# Patient Record
Sex: FEMALE | Race: WHITE | Employment: UNEMPLOYED | URBAN - METROPOLITAN AREA
[De-identification: names, ages, dates, MRNs, and addresses within clinical notes are randomized per-mention and may not be internally consistent; named-entity substitution may affect disease eponyms.]

---

## 2017-03-30 ENCOUNTER — ANESTHESIA EVENT (OUTPATIENT)
Dept: SURGERY | Age: 57
DRG: 824 | End: 2017-03-30
Payer: COMMERCIAL

## 2017-03-30 ENCOUNTER — ANESTHESIA (OUTPATIENT)
Dept: SURGERY | Age: 57
DRG: 824 | End: 2017-03-30
Payer: COMMERCIAL

## 2017-03-30 ENCOUNTER — HOSPITAL ENCOUNTER (INPATIENT)
Age: 57
LOS: 1 days | Discharge: HOME | DRG: 824 | End: 2017-03-31
Attending: INTERNAL MEDICINE | Admitting: INTERNAL MEDICINE
Payer: COMMERCIAL

## 2017-03-30 ENCOUNTER — PATIENT OUTREACH (OUTPATIENT)
Dept: CASE MANAGEMENT | Age: 57
End: 2017-03-30

## 2017-03-30 DIAGNOSIS — R59.1 LYMPHADENOPATHY, GENERALIZED: ICD-10-CM

## 2017-03-30 DIAGNOSIS — R18.8 OTHER ASCITES: Primary | ICD-10-CM

## 2017-03-30 PROBLEM — C85.90 NON HODGKIN'S LYMPHOMA (HCC): Status: ACTIVE | Noted: 2017-03-30

## 2017-03-30 LAB
ALBUMIN SERPL BCP-MCNC: 2.9 G/DL (ref 3.5–5)
ALBUMIN/GLOB SERPL: 0.9 {RATIO} (ref 1.2–3.5)
ALP SERPL-CCNC: 89 U/L (ref 50–136)
ALT SERPL-CCNC: 14 U/L (ref 12–65)
ANION GAP BLD CALC-SCNC: 7 MMOL/L (ref 7–16)
APTT PPP: 23.9 SEC (ref 23.5–31.7)
AST SERPL W P-5'-P-CCNC: 32 U/L (ref 15–37)
BASOPHILS # BLD AUTO: 0 K/UL (ref 0–0.2)
BASOPHILS # BLD: 1 % (ref 0–2)
BILIRUB SERPL-MCNC: 0.6 MG/DL (ref 0.2–1.1)
BUN SERPL-MCNC: 39 MG/DL (ref 6–23)
CALCIUM SERPL-MCNC: 8.8 MG/DL (ref 8.3–10.4)
CHLORIDE SERPL-SCNC: 108 MMOL/L (ref 98–107)
CO2 SERPL-SCNC: 27 MMOL/L (ref 21–32)
CREAT SERPL-MCNC: 1.76 MG/DL (ref 0.6–1)
DIFFERENTIAL METHOD BLD: ABNORMAL
EOSINOPHIL # BLD: 0.5 K/UL (ref 0–0.8)
EOSINOPHIL NFR BLD: 12 % (ref 0.5–7.8)
ERYTHROCYTE [DISTWIDTH] IN BLOOD BY AUTOMATED COUNT: 18.5 % (ref 11.9–14.6)
GLOBULIN SER CALC-MCNC: 3.1 G/DL (ref 2.3–3.5)
GLUCOSE SERPL-MCNC: 70 MG/DL (ref 65–100)
HCT VFR BLD AUTO: 26.9 % (ref 35.8–46.3)
HGB BLD-MCNC: 8.1 G/DL (ref 11.7–15.4)
IMM GRANULOCYTES # BLD: 0 K/UL (ref 0–0.5)
IMM GRANULOCYTES NFR BLD AUTO: 0.2 % (ref 0–5)
INR PPP: 1 (ref 0.9–1.2)
LYMPHOCYTES # BLD AUTO: 20 % (ref 13–44)
LYMPHOCYTES # BLD: 0.8 K/UL (ref 0.5–4.6)
MAGNESIUM SERPL-MCNC: 2 MG/DL (ref 1.8–2.4)
MCH RBC QN AUTO: 26.9 PG (ref 26.1–32.9)
MCHC RBC AUTO-ENTMCNC: 30.1 G/DL (ref 31.4–35)
MCV RBC AUTO: 89.4 FL (ref 79.6–97.8)
MONOCYTES # BLD: 0.4 K/UL (ref 0.1–1.3)
MONOCYTES NFR BLD AUTO: 10 % (ref 4–12)
NEUTS SEG # BLD: 2.5 K/UL (ref 1.7–8.2)
NEUTS SEG NFR BLD AUTO: 57 % (ref 43–78)
PLATELET # BLD AUTO: 193 K/UL (ref 150–450)
PMV BLD AUTO: 9.5 FL (ref 10.8–14.1)
POTASSIUM SERPL-SCNC: 5.1 MMOL/L (ref 3.5–5.1)
PROT SERPL-MCNC: 6 G/DL (ref 6.3–8.2)
PROTHROMBIN TIME: 10.9 SEC (ref 9.6–12)
RBC # BLD AUTO: 3.01 M/UL (ref 4.05–5.25)
SODIUM SERPL-SCNC: 142 MMOL/L (ref 136–145)
URATE SERPL-MCNC: 15.4 MG/DL (ref 2.6–6)
WBC # BLD AUTO: 4.3 K/UL (ref 4.3–11.1)

## 2017-03-30 PROCEDURE — 77030008703 HC TU ET UNCUF COVD -A: Performed by: ANESTHESIOLOGY

## 2017-03-30 PROCEDURE — 77030008477 HC STYL SATN SLP COVD -A: Performed by: ANESTHESIOLOGY

## 2017-03-30 PROCEDURE — 76210000006 HC OR PH I REC 0.5 TO 1 HR: Performed by: SURGERY

## 2017-03-30 PROCEDURE — 74011250636 HC RX REV CODE- 250/636: Performed by: SURGERY

## 2017-03-30 PROCEDURE — 86334 IMMUNOFIX E-PHORESIS SERUM: CPT | Performed by: INTERNAL MEDICINE

## 2017-03-30 PROCEDURE — 74011250637 HC RX REV CODE- 250/637: Performed by: INTERNAL MEDICINE

## 2017-03-30 PROCEDURE — 85025 COMPLETE CBC W/AUTO DIFF WBC: CPT | Performed by: INTERNAL MEDICINE

## 2017-03-30 PROCEDURE — 88305 TISSUE EXAM BY PATHOLOGIST: CPT | Performed by: SURGERY

## 2017-03-30 PROCEDURE — 76060000032 HC ANESTHESIA 0.5 TO 1 HR: Performed by: SURGERY

## 2017-03-30 PROCEDURE — 77030020782 HC GWN BAIR PAWS FLX 3M -B: Performed by: ANESTHESIOLOGY

## 2017-03-30 PROCEDURE — 77030033269 HC SLV COMPR SCD KNE2 CARD -B: Performed by: SURGERY

## 2017-03-30 PROCEDURE — 88342 IMHCHEM/IMCYTCHM 1ST ANTB: CPT | Performed by: SURGERY

## 2017-03-30 PROCEDURE — 74011250636 HC RX REV CODE- 250/636

## 2017-03-30 PROCEDURE — 80053 COMPREHEN METABOLIC PANEL: CPT | Performed by: INTERNAL MEDICINE

## 2017-03-30 PROCEDURE — 85610 PROTHROMBIN TIME: CPT | Performed by: INTERNAL MEDICINE

## 2017-03-30 PROCEDURE — 74011250637 HC RX REV CODE- 250/637: Performed by: NURSE PRACTITIONER

## 2017-03-30 PROCEDURE — 65270000029 HC RM PRIVATE

## 2017-03-30 PROCEDURE — 36415 COLL VENOUS BLD VENIPUNCTURE: CPT | Performed by: INTERNAL MEDICINE

## 2017-03-30 PROCEDURE — 77030011640 HC PAD GRND REM COVD -A: Performed by: SURGERY

## 2017-03-30 PROCEDURE — 74011000250 HC RX REV CODE- 250: Performed by: ANESTHESIOLOGY

## 2017-03-30 PROCEDURE — 83735 ASSAY OF MAGNESIUM: CPT | Performed by: INTERNAL MEDICINE

## 2017-03-30 PROCEDURE — 77030019908 HC STETH ESOPH SIMS -A: Performed by: ANESTHESIOLOGY

## 2017-03-30 PROCEDURE — 77030031139 HC SUT VCRL2 J&J -A: Performed by: SURGERY

## 2017-03-30 PROCEDURE — 84165 PROTEIN E-PHORESIS SERUM: CPT | Performed by: INTERNAL MEDICINE

## 2017-03-30 PROCEDURE — 74011250636 HC RX REV CODE- 250/636: Performed by: ANESTHESIOLOGY

## 2017-03-30 PROCEDURE — 88341 IMHCHEM/IMCYTCHM EA ADD ANTB: CPT | Performed by: SURGERY

## 2017-03-30 PROCEDURE — 74011000250 HC RX REV CODE- 250

## 2017-03-30 PROCEDURE — 07B50ZX EXCISION OF RIGHT AXILLARY LYMPHATIC, OPEN APPROACH, DIAGNOSTIC: ICD-10-PCS | Performed by: SURGERY

## 2017-03-30 PROCEDURE — 85730 THROMBOPLASTIN TIME PARTIAL: CPT | Performed by: INTERNAL MEDICINE

## 2017-03-30 PROCEDURE — 84550 ASSAY OF BLOOD/URIC ACID: CPT | Performed by: INTERNAL MEDICINE

## 2017-03-30 PROCEDURE — 77030018836 HC SOL IRR NACL ICUM -A: Performed by: SURGERY

## 2017-03-30 PROCEDURE — 77030010514 HC APPL CLP LIG COVD -B: Performed by: SURGERY

## 2017-03-30 PROCEDURE — 83615 LACTATE (LD) (LDH) ENZYME: CPT | Performed by: INTERNAL MEDICINE

## 2017-03-30 PROCEDURE — 76010000138 HC OR TIME 0.5 TO 1 HR: Performed by: SURGERY

## 2017-03-30 PROCEDURE — 74011000250 HC RX REV CODE- 250: Performed by: SURGERY

## 2017-03-30 RX ORDER — SUCCINYLCHOLINE CHLORIDE 20 MG/ML
INJECTION INTRAMUSCULAR; INTRAVENOUS AS NEEDED
Status: DISCONTINUED | OUTPATIENT
Start: 2017-03-30 | End: 2017-03-30 | Stop reason: HOSPADM

## 2017-03-30 RX ORDER — BUPIVACAINE HYDROCHLORIDE AND EPINEPHRINE 2.5; 5 MG/ML; UG/ML
INJECTION, SOLUTION EPIDURAL; INFILTRATION; INTRACAUDAL; PERINEURAL AS NEEDED
Status: DISCONTINUED | OUTPATIENT
Start: 2017-03-30 | End: 2017-03-30 | Stop reason: HOSPADM

## 2017-03-30 RX ORDER — LIDOCAINE HYDROCHLORIDE 10 MG/ML
0.1 INJECTION INFILTRATION; PERINEURAL AS NEEDED
Status: DISCONTINUED | OUTPATIENT
Start: 2017-03-30 | End: 2017-03-30 | Stop reason: HOSPADM

## 2017-03-30 RX ORDER — LISINOPRIL 20 MG/1
20 TABLET ORAL DAILY
Status: DISCONTINUED | OUTPATIENT
Start: 2017-03-31 | End: 2017-03-31 | Stop reason: HOSPADM

## 2017-03-30 RX ORDER — HYDROCODONE BITARTRATE AND ACETAMINOPHEN 5; 325 MG/1; MG/1
1 TABLET ORAL
Status: DISCONTINUED | OUTPATIENT
Start: 2017-03-30 | End: 2017-03-31

## 2017-03-30 RX ORDER — SODIUM CHLORIDE, SODIUM LACTATE, POTASSIUM CHLORIDE, CALCIUM CHLORIDE 600; 310; 30; 20 MG/100ML; MG/100ML; MG/100ML; MG/100ML
100 INJECTION, SOLUTION INTRAVENOUS CONTINUOUS
Status: DISCONTINUED | OUTPATIENT
Start: 2017-03-30 | End: 2017-03-30 | Stop reason: HOSPADM

## 2017-03-30 RX ORDER — SODIUM CHLORIDE 0.9 % (FLUSH) 0.9 %
5-10 SYRINGE (ML) INJECTION AS NEEDED
Status: DISCONTINUED | OUTPATIENT
Start: 2017-03-30 | End: 2017-03-30 | Stop reason: HOSPADM

## 2017-03-30 RX ORDER — PANTOPRAZOLE SODIUM 40 MG/1
40 TABLET, DELAYED RELEASE ORAL
Status: DISCONTINUED | OUTPATIENT
Start: 2017-03-31 | End: 2017-03-31 | Stop reason: HOSPADM

## 2017-03-30 RX ORDER — GABAPENTIN 300 MG/1
300 CAPSULE ORAL
Status: DISCONTINUED | OUTPATIENT
Start: 2017-03-30 | End: 2017-03-31 | Stop reason: HOSPADM

## 2017-03-30 RX ORDER — NEOSTIGMINE METHYLSULFATE 1 MG/ML
INJECTION INTRAVENOUS AS NEEDED
Status: DISCONTINUED | OUTPATIENT
Start: 2017-03-30 | End: 2017-03-30 | Stop reason: HOSPADM

## 2017-03-30 RX ORDER — ONDANSETRON 2 MG/ML
INJECTION INTRAMUSCULAR; INTRAVENOUS AS NEEDED
Status: DISCONTINUED | OUTPATIENT
Start: 2017-03-30 | End: 2017-03-30 | Stop reason: HOSPADM

## 2017-03-30 RX ORDER — PROMETHAZINE HYDROCHLORIDE 25 MG/1
25 TABLET ORAL
Status: DISCONTINUED | OUTPATIENT
Start: 2017-03-30 | End: 2017-03-31 | Stop reason: HOSPADM

## 2017-03-30 RX ORDER — GLYCOPYRROLATE 0.2 MG/ML
INJECTION INTRAMUSCULAR; INTRAVENOUS AS NEEDED
Status: DISCONTINUED | OUTPATIENT
Start: 2017-03-30 | End: 2017-03-30 | Stop reason: HOSPADM

## 2017-03-30 RX ORDER — FENTANYL CITRATE 50 UG/ML
INJECTION, SOLUTION INTRAMUSCULAR; INTRAVENOUS AS NEEDED
Status: DISCONTINUED | OUTPATIENT
Start: 2017-03-30 | End: 2017-03-30 | Stop reason: HOSPADM

## 2017-03-30 RX ORDER — ROCURONIUM BROMIDE 10 MG/ML
INJECTION, SOLUTION INTRAVENOUS AS NEEDED
Status: DISCONTINUED | OUTPATIENT
Start: 2017-03-30 | End: 2017-03-30 | Stop reason: HOSPADM

## 2017-03-30 RX ORDER — IPRATROPIUM BROMIDE AND ALBUTEROL SULFATE 2.5; .5 MG/3ML; MG/3ML
3 SOLUTION RESPIRATORY (INHALATION)
Status: DISCONTINUED | OUTPATIENT
Start: 2017-03-30 | End: 2017-03-30

## 2017-03-30 RX ORDER — ONDANSETRON 2 MG/ML
4 INJECTION INTRAMUSCULAR; INTRAVENOUS
Status: DISCONTINUED | OUTPATIENT
Start: 2017-03-30 | End: 2017-03-31 | Stop reason: HOSPADM

## 2017-03-30 RX ORDER — LEVALBUTEROL INHALATION SOLUTION 0.63 MG/3ML
0.63 SOLUTION RESPIRATORY (INHALATION)
Status: COMPLETED | OUTPATIENT
Start: 2017-03-30 | End: 2017-03-30

## 2017-03-30 RX ORDER — LIDOCAINE HYDROCHLORIDE 20 MG/ML
INJECTION, SOLUTION EPIDURAL; INFILTRATION; INTRACAUDAL; PERINEURAL AS NEEDED
Status: DISCONTINUED | OUTPATIENT
Start: 2017-03-30 | End: 2017-03-30 | Stop reason: HOSPADM

## 2017-03-30 RX ORDER — OXYCODONE HYDROCHLORIDE 5 MG/1
5 TABLET ORAL
Status: DISCONTINUED | OUTPATIENT
Start: 2017-03-30 | End: 2017-03-30 | Stop reason: HOSPADM

## 2017-03-30 RX ORDER — HYDROMORPHONE HYDROCHLORIDE 2 MG/ML
0.5 INJECTION, SOLUTION INTRAMUSCULAR; INTRAVENOUS; SUBCUTANEOUS
Status: DISCONTINUED | OUTPATIENT
Start: 2017-03-30 | End: 2017-03-30 | Stop reason: HOSPADM

## 2017-03-30 RX ORDER — MIDAZOLAM HYDROCHLORIDE 1 MG/ML
2 INJECTION, SOLUTION INTRAMUSCULAR; INTRAVENOUS ONCE
Status: DISCONTINUED | OUTPATIENT
Start: 2017-03-30 | End: 2017-03-30 | Stop reason: HOSPADM

## 2017-03-30 RX ORDER — CEFAZOLIN SODIUM IN 0.9 % NACL 2 G/50 ML
2-3 INTRAVENOUS SOLUTION, PIGGYBACK (ML) INTRAVENOUS ONCE
Status: DISCONTINUED | OUTPATIENT
Start: 2017-03-30 | End: 2017-03-30 | Stop reason: DRUGHIGH

## 2017-03-30 RX ORDER — SODIUM CHLORIDE 0.9 % (FLUSH) 0.9 %
5-10 SYRINGE (ML) INJECTION EVERY 8 HOURS
Status: DISCONTINUED | OUTPATIENT
Start: 2017-03-30 | End: 2017-03-30 | Stop reason: HOSPADM

## 2017-03-30 RX ORDER — FENTANYL CITRATE 50 UG/ML
100 INJECTION, SOLUTION INTRAMUSCULAR; INTRAVENOUS ONCE
Status: DISCONTINUED | OUTPATIENT
Start: 2017-03-30 | End: 2017-03-30 | Stop reason: HOSPADM

## 2017-03-30 RX ORDER — PROPOFOL 10 MG/ML
INJECTION, EMULSION INTRAVENOUS AS NEEDED
Status: DISCONTINUED | OUTPATIENT
Start: 2017-03-30 | End: 2017-03-30 | Stop reason: HOSPADM

## 2017-03-30 RX ORDER — MIDAZOLAM HYDROCHLORIDE 1 MG/ML
2 INJECTION, SOLUTION INTRAMUSCULAR; INTRAVENOUS
Status: COMPLETED | OUTPATIENT
Start: 2017-03-30 | End: 2017-03-30

## 2017-03-30 RX ORDER — DEXAMETHASONE SODIUM PHOSPHATE 4 MG/ML
INJECTION, SOLUTION INTRA-ARTICULAR; INTRALESIONAL; INTRAMUSCULAR; INTRAVENOUS; SOFT TISSUE AS NEEDED
Status: DISCONTINUED | OUTPATIENT
Start: 2017-03-30 | End: 2017-03-30 | Stop reason: HOSPADM

## 2017-03-30 RX ADMIN — ROCURONIUM BROMIDE 10 MG: 10 INJECTION, SOLUTION INTRAVENOUS at 19:38

## 2017-03-30 RX ADMIN — CEFAZOLIN 3 G: 1 INJECTION, POWDER, FOR SOLUTION INTRAMUSCULAR; INTRAVENOUS; PARENTERAL at 19:44

## 2017-03-30 RX ADMIN — LIDOCAINE HYDROCHLORIDE 60 MG: 20 INJECTION, SOLUTION EPIDURAL; INFILTRATION; INTRACAUDAL; PERINEURAL at 19:38

## 2017-03-30 RX ADMIN — SUCCINYLCHOLINE CHLORIDE 140 MG: 20 INJECTION INTRAMUSCULAR; INTRAVENOUS at 19:38

## 2017-03-30 RX ADMIN — HYDROCODONE BITARTRATE AND ACETAMINOPHEN 1 TABLET: 5; 325 TABLET ORAL at 15:01

## 2017-03-30 RX ADMIN — DEXAMETHASONE SODIUM PHOSPHATE 4 MG: 4 INJECTION, SOLUTION INTRA-ARTICULAR; INTRALESIONAL; INTRAMUSCULAR; INTRAVENOUS; SOFT TISSUE at 19:56

## 2017-03-30 RX ADMIN — LEVALBUTEROL HYDROCHLORIDE 0.63 MG: 0.63 SOLUTION RESPIRATORY (INHALATION) at 20:35

## 2017-03-30 RX ADMIN — Medication 10 ML: at 21:30

## 2017-03-30 RX ADMIN — ONDANSETRON 4 MG: 2 INJECTION INTRAMUSCULAR; INTRAVENOUS at 19:54

## 2017-03-30 RX ADMIN — ROCURONIUM BROMIDE 20 MG: 10 INJECTION, SOLUTION INTRAVENOUS at 19:47

## 2017-03-30 RX ADMIN — SODIUM CHLORIDE, SODIUM LACTATE, POTASSIUM CHLORIDE, AND CALCIUM CHLORIDE 100 ML/HR: 600; 310; 30; 20 INJECTION, SOLUTION INTRAVENOUS at 17:22

## 2017-03-30 RX ADMIN — FENTANYL CITRATE 50 MCG: 50 INJECTION, SOLUTION INTRAMUSCULAR; INTRAVENOUS at 20:05

## 2017-03-30 RX ADMIN — FENTANYL CITRATE 50 MCG: 50 INJECTION, SOLUTION INTRAMUSCULAR; INTRAVENOUS at 20:09

## 2017-03-30 RX ADMIN — GLYCOPYRROLATE 0.4 MG: 0.2 INJECTION INTRAMUSCULAR; INTRAVENOUS at 20:04

## 2017-03-30 RX ADMIN — NEOSTIGMINE METHYLSULFATE 4 MG: 1 INJECTION INTRAVENOUS at 20:04

## 2017-03-30 RX ADMIN — FENTANYL CITRATE 100 MCG: 50 INJECTION, SOLUTION INTRAMUSCULAR; INTRAVENOUS at 19:35

## 2017-03-30 RX ADMIN — PROPOFOL 200 MG: 10 INJECTION, EMULSION INTRAVENOUS at 19:38

## 2017-03-30 RX ADMIN — MIDAZOLAM HYDROCHLORIDE 2 MG: 1 INJECTION, SOLUTION INTRAMUSCULAR; INTRAVENOUS at 19:25

## 2017-03-30 RX ADMIN — GABAPENTIN 300 MG: 300 CAPSULE ORAL at 22:30

## 2017-03-30 NOTE — CONSULTS
[facility]  H&P/Consult Note     Marisol Albarado  MRN: [id number]  BOJ:[birth year]  Age:56 y.o.    HPI: Marisol Albarado is a 64 y.o.  female who was admitted today by the Oncology service for expedited lymphoma evaluation. We have been consulted to obtain axilla node biopsy. She reports that she has been NPO all day and did not take her usual morning ASA. She is anxious to have biopsy and remaining work-up completed. PMHx listed below. She reports prior surgeries: cholecystectomy, L achilles tendon, bilat TKA. She does not smoke, social ETOH. Past Medical History:   Diagnosis Date    Anemia     in high school, \"received gamma globulin twice a week for awhile\"    Arthritis     osteo-r knee    Basal cell carcinoma     Followed by Dermatology    Chronic pain     KNEEs    Hypertension 10/4/2011    medication    Morbid obesity ([address]     Murmur     \"had it my whole life\", did not appreciate murmur 9/12/2011 during assessment    Non Hodgkin's lymphoma ([address] 3/30/2017    Other ill-defined conditions(799.89)     skin bumps-med as needed    Overactive bladder     Rheumatic fever     Possibly as a child    Shingles     Thromboembolus ([address] x2    LLE-calf-1990?-only took ASA-resolved in less than a wk-\"a little burned area-not examined\"    Thyroid disease     hypo-medication    Unspecified adverse effect of anesthesia     pt reports waking several times with knee surgery-spinal     Past Surgical History:   Procedure Laterality Date    [facility] CHOLECYSTECTOMY FNC41      HX CHOLECYSTECTOMY  1983    HX ORTHOPAEDIC  2012    right TKA    HX ORTHOPAEDIC  2013    left TKA. Dr. Lukasz Arrington.  IN ANESTH,ACHILLES TENDON SURG  2/10/2011    LEFT. Dr. Joseph Ceja.       Current Facility-Administered Medications   Medication Dose Route Frequency    [START ON 3/31/2017] levothyroxine (SYNTHROID) tablet 125 mcg  125 mcg Oral ACB    [START ON 3/31/2017] lisinopril (PRINIVIL, ZESTRIL) tablet 20 mg  20 mg Oral DAILY    [START ON 3/31/2017] pantoprazole (PROTONIX) tablet 40 mg  40 mg Oral ACB    promethazine (PHENERGAN) tablet 25 mg  25 mg Oral Q6H PRN    HYDROcodone-acetaminophen (NORCO) 5-325 mg per tablet 1 Tab  1 Tab Oral Q4H PRN    gabapentin (NEURONTIN) capsule 300 mg  300 mg Oral QHS    ondansetron (ZOFRAN) injection 4 mg  4 mg IntraVENous Q4H PRN     Review of patient's allergies indicates no known allergies. Social History     Social History    Marital status:      Spouse name: N/A    Number of children: N/A    Years of education: N/A     Social History Main Topics    Smoking status: Never Smoker    Smokeless tobacco: Never Used    Alcohol use Yes      Comment: RARE, ONCE/TWICE A YEAR    Drug use: Yes     Special: Prescription    Sexual activity: Not on file     Other Topics Concern    Not on file     Social History Narrative    10/3/11:  PATIENT IS  TO [address] [place], [place] [address] AND DAUGHTER. SHE IS DISABLED. History   Smoking Status    Never Smoker   Smokeless Tobacco    Never Used     Family History   Problem Relation Age of Onset    Post-op Nausea/Vomiting Other      X3    Breast Cancer Other 28     cousin    Kidney Disease Father      RENAL FAILURE    Other Son     Other Daughter     Other Maternal Grandmother      Vascular Disorder with leg amputation    Liver Disease Sister      non-alcoholic    Celiac Disease Sister     Malignant Hyperthermia Neg Hx     Pseudocholinesterase Deficiency Neg Hx     Delayed Awakening Neg Hx     Emergence Delirium Neg Hx     Post-op Cognitive Dysfunction Neg Hx      ROS: The patient has no difficulty with chest pain, +shortness of breath. +fever, +chills. +abd pain, + N/V. Comprehensive review of systems was otherwise unremarkable except as noted above.     Physical Exam:   Visit Vitals    /71 (BP 1 Location: Right arm, BP Patient Position: At rest)    Pulse 88    Temp 100.2 °F (37.9 °C)    Resp 18    Wt 122.9 kg (271 lb)    LMP 04/21/2015    SpO2 98%    BMI 48.01 kg/m2     Constitutional: Alert, oriented, cooperative patient in no acute distress; appears stated age    Eyes: Sclera are clear. EOMs intact  ENMT: No external lesions, gross hearing normal; no ear or lip lesions, nares normal  CV: RRR, S1S2. Normal perfusion, pulses palpable  Resp: No JVD. Breathing is non-labored; no audible wheezing, BBS clear, on RA    GI: Obese, protuberant but soft, mildly diffusely tender, few BS  Musculoskeletal: Unremarkable with normal function. No embolic signs or cyanosis. Neuro: Alert, oriented; moves all 4; no focal deficits  Psychiatric: Normal affect and mood, mildly anxious, no memory impairment  Lymph: R neck palpable cervical node, R axilla palpable node -- ttp    Recent vitals (if inpt):  Patient Vitals for the past 24 hrs:   BP Temp Pulse Resp SpO2 Weight   03/30/17 1424 119/71 100.2 °F (37.9 °C) 88 18 98 % -   03/30/17 1345 113/76 100.1 °F (37.8 °C) 91 18 96 % 122.9 kg (271 lb)       Labs:  No results for input(s): WBC, HGB, PLT, NA, K, CL, CO2, BUN, CREA, GLU, PTP, INR, APTT, TBIL, TBILI, CBIL, SGOT, GPT, ALT, AP, AML, LPSE, LCAD, NH4, TROPT, TROIQ, PCO2, PO2, HCO3, HGBEXT, PLTEXT in the last 72 hours. No lab exists for component:  PH, INREXT    Lab Results   Component Value Date/Time    WBC 4.7 02/10/2017 02:43 PM    HGB 9.8 02/10/2017 02:43 PM    PLATELET 778 42/04/9383 02:43 PM    Sodium 142 06/07/2016 08:08 AM    Potassium 4.8 06/07/2016 08:08 AM    Chloride 100 06/07/2016 08:08 AM    CO2 25 06/07/2016 08:08 AM    BUN 16 06/07/2016 08:08 AM    Creatinine 0.73 06/07/2016 08:08 AM    Glucose 110 06/07/2016 08:08 AM    INR 2.1 09/23/2013 12:11 PM    aPTT 27.2 07/30/2013 09:15 AM    Bilirubin, total 0.4 06/07/2016 08:08 AM    AST (SGOT) 23 06/07/2016 08:08 AM    ALT (SGPT) 23 06/07/2016 08:08 AM    Alk.  phosphatase 104 06/07/2016 08:08 AM     Admission date (for inpatients): 3/30/2017   * No surgery found * * No surgery found *    ASSESSMENT/PLAN:  Problem List  Date Reviewed: 3/6/2017          Codes Class Noted    Non Hodgkin's lymphoma ([facility] ICD-10-CM: C85.90  ICD-9-CM: 202.80  3/30/2017        Ascites ICD-10-CM: R18.8  ICD-9-CM: 789.59  3/30/2017        Lymphadenopathy, generalized ICD-10-CM: R59.1  ICD-9-CM: 785.6  3/30/2017        Osteoarthritis of left knee ICD-10-CM: M17.9  ICD-9-CM: 715.96  8/26/2013        Morbid obesity ([facility] ICD-10-CM: E66.01  ICD-9-CM: 278.01  10/4/2011        History of DVT of lower extremity ICD-10-CM: G30.389  ICD-9-CM: V12.51  10/4/2011    Overview Signed 10/4/2011  1:44 AM by Elayne Coto NP     X 2               Hypertension ICD-10-CM: I10  ICD-9-CM: 401.9  10/4/2011    Overview Signed 10/4/2011  1:44 AM by Elayne Coto NP     PRE OP             Heart murmur ICD-10-CM: R01.1  ICD-9-CM: 785.2  10/4/2011        Osteoarthritis of right knee ICD-10-CM: M17.9  ICD-9-CM: 715.96  10/3/2011        S/P total knee replacement using cement ICD-10-CM: I17.183  ICD-9-CM: V43.65  10/3/2011            Active Problems:    Non Hodgkin's lymphoma ([facility] (3/30/2017)      Ascites (3/30/2017)      Lymphadenopathy, generalized (3/30/2017)       PLAN:    Cont management/work-up -- per Oncology  NPO now  Obtain consent     Further recommendations following evaluation by Dr. Job Brittle      Signed:  PETE Dobson

## 2017-03-30 NOTE — PROGRESS NOTES
TRANSFER - OUT REPORT:    Verbal report given to RN(name) on Eamon Navas  being transferred to OR(unit) for ordered procedure       Report consisted of patients Situation, Background, Assessment and   Recommendations(SBAR). Information from the following report(s) SBAR was reviewed with the receiving nurse. Opportunity for questions and clarification was provided.

## 2017-03-30 NOTE — H&P
[facility]: Inpatient Hematology / Oncology History & Physical Note    Reason for Admission:  Lymphoma evaluation  PCP Physician:  Gauri Pardo MD    History of Present Illness:  65 y/o female in previously good health, seen as an urgent work-in in clinic for lymphadenopathy. She was having abdominal pain and swelling which has progressed over the last several weeks, CT was recommended but was initially denied by insurance. She had GI evaluation including EGD/colonoscopy which showed no intraluminal pathology, but finally had a CT at [facility] last week that showed diffuse lymphadenopathy with moderate ascites, splenomegaly, and possible infiltration of the left kidney, all consistent with lymphoproliferative disorder. She is scheduled for biopsy by IR at [facility] tomorrow, but presented to clinic today with these complaints, and we felt that she needed expedited work-up and management of her ascites, abdominal pain, anorexia/weight loss. Other symptoms include severe right jaw/facial pain, only partially relieved with dental numbing agents, and diarrhea improved with Imodium. Review of Systems:  Constitutional: Positive for chills, diaphoresis and fever. HENT: Positive for hearing loss (left ear ---one day) and nosebleeds. Eyes: Positive for blurred vision (wears glasses ). Respiratory: Positive for shortness of breath. Cardiovascular: Negative. Gastrointestinal: Positive for abdominal pain, heartburn, nausea and vomiting. Genitourinary: Positive for dysuria (about once a month ). Musculoskeletal: Positive for back pain (history ) and neck pain. Skin: Positive for itching (arms for 2 years). Neurological: Positive for dizziness, tremors and weakness. Endo/Heme/Allergies: Negative. Psychiatric/Behavioral: The patient is nervous/anxious. All other systems reviewed and are negative.     No Known Allergies  Past Medical History:   Diagnosis Date    Anemia in high school, \"received gamma globulin twice a week for awhile\"    Arthritis     osteo-r knee    Basal cell carcinoma     Followed by Dermatology    Chronic pain     KNEEs    Hypertension 10/4/2011    medication    Morbid obesity ([facility]     Murmur     \"had it my whole life\", did not appreciate murmur 9/12/2011 during assessment    Non Hodgkin's lymphoma ([facility] 3/30/2017    Other ill-defined conditions(799.89)     skin bumps-med as needed    Overactive bladder     Rheumatic fever     Possibly as a child    Shingles     Thromboembolus ([facility] x2    LLE-calf-1990?-only took ASA-resolved in less than a wk-\"a little burned area-not examined\"    Thyroid disease     hypo-medication    Unspecified adverse effect of anesthesia     pt reports waking several times with knee surgery-spinal     Past Surgical History:   Procedure Laterality Date    [facility] CHOLECYSTECTOMY FNC41      HX CHOLECYSTECTOMY  1983    HX ORTHOPAEDIC  2012    right TKA    HX ORTHOPAEDIC  2013    left TKA. Dr. Maria Luisa Odonnell.  [place] ANESTH,ACHILLES TENDON SURG  2/10/2011    LEFT. Dr. Todd Heard. Family History   Problem Relation Age of Onset    Post-op Nausea/Vomiting Other      X3    Breast Cancer Other 28     cousin    Kidney Disease Father      RENAL FAILURE    Other Son     Other Daughter     Other Maternal Grandmother      Vascular Disorder with leg amputation    Liver Disease Sister      non-alcoholic    Celiac Disease Sister     Malignant Hyperthermia Neg Hx     Pseudocholinesterase Deficiency Neg Hx     Delayed Awakening Neg Hx     Emergence Delirium Neg Hx     Post-op Cognitive Dysfunction Neg Hx      Social History     Social History    Marital status:      Spouse name: N/A    Number of children: N/A    Years of education: N/A     Occupational History    Not on file.      Social History Main Topics    Smoking status: Never Smoker    Smokeless tobacco: Never Used    Alcohol use Yes      Comment: RARE, ONCE/TWICE A YEAR    Drug use: Yes     Special: Prescription    Sexual activity: Not on file     Other Topics Concern    Not on file     Social History Narrative    10/3/11:  PATIENT IS  TO Aleksandra Singh AND DAUGHTER. SHE IS DISABLED. Current Outpatient Prescriptions   Medication Sig Dispense Refill    promethazine (PHENERGAN) 25 mg tablet Take 25 mg by mouth every six (6) hours as needed for Nausea. Unsure of dose      lidocaine (XYLOCAINE) 5 % ointment Apply  to affected area as needed for Pain. 15 g 1    ondansetron hcl (ZOFRAN, AS HYDROCHLORIDE,) 4 mg tablet Take 1 Tab by mouth every eight (8) hours as needed for Nausea. 60 Tab 3    aspirin 81 mg chewable tablet Take 81 mg by mouth daily.  levothyroxine (SYNTHROID) 125 mcg tablet Take 1 Tab by mouth Daily (before breakfast). 90 Tab 1    lisinopril (PRINIVIL, ZESTRIL) 20 mg tablet Take 1 Tab by mouth daily. 90 Tab 3    omeprazole (PRILOSEC) 20 mg capsule Take 1 Cap by mouth daily. 90 Cap 3    CHOLECALCIFEROL, VITAMIN D3, (VITAMIN D3 PO) Take  by mouth. OBJECTIVE:  No data found. Temp (24hrs), Av.4 °F (37.4 °C), Min:99.4 °F (37.4 °C), Max:99.4 °F (37.4 °C)         Physical Exam:  Constitutional: Well developed, well nourished female in no acute distress, sitting comfortably in the exam room chair. HEENT: Normocephalic and atraumatic. Oropharynx is clear, mucous membranes are moist.  Sclerae anicteric. Neck supple without JVD. No thyromegaly present. Lymph node   Palpable right posterior cervical and right axillary nodes, both approximately 1-2 cm. Skin Warm and dry. No bruising and no rash noted. No erythema. No pallor. Respiratory Lungs are clear to auscultation bilaterally without wheezes, rales or rhonchi, normal air exchange without accessory muscle use. CVS Normal rate, regular rhythm and normal S1 and S2. No murmurs, gallops, or rubs.    Abdomen Soft, obese, nontender and moderately distended, normoactive bowel sounds. No palpable mass. Cannot palpate hepatosplenomegaly due to ascites and body habitus. Neuro Grossly nonfocal with no obvious sensory or motor deficits. MSK Normal range of motion in general.  No edema and no tenderness. Psych Appropriate mood and affect. Labs:    No results found for this or any previous visit (from the past 24 hour(s)). Imaging:  [id number] CT ABDOMEN PELVIS Natasha Moreno ACCESSION NO: [id number]  ORDERED BY: Chandan Cohen. Jet Mosley MD    DATE OF EXAM: 03/22/2017 14:00  REASON FOR EXAM: ^abd pain, pelvic pain, weight loss 50lbs and anemia - neg GI w/    ADMISSION DATE: 03/22/2017 13:37    CT OF THE ABDOMEN AND PELVIS    A CT scan of the abdomen and pelvis was performed.  The clinical history is that of abdominal pain, pelvic pain, weight loss, and anemia. Bob Rosin study was performed after the administration of both oral and intravenous contrast.  Approximately 100 mL Omnipaque 350was administered intravenously without adverse reaction. Comparison: None . LOWER CHEST :I believe the heart is mildly enlarged. The patient has a small left pleural effusion.  The patient has several cardiophrenic angle lymph nodes on the right.  The largest of these measures about 10 mm in short axis diameter.  There is either a focal thickening involving the pericardium anteriorly near the base of the heart or there is an enlarged lymph node abutting the pericardium.  There are small lymph nodes also noted the region of the left cardiophrenic angle.  The largest these measures about 12 mm in short axis diameter. ABDOMEN:The liver is normal in size.  Note is made an approximately 16 mm low attenuation lesion involving the dome of the right lobe the liver (segment 8).   This is indeterminate in etiology.  Note is made of A 24 mm low attenuation lesion involving segment 5 of the right lobe the liver.  Based on its low attenuation is most likely represents a benign cyst. The spleen is enlarged.  It measures approximately 22.6 cm in length.  I do not see any focal lesions within the spleen. The pancreas appears grossly normal.  There are findings consistent with that of abnormal lymphadenopathy in the abdomen.  There is abnormal lymphadenopathy noted in the region of the jere hepatis, about the celiac axis, and in the portacaval region.  A lymph node anterior to the division of the celiac axis measures approximately 21 mm in short axis diameter. The patient also has retroperitoneal lymphadenopathy, most prominent in the left para-aortic region. This is continuous with abnormal soft tissue abutting   the central aspect of the left kidney.  For this reason, I cannot definitely exclude associated tumor involving the left kidney. There is what I believe to be an abnormal appearance of the central aspect of the left kidney. Strictly speaking, I would not be of exclude tumor in the left renal vein.  The distal left renal vein approaching the inferior vena cava is normal in appearance.  There are abnormal soft tissue densities noted about both kidneys, findings which are suspicious for tumor. There is abnormal lymphadenopathy anterior the lower IVC.  There is a small amount of ascites in the abdomen. PELVIS:In the pelvis, there are findings consistent with that of bilateral common iliac lymphadenopathy.  A right common iliac lymph node measures approximately 16 mm in short axis diameter.  I believe there is mild external iliac lymphadenopathy bilaterally.  There is a moderate-to-large amount of ascites in the pelvis.  There is mild increased density involving the subcutaneous fat, findings which could be seen with generalized anasarca. BONY SKLETON :No unexpected bony abnormality is identified.  There are degenerative changes involving the spine. IMPRESSION:  1.  THE PATIENT HAS MODERATE TO MARKED SPLENOMEGALY.   2.  THE PATIENT HAS FINDINGS CONSISTENT WITH THAT OF LYMPHADENOPATHY IN THE LOWER CHEST , ABDOMEN, AND PELVIS.  THE FINDINGS ARE BELIEVED TO BE RELATED TO UNDERLYING MALIGNANCY.  CERTAINLY FINDINGS COULD BE SEEN WITH LYMPHOMA. 3.  ABNORMAL APPEARANCE OF THE LEFT KIDNEY, FINDINGS WHICH COULD ALSO BE SEEN WITH TUMOR SUCH AS LYMPHOMA.  INVOLVEMENT OF THE LEFT RENAL VEIN CANNOT BE EXCLUDED. 4.  SOFT TISSUE DENSITIES ABOUT BOTH KIDNEYS ARE NOTED, FINDINGS WHICH ARE MOST LIKELY RELATED TUMOR. 5.  THERE IS 16 MM LOW ATTENUATION LESION IN THE DOME OF THE RIGHT LOBE THE LIVER, WHICH IS INDETERMINATE IN ETIOLOGY.  THERE IS A SECOND LESION IN THE SEGMENT 5 OF THE RIGHT LOBE THE LIVER, WHICH MOST LIKELY REPRESENTS A BENIGN LESION.   6.  THERE IS A SMALL AMOUNT OF ASCITES IN THE ABDOMEN, WITH A MODERATE AMOUNT OF ASCITES IN THE PELVIS    ASSESSMENT:    Active Problems:    Non Hodgkin's lymphoma ([facility] (3/30/2017)        PLAN:  Lymphadenopathy  Highly suspicious for lymphoma  She needs excisional biopsy rather than a core biopsy, easily palpable cervical or axillary nodes should be accessible  Consult gen surg - spoke with Thad Santos who will take her to OR tomorrow  BMBx in IR  Labs including LD, uric acid, SPEP, hep panel  Therapy depending on etiology    Ascites  Likely secondary to lymphoproliferative disorder  OK to wait on paracentesis for now    Pain  Worse in right jaw, sounds neuropathic in nature but atypical  Try opioids and add gabapentin to see if this helps  May need imaging to evaluate trigeminal V3 branch to make sure nothing structural    Proph  Hold pharmacologic DVT proph due to impending procedures  Hopeful for d/c in next day or two                Rama Villa, [address] [place] [address] Rd  [address] [address]  Office : [phone]  Fax : [phone]

## 2017-03-30 NOTE — ACP (ADVANCE CARE PLANNING)
Pt was seen for initial visit with Dr. Braydon Pérez. Plan is to do excisional biopsy and bone marrow biopsy inpatient and PET scan next week. Pt will follow up with Dr. Braydon Pérez after she is discharged from the hospital to discuss results of biopsies/scans and will discuss treatment options at that time. Referral for palliative care made per family's request.  Will continue to follow.

## 2017-03-30 NOTE — PROGRESS NOTES
TRANSFER - IN REPORT:    Verbal report received from RN(name) on Patricia Allis  being received from office(unit) for routine progression of care      Report consisted of patients Situation, Background, Assessment and   Recommendations(SBAR). Information from the following report(s) SBAR was reviewed with the receiving nurse. Opportunity for questions and clarification was provided. Assessment completed upon patients arrival to unit and care assumed.

## 2017-03-30 NOTE — PROGRESS NOTES
Bedside shift change report given to  (oncoming nurse) by Juan J Dennis RN (offgoing nurse). Report included the following information SBAR and MAR. Received as direct admit from Dr. Arely Minor. Family at bedside. Pt and family appear anxious.  Pt to OR for LN Bx.

## 2017-03-30 NOTE — IP AVS SNAPSHOT
[facility] 
 
 
 [address] 
[phone] Patient: Bekah Sanchez MRN: [id number] SPU:[id number] You are allergic to the following No active allergies Recent Documentation Weight BMI OB Status Smoking Status  
  
  
 122.9 kg 48.01 kg/m2 Postmenopausal Never Smoker Unresulted Labs Order Current Status LD, ISOENZYMES In process PROTEIN ELEC WITH TITA, SERUM Preliminary result Emergency Contacts Name Discharge Info Relation Home Work Mobile Ngozi Toledo  Spouse [[phone] Lesleigh Lesches  Son [22] [phone] 69 [facility]  Other Relative [6] [phone] About your hospitalization You were admitted on:  March 30, 2017 You last received care in the:  [address] You were discharged on:  March 31, 2017 Unit phone number:  [phone] Why you were hospitalized Your primary diagnosis was:  Not on File Your diagnoses also included:  Non Hodgkin's Lymphoma (Hcc), Ascites, Lymphadenopathy, Generalized Providers Seen During Your Hospitalizations Provider Role Specialty Primary office phone Jerome Ventura MD Attending Provider Hematology and Oncology [phone] Your Primary Care Physician (PCP) Primary Care Physician Office Phone Office Fax [facility] [phone] Follow-up Information Follow up With Details Comments Contact Info Jerome Ventura MD On 4/7/2017 labs at 11:00 see Dr. rAmando Duenas at 11:30 [address] [address] [address] 
[phone] Your Appointments Thursday April 06, 2017  9:45 AM EDT Office Visit with Sheyla Yu MD  
[address] [place] [address] [address] [place] [address] [address] [facility] [address]  
[phone]  Thursday April 06, 2017  4:00 PM EDT  
 NM PET/CT DOSE with [facility] NM PET/CT INJ RM  
[facility] [address] [facility] [address]  
[phone] * FOR ALL APPOINTMENTS BEFORE NOON: Nothing to eat or drink after midnight except for water ONLY. * AFTERNOON APPOINTMENTS: May eat a light breakfast, but without carbohydrates or sugars (We have a list of suggested foods). They must be NPO except for water for at least 4 hours before their appointment time. Patient should be well hydrated (at least 24 oz of water). Do not participate in strenuous activity the day before or the morning before afternoon appointments. Diabetic patients should refrain from insulin 4 hours prior to their appointment. No pregnant patients may have a PET/CT exam, breast feeding mothers should pump enough breast milk for 24 hours as they will not be able to breast feed for 1 day after the scan. Contact Nuclear Medicine with any questions. Procedure takes anywhere from 2-3 hours. If the patient requires pain or anxiety medications, arrangements must be made prior to patient's arrival with their ordering physician. The patient should wait 8 weeks after radiation therapy and 2 weeks after chemotherapy has been completed. * PATIENT ARRIVAL Please report 30 minutes early to check in, except for 7 am patient 7am arrival.  
  
    
 Friday April 07, 2017 11:05 AM EDT  
LAB with [address]  
[address] [place] [address] [employer]ANCE ([facility] Linnea Pratt [address] [place], [place] [phone] Friday April 07, 2017 11:30 AM EDT Follow Up with Lesly Carr MD  
[facility] Hematology and Oncology [facility]) [address] [facility] [address]  
[phone] Current Discharge Medication List  
  
START taking these medications Dose & Instructions Dispensing Information Comments Morning Noon Evening Bedtime  
 magic mouthwash Susp Commonly known as:  Yesica Snyder Your last dose was:  3/30 9:30 PM   
Your next dose is:  Every 4 hours as needed. Dose:  10 mL Take 10 mL by mouth every four (4) hours as needed. Quantity:  1 Bottle Refills:  2 CONTINUE these medications which have NOT CHANGED Dose & Instructions Dispensing Information Comments Morning Noon Evening Bedtime  
 aspirin 81 mg chewable tablet Your last dose was:  Before admission Your next dose is:  Tomorrow Dose:  81 mg Take 81 mg by mouth daily. Refills:  0  
     
  
   
   
   
  
 levothyroxine 125 mcg tablet Commonly known as:  SYNTHROID Your last dose was: Today Your next dose is:  Tomorrow Dose:  125 mcg Take 1 Tab by mouth Daily (before breakfast). Quantity:  90 Tab Refills:  1  
     
  
   
   
   
  
 lisinopril 20 mg tablet Commonly known as:  Myriam Peacock Your last dose was: Today Your next dose is:  Tomorrow Dose:  20 mg Take 1 Tab by mouth daily. Quantity:  90 Tab Refills:  3  
     
  
   
   
   
  
 omeprazole 20 mg capsule Commonly known as:  PRILOSEC Your last dose was:  today Your next dose is:  Tomorrow Dose:  20 mg Take 1 Cap by mouth daily. Quantity:  90 Cap Refills:  3  
     
  
   
   
   
  
 ondansetron hcl 4 mg tablet Commonly known as:  ZOFRAN (AS HYDROCHLORIDE) Your next dose is:  Every 8 hours as needed. Dose:  4 mg Take 1 Tab by mouth every eight (8) hours as needed for Nausea. Quantity:  60 Tab Refills:  3  
     
   
   
   
  
 promethazine 25 mg tablet Commonly known as:  PHENERGAN Your next dose is:  Every 6 hours as needed Dose:  25 mg Take 25 mg by mouth every six (6) hours as needed for Nausea. Unsure of dose Refills:  0 ASK your doctor about these medications Dose & Instructions Dispensing Information Comments Morning Noon Evening Bedtime  
 gabapentin 300 mg capsule Commonly known as:  NEURONTIN Your next dose is: Tonight Dose:  300 mg Take 1 Cap by mouth nightly. Quantity:  30 Cap Refills:  2 HYDROcodone-acetaminophen 5-325 mg per tablet Commonly known as:  Halle Cruise Your next dose is:  Every 4 hours as needed. Dose:  1-2 Tab Take 1-2 Tabs by mouth every four (4) hours as needed for Pain. Max Daily Amount: 12 Tabs. Quantity:  120 Tab Refills:  0  
     
   
   
   
  
 magic mouthwash solution Magic mouth wash  Maalox Lidocaine 2% viscous  Diphenhydramine oral solution   Pharmacy to mix equal portions of ingredients to a total volume as indicated in the dispense amount. Quantity:  473 mL Refills:  0 Where to Get Your Medications Information on where to get these meds will be given to you by the nurse or doctor. ! Ask your nurse or doctor about these medications  
  gabapentin 300 mg capsule HYDROcodone-acetaminophen 5-325 mg per tablet  
 magic mouthwash solution  
 magic mouthwash Susp Discharge Instructions DISCHARGE SUMMARY from Nurse The following personal items are in your possession at time of discharge: 
 
Dental Appliances: None Visual Aid: Glasses, With patient Home Medications: None Jewelry: None Clothing: None Other Valuables: None PATIENT INSTRUCTIONS: 
 
After general anesthesia or intravenous sedation, for 24 hours or while taking prescription Narcotics: · Limit your activities · Do not drive and operate hazardous machinery · Do not make important personal or business decisions · Do  not drink alcoholic beverages · If you have not urinated within 8 hours after discharge, please contact your surgeon on call. Report the following to your surgeon: 
· Excessive pain, swelling, redness or odor of or around the surgical area · Temperature over 100.5 · Nausea and vomiting lasting longer than 4 hours or if unable to take medications · Any signs of decreased circulation or nerve impairment to extremity: change in color, persistent  numbness, tingling, coldness or increase pain · Any questions What to do at Home: 
Recommended activity: Activity as tolerated, If you experience any of the following symptoms fever greater than 100.5, persistent nausea or vomiting, new or unrelieved pain, dizziness, chest pain or shortness of breath, please follow up with MD. 
 
 
*  Please give a list of your current medications to your Primary Care Provider. *  Please update this list whenever your medications are discontinued, doses are 
    changed, or new medications (including over-the-counter products) are added. *  Please carry medication information at all times in case of emergency situations. These are general instructions for a healthy lifestyle: No smoking/ No tobacco products/ Avoid exposure to second hand smoke Surgeon General's Warning:  Quitting smoking now greatly reduces serious risk to your health. Obesity, smoking, and sedentary lifestyle greatly increases your risk for illness A healthy diet, regular physical exercise & weight monitoring are important for maintaining a healthy lifestyle You may be retaining fluid if you have a history of heart failure or if you experience any of the following symptoms:  Weight gain of 3 pounds or more overnight or 5 pounds in a week, increased swelling in our hands or feet or shortness of breath while lying flat in bed. Please call your doctor as soon as you notice any of these symptoms; do not wait until your next office visit. Recognize signs and symptoms of STROKE: 
 
F-face looks uneven A-arms unable to move or move unevenly S-speech slurred or non-existent T-time-call 911 as soon as signs and symptoms begin-DO NOT go  
 Back to bed or wait to see if you get better-TIME IS BRAIN. Warning Signs of HEART ATTACK Call 911 if you have these symptoms: 
? Chest discomfort. Most heart attacks involve discomfort in the center of the chest that lasts more than a few minutes, or that goes away and comes back. It can feel like uncomfortable pressure, squeezing, fullness, or pain. ? Discomfort in other areas of the upper body. Symptoms can include pain or discomfort in one or both arms, the back, neck, jaw, or stomach. ? Shortness of breath with or without chest discomfort. ? Other signs may include breaking out in a cold sweat, nausea, or lightheadedness. Don't wait more than five minutes to call [address]! Fast action can save your life. Calling 911 is almost always the fastest way to get lifesaving treatment. Emergency Medical Services staff can begin treatment when they arrive  up to an hour sooner than if someone gets to the hospital by car. The discharge information has been reviewed with the patient. The patient verbalized understanding. Discharge medications reviewed with the patient and appropriate educational materials and side effects teaching were provided. Discharge Instructions Attachments/References BONE MARROW ASPIRATION AND BIOPSY: POST-OP (ENGLISH) Discharge Orders None Introducing [facility] SERVICES! Dear Red Gip: Thank you for requesting a [employer] account. Our records indicate that you already have an active [employer] account. You can access your account anytime at [email or web address]://Tale [employer] Stories. [employer] Did you know that you can access your hospital and ER discharge instructions at any time in [employer]? You can also review all of your test results from your hospital stay or ER visit. Additional Information If you have questions, please visit the Frequently Asked Questions section of the [employer] website at [email or web address] [employer]/. Remember, MyChart is NOT to be used for urgent needs. For medical emergencies, dial 911. Now available from your iPhone and Android! General Information Please provide this summary of care documentation to your next provider. Patient Signature:  ____________________________________________________________ Date:  ____________________________________________________________  
  
Poornima Carmona Provider Signature:  ____________________________________________________________ Date:  ____________________________________________________________ More Information Bone Marrow Aspiration and Biopsy: What to Expect at [facility] Your Recovery The biopsy site may feel sore for several days. It can help to walk, take pain medicine, and put ice packs on the site. You will probably be able to return to work and your usual activities the day after the procedure. Your doctor or nurse will call you with the results of your test. 
This care sheet gives you a general idea about how long it will take for you to recover. But each person recovers at a different pace. Follow the steps below to get better as quickly as possible. How can you care for yourself at home? Activity · Rest when you feel tired. Getting enough sleep will help you recover. · You may drive when you are no longer taking pain pills and can quickly move your foot from the gas pedal to the brake. You must also be able to sit comfortably for a long period of time, even if you do not plan to go far. You might get caught in traffic. · Most people are able to return to work the day after the procedure. Medicines · Your doctor will tell you if and when you can restart your medicines. He or she will also give you instructions about taking any new medicines. · If you take blood thinners, such as warfarin (Coumadin), clopidogrel (Plavix), or aspirin, be sure to talk to your doctor.  He or she will tell you if and when to start taking those medicines again. Make sure that you understand exactly what your doctor wants you to do. · Be safe with medicines. Take pain medicines exactly as directed. ¨ If the doctor gave you a prescription medicine for pain, take it as prescribed. ¨ If you are not taking a prescription pain medicine, take an over-the-counter medicine such as acetaminophen (Tylenol), ibuprofen (Advil, Motrin), or naproxen (Aleve). Read and follow all instructions on the label. ¨ Do not take two or more pain medicines at the same time unless the doctor told you to. Many pain medicines have acetaminophen, which is Tylenol. Too much acetaminophen (Tylenol) can be harmful. · If you think your pain medicine is making you sick to your stomach: 
¨ Take your medicine after meals (unless your doctor has told you not to). ¨ Ask your doctor for a different pain medicine. · If your doctor prescribed antibiotics, take them as directed. Do not stop taking them just because you feel better. Ice · Put ice or a cold pack on the biopsy site for 10 to 20 minutes at a time. Put a thin cloth between the ice and your skin. Follow-up care is a key part of your treatment and safety. Be sure to make and go to all appointments, and call your doctor if you are having problems. It's also a good idea to know your test results and keep a list of the medicines you take. When should you call for help? Call 911 anytime you think you may need emergency care. For example, call if: 
· You passed out (lost consciousness). Call your doctor now or seek immediate medical care if: 
· You have signs of infection, such as: 
¨ Increased pain, swelling, warmth, or redness. ¨ Red streaks leading from the biopsy site. ¨ Pus draining from the biopsy site. ¨ Swollen lymph nodes in your neck, armpits, or groin. ¨ A fever. Watch closely for any changes in your health, and be sure to contact your doctor if: · You are not getting better as expected. Where can you learn more? Go to http://rajat-rachel.info/. Enter E148 in the search box to learn more about \"Bone Marrow Aspiration and Biopsy: What to Expect at Home. \" Current as of: October 14, 2016 Content Version: 11.2 © 0383-2369 [email or web address]. Care instructions adapted under license by [employer] (which disclaims liability or warranty for this information). If you have questions about a medical condition or this instruction, always ask your healthcare professional. [address] any warranty or liability for your use of this information.

## 2017-03-30 NOTE — ANESTHESIA PREPROCEDURE EVALUATION
Anesthetic History   No history of anesthetic complications            Review of Systems / Medical History  Patient summary reviewed    Pulmonary  Within defined limits                 Neuro/Psych   Within defined limits           Cardiovascular    Hypertension: well controlled              Exercise tolerance: <4 METS  Comments: < 4 METS secondary to knee pain   GI/Hepatic/Renal  Within defined limits              Endo/Other      Hypothyroidism: well controlled  Morbid obesity, arthritis, cancer (Non Hodgkin's lymphoma) and anemia     Other Findings   Comments: DVT         Physical Exam    Airway  Mallampati: II  TM Distance: 4 - 6 cm  Neck ROM: normal range of motion, short neck   Mouth opening: Normal     Cardiovascular  Regular rate and rhythm,  S1 and S2 normal,  no murmur, click, rub, or gallop  Rhythm: regular  Rate: normal         Dental  No notable dental hx       Pulmonary  Breath sounds clear to auscultation               Abdominal  GI exam deferred       Other Findings            Anesthetic Plan    ASA: 3  Anesthesia type: general          Induction: Intravenous  Anesthetic plan and risks discussed with: Patient and Son / Daughter      Princess Matamoros

## 2017-03-30 NOTE — PROGRESS NOTES
Pt direct admit by Dr. Mart Figueroa. Son at bedside. Dual skin assessment completed with Donal Ayon RN. Skin is warm, dry, and intact. Pt with complaints of mouth pain and asking for meds. Will check MAR. No other needs at present.

## 2017-03-31 ENCOUNTER — APPOINTMENT (OUTPATIENT)
Dept: INTERVENTIONAL RADIOLOGY/VASCULAR | Age: 57
DRG: 824 | End: 2017-03-31
Attending: INTERNAL MEDICINE
Payer: COMMERCIAL

## 2017-03-31 ENCOUNTER — APPOINTMENT (OUTPATIENT)
Dept: ULTRASOUND IMAGING | Age: 57
DRG: 824 | End: 2017-03-31
Attending: RADIOLOGY
Payer: COMMERCIAL

## 2017-03-31 VITALS
RESPIRATION RATE: 15 BRPM | WEIGHT: 271 LBS | TEMPERATURE: 99 F | OXYGEN SATURATION: 98 % | BODY MASS INDEX: 48.01 KG/M2 | HEART RATE: 80 BPM | SYSTOLIC BLOOD PRESSURE: 111 MMHG | DIASTOLIC BLOOD PRESSURE: 58 MMHG

## 2017-03-31 LAB
ALBUMIN SERPL ELPH-MCNC: 3.05 G/DL (ref 3.2–5.6)
ALBUMIN/GLOB SERPL: 1.1 {RATIO}
ALPHA1 GLOB SERPL ELPH-MCNC: 0.38 G/DL (ref 0.1–0.4)
ALPHA2 GLOB SERPL ELPH-MCNC: 0.88 G/DL (ref 0.4–1.2)
APPEARANCE FLD: NORMAL
B-GLOBULIN SERPL QL ELPH: 0.94 G/DL (ref 0.6–1.3)
BONE MARROW PREP & W,BMA: NORMAL
COLOR FLD: YELLOW
FLUID COMMENTS, FCOM: NORMAL
GAMMA GLOB MFR SERPL ELPH: 0.54 G/DL (ref 0.5–1.6)
IGA SERPL-MCNC: 134 MG/DL (ref 85–499)
IGG SERPL-MCNC: 451 MG/DL (ref 610–1616)
IGM SERPL-MCNC: 24 MG/DL (ref 35–242)
LYMPHOCYTES NFR FLD: 85 %
M PROTEIN SERPL ELPH-MCNC: ABNORMAL G/DL
MONOS+MACROS NFR FLD: 12 %
NEUTS SEG NFR FLD: 3 %
NUC CELL # FLD: 2437 /CU MM
PROT PATTERN SERPL ELPH-IMP: ABNORMAL
PROT PATTERN SPEC IFE-IMP: ABNORMAL
PROT SERPL-MCNC: 5.8 G/DL (ref 6.3–8.2)
RBC # FLD: NORMAL /CU MM
SPECIMEN SOURCE FLD: NORMAL

## 2017-03-31 PROCEDURE — 99152 MOD SED SAME PHYS/QHP 5/>YRS: CPT

## 2017-03-31 PROCEDURE — 07DR3ZX EXTRACTION OF ILIAC BONE MARROW, PERCUTANEOUS APPROACH, DIAGNOSTIC: ICD-10-PCS | Performed by: INTERNAL MEDICINE

## 2017-03-31 PROCEDURE — 49083 ABD PARACENTESIS W/IMAGING: CPT

## 2017-03-31 PROCEDURE — 77030012920

## 2017-03-31 PROCEDURE — 88112 CYTOPATH CELL ENHANCE TECH: CPT | Performed by: INTERNAL MEDICINE

## 2017-03-31 PROCEDURE — 74011250637 HC RX REV CODE- 250/637: Performed by: INTERNAL MEDICINE

## 2017-03-31 PROCEDURE — C1729 CATH, DRAINAGE: HCPCS

## 2017-03-31 PROCEDURE — 38221 DX BONE MARROW BIOPSIES: CPT

## 2017-03-31 PROCEDURE — 88305 TISSUE EXAM BY PATHOLOGIST: CPT | Performed by: INTERNAL MEDICINE

## 2017-03-31 PROCEDURE — 88184 FLOWCYTOMETRY/ TC 1 MARKER: CPT | Performed by: INTERNAL MEDICINE

## 2017-03-31 PROCEDURE — 88312 SPECIAL STAINS GROUP 1: CPT | Performed by: INTERNAL MEDICINE

## 2017-03-31 PROCEDURE — 89050 BODY FLUID CELL COUNT: CPT | Performed by: INTERNAL MEDICINE

## 2017-03-31 PROCEDURE — 0W9G3ZZ DRAINAGE OF PERITONEAL CAVITY, PERCUTANEOUS APPROACH: ICD-10-PCS | Performed by: RADIOLOGY

## 2017-03-31 PROCEDURE — 74011250636 HC RX REV CODE- 250/636: Performed by: RADIOLOGY

## 2017-03-31 PROCEDURE — 88342 IMHCHEM/IMCYTCHM 1ST ANTB: CPT | Performed by: INTERNAL MEDICINE

## 2017-03-31 PROCEDURE — 88185 FLOWCYTOMETRY/TC ADD-ON: CPT | Performed by: INTERNAL MEDICINE

## 2017-03-31 PROCEDURE — 88341 IMHCHEM/IMCYTCHM EA ADD ANTB: CPT | Performed by: INTERNAL MEDICINE

## 2017-03-31 PROCEDURE — 74011000250 HC RX REV CODE- 250: Performed by: RADIOLOGY

## 2017-03-31 PROCEDURE — 74011250636 HC RX REV CODE- 250/636

## 2017-03-31 PROCEDURE — 88311 DECALCIFY TISSUE: CPT | Performed by: INTERNAL MEDICINE

## 2017-03-31 PROCEDURE — 88313 SPECIAL STAINS GROUP 2: CPT | Performed by: INTERNAL MEDICINE

## 2017-03-31 RX ORDER — HYDROCODONE BITARTRATE AND ACETAMINOPHEN 5; 325 MG/1; MG/1
1-2 TABLET ORAL
Qty: 180 TAB | Refills: 0 | Status: SHIPPED
Start: 2017-03-31 | End: 2017-03-31 | Stop reason: SDUPTHER

## 2017-03-31 RX ORDER — GABAPENTIN 300 MG/1
300 CAPSULE ORAL
Qty: 30 CAP | Refills: 3 | Status: SHIPPED | OUTPATIENT
Start: 2017-03-31 | End: 2017-03-31 | Stop reason: SDUPTHER

## 2017-03-31 RX ORDER — FENTANYL CITRATE 50 UG/ML
12.5-1 INJECTION, SOLUTION INTRAMUSCULAR; INTRAVENOUS
Status: DISCONTINUED | OUTPATIENT
Start: 2017-03-31 | End: 2017-03-31 | Stop reason: HOSPADM

## 2017-03-31 RX ORDER — DIPHENHYDRAMINE HYDROCHLORIDE 50 MG/ML
25-50 INJECTION, SOLUTION INTRAMUSCULAR; INTRAVENOUS ONCE
Status: DISCONTINUED | OUTPATIENT
Start: 2017-03-31 | End: 2017-03-31 | Stop reason: HOSPADM

## 2017-03-31 RX ORDER — SODIUM CHLORIDE 9 MG/ML
25 INJECTION, SOLUTION INTRAVENOUS ONCE
Status: COMPLETED | OUTPATIENT
Start: 2017-03-31 | End: 2017-03-31

## 2017-03-31 RX ORDER — HYDROCODONE BITARTRATE AND ACETAMINOPHEN 5; 325 MG/1; MG/1
1-2 TABLET ORAL
Status: DISCONTINUED | OUTPATIENT
Start: 2017-03-31 | End: 2017-03-31 | Stop reason: HOSPADM

## 2017-03-31 RX ORDER — LIDOCAINE HYDROCHLORIDE 20 MG/ML
2-20 INJECTION, SOLUTION INFILTRATION; PERINEURAL
Status: COMPLETED | OUTPATIENT
Start: 2017-03-31 | End: 2017-03-31

## 2017-03-31 RX ORDER — MIDAZOLAM HYDROCHLORIDE 1 MG/ML
.5-2 INJECTION, SOLUTION INTRAMUSCULAR; INTRAVENOUS
Status: DISCONTINUED | OUTPATIENT
Start: 2017-03-31 | End: 2017-03-31 | Stop reason: HOSPADM

## 2017-03-31 RX ADMIN — FENTANYL CITRATE 50 MCG: 50 INJECTION, SOLUTION INTRAMUSCULAR; INTRAVENOUS at 10:25

## 2017-03-31 RX ADMIN — FENTANYL CITRATE 50 MCG: 50 INJECTION, SOLUTION INTRAMUSCULAR; INTRAVENOUS at 10:30

## 2017-03-31 RX ADMIN — MIDAZOLAM HYDROCHLORIDE 1 MG: 1 INJECTION, SOLUTION INTRAMUSCULAR; INTRAVENOUS at 10:30

## 2017-03-31 RX ADMIN — LIDOCAINE HYDROCHLORIDE 200 MG: 20 INJECTION, SOLUTION INFILTRATION; PERINEURAL at 10:33

## 2017-03-31 RX ADMIN — LISINOPRIL 20 MG: 20 TABLET ORAL at 07:57

## 2017-03-31 RX ADMIN — SODIUM CHLORIDE 25 ML/HR: 900 INJECTION, SOLUTION INTRAVENOUS at 10:15

## 2017-03-31 RX ADMIN — MIDAZOLAM HYDROCHLORIDE 1 MG: 1 INJECTION, SOLUTION INTRAMUSCULAR; INTRAVENOUS at 10:25

## 2017-03-31 RX ADMIN — PANTOPRAZOLE SODIUM 40 MG: 40 TABLET, DELAYED RELEASE ORAL at 07:57

## 2017-03-31 RX ADMIN — LEVOTHYROXINE SODIUM 125 MCG: 75 TABLET ORAL at 07:57

## 2017-03-31 NOTE — DISCHARGE INSTRUCTIONS
DISCHARGE SUMMARY from Nurse    The following personal items are in your possession at time of discharge:    Dental Appliances: None  Visual Aid: Glasses, With patient     Home Medications: None  Jewelry: None  Clothing: None  Other Valuables: None             PATIENT INSTRUCTIONS:    After general anesthesia or intravenous sedation, for 24 hours or while taking prescription Narcotics:  · Limit your activities  · Do not drive and operate hazardous machinery  · Do not make important personal or business decisions  · Do  not drink alcoholic beverages  · If you have not urinated within 8 hours after discharge, please contact your surgeon on call. Report the following to your surgeon:  · Excessive pain, swelling, redness or odor of or around the surgical area  · Temperature over 100.5  · Nausea and vomiting lasting longer than 4 hours or if unable to take medications  · Any signs of decreased circulation or nerve impairment to extremity: change in color, persistent  numbness, tingling, coldness or increase pain  · Any questions        What to do at Home:  Recommended activity: Activity as tolerated,     If you experience any of the following symptoms fever greater than 100.5, persistent nausea or vomiting, new or unrelieved pain, dizziness, chest pain or shortness of breath, please follow up with MD.      *  Please give a list of your current medications to your Primary Care Provider. *  Please update this list whenever your medications are discontinued, doses are      changed, or new medications (including over-the-counter products) are added. *  Please carry medication information at all times in case of emergency situations. These are general instructions for a healthy lifestyle:    No smoking/ No tobacco products/ Avoid exposure to second hand smoke    Surgeon General's Warning:  Quitting smoking now greatly reduces serious risk to your health.     Obesity, smoking, and sedentary lifestyle greatly increases your risk for illness    A healthy diet, regular physical exercise & weight monitoring are important for maintaining a healthy lifestyle    You may be retaining fluid if you have a history of heart failure or if you experience any of the following symptoms:  Weight gain of 3 pounds or more overnight or 5 pounds in a week, increased swelling in our hands or feet or shortness of breath while lying flat in bed. Please call your doctor as soon as you notice any of these symptoms; do not wait until your next office visit. Recognize signs and symptoms of STROKE:    F-face looks uneven    A-arms unable to move or move unevenly    S-speech slurred or non-existent    T-time-call 911 as soon as signs and symptoms begin-DO NOT go       Back to bed or wait to see if you get better-TIME IS BRAIN. Warning Signs of HEART ATTACK     Call 911 if you have these symptoms:   Chest discomfort. Most heart attacks involve discomfort in the center of the chest that lasts more than a few minutes, or that goes away and comes back. It can feel like uncomfortable pressure, squeezing, fullness, or pain.  Discomfort in other areas of the upper body. Symptoms can include pain or discomfort in one or both arms, the back, neck, jaw, or stomach.  Shortness of breath with or without chest discomfort.  Other signs may include breaking out in a cold sweat, nausea, or lightheadedness. Don't wait more than five minutes to call 911 - MINUTES MATTER! Fast action can save your life. Calling 911 is almost always the fastest way to get lifesaving treatment. Emergency Medical Services staff can begin treatment when they arrive -- up to an hour sooner than if someone gets to the hospital by car. The discharge information has been reviewed with the patient. The patient verbalized understanding.     Discharge medications reviewed with the patient and appropriate educational materials and side effects teaching were provided.

## 2017-03-31 NOTE — DISCHARGE SUMMARY
[facility]: Inpatient Hematology / Oncology Discharge Summary Note    Patient ID:  Davin Echevarria  [id number]  89 y.o.  1960    Admit Date: 3/30/2017    Discharge Date: 3/31/2017    Admission Diagnoses: lymphoma  Lymphadenopathy, generalized  Ascites  Lymphoma     Discharge Diagnoses:  Principal Diagnosis: <principal problem not specified>  Active Problems:    Non Hodgkin's lymphoma ([address] (3/30/2017)      Ascites (3/30/2017)      Lymphadenopathy, generalized (3/30/2017)        Hospital Course:  Mrs Vania Sood is 63 y/o female in previously good health, seen as an urgent work-in in clinic for lymphadenopathy. She was having abdominal pain and swelling which had progressed over the last several weeks, CT was recommended but was initially denied by insurance. She had GI evaluation including EGD/colonoscopy which showed no intraluminal pathology, but finally had a CT at [facility] last week that showed diffuse lymphadenopathy with moderate ascites, splenomegaly, and possible infiltration of the left kidney, all consistent with lymphoproliferative disorder. She was scheduled for biopsy by IR at [facility] today, but presented to clinic yesterday with these complaints, and we felt that she needed expedited work-up and management of her ascites, abdominal pain, anorexia/weight loss. Other symptoms include severe right jaw/facial pain, only partially relieved with dental numbing agents, and diarrhea improved with Imodium. During her admission she was able to get her right axillary node biopsied, a bone marrow biopsy and a paracentesis with 4L of fluid removed. She had much relief after the fluid removal.  She continued to have gum pain that was relieved with MMW. She is stable and ready to go home. She is scheduled for her PET scan on 4/6 and follow up with Dr Melissa Leung on 4/7 to discuss all her results and to get a definite diagnosis.       Consults:  IP CONSULT TO INTERVENTIONAL RADIOLOGY  IP CONSULT TO GENERAL SURGERY    Pertinent Diagnostic Studies:   Labs:    Recent Labs      03/30/17   1525   WBC  4.3   HGB  8.1*   PLT  193   ANEU  2.5    Recent Labs      03/30/17   1525   NA  142   K  5.1   CL  108*   CO2  27   GLU  70   BUN  39*   CREA  1.76*   CA  8.8   SGOT  32   AP  89   TP  5.8*  6.0*   ALB  2.9*   MG  2.0       Imaging:  [unfilled]    Discharge Medication List as of 3/31/2017  1:41 PM      START taking these medications    Details   magic mouthwash (ALEXSANDER) susp Take 10 mL by mouth every four (4) hours as needed., No Print, Disp-1 Bottle, R-2      magic mouthwash solution Magic mouth wash   Maalox  Lidocaine 2% viscous   Diphenhydramine oral solution     Pharmacy to mix equal portions of ingredients to a total volume as indicated in the dispense amount. , Print, Disp-473 mL, R-0         CONTINUE these medications which have CHANGED    Details   HYDROcodone-acetaminophen (NORCO) 5-325 mg per tablet Take 1-2 Tabs by mouth every four (4) hours as needed for Pain. Max Daily Amount: 12 Tabs., Print, Disp-120 Tab, R-0      gabapentin (NEURONTIN) 300 mg capsule Take 1 Cap by mouth nightly., Normal, Disp-30 Cap, R-2         CONTINUE these medications which have NOT CHANGED    Details   promethazine (PHENERGAN) 25 mg tablet Take 25 mg by mouth every six (6) hours as needed for Nausea. Unsure of dose, Historical Med      ondansetron hcl (ZOFRAN, AS HYDROCHLORIDE,) 4 mg tablet Take 1 Tab by mouth every eight (8) hours as needed for Nausea., Normal, Disp-60 Tab, R-3      aspirin 81 mg chewable tablet Take 81 mg by mouth daily. , Historical Med      levothyroxine (SYNTHROID) 125 mcg tablet Take 1 Tab by mouth Daily (before breakfast). , Normal, Disp-90 Tab, R-1      lisinopril (PRINIVIL, ZESTRIL) 20 mg tablet Take 1 Tab by mouth daily. , Normal, Disp-90 Tab, R-3      omeprazole (PRILOSEC) 20 mg capsule Take 1 Cap by mouth daily. , Normal, Disp-90 Cap, R-3               OBJECTIVE:  Patient Vitals for the past 8 hrs:   BP Pulse Resp SpO2   17 1112 111/58 80 - 98 %   17 1107 99/57 81 - 95 %   17 1102 91/61 79 15 96 %   17 1052 104/60 71 15 98 %   17 1047 109/61 77 14 97 %   17 1042 109/55 76 14 98 %   17 1037 106/58 76 14 97 %   17 1032 109/58 76 14 96 %   17 1027 110/58 74 15 98 %   17 1022 109/57 74 16 97 %   17 1012 108/55 - - 99 %   17 1007 116/56 - - 99 %   17 0947 129/58 77 16 100 %   17 0942 129/59 77 16 100 %   17 0937 115/54 75 16 100 %   17 0932 121/61 75 16 100 %   17 0927 124/69 77 16 99 %   17 0922 124/65 79 16 100 %   17 0917 117/57 77 18 100 %   17 0912 135/61 75 18 100 %   17 0904 129/59 79 18 100 %     Temp (24hrs), Av.4 °F (37.4 °C), Min:99 °F (37.2 °C), Max:100.2 °F (37.9 °C)     0701 -  1900  In: 0   Out: 4200 [Urine:200]    Physical Exam:  Constitutional: Well developed, well nourished female in no acute distress, sitting comfortably on the hospital bed. Family at bedside. HEENT: Normocephalic and atraumatic. Oropharynx is clear, mucous membranes are moist.  Sclerae anicteric. Neck supple without JVD. No thyromegaly present. Lymph node   Deferred. Skin Warm and dry. No bruising and no rash noted. No erythema. No pallor. Respiratory Lungs are clear to auscultation bilaterally without wheezes, rales or rhonchi, normal air exchange without accessory muscle use. CVS Normal rate, regular rhythm and normal S1 and S2. No murmurs, gallops, or rubs. Abdomen Soft, nontender and nondistended, normoactive bowel sounds. No palpable mass. Neuro Grossly nonfocal with no obvious sensory or motor deficits. MSK Normal range of motion in general.  No edema and no tenderness. Psych Appropriate mood and affect.         ASSESSMENT:    Active Problems:    Non Hodgkin's lymphoma ([facility] [address] (3/30/2017)      Ascites (3/30/2017)      Lymphadenopathy, generalized (3/30/2017)        DISPOSITION:  Follow-up Appointments   Procedures    FOLLOW UP VISIT Appointment in: Other (Specify) Follow up is already scheduled with Dr Azam Rudd on 4/7 with labs at 11:00, visit at 11:30. Follow up is already scheduled with Dr Azam Rudd on 4/7 with labs at 11:00, visit at 11:30. Standing Status:   Standing     Number of Occurrences:   1     Order Specific Question:   Appointment in     Answer: Other (Specify)         Over 30 minutes was spent in discharge planning and coordination of care.             Harsha Ingram NP  [facility]  [address] [place] [address]  Office : [phone]  Fax : [phone]

## 2017-03-31 NOTE — PROGRESS NOTES
PLAN:  Care Management per Hematology  General Surgery  --Leave dressing in place to Right Axilla until Wednesday; allow steri strips to fall off. --No outpatient follow up with General Surgery needed. --Will Sign off at this time - please call for any questions or concerns. Thank you. ASSESSMENT:  Admit Date: 3/30/2017   1 Day Post-Op  Procedure(s):  RIGHT AXILLARY LYMPH NODE BIOPSY    Active Problems:    Non Hodgkin's lymphoma ([address] (3/30/2017)      Ascites (3/30/2017)      Lymphadenopathy, generalized (3/30/2017)       HPI: Patricia Arriaga is a 64 y.o.  female who was admitted 3/30/17 by the Oncology service for expedited lymphoma evaluation. General Surgery - Dr. Julian Bazzi was consulted to obtain axilla node biopsy. Right axillary lymph node excisional biopsy by Dr Julian Bazzi 3/30/17. SUBJECTIVE:  Pt up walking in room. No complaints. States is being discharged today. Family at bedside. AF, VSS. OBJECTIVE:  Constitutional: Alert, cooperative, no acute distress; appears stated age   Visit Vitals    /58    Pulse 80    Temp 99 °F (37.2 °C)    Resp 15    Wt 271 lb (122.9 kg)    LMP 04/21/2015    SpO2 98%    BMI 48.01 kg/m2     Eyes:Sclera are clear. ENMT: no external lesions gross hearing normal; no obvious neck masses, no ear or lip lesions  CV: RRR. Normal perfusion  Resp: No JVD. Breathing is  non-labored; no audible wheezing. GI: soft and non-distended     Musculoskeletal: unremarkable with normal function. No embolic signs or cyanosis.    Skin: Dressing to Right Axilla c/d/i  Neuro:  Oriented; moves all 4; no focal deficits  Psychiatric: normal affect and mood, no memory impairment      Patient Vitals for the past 24 hrs:   BP Temp Pulse Resp SpO2 Weight   03/31/17 1112 111/58 - 80 - 98 % -   03/31/17 1107 99/57 - 81 - 95 % -   03/31/17 1102 91/61 - 79 15 96 % -   03/31/17 1052 104/60 - 71 15 98 % -   03/31/17 1047 109/61 - 77 14 97 % -   03/31/17 1042 109/55 - 76 14 98 % - 03/31/17 1037 106/58 - 76 14 97 % -   03/31/17 1032 109/58 - 76 14 96 % -   03/31/17 1027 110/58 - 74 15 98 % -   03/31/17 1022 109/57 - 74 16 97 % -   03/31/17 1012 108/55 - - - 99 % -   03/31/17 1007 116/56 - - - 99 % -   03/31/17 0947 129/58 - 77 16 100 % -   03/31/17 0942 129/59 - 77 16 100 % -   03/31/17 0937 115/54 - 75 16 100 % -   03/31/17 0932 121/61 - 75 16 100 % -   03/31/17 0927 124/69 - 77 16 99 % -   03/31/17 0922 124/65 - 79 16 100 % -   03/31/17 0917 117/57 - 77 18 100 % -   03/31/17 0912 135/61 - 75 18 100 % -   03/31/17 0904 129/59 - 79 18 100 % -   03/31/17 0725 120/62 99 °F (37.2 °C) 74 20 94 % -   03/31/17 0341 128/58 99 °F (37.2 °C) 86 20 94 % -   03/30/17 2323 124/59 99.7 °F (37.6 °C) 88 20 92 % -   03/30/17 2158 137/69 100.2 °F (37.9 °C) 100 20 93 % -   03/30/17 2104 123/58 99.3 °F (37.4 °C) 97 20 96 % -   03/30/17 2059 108/55 - 98 20 94 % -   03/30/17 2054 119/50 - (!) 101 20 94 % -   03/30/17 2049 134/61 - (!) 103 22 98 % -   03/30/17 2044 138/55 - (!) 105 20 99 % -   03/30/17 2039 136/63 - (!) 104 22 97 % -   03/30/17 2034 130/57 - (!) 108 21 98 % -   03/30/17 2029 131/60 - (!) 111 20 93 % -   03/30/17 2025 132/59 99.1 °F (37.3 °C) (!) 108 19 96 % -   03/30/17 2024 129/60 - (!) 107 - 92 % -   03/30/17 1711 103/51 99.3 °F (37.4 °C) 86 18 97 % -   03/30/17 1424 119/71 100.2 °F (37.9 °C) 88 18 98 % -   03/30/17 1345 113/76 100.1 °F (37.8 °C) 91 18 96 % 271 lb (122.9 kg)     Labs:  Recent Labs      03/30/17   1525   WBC  4.3   HGB  8.1*   PLT  193   NA  142   K  5.1   CL  108*   CO2  27   BUN  39*   CREA  1.76*   GLU  70   PTP  10.9   INR  1.0   APTT  23.9   TBILI  0.6   SGOT  32   ALT  14   AP  89       Sivakumar Henley, FNP-BC    The patient was seen in conjunction with Dr. Tifafnie Shields who independently evaluated the patient, reviewed the chart and agreed with the assessment and plan.

## 2017-03-31 NOTE — PROGRESS NOTES
TRANSFER - OUT REPORT:    Verbal report given to [facility] RN(name) on Bo Chanel  being transferred to IR Recovery(unit) for ordered procedure       Report consisted of patients Situation, Background, Assessment and   Recommendations(SBAR). Information from the following report(s) Procedure Summary and MAR was reviewed with the receiving nurse. Lines:   Peripheral IV 03/30/17 Right Hand (Active)   Site Assessment Clean, dry, & intact 3/31/2017  8:00 AM   Phlebitis Assessment 0 3/31/2017  8:00 AM   Infiltration Assessment 0 3/31/2017  8:00 AM   Dressing Status Clean, dry, & intact 3/31/2017  8:00 AM   Dressing Type Transparent;Tape 3/31/2017  8:00 AM   Hub Color/Line Status Flushed 3/31/2017  8:00 AM        Opportunity for questions and clarification was provided.       Patient transported with:   Registered Nurse

## 2017-03-31 NOTE — PROGRESS NOTES
TRANSFER - OUT REPORT:    Verbal report given to RN (name) on Su Lopez  being transferred to IR(unit) for ordered procedure       Report consisted of patients Situation, Background, Assessment and   Recommendations(SBAR). Information from the following report(s) SBAR was reviewed with the receiving nurse. Opportunity for questions and clarification was provided.       Patient transported with:   Registered Nurse (IR)

## 2017-03-31 NOTE — PROCEDURES
Date of Procedure: 3/31/2017    Pre-Procedure Diagnosis: Lymphoma    Post-Procedure Diagnosis: SAME    Procedure(s): Bone marrow biopsy    Brief Description of Procedure: Routine bone marrow biopsy    Performed By: Dewayne Mckenzie MD     Assistants: None    Anesthesia: Moderate sedation    Estimated Blood Loss: Minimal    Specimens: Routine bone marrow biopsy    Implants: None    Findings: None    Complications: None    Recommendations: None    Follow Up: As needed

## 2017-03-31 NOTE — ANESTHESIA POSTPROCEDURE EVALUATION
Post-Anesthesia Evaluation and Assessment    Patient: Maxi Walters MRN: [id number]  SSN: [id number]    YOB: 1960  Age: 64 y.o. Sex: female       Cardiovascular Function/Vital Signs  Visit Vitals    /58 (BP 1 Location: Left arm, BP Patient Position: At rest)    Pulse 97    Temp 37.4 °C (99.3 °F)    Resp 20    Wt 122.9 kg (271 lb)    SpO2 96%    BMI 48.01 kg/m2       Patient is status post general anesthesia for Procedure(s):  RIGHT AXILLARY LYMPH NODE BIOPSY. Nausea/Vomiting: None    Postoperative hydration reviewed and adequate. Pain:  Pain Scale 1: Visual (03/30/17 2104)  Pain Intensity 1: 0 (03/30/17 2104)   Managed    Neurological Status:   Neuro (WDL): Within Defined Limits (03/30/17 2104)   At baseline    Mental Status and Level of Consciousness: Awake. Pulmonary Status:   O2 Device: Nasal cannula (03/30/17 2104)   Adequate oxygenation and airway patent    Complications related to anesthesia: None    Post-anesthesia assessment completed.  No concerns    Signed By: Yamilka Silva MD     March 30, 2017

## 2017-03-31 NOTE — PROGRESS NOTES
TRANSFER - IN REPORT:    Verbal [facility]name[facility] for routine progression of care      Report consisted of patients Situation, Background, Assessment and   Recommendations(SBAR). Information from the following report(s) SBAR was reviewed with the receiving nurse. Opportunity for questions and clarification was provided. Assessment completed upon patients arrival to unit and care assumed.

## 2017-03-31 NOTE — PERIOP NOTES
TRANSFER - OUT REPORT:    Verbal report given to Mallory(name) on Giovanni Solis  being transferred to [facility](unit) for routine post - op       Report consisted of patients Situation, Background, Assessment and   Recommendations(SBAR). Information from the following report(s) Kardex, OR Summary, Procedure Summary and Intake/Output was reviewed with the receiving nurse. Lines:   Peripheral IV 03/30/17 Right Hand (Active)   Site Assessment Clean, dry, & intact 3/30/2017  9:04 PM   Phlebitis Assessment 0 3/30/2017  9:04 PM   Infiltration Assessment 0 3/30/2017  9:04 PM   Dressing Status Clean, dry, & intact 3/30/2017  9:04 PM   Dressing Type Tape;Transparent 3/30/2017  8:25 PM   Hub Color/Line Status Blue; Infusing 3/30/2017  8:25 PM        Opportunity for questions and clarification was provided. Patient transported with:   O2 @ 3 liters    VTE prophylaxis orders have been written for Giovanni Solis. Patient and family given floor number and nurses name. Family updated re: pt status after security code verified.

## 2017-03-31 NOTE — PROGRESS NOTES
Discharge instructions and prescriptions provided and explained to patient, patient voiced understanding. Medication side effect sheet reviewed with pt. No home meds or valuables to return. Opportunity for questions provided. Instructed to call once ready to leave the floor.

## 2017-03-31 NOTE — PROGRESS NOTES
TRANSFER - OUT REPORT:    Verbal report given to Pro RN(name) on Paz Martinez  being transferred to 524(unit) for ordered procedure       Report consisted of patients Situation, Background, Assessment and   Recommendations(SBAR). Information from the following report(s) Procedure Summary and MAR was reviewed with the receiving nurse. Lines:   Peripheral IV 03/30/17 Right Hand (Active)   Site Assessment Clean, dry, & intact 3/31/2017  8:00 AM   Phlebitis Assessment 0 3/31/2017  8:00 AM   Infiltration Assessment 0 3/31/2017  8:00 AM   Dressing Status Clean, dry, & intact 3/31/2017  8:00 AM   Dressing Type Transparent;Tape 3/31/2017  8:00 AM   Hub Color/Line Status Flushed 3/31/2017  8:00 AM        Opportunity for questions and clarification was provided.       Patient transported with:   Zmanda

## 2017-03-31 NOTE — PROGRESS NOTES
DC instructions reviewed by DC nurse. VSS, rt axilla dressing C/D/I. Pt observed leaving floor with .

## 2017-03-31 NOTE — OP NOTES
Operative Report    Patient: Robbie Sanderson MRN: [id number]  SSN: [id number]    YOB: 1960  Age: 64 y.o. Sex: female       Date of Surgery: 3/30/2017     Preoperative Diagnosis: Lymphadenopathy, probable lymphoma    Postoperative Diagnosis: same    Procedure: right axillary lymph node excisional biopsy    Surgeon(s) and Role:     * Dominique Carias MD - Primary    Complications:  none    EBL: minimal      Procedure Details   Informed consent was obtained previously, and the site of surgery was properly noted/marked. The patient was placed supine and  anesthetized; a Time Out was held. The right axilla was prepped and draped in standard fashion. An incision was made over the palpable node and cautery was used to dissect through the subcutaneous tissue and through the deep subcutaneous fascia. Sharp and blunt dissection was performed with removal of the enlarged lymph node from the surrounding adipose tissue. Small  lymphatics and vessels were clipped or cauterized and divided. The specimen was submitted to pathology. The wound was irrigated and the incision was closed in layers with 3-0 vicryl to reapproximate the deep subcutaneous fascia and 4-0 Vicryl suture used for the subcuticular closure. Steri-Strips, telfa, and tegaderm were applied. The patient tolerated the procedure well and was taken to recovery in satisfactory condition    Instrument, sponge, and needle counts were correct at closure and at the conclusion of the case.      Specimens:   ID Type Source Tests Collected by Time Destination   1 : right axillary node Fresh Lymph Node  Dominique Carias MD 3/30/2017 2001 Pathology             Signed By:  Dominique Carias MD     March 30, 2017

## 2017-04-03 ENCOUNTER — HOSPITAL ENCOUNTER (OUTPATIENT)
Dept: ULTRASOUND IMAGING | Age: 57
Discharge: HOME | End: 2017-04-03
Attending: INTERNAL MEDICINE
Payer: COMMERCIAL

## 2017-04-03 DIAGNOSIS — R18.8 OTHER ASCITES: ICD-10-CM

## 2017-04-03 DIAGNOSIS — C85.90 NON-HODGKIN'S LYMPHOMA, UNSPECIFIED BODY REGION, UNSPECIFIED NON-HODGKIN LYMPHOMA TYPE (HCC): ICD-10-CM

## 2017-04-03 DIAGNOSIS — R59.1 LYMPHADENOPATHY, GENERALIZED: ICD-10-CM

## 2017-04-03 PROCEDURE — 76705 ECHO EXAM OF ABDOMEN: CPT

## 2017-04-03 NOTE — IP AVS SNAPSHOT
Current Discharge Medication List  
  
ASK your doctor about these medications Dose & Instructions Dispensing Information Comments Morning Noon Evening Bedtime  
 aspirin 81 mg chewable tablet Your last dose was: Your next dose is:    
   
   
 Dose:  81 mg Take 81 mg by mouth daily. Refills:  0  
     
   
   
   
  
 fluconazole 150 mg tablet Commonly known as:  DIFLUCAN Your last dose was: Your next dose is:    
   
   
 Dose:  150 mg Take 1 Tab by mouth daily for 1 day. FDA advises cautious prescribing of oral fluconazole in pregnancy. Quantity:  1 Tab Refills:  0  
     
   
   
   
  
 gabapentin 300 mg capsule Commonly known as:  NEURONTIN Your last dose was: Your next dose is:    
   
   
 Dose:  300 mg Take 1 Cap by mouth nightly. Quantity:  30 Cap Refills:  2 HYDROcodone-acetaminophen 5-325 mg per tablet Commonly known as:  Lynnda Levo Your last dose was: Your next dose is:    
   
   
 Dose:  1-2 Tab Take 1-2 Tabs by mouth every four (4) hours as needed for Pain. Max Daily Amount: 12 Tabs. Quantity:  120 Tab Refills:  0  
     
   
   
   
  
 levothyroxine 125 mcg tablet Commonly known as:  SYNTHROID Your last dose was: Your next dose is:    
   
   
 Dose:  125 mcg Take 1 Tab by mouth Daily (before breakfast). Quantity:  90 Tab Refills:  1  
     
   
   
   
  
 lisinopril 20 mg tablet Commonly known as:  Barbarann Plunk Your last dose was: Your next dose is:    
   
   
 Dose:  20 mg Take 1 Tab by mouth daily. Quantity:  90 Tab Refills:  3  
     
   
   
   
  
 magic mouthwash solution Your last dose was: Your next dose is:    
   
   
 Magic mouth wash  Maalox Lidocaine 2% viscous  Diphenhydramine oral solution   Pharmacy to mix equal portions of ingredients to a total volume as indicated in the dispense amount. Quantity:  473 mL Refills:  0  
     
   
   
   
  
 magic mouthwash Susp Commonly known as:  [place] Your last dose was: Your next dose is:    
   
   
 Dose:  10 mL Take 10 mL by mouth every four (4) hours as needed. Quantity:  1 Bottle Refills:  2  
     
   
   
   
  
 omeprazole 20 mg capsule Commonly known as:  PRILOSEC Your last dose was: Your next dose is:    
   
   
 Dose:  20 mg Take 1 Cap by mouth daily. Quantity:  90 Cap Refills:  3  
     
   
   
   
  
 ondansetron hcl 4 mg tablet Commonly known as:  ZOFRAN (AS HYDROCHLORIDE) Your last dose was: Your next dose is:    
   
   
 Dose:  4 mg Take 1 Tab by mouth every eight (8) hours as needed for Nausea. Quantity:  60 Tab Refills:  3  
     
   
   
   
  
 promethazine 25 mg tablet Commonly known as:  PHENERGAN Your last dose was: Your next dose is:    
   
   
 Dose:  25 mg Take 25 mg by mouth every six (6) hours as needed for Nausea. Unsure of dose Refills:  0

## 2017-04-03 NOTE — IP AVS SNAPSHOT
[address] 
[phone] Patient: Lawson Foster MRN: [id number] CIV:3/78/7330 You are allergic to the following No active allergies Recent Documentation OB Status Smoking Status Postmenopausal Never Smoker Emergency Contacts Name Discharge Info Relation Home Work Mobile Magnolia Coleman  Spouse [3] [phone] Shirleen Blizzard  Son [22] [phone] [facility]  Other Relative [6] [phone] About your hospitalization You were admitted on:  April 3, 2017 You last received care in the:   RADIOLOGY ULTRASOUND You were discharged on:  April 3, 2017 Unit phone number:  [phone] Why you were hospitalized Your primary diagnosis was:  Not on File Providers Seen During Your Hospitalizations Provider Role Specialty Primary office phone Quinton Dominguez MD Attending Provider Hematology and Oncology [phone] Your Primary Care Physician (PCP) Primary Care Physician Office Phone Office Fax Illene Hodgkin [phone] Follow-up Information None Your Appointments Thursday April 06, 2017  9:45 AM EDT Office Visit with Samaria Steinberg MD  
[address] [place] [address] 1051 [facility] [address] [address]  
[phone] Thursday April 06, 2017  4:00 PM EDT  
NM PET/CT DOSE with [facility] NM PET/CT INJ [facility] PET [facility]) [address] [facility] [place] [address] [id number] [phone] * FOR ALL APPOINTMENTS BEFORE NOON: Nothing to eat or drink after midnight except for water ONLY. * AFTERNOON APPOINTMENTS: May eat a light breakfast, but without carbohydrates or sugars (We have a list of suggested foods).  They must be NPO except for water for at least 4 hours before their appointment time. Patient should be well hydrated (at least 24 oz of water). Do not participate in strenuous activity the day before or the morning before afternoon appointments. Diabetic patients should refrain from insulin 4 hours prior to their appointment. No pregnant patients may have a PET/CT exam, breast feeding mothers should pump enough breast milk for 24 hours as they will not be able to breast feed for 1 day after the scan. Contact Nuclear Medicine with any questions. Procedure takes anywhere from 2-3 hours. If the patient requires pain or anxiety medications, arrangements must be made prior to patient's arrival with their ordering physician. The patient should wait 8 weeks after radiation therapy and 2 weeks after chemotherapy has been completed. * PATIENT ARRIVAL Please report 30 minutes early to check in, except for 7 am patient 7am arrival.  
  
    
 Friday April 07, 2017 11:05 AM EDT  
LAB with [address]  
1808 [facility] OUTREACH INSURANCE ([address]) Linnea Pratt [address] [place] [address]  
[phone] Friday April 07, 2017 11:30 AM EDT Follow Up with Stella Carbajal MD  
[facility] Hematology and Oncology [facility] PRINCESS Balbuena [address] [place] [phone]  
[phone] Current Discharge Medication List  
  
ASK your doctor about these medications Dose & Instructions Dispensing Information Comments Morning Noon Evening Bedtime  
 aspirin 81 mg chewable tablet Your last dose was: Your next dose is:    
   
   
 Dose:  81 mg Take 81 mg by mouth daily. Refills:  0  
     
   
   
   
  
 fluconazole 150 mg tablet Commonly known as:  DIFLUCAN Your last dose was: Your next dose is:    
   
   
 Dose:  150 mg Take 1 Tab by mouth daily for 1 day. FDA advises cautious prescribing of oral fluconazole in pregnancy. Quantity:  1 Tab Refills:  0 gabapentin 300 mg capsule Commonly known as:  NEURONTIN Your last dose was: Your next dose is:    
   
   
 Dose:  300 mg Take 1 Cap by mouth nightly. Quantity:  30 Cap Refills:  2 HYDROcodone-acetaminophen 5-325 mg per tablet Commonly known as:  Joana Ignacio Your last dose was: Your next dose is:    
   
   
 Dose:  1-2 Tab Take 1-2 Tabs by mouth every four (4) hours as needed for Pain. Max Daily Amount: 12 Tabs. Quantity:  120 Tab Refills:  0  
     
   
   
   
  
 levothyroxine 125 mcg tablet Commonly known as:  SYNTHROID Your last dose was: Your next dose is:    
   
   
 Dose:  125 mcg Take 1 Tab by mouth Daily (before breakfast). Quantity:  90 Tab Refills:  1  
     
   
   
   
  
 lisinopril 20 mg tablet Commonly known as:  Lakesha Cody Your last dose was: Your next dose is:    
   
   
 Dose:  20 mg Take 1 Tab by mouth daily. Quantity:  90 Tab Refills:  3  
     
   
   
   
  
 magic mouthwash solution Your last dose was: Your next dose is:    
   
   
 Magic mouth wash  Maalox Lidocaine 2% viscous  Diphenhydramine oral solution   Pharmacy to mix equal portions of ingredients to a total volume as indicated in the dispense amount. Quantity:  473 mL Refills:  0  
     
   
   
   
  
 magic mouthwash Susp Commonly known as:  [place] Your last dose was: Your next dose is:    
   
   
 Dose:  10 mL Take 10 mL by mouth every four (4) hours as needed. Quantity:  1 Bottle Refills:  2  
     
   
   
   
  
 omeprazole 20 mg capsule Commonly known as:  PRILOSEC Your last dose was: Your next dose is:    
   
   
 Dose:  20 mg Take 1 Cap by mouth daily. Quantity:  90 Cap Refills:  3  
     
   
   
   
  
 ondansetron hcl 4 mg tablet Commonly known as:  ZOFRAN (AS HYDROCHLORIDE) Your last dose was: Your next dose is:    
   
   
 Dose:  4 mg Take 1 Tab by mouth every eight (8) hours as needed for Nausea. Quantity:  60 Tab Refills:  3  
     
   
   
   
  
 promethazine 25 mg tablet Commonly known as:  PHENERGAN Your last dose was: Your next dose is:    
   
   
 Dose:  25 mg Take 25 mg by mouth every six (6) hours as needed for Nausea. Unsure of dose Refills:  0 Discharge Instructions [address] Department of Interventional Radiology [facility] 
[phone] Office 
[phone] Fax PARACENTESIS DISCHARGE INSTRUCTIONS General Information: 
During this procedure, the doctor will insert a needle into the abdomen to drain fluid. After the procedure, you will be able to take a deep breath much easier. The site of the puncture may ooze the first day. This will decrease and eventually stop. Paracentesis (draining fluid from the abdomen) sometimes makes patients hypotensive (low blood pressure). Your doctor may order for you to receive fluids or albumin (a volume booster) during the procedure through an IV site. Home Care Instructions: 
Keep the puncture site clean and dry. No tub baths or swimming until puncture site heals. Showering is acceptable. Resume your normal diet, and resume your normal activity slowly and as you tolerate. If you are short of breath, rest. If shortness of breath does not ease, please call your ordering doctor. Fluid can re-accumulate in the chest and/or in the abdomen. If this should occur, your doctor needs to know as you may need to have the procedure done again. Call If: 
   You should call your Physician and/or the Radiology Nurse if you notice any signs of infection, like pus draining, or if it is swollen or reddened.  Also call if you have a fever, or if you are bleeding from the puncture site more than a small amount on the dressing. Call if the puncture site keeps draining fluid. Some oozing is to be expected, but should slow and then stop. Call if you feel like you have pressure in your abdomen. SEEK IMMEDIATE CARE OR CALL 911 IF YOU SUDDENLY HAVE TROUBLE BREATHING, OR IF YOUR LIPS TURN BLUE, OR IF YOU NOTICE BLOOD IN YOUR SPUTUM. Follow-Up Instructions: Please see your ordering doctor as he/she has requested. Interventional Radiology General Nurse Discharge After general anesthesia or intravenous sedation, for 24 hours or while taking prescription Narcotics: · Limit your activities · Do not drive and operate hazardous machinery · Do not make important personal or business decisions · Do  not drink alcoholic beverages · If you have not urinated within 8 hours after discharge, please contact your surgeon on call. * Please give a list of your current medications to your Primary Care Provider. * Please update this list whenever your medications are discontinued, doses are 
   changed, or new medications (including over-the-counter products) are added. * Please carry medication information at all times in case of emergency situations. These are general instructions for a healthy lifestyle: No smoking/ No tobacco products/ Avoid exposure to second hand smoke Surgeon General's Warning:  Quitting smoking now greatly reduces serious risk to your health. Obesity, smoking, and sedentary lifestyle greatly increases your risk for illness A healthy diet, regular physical exercise & weight monitoring are important for maintaining a healthy lifestyle You may be retaining fluid if you have a history of heart failure or if you experience any of the following symptoms:  Weight gain of 3 pounds or more overnight or 5 pounds in a week, increased swelling in our hands or feet or shortness of breath while lying flat in bed.   Please call your doctor as soon as you notice any of these symptoms; do not wait until your next office visit. Recognize signs and symptoms of STROKE: 
F-face looks uneven A-arms unable to move or move unevenly S-speech slurred or non-existent T-time-call 911 as soon as signs and symptoms begin-DO NOT go Back to bed or wait to see if you get better-TIME IS BRAIN. To Reach Us: If you have any questions about your procedure, please call the Interventional Radiology department at [phone]. After business hours (5pm) and weekends, call the answering service at [phone] and ask for the Radiologist on call to be paged. Si tiene Preguntas acerca del procedimiento, por favor llame al departamento de Radiología Intervencional al [phone]. Después de horas de oficina (5 pm) y los fines de [place], llamar al [place] de llamadas al [phone] y pregunte por el Radiologo de [place]. Date: 4/3/2017 Discharging Nurse: Fina Negro RN Discharge Orders None Introducing [facility] SERVICES! Dear Erin Gomez: Thank you for requesting a [employer] account. Our records indicate that you already have an active [employer] account. You can access your account anytime at [email or web address] Did you know that you can access your hospital and ER discharge instructions at any time in [employer]? You can also review all of your test results from your hospital stay or ER visit. Additional Information If you have questions, please visit the Frequently Asked Questions section of the [employer] website at [email or web address] Remember, [employer] is NOT to be used for urgent needs. For medical emergencies, dial 911. Now available from your iPhone and Android! General Information Please provide this summary of care documentation to your next provider.  
  
  
    
    
 Patient Signature: ____________________________________________________________ Date:  ____________________________________________________________  
  
Angelia Marcela Provider Signature:  ____________________________________________________________ Date:  ____________________________________________________________

## 2017-04-03 NOTE — DISCHARGE INSTRUCTIONS
[address]  Department of Interventional Radiology  [facility]  [phone] Office  [phone] Fax    PARACENTESIS DISCHARGE INSTRUCTIONS    General Information:  During this procedure, the doctor will insert a needle into the abdomen to drain fluid. After the procedure, you will be able to take a deep breath much easier. The site of the puncture may ooze the first day. This will decrease and eventually stop. Paracentesis (draining fluid from the abdomen) sometimes makes patients hypotensive (low blood pressure). Your doctor may order for you to receive fluids or albumin (a volume booster) during the procedure through an IV site. Home Care Instructions:  Keep the puncture site clean and dry. No tub baths or swimming until puncture site heals. Showering is acceptable. Resume your normal diet, and resume your normal activity slowly and as you tolerate. If you are short of breath, rest. If shortness of breath does not ease, please call your ordering doctor. Fluid can re-accumulate in the chest and/or in the abdomen. If this should occur, your doctor needs to know as you may need to have the procedure done again. Call If:     You should call your Physician and/or the Radiology Nurse if you notice any signs of infection, like pus draining, or if it is swollen or reddened. Also call if you have a fever, or if you are bleeding from the puncture site more than a small amount on the dressing. Call if the puncture site keeps draining fluid. Some oozing is to be expected, but should slow and then stop. Call if you feel like you have pressure in your abdomen. SEEK IMMEDIATE CARE OR CALL 911 IF YOU SUDDENLY HAVE TROUBLE BREATHING, OR IF YOUR LIPS TURN BLUE, OR IF YOU NOTICE BLOOD IN YOUR SPUTUM. Follow-Up Instructions: Please see your ordering doctor as he/she has requested.    Interventional Radiology General Nurse Discharge    After general anesthesia or intravenous sedation, for 24 hours or while taking prescription Narcotics:  · Limit your activities  · Do not drive and operate hazardous machinery  · Do not make important personal or business decisions  · Do  not drink alcoholic beverages  · If you have not urinated within 8 hours after discharge, please contact your surgeon on call. * Please give a list of your current medications to your Primary Care Provider. * Please update this list whenever your medications are discontinued, doses are     changed, or new medications (including over-the-counter products) are added. * Please carry medication information at all times in case of emergency situations. These are general instructions for a healthy lifestyle:    No smoking/ No tobacco products/ Avoid exposure to second hand smoke  Surgeon General's Warning:  Quitting smoking now greatly reduces serious risk to your health. Obesity, smoking, and sedentary lifestyle greatly increases your risk for illness  A healthy diet, regular physical exercise & weight monitoring are important for maintaining a healthy lifestyle    You may be retaining fluid if you have a history of heart failure or if you experience any of the following symptoms:  Weight gain of 3 pounds or more overnight or 5 pounds in a week, increased swelling in our hands or feet or shortness of breath while lying flat in bed. Please call your doctor as soon as you notice any of these symptoms; do not wait until your next office visit. Recognize signs and symptoms of STROKE:  F-face looks uneven    A-arms unable to move or move unevenly    S-speech slurred or non-existent    T-time-call 911 as soon as signs and symptoms begin-DO NOT go       Back to bed or wait to see if you get better-TIME IS BRAIN. To Reach Us: If you have any questions about your procedure, please call the Interventional Radiology department at [phone].  After business hours (5pm) and weekends, call the answering service at [phone] and ask for the Radiologist on call to be paged. Si tiene Preguntas acerca del procedimiento, por favor llame al departamento de Radiología Intervencional al [phone]. Después de horas de oficina (5 pm) y los fines de [place], llamar al [place] de llamadas al [phone] y pregunte por el Radiologo de [facility].          Date: 4/3/2017  Discharging Nurse: Padmaja Pompa RN

## 2017-04-03 NOTE — PROGRESS NOTES
No fluid to be drained per Vivi Osuna PA-C. Dragan Banda Patient requested a 2nd copy of dc instructions .

## 2017-04-04 LAB
LDH SERPL-CCNC: 372 IU/L (ref 119–226)
LDH1 CFR SERPL ELPH: 19 % (ref 17–32)
LDH2 CFR SERPL ELPH: 34 % (ref 25–40)
LDH3 CFR SERPL ELPH: 27 % (ref 17–27)
LDH4 CFR SERPL ELPH: 13 % (ref 5–13)
LDH5 CFR SERPL ELPH: 7 % (ref 4–20)

## 2017-04-06 ENCOUNTER — HOSPITAL ENCOUNTER (OUTPATIENT)
Dept: PET IMAGING | Age: 57
Discharge: HOME | End: 2017-04-06
Payer: COMMERCIAL

## 2017-04-06 DIAGNOSIS — C85.90 NON-HODGKIN'S LYMPHOMA, UNSPECIFIED BODY REGION, UNSPECIFIED NON-HODGKIN LYMPHOMA TYPE (HCC): ICD-10-CM

## 2017-04-06 PROCEDURE — A9552 F18 FDG: HCPCS

## 2017-04-06 PROCEDURE — 74011636320 HC RX REV CODE- 636/320: Performed by: INTERNAL MEDICINE

## 2017-04-06 RX ADMIN — DIATRIZOATE MEGLUMINE AND DIATRIZOATE SODIUM 10 ML: 660; 100 LIQUID ORAL; RECTAL at 15:37

## 2017-04-07 ENCOUNTER — HOSPITAL ENCOUNTER (OUTPATIENT)
Dept: LAB | Age: 57
Discharge: HOME | End: 2017-04-07
Payer: COMMERCIAL

## 2017-04-07 ENCOUNTER — PATIENT OUTREACH (OUTPATIENT)
Dept: CASE MANAGEMENT | Age: 57
End: 2017-04-07

## 2017-04-07 DIAGNOSIS — C85.88 MARGINAL ZONE LYMPHOMA OF LYMPH NODES OF MULTIPLE SITES (HCC): ICD-10-CM

## 2017-04-07 DIAGNOSIS — C85.90 NON-HODGKIN'S LYMPHOMA, UNSPECIFIED BODY REGION, UNSPECIFIED NON-HODGKIN LYMPHOMA TYPE (HCC): ICD-10-CM

## 2017-04-07 LAB
ALBUMIN SERPL BCP-MCNC: 2.5 G/DL (ref 3.5–5)
ALBUMIN/GLOB SERPL: 0.8 {RATIO} (ref 1.2–3.5)
ALP SERPL-CCNC: 89 U/L (ref 50–136)
ALT SERPL-CCNC: 16 U/L (ref 12–65)
ANION GAP BLD CALC-SCNC: 9 MMOL/L (ref 7–16)
AST SERPL W P-5'-P-CCNC: 30 U/L (ref 15–37)
BASOPHILS # BLD AUTO: 0 K/UL (ref 0–0.2)
BASOPHILS # BLD: 1 % (ref 0–2)
BILIRUB SERPL-MCNC: 0.3 MG/DL (ref 0.2–1.1)
BUN SERPL-MCNC: 44 MG/DL (ref 6–23)
CALCIUM SERPL-MCNC: 8.8 MG/DL (ref 8.3–10.4)
CHLORIDE SERPL-SCNC: 106 MMOL/L (ref 98–107)
CO2 SERPL-SCNC: 24 MMOL/L (ref 23–32)
CREAT SERPL-MCNC: 1.54 MG/DL (ref 0.6–1)
DIFFERENTIAL METHOD BLD: ABNORMAL
EOSINOPHIL # BLD: 0.7 K/UL (ref 0–0.8)
EOSINOPHIL NFR BLD: 15 % (ref 0.5–7.8)
ERYTHROCYTE [DISTWIDTH] IN BLOOD BY AUTOMATED COUNT: 18.5 % (ref 11.9–14.6)
GLOBULIN SER CALC-MCNC: 3 G/DL (ref 2.3–3.5)
GLUCOSE SERPL-MCNC: 106 MG/DL (ref 65–100)
HCT VFR BLD AUTO: 26.7 % (ref 35.8–46.3)
HGB BLD-MCNC: 8.2 G/DL (ref 11.7–15.4)
LYMPHOCYTES # BLD AUTO: 20 % (ref 13–44)
LYMPHOCYTES # BLD: 0.9 K/UL (ref 0.5–4.6)
MAGNESIUM SERPL-MCNC: 2.1 MG/DL (ref 1.8–2.4)
MCH RBC QN AUTO: 27.4 PG (ref 26.1–32.9)
MCHC RBC AUTO-ENTMCNC: 30.7 G/DL (ref 31.4–35)
MCV RBC AUTO: 89.3 FL (ref 79.6–97.8)
MONOCYTES # BLD: 0.5 K/UL (ref 0.1–1.3)
MONOCYTES NFR BLD AUTO: 10 % (ref 4–12)
NEUTS SEG # BLD: 2.6 K/UL (ref 1.7–8.2)
NEUTS SEG NFR BLD AUTO: 55 % (ref 43–78)
NRBC # BLD: 0.01 K/UL (ref 0–0.2)
PLATELET # BLD AUTO: 176 K/UL (ref 150–450)
PMV BLD AUTO: 9.5 FL (ref 10.8–14.1)
POTASSIUM SERPL-SCNC: 5.8 MMOL/L (ref 3.5–5.1)
PROT SERPL-MCNC: 5.5 G/DL (ref 6.3–8.2)
RBC # BLD AUTO: 2.99 M/UL (ref 4.05–5.25)
SODIUM SERPL-SCNC: 139 MMOL/L (ref 136–145)
WBC # BLD AUTO: 4.7 K/UL (ref 4.3–11.1)

## 2017-04-07 PROCEDURE — 83735 ASSAY OF MAGNESIUM: CPT | Performed by: INTERNAL MEDICINE

## 2017-04-07 PROCEDURE — 36415 COLL VENOUS BLD VENIPUNCTURE: CPT | Performed by: INTERNAL MEDICINE

## 2017-04-07 PROCEDURE — 85025 COMPLETE CBC W/AUTO DIFF WBC: CPT | Performed by: INTERNAL MEDICINE

## 2017-04-07 PROCEDURE — 80074 ACUTE HEPATITIS PANEL: CPT | Performed by: INTERNAL MEDICINE

## 2017-04-07 PROCEDURE — 80053 COMPREHEN METABOLIC PANEL: CPT | Performed by: INTERNAL MEDICINE

## 2017-04-07 NOTE — ACP (ADVANCE CARE PLANNING)
Pt was seen by Dr. Jennifer Quach and was given her definitive diagnosis of Marginal zone lymphoma. Plan is to treat with 6 cycles of R- Bendamustine with Neulasta. Will do PET scan after 2 cycles. Pt to have chemo ed/FC and start treatment next week. Port placement to be scheduled, may have to do 1st cycle peripherally. Prescriptions called in for Acyclovir, Diflucan, Allopurinol and emla cream.  Pt's K+ 5.8 today. Pt instructed to start taking Allopurinol today, and to return to lab on Monday for lab check. Pt verbalized understanding of this and to stay away from foods high in  potassium until labs resulted on Monday. Pt and family in aggreance with plan. Will continue to follow.

## 2017-04-08 LAB
HAV IGM SERPL QL IA: NEGATIVE
HBV CORE IGM SERPL QL IA: NEGATIVE
HBV SURFACE AG SERPL QL IA: NEGATIVE
HCV AB S/CO SERPL IA: <0.1 S/CO RATIO (ref 0–0.9)

## 2017-04-10 ENCOUNTER — HOSPITAL ENCOUNTER (OUTPATIENT)
Dept: LAB | Age: 57
Discharge: HOME | End: 2017-04-10
Payer: COMMERCIAL

## 2017-04-10 DIAGNOSIS — C85.88 MARGINAL ZONE LYMPHOMA OF LYMPH NODES OF MULTIPLE SITES (HCC): ICD-10-CM

## 2017-04-10 LAB
ALBUMIN SERPL BCP-MCNC: 2.7 G/DL (ref 3.5–5)
ALBUMIN/GLOB SERPL: 0.9 {RATIO} (ref 1.2–3.5)
ALP SERPL-CCNC: 90 U/L (ref 50–136)
ALT SERPL-CCNC: 17 U/L (ref 12–65)
ANION GAP BLD CALC-SCNC: 10 MMOL/L (ref 7–16)
AST SERPL W P-5'-P-CCNC: 28 U/L (ref 15–37)
BILIRUB SERPL-MCNC: 0.4 MG/DL (ref 0.2–1.1)
BUN SERPL-MCNC: 45 MG/DL (ref 6–23)
CALCIUM SERPL-MCNC: 9.6 MG/DL (ref 8.3–10.4)
CHLORIDE SERPL-SCNC: 105 MMOL/L (ref 98–107)
CO2 SERPL-SCNC: 24 MMOL/L (ref 23–32)
CREAT SERPL-MCNC: 1.55 MG/DL (ref 0.6–1)
GLOBULIN SER CALC-MCNC: 3 G/DL (ref 2.3–3.5)
GLUCOSE SERPL-MCNC: 109 MG/DL (ref 65–100)
LDH SERPL L TO P-CCNC: 367 U/L (ref 100–190)
POTASSIUM SERPL-SCNC: 5.3 MMOL/L (ref 3.5–5.1)
PROT SERPL-MCNC: 5.7 G/DL (ref 6.3–8.2)
SODIUM SERPL-SCNC: 139 MMOL/L (ref 136–145)
URATE SERPL-MCNC: 13 MG/DL (ref 2.6–6)

## 2017-04-10 PROCEDURE — 83615 LACTATE (LD) (LDH) ENZYME: CPT | Performed by: INTERNAL MEDICINE

## 2017-04-10 PROCEDURE — 80053 COMPREHEN METABOLIC PANEL: CPT | Performed by: INTERNAL MEDICINE

## 2017-04-10 PROCEDURE — 84550 ASSAY OF BLOOD/URIC ACID: CPT | Performed by: INTERNAL MEDICINE

## 2017-04-10 PROCEDURE — 87389 HIV-1 AG W/HIV-1&-2 AB AG IA: CPT | Performed by: INTERNAL MEDICINE

## 2017-04-10 PROCEDURE — 36415 COLL VENOUS BLD VENIPUNCTURE: CPT | Performed by: INTERNAL MEDICINE

## 2017-04-11 ENCOUNTER — DOCUMENTATION ONLY (OUTPATIENT)
Dept: HEMATOLOGY | Age: 57
End: 2017-04-11

## 2017-04-11 LAB — HIV 1+2 AB+HIV1 P24 AG SERPL QL IA: NON REACTIVE

## 2017-04-11 NOTE — PROGRESS NOTES
I spoke with Kassy Turner regarding her [place]. Patient has a $1,500 Ded and $ 3,750 OOP Max has been met and insurance should pay  at 100%. Mrs. Eugenio Gonzalez had no concerns about the cost of treatment at this time. Next, I spoke with Mrs Eugenio Gonzalez regarding potential oral medication authorizations. I told her that if she ever had any problems getting her oral medications filled to give the dedicated   [address]. Konstantin Bee  Kendell Mathis a call. Most of the time, it is simply an authorization that needs to be done with the insurance company. Next, I spoke with Mrs. Eugenio Gonzalez regarding enrolling with [facility]. I went over some of the services that [facility] and [facility] offers and the enrollment process. Lastly, I gave Mrs. Eugenio Gonzalez a form with various resource organizations that could assist with specific needs (example:  transportation, lodging, preparing meals, home cleaning)                Faxed Patient Referral form to the [facility] at [phone]. Phone [phone]. Form scanned into chart. Faxed Physician's Statement to the [id number] [place] [address] at [phone]. Phone [phone]. Form scanned into chart.

## 2017-04-13 ENCOUNTER — HOSPITAL ENCOUNTER (OUTPATIENT)
Dept: INFUSION THERAPY | Age: 57
Discharge: HOME | End: 2017-04-13
Payer: COMMERCIAL

## 2017-04-13 VITALS
BODY MASS INDEX: 49.25 KG/M2 | HEART RATE: 92 BPM | DIASTOLIC BLOOD PRESSURE: 52 MMHG | RESPIRATION RATE: 18 BRPM | TEMPERATURE: 99.2 F | SYSTOLIC BLOOD PRESSURE: 88 MMHG | WEIGHT: 278 LBS | OXYGEN SATURATION: 93 %

## 2017-04-13 DIAGNOSIS — C85.88 MARGINAL ZONE LYMPHOMA OF LYMPH NODES OF MULTIPLE SITES (HCC): ICD-10-CM

## 2017-04-13 LAB
ALBUMIN SERPL BCP-MCNC: 2.6 G/DL (ref 3.5–5)
ALBUMIN/GLOB SERPL: 0.8 {RATIO} (ref 1.2–3.5)
ALP SERPL-CCNC: 90 U/L (ref 50–136)
ALT SERPL-CCNC: 17 U/L (ref 12–65)
ANION GAP BLD CALC-SCNC: 11 MMOL/L (ref 7–16)
ANION GAP BLD CALC-SCNC: 12 MMOL/L (ref 7–16)
APTT PPP: 23.4 SEC (ref 23.5–31.7)
AST SERPL W P-5'-P-CCNC: 30 U/L (ref 15–37)
BASOPHILS # BLD AUTO: 0.1 K/UL (ref 0–0.2)
BASOPHILS # BLD: 1 % (ref 0–2)
BILIRUB SERPL-MCNC: 0.5 MG/DL (ref 0.2–1.1)
BUN SERPL-MCNC: 40 MG/DL (ref 6–23)
BUN SERPL-MCNC: 41 MG/DL (ref 6–23)
CALCIUM SERPL-MCNC: 8.7 MG/DL (ref 8.3–10.4)
CALCIUM SERPL-MCNC: 9.3 MG/DL (ref 8.3–10.4)
CHLORIDE SERPL-SCNC: 105 MMOL/L (ref 98–107)
CHLORIDE SERPL-SCNC: 106 MMOL/L (ref 98–107)
CO2 SERPL-SCNC: 21 MMOL/L (ref 23–32)
CO2 SERPL-SCNC: 24 MMOL/L (ref 23–32)
CREAT SERPL-MCNC: 1.39 MG/DL (ref 0.6–1)
CREAT SERPL-MCNC: 1.54 MG/DL (ref 0.6–1)
DIFFERENTIAL METHOD BLD: ABNORMAL
EOSINOPHIL # BLD: 0.7 K/UL (ref 0–0.8)
EOSINOPHIL NFR BLD: 16 % (ref 0.5–7.8)
ERYTHROCYTE [DISTWIDTH] IN BLOOD BY AUTOMATED COUNT: 18.5 % (ref 11.9–14.6)
FIBRINOGEN PPP-MCNC: 420 MG/DL (ref 172–437)
GLOBULIN SER CALC-MCNC: 3.1 G/DL (ref 2.3–3.5)
GLUCOSE SERPL-MCNC: 118 MG/DL (ref 65–100)
GLUCOSE SERPL-MCNC: 93 MG/DL (ref 65–100)
HCT VFR BLD AUTO: 25.9 % (ref 35.8–46.3)
HGB BLD-MCNC: 8 G/DL (ref 11.7–15.4)
INR PPP: 1 (ref 0.9–1.2)
LDH SERPL L TO P-CCNC: 385 U/L (ref 100–190)
LYMPHOCYTES # BLD AUTO: 16 % (ref 13–44)
LYMPHOCYTES # BLD: 0.7 K/UL (ref 0.5–4.6)
MAGNESIUM SERPL-MCNC: 1.9 MG/DL (ref 1.8–2.4)
MAGNESIUM SERPL-MCNC: 2 MG/DL (ref 1.8–2.4)
MCH RBC QN AUTO: 27.8 PG (ref 26.1–32.9)
MCHC RBC AUTO-ENTMCNC: 30.9 G/DL (ref 31.4–35)
MCV RBC AUTO: 89.9 FL (ref 79.6–97.8)
MONOCYTES # BLD: 0.4 K/UL (ref 0.1–1.3)
MONOCYTES NFR BLD AUTO: 8 % (ref 4–12)
NEUTS SEG # BLD: 2.5 K/UL (ref 1.7–8.2)
NEUTS SEG NFR BLD AUTO: 59 % (ref 43–78)
NRBC # BLD: 0 K/UL (ref 0–0.2)
PHOSPHATE SERPL-MCNC: 4 MG/DL (ref 2.5–4.5)
PHOSPHATE SERPL-MCNC: 4.3 MG/DL (ref 2.5–4.5)
PLATELET # BLD AUTO: 178 K/UL (ref 150–450)
PMV BLD AUTO: 9.9 FL (ref 10.8–14.1)
POTASSIUM SERPL-SCNC: 5 MMOL/L (ref 3.5–5.1)
POTASSIUM SERPL-SCNC: 5 MMOL/L (ref 3.5–5.1)
PROT SERPL-MCNC: 5.7 G/DL (ref 6.3–8.2)
PROTHROMBIN TIME: 10.2 SEC (ref 9.6–12)
RBC # BLD AUTO: 2.88 M/UL (ref 4.05–5.25)
SODIUM SERPL-SCNC: 139 MMOL/L (ref 136–145)
SODIUM SERPL-SCNC: 140 MMOL/L (ref 136–145)
URATE SERPL-MCNC: 12.4 MG/DL (ref 2.6–6)
URATE SERPL-MCNC: 7.5 MG/DL (ref 2.6–6)
WBC # BLD AUTO: 4.3 K/UL (ref 4.3–11.1)

## 2017-04-13 PROCEDURE — 36415 COLL VENOUS BLD VENIPUNCTURE: CPT | Performed by: INTERNAL MEDICINE

## 2017-04-13 PROCEDURE — 80048 BASIC METABOLIC PNL TOTAL CA: CPT | Performed by: NURSE PRACTITIONER

## 2017-04-13 PROCEDURE — 85610 PROTHROMBIN TIME: CPT | Performed by: NURSE PRACTITIONER

## 2017-04-13 PROCEDURE — 74011250636 HC RX REV CODE- 250/636: Performed by: INTERNAL MEDICINE

## 2017-04-13 PROCEDURE — 85025 COMPLETE CBC W/AUTO DIFF WBC: CPT | Performed by: INTERNAL MEDICINE

## 2017-04-13 PROCEDURE — 96367 TX/PROPH/DG ADDL SEQ IV INF: CPT

## 2017-04-13 PROCEDURE — 84100 ASSAY OF PHOSPHORUS: CPT | Performed by: NURSE PRACTITIONER

## 2017-04-13 PROCEDURE — 80053 COMPREHEN METABOLIC PANEL: CPT | Performed by: INTERNAL MEDICINE

## 2017-04-13 PROCEDURE — 83735 ASSAY OF MAGNESIUM: CPT | Performed by: INTERNAL MEDICINE

## 2017-04-13 PROCEDURE — 83735 ASSAY OF MAGNESIUM: CPT | Performed by: NURSE PRACTITIONER

## 2017-04-13 PROCEDURE — 85730 THROMBOPLASTIN TIME PARTIAL: CPT | Performed by: NURSE PRACTITIONER

## 2017-04-13 PROCEDURE — 96375 TX/PRO/DX INJ NEW DRUG ADDON: CPT

## 2017-04-13 PROCEDURE — 85384 FIBRINOGEN ACTIVITY: CPT | Performed by: NURSE PRACTITIONER

## 2017-04-13 PROCEDURE — 74011000258 HC RX REV CODE- 258: Performed by: INTERNAL MEDICINE

## 2017-04-13 PROCEDURE — 74011250636 HC RX REV CODE- 250/636: Performed by: NURSE PRACTITIONER

## 2017-04-13 PROCEDURE — 96376 TX/PRO/DX INJ SAME DRUG ADON: CPT

## 2017-04-13 PROCEDURE — 96415 CHEMO IV INFUSION ADDL HR: CPT

## 2017-04-13 PROCEDURE — 96413 CHEMO IV INFUSION 1 HR: CPT

## 2017-04-13 PROCEDURE — 96361 HYDRATE IV INFUSION ADD-ON: CPT

## 2017-04-13 PROCEDURE — 74011000258 HC RX REV CODE- 258: Performed by: NURSE PRACTITIONER

## 2017-04-13 PROCEDURE — 96417 CHEMO IV INFUS EACH ADDL SEQ: CPT

## 2017-04-13 PROCEDURE — 84550 ASSAY OF BLOOD/URIC ACID: CPT | Performed by: INTERNAL MEDICINE

## 2017-04-13 PROCEDURE — 83615 LACTATE (LD) (LDH) ENZYME: CPT | Performed by: INTERNAL MEDICINE

## 2017-04-13 PROCEDURE — 74011250637 HC RX REV CODE- 250/637: Performed by: INTERNAL MEDICINE

## 2017-04-13 PROCEDURE — 84100 ASSAY OF PHOSPHORUS: CPT | Performed by: INTERNAL MEDICINE

## 2017-04-13 RX ORDER — SODIUM CHLORIDE 0.9 % (FLUSH) 0.9 %
10 SYRINGE (ML) INJECTION AS NEEDED
Status: ACTIVE | OUTPATIENT
Start: 2017-04-13 | End: 2017-04-14

## 2017-04-13 RX ORDER — SODIUM CHLORIDE 9 MG/ML
1000 INJECTION, SOLUTION INTRAVENOUS ONCE
Status: COMPLETED | OUTPATIENT
Start: 2017-04-13 | End: 2017-04-13

## 2017-04-13 RX ORDER — DIPHENHYDRAMINE HYDROCHLORIDE 50 MG/ML
50 INJECTION, SOLUTION INTRAMUSCULAR; INTRAVENOUS AS NEEDED
Status: DISPENSED | OUTPATIENT
Start: 2017-04-13 | End: 2017-04-13

## 2017-04-13 RX ORDER — SODIUM CHLORIDE 0.9 % (FLUSH) 0.9 %
10 SYRINGE (ML) INJECTION AS NEEDED
Status: DISCONTINUED | OUTPATIENT
Start: 2017-04-13 | End: 2017-04-17 | Stop reason: HOSPADM

## 2017-04-13 RX ORDER — ONDANSETRON 2 MG/ML
8 INJECTION INTRAMUSCULAR; INTRAVENOUS AS NEEDED
Status: ACTIVE | OUTPATIENT
Start: 2017-04-13 | End: 2017-04-13

## 2017-04-13 RX ORDER — LORAZEPAM 2 MG/ML
0.5 INJECTION INTRAMUSCULAR
Status: DISPENSED | OUTPATIENT
Start: 2017-04-13 | End: 2017-04-14

## 2017-04-13 RX ORDER — DIPHENHYDRAMINE HYDROCHLORIDE 50 MG/ML
50 INJECTION, SOLUTION INTRAMUSCULAR; INTRAVENOUS ONCE
Status: COMPLETED | OUTPATIENT
Start: 2017-04-13 | End: 2017-04-13

## 2017-04-13 RX ORDER — ONDANSETRON 2 MG/ML
8 INJECTION INTRAMUSCULAR; INTRAVENOUS ONCE
Status: COMPLETED | OUTPATIENT
Start: 2017-04-13 | End: 2017-04-13

## 2017-04-13 RX ORDER — ACETAMINOPHEN 325 MG/1
650 TABLET ORAL ONCE
Status: COMPLETED | OUTPATIENT
Start: 2017-04-13 | End: 2017-04-13

## 2017-04-13 RX ORDER — ACETAMINOPHEN 325 MG/1
650 TABLET ORAL AS NEEDED
Status: DISPENSED | OUTPATIENT
Start: 2017-04-13 | End: 2017-04-14

## 2017-04-13 RX ORDER — DEXAMETHASONE SODIUM PHOSPHATE 100 MG/10ML
10 INJECTION INTRAMUSCULAR; INTRAVENOUS ONCE
Status: COMPLETED | OUTPATIENT
Start: 2017-04-13 | End: 2017-04-13

## 2017-04-13 RX ORDER — HYDROCORTISONE SODIUM SUCCINATE 100 MG/2ML
100 INJECTION, POWDER, FOR SOLUTION INTRAMUSCULAR; INTRAVENOUS AS NEEDED
Status: DISPENSED | OUTPATIENT
Start: 2017-04-13 | End: 2017-04-13

## 2017-04-13 RX ADMIN — LORAZEPAM 0.5 MG: 2 INJECTION, SOLUTION INTRAMUSCULAR; INTRAVENOUS at 10:39

## 2017-04-13 RX ADMIN — ACETAMINOPHEN 650 MG: 325 TABLET, FILM COATED ORAL at 13:02

## 2017-04-13 RX ADMIN — DIPHENHYDRAMINE HYDROCHLORIDE 50 MG: 50 INJECTION, SOLUTION INTRAMUSCULAR; INTRAVENOUS at 09:12

## 2017-04-13 RX ADMIN — MEPERIDINE HYDROCHLORIDE 25 MG: 25 INJECTION INTRAMUSCULAR; INTRAVENOUS; SUBCUTANEOUS at 10:29

## 2017-04-13 RX ADMIN — SODIUM CHLORIDE 6 MG: 900 INJECTION, SOLUTION INTRAVENOUS at 08:45

## 2017-04-13 RX ADMIN — BENDAMUSTINE HYDROCHLORIDE 212.5 MG: 25 INJECTION, SOLUTION INTRAVENOUS at 15:01

## 2017-04-13 RX ADMIN — DEXAMETHASONE SODIUM PHOSPHATE 10 MG: 10 INJECTION INTRAMUSCULAR; INTRAVENOUS at 14:51

## 2017-04-13 RX ADMIN — ONDANSETRON 8 MG: 2 INJECTION INTRAMUSCULAR; INTRAVENOUS at 14:49

## 2017-04-13 RX ADMIN — SODIUM CHLORIDE 1000 ML: 900 INJECTION, SOLUTION INTRAVENOUS at 07:45

## 2017-04-13 RX ADMIN — RITUXIMAB 885 MG: 10 INJECTION, SOLUTION INTRAVENOUS at 09:19

## 2017-04-13 RX ADMIN — Medication 10 ML: at 15:20

## 2017-04-13 RX ADMIN — ACETAMINOPHEN 650 MG: 325 TABLET, FILM COATED ORAL at 09:10

## 2017-04-13 RX ADMIN — HYDROCORTISONE SODIUM SUCCINATE 100 MG: 100 INJECTION, POWDER, FOR SOLUTION INTRAMUSCULAR; INTRAVENOUS at 10:36

## 2017-04-13 RX ADMIN — SODIUM CHLORIDE 500 ML: 900 INJECTION, SOLUTION INTRAVENOUS at 10:30

## 2017-04-13 RX ADMIN — DIPHENHYDRAMINE HYDROCHLORIDE 50 MG: 50 INJECTION, SOLUTION INTRAMUSCULAR; INTRAVENOUS at 10:33

## 2017-04-13 NOTE — PROGRESS NOTES
Arrived to the [facility]. D1C1 rasburicase/ Rituxan, Ethelene Ards completed. Patient tolerated well. Any issues or concerns during appointment: after an hour of starting rituxan pt began having chills/vomiting/rigors and increased temperature, emergency meds given, rituxan restarted  After 1 hour and tolerated well. Patient aware of next infusion appointment on 4/14 (date) at 0700 (time).   Discharged ambulatory with family

## 2017-04-13 NOTE — PROGRESS NOTES
Massage THERAPY: Daily Note    Referring Physician: Tahir Pate MD  Medical/Referring Diagnosis: Lymphoma [facility]) [C85.90]   Precautions/Allergies: Review of patient's allergies indicates no known allergies. SUBJECTIVE:  Present Symptoms: some discomfort in legs     Pre-Treatment Pain: 4/10  Past Medical History:    Ms. Jose Bailey  has a past medical history of Anemia; Arthritis; Basal cell carcinoma; Chronic pain; Hypertension (10/4/2011); Morbid obesity ([address] Murmur; Non Hodgkin's lymphoma ([address] (3/30/2017); Other ill-defined conditions; Overactive bladder; Rheumatic fever; Shingles; Thromboembolus ([address] (x2); Thyroid disease; and Unspecified adverse effect of anesthesia. She also has no past medical history of Aneurysm ([address] Arrhythmia; Asthma; Autoimmune disease ([address] CAD (coronary artery disease); Chronic kidney disease; Coagulation defects; COPD; Diabetes ([address] Difficult intubation; GERD (gastroesophageal reflux disease); Heart failure ([address] Liver disease; Malignant hyperthermia due to anesthesia; Nausea & vomiting; Pseudocholinesterase deficiency; Psychiatric disorder; PUD (peptic ulcer disease); Seizures ([address] Stroke [facility]); or Unspecified sleep apnea. Ms. Jose Bailey  has a past surgical history that includes hc cholecystectomy fnc41; anesth,achilles tendon surg (2/10/2011); cholecystectomy (5630); orthopaedic (2012); and orthopaedic (2013). Current Medications:       Current Outpatient Prescriptions:     allopurinol (ZYLOPRIM) 300 mg tablet, Take 1 Tab by mouth two (2) times a day., Disp: 90 Tab, Rfl: 2    pregabalin (LYRICA) 25 mg capsule, Take 1 Cap by mouth three (3) times daily. Max Daily Amount: 75 mg., Disp: 90 Cap, Rfl: 2    nystatin (MYCOSTATIN) powder, Apply  to affected area three (3) times daily. , Disp: 60 g, Rfl: 2    acyclovir (ZOVIRAX) 400 mg tablet, Take 1 Tab by mouth two (2) times a day for 10 days. , Disp: 60 Tab, Rfl: 0    lidocaine-prilocaine (EMLA) topical cream, Apply  to affected area as needed for Pain. Apply to port site 45-60 minutes prior to lab appt or infusion. , Disp: 30 g, Rfl: 0    hydrOXYzine pamoate (VISTARIL) 25 mg capsule, Take 1 Cap by mouth four (4) times daily as needed for Itching for up to 14 days. , Disp: 64 Cap, Rfl: 0    cpm-phenyleph-acetaminophen (NOREL AD) 4- mg tab, Take 1 Tab by mouth every eight (8) hours as needed for Other (cough/congestion). , Disp: 30 Tab, Rfl: 0    magic mouthwash (ALEXSANDER) susp, Take 10 mL by mouth every four (4) hours as needed. , Disp: 1 Bottle, Rfl: 2    HYDROcodone-acetaminophen (NORCO) 5-325 mg per tablet, Take 1-2 Tabs by mouth every four (4) hours as needed for Pain. Max Daily Amount: 12 Tabs., Disp: 120 Tab, Rfl: 0    promethazine (PHENERGAN) 25 mg tablet, Take 25 mg by mouth every six (6) hours as needed for Nausea. Unsure of dose, Disp: , Rfl:     ondansetron hcl (ZOFRAN, AS HYDROCHLORIDE,) 4 mg tablet, Take 1 Tab by mouth every eight (8) hours as needed for Nausea., Disp: 60 Tab, Rfl: 3    aspirin 81 mg chewable tablet, Take 81 mg by mouth daily. , Disp: , Rfl:     levothyroxine (SYNTHROID) 125 mcg tablet, Take 1 Tab by mouth Daily (before breakfast). , Disp: 90 Tab, Rfl: 1    lisinopril (PRINIVIL, ZESTRIL) 20 mg tablet, Take 1 Tab by mouth daily. , Disp: 90 Tab, Rfl: 3    omeprazole (PRILOSEC) 20 mg capsule, Take 1 Cap by mouth daily. , Disp: 90 Cap, Rfl: 3    Current Facility-Administered Medications:     saline peripheral flush soln 10 mL, 10 mL, InterCATHeter, PRN, Margaret Mackenzie, NP    sodium chloride 0.9 % bolus infusion 500 mL, 500 mL, IntraVENous, ONCE, Denae Jalloh MD    meperidine (DEMEROL) injection 25 mg, 25 mg, IntraVENous, PRN, Denae Jalloh MD    diphenhydrAMINE (BENADRYL) injection 50 mg, 50 mg, IntraVENous, PRN, Denae Jalloh MD    hydrocortisone Sod Succ (PF) (SOLU-CORTEF) injection 100 mg, 100 mg, IntraVENous, PRN, Denae Jalloh MD       OBJECTIVE/ASSESSMENT:  Objective Measure: Tool Used: Subjective Units of Distress Scale (SUDS)  Score:  Pre-Treatment: 0/100 Post-Treatment: 0/100   Interpretation of Score: Rating of patient's distress, fear, anxiety or discomfort on a scale of 0-100. Observations of Patient:  Mentioned she had never had a massage before, likes pedicures, but not getting them while in treatment  Response To Treatment: legs and feet feel much better   Post-Treatment Pain: 1/10  TREATMENT:    (In addition to Assessment/Re-Assessment sessions the following treatments were rendered)  Treatment Provided:  [x]  Soft tissue massage  []  Healing Touch   Location: bilateral lower legs and feet  Patient Position: seated  Time: 20 minutes    PLAN OF CARE:    []  I will follow up with this patient as needed. [x]  No follow up visit necessary.     Thank you for this referral.  Elsie Baca

## 2017-04-14 ENCOUNTER — HOSPITAL ENCOUNTER (OUTPATIENT)
Dept: ULTRASOUND IMAGING | Age: 57
Discharge: HOME | End: 2017-04-14
Attending: NURSE PRACTITIONER
Payer: COMMERCIAL

## 2017-04-14 ENCOUNTER — HOSPITAL ENCOUNTER (OUTPATIENT)
Dept: INFUSION THERAPY | Age: 57
Discharge: HOME | End: 2017-04-14
Payer: COMMERCIAL

## 2017-04-14 VITALS
OXYGEN SATURATION: 95 % | SYSTOLIC BLOOD PRESSURE: 120 MMHG | RESPIRATION RATE: 18 BRPM | HEART RATE: 83 BPM | TEMPERATURE: 98.5 F | DIASTOLIC BLOOD PRESSURE: 74 MMHG

## 2017-04-14 VITALS
HEART RATE: 95 BPM | DIASTOLIC BLOOD PRESSURE: 76 MMHG | SYSTOLIC BLOOD PRESSURE: 115 MMHG | RESPIRATION RATE: 18 BRPM | WEIGHT: 280.6 LBS | BODY MASS INDEX: 49.71 KG/M2

## 2017-04-14 DIAGNOSIS — C85.88 MARGINAL ZONE LYMPHOMA OF LYMPH NODES OF MULTIPLE SITES (HCC): ICD-10-CM

## 2017-04-14 DIAGNOSIS — C85.90 NON-HODGKIN'S LYMPHOMA, UNSPECIFIED BODY REGION, UNSPECIFIED NON-HODGKIN LYMPHOMA TYPE (HCC): ICD-10-CM

## 2017-04-14 PROCEDURE — 74011250636 HC RX REV CODE- 250/636: Performed by: INTERNAL MEDICINE

## 2017-04-14 PROCEDURE — 96375 TX/PRO/DX INJ NEW DRUG ADDON: CPT

## 2017-04-14 PROCEDURE — 74011000258 HC RX REV CODE- 258: Performed by: INTERNAL MEDICINE

## 2017-04-14 PROCEDURE — 96409 CHEMO IV PUSH SNGL DRUG: CPT

## 2017-04-14 PROCEDURE — 49083 ABD PARACENTESIS W/IMAGING: CPT

## 2017-04-14 PROCEDURE — 74011250636 HC RX REV CODE- 250/636

## 2017-04-14 PROCEDURE — C1729 CATH, DRAINAGE: HCPCS

## 2017-04-14 RX ORDER — DEXAMETHASONE SODIUM PHOSPHATE 100 MG/10ML
10 INJECTION INTRAMUSCULAR; INTRAVENOUS ONCE
Status: COMPLETED | OUTPATIENT
Start: 2017-04-14 | End: 2017-04-14

## 2017-04-14 RX ORDER — ONDANSETRON 2 MG/ML
8 INJECTION INTRAMUSCULAR; INTRAVENOUS ONCE
Status: COMPLETED | OUTPATIENT
Start: 2017-04-14 | End: 2017-04-14

## 2017-04-14 RX ORDER — SODIUM CHLORIDE 0.9 % (FLUSH) 0.9 %
10 SYRINGE (ML) INJECTION AS NEEDED
Status: ACTIVE | OUTPATIENT
Start: 2017-04-14 | End: 2017-04-14

## 2017-04-14 RX ORDER — HEPARIN 100 UNIT/ML
300-500 SYRINGE INTRAVENOUS AS NEEDED
Status: DISCONTINUED | OUTPATIENT
Start: 2017-04-14 | End: 2017-04-14

## 2017-04-14 RX ADMIN — ONDANSETRON 8 MG: 2 INJECTION INTRAMUSCULAR; INTRAVENOUS at 07:38

## 2017-04-14 RX ADMIN — Medication 10 ML: at 07:15

## 2017-04-14 RX ADMIN — SODIUM CHLORIDE 500 ML: 900 INJECTION, SOLUTION INTRAVENOUS at 07:45

## 2017-04-14 RX ADMIN — DEXAMETHASONE SODIUM PHOSPHATE 10 MG: 10 INJECTION INTRAMUSCULAR; INTRAVENOUS at 07:40

## 2017-04-14 RX ADMIN — BENDAMUSTINE HYDROCHLORIDE 212.5 MG: 25 INJECTION, SOLUTION INTRAVENOUS at 08:25

## 2017-04-14 NOTE — PROGRESS NOTES
Arrived ambulatory for infusion appt  bendeka infused   Tolerated well  No concerns,reminded to start claritin for neulasta injection tomorrow  Verbalized understanding]  Next appt 4/15

## 2017-04-14 NOTE — IP AVS SNAPSHOT
Current Discharge Medication List  
  
ASK your doctor about these medications Dose & Instructions Dispensing Information Comments Morning Noon Evening Bedtime  
 acyclovir 400 mg tablet Commonly known as:  ZOVIRAX Your last dose was: Your next dose is:    
   
   
 Dose:  400 mg Take 1 Tab by mouth two (2) times a day for 10 days. Quantity:  60 Tab Refills:  0  
     
   
   
   
  
 allopurinol 300 mg tablet Commonly known as:  Sue Marquis Your last dose was: Your next dose is:    
   
   
 Dose:  300 mg Take 1 Tab by mouth two (2) times a day. Quantity:  90 Tab Refills:  2  
     
   
   
   
  
 aspirin 81 mg chewable tablet Your last dose was: Your next dose is:    
   
   
 Dose:  81 mg Take 81 mg by mouth daily. Refills:  0  
     
   
   
   
  
 cpm-phenyleph-acetaminophen 4- mg Tab Commonly known as:  Georgia SCHAFFER Your last dose was: Your next dose is:    
   
   
 Dose:  1 Tab Take 1 Tab by mouth every eight (8) hours as needed for Other (cough/congestion). Quantity:  30 Tab Refills:  0 HYDROcodone-acetaminophen 5-325 mg per tablet Commonly known as:  Chun Meraz Your last dose was: Your next dose is:    
   
   
 Dose:  1-2 Tab Take 1-2 Tabs by mouth every four (4) hours as needed for Pain. Max Daily Amount: 12 Tabs. Quantity:  120 Tab Refills:  0  
     
   
   
   
  
 hydrOXYzine pamoate 25 mg capsule Commonly known as:  VISTARIL Your last dose was: Your next dose is:    
   
   
 Dose:  25 mg Take 1 Cap by mouth four (4) times daily as needed for Itching for up to 14 days. Quantity:  56 Cap Refills:  0  
     
   
   
   
  
 levothyroxine 125 mcg tablet Commonly known as:  SYNTHROID Your last dose was: Your next dose is:    
   
   
 Dose:  125 mcg Take 1 Tab by mouth Daily (before breakfast). Quantity:  90 Tab Refills:  1  
     
   
   
   
  
 lidocaine-prilocaine topical cream  
Commonly known as:  EMLA Your last dose was: Your next dose is:    
   
   
 Apply  to affected area as needed for Pain. Apply to port site 45-60 minutes prior to lab appt or infusion. Quantity:  30 g Refills:  0  
     
   
   
   
  
 lisinopril 20 mg tablet Commonly known as:  Minus Salk Your last dose was: Your next dose is:    
   
   
 Dose:  20 mg Take 1 Tab by mouth daily. Quantity:  90 Tab Refills:  3  
     
   
   
   
  
 magic mouthwash Susp Commonly known as:  [place] Your last dose was: Your next dose is:    
   
   
 Dose:  10 mL Take 10 mL by mouth every four (4) hours as needed. Quantity:  1 Bottle Refills:  2  
     
   
   
   
  
 nystatin powder Commonly known as:  MYCOSTATIN Your last dose was: Your next dose is:    
   
   
 Apply  to affected area three (3) times daily. Quantity:  60 g Refills:  2  
     
   
   
   
  
 omeprazole 20 mg capsule Commonly known as:  PRILOSEC Your last dose was: Your next dose is:    
   
   
 Dose:  20 mg Take 1 Cap by mouth daily. Quantity:  90 Cap Refills:  3  
     
   
   
   
  
 ondansetron hcl 4 mg tablet Commonly known as:  ZOFRAN (AS HYDROCHLORIDE) Your last dose was: Your next dose is:    
   
   
 Dose:  4 mg Take 1 Tab by mouth every eight (8) hours as needed for Nausea. Quantity:  60 Tab Refills:  3  
     
   
   
   
  
 pregabalin 25 mg capsule Commonly known as:  Carlos Neal Your last dose was: Your next dose is:    
   
   
 Dose:  25 mg Take 1 Cap by mouth three (3) times daily. Max Daily Amount: 75 mg. Quantity:  90 Cap Refills:  2  
     
   
   
   
  
 promethazine 25 mg tablet Commonly known as:  PHENERGAN Your last dose was:     
   
Your next dose is:    
   
   
 Dose:  25 mg  
 Take 25 mg by mouth every six (6) hours as needed for Nausea. Unsure of dose Refills:  0

## 2017-04-14 NOTE — DISCHARGE INSTRUCTIONS
[address]  Department of Interventional Radiology  [address] [place] [address] [address] [facility]  [phone] Office  [phone] Fax    PARACENTESIS DISCHARGE INSTRUCTIONS    General Information:  During this procedure, the doctor will insert a needle into the abdomen to drain fluid. After the procedure, you will be able to take a deep breath much easier. The site of the puncture may ooze the first day. This will decrease and eventually stop. Paracentesis (draining fluid from the abdomen) sometimes makes patients hypotensive (low blood pressure). Your doctor may order for you to receive fluids or albumin (a volume booster) during the procedure through an IV site. Home Care Instructions:  Keep the puncture site clean and dry. No tub baths or swimming until puncture site heals. Showering is acceptable. Resume your normal diet, and resume your normal activity slowly and as you tolerate. If you are short of breath, rest. If shortness of breath does not ease, please call your ordering doctor. Fluid can re-accumulate in the chest and/or in the abdomen. If this should occur, your doctor needs to know as you may need to have the procedure done again. Call If:     You should call your Physician and/or the Radiology Nurse if you notice any signs of infection, like pus draining, or if it is swollen or reddened. Also call if you have a fever, or if you are bleeding from the puncture site more than a small amount on the dressing. Call if the puncture site keeps draining fluid. Some oozing is to be expected, but should slow and then stop. Call if you feel like you have pressure in your abdomen. SEEK IMMEDIATE CARE OR CALL 911 IF YOU SUDDENLY HAVE TROUBLE BREATHING, OR IF YOUR LIPS TURN BLUE, OR IF YOU NOTICE BLOOD IN YOUR SPUTUM. Follow-Up Instructions: Please see your ordering doctor as he/she has requested. To Reach Us:   If you have any questions about your procedure, please call the Interventional Radiology department at [phone]. After business hours (5pm) and weekends, call the answering service at [phone] and ask for the Radiologist on call to be paged. Interventional Radiology General Nurse Discharge    After general anesthesia or intravenous sedation, for 24 hours or while taking prescription Narcotics:  · Limit your activities  · Do not drive and operate hazardous machinery  · Do not make important personal or business decisions  · Do  not drink alcoholic beverages  · If you have not urinated within 8 hours after discharge, please contact your surgeon on call. * Please give a list of your current medications to your Primary Care Provider. * Please update this list whenever your medications are discontinued, doses are     changed, or new medications (including over-the-counter products) are added. * Please carry medication information at all times in case of emergency situations. These are general instructions for a healthy lifestyle:    No smoking/ No tobacco products/ Avoid exposure to second hand smoke  Surgeon General's Warning:  Quitting smoking now greatly reduces serious risk to your health. Obesity, smoking, and sedentary lifestyle greatly increases your risk for illness  A healthy diet, regular physical exercise & weight monitoring are important for maintaining a healthy lifestyle    You may be retaining fluid if you have a history of heart failure or if you experience any of the following symptoms:  Weight gain of 3 pounds or more overnight or 5 pounds in a week, increased swelling in our hands or feet or shortness of breath while lying flat in bed. Please call your doctor as soon as you notice any of these symptoms; do not wait until your next office visit.     Recognize signs and symptoms of STROKE:  F-face looks uneven    A-arms unable to move or move unevenly    S-speech slurred or non-existent    T-time-call 911 as soon as signs and symptoms begin-DO NOT go       Back to bed or wait to see if you get better-TIME IS BRAIN.             Date: 4/14/2017  Discharging Nurse: Karolina Otoole RN

## 2017-04-14 NOTE — IP AVS SNAPSHOT
Brenna Bryant 
 
 
 [address] [facility] 
[phone] Patient: eDl Jordan MRN: [id number] You are allergic to the following No active allergies Recent Documentation OB Status Smoking Status Postmenopausal Never Smoker Emergency Contacts Name Discharge Info Relation Home Work Mobile Chris Alejandro  Spouse [3] [phone] Raul Zambrano  Son [22] [phone] 69 Phan Graham  Other Relative [6] [phone] About your hospitalization You were admitted on:  April 14, 2017 You last received care in the:  [facility] You were discharged on:  April 14, 2017 Unit phone number:  [phone] Why you were hospitalized Your primary diagnosis was:  Not on File Providers Seen During Your Hospitalizations Provider Role Specialty Primary office phone Luis Angel Bell NP Attending Provider Nurse Practitioner [phone] Your Primary Care Physician (PCP) Primary Care Physician Office Phone Office Fax Floyd López [phone] Follow-up Information None Your Appointments Saturday April 15, 2017 11:00 AM EDT Injection with FLR5 INF4 [facility] ([address] [place] [address]) 5th Floor Infusion [address] Claudio Brunson [phone]  [facility] [id number] [phone] For [facility] SURGICAL [facility] Floor [phone] ext. [phone] [facility] Tuesday April 18, 2017  8:30 AM EDT  
IR INS TNL CVC W PT OVER 5 YRS with [facility] UNIT 1, [facility] RADIOLOGIST RESOURCE, [facility] IR ANES NOT REQUIRED  
[facility] ([address] [place] [address]) [address] [facility]  
[phone]  Interventional Radiology Procedure Referring Physicians: 1) Fax H&P/recent office notes and lab work, no older than 30 days (CBC, BMP, PT/PTT) to Interventional Radiology to facilitate prompt scheduling. 2)Patients with contrast dye allergies must be pre medicated prior to arrival. 3) Obtain clearance to hold blood thinners from prescribing Physician and give Patient instructions prior to arrival. 4)Hold oral diabetic medications the day of the procedure. If Insulin is required, take 1/2 dose the day of the procedure. 5) Pt should not eat or drink anything past midnight 6) Pt to arrive 1 hour to 1.5 hours early depending upon sedation method. 7) Responsible adult  required to drive Patient home after recovery period. Recovery period can vary depending on sedation and patient condition. 8) Requires approval from Radiologist prior to scheduling 9) Interventional Radiology Scheduling can be contacted at [phone] Wednesday May 10, 2017  9:45 AM EDT  
LAB with [address]  
[address] [facility] Linnea Pratt [address] [place] [address] [place], [place] [phone] Wednesday May 10, 2017 10:15 AM EDT PreChemo [facility] C/ Juan Balbuena [address] [facility] [address]  
[phone] Wednesday May 10, 2017 11:00 AM EDT Follow Up with MARA Valdivia Hematology and Oncology [facility]) RUDDY/ Juan Balbuena [address] [facility] [address]  
[phone] Wednesday May 10, 2017 11:15 AM EDT Chemo with NUR5  
[address] [facility] ([facility]) [address] Silva Awan [phone] [facility] [address]  
[phone] [address] Thursday May 11, 2017  1:15 PM EDT Infusion with NUR2  
[address] [facility] ([facility]) [address] Silva Awan [phone] [facility] [address]  
[phone] [address] Current Discharge Medication List  
  
ASK your doctor about these medications Dose & Instructions Dispensing Information Comments Morning Noon Evening Bedtime  
 acyclovir 400 mg tablet Commonly known as:  ZOVIRAX Your last dose was: Your next dose is:    
   
   
 Dose:  400 mg Take 1 Tab by mouth two (2) times a day for 10 days. Quantity:  60 Tab Refills:  0  
     
   
   
   
  
 allopurinol 300 mg tablet Commonly known as:  Jarome [place] Your last dose was: Your next dose is:    
   
   
 Dose:  300 mg Take 1 Tab by mouth two (2) times a day. Quantity:  90 Tab Refills:  2  
     
   
   
   
  
 aspirin 81 mg chewable tablet Your last dose was: Your next dose is:    
   
   
 Dose:  81 mg Take 81 mg by mouth daily. Refills:  0  
     
   
   
   
  
 cpm-phenyleph-acetaminophen 4- mg Tab Commonly known as:  Mertie James AD Your last dose was: Your next dose is:    
   
   
 Dose:  1 Tab Take 1 Tab by mouth every eight (8) hours as needed for Other (cough/congestion). Quantity:  30 Tab Refills:  0 HYDROcodone-acetaminophen 5-325 mg per tablet Commonly known as:  Jarome Becca Your last dose was: Your next dose is:    
   
   
 Dose:  1-2 Tab Take 1-2 Tabs by mouth every four (4) hours as needed for Pain. Max Daily Amount: 12 Tabs. Quantity:  120 Tab Refills:  0  
     
   
   
   
  
 hydrOXYzine pamoate 25 mg capsule Commonly known as:  VISTARIL Your last dose was: Your next dose is:    
   
   
 Dose:  25 mg Take 1 Cap by mouth four (4) times daily as needed for Itching for up to 14 days. Quantity:  56 Cap Refills:  0  
     
   
   
   
  
 levothyroxine 125 mcg tablet Commonly known as:  SYNTHROID Your last dose was: Your next dose is:    
   
   
 Dose:  125 mcg Take 1 Tab by mouth Daily (before breakfast). Quantity:  90 Tab Refills:  1  
     
   
   
   
  
 lidocaine-prilocaine topical cream  
Commonly known as:  EMLA Your last dose was: Your next dose is:    
   
   
 Apply  to affected area as needed for Pain. Apply to port site 45-60 minutes prior to lab appt or infusion. Quantity:  30 g Refills:  0  
     
   
   
   
  
 lisinopril 20 mg tablet Commonly known as:  Alex Leaver Your last dose was: Your next dose is:    
   
   
 Dose:  20 mg Take 1 Tab by mouth daily. Quantity:  90 Tab Refills:  3  
     
   
   
   
  
 magic mouthwash Susp Commonly known as:  [place] Your last dose was: Your next dose is:    
   
   
 Dose:  10 mL Take 10 mL by mouth every four (4) hours as needed. Quantity:  1 Bottle Refills:  2  
     
   
   
   
  
 nystatin powder Commonly known as:  MYCOSTATIN Your last dose was: Your next dose is:    
   
   
 Apply  to affected area three (3) times daily. Quantity:  60 g Refills:  2  
     
   
   
   
  
 omeprazole 20 mg capsule Commonly known as:  PRILOSEC Your last dose was: Your next dose is:    
   
   
 Dose:  20 mg Take 1 Cap by mouth daily. Quantity:  90 Cap Refills:  3  
     
   
   
   
  
 ondansetron hcl 4 mg tablet Commonly known as:  ZOFRAN (AS HYDROCHLORIDE) Your last dose was: Your next dose is:    
   
   
 Dose:  4 mg Take 1 Tab by mouth every eight (8) hours as needed for Nausea. Quantity:  60 Tab Refills:  3  
     
   
   
   
  
 pregabalin 25 mg capsule Commonly known as:  Salazar Sykes Your last dose was: Your next dose is:    
   
   
 Dose:  25 mg Take 1 Cap by mouth three (3) times daily. Max Daily Amount: 75 mg. Quantity:  90 Cap Refills:  2  
     
   
   
   
  
 promethazine 25 mg tablet Commonly known as:  PHENERGAN Your last dose was: Your next dose is:    
   
   
 Dose:  25 mg Take 25 mg by mouth every six (6) hours as needed for Nausea. Unsure of dose Refills:  0 Discharge Instructions [address] Department of Interventional Radiology [facility] 
[phone] Office 
[phone] Fax PARACENTESIS DISCHARGE INSTRUCTIONS General Information: 
During this procedure, the doctor will insert a needle into the abdomen to drain fluid. After the procedure, you will be able to take a deep breath much easier. The site of the puncture may ooze the first day. This will decrease and eventually stop. Paracentesis (draining fluid from the abdomen) sometimes makes patients hypotensive (low blood pressure). Your doctor may order for you to receive fluids or albumin (a volume booster) during the procedure through an IV site. Home Care Instructions: 
Keep the puncture site clean and dry. No tub baths or swimming until puncture site heals. Showering is acceptable. Resume your normal diet, and resume your normal activity slowly and as you tolerate. If you are short of breath, rest. If shortness of breath does not ease, please call your ordering doctor. Fluid can re-accumulate in the chest and/or in the abdomen. If this should occur, your doctor needs to know as you may need to have the procedure done again. Call If: 
   You should call your Physician and/or the Radiology Nurse if you notice any signs of infection, like pus draining, or if it is swollen or reddened. Also call if you have a fever, or if you are bleeding from the puncture site more than a small amount on the dressing. Call if the puncture site keeps draining fluid. Some oozing is to be expected, but should slow and then stop. Call if you feel like you have pressure in your abdomen.  SEEK IMMEDIATE CARE OR CALL 911 IF YOU SUDDENLY HAVE TROUBLE BREATHING, OR IF YOUR LIPS TURN BLUE, OR IF YOU NOTICE BLOOD IN YOUR SPUTUM. Follow-Up Instructions: Please see your ordering doctor as he/she has requested. To Reach Us: If you have any questions about your procedure, please call the Interventional Radiology department at [phone]. After business hours (5pm) and weekends, call the answering service at [phone] and ask for the Radiologist on call to be paged. Interventional Radiology General Nurse Discharge After general anesthesia or intravenous sedation, for 24 hours or while taking prescription Narcotics: · Limit your activities · Do not drive and operate hazardous machinery · Do not make important personal or business decisions · Do  not drink alcoholic beverages · If you have not urinated within 8 hours after discharge, please contact your surgeon on call. * Please give a list of your current medications to your Primary Care Provider. * Please update this list whenever your medications are discontinued, doses are 
   changed, or new medications (including over-the-counter products) are added. * Please carry medication information at all times in case of emergency situations. These are general instructions for a healthy lifestyle: No smoking/ No tobacco products/ Avoid exposure to second hand smoke Surgeon General's Warning:  Quitting smoking now greatly reduces serious risk to your health. Obesity, smoking, and sedentary lifestyle greatly increases your risk for illness A healthy diet, regular physical exercise & weight monitoring are important for maintaining a healthy lifestyle You may be retaining fluid if you have a history of heart failure or if you experience any of the following symptoms:  Weight gain of 3 pounds or more overnight or 5 pounds in a week, increased swelling in our hands or feet or shortness of breath while lying flat in bed.   Please call your doctor as soon as you notice any of these symptoms; do not wait until your next office visit. Recognize signs and symptoms of STROKE: 
F-face looks uneven A-arms unable to move or move unevenly S-speech slurred or non-existent T-time-call 911 as soon as signs and symptoms begin-DO NOT go Back to bed or wait to see if you get better-TIME IS BRAIN. Date: 4/14/2017 Discharging Nurse: Gordo Toledo RN Discharge Orders None Introducing \[facility]\"" & HEALTH SERVICES! Dear French Zhao: Thank you for requesting a [employer] account. Our records indicate that you already have an active [employer] account. You can access your account anytime at [email or web address] Did you know that you can access your hospital and ER discharge instructions at any time in [employer]? You can also review all of your test results from your hospital stay or ER visit. Additional Information If you have questions, please visit the Frequently Asked Questions section of the [employer] website at [email or web address] Remember, [employer] is NOT to be used for urgent needs. For medical emergencies, dial 911. Now available from your iPhone and Android! General Information Please provide this summary of care documentation to your next provider. Patient Signature:  ____________________________________________________________ Date:  ____________________________________________________________  
  
Isidro Garnica Provider Signature:  ____________________________________________________________ Date:  ____________________________________________________________

## 2017-04-15 ENCOUNTER — HOSPITAL ENCOUNTER (OUTPATIENT)
Dept: INFUSION THERAPY | Age: 57
Discharge: HOME | End: 2017-04-15
Payer: COMMERCIAL

## 2017-04-15 VITALS
BODY MASS INDEX: 48.64 KG/M2 | WEIGHT: 274.6 LBS | SYSTOLIC BLOOD PRESSURE: 125 MMHG | OXYGEN SATURATION: 97 % | HEART RATE: 86 BPM | RESPIRATION RATE: 18 BRPM | DIASTOLIC BLOOD PRESSURE: 62 MMHG | TEMPERATURE: 98.2 F

## 2017-04-15 DIAGNOSIS — C85.88 MARGINAL ZONE LYMPHOMA OF LYMPH NODES OF MULTIPLE SITES (HCC): ICD-10-CM

## 2017-04-15 PROCEDURE — 74011250637 HC RX REV CODE- 250/637: Performed by: NURSE PRACTITIONER

## 2017-04-15 PROCEDURE — 96372 THER/PROPH/DIAG INJ SC/IM: CPT

## 2017-04-15 PROCEDURE — 74011250636 HC RX REV CODE- 250/636: Performed by: INTERNAL MEDICINE

## 2017-04-15 RX ORDER — FUROSEMIDE 20 MG/1
20 TABLET ORAL
Status: COMPLETED | OUTPATIENT
Start: 2017-04-15 | End: 2017-04-15

## 2017-04-15 RX ADMIN — PEGFILGRASTIM 6 MG: 6 INJECTION SUBCUTANEOUS at 11:15

## 2017-04-15 RX ADMIN — FUROSEMIDE 20 MG: 20 TABLET ORAL at 11:44

## 2017-04-15 NOTE — PROGRESS NOTES
Arrived to the [facility]. Neulasta completed. Patient tolerated well. Any issues or concerns during appointment: Lasix 20mg PO given per order for pedal edema. Patient aware of next infusion appointment on 5/10/17 at 1115. Discharged ambulatory.

## 2017-04-18 ENCOUNTER — HOSPITAL ENCOUNTER (OUTPATIENT)
Dept: INTERVENTIONAL RADIOLOGY/VASCULAR | Age: 57
Discharge: HOME | End: 2017-04-18
Attending: INTERNAL MEDICINE
Payer: COMMERCIAL

## 2017-04-18 VITALS
RESPIRATION RATE: 18 BRPM | OXYGEN SATURATION: 98 % | HEART RATE: 70 BPM | SYSTOLIC BLOOD PRESSURE: 98 MMHG | DIASTOLIC BLOOD PRESSURE: 54 MMHG

## 2017-04-18 DIAGNOSIS — C85.88 MARGINAL ZONE LYMPHOMA OF LYMPH NODES OF MULTIPLE SITES (HCC): ICD-10-CM

## 2017-04-18 PROCEDURE — 36561 INSERT TUNNELED CV CATH: CPT

## 2017-04-18 PROCEDURE — C1894 INTRO/SHEATH, NON-LASER: HCPCS

## 2017-04-18 PROCEDURE — 99152 MOD SED SAME PHYS/QHP 5/>YRS: CPT

## 2017-04-18 PROCEDURE — 77030002916 HC SUT ETHLN J&J -A

## 2017-04-18 PROCEDURE — 74011250636 HC RX REV CODE- 250/636: Performed by: RADIOLOGY

## 2017-04-18 PROCEDURE — 77030010507 HC ADH SKN DERMBND J&J -B

## 2017-04-18 PROCEDURE — 77030002996 HC SUT SLK J&J -A

## 2017-04-18 PROCEDURE — C1788 PORT, INDWELLING, IMP: HCPCS

## 2017-04-18 PROCEDURE — 74011250636 HC RX REV CODE- 250/636

## 2017-04-18 PROCEDURE — 77030031131 HC SUT MXN P COVD -B

## 2017-04-18 PROCEDURE — 74011000250 HC RX REV CODE- 250: Performed by: RADIOLOGY

## 2017-04-18 PROCEDURE — 99153 MOD SED SAME PHYS/QHP EA: CPT

## 2017-04-18 RX ORDER — SODIUM CHLORIDE 9 MG/ML
25 INJECTION, SOLUTION INTRAVENOUS ONCE
Status: COMPLETED | OUTPATIENT
Start: 2017-04-18 | End: 2017-04-18

## 2017-04-18 RX ORDER — FENTANYL CITRATE 50 UG/ML
25-100 INJECTION, SOLUTION INTRAMUSCULAR; INTRAVENOUS
Status: DISCONTINUED | OUTPATIENT
Start: 2017-04-18 | End: 2017-04-18 | Stop reason: ALTCHOICE

## 2017-04-18 RX ORDER — DIPHENHYDRAMINE HYDROCHLORIDE 50 MG/ML
12.5-5 INJECTION, SOLUTION INTRAMUSCULAR; INTRAVENOUS ONCE
Status: COMPLETED | OUTPATIENT
Start: 2017-04-18 | End: 2017-04-18

## 2017-04-18 RX ORDER — SODIUM CHLORIDE 9 MG/ML
150 INJECTION, SOLUTION INTRAVENOUS CONTINUOUS
Status: ACTIVE | OUTPATIENT
Start: 2017-04-18 | End: 2017-04-19

## 2017-04-18 RX ORDER — LIDOCAINE HYDROCHLORIDE AND EPINEPHRINE 20; 10 MG/ML; UG/ML
200-400 INJECTION, SOLUTION INFILTRATION; PERINEURAL ONCE
Status: COMPLETED | OUTPATIENT
Start: 2017-04-18 | End: 2017-04-18

## 2017-04-18 RX ORDER — LIDOCAINE HYDROCHLORIDE 20 MG/ML
100-200 INJECTION, SOLUTION INFILTRATION; PERINEURAL ONCE
Status: COMPLETED | OUTPATIENT
Start: 2017-04-18 | End: 2017-04-18

## 2017-04-18 RX ORDER — CEFAZOLIN SODIUM IN 0.9 % NACL 2 G/50 ML
2 INTRAVENOUS SOLUTION, PIGGYBACK (ML) INTRAVENOUS ONCE
Status: COMPLETED | OUTPATIENT
Start: 2017-04-18 | End: 2017-04-18

## 2017-04-18 RX ORDER — IBUPROFEN 400 MG/1
400 TABLET ORAL
Status: DISCONTINUED | OUTPATIENT
Start: 2017-04-18 | End: 2017-04-22 | Stop reason: HOSPADM

## 2017-04-18 RX ORDER — HEPARIN SODIUM 200 [USP'U]/100ML
1000 INJECTION, SOLUTION INTRAVENOUS
Status: DISCONTINUED | OUTPATIENT
Start: 2017-04-18 | End: 2017-04-18 | Stop reason: ALTCHOICE

## 2017-04-18 RX ORDER — MIDAZOLAM HYDROCHLORIDE 1 MG/ML
.25-2 INJECTION, SOLUTION INTRAMUSCULAR; INTRAVENOUS
Status: DISCONTINUED | OUTPATIENT
Start: 2017-04-18 | End: 2017-04-18 | Stop reason: ALTCHOICE

## 2017-04-18 RX ORDER — HYDROCODONE BITARTRATE AND ACETAMINOPHEN 7.5; 325 MG/1; MG/1
1 TABLET ORAL
Status: DISCONTINUED | OUTPATIENT
Start: 2017-04-18 | End: 2017-04-22 | Stop reason: HOSPADM

## 2017-04-18 RX ORDER — HEPARIN SODIUM (PORCINE) LOCK FLUSH IV SOLN 100 UNIT/ML 100 UNIT/ML
500 SOLUTION INTRAVENOUS ONCE
Status: COMPLETED | OUTPATIENT
Start: 2017-04-18 | End: 2017-04-18

## 2017-04-18 RX ADMIN — MIDAZOLAM HYDROCHLORIDE 1 MG: 1 INJECTION, SOLUTION INTRAMUSCULAR; INTRAVENOUS at 08:43

## 2017-04-18 RX ADMIN — DIPHENHYDRAMINE HYDROCHLORIDE 50 MG: 50 INJECTION, SOLUTION INTRAMUSCULAR; INTRAVENOUS at 08:39

## 2017-04-18 RX ADMIN — HEPARIN SODIUM 2000 UNITS: 200 INJECTION, SOLUTION INTRAVENOUS at 08:58

## 2017-04-18 RX ADMIN — LIDOCAINE HYDROCHLORIDE,EPINEPHRINE BITARTRATE 300 MG: 20; .01 INJECTION, SOLUTION INFILTRATION; PERINEURAL at 08:51

## 2017-04-18 RX ADMIN — FENTANYL CITRATE 50 MCG: 50 INJECTION, SOLUTION INTRAMUSCULAR; INTRAVENOUS at 08:43

## 2017-04-18 RX ADMIN — CEFAZOLIN 2 G: 1 INJECTION, POWDER, FOR SOLUTION INTRAMUSCULAR; INTRAVENOUS; PARENTERAL at 08:20

## 2017-04-18 RX ADMIN — FENTANYL CITRATE 50 MCG: 50 INJECTION, SOLUTION INTRAMUSCULAR; INTRAVENOUS at 08:36

## 2017-04-18 RX ADMIN — HEPARIN SODIUM (PORCINE) LOCK FLUSH IV SOLN 100 UNIT/ML 500 UNITS: 100 SOLUTION at 08:58

## 2017-04-18 RX ADMIN — SODIUM CHLORIDE 25 ML/HR: 900 INJECTION, SOLUTION INTRAVENOUS at 08:20

## 2017-04-18 RX ADMIN — MIDAZOLAM HYDROCHLORIDE 1 MG: 1 INJECTION, SOLUTION INTRAMUSCULAR; INTRAVENOUS at 08:36

## 2017-04-18 RX ADMIN — LIDOCAINE HYDROCHLORIDE 200 MG: 20 INJECTION, SOLUTION INFILTRATION; PERINEURAL at 08:42

## 2017-04-18 NOTE — IP AVS SNAPSHOT
Joycelyn Reddy 
 
 
 [address] 
[phone] Patient: Yakov Prescott MRN: [id number]:[id number] You are allergic to the following No active allergies Recent Documentation Breastfeeding? OB Status Smoking Status No Postmenopausal Never Smoker Emergency Contacts Name Discharge Info Relation Home Work Mobile Jemukesh Sergeant  Spouse [3] [phone] Dread Jacome  Son [22] [phone] [address] [facility]  Other Relative [6] [phone] Maricarmen Zuniga  Sister [23] [phone] About your hospitalization You were admitted on:  April 18, 2017 You last received care in the:  [facility] Radiology Specials You were discharged on:  April 18, 2017 Unit phone number:  [phone] Why you were hospitalized Your primary diagnosis was:  Not on File Providers Seen During Your Hospitalizations Provider Role Specialty Primary office phone Reynaldo Juarez MD Attending Provider Hematology and Oncology [phone] Your Primary Care Physician (PCP) Primary Care Physician Office Phone Office Fax Tori Fish [phone] Follow-up Information None Your Appointments Wednesday May 10, 2017  9:45 AM EDT  
LAB with [address]  
[address] [place] [address] [address]  
[phone] Wednesday May 10, 2017 10:15 AM EDT PreChemo Follow Up with Reynaldo Juarez MD  
[facility] [address] [facility] [address]  
[phone] Wednesday May 10, 2017 11:00 AM EDT Follow Up with MARA Larios [facility] [address] [facility] [address]  
[phone] Wednesday May 10, 2017 11:15 AM EDT Chemo with NUR5  
[address] [facility] ([address]) [address] Karan Palacios [phone] [facility] [address]  
[phone] [address] Thursday May 11, 2017  1:15 PM EDT Infusion with NUR2  
[address] [facility] ([address]) [address] Karan Palacios [phone] [facility] [address]  
[phone] [address] Current Discharge Medication List  
  
ASK your doctor about these medications Dose & Instructions Dispensing Information Comments Morning Noon Evening Bedtime  
 acyclovir 400 mg tablet Commonly known as:  ZOVIRAX Ask about: Should I take this medication? Your last dose was: Your next dose is:    
   
   
 Dose:  400 mg Take 1 Tab by mouth two (2) times a day for 10 days. Quantity:  60 Tab Refills:  0  
     
   
   
   
  
 allopurinol 300 mg tablet Commonly known as:  Nelli Hernandez Your last dose was: Your next dose is:    
   
   
 Dose:  300 mg Take 1 Tab by mouth two (2) times a day. Quantity:  90 Tab Refills:  2  
     
   
   
   
  
 aspirin 81 mg chewable tablet Your last dose was: Your next dose is:    
   
   
 Dose:  81 mg Take 81 mg by mouth daily. Refills:  0  
     
   
   
   
  
 cpm-phenyleph-acetaminophen 4- mg Tab Commonly known as:  Ngozi Garrison AD Your last dose was: Your next dose is:    
   
   
 Dose:  1 Tab Take 1 Tab by mouth every eight (8) hours as needed for Other (cough/congestion). Quantity:  30 Tab Refills:  0 HYDROcodone-acetaminophen 5-325 mg per tablet Commonly known as:  Jacque Oviter Your last dose was: Your next dose is:    
   
   
 Dose:  1-2 Tab Take 1-2 Tabs by mouth every four (4) hours as needed for Pain. Max Daily Amount: 12 Tabs. Quantity:  120 Tab Refills:  0  
     
   
   
   
  
 hydrOXYzine pamoate 25 mg capsule Commonly known as:  VISTARIL Your last dose was: Your next dose is:    
   
   
 Dose:  25 mg Take 1 Cap by mouth four (4) times daily as needed for Itching for up to 14 days. Quantity:  56 Cap Refills:  0  
     
   
   
   
  
 levothyroxine 125 mcg tablet Commonly known as:  SYNTHROID Your last dose was: Your next dose is:    
   
   
 Dose:  125 mcg Take 1 Tab by mouth Daily (before breakfast). Quantity:  90 Tab Refills:  1  
     
   
   
   
  
 lidocaine-prilocaine topical cream  
Commonly known as:  EMLA Your last dose was: Your next dose is:    
   
   
 Apply  to affected area as needed for Pain. Apply to port site 45-60 minutes prior to lab appt or infusion. Quantity:  30 g Refills:  0  
     
   
   
   
  
 lisinopril 20 mg tablet Commonly known as:  [place] Your last dose was: Your next dose is:    
   
   
 Dose:  20 mg Take 1 Tab by mouth daily. Quantity:  90 Tab Refills:  3  
     
   
   
   
  
 magic mouthwash Susp Commonly known as:  [place] Your last dose was: Your next dose is:    
   
   
 Dose:  10 mL Take 10 mL by mouth every four (4) hours as needed. Quantity:  1 Bottle Refills:  2  
     
   
   
   
  
 nystatin powder Commonly known as:  MYCOSTATIN Your last dose was: Your next dose is:    
   
   
 Apply  to affected area three (3) times daily. Quantity:  60 g Refills:  2  
     
   
   
   
  
 omeprazole 20 mg capsule Commonly known as:  PRILOSEC Your last dose was: Your next dose is:    
   
   
 Dose:  20 mg Take 1 Cap by mouth daily. Quantity:  90 Cap Refills:  3  
     
   
   
   
  
 ondansetron hcl 4 mg tablet Commonly known as:  ZOFRAN (AS HYDROCHLORIDE) Your last dose was: Your next dose is:    
   
   
 Dose:  4 mg Take 1 Tab by mouth every eight (8) hours as needed for Nausea. Quantity:  60 Tab Refills:  3  
     
   
   
   
  
 pregabalin 25 mg capsule Commonly known as:  Rendell Panda Your last dose was: Your next dose is:    
   
   
 Dose:  25 mg Take 1 Cap by mouth three (3) times daily. Max Daily Amount: 75 mg. Quantity:  90 Cap Refills:  2  
     
   
   
   
  
 promethazine 25 mg tablet Commonly known as:  PHENERGAN Your last dose was: Your next dose is:    
   
   
 Dose:  25 mg Take 25 mg by mouth every six (6) hours as needed for Nausea. Unsure of dose Refills:  0 Discharge Instructions [address] Department of Interventional Radiology [place] [facility] 
[phone] Office 
[phone] Fax Implanted [facility] Discharge Instructions General Instructions: 
 A port is like an implanted IV. They are usually ordered for patients who will be getting chemotherapy, but can also be used as an IV for long term antibiotics, large amounts of fluids, and/or blood products. Your blood can be drawn from your port for labs also. Those patients who do not have good veins find the ports convenient as they can get the IV they need with one stick. The port can be used long term, and the care is easy. The device is under the skin, and once the skin heals, care is minimal. All that is required is the nurse who accesses the port will need to flush it with heparinized saline after each use. Ports are usually placed in the chest wall, usually on the right side. But they can be place in the arms and in the abdomen. Home Care Instructions:  If your port is in your arm, do not allow blood pressure or other IVs to be place in that arm. Do not allow bra straps or any clothing to rub the skin over the port. Do not bathe or swim until the skin has healed and if the port is accessed. Once it is healed, and when the port is not accessed, it is okay to bathe and swim. Restrict yourself to light activity for the first 5 days after getting the port put in, after that, resume normal activity slowly. You may resume your normal diet and medications. Follow-Up Instructions: Please see your oncologist, or whatever physician ordered the port as he/she has requested of you. Call If: You should call your Physician and/or the Radiology Nurse if you notice redness, pus, swelling, or pain from the area of your incision. Call if you should develop a fever. The nurses who access your port will know to call your doctor if the port does not seem to be working properly. You need to tell the nurses who use the port if you should have any pain or swelling at the site during an infusion. To Reach Us: If you have any questions about your procedure, please call the Interventional Radiology department at [phone]. After business hours (5pm) and weekends, call the answering service at [phone] and ask for the Radiologist on call to be paged. If you have any questions about your procedure, please call the Interventional Radiology department at [phone]. After business hours (5pm) and weekends, call the answering service at [phone] and ask for the Radiologist on call to be paged. Interventional Radiology General Nurse Discharge After general anesthesia or intravenous sedation, for 24 hours or while taking prescription Narcotics: · Limit your activities · Do not drive and operate hazardous machinery · Do not make important personal or business decisions · Do  not drink alcoholic beverages · If you have not urinated within 8 hours after discharge, please contact your surgeon on call. * Please give a list of your current medications to your Primary Care Provider. * Please update this list whenever your medications are discontinued, doses are 
   changed, or new medications (including over-the-counter products) are added. * Please carry medication information at all times in case of emergency situations. These are general instructions for a healthy lifestyle: No smoking/ No tobacco products/ Avoid exposure to second hand smoke Surgeon General's Warning:  Quitting smoking now greatly reduces serious risk to your health. Obesity, smoking, and sedentary lifestyle greatly increases your risk for illness A healthy diet, regular physical exercise & weight monitoring are important for maintaining a healthy lifestyle You may be retaining fluid if you have a history of heart failure or if you experience any of the following symptoms:  Weight gain of 3 pounds or more overnight or 5 pounds in a week, increased swelling in our hands or feet or shortness of breath while lying flat in bed. Please call your doctor as soon as you notice any of these symptoms; do not wait until your next office visit. Recognize signs and symptoms of STROKE: 
F-face looks uneven A-arms unable to move or move unevenly S-speech slurred or non-existent T-time-call 911 as soon as signs and symptoms begin-DO NOT go Back to bed or wait to see if you get better-TIME IS BRAIN. Patient Signature: 
Date: 4/18/2017 Discharging Nurse: Teresa Pitt RN Discharge Orders None Introducing [facility]! Dear Jayne Lucero: Thank you for requesting a [employer] account. Our records indicate that you already have an active [employer] account. You can access your account anytime at [email or web address] Did you know that you can access your hospital and ER discharge instructions at any time in [employer]?   You can also review all of your test results from your hospital stay or ER visit. Additional Information If you have questions, please visit the Frequently Asked Questions section of the [employer] website at [email or web address] Remember, MyChart is NOT to be used for urgent needs. For medical emergencies, dial 911. Now available from your iPhone and Android! General Information Please provide this summary of care documentation to your next provider. Patient Signature:  ____________________________________________________________ Date:  ____________________________________________________________  
  
Yumiko Parrish Provider Signature:  ____________________________________________________________ Date:  ____________________________________________________________

## 2017-04-18 NOTE — PROGRESS NOTES
Discharged patient home with family , all belongings with patient at this time. Tolerating procedures well.

## 2017-04-18 NOTE — DISCHARGE INSTRUCTIONS
[address]  Department of Interventional Radiology  [facility]  [phone] Office  [phone] Fax  Implanted Port Discharge Instructions      General Instructions:   A port is like an implanted IV. They are usually ordered for patients who will be getting chemotherapy, but can also be used as an IV for long term antibiotics, large amounts of fluids, and/or blood products. Your blood can be drawn from your port for labs also. Those patients who do not have good veins find the ports convenient as they can get the IV they need with one stick. The port can be used long term, and the care is easy. The device is under the skin, and once the skin heals, care is minimal. All that is required is the nurse who accesses the port will need to flush it with heparinized saline after each use. Ports are usually placed in the chest wall, usually on the right side. But they can be place in the arms and in the abdomen. Home Care Instructions: If your port is in your arm, do not allow blood pressure or other IVs to be place in that arm. Do not allow bra straps or any clothing to rub the skin over the port. Do not bathe or swim until the skin has healed and if the port is accessed. Once it is healed, and when the port is not accessed, it is okay to bathe and swim. Restrict yourself to light activity for the first 5 days after getting the port put in, after that, resume normal activity slowly. You may resume your normal diet and medications. Follow-Up Instructions: Please see your oncologist, or whatever physician ordered the port as he/she has requested of you. Call If: You should call your Physician and/or the Radiology Nurse if you notice redness, pus, swelling, or pain from the area of your incision. Call if you should develop a fever. The nurses who access your port will know to call your doctor if the port does not seem to be working properly. You need to tell the nurses who use the port if you should have any pain or swelling at the site during an infusion. To Reach Us: If you have any questions about your procedure, please call the Interventional Radiology department at [phone]. After business hours (5pm) and weekends, call the answering service at [phone] and ask for the Radiologist on call to be paged. If you have any questions about your procedure, please call the Interventional Radiology department at [phone]. After business hours (5pm) and weekends, call the answering service at [phone] and ask for the Radiologist on call to be paged. Interventional Radiology General Nurse Discharge    After general anesthesia or intravenous sedation, for 24 hours or while taking prescription Narcotics:  · Limit your activities  · Do not drive and operate hazardous machinery  · Do not make important personal or business decisions  · Do  not drink alcoholic beverages  · If you have not urinated within 8 hours after discharge, please contact your surgeon on call. * Please give a list of your current medications to your Primary Care Provider. * Please update this list whenever your medications are discontinued, doses are     changed, or new medications (including over-the-counter products) are added. * Please carry medication information at all times in case of emergency situations. These are general instructions for a healthy lifestyle:    No smoking/ No tobacco products/ Avoid exposure to second hand smoke  Surgeon General's Warning:  Quitting smoking now greatly reduces serious risk to your health.     Obesity, smoking, and sedentary lifestyle greatly increases your risk for illness  A healthy diet, regular physical exercise & weight monitoring are important for maintaining a healthy lifestyle    You may be retaining fluid if you have a history of heart failure or if you experience any of the following symptoms:  Weight gain of 3 pounds or more overnight or 5 pounds in a week, increased swelling in our hands or feet or shortness of breath while lying flat in bed. Please call your doctor as soon as you notice any of these symptoms; do not wait until your next office visit. Recognize signs and symptoms of STROKE:  F-face looks uneven    A-arms unable to move or move unevenly    S-speech slurred or non-existent    T-time-call 911 as soon as signs and symptoms begin-DO NOT go       Back to bed or wait to see if you get better-TIME IS BRAIN.                Patient Signature:  Date: 4/18/2017  Discharging Nurse: Lea uS RN

## 2017-04-18 NOTE — PROGRESS NOTES
TRANSFER - OUT REPORT:    Verbal report given to Gilmer Peguero RN (name) on Adeline Hernandez  being transferred to IR Recovery (unit) for routine progression of care       Report consisted of patients Situation, Background, Assessment and   Recommendations(SBAR). Information from the following report(s) Procedure Summary and MAR was reviewed with the receiving nurse. Opportunity for questions and clarification was provided. Conscious Sedation:   100 Mcg of Fentanyl administered  2 Mg of Versed administered  50 Benadryl     Pt tolerated procedure well.      Visit Vitals    BP 90/50    Pulse 60    Resp 17    SpO2 100%    Breastfeeding No     Past Medical History:   Diagnosis Date    Anemia     in high school, \"received gamma globulin twice a week for awhile\"    Arthritis     osteo-r knee    Basal cell carcinoma     Followed by Dermatology    Chronic pain     KNEEs    Hypertension 10/4/2011    medication    Morbid obesity ([facility]     Murmur     \"had it my whole life\", did not appreciate murmur 9/12/2011 during assessment    Non Hodgkin's lymphoma ([facility] 3/30/2017    Other ill-defined conditions     skin bumps-med as needed    Overactive bladder     Rheumatic fever     Possibly as a child    Shingles     Thromboembolus ([facility] x2    LLE-calf-1990?-only took ASA-resolved in less than a wk-\"a little burned area-not examined\"    Thyroid disease     hypo-medication    Unspecified adverse effect of anesthesia     pt reports waking several times with knee surgery-spinal                 Venous Access Device Power Port 04/18/17 Upper chest (subclavicular area, right (Active)

## 2017-04-18 NOTE — PROCEDURES
Interventional Radiology Brief Procedure Note    Patient: Janette Vazquez MRN: [id number]  SSN: [id number]    YOB: 1960  Age: 64 y.o. Sex: female      Date of Procedure: 4/18/2017    Pre-Procedure Diagnosis: Lymphoma. Post-Procedure Diagnosis: SAME    Procedure(s): Venous Chest Port Placement    Brief Description of Procedure: RIJV. Performed By: Didier Valenzuela MD     Assistants: None    Anesthesia: Moderate Sedation    Estimated Blood Loss: Less than 10ml    Specimens: None    Implants: None    Findings: Tip in RA. Complications: None    Recommendations: 1 hour bedrest.       Follow Up: PRN.      Signed By: Didier Valenzuela MD     April 18, 2017

## 2017-04-18 NOTE — PROGRESS NOTES
Interventional Radiology Prep Area Handoff      No Known Allergies    IRSummary reviewed. Pt identified with two identifiers. Verified procedure with Patient and IR Provider as Port placement. Consent obtained: yes H&P complete/updated: yes MD airway complete: yes    Sedation:moderate   NPO: yes   Anticoagulation: no     Pt shaved: n/a   Antibiotics: ancef 2gm  Anesthesia notified: n/a    Additional Notes: none      Lines:  Peripherally Inserted Central Catheter:     Central Venous Catheter:     Venous Access Device:     Peripheral Intravenous Line:     Hemodialysis Catheter:     Drain(s):       Labs verified: yes  No results found for this or any previous visit (from the past 24 hour(s)). No data found.

## 2017-04-23 ENCOUNTER — APPOINTMENT (OUTPATIENT)
Dept: GENERAL RADIOLOGY | Age: 57
DRG: 871 | End: 2017-04-23
Attending: EMERGENCY MEDICINE
Payer: COMMERCIAL

## 2017-04-23 ENCOUNTER — HOSPITAL ENCOUNTER (INPATIENT)
Age: 57
LOS: 9 days | Discharge: HOME | DRG: 871 | End: 2017-05-02
Attending: EMERGENCY MEDICINE | Admitting: INTERNAL MEDICINE
Payer: COMMERCIAL

## 2017-04-23 DIAGNOSIS — R06.83 SNORES: ICD-10-CM

## 2017-04-23 DIAGNOSIS — E66.01 MORBID OBESITY DUE TO EXCESS CALORIES (HCC): ICD-10-CM

## 2017-04-23 DIAGNOSIS — C85.90 NON-HODGKIN'S LYMPHOMA, UNSPECIFIED BODY REGION, UNSPECIFIED NON-HODGKIN LYMPHOMA TYPE (HCC): ICD-10-CM

## 2017-04-23 DIAGNOSIS — I82.611 SUPERFICIAL VENOUS THROMBOSIS OF RIGHT ARM: ICD-10-CM

## 2017-04-23 DIAGNOSIS — I95.9 HYPOTENSION, UNSPECIFIED HYPOTENSION TYPE: ICD-10-CM

## 2017-04-23 DIAGNOSIS — R50.9 FEVER, UNSPECIFIED FEVER CAUSE: Primary | ICD-10-CM

## 2017-04-23 DIAGNOSIS — A41.9 SEPSIS, DUE TO UNSPECIFIED ORGANISM: ICD-10-CM

## 2017-04-23 DIAGNOSIS — N17.9 ACUTE RENAL FAILURE, UNSPECIFIED ACUTE RENAL FAILURE TYPE (HCC): ICD-10-CM

## 2017-04-23 DIAGNOSIS — R18.8 OTHER ASCITES: ICD-10-CM

## 2017-04-23 DIAGNOSIS — C85.88 MARGINAL ZONE LYMPHOMA OF LYMPH NODES OF MULTIPLE SITES (HCC): ICD-10-CM

## 2017-04-23 PROBLEM — E83.42 HYPOMAGNESEMIA: Status: ACTIVE | Noted: 2017-04-23

## 2017-04-23 LAB
ALBUMIN SERPL BCP-MCNC: 2.8 G/DL (ref 3.5–5)
ALBUMIN/GLOB SERPL: 0.9 {RATIO} (ref 1.2–3.5)
ALP SERPL-CCNC: 100 U/L (ref 50–136)
ALT SERPL-CCNC: 18 U/L (ref 12–65)
ANION GAP BLD CALC-SCNC: 9 MMOL/L (ref 7–16)
APPEARANCE UR: CLEAR
AST SERPL W P-5'-P-CCNC: 16 U/L (ref 15–37)
ATRIAL RATE: 99 BPM
BACTERIA URNS QL MICRO: 0 /HPF
BASOPHILS # BLD AUTO: 0 K/UL (ref 0–0.2)
BASOPHILS # BLD: 0 % (ref 0–2)
BILIRUB SERPL-MCNC: 0.3 MG/DL (ref 0.2–1.1)
BILIRUB UR QL: NEGATIVE
BUN SERPL-MCNC: 14 MG/DL (ref 6–23)
CALCIUM SERPL-MCNC: 7.7 MG/DL (ref 8.3–10.4)
CALCULATED P AXIS, ECG09: 43 DEGREES
CALCULATED R AXIS, ECG10: 37 DEGREES
CALCULATED T AXIS, ECG11: 36 DEGREES
CASTS URNS QL MICRO: ABNORMAL /LPF
CHLORIDE SERPL-SCNC: 108 MMOL/L (ref 98–107)
CO2 SERPL-SCNC: 26 MMOL/L (ref 21–32)
COLOR UR: YELLOW
CREAT SERPL-MCNC: 0.97 MG/DL (ref 0.6–1)
DIAGNOSIS, 93000: NORMAL
DIFFERENTIAL METHOD BLD: ABNORMAL
EOSINOPHIL # BLD: 0.1 K/UL (ref 0–0.8)
EOSINOPHIL NFR BLD: 1 % (ref 0.5–7.8)
EPI CELLS #/AREA URNS HPF: ABNORMAL /HPF
ERYTHROCYTE [DISTWIDTH] IN BLOOD BY AUTOMATED COUNT: 19.6 % (ref 11.9–14.6)
FLUAV AG NPH QL IA: NEGATIVE
FLUBV AG NPH QL IA: NEGATIVE
GLOBULIN SER CALC-MCNC: 3 G/DL (ref 2.3–3.5)
GLUCOSE SERPL-MCNC: 114 MG/DL (ref 65–100)
GLUCOSE UR STRIP.AUTO-MCNC: NEGATIVE MG/DL
HCT VFR BLD AUTO: 24.6 % (ref 35.8–46.3)
HGB BLD-MCNC: 7.6 G/DL (ref 11.7–15.4)
HGB UR QL STRIP: NEGATIVE
IMM GRANULOCYTES # BLD: 0 K/UL (ref 0–0.5)
IMM GRANULOCYTES NFR BLD AUTO: 0.3 % (ref 0–5)
KETONES UR QL STRIP.AUTO: NEGATIVE MG/DL
LACTATE BLD-SCNC: 1 MMOL/L (ref 0.5–1.9)
LEUKOCYTE ESTERASE UR QL STRIP.AUTO: NEGATIVE
LYMPHOCYTES # BLD AUTO: 4 % (ref 13–44)
LYMPHOCYTES # BLD: 0.1 K/UL (ref 0.5–4.6)
MAGNESIUM SERPL-MCNC: 1.5 MG/DL (ref 1.8–2.4)
MCH RBC QN AUTO: 27.7 PG (ref 26.1–32.9)
MCHC RBC AUTO-ENTMCNC: 30.9 G/DL (ref 31.4–35)
MCV RBC AUTO: 89.8 FL (ref 79.6–97.8)
MONOCYTES # BLD: 0 K/UL (ref 0.1–1.3)
MONOCYTES NFR BLD AUTO: 1 % (ref 4–12)
NEUTS SEG # BLD: 3.4 K/UL (ref 1.7–8.2)
NEUTS SEG NFR BLD AUTO: 94 % (ref 43–78)
NITRITE UR QL STRIP.AUTO: NEGATIVE
P-R INTERVAL, ECG05: 134 MS
PH UR STRIP: 6 [PH] (ref 5–9)
PLATELET # BLD AUTO: 94 K/UL (ref 150–450)
PMV BLD AUTO: 9.7 FL (ref 10.8–14.1)
POTASSIUM SERPL-SCNC: 4.3 MMOL/L (ref 3.5–5.1)
PROCALCITONIN SERPL-MCNC: 0.3 NG/ML
PROT SERPL-MCNC: 5.8 G/DL (ref 6.3–8.2)
PROT UR STRIP-MCNC: 30 MG/DL
Q-T INTERVAL, ECG07: 314 MS
QRS DURATION, ECG06: 72 MS
QTC CALCULATION (BEZET), ECG08: 402 MS
RBC # BLD AUTO: 2.74 M/UL (ref 4.05–5.25)
RBC #/AREA URNS HPF: ABNORMAL /HPF
SODIUM SERPL-SCNC: 143 MMOL/L (ref 136–145)
SP GR UR REFRACTOMETRY: 1.02 (ref 1–1.02)
UROBILINOGEN UR QL STRIP.AUTO: 0.2 EU/DL (ref 0.2–1)
VENTRICULAR RATE, ECG03: 99 BPM
WBC # BLD AUTO: 3.6 K/UL (ref 4.3–11.1)
WBC URNS QL MICRO: ABNORMAL /HPF

## 2017-04-23 PROCEDURE — 87040 BLOOD CULTURE FOR BACTERIA: CPT | Performed by: EMERGENCY MEDICINE

## 2017-04-23 PROCEDURE — 80053 COMPREHEN METABOLIC PANEL: CPT | Performed by: EMERGENCY MEDICINE

## 2017-04-23 PROCEDURE — 87088 URINE BACTERIA CULTURE: CPT | Performed by: EMERGENCY MEDICINE

## 2017-04-23 PROCEDURE — 74011000258 HC RX REV CODE- 258: Performed by: EMERGENCY MEDICINE

## 2017-04-23 PROCEDURE — 74011250637 HC RX REV CODE- 250/637: Performed by: EMERGENCY MEDICINE

## 2017-04-23 PROCEDURE — 85025 COMPLETE CBC W/AUTO DIFF WBC: CPT | Performed by: EMERGENCY MEDICINE

## 2017-04-23 PROCEDURE — 96365 THER/PROPH/DIAG IV INF INIT: CPT | Performed by: EMERGENCY MEDICINE

## 2017-04-23 PROCEDURE — 74011250636 HC RX REV CODE- 250/636: Performed by: EMERGENCY MEDICINE

## 2017-04-23 PROCEDURE — 71010 XR CHEST PORT: CPT

## 2017-04-23 PROCEDURE — 74011250637 HC RX REV CODE- 250/637: Performed by: INTERNAL MEDICINE

## 2017-04-23 PROCEDURE — 93005 ELECTROCARDIOGRAM TRACING: CPT | Performed by: EMERGENCY MEDICINE

## 2017-04-23 PROCEDURE — 87086 URINE CULTURE/COLONY COUNT: CPT | Performed by: EMERGENCY MEDICINE

## 2017-04-23 PROCEDURE — 96368 THER/DIAG CONCURRENT INF: CPT | Performed by: EMERGENCY MEDICINE

## 2017-04-23 PROCEDURE — 83735 ASSAY OF MAGNESIUM: CPT | Performed by: EMERGENCY MEDICINE

## 2017-04-23 PROCEDURE — 87186 SC STD MICRODIL/AGAR DIL: CPT | Performed by: EMERGENCY MEDICINE

## 2017-04-23 PROCEDURE — 74011250636 HC RX REV CODE- 250/636: Performed by: INTERNAL MEDICINE

## 2017-04-23 PROCEDURE — 65270000029 HC RM PRIVATE

## 2017-04-23 PROCEDURE — 96367 TX/PROPH/DG ADDL SEQ IV INF: CPT | Performed by: EMERGENCY MEDICINE

## 2017-04-23 PROCEDURE — 81001 URINALYSIS AUTO W/SCOPE: CPT | Performed by: EMERGENCY MEDICINE

## 2017-04-23 PROCEDURE — 83605 ASSAY OF LACTIC ACID: CPT

## 2017-04-23 PROCEDURE — 84145 PROCALCITONIN (PCT): CPT | Performed by: EMERGENCY MEDICINE

## 2017-04-23 PROCEDURE — 87804 INFLUENZA ASSAY W/OPTIC: CPT | Performed by: EMERGENCY MEDICINE

## 2017-04-23 PROCEDURE — 99285 EMERGENCY DEPT VISIT HI MDM: CPT | Performed by: EMERGENCY MEDICINE

## 2017-04-23 RX ORDER — ACETAMINOPHEN 500 MG
1000 TABLET ORAL
Status: COMPLETED | OUTPATIENT
Start: 2017-04-23 | End: 2017-04-23

## 2017-04-23 RX ORDER — GUAIFENESIN 100 MG/5ML
81 LIQUID (ML) ORAL DAILY
Status: DISCONTINUED | OUTPATIENT
Start: 2017-04-24 | End: 2017-05-02 | Stop reason: HOSPADM

## 2017-04-23 RX ORDER — SODIUM CHLORIDE 0.9 % (FLUSH) 0.9 %
5-10 SYRINGE (ML) INJECTION AS NEEDED
Status: DISCONTINUED | OUTPATIENT
Start: 2017-04-23 | End: 2017-05-02 | Stop reason: HOSPADM

## 2017-04-23 RX ORDER — LISINOPRIL 20 MG/1
20 TABLET ORAL DAILY
Status: DISCONTINUED | OUTPATIENT
Start: 2017-04-24 | End: 2017-04-26

## 2017-04-23 RX ORDER — SODIUM CHLORIDE 0.9 % (FLUSH) 0.9 %
5 SYRINGE (ML) INJECTION EVERY 8 HOURS
Status: DISCONTINUED | OUTPATIENT
Start: 2017-04-23 | End: 2017-05-02 | Stop reason: HOSPADM

## 2017-04-23 RX ORDER — SODIUM CHLORIDE 0.9 % (FLUSH) 0.9 %
5-10 SYRINGE (ML) INJECTION AS NEEDED
Status: DISCONTINUED | OUTPATIENT
Start: 2017-04-23 | End: 2017-04-28

## 2017-04-23 RX ORDER — VANCOMYCIN 2 GRAM/500 ML IN 0.9 % SODIUM CHLORIDE INTRAVENOUS
2000 EVERY 12 HOURS
Status: DISCONTINUED | OUTPATIENT
Start: 2017-04-23 | End: 2017-04-25

## 2017-04-23 RX ORDER — MAGNESIUM SULFATE HEPTAHYDRATE 40 MG/ML
2 INJECTION, SOLUTION INTRAVENOUS ONCE
Status: COMPLETED | OUTPATIENT
Start: 2017-04-23 | End: 2017-04-23

## 2017-04-23 RX ORDER — HYDROCODONE BITARTRATE AND ACETAMINOPHEN 5; 325 MG/1; MG/1
1-2 TABLET ORAL
Status: DISCONTINUED | OUTPATIENT
Start: 2017-04-23 | End: 2017-05-02 | Stop reason: HOSPADM

## 2017-04-23 RX ORDER — ACETAMINOPHEN 325 MG/1
650 TABLET ORAL
Status: DISCONTINUED | OUTPATIENT
Start: 2017-04-23 | End: 2017-05-02 | Stop reason: HOSPADM

## 2017-04-23 RX ORDER — PANTOPRAZOLE SODIUM 40 MG/1
40 TABLET, DELAYED RELEASE ORAL
Status: DISCONTINUED | OUTPATIENT
Start: 2017-04-24 | End: 2017-05-02 | Stop reason: HOSPADM

## 2017-04-23 RX ORDER — PREGABALIN 50 MG/1
50 CAPSULE ORAL 3 TIMES DAILY
Status: DISCONTINUED | OUTPATIENT
Start: 2017-04-23 | End: 2017-05-02 | Stop reason: HOSPADM

## 2017-04-23 RX ORDER — HEPARIN SODIUM 5000 [USP'U]/ML
5000 INJECTION, SOLUTION INTRAVENOUS; SUBCUTANEOUS EVERY 8 HOURS
Status: DISCONTINUED | OUTPATIENT
Start: 2017-04-23 | End: 2017-04-26

## 2017-04-23 RX ORDER — ALLOPURINOL 300 MG/1
300 TABLET ORAL 2 TIMES DAILY
Status: DISCONTINUED | OUTPATIENT
Start: 2017-04-23 | End: 2017-04-28

## 2017-04-23 RX ORDER — SODIUM CHLORIDE 9 MG/ML
125 INJECTION, SOLUTION INTRAVENOUS CONTINUOUS
Status: DISCONTINUED | OUTPATIENT
Start: 2017-04-23 | End: 2017-05-01

## 2017-04-23 RX ADMIN — PREGABALIN 50 MG: 50 CAPSULE ORAL at 22:36

## 2017-04-23 RX ADMIN — PIPERACILLIN SODIUM,TAZOBACTAM SODIUM 4.5 G: 4; .5 INJECTION, POWDER, FOR SOLUTION INTRAVENOUS at 15:45

## 2017-04-23 RX ADMIN — MAGNESIUM SULFATE HEPTAHYDRATE 2 G: 40 INJECTION, SOLUTION INTRAVENOUS at 16:32

## 2017-04-23 RX ADMIN — SODIUM CHLORIDE 75 ML/HR: 900 INJECTION, SOLUTION INTRAVENOUS at 20:00

## 2017-04-23 RX ADMIN — Medication 5 ML: at 22:36

## 2017-04-23 RX ADMIN — HEPARIN SODIUM 5000 UNITS: 5000 INJECTION, SOLUTION INTRAVENOUS; SUBCUTANEOUS at 22:36

## 2017-04-23 RX ADMIN — ACETAMINOPHEN 1000 MG: 500 TABLET ORAL at 15:24

## 2017-04-23 RX ADMIN — ACETAMINOPHEN 650 MG: 325 TABLET, FILM COATED ORAL at 23:50

## 2017-04-23 RX ADMIN — SODIUM CHLORIDE 1000 ML: 900 INJECTION, SOLUTION INTRAVENOUS at 15:24

## 2017-04-23 RX ADMIN — PIPERACILLIN SODIUM,TAZOBACTAM SODIUM 3.38 G: 3; .375 INJECTION, POWDER, FOR SOLUTION INTRAVENOUS at 22:39

## 2017-04-23 RX ADMIN — VANCOMYCIN HYDROCHLORIDE 2000 MG: 10 INJECTION, POWDER, LYOPHILIZED, FOR SOLUTION INTRAVENOUS at 16:38

## 2017-04-23 NOTE — ED TRIAGE NOTES
Reports fever of 104.6 at home that has been rising through the day. States finished first round of chemo. Being treated for lymphoma. Took advil x 2 at home.

## 2017-04-23 NOTE — ED NOTES
TRANSFER - OUT REPORT:    Verbal report given to  Clarence Modi RN on Nabil Velasco  being transferred to [phone] for routine progression of care       Report consisted of patients Situation, Background, Assessment and   Recommendations(SBAR). Information from the following report(s) SBAR, ED Summary and MAR was reviewed with the receiving nurse. Lines:   Venous Access Device Power Port 04/18/17 Upper chest (subclavicular area, right (Active)       Peripheral IV 04/23/17 Right Antecubital (Active)        Opportunity for questions and clarification was provided.       Patient transported with:   Transport with mask

## 2017-04-23 NOTE — PROGRESS NOTES
Pharmacokinetic Consult to Pharmacist    Marilou Reyes is a 62 y.o. female being treated for sepsis with vancomycin and zosyn. Height: 5' 3\" (160 cm)  Weight: 124.3 kg (274 lb)  Lab Results   Component Value Date/Time    BUN 14 04/23/2017 03:01 PM    Creatinine 0.97 04/23/2017 03:01 PM    WBC 3.6 04/23/2017 03:01 PM    Procalcitonin 0.3 04/23/2017 03:01 PM      Estimated Creatinine Clearance: 82 mL/min (based on Cr of 0.97). CULTURES:  4/23: Influenza - in process   BCx - in process   UCx- in process    Day 1 of vancomycin. Goal trough is 15-20. Vancomycin dose initiated at 2g q12h. Check trough prior to 4th dose. Will continue to follow patient.       Thank you,  Romie Mcburney, PharmD, [facility]  Clinical Pharmacist  [phone]

## 2017-04-23 NOTE — IP AVS SNAPSHOT
[address] 
[phone] Patient: David Hogan MRN: [id number] VZN:6/81/[id number] You are allergic to the following No active allergies Recent Documentation Height Weight Breastfeeding? BMI OB Status Smoking Status 1.6 m 131.5 kg No 51.37 kg/m2 Postmenopausal Never Smoker Emergency Contacts Name Discharge Info Relation Home Work Mobile Erlin Sawant  Spouse [3] [phone] Karlo Womack  Son [22] [phone] [address] [facility]  Other Relative [6] [phone] Maricarmen Zuniga  Sister [23] [phone] About your hospitalization You were admitted on:  April 23, 2017 You last received care in the[address] You were discharged on:  May 2, 2017 Unit phone number:  [phone] Why you were hospitalized Your primary diagnosis was:  Sepsis (Hcc) Your diagnoses also included:  Non Hodgkin's Lymphoma (Hcc), Morbid Obesity (Hcc), Hypomagnesemia, Hypotension, Ascites, Acute Renal Failure (Arf) (Hcc), Snores, Superficial Venous Thrombosis Of Right Arm Providers Seen During Your Hospitalizations Provider Role Specialty Primary office phone Vamshi Paul MD Attending Provider Emergency Medicine [phone] Africa Love MD Attending Provider Internal Medicine [phone] Janak Retana MD Attending Provider Hematology and Oncology [phone] Your Primary Care Physician (PCP) Primary Care Physician Office Phone Office Fax Elen Cabrera [phone] Follow-up Information Follow up With Details Comments Contact Info RUTHY Arboleda On 5/8/2017 Appt time per Seema Rondon 10:15 am , [place] [address] [place] [address] 
[phone] Claudia Chester MD   [address] Josue Sifuentes 
[phone] Angella Ricardo MD On 5/3/2017 labs 9:30 a and appt 10:00 a per Bebo Hardy [address] [facility] [address] 
[phone] Your Appointments Wednesday May 03, 2017  9:30 AM EDT  
LAB with Hossein 58  
18[facility] OUTREACH INSURANCE [address]) Linnea Pratt [phone] [facility]  
[phone] Wednesday May 03, 2017 10:00 AM EDT Follow Up with Angella Ricardo MD  
[facility] Hematology and Oncology [facility]) C/ Juan Balbuena 33 [facility] [address]  
[phone] Wednesday May 10, 2017  9:45 AM EDT  
LAB with Hossein 58  
[address] [facility] OUTREACH INSURANCE [address]) Linnea Pratt [address] [facility]  
[phone] Wednesday May 10, 2017 10:15 AM EDT PreChemo Follow Up with Angella Ricardo MD  
[facility] Hematology and Oncology [facility]) C/ Juan Balbuena 33 [facility] [address]  
[phone] Wednesday May 10, 2017 11:00 AM EDT Follow Up with MARA York 52 [facility] Hematology and Oncology [facility]) C/ Juan Balbuena 33 [facility] [address]  
[phone] Wednesday May 10, 2017 11:15 AM EDT Chemo with NUR5  
ST. [address] ([address] ) [address] Ho Master [phone] [facility] [address]  
[phone] [address] Thursday May 11, 2017  1:15 PM EDT Infusion with NUR2  
ST. [address] ([address] ) [address] Ho Master [phone] [facility] [address]  
[phone] [address] Tuesday June 13, 2017  7:15 AM EDT  
LAB with [facility] LAB RESOURCE [address] [place] [address] ([facility] 10[facility]) [facility] ([facility]) [place] [address]  
[phone] Friday June 16, 2017  9:45 AM EDT  
COMPLETE PHYSICAL with Ilana Rodriguez MD  
13[facility] 1052 [facility] 101 [facility] Rosina Enrqiue  
[phone] Current Discharge Medication List  
  
START taking these medications Dose & Instructions Dispensing Information Comments Morning Noon Evening Bedtime  
 citalopram 20 mg tablet Commonly known as:  Gonzales Saint Johns Dose:  20 mg Take 1 Tab by mouth daily. Quantity:  30 Tab Refills:  0  
     
  
   
   
   
  
 midodrine 2.5 mg tablet Commonly known as:  Idelle Spine Dose:  2.5 mg Take 1 Tab by mouth two (2) times daily (with meals) for 30 days. Quantity:  60 Tab Refills:  0 CONTINUE these medications which have NOT CHANGED Dose & Instructions Dispensing Information Comments Morning Noon Evening Bedtime  
 allopurinol 300 mg tablet Commonly known as:  Onnie Keyana Dose:  300 mg Take 1 Tab by mouth two (2) times a day. Quantity:  90 Tab Refills:  2  
     
  
   
   
  
   
  
 aspirin 81 mg chewable tablet Dose:  81 mg Take 81 mg by mouth daily. Refills:  0 HYDROcodone-acetaminophen 5-325 mg per tablet Commonly known as:  Edgardo Kilts Dose:  1-2 Tab Take 1-2 Tabs by mouth every four (4) hours as needed for Pain. Max Daily Amount: 12 Tabs. Quantity:  120 Tab Refills:  0  
     
   
   
   
  
 levothyroxine 125 mcg tablet Commonly known as:  SYNTHROID Dose:  125 mcg Take 1 Tab by mouth Daily (before breakfast). Quantity:  90 Tab Refills:  1  
     
  
   
   
   
  
 lidocaine-prilocaine topical cream  
Commonly known as:  EMLA Apply  to affected area as needed for Pain. Apply to port site 45-60 minutes prior to lab appt or infusion. Quantity:  30 g Refills:  0  
     
   
   
   
  
 magic mouthwash Susp Commonly known as:  Yesica Snyder  
 Dose:  10 mL Take 10 mL by mouth every four (4) hours as needed. Quantity:  1 Bottle Refills:  2  
     
   
   
   
  
 nystatin powder Commonly known as:  MYCOSTATIN Apply  to affected area three (3) times daily. Quantity:  60 g Refills:  2  
     
  
   
  
   
   
  
  
 omeprazole 20 mg capsule Commonly known as:  PRILOSEC Dose:  20 mg Take 1 Cap by mouth daily. Quantity:  90 Cap Refills:  3  
     
  
   
   
   
  
 ondansetron hcl 4 mg tablet Commonly known as:  ZOFRAN (AS HYDROCHLORIDE) Dose:  4 mg Take 1 Tab by mouth every eight (8) hours as needed for Nausea. Quantity:  60 Tab Refills:  3  
     
   
   
   
  
 pregabalin 50 mg capsule Commonly known as:  Green Dye Dose:  50 mg Take 1 Cap by mouth three (3) times daily. Max Daily Amount: 150 mg.  
 Quantity:  90 Cap Refills:  1  
     
   
   
   
  
 promethazine 25 mg tablet Commonly known as:  PHENERGAN Dose:  25 mg Take 25 mg by mouth every six (6) hours as needed for Nausea. Unsure of dose Refills:  0 STOP taking these medications   
 cpm-phenyleph-acetaminophen 4- mg Tab Commonly known as:  NOREL AD  
   
  
 lisinopril 20 mg tablet Commonly known as:  Najma Cozier Where to Get Your Medications These medications were sent to [facility], [address]  [address], [place] [facility] [address] Phone:  [phone]  
  citalopram 20 mg tablet  
 midodrine 2.5 mg tablet Discharge Instructions DISCHARGE SUMMARY from Nurse The following personal items are in your possession at time of discharge: 
 
Dental Appliances: None Visual Aid: Glasses, With patient Home Medications: None Clothing: Kalia Kappa Other Valuables: None PATIENT INSTRUCTIONS: 
 
After general anesthesia or intravenous sedation, for 24 hours or while taking prescription Narcotics: · Limit your activities · Do not drive and operate hazardous machinery · Do not make important personal or business decisions · Do  not drink alcoholic beverages · If you have not urinated within 8 hours after discharge, please contact your surgeon on call. Report the following to your surgeon: 
· Excessive pain, swelling, redness or odor of or around the surgical area · Temperature over 100.5 · Nausea and vomiting lasting longer than 4 hours or if unable to take medications · Any signs of decreased circulation or nerve impairment to extremity: change in color, persistent  numbness, tingling, coldness or increase pain · Any questions What to do at Home: 
Recommended activity: Activity as tolerated, per MD instructions If you experience any of the following symptoms fever > 100.5, nausea, vomiting, pain, chest pain, shortness of breath please follow up with MD. 
 
 
*  Please give a list of your current medications to your Primary Care Provider. *  Please update this list whenever your medications are discontinued, doses are 
    changed, or new medications (including over-the-counter products) are added. *  Please carry medication information at all times in case of emergency situations. These are general instructions for a healthy lifestyle: No smoking/ No tobacco products/ Avoid exposure to second hand smoke Surgeon General's Warning:  Quitting smoking now greatly reduces serious risk to your health. Obesity, smoking, and sedentary lifestyle greatly increases your risk for illness A healthy diet, regular physical exercise & weight monitoring are important for maintaining a healthy lifestyle You may be retaining fluid if you have a history of heart failure or if you experience any of the following symptoms:  Weight gain of 3 pounds or more overnight or 5 pounds in a week, increased swelling in our hands or feet or shortness of breath while lying flat in bed. Please call your doctor as soon as you notice any of these symptoms; do not wait until your next office visit. Recognize signs and symptoms of STROKE: 
 
F-face looks uneven A-arms unable to move or move unevenly S-speech slurred or non-existent T-time-call 911 as soon as signs and symptoms begin-DO NOT go Back to bed or wait to see if you get better-TIME IS BRAIN. Warning Signs of HEART ATTACK Call 911 if you have these symptoms: 
? Chest discomfort. Most heart attacks involve discomfort in the center of the chest that lasts more than a few minutes, or that goes away and comes back. It can feel like uncomfortable pressure, squeezing, fullness, or pain. ? Discomfort in other areas of the upper body. Symptoms can include pain or discomfort in one or both arms, the back, neck, jaw, or stomach. ? Shortness of breath with or without chest discomfort. ? Other signs may include breaking out in a cold sweat, nausea, or lightheadedness. Don't wait more than five minutes to call [address]! Fast action can save your life. Calling 911 is almost always the fastest way to get lifesaving treatment. Emergency Medical Services staff can begin treatment when they arrive  up to an hour sooner than if someone gets to the hospital by car. The discharge information has been reviewed with the patient. The patient verbalized understanding. Discharge medications reviewed with the patient and appropriate educational materials and side effects teaching were provided. Sepsis: Care Instructions Your Care Instructions Sepsis is an infection that has spread throughout your body. It is a life-threatening condition and often causes extremely low blood pressure. This can lead to problems with many different organs.  
The cause of sepsis is not always clear, but it can happen as part of a long-term or sudden illness. Sometimes even a mild illness can lead to sepsis. Follow-up care is a key part of your treatment and safety. Be sure to make and go to all appointments, and call your doctor if you are having problems. Its also a good idea to know your test results and keep a list of the medicines you take. How can you care for yourself at home? · If your doctor prescribed antibiotics, take them as directed. Do not stop taking them just because you feel better. You need to take the full course of antibiotics. · Drink plenty of fluids, enough so that your urine is light yellow or clear like water. Choose water or caffeine-free clear liquids until you feel better. If you have kidney, heart, or liver disease and have to limit fluids, talk with your doctor before you increase your fluid intake. You can try rehydration drinks, such as Gatorade or Powerade. · Do not drink alcohol. · Eat a healthy diet. Include fruits, vegetables, and whole grains in your diet every day. · Walking is an easy way to get exercise. Gradually increase the amount you walk every day. Make sure your doctor knows that you are starting an exercise program. 
· Do not smoke or use other tobacco products. If you need help quitting, talk to your doctor about stop-smoking programs and medicines. These can increase your chances of quitting for good. When should you call for help? Call 911 anytime you think you may need emergency care. For example, call if: 
· You passed out (lost consciousness). Call your doctor now or seek immediate medical care if: 
· You have a fever or chills. · You have cool, pale, or clammy skin. · You are dizzy or lightheaded, or you feel like you may faint. · You have any new symptoms, such as a cough, pain in one part of your body, or urinary problems. Watch closely for changes in your health, and be sure to contact your doctor if: 
· You do not get better as expected. Where can you learn more? Go to http://rajat-rachel.info/. Enter E329 in the search box to learn more about \"Sepsis: Care Instructions. \" Current as of: May 27, 2016 Content Version: 11.2 © 4140-5245 [employer]Symbols. Care instructions adapted under license by [employer] (which disclaims liability or warranty for this information). If you have questions about a medical condition or this instruction, always ask your healthcare professional. [place] [address] any warranty or liability for your use of this information. Discharge Orders None Nujira Announcement We are excited to announce that we are making your provider's discharge notes available to you in Nujira. You will see these notes when they are completed and signed by the physician that discharged you from your recent hospital stay. If you have any questions or concerns about any information you see in Nujira, please call the Health Information Department where you were seen or reach out to your Primary Care Provider for more information about your plan of care. Introducing [facility]! Dear Maria Antonia De La Cruz: Thank you for requesting a Nujira account. Our records indicate that you already have an active Nujira account. You can access your account anytime at [email or web address] Did you know that you can access your hospital and ER discharge instructions at any time in Nujira? You can also review all of your test results from your hospital stay or ER visit. Additional Information If you have questions, please visit the Frequently Asked Questions section of the Nujira website at [email or web address] Remember, Nujira is NOT to be used for urgent needs. For medical emergencies, dial 911. Now available from your iPhone and Android! General Information Please provide this summary of care documentation to your next provider. Patient Signature:  ____________________________________________________________ Date:  ____________________________________________________________  
  
Angelia Marcela Provider Signature:  ____________________________________________________________ Date:  ____________________________________________________________

## 2017-04-23 NOTE — ED PROVIDER NOTES
HPI Comments: Presents with complaint of fever of 104.6 at home. Patient took 2 Advil prior to arrival.  She's had chills since yesterday. She reports congestion and \"phlegm\" but no cough or dysuria abdominal pain nausea or vomiting. She had chemotherapy 1 week ago. She had a port placed 5 days ago. She's had 2 paracentesis recently. Patient is a 62 y.o. female presenting with fever. The history is provided by the patient. Fever    This is a new problem. The current episode started yesterday. The problem occurs constantly. The problem has been rapidly worsening. The maximum temperature noted was more than 104 F. The temperature was taken using an oral thermometer. Associated symptoms include congestion. Pertinent negatives include no chest pain, no sleepiness, no diarrhea, no vomiting, no sore throat, no muscle aches, no cough, no shortness of breath, no mental status change and no rash. She has tried ibuprofen for the symptoms. The treatment provided mild relief.         Past Medical History:   Diagnosis Date    Anemia     in high school, \"received gamma globulin twice a week for awhile\"    Arthritis     osteo-r knee    Basal cell carcinoma     Followed by Dermatology    Chronic pain     KNEEs    Hypertension 10/4/2011    medication    Morbid obesity ([address]     Murmur     \"had it my whole life\", did not appreciate murmur 9/12/2011 during assessment    Non Hodgkin's lymphoma ([address] 3/30/2017    Other ill-defined conditions     skin bumps-med as needed    Overactive bladder     Rheumatic fever     Possibly as a child    Shingles     Thromboembolus ([address] x2    LLE-calf-1990?-only took ASA-resolved in less than a wk-\"a little burned area-not examined\"    Thyroid disease     hypo-medication    Unspecified adverse effect of anesthesia     pt reports waking several times with knee surgery-spinal       Past Surgical History:   Procedure Laterality Date    [facility] CHOLECYSTECTOMY [id number]      HX CHOLECYSTECTOMY  1983    HX ORTHOPAEDIC  2012    right TKA    HX ORTHOPAEDIC  2013    left TKA. Dr. Go Hurtado.  AL ANESTH,ACHILLES TENDON SURG  2/10/2011    LEFT. Dr. Federica Bruce. Family History:   Problem Relation Age of Onset    Post-op Nausea/Vomiting Other      X3    Breast Cancer Other 28     cousin    Kidney Disease Father      RENAL FAILURE    Other Son     Other Daughter     Other Maternal Grandmother      Vascular Disorder with leg amputation    Liver Disease Sister      non-alcoholic    Celiac Disease Sister     Malignant Hyperthermia Neg Hx     Pseudocholinesterase Deficiency Neg Hx     Delayed Awakening Neg Hx     Emergence Delirium Neg Hx     Post-op Cognitive Dysfunction Neg Hx        Social History     Social History    Marital status:      Spouse name: N/A    Number of children: N/A    Years of education: N/A     Occupational History    Not on file. Social History Main Topics    Smoking status: Never Smoker    Smokeless tobacco: Never Used    Alcohol use Yes      Comment: RARE, ONCE/TWICE A YEAR    Drug use: Yes     Special: Prescription    Sexual activity: Not on file     Other Topics Concern    Not on file     Social History Narrative    10/3/11:  PATIENT IS  TO Rosi Butts. SHE IS DISABLED. ALLERGIES: Review of patient's allergies indicates no known allergies. Review of Systems   Constitutional: Positive for chills and fever. HENT: Positive for congestion. Negative for sore throat. Respiratory: Negative for cough and shortness of breath. Cardiovascular: Negative for chest pain. Gastrointestinal: Negative for diarrhea and vomiting. Skin: Negative for rash and wound. All other systems reviewed and are negative.       Vitals:    04/23/17 1454   BP: 132/59   Pulse: (!) 112   Resp: 20   Temp: (!) 103 °F (39.4 °C)   SpO2: 98%   Weight: 124.3 kg (274 lb)   Height: 5' 3\" (1.6 m)            Physical Exam   Constitutional: She is oriented to person, place, and time. She appears well-developed and well-nourished. No distress. HENT:   Head: Normocephalic and atraumatic. Neck: Normal range of motion. Neck supple. Cardiovascular: Regular rhythm. Tachycardia present. Murmur heard. Pulmonary/Chest: Effort normal and breath sounds normal. No respiratory distress. She has no wheezes. She has no rales. Abdominal: Soft. She exhibits no distension. There is tenderness (very mild right lower quadrant patient reports since paracentesis). There is no rebound and no guarding. Mild dependent edema and erythema of her pannus   Musculoskeletal: Normal range of motion. She exhibits no edema. Neurological: She is alert and oriented to person, place, and time. No cranial nerve deficit. Skin: Skin is warm and dry. She is not diaphoretic. No erythema. Psychiatric: She has a normal mood and affect. Her behavior is normal.   Nursing note and vitals reviewed. MDM  Number of Diagnoses or Management Options  Fever, unspecified fever cause:   Non-Hodgkin's lymphoma, unspecified body region, unspecified non-Hodgkin lymphoma type:   Diagnosis management comments: Tylenol and fluids thank and Zosyn. Patient has multiple sources for possible infection including her recent port placement and SBP. She looks very well and her lactate is normal and her white count is 3.6. Discussed with oncology. I think she would be best served by being admitted and awaiting cultures.          Amount and/or Complexity of Data Reviewed  Clinical lab tests: ordered and reviewed  Review and summarize past medical records: yes  Discuss the patient with other providers: yes  Independent visualization of images, tracings, or specimens: yes (Sinus tach no ST elevation nl QRS)    Risk of Complications, Morbidity, and/or Mortality  Presenting problems: high  Diagnostic procedures: moderate  Management options: high    Patient Progress  Patient progress: improved    ED Course       Procedures

## 2017-04-23 NOTE — H&P
HOSPITALIST H&P/CONSULT  NAME:  Davin Echevarria   Age:  62 y.o.  :   1960   MRN:   [id number]  PCP: Faraz Martinez MD  Consulting MD:  Treatment Team: Attending Provider: Yamileth Crane MD; Primary Nurse: Aristeo Henley  HPI:   Patient is a 62 yof with a hx of NHL on chemotherapy, htn, morbid obesity who presents with one day hx of fevers and chills. Denies any nausea, vomiting, abdominal pain, dyspnea. Pt had recent port placed and 2 paracentesis within the past 3 weeks. Noted to have a fever of 101.4 in ER. States she had a fever of 103 at home. Pt started on vanco and zosyn in ER. Complete ROS done and is as stated in HPI or otherwise negative  Past Medical History:   Diagnosis Date    Anemia     in high school, \"received gamma globulin twice a week for awhile\"    Arthritis     osteo-r knee    Basal cell carcinoma     Followed by Dermatology    Chronic pain     KNEEs    Hypertension 10/4/2011    medication    Morbid obesity ([address]     Murmur     \"had it my whole life\", did not appreciate murmur 2011 during assessment    Non Hodgkin's lymphoma ([address] 3/30/2017    Other ill-defined conditions     skin bumps-med as needed    Overactive bladder     Rheumatic fever     Possibly as a child    Shingles     Thromboembolus ([address] x2    LLE-calf-?-only took ASA-resolved in less than a wk-\"a little burned area-not examined\"    Thyroid disease     hypo-medication    Unspecified adverse effect of anesthesia     pt reports waking several times with knee surgery-spinal      Past Surgical History:   Procedure Laterality Date    [id number]      HX CHOLECYSTECTOMY  1983    HX ORTHOPAEDIC      right TKA    HX ORTHOPAEDIC  2013    left TKA. Dr. Radha Ochoa.  [place] ANESTH,ACHILLES TENDON SURG  2/10/2011    LEFT. Dr. Oscar Chavis. Prior to Admission Medications   Prescriptions Last Dose Informant Patient Reported? Taking?    HYDROcodone-acetaminophen (NORCO) 5-325 mg per tablet   No No   Sig: Take 1-2 Tabs by mouth every four (4) hours as needed for Pain. Max Daily Amount: 12 Tabs. allopurinol (ZYLOPRIM) 300 mg tablet   No No   Sig: Take 1 Tab by mouth two (2) times a day. aspirin 81 mg chewable tablet   Yes No   Sig: Take 81 mg by mouth daily. cpm-phenyleph-acetaminophen (NOREL AD) 4- mg tab   No No   Sig: Take 1 Tab by mouth every eight (8) hours as needed for Other (cough/congestion). levothyroxine (SYNTHROID) 125 mcg tablet   No No   Sig: Take 1 Tab by mouth Daily (before breakfast). lidocaine-prilocaine (EMLA) topical cream   No No   Sig: Apply  to affected area as needed for Pain. Apply to port site 45-60 minutes prior to lab appt or infusion. lisinopril (PRINIVIL, ZESTRIL) 20 mg tablet   No No   Sig: Take 1 Tab by mouth daily. magic mouthwash (ALEXSANDER) susp   No No   Sig: Take 10 mL by mouth every four (4) hours as needed. nystatin (MYCOSTATIN) powder   No No   Sig: Apply  to affected area three (3) times daily. omeprazole (PRILOSEC) 20 mg capsule   No No   Sig: Take 1 Cap by mouth daily. ondansetron hcl (ZOFRAN, AS HYDROCHLORIDE,) 4 mg tablet   No No   Sig: Take 1 Tab by mouth every eight (8) hours as needed for Nausea. pregabalin (LYRICA) 50 mg capsule   No No   Sig: Take 1 Cap by mouth three (3) times daily. Max Daily Amount: 150 mg.   promethazine (PHENERGAN) 25 mg tablet   Yes No   Sig: Take 25 mg by mouth every six (6) hours as needed for Nausea.  Unsure of dose      Facility-Administered Medications: None     No Known Allergies   Social History   Substance Use Topics    Smoking status: Never Smoker    Smokeless tobacco: Never Used    Alcohol use Yes      Comment: RARE, ONCE/TWICE A YEAR      Family History   Problem Relation Age of Onset    Post-op Nausea/Vomiting Other      X3    Breast Cancer Other 28     cousin    Kidney Disease Father      RENAL FAILURE    Other Son     Other Daughter     Other Maternal Grandmother      Vascular Disorder with leg amputation    Liver Disease Sister      non-alcoholic    Celiac Disease Sister     Malignant Hyperthermia Neg Hx     Pseudocholinesterase Deficiency Neg Hx     Delayed Awakening Neg Hx     Emergence Delirium Neg Hx     Post-op Cognitive Dysfunction Neg Hx       Objective:     Visit Vitals    /82    Pulse 92    Temp (!) 101.4 °F (38.6 °C)    Resp 19    Ht 5' 3\" (1.6 m)    Wt 124.3 kg (274 lb)    LMP 2015    SpO2 97%    BMI 48.54 kg/m2      Temp (24hrs), Av.1 °F (38.9 °C), Min:101.4 °F (38.6 °C), Max:103 °F (39.4 °C)    Oxygen Therapy  O2 Sat (%): 97 % (17 1700)  Pulse via Oximetry: 93 beats per minute (17 1700)  O2 Device: Room air (17 1612)  Physical Exam:  General:    Alert, cooperative, no distress, appears stated age. Head:   Normocephalic, without obvious abnormality, atraumatic. Nose:  Nares normal. No drainage or sinus tenderness. Lungs:   Clear to auscultation bilaterally. No Wheezing or Rhonchi. No rales. Heart:   Regular rate and rhythm,  no murmur, rub or gallop. Abdomen:   Soft, non-tender. Not distended. Bowel sounds normal. obese  Extremities: No cyanosis. No edema. No clubbing  Skin:     Texture, turgor normal. No rashes or lesions.   Not Jaundiced  Neurologic: Alert and oriented x 3, no focal deficits   Data Review:   Recent Results (from the past 24 hour(s))   CBC WITH AUTOMATED DIFF    Collection Time: 17  3:01 PM   Result Value Ref Range    WBC 3.6 (L) 4.3 - 11.1 K/uL    RBC 2.74 (L) 4.05 - 5.25 M/uL    HGB 7.6 (L) 11.7 - 15.4 g/dL    HCT 24.6 (L) 35.8 - 46.3 %    MCV 89.8 79.6 - 97.8 FL    MCH 27.7 26.1 - 32.9 PG    MCHC 30.9 (L) 31.4 - 35.0 g/dL    RDW 19.6 (H) 11.9 - 14.6 %    PLATELET 94 (L) 418 - 450 K/uL    MPV 9.7 (L) 10.8 - 14.1 FL    DF AUTOMATED      NEUTROPHILS 94 (H) 43 - 78 %    LYMPHOCYTES 4 (L) 13 - 44 %    MONOCYTES 1 (L) 4.0 - 12.0 %    EOSINOPHILS 1 0.5 - 7.8 % BASOPHILS 0 0.0 - 2.0 %    IMMATURE GRANULOCYTES 0.3 0.0 - 5.0 %    ABS. NEUTROPHILS 3.4 1.7 - 8.2 K/UL    ABS. LYMPHOCYTES 0.1 (L) 0.5 - 4.6 K/UL    ABS. MONOCYTES 0.0 (L) 0.1 - 1.3 K/UL    ABS. EOSINOPHILS 0.1 0.0 - 0.8 K/UL    ABS. BASOPHILS 0.0 0.0 - 0.2 K/UL    ABS. IMM. GRANS. 0.0 0.0 - 0.5 K/UL   METABOLIC PANEL, COMPREHENSIVE    Collection Time: 04/23/17  3:01 PM   Result Value Ref Range    Sodium 143 136 - 145 mmol/L    Potassium 4.3 3.5 - 5.1 mmol/L    Chloride 108 (H) 98 - 107 mmol/L    CO2 26 21 - 32 mmol/L    Anion gap 9 7 - 16 mmol/L    Glucose 114 (H) 65 - 100 mg/dL    BUN 14 6 - 23 MG/DL    Creatinine 0.97 0.6 - 1.0 MG/DL    GFR est AA >60 >60 ml/min/1.73m2    GFR est non-AA >60 >60 ml/min/1.73m2    Calcium 7.7 (L) 8.3 - 10.4 MG/DL    Bilirubin, total 0.3 0.2 - 1.1 MG/DL    ALT (SGPT) 18 12 - 65 U/L    AST (SGOT) 16 15 - 37 U/L    Alk. phosphatase 100 50 - 136 U/L    Protein, total 5.8 (L) 6.3 - 8.2 g/dL    Albumin 2.8 (L) 3.5 - 5.0 g/dL    Globulin 3.0 2.3 - 3.5 g/dL    A-G Ratio 0.9 (L) 1.2 - 3.5     PROCALCITONIN    Collection Time: 04/23/17  3:01 PM   Result Value Ref Range    Procalcitonin 0.3 ng/mL   MAGNESIUM    Collection Time: 04/23/17  3:01 PM   Result Value Ref Range    Magnesium 1.5 (L) 1.8 - 2.4 mg/dL   POC LACTIC ACID    Collection Time: 04/23/17  3:04 PM   Result Value Ref Range    Lactic Acid (POC) 1.0 0.5 - 1.9 mmol/L   EKG, 12 LEAD, INITIAL    Collection Time: 04/23/17  3:46 PM   Result Value Ref Range    Ventricular Rate 99 BPM    Atrial Rate 99 BPM    P-R Interval 134 ms    QRS Duration 72 ms    Q-T Interval 314 ms    QTC Calculation (Bezet) 402 ms    Calculated P Axis 43 degrees    Calculated R Axis 37 degrees    Calculated T Axis 36 degrees    Diagnosis       !! AGE AND GENDER SPECIFIC ECG ANALYSIS !!   Normal sinus rhythm  Low voltage QRS  Nonspecific ST abnormality  No previous ECGs available  Confirmed by [facility]  MD ([facility]), FATUMA OWENS ([id number]) on 4/23/2017 5:26:27 PM URINALYSIS W/ RFLX MICROSCOPIC    Collection Time: 04/23/17  4:08 PM   Result Value Ref Range    Color YELLOW      Appearance CLEAR      Specific gravity 1.017 1.001 - 1.023      pH (UA) 6.0 5.0 - 9.0      Protein 30 (A) NEG mg/dL    Glucose NEGATIVE  mg/dL    Ketone NEGATIVE  NEG mg/dL    Bilirubin NEGATIVE  NEG      Blood NEGATIVE  NEG      Urobilinogen 0.2 0.2 - 1.0 EU/dL    Nitrites NEGATIVE  NEG      Leukocyte Esterase NEGATIVE  NEG      WBC 3-5 0 /hpf    RBC 0-3 0 /hpf    Epithelial cells 0-3 0 /hpf    Bacteria 0 0 /hpf    Casts 0-3 0 /lpf   INFLUENZA A & B AG (RAPID TEST)    Collection Time: 04/23/17  4:09 PM   Result Value Ref Range    Influenza A Ag NEGATIVE  NEG      Influenza B Ag NEGATIVE  NEG       Imaging /Procedures /Studies     Assessment and Plan: Active Hospital Problems    Diagnosis Date Noted    Sepsis ([facility] [address] 04/23/2017    Hypomagnesemia 04/23/2017    Non Hodgkin's lymphoma ([facility] [address] 03/30/2017    Morbid obesity ([facility] [address] 10/04/2011       PLAN  ·  admit to inpatient  · Continue sepsis bundle  · Continue vanco and zosyn. Follow cx and adjust as needed.   Pt with multiple possibilities for etiology   · Repeat mag in am  · Repeat procal in am  · Will ask oncology to eval in am    Code Status: full    Anticipated discharge: 4 days    Signed By: Chhaya Winkler MD     April 23, 2017

## 2017-04-23 NOTE — PROGRESS NOTES
TRANSFER - IN REPORT:    Verbal report received from Keyana in ED on Rubin Master  being received from ED for routine progression of care      Report consisted of patients Situation, Background, Assessment and   Recommendations(SBAR). Information from the following report(s) SBAR was reviewed with the receiving nurse. Screening Assessment for C Diff:     1. Three (3) or more diarrheal (liquid unformed) stools in less than 24 hours  no     2. If yes, has patient off laxatives for more than 24 hours? Not applicable     3. Was a stool specimen sent for C. Difficile toxin A and B? Not applicable     4. Was the patient placed on contact isolation? Not applicable    Opportunity for questions and clarification was provided. Assessment completed upon patients arrival to unit and care assumed.

## 2017-04-24 LAB
ANION GAP BLD CALC-SCNC: 9 MMOL/L (ref 7–16)
BUN SERPL-MCNC: 15 MG/DL (ref 6–23)
CALCIUM SERPL-MCNC: 7.8 MG/DL (ref 8.3–10.4)
CHLORIDE SERPL-SCNC: 110 MMOL/L (ref 98–107)
CO2 SERPL-SCNC: 25 MMOL/L (ref 21–32)
CREAT SERPL-MCNC: 0.95 MG/DL (ref 0.6–1)
ERYTHROCYTE [DISTWIDTH] IN BLOOD BY AUTOMATED COUNT: 19.7 % (ref 11.9–14.6)
GLUCOSE BLD STRIP.AUTO-MCNC: 109 MG/DL (ref 65–100)
GLUCOSE BLD STRIP.AUTO-MCNC: 135 MG/DL (ref 65–100)
GLUCOSE BLD STRIP.AUTO-MCNC: 141 MG/DL (ref 65–100)
GLUCOSE BLD STRIP.AUTO-MCNC: 95 MG/DL (ref 65–100)
GLUCOSE SERPL-MCNC: 101 MG/DL (ref 65–100)
HCT VFR BLD AUTO: 22.3 % (ref 35.8–46.3)
HGB BLD-MCNC: 7 G/DL (ref 11.7–15.4)
MCH RBC QN AUTO: 27.8 PG (ref 26.1–32.9)
MCHC RBC AUTO-ENTMCNC: 31.4 G/DL (ref 31.4–35)
MCV RBC AUTO: 88.5 FL (ref 79.6–97.8)
PLATELET # BLD AUTO: 84 K/UL (ref 150–450)
PMV BLD AUTO: 10.4 FL (ref 10.8–14.1)
POTASSIUM SERPL-SCNC: 4.2 MMOL/L (ref 3.5–5.1)
PROCALCITONIN SERPL-MCNC: 0.6 NG/ML
RBC # BLD AUTO: 2.52 M/UL (ref 4.05–5.25)
SODIUM SERPL-SCNC: 144 MMOL/L (ref 136–145)
WBC # BLD AUTO: 3.1 K/UL (ref 4.3–11.1)

## 2017-04-24 PROCEDURE — 74011250636 HC RX REV CODE- 250/636: Performed by: EMERGENCY MEDICINE

## 2017-04-24 PROCEDURE — 82962 GLUCOSE BLOOD TEST: CPT

## 2017-04-24 PROCEDURE — 85027 COMPLETE CBC AUTOMATED: CPT | Performed by: INTERNAL MEDICINE

## 2017-04-24 PROCEDURE — 80048 BASIC METABOLIC PNL TOTAL CA: CPT | Performed by: INTERNAL MEDICINE

## 2017-04-24 PROCEDURE — 74011000258 HC RX REV CODE- 258: Performed by: EMERGENCY MEDICINE

## 2017-04-24 PROCEDURE — 36415 COLL VENOUS BLD VENIPUNCTURE: CPT | Performed by: INTERNAL MEDICINE

## 2017-04-24 PROCEDURE — 65270000029 HC RM PRIVATE

## 2017-04-24 PROCEDURE — 74011250636 HC RX REV CODE- 250/636: Performed by: INTERNAL MEDICINE

## 2017-04-24 PROCEDURE — 84145 PROCALCITONIN (PCT): CPT | Performed by: INTERNAL MEDICINE

## 2017-04-24 PROCEDURE — 77030003560 HC NDL HUBR BARD -A

## 2017-04-24 PROCEDURE — 74011250637 HC RX REV CODE- 250/637: Performed by: NURSE PRACTITIONER

## 2017-04-24 PROCEDURE — 87040 BLOOD CULTURE FOR BACTERIA: CPT | Performed by: NURSE PRACTITIONER

## 2017-04-24 PROCEDURE — 74011250637 HC RX REV CODE- 250/637: Performed by: INTERNAL MEDICINE

## 2017-04-24 RX ORDER — CHLORPHENIRAMINE MALEATE 4 MG
4 TABLET ORAL
Status: DISCONTINUED | OUTPATIENT
Start: 2017-04-24 | End: 2017-04-28

## 2017-04-24 RX ADMIN — HEPARIN SODIUM 5000 UNITS: 5000 INJECTION, SOLUTION INTRAVENOUS; SUBCUTANEOUS at 17:34

## 2017-04-24 RX ADMIN — Medication 5 ML: at 14:00

## 2017-04-24 RX ADMIN — ALLOPURINOL 300 MG: 300 TABLET ORAL at 08:04

## 2017-04-24 RX ADMIN — ACETAMINOPHEN 650 MG: 325 TABLET, FILM COATED ORAL at 17:42

## 2017-04-24 RX ADMIN — LEVOTHYROXINE SODIUM 125 MCG: 125 TABLET ORAL at 07:30

## 2017-04-24 RX ADMIN — ACETAMINOPHEN 650 MG: 325 TABLET, FILM COATED ORAL at 12:05

## 2017-04-24 RX ADMIN — LEVOTHYROXINE SODIUM 125 MCG: 125 TABLET ORAL at 06:24

## 2017-04-24 RX ADMIN — CHLORPHENIRAMINE MALEATE 4 MG: 4 TABLET ORAL at 12:13

## 2017-04-24 RX ADMIN — ALLOPURINOL 300 MG: 300 TABLET ORAL at 17:35

## 2017-04-24 RX ADMIN — PREGABALIN 50 MG: 50 CAPSULE ORAL at 08:08

## 2017-04-24 RX ADMIN — PANTOPRAZOLE SODIUM 40 MG: 40 TABLET, DELAYED RELEASE ORAL at 07:30

## 2017-04-24 RX ADMIN — PANTOPRAZOLE SODIUM 40 MG: 40 TABLET, DELAYED RELEASE ORAL at 06:24

## 2017-04-24 RX ADMIN — Medication 5 ML: at 06:00

## 2017-04-24 RX ADMIN — HEPARIN SODIUM 5000 UNITS: 5000 INJECTION, SOLUTION INTRAVENOUS; SUBCUTANEOUS at 06:24

## 2017-04-24 RX ADMIN — LISINOPRIL 20 MG: 20 TABLET ORAL at 08:05

## 2017-04-24 RX ADMIN — PREGABALIN 50 MG: 50 CAPSULE ORAL at 17:35

## 2017-04-24 RX ADMIN — Medication 5 ML: at 21:50

## 2017-04-24 RX ADMIN — VANCOMYCIN HYDROCHLORIDE 2000 MG: 10 INJECTION, POWDER, LYOPHILIZED, FOR SOLUTION INTRAVENOUS at 04:40

## 2017-04-24 RX ADMIN — PIPERACILLIN SODIUM,TAZOBACTAM SODIUM 3.38 G: 3; .375 INJECTION, POWDER, FOR SOLUTION INTRAVENOUS at 17:36

## 2017-04-24 RX ADMIN — CHLORPHENIRAMINE MALEATE 4 MG: 4 TABLET ORAL at 21:49

## 2017-04-24 RX ADMIN — HEPARIN SODIUM 5000 UNITS: 5000 INJECTION, SOLUTION INTRAVENOUS; SUBCUTANEOUS at 21:49

## 2017-04-24 RX ADMIN — HYDROCODONE BITARTRATE AND ACETAMINOPHEN 2 TABLET: 5; 325 TABLET ORAL at 21:49

## 2017-04-24 RX ADMIN — PIPERACILLIN SODIUM,TAZOBACTAM SODIUM 3.38 G: 3; .375 INJECTION, POWDER, FOR SOLUTION INTRAVENOUS at 07:31

## 2017-04-24 RX ADMIN — PREGABALIN 50 MG: 50 CAPSULE ORAL at 21:49

## 2017-04-24 RX ADMIN — VANCOMYCIN HYDROCHLORIDE 2000 MG: 10 INJECTION, POWDER, LYOPHILIZED, FOR SOLUTION INTRAVENOUS at 17:34

## 2017-04-24 RX ADMIN — ACETAMINOPHEN 650 MG: 325 TABLET, FILM COATED ORAL at 07:30

## 2017-04-24 NOTE — PROGRESS NOTES
OOB to chair most of day. Adult son at bedside this afternoon assisting pt to watch the movie \"Pretty Woman\" which the pt enjoys the music from. \"you can't listen to that music and not be happy' she says. Pt appears upbeat and is good company.

## 2017-04-24 NOTE — CONSULTS
Inpatient Hematology / Oncology Consult Note    Reason for Consult:  Sepsis [facility])  Referring Physician:  Steffen Watkins MD    History of Present Illness:  Ms. Bj Meredith is a 62 y.o. female admitted on 4/23/2017. The primary encounter diagnosis was Fever, unspecified fever cause. A diagnosis of Non-Hodgkin's lymphoma, unspecified body region, unspecified non-Hodgkin lymphoma type was also pertinent to this visit. .  She is a patient of Dr. Brian Baldwin with NHL and is currently undergoing treatment with Bendeka/Rituxan/Neulasta. She completed the first cycle on 4/15. She presented to the ED c/o one day hx of fever and chills; fever of 103 at home. Reports fever began the day after new port placement. Started on vanc/zosyn in ED. BCx and UCx NGTD. Flu neg. CXR neg. Review of Systems:  Constitutional +fever, chills, fatigue. Denies weight loss, appetite changes, night sweats. HEENT Denies trauma, blurry vision, hearing loss, ear pain, nosebleeds, sore throat, neck pain and ear discharge. Skin Denies lesions or rashes. Lungs +cough. Denies dyspnea, cough, sputum production or hemoptysis. Cardiovascular Denies chest pain, palpitations, or lower extremity edema. Gastrointestinal Denies nausea, vomiting, changes in bowel habits, bloody or black stools, abdominal pain.  Denies dysuria, frequency or hesitancy of urination. Neuro Denies headaches, visual changes or ataxia. Denies dizziness, tingling, tremors, sensory change, speech change, focal weakness or headaches. Hematology Denies easy bruising or bleeding, denies gingival bleeding or epistaxis. Endo Denies heat/cold intolerance, denies diabetes or thyroid abnormalities. MSK Denies back pain, arthralgias, myalgias or frequent falls. Psychiatric/Behavioral Denies depression and substance abuse. The patient is not nervous/anxious.          No Known Allergies  Past Medical History:   Diagnosis Date    Anemia     in high school, \"received gamma globulin twice a week for awhile\"    Arthritis     osteo-r knee    Basal cell carcinoma     Followed by Dermatology    Chronic pain     KNEEs    Hypertension 10/4/2011    medication    [facility]bid [facility] ([facility]     Murmur     \"had it my whole life\", did not appreciate murmur 2011 during assessment    Non Hodgkin's lymphoma ([facility] 3/30/2017    Other ill-defined conditions     skin bumps-med as needed    Overactive bladder     Rheumatic fever     Possibly as a child    Shingles     Thromboembolus ([facility] x2    LLE-calf-?-only took ASA-resolved in less than a wk-\"a little burned area-not examined\"    Thyroid disease     hypo-medication    Unspecified adverse effect of anesthesia     pt reports waking several times with knee surgery-spinal     Past Surgical History:   Procedure Laterality Date    [facility] CHOLECYSTECTOMY [id number]      HX CHOLECYSTECTOMY  1983    HX ORTHOPAEDIC      right TKA    HX ORTHOPAEDIC  2013    left TKA. Dr. Kimberly Rosa.  NM ANESTH,ACHILLES TENDON SURG  2/10/2011    LEFT. Dr. Christopher Smith. Family History   Problem Relation Age of Onset    Post-op Nausea/Vomiting Other      X3    Breast Cancer Other 28     cousin    Kidney Disease Father      RENAL FAILURE    Other Son     Other Daughter     Other Maternal Grandmother      Vascular Disorder with leg amputation    Liver Disease Sister      non-alcoholic    Celiac Disease Sister     Malignant Hyperthermia Neg Hx     Pseudocholinesterase Deficiency Neg Hx     Delayed Awakening Neg Hx     Emergence Delirium Neg Hx     Post-op Cognitive Dysfunction Neg Hx      Social History     Social History    Marital status:      Spouse name: N/A    Number of children: N/A    Years of education: N/A     Occupational History    Not on file. Social History Main Topics    Smoking status: Never Smoker    Smokeless tobacco: Never Used    Alcohol use Yes      Comment: RARE, ONCE/TWICE A YEAR    Drug use:  Yes Special: Prescription    Sexual activity: Not on file     Other Topics Concern    Not on file     Social History Narrative    10/3/11:  PATIENT IS  TO Leda SON AND DAUGHTER. SHE IS DISABLED.        Current Facility-Administered Medications   Medication Dose Route Frequency Provider Last Rate Last Dose    chlorpheniramine (CHLOR-TRIMETRON) tablet 4 mg  4 mg Oral Q6H PRN Prince Viola, NP   4 mg at 04/24/17 1213    sodium chloride (NS) flush 5-10 mL  5-10 mL IntraVENous PRN Megan Garrido MD        vancomycin (VANCOCIN) 2000 mg in  ml infusion  2,000 mg IntraVENous Q12H Megan Garrido  mL/hr at 04/24/17 0440 2,000 mg at 04/24/17 0440    piperacillin-tazobactam (ZOSYN) 3.375 g in 0.9% sodium chloride (MBP/ADV) 100 mL  3.375 g IntraVENous Q8H Megan Garrido MD 25 mL/hr at 04/24/17 0731 3.375 g at 04/24/17 0731    allopurinol (ZYLOPRIM) tablet 300 mg  300 mg Oral BID Beverley Priest MD   300 mg at 04/24/17 9556    aspirin chewable tablet 81 mg  81 mg Oral DAILY Beverley Priest MD        HYDROcodone-acetaminophen [facility]) 5-325 mg per tablet 1-2 Tab  1-2 Tab Oral Q4H PRN Beverley Priest MD        levothyroxine (SYNTHROID) tablet 125 mcg  125 mcg Oral ACB Beverley Priest MD   125 mcg at 04/24/17 0730    lisinopril (PRINIVIL, ZESTRIL) tablet 20 mg  20 mg Oral DAILY Beverley Priest MD   20 mg at 04/24/17 0805    pantoprazole (PROTONIX) tablet 40 mg  40 mg Oral ACB Beverley Priest MD   40 mg at 04/24/17 0730    pregabalin (LYRICA) capsule 50 mg  50 mg Oral TID Beverley Priest MD   50 mg at 04/24/17 0030    sodium chloride (NS) flush 5 mL  5 mL InterCATHeter Q8H Beverley Priest MD   5 mL at 04/24/17 0600    sodium chloride (NS) flush 5-10 mL  5-10 mL InterCATHeter PRN Beverley Priest MD        0.9% sodium chloride infusion  75 mL/hr IntraVENous CONTINUOUS Beverley Priest MD 75 mL/hr at 04/23/17 2000 75 mL/hr at 17    acetaminophen (TYLENOL) tablet 650 mg  650 mg Oral Q4H PRN Samuel Shafer MD   650 mg at 17 1205    heparin (porcine) injection 5,000 Units  5,000 Units SubCUTAneous Hilda Olivares MD   5,000 Units at 17 6444    magic mouthwash (ALEXSANDER) oral suspension 5 mL  5 mL Oral Q4H PRN Samuel Shafer MD           OBJECTIVE:  Patient Vitals for the past 8 hrs:   BP Temp Pulse Resp SpO2   17 1133 109/73 100.1 °F (37.8 °C) (!) 107 20 100 %   17 0720 134/66 (!) 102.9 °F (39.4 °C) (!) 110 20 100 %   17 0624 - 98.4 °F (36.9 °C) - - -   17 0449 - 99.4 °F (37.4 °C) - - -     Temp (24hrs), Av.9 °F (38.3 °C), Min:98.4 °F (36.9 °C), Max:103 °F (39.4 °C)     0701 -  1900  In: 120 [P.O.:120]  Out: -     Physical Exam:  Constitutional: Well developed, well nourished female in no acute distress, sitting comfortably in chair. HEENT: Normocephalic and atraumatic. Oropharynx is clear, mucous membranes are moist. Neck supple without JVD. Lymph node   Deferred   Skin Warm and dry. No bruising and no rash noted. No erythema. No pallor. New port, R chest, placed 4/18   Respiratory Lungs are clear to auscultation bilaterally without wheezes, rales or rhonchi, normal air exchange without accessory muscle use. CVS Tachycardic rate, regular rhythm and normal S1 and S2. No murmurs, gallops, or rubs. Abdomen Soft, nontender and nondistended, normoactive bowel sounds. No palpable mass. No hepatosplenomegaly. Neuro Grossly nonfocal with no obvious sensory or motor deficits. MSK Normal range of motion in general.  No edema and no tenderness. Psych Appropriate mood and affect.         Labs:    Recent Results (from the past 24 hour(s))   CBC WITH AUTOMATED DIFF    Collection Time: 17  3:01 PM   Result Value Ref Range    WBC 3.6 (L) 4.3 - 11.1 K/uL    RBC 2.74 (L) 4.05 - 5.25 M/uL    HGB 7.6 (L) 11.7 - 15.4 g/dL    HCT 24.6 (L) 35.8 - 46.3 % MCV 89.8 79.6 - 97.8 FL    MCH 27.7 26.1 - 32.9 PG    MCHC 30.9 (L) 31.4 - 35.0 g/dL    RDW 19.6 (H) 11.9 - 14.6 %    PLATELET 94 (L) 691 - 450 K/uL    MPV 9.7 (L) 10.8 - 14.1 FL    DF AUTOMATED      NEUTROPHILS 94 (H) 43 - 78 %    LYMPHOCYTES 4 (L) 13 - 44 %    MONOCYTES 1 (L) 4.0 - 12.0 %    EOSINOPHILS 1 0.5 - 7.8 %    BASOPHILS 0 0.0 - 2.0 %    IMMATURE GRANULOCYTES 0.3 0.0 - 5.0 %    ABS. NEUTROPHILS 3.4 1.7 - 8.2 K/UL    ABS. LYMPHOCYTES 0.1 (L) 0.5 - 4.6 K/UL    ABS. MONOCYTES 0.0 (L) 0.1 - 1.3 K/UL    ABS. EOSINOPHILS 0.1 0.0 - 0.8 K/UL    ABS. BASOPHILS 0.0 0.0 - 0.2 K/UL    ABS. IMM. GRANS. 0.0 0.0 - 0.5 K/UL   METABOLIC PANEL, COMPREHENSIVE    Collection Time: 04/23/17  3:01 PM   Result Value Ref Range    Sodium 143 136 - 145 mmol/L    Potassium 4.3 3.5 - 5.1 mmol/L    Chloride 108 (H) 98 - 107 mmol/L    CO2 26 21 - 32 mmol/L    Anion gap 9 7 - 16 mmol/L    Glucose 114 (H) 65 - 100 mg/dL    BUN 14 6 - 23 MG/DL    Creatinine 0.97 0.6 - 1.0 MG/DL    GFR est AA >60 >60 ml/min/1.73m2    GFR est non-AA >60 >60 ml/min/1.73m2    Calcium 7.7 (L) 8.3 - 10.4 MG/DL    Bilirubin, total 0.3 0.2 - 1.1 MG/DL    ALT (SGPT) 18 12 - 65 U/L    AST (SGOT) 16 15 - 37 U/L    Alk.  phosphatase 100 50 - 136 U/L    Protein, total 5.8 (L) 6.3 - 8.2 g/dL    Albumin 2.8 (L) 3.5 - 5.0 g/dL    Globulin 3.0 2.3 - 3.5 g/dL    A-G Ratio 0.9 (L) 1.2 - 3.5     PROCALCITONIN    Collection Time: 04/23/17  3:01 PM   Result Value Ref Range    Procalcitonin 0.3 ng/mL   MAGNESIUM    Collection Time: 04/23/17  3:01 PM   Result Value Ref Range    Magnesium 1.5 (L) 1.8 - 2.4 mg/dL   POC LACTIC ACID    Collection Time: 04/23/17  3:04 PM   Result Value Ref Range    Lactic Acid (POC) 1.0 0.5 - 1.9 mmol/L   CULTURE, BLOOD    Collection Time: 04/23/17  3:18 PM   Result Value Ref Range    Special Requests: RIGHT ANTECUBITAL      Culture result: NO GROWTH AFTER 13 HOURS     EKG, 12 LEAD, INITIAL    Collection Time: 04/23/17  3:46 PM   Result Value Ref Range    Ventricular Rate 99 BPM    Atrial Rate 99 BPM    P-R Interval 134 ms    QRS Duration 72 ms    Q-T Interval 314 ms    QTC Calculation (Bezet) 402 ms    Calculated P Axis 43 degrees    Calculated R Axis 37 degrees    Calculated T Axis 36 degrees    Diagnosis       !! AGE AND GENDER SPECIFIC ECG ANALYSIS !! Normal sinus rhythm  Low voltage QRS  Nonspecific ST abnormality  No previous ECGs available  Confirmed by [facility]  MD ()[id number] ([id number]) on 4/23/2017 5:26:27 PM     URINALYSIS W/ RFLX MICROSCOPIC    Collection Time: 04/23/17  4:08 PM   Result Value Ref Range    Color YELLOW      Appearance CLEAR      Specific gravity 1.017 1.001 - 1.023      pH (UA) 6.0 5.0 - 9.0      Protein 30 (A) NEG mg/dL    Glucose NEGATIVE  mg/dL    Ketone NEGATIVE  NEG mg/dL    Bilirubin NEGATIVE  NEG      Blood NEGATIVE  NEG      Urobilinogen 0.2 0.2 - 1.0 EU/dL    Nitrites NEGATIVE  NEG      Leukocyte Esterase NEGATIVE  NEG      WBC 3-5 0 /hpf    RBC 0-3 0 /hpf    Epithelial cells 0-3 0 /hpf    Bacteria 0 0 /hpf    Casts 0-3 0 /lpf   CULTURE, URINE    Collection Time: 04/23/17  4:08 PM   Result Value Ref Range    Special Requests: NO SPECIAL REQUESTS      Culture result:        NO GROWTH AFTER SHORT PERIOD OF INCUBATION. FURTHER RESULTS TO FOLLOW AFTER OVERNIGHT INCUBATION.    INFLUENZA A & B AG (RAPID TEST)    Collection Time: 04/23/17  4:09 PM   Result Value Ref Range    Influenza A Ag NEGATIVE  NEG      Influenza B Ag NEGATIVE  NEG     CULTURE, BLOOD    Collection Time: 04/23/17  4:30 PM   Result Value Ref Range    Special Requests: LEFT ANTECUBITAL      Culture result: NO GROWTH AFTER 13 HOURS     METABOLIC PANEL, BASIC    Collection Time: 04/24/17  6:10 AM   Result Value Ref Range    Sodium 144 136 - 145 mmol/L    Potassium 4.2 3.5 - 5.1 mmol/L    Chloride 110 (H) 98 - 107 mmol/L    CO2 25 21 - 32 mmol/L    Anion gap 9 7 - 16 mmol/L    Glucose 101 (H) 65 - 100 mg/dL    BUN 15 6 - 23 MG/DL    Creatinine 0.95 0.6 - 1.0 MG/DL    GFR est AA >60 >60 ml/min/1.73m2    GFR est non-AA >60 >60 ml/min/1.73m2    Calcium 7.8 (L) 8.3 - 10.4 MG/DL   CBC W/O DIFF    Collection Time: 04/24/17  6:10 AM   Result Value Ref Range    WBC 3.1 (L) 4.3 - 11.1 K/uL    RBC 2.52 (L) 4.05 - 5.25 M/uL    HGB 7.0 (L) 11.7 - 15.4 g/dL    HCT 22.3 (L) 35.8 - 46.3 %    MCV 88.5 79.6 - 97.8 FL    MCH 27.8 26.1 - 32.9 PG    MCHC 31.4 31.4 - 35.0 g/dL    RDW 19.7 (H) 11.9 - 14.6 %    PLATELET 84 (L) 950 - 450 K/uL    MPV 10.4 (L) 10.8 - 14.1 FL   PROCALCITONIN    Collection Time: 04/24/17  6:10 AM   Result Value Ref Range    Procalcitonin 0.6 ng/mL   GLUCOSE, POC    Collection Time: 04/24/17  8:02 AM   Result Value Ref Range    Glucose (POC) 141 (H) 65 - 100 mg/dL   GLUCOSE, POC    Collection Time: 04/24/17 11:36 AM   Result Value Ref Range    Glucose (POC) 95 65 - 100 mg/dL       Imaging:  Portable chest x-ray. 4/23/2017     CLINICAL INDICATION: Sepsis, fever     FINDINGS: Single AP view of the chest submitted without comparison shows mild  atelectasis in the left lower lung otherwise the lungs are clear. A right-sided  chest port is in place. The cardiac silhouette and mediastinum are unremarkable. IMPRESSION: Mild left lower lung atelectasis, otherwise no acute abnormality.     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 [facility]) ICD-10-CM: C85.88  ICD-9-CM: 200.38  4/7/2017        Non Hodgkin's lymphoma ([facility] ICD-10-CM: C85.90  ICD-9-CM: 202.80  3/30/2017        Ascites ICD-10-CM: R18.8  ICD-9-CM: 789.59  3/30/2017        Lymphadenopathy, generalized ICD-10-CM: R59.1  ICD-9-CM: 785.6  3/30/2017        Osteoarthritis of left knee ICD-10-CM: M17.12  ICD-9-CM: 715.96  8/26/2013        Morbid obesity ([facility] ICD-10-CM: E66.01  ICD-9-CM: 278.01  10/4/2011        History of DVT of lower extremity ICD-10-CM: O98.892  ICD-9-CM: V12.51  10/4/2011    Overview Signed 10/4/2011  1:44 AM by Adan Martinez NP     X 2               Hypertension ICD-10-CM: I10  ICD-9-CM: 401.9  10/4/2011    Overview Signed 10/4/2011  1:44 AM by Adan Martinez NP     PRE OP             Heart murmur ICD-10-CM: R01.1  ICD-9-CM: 785.2  10/4/2011        Osteoarthritis of right knee ICD-10-CM: M17.11  ICD-9-CM: 715.96  10/3/2011        S/P total knee replacement using cement ICD-10-CM: R49.733  ICD-9-CM: V43.65  10/3/2011                RECOMMENDATIONS:  NHL  4/24  Completed cycle 1 of Bendeka/Rituxan/Neulasta on 4/15. Next cycle scheduled for 5/11. Fever  4/24 Still febrile. BCx & UCx -NGTD. Flu neg. CXR neg. Con't vanc/zosyn. BCx were not drawn from port originally. Will go ahead and order BCx to be drawn from port. DVT prophylaxis: heparin    Lab studies and imaging studies (CXR) were personally reviewed. Pertinent old records were reviewed. Case discussed with inpatient team.    Thank you for allowing us to participate in the care of Ms. Cristian Peterson.             Chris Casas NP   [facility]  [id number] [place] [address]  Office : [phone]  Fax : [phone]

## 2017-04-24 NOTE — PROGRESS NOTES
[facility] and [facility] collected by [facility] infusion nurse. Thank you 5th floor RN. Taken to lab.

## 2017-04-24 NOTE — PROGRESS NOTES
Patient was running 102.8 temperature at 2339, Hospitalist called and is aware. Tylenol 650mg given po, slowly came down and is now 99.4. Will continue to monitor.

## 2017-04-24 NOTE — PROGRESS NOTES
Reports 'weak stomach' and strong gag reflex. Denies n/v. +BS upper quadrants. Reports 60 lb wt loss since June. Dr. Jennie Shea as Oncologist.     Requests magic mouth wash. Will f/u with provider. Reports 'sore mouth' denies mouth sores. Tylenol given for fever.

## 2017-04-24 NOTE — PROGRESS NOTES
Hospitalist Progress Note    2017  Admit Date: 2017  2:56 PM   NAME: Rubin Wheeler   :  1960   MRN:  [id number]   Attending: Sachi Lanza MD  PCP:  Leslie Callaway MD    SUBJECTIVE:   Patient with recent NHL diagnosis and started chemo. Had port placed. Developed fevers/chills shortly after port placed. Started on vanco and zosyn on admission. BC pending. No cx drawn from port. Still with fevers overnight. Review of Systems negative with exception of pertinent positives noted above  PHYSICAL EXAM     Visit Vitals    /66    Pulse (!) 110    Temp (!) 102.9 °F (39.4 °C)    Resp 20    Ht 5' 3\" (1.6 m)    Wt 124.3 kg (274 lb)    LMP 2015    SpO2 100%    Breastfeeding No    BMI 48.54 kg/m2      Temp (24hrs), Av °F (38.3 °C), Min:98.4 °F (36.9 °C), Max:103 °F (39.4 °C)    Oxygen Therapy  O2 Sat (%): 100 % (17 0720)  Pulse via Oximetry: 86 beats per minute (17 1736)  O2 Device: Room air (17 1612)  No intake or output data in the 24 hours ending 17 0825   General: No acute distress    Lungs:  CTA Bilaterally. Heart:  Regular rate and rhythm,  No murmur, rub, or gallop  Abdomen: Soft, Non distended, Non tender, Positive bowel sounds, obese  Extremities: No cyanosis, clubbing or edema  Neurologic:  No focal deficits    ASSESSMENT      Active Hospital Problems    Diagnosis Date Noted    Sepsis ([address] 2017    Hypomagnesemia 2017    Non Hodgkin's lymphoma ([facility] 2017    Morbid obesity ([address] 10/04/2011     Plan:  · Continue current abx  · Would draw cx from port.   Has already received multiple dosing of vanco and zosyn  · Add mag to morning labs  · Oncology consulted regarding nhl    DVT Prophylaxis: heparin    Signed By: Sachi Lanza MD     2017

## 2017-04-25 LAB
ALBUMIN SERPL BCP-MCNC: 2.3 G/DL (ref 3.5–5)
ALBUMIN/GLOB SERPL: 0.9 {RATIO} (ref 1.2–3.5)
ALP SERPL-CCNC: 99 U/L (ref 50–136)
ALT SERPL-CCNC: 24 U/L (ref 12–65)
ANION GAP BLD CALC-SCNC: 10 MMOL/L (ref 7–16)
AST SERPL W P-5'-P-CCNC: 23 U/L (ref 15–37)
BASOPHILS # BLD AUTO: 0 K/UL (ref 0–0.2)
BASOPHILS # BLD: 0 % (ref 0–2)
BILIRUB SERPL-MCNC: 0.5 MG/DL (ref 0.2–1.1)
BUN SERPL-MCNC: 20 MG/DL (ref 6–23)
CALCIUM SERPL-MCNC: 7.4 MG/DL (ref 8.3–10.4)
CHLORIDE SERPL-SCNC: 109 MMOL/L (ref 98–107)
CO2 SERPL-SCNC: 23 MMOL/L (ref 21–32)
CREAT SERPL-MCNC: 1.3 MG/DL (ref 0.6–1)
DIFFERENTIAL METHOD BLD: ABNORMAL
EOSINOPHIL # BLD: 0.1 K/UL (ref 0–0.8)
EOSINOPHIL NFR BLD: 2 % (ref 0.5–7.8)
ERYTHROCYTE [DISTWIDTH] IN BLOOD BY AUTOMATED COUNT: 19.6 % (ref 11.9–14.6)
GLOBULIN SER CALC-MCNC: 2.7 G/DL (ref 2.3–3.5)
GLUCOSE BLD STRIP.AUTO-MCNC: 110 MG/DL (ref 65–100)
GLUCOSE BLD STRIP.AUTO-MCNC: 116 MG/DL (ref 65–100)
GLUCOSE BLD STRIP.AUTO-MCNC: 117 MG/DL (ref 65–100)
GLUCOSE BLD STRIP.AUTO-MCNC: 93 MG/DL (ref 65–100)
GLUCOSE SERPL-MCNC: 108 MG/DL (ref 65–100)
HCT VFR BLD AUTO: 21.5 % (ref 35.8–46.3)
HGB BLD-MCNC: 7 G/DL (ref 11.7–15.4)
IMM GRANULOCYTES # BLD: 0 K/UL (ref 0–0.5)
IMM GRANULOCYTES NFR BLD AUTO: 0.3 % (ref 0–5)
LACTATE SERPL-SCNC: 1.3 MMOL/L (ref 0.4–2)
LYMPHOCYTES # BLD AUTO: 3 % (ref 13–44)
LYMPHOCYTES # BLD: 0.1 K/UL (ref 0.5–4.6)
MAGNESIUM SERPL-MCNC: 1.6 MG/DL (ref 1.8–2.4)
MCH RBC QN AUTO: 27.3 PG (ref 26.1–32.9)
MCHC RBC AUTO-ENTMCNC: 31.6 G/DL (ref 31.4–35)
MCV RBC AUTO: 86.3 FL (ref 79.6–97.8)
MONOCYTES # BLD: 0.1 K/UL (ref 0.1–1.3)
MONOCYTES NFR BLD AUTO: 2 % (ref 4–12)
NEUTS SEG # BLD: 3.2 K/UL (ref 1.7–8.2)
NEUTS SEG NFR BLD AUTO: 93 % (ref 43–78)
PLATELET # BLD AUTO: 76 K/UL (ref 150–450)
PMV BLD AUTO: 9.7 FL (ref 10.8–14.1)
POTASSIUM SERPL-SCNC: 4 MMOL/L (ref 3.5–5.1)
PROCALCITONIN SERPL-MCNC: 3 NG/ML
PROT SERPL-MCNC: 5 G/DL (ref 6.3–8.2)
RBC # BLD AUTO: 2.49 M/UL (ref 4.05–5.25)
SODIUM SERPL-SCNC: 142 MMOL/L (ref 136–145)
VANCOMYCIN TROUGH SERPL-MCNC: 26.1 UG/ML (ref 5–20)
WBC # BLD AUTO: 3.4 K/UL (ref 4.3–11.1)

## 2017-04-25 PROCEDURE — 74011250636 HC RX REV CODE- 250/636: Performed by: INTERNAL MEDICINE

## 2017-04-25 PROCEDURE — 65270000029 HC RM PRIVATE

## 2017-04-25 PROCEDURE — 74011000258 HC RX REV CODE- 258: Performed by: EMERGENCY MEDICINE

## 2017-04-25 PROCEDURE — 74011250636 HC RX REV CODE- 250/636: Performed by: EMERGENCY MEDICINE

## 2017-04-25 PROCEDURE — 82962 GLUCOSE BLOOD TEST: CPT

## 2017-04-25 PROCEDURE — 80202 ASSAY OF VANCOMYCIN: CPT | Performed by: INTERNAL MEDICINE

## 2017-04-25 PROCEDURE — 80053 COMPREHEN METABOLIC PANEL: CPT | Performed by: NURSE PRACTITIONER

## 2017-04-25 PROCEDURE — 84145 PROCALCITONIN (PCT): CPT | Performed by: NURSE PRACTITIONER

## 2017-04-25 PROCEDURE — 74011250636 HC RX REV CODE- 250/636: Performed by: NURSE PRACTITIONER

## 2017-04-25 PROCEDURE — 83605 ASSAY OF LACTIC ACID: CPT | Performed by: NURSE PRACTITIONER

## 2017-04-25 PROCEDURE — 74011000258 HC RX REV CODE- 258: Performed by: NURSE PRACTITIONER

## 2017-04-25 PROCEDURE — 85025 COMPLETE CBC W/AUTO DIFF WBC: CPT | Performed by: NURSE PRACTITIONER

## 2017-04-25 PROCEDURE — 74011250637 HC RX REV CODE- 250/637: Performed by: NURSE PRACTITIONER

## 2017-04-25 PROCEDURE — 74011250637 HC RX REV CODE- 250/637: Performed by: INTERNAL MEDICINE

## 2017-04-25 PROCEDURE — 83735 ASSAY OF MAGNESIUM: CPT | Performed by: NURSE PRACTITIONER

## 2017-04-25 RX ORDER — VANCOMYCIN/0.9 % SOD CHLORIDE 1.5G/250ML
1500 PLASTIC BAG, INJECTION (ML) INTRAVENOUS EVERY 12 HOURS
Status: DISCONTINUED | OUTPATIENT
Start: 2017-04-25 | End: 2017-04-27

## 2017-04-25 RX ADMIN — PREGABALIN 50 MG: 50 CAPSULE ORAL at 08:19

## 2017-04-25 RX ADMIN — PREGABALIN 50 MG: 50 CAPSULE ORAL at 22:08

## 2017-04-25 RX ADMIN — ACETAMINOPHEN 650 MG: 325 TABLET, FILM COATED ORAL at 16:41

## 2017-04-25 RX ADMIN — ALLOPURINOL 300 MG: 300 TABLET ORAL at 08:19

## 2017-04-25 RX ADMIN — PIPERACILLIN SODIUM,TAZOBACTAM SODIUM 3.38 G: 3; .375 INJECTION, POWDER, FOR SOLUTION INTRAVENOUS at 00:43

## 2017-04-25 RX ADMIN — PIPERACILLIN SODIUM,TAZOBACTAM SODIUM 3.38 G: 3; .375 INJECTION, POWDER, FOR SOLUTION INTRAVENOUS at 08:23

## 2017-04-25 RX ADMIN — SODIUM CHLORIDE 1000 ML: 900 INJECTION, SOLUTION INTRAVENOUS at 08:00

## 2017-04-25 RX ADMIN — PREGABALIN 50 MG: 50 CAPSULE ORAL at 16:41

## 2017-04-25 RX ADMIN — PIPERACILLIN SODIUM,TAZOBACTAM SODIUM 3.38 G: 3; .375 INJECTION, POWDER, FOR SOLUTION INTRAVENOUS at 16:10

## 2017-04-25 RX ADMIN — CHLORPHENIRAMINE MALEATE 4 MG: 4 TABLET ORAL at 11:57

## 2017-04-25 RX ADMIN — HEPARIN SODIUM 5000 UNITS: 5000 INJECTION, SOLUTION INTRAVENOUS; SUBCUTANEOUS at 14:54

## 2017-04-25 RX ADMIN — ALLOPURINOL 300 MG: 300 TABLET ORAL at 18:20

## 2017-04-25 RX ADMIN — HYDROCODONE BITARTRATE AND ACETAMINOPHEN 2 TABLET: 5; 325 TABLET ORAL at 23:52

## 2017-04-25 RX ADMIN — Medication 5 ML: at 22:15

## 2017-04-25 RX ADMIN — VANCOMYCIN HYDROCHLORIDE 1500 MG: 10 INJECTION, POWDER, LYOPHILIZED, FOR SOLUTION INTRAVENOUS at 22:13

## 2017-04-25 RX ADMIN — CHLORPHENIRAMINE MALEATE 4 MG: 4 TABLET ORAL at 22:08

## 2017-04-25 RX ADMIN — PIPERACILLIN SODIUM,TAZOBACTAM SODIUM 3.38 G: 3; .375 INJECTION, POWDER, FOR SOLUTION INTRAVENOUS at 23:23

## 2017-04-25 RX ADMIN — SODIUM CHLORIDE 75 ML/HR: 900 INJECTION, SOLUTION INTRAVENOUS at 22:07

## 2017-04-25 RX ADMIN — HEPARIN SODIUM 5000 UNITS: 5000 INJECTION, SOLUTION INTRAVENOUS; SUBCUTANEOUS at 05:28

## 2017-04-25 RX ADMIN — LEVOTHYROXINE SODIUM 125 MCG: 125 TABLET ORAL at 05:28

## 2017-04-25 RX ADMIN — HEPARIN SODIUM 5000 UNITS: 5000 INJECTION, SOLUTION INTRAVENOUS; SUBCUTANEOUS at 22:08

## 2017-04-25 RX ADMIN — TOBRAMYCIN SULFATE 406 MG: 40 INJECTION, SOLUTION INTRAMUSCULAR; INTRAVENOUS at 13:22

## 2017-04-25 RX ADMIN — VANCOMYCIN HYDROCHLORIDE 2000 MG: 10 INJECTION, POWDER, LYOPHILIZED, FOR SOLUTION INTRAVENOUS at 04:06

## 2017-04-25 RX ADMIN — SODIUM CHLORIDE 75 ML/HR: 900 INJECTION, SOLUTION INTRAVENOUS at 10:29

## 2017-04-25 RX ADMIN — Medication 5 ML: at 14:00

## 2017-04-25 RX ADMIN — ACETAMINOPHEN 650 MG: 325 TABLET, FILM COATED ORAL at 20:30

## 2017-04-25 RX ADMIN — PANTOPRAZOLE SODIUM 40 MG: 40 TABLET, DELAYED RELEASE ORAL at 05:28

## 2017-04-25 RX ADMIN — Medication 5 ML: at 05:29

## 2017-04-25 NOTE — PROGRESS NOTES
Patient running temperature at 2007 of 102.9, she did experience tremors prior to the fever. She was given 2 Norco 5-325mg for extreme back pain rated a 8/10 and to help with the fever. She is now 99.4 and states that she feels much better, will continue to monitor.

## 2017-04-25 NOTE — PROGRESS NOTES
Pharmacokinetic Consult to Pharmacist    Janette Taylor is a 62 y.o. female being treated for sepsis with vancomycin, tobramycin and zosyn. Height: 5' 3\" (160 cm)  Weight: 124.3 kg (274 lb)  Lab Results   Component Value Date/Time    BUN 20 04/25/2017 03:30 AM    Creatinine 1.30 04/25/2017 03:30 AM    WBC 3.4 04/25/2017 03:44 AM    Procalcitonin 0.6 04/24/2017 06:10 AM      Estimated Creatinine Clearance: 61.2 mL/min (based on Cr of 1.3). CULTURES:  4/23  Influenza, negative, final.  4/23  Blood cultures x2, no growth, pending. 4/23  Urine culture, 74284 gram negative rods, pending. 4/24  Blood culture, pending. Lab Results   Component Value Date/Time    Vancomycin,trough 26.1 04/25/2017 03:30 AM       Day 3 of vancomycin. Goal trough is 15-20. Will decrease dose by 25% to 1500 mg q12h, and recheck a trough after the 4th dose on Thursday. Also, oncology consulted pharmacy this morning to dose tobramycin for persistent fever (102.7 this morning). Ordered 5 mg/kg q24h. We will utiltize the nomogram and a 10-hr post infusion random level to guide further tobramycin dosing. Will continue to follow patient.       Thank you,  Jordan Reid, [facility]

## 2017-04-25 NOTE — PROGRESS NOTES
Inpatient Hematology / Oncology Progress Note      Admission Date: 2017  2:56 PM  Reason for Admission/Hospital Course: Sepsis ([facility] [address]      24 Hour Events:  T102.7, hypotensive down to 64/43 - improved with fluid bolus 120/60  UCx - +gram neg rods  BCx - NGTD  States \"I feel fine\"; wanting to get up to shower      ROS:  Constitutional: +fever, chills, rigors, fatigue  CV: Negative for chest pain, palpitations, edema. Respiratory: +cough; negative for dyspnea, wheezing. GI: Negative for nausea, abdominal pain, diarrhea. 10 point review of systems is otherwise negative with the exception of the elements mentioned above in the HPI. No Known Allergies    OBJECTIVE:  Patient Vitals for the past 8 hrs:   BP Temp Pulse Resp SpO2   17 0843 95/46 - - - -   17 0741 (!) 84/64 - - - -   17 0725 (!) 66/39 99.4 °F (37.4 °C) 96 18 95 %   17 0718 (!) 64/43 99.4 °F (37.4 °C) 98 18 93 %   17 0411 93/51 (!) 102.7 °F (39.3 °C) (!) 105 18 99 %     Temp (24hrs), Av.6 °F (38.1 °C), Min:98.6 °F (37 °C), Max:102.9 °F (39.4 °C)         Physical Exam:  Constitutional: Well developed, well nourished female in no acute distress, lying comfortably in the hospital bed. HEENT: Normocephalic and atraumatic. Oropharynx is clear, mucous membranes are moist. Neck supple without JVD. Lymph node   Deferred   Skin Warm and dry. No bruising and no rash noted. No erythema. No pallor. Respiratory Lungs are clear to auscultation bilaterally without wheezes, rales or rhonchi, normal air exchange without accessory muscle use. CVS Tachycardic rate, regular rhythm and normal S1 and S2. No murmurs, gallops, or rubs. Abdomen +distention Soft, nontender, normoactive bowel sounds. No palpable mass. No hepatosplenomegaly. Neuro Grossly nonfocal with no obvious sensory or motor deficits. MSK +2 edema to BLE. Normal range of motion in general.  No tenderness.    Psych Appropriate mood and affect. Right chest port without erythema, edema. Mild tenderness with palpation (newly placed port)    Labs:    Recent Labs      04/25/17   0344  04/24/17   0610  04/23/17   1501   WBC  3.4*  3.1*  3.6*   RBC  2.49*  2.52*  2.74*   HGB  7.0*  7.0*  7.6*   HCT  21.5*  22.3*  24.6*   MCV  86.3  88.5  89.8   MCH  27.3  27.8  27.7   MCHC  31.6  31.4  30.9*   RDW  19.6*  19.7*  19.6*   PLT  76*  84*  94*   GRANS  93*   --   94*   LYMPH  3*   --   4*   MONOS  2*   --   1*   EOS  2   --   1   BASOS  0   --   0   IG  0.3   --   0.3   DF  AUTOMATED   --   AUTOMATED   ANEU  3.2   --   3.4   ABL  0.1*   --   0.1*   ABM  0.1   --   0.0*   HOMERO  0.1   --   0.1   ABB  0.0   --   0.0   AIG  0.0   --   0.0      Recent Labs      04/25/17   0330  04/24/17   0610  04/23/17   1501   NA  142  144  143   K  4.0  4.2  4.3   CL  109*  110*  108*   CO2  23  25  26   AGAP  10  9  9   GLU  108*  101*  114*   BUN  20  15  14   CREA  1.30*  0.95  0.97   GFRAA  54*  >60  >60   GFRNA  45*  >60  >60   CA  7.4*  7.8*  7.7*   SGOT  23   --   16   AP  99   --   100   TP  5.0*   --   5.8*   ALB  2.3*   --   2.8*   GLOB  2.7   --   3.0   AGRAT  0.9*   --   0.9*   MG  1.6*   --   1.5*         Imaging:  Portable chest x-ray. 4/23/2017      CLINICAL INDICATION: Sepsis, fever      FINDINGS: Single AP view of the chest submitted without comparison shows mild  atelectasis in the left lower lung otherwise the lungs are clear. A right-sided  chest port is in place. The cardiac silhouette and mediastinum are unremarkable.     IMPRESSION: Mild left lower lung atelectasis, otherwise no acute abnormality.     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 [facility]) ICD-10-CM: C85.88  ICD-9-CM: 200.38  4/7/2017        Non Hodgkin's lymphoma [facility]) ICD-10-CM: C85.90  ICD-9-CM: 202.80  3/30/2017        Ascites ICD-10-CM: R18.8  ICD-9-CM: 789.59  3/30/2017        Lymphadenopathy, generalized ICD-10-CM: R59.1  ICD-9-CM: 785.6  3/30/2017        Osteoarthritis of left knee ICD-10-CM: M17.12  ICD-9-CM: 715.96  8/26/2013        Morbid obesity ([address] ICD-10-CM: E66.01  ICD-9-CM: 278.01  10/4/2011        History of DVT of lower extremity ICD-10-CM: I94.380  ICD-9-CM: V12.51  10/4/2011    Overview Signed 10/4/2011  1:44 AM by Kenzie Salazar NP     X 2               Hypertension ICD-10-CM: I10  ICD-9-CM: 401.9  10/4/2011    Overview Signed 10/4/2011  1:44 AM by Kenzie Salazar NP     PRE OP             Heart murmur ICD-10-CM: R01.1  ICD-9-CM: 785.2  10/4/2011        Osteoarthritis of right knee ICD-10-CM: M17.11  ICD-9-CM: 715.96  10/3/2011        S/P total knee replacement using cement ICD-10-CM: N49.349  ICD-9-CM: V43.65  10/3/2011            Ms. Jimena Cutler is a 63 yo female with NGL. Admitted for sepsis. PLAN:  NHL  4/24 Completed cycle 1 of Bendeka/Rituxan/Neulasta on 4/15. Next cycle scheduled for 5/11.     Fever/Sepsis  4/24 Still febrile. BCx & UCx -NGTD. Flu neg. CXR neg. Con't vanc/zosyn. BCx were not drawn from port originally. Will go ahead and order BCx to be drawn from port. 4/25  Temp spike 102.7 with BP 64/43. 1L fluid bolus improved /60. Add tobramycin. BCx including one from port - NGTD.   UCx +12,000 gram neg rods, final report pending.     DVT prophylaxis: heparin              Melanie Molina NP   [address]  Office : [phone]  Fax : [phone]

## 2017-04-26 ENCOUNTER — APPOINTMENT (OUTPATIENT)
Dept: CT IMAGING | Age: 57
DRG: 871 | End: 2017-04-26
Attending: NURSE PRACTITIONER
Payer: COMMERCIAL

## 2017-04-26 LAB
ALBUMIN SERPL BCP-MCNC: 1.7 G/DL (ref 3.5–5)
ALBUMIN/GLOB SERPL: 0.8 {RATIO} (ref 1.2–3.5)
ALP SERPL-CCNC: 87 U/L (ref 50–136)
ALT SERPL-CCNC: 25 U/L (ref 12–65)
ANION GAP BLD CALC-SCNC: 13 MMOL/L (ref 7–16)
AST SERPL W P-5'-P-CCNC: 23 U/L (ref 15–37)
BACTERIA SPEC CULT: ABNORMAL
BASOPHILS # BLD AUTO: 0 K/UL (ref 0–0.2)
BASOPHILS # BLD: 0 % (ref 0–2)
BILIRUB SERPL-MCNC: 0.4 MG/DL (ref 0.2–1.1)
BUN SERPL-MCNC: 23 MG/DL (ref 6–23)
CALCIUM SERPL-MCNC: 5.6 MG/DL (ref 8.3–10.4)
CHLORIDE SERPL-SCNC: 118 MMOL/L (ref 98–107)
CO2 SERPL-SCNC: 15 MMOL/L (ref 21–32)
CREAT SERPL-MCNC: 1.91 MG/DL (ref 0.6–1)
DIFFERENTIAL METHOD BLD: ABNORMAL
EOSINOPHIL # BLD: 0 K/UL (ref 0–0.8)
EOSINOPHIL NFR BLD: 2 % (ref 0.5–7.8)
ERYTHROCYTE [DISTWIDTH] IN BLOOD BY AUTOMATED COUNT: 20.2 % (ref 11.9–14.6)
FLUAV AG NPH QL IA: NEGATIVE
FLUBV AG NPH QL IA: NEGATIVE
GLOBULIN SER CALC-MCNC: 2.1 G/DL (ref 2.3–3.5)
GLUCOSE BLD STRIP.AUTO-MCNC: 113 MG/DL (ref 65–100)
GLUCOSE BLD STRIP.AUTO-MCNC: 118 MG/DL (ref 65–100)
GLUCOSE SERPL-MCNC: 87 MG/DL (ref 65–100)
HCT VFR BLD AUTO: 29.7 % (ref 35.8–46.3)
HGB BLD-MCNC: 9.7 G/DL (ref 11.7–15.4)
IMM GRANULOCYTES # BLD: 0 K/UL (ref 0–0.5)
IMM GRANULOCYTES NFR BLD AUTO: 0.6 % (ref 0–5)
LYMPHOCYTES # BLD AUTO: 6 % (ref 13–44)
LYMPHOCYTES # BLD: 0.1 K/UL (ref 0.5–4.6)
MAGNESIUM SERPL-MCNC: 1.1 MG/DL (ref 1.8–2.4)
MCH RBC QN AUTO: 28.1 PG (ref 26.1–32.9)
MCHC RBC AUTO-ENTMCNC: 32.7 G/DL (ref 31.4–35)
MCV RBC AUTO: 86.1 FL (ref 79.6–97.8)
MONOCYTES # BLD: 0.1 K/UL (ref 0.1–1.3)
MONOCYTES NFR BLD AUTO: 3 % (ref 4–12)
NEUTS SEG # BLD: 1.6 K/UL (ref 1.7–8.2)
NEUTS SEG NFR BLD AUTO: 88 % (ref 43–78)
PLATELET # BLD AUTO: 36 K/UL (ref 150–450)
PMV BLD AUTO: 9.4 FL (ref 10.8–14.1)
POTASSIUM SERPL-SCNC: 3.1 MMOL/L (ref 3.5–5.1)
PROT SERPL-MCNC: 3.8 G/DL (ref 6.3–8.2)
RBC # BLD AUTO: 3.45 M/UL (ref 4.05–5.25)
SERVICE CMNT-IMP: ABNORMAL
SODIUM SERPL-SCNC: 146 MMOL/L (ref 136–145)
TOBRAMYCIN SERPL-MCNC: 4.2 UG/ML
WBC # BLD AUTO: 2.5 K/UL (ref 4.3–11.1)

## 2017-04-26 PROCEDURE — 74011250636 HC RX REV CODE- 250/636: Performed by: EMERGENCY MEDICINE

## 2017-04-26 PROCEDURE — 74011250636 HC RX REV CODE- 250/636: Performed by: NURSE PRACTITIONER

## 2017-04-26 PROCEDURE — 74011250636 HC RX REV CODE- 250/636: Performed by: INTERNAL MEDICINE

## 2017-04-26 PROCEDURE — 65270000029 HC RM PRIVATE

## 2017-04-26 PROCEDURE — 87633 RESP VIRUS 12-25 TARGETS: CPT | Performed by: NURSE PRACTITIONER

## 2017-04-26 PROCEDURE — 74011250637 HC RX REV CODE- 250/637: Performed by: INTERNAL MEDICINE

## 2017-04-26 PROCEDURE — 83735 ASSAY OF MAGNESIUM: CPT | Performed by: NURSE PRACTITIONER

## 2017-04-26 PROCEDURE — 85025 COMPLETE CBC W/AUTO DIFF WBC: CPT | Performed by: NURSE PRACTITIONER

## 2017-04-26 PROCEDURE — 82962 GLUCOSE BLOOD TEST: CPT

## 2017-04-26 PROCEDURE — 74011000258 HC RX REV CODE- 258: Performed by: EMERGENCY MEDICINE

## 2017-04-26 PROCEDURE — 80200 ASSAY OF TOBRAMYCIN: CPT | Performed by: NURSE PRACTITIONER

## 2017-04-26 PROCEDURE — 87804 INFLUENZA ASSAY W/OPTIC: CPT | Performed by: INTERNAL MEDICINE

## 2017-04-26 PROCEDURE — 80053 COMPREHEN METABOLIC PANEL: CPT | Performed by: NURSE PRACTITIONER

## 2017-04-26 PROCEDURE — 74011250637 HC RX REV CODE- 250/637: Performed by: NURSE PRACTITIONER

## 2017-04-26 RX ORDER — MAGNESIUM SULFATE HEPTAHYDRATE 40 MG/ML
4 INJECTION, SOLUTION INTRAVENOUS ONCE
Status: COMPLETED | OUTPATIENT
Start: 2017-04-26 | End: 2017-04-26

## 2017-04-26 RX ADMIN — PREGABALIN 50 MG: 50 CAPSULE ORAL at 18:11

## 2017-04-26 RX ADMIN — PREGABALIN 50 MG: 50 CAPSULE ORAL at 08:41

## 2017-04-26 RX ADMIN — MAGNESIUM SULFATE HEPTAHYDRATE 4 G: 40 INJECTION, SOLUTION INTRAVENOUS at 05:22

## 2017-04-26 RX ADMIN — LEVOFLOXACIN 750 MG: 500 TABLET, FILM COATED ORAL at 18:11

## 2017-04-26 RX ADMIN — SODIUM CHLORIDE 75 ML/HR: 900 INJECTION, SOLUTION INTRAVENOUS at 18:16

## 2017-04-26 RX ADMIN — PIPERACILLIN SODIUM,TAZOBACTAM SODIUM 3.38 G: 3; .375 INJECTION, POWDER, FOR SOLUTION INTRAVENOUS at 22:48

## 2017-04-26 RX ADMIN — CHLORPHENIRAMINE MALEATE 4 MG: 4 TABLET ORAL at 21:03

## 2017-04-26 RX ADMIN — Medication 5 ML: at 05:23

## 2017-04-26 RX ADMIN — PREGABALIN 50 MG: 50 CAPSULE ORAL at 21:04

## 2017-04-26 RX ADMIN — HEPARIN SODIUM 5000 UNITS: 5000 INJECTION, SOLUTION INTRAVENOUS; SUBCUTANEOUS at 05:23

## 2017-04-26 RX ADMIN — ALLOPURINOL 300 MG: 300 TABLET ORAL at 08:41

## 2017-04-26 RX ADMIN — ACETAMINOPHEN 650 MG: 325 TABLET, FILM COATED ORAL at 02:14

## 2017-04-26 RX ADMIN — VANCOMYCIN HYDROCHLORIDE 1500 MG: 10 INJECTION, POWDER, LYOPHILIZED, FOR SOLUTION INTRAVENOUS at 14:51

## 2017-04-26 RX ADMIN — ALLOPURINOL 300 MG: 300 TABLET ORAL at 18:13

## 2017-04-26 RX ADMIN — Medication 10 ML: at 21:05

## 2017-04-26 RX ADMIN — PIPERACILLIN SODIUM,TAZOBACTAM SODIUM 3.38 G: 3; .375 INJECTION, POWDER, FOR SOLUTION INTRAVENOUS at 14:50

## 2017-04-26 RX ADMIN — LEVOTHYROXINE SODIUM 125 MCG: 125 TABLET ORAL at 05:24

## 2017-04-26 RX ADMIN — PANTOPRAZOLE SODIUM 40 MG: 40 TABLET, DELAYED RELEASE ORAL at 05:24

## 2017-04-26 RX ADMIN — SODIUM CHLORIDE 75 ML/HR: 900 INJECTION, SOLUTION INTRAVENOUS at 12:53

## 2017-04-26 RX ADMIN — CHLORPHENIRAMINE MALEATE 4 MG: 4 TABLET ORAL at 10:29

## 2017-04-26 RX ADMIN — Medication 5 ML: at 14:53

## 2017-04-26 RX ADMIN — HYDROCODONE BITARTRATE AND ACETAMINOPHEN 1 TABLET: 5; 325 TABLET ORAL at 20:57

## 2017-04-26 RX ADMIN — CALCIUM GLUCONATE 4 G: 94 INJECTION, SOLUTION INTRAVENOUS at 08:38

## 2017-04-26 NOTE — PROGRESS NOTES
Called  of Mag 1.1 and calcium 5.6. Orders received for 4 g calcium gluconate once IV and 4 g magnesium sulfate IV once.

## 2017-04-26 NOTE — CONSULTS
Infectious Disease Consult    Today's Date: 4/26/2017   Admit Date: 4/23/2017    Impression:   · Fever with chills  · Ascites s/p paracentesis (4/3, 4/14)  · NHL s/p 1 cycle of chemotherapy; PORT placed 4/18  · OA s/p mayur TKAs  · Morbid obesity    Plan:   · Continue vancomycin and Zosyn. Stop Tobramycin and add Levaquin for atypical coverage  · Check another influenza and Respiratory viral panel  · Obtain CT A/P for abdominal source, hx of multiple soft tissue densities to kidneys and liver with ascites  · Screen for HIV/HCV    Anti-infectives:     Inpat Anti-Infectives     Start     Ordered Stop    04/27/17 0100  tobramycin (NEBCIN) 400 mg in 0.9% sodium chloride 100 mL IVPB  400 mg,   IntraVENous,   EVERY 36 HOURS      04/26/17 1038 --    04/26/17 2100  Vancomcyin Trough Reminder  Other,   ONCE      04/26/17 1044 04/27/17 0859    04/25/17 2200  vancomycin (VANCOCIN) 1500 mg in  ml infusion  1,500 mg,   IntraVENous,   EVERY 12 HOURS      04/25/17 0912 --    04/24/17 0000  piperacillin-tazobactam (ZOSYN) 3.375 g in 0.9% sodium chloride (MBP/ADV) 100 mL  3.375 g,   IntraVENous,   EVERY 8 HOURS      04/23/17 1600 --          Subjective:   Date of Consultation:  April 26, 2017  Referring Physician: Kandace Bumpers, NP    Patient is a 62 y.o. female admitted on 4/23/17 for fever and chills. She started chemotherapy for NHL with infusion of Bendeka/Rituxan/Neulasta. First cycle completed 4/15. PORT placed on 4/18/17. States \"high fever\" started on 4/22 but previously had \"daily low grade fevers for several weeks. \" Started on Vanc/Zosyn. BCx1 peripheral at admission NGTD. PORT accessed and BC from PORT NGTD. UC with 12k Proteus, UA negative for infection. Influenza swab negative. CXR negative. Tobramycin added on 4/25 due to hypotension and fever.   -She remains febrile with mild leukopenia. PCT 3. MATI noted with creatinine at 1.9, VT 26. To note, paracentesis x2 on 4/14 and 4/3.  Denies any specific complaints but + [facility] pain\" to R side of front jaw, periodontist  examined--no abnormalities. Loose stools but this has been \"usual.\" + ascites with abdominal tightness. No open lesions, mayur TKAs appear WNL.      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 ([address] C85.88    Sepsis ([address] A41.9    Hypomagnesemia E83.42     Past Medical History:   Diagnosis Date    Anemia     in high school, \"received gamma globulin twice a week for awhile\"    Arthritis     osteo-r knee    Basal cell carcinoma     Followed by Dermatology    Chronic pain     KNEEs    Hypertension 10/4/2011    medication    Morbid obesity ([address]     Murmur     \"had it my whole life\", did not appreciate murmur 9/12/2011 during assessment    Non Hodgkin's lymphoma ([address] 3/30/2017    Other ill-defined conditions     skin bumps-med as needed    Overactive bladder     Rheumatic fever     Possibly as a child    Shingles     Thromboembolus ([address] x2    LLE-calf-1990?-only took ASA-resolved in less than a wk-\"a little burned area-not examined\"    Thyroid disease     hypo-medication    Unspecified adverse effect of anesthesia     pt reports waking several times with knee surgery-spinal      Family History   Problem Relation Age of Onset    Post-op Nausea/Vomiting Other      X3    Breast Cancer Other 28     cousin    Kidney Disease Father      RENAL FAILURE    Other Son     Other Daughter     Other Maternal Grandmother      Vascular Disorder with leg amputation    Liver Disease Sister      non-alcoholic    Celiac Disease Sister     Malignant Hyperthermia Neg Hx  Pseudocholinesterase Deficiency Neg Hx     Delayed Awakening Neg Hx     Emergence Delirium Neg Hx     Post-op Cognitive Dysfunction Neg Hx       Social History   Substance Use Topics    Smoking status: Never Smoker    Smokeless tobacco: Never Used    Alcohol use Yes      Comment: RARE, ONCE/TWICE A YEAR     Past Surgical History:   Procedure Laterality Date    [facility]. CHOLECYSTECTOMY [id number]      HX CHOLECYSTECTOMY  1983    HX ORTHOPAEDIC  2012    right TKA    HX ORTHOPAEDIC  2013    left TKA. Dr. Andrea Kidd.  [place] ANESTH,ACHILLES TENDON SURG  2/10/2011    LEFT. Dr. Jakob Burdick. Prior to Admission medications    Medication Sig Start Date End Date Taking? Authorizing Provider   pregabalin (LYRICA) 50 mg capsule Take 1 Cap by mouth three (3) times daily. Max Daily Amount: 150 mg. 4/19/17   Quinton Dominguez MD   allopurinol (ZYLOPRIM) 300 mg tablet Take 1 Tab by mouth two (2) times a day. 4/10/17   Quinton Dominguez MD   nystatin (MYCOSTATIN) powder Apply  to affected area three (3) times daily. 4/10/17   Quinton Dominguez MD   lidocaine-prilocaine (EMLA) topical cream Apply  to affected area as needed for Pain. Apply to port site 45-60 minutes prior to lab appt or infusion. 4/7/17   Quinton Dominguez MD   cpm-phenyleph-acetaminophen (NOREL AD) 4- mg tab Take 1 Tab by mouth every eight (8) hours as needed for Other (cough/congestion). 4/6/17   Samaria Steinberg MD   magic mouthwash Valri Pepper) susp Take 10 mL by mouth every four (4) hours as needed. 3/31/17   Nimesh Esparza MD   HYDROcodone-acetaminophen [facility]) 5-325 mg per tablet Take 1-2 Tabs by mouth every four (4) hours as needed for Pain. Max Daily Amount: 12 Tabs. 3/31/17   Quinton Dominguez MD   promethazine (PHENERGAN) 25 mg tablet Take 25 mg by mouth every six (6) hours as needed for Nausea. Unsure of dose    Historical Provider   ondansetron hcl (ZOFRAN, AS HYDROCHLORIDE,) 4 mg tablet Take 1 Tab by mouth every eight (8) hours as needed for Nausea. 3/10/17   Janes Tripp MD   aspirin 81 mg chewable tablet Take 81 mg by mouth daily. Historical Provider   levothyroxine (SYNTHROID) 125 mcg tablet Take 1 Tab by mouth Daily (before breakfast). 16   Janes Tripp MD   lisinopril (PRINIVIL, ZESTRIL) 20 mg tablet Take 1 Tab by mouth daily. 16   Janes Tripp MD   omeprazole (PRILOSEC) 20 mg capsule Take 1 Cap by mouth daily. 16   Janes Tripp MD       No Known Allergies     Review of Systems:  A comprehensive review of systems was negative except for that written in the History of Present Illness. Objective:     Visit Vitals    BP 91/51    Pulse 90    Temp 99.7 °F (37.6 °C)    Resp 18    Ht 5' 3\" (1.6 m)    Wt 124.3 kg (274 lb)    SpO2 99%    Breastfeeding No    BMI 48.54 kg/m2     Temp (24hrs), Av.3 °F (38.5 °C), Min:99.5 °F (37.5 °C), Max:103 °F (39.4 °C)       Lines:  Ventricular Access Device:  R chest-mild erythema to incision site but well approximated. Physical Exam:    General:  Alert, cooperative, appears stated age   Eyes:  Sclera anicteric. Pupils equally round and reactive to light. Mouth/Throat: Mucous membranes normal, oral pharynx clear   Neck: Supple   Lungs:   Clear to auscultation bilaterally, good effort   CV:  Regular rate and rhythm,+ 2/6 murmur   Abdomen:   firm non-tender. bowel sounds normal. + distension    Extremities: No cyanosis. + 1-2 generalized edema   Skin: Skin color, texture, turgor normal. Petechiae to yuriy LEs   Lymph nodes: Cervical and supraclavicular normal   Musculoskeletal: No swelling or deformity.  Yuriy TKAs incisions well approximated, no erythema, no evidence of effusion   Lines/Devices:  Intact, no erythema, drainage or tenderness   Psych: Alert and oriented, normal mood affect given the setting       Data Review:     CBC:  Recent Labs      17   0330  17   0344  17   0610   WBC  2.5*  3.4*  3.1*   GRANS  88*  93*   --    MONOS 3*  2*   --    EOS  2  2   --    ANEU  1.6*  3.2   --    ABL  0.1*  0.1*   --    HGB  9.7*  7.0*  7.0*   HCT  29.7*  21.5*  22.3*   PLT  36*  76*  84*       BMP:  Recent Labs      04/26/17   0330  04/25/17   0330  04/24/17   0610   CREA  1.91*  1.30*  0.95   BUN  23  20  15   NA  146*  142  144   K  3.1*  4.0  4.2   CL  118*  109*  110*   CO2  15*  23  25   AGAP  13  10  9   GLU  87  108*  101*       LFTS:  Recent Labs      04/26/17   0330  04/25/17   0330  04/23/17   1501   TBILI  0.4  0.5  0.3   ALT  25  24  18   SGOT  23  23  16   AP  87  99  100   TP  3.8*  5.0*  5.8*   ALB  1.7*  2.3*  2.8*       Microbiology:     All Micro Results     Procedure Component Value Units Date/Time    CULTURE, URINE [[id number]]  (Abnormal)  (Susceptibility) Collected:  04/23/17 1608    Order Status:  Completed Specimen:  Urine from Cath Urine Updated:  04/26/17 0726     Special Requests: NO SPECIAL REQUESTS        Culture result: [id number]  COLONIES/mL  PROTEUS MIRABILIS   (A)     CULTURE, BLOOD [[id number]] Collected:  04/24/17 1450    Order Status:  Completed Specimen:  Blood from Blood Updated:  04/26/17 0623     Special Requests: PORT        Culture result: NO GROWTH 2 DAYS       CULTURE, BLOOD [[id number]] Collected:  04/23/17 1630    Order Status:  Completed Specimen:  Blood from Blood Updated:  04/26/17 0623     Special Requests: LEFT ANTECUBITAL        Culture result: NO GROWTH 3 DAYS       CULTURE, BLOOD [[id number]] Collected:  04/23/17 1518    Order Status:  Completed Specimen:  Blood from Blood Updated:  04/26/17 0623     Special Requests: RIGHT ANTECUBITAL        Culture result: NO GROWTH 3 DAYS       INFLUENZA A & B AG (RAPID TEST) [[id number]] Collected:  04/23/17 1609    Order Status:  Completed Specimen:  Nasal washing Updated:  04/23/17 1647     Influenza A Ag NEGATIVE          NEGATIVE FOR THE PRESENCE OF INFLUENZA A ANTIGEN  INFECTION DUE TO INFLUENZA A CANNOT BE RULED OUT.   BECAUSE THE ANTIGEN PRESENT IN THE SAMPLE MAY BE BELOW  THE DETECTION LIMIT OF THE TEST. A NEGATIVE TEST IS PRESUMPTIVE AND IT IS RECOMMENDED THAT THESE RESULTS BE CONFIRMED BY VIRAL CULTURE OR AN FDA-CLEARED INFLUENZA A AND B MOLECULAR ASSAY. Influenza B Ag NEGATIVE          NEGATIVE FOR THE PRESENCE OF INFLUENZA B ANTIGEN  INFECTION DUE TO INFLUENZA B CANNOT BE RULED OUT. BECAUSE THE ANTIGEN PRESENT IN THE SAMPLE MAY BE BELOW  THE DETECTION LIMIT OF THE TEST. A NEGATIVE TEST IS PRESUMPTIVE AND IT IS RECOMMENDED THAT THESE RESULTS BE CONFIRMED BY VIRAL CULTURE OR AN FDA-CLEARED INFLUENZA A AND B MOLECULAR ASSAY.          INFLUENZA A & B AG (RAPID TEST) [[id number]] Collected:  04/23/17 1530    Order Status:  Canceled Specimen:  Nasopharyngeal from Nasopharyngeal Updated:  04/23/17 1547          Imaging:   CXR negative    Signed By: Jamar Tubbs NP     April 26, 2017

## 2017-04-26 NOTE — PROGRESS NOTES
[facility]        Inpatient Hematology / Oncology Progress Note      Admission Date: 2017  2:56 PM  Reason for Admission/Hospital Course: Sepsis ([address]      24 Hour Events:  [id number], hypotensive down to 72/49 - improved to 91/51  UCx - +proteus mirabilis  BCx - NGTD  States \"I feel fine\"; wanting to get up to shower      ROS:  Constitutional: +fever, chills, rigors, fatigue  CV: Negative for chest pain, palpitations, edema. Respiratory: +cough; negative for dyspnea, wheezing. GI: Negative for nausea, abdominal pain, diarrhea. 10 point review of systems is otherwise negative with the exception of the elements mentioned above in the HPI. No Known Allergies    OBJECTIVE:  Patient Vitals for the past 8 hrs:   BP Temp Pulse Resp SpO2   17 1042 91/51 99.7 °F (37.6 °C) 90 18 99 %   17 0751 (!) 72/49 99.7 °F (37.6 °C) 91 18 100 %   17 0451 102/59 99.5 °F (37.5 °C) 92 18 93 %     Temp (24hrs), Av.3 °F (38.5 °C), Min:99.5 °F (37.5 °C), Max:103 °F (39.4 °C)     0701 -  1900  In: 120 [P.O.:120]  Out: -     Physical Exam:  Constitutional: Well developed, well nourished female in no acute distress, lying comfortably in the hospital bed. HEENT: Normocephalic and atraumatic. Oropharynx is clear, mucous membranes are moist. Neck supple without JVD. Lymph node   Deferred   Skin Warm and dry. No bruising and no rash noted. No erythema. No pallor. Respiratory Lungs are clear to auscultation bilaterally without wheezes, rales or rhonchi, normal air exchange without accessory muscle use. CVS Normal rate, regular rhythm and normal S1 and S2. No murmurs, gallops, or rubs. Abdomen +distention Soft, nontender, normoactive bowel sounds. No palpable mass. No hepatosplenomegaly. Neuro Grossly nonfocal with no obvious sensory or motor deficits. MSK 3+ edema to BLE. Normal range of motion in general.  No tenderness. Psych Appropriate mood and affect. Right chest port without erythema, edema. Mild tenderness with palpation (newly placed port)    Labs:      Recent Labs      04/26/17   0330  04/25/17   0344  04/24/17   0610  04/23/17   1501   WBC  2.5*  3.4*  3.1*  3.6*   RBC  3.45*  2.49*  2.52*  2.74*   HGB  9.7*  7.0*  7.0*  7.6*   HCT  29.7*  21.5*  22.3*  24.6*   MCV  86.1  86.3  88.5  89.8   MCH  28.1  27.3  27.8  27.7   MCHC  32.7  31.6  31.4  30.9*   RDW  20.2*  19.6*  19.7*  19.6*   PLT  36*  76*  84*  94*   GRANS  88*  93*   --   94*   LYMPH  6*  3*   --   4*   MONOS  3*  2*   --   1*   EOS  2  2   --   1   BASOS  0  0   --   0   IG  0.6  0.3   --   0.3   DF  AUTOMATED  AUTOMATED   --   AUTOMATED   ANEU  1.6*  3.2   --   3.4   ABL  0.1*  0.1*   --   0.1*   ABM  0.1  0.1   --   0.0*   HOMERO  0.0  0.1   --   0.1   ABB  0.0  0.0   --   0.0   AIG  0.0  0.0   --   0.0        Recent Labs      04/26/17   0330  04/25/17   0330  04/24/17   0610  04/23/17   1501   NA  146*  142  144  143   K  3.1*  4.0  4.2  4.3   CL  118*  109*  110*  108*   CO2  15*  23  25  26   AGAP  13  10  9  9   GLU  87  108*  101*  114*   BUN  23  20  15  14   CREA  1.91*  1.30*  0.95  0.97   GFRAA  35*  54*  >60  >60   GFRNA  29*  45*  >60  >60   CA  5.6*  7.4*  7.8*  7.7*   SGOT  23  23   --   16   AP  87  99   --   100   TP  3.8*  5.0*   --   5.8*   ALB  1.7*  2.3*   --   2.8*   GLOB  2.1*  2.7   --   3.0   AGRAT  0.8*  0.9*   --   0.9*   MG  1.1*  1.6*   --   1.5*         Imaging:  Portable chest x-ray. 4/23/2017      CLINICAL INDICATION: Sepsis, fever      FINDINGS: Single AP view of the chest submitted without comparison shows mild  atelectasis in the left lower lung otherwise the lungs are clear. A right-sided  chest port is in place. The cardiac silhouette and mediastinum are unremarkable.     IMPRESSION: Mild left lower lung atelectasis, otherwise no acute abnormality.     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 [facility]) ICD-10-CM: C85.88  ICD-9-CM: 200.38  4/7/2017        Non Hodgkin's lymphoma ([facility] ICD-10-CM: C85.90  ICD-9-CM: 202.80  3/30/2017        Ascites ICD-10-CM: R18.8  ICD-9-CM: 789.59  3/30/2017        Lymphadenopathy, generalized ICD-10-CM: R59.1  ICD-9-CM: 785.6  3/30/2017        Osteoarthritis of left knee ICD-10-CM: M17.12  ICD-9-CM: 715.96  8/26/2013        Morbid obesity ([facility] [address] ICD-10-CM: E66.01  ICD-9-CM: 278.01  10/4/2011        History of DVT of lower extremity ICD-10-CM: I29.131  ICD-9-CM: V12.51  10/4/2011    Overview Signed 10/4/2011  1:44 AM by Erma Carson NP     X 2               Hypertension ICD-10-CM: I10  ICD-9-CM: 401.9  10/4/2011    Overview Signed 10/4/2011  1:44 AM by Erma Carson NP     PRE OP             Heart murmur ICD-10-CM: R01.1  ICD-9-CM: 785.2  10/4/2011        Osteoarthritis of right knee ICD-10-CM: M17.11  ICD-9-CM: 715.96  10/3/2011        S/P total knee replacement using cement ICD-10-CM: V43.082  ICD-9-CM: V43.65  10/3/2011            Ms. Elena Bal is a 63 yo female with NGL. Admitted for sepsis. PLAN:  NHL  4/24 Completed cycle 1 of Bendeka/Rituxan/Neulasta on 4/15. Next cycle scheduled for 5/11.     Fever/Sepsis  4/24 Still febrile. BCx & UCx -NGTD. Flu neg. CXR neg. Con't vanc/zosyn. BCx were not drawn from port originally. Will go ahead and order BCx to be drawn from port. 4/25  Temp spike 102.7 with BP 64/43. 1L fluid bolus improved /60. Add tobramycin. BCx including one from port - NGTD. UCx +12,000 gram neg rods, final report pending. 4/26 Tmax 103. BCx-NGTD. UCx + for proteus mirabilis susceptible to current antibx regimen. ID consulted for persistent fever. Possible noninfectious etiologies of fever as well, lymphoma vs neutrophil recovery.     Hypotension  4/26 Continues to drop BP  down to 72/49 - improved to 91/51. Hold lisinopril. Electrolyte imbalance secondary to chemo  4/26  Mg+ 1.1, Ca+ 5.6. Replace with Ca+ gluconate 4g IV and Mg+ sulfate 4g IV. Pancytopenia secondary to chemo  4/26  WBC 2.5 Hgb 9.7 Plt 36,000. No transfusions needed today. D/C heparin. Will con't to monitor. Renal Fx  4/26  Renal fx is acutely worsening. BUN 23 Cr 1.91. Pharmacist checking vanc trough prior to the next scheduled dose and will adjust as necessary. Will continue to follow trend in renal fx and make adjustments if renal fx con't to worsen. ID consulted, will appreciate their recommendations.     DVT prophylaxis: SCDs. We would like to transfer her to 5th floor - oncology.           Clarisse Figueroa NP   [facility]  [address] [address]  Office : [phone]  Fax : [phone]

## 2017-04-26 NOTE — PROGRESS NOTES
Pharmacokinetic Consult to Pharmacist    Del Mcgeest is a 62 y.o. female being treated for sepsis with vancomycin, tobramycin and zosyn. Height: 5' 3\" (160 cm)  Weight: 124.3 kg (274 lb)  Lab Results   Component Value Date/Time    BUN 23 04/26/2017 03:30 AM    Creatinine 1.91 04/26/2017 03:30 AM    WBC 2.5 04/26/2017 03:30 AM    Procalcitonin 3.0 04/25/2017 08:25 AM      Estimated Creatinine Clearance: 41.7 mL/min (based on Cr of 1.91). CULTURES:  4/23  Influenza, negative, final.  4/23  Blood cultures x2, no growth, pending. 4/23  Urine culture, 59404 proteus mirabilis, pan susceptible  4/24  Blood culture, no growth to date, pending. Lab Results   Component Value Date/Time    Tobramycin,random 4.2 04/26/2017 03:30 AM       Day 4 of vancomycin. Goal trough is 15-20. Dose adjusted yesterday to 1500 mg Q12H. As renal function is acutely worsening, will plan to check vancomycin trough prior to the next scheduled dose and adjust dosing as necessary. Day 2 of tobramycin. Dosing at 5 mg/kg and using [place] [employer] for dosing frequency. Roughly 14 hour tobramycin random level resulted at 4.2, so will adjust dose to 400 mg Q36H. Will continue to follow trend in renal function and make adjustments if renal function continues to worsen. ID consulted today, so will await their recommendations. Will continue to follow patient.     Thank you,  Noble Augustine, PharmD  Clinical Pharmacist  [phone]

## 2017-04-26 NOTE — PROGRESS NOTES
Dr notified of platelets 36, orders to cancel heparin. No further orders due to platelets being low from chemo.

## 2017-04-27 ENCOUNTER — APPOINTMENT (OUTPATIENT)
Dept: ULTRASOUND IMAGING | Age: 57
DRG: 871 | End: 2017-04-27
Attending: NURSE PRACTITIONER
Payer: COMMERCIAL

## 2017-04-27 ENCOUNTER — APPOINTMENT (OUTPATIENT)
Dept: CT IMAGING | Age: 57
DRG: 871 | End: 2017-04-27
Attending: NURSE PRACTITIONER
Payer: COMMERCIAL

## 2017-04-27 LAB
ALBUMIN SERPL BCP-MCNC: 2 G/DL (ref 3.5–5)
ALBUMIN/GLOB SERPL: 0.7 {RATIO} (ref 1.2–3.5)
ALP SERPL-CCNC: 133 U/L (ref 50–136)
ALT SERPL-CCNC: 37 U/L (ref 12–65)
ANION GAP BLD CALC-SCNC: 12 MMOL/L (ref 7–16)
AST SERPL W P-5'-P-CCNC: 27 U/L (ref 15–37)
BASOPHILS # BLD AUTO: 0 K/UL (ref 0–0.2)
BASOPHILS # BLD: 0 % (ref 0–2)
BILIRUB SERPL-MCNC: 0.4 MG/DL (ref 0.2–1.1)
BUN SERPL-MCNC: 42 MG/DL (ref 6–23)
CALCIUM SERPL-MCNC: 7.1 MG/DL (ref 8.3–10.4)
CHLORIDE SERPL-SCNC: 110 MMOL/L (ref 98–107)
CO2 SERPL-SCNC: 18 MMOL/L (ref 21–32)
CORTIS BS SERPL-MCNC: 21 UG/DL
CREAT SERPL-MCNC: 3.74 MG/DL (ref 0.6–1)
DIFFERENTIAL METHOD BLD: ABNORMAL
EOSINOPHIL # BLD: 0 K/UL (ref 0–0.8)
EOSINOPHIL NFR BLD: 1 % (ref 0.5–7.8)
ERYTHROCYTE [DISTWIDTH] IN BLOOD BY AUTOMATED COUNT: 20.5 % (ref 11.9–14.6)
FLUAV RNA SPEC QL NAA+PROBE: NEGATIVE
FLUBV RNA SPEC QL NAA+PROBE: NEGATIVE
GLOBULIN SER CALC-MCNC: 2.8 G/DL (ref 2.3–3.5)
GLUCOSE SERPL-MCNC: 85 MG/DL (ref 65–100)
HADV DNA SPEC QL NAA+PROBE: NEGATIVE
HCT VFR BLD AUTO: 17 % (ref 35.8–46.3)
HCT VFR BLD AUTO: 18.5 % (ref 35.8–46.3)
HGB BLD-MCNC: 5.6 G/DL (ref 11.7–15.4)
HGB BLD-MCNC: 6 G/DL (ref 11.7–15.4)
HIV1 P24 AG SERPL QL IA: NONREACTIVE
HIV1+2 AB SERPL QL IA: NONREACTIVE
HMPV RNA SPEC QL NAA+PROBE: NEGATIVE
HPIV1 RNA SPEC QL NAA+PROBE: NEGATIVE
HPIV2 RNA SPEC QL NAA+PROBE: NEGATIVE
HPIV3 RNA SPEC QL NAA+PROBE: NEGATIVE
IMM GRANULOCYTES # BLD: 0 K/UL (ref 0–0.5)
IMM GRANULOCYTES NFR BLD AUTO: 0.3 % (ref 0–5)
LYMPHOCYTES # BLD AUTO: 6 % (ref 13–44)
LYMPHOCYTES # BLD: 0.2 K/UL (ref 0.5–4.6)
MAGNESIUM SERPL-MCNC: 2 MG/DL (ref 1.8–2.4)
MCH RBC QN AUTO: 28 PG (ref 26.1–32.9)
MCHC RBC AUTO-ENTMCNC: 32.9 G/DL (ref 31.4–35)
MCV RBC AUTO: 85 FL (ref 79.6–97.8)
MONOCYTES # BLD: 0.1 K/UL (ref 0.1–1.3)
MONOCYTES NFR BLD AUTO: 2 % (ref 4–12)
NEUTS SEG # BLD: 2.7 K/UL (ref 1.7–8.2)
NEUTS SEG NFR BLD AUTO: 91 % (ref 43–78)
PLATELET # BLD AUTO: 50 K/UL (ref 150–450)
PMV BLD AUTO: 10.5 FL (ref 10.8–14.1)
POTASSIUM SERPL-SCNC: 4.2 MMOL/L (ref 3.5–5.1)
PROT SERPL-MCNC: 4.8 G/DL (ref 6.3–8.2)
RBC # BLD AUTO: 2 M/UL (ref 4.05–5.25)
RHINOVIRUS RNA SPEC QL NAA+PROBE: NEGATIVE
RSV A RNA SPEC QL NAA+PROBE: NEGATIVE
RSV B RNA SPEC QL NAA+PROBE: NEGATIVE
SODIUM SERPL-SCNC: 140 MMOL/L (ref 136–145)
SPECIMEN SOURCE: NORMAL
URATE SERPL-MCNC: 4.1 MG/DL (ref 2.6–6)
VANCOMYCIN TROUGH SERPL-MCNC: 35.2 UG/ML (ref 5–20)
WBC # BLD AUTO: 2.9 K/UL (ref 4.3–11.1)

## 2017-04-27 PROCEDURE — 74011250636 HC RX REV CODE- 250/636: Performed by: EMERGENCY MEDICINE

## 2017-04-27 PROCEDURE — 87389 HIV-1 AG W/HIV-1&-2 AB AG IA: CPT | Performed by: NURSE PRACTITIONER

## 2017-04-27 PROCEDURE — 74011250637 HC RX REV CODE- 250/637: Performed by: NURSE PRACTITIONER

## 2017-04-27 PROCEDURE — 76770 US EXAM ABDO BACK WALL COMP: CPT

## 2017-04-27 PROCEDURE — 86923 COMPATIBILITY TEST ELECTRIC: CPT | Performed by: INTERNAL MEDICINE

## 2017-04-27 PROCEDURE — P9040 RBC LEUKOREDUCED IRRADIATED: HCPCS | Performed by: INTERNAL MEDICINE

## 2017-04-27 PROCEDURE — 65270000029 HC RM PRIVATE

## 2017-04-27 PROCEDURE — 74011250637 HC RX REV CODE- 250/637: Performed by: INTERNAL MEDICINE

## 2017-04-27 PROCEDURE — 85025 COMPLETE CBC W/AUTO DIFF WBC: CPT | Performed by: NURSE PRACTITIONER

## 2017-04-27 PROCEDURE — 74011636320 HC RX REV CODE- 636/320: Performed by: INTERNAL MEDICINE

## 2017-04-27 PROCEDURE — 36430 TRANSFUSION BLD/BLD COMPNT: CPT

## 2017-04-27 PROCEDURE — 86803 HEPATITIS C AB TEST: CPT | Performed by: NURSE PRACTITIONER

## 2017-04-27 PROCEDURE — 85018 HEMOGLOBIN: CPT | Performed by: INTERNAL MEDICINE

## 2017-04-27 PROCEDURE — 86900 BLOOD TYPING SEROLOGIC ABO: CPT | Performed by: INTERNAL MEDICINE

## 2017-04-27 PROCEDURE — 77030013131 HC IV BLD ST ICUM -A

## 2017-04-27 PROCEDURE — 80053 COMPREHEN METABOLIC PANEL: CPT | Performed by: NURSE PRACTITIONER

## 2017-04-27 PROCEDURE — 84550 ASSAY OF BLOOD/URIC ACID: CPT | Performed by: NURSE PRACTITIONER

## 2017-04-27 PROCEDURE — 86644 CMV ANTIBODY: CPT | Performed by: INTERNAL MEDICINE

## 2017-04-27 PROCEDURE — 80202 ASSAY OF VANCOMYCIN: CPT | Performed by: INTERNAL MEDICINE

## 2017-04-27 PROCEDURE — 74176 CT ABD & PELVIS W/O CONTRAST: CPT

## 2017-04-27 PROCEDURE — 83735 ASSAY OF MAGNESIUM: CPT | Performed by: NURSE PRACTITIONER

## 2017-04-27 PROCEDURE — 74011250636 HC RX REV CODE- 250/636: Performed by: NURSE PRACTITIONER

## 2017-04-27 PROCEDURE — 86920 COMPATIBILITY TEST SPIN: CPT | Performed by: INTERNAL MEDICINE

## 2017-04-27 PROCEDURE — 74011000258 HC RX REV CODE- 258: Performed by: EMERGENCY MEDICINE

## 2017-04-27 PROCEDURE — 82533 TOTAL CORTISOL: CPT | Performed by: NURSE PRACTITIONER

## 2017-04-27 RX ORDER — DIPHENOXYLATE HYDROCHLORIDE AND ATROPINE SULFATE 2.5; .025 MG/1; MG/1
1 TABLET ORAL
Status: DISCONTINUED | OUTPATIENT
Start: 2017-04-27 | End: 2017-05-02 | Stop reason: HOSPADM

## 2017-04-27 RX ORDER — ACETAMINOPHEN 325 MG/1
650 TABLET ORAL
Status: COMPLETED | OUTPATIENT
Start: 2017-04-27 | End: 2017-04-27

## 2017-04-27 RX ORDER — LOPERAMIDE HYDROCHLORIDE 2 MG/1
2 CAPSULE ORAL AS NEEDED
Status: DISCONTINUED | OUTPATIENT
Start: 2017-04-27 | End: 2017-05-02 | Stop reason: HOSPADM

## 2017-04-27 RX ORDER — DIPHENHYDRAMINE HCL 25 MG
25 CAPSULE ORAL
Status: COMPLETED | OUTPATIENT
Start: 2017-04-27 | End: 2017-04-27

## 2017-04-27 RX ORDER — SODIUM CHLORIDE 9 MG/ML
250 INJECTION, SOLUTION INTRAVENOUS AS NEEDED
Status: DISCONTINUED | OUTPATIENT
Start: 2017-04-27 | End: 2017-04-28

## 2017-04-27 RX ADMIN — LEVOTHYROXINE SODIUM 125 MCG: 125 TABLET ORAL at 07:39

## 2017-04-27 RX ADMIN — DIATRIZOATE MEGLUMINE AND DIATRIZOATE SODIUM 15 ML: 660; 100 LIQUID ORAL; RECTAL at 16:03

## 2017-04-27 RX ADMIN — Medication 5 ML: at 21:11

## 2017-04-27 RX ADMIN — ALLOPURINOL 300 MG: 300 TABLET ORAL at 12:50

## 2017-04-27 RX ADMIN — ASPIRIN 81 MG: 81 TABLET, CHEWABLE ORAL at 12:50

## 2017-04-27 RX ADMIN — ALLOPURINOL 300 MG: 300 TABLET ORAL at 19:30

## 2017-04-27 RX ADMIN — SODIUM CHLORIDE 125 ML/HR: 900 INJECTION, SOLUTION INTRAVENOUS at 23:55

## 2017-04-27 RX ADMIN — CHLORPHENIRAMINE MALEATE 4 MG: 4 TABLET ORAL at 23:07

## 2017-04-27 RX ADMIN — ACETAMINOPHEN 650 MG: 325 TABLET ORAL at 13:47

## 2017-04-27 RX ADMIN — PREGABALIN 50 MG: 50 CAPSULE ORAL at 16:03

## 2017-04-27 RX ADMIN — PANTOPRAZOLE SODIUM 40 MG: 40 TABLET, DELAYED RELEASE ORAL at 07:39

## 2017-04-27 RX ADMIN — SODIUM CHLORIDE 125 ML/HR: 900 INJECTION, SOLUTION INTRAVENOUS at 02:57

## 2017-04-27 RX ADMIN — PREGABALIN 50 MG: 50 CAPSULE ORAL at 12:50

## 2017-04-27 RX ADMIN — SODIUM CHLORIDE 125 ML/HR: 900 INJECTION, SOLUTION INTRAVENOUS at 19:48

## 2017-04-27 RX ADMIN — Medication 10 ML: at 06:58

## 2017-04-27 RX ADMIN — PREGABALIN 50 MG: 50 CAPSULE ORAL at 21:37

## 2017-04-27 RX ADMIN — LOPERAMIDE HYDROCHLORIDE 2 MG: 2 CAPSULE ORAL at 19:45

## 2017-04-27 RX ADMIN — LOPERAMIDE HYDROCHLORIDE 2 MG: 2 CAPSULE ORAL at 20:42

## 2017-04-27 RX ADMIN — DIPHENHYDRAMINE HYDROCHLORIDE 25 MG: 25 CAPSULE ORAL at 13:47

## 2017-04-27 RX ADMIN — ACETAMINOPHEN 650 MG: 325 TABLET, FILM COATED ORAL at 23:08

## 2017-04-27 RX ADMIN — PIPERACILLIN SODIUM,TAZOBACTAM SODIUM 3.38 G: 3; .375 INJECTION, POWDER, FOR SOLUTION INTRAVENOUS at 22:31

## 2017-04-27 RX ADMIN — PIPERACILLIN SODIUM,TAZOBACTAM SODIUM 3.38 G: 3; .375 INJECTION, POWDER, FOR SOLUTION INTRAVENOUS at 06:57

## 2017-04-27 RX ADMIN — Medication 5 ML: at 13:49

## 2017-04-27 RX ADMIN — VANCOMYCIN HYDROCHLORIDE 1500 MG: 10 INJECTION, POWDER, LYOPHILIZED, FOR SOLUTION INTRAVENOUS at 02:57

## 2017-04-27 NOTE — PROGRESS NOTES
[facility]        Inpatient Hematology / Oncology Progress Note      Admission Date: 2017  2:56 PM  Reason for Admission/Hospital Course: Sepsis ([address]      24 Hour Events:  T102.4 yesterday. Still hypotensive at times  UCx - +proteus mirabilis  BCx - NGTD  Fell today while in bathroom. Denies LOC, injury, or pain. ROS:  Constitutional: +fever, chills, fatigue  CV: Negative for chest pain, palpitations, edema. Respiratory: +cough, dyspnea; negative for wheezing. GI: +abdominal pain. Negative for nausea, diarrhea. 10 point review of systems is otherwise negative with the exception of the elements mentioned above in the HPI. No Known Allergies    OBJECTIVE:  Patient Vitals for the past 8 hrs:   BP Temp Pulse Resp SpO2   17 0923 (!) 74/46 (!) 100.7 °F (38.2 °C) 95 20 99 %   17 0744 (!) 88/53 99.7 °F (37.6 °C) 92 20 98 %     Temp (24hrs), Av.3 °F (37.9 °C), Min:99.6 °F (37.6 °C), Max:102.4 °F (39.1 °C)     0701 -  1900  In: 240 [P.O.:240]  Out: 1     Physical Exam:  Constitutional: Ill-appearing female in no acute distress, lying comfortably in the hospital bed. HEENT: Normocephalic and atraumatic. Oropharynx is clear, mucous membranes are moist. Neck supple without JVD. Lymph node   Deferred   Skin +pallor. Warm and dry. No bruising and no rash noted. No erythema. Respiratory +dyspneic Lungs are clear to auscultation bilaterally without wheezes, rales or rhonchi, normal air exchange without accessory muscle use. CVS Normal rate, regular rhythm and normal S1 and S2. No murmurs, gallops, or rubs. Abdomen +distention/ascites- worsening. Nontender with palpation, normoactive bowel sounds. No palpable mass. No hepatosplenomegaly. Neuro Grossly nonfocal with no obvious sensory or motor deficits. MSK 3+ pitting edema to BLE. Normal range of motion in general.  No tenderness. Psych Appropriate mood and affect.     Right chest port without erythema, edema. Mild tenderness with palpation (newly placed port)    Labs:      Recent Labs      04/27/17   0758  04/27/17   0428  04/26/17   0330  04/25/17   0344   WBC   --   2.9*  2.5*  3.4*   RBC   --   2.00*  3.45*  2.49*   HGB  6.0*  5.6*  9.7*  7.0*   HCT  18.5*  17.0*  29.7*  21.5*   MCV   --   85.0  86.1  86.3   MCH   --   28.0  28.1  27.3   MCHC   --   32.9  32.7  31.6   RDW   --   20.5*  20.2*  19.6*   PLT   --   50*  36*  76*   GRANS   --   91*  88*  93*   LYMPH   --   6*  6*  3*   MONOS   --   2*  3*  2*   EOS   --   1  2  2   BASOS   --   0  0  0   IG   --   0.3  0.6  0.3   DF   --   AUTOMATED  AUTOMATED  AUTOMATED   ANEU   --   2.7  1.6*  3.2   ABL   --   0.2*  0.1*  0.1*   ABM   --   0.1  0.1  0.1   HOMERO   --   0.0  0.0  0.1   ABB   --   0.0  0.0  0.0   AIG   --   0.0  0.0  0.0        Recent Labs      04/27/17   0428  04/26/17   0330  04/25/17   0330   NA  140  146*  142   K  4.2  3.1*  4.0   CL  110*  118*  109*   CO2  18*  15*  23   AGAP  12  13  10   GLU  85  87  108*   BUN  42*  23  20   CREA  3.74*  1.91*  1.30*   GFRAA  16*  35*  54*   GFRNA  13*  29*  45*   CA  7.1*  5.6*  7.4*   SGOT  27 23 23   AP  133  87  99   TP  4.8*  3.8*  5.0*   ALB  2.0*  1.7*  2.3*   GLOB  2.8  2.1*  2.7   AGRAT  0.7*  0.8*  0.9*   MG  2.0  1.1*  1.6*         Imaging:  Portable chest x-ray. 4/23/2017      CLINICAL INDICATION: Sepsis, fever      FINDINGS: Single AP view of the chest submitted without comparison shows mild  atelectasis in the left lower lung otherwise the lungs are clear. A right-sided  chest port is in place. The cardiac silhouette and mediastinum are unremarkable.     IMPRESSION: Mild left lower lung atelectasis, otherwise no acute abnormality.     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 [facility]) ICD-10-CM: C85.88  ICD-9-CM: 200.38  4/7/2017        Non Hodgkin's lymphoma ([address] ICD-10-CM: C85.90  ICD-9-CM: 202.80  3/30/2017        Ascites ICD-10-CM: R18.8  ICD-9-CM: 789.59  3/30/2017        Lymphadenopathy, generalized ICD-10-CM: R59.1  ICD-9-CM: 785.6  3/30/2017        Osteoarthritis of left knee ICD-10-CM: M17.12  ICD-9-CM: 715.96  8/26/2013        Morbid obesity ([address] ICD-10-CM: E66.01  ICD-9-CM: 278.01  10/4/2011        History of DVT of lower extremity ICD-10-CM: H29.044  ICD-9-CM: V12.51  10/4/2011    Overview Signed 10/4/2011  1:44 AM by Nilda Saavedra NP     X 2               Hypertension ICD-10-CM: I10  ICD-9-CM: 401.9  10/4/2011    Overview Signed 10/4/2011  1:44 AM by Nilda Saavedra NP     PRE OP             Heart murmur ICD-10-CM: R01.1  ICD-9-CM: 785.2  10/4/2011        Osteoarthritis of right knee ICD-10-CM: M17.11  ICD-9-CM: 715.96  10/3/2011        S/P total knee replacement using cement ICD-10-CM: X61.550  ICD-9-CM: V43.65  10/3/2011            Ms. Kevin Grimes is a 63 yo female with NGL. Admitted for sepsis. PLAN:  NHL  4/24 Completed cycle 1 of Bendeka/Rituxan/Neulasta on 4/15. Next cycle scheduled for 5/11.     Fever/Sepsis  4/24 Still febrile. BCx & UCx -NGTD. Flu neg. CXR neg. Con't vanc/zosyn. BCx were not drawn from port originally. Will go ahead and order BCx to be drawn from port. 4/25  Temp spike 102.7 with BP 64/43. 1L fluid bolus improved /60. Add tobramycin. BCx including one from port - NGTD. UCx +12,000 gram neg rods, final report pending. 4/26 Tmax 103. BCx-NGTD. UCx + for proteus mirabilis susceptible to current antibx regimen. ID consulted for persistent fever. Possible noninfectious etiologies of fever as well, lymphoma vs neutrophil recovery. 4/27 Fevers con't.   ID following - \"clincialy pt fabiana has a viral illness - check flu swab again given sampling issues as well as RVP (this will take a week to come back unfortunately). CT A/P and then likely a paracentesis to r/o infction. Switch tobra to levoquin to rx for atypical PNA pathogens. Cont vanc/zosyn for now. Routine HIV, HCV screens. PCT in 3s expected with mild CKD. \"  CT A/P unable to be completed due to elevated creatinine. Ordered CT A/P without contrast.  DC vanc due to decreased renal fx and vanc trough 35.2. Con't Zosyn/LVQ. Hypotension  4/26  Continues to drop BP  down to 72/49 - improved to 91/51. Hold lisinopril. 4/27 Still having hypotensive episodes - possibly happening at home and were just going unnoticed? Dondeejay Fearing in bathroom today. Denies LOC, injury, or pain. OOB with assistance only (if not hypotensive). Electrolyte imbalance secondary to chemo  4/26  Mg+ 1.1, Ca+ 5.6. Replace with Ca+ gluconate 4g IV and Mg+ sulfate 4g IV.  4/27 Improved with replacement Mg+2.0 Ca+7.1 (corrected Ca+8.7)    Pancytopenia secondary to chemo  4/26  WBC 2.5 Hgb 9.7 Plt 36,000. No transfusions needed today. D/C heparin. Will con't to monitor. 4/27  WBC 2.9 Hgb 5.6 Plt 50,000. Repeat Hgb 6.0. Transfuse 2 units PRBCs. Renal Fx  4/26  Renal fx is acutely worsening. BUN 23 Cr 1.91. Pharmacist checking vanc trough prior to the next scheduled dose and will adjust as necessary. Will continue to follow trend in renal fx and make adjustments if renal fx con't to worsen. ID consulted, will appreciate their recommendations. 4/27  BUN 42, Cr 3.74, GFR 13. Worsening renal fx due to vanc/tobra? DC vanc due to decreased renal fx and vanc trough 35.2. Ultrasound, CT A/P WO contrast, urine sodium, urine creatinine, cortisol, and uric acid.     DVT prophylaxis: SCDs.           Sarita Ramirez, [facility]  [address]  Office : [phone]  Fax : [phone]

## 2017-04-27 NOTE — PROGRESS NOTES
Spoke with Hari Su NP about pt's ordered CT scan. Pt's GFR < 30 and per protocol, pt will have hydration at 125 ml/ hr 12 hours prior to procedure.  Will postpone scan until the AM.

## 2017-04-27 NOTE — PROGRESS NOTES
Called and spoke with Ashlee New NP with [facility] ON[facility]. Made her aware of low BP. She stated to finish 1st unit of blood and check bp again and if lower to give 500ml bolus of ns.

## 2017-04-27 NOTE — PROGRESS NOTES
Pt's son, POA, has many questions for himself and multiple family members regarding pt's plan of care. Voiced concern that pt has not had a nephrology consult, is having continued hypotension (pt's son reports her normal BP is around 130/80), and received oral contrast (reports that a family member told him that pt was not to have contrast). Updated son on recent tests and changes to plan of care and medications today. Also explained that per MD note, pt was not to have IV contrast, but oral contrast could be given instead. Pt's son also asking about getting a paracentesis as pt is uncomfortable lying down s/t ascites. Informed him that he should be present when MD rounds in the morning in order to have any other questions answered at that time.

## 2017-04-27 NOTE — PROGRESS NOTES
Staffed called to room. Pt. On floor in bathroom. Stated she fell while standing at toilet. She stated she did not hurt herself and was not in pain. V/s taken and reported to Herbert Fuentes NP.no new orders. Pt. Assisted to recliner by several staff members.  present in room.

## 2017-04-27 NOTE — PROGRESS NOTES
Assessed pt. Skin with lexi arce. Pt. Has bilateral scars from knee replacement surgery. Scar under right arm from lymphnode removal/biopsy. Petechiae noted bilateral LE and abdomen. fungul issue on abdominal folds. Scar from cholecystectomy noted. Skin on back and bottom assessed and intact with no evidence of breakdown.

## 2017-04-27 NOTE — PROGRESS NOTES
Pt. Alert and oriented. Able to make needs known.  and son present. Transport to radiology for CT. No unresolved concerns at this time.

## 2017-04-27 NOTE — PROGRESS NOTES
Infectious Disease Consult    Today's Date: 2017   Admit Date: 2017    Impression:   · Fever with chills - etiology thsu far unclear - could be ascitic fluid infection vs viral vs atypical PNA vs non-inefcitous. Urine cx with proteus being treated w/o improvement in fever curve  · Proteus on urine cx  · Ascites s/p paracentesis (4/3, )  · NHL s/p 1 cycle of chemotherapy; PORT placed   · OA s/p yuriy TKAs, Morbid obesity  · Borderline hypotension  · MATI    Plan:   · Continue vancomycin, Zosyn, levoquin x 5-7 days for now   · CT A/P w/ oral contrast  · RVP panel pending  · Needs diagnostic and therapeutic paracentesis      Anti-infectives:   Zosyn  - current  vanc   levoquin      Subjective:   Feels bloated and tired. Abdomen getting more distended    No Known Allergies     Review of Systems:  A comprehensive review of systems was negative except for that written in the History of Present Illness. Objective:     Visit Vitals    BP (!) 88/55 (BP 1 Location: Left arm)    Pulse 87    Temp 99.7 °F (37.6 °C)    Resp 20    Ht 5' 3\" (1.6 m)    Wt 126.6 kg (279 lb)    SpO2 99%    Breastfeeding No    BMI 49.42 kg/m2     Temp (24hrs), Av.2 °F (37.9 °C), Min:99.6 °F (37.6 °C), Max:102.4 °F (39.1 °C)       Lines:  Ventricular Access Device:  R chest-mild erythema to incision site but well approximated. Physical Exam:    General:  Alert, cooperative, appears stated age   Eyes:  Sclera anicteric. Pupils equally round and reactive to light. Mouth/Throat: Mucous membranes normal, oral pharynx clear   Neck: Supple   Lungs:   Clear to auscultation bilaterally, good effort   CV:  Regular rate and rhythm,+ 2/6 murmur   Abdomen:   firm non-tender. bowel sounds normal. + distension    Extremities: No cyanosis. + 1-2 generalized edema   Skin: Skin color, texture, turgor normal. Petechiae to yuriy LEs - now spread to abdomen       Musculoskeletal: No swelling or deformity.  Yuriy TKAs incisions well approximated, no erythema, no evidence of effusion   Lines/Devices:  Intact, no erythema, drainage or tenderness   Psych: Alert and oriented, normal mood affect given the setting       Data Review:     CBC:  Recent Labs      04/27/17   0758  04/27/17 0428 04/26/17   0330  04/25/17   0344   WBC   --   2.9*  2.5*  3.4*   GRANS   --   91*  88*  93*   MONOS   --   2*  3*  2*   EOS   --   1  2  2   ANEU   --   2.7  1.6*  3.2   ABL   --   0.2*  0.1*  0.1*   HGB  6.0*  5.6*  9.7*  7.0*   HCT  18.5*  17.0*  29.7*  21.5*   PLT   --   50*  36*  76*       BMP:  Recent Labs      04/27/17 0428 04/26/17   0330  04/25/17   0330   CREA  3.74*  1.91*  1.30*   BUN  42*  23  20   NA  140  146*  142   K  4.2  3.1*  4.0   CL  110*  118*  109*   CO2  18*  15*  23   AGAP  12  13  10   GLU  85  87  108*       LFTS:  Recent Labs      04/27/17 0428 04/26/17   0330  04/25/17   0330   TBILI  0.4  0.4  0.5   ALT  37  25  24   SGOT  27  23  23   AP  133  87  99   TP  4.8*  3.8*  5.0*   ALB  2.0*  1.7*  2.3*       Microbiology:     All Micro Results     Procedure Component Value Units Date/Time    CULTURE, BLOOD [[id number]] Collected:  04/24/17 1450    Order Status:  Completed Specimen:  Blood from Blood Updated:  04/27/17 0639     Special Requests: PORT        Culture result: NO GROWTH 3 DAYS       CULTURE, BLOOD [[id number]] Collected:  04/23/17 1630    Order Status:  Completed Specimen:  Blood from Blood Updated:  04/27/17 0639     Special Requests: LEFT ANTECUBITAL        Culture result: NO GROWTH 4 DAYS       CULTURE, BLOOD [[id number]] Collected:  04/23/17 1518    Order Status:  Completed Specimen:  Blood from Blood Updated:  04/27/17 0639     Special Requests: RIGHT ANTECUBITAL        Culture result: NO GROWTH 4 DAYS       INFLUENZA A & B AG (RAPID TEST) [[id number]] Collected:  04/26/17 1507    Order Status:  Completed Specimen:  Nasopharyngeal from Nasal washing Updated:  04/26/17 1531     Influenza A Ag NEGATIVE NEGATIVE FOR THE PRESENCE OF INFLUENZA A ANTIGEN  INFECTION DUE TO INFLUENZA A CANNOT BE RULED OUT. BECAUSE THE ANTIGEN PRESENT IN THE SAMPLE MAY BE BELOW  THE DETECTION LIMIT OF THE TEST. A NEGATIVE TEST IS PRESUMPTIVE AND IT IS RECOMMENDED THAT THESE RESULTS BE CONFIRMED BY VIRAL CULTURE OR AN FDA-CLEARED INFLUENZA A AND B MOLECULAR ASSAY. Influenza B Ag NEGATIVE          NEGATIVE FOR THE PRESENCE OF INFLUENZA B ANTIGEN  INFECTION DUE TO INFLUENZA B CANNOT BE RULED OUT. BECAUSE THE ANTIGEN PRESENT IN THE SAMPLE MAY BE BELOW  THE DETECTION LIMIT OF THE TEST. A NEGATIVE TEST IS PRESUMPTIVE AND IT IS RECOMMENDED THAT THESE RESULTS BE CONFIRMED BY VIRAL CULTURE OR AN FDA-CLEARED INFLUENZA A AND B MOLECULAR ASSAY. RESPIRATORY VIRAL PANEL, PCR [[id number]] Collected:  04/26/17 1507    Order Status:  Completed Updated:  04/26/17 1522    CULTURE, URINE [[id number]]  (Abnormal)  (Susceptibility) Collected:  04/23/17 1608    Order Status:  Completed Specimen:  Urine from Cath Urine Updated:  04/26/17 0796     Special Requests: NO SPECIAL REQUESTS        Culture result: [id number]  COLONIES/mL  PROTEUS MIRABILIS   (A)     INFLUENZA A & B AG (RAPID TEST) [[id number]] Collected:  04/23/17 1609    Order Status:  Completed Specimen:  Nasal washing Updated:  04/23/17 1647     Influenza A Ag NEGATIVE          NEGATIVE FOR THE PRESENCE OF INFLUENZA A ANTIGEN  INFECTION DUE TO INFLUENZA A CANNOT BE RULED OUT. BECAUSE THE ANTIGEN PRESENT IN THE SAMPLE MAY BE BELOW  THE DETECTION LIMIT OF THE TEST. A NEGATIVE TEST IS PRESUMPTIVE AND IT IS RECOMMENDED THAT THESE RESULTS BE CONFIRMED BY VIRAL CULTURE OR AN FDA-CLEARED INFLUENZA A AND B MOLECULAR ASSAY. Influenza B Ag NEGATIVE          NEGATIVE FOR THE PRESENCE OF INFLUENZA B ANTIGEN  INFECTION DUE TO INFLUENZA B CANNOT BE RULED OUT. BECAUSE THE ANTIGEN PRESENT IN THE SAMPLE MAY BE BELOW  THE DETECTION LIMIT OF THE TEST.   A NEGATIVE TEST IS PRESUMPTIVE AND IT IS RECOMMENDED THAT THESE RESULTS BE CONFIRMED BY VIRAL CULTURE OR AN FDA-CLEARED INFLUENZA A AND B MOLECULAR ASSAY.          INFLUENZA A & B AG (RAPID TEST) [[id number]] Collected:  04/23/17 1530    Order Status:  Canceled Specimen:  Nasopharyngeal from Nasopharyngeal Updated:  04/23/17 1547          Imaging:   CXR negative    Signed By: Fabien Schultz MD     April 27, 2017

## 2017-04-28 ENCOUNTER — APPOINTMENT (OUTPATIENT)
Dept: ULTRASOUND IMAGING | Age: 57
DRG: 871 | End: 2017-04-28
Attending: INTERNAL MEDICINE
Payer: COMMERCIAL

## 2017-04-28 PROBLEM — N17.9 ACUTE RENAL FAILURE (ARF) (HCC): Status: ACTIVE | Noted: 2017-04-28

## 2017-04-28 PROBLEM — I95.9 HYPOTENSION: Status: ACTIVE | Noted: 2017-04-28

## 2017-04-28 LAB
ALBUMIN SERPL BCP-MCNC: 2 G/DL (ref 3.5–5)
ALBUMIN/GLOB SERPL: 0.7 {RATIO} (ref 1.2–3.5)
ALP SERPL-CCNC: 140 U/L (ref 50–136)
ALT SERPL-CCNC: 36 U/L (ref 12–65)
ANION GAP BLD CALC-SCNC: 12 MMOL/L (ref 7–16)
APPEARANCE FLD: NORMAL
AST SERPL W P-5'-P-CCNC: 25 U/L (ref 15–37)
BACTERIA SPEC CULT: NORMAL
BASOPHILS # BLD AUTO: 0 K/UL (ref 0–0.2)
BASOPHILS # BLD AUTO: 0 K/UL (ref 0–0.2)
BASOPHILS # BLD: 0 % (ref 0–2)
BASOPHILS # BLD: 0 % (ref 0–2)
BILIRUB SERPL-MCNC: 0.3 MG/DL (ref 0.2–1.1)
BUN SERPL-MCNC: 48 MG/DL (ref 6–23)
CALCIUM SERPL-MCNC: 7 MG/DL (ref 8.3–10.4)
CHLORIDE SERPL-SCNC: 111 MMOL/L (ref 98–107)
CO2 SERPL-SCNC: 18 MMOL/L (ref 21–32)
COLOR FLD: NORMAL
CREAT SERPL-MCNC: 4.47 MG/DL (ref 0.6–1)
DIFFERENTIAL METHOD BLD: ABNORMAL
DIFFERENTIAL METHOD BLD: ABNORMAL
EOSINOPHIL # BLD: 0 K/UL (ref 0–0.8)
EOSINOPHIL # BLD: 0.1 K/UL (ref 0–0.8)
EOSINOPHIL # FLD: 1 %
EOSINOPHIL NFR BLD: 1 % (ref 0.5–7.8)
EOSINOPHIL NFR BLD: 2 % (ref 0.5–7.8)
ERYTHROCYTE [DISTWIDTH] IN BLOOD BY AUTOMATED COUNT: 20.6 % (ref 11.9–14.6)
ERYTHROCYTE [DISTWIDTH] IN BLOOD BY AUTOMATED COUNT: 21 % (ref 11.9–14.6)
FLUID COMMENTS, FCOM: NORMAL
GLOBULIN SER CALC-MCNC: 2.7 G/DL (ref 2.3–3.5)
GLUCOSE SERPL-MCNC: 91 MG/DL (ref 65–100)
HCT VFR BLD AUTO: 19.1 % (ref 35.8–46.3)
HCT VFR BLD AUTO: 22.1 % (ref 35.8–46.3)
HCV AB S/CO SERPL IA: <0.1 S/CO RATIO (ref 0–0.9)
HCV AB SERPL QL IA: NORMAL
HGB BLD-MCNC: 6.2 G/DL (ref 11.7–15.4)
HGB BLD-MCNC: 7.3 G/DL (ref 11.7–15.4)
IMM GRANULOCYTES # BLD: 0 K/UL (ref 0–0.5)
IMM GRANULOCYTES # BLD: 0 K/UL (ref 0–0.5)
IMM GRANULOCYTES NFR BLD AUTO: 0.3 % (ref 0–5)
IMM GRANULOCYTES NFR BLD AUTO: 0.3 % (ref 0–5)
LYMPHOCYTES # BLD AUTO: 4 % (ref 13–44)
LYMPHOCYTES # BLD AUTO: 5 % (ref 13–44)
LYMPHOCYTES # BLD: 0.2 K/UL (ref 0.5–4.6)
LYMPHOCYTES # BLD: 0.2 K/UL (ref 0.5–4.6)
LYMPHOCYTES NFR FLD: 30 %
MAGNESIUM SERPL-MCNC: 2.1 MG/DL (ref 1.8–2.4)
MCH RBC QN AUTO: 27 PG (ref 26.1–32.9)
MCH RBC QN AUTO: 27 PG (ref 26.1–32.9)
MCHC RBC AUTO-ENTMCNC: 32.5 G/DL (ref 31.4–35)
MCHC RBC AUTO-ENTMCNC: 33 G/DL (ref 31.4–35)
MCV RBC AUTO: 81.9 FL (ref 79.6–97.8)
MCV RBC AUTO: 83 FL (ref 79.6–97.8)
MONOCYTES # BLD: 0.1 K/UL (ref 0.1–1.3)
MONOCYTES # BLD: 0.2 K/UL (ref 0.1–1.3)
MONOCYTES NFR BLD AUTO: 4 % (ref 4–12)
MONOCYTES NFR BLD AUTO: 5 % (ref 4–12)
MONOS+MACROS NFR FLD: 7 %
NEUTS SEG # BLD: 3 K/UL (ref 1.7–8.2)
NEUTS SEG # BLD: 3 K/UL (ref 1.7–8.2)
NEUTS SEG NFR BLD AUTO: 89 % (ref 43–78)
NEUTS SEG NFR BLD AUTO: 90 % (ref 43–78)
NEUTS SEG NFR FLD: 62 %
NUC CELL # FLD: 446 /CU MM
PLATELET # BLD AUTO: 40 K/UL (ref 150–450)
PLATELET # BLD AUTO: 42 K/UL (ref 150–450)
PMV BLD AUTO: 10.2 FL (ref 10.8–14.1)
PMV BLD AUTO: 9.4 FL (ref 10.8–14.1)
POTASSIUM SERPL-SCNC: 4.1 MMOL/L (ref 3.5–5.1)
PROT SERPL-MCNC: 4.7 G/DL (ref 6.3–8.2)
RBC # BLD AUTO: 2.3 M/UL (ref 4.05–5.25)
RBC # BLD AUTO: 2.7 M/UL (ref 4.05–5.25)
RBC # FLD: NORMAL /CU MM
SERVICE CMNT-IMP: NORMAL
SODIUM SERPL-SCNC: 141 MMOL/L (ref 136–145)
SPECIMEN SOURCE FLD: NORMAL
VANCOMYCIN SERPL-MCNC: 28 UG/ML
WBC # BLD AUTO: 3.4 K/UL (ref 4.3–11.1)
WBC # BLD AUTO: 3.4 K/UL (ref 4.3–11.1)

## 2017-04-28 PROCEDURE — 86644 CMV ANTIBODY: CPT | Performed by: INTERNAL MEDICINE

## 2017-04-28 PROCEDURE — 36591 DRAW BLOOD OFF VENOUS DEVICE: CPT

## 2017-04-28 PROCEDURE — 77030013131 HC IV BLD ST ICUM -A

## 2017-04-28 PROCEDURE — 89050 BODY FLUID CELL COUNT: CPT | Performed by: INTERNAL MEDICINE

## 2017-04-28 PROCEDURE — 74011250636 HC RX REV CODE- 250/636: Performed by: EMERGENCY MEDICINE

## 2017-04-28 PROCEDURE — 84157 ASSAY OF PROTEIN OTHER: CPT | Performed by: INTERNAL MEDICINE

## 2017-04-28 PROCEDURE — 74011250636 HC RX REV CODE- 250/636: Performed by: NURSE PRACTITIONER

## 2017-04-28 PROCEDURE — 74011250637 HC RX REV CODE- 250/637: Performed by: INTERNAL MEDICINE

## 2017-04-28 PROCEDURE — 80053 COMPREHEN METABOLIC PANEL: CPT | Performed by: NURSE PRACTITIONER

## 2017-04-28 PROCEDURE — 99254 IP/OBS CNSLTJ NEW/EST MOD 60: CPT | Performed by: INTERNAL MEDICINE

## 2017-04-28 PROCEDURE — 85025 COMPLETE CBC W/AUTO DIFF WBC: CPT | Performed by: NURSE PRACTITIONER

## 2017-04-28 PROCEDURE — 83735 ASSAY OF MAGNESIUM: CPT | Performed by: NURSE PRACTITIONER

## 2017-04-28 PROCEDURE — 36430 TRANSFUSION BLD/BLD COMPNT: CPT

## 2017-04-28 PROCEDURE — 74011250637 HC RX REV CODE- 250/637: Performed by: NURSE PRACTITIONER

## 2017-04-28 PROCEDURE — 77030034850

## 2017-04-28 PROCEDURE — 87641 MR-STAPH DNA AMP PROBE: CPT | Performed by: INTERNAL MEDICINE

## 2017-04-28 PROCEDURE — 65620000000 HC RM CCU GENERAL

## 2017-04-28 PROCEDURE — 77030033269 HC SLV COMPR SCD KNE2 CARD -B

## 2017-04-28 PROCEDURE — 74011000258 HC RX REV CODE- 258: Performed by: INTERNAL MEDICINE

## 2017-04-28 PROCEDURE — 83615 LACTATE (LD) (LDH) ENZYME: CPT | Performed by: INTERNAL MEDICINE

## 2017-04-28 PROCEDURE — 74011000258 HC RX REV CODE- 258: Performed by: EMERGENCY MEDICINE

## 2017-04-28 PROCEDURE — P9040 RBC LEUKOREDUCED IRRADIATED: HCPCS | Performed by: INTERNAL MEDICINE

## 2017-04-28 PROCEDURE — 80202 ASSAY OF VANCOMYCIN: CPT | Performed by: INTERNAL MEDICINE

## 2017-04-28 PROCEDURE — 74011250636 HC RX REV CODE- 250/636: Performed by: INTERNAL MEDICINE

## 2017-04-28 PROCEDURE — 49083 ABD PARACENTESIS W/IMAGING: CPT

## 2017-04-28 PROCEDURE — 87205 SMEAR GRAM STAIN: CPT | Performed by: INTERNAL MEDICINE

## 2017-04-28 PROCEDURE — 77030032490 HC SLV COMPR SCD KNE COVD -B

## 2017-04-28 PROCEDURE — 77010033678 HC OXYGEN DAILY

## 2017-04-28 PROCEDURE — 74011000250 HC RX REV CODE- 250: Performed by: INTERNAL MEDICINE

## 2017-04-28 RX ORDER — ACETAMINOPHEN 325 MG/1
650 TABLET ORAL ONCE
Status: DISCONTINUED | OUTPATIENT
Start: 2017-04-28 | End: 2017-04-28

## 2017-04-28 RX ORDER — ALBUMIN HUMAN 250 G/1000ML
12.5 SOLUTION INTRAVENOUS ONCE
Status: DISCONTINUED | OUTPATIENT
Start: 2017-04-28 | End: 2017-04-28

## 2017-04-28 RX ORDER — VANCOMYCIN/0.9 % SOD CHLORIDE 1.5G/250ML
1500 PLASTIC BAG, INJECTION (ML) INTRAVENOUS SEE ADMIN INSTRUCTIONS
Status: DISCONTINUED | OUTPATIENT
Start: 2017-04-28 | End: 2017-04-29

## 2017-04-28 RX ORDER — DIPHENHYDRAMINE HCL 25 MG
25 CAPSULE ORAL ONCE
Status: DISCONTINUED | OUTPATIENT
Start: 2017-04-28 | End: 2017-04-28

## 2017-04-28 RX ORDER — SODIUM CHLORIDE 9 MG/ML
250 INJECTION, SOLUTION INTRAVENOUS AS NEEDED
Status: DISCONTINUED | OUTPATIENT
Start: 2017-04-28 | End: 2017-05-02 | Stop reason: HOSPADM

## 2017-04-28 RX ORDER — POTASSIUM CHLORIDE 20 MEQ/1
20 TABLET, EXTENDED RELEASE ORAL 2 TIMES DAILY
Status: DISCONTINUED | OUTPATIENT
Start: 2017-04-28 | End: 2017-05-01

## 2017-04-28 RX ORDER — VANCOMYCIN/0.9 % SOD CHLORIDE 1.5G/250ML
1500 PLASTIC BAG, INJECTION (ML) INTRAVENOUS EVERY 12 HOURS
Status: DISCONTINUED | OUTPATIENT
Start: 2017-04-28 | End: 2017-04-28

## 2017-04-28 RX ORDER — VANCOMYCIN/0.9 % SOD CHLORIDE 1.5G/250ML
1500 PLASTIC BAG, INJECTION (ML) INTRAVENOUS ONCE
Status: DISCONTINUED | OUTPATIENT
Start: 2017-04-28 | End: 2017-04-28

## 2017-04-28 RX ORDER — ALLOPURINOL 100 MG/1
100 TABLET ORAL DAILY
Status: DISCONTINUED | OUTPATIENT
Start: 2017-04-29 | End: 2017-05-02 | Stop reason: HOSPADM

## 2017-04-28 RX ADMIN — PANTOPRAZOLE SODIUM 40 MG: 40 TABLET, DELAYED RELEASE ORAL at 07:52

## 2017-04-28 RX ADMIN — PIPERACILLIN SODIUM,TAZOBACTAM SODIUM 3.38 G: 3; .375 INJECTION, POWDER, FOR SOLUTION INTRAVENOUS at 07:51

## 2017-04-28 RX ADMIN — LEVOTHYROXINE SODIUM 125 MCG: 125 TABLET ORAL at 07:52

## 2017-04-28 RX ADMIN — PREGABALIN 50 MG: 50 CAPSULE ORAL at 07:52

## 2017-04-28 RX ADMIN — POTASSIUM CHLORIDE 20 MEQ: 20 TABLET, EXTENDED RELEASE ORAL at 07:52

## 2017-04-28 RX ADMIN — PIPERACILLIN SODIUM,TAZOBACTAM SODIUM 3.38 G: 3; .375 INJECTION, POWDER, FOR SOLUTION INTRAVENOUS at 20:56

## 2017-04-28 RX ADMIN — HYDROCODONE BITARTRATE AND ACETAMINOPHEN 1 TABLET: 5; 325 TABLET ORAL at 11:08

## 2017-04-28 RX ADMIN — LEVOFLOXACIN 750 MG: 500 TABLET, FILM COATED ORAL at 18:31

## 2017-04-28 RX ADMIN — Medication 5 ML: at 07:56

## 2017-04-28 RX ADMIN — SODIUM CHLORIDE 125 ML/HR: 900 INJECTION, SOLUTION INTRAVENOUS at 22:09

## 2017-04-28 RX ADMIN — PREGABALIN 50 MG: 50 CAPSULE ORAL at 22:34

## 2017-04-28 RX ADMIN — SODIUM CHLORIDE 250 ML: 900 INJECTION, SOLUTION INTRAVENOUS at 04:40

## 2017-04-28 RX ADMIN — PREGABALIN 50 MG: 50 CAPSULE ORAL at 18:30

## 2017-04-28 RX ADMIN — ALLOPURINOL 300 MG: 300 TABLET ORAL at 07:52

## 2017-04-28 RX ADMIN — Medication 5 ML: at 22:55

## 2017-04-28 RX ADMIN — POTASSIUM CHLORIDE 20 MEQ: 20 TABLET, EXTENDED RELEASE ORAL at 18:31

## 2017-04-28 RX ADMIN — Medication 5 ML: at 05:16

## 2017-04-28 RX ADMIN — NOREPINEPHRINE BITARTRATE 6 MCG/MIN: 1 INJECTION INTRAVENOUS at 18:17

## 2017-04-28 NOTE — PROGRESS NOTES
Dr. Lexie Cinnamon called for consult per Heme/Onc. Updated on patient status after admission to unit. Orders received.

## 2017-04-28 NOTE — PROGRESS NOTES
Problem: Nutrition Deficit  Goal: *Optimize nutritional status  Nutrition  Reason for assessment: LOS day 5  Assessment:   Diet order(s): Cardiac  Food/Nutrition Patient History:  Patient with h/o NHL s/p cycle 1 chemotherapy treatment 4/15 with MATI, ascites, planning for paracentesis today. She has had recent episodes of diarrhea. Patient is off floor, however, patient's son in room reports that her appetite has been poor throughout admission. Ensure enlive consumed at bedside is the only thing he has gotten her to drink today, per patient's son. He states that she has chewing difficulties and is unable to eat solids right now due to increased phlegm. Patient's son has purchased different supplements for her at home as she has had difficulties chewing and eating meals PTA. He does inquire whether she needs to monitor potassium at this time. Anthropometrics:Height: 5' 3\" (160 cm),  Weight: 128.8 kg (283 lb 14.4 oz), Weight Source: Standing scale (comment), Body mass index is 50.29 kg/(m^2). BMI class of morbid obesity class III. RN notes patient with 2+ generalized edema, 3+ pitting edema to upper extremities, 3+ edema to lower extremities. Macronutrient needs:  EER:  6406-3776 kcal /day (30-35 kcal/kg I BW)-patient remains edematous  EPR:  42-63 grams protein/day (0.8-1.2 grams/kg IBW)(GFR 11)-MATI  Intake/Comparative Standards: Average intake for past 5 day(s)/10 recorded meal(s): 69%. This potentially meets ~88% of kcal and ~100% of protein needs     Nutrition Diagnosis: Inadequate oral intake r/t decreased ability to consume sufficient oral intake as evidenced by patient with difficulty chewing, mouth pain, consuming 0% of meals and one ensure enlive today per patient's son, meeting above noted kcal needs. Intervention:  Meals and snacks: Add mechanical soft consistency to current diet. Patient does not require potassium restriction as this time.    Nutrition Supplement Therapy: ensure high protein TID while intake remains poor      Natan Kramer [address], [address] [place] [address], [address] [place] [address], [phone]

## 2017-04-28 NOTE — PROGRESS NOTES
TRANSFER - IN REPORT:    Verbal report received from [facility], [facility] on [facility]  being received from 5th floor BMT for change in patient condition(hypotension)      Report consisted of patients Situation, Background, Assessment and   Recommendations(SBAR). Information from the following report(s) SBAR, Kardex, ED Summary, Procedure Summary, Intake/Output, MAR, Recent Results, Med Rec Status and Cardiac Rhythm SR was reviewed with the receiving nurse. Opportunity for questions and clarification was provided. Assessment completed upon patients arrival to unit and care assumed.

## 2017-04-28 NOTE — PROGRESS NOTES
Infectious Disease Progress Note    Today's Date: 2017   Admit Date: 2017    Impression:   · Fever with chills - etiology thsu far unclear - could be ascitic fluid infection vs viral vs atypical PNA vs non-inefcitous. Urine cx with proteus being treated w/o improvement in fever curve  · Proteus on urine cx  · Ascites s/p paracentesis (4/3, )  · NHL s/p 1 cycle of chemotherapy; PORT placed   · OA s/p mayur TKAs, Morbid obesity  · Borderline hypotension  · MATI    Plan:   · Continue vancomycin, Zosyn, levoquin x 5-7 days for now. Will begin to taper soon. · CT A/P unrevealing  · Needs diagnostic and therapeutic paracentesis-today      Anti-infectives:   Zosyn  - current  vanc   levoquin      Subjective:   Afebrile but hypotension continues. Paracentesis today. SOB with shallow breath sounds related to abdominal fluid. No Known Allergies     Review of Systems:  A comprehensive review of systems was negative except for that written in the History of Present Illness. Objective:     Visit Vitals    BP (!) 72/43    Pulse 86    Temp 98.5 °F (36.9 °C)    Resp 22    Ht 5' 3\" (1.6 m)    Wt 128.8 kg (283 lb 14.4 oz)    SpO2 98%    Breastfeeding No    BMI 50.29 kg/m2     Temp (24hrs), Av.6 °F (37 °C), Min:97.8 °F (36.6 °C), Max:99.7 °F (37.6 °C)       Lines:  Ventricular Access Device:  R chest-mild erythema to incision site but well approximated. Physical Exam:    General:  Alert, cooperative, appears stated age   Eyes:  Sclera anicteric. Pupils equally round and reactive to light. Mouth/Throat: Mucous membranes normal, oral pharynx clear   Neck: Supple   Lungs:   Clear to auscultation bilaterally, good effort   CV:  Regular rate and rhythm,+ 2/6 murmur   Abdomen:   firm non-tender. bowel sounds normal. + distension    Extremities: No cyanosis.  + 3 generalized edema   Skin: Skin color, texture, turgor normal. Petechiae to mayur LEs - now spread to abdomen Musculoskeletal: No swelling or deformity.  Yuriy TKAs incisions well approximated, no erythema, no evidence of effusion   Lines/Devices:  Intact, no erythema, drainage or tenderness   Psych: Alert and oriented, normal mood affect given the setting       Data Review:     CBC:  Recent Labs      04/28/17   0915  04/28/17   0400  04/27/17   0758  04/27/17   0428   WBC  3.4*  3.4*   --   2.9*   GRANS  89*  90*   --   91*   MONOS  5  4   --   2*   EOS  2  1   --   1   ANEU  3.0  3.0   --   2.7   ABL  0.2*  0.2*   --   0.2*   HGB  7.3*  6.2*  6.0*  5.6*   HCT  22.1*  19.1*  18.5*  17.0*   PLT  40*  42*   --   50*       BMP:  Recent Labs      04/28/17   0400  04/27/17   0428  04/26/17   0330   CREA  4.47*  3.74*  1.91*   BUN  48*  42*  23   NA  141  140  146*   K  4.1  4.2  3.1*   CL  111*  110*  118*   CO2  18*  18*  15*   AGAP  12  12  13   GLU  91  85  87       LFTS:  Recent Labs      04/28/17   0400  04/27/17   0428  04/26/17   0330   TBILI  0.3  0.4  0.4   ALT  36  37  25   SGOT  25  27  23   AP  140*  133  87   TP  4.7*  4.8*  3.8*   ALB  2.0*  2.0*  1.7*       Microbiology:     All Micro Results     Procedure Component Value Units Date/Time    CULTURE, BLOOD [[id number]] Collected:  04/24/17 1450    Order Status:  Completed Specimen:  Blood from Blood Updated:  04/28/17 1013     Special Requests: PORT        Culture result: NO GROWTH 4 DAYS       CULTURE, BLOOD [[id number]] Collected:  04/23/17 1518    Order Status:  Completed Specimen:  Blood from Blood Updated:  04/28/17 1012     Special Requests: RIGHT ANTECUBITAL        Culture result: NO GROWTH 5 DAYS       CULTURE, BLOOD [[id number]] Collected:  04/23/17 1630    Order Status:  Completed Specimen:  Blood from Blood Updated:  04/28/17 1012     Special Requests: LEFT ANTECUBITAL        Culture result: NO GROWTH 5 DAYS       CULTURE, BODY FLUID Celeste Lorenzo STAIN [[id number]]     Order Status:  Sent Specimen:  Paracentesis Fluid     RESPIRATORY VIRAL PANEL, PCR [[id number]] Collected:  04/26/17 1507    Order Status:  Completed Specimen:  Respiratory sample Updated:  04/27/17 2335     Source NASOPHARYNGEAL        Influenza A NEGATIVE         Influenza B NEGATIVE         RSV A NEGATIVE         RSV B NEGATIVE         Parainfluenza 1 NEGATIVE         Parainfluenza 2 NEGATIVE         Parainfluenza 3 NEGATIVE         Rhinovirus NEGATIVE         Metapneumovirus NEGATIVE         Adenovirus NEGATIVE          (NOTE)  Performed At: [address] [id number]  Sindy Eid MD [id number]         INFLUENZA A & B AG (RAPID TEST) [[id number]] Collected:  04/26/17 1507    Order Status:  Completed Specimen:  Nasopharyngeal from Nasal washing Updated:  04/26/17 1539     Influenza A Ag NEGATIVE          NEGATIVE FOR THE PRESENCE OF INFLUENZA A ANTIGEN  INFECTION DUE TO INFLUENZA A CANNOT BE RULED OUT. BECAUSE THE ANTIGEN PRESENT IN THE SAMPLE MAY BE BELOW  THE DETECTION LIMIT OF THE TEST. A NEGATIVE TEST IS PRESUMPTIVE AND IT IS RECOMMENDED THAT THESE RESULTS BE CONFIRMED BY VIRAL CULTURE OR AN FDA-CLEARED INFLUENZA A AND B MOLECULAR ASSAY. Influenza B Ag NEGATIVE          NEGATIVE FOR THE PRESENCE OF INFLUENZA B ANTIGEN  INFECTION DUE TO INFLUENZA B CANNOT BE RULED OUT. BECAUSE THE ANTIGEN PRESENT IN THE SAMPLE MAY BE BELOW  THE DETECTION LIMIT OF THE TEST. A NEGATIVE TEST IS PRESUMPTIVE AND IT IS RECOMMENDED THAT THESE RESULTS BE CONFIRMED BY VIRAL CULTURE OR AN FDA-CLEARED INFLUENZA A AND B MOLECULAR ASSAY.          CULTURE, URINE [[id number]]  (Abnormal)  (Susceptibility) Collected:  04/23/17 1608    Order Status:  Completed Specimen:  Urine from Cath Urine Updated:  04/26/17 0744     Special Requests: NO SPECIAL REQUESTS        Culture result: [id number]  COLONIES/mL  PROTEUS MIRABILIS   (A)     INFLUENZA A & B AG (RAPID TEST) [[id number]] Collected:  04/23/17 1609    Order Status:  Completed Specimen:  Nasal washing Updated:  04/23/17 1643 Influenza A Ag NEGATIVE          NEGATIVE FOR THE PRESENCE OF INFLUENZA A ANTIGEN  INFECTION DUE TO INFLUENZA A CANNOT BE RULED OUT. BECAUSE THE ANTIGEN PRESENT IN THE SAMPLE MAY BE BELOW  THE DETECTION LIMIT OF THE TEST. A NEGATIVE TEST IS PRESUMPTIVE AND IT IS RECOMMENDED THAT THESE RESULTS BE CONFIRMED BY VIRAL CULTURE OR AN FDA-CLEARED INFLUENZA A AND B MOLECULAR ASSAY. Influenza B Ag NEGATIVE          NEGATIVE FOR THE PRESENCE OF INFLUENZA B ANTIGEN  INFECTION DUE TO INFLUENZA B CANNOT BE RULED OUT. BECAUSE THE ANTIGEN PRESENT IN THE SAMPLE MAY BE BELOW  THE DETECTION LIMIT OF THE TEST. A NEGATIVE TEST IS PRESUMPTIVE AND IT IS RECOMMENDED THAT THESE RESULTS BE CONFIRMED BY VIRAL CULTURE OR AN FDA-CLEARED INFLUENZA A AND B MOLECULAR ASSAY.          INFLUENZA A & B AG (RAPID TEST) [[id number]] Collected:  04/23/17 1530    Order Status:  Canceled Specimen:  Nasopharyngeal from Nasopharyngeal Updated:  04/23/17 1547          Imaging:   CXR negative    Signed By: Na Sandoval NP     April 28, 2017

## 2017-04-28 NOTE — PROGRESS NOTES
Full report from [address], ESTHER Bucio. Patient alert/oriented at time of shift change. Per MD Elida Ford at bedside, transfuse 1u PRBCs, titrate presser to keep MAP > 65. Patient denies any needs at current.

## 2017-04-28 NOTE — CONSULTS
[facility] consultation    We were asked to see the patient at the request of Dr. Antoine Magdaleno  for evaluation of acute kidney failure    Admission Date:  4/23/2017    Admission Diagnosis:  Sepsis [facility])    History of Present Illness:    Patient is a 63 y/o [place] female with hx of NHL, HTN presented with fevers and chills on 4/23. Patient was undergoing treatment with Bendeka/Rituxan/Neulast.  She completed the first cycle on 4/15. Her fevers have improved. She was initiated on multiple Abx including Vancomycin and Tobramycin. She had multiple doses of Vancomycin and looks like a single dose of Tobramycin. Her BPs have remained low. She also has decreasing urine output. She also has intermittent problems with diarrhea. Noted ACE-I prior to discharge. She has had 2 paracentesis procedures - the 2nd on 4/15 and the 1st in early April. They are planning another para today.     Past Medical History:   Diagnosis Date    Anemia     in high school, \"received gamma globulin twice a week for awhile\"    Arthritis     osteo-r knee    Basal cell carcinoma     Followed by Dermatology    Chronic pain     KNEEs    Hypertension 10/4/2011    medication    Morbid obesity ([address]     Murmur     \"had it my whole life\", did not appreciate murmur 9/12/2011 during assessment    Non Hodgkin's lymphoma ([address] 3/30/2017    Other ill-defined conditions     skin bumps-med as needed    Overactive bladder     Rheumatic fever     Possibly as a child    Shingles     Thromboembolus ([address] x2    LLE-calf-1990?-only took ASA-resolved in less than a wk-\"a little burned area-not examined\"    Thyroid disease     hypo-medication    Unspecified adverse effect of anesthesia     pt reports waking several times with knee surgery-spinal      Past Surgical History:   Procedure Laterality Date    [facility]. CHOLECYSTECTOMY [id number]      HX CHOLECYSTECTOMY  1983    HX ORTHOPAEDIC  2012    right TKA    HX ORTHOPAEDIC  2013    left TKA. Dr. Dumas [facility] [place].  VT ANESTH,ACHILLES TENDON SURG  2/10/2011    LEFT. Dr. Teja Ardon.        Current Facility-Administered Medications   Medication Dose Route Frequency    potassium chloride (K-DUR, KLOR-CON) SR tablet 20 mEq  20 mEq Oral BID    [START ON 4/29/2017] allopurinol (ZYLOPRIM) tablet 100 mg  100 mg Oral DAILY    0.9% sodium chloride infusion 250 mL  250 mL IntraVENous PRN    0.9% sodium chloride infusion 250 mL  250 mL IntraVENous PRN    loperamide (IMODIUM) capsule 2 mg  2 mg Oral PRN    diphenoxylate-atropine (LOMOTIL) tablet 1 Tab  1 Tab Oral Q4H PRN    0.9% sodium chloride infusion 250 mL  250 mL IntraVENous PRN    levoFLOXacin (LEVAQUIN) tablet 750 mg  750 mg Oral Q48H    sodium chloride (NS) flush 5-10 mL  5-10 mL IntraVENous PRN    piperacillin-tazobactam (ZOSYN) 3.375 g in 0.9% sodium chloride (MBP/ADV) 100 mL  3.375 g IntraVENous Q8H    aspirin chewable tablet 81 mg  81 mg Oral DAILY    HYDROcodone-acetaminophen (NORCO) 5-325 mg per tablet 1-2 Tab  1-2 Tab Oral Q4H PRN    levothyroxine (SYNTHROID) tablet 125 mcg  125 mcg Oral ACB    pantoprazole (PROTONIX) tablet 40 mg  40 mg Oral ACB    pregabalin (LYRICA) capsule 50 mg  50 mg Oral TID    sodium chloride (NS) flush 5 mL  5 mL InterCATHeter Q8H    sodium chloride (NS) flush 5-10 mL  5-10 mL InterCATHeter PRN    0.9% sodium chloride infusion  125 mL/hr IntraVENous CONTINUOUS    acetaminophen (TYLENOL) tablet 650 mg  650 mg Oral Q4H PRN    magic mouthwash (ALEXSANDER) oral suspension 5 mL  5 mL Oral Q4H PRN     No Known Allergies   Social History   Substance Use Topics    Smoking status: Never Smoker    Smokeless tobacco: Never Used    Alcohol use Yes      Comment: RARE, ONCE/TWICE A YEAR      Family History   Problem Relation Age of Onset    Post-op Nausea/Vomiting Other      X3    Breast Cancer Other 28     cousin    Kidney Disease Father      RENAL FAILURE    Other Son     Other Daughter     Other Maternal Grandmother Vascular Disorder with leg amputation    Liver Disease Sister      non-alcoholic    Celiac Disease Sister     Malignant Hyperthermia Neg Hx     Pseudocholinesterase Deficiency Neg Hx     Delayed Awakening Neg Hx     Emergence Delirium Neg Hx     Post-op Cognitive Dysfunction Neg Hx         Review of Systems:  Gen - fevers better, appetite poor  CV - no chest pain, no palpitation  Lung - noted shortness of breath, no cough  Abd - noted tenderness, no nausea/vomiting, noted diarrhea  Ext - noted edema    Objective:  Vitals:    04/28/17 0609 04/28/17 0634 04/28/17 0910 04/28/17 1146   BP: (!) 86/48 97/60 (!) 72/43 (!) 82/30   Pulse: 81 86 86 80   Resp:   22 20   Temp: 97.8 °F (36.6 °C) 97.9 °F (36.6 °C) 98.5 °F (36.9 °C) 98.3 °F (36.8 °C)   SpO2:   98% 98%   Weight:       Height:           Intake/Output Summary (Last 24 hours) at 04/28/17 1201  Last data filed at 04/28/17 0941   Gross per 24 hour   Intake             2830 ml   Output              300 ml   Net             2530 ml     Wt Readings from Last 3 Encounters:   04/28/17 128.8 kg (283 lb 14.4 oz)   04/15/17 124.6 kg (274 lb 9.6 oz)   04/14/17 127.3 kg (280 lb 9.6 oz)       GEN - in no distress, uncomfortable due to abdominal distention, noted conversational dyspnea  CV - S1, S2, no rub  Lung - clear anteriorly  Abd - distended  Ext - 2-3+ edema        Data Review:     Recent Labs      04/28/17   0915  04/28/17   0400  04/27/17   0758  04/27/17   0428   WBC  3.4*  3.4*   --   2.9*   HGB  7.3*  6.2*  6.0*  5.6*   HCT  22.1*  19.1*  18.5*  17.0*   PLT  40*  42*   --   50*        Recent Labs      04/28/17   0400  04/27/17   0428  04/26/17   0330   NA  141  140  146*   K  4.1  4.2  3.1*   CL  111*  110*  118*   CO2  18*  18*  15*   BUN  48*  42*  23   CREA  4.47*  3.74*  1.91*   CA  7.0*  7.1*  5.6*   GLU  91  85  87   MG  2.1  2.0  1.1*         Assessment/Plan:     1. MATI - oliguric. My concern in Abx in combination with hypoperfusion and developing ATN. Need to optimize hemodynamics. UA only showed some proteinuria. 2.  Anion Gap Metabolic Acidosis (in setting of hypoalbuminemia)    3. Anemia - ok with transfusion    4. Ascites - plans for para later today    Again, unfortunate female with MATI in the setting of hypotension and recent antibiotics. Fortunately, her renal function at baseline was normal.  Renal imaging reviewed and ok. My concern is another paracentesis today and ongoing hypotension, which will prevent renal recovery. She is becoming very edematous and in order to optimize hemodynamics, would favor moving to ICU and initiate vasopressor therapy. Extensive discussion with son both in the room with the patient as well as outside in the [place]. I discussed the case multiple times with Dr. Jf Pierce and Kary Waters. Thanks for the opportunity to see Ms. Michi Ramirez.

## 2017-04-28 NOTE — PROGRESS NOTES
Patient arrived to room from IR via stretcher and RN. Bedside report received from [facility]. Dual skin assessment completed by myself and Jeyson Mitchell RN. No skin breakdown noted. Blanchable redness noted to back area. Alleyvn placed to sacrum area.

## 2017-04-28 NOTE — PROGRESS NOTES
TRANSFER - OUT REPORT:    Verbal report given to Judith Hickey RN(name) on Kristal Ulrich  being transferred to CCU(unit) for change in patient condition(hypotension)       Report consisted of patients Situation, Background, Assessment and   Recommendations(SBAR). Information from the following report(s) SBAR, Kardex, Intake/Output, MAR and Recent Results was reviewed with the receiving nurse. Lines:   Venous Access Device Power Port 04/18/17 Upper chest (subclavicular area, right (Active)   Central Line Being Utilized Yes 4/28/2017  2:00 PM   Criteria for Appropriate Use Long term IV/antibiotic administration 4/28/2017  2:00 PM   Site Assessment Clean, dry, & intact 4/28/2017  2:00 PM   Date of Last Dressing Change 04/24/17 4/28/2017  2:00 PM   Dressing Status Clean, dry, & intact 4/28/2017  2:00 PM   Dressing Type Disk with Chlorhexadine gluconate (CHG); Transparent 4/28/2017  2:00 PM   Action Taken Blood drawn 4/28/2017  4:00 AM   Access Time (Medial Site) 1500 4/24/2017  3:15 PM   Access Needle Length (Medial Site) 0.75 inches 4/24/2017  3:15 PM   Positive Blood Return (Medial Site) Yes 4/28/2017  2:00 PM   Action Taken (Medial Site) Infusing 4/28/2017  2:00 PM   Alcohol Cap Used No 4/28/2017  2:00 PM        Opportunity for questions and clarification was provided.       Patient transported with:   O2 @ 2 liters

## 2017-04-28 NOTE — PROGRESS NOTES
B/p remains low after 500cc fluid bolus 83/53. .. Chales Minus Chales Minus Placed call to N/P Negin Lobo . .remains asymptomatic ok to transfuse blood when ready even with low B/P ......  No new orders

## 2017-04-28 NOTE — PROGRESS NOTES
TRANSFER - OUT REPORT:    Verbal report given to [address] on Lisa Mari  being transferred to [phone] (unit) for routine progression of care       Report consisted of patients Situation, Background, Assessment and   Recommendations(SBAR). Information from the following report(s) Procedure Summary was reviewed with the receiving nurse. Lines:   Venous Access Device Power Port 04/18/17 Upper chest (subclavicular area, right (Active)   Central Line Being Utilized Yes 4/28/2017  2:00 PM   Criteria for Appropriate Use Long term IV/antibiotic administration 4/28/2017  2:00 PM   Site Assessment Clean, dry, & intact 4/28/2017  2:00 PM   Date of Last Dressing Change 04/24/17 4/28/2017  2:00 PM   Dressing Status Clean, dry, & intact 4/28/2017  2:00 PM   Dressing Type Disk with Chlorhexadine gluconate (CHG); Transparent 4/28/2017  2:00 PM   Action Taken Blood drawn 4/28/2017  4:00 AM   Access Time (Medial Site) 1500 4/24/2017  3:15 PM   Access Needle Length (Medial Site) 0.75 inches 4/24/2017  3:15 PM   Positive Blood Return (Medial Site) Yes 4/28/2017  2:00 PM   Action Taken (Medial Site) Infusing 4/28/2017  2:00 PM   Alcohol Cap Used No 4/28/2017  2:00 PM        Opportunity for questions and clarification was provided.       Patient transported with:   O2 @ 2 liters

## 2017-04-28 NOTE — PROGRESS NOTES
[facility]        Inpatient Hematology / Oncology Progress Note      Admission Date: 2017  2:56 PM  Reason for Admission/Hospital Course: Sepsis ([address]      24 Hour Events:  Afebrile x 24 hours  UCx - +proteus mirabilis  On Zosyn/LVQ  BCx - NGTD  Reports 5 episodes of loose stools yesterday, none today      ROS:  Constitutional: + fatigue; Negative for fever, chills  CV: Negative for chest pain, palpitations, edema. Respiratory: +cough, dyspnea; negative for wheezing. GI: +abdominal pain, diarrhea    10 point review of systems is otherwise negative with the exception of the elements mentioned above in the HPI. No Known Allergies    OBJECTIVE:  Patient Vitals for the past 8 hrs:   BP Temp Pulse Resp SpO2 Weight   17 0634 97/60 97.9 °F (36.6 °C) 86 - - -   17 0609 (!) 86/48 97.8 °F (36.6 °C) 81 - - -   17 0533 (!) 78/41 - - - - -   17 0509 (!) 73/47 98.3 °F (36.8 °C) 83 - 96 % -   17 0455 93/53 98.3 °F (36.8 °C) 84 20 94 % -   17 0409 97/52 98.3 °F (36.8 °C) 85 20 95 % 283 lb 14.4 oz (128.8 kg)     Temp (24hrs), Av.8 °F (37.1 °C), Min:97.8 °F (36.6 °C), Max:100.7 °F (38.2 °C)    701 -  1900  In: 277 [P.O.:100; I.V.:177]  Out: -     Physical Exam:  Constitutional: Ill-appearing female in no acute distress, sitting comfortably in chair   HEENT: Normocephalic and atraumatic. Oropharynx is clear, mucous membranes are moist. Neck supple without JVD. Lymph node   Deferred   Skin +pallor - better today. Warm and dry. No bruising and no rash noted. No erythema. Respiratory +dyspneic with activity. Lungs are clear to auscultation bilaterally without wheezes, rales or rhonchi, normal air exchange without accessory muscle use. CVS Normal rate, regular rhythm and normal S1 and S2. No murmurs, gallops, or rubs. Abdomen +distention/ascites- worsening. Nontender with palpation, normoactive bowel sounds. No palpable mass.   No hepatosplenomegaly. Neuro Grossly nonfocal with no obvious sensory or motor deficits. MSK 3+ pitting edema to BLE. Normal range of motion in general.  No tenderness. Psych Appropriate mood and affect. Right chest port without erythema, edema. Mild tenderness with palpation (newly placed port)    Labs:      Recent Labs      04/28/17   0400  04/27/17   0758  04/27/17   0428  04/26/17   0330   WBC  3.4*   --   2.9*  2.5*   RBC  2.30*   --   2.00*  3.45*   HGB  6.2*  6.0*  5.6*  9.7*   HCT  19.1*  18.5*  17.0*  29.7*   MCV  83.0   --   85.0  86.1   MCH  27.0   --   28.0  28.1   MCHC  32.5   --   32.9  32.7   RDW  21.0*   --   20.5*  20.2*   PLT  42*   --   50*  36*   GRANS  90*   --   91*  88*   LYMPH  5*   --   6*  6*   MONOS  4   --   2*  3*   EOS  1   --   1  2   BASOS  0   --   0  0   IG  0.3   --   0.3  0.6   DF  AUTOMATED   --   AUTOMATED  AUTOMATED   ANEU  3.0   --   2.7  1.6*   ABL  0.2*   --   0.2*  0.1*   ABM  0.1   --   0.1  0.1   HOMERO  0.0   --   0.0  0.0   ABB  0.0   --   0.0  0.0   AIG  0.0   --   0.0  0.0        Recent Labs      04/28/17   0400  04/27/17   0428  04/26/17   0330   NA  141  140  146*   K  4.1  4.2  3.1*   CL  111*  110*  118*   CO2  18*  18*  15*   AGAP  12  12  13   GLU  91  85  87   BUN  48*  42*  23   CREA  4.47*  3.74*  1.91*   GFRAA  13*  16*  35*   GFRNA  11*  13*  29*   CA  7.0*  7.1*  5.6*   SGOT  25  27  23   AP  140*  133  87   TP  4.7*  4.8*  3.8*   ALB  2.0*  2.0*  1.7*   GLOB  2.7  2.8  2.1*   AGRAT  0.7*  0.7*  0.8*   MG  2.1  2.0  1.1*         Imaging:  Portable chest x-ray. 4/23/2017      CLINICAL INDICATION: Sepsis, fever      FINDINGS: Single AP view of the chest submitted without comparison shows mild  atelectasis in the left lower lung otherwise the lungs are clear. A right-sided  chest port is in place. The cardiac silhouette and mediastinum are unremarkable.     IMPRESSION: Mild left lower lung atelectasis, otherwise no acute abnormality. RENAL ULTRASOUND. 4/27/2017     HISTORY: Acute Renal Failure.     COMPARISON: None.     TECHNIQUE: Direct skin contact sector 3-5 MHz images of the kidneys are  obtained.     FINDINGS: Renal echogenicity within normal limits, the right kidney is  hypoechoic compared to the liver.     Right kidney: 12.2 cm in length. No hydronephrosis.     Left kidney: Also 12.2 cm in length . No hydronephrosis.     Lateral obscured by bowel gas and probably undistended.     IMPRESSION: Unremarkable renal ultrasound. CT abdomen and pelvis without contrast 04/27/2017     History: acute kidney failure; worsening ascites.     Technique: Helically acquired images were obtained from the upper abdomen to the  ischial tuberosities reconstructed at 5mm intervals after oral contrast only. Intravenous contrast was not administered due to elevated creatinine. Coronal  reformatted images were submitted.     Radiation dose reduction techniques were used for this study: Our CT scanners  use one or all of the following: Automated exposure control, adjustment of the  mA and/or kVp according to patient's size, iterative reconstruction.     Comparison: 03/22/2017 from [facility] system     CT abdomen: There is a small left pleural effusion, unchanged. The liver is  grossly unremarkable on this noncontrast study. The low density lesion seen  previously is no longer apparent. The spleen is markedly enlarged although less  pronounced than seen previously where was measured at up to 23.4 cm in length  compared to 19.3 cm on today's study. The pancreas pancreas and adrenal glands  are grossly unremarkable on this contrast study.      There is loss of the typical fat density within the juan although this may be  less pronounced than seen previously. Periaortic adenopathy is also diminished  measuring approximately 2.9 x 2.8 cm compared to 3.9 x 4.1 cm. There is a  moderate amount of abdominal ascites with minimal change.  There is diffuse  subcutaneous edema.     CT PELVIS: There is a large amount of pelvic ascites with minimal change. No  adenopathy is present. There is edema within the subcutaneous tissues.     IMPRESSION:  1. Overall moderate amounts of abdominal and pelvic ascites with minimal change.     2. Interval improvement in splenomegaly as well as retroperitoneal adenopathy. The low density lesion within the liver also is no longer evident although the  lack of intravenous contrast does not allow for accurate comparison. 3. Abnormal appearance of the renal juan which have also shown apparent  improvement although there is less than optimal evaluation the absence of  intravenous contrast as was administered on the comparison study  4. Small left pleural effusion, unchanged.     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 [facility]) ICD-10-CM: C85.88  ICD-9-CM: 200.38  4/7/2017        Non Hodgkin's lymphoma ([facility] ICD-10-CM: C85.90  ICD-9-CM: 202.80  3/30/2017        Ascites ICD-10-CM: R18.8  ICD-9-CM: 789.59  3/30/2017        Lymphadenopathy, generalized ICD-10-CM: R59.1  ICD-9-CM: 785.6  3/30/2017        Osteoarthritis of left knee ICD-10-CM: M17.12  ICD-9-CM: 715.96  8/26/2013        Morbid obesity ([facility] ICD-10-CM: E66.01  ICD-9-CM: 278.01  10/4/2011        History of DVT of lower extremity ICD-10-CM: F69.437  ICD-9-CM: V12.51  10/4/2011    Overview Signed 10/4/2011  1:44 AM by Inge Bahena NP     X 2               Hypertension ICD-10-CM: I10  ICD-9-CM: 401.9  10/4/2011    Overview Signed 10/4/2011  1:44 AM by Elbridge Shruti, NP     PRE OP             Heart murmur ICD-10-CM: R01.1  ICD-9-CM: 785.2  10/4/2011        Osteoarthritis of right knee ICD-10-CM: M17.11  ICD-9-CM: 715.96  10/3/2011        S/P total knee replacement using cement ICD-10-CM: Z96.659  ICD-9-CM: V43.65  10/3/2011            Ms. Edith Mireles is a 63 yo female with NGL. Admitted for sepsis. PLAN:  NHL  4/24 Completed cycle 1 of Bendeka/Rituxan/Neulasta on 4/15. Next cycle scheduled for 5/11.     Fever/Sepsis  4/24 Still febrile. BCx & UCx -NGTD. Flu neg. CXR neg. Con't vanc/zosyn. BCx were not drawn from port originally. Will go ahead and order BCx to be drawn from port. 4/25  Temp spike 102.7 with BP 64/43. 1L fluid bolus improved /60. Add tobramycin. BCx including one from port - NGTD. UCx +12,000 gram neg rods, final report pending. 4/26 Tmax 103. BCx-NGTD. UCx + for proteus mirabilis susceptible to current antibx regimen. ID consulted for persistent fever. Possible noninfectious etiologies of fever as well, lymphoma vs neutrophil recovery. 4/27 Fevers con't. ID following - \"clincialy pt fabiana has a viral illness - check flu swab again given sampling issues as well as RVP (this will take a week to come back unfortunately). CT A/P and then likely a paracentesis to r/o infction. Switch tobra to levoquin to rx for atypical PNA pathogens. Cont vanc/zosyn for now. Routine HIV, HCV screens. PCT in 3s expected with mild CKD. \"  CT A/P unable to be completed due to elevated creatinine. Ordered CT A/P without contrast.  DC vanc due to decreased renal fx and vanc trough 35.2. Con't Zosyn/LVQ. 4/28 Afebrile x 24 hours. ID following. Con't on Zosyn/LVQ. Paracentesis scheduled to r/o infx as well as to drain accumulating fluid. Hypotension  4/26  Continues to drop BP  down to 72/49 - improved to 91/51. Hold lisinopril. 4/27 Still having hypotensive episodes - possibly happening at home and were just going unnoticed? Stephannie Goodpasture in bathroom today. Denies LOC, injury, or pain. OOB with assistance only (if not hypotensive). 4/28 Most likely related to decreasing kidney fx. Nephrology consulted.     Electrolyte imbalance secondary to chemo  4/26  Mg+ 1.1, Ca+ 5.6. Replace with Ca+ gluconate 4g IV and Mg+ sulfate 4g IV.  4/27 Improved with replacement Mg+2.0 Ca+7.1 (corrected Ca+8.7)    Pancytopenia secondary to chemo  4/26  WBC 2.5 Hgb 9.7 Plt 36,000. No transfusions needed today. D/C heparin. Will con't to monitor. 4/27  WBC 2.9 Hgb 5.6 Plt 50,000. Repeat Hgb 6.0. Transfuse 2 units PRBCs.  4/28  Counts improved after transfusion. WBC 3.4 Hgb 7.3 Plt 40,000. Renal Fx  4/26  Renal fx is acutely worsening. BUN 23 Cr 1.91. Pharmacist checking vanc trough prior to the next scheduled dose and will adjust as necessary. Will continue to follow trend in renal fx and make adjustments if renal fx con't to worsen. ID consulted, will appreciate their recommendations. 4/27  BUN 42, Cr 3.74, GFR 13. Worsening renal fx due to vanc/tobra? DC vanc due to decreased renal fx and vanc trough 35.2. Ultrasound, CT A/P WO contrast, urine sodium, urine creatinine, cortisol, and uric acid. 4/28  Kidney fx continues to decline. BUN 48 Cr 4.47 GFR 11. Nephrology consulted. Pt had previous hx of mild CKD. Possibility that antibx caused the decline in fx? Decrease Allopurinol to 100mg daily as it can affect renal fx as well - AHS? Renal US unremarkable. CT A/P unrevealing. Nephrology consulted. Ascites  - Paracentesis (4/3/17, 4/14/17)   4/28 IR consulted for paracentesis. Unsure if this will be able to be performed due to hypotension. Diarrhea  4/28 Reports 5 episodes of loose stools yesterday. None today. Will check stool studies, c diff if diarrhea returns.     DVT prophylaxis: SCDs.           Taj Chowdary NP   [facility]  [address]  Office : [phone]  Fax : [phone]

## 2017-04-28 NOTE — PROGRESS NOTES
Bedside and Verbal shift change report given to Isela Ugarte RN (oncoming nurse) by Wai Tanner RN (offgoing nurse).  Report included the following information SBAR, Kardex, ED Summary, Procedure Summary, Intake/Output, MAR, Recent Results, Med Rec Status and Cardiac Rhythm SR.

## 2017-04-28 NOTE — CONSULTS
CONSULT NOTE    Francisco Snider    4/28/2017    Date of Admission:  4/23/2017    The patient's chart is reviewed and the patient is discussed with the staff. Subjective:     Patient is a 62 y.o.  female seen and evaluated at the request of Dr. Alyce Lopez for the evaluation of sepsis.   She is a 63 y/o [place] female with hx of NHL, HTN presented with fevers and chills on 4/23. Patient was  Undergoing  treatment with Bendeka/Rituxan/Neulast. She completed the first cycle on 4/15. Her fevers have improved. She is followed by ID and on Vanco ,zosyn and LEvaquyin . She had paracentesis today for the 3rd time. She has been bordeline hypotensive and also developed acute renal failure.    She has not been febrile last 24 hours. She reports she feels better after paracentesis. She denies SOB. Review of Systems  A comprehensive review of systems was negative except for that written in the HPI. 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 Prior to Admission Medications   Prescriptions Last Dose Informant Patient Reported? Taking? HYDROcodone-acetaminophen (NORCO) 5-325 mg per tablet   No No   Sig: Take 1-2 Tabs by mouth every four (4) hours as needed for Pain. Max Daily Amount: 12 Tabs. allopurinol (ZYLOPRIM) 300 mg tablet   No No   Sig: Take 1 Tab by mouth two (2) times a day.    aspirin 81 mg chewable tablet   Yes No   Sig: Take 81 mg by mouth daily.   cpm-phenyleph-acetaminophen (NOREL AD) 4- mg tab   No No   Sig: Take 1 Tab by mouth every eight (8) hours as needed for Other (cough/congestion). levothyroxine (SYNTHROID) 125 mcg tablet   No No   Sig: Take 1 Tab by mouth Daily (before breakfast). lidocaine-prilocaine (EMLA) topical cream   No No   Sig: Apply  to affected area as needed for Pain. Apply to port site 45-60 minutes prior to lab appt or infusion. lisinopril (PRINIVIL, ZESTRIL) 20 mg tablet   No No   Sig: Take 1 Tab by mouth daily. magic mouthwash (ALEXSANDER) susp   No No   Sig: Take 10 mL by mouth every four (4) hours as needed. nystatin (MYCOSTATIN) powder   No No   Sig: Apply  to affected area three (3) times daily. omeprazole (PRILOSEC) 20 mg capsule   No No   Sig: Take 1 Cap by mouth daily. ondansetron hcl (ZOFRAN, AS HYDROCHLORIDE,) 4 mg tablet   No No   Sig: Take 1 Tab by mouth every eight (8) hours as needed for Nausea. pregabalin (LYRICA) 50 mg capsule   No No   Sig: Take 1 Cap by mouth three (3) times daily. Max Daily Amount: 150 mg.   promethazine (PHENERGAN) 25 mg tablet   Yes No   Sig: Take 25 mg by mouth every six (6) hours as needed for Nausea.  Unsure of dose      Facility-Administered Medications: None       Past Medical History:   Diagnosis Date    Anemia     in high school, \"received gamma globulin twice a week for awhile\"    Arthritis     osteo-r knee    Basal cell carcinoma     Followed by Dermatology    Chronic pain     KNEEs    Hypertension 10/4/2011    medication    Morbid obesity ([address]     Murmur     \"had it my whole life\", did not appreciate murmur 9/12/2011 during assessment    Non Hodgkin's lymphoma ([address] 3/30/2017    Other ill-defined conditions     skin bumps-med as needed    Overactive bladder     Rheumatic fever     Possibly as a child    Shingles     Thromboembolus ([address] x2    LLE-calf-1990?-only took ASA-resolved in less than a wk-\"a little burned area-not examined\"    Thyroid disease     hypo-medication    Unspecified adverse effect of anesthesia     pt reports waking several times with knee surgery-spinal     Past Surgical History:   Procedure Laterality Date    [facility] CHOLECYSTECTOMY [id number]      HX CHOLECYSTECTOMY  1983    HX ORTHOPAEDIC  2012    right TKA    HX ORTHOPAEDIC  2013    left TKA. Dr. Joan Mills.  AR ANESTH,ACHILLES TENDON SURG  2/10/2011    LEFT. Dr. Tanna Hardy. Social History     Social History    Marital status:      Spouse name: N/A    Number of children: N/A    Years of education: N/A     Occupational History    Not on file. Social History Main Topics    Smoking status: Never Smoker    Smokeless tobacco: Never Used    Alcohol use Yes      Comment: RARE, ONCE/TWICE A YEAR    Drug use: Yes     Special: Prescription    Sexual activity: Not on file     Other Topics Concern    Not on file     Social History Narrative    10/3/11:  PATIENT IS  TO Kevin Ramirez. SHE IS DISABLED.        Family History   Problem Relation Age of Onset    Post-op Nausea/Vomiting Other      X3    Breast Cancer Other 28     cousin    Kidney Disease Father      RENAL FAILURE    Other Son     Other Daughter     Other Maternal Grandmother      Vascular Disorder with leg amputation    Liver Disease Sister      non-alcoholic    Celiac Disease Sister     Malignant Hyperthermia Neg Hx     Pseudocholinesterase Deficiency Neg Hx     Delayed Awakening Neg Hx     Emergence Delirium Neg Hx     Post-op Cognitive Dysfunction Neg Hx      No Known Allergies    Current Facility-Administered Medications   Medication Dose Route Frequency    potassium chloride (K-DUR, KLOR-CON) SR tablet 20 mEq  20 mEq Oral BID    [START ON 4/29/2017] allopurinol (ZYLOPRIM) tablet 100 mg  100 mg Oral DAILY    0.9% sodium chloride infusion 250 mL  250 mL IntraVENous PRN    piperacillin-tazobactam (ZOSYN) 3.375 g in 0.9% sodium chloride (MBP/ADV) 100 mL  3.375 g IntraVENous Q12H    vancomycin (VANCOCIN) 1500 mg in  ml infusion  1,500 mg IntraVENous See Admin Instructions    VANCOMYCIN RANDOM LEVEL REMINDER   Other ONCE    NOREPINephrine (LEVOPHED) 8,000 mcg in dextrose 5% 250 mL infusion  2-16 mcg/min IntraVENous TITRATE    loperamide (IMODIUM) capsule 2 mg  2 mg Oral PRN    diphenoxylate-atropine (LOMOTIL) tablet 1 Tab  1 Tab Oral Q4H PRN    levoFLOXacin (LEVAQUIN) tablet 750 mg  750 mg Oral Q48H    sodium chloride (NS) flush 5-10 mL  5-10 mL IntraVENous PRN    aspirin chewable tablet 81 mg  81 mg Oral DAILY    HYDROcodone-acetaminophen (NORCO) 5-325 mg per tablet 1-2 Tab  1-2 Tab Oral Q4H PRN    levothyroxine (SYNTHROID) tablet 125 mcg  125 mcg Oral ACB    pantoprazole (PROTONIX) tablet 40 mg  40 mg Oral ACB    pregabalin (LYRICA) capsule 50 mg  50 mg Oral TID    sodium chloride (NS) flush 5 mL  5 mL InterCATHeter Q8H    0.9% sodium chloride infusion  125 mL/hr IntraVENous CONTINUOUS    acetaminophen (TYLENOL) tablet 650 mg  650 mg Oral Q4H PRN    magic mouthwash (ALEXSANDER) oral suspension 5 mL  5 mL Oral Q4H PRN         Objective:     Vitals:    04/28/17 1616 04/28/17 1646 04/28/17 1700 04/28/17 1715   BP: 109/76 (!) 79/45 95/48 (!) 82/49   Pulse: 70 91 95 88   Resp: 20 23 26 24   Temp:       SpO2: 96% 99% 100% 100%   Weight:       Height:           PHYSICAL EXAM     Constitutional:  the patient is well developed and in no acute distress  EENMT:  Sclera clear, pupils equal, oral mucosa moist  Respiratory: diminished   Cardiovascular:  RRR without M,G,R  Gastrointestinal: soft and non-tender; with positive bowel sounds. ,ascites   Musculoskeletal: warm without cyanosis. There is plus 2 lower leg edema.   Skin:  no jaundice positive for  rashes, no wounds   Neurologic: no gross neuro deficits     Psychiatric:  alert and oriented x 3    Chest X-ray:        Recent Labs      04/28/17   0915  04/28/17   0400  04/27/17   0758  04/27/17   0428  04/26/17   0330 WBC  3.4*  3.4*   --   2.9*  2.5*   HGB  7.3*  6.2*  6.0*  5.6*  9.7*   HCT  22.1*  19.1*  18.5*  17.0*  29.7*   PLT  40*  42*   --   50*  36*     Recent Labs      04/28/17   0400  04/27/17   0428  04/26/17   0330   NA  141  140  146*   K  4.1  4.2  3.1*   CL  111*  110*  118*   GLU  91  85  87   CO2  18*  18*  15*   BUN  48*  42*  23   CREA  4.47*  3.74*  1.91*   MG  2.1  2.0  1.1*   CA  7.0*  7.1*  5.6*   ALB  2.0*  2.0*  1.7*   TBILI  0.3  0.4  0.4   ALT  36  37  25   SGOT  25  27  23         Assessment:  (Medical Decision Making)     Hospital Problems  Date Reviewed: 4/7/2017          Codes Class Noted POA    Hypotension may need pressors  ICD-10-CM: I95.9  ICD-9-CM: 458.9  4/28/2017 Unknown        Acute renal failure (ARF) (HCC) followed by renal  ICD-10-CM: N17.9  ICD-9-CM: 584.9  4/28/2017 Unknown        * (Principal)Sepsis ([facility] ICD-10-CM: A41.9  ICD-9-CM: 038.9, 995.91  4/23/2017 Yes        Hypomagnesemia ICD-10-CM: E83.42  ICD-9-CM: 275.2  4/23/2017 Yes        Non Hodgkin's lymphoma ([facility]   per oncology  ICD-10-CM: C85.90  ICD-9-CM: 202.80  3/30/2017 Yes        Ascites ICD-10-CM: R18.8  ICD-9-CM: 789.59  3/30/2017 Yes        Morbid obesity (HCC) ICD-10-CM: E66.01  ICD-9-CM: 278.01  10/4/2011 Yes              Plan:  (Medical Decision Making)     --continue ABX per ID   - pressors if needed for hypotension  - replace lytes  - transfuse if needed  - recheck CXR -not done in few days      More than 50% of the time documented was spent in face-to-face contact with the patient and in the care of the patient on the floor/unit where the patient is located. Thank you very much for this referral.  We appreciate the opportunity to participate in this patient's care. Will follow along with above stated plan.     Lizeth Mantilla MD

## 2017-04-28 NOTE — PROGRESS NOTES
Interventional Radiology Post Paracentesis/Thoracentesis Note    4/28/2017    Procedure(s): Ultrasound guided Diagnostic Paracentesis Performed with 8 Italian catheter total volume 3650 ml. Samples sent to lab. Preliminary Findings: moderate cloudy and dark. Complications: None    Plan:  Observation, check labs if drawn.           Chest X-Ray:  no    Full dictated report to follow    Signed By: Rocio Mead RN

## 2017-04-28 NOTE — PROGRESS NOTES
This Rn attempted to call report on pt going to room 3303 two times. Unable to give hand off report. Rn receiving pt to call this RN back at their earliest convenience.

## 2017-04-29 ENCOUNTER — APPOINTMENT (OUTPATIENT)
Dept: GENERAL RADIOLOGY | Age: 57
DRG: 871 | End: 2017-04-29
Attending: INTERNAL MEDICINE
Payer: COMMERCIAL

## 2017-04-29 LAB
ABO + RH BLD: NORMAL
ALBUMIN SERPL BCP-MCNC: 1.8 G/DL (ref 3.5–5)
ALBUMIN/GLOB SERPL: 0.6 {RATIO} (ref 1.2–3.5)
ALP SERPL-CCNC: 231 U/L (ref 50–136)
ALT SERPL-CCNC: 44 U/L (ref 12–65)
ANION GAP BLD CALC-SCNC: 12 MMOL/L (ref 7–16)
AST SERPL W P-5'-P-CCNC: 33 U/L (ref 15–37)
BACTERIA SPEC CULT: NORMAL
BASOPHILS # BLD AUTO: 0 K/UL (ref 0–0.2)
BASOPHILS # BLD: 0 % (ref 0–2)
BILIRUB SERPL-MCNC: 0.4 MG/DL (ref 0.2–1.1)
BLD PROD TYP BPU: NORMAL
BLOOD GROUP ANTIBODIES SERPL: NORMAL
BPU ID: NORMAL
BUN SERPL-MCNC: 55 MG/DL (ref 6–23)
CALCIUM SERPL-MCNC: 7.1 MG/DL (ref 8.3–10.4)
CHLORIDE SERPL-SCNC: 114 MMOL/L (ref 98–107)
CO2 SERPL-SCNC: 17 MMOL/L (ref 21–32)
CORTIS AM PEAK SERPL-MCNC: 12.7 UG/DL (ref 7–25)
CREAT SERPL-MCNC: 4.35 MG/DL (ref 0.6–1)
CROSSMATCH RESULT,%XM: NORMAL
DIFFERENTIAL METHOD BLD: ABNORMAL
EOSINOPHIL # BLD: 0.1 K/UL (ref 0–0.8)
EOSINOPHIL NFR BLD: 2 % (ref 0.5–7.8)
ERYTHROCYTE [DISTWIDTH] IN BLOOD BY AUTOMATED COUNT: 19.8 % (ref 11.9–14.6)
GLOBULIN SER CALC-MCNC: 3 G/DL (ref 2.3–3.5)
GLUCOSE SERPL-MCNC: 133 MG/DL (ref 65–100)
HCT VFR BLD AUTO: 26.4 % (ref 35.8–46.3)
HGB BLD-MCNC: 8.8 G/DL (ref 11.7–15.4)
IMM GRANULOCYTES # BLD: 0 K/UL (ref 0–0.5)
IMM GRANULOCYTES NFR BLD AUTO: 0.2 % (ref 0–5)
LDH FLD L TO P-CCNC: 129 U/L
LYMPHOCYTES # BLD AUTO: 5 % (ref 13–44)
LYMPHOCYTES # BLD: 0.2 K/UL (ref 0.5–4.6)
MAGNESIUM SERPL-MCNC: 2 MG/DL (ref 1.8–2.4)
MCH RBC QN AUTO: 27.6 PG (ref 26.1–32.9)
MCHC RBC AUTO-ENTMCNC: 33.3 G/DL (ref 31.4–35)
MCV RBC AUTO: 82.8 FL (ref 79.6–97.8)
MONOCYTES # BLD: 0.2 K/UL (ref 0.1–1.3)
MONOCYTES NFR BLD AUTO: 5 % (ref 4–12)
NEUTS SEG # BLD: 4.1 K/UL (ref 1.7–8.2)
NEUTS SEG NFR BLD AUTO: 88 % (ref 43–78)
PLATELET # BLD AUTO: 55 K/UL (ref 150–450)
PMV BLD AUTO: 10.8 FL (ref 10.8–14.1)
POTASSIUM SERPL-SCNC: 4.4 MMOL/L (ref 3.5–5.1)
PROT FLD-MCNC: 2.6 G/DL
PROT SERPL-MCNC: 4.8 G/DL (ref 6.3–8.2)
RBC # BLD AUTO: 3.19 M/UL (ref 4.05–5.25)
SERVICE CMNT-IMP: NORMAL
SODIUM SERPL-SCNC: 143 MMOL/L (ref 136–145)
SPECIMEN EXP DATE BLD: NORMAL
SPECIMEN SOURCE FLD: NORMAL
SPECIMEN SOURCE FLD: NORMAL
STATUS OF UNIT,%ST: NORMAL
UNIT DIVISION, %UDIV: 0
URATE SERPL-MCNC: 4.4 MG/DL (ref 2.6–6)
WBC # BLD AUTO: 4.7 K/UL (ref 4.3–11.1)

## 2017-04-29 PROCEDURE — 84550 ASSAY OF BLOOD/URIC ACID: CPT | Performed by: NURSE PRACTITIONER

## 2017-04-29 PROCEDURE — 74011250636 HC RX REV CODE- 250/636: Performed by: NURSE PRACTITIONER

## 2017-04-29 PROCEDURE — 82533 TOTAL CORTISOL: CPT | Performed by: NURSE PRACTITIONER

## 2017-04-29 PROCEDURE — 74011000250 HC RX REV CODE- 250: Performed by: INTERNAL MEDICINE

## 2017-04-29 PROCEDURE — 85025 COMPLETE CBC W/AUTO DIFF WBC: CPT | Performed by: NURSE PRACTITIONER

## 2017-04-29 PROCEDURE — 83735 ASSAY OF MAGNESIUM: CPT | Performed by: NURSE PRACTITIONER

## 2017-04-29 PROCEDURE — 36591 DRAW BLOOD OFF VENOUS DEVICE: CPT

## 2017-04-29 PROCEDURE — 74011000258 HC RX REV CODE- 258: Performed by: INTERNAL MEDICINE

## 2017-04-29 PROCEDURE — 30233N1 TRANSFUSION OF NONAUTOLOGOUS RED BLOOD CELLS INTO PERIPHERAL VEIN, PERCUTANEOUS APPROACH: ICD-10-PCS | Performed by: INTERNAL MEDICINE

## 2017-04-29 PROCEDURE — 65620000000 HC RM CCU GENERAL

## 2017-04-29 PROCEDURE — 74011250637 HC RX REV CODE- 250/637: Performed by: INTERNAL MEDICINE

## 2017-04-29 PROCEDURE — 74011250636 HC RX REV CODE- 250/636: Performed by: INTERNAL MEDICINE

## 2017-04-29 PROCEDURE — 71010 XR CHEST SNGL V: CPT

## 2017-04-29 PROCEDURE — 74011250637 HC RX REV CODE- 250/637: Performed by: NURSE PRACTITIONER

## 2017-04-29 PROCEDURE — 80053 COMPREHEN METABOLIC PANEL: CPT | Performed by: NURSE PRACTITIONER

## 2017-04-29 PROCEDURE — 99232 SBSQ HOSP IP/OBS MODERATE 35: CPT | Performed by: INTERNAL MEDICINE

## 2017-04-29 RX ORDER — LORAZEPAM 1 MG/1
1 TABLET ORAL
Status: DISCONTINUED | OUTPATIENT
Start: 2017-04-29 | End: 2017-05-02 | Stop reason: HOSPADM

## 2017-04-29 RX ADMIN — SODIUM CHLORIDE 125 ML/HR: 900 INJECTION, SOLUTION INTRAVENOUS at 14:10

## 2017-04-29 RX ADMIN — PANTOPRAZOLE SODIUM 40 MG: 40 TABLET, DELAYED RELEASE ORAL at 07:17

## 2017-04-29 RX ADMIN — POTASSIUM CHLORIDE 20 MEQ: 20 TABLET, EXTENDED RELEASE ORAL at 17:33

## 2017-04-29 RX ADMIN — HYDROCODONE BITARTRATE AND ACETAMINOPHEN 1 TABLET: 5; 325 TABLET ORAL at 07:18

## 2017-04-29 RX ADMIN — LEVOTHYROXINE SODIUM 125 MCG: 125 TABLET ORAL at 07:18

## 2017-04-29 RX ADMIN — PIPERACILLIN SODIUM,TAZOBACTAM SODIUM 3.38 G: 3; .375 INJECTION, POWDER, FOR SOLUTION INTRAVENOUS at 09:48

## 2017-04-29 RX ADMIN — ALLOPURINOL 100 MG: 100 TABLET ORAL at 09:48

## 2017-04-29 RX ADMIN — PREGABALIN 50 MG: 50 CAPSULE ORAL at 21:02

## 2017-04-29 RX ADMIN — PIPERACILLIN SODIUM,TAZOBACTAM SODIUM 3.38 G: 3; .375 INJECTION, POWDER, FOR SOLUTION INTRAVENOUS at 20:39

## 2017-04-29 RX ADMIN — Medication 5 ML: at 13:33

## 2017-04-29 RX ADMIN — NOREPINEPHRINE BITARTRATE 5 MCG/MIN: 1 INJECTION INTRAVENOUS at 13:32

## 2017-04-29 RX ADMIN — POTASSIUM CHLORIDE 20 MEQ: 20 TABLET, EXTENDED RELEASE ORAL at 09:50

## 2017-04-29 RX ADMIN — PREGABALIN 50 MG: 50 CAPSULE ORAL at 09:49

## 2017-04-29 RX ADMIN — Medication 5 ML: at 05:08

## 2017-04-29 RX ADMIN — PREGABALIN 50 MG: 50 CAPSULE ORAL at 15:37

## 2017-04-29 RX ADMIN — Medication 5 ML: at 21:06

## 2017-04-29 RX ADMIN — SODIUM CHLORIDE 125 ML/HR: 900 INJECTION, SOLUTION INTRAVENOUS at 06:19

## 2017-04-29 NOTE — PROGRESS NOTES
Patient complains of some pain to the RUE. Extremity observed to possibly have more swelling than LUE. Patient with + radial pulse, brisk cap refill. Patient with no IV or line in that arm. BP cuff changed to LUE- will pass on to oncoming shift.

## 2017-04-29 NOTE — PROGRESS NOTES
Bedside and Verbal shift change report given to Dinorah Armstrong RN (oncoming nurse) by Justin Jackson RN (offgoing nurse). Report included the following information SBAR, Kardex, Procedure Summary, Intake/Output, MAR, Recent Results and Cardiac Rhythm SR. Levophed infusing at 2mcg/min. MAP remains marginal.  Oncoming RN aware.

## 2017-04-29 NOTE — PROGRESS NOTES
Bedside, Verbal and Written shift change report given to RN Arron Dorantes (oncoming nurse) by Darryn Hylton (offgoing nurse). Report included the following information SBAR, Kardex, ED Summary, Intake/Output, MAR, Recent Results and Cardiac Rhythm NSR. We discussed patient's increased need for levo overnight to maintain BP as well as patient's irritation with washburn cath. In addition, we discussed late pain to RUE. Labs in process, oncoming RNs to review.

## 2017-04-29 NOTE — PROGRESS NOTES
Dr. King at bedside. Patient's spouse, son, and daughter present. Time allowed for questions and concerns.

## 2017-04-29 NOTE — PROGRESS NOTES
[facility] Nephrology progress note    Follow-Up on: 4/29/2017     Patient seen and examined. Clinically appears less sluggish than yesterday. Had para yesterday with 3.6 liters removed. She is quite agitated with the washburn discomfort. Levo is currently at 9 mcg. She feels much better after para yesterday. ROS:  Gen - no fever, no chills, appetite poor  CV - no chest pain, no orthopnea  Lung - shortness of breath better, occasional cough  Abd - no tenderness, no nausea, no vomiting  Ext - noted edema    Exam:  Vitals:    04/29/17 0559 04/29/17 0600 04/29/17 0638 04/29/17 0705   BP:  99/51 94/55 105/50   Pulse: 81  81 83   Resp: 19  27 21   Temp:       SpO2: 96%  98% 98%   Weight:       Height:             Intake/Output Summary (Last 24 hours) at 04/29/17 0722  Last data filed at 04/29/17 0533   Gross per 24 hour   Intake          2410.77 ml   Output             2050 ml   Net           360.77 ml       GEN - in no distress but notably agitated with catheter  CV - S1, S2, no rub  Lung - clear anteriorly  Abd - less distended, non-tender  Ext - 1-2+ edema    Recent Labs      04/29/17   0425  04/28/17   0915  04/28/17   0400   WBC  4.7  3.4*  3.4*   HGB  8.8*  7.3*  6.2*   HCT  26.4*  22.1*  19.1*   PLT  55*  40*  42*        Recent Labs      04/29/17   0425  04/28/17   0400  04/27/17   0428   NA  143  141  140   K  4.4  4.1  4.2   CL  114*  111*  110*   CO2  17*  18*  18*   BUN  55*  48*  42*   CREA  4.35*  4.47*  3.74*   CA  7.1*  7.0*  7.1*   GLU  133*  91  85   MG  2.0  2.1  2.0       Assessment / Plan:    1. MATI - non-oliguric. Likely ATN related to Abx and ischemia. However, with improved perfusion, her renal function appears to be stabilizing. 2. Anion Gap Metabolic Acidosis (in setting of hypoalbuminemia)    3. Pressor-dependent hypotension - currently on 9 mcg of Levo.     4. Anemia - better     5. Ascites - s/p para - 3.6 liters (4/28)    CXR reviewed.       Improved perfusion with pressors has stabilized her renal function with excellent urinary response. Should see ongoing improvement if can maintain adequate perfusion. I have an AM cortisol level pending. Discussed with  at bedside.

## 2017-04-29 NOTE — PROGRESS NOTES
Bedside and Verbal shift change report given to Maurisio Honeycutt RN (oncoming nurse) by Jessica Neff RN (offgoing nurse). Report included the following information SBAR, Kardex, Intake/Output, MAR, Recent Results and Cardiac Rhythm SR. Levophed infusing at 9mcg/min, MAP >60.

## 2017-04-29 NOTE — PROGRESS NOTES
Jonn Galindo  Admission Date: 4/23/2017             Daily Progress Note: 4/29/2017   Patient is a 62 y.o.  female seen and evaluated at the request of Dr. Sebastian Ziegler for the evaluation of sepsis.   She is a 61 y/o [place] female with hx of NHL, HTN presented with fevers and chills on 4/23. Patient was Undergoing  treatment with Bend[place]a/Rituxan/Neulast. She completed the first cycle on 4/15. Her fevers have improved. She is followed by ID and on Vanco ,zosyn and Levaquin . She had paracentesis today for the 3rd time. She has been bordeline hypotensive and also developed acute renal failure.    She has not been febrile last 24 hours. She reports she feels better after paracentesis. She denies SOB. Subjective:     Started on pressors yesterday and is being down titrated now on 5 mcg/min. Feels better after pracenthesis yesterday.   Received 1 UPRBC yesterday  Comfortable and up in chair today    Current Facility-Administered Medications   Medication Dose Route Frequency    LORazepam (ATIVAN) tablet 1 mg  1 mg Oral Q6H PRN    potassium chloride (K-DUR, KLOR-CON) SR tablet 20 mEq  20 mEq Oral BID    allopurinol (ZYLOPRIM) tablet 100 mg  100 mg Oral DAILY    0.9% sodium chloride infusion 250 mL  250 mL IntraVENous PRN    piperacillin-tazobactam (ZOSYN) 3.375 g in 0.9% sodium chloride (MBP/ADV) 100 mL  3.375 g IntraVENous Q12H    vancomycin (VANCOCIN) 1500 mg in  ml infusion  1,500 mg IntraVENous See Admin Instructions    NOREPINephrine (LEVOPHED) 8,000 mcg in dextrose 5% 250 mL infusion  2-16 mcg/min IntraVENous TITRATE    0.9% sodium chloride infusion 250 mL  250 mL IntraVENous PRN    loperamide (IMODIUM) capsule 2 mg  2 mg Oral PRN    diphenoxylate-atropine (LOMOTIL) tablet 1 Tab  1 Tab Oral Q4H PRN    levoFLOXacin (LEVAQUIN) tablet 750 mg  750 mg Oral Q48H    sodium chloride (NS) flush 5-10 mL  5-10 mL IntraVENous PRN    aspirin chewable tablet 81 mg  81 mg Oral DAILY    HYDROcodone-acetaminophen (NORCO) 5-325 mg per tablet 1-2 Tab  1-2 Tab Oral Q4H PRN    levothyroxine (SYNTHROID) tablet 125 mcg  125 mcg Oral ACB    pantoprazole (PROTONIX) tablet 40 mg  40 mg Oral ACB    pregabalin (LYRICA) capsule 50 mg  50 mg Oral TID    sodium chloride (NS) flush 5 mL  5 mL InterCATHeter Q8H    0.9% sodium chloride infusion  125 mL/hr IntraVENous CONTINUOUS    acetaminophen (TYLENOL) tablet 650 mg  650 mg Oral Q4H PRN    magic mouthwash (ALEXSANDER) oral suspension 5 mL  5 mL Oral Q4H PRN         Objective:     Vitals:    04/29/17 1143 04/29/17 1208 04/29/17 1214 04/29/17 1218   BP: (!) 85/49  (!) 71/46 (!) 95/38   Pulse: 87 87 75 69   Resp: 18 23 23 21   Temp:       SpO2: 100% 99% 98%    Weight:       Height:         Intake and Output:   04/27 1901 - 04/29 0700  In: 4880.8 [P.O.:320; I.V.:4189.8]  Out: 2150 [Urine:2150]  04/29 0701 - 04/29 1900  In: 240 [P.O.:240]  Out: 850 [Urine:850]    Physical Exam:          GEN: well developed and in no acute distress, Oxygen per per NC at 2L  HEENT:  PERRL, EOMI, no alar flaring or epistaxis, oral mucosa moist without cyanosis,   NECK:  no JVD, no retractions, no thyromegaly or masses,   LUNGS:  CTA  HEART:  RRR with no M,G,R;  ABDOMEN:  soft with no tenderness; positive bowel sounds present  EXTREMITIES:  warm with no cyanosis, 1-2+ lower leg edema  SKIN:  no jaundice or ecchymosis   NEURO:  alert and oriented, grossly non-focal    CHEST XRAY:     LAB  Recent Labs      04/29/17   0425  04/28/17   0915  04/28/17   0400   WBC  4.7  3.4*  3.4*   HGB  8.8*  7.3*  6.2*   HCT  26.4*  22.1*  19.1*   PLT  55*  40*  42*     Recent Labs      04/29/17   0425  04/28/17   0400  04/27/17   0428   NA  143  141  140   K  4.4  4.1  4.2   CL  114*  111*  110*   CO2  17*  18*  18*   GLU  133*  91  85   BUN  55*  48*  42*   CREA  4.35*  4.47*  3.74*   MG  2.0  2.1  2.0     No results for input(s): PH, PCO2, PO2, HCO3 in the last 72 hours.   No results for input(s): LCAD, LAC in the last 72 hours. 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   Wean pressor to maintain MAP of 65 mmHg+    Acute renal failure (ARF) (HCC) ICD-10-CM: N17.9  ICD-9-CM: 584.9  4/28/2017 Unknown    ATN- stable- nonoliguric nephrology following    * (Principal)Sepsis [facility]) ICD-10-CM: A41.9  ICD-9-CM: 038.9, 995.91  4/23/2017 Yes        Hypomagnesemia ICD-10-CM: E83.42  ICD-9-CM: 275.2  4/23/2017 Yes        Non Hodgkin's lymphoma ([facility] ICD-10-CM: C85.90  ICD-9-CM: 202.80  3/30/2017 Yes    Followed by oncology    Ascites ICD-10-CM: R18.8  ICD-9-CM: 789.59  3/30/2017 Yes    S/p paracenthesis yesterday 3.6L removed    Morbid obesity ([facility] ICD-10-CM: E66.01  ICD-9-CM: 278.01  10/4/2011 Yes                More than 50% of time documented was spent in face-to-face contact with the patient and in the care of the patient on the floor/unit where the patient is located. Nolberto Barrett MD

## 2017-04-29 NOTE — PROGRESS NOTES
Transfusion complete. Patient tolerated well, no s/s of reaction. Patient's BP remains marginal, monitoring for need in levo adjustment. No further needs expressed or apparent.

## 2017-04-29 NOTE — PROGRESS NOTES
Dr. Jerrell Mo at bedside. Updated on patient's status. Patient and son concerns and questions answered.

## 2017-04-29 NOTE — PROGRESS NOTES
Full report from Mack Chopra. Patient alert/oriented, up to chair since this AM. Patient's levo weaned to 2mcg/min, watching closely for rate adjustment needs. Patient denies any needs at current. Call light within reach, family at bedside.

## 2017-04-29 NOTE — PROGRESS NOTES
Patient with drop in BP over past four hours necessitating increase in levo. BP now within parameter. Patient with low grade fever (100F), not requiring tylenol coverage at this point.

## 2017-04-29 NOTE — PROGRESS NOTES
Patient to restful. Patient a bit more lethargic than at start of shift but fully alert/oriented when questioned. No needs identified or expressed.

## 2017-04-29 NOTE — PROGRESS NOTES
Infectious Disease Progress Note    Today's Date: 2017   Admit Date: 2017    Impression:   · Fever with chills - etiology thus far unclear - could be ascitic fluid infection vs viral vs atypical PNA vs non-inefcitous. Urine cx with proteus was treated w/o improvement in fever curve. Respiratory viral panel was negative. · Proteus on urine cx  · Ascites s/p paracentesis (4/3, , ). .. The [address] on the ascitic fluid meets criteria for bacterial peritonitis but the fluid had a markedly elevated RBC count that may have falsely augmented this. I agree with Dr. Zia Drummond that these fluid results are difficult to interpret in the setting of NHL and chemo and fluctuating counts in blood  · NHL s/p 1 cycle of chemotherapy; PORT placed   · OA s/p mayur TKAs, Morbid obesity  · Borderline hypotension  · MATI    Plan:   · Continue Zosyn. .. DOT is difficult to outline  ·  levoquin x 5 days total.   · Will dc vanc as nothing is growing warranting its continued use in the face of the MATI  · CT A/P unrevealing  · Continue to follow cultures  · If RUE c/o's continue into tomorrow, consider US    > 35 min spent in the care of the patient including chart review, d/w family, and d/w nursing    Anti-infectives:   Zosyn  - current  vanc -  levoquin  -    Subjective:   Now in the ICU. Denies acute c/o's apart from pain from washburn and some swelling in the RUE. No n/v/d, worsening sob, abdominal pain, f/c/s today. No Known Allergies     Review of Systems:  A comprehensive review of systems was negative except for that written in the History of Present Illness.     Objective:     Visit Vitals    BP 97/61    Pulse 87    Temp 98.9 °F (37.2 °C)    Resp 25    Ht 5' 3\" (1.6 m)    Wt 134.4 kg (296 lb 4.8 oz)    SpO2 100%    Breastfeeding No    BMI 52.49 kg/m2     Temp (24hrs), Av.6 °F (37 °C), Min:98 °F (36.7 °C), Max:100 °F (37.8 °C)       Lines:  Ventricular Access Device:  R chest-mild erythema to incision site but well approximated. Physical Exam:    General:  Alert, cooperative, appears stated age   Eyes:  Sclera anicteric. Pupils equally round and reactive to light. Mouth/Throat: Mucous membranes normal, oral pharynx clear   Neck: Supple   Lungs:   Clear to auscultation bilaterally, good effort   CV:  Regular rate and rhythm,+ 2/6 murmur   Abdomen:   firm non-tender. bowel sounds normal. + distension    Extremities: No cyanosis. + 3 generalized edema   Skin: Skin color, texture, turgor normal. Petechiae to yuriy LEs - now spread to abdomen   Musculoskeletal: RUE with some faint erythema but not out of proportion to the LUE. Given her body habitus, it is difficult at this time to say there is asymmetrical swelling.  Yuriy TKAs incisions well approximated, no erythema, no evidence of effusion   Lines/Devices:  Intact, no erythema, drainage or tenderness   Psych: Alert and oriented, normal mood affect given the setting       Data Review:     CBC:  Recent Labs      04/29/17 0425 04/28/17   0915  04/28/17 0400   WBC  4.7  3.4*  3.4*   GRANS  88*  89*  90*   MONOS  5  5  4   EOS  2  2  1   ANEU  4.1  3.0  3.0   ABL  0.2*  0.2*  0.2*   HGB  8.8*  7.3*  6.2*   HCT  26.4*  22.1*  19.1*   PLT  55*  40*  42*       BMP:  Recent Labs      04/29/17 0425 04/28/17   0400  04/27/17 0428   CREA  4.35*  4.47*  3.74*   BUN  55*  48*  42*   NA  143  141  140   K  4.4  4.1  4.2   CL  114*  111*  110*   CO2  17*  18*  18*   AGAP  12  12  12   GLU  133*  91  85       LFTS:  Recent Labs      04/29/17 0425 04/28/17   0400  04/27/17 0428   TBILI  0.4  0.3  0.4   ALT  44  36  37   SGOT  33  25  27   AP  231*  140*  133   TP  4.8*  4.7*  4.8*   ALB  1.8*  2.0*  2.0*       Microbiology:     All Micro Results     Procedure Component Value Units Date/Time    C. DIFFICILE/EPI PCR [[id number]]     Order Status:  Canceled Specimen:  Stool     CULTURE, BODY FLUID W Bebe Hernandez [[id number]] Collected:  04/28/17 1600 Order Status:  Completed Specimen:  Ascitic Fluid Updated:  17 0842     Special Requests: NO SPECIAL REQUESTS        GRAM STAIN PENDING     Culture result:         NO GROWTH AFTER SHORT PERIOD OF INCUBATION. FURTHER RESULTS TO FOLLOW AFTER OVERNIGHT INCUBATION.     CULTURE, BLOOD [[id number]] Collected:  17 1450    Order Status:  Completed Specimen:  Blood from Blood Updated:  17 0841     Special Requests: PORT        Culture result: NO GROWTH 5 DAYS       MRSA SCREEN - PCR (NASAL) [[id number]] Collected:  17 1650    Order Status:  Completed Specimen:  Nasal from Nares Updated:  17 2120     Special Requests: NO SPECIAL REQUESTS        Culture result:         MRSA target DNA is not detected (presumptive not colonized with MRSA)    CULTURE, BLOOD [[id number]] Collected:  17 1518    Order Status:  Completed Specimen:  Blood from Blood Updated:  17 1012     Special Requests: RIGHT ANTECUBITAL        Culture result: NO GROWTH 5 DAYS       CULTURE, BLOOD [[id number]] Collected:  17 1630    Order Status:  Completed Specimen:  Blood from Blood Updated:  17 1012     Special Requests: LEFT ANTECUBITAL        Culture result: NO GROWTH 5 DAYS       CULTURE, BODY FLUID Bart Bump STAIN [[id number]]     Order Status:  Sent Specimen:  Paracentesis Fluid     RESPIRATORY VIRAL PANEL, PCR [[id number]] Collected:  17 1507    Order Status:  Completed Specimen:  Respiratory sample Updated:  17 2335     Source NASOPHARYNGEAL        Influenza A NEGATIVE         Influenza B NEGATIVE         RSV A NEGATIVE         RSV B NEGATIVE         Parainfluenza 1 NEGATIVE         Parainfluenza 2 NEGATIVE         Parainfluenza 3 NEGATIVE         Rhinovirus NEGATIVE         Metapneumovirus NEGATIVE         Adenovirus NEGATIVE          (NOTE)  Performed At: [address] [id number]  Adriana Clements MD X         INFLUENZA A & B AG (RAPID TEST) [[id number]] Collected:  04/26/17 1507    Order Status:  Completed Specimen:  Nasopharyngeal from Nasal washing Updated:  04/26/17 1539     Influenza A Ag NEGATIVE          NEGATIVE FOR THE PRESENCE OF INFLUENZA A ANTIGEN  INFECTION DUE TO INFLUENZA A CANNOT BE RULED OUT. BECAUSE THE ANTIGEN PRESENT IN THE SAMPLE MAY BE BELOW  THE DETECTION LIMIT OF THE TEST. A NEGATIVE TEST IS PRESUMPTIVE AND IT IS RECOMMENDED THAT THESE RESULTS BE CONFIRMED BY VIRAL CULTURE OR AN FDA-CLEARED INFLUENZA A AND B MOLECULAR ASSAY. Influenza B Ag NEGATIVE          NEGATIVE FOR THE PRESENCE OF INFLUENZA B ANTIGEN  INFECTION DUE TO INFLUENZA B CANNOT BE RULED OUT. BECAUSE THE ANTIGEN PRESENT IN THE SAMPLE MAY BE BELOW  THE DETECTION LIMIT OF THE TEST. A NEGATIVE TEST IS PRESUMPTIVE AND IT IS RECOMMENDED THAT THESE RESULTS BE CONFIRMED BY VIRAL CULTURE OR AN FDA-CLEARED INFLUENZA A AND B MOLECULAR ASSAY. CULTURE, URINE [[id number]]  (Abnormal)  (Susceptibility) Collected:  04/23/17 1608    Order Status:  Completed Specimen:  Urine from Cath Urine Updated:  04/26/17 0726     Special Requests: NO SPECIAL REQUESTS        Culture result: [id number]  COLONIES/mL  PROTEUS MIRABILIS   (A)     INFLUENZA A & B AG (RAPID TEST) [[id number]] Collected:  04/23/17 1609    Order Status:  Completed Specimen:  Nasal washing Updated:  04/23/17 1647     Influenza A Ag NEGATIVE          NEGATIVE FOR THE PRESENCE OF INFLUENZA A ANTIGEN  INFECTION DUE TO INFLUENZA A CANNOT BE RULED OUT. BECAUSE THE ANTIGEN PRESENT IN THE SAMPLE MAY BE BELOW  THE DETECTION LIMIT OF THE TEST. A NEGATIVE TEST IS PRESUMPTIVE AND IT IS RECOMMENDED THAT THESE RESULTS BE CONFIRMED BY VIRAL CULTURE OR AN FDA-CLEARED INFLUENZA A AND B MOLECULAR ASSAY. Influenza B Ag NEGATIVE          NEGATIVE FOR THE PRESENCE OF INFLUENZA B ANTIGEN  INFECTION DUE TO INFLUENZA B CANNOT BE RULED OUT.   BECAUSE THE ANTIGEN PRESENT IN THE SAMPLE MAY BE BELOW  THE DETECTION LIMIT OF THE TEST. A NEGATIVE TEST IS PRESUMPTIVE AND IT IS RECOMMENDED THAT THESE RESULTS BE CONFIRMED BY VIRAL CULTURE OR AN FDA-CLEARED INFLUENZA A AND B MOLECULAR ASSAY. INFLUENZA A & B AG (RAPID TEST) [[id number]] Collected:  04/23/17 1530    Order Status:  Canceled Specimen:  Nasopharyngeal from Nasopharyngeal Updated:  04/23/17 1547          Imaging:      XR CHEST SNGL V (Final result) Result time: 04/29/17 07:12:00     Final result     Impression:     IMPRESSION:    Mild bibasilar densities, likely atelectasis. No substantial change. CT ABD PELV WO CONT (Final result) Result time: 04/27/17 19:13:49     Final result     Impression:     IMPRESSION:  1. Overall moderate amounts of abdominal and pelvic ascites with minimal change. 2. Interval improvement in splenomegaly as well as retroperitoneal adenopathy. The low density lesion within the liver also is no longer evident although the  lack of intravenous contrast does not allow for accurate comparison. 3. Abnormal appearance of the renal juan which have also shown apparent  improvement although there is less than optimal evaluation the absence of  intravenous contrast as was administered on the comparison study  4. Small left pleural effusion, unchanged.          Signed By: Janey King MD     April 29, 2017

## 2017-04-29 NOTE — PROGRESS NOTES
[facility] Hematology & Oncology        Inpatient Hematology / Oncology Progress Note      Admission Date: 2017  2:56 PM  Reason for Admission/Hospital Course: Sepsis ([address]      24 Hour Events: On levophed  In CCU  UOP improved  Para yesterday -3.6 L      ROS:  Constitutional: + fatigue; Negative for fever, chills  CV: Negative for chest pain, palpitations, edema. Respiratory: +cough, dyspnea; negative for wheezing. GI: +abdominal pain, diarrhea    10 point review of systems is otherwise negative with the exception of the elements mentioned above in the HPI. No Known Allergies    OBJECTIVE:  Patient Vitals for the past 8 hrs:   BP Temp Pulse Resp SpO2   17 1112 102/57 - 85 15 99 %   17 1001 - - 81 18 98 %   17 1000 99/53 - - - -   17 0930 91/62 - 83 21 99 %   17 0900 98/57 - 83 23 99 %   17 0830 111/61 - 76 16 100 %   17 0800 115/71 - - - -   17 0759 - - 81 19 97 %   17 0730 105/53 - 74 25 98 %   17 0718 105/50 98.6 °F (37 °C) 79 22 93 %   17 0705 105/50 - 83 21 98 %   17 0638 94/55 - 81 27 98 %   17 0600 99/51 - - - -   17 0559 - - 81 19 96 %   17 0530 96/49 - 80 19 -   17 0529 - - 81 16 99 %   17 0500 99/57 98.9 °F (37.2 °C) 81 20 97 %   17 0430 94/54 - 81 28 -   17 0429 - - 84 18 97 %   17 0400 97/61 - 79 19 96 %     Temp (24hrs), Av.6 °F (37 °C), Min:98 °F (36.7 °C), Max:100 °F (37.8 °C)     0701 - 04/29 1900  In: 240 [P.O.:240]  Out: 850 [Urine:850]    Physical Exam:  Constitutional: Ill-appearing female in no acute distress, sitting comfortably in chair   HEENT: Normocephalic and atraumatic. Oropharynx is clear, mucous membranes are moist.    Lymph node   Deferred   Skin  Warm and dry. No bruising and no rash noted. Abdomen pink.     Respiratory  Lungs are clear to auscultation bilaterally without wheezes, rales or rhonchi, normal air exchange without accessory muscle use.    CVS Normal rate, regular rhythm and normal S1 and S2. No murmurs, gallops, or rubs. Abdomen +distention/ascites- worsening. Nontender with palpation, normoactive bowel sounds. No palpable mass. No hepatosplenomegaly. Neuro Grossly nonfocal with no obvious sensory or motor deficits. MSK 3+ pitting edema to BLE. Normal range of motion in general.  No tenderness. Psych Appropriate mood and affect. Right chest port without erythema, edema. Mild tenderness with palpation (newly placed port)    Labs:      Recent Labs      04/29/17 0425 04/28/17   0915  04/28/17   0400   WBC  4.7  3.4*  3.4*   RBC  3.19*  2.70*  2.30*   HGB  8.8*  7.3*  6.2*   HCT  26.4*  22.1*  19.1*   MCV  82.8  81.9  83.0   MCH  27.6  27.0  27.0   MCHC  33.3  33.0  32.5   RDW  19.8*  20.6*  21.0*   PLT  55*  40*  42*   GRANS  88*  89*  90*   LYMPH  5*  4*  5*   MONOS  5  5  4   EOS  2  2  1   BASOS  0  0  0   IG  0.2  0.3  0.3   DF  AUTOMATED  AUTOMATED  AUTOMATED   ANEU  4.1  3.0  3.0   ABL  0.2*  0.2*  0.2*   ABM  0.2  0.2  0.1   HOMERO  0.1  0.1  0.0   ABB  0.0  0.0  0.0   AIG  0.0  0.0  0.0        Recent Labs      04/29/17 0425 04/28/17   0400  04/27/17   0428   NA  143  141  140   K  4.4  4.1  4.2   CL  114*  111*  110*   CO2  17*  18*  18*   AGAP  12  12  12   GLU  133*  91  85   BUN  55*  48*  42*   CREA  4.35*  4.47*  3.74*   GFRAA  13*  13*  16*   GFRNA  11*  11*  13*   CA  7.1*  7.0*  7.1*   SGOT  33  25  27   AP  231*  140*  133   TP  4.8*  4.7*  4.8*   ALB  1.8*  2.0*  2.0*   GLOB  3.0  2.7  2.8   AGRAT  0.6*  0.7*  0.7*   MG  2.0  2.1  2.0         Imaging:  Portable chest x-ray. 4/23/2017      CLINICAL INDICATION: Sepsis, fever      FINDINGS: Single AP view of the chest submitted without comparison shows mild  atelectasis in the left lower lung otherwise the lungs are clear. A right-sided  chest port is in place.  The cardiac silhouette and mediastinum are unremarkable.     IMPRESSION: Mild left lower lung atelectasis, otherwise no acute abnormality. RENAL ULTRASOUND.  4/27/2017     HISTORY: Acute Renal Failure.     COMPARISON: None.     TECHNIQUE: Direct skin contact sector 3-5 MHz images of the kidneys are  obtained.     FINDINGS: Renal echogenicity within normal limits, the right kidney is  hypoechoic compared to the liver.     Right kidney: 12.2 cm in length. No hydronephrosis.     Left kidney: Also 12.2 cm in length . No hydronephrosis.     Lateral obscured by bowel gas and probably undistended.     IMPRESSION: Unremarkable renal ultrasound. CT abdomen and pelvis without contrast 04/27/2017     History: acute kidney failure; worsening ascites.     Technique: Helically acquired images were obtained from the upper abdomen to the  ischial tuberosities reconstructed at 5mm intervals after oral contrast only. Intravenous contrast was not administered due to elevated creatinine. Coronal  reformatted images were submitted.     Radiation dose reduction techniques were used for this study: Our CT scanners  use one or all of the following: Automated exposure control, adjustment of the  mA and/or kVp according to patient's size, iterative reconstruction.     Comparison: 03/22/2017 from [facility] system     CT abdomen: There is a small left pleural effusion, unchanged. The liver is  grossly unremarkable on this noncontrast study. The low density lesion seen  previously is no longer apparent. The spleen is markedly enlarged although less  pronounced than seen previously where was measured at up to 23.4 cm in length  compared to 19.3 cm on today's study. The pancreas pancreas and adrenal glands  are grossly unremarkable on this contrast study.      There is loss of the typical fat density within the juan although this may be  less pronounced than seen previously. Periaortic adenopathy is also diminished  measuring approximately 2.9 x 2.8 cm compared to 3.9 x 4.1 cm.  There is a  moderate amount of abdominal ascites with minimal change. There is diffuse  subcutaneous edema.     CT PELVIS: There is a large amount of pelvic ascites with minimal change. No  adenopathy is present. There is edema within the subcutaneous tissues.     IMPRESSION:  1. Overall moderate amounts of abdominal and pelvic ascites with minimal change.     2. Interval improvement in splenomegaly as well as retroperitoneal adenopathy. The low density lesion within the liver also is no longer evident although the  lack of intravenous contrast does not allow for accurate comparison. 3. Abnormal appearance of the renal juan which have also shown apparent  improvement although there is less than optimal evaluation the absence of  intravenous contrast as was administered on the comparison study  4. Small left pleural effusion, unchanged.     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 [facility]) ICD-10-CM: C85.88  ICD-9-CM: 200.38  4/7/2017        Non Hodgkin's lymphoma ([facility] ICD-10-CM: C85.90  ICD-9-CM: 202.80  3/30/2017        Ascites ICD-10-CM: R18.8  ICD-9-CM: 789.59  3/30/2017        Lymphadenopathy, generalized ICD-10-CM: R59.1  ICD-9-CM: 785.6  3/30/2017        Osteoarthritis of left knee ICD-10-CM: M17.12  ICD-9-CM: 715.96  8/26/2013        Morbid obesity ([facility] ICD-10-CM: E66.01  ICD-9-CM: 278.01  10/4/2011        History of DVT of lower extremity ICD-10-CM: H33.898  ICD-9-CM: V12.51  10/4/2011    Overview Signed 10/4/2011  1:44 AM by Cristopher Agosto NP     X 2               Hypertension ICD-10-CM: I10  ICD-9-CM: 401.9  10/4/2011    Overview Signed 10/4/2011 1:44 AM by Leny Odom NP     PRE OP             Heart murmur ICD-10-CM: R01.1  ICD-9-CM: 785.2  10/4/2011        Osteoarthritis of right knee ICD-10-CM: M17.11  ICD-9-CM: 715.96  10/3/2011        S/P total knee replacement using cement ICD-10-CM: Z96.659  ICD-9-CM: V43.65  10/3/2011            Ms. Ketty Mccormack is a 61 yo female with NGL. Admitted for sepsis. PLAN:  NHL  4/24 Completed cycle 1 of Bendeka/Rituxan/Neulasta on 4/15. Next cycle scheduled for 5/11.     Fever/Sepsis  4/24 Still febrile. BCx & UCx -NGTD. Flu neg. CXR neg. Con't vanc/zosyn. BCx were not drawn from port originally. Will go ahead and order BCx to be drawn from port. 4/25  Temp spike 102.7 with BP 64/43. 1L fluid bolus improved /60. Add tobramycin. BCx including one from port - NGTD. UCx +12,000 gram neg rods, final report pending. 4/26 Tmax 103. BCx-NGTD. UCx + for proteus mirabilis susceptible to current antibx regimen. ID consulted for persistent fever. Possible noninfectious etiologies of fever as well, lymphoma vs neutrophil recovery. 4/27 Fevers con't. ID following - \"clincialy pt fabiana has a viral illness - check flu swab again given sampling issues as well as RVP (this will take a week to come back unfortunately). CT A/P and then likely a paracentesis to r/o infction. Switch tobra to levoquin to rx for atypical PNA pathogens. Cont vanc/zosyn for now. Routine HIV, HCV screens. PCT in 3s expected with mild CKD. \"  CT A/P unable to be completed due to elevated creatinine. Ordered CT A/P without contrast.  DC vanc due to decreased renal fx and vanc trough 35.2. Con't Zosyn/LVQ. 4/28 Afebrile x 24 hours. ID following. Con't on Zosyn/LVQ. Paracentesis scheduled to r/o infx as well as to drain accumulating fluid. Hypotension  4/26  Continues to drop BP  down to 72/49 - improved to 91/51. Hold lisinopril. 4/27 Still having hypotensive episodes - possibly happening at home and were just going unnoticed?   Julianna Lombard in bathroom today. Denies LOC, injury, or pain. OOB with assistance only (if not hypotensive). 4/28 Most likely related to decreasing kidney fx. Nephrology consulted. Electrolyte imbalance secondary to chemo  4/26  Mg+ 1.1, Ca+ 5.6. Replace with Ca+ gluconate 4g IV and Mg+ sulfate 4g IV.  4/27 Improved with replacement Mg+2.0 Ca+7.1 (corrected Ca+8.7)    Pancytopenia secondary to chemo  4/26  WBC 2.5 Hgb 9.7 Plt 36,000. No transfusions needed today. D/C heparin. Will con't to monitor. 4/27  WBC 2.9 Hgb 5.6 Plt 50,000. Repeat Hgb 6.0. Transfuse 2 units PRBCs.  4/28  Counts improved after transfusion. WBC 3.4 Hgb 7.3 Plt 40,000. Renal Fx  4/26  Renal fx is acutely worsening. BUN 23 Cr 1.91. Pharmacist checking vanc trough prior to the next scheduled dose and will adjust as necessary. Will continue to follow trend in renal fx and make adjustments if renal fx con't to worsen. ID consulted, will appreciate their recommendations. 4/27  BUN 42, Cr 3.74, GFR 13. Worsening renal fx due to vanc/tobra? DC vanc due to decreased renal fx and vanc trough 35.2. Ultrasound, CT A/P WO contrast, urine sodium, urine creatinine, cortisol, and uric acid. 4/28  Kidney fx continues to decline. BUN 48 Cr 4.47 GFR 11. Nephrology consulted. Pt had previous hx of mild CKD. Possibility that antibx caused the decline in fx? Decrease Allopurinol to 100mg daily as it can affect renal fx as well - AHS? Renal US unremarkable. CT A/P unrevealing. Nephrology consulted. Ascites  - Paracentesis (4/3/17, 4/14/17)   4/28 IR consulted for paracentesis. Unsure if this will be able to be performed due to hypotension. Diarrhea  4/28 Reports 5 episodes of loose stools yesterday. None today. Will check stool studies, c diff if diarrhea returns.     DVT prophylaxis: SCDs.        04/29/17 Ms Barrientos's BP has remained stable overnight with minimal use of levophed. Encouraged by improved perfusion with pressors.  Her UOP has significantly improved. Hopefully her kidney function will continue to improve. Patient is extremely anxious to get out of the CCU unit. We encouraged her to take it day by day.        Carito Carpio NP   [facility] Hematology & Oncology  [address] [place] [address]  Office : [phone]  Fax : [phone]

## 2017-04-30 LAB
ALBUMIN SERPL BCP-MCNC: 1.8 G/DL (ref 3.5–5)
ALBUMIN/GLOB SERPL: 0.7 {RATIO} (ref 1.2–3.5)
ALP SERPL-CCNC: 221 U/L (ref 50–136)
ALT SERPL-CCNC: 38 U/L (ref 12–65)
ANION GAP BLD CALC-SCNC: 11 MMOL/L (ref 7–16)
AST SERPL W P-5'-P-CCNC: 19 U/L (ref 15–37)
BASOPHILS # BLD AUTO: 0 K/UL (ref 0–0.2)
BASOPHILS # BLD: 0 % (ref 0–2)
BILIRUB SERPL-MCNC: 0.4 MG/DL (ref 0.2–1.1)
BUN SERPL-MCNC: 55 MG/DL (ref 6–23)
CALCIUM SERPL-MCNC: 7.4 MG/DL (ref 8.3–10.4)
CHLORIDE SERPL-SCNC: 118 MMOL/L (ref 98–107)
CO2 SERPL-SCNC: 17 MMOL/L (ref 21–32)
CREAT SERPL-MCNC: 3.99 MG/DL (ref 0.6–1)
DIFFERENTIAL METHOD BLD: ABNORMAL
EOSINOPHIL # BLD: 0.1 K/UL (ref 0–0.8)
EOSINOPHIL NFR BLD: 3 % (ref 0.5–7.8)
ERYTHROCYTE [DISTWIDTH] IN BLOOD BY AUTOMATED COUNT: 20 % (ref 11.9–14.6)
GLOBULIN SER CALC-MCNC: 2.7 G/DL (ref 2.3–3.5)
GLUCOSE SERPL-MCNC: 117 MG/DL (ref 65–100)
HCT VFR BLD AUTO: 25.3 % (ref 35.8–46.3)
HGB BLD-MCNC: 8.6 G/DL (ref 11.7–15.4)
IMM GRANULOCYTES # BLD: 0 K/UL (ref 0–0.5)
IMM GRANULOCYTES NFR BLD AUTO: 0.3 % (ref 0–5)
LYMPHOCYTES # BLD AUTO: 7 % (ref 13–44)
LYMPHOCYTES # BLD: 0.2 K/UL (ref 0.5–4.6)
MAGNESIUM SERPL-MCNC: 1.9 MG/DL (ref 1.8–2.4)
MCH RBC QN AUTO: 27.7 PG (ref 26.1–32.9)
MCHC RBC AUTO-ENTMCNC: 34 G/DL (ref 31.4–35)
MCV RBC AUTO: 81.4 FL (ref 79.6–97.8)
MONOCYTES # BLD: 0.2 K/UL (ref 0.1–1.3)
MONOCYTES NFR BLD AUTO: 7 % (ref 4–12)
NEUTS SEG # BLD: 2.5 K/UL (ref 1.7–8.2)
NEUTS SEG NFR BLD AUTO: 83 % (ref 43–78)
PLATELET # BLD AUTO: 39 K/UL (ref 150–450)
PMV BLD AUTO: 8.9 FL (ref 10.8–14.1)
POTASSIUM SERPL-SCNC: 4.6 MMOL/L (ref 3.5–5.1)
PROT SERPL-MCNC: 4.5 G/DL (ref 6.3–8.2)
RBC # BLD AUTO: 3.11 M/UL (ref 4.05–5.25)
SODIUM SERPL-SCNC: 146 MMOL/L (ref 136–145)
SODIUM UR-SCNC: 53 MMOL/L
WBC # BLD AUTO: 3 K/UL (ref 4.3–11.1)

## 2017-04-30 PROCEDURE — 74011250637 HC RX REV CODE- 250/637: Performed by: INTERNAL MEDICINE

## 2017-04-30 PROCEDURE — 84300 ASSAY OF URINE SODIUM: CPT | Performed by: INTERNAL MEDICINE

## 2017-04-30 PROCEDURE — 65270000029 HC RM PRIVATE

## 2017-04-30 PROCEDURE — 99232 SBSQ HOSP IP/OBS MODERATE 35: CPT | Performed by: INTERNAL MEDICINE

## 2017-04-30 PROCEDURE — 83735 ASSAY OF MAGNESIUM: CPT | Performed by: NURSE PRACTITIONER

## 2017-04-30 PROCEDURE — 80053 COMPREHEN METABOLIC PANEL: CPT | Performed by: NURSE PRACTITIONER

## 2017-04-30 PROCEDURE — 74011250636 HC RX REV CODE- 250/636: Performed by: INTERNAL MEDICINE

## 2017-04-30 PROCEDURE — 74011000258 HC RX REV CODE- 258: Performed by: INTERNAL MEDICINE

## 2017-04-30 PROCEDURE — 74011250637 HC RX REV CODE- 250/637: Performed by: NURSE PRACTITIONER

## 2017-04-30 PROCEDURE — 85025 COMPLETE CBC W/AUTO DIFF WBC: CPT | Performed by: NURSE PRACTITIONER

## 2017-04-30 PROCEDURE — 74011250636 HC RX REV CODE- 250/636: Performed by: NURSE PRACTITIONER

## 2017-04-30 RX ORDER — MIDODRINE HYDROCHLORIDE 5 MG/1
5 TABLET ORAL 2 TIMES DAILY WITH MEALS
Status: DISCONTINUED | OUTPATIENT
Start: 2017-04-30 | End: 2017-05-01

## 2017-04-30 RX ORDER — GUAIFENESIN/DEXTROMETHORPHAN 100-10MG/5
5 SYRUP ORAL
Status: DISCONTINUED | OUTPATIENT
Start: 2017-04-30 | End: 2017-05-02 | Stop reason: HOSPADM

## 2017-04-30 RX ADMIN — PREGABALIN 50 MG: 50 CAPSULE ORAL at 15:10

## 2017-04-30 RX ADMIN — PREGABALIN 50 MG: 50 CAPSULE ORAL at 08:01

## 2017-04-30 RX ADMIN — SODIUM CHLORIDE 125 ML/HR: 900 INJECTION, SOLUTION INTRAVENOUS at 01:49

## 2017-04-30 RX ADMIN — Medication 5 ML: at 05:03

## 2017-04-30 RX ADMIN — LEVOFLOXACIN 750 MG: 500 TABLET, FILM COATED ORAL at 15:10

## 2017-04-30 RX ADMIN — SODIUM CHLORIDE 125 ML/HR: 900 INJECTION, SOLUTION INTRAVENOUS at 19:33

## 2017-04-30 RX ADMIN — PREGABALIN 50 MG: 50 CAPSULE ORAL at 21:46

## 2017-04-30 RX ADMIN — GUAIFENESIN AND DEXTROMETHORPHAN 5 ML: 100; 10 SYRUP ORAL at 19:09

## 2017-04-30 RX ADMIN — ALLOPURINOL 100 MG: 100 TABLET ORAL at 08:01

## 2017-04-30 RX ADMIN — MIDODRINE HYDROCHLORIDE 5 MG: 5 TABLET ORAL at 12:13

## 2017-04-30 RX ADMIN — PIPERACILLIN SODIUM,TAZOBACTAM SODIUM 3.38 G: 3; .375 INJECTION, POWDER, FOR SOLUTION INTRAVENOUS at 08:02

## 2017-04-30 RX ADMIN — POTASSIUM CHLORIDE 20 MEQ: 20 TABLET, EXTENDED RELEASE ORAL at 08:02

## 2017-04-30 RX ADMIN — PANTOPRAZOLE SODIUM 40 MG: 40 TABLET, DELAYED RELEASE ORAL at 07:59

## 2017-04-30 RX ADMIN — MIDODRINE HYDROCHLORIDE 5 MG: 5 TABLET ORAL at 17:21

## 2017-04-30 RX ADMIN — PIPERACILLIN SODIUM,TAZOBACTAM SODIUM 3.38 G: 3; .375 INJECTION, POWDER, FOR SOLUTION INTRAVENOUS at 21:47

## 2017-04-30 RX ADMIN — SODIUM CHLORIDE 125 ML/HR: 900 INJECTION, SOLUTION INTRAVENOUS at 09:50

## 2017-04-30 RX ADMIN — Medication 5 ML: at 15:10

## 2017-04-30 RX ADMIN — LEVOTHYROXINE SODIUM 125 MCG: 125 TABLET ORAL at 08:00

## 2017-04-30 RX ADMIN — LORAZEPAM 1 MG: 1 TABLET ORAL at 03:47

## 2017-04-30 RX ADMIN — POTASSIUM CHLORIDE 20 MEQ: 20 TABLET, EXTENDED RELEASE ORAL at 17:21

## 2017-04-30 RX ADMIN — Medication 5 ML: at 21:52

## 2017-04-30 NOTE — PROGRESS NOTES
Called to patient's room. Patient remains agitated. Patient reports continuous discomfort from washburn cath. Patient continues to drain well, no apparent retention issues. Patient offered PO pain medication- refused. Patient's stretcher moved to semi-sitting position at patient's request.  I once again reinforced the need to retain washburn in light of variable BP overnight as well as need to monitor kidney function.

## 2017-04-30 NOTE — PROGRESS NOTES
TRANSFER - OUT REPORT:    [facility]  [facility] for routine progression of care       Report consisted of patients Situation, Background, Assessment and   Recommendations(SBAR). Information from the following report(s) SBAR, Kardex, ED Summary, Procedure Summary, Intake/Output, MAR, Recent Results, Med Rec Status and Cardiac Rhythm SR was reviewed with the receiving nurse. Lines:   Venous Access Device Power Port 04/18/17 Upper chest (subclavicular area, right (Active)   Central Line Being Utilized Yes 4/29/2017  7:11 PM   Criteria for Appropriate Use Long term IV/antibiotic administration 4/29/2017  7:11 PM   Site Assessment Clean, dry, & intact 4/29/2017  7:18 AM   Date of Last Dressing Change 04/24/17 4/29/2017  7:11 PM   Dressing Status Clean, dry, & intact 4/29/2017  7:11 PM   Dressing Type Disk with Chlorhexadine gluconate (CHG); Transparent 4/29/2017  7:11 PM   Action Taken Blood drawn 4/28/2017  6:40 PM   Access Time (Medial Site) 1500 4/24/2017  3:15 PM   Access Needle Length (Medial Site) 0.75 inches 4/24/2017  3:15 PM   Positive Blood Return (Medial Site) Yes 4/28/2017  2:00 PM   Action Taken (Medial Site) Flushed; Infusing 4/28/2017  7:00 PM   Alcohol Cap Used No 4/28/2017  5:30 PM       Peripheral IV 04/28/17 Left Antecubital (Active)   Site Assessment Clean, dry, & intact 4/29/2017  7:11 PM   Phlebitis Assessment 0 4/29/2017  7:11 PM   Infiltration Assessment 0 4/29/2017  7:11 PM   Dressing Status Clean, dry, & intact 4/29/2017  7:11 PM   Dressing Type Transparent;Tape 4/29/2017  7:11 PM   Hub Color/Line Status Patent; Flushed;Green 4/29/2017  7:11 PM   Alcohol Cap Used No 4/29/2017  7:18 AM        Opportunity for questions and clarification was provided.       Patient transported with:   Tech   Patient's spouse, son, and daughter present

## 2017-04-30 NOTE — PROGRESS NOTES
[facility]        Inpatient Hematology / Oncology Progress Note      Admission Date: 2017  2:56 PM  Reason for Admission/Hospital Course: Sepsis ([address]      24 Hour Events:  BP stable off pressor  Kidney function improving  Spouse and son at bedside  Transfer back to floor    ROS:  Constitutional: + fatigue; Negative for fever, chills  CV: Negative for chest pain, palpitations, edema. Respiratory: +cough, dyspnea; negative for wheezing. GI: -abdominal pain, diarrhea    10 point review of systems is otherwise negative with the exception of the elements mentioned above in the HPI.      No Known Allergies    OBJECTIVE:  Patient Vitals for the past 8 hrs:   BP Pulse Resp SpO2   17 1213 102/53 92 - -   17 1200 102/53 - - -   17 1159 - 94 21 100 %   17 1130 97/68 - - -   17 1129 - 88 21 99 %   17 1100 (!) 85/48 88 27 99 %   17 1048 (!) 85/47 - - -   17 1047 - 92 17 100 %   17 1000 (!) 88/60 94 23 99 %   17 0930 (!) 81/46 89 15 100 %   17 0914 (!) 87/58 - - -   17 0913 - 87 23 98 %   17 0805 99/55 98 23 99 %   17 0754 90/56 95 26 97 %   17 0732 (!) 81/52 - - -   17 0731 - 95 20 94 %   17 0729 (!) 75/40 86 20 96 %   17 0700 98/57 100 24 98 %   17 0645 (!) 81/59 (!) 103 21 97 %   17 0630 114/59 100 20 98 %   17 0616 - 97 19 98 %   17 0615 94/59 - - -   17 0600 110/62 91 18 97 %   17 0545 120/62 - - -   17 0544 - 89 23 98 %   17 0530 97/57 - - -   17 0529 - 86 25 98 %   17 0515 110/60 93 28 97 %   17 0500 109/67 90 18 98 %   17 0445 118/63 85 22 98 %   17 0430 (!) 76/58 87 21 99 %     Temp (24hrs), Av.6 °F (37 °C), Min:98.2 °F (36.8 °C), Max:98.9 °F (37.2 °C)    701 - 1900  In: 540 [P.O.:540]  Out: -     Physical Exam:  Constitutional: Well nourished female in no acute distress, sitting comfortably in chair   HEENT: Normocephalic and atraumatic. Oropharynx is clear, mucous membranes are moist.    Lymph node   Deferred   Skin  Warm and dry. No bruising and no rash noted. Abdomen pink. Respiratory  Lungs are clear to auscultation bilaterally without wheezes, rales or rhonchi, normal air exchange without accessory muscle use. CVS Normal rate, regular rhythm and normal S1 and S2. No murmurs, gallops, or rubs. Abdomen Nontender with palpation, normoactive bowel sounds. Neuro Grossly nonfocal with no obvious sensory or motor deficits. MSK 3+ pitting edema to BLE. Normal range of motion in general.  No tenderness. Psych Appropriate mood and affect. Anxious    Right chest port without erythema, edema. Mild tenderness with palpation (newly placed port)    Labs:      Recent Labs      04/30/17 0355 04/29/17 0425 04/28/17   0915   WBC  3.0*  4.7  3.4*   RBC  3.11*  3.19*  2.70*   HGB  8.6*  8.8*  7.3*   HCT  25.3*  26.4*  22.1*   MCV  81.4  82.8  81.9   MCH  27.7  27.6  27.0   MCHC  34.0  33.3  33.0   RDW  20.0*  19.8*  20.6*   PLT  39*  55*  40*   GRANS  83*  88*  89*   LYMPH  7*  5*  4*   MONOS  7  5  5   EOS  3  2  2   BASOS  0  0  0   IG  0.3  0.2  0.3   DF  AUTOMATED  AUTOMATED  AUTOMATED   ANEU  2.5  4.1  3.0   ABL  0.2*  0.2*  0.2*   ABM  0.2  0.2  0.2   HOMERO  0.1  0.1  0.1   ABB  0.0  0.0  0.0   AIG  0.0  0.0  0.0        Recent Labs      04/30/17   0355  04/29/17   0425  04/28/17   0400   NA  146*  143  141   K  4.6  4.4  4.1   CL  118*  114*  111*   CO2  17*  17*  18*   AGAP  11  12  12   GLU  117*  133*  91   BUN  55*  55*  48*   CREA  3.99*  4.35*  4.47*   GFRAA  15*  13*  13*   GFRNA  12*  11*  11*   CA  7.4*  7.1*  7.0*   SGOT  19  33  25   AP  221*  231*  140*   TP  4.5*  4.8*  4.7*   ALB  1.8*  1.8*  2.0*   GLOB  2.7  3.0  2.7   AGRAT  0.7*  0.6*  0.7*   MG  1.9  2.0  2.1         Imaging:  Portable chest x-ray.  4/23/2017      CLINICAL INDICATION: Sepsis, fever      FINDINGS: Single AP view of the chest submitted without comparison shows mild  atelectasis in the left lower lung otherwise the lungs are clear. A right-sided  chest port is in place. The cardiac silhouette and mediastinum are unremarkable.     IMPRESSION: Mild left lower lung atelectasis, otherwise no acute abnormality. RENAL ULTRASOUND.  4/27/2017     HISTORY: Acute Renal Failure.     COMPARISON: None.     TECHNIQUE: Direct skin contact sector 3-5 MHz images of the kidneys are  obtained.     FINDINGS: Renal echogenicity within normal limits, the right kidney is  hypoechoic compared to the liver.     Right kidney: 12.2 cm in length. No hydronephrosis.     Left kidney: Also 12.2 cm in length . No hydronephrosis.     Lateral obscured by bowel gas and probably undistended.     IMPRESSION: Unremarkable renal ultrasound. CT abdomen and pelvis without contrast 04/27/2017     History: acute kidney failure; worsening ascites.     Technique: Helically acquired images were obtained from the upper abdomen to the  ischial tuberosities reconstructed at 5mm intervals after oral contrast only. Intravenous contrast was not administered due to elevated creatinine. Coronal  reformatted images were submitted.     Radiation dose reduction techniques were used for this study: Our CT scanners  use one or all of the following: Automated exposure control, adjustment of the  mA and/or kVp according to patient's size, iterative reconstruction.     Comparison: 03/22/2017 from [facility]     CT abdomen: There is a small left pleural effusion, unchanged. The liver is  grossly unremarkable on this noncontrast study. The low density lesion seen  previously is no longer apparent. The spleen is markedly enlarged although less  pronounced than seen previously where was measured at up to 23.4 cm in length  compared to 19.3 cm on today's study.  The pancreas pancreas and adrenal glands  are grossly unremarkable on this contrast study.      There is loss of the typical fat density within the juan although this may be  less pronounced than seen previously. Periaortic adenopathy is also diminished  measuring approximately 2.9 x 2.8 cm compared to 3.9 x 4.1 cm. There is a  moderate amount of abdominal ascites with minimal change. There is diffuse  subcutaneous edema.     CT PELVIS: There is a large amount of pelvic ascites with minimal change. No  adenopathy is present. There is edema within the subcutaneous tissues.     IMPRESSION:  1. Overall moderate amounts of abdominal and pelvic ascites with minimal change.     2. Interval improvement in splenomegaly as well as retroperitoneal adenopathy. The low density lesion within the liver also is no longer evident although the  lack of intravenous contrast does not allow for accurate comparison. 3. Abnormal appearance of the renal juan which have also shown apparent  improvement although there is less than optimal evaluation the absence of  intravenous contrast as was administered on the comparison study  4. Small left pleural effusion, unchanged.     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 [facility]) ICD-10-CM: C85.88  ICD-9-CM: 200.38  4/7/2017        Non Hodgkin's lymphoma ([facility] ICD-10-CM: C85.90  ICD-9-CM: 202.80  3/30/2017        Ascites ICD-10-CM: R18.8  ICD-9-CM: 789.59  3/30/2017        Lymphadenopathy, generalized ICD-10-CM: R59.1  ICD-9-CM: 785.6  3/30/2017        Osteoarthritis of left knee ICD-10-CM: M17.12  ICD-9-CM: 715.96  8/26/2013        Morbid obesity ([facility] ICD-10-CM: E66.01  ICD-9-CM: 278.01  10/4/2011 History of DVT of lower extremity ICD-10-CM: A53.781  ICD-9-CM: V12.51  10/4/2011    Overview Signed 10/4/2011  1:44 AM by Mariluz Marquez NP     X 2               Hypertension ICD-10-CM: I10  ICD-9-CM: 401.9  10/4/2011    Overview Signed 10/4/2011  1:44 AM by Mariluz Marquez NP     PRE OP             Heart murmur ICD-10-CM: R01.1  ICD-9-CM: 785.2  10/4/2011        Osteoarthritis of right knee ICD-10-CM: M17.11  ICD-9-CM: 715.96  10/3/2011        S/P total knee replacement using cement ICD-10-CM: Z96.659  ICD-9-CM: V43.65  10/3/2011            Ms. Vania Sood is a 63 yo female with NGL. Admitted for sepsis. PLAN:  NHL  4/24 Completed cycle 1 of Bendeka/Rituxan/Neulasta on 4/15. Next cycle scheduled for 5/11.     Fever/Sepsis  4/24 Still febrile. BCx & UCx -NGTD. Flu neg. CXR neg. Con't vanc/zosyn. BCx were not drawn from port originally. Will go ahead and order BCx to be drawn from port. 4/25  Temp spike 102.7 with BP 64/43. 1L fluid bolus improved /60. Add tobramycin. BCx including one from port - NGTD. UCx +12,000 gram neg rods, final report pending. 4/26 Tmax 103. BCx-NGTD. UCx + for proteus mirabilis susceptible to current antibx regimen. ID consulted for persistent fever. Possible noninfectious etiologies of fever as well, lymphoma vs neutrophil recovery. 4/27 Fevers con't. ID following - \"clincialy pt fabiana has a viral illness - check flu swab again given sampling issues as well as RVP (this will take a week to come back unfortunately). CT A/P and then likely a paracentesis to r/o infction. Switch tobra to levoquin to rx for atypical PNA pathogens. Cont vanc/zosyn for now. Routine HIV, HCV screens. PCT in 3s expected with mild CKD. \"  CT A/P unable to be completed due to elevated creatinine. Ordered CT A/P without contrast.  DC vanc due to decreased renal fx and vanc trough 35.2. Con't Zosyn/LVQ. 4/28 Afebrile x 24 hours. ID following. Con't on Zosyn/LVQ.   Paracentesis scheduled to r/o infx as well as to drain accumulating fluid. Hypotension  4/26  Continues to drop BP  down to 72/49 - improved to 91/51. Hold lisinopril. 4/27 Still having hypotensive episodes - possibly happening at home and were just going unnoticed? Shane Puente in bathroom today. Denies LOC, injury, or pain. OOB with assistance only (if not hypotensive). 4/28 Most likely related to decreasing kidney fx. Nephrology consulted. 4/30 BP stable off pressors    Electrolyte imbalance secondary to chemo  4/26  Mg+ 1.1, Ca+ 5.6. Replace with Ca+ gluconate 4g IV and Mg+ sulfate 4g IV.  4/27 Improved with replacement Mg+2.0 Ca+7.1 (corrected Ca+8.7)    Pancytopenia secondary to chemo  4/26  WBC 2.5 Hgb 9.7 Plt 36,000. No transfusions needed today. D/C heparin. Will con't to monitor. 4/27  WBC 2.9 Hgb 5.6 Plt 50,000. Repeat Hgb 6.0. Transfuse 2 units PRBCs.  4/28  Counts improved after transfusion. WBC 3.4 Hgb 7.3 Plt 40,000. Renal Fx  4/26  Renal fx is acutely worsening. BUN 23 Cr 1.91. Pharmacist checking vanc trough prior to the next scheduled dose and will adjust as necessary. Will continue to follow trend in renal fx and make adjustments if renal fx con't to worsen. ID consulted, will appreciate their recommendations. 4/27  BUN 42, Cr 3.74, GFR 13. Worsening renal fx due to vanc/tobra? DC vanc due to decreased renal fx and vanc trough 35.2. Ultrasound, CT A/P WO contrast, urine sodium, urine creatinine, cortisol, and uric acid. 4/28  Kidney fx continues to decline. BUN 48 Cr 4.47 GFR 11. Nephrology consulted. Pt had previous hx of mild CKD. Possibility that antibx caused the decline in fx? Decrease Allopurinol to 100mg daily as it can affect renal fx as well - AHS? Renal US unremarkable. CT A/P unrevealing. Nephrology consulted. 4/30 Kidney function improving    Ascites  - Paracentesis (4/3/17, 4/14/17)   4/28 IR consulted for paracentesis.   Unsure if this will be able to be performed due to hypotension. 4/30 Ascites stable    Diarrhea  4/28 Reports 5 episodes of loose stools yesterday. None today. Will check stool studies, c diff if diarrhea returns.     DVT prophylaxis: SCDs.        04/29/17 Ms Barrientos's BP has remained stable overnight with minimal use of levophed. Encouraged by improved perfusion with pressors. Her UOP has significantly improved. Hopefully her kidney function will continue to improve. Patient is extremely anxious to get out of the CCU unit. We encouraged her to take it day by day. 04/30/17 BP is stable off of pressors. Encourage by improvement in kidney function. Adding celexa to ativan for depression/anxiety. Stable to transfer back to floor.      Zenia Speaker, NP   [facility] [place] [facility]  [address] [address]  Office : [phone]  Fax : [phone]

## 2017-04-30 NOTE — PROGRESS NOTES
Patient complaints continued about washburn. Patient's son came to nursing station during report stating \"Mom is crying now of discomfort. I am open for anything at this point to remove washburn. \"  BP still marginal.  Patient informed that washburn will be removed and she will have to call for assistance to use restroom. Patient and son verbalized understanding. Washburn removed, marco care performed, and patient repositioned in bed.

## 2017-04-30 NOTE — PROGRESS NOTES
Patient up from recliner and back into bed. Patient able to stand with assistance from 1 person, denies weakness/dizziness when to standing position. Full bed bath given, EKG/O2 probe and linens changed. Patient restful in bed with call light within reach. No further needs expressed or apparent.

## 2017-04-30 NOTE — PROGRESS NOTES
Bedside, Verbal and Written shift change report given to RN Seble Frye (oncoming nurse) by Radha Veloz (offgoing nurse). Report included the following information SBAR, Kardex, Intake/Output, MAR, Recent Results and Cardiac Rhythm NSR/S. Tach. We discussed patient's up/down levo demand overnight to maintain pressure with levo off as of appx 0630. In addition, we discussed patient's emotional distress and discomfort with washburn cath. With patient now off levo, oncoming RNs to remove washburn cath. Care assumed by oncoming RNs at this time.

## 2017-04-30 NOTE — PROGRESS NOTES
Chart was reviewed, but patient was not seen. Continue ID plan as stated in previous notes. Please call if questions arise. Will follow up on Monday or sooner if called. Patient finishes 5 days of levaquin today (4/26/17-4/30/17) for atypical pulmonary coverage. This was renal dosed every 48 hours and last dose was given today at 3 pm.    Will dc levaquin and remain only on zosyn.

## 2017-04-30 NOTE — PROGRESS NOTES
TRANSFER - IN REPORT:    Verbal report received from Erna(maulik) on Bekah Sanchez  being received from CCU(unit) for routine progression of care      Report consisted of patients Situation, Background, Assessment and   Recommendations(SBAR). Information from the following report(s) SBAR, Kardex and Recent Results was reviewed with the receiving nurse. Opportunity for questions and clarification was provided. Assessment completed upon patients arrival to unit and care assumed.

## 2017-04-30 NOTE — PROGRESS NOTES
Bekah Sanchez  Admission Date: 4/23/2017             Daily Progress Note: 4/30/2017   Patient is a 62 y.o.  female seen and evaluated at the request of Dr. Armando Duenas for the evaluation of sepsis.   She is a 61 y/o [place] female with hx of NHL, HTN presented with fevers and chills on 4/23. Patient was Undergoing  treatment with Bendeka/Rituxan/Neulast. She completed the first cycle on 4/15. Her fevers have improved. She is followed by ID and on Vanco ,zosyn and Levaquin . She had paracentesis today for the 3rd time. She has been bordeline hypotensive and also developed acute renal failure.    She has not been febrile last 24 hours. She reports she feels better after paracentesis. She denies SOB.       Subjective:   Comfortable and up in chair today  Asymptomatic with no complaints  Current Facility-Administered Medications   Medication Dose Route Frequency    LORazepam (ATIVAN) tablet 1 mg  1 mg Oral Q6H PRN    potassium chloride (K-DUR, KLOR-CON) SR tablet 20 mEq  20 mEq Oral BID    allopurinol (ZYLOPRIM) tablet 100 mg  100 mg Oral DAILY    0.9% sodium chloride infusion 250 mL  250 mL IntraVENous PRN    piperacillin-tazobactam (ZOSYN) 3.375 g in 0.9% sodium chloride (MBP/ADV) 100 mL  3.375 g IntraVENous Q12H    NOREPINephrine (LEVOPHED) 8,000 mcg in dextrose 5% 250 mL infusion  2-16 mcg/min IntraVENous TITRATE    0.9% sodium chloride infusion 250 mL  250 mL IntraVENous PRN    loperamide (IMODIUM) capsule 2 mg  2 mg Oral PRN    diphenoxylate-atropine (LOMOTIL) tablet 1 Tab  1 Tab Oral Q4H PRN    levoFLOXacin (LEVAQUIN) tablet 750 mg  750 mg Oral Q48H    sodium chloride (NS) flush 5-10 mL  5-10 mL IntraVENous PRN    aspirin chewable tablet 81 mg  81 mg Oral DAILY    HYDROcodone-acetaminophen (NORCO) 5-325 mg per tablet 1-2 Tab  1-2 Tab Oral Q4H PRN    levothyroxine (SYNTHROID) tablet 125 mcg  125 mcg Oral ACB    pantoprazole (PROTONIX) tablet 40 mg  40 mg Oral ACB    pregabalin (LYRICA) capsule 50 mg  50 mg Oral TID    sodium chloride (NS) flush 5 mL  5 mL InterCATHeter Q8H    0.9% sodium chloride infusion  125 mL/hr IntraVENous CONTINUOUS    acetaminophen (TYLENOL) tablet 650 mg  650 mg Oral Q4H PRN    magic mouthwash (ALEXSANDER) oral suspension 5 mL  5 mL Oral Q4H PRN         Objective:     Vitals:    04/30/17 0729 04/30/17 0731 04/30/17 0732 04/30/17 0754   BP: (!) 75/40  (!) 81/52 90/56   Pulse: 86 95  95   Resp: 20 20  26   Temp:       SpO2: 96% 94%  97%   Weight:       Height:         Intake and Output:   04/28 1901 - 04/30 0700  In: 6429.9 [P.O.:840; I.V.:5218.9]  Out: 5100 [Urine:5100]  04/30 0701 - 04/30 1900  In: 300 [P.O.:300]  Out: -     Physical Exam:          GEN: Obese and in no acute distress, Oxygen per per NC at 2L  HEENT:  PERRL, EOMI, no alar flaring or epistaxis, oral mucosa moist without cyanosis,   NECK:  no JVD, no retractions, no thyromegaly or masses,   LUNGS:  CTA  HEART:  RRR with no M,G,R;  ABDOMEN:  soft with no tenderness; positive bowel sounds present  EXTREMITIES:  warm with no cyanosis, 1-2+ lower leg edema  SKIN:  no jaundice or ecchymosis   NEURO:  alert and oriented, grossly non-focal    CHEST XRAY:     LAB  Recent Labs      04/30/17   0355  04/29/17   0425  04/28/17   0915   WBC  3.0*  4.7  3.4*   HGB  8.6*  8.8*  7.3*   HCT  25.3*  26.4*  22.1*   PLT  39*  55*  40*     Recent Labs      04/30/17   0355  04/29/17   0425  04/28/17   0400   NA  146*  143  141   K  4.6  4.4  4.1   CL  118*  114*  111*   CO2  17*  17*  18*   GLU  117*  133*  91   BUN  55*  55*  48*   CREA  3.99*  4.35*  4.47*   MG  1.9  2.0  2.1     No results for input(s): PH, PCO2, PO2, HCO3 in the last 72 hours. No results for input(s): LCAD, LAC in the last 72 hours.   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   Now off pressors  She is completely asymptomatic and may have lower baseline pressures    Acute renal failure (ARF) ([facility] ICD-10-CM: N17.9  ICD-9-CM: 584.9  4/28/2017 Unknown    ATN- stable- nonoliguric nephrology following    * (Principal)Sepsis ([facility] ICD-10-CM: A41.9  ICD-9-CM: 038.9, 995.91  4/23/2017 Yes        Hypomagnesemia ICD-10-CM: E83.42  ICD-9-CM: 275.2  4/23/2017 Yes        Non Hodgkin's lymphoma ([facility] ICD-10-CM: C85.90  ICD-9-CM: 202.80  3/30/2017 Yes    Followed by oncology    Ascites ICD-10-CM: R18.8  ICD-9-CM: 789.59  3/30/2017 Yes    S/p paracenthesis yesterday 3.6L removed    Morbid obesity ([facility] ICD-10-CM: E66.01  ICD-9-CM: 278.01  10/4/2011 Yes            Venkat David- may transfer out of ICU from my stand point    More than 50% of time documented was spent in face-to-face contact with the patient and in the care of the patient on the floor/unit where the patient is located.              Henok Gardner MD

## 2017-04-30 NOTE — PROGRESS NOTES
END OF SHIFT NOTE:    Intake/Output  04/30 0701 - 04/30 1900  In: 1009 [P.O.:540; I.V.:469]  Out: 725 [Urine:725]   Voiding: YES  Catheter: NO  Drain:              Stool:  0 occurrences. Stool Assessment  Stool Color: Brown;Yellow (04/29/17 0915)  Stool Appearance: Mucous (04/29/17 0915)  Stool Amount: Smear (04/29/17 0915)  Stool Source/Status: Incontinence; Rectum (04/29/17 0915)    Emesis:  0 occurrences.           VITAL SIGNS  Patient Vitals for the past 12 hrs:   Temp Pulse Resp BP SpO2   04/30/17 1435 97.4 °F (36.3 °C) 84 18 107/67 100 %   04/30/17 1417 - - - 118/55 -   04/30/17 1244 98 °F (36.7 °C) 89 21 (!) 85/60 100 %   04/30/17 1213 - 92 - 102/53 -   04/30/17 1200 - - - 102/53 -   04/30/17 1159 - 94 21 - 100 %   04/30/17 1130 - - - 97/68 -   04/30/17 1129 - 88 21 - 99 %   04/30/17 1100 - 88 27 (!) 85/48 99 %   04/30/17 1048 - - - (!) 85/47 -   04/30/17 1047 - 92 17 - 100 %   04/30/17 1000 - 94 23 (!) 88/60 99 %   04/30/17 0930 - 89 15 (!) 81/46 100 %   04/30/17 0914 - - - (!) 87/58 -   04/30/17 0913 - 87 23 - 98 %   04/30/17 0805 98.6 °F (37 °C) 98 23 99/55 99 %   04/30/17 0754 - 95 26 90/56 97 %   04/30/17 0732 - - - (!) 81/52 -   04/30/17 0731 - 95 20 - 94 %   04/30/17 0729 - 86 20 (!) 75/40 96 %   04/30/17 0700 - 100 24 98/57 98 %   04/30/17 0645 - (!) 103 21 (!) 81/59 97 %   04/30/17 0630 - 100 20 114/59 98 %   04/30/17 0616 - 97 19 - 98 %   04/30/17 0615 - - - 94/59 -       Pain Assessment  Pain 1  Pain Scale 1: Numeric (0 - 10) (04/30/17 1245)  Pain Intensity 1: 0 (04/30/17 1245)  Patient Stated Pain Goal: 0 (04/30/17 0300)  Pain Reassessment 1: Yes (04/30/17 0300)  Pain Onset 1: acute (04/28/17 1108)  Pain Location 1: Perineum (04/29/17 0718)  Pain Orientation 1: Anterior (04/26/17 2057)  Pain Description 1: Hypersensitivity;Pressure (04/29/17 0718)  Pain Intervention(s) 1: Medication (see MAR) (04/29/17 1408)    Ambulating  Yes    Additional Information:     Shift report will be given to onchelene nurse at the bedside.     Daryl Mota RN

## 2017-04-30 NOTE — PROGRESS NOTES
Bedside and Verbal shift change report given to Georgette Pedroza RN (oncoming nurse) by Barby Mann RN (offgoing nurse).  Report included the following information SBAR, Kardex, Intake/Output, MAR, Recent Results and Cardiac Rhythm SR.

## 2017-04-30 NOTE — PROGRESS NOTES
Patient largely restful over 1st 4 hours of shift. Family home for the evening, patient sleeping. As noted, patient with slow drop in BP while at rest.  Levo at 3 at current, continuously monitoring for rate adjustment. Patient easily awoken for re-assessment then back to restful. Good UOP at 550. No further needs expressed or apparent.

## 2017-04-30 NOTE — PROGRESS NOTES
Dual skin assessment completed along with Salo Alexander. Skin intact, redness to torso, back and legs from chemo per pt. Edema to bilateral legs and arms.

## 2017-04-30 NOTE — PROGRESS NOTES
BP continues to drop over 2nd 3rd of shift, levophed adjusted to meet MAP 65. Good  UOP. As was the case last night, patient very lethargic but able to answer orientation questions when prompted. No needs expressed or apparent.

## 2017-04-30 NOTE — PROGRESS NOTES
Patient called for assistance to use restroom. Assisted x2 to bedside commode. Patient noted to be unsteady on feet; instructed patient to stand tall with eyes open. Patient able to void 50 ml. Urine is clear and yellow. Patient assisted to recliner. Family at bedside. Call light within reach.

## 2017-04-30 NOTE — PROGRESS NOTES
[address] Nephrology progress note    Follow-Up on: 4/30/2017     Patient seen and examined. Had para Friday with 3.6 liters removed. She is much more comfortable this morning with washburn out. Levo is currently off and has been since early this morning. She continues to state her abdominal discomfort is ok. ROS:  Gen - no fever, no chills, appetite suboptimal  CV - no chest pain, no orthopnea  Lung - shortness of breath better, occasional cough  Abd - no tenderness, no nausea, no vomiting  Ext - noted edema    Exam:  Vitals:    04/30/17 0729 04/30/17 0731 04/30/17 0732 04/30/17 0754   BP: (!) 75/40  (!) 81/52 90/56   Pulse: 86 95  95   Resp: 20 20  26   Temp:       SpO2: 96% 94%  97%   Weight:       Height:             Intake/Output Summary (Last 24 hours) at 04/30/17 1103  Last data filed at 04/30/17 0810   Gross per 24 hour   Intake          4299.12 ml   Output             2750 ml   Net          1549.12 ml       GEN - in no distress but notably agitated with catheter  CV - S1, S2, no rub  Lung - clear bilaterally  Abd - less distended, non-tender  Ext - 2+ edema    Recent Labs      04/30/17   0355  04/29/17   0425  04/28/17   0915   WBC  3.0*  4.7  3.4*   HGB  8.6*  8.8*  7.3*   HCT  25.3*  26.4*  22.1*   PLT  39*  55*  40*        Recent Labs      04/30/17   0355  04/29/17   0425  04/28/17   0400   NA  146*  143  141   K  4.6  4.4  4.1   CL  118*  114*  111*   CO2  17*  17*  18*   BUN  55*  55*  48*   CREA  3.99*  4.35*  4.47*   CA  7.4*  7.1*  7.0*   GLU  117*  133*  91   MG  1.9  2.0  2.1       Assessment / Plan:    1. MATI - non-oliguric. Likely ATN related to Abx and ischemia. However, with improved perfusion, her renal function appears to be stabilizing. 2. Anion Gap Metabolic Acidosis (in setting of hypoalbuminemia)    3. Pressor-dependent hypotension - pressors now off. Start midodrine.     4. Pancytopenia     5.  Ascites - s/p para - 3.6 liters (4/28)    Discussed with  and Trung garduno, RN at bedside. Stable for transfer to floor. Start midodrine. AM cortisol yesterday ok.

## 2017-04-30 NOTE — PROGRESS NOTES
Alerted by tech that patient was agitated and needed assistance. In to room to find patient in bed, speaking in an animated fashion on the phone. Patient spoke to me and said \"I want this catheter out right now, I can't handle it and I want this damn thing out. \"  Patient agitated though alert/oriented. Patient states \"I'm tired of hearing the same old thing and I want to know what is going on. \"  I reinforced the need for the catheter as we are monitoring patient's kidney function. In addition, I reminded patient that due to her needs for presser support for BP, making several trips out of bed to the commode as she requested would put her at a great risk for a fall. At patient's request, I spoke to the son on the phone. I endorsed the same information and son was in agreement. Patient's son advises that it may help to establish goals with patient to have the washburn removed. I advised patient that this would be at the physician's discretion and as of current, it would not be safe or advisable to remove. Patient states she has had difficulty sleeping due to the discomfort of having the catheter in place. We agreed to try PO ativan to see if it would help with restfulness and ease anxiety. Patient states \"what if I just pull the damn thing out myself- nobody will know. \" I explained the mechanics of the catheter to the patient and advised her that this would be unadvisable as the balloon could cause damage to her urethra and/or bladder necessitating long term catheter use. Patient given PO ativan. Call light within reach. Patient again advises that I am available to help her with anything that she needs.

## 2017-05-01 ENCOUNTER — APPOINTMENT (OUTPATIENT)
Dept: GENERAL RADIOLOGY | Age: 57
DRG: 871 | End: 2017-05-01
Attending: NURSE PRACTITIONER
Payer: COMMERCIAL

## 2017-05-01 ENCOUNTER — APPOINTMENT (OUTPATIENT)
Dept: ULTRASOUND IMAGING | Age: 57
DRG: 871 | End: 2017-05-01
Attending: NURSE PRACTITIONER
Payer: COMMERCIAL

## 2017-05-01 ENCOUNTER — APPOINTMENT (OUTPATIENT)
Dept: ULTRASOUND IMAGING | Age: 57
DRG: 871 | End: 2017-05-01
Attending: INTERNAL MEDICINE
Payer: COMMERCIAL

## 2017-05-01 PROBLEM — R06.83 SNORES: Status: ACTIVE | Noted: 2017-05-01

## 2017-05-01 PROBLEM — I82.611 SUPERFICIAL VENOUS THROMBOSIS OF RIGHT ARM: Status: ACTIVE | Noted: 2017-05-01

## 2017-05-01 LAB
ALBUMIN SERPL BCP-MCNC: 2 G/DL (ref 3.5–5)
ALBUMIN/GLOB SERPL: 0.7 {RATIO} (ref 1.2–3.5)
ALP SERPL-CCNC: 225 U/L (ref 50–136)
ALT SERPL-CCNC: 31 U/L (ref 12–65)
ANION GAP BLD CALC-SCNC: 10 MMOL/L (ref 7–16)
AST SERPL W P-5'-P-CCNC: 18 U/L (ref 15–37)
BACTERIA SPEC CULT: NORMAL
BASOPHILS # BLD AUTO: 0 K/UL (ref 0–0.2)
BASOPHILS # BLD: 0 % (ref 0–2)
BILIRUB SERPL-MCNC: 0.3 MG/DL (ref 0.2–1.1)
BUN SERPL-MCNC: 55 MG/DL (ref 6–23)
CALCIUM SERPL-MCNC: 7.7 MG/DL (ref 8.3–10.4)
CHLORIDE SERPL-SCNC: 121 MMOL/L (ref 98–107)
CO2 SERPL-SCNC: 17 MMOL/L (ref 21–32)
CREAT SERPL-MCNC: 3.5 MG/DL (ref 0.6–1)
DIFFERENTIAL METHOD BLD: ABNORMAL
EOSINOPHIL # BLD: 0.1 K/UL (ref 0–0.8)
EOSINOPHIL NFR BLD: 4 % (ref 0.5–7.8)
ERYTHROCYTE [DISTWIDTH] IN BLOOD BY AUTOMATED COUNT: 20.1 % (ref 11.9–14.6)
GLOBULIN SER CALC-MCNC: 2.7 G/DL (ref 2.3–3.5)
GLUCOSE SERPL-MCNC: 91 MG/DL (ref 65–100)
GRAM STN SPEC: NORMAL
GRAM STN SPEC: NORMAL
HCT VFR BLD AUTO: 24.5 % (ref 35.8–46.3)
HGB BLD-MCNC: 8.2 G/DL (ref 11.7–15.4)
IMM GRANULOCYTES # BLD: 0 K/UL (ref 0–0.5)
IMM GRANULOCYTES NFR BLD AUTO: 0.5 % (ref 0–5)
LYMPHOCYTES # BLD AUTO: 11 % (ref 13–44)
LYMPHOCYTES # BLD: 0.2 K/UL (ref 0.5–4.6)
MAGNESIUM SERPL-MCNC: 1.7 MG/DL (ref 1.8–2.4)
MCH RBC QN AUTO: 27.6 PG (ref 26.1–32.9)
MCHC RBC AUTO-ENTMCNC: 33.5 G/DL (ref 31.4–35)
MCV RBC AUTO: 82.5 FL (ref 79.6–97.8)
MONOCYTES # BLD: 0.2 K/UL (ref 0.1–1.3)
MONOCYTES NFR BLD AUTO: 8 % (ref 4–12)
NEUTS SEG # BLD: 1.5 K/UL (ref 1.7–8.2)
NEUTS SEG NFR BLD AUTO: 77 % (ref 43–78)
PLATELET # BLD AUTO: 38 K/UL (ref 150–450)
PMV BLD AUTO: 10.1 FL (ref 10.8–14.1)
POTASSIUM SERPL-SCNC: 4.9 MMOL/L (ref 3.5–5.1)
PROT SERPL-MCNC: 4.7 G/DL (ref 6.3–8.2)
RBC # BLD AUTO: 2.97 M/UL (ref 4.05–5.25)
SERVICE CMNT-IMP: NORMAL
SODIUM SERPL-SCNC: 148 MMOL/L (ref 136–145)
WBC # BLD AUTO: 1.9 K/UL (ref 4.3–11.1)

## 2017-05-01 PROCEDURE — 85025 COMPLETE CBC W/AUTO DIFF WBC: CPT | Performed by: NURSE PRACTITIONER

## 2017-05-01 PROCEDURE — 36591 DRAW BLOOD OFF VENOUS DEVICE: CPT

## 2017-05-01 PROCEDURE — 74011000258 HC RX REV CODE- 258: Performed by: INTERNAL MEDICINE

## 2017-05-01 PROCEDURE — 74011250636 HC RX REV CODE- 250/636: Performed by: NURSE PRACTITIONER

## 2017-05-01 PROCEDURE — 74011250637 HC RX REV CODE- 250/637: Performed by: INTERNAL MEDICINE

## 2017-05-01 PROCEDURE — 77030003560 HC NDL HUBR BARD -A

## 2017-05-01 PROCEDURE — 80053 COMPREHEN METABOLIC PANEL: CPT | Performed by: NURSE PRACTITIONER

## 2017-05-01 PROCEDURE — 65270000029 HC RM PRIVATE

## 2017-05-01 PROCEDURE — 93971 EXTREMITY STUDY: CPT

## 2017-05-01 PROCEDURE — 83735 ASSAY OF MAGNESIUM: CPT | Performed by: NURSE PRACTITIONER

## 2017-05-01 PROCEDURE — 74011250637 HC RX REV CODE- 250/637: Performed by: NURSE PRACTITIONER

## 2017-05-01 PROCEDURE — 74011250636 HC RX REV CODE- 250/636: Performed by: INTERNAL MEDICINE

## 2017-05-01 PROCEDURE — 99232 SBSQ HOSP IP/OBS MODERATE 35: CPT | Performed by: INTERNAL MEDICINE

## 2017-05-01 PROCEDURE — 71020 XR CHEST PA LAT: CPT

## 2017-05-01 PROCEDURE — 93970 EXTREMITY STUDY: CPT

## 2017-05-01 RX ORDER — CITALOPRAM 20 MG/1
20 TABLET, FILM COATED ORAL DAILY
Status: DISCONTINUED | OUTPATIENT
Start: 2017-05-01 | End: 2017-05-02 | Stop reason: HOSPADM

## 2017-05-01 RX ORDER — SODIUM CHLORIDE 450 MG/100ML
100 INJECTION, SOLUTION INTRAVENOUS CONTINUOUS
Status: DISCONTINUED | OUTPATIENT
Start: 2017-05-01 | End: 2017-05-01

## 2017-05-01 RX ORDER — MIDODRINE HYDROCHLORIDE 5 MG/1
2.5 TABLET ORAL 2 TIMES DAILY WITH MEALS
Status: DISCONTINUED | OUTPATIENT
Start: 2017-05-01 | End: 2017-05-02 | Stop reason: HOSPADM

## 2017-05-01 RX ORDER — POTASSIUM CHLORIDE 20 MEQ/1
20 TABLET, EXTENDED RELEASE ORAL DAILY
Status: DISCONTINUED | OUTPATIENT
Start: 2017-05-01 | End: 2017-05-01

## 2017-05-01 RX ORDER — MAGNESIUM SULFATE HEPTAHYDRATE 40 MG/ML
2 INJECTION, SOLUTION INTRAVENOUS ONCE
Status: COMPLETED | OUTPATIENT
Start: 2017-05-01 | End: 2017-05-01

## 2017-05-01 RX ORDER — FLUTICASONE PROPIONATE 50 MCG
2 SPRAY, SUSPENSION (ML) NASAL DAILY
Status: DISCONTINUED | OUTPATIENT
Start: 2017-05-01 | End: 2017-05-02 | Stop reason: HOSPADM

## 2017-05-01 RX ADMIN — Medication 5 ML: at 14:00

## 2017-05-01 RX ADMIN — MIDODRINE HYDROCHLORIDE 2.5 MG: 5 TABLET ORAL at 17:27

## 2017-05-01 RX ADMIN — FLUTICASONE PROPIONATE 2 SPRAY: 50 SPRAY, METERED NASAL at 12:51

## 2017-05-01 RX ADMIN — SODIUM CHLORIDE 125 ML/HR: 900 INJECTION, SOLUTION INTRAVENOUS at 07:35

## 2017-05-01 RX ADMIN — PREGABALIN 50 MG: 50 CAPSULE ORAL at 17:28

## 2017-05-01 RX ADMIN — MAGNESIUM SULFATE HEPTAHYDRATE 2 G: 40 INJECTION, SOLUTION INTRAVENOUS at 09:05

## 2017-05-01 RX ADMIN — PREGABALIN 50 MG: 50 CAPSULE ORAL at 21:31

## 2017-05-01 RX ADMIN — PREGABALIN 50 MG: 50 CAPSULE ORAL at 09:05

## 2017-05-01 RX ADMIN — Medication 5 ML: at 21:31

## 2017-05-01 RX ADMIN — ALLOPURINOL 100 MG: 100 TABLET ORAL at 09:05

## 2017-05-01 RX ADMIN — LEVOTHYROXINE SODIUM 125 MCG: 125 TABLET ORAL at 07:23

## 2017-05-01 RX ADMIN — PIPERACILLIN SODIUM,TAZOBACTAM SODIUM 3.38 G: 3; .375 INJECTION, POWDER, FOR SOLUTION INTRAVENOUS at 09:05

## 2017-05-01 RX ADMIN — PANTOPRAZOLE SODIUM 40 MG: 40 TABLET, DELAYED RELEASE ORAL at 07:23

## 2017-05-01 RX ADMIN — Medication 5 ML: at 05:18

## 2017-05-01 RX ADMIN — CITALOPRAM HYDROBROMIDE 20 MG: 20 TABLET ORAL at 12:47

## 2017-05-01 RX ADMIN — POTASSIUM CHLORIDE 20 MEQ: 20 TABLET, EXTENDED RELEASE ORAL at 09:05

## 2017-05-01 RX ADMIN — MIDODRINE HYDROCHLORIDE 5 MG: 5 TABLET ORAL at 07:23

## 2017-05-01 NOTE — PROGRESS NOTES
[facility] Hematology & Oncology        Inpatient Hematology / Oncology Progress Note      Admission Date: 2017  2:56 PM  Reason for Admission/Hospital Course: Sepsis ([address]      24 Hour Events:  BP stable off pressor  Kidney function improving  Son at bedside  Cough worsening  C/o right arm swelling    ROS:  Constitutional: + fatigue; Negative for fever, chills  CV: +edema Negative for chest pain, palpitations  Respiratory: +cough, dyspnea; negative for wheezing. GI: negative abdominal pain, diarrhea    10 point review of systems is otherwise negative with the exception of the elements mentioned above in the HPI.      No Known Allergies    OBJECTIVE:  Patient Vitals for the past 8 hrs:   BP Pulse Resp SpO2   17 1213 102/53 92 - -   17 1200 102/53 - - -   17 1159 - 94 21 100 %   17 1130 97/68 - - -   17 1129 - 88 21 99 %   17 1100 (!) 85/48 88 27 99 %   17 1048 (!) 85/47 - - -   17 1047 - 92 17 100 %   17 1000 (!) 88/60 94 23 99 %   17 0930 (!) 81/46 89 15 100 %   17 0914 (!) 87/58 - - -   17 0913 - 87 23 98 %   17 0805 99/55 98 23 99 %   17 0754 90/56 95 26 97 %   17 0732 (!) 81/52 - - -   17 0731 - 95 20 94 %   17 0729 (!) 75/40 86 20 96 %   17 0700 98/57 100 24 98 %   17 0645 (!) 81/59 (!) 103 21 97 %   17 0630 114/59 100 20 98 %   17 0616 - 97 19 98 %   17 0615 94/59 - - -   17 0600 110/62 91 18 97 %   17 0545 120/62 - - -   17 0544 - 89 23 98 %   17 0530 97/57 - - -   17 0529 - 86 25 98 %   17 0515 110/60 93 28 97 %   17 0500 109/67 90 18 98 %   17 0445 118/63 85 22 98 %   17 0430 (!) 76/58 87 21 99 %     Temp (24hrs), Av.6 °F (37 °C), Min:98.2 °F (36.8 °C), Max:98.9 °F (37.2 °C)    701 - 1900  In: 540 [P.O.:540]  Out: -     Physical Exam:  Constitutional: Well nourished female in no acute distress, sitting comfortably in chair   HEENT: Normocephalic and atraumatic. Oropharynx is clear, mucous membranes are moist.    Lymph node   Deferred   Skin  Warm and dry. No bruising and no rash noted. Abdomen pink. Respiratory  Lungs are clear to auscultation bilaterally without wheezes, rales or rhonchi, normal air exchange without accessory muscle use. CVS Normal rate, regular rhythm and normal S1 and S2. No murmurs, gallops, or rubs. Abdomen Nontender with palpation, normoactive bowel sounds. Neuro Grossly nonfocal with no obvious sensory or motor deficits. MSK 2-3+ generalized edema with increasing swelling to right arm. Normal range of motion in general.  No tenderness. Psych Appropriate mood and affect. Anxious    Right chest port without erythema, edema, and tenderness    Labs:      Recent Labs      04/30/17 0355 04/29/17 0425 04/28/17   0915   WBC  3.0*  4.7  3.4*   RBC  3.11*  3.19*  2.70*   HGB  8.6*  8.8*  7.3*   HCT  25.3*  26.4*  22.1*   MCV  81.4  82.8  81.9   MCH  27.7  27.6  27.0   MCHC  34.0  33.3  33.0   RDW  20.0*  19.8*  20.6*   PLT  39*  55*  40*   GRANS  83*  88*  89*   LYMPH  7*  5*  4*   MONOS  7  5  5   EOS  3  2  2   BASOS  0  0  0   IG  0.3  0.2  0.3   DF  AUTOMATED  AUTOMATED  AUTOMATED   ANEU  2.5  4.1  3.0   ABL  0.2*  0.2*  0.2*   ABM  0.2  0.2  0.2   HOMERO  0.1  0.1  0.1   ABB  0.0  0.0  0.0   AIG  0.0  0.0  0.0        Recent Labs      04/30/17   0355  04/29/17   0425  04/28/17   0400   NA  146*  143  141   K  4.6  4.4  4.1   CL  118*  114*  111*   CO2  17*  17*  18*   AGAP  11  12  12   GLU  117*  133*  91   BUN  55*  55*  48*   CREA  3.99*  4.35*  4.47*   GFRAA  15*  13*  13*   GFRNA  12*  11*  11*   CA  7.4*  7.1*  7.0*   SGOT  19  33  25   AP  221*  231*  140*   TP  4.5*  4.8*  4.7*   ALB  1.8*  1.8*  2.0*   GLOB  2.7  3.0  2.7   AGRAT  0.7*  0.6*  0.7*   MG  1.9  2.0  2.1         Imaging:  Portable chest x-ray.  4/23/2017      CLINICAL INDICATION: Sepsis, fever      FINDINGS: Single AP view of the chest submitted without comparison shows mild  atelectasis in the left lower lung otherwise the lungs are clear. A right-sided  chest port is in place. The cardiac silhouette and mediastinum are unremarkable.     IMPRESSION: Mild left lower lung atelectasis, otherwise no acute abnormality. RENAL ULTRASOUND.  4/27/2017     HISTORY: Acute Renal Failure.     COMPARISON: None.     TECHNIQUE: Direct skin contact sector 3-5 MHz images of the kidneys are  obtained.     FINDINGS: Renal echogenicity within normal limits, the right kidney is  hypoechoic compared to the liver.     Right kidney: 12.2 cm in length. No hydronephrosis.     Left kidney: Also 12.2 cm in length . No hydronephrosis.     Lateral obscured by bowel gas and probably undistended.     IMPRESSION: Unremarkable renal ultrasound. CT abdomen and pelvis without contrast 04/27/2017     History: acute kidney failure; worsening ascites.     Technique: Helically acquired images were obtained from the upper abdomen to the  ischial tuberosities reconstructed at 5mm intervals after oral contrast only. Intravenous contrast was not administered due to elevated creatinine. Coronal  reformatted images were submitted.     Radiation dose reduction techniques were used for this study: Our CT scanners  use one or all of the following: Automated exposure control, adjustment of the  mA and/or kVp according to patient's size, iterative reconstruction.     Comparison: 03/22/2017 from [facility]     CT abdomen: There is a small left pleural effusion, unchanged. The liver is  grossly unremarkable on this noncontrast study. The low density lesion seen  previously is no longer apparent. The spleen is markedly enlarged although less  pronounced than seen previously where was measured at up to 23.4 cm in length  compared to 19.3 cm on today's study.  The pancreas pancreas and adrenal glands  are grossly unremarkable on this contrast study.      There is loss of the typical fat density within the juan although this may be  less pronounced than seen previously. Periaortic adenopathy is also diminished  measuring approximately 2.9 x 2.8 cm compared to 3.9 x 4.1 cm. There is a  moderate amount of abdominal ascites with minimal change. There is diffuse  subcutaneous edema.     CT PELVIS: There is a large amount of pelvic ascites with minimal change. No  adenopathy is present. There is edema within the subcutaneous tissues.     IMPRESSION:  1. Overall moderate amounts of abdominal and pelvic ascites with minimal change.     2. Interval improvement in splenomegaly as well as retroperitoneal adenopathy. The low density lesion within the liver also is no longer evident although the  lack of intravenous contrast does not allow for accurate comparison. 3. Abnormal appearance of the renal juan which have also shown apparent  improvement although there is less than optimal evaluation the absence of  intravenous contrast as was administered on the comparison study  4. Small left pleural effusion, unchanged. Portable view of the chest 4/29/17     COMPARISON: April 23, 2017.     CLINICAL HISTORY: Shortness of breath.     FINDINGS:     There is a stable right-sided IJ line. There is mild bibasilar densities. No  pneumothorax or pulmonary edema. No large pleural effusion. Cardiac mediastinal  contour is within normal limits. Surrounding bones are stable.     IMPRESSION:     Mild bibasilar densities, likely atelectasis. No substantial change. Right upper extremity duplex venous study, 5/1/2017.     CLINICAL HISTORY: Moderate right upper extremity swelling for 2 days.     Technique: Grayscale, and duplex Doppler images of the right upper extremity  venous vascular system were obtained using an 8 MHz transducer.  In addition,  evaluation of the left upper extremity central venous structures to include the  left internal jugular vein was performed.     FINDINGS:     Nonocclusive thrombus is seen in the right cephalic vein near the axillary  fossa. The remaining right upper extremity venous structures are patent. Normal  respiratory variation is seen in the central venous structures. Mild edema is  seen in the soft tissues of the right upper extremity. No abnormal fluid  collection is seen, however.     IMPRESSION:  1. Nonocclusive thrombus in the right cephalic vein. 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 [facility]) ICD-10-CM: C85.88  ICD-9-CM: 200.38  4/7/2017        Non Hodgkin's lymphoma ([facility] ICD-10-CM: C85.90  ICD-9-CM: 202.80  3/30/2017        Ascites ICD-10-CM: R18.8  ICD-9-CM: 789.59  3/30/2017        Lymphadenopathy, generalized ICD-10-CM: R59.1  ICD-9-CM: 785.6  3/30/2017        Osteoarthritis of left knee ICD-10-CM: M17.12  ICD-9-CM: 715.96  8/26/2013        Morbid obesity ([facility] ICD-10-CM: E66.01  ICD-9-CM: 278.01  10/4/2011        History of DVT of lower extremity ICD-10-CM: R10.061  ICD-9-CM: V12.51  10/4/2011    Overview Signed 10/4/2011  1:44 AM by Juno Toledo, NP     X 2               Hypertension ICD-10-CM: I10  ICD-9-CM: 401.9  10/4/2011    Overview Signed 10/4/2011  1:44 AM by Juno Toledo NP     PRE OP             Heart murmur ICD-10-CM: R01.1  ICD-9-CM: 785.2  10/4/2011        Osteoarthritis of right knee ICD-10-CM: M17.11  ICD-9-CM: 715.96  10/3/2011        S/P total knee replacement using cement ICD-10-CM: C46.076  ICD-9-CM: V43.65  10/3/2011            Ms. Javier Zuniga is a 61 yo female with NHL.   Admitted for sepsis. PLAN:  NHL  4/24 Completed cycle 1 of Bendeka/Rituxan/Neulasta on 4/15. Next cycle scheduled for 5/11.     Fever/Sepsis  4/24 Still febrile. BCx & UCx -NGTD. Flu neg. CXR neg. Con't vanc/zosyn. BCx were not drawn from port originally. Will go ahead and order BCx to be drawn from port. 4/25  Temp spike 102.7 with BP 64/43. 1L fluid bolus improved /60. Add tobramycin. BCx including one from port - NGTD. UCx +12,000 gram neg rods, final report pending. 4/26 Tmax 103. BCx-NGTD. UCx + for proteus mirabilis susceptible to current antibx regimen. ID consulted for persistent fever. Possible noninfectious etiologies of fever as well, lymphoma vs neutrophil recovery. 4/27 Fevers con't. ID following - \"clincialy pt fabiana has a viral illness - check flu swab again given sampling issues as well as RVP (this will take a week to come back unfortunately). CT A/P and then likely a paracentesis to r/o infction. Switch tobra to levoquin to rx for atypical PNA pathogens. Cont vanc/zosyn for now. Routine HIV, HCV screens. PCT in 3s expected with mild CKD. \"  CT A/P unable to be completed due to elevated creatinine. Ordered CT A/P without contrast.  DC vanc due to decreased renal fx and vanc trough 35.2. Con't Zosyn/LVQ. 4/28 Afebrile x 24 hours. ID following. Con't on Zosyn/LVQ. Paracentesis scheduled to r/o infx as well as to drain accumulating fluid. 5/1  Remains afebrile. LVQ d/c'd by ID. Con't Zosyn. Reports worsening cough. CXR ordered. Inquired about nasal spray for congestion. Fluticasone nasal spray ordered. Respiratory viral panel neg. Hypotension  4/26  Continues to drop BP  down to 72/49 - improved to 91/51. Hold lisinopril. 4/27 Still having hypotensive episodes - possibly happening at home and were just going unnoticed? Jeraline Buzzard in bathroom today. Denies LOC, injury, or pain. OOB with assistance only (if not hypotensive). 4/28 Most likely related to decreasing kidney fx. Nephrology consulted. 4/29 BP has remained stable overnight with minimal use of levophed. Encouraged by improved perfusion with pressors. Her UOP has significantly improved  4/30 BP stable off pressors  5/1  BP stable. Placed on midodrine by nephrology    Electrolyte imbalance secondary to chemo  4/26  Mg+ 1.1, Ca+ 5.6. Replace with Ca+ gluconate 4g IV and Mg+ sulfate 4g IV.  4/27 Improved with replacement Mg+2.0 Ca+7.1 (corrected Ca+8.7)  5/1 Mg+ 1.7. Replace with Mg+ 2g IV. Pancytopenia secondary to chemo  4/26  WBC 2.5 Hgb 9.7 Plt 36,000. No transfusions needed today. D/C heparin. Will con't to monitor. 4/27  WBC 2.9 Hgb 5.6 Plt 50,000. Repeat Hgb 6.0. Transfuse 2 units PRBCs.  4/28  Counts improved after transfusion. WBC 3.4 Hgb 7.3 Plt 40,000. 5/1 WBC 1.9 Hgb 8.2 Plt 38,000. No transfusions needed today. Renal Fx  4/26  Renal fx is acutely worsening. BUN 23 Cr 1.91. Pharmacist checking vanc trough prior to the next scheduled dose and will adjust as necessary. Will continue to follow trend in renal fx and make adjustments if renal fx con't to worsen. ID consulted, will appreciate their recommendations. 4/27  BUN 42, Cr 3.74, GFR 13. Worsening renal fx due to vanc/tobra? DC vanc due to decreased renal fx and vanc trough 35.2. Ultrasound, CT A/P WO contrast, urine sodium, urine creatinine, cortisol, and uric acid. 4/28  Kidney fx continues to decline. BUN 48 Cr 4.47 GFR 11. Nephrology consulted. Pt had previous hx of mild CKD. Possibility that antibx caused the decline in fx? Decrease Allopurinol to 100mg daily as it can affect renal fx as well - AHS? Renal US unremarkable. CT A/P unrevealing. Nephrology consulted. 4/30 Kidney function improving  5/1  Improving - BUN 55, Cr 3.5. DC IV fluids per nephrology. Ascites  - Paracentesis (4/3/17, 4/14/17)   4/28 IR consulted for paracentesis. Unsure if this will be able to be performed due to hypotension.   4/30 Ascites stable  5/1 Paracentesis performed on 4/28. 3.6L fluid removed and sent to lab- NGTD. Diarrhea  4/28 Reports 5 episodes of loose stools yesterday. None today. Will check stool studies, c diff if diarrhea returns. RESOLVED    Anxiety  4/30  Add celexa and ativan PRN    Right Arm Swelling  5/1  US ordered showed nonocclusive thrombus in the right cephalic vein. Plt currently 38,000. Anticoagulants contraindicated at this time. Will con't to monitor. Elevate arm to reduce swelling.     DVT prophylaxis: SCDs.     [facility]  [address] [address]  Office : [phone]  Fax : [phone]

## 2017-05-01 NOTE — PROGRESS NOTES
END OF SHIFT NOTE:    Intake/Output  05/01 0701 - 05/01 1900  In: 999 [P.O.:480; I.V.:519]  Out: 600 [Urine:600]   Voiding: YES  Catheter: NO  Drain:              Stool:  0 occurrences. Stool Assessment  Stool Color: Brown;Yellow (04/29/17 0915)  Stool Appearance: Mucous (04/29/17 0915)  Stool Amount: Smear (04/29/17 0915)  Stool Source/Status: Incontinence; Rectum (04/29/17 0915)    Emesis:  0 occurrences. VITAL SIGNS  Patient Vitals for the past 12 hrs:   Temp Pulse Resp BP SpO2   05/01/17 1547 97.9 °F (36.6 °C) 84 18 145/81 99 %   05/01/17 1248 98.6 °F (37 °C) 85 18 148/80 100 %   05/01/17 0724 97.7 °F (36.5 °C) 83 18 109/71 100 %       Pain Assessment  Pain 1  Pain Scale 1: Numeric (0 - 10) (05/01/17 0730)  Pain Intensity 1: 0 (05/01/17 0730)  Patient Stated Pain Goal: 0 (05/01/17 0315)  Pain Reassessment 1: Yes (05/01/17 0315)  Pain Onset 1: acute (04/28/17 1108)  Pain Location 1: Perineum (04/29/17 0718)  Pain Orientation 1: Anterior (04/26/17 2057)  Pain Description 1: Hypersensitivity;Pressure (04/29/17 0718)  Pain Intervention(s) 1: Medication (see MAR) (04/29/17 6493)    Ambulating  Yes    Additional Information:     Shift report will be given to oncoming nurse at the bedside.     Patricio Palacios RN

## 2017-05-01 NOTE — PROGRESS NOTES
Subjective:   Daily Progress Note: 2017 10:58 AM    C/O edema    Current Facility-Administered Medications   Medication Dose Route Frequency    citalopram (CELEXA) tablet 20 mg  20 mg Oral DAILY    fluticasone (FLONASE) 50 mcg/actuation nasal spray 2 Spray  2 Spray Both Nostrils DAILY    midodrine (PROAMITINE) tablet 2.5 mg  2.5 mg Oral BID WITH MEALS    guaiFENesin-dextromethorphan (ROBITUSSIN DM) 100-10 mg/5 mL syrup 5 mL  5 mL Oral Q6H PRN    LORazepam (ATIVAN) tablet 1 mg  1 mg Oral Q6H PRN    allopurinol (ZYLOPRIM) tablet 100 mg  100 mg Oral DAILY    0.9% sodium chloride infusion 250 mL  250 mL IntraVENous PRN    piperacillin-tazobactam (ZOSYN) 3.375 g in 0.9% sodium chloride (MBP/ADV) 100 mL  3.375 g IntraVENous Q12H    0.9% sodium chloride infusion 250 mL  250 mL IntraVENous PRN    loperamide (IMODIUM) capsule 2 mg  2 mg Oral PRN    diphenoxylate-atropine (LOMOTIL) tablet 1 Tab  1 Tab Oral Q4H PRN    sodium chloride (NS) flush 5-10 mL  5-10 mL IntraVENous PRN    aspirin chewable tablet 81 mg  81 mg Oral DAILY    HYDROcodone-acetaminophen (NORCO) 5-325 mg per tablet 1-2 Tab  1-2 Tab Oral Q4H PRN    levothyroxine (SYNTHROID) tablet 125 mcg  125 mcg Oral ACB    pantoprazole (PROTONIX) tablet 40 mg  40 mg Oral ACB    pregabalin (LYRICA) capsule 50 mg  50 mg Oral TID    sodium chloride (NS) flush 5 mL  5 mL InterCATHeter Q8H    acetaminophen (TYLENOL) tablet 650 mg  650 mg Oral Q4H PRN    magic mouthwash (ALEXSANDER) oral suspension 5 mL  5 mL Oral Q4H PRN               Objective:     Visit Vitals    /71 (BP 1 Location: Left arm, BP Patient Position: Sitting)    Pulse 83    Temp 97.7 °F (36.5 °C)    Resp 18    Ht 5' 3\" (1.6 m)    Wt 131.8 kg (290 lb 9.6 oz)    LMP 2015    SpO2 100%    Breastfeeding No    BMI 51.48 kg/m2    O2 Flow Rate (L/min): 0 l/min O2 Device: Room air    Temp (24hrs), Av.1 °F (36.7 °C), Min:97.4 °F (36.3 °C), Max:98.6 °F (37 °C)      701 - 05/01 1900  In: 759 [P.O.:240; I.V.:519]  Out: -   04/29 1901 - 05/01 0700  In: 3828.5 [P.O.:900; I.V.:2928.5]  Out: 4949 [Urine:2475]      Visit Vitals    /71 (BP 1 Location: Left arm, BP Patient Position: Sitting)    Pulse 83    Temp 97.7 °F (36.5 °C)    Resp 18    Ht 5' 3\" (1.6 m)    Wt 131.8 kg (290 lb 9.6 oz)    LMP 04/21/2015    SpO2 100%    Breastfeeding No    BMI 51.48 kg/m2     Head: Normocephalic, without obvious abnormality  Lungs: clear to auscultation bilaterally  Heart: regular rate and rhythm  Abdomen: obese  Extremities: 2-3+ edema, left arm ++          Data Review    Recent Results (from the past 48 hour(s))   CBC WITH AUTOMATED DIFF    Collection Time: 04/30/17  3:55 AM   Result Value Ref Range    WBC 3.0 (L) 4.3 - 11.1 K/uL    RBC 3.11 (L) 4.05 - 5.25 M/uL    HGB 8.6 (L) 11.7 - 15.4 g/dL    HCT 25.3 (L) 35.8 - 46.3 %    MCV 81.4 79.6 - 97.8 FL    MCH 27.7 26.1 - 32.9 PG    MCHC 34.0 31.4 - 35.0 g/dL    RDW 20.0 (H) 11.9 - 14.6 %    PLATELET 39 (L) 106 - 450 K/uL    MPV 8.9 (L) 10.8 - 14.1 FL    DF AUTOMATED      NEUTROPHILS 83 (H) 43 - 78 %    LYMPHOCYTES 7 (L) 13 - 44 %    MONOCYTES 7 4.0 - 12.0 %    EOSINOPHILS 3 0.5 - 7.8 %    BASOPHILS 0 0.0 - 2.0 %    IMMATURE GRANULOCYTES 0.3 0.0 - 5.0 %    ABS. NEUTROPHILS 2.5 1.7 - 8.2 K/UL    ABS. LYMPHOCYTES 0.2 (L) 0.5 - 4.6 K/UL    ABS. MONOCYTES 0.2 0.1 - 1.3 K/UL    ABS. EOSINOPHILS 0.1 0.0 - 0.8 K/UL    ABS. BASOPHILS 0.0 0.0 - 0.2 K/UL    ABS. IMM.  GRANS. 0.0 0.0 - 0.5 K/UL   METABOLIC PANEL, COMPREHENSIVE    Collection Time: 04/30/17  3:55 AM   Result Value Ref Range    Sodium 146 (H) 136 - 145 mmol/L    Potassium 4.6 3.5 - 5.1 mmol/L    Chloride 118 (H) 98 - 107 mmol/L    CO2 17 (L) 21 - 32 mmol/L    Anion gap 11 7 - 16 mmol/L    Glucose 117 (H) 65 - 100 mg/dL    BUN 55 (H) 6 - 23 MG/DL    Creatinine 3.99 (H) 0.6 - 1.0 MG/DL    GFR est AA 15 (L) >60 ml/min/1.73m2    GFR est non-AA 12 (L) >60 ml/min/1.73m2    Calcium 7.4 (L) 8.3 - 10.4 MG/DL    Bilirubin, total 0.4 0.2 - 1.1 MG/DL    ALT (SGPT) 38 12 - 65 U/L    AST (SGOT) 19 15 - 37 U/L    Alk. phosphatase 221 (H) 50 - 136 U/L    Protein, total 4.5 (L) 6.3 - 8.2 g/dL    Albumin 1.8 (L) 3.5 - 5.0 g/dL    Globulin 2.7 2.3 - 3.5 g/dL    A-G Ratio 0.7 (L) 1.2 - 3.5     MAGNESIUM    Collection Time: 04/30/17  3:55 AM   Result Value Ref Range    Magnesium 1.9 1.8 - 2.4 mg/dL   SODIUM, UR, RANDOM    Collection Time: 04/30/17  1:30 PM   Result Value Ref Range    Sodium urine, random 53 MMOL/L   CBC WITH AUTOMATED DIFF    Collection Time: 05/01/17  3:45 AM   Result Value Ref Range    WBC 1.9 (LL) 4.3 - 11.1 K/uL    RBC 2.97 (L) 4.05 - 5.25 M/uL    HGB 8.2 (L) 11.7 - 15.4 g/dL    HCT 24.5 (L) 35.8 - 46.3 %    MCV 82.5 79.6 - 97.8 FL    MCH 27.6 26.1 - 32.9 PG    MCHC 33.5 31.4 - 35.0 g/dL    RDW 20.1 (H) 11.9 - 14.6 %    PLATELET 38 (L) 879 - 450 K/uL    MPV 10.1 (L) 10.8 - 14.1 FL    DF AUTOMATED      NEUTROPHILS 77 43 - 78 %    LYMPHOCYTES 11 (L) 13 - 44 %    MONOCYTES 8 4.0 - 12.0 %    EOSINOPHILS 4 0.5 - 7.8 %    BASOPHILS 0 0.0 - 2.0 %    IMMATURE GRANULOCYTES 0.5 0.0 - 5.0 %    ABS. NEUTROPHILS 1.5 (L) 1.7 - 8.2 K/UL    ABS. LYMPHOCYTES 0.2 (L) 0.5 - 4.6 K/UL    ABS. MONOCYTES 0.2 0.1 - 1.3 K/UL    ABS. EOSINOPHILS 0.1 0.0 - 0.8 K/UL    ABS. BASOPHILS 0.0 0.0 - 0.2 K/UL    ABS. IMM. GRANS. 0.0 0.0 - 0.5 K/UL   METABOLIC PANEL, COMPREHENSIVE    Collection Time: 05/01/17  3:45 AM   Result Value Ref Range    Sodium 148 (H) 136 - 145 mmol/L    Potassium 4.9 3.5 - 5.1 mmol/L    Chloride 121 (H) 98 - 107 mmol/L    CO2 17 (L) 21 - 32 mmol/L    Anion gap 10 7 - 16 mmol/L    Glucose 91 65 - 100 mg/dL    BUN 55 (H) 6 - 23 MG/DL    Creatinine 3.50 (H) 0.6 - 1.0 MG/DL    GFR est AA 17 (L) >60 ml/min/1.73m2    GFR est non-AA 14 (L) >60 ml/min/1.73m2    Calcium 7.7 (L) 8.3 - 10.4 MG/DL    Bilirubin, total 0.3 0.2 - 1.1 MG/DL    ALT (SGPT) 31 12 - 65 U/L    AST (SGOT) 18 15 - 37 U/L    Alk.  phosphatase 225 (H) 50 - 136 U/L    Protein, total 4.7 (L) 6.3 - 8.2 g/dL    Albumin 2.0 (L) 3.5 - 5.0 g/dL    Globulin 2.7 2.3 - 3.5 g/dL    A-G Ratio 0.7 (L) 1.2 - 3.5     MAGNESIUM    Collection Time: 05/01/17  3:45 AM   Result Value Ref Range    Magnesium 1.7 (L) 1.8 - 2.4 mg/dL       Assessment     Patient Active Problem List    Diagnosis Date Noted    Hypotension 04/28/2017    Acute renal failure (ARF) ([facility] 04/28/2017    Sepsis ([facility] 04/23/2017    Hypomagnesemia 04/23/2017    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 [facility] 04/07/2017    Non Hodgkin's lymphoma ([facility] 03/30/2017    Ascites 03/30/2017    Lymphadenopathy, generalized 03/30/2017    Osteoarthritis of left knee 08/26/2013    Morbid obesity ([facility] 10/04/2011    History of DVT of lower extremity 10/04/2011    Hypertension 10/04/2011    Heart murmur 10/04/2011    Osteoarthritis of right knee 10/03/2011    S/P total knee replacement using cement 10/03/2011           Problems Addressed by Nephrology     MATI - prerenal  Edema    Plan     Can discontinue IV fluids. Urine output is up. Renal function continues to improve. She is out of bed which may help mobilize fluids. Left arm swollen - discussed with H/O and they will check an ultrasound. Would establish improvement off fluids before discharge.

## 2017-05-01 NOTE — PROGRESS NOTES
Results from [address] [facility] [address] reported to Kady Palafox NP, awaiting new orders

## 2017-05-01 NOTE — PROGRESS NOTES
Infectious Disease Progress Note    Today's Date: 2017   Admit Date: 2017    Impression:   · Fever with chills - etiology thus far unclear - however fever resolved abruptly  and she has remained with temp<100 since; all cultures have remained negative. Would favor stopping Zosyn and observing patient off. · Proteus on urine cx  · Ascites s/p paracentesis (4/3, , ). .. The [address] on the ascitic fluid meets criteria for bacterial peritonitis but the fluid had a markedly elevated RBC count that may have falsely augmented this. I agree with Dr. Melissa Leung that these fluid results are difficult to interpret in the setting of NHL and chemo and fluctuating counts in blood  · NHL s/p 1 cycle of chemotherapy; PORT placed   · OA s/p mayur TKAs, Morbid obesity  · Borderline hypotension  · MATI    Plan:   · Will discontinue Zosyn and observe; if T>100.6 would reculture. > 35 min spent in the care of the patient including chart review, d/w family, and d/w nursing    Anti-infectives:   Zosyn  - 17  vanc -  levoquin  -    Subjective:   Sitting on chair; main issue is diffuse swelling; LE bilaterally and RUE. Cough, nonproductive, but chronic persists primarily at night. No other focal complaints. No Known Allergies     Review of Systems:  A comprehensive review of systems was negative except for that written in the History of Present Illness. Objective:     Visit Vitals    /80 (BP 1 Location: Left arm, BP Patient Position: Sitting)    Pulse 85    Temp 98.6 °F (37 °C)    Resp 18    Ht 5' 3\" (1.6 m)    Wt 131.8 kg (290 lb 9.6 oz)    SpO2 100%    Breastfeeding No    BMI 51.48 kg/m2     Temp (24hrs), Av.2 °F (36.8 °C), Min:97.4 °F (36.3 °C), Max:98.6 °F (37 °C)       Lines:  Ventricular Access Device:  R chest-mild erythema to incision site but well approximated. Physical Exam:    General:  Alert, cooperative, appears stated age   Eyes:  Sclera anicteric. Pupils equally round and reactive to light. Mouth/Throat: Mucous membranes normal, oral pharynx clear   Neck: Supple   Lungs:   Clear to auscultation bilaterally, good effort   CV:  Regular rate and rhythm,+ 2/6 murmur   Abdomen:   firm non-tender. bowel sounds normal. + distension    Extremities: No cyanosis. + 3 generalized edema   Skin: Skin color, texture, turgor normal. Petechiae to yuriy LEs - now spread to abdomen   Musculoskeletal: RUE with some faint erythema but not out of proportion to the LUE. Given her body habitus, it is difficult at this time to say there is asymmetrical swelling.  Yuriy TKAs incisions well approximated, no erythema, no evidence of effusion   Lines/Devices:  Intact, no erythema, drainage or tenderness   Psych: Alert and oriented, normal mood affect given the setting       Data Review:     CBC:  Recent Labs      05/01/17 0345 04/30/17 0355 04/29/17 0425   WBC  1.9*  3.0*  4.7   GRANS  77  83*  88*   MONOS  8  7  5   EOS  4  3  2   ANEU  1.5*  2.5  4.1   ABL  0.2*  0.2*  0.2*   HGB  8.2*  8.6*  8.8*   HCT  24.5*  25.3*  26.4*   PLT  38*  39*  55*       BMP:  Recent Labs      05/01/17 0345 04/30/17 0355 04/29/17 0425   CREA  3.50*  3.99*  4.35*   BUN  55*  55*  55*   NA  148*  146*  143   K  4.9  4.6  4.4   CL  121*  118*  114*   CO2  17*  17*  17*   AGAP  10  11  12   GLU  91  117*  133*       LFTS:  Recent Labs      05/01/17 0345 04/30/17 0355 04/29/17 0425   TBILI  0.3  0.4  0.4   ALT  31  38  44   SGOT  18  19  33   AP  225*  221*  231*   TP  4.7*  4.5*  4.8*   ALB  2.0*  1.8*  1.8*       Microbiology:     All Micro Results     Procedure Component Value Units Date/Time    CULTURE, BODY FLUID Henrene Canaan STAIN [[id number]] Collected:  04/28/17 1600    Order Status:  Completed Specimen:  Ascitic Fluid Updated:  05/01/17 0937     Special Requests: NO SPECIAL REQUESTS        GRAM STAIN 0 TO 4 WBC'S/OIF      NO DEFINITE ORGANISM SEEN        Culture result: NO GROWTH 2 DAYS C.  DIFFICILE/EPI PCR [[id number]]     Order Status:  Canceled Specimen:  Stool     CULTURE, BLOOD [[id number]] Collected:  04/24/17 1450    Order Status:  Completed Specimen:  Blood from Blood Updated:  04/29/17 0841     Special Requests: PORT        Culture result: NO GROWTH 5 DAYS       MRSA SCREEN - PCR (NASAL) [[id number]] Collected:  04/28/17 1650    Order Status:  Completed Specimen:  Nasal from Nares Updated:  04/28/17 2120     Special Requests: NO SPECIAL REQUESTS        Culture result:         MRSA target DNA is not detected (presumptive not colonized with MRSA)    CULTURE, BLOOD [[id number]] Collected:  04/23/17 1518    Order Status:  Completed Specimen:  Blood from Blood Updated:  04/28/17 1012     Special Requests: RIGHT ANTECUBITAL        Culture result: NO GROWTH 5 DAYS       CULTURE, BLOOD [[id number]] Collected:  04/23/17 1630    Order Status:  Completed Specimen:  Blood from Blood Updated:  04/28/17 1012     Special Requests: LEFT ANTECUBITAL        Culture result: NO GROWTH 5 DAYS       CULTURE, BODY FLUID W Sable Loth [[id number]] Collected:  04/28/17 0930    Order Status:  Canceled Specimen:  Paracentesis Fluid     RESPIRATORY VIRAL PANEL, PCR [[id number]] Collected:  04/26/17 1507    Order Status:  Completed Specimen:  Respiratory sample Updated:  04/27/17 2335     Source NASOPHARYNGEAL        Influenza A NEGATIVE         Influenza B NEGATIVE         RSV A NEGATIVE         RSV B NEGATIVE         Parainfluenza 1 NEGATIVE         Parainfluenza 2 NEGATIVE         Parainfluenza 3 NEGATIVE         Rhinovirus NEGATIVE         Metapneumovirus NEGATIVE         Adenovirus NEGATIVE          (NOTE)  Performed At: [address] [id number]  Pj Shah MD [id number]         C/ [address] (RAPID TEST) [[id number]] Collected:  04/26/17 1507    Order Status:  Completed Specimen:  Nasopharyngeal from Nasal washing Updated:  04/26/17 1539     Influenza A Ag NEGATIVE          NEGATIVE FOR THE PRESENCE OF INFLUENZA A ANTIGEN  INFECTION DUE TO INFLUENZA A CANNOT BE RULED OUT. BECAUSE THE ANTIGEN PRESENT IN THE SAMPLE MAY BE BELOW  THE DETECTION LIMIT OF THE TEST. A NEGATIVE TEST IS PRESUMPTIVE AND IT IS RECOMMENDED THAT THESE RESULTS BE CONFIRMED BY VIRAL CULTURE OR AN FDA-CLEARED INFLUENZA A AND B MOLECULAR ASSAY. Influenza B Ag NEGATIVE          NEGATIVE FOR THE PRESENCE OF INFLUENZA B ANTIGEN  INFECTION DUE TO INFLUENZA B CANNOT BE RULED OUT. BECAUSE THE ANTIGEN PRESENT IN THE SAMPLE MAY BE BELOW  THE DETECTION LIMIT OF THE TEST. A NEGATIVE TEST IS PRESUMPTIVE AND IT IS RECOMMENDED THAT THESE RESULTS BE CONFIRMED BY VIRAL CULTURE OR AN FDA-CLEARED INFLUENZA A AND B MOLECULAR ASSAY. CULTURE, URINE [[id number]]  (Abnormal)  (Susceptibility) Collected:  04/23/17 1608    Order Status:  Completed Specimen:  Urine from Cath Urine Updated:  04/26/17 0726     Special Requests: NO SPECIAL REQUESTS        Culture result: [id number]  COLONIES/mL  PROTEUS MIRABILIS   (A)     INFLUENZA A & B AG (RAPID TEST) [[id number]] Collected:  04/23/17 1609    Order Status:  Completed Specimen:  Nasal washing Updated:  04/23/17 1647     Influenza A Ag NEGATIVE          NEGATIVE FOR THE PRESENCE OF INFLUENZA A ANTIGEN  INFECTION DUE TO INFLUENZA A CANNOT BE RULED OUT. BECAUSE THE ANTIGEN PRESENT IN THE SAMPLE MAY BE BELOW  THE DETECTION LIMIT OF THE TEST. A NEGATIVE TEST IS PRESUMPTIVE AND IT IS RECOMMENDED THAT THESE RESULTS BE CONFIRMED BY VIRAL CULTURE OR AN FDA-CLEARED INFLUENZA A AND B MOLECULAR ASSAY. Influenza B Ag NEGATIVE          NEGATIVE FOR THE PRESENCE OF INFLUENZA B ANTIGEN  INFECTION DUE TO INFLUENZA B CANNOT BE RULED OUT. BECAUSE THE ANTIGEN PRESENT IN THE SAMPLE MAY BE BELOW  THE DETECTION LIMIT OF THE TEST.   A NEGATIVE TEST IS PRESUMPTIVE AND IT IS RECOMMENDED THAT THESE RESULTS BE CONFIRMED BY VIRAL CULTURE OR AN FDA-CLEARED INFLUENZA A AND B MOLECULAR ASSAY. INFLUENZA A & B AG (RAPID TEST) [[id number]] Collected:  04/23/17 1530    Order Status:  Canceled Specimen:  Nasopharyngeal from Nasopharyngeal Updated:  04/23/17 1547          Imaging:      XR CHEST SNGL V (Final result) Result time: 04/29/17 07:12:00     Final result     Impression:     IMPRESSION:    Mild bibasilar densities, likely atelectasis. No substantial change. CT ABD PELV WO CONT (Final result) Result time: 04/27/17 19:13:49     Final result     Impression:     IMPRESSION:  1. Overall moderate amounts of abdominal and pelvic ascites with minimal change. 2. Interval improvement in splenomegaly as well as retroperitoneal adenopathy. The low density lesion within the liver also is no longer evident although the  lack of intravenous contrast does not allow for accurate comparison. 3. Abnormal appearance of the renal juan which have also shown apparent  improvement although there is less than optimal evaluation the absence of  intravenous contrast as was administered on the comparison study  4. Small left pleural effusion, unchanged.          Signed By: Rhoda Solis MD     May 1, 2017

## 2017-05-01 NOTE — PROGRESS NOTES
Son asked if PT could not wake his mother and wait until tomorrow. Son admits that she has been up for all of her meals and ambulating in the room. Will evaluate in the morning.   Kenna Zhang, PT

## 2017-05-01 NOTE — PROGRESS NOTES
Marisol Albarado  Admission Date: 4/23/2017             Daily Progress Note: 5/1/2017    The patient's chart is reviewed and the patient is discussed with the staff.    62 y.o.  female seen and evaluated at the request of Dr. Ebenezer Hernández for the evaluation of sepsis.   She is a 63 y/o [place] female with hx of NHL, HTN presented with fevers and chills on 4/23. Patient was Undergoing treatment with Bendeka/Rituxan/Neulast. She completed the first cycle on 4/15. Her fevers have improved. She is followed by ID and on Vanco ,zosyn and Levaquin . She had paracentesis today for the 3rd time. She has been bordeline hypotensive and also developed acute renal failure.    She has not been febrile last 24 hours. She reports she feels better after paracentesis. She denies SOB. Subjective: On room air with O2 sat 100%   In bed, son at bedside.  Nonproductive moist cough  Edema to BLE and RUE   Does report snoring at night but states she will not wear a cpap mask if JEAN-PIERRE identified     Current Facility-Administered Medications   Medication Dose Route Frequency    citalopram (CELEXA) tablet 20 mg  20 mg Oral DAILY    fluticasone (FLONASE) 50 mcg/actuation nasal spray 2 Spray  2 Spray Both Nostrils DAILY    midodrine (PROAMITINE) tablet 2.5 mg  2.5 mg Oral BID WITH MEALS    guaiFENesin-dextromethorphan (ROBITUSSIN DM) 100-10 mg/5 mL syrup 5 mL  5 mL Oral Q6H PRN    LORazepam (ATIVAN) tablet 1 mg  1 mg Oral Q6H PRN    allopurinol (ZYLOPRIM) tablet 100 mg  100 mg Oral DAILY    0.9% sodium chloride infusion 250 mL  250 mL IntraVENous PRN    0.9% sodium chloride infusion 250 mL  250 mL IntraVENous PRN    loperamide (IMODIUM) capsule 2 mg  2 mg Oral PRN    diphenoxylate-atropine (LOMOTIL) tablet 1 Tab  1 Tab Oral Q4H PRN    sodium chloride (NS) flush 5-10 mL  5-10 mL IntraVENous PRN    aspirin chewable tablet 81 mg  81 mg Oral DAILY    HYDROcodone-acetaminophen (NORCO) 5-325 mg per tablet 1-2 Tab  1-2 Tab Oral Q4H PRN    levothyroxine (SYNTHROID) tablet 125 mcg  125 mcg Oral ACB    pantoprazole (PROTONIX) tablet 40 mg  40 mg Oral ACB    pregabalin (LYRICA) capsule 50 mg  50 mg Oral TID    sodium chloride (NS) flush 5 mL  5 mL InterCATHeter Q8H    acetaminophen (TYLENOL) tablet 650 mg  650 mg Oral Q4H PRN    magic mouthwash (ALEXSANDER) oral suspension 5 mL  5 mL Oral Q4H PRN       Review of Systems  Constitutional: negative for fever, chills, sweats  Cardiovascular: negative for chest pain, palpitations, syncope, + edema  Gastrointestinal:  negative for dysphagia, reflux, vomiting, diarrhea, abdominal pain, or melena  Neurologic:  negative for focal weakness, numbness, headache    Objective:     Vitals:    05/01/17 0311 05/01/17 0342 05/01/17 0724 05/01/17 1248   BP:  108/64 109/71 148/80   Pulse:  90 83 85   Resp:  18 18 18   Temp:  98.6 °F (37 °C) 97.7 °F (36.5 °C) 98.6 °F (37 °C)   SpO2:  99% 100% 100%   Weight: 290 lb 9.6 oz (131.8 kg)      Height:         Intake and Output:   04/29 1901 - 05/01 0700  In: 3828.5 [P.O.:900; I.V.:2928.5]  Out: 3647 [Urine:2475]  05/01 0701 - 05/01 1900  In: 969 [P.O.:240; I.V.:519]  Out: -     Physical Exam:   Constitution:  the patient is obese and in no acute distress, on room air   EENMT:  Sclera clear, pupils equal, oral mucosa moist  Respiratory: clear   Cardiovascular:  RRR without M,G,R  Gastrointestinal: soft and non-tender; with positive bowel sounds. Musculoskeletal: warm without cyanosis. There is 2+ lower leg edema. Skin:  no jaundice or rashes, no open wounds   Neurologic: no gross neuro deficits     Psychiatric:  alert and oriented x 3    CHEST XRAY:       Findings:   A stable right-sided venous port is seen. The cardiac silhouette is nonenlarged. Lungs are expanded without pneumothorax. No evolving consolidation is seen. However, a small posterior left pleural effusion is suggested on the lateral  view.  Stable surgical clips overlie the right axilla.     IMPRESSION:   1. Small posterior left pleural effusion. Early fluid overload cannot be  excluded given the clinical history provided.         LAB  No results for input(s): GLUCPOC in the last 72 hours. No lab exists for component: [place]   Recent Labs      05/01/17 0345 04/30/17 0355 04/29/17   0425   WBC  1.9*  3.0*  4.7   HGB  8.2*  8.6*  8.8*   HCT  24.5*  25.3*  26.4*   PLT  38*  39*  55*     Recent Labs      05/01/17 0345 04/30/17 0355  04/29/17   0425   NA  148*  146*  143   K  4.9  4.6  4.4   CL  121*  118*  114*   CO2  17*  17*  17*   GLU  91  117*  133*   BUN  55*  55*  55*   CREA  3.50*  3.99*  4.35*   MG  1.7*  1.9  2.0   CA  7.7*  7.4*  7.1*   ALB  2.0*  1.8*  1.8*   TBILI  0.3  0.4  0.4   ALT  31  38  44   SGOT  18  19  33     No results for input(s): PH, PCO2, PO2, HCO3 in the last 72 hours. No results for input(s): LCAD, LAC in the last 72 hours.       Assessment:  (Medical Decision Making)     Hospital Problems  Date Reviewed: 5/1/2017          Codes Class Noted POA    Hypotension ICD-10-CM: I95.9  ICD-9-CM: 458.9  4/28/2017 Unknown    On midodrine    Acute renal failure (ARF) (HCC) ICD-10-CM: N17.9  ICD-9-CM: 584.9  4/28/2017 Unknown    Creatinine 3.50, per nephrology, improving     * (Principal)Sepsis ([facility] ICD-10-CM: A41.9  ICD-9-CM: 038.9, 995.91  4/23/2017 Yes    Antibiotics d/c today     Hypomagnesemia ICD-10-CM: E83.42  ICD-9-CM: 275.2  4/23/2017 Yes    Mag 1.7 today    Non Hodgkin's lymphoma ([facility] ICD-10-CM: C85.90  ICD-9-CM: 202.80  3/30/2017 Yes    Per heme/onc     Ascites ICD-10-CM: R18.8  ICD-9-CM: 789.59  3/30/2017 Yes    Paracentesis with 3.6L removed on 4/28     Morbid obesity ([facility] ICD-10-CM: E66.01  ICD-9-CM: 278.01  10/4/2011 Yes    Unchanged        Non occlusive thrombus to RUE   plts     Plan:  (Medical Decision Making)     --Antibiotics stopped per ID.  --BLE venous duplex with c/o pain to left and bilateral edema   --GONZALEZ tonight on room air     More than 50% of the time documented was spent in face-to-face contact with the patient and in the care of the patient on the floor/unit where the patient is located. Freedmo Keita NP    Lungs: CTA b/l No wheezing  Heart S1 and S2 audible, no murmers or rubs appreciated  Other     EXT: +2 edema legs and +1 right arm. Son aware has nonocclusive DVt in right arm. PLTS are 39 and on ASA per hematology. High risk for bleeding so no full dose anticoagulation. Check legs to see has DVT then may be a candidate for an IVC filter. Patient also at risk of mimi given making noise/snoring QHS, with obese body habitus and narrow airway. She is not sure she will use CPAP, etc. Will get overngiht to see if needs oxygen QHS. Consider PSG in the future. I have spoken with and examined the patient. I have reviewed the history, examination, assessment, and plan and agree with the above. Mark Anthony Dixon MD      This note was signed electronically. Errors are unfortunately her likely due to dictation software.

## 2017-05-01 NOTE — PROGRESS NOTES
Patient seen and full note to follow. Son aware has nonocclusive DVt in right arm. PLTS are 39 and on ASA per hematology. High risk for bleeding so no full dose anticoagulation. Check legs to see has DVT then may be a candidate for an IVC filter.      Kathleen Claire MD

## 2017-05-02 ENCOUNTER — HOME CARE VISIT (OUTPATIENT)
Dept: HOME HEALTH SERVICES | Facility: HOME HEALTH | Age: 57
End: 2017-05-02

## 2017-05-02 ENCOUNTER — HOME HEALTH ADMISSION (OUTPATIENT)
Dept: HOME HEALTH SERVICES | Facility: HOME HEALTH | Age: 57
End: 2017-05-02

## 2017-05-02 VITALS
BODY MASS INDEX: 51.38 KG/M2 | HEART RATE: 81 BPM | SYSTOLIC BLOOD PRESSURE: 128 MMHG | DIASTOLIC BLOOD PRESSURE: 86 MMHG | OXYGEN SATURATION: 96 % | HEIGHT: 63 IN | TEMPERATURE: 98.3 F | WEIGHT: 290 LBS | RESPIRATION RATE: 18 BRPM

## 2017-05-02 LAB
ALBUMIN SERPL BCP-MCNC: 2.2 G/DL (ref 3.5–5)
ALBUMIN/GLOB SERPL: 0.8 {RATIO} (ref 1.2–3.5)
ALP SERPL-CCNC: 241 U/L (ref 50–136)
ALT SERPL-CCNC: 33 U/L (ref 12–65)
ANION GAP BLD CALC-SCNC: 12 MMOL/L (ref 7–16)
AST SERPL W P-5'-P-CCNC: 20 U/L (ref 15–37)
BASOPHILS # BLD AUTO: 0 K/UL (ref 0–0.2)
BASOPHILS # BLD: 0 % (ref 0–2)
BILIRUB SERPL-MCNC: 0.3 MG/DL (ref 0.2–1.1)
BUN SERPL-MCNC: 50 MG/DL (ref 6–23)
CALCIUM SERPL-MCNC: 7.7 MG/DL (ref 8.3–10.4)
CHLORIDE SERPL-SCNC: 121 MMOL/L (ref 98–107)
CO2 SERPL-SCNC: 17 MMOL/L (ref 21–32)
CREAT SERPL-MCNC: 3.08 MG/DL (ref 0.6–1)
DIFFERENTIAL METHOD BLD: ABNORMAL
EOSINOPHIL # BLD: 0.1 K/UL (ref 0–0.8)
EOSINOPHIL NFR BLD: 7 % (ref 0.5–7.8)
ERYTHROCYTE [DISTWIDTH] IN BLOOD BY AUTOMATED COUNT: 20 % (ref 11.9–14.6)
GLOBULIN SER CALC-MCNC: 2.6 G/DL (ref 2.3–3.5)
GLUCOSE SERPL-MCNC: 112 MG/DL (ref 65–100)
HCT VFR BLD AUTO: 24.7 % (ref 35.8–46.3)
HGB BLD-MCNC: 8.2 G/DL (ref 11.7–15.4)
IMM GRANULOCYTES # BLD: 0 K/UL (ref 0–0.5)
IMM GRANULOCYTES NFR BLD AUTO: 0.6 % (ref 0–5)
LYMPHOCYTES # BLD AUTO: 9 % (ref 13–44)
LYMPHOCYTES # BLD: 0.2 K/UL (ref 0.5–4.6)
MAGNESIUM SERPL-MCNC: 1.9 MG/DL (ref 1.8–2.4)
MCH RBC QN AUTO: 27.4 PG (ref 26.1–32.9)
MCHC RBC AUTO-ENTMCNC: 33.2 G/DL (ref 31.4–35)
MCV RBC AUTO: 82.6 FL (ref 79.6–97.8)
MONOCYTES # BLD: 0.1 K/UL (ref 0.1–1.3)
MONOCYTES NFR BLD AUTO: 7 % (ref 4–12)
NEUTS SEG # BLD: 1.3 K/UL (ref 1.7–8.2)
NEUTS SEG NFR BLD AUTO: 76 % (ref 43–78)
PLATELET # BLD AUTO: 35 K/UL (ref 150–450)
PMV BLD AUTO: 9.6 FL (ref 10.8–14.1)
POTASSIUM SERPL-SCNC: 4.7 MMOL/L (ref 3.5–5.1)
PROT SERPL-MCNC: 4.8 G/DL (ref 6.3–8.2)
RBC # BLD AUTO: 2.99 M/UL (ref 4.05–5.25)
SODIUM SERPL-SCNC: 150 MMOL/L (ref 136–145)
WBC # BLD AUTO: 1.7 K/UL (ref 4.3–11.1)

## 2017-05-02 PROCEDURE — 74011250637 HC RX REV CODE- 250/637: Performed by: INTERNAL MEDICINE

## 2017-05-02 PROCEDURE — 74011250637 HC RX REV CODE- 250/637: Performed by: NURSE PRACTITIONER

## 2017-05-02 PROCEDURE — 85025 COMPLETE CBC W/AUTO DIFF WBC: CPT | Performed by: NURSE PRACTITIONER

## 2017-05-02 PROCEDURE — 97161 PT EVAL LOW COMPLEX 20 MIN: CPT

## 2017-05-02 PROCEDURE — 83735 ASSAY OF MAGNESIUM: CPT | Performed by: NURSE PRACTITIONER

## 2017-05-02 PROCEDURE — 80053 COMPREHEN METABOLIC PANEL: CPT | Performed by: NURSE PRACTITIONER

## 2017-05-02 PROCEDURE — 94762 N-INVAS EAR/PLS OXIMTRY CONT: CPT

## 2017-05-02 PROCEDURE — 97166 OT EVAL MOD COMPLEX 45 MIN: CPT

## 2017-05-02 RX ORDER — HEPARIN 100 UNIT/ML
300 SYRINGE INTRAVENOUS AS NEEDED
Status: DISCONTINUED | OUTPATIENT
Start: 2017-05-02 | End: 2017-05-02 | Stop reason: HOSPADM

## 2017-05-02 RX ORDER — CITALOPRAM 20 MG/1
20 TABLET, FILM COATED ORAL DAILY
Qty: 30 TAB | Refills: 0 | Status: SHIPPED | OUTPATIENT
Start: 2017-05-02 | End: 2017-07-12 | Stop reason: ALTCHOICE

## 2017-05-02 RX ORDER — MIDODRINE HYDROCHLORIDE 2.5 MG/1
2.5 TABLET ORAL 2 TIMES DAILY WITH MEALS
Qty: 60 TAB | Refills: 0 | Status: SHIPPED | OUTPATIENT
Start: 2017-05-02 | End: 2017-06-07 | Stop reason: SDUPTHER

## 2017-05-02 RX ADMIN — MIDODRINE HYDROCHLORIDE 2.5 MG: 5 TABLET ORAL at 07:40

## 2017-05-02 RX ADMIN — PREGABALIN 50 MG: 50 CAPSULE ORAL at 09:56

## 2017-05-02 RX ADMIN — PANTOPRAZOLE SODIUM 40 MG: 40 TABLET, DELAYED RELEASE ORAL at 07:40

## 2017-05-02 RX ADMIN — LEVOTHYROXINE SODIUM 125 MCG: 125 TABLET ORAL at 07:40

## 2017-05-02 RX ADMIN — Medication 10 ML: at 02:31

## 2017-05-02 RX ADMIN — ALLOPURINOL 100 MG: 100 TABLET ORAL at 09:56

## 2017-05-02 RX ADMIN — FLUTICASONE PROPIONATE 2 SPRAY: 50 SPRAY, METERED NASAL at 09:55

## 2017-05-02 RX ADMIN — CITALOPRAM HYDROBROMIDE 20 MG: 20 TABLET ORAL at 09:56

## 2017-05-02 NOTE — PROGRESS NOTES
Discharge instructions and prescriptions given and reviewed with pt, verbalizes understanding, medication side effect sheet reviewed with pt, pt discharged home with family. Sepsis booklet and thermometer given and discussed with patient and patient and  understand.

## 2017-05-02 NOTE — DISCHARGE SUMMARY
[facility]: Inpatient Hematology / Oncology Discharge Summary Note    Patient ID:  David Hogan  [id number]  61 y.o.  1960    Admit Date: 4/23/2017    Discharge Date: 5/2/2017    Admission Diagnoses: Sepsis [facility])    Discharge Diagnoses:  Principal Diagnosis: Sepsis ([facility]  Principal Problem:    Sepsis ([facility] (4/23/2017)    Active Problems: Morbid obesity ([facility] (10/4/2011)      Non Hodgkin's lymphoma ([facility] (3/30/2017)      Ascites (3/30/2017)      Hypomagnesemia (4/23/2017)      Hypotension (4/28/2017)      Acute renal failure (ARF) ([facility] (4/28/2017)      Snores (5/1/2017)      Superficial venous thrombosis of right arm (5/1/2017)        Hospital Course:  Ms. Meda Rubinstein is a 62 y.o. female admitted on 4/23/2017. The primary encounter diagnosis was Fever, unspecified fever cause. A diagnosis of Non-Hodgkin's lymphoma, unspecified body region, unspecified non-Hodgkin lymphoma type was also pertinent to this visit. . She is a patient of Dr. Pablo Redd with [facility] and is currently undergoing treatment with Bendeka/Rituxan/Neulasta. She completed the first cycle on 4/15. Next cycle scheduled for 5/11. She presented to the ED on 4/23 c/o one day hx of fever 103 and chills. Started on vanc/zosyn in ED. BCx NGTD. Flu neg. CXR neg.  UCx + for proteus mirabilis, susceptible to current antibx. Fevers persisted on vanc/zosyn, so tobramycin was added. ID was consulted for persistent fevers which they thought her fevers may have been viral in nature. Viral panel was negative. She became hypotensive (60/40's) and renal function began to decline - Cr up to 4.47.  DC'd lisinopril. Vanc and Tobra were d/c'd and replaced with LVQ. She also required paracentesis for ascites (3rd one in past month) - 3. 6L were removed and sent to lab for pathology. Nephrology was consulted and moved patient to ICU for pressors from 4/28-4/30.   She was placed on midodrine by nephrology to maintain BP once she was able to come off pressors. BP stable 120/60's. Cr improving daily 3.05 now. She remains quite edematous with some concerns about pushing diuresis in the context of her recently recovering renal function. On 5/1, she c/o right arm swelling. US showed nonocclusive thrombus in the right cephalic vein. Plt 35,000 so anticoagulants contraindicated at this time. Ok to con't with ASA. Pulm ordered LE US, which were negative. Pt and family asking for discharge. To f/u with Dr. Sebastian Ziegler this week inc labs. Advised to seek immediate medical attention with fever, SOB, CP, decreased urine OP, or with any other concerns. Pt verbalizes understanding.           Consults:  IP CONSULT TO ONCOLOGY  IP CONSULT TO INFECTIOUS DISEASES  IP CONSULT TO NEPHROLOGY  IP CONSULT TO INTERVENTIONAL RADIOLOGY  IP CONSULT TO INTENSIVIST    Pertinent Diagnostic Studies:   Labs:    Recent Labs      05/02/17 0235 05/01/17 0345  04/30/17   0355   WBC  1.7*  1.9*  3.0*   HGB  8.2*  8.2*  8.6*   PLT  35*  38*  39*   ANEU  1.3*  1.5*  2.5      Recent Labs      05/02/17 0235  05/01/17   0345  04/30/17   0355   NA  150*  148*  146*   K  4.7  4.9  4.6   CL  121*  121*  118*   CO2  17*  17*  17*   GLU  112*  91  117*   BUN  50*  55*  55*   CREA  3.08*  3.50*  3.99*   CA  7.7*  7.7*  7.4*   SGOT  20  18  19   AP  241*  225*  221*   TP  4.8*  4.7*  4.5*   ALB  2.2*  2.0*  1.8*   MG  1.9  1.7*  1.9       Imaging:  Portable chest x-ray. 4/23/2017      CLINICAL INDICATION: Sepsis, fever      FINDINGS: Single AP view of the chest submitted without comparison shows mild  atelectasis in the left lower lung otherwise the lungs are clear. A right-sided  chest port is in place.  The cardiac silhouette and mediastinum are unremarkable.      IMPRESSION: Mild left lower lung atelectasis, otherwise no acute abnormality.     RENAL ULTRASOUND. 4/27/2017      HISTORY: Acute Renal Failure.      COMPARISON: None.      TECHNIQUE: Direct skin contact sector 3-5 MHz images of the kidneys are  obtained.      FINDINGS: Renal echogenicity within normal limits, the right kidney is  hypoechoic compared to the liver.      Right kidney: 12.2 cm in length. No hydronephrosis.      Left kidney: Also 12.2 cm in length . No hydronephrosis.      Lateral obscured by bowel gas and probably undistended.      IMPRESSION: Unremarkable renal ultrasound.     CT abdomen and pelvis without contrast 04/27/2017      History: acute kidney failure; worsening ascites.      Technique: Helically acquired images were obtained from the upper abdomen to the  ischial tuberosities reconstructed at 5mm intervals after oral contrast only. Intravenous contrast was not administered due to elevated creatinine. Coronal  reformatted images were submitted.      Radiation dose reduction techniques were used for this study: Our CT scanners  use one or all of the following: Automated exposure control, adjustment of the  mA and/or kVp according to patient's size, iterative reconstruction.      Comparison: 03/22/2017 from [facility]      CT abdomen: There is a small left pleural effusion, unchanged. The liver is  grossly unremarkable on this noncontrast study. The low density lesion seen  previously is no longer apparent. The spleen is markedly enlarged although less  pronounced than seen previously where was measured at up to 23.4 cm in length  compared to 19.3 cm on today's study. The pancreas pancreas and adrenal glands  are grossly unremarkable on this contrast study.       There is loss of the typical fat density within the juan although this may be  less pronounced than seen previously. Periaortic adenopathy is also diminished  measuring approximately 2.9 x 2.8 cm compared to 3.9 x 4.1 cm. There is a  moderate amount of abdominal ascites with minimal change. There is diffuse  subcutaneous edema.      CT PELVIS: There is a large amount of pelvic ascites with minimal change. No  adenopathy is present.  There is edema within the subcutaneous tissues.      IMPRESSION:  1. Overall moderate amounts of abdominal and pelvic ascites with minimal change.      2. Interval improvement in splenomegaly as well as retroperitoneal adenopathy. The low density lesion within the liver also is no longer evident although the  lack of intravenous contrast does not allow for accurate comparison. 3. Abnormal appearance of the renal juan which have also shown apparent  improvement although there is less than optimal evaluation the absence of  intravenous contrast as was administered on the comparison study  4. Small left pleural effusion, unchanged.     Portable view of the chest 4/29/17      COMPARISON: April 23, 2017.      CLINICAL HISTORY: Shortness of breath.      FINDINGS:      There is a stable right-sided IJ line. There is mild bibasilar densities. No  pneumothorax or pulmonary edema. No large pleural effusion. Cardiac mediastinal  contour is within normal limits. Surrounding bones are stable.      IMPRESSION:      Mild bibasilar densities, likely atelectasis. No substantial change.     Right upper extremity duplex venous study, 5/1/2017.      CLINICAL HISTORY: Moderate right upper extremity swelling for 2 days.      Technique: Grayscale, and duplex Doppler images of the right upper extremity  venous vascular system were obtained using an 8 MHz transducer. In addition,  evaluation of the left upper extremity central venous structures to include the  left internal jugular vein was performed.      FINDINGS:      Nonocclusive thrombus is seen in the right cephalic vein near the axillary  fossa. The remaining right upper extremity venous structures are patent. Normal  respiratory variation is seen in the central venous structures. Mild edema is  seen in the soft tissues of the right upper extremity. No abnormal fluid  collection is seen, however.      IMPRESSION:  1. Nonocclusive thrombus in the right cephalic vein.     CHEST X-RAY, 2 views 5/1/2017     History: Increased cough and shortness of breath with bilateral leg swelling.     Technique: PA and lateral views of the chest.      Comparison: Chest x-ray 4/29/2017     Findings:   A stable right-sided venous port is seen. The cardiac silhouette is nonenlarged. Lungs are expanded without pneumothorax. No evolving consolidation is seen. However, a small posterior left pleural effusion is suggested on the lateral  view. Stable surgical clips overlie the right axilla.     IMPRESSION:   1. Small posterior left pleural effusion. Early fluid overload cannot be  excluded given the clinical history provided. Bilateral lower extremity duplex venous study, 5/1/2017.     CLINICAL HISTORY: Moderate bilateral lower extremity swelling.     Technique: Grayscale, and duplex Doppler images of the bilateral lower extremity  venous vascular systems were obtained using an 9 MHz transducer. In addition,  compression and augmentation were performed at multiple levels.     FINDINGS:     No definite evidence for lower extremity venous thrombus is seen. Assessment is  limited given that the soft tissues are significantly edematous. In particular,  the bilateral femoral veins are not well visualized on grayscale imaging. It can  be said that venous flow is seen at this level without findings to suggest  occlusion. In addition, the bilateral peroneal veins are not visualized. The  peroneal veins are considered superficial venous structures. Normal augmentation  is seen at all evaluated levels without findings to suggest more proximal  occlusion. Once again the soft tissues are edematous. No abnormal loculated  fluid collection is seen, however.     IMPRESSION:  1. Limited study due to soft tissue edema. No lower extremity DVT is directly  visualized. Current Discharge Medication List      START taking these medications    Details   citalopram (CELEXA) 20 mg tablet Take 1 Tab by mouth daily.   Qty: 30 Tab, Refills: 0 midodrine (PROAMITINE) 2.5 mg tablet Take 1 Tab by mouth two (2) times daily (with meals) for 30 days. Qty: 60 Tab, Refills: 0         CONTINUE these medications which have NOT CHANGED    Details   pregabalin (LYRICA) 50 mg capsule Take 1 Cap by mouth three (3) times daily. Max Daily Amount: 150 mg.  Qty: 90 Cap, Refills: 1      allopurinol (ZYLOPRIM) 300 mg tablet Take 1 Tab by mouth two (2) times a day. 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 Qty: 60 g, Refills: 2      lidocaine-prilocaine (EMLA) topical cream Apply  to affected area as needed for Pain. Apply to port site 45-60 minutes prior to lab appt or infusion. 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 Qty: 1 Bottle, Refills: 2      HYDROcodone-acetaminophen (NORCO) 5-325 mg per tablet Take 1-2 Tabs by mouth every four (4) hours as needed for Pain. Max Daily Amount: 12 Tabs. Qty: 120 Tab, Refills: 0    Associated Diagnoses: Lymphadenopathy, generalized      promethazine (PHENERGAN) 25 mg tablet Take 25 mg by mouth every six (6) hours as needed for Nausea. Unsure of dose      ondansetron hcl (ZOFRAN, AS HYDROCHLORIDE,) 4 mg tablet Take 1 Tab by mouth every eight (8) hours as needed for Nausea. Qty: 60 Tab, Refills: 3    Associated Diagnoses: Bilious vomiting with nausea      aspirin 81 mg chewable tablet Take 81 mg by mouth daily. levothyroxine (SYNTHROID) 125 mcg tablet Take 1 Tab by mouth Daily (before breakfast). Qty: 90 Tab, Refills: 1    Associated Diagnoses: Hypothyroidism due to acquired atrophy of thyroid      omeprazole (PRILOSEC) 20 mg capsule Take 1 Cap by mouth daily.   Qty: 90 Cap, Refills: 3    Associated Diagnoses: Gastroesophageal reflux disease without esophagitis         STOP taking these medications       cpm-phenyleph-acetaminophen (NOREL AD) 4- mg tab Comments:   Reason for Stopping:         lisinopril (PRINIVIL, ZESTRIL) 20 mg tablet Comments:   Reason for Stopping:                   OBJECTIVE:  Patient Vitals for the past 8 hrs:   BP Temp Pulse Resp SpO2 Weight   17 0800 123/64 97.7 °F (36.5 °C) 76 18 96 % -   17 0434 - - - - - 290 lb (131.5 kg)     Temp (24hrs), Av.2 °F (36.8 °C), Min:97.7 °F (36.5 °C), Max:98.6 °F (37 °C)     07 -  1900  In: 240 [P.O.:240]  Out: -     Physical Exam:  Constitutional: Well nourished female in no acute distress, sitting comfortably on hospital bed   HEENT: Normocephalic and atraumatic. Oropharynx is clear, mucous membranes are moist.  Sclerae anicteric. Neck supple without JVD. Lymph node   Deferred   Skin Warm and dry. No bruising and no rash noted. No erythema. No pallor. Respiratory Lungs are clear to auscultation bilaterally without wheezes, rales or rhonchi, normal air exchange without accessory muscle use. CVS Normal rate, regular rhythm and normal S1 and S2. No murmurs, gallops, or rubs. Abdomen +ascites Nontender, normoactive bowel sounds. No palpable mass. Neuro Grossly nonfocal with no obvious sensory or motor deficits. MSK 2-3+ generalized edema. +2 RUE Normal range of motion in general.    Psych Appropriate mood and affect. Appears anxious at times       ASSESSMENT:    Principal Problem:    Sepsis ([address] (2017)    Active Problems:     Morbid obesity ([address] (10/4/2011)      Non Hodgkin's lymphoma ([address] (3/30/2017)      Ascites (3/30/2017)      Hypomagnesemia (2017)      Hypotension (2017)      Acute renal failure (ARF) ([address] (2017)      Snores (2017)      Superficial venous thrombosis of right arm (2017)        DISPOSITION:  Follow-up Appointments   Procedures    FOLLOW UP VISIT Appointment in: Other (Specify) Follow up this week with Dr. Melissa Leung (with labs)     Follow up this week with Dr. Melissa Leung (with labs)     Standing Status:   Standing     Number of Occurrences:   1     Order Specific Question:   Appointment in     Answer: Other (Specify)           Over 30 minutes was spent in discharge planning and coordination of care.             Shelley Eller, CIARRA  [facility]  [address] [address]  Office : [phone]  Fax : [phone]

## 2017-05-02 NOTE — PROGRESS NOTES
[address]  Face to Face Encounter    Patients Name: Giovanni Solis    YOB: 1960    Primary Diagnosis: sepsis    Date of Face to Face:   5/2/2017                                  Face to Face Encounter findings are related to primary reason for home care:   yes. 1. I certify that the patient needs intermittent care as follows: skilled nursing care:  skilled observation/assessment, patient education  physical therapy: strengthening  occupational therapy:  ADL safety (ie. cooking, bathing, dressing)    2. I certify that this patient is homebound, that is: 1) patient requires the use of a walker device, special transportation, or assistance of another to leave the home; or 2) patient's condition makes leaving the home medically contraindicated; and 3) patient has a normal inability to leave the home and leaving the home requires considerable and taxing effort. Patient may leave the home for infrequent and short duration for medical reasons, and occasional absences for non-medical reasons. Homebound status is due to the following functional limitations: Patient's ambulation limited secondary to severe pain and requires the use of an assistive device and the assistance of a caregiver for safe completion. Patient with strength and ROM deficits limiting ambulation endurance requiring the use of an assistive device and the assistance of a caregiver. Patient deemed temporarily homebound secondary to increased risk for infection when leaving home and going out into the community. 3. I certify that this patient is under my care and that I, or a nurse practitioner or  [id number], or clinical nurse specialist, or certified nurse midwife, working with me, had a Face-to-Face Encounter that meets the physician Face-to-Face Encounter requirements.   The following are the clinical findings from the [address] [place] [address] Street encounter that support the need for skilled services and is a summary of the encounter:     See discharge summary      Daniel Rogel Petties  5/2/2017      THE FOLLOWING TO BE COMPLETED BY THE COMMUNITY PHYSICIAN:    I concur with the findings described above from the F2F encounter that this patient is homebound and in need of a skilled service.     Certifying Physician: _____________________________________      Printed Certifying Physician Name: _____________________________________    Date: _________________

## 2017-05-02 NOTE — PROGRESS NOTES
Problem: Mobility Impaired (Adult and Pediatric)  Goal: *Acute Goals and Plan of Care (Insert Text)  1. Ms. Meda Rubinstein will perform gait with least restrictive device 250 ft with good balance in 7 days. 2. Ms. Meda Rubinstein will perform therex in sitting and standing x 25 reps in 7 days. 3. Ms. Meda Rubinstein will perform dynamic standing balance activity x 15 min with good balance in 7 days. PHYSICAL THERAPY: INITIAL ASSESSMENT 5/2/2017  INPATIENT: Hospital Day: 10  Payor: [place] [facility] Financial / Plan: Zapproved HMO / Product Type: HMO /      NAME/AGE/GENDER: David Hogan is a 62 y.o. female     PRIMARY DIAGNOSIS: Sepsis ([address] Sepsis ([address] Sepsis ([facility]        ICD-10: Treatment Diagnosis:       · Generalized Muscle Weakness (M62.81)  · Difficulty in walking, Not elsewhere classified (R26.2)   Precaution/Allergies:  Review of patient's allergies indicates no known allergies. ASSESSMENT:      Ms. Meda Rubinstein presents with decreased mobility and gait along with decreased activity tolerance. As a result she is appropriate for skilled PT to maximize her rehab potential.  Goals were written if for some reason she does not leave today but there is every indication she will. In that case recommend home health PT/OT and a rollator. She still has more chemo coming up and she needs to be as strong as possible. The rollator is because she reaches for external support all the time. This is not safe and not energy efficient for her. This section established at most recent assessment   PROBLEM LIST (Impairments causing functional limitations):  1. Decreased Strength  2. Decreased Transfer Abilities  3. Decreased Ambulation Ability/Technique  4. Decreased Activity Tolerance    INTERVENTIONS PLANNED: (Benefits and precautions of physical therapy have been discussed with the patient.)  1. Bed Mobility  2. Gait Training  3. Therapeutic Activites  4.  Transfer Training      TREATMENT PLAN: Frequency/Duration: 3 times a week for duration of hospital stay  Rehabilitation Potential For Stated Goals: GOOD      RECOMMENDED REHABILITATION/EQUIPMENT: (at time of discharge pending progress): Continue Skilled Therapy. HISTORY:   History of Present Injury/Illness (Reason for Referral):  Patient is a 62 yof with a hx of NHL on chemotherapy, htn, morbid obesity who presents with one day hx of fevers and chills. Denies any nausea, vomiting, abdominal pain, dyspnea. Pt had recent port placed and 2 paracentesis within the past 3 weeks. Noted to have a fever of 101.4 in ER. States she had a fever of 103 at home. Pt started on vanco and zosyn in ER. Past Medical History/Comorbidities:   Ms. Eugenio Gonzalez  has a past medical history of Anemia; Arthritis; Basal cell carcinoma; Chronic pain; Hypertension (10/4/2011); Morbid obesity ([address] Murmur; Non Hodgkin's lymphoma ([address] (3/30/2017); Other ill-defined conditions; Overactive bladder; Rheumatic fever; Shingles; Thromboembolus ([address] (x2); Thyroid disease; and Unspecified adverse effect of anesthesia. She also has no past medical history of Aneurysm ([address] Arrhythmia; Asthma; Autoimmune disease ([address] CAD (coronary artery disease); Chronic kidney disease; Coagulation defects; COPD; Diabetes ([address] Difficult intubation; GERD (gastroesophageal reflux disease); Heart failure ([address] Liver disease; Malignant hyperthermia due to anesthesia; Nausea & vomiting; Pseudocholinesterase deficiency; Psychiatric disorder; PUD (peptic ulcer disease); Seizures ([address] Stroke [facility]); or Unspecified sleep apnea. Ms. Eugenio Gonzalez  has a past surgical history that includes hc cholecystectomy fnc41; anesth,achilles tendon surg (2/10/2011); cholecystectomy (8868); orthopaedic (2012); and orthopaedic (2013).   Social History/Living Environment:   Home Environment: Private residence  One/Two Story Residence: One story  Living Alone: No  Support Systems: Family member(s)  Patient Expects to be Discharged to[de-identified] Private residence  Current DME Used/Available at Home: None  Prior Level of Function/Work/Activity:  Held to furniture. Otherwise independent. Rather chatty  NHL, chemo, sepsis   Number of Personal Factors/Comorbidities that affect the Plan of Care: 3+: HIGH COMPLEXITY   EXAMINATION:   Most Recent Physical Functioning:   Gross Assessment:  AROM: Generally decreased, functional  Strength: Generally decreased, functional               Posture:  Posture (WDL): Within defined limits  Balance:  Sitting: Intact  Standing: Impaired  Standing - Static: Fair  Standing - Dynamic : Fair (reaches for external support) Bed Mobility:     Wheelchair Mobility:     Transfers:  Sit to Stand: Supervision  Stand to Sit: Supervision  Gait:     Base of Support: Widened  Step Length: Right shortened;Left shortened  Gait Abnormalities: Trunk sway increased  Distance (ft): 50 Feet (ft)  Assistive Device: Walker, rolling  Ambulation - Level of Assistance: Supervision  Interventions: Tactile cues; Verbal cues;Manual cues; Safety awareness training       Body Structures Involved:  1. Muscles Body Functions Affected:  1. Movement Related Activities and Participation Affected:  1. Mobility   Number of elements that affect the Plan of Care: 3: MODERATE COMPLEXITY   CLINICAL PRESENTATION:   Presentation: Stable and uncomplicated: LOW COMPLEXITY   CLINICAL DECISION MAKIN[address] [place] [address]-PAC 6 Clicks   Basic Mobility Inpatient Short Form  How much difficulty does the patient currently have. .. Unable A Lot A Little None   1. Turning over in bed (including adjusting bedclothes, sheets and blankets)? [ ] 1   [ ] 2   [X] 3   [ ] 4   2. Sitting down on and standing up from a chair with arms ( e.g., wheelchair, bedside commode, etc.)   [ ] 1   [ ] 2   [X] 3   [ ] 4   3. Moving from lying on back to sitting on the side of the bed? [ ] 1   [ ] 2   [X] 3   [ ] 4   How much help from another person does the patient currently need. ..  Total A Lot A Little None   4. Moving to and from a bed to a chair (including a wheelchair)? [ ] 1   [ ] 2   [X] 3   [ ] 4   5. Need to walk in hospital room? [ ] 1   [ ] 2   [X] 3   [ ] 4   6. Climbing 3-5 steps with a railing? [ ] 1   [ ] 2   [X] 3   [ ] 4   © 2007, Trustees of [address] [place] [address] [address] [address], under license to [employer]. All rights reserved    Score:  Initial: 18 Most Recent: X (Date: -- )     Interpretation of Tool:  Represents activities that are increasingly more difficult (i.e. Bed mobility, Transfers, Gait). Score 24 23 22-20 19-15 14-10 9-7 6       Modifier CH CI CJ CK CL CM CN         · Mobility - Walking and Moving Around:               - CURRENT STATUS:    CK - 40%-59% impaired, limited or restricted               - GOAL STATUS:           CK - 40%-59% impaired, limited or restricted               - D/C STATUS:                       ---------------To be determined---------------  Payor: [employer] / Plan: [address] / Product Type: HMO /       Medical Necessity:     · Patient is expected to demonstrate progress in functional technique to increase independence with mobility and gait. .  Reason for Services/Other Comments:  · Patient continues to require present interventions due to patient's inability to function at baseline. .   Use of outcome tool(s) and clinical judgement create a POC that gives a: Clear prediction of patient's progress: LOW COMPLEXITY                 TREATMENT:   (In addition to Assessment/Re-Assessment sessions the following treatments were rendered)   Pre-treatment Symptoms/Complaints:  Wants to go home  Pain: Initial:   Pain Intensity 1: 0  Post Session:  0/10      Assessment/Reassessment only, no treatment provided today     Braces/Orthotics/Lines/Etc:   · O2 Device: Room air  Treatment/Session Assessment:    · Response to Treatment:  good  · Interdisciplinary Collaboration:  · Registered Nurse  · After treatment position/precautions:  · Up in chair  · Call light within reach  · RN notified  · Compliance with Program/Exercises: Will assess as treatment progresses. · Recommendations/Intent for next treatment session: \"Next visit will focus on advancements to more challenging activities and reduction in assistance provided\".   Total Treatment Duration:  PT Patient Time In/Time Out  Time In: 1040  Time Out: 2200 Melissa Hernandez, PT

## 2017-05-02 NOTE — PROGRESS NOTES
[facility] Nephrology    Follow-Up on: MATI    HPI: Pt is feeling well today. Wants to go home. Complaints of edema. ROS:  Denies CP, SOB.     Current Facility-Administered Medications   Medication Dose Route Frequency    citalopram (CELEXA) tablet 20 mg  20 mg Oral DAILY    fluticasone (FLONASE) 50 mcg/actuation nasal spray 2 Spray  2 Spray Both Nostrils DAILY    midodrine (PROAMITINE) tablet 2.5 mg  2.5 mg Oral BID WITH MEALS    guaiFENesin-dextromethorphan (ROBITUSSIN DM) 100-10 mg/5 mL syrup 5 mL  5 mL Oral Q6H PRN    LORazepam (ATIVAN) tablet 1 mg  1 mg Oral Q6H PRN    allopurinol (ZYLOPRIM) tablet 100 mg  100 mg Oral DAILY    0.9% sodium chloride infusion 250 mL  250 mL IntraVENous PRN    0.9% sodium chloride infusion 250 mL  250 mL IntraVENous PRN    loperamide (IMODIUM) capsule 2 mg  2 mg Oral PRN    diphenoxylate-atropine (LOMOTIL) tablet 1 Tab  1 Tab Oral Q4H PRN    sodium chloride (NS) flush 5-10 mL  5-10 mL IntraVENous PRN    aspirin chewable tablet 81 mg  81 mg Oral DAILY    HYDROcodone-acetaminophen (NORCO) 5-325 mg per tablet 1-2 Tab  1-2 Tab Oral Q4H PRN    levothyroxine (SYNTHROID) tablet 125 mcg  125 mcg Oral ACB    pantoprazole (PROTONIX) tablet 40 mg  40 mg Oral ACB    pregabalin (LYRICA) capsule 50 mg  50 mg Oral TID    sodium chloride (NS) flush 5 mL  5 mL InterCATHeter Q8H    acetaminophen (TYLENOL) tablet 650 mg  650 mg Oral Q4H PRN    magic mouthwash (ALEXSANDER) oral suspension 5 mL  5 mL Oral Q4H PRN       Exam:  Vitals:    05/02/17 0014 05/02/17 0402 05/02/17 0434 05/02/17 0800   BP: 151/65 110/44  123/64   Pulse: 84 75  76   Resp: 18 18  18   Temp: 98.5 °F (36.9 °C) 98.1 °F (36.7 °C)  97.7 °F (36.5 °C)   SpO2: 100% 99%  96%   Weight:   131.5 kg (290 lb)    Height:             Intake/Output Summary (Last 24 hours) at 05/02/17 1006  Last data filed at 05/02/17 0856   Gross per 24 hour   Intake              480 ml   Output              900 ml   Net             -420 ml PE:  GEN - in no distress  CV - regular, no murmur, no rub  Lung - clear bilaterally  Abd - soft, nontender  Ext - 1-2+ pitting edema    Labs  Recent Labs      05/02/17 0235 05/01/17 0345  04/30/17   0355   WBC  1.7*  1.9*  3.0*   HGB  8.2*  8.2*  8.6*   HCT  24.7*  24.5*  25.3*   PLT  35*  38*  39*     Recent Labs      05/02/17 0235 05/01/17 0345  04/30/17   0355   NA  150*  148*  146*   K  4.7  4.9  4.6   CL  121*  121*  118*   CO2  17*  17*  17*   BUN  50*  55*  55*   CREA  3.08*  3.50*  3.99*   GLU  112*  91  117*   CA  7.7*  7.7*  7.4*   MG  1.9  1.7*  1.9     No results for input(s): PH, PCO2, PO2, PCO2 in the last 72 hours. 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 MATI due to ATN: Non=oliguric. Improved again today off of IVF. 2. Edema: Should improve with mobilization. May end up needing a diuretic  3. Anemia  4.  Hypernatremia: Encouraged more free water intake    Plan:  -OK to DC home  -Follow up with labs this week at the [address] [facility] for f/u

## 2017-05-02 NOTE — PROGRESS NOTES
Problem: Self Care Deficits Care Plan (Adult)  Goal: *Acute Goals and Plan of Care (Insert Text)  1. Patient will complete lower body bathing and dressing with modified independence and adaptive equipment as needed. 2. Patient will complete toileting with modified independence. 3. Patient will tolerate 23 minutes of OT treatment with less than 3 rest breaks to increase activity tolerance for ADLs. 4. Patient will complete functional transfers with modified independence and adaptive equipment as needed. Timeframe: 7 visits     Comments:       OCCUPATIONAL THERAPY: Initial Assessment and AM 5/2/2017  INPATIENT: Hospital Day: 10  Payor: Cherise Green / Plan: [employer] / Product Type: HMO /      NAME/AGE/GENDER: Cornelius Son is a 62 y.o. female     PRIMARY DIAGNOSIS:  Sepsis ([address] Sepsis ([facility] Sepsis ([facility]        ICD-10: Treatment Diagnosis:        · Generalized Muscle Weakness (M62.81)  · History of falling (Z91.81)   Precautions/Allergies:         Review of patient's allergies indicates no known allergies. ASSESSMENT:      Ms. Brain Jeans presents to hospital for above. Upon arrival of OT, pt was seated in reclining chair, alert and agreeable to OT evaluation. She reports that she is discharging home today and is eager to do so. Pt reports very strong social support system from spouse, siblings, and children. She reports independence at baseline with ADLs. She has hired maids to perform home management tasks and her mother in laws perform meal preparation. She no longer drive. Pt reports that she does not normally use AD for functional mobility although she would benefit from a rollator after observations during today's evaluation. Today, she performs functional transfers with supervision. She coasts along furniture in room, which an unsafe an inefficient way to ambulate. She would benefit from a rollator to improve safety with functional mobility within the home and community. Pt also requires assistance donning B socks, which she reports is below baseline and may be contributed to edema. Ms. Deshawn Haider remains very SOB throughout eval although Sa02 reads at 97% on room air. She would continue to benefit from skilled OT to address functional deficits in order to improve safety and independence within the home up d/c. Recommending HHOT. Will follow. This section established at most recent assessment   PROBLEM LIST (Impairments causing functional limitations):  1. Decreased Strength  2. Decreased ADL/Functional Activities  3. Decreased Transfer Abilities  4. Decreased Ambulation Ability/Technique  5. Decreased Balance  6. Decreased Activity Tolerance  7. Decreased Pacing Skills  8. Decreased Work Simplification/Energy Conservation Techniques  9. Increased Fatigue  10. Increased Shortness of Breath  11. Edema/Girth    INTERVENTIONS PLANNED: (Benefits and precautions of occupational therapy have been discussed with the patient.)  1. Activities of daily living training  2. Adaptive equipment training  3. Balance training  4. Clothing management  5. Donning&doffing training  6. Group therapy  7. Theraputic activity  8. Theraputic exercise      TREATMENT PLAN: Frequency/Duration: Follow patient 3x/week to address above goals. Rehabilitation Potential For Stated Goals: GOOD      RECOMMENDED REHABILITATION/EQUIPMENT: (at time of discharge pending progress): Continue Skilled Therapy. OCCUPATIONAL PROFILE AND HISTORY:   History of Present Injury/Illness (Reason for Referral):  See H&P  Past Medical History/Comorbidities:   Ms. Deshawn Haider  has a past medical history of Anemia; Arthritis; Basal cell carcinoma; Chronic pain; Hypertension (10/4/2011); Morbid obesity ([address] Murmur; Non Hodgkin's lymphoma ([address] (3/30/2017); Other ill-defined conditions; Overactive bladder; Rheumatic fever; Shingles;  Thromboembolus ([address] (x2); Thyroid disease; and Unspecified adverse effect of anesthesia. She also has no past medical history of Aneurysm ([facility] Arrhythmia; Asthma; Autoimmune disease ([facility] CAD (coronary artery disease); Chronic kidney disease; Coagulation defects; COPD; Diabetes ([facility] Difficult intubation; GERD (gastroesophageal reflux disease); Heart failure ([facility] Liver disease; Malignant hyperthermia due to anesthesia; Nausea & vomiting; Pseudocholinesterase deficiency; Psychiatric disorder; PUD (peptic ulcer disease); Seizures ([facility] Stroke [facility]); or Unspecified sleep apnea. Ms. Jon Linares  has a past surgical history that includes hc cholecystectomy fnc41; anesth,achilles tendon surg (2/10/2011); cholecystectomy (5584); orthopaedic (2012); and orthopaedic (2013). Social History/Living Environment:   Home Environment: Private residence  # Steps to Enter: 2  One/Two Story Residence: One story  # of Interior Steps: 13  Lift Chair Available: No  Living Alone: No  Support Systems: Family member(s)  Patient Expects to be Discharged to[de-identified] Private residence  Current DME Used/Available at Home: None  Tub or Shower Type: Shower  Prior Level of Function/Work/Activity:  Live in [facility] home with 13 total interior stairs. She has ample social support available to her at home. Independent with ADLs at baseline. Personal Factors:          Sex:  female        Age:  62 y.o. Social Background:  Strong supportive family    Number of Personal Factors/Comorbidities that affect the Plan of Care: Expanded review of therapy/medical records (1-2):  MODERATE COMPLEXITY   ASSESSMENT OF OCCUPATIONAL PERFORMANCE[de-identified]   Activities of Daily Living:           Basic ADLs (From Assessment) Complex ADLs (From Assessment)   Basic ADL  Feeding: Independent  Oral Facial Hygiene/Grooming: Independent  Bathing: Moderate assistance  Upper Body Dressing: Independent  Lower Body Dressing: Moderate assistance  Toileting: Supervision Instrumental ADL  Meal Preparation: Maximum assistance  Homemaking:  Total assistance  Medication Management: Modified independent  Financial Management: Modified independent   Grooming/Bathing/Dressing Activities of Daily Living     Cognitive Retraining  Safety/Judgement: Awareness of environment                 Functional Transfers  Toilet Transfer : Supervision     Bed/Mat Mobility  Sit to Stand: Supervision          Most Recent Physical Functioning:   Gross Assessment:  AROM: Generally decreased, functional  Strength: Generally decreased, functional               Posture:  Posture (WDL): Within defined limits  Balance:  Sitting: Intact  Standing: Impaired  Standing - Static: Fair  Standing - Dynamic : Fair (reaches for external support) Bed Mobility:     Wheelchair Mobility:     Transfers:  Sit to Stand: Supervision  Stand to Sit: Supervision                    Patient Vitals for the past 6 hrs:       BP BP Patient Position SpO2 Pulse   17 1242 128/86 At rest 96 % 81        Mental Status  Neurologic State: Alert  Orientation Level: Oriented X4  Cognition: Follows commands  Perception: Appears intact  Perseveration: No perseveration noted  Safety/Judgement: Awareness of environment                               Physical Skills Involved:  1. Range of Motion  2. Balance  3. Mobility  4. Strength  5. Endurance  6. Fine or Gross Motor Coordination Cognitive Skills Affected (resulting in the inability to perform in a timely and safe manner): 1. none Psychosocial Skills Affected:  1. Interpersonal Interactions  2. Habits  3. Routines and Behaviors  4. Active Use of Coping Strategies  5. Environmental Adaptations   Number of elements that affect the Plan of Care: 5+:  HIGH COMPLEXITY   CLINICAL DECISION MAKIN [facility] Inpatient Short Form  How much help from another person does the patient currently need. .. Total A Lot A Little None   1. Putting on and taking off regular lower body clothing?   [ ] 1   [X] 2   [ ] 3   [ ] 4   2.   Bathing (including washing, rinsing, drying)? [ ] 1   [X] 2   [ ] 3   [ ] 4   3. Toileting, which includes using toilet, bedpan or urinal?   [ ] 1   [ ] 2   [ ] 3   [X] 4   4. Putting on and taking off regular upper body clothing?   [ ] 1   [ ] 2   [ ] 3   [X] 4   5. Taking care of personal grooming such as brushing teeth? [ ] 1   [ ] 2   [ ] 3   [X] 4   6. Eating meals? [ ] 1   [ ] 2   [ ] 3   [X] 4   © 2007, Trustees of [address] [place] [address] [address] [address], under license to [employer]. All rights reserved    Score:  Initial: 20 Most Recent: X (Date: -- )     Interpretation of Tool:  Represents activities that are increasingly more difficult (i.e. Bed mobility, Transfers, Gait). Score 24 23 22-20 19-15 14-10 9-7 6       Modifier CH CI CJ CK CL CM CN         · Self Care:               - CURRENT STATUS:    CJ - 20%-39% impaired, limited or restricted               - GOAL STATUS:           CI - 1%-19% impaired, limited or restricted               - D/C STATUS:                       ---------------To be determined---------------  Payor: Alpha Landau / Plan: [employer] / Product Type: HMO /       Medical Necessity:     · Patient demonstrates good rehab potential due to higher previous functional level. Reason for Services/Other Comments:  · Patient continues to require modification of therapeutic interventions to increase complexity of exercises. Use of outcome tool(s) and clinical judgement create a POC that gives a: MODERATE COMPLEXITY             TREATMENT:   (In addition to Assessment/Re-Assessment sessions the following treatments were rendered)      Pre-treatment Symptoms/Complaints:    Pain: Initial:   Pain Intensity 1: 0  Post Session:  same      Assessment/Reassessment only, no treatment provided today     Braces/Orthotics/Lines/Etc:   · O2 Device: Room air  Treatment/Session Assessment:    · Response to Treatment:  Tolerated well w/o complaints.   · Interdisciplinary Collaboration:  · Physical Therapist  · Occupational Therapist  · Registered Nurse  · Physician  · After treatment position/precautions:  · Up in chair  · Bed/Chair-wheels locked  · Call light within reach  · Compliance with Program/Exercises: Compliant all of the time today. · Recommendations/Intent for next treatment session: \"Next visit will focus on advancements to more challenging activities and reduction in assistance provided\".   Total Treatment Duration:  OT Patient Time In/Time Out  Time In: 1000  Time Out: 921 [facility]

## 2017-05-02 NOTE — DISCHARGE INSTRUCTIONS
DISCHARGE SUMMARY from Nurse    The following personal items are in your possession at time of discharge:    Dental Appliances: None  Visual Aid: Glasses, With patient     Home Medications: None     Clothing: Shirt, Pants  Other Valuables: None             PATIENT INSTRUCTIONS:    After general anesthesia or intravenous sedation, for 24 hours or while taking prescription Narcotics:  · Limit your activities  · Do not drive and operate hazardous machinery  · Do not make important personal or business decisions  · Do  not drink alcoholic beverages  · If you have not urinated within 8 hours after discharge, please contact your surgeon on call. Report the following to your surgeon:  · Excessive pain, swelling, redness or odor of or around the surgical area  · Temperature over 100.5  · Nausea and vomiting lasting longer than 4 hours or if unable to take medications  · Any signs of decreased circulation or nerve impairment to extremity: change in color, persistent  numbness, tingling, coldness or increase pain  · Any questions        What to do at Home:  Recommended activity: Activity as tolerated, per MD instructions    If you experience any of the following symptoms fever > 100.5, nausea, vomiting, pain, chest pain, shortness of breath please follow up with MD.      *  Please give a list of your current medications to your Primary Care Provider. *  Please update this list whenever your medications are discontinued, doses are      changed, or new medications (including over-the-counter products) are added. *  Please carry medication information at all times in case of emergency situations. These are general instructions for a healthy lifestyle:    No smoking/ No tobacco products/ Avoid exposure to second hand smoke    Surgeon General's Warning:  Quitting smoking now greatly reduces serious risk to your health.     Obesity, smoking, and sedentary lifestyle greatly increases your risk for illness    A healthy diet, regular physical exercise & weight monitoring are important for maintaining a healthy lifestyle    You may be retaining fluid if you have a history of heart failure or if you experience any of the following symptoms:  Weight gain of 3 pounds or more overnight or 5 pounds in a week, increased swelling in our hands or feet or shortness of breath while lying flat in bed. Please call your doctor as soon as you notice any of these symptoms; do not wait until your next office visit. Recognize signs and symptoms of STROKE:    F-face looks uneven    A-arms unable to move or move unevenly    S-speech slurred or non-existent    T-time-call 911 as soon as signs and symptoms begin-DO NOT go       Back to bed or wait to see if you get better-TIME IS BRAIN. Warning Signs of HEART ATTACK     Call 911 if you have these symptoms:   Chest discomfort. Most heart attacks involve discomfort in the center of the chest that lasts more than a few minutes, or that goes away and comes back. It can feel like uncomfortable pressure, squeezing, fullness, or pain.  Discomfort in other areas of the upper body. Symptoms can include pain or discomfort in one or both arms, the back, neck, jaw, or stomach.  Shortness of breath with or without chest discomfort.  Other signs may include breaking out in a cold sweat, nausea, or lightheadedness. Don't wait more than five minutes to call 911 - MINUTES MATTER! Fast action can save your life. Calling 911 is almost always the fastest way to get lifesaving treatment. Emergency Medical Services staff can begin treatment when they arrive -- up to an hour sooner than if someone gets to the hospital by car. The discharge information has been reviewed with the patient. The patient verbalized understanding. Discharge medications reviewed with the patient and appropriate educational materials and side effects teaching were provided.            Sepsis: Care Instructions  Your Care Instructions  Sepsis is an infection that has spread throughout your body. It is a life-threatening condition and often causes extremely low blood pressure. This can lead to problems with many different organs. The cause of sepsis is not always clear, but it can happen as part of a long-term or sudden illness. Sometimes even a mild illness can lead to sepsis. Follow-up care is a key part of your treatment and safety. Be sure to make and go to all appointments, and call your doctor if you are having problems. Its also a good idea to know your test results and keep a list of the medicines you take. How can you care for yourself at home? · If your doctor prescribed antibiotics, take them as directed. Do not stop taking them just because you feel better. You need to take the full course of antibiotics. · Drink plenty of fluids, enough so that your urine is light yellow or clear like water. Choose water or caffeine-free clear liquids until you feel better. If you have kidney, heart, or liver disease and have to limit fluids, talk with your doctor before you increase your fluid intake. You can try rehydration drinks, such as Gatorade or Powerade. · Do not drink alcohol. · Eat a healthy diet. Include fruits, vegetables, and whole grains in your diet every day. · Walking is an easy way to get exercise. Gradually increase the amount you walk every day. Make sure your doctor knows that you are starting an exercise program.  · Do not smoke or use other tobacco products. If you need help quitting, talk to your doctor about stop-smoking programs and medicines. These can increase your chances of quitting for good. When should you call for help? Call 911 anytime you think you may need emergency care. For example, call if:  · You passed out (lost consciousness). Call your doctor now or seek immediate medical care if:  · You have a fever or chills. · You have cool, pale, or clammy skin.   · You are dizzy or lightheaded, or you feel like you may faint. · You have any new symptoms, such as a cough, pain in one part of your body, or urinary problems. Watch closely for changes in your health, and be sure to contact your doctor if:  · You do not get better as expected. Where can you learn more? Go to http://rajat-rachel.info/. Enter R112 in the search box to learn more about \"Sepsis: Care Instructions. \"  Current as of: May 27, 2016  Content Version: 11.2  © 9930-8583 [employer]. Care instructions adapted under license by [employer] (which disclaims liability or warranty for this information). If you have questions about a medical condition or this instruction, always ask your healthcare professional. [address] any warranty or liability for your use of this information.

## 2017-05-03 ENCOUNTER — HOSPITAL ENCOUNTER (OUTPATIENT)
Dept: LAB | Age: 57
Discharge: HOME | End: 2017-05-03
Payer: COMMERCIAL

## 2017-05-03 ENCOUNTER — PATIENT OUTREACH (OUTPATIENT)
Dept: CASE MANAGEMENT | Age: 57
End: 2017-05-03

## 2017-05-03 DIAGNOSIS — C85.88 MARGINAL ZONE LYMPHOMA OF LYMPH NODES OF MULTIPLE SITES (HCC): ICD-10-CM

## 2017-05-03 LAB
ALBUMIN SERPL BCP-MCNC: 2.4 G/DL (ref 3.5–5)
ALBUMIN/GLOB SERPL: 1 {RATIO} (ref 1.2–3.5)
ALP SERPL-CCNC: 190 U/L (ref 50–136)
ALT SERPL-CCNC: 29 U/L (ref 12–65)
ANION GAP BLD CALC-SCNC: 9 MMOL/L (ref 7–16)
AST SERPL W P-5'-P-CCNC: 15 U/L (ref 15–37)
BASOPHILS # BLD AUTO: 0 K/UL (ref 0–0.2)
BASOPHILS # BLD: 1 % (ref 0–2)
BILIRUB SERPL-MCNC: 0.4 MG/DL (ref 0.2–1.1)
BUN SERPL-MCNC: 43 MG/DL (ref 6–23)
CALCIUM SERPL-MCNC: 8.2 MG/DL (ref 8.3–10.4)
CHLORIDE SERPL-SCNC: 121 MMOL/L (ref 98–107)
CO2 SERPL-SCNC: 17 MMOL/L (ref 21–32)
CREAT SERPL-MCNC: 2.46 MG/DL (ref 0.6–1)
DIFFERENTIAL METHOD BLD: ABNORMAL
EOSINOPHIL # BLD: 0.1 K/UL (ref 0–0.8)
EOSINOPHIL NFR BLD: 7 % (ref 0.5–7.8)
ERYTHROCYTE [DISTWIDTH] IN BLOOD BY AUTOMATED COUNT: 19.3 % (ref 11.9–14.6)
GLOBULIN SER CALC-MCNC: 2.4 G/DL (ref 2.3–3.5)
GLUCOSE SERPL-MCNC: 116 MG/DL (ref 65–100)
HCT VFR BLD AUTO: 24.5 % (ref 35.8–46.3)
HGB BLD-MCNC: 7.9 G/DL (ref 11.7–15.4)
LYMPHOCYTES # BLD AUTO: 6 % (ref 13–44)
LYMPHOCYTES # BLD: 0.1 K/UL (ref 0.5–4.6)
MAGNESIUM SERPL-MCNC: 1.9 MG/DL (ref 1.8–2.4)
MCH RBC QN AUTO: 27.4 PG (ref 26.1–32.9)
MCHC RBC AUTO-ENTMCNC: 32.2 G/DL (ref 31.4–35)
MCV RBC AUTO: 85.1 FL (ref 79.6–97.8)
MONOCYTES # BLD: 0.2 K/UL (ref 0.1–1.3)
MONOCYTES NFR BLD AUTO: 8 % (ref 4–12)
NEUTS SEG # BLD: 1.5 K/UL (ref 1.7–8.2)
NEUTS SEG NFR BLD AUTO: 78 % (ref 43–78)
NRBC # BLD: 0.01 K/UL (ref 0–0.2)
NRBC BLD-RTO: 0.5 PER 100 WBC (ref 0–2)
PLATELET # BLD AUTO: 32 K/UL (ref 150–450)
PMV BLD AUTO: 8.7 FL (ref 10.8–14.1)
POTASSIUM SERPL-SCNC: 4.5 MMOL/L (ref 3.5–5.1)
PROT SERPL-MCNC: 4.8 G/DL (ref 6.3–8.2)
RBC # BLD AUTO: 2.88 M/UL (ref 4.05–5.25)
SODIUM SERPL-SCNC: 147 MMOL/L (ref 136–145)
WBC # BLD AUTO: 1.9 K/UL (ref 4.3–11.1)

## 2017-05-03 PROCEDURE — 85025 COMPLETE CBC W/AUTO DIFF WBC: CPT | Performed by: INTERNAL MEDICINE

## 2017-05-03 PROCEDURE — 83735 ASSAY OF MAGNESIUM: CPT | Performed by: INTERNAL MEDICINE

## 2017-05-03 PROCEDURE — 80053 COMPREHEN METABOLIC PANEL: CPT | Performed by: INTERNAL MEDICINE

## 2017-05-05 ENCOUNTER — HOSPITAL ENCOUNTER (OUTPATIENT)
Dept: LAB | Age: 57
Discharge: HOME | End: 2017-05-05
Payer: COMMERCIAL

## 2017-05-05 ENCOUNTER — PATIENT OUTREACH (OUTPATIENT)
Dept: CASE MANAGEMENT | Age: 57
End: 2017-05-05

## 2017-05-05 ENCOUNTER — TELEPHONE (OUTPATIENT)
Dept: HOME HEALTH SERVICES | Facility: HOME HEALTH | Age: 57
End: 2017-05-05

## 2017-05-05 ENCOUNTER — HOME CARE VISIT (OUTPATIENT)
Dept: HOME HEALTH SERVICES | Facility: HOME HEALTH | Age: 57
End: 2017-05-05

## 2017-05-05 ENCOUNTER — HOME CARE VISIT (OUTPATIENT)
Dept: SCHEDULING | Facility: HOME HEALTH | Age: 57
End: 2017-05-05

## 2017-05-05 DIAGNOSIS — C85.88 MARGINAL ZONE LYMPHOMA OF LYMPH NODES OF MULTIPLE SITES (HCC): ICD-10-CM

## 2017-05-05 LAB
ALBUMIN SERPL BCP-MCNC: 2.4 G/DL (ref 3.5–5)
ALBUMIN/GLOB SERPL: 1 {RATIO} (ref 1.2–3.5)
ALP SERPL-CCNC: 156 U/L (ref 50–136)
ALT SERPL-CCNC: 22 U/L (ref 12–65)
ANION GAP BLD CALC-SCNC: 7 MMOL/L (ref 7–16)
AST SERPL W P-5'-P-CCNC: 14 U/L (ref 15–37)
BASOPHILS # BLD AUTO: 0 K/UL (ref 0–0.2)
BASOPHILS # BLD: 0 % (ref 0–2)
BILIRUB SERPL-MCNC: 0.3 MG/DL (ref 0.2–1.1)
BUN SERPL-MCNC: 31 MG/DL (ref 6–23)
CALCIUM SERPL-MCNC: 8.3 MG/DL (ref 8.3–10.4)
CHLORIDE SERPL-SCNC: 119 MMOL/L (ref 98–107)
CO2 SERPL-SCNC: 19 MMOL/L (ref 21–32)
CREAT SERPL-MCNC: 1.89 MG/DL (ref 0.6–1)
DIFFERENTIAL METHOD BLD: ABNORMAL
EOSINOPHIL # BLD: 0.2 K/UL (ref 0–0.8)
EOSINOPHIL NFR BLD: 8 % (ref 0.5–7.8)
ERYTHROCYTE [DISTWIDTH] IN BLOOD BY AUTOMATED COUNT: 18.6 % (ref 11.9–14.6)
GLOBULIN SER CALC-MCNC: 2.5 G/DL (ref 2.3–3.5)
GLUCOSE SERPL-MCNC: 126 MG/DL (ref 65–100)
HCT VFR BLD AUTO: 23 % (ref 35.8–46.3)
HGB BLD-MCNC: 7.5 G/DL (ref 11.7–15.4)
LYMPHOCYTES # BLD AUTO: 5 % (ref 13–44)
LYMPHOCYTES # BLD: 0.1 K/UL (ref 0.5–4.6)
MAGNESIUM SERPL-MCNC: 1.8 MG/DL (ref 1.8–2.4)
MCH RBC QN AUTO: 27.9 PG (ref 26.1–32.9)
MCHC RBC AUTO-ENTMCNC: 32.6 G/DL (ref 31.4–35)
MCV RBC AUTO: 85.5 FL (ref 79.6–97.8)
MONOCYTES # BLD: 0.2 K/UL (ref 0.1–1.3)
MONOCYTES NFR BLD AUTO: 6 % (ref 4–12)
NEUTS SEG # BLD: 2.3 K/UL (ref 1.7–8.2)
NEUTS SEG NFR BLD AUTO: 82 % (ref 43–78)
NRBC # BLD: 0 K/UL (ref 0–0.2)
PLATELET # BLD AUTO: 22 K/UL (ref 150–450)
PMV BLD AUTO: 9.9 FL (ref 10.8–14.1)
POTASSIUM SERPL-SCNC: 4.4 MMOL/L (ref 3.5–5.1)
PROT SERPL-MCNC: 4.9 G/DL (ref 6.3–8.2)
RBC # BLD AUTO: 2.69 M/UL (ref 4.05–5.25)
SODIUM SERPL-SCNC: 145 MMOL/L (ref 136–145)
WBC # BLD AUTO: 2.9 K/UL (ref 4.3–11.1)

## 2017-05-05 PROCEDURE — 83735 ASSAY OF MAGNESIUM: CPT | Performed by: INTERNAL MEDICINE

## 2017-05-05 PROCEDURE — 80053 COMPREHEN METABOLIC PANEL: CPT | Performed by: INTERNAL MEDICINE

## 2017-05-05 PROCEDURE — 85025 COMPLETE CBC W/AUTO DIFF WBC: CPT | Performed by: INTERNAL MEDICINE

## 2017-05-05 NOTE — PROGRESS NOTES
Navigator spoke with patient via phone to discuss lab results. Pt notified of hgb, WBC, platelet count and creatinine. Pt states her abdomen is beginning to feel full. Pt with trend of needing weekly (approx) paracentesis. Paracentesis scheduled on 5/9 in anticipation that she will need it. Pt to have labs drawn at [facility] on Monday. If platelets <56, pt will get platelet transfusion on Tuesday (5th floor [facility]) prior to paracentesis. Will continue to follow.

## 2017-05-05 NOTE — TELEPHONE ENCOUNTER
[address] [facility] Pharmacist consult. MsFilipe Jon Linares ws discharged prior to my consult. I have called and only gotten voice mail. I have left my name and cell phone number. I asked her to call me with any medication questions or issues.   5/5/17  1300

## 2017-05-08 ENCOUNTER — HOSPITAL ENCOUNTER (OUTPATIENT)
Dept: LAB | Age: 57
Discharge: HOME | End: 2017-05-08
Payer: COMMERCIAL

## 2017-05-08 DIAGNOSIS — C85.88 MARGINAL ZONE LYMPHOMA OF LYMPH NODES OF MULTIPLE SITES (HCC): ICD-10-CM

## 2017-05-08 LAB
ALBUMIN SERPL BCP-MCNC: 2.5 G/DL (ref 3.5–5)
ALBUMIN/GLOB SERPL: 1 {RATIO} (ref 1.2–3.5)
ALP SERPL-CCNC: 146 U/L (ref 50–136)
ALT SERPL-CCNC: 20 U/L (ref 12–65)
ANION GAP BLD CALC-SCNC: 8 MMOL/L (ref 7–16)
AST SERPL W P-5'-P-CCNC: 14 U/L (ref 15–37)
BASOPHILS # BLD AUTO: 0 K/UL (ref 0–0.2)
BASOPHILS # BLD: 0 % (ref 0–2)
BILIRUB SERPL-MCNC: 0.3 MG/DL (ref 0.2–1.1)
BUN SERPL-MCNC: 25 MG/DL (ref 6–23)
CALCIUM SERPL-MCNC: 8.5 MG/DL (ref 8.3–10.4)
CHLORIDE SERPL-SCNC: 115 MMOL/L (ref 98–107)
CO2 SERPL-SCNC: 22 MMOL/L (ref 21–32)
CREAT SERPL-MCNC: 2.02 MG/DL (ref 0.6–1)
DIFFERENTIAL METHOD BLD: ABNORMAL
EOSINOPHIL # BLD: 0.4 K/UL (ref 0–0.8)
EOSINOPHIL NFR BLD: 12 % (ref 0.5–7.8)
ERYTHROCYTE [DISTWIDTH] IN BLOOD BY AUTOMATED COUNT: 18.7 % (ref 11.9–14.6)
GLOBULIN SER CALC-MCNC: 2.6 G/DL (ref 2.3–3.5)
GLUCOSE SERPL-MCNC: 115 MG/DL (ref 65–100)
HCT VFR BLD AUTO: 22.4 % (ref 35.8–46.3)
HGB BLD-MCNC: 7.2 G/DL (ref 11.7–15.4)
LYMPHOCYTES # BLD AUTO: 4 % (ref 13–44)
LYMPHOCYTES # BLD: 0.1 K/UL (ref 0.5–4.6)
MCH RBC QN AUTO: 27.5 PG (ref 26.1–32.9)
MCHC RBC AUTO-ENTMCNC: 32.1 G/DL (ref 31.4–35)
MCV RBC AUTO: 85.5 FL (ref 79.6–97.8)
MONOCYTES # BLD: 0.1 K/UL (ref 0.1–1.3)
MONOCYTES NFR BLD AUTO: 3 % (ref 4–12)
NEUTS SEG # BLD: 2.4 K/UL (ref 1.7–8.2)
NEUTS SEG NFR BLD AUTO: 81 % (ref 43–78)
NRBC # BLD: 0 K/UL (ref 0–0.2)
NRBC BLD-RTO: 0 PER 100 WBC (ref 0–2)
PLATELET # BLD AUTO: 29 K/UL (ref 150–450)
PMV BLD AUTO: 10.2 FL (ref 10.8–14.1)
POTASSIUM SERPL-SCNC: 4.4 MMOL/L (ref 3.5–5.1)
PROT SERPL-MCNC: 5.1 G/DL (ref 6.3–8.2)
RBC # BLD AUTO: 2.62 M/UL (ref 4.05–5.25)
SODIUM SERPL-SCNC: 145 MMOL/L (ref 136–145)
WBC # BLD AUTO: 2.9 K/UL (ref 4.3–11.1)

## 2017-05-08 PROCEDURE — 85025 COMPLETE CBC W/AUTO DIFF WBC: CPT | Performed by: INTERNAL MEDICINE

## 2017-05-08 PROCEDURE — 80053 COMPREHEN METABOLIC PANEL: CPT | Performed by: INTERNAL MEDICINE

## 2017-05-08 PROCEDURE — 86644 CMV ANTIBODY: CPT | Performed by: INTERNAL MEDICINE

## 2017-05-08 PROCEDURE — 86923 COMPATIBILITY TEST ELECTRIC: CPT | Performed by: INTERNAL MEDICINE

## 2017-05-08 PROCEDURE — 86900 BLOOD TYPING SEROLOGIC ABO: CPT | Performed by: INTERNAL MEDICINE

## 2017-05-09 ENCOUNTER — HOSPITAL ENCOUNTER (OUTPATIENT)
Dept: INFUSION THERAPY | Age: 57
Discharge: HOME | End: 2017-05-09

## 2017-05-09 ENCOUNTER — PATIENT OUTREACH (OUTPATIENT)
Dept: CASE MANAGEMENT | Age: 57
End: 2017-05-09

## 2017-05-09 DIAGNOSIS — C85.88 MARGINAL ZONE LYMPHOMA OF LYMPH NODES OF MULTIPLE SITES (HCC): ICD-10-CM

## 2017-05-09 RX ORDER — ACETAMINOPHEN 325 MG/1
650 TABLET ORAL ONCE
Status: CANCELLED | OUTPATIENT
Start: 2017-05-09 | End: 2017-05-09

## 2017-05-09 RX ORDER — SODIUM CHLORIDE 9 MG/ML
250 INJECTION, SOLUTION INTRAVENOUS AS NEEDED
Status: CANCELLED | OUTPATIENT
Start: 2017-05-09

## 2017-05-09 RX ORDER — DIPHENHYDRAMINE HCL 25 MG
25 CAPSULE ORAL
Status: CANCELLED | OUTPATIENT
Start: 2017-05-09

## 2017-05-09 NOTE — PROGRESS NOTES
Pt called and stated she did not feel she needed her paracentesis today. Infusion appt for platelets and IR appt cancelled. Paracentesis tentatively scheduled for Friday. Pt aware of appts tomorrow. Will continue to follow.

## 2017-05-10 ENCOUNTER — HOSPITAL ENCOUNTER (OUTPATIENT)
Dept: LAB | Age: 57
Discharge: HOME | End: 2017-05-10
Payer: COMMERCIAL

## 2017-05-10 ENCOUNTER — PATIENT OUTREACH (OUTPATIENT)
Dept: CASE MANAGEMENT | Age: 57
End: 2017-05-10

## 2017-05-10 ENCOUNTER — HOSPITAL ENCOUNTER (OUTPATIENT)
Dept: INFUSION THERAPY | Age: 57
Discharge: HOME | End: 2017-05-10
Payer: COMMERCIAL

## 2017-05-10 VITALS
TEMPERATURE: 98.2 F | OXYGEN SATURATION: 100 % | RESPIRATION RATE: 18 BRPM | DIASTOLIC BLOOD PRESSURE: 65 MMHG | SYSTOLIC BLOOD PRESSURE: 132 MMHG | HEART RATE: 65 BPM

## 2017-05-10 DIAGNOSIS — C85.88 MARGINAL ZONE LYMPHOMA OF LYMPH NODES OF MULTIPLE SITES (HCC): ICD-10-CM

## 2017-05-10 LAB
ALBUMIN SERPL BCP-MCNC: 2.9 G/DL (ref 3.5–5)
ALBUMIN/GLOB SERPL: 1.1 {RATIO} (ref 1.2–3.5)
ALP SERPL-CCNC: 148 U/L (ref 50–136)
ALT SERPL-CCNC: 19 U/L (ref 12–65)
ANION GAP BLD CALC-SCNC: 10 MMOL/L (ref 7–16)
AST SERPL W P-5'-P-CCNC: 14 U/L (ref 15–37)
BASOPHILS # BLD AUTO: 0 K/UL (ref 0–0.2)
BASOPHILS # BLD: 0 % (ref 0–2)
BILIRUB SERPL-MCNC: 0.3 MG/DL (ref 0.2–1.1)
BUN SERPL-MCNC: 22 MG/DL (ref 6–23)
CALCIUM SERPL-MCNC: 8.4 MG/DL (ref 8.3–10.4)
CHLORIDE SERPL-SCNC: 113 MMOL/L (ref 98–107)
CO2 SERPL-SCNC: 22 MMOL/L (ref 21–32)
CREAT SERPL-MCNC: 1.49 MG/DL (ref 0.6–1)
DIFFERENTIAL METHOD BLD: ABNORMAL
EOSINOPHIL # BLD: 0.4 K/UL (ref 0–0.8)
EOSINOPHIL NFR BLD: 16 % (ref 0.5–7.8)
ERYTHROCYTE [DISTWIDTH] IN BLOOD BY AUTOMATED COUNT: 18.6 % (ref 11.9–14.6)
GLOBULIN SER CALC-MCNC: 2.7 G/DL (ref 2.3–3.5)
GLUCOSE SERPL-MCNC: 107 MG/DL (ref 65–100)
HCT VFR BLD AUTO: 20.6 % (ref 35.8–46.3)
HGB BLD-MCNC: 6.7 G/DL (ref 11.7–15.4)
LYMPHOCYTES # BLD AUTO: 4 % (ref 13–44)
LYMPHOCYTES # BLD: 0.1 K/UL (ref 0.5–4.6)
MAGNESIUM SERPL-MCNC: 1.6 MG/DL (ref 1.8–2.4)
MCH RBC QN AUTO: 27.7 PG (ref 26.1–32.9)
MCHC RBC AUTO-ENTMCNC: 32.5 G/DL (ref 31.4–35)
MCV RBC AUTO: 85.1 FL (ref 79.6–97.8)
MONOCYTES # BLD: 0.1 K/UL (ref 0.1–1.3)
MONOCYTES NFR BLD AUTO: 3 % (ref 4–12)
NEUTS SEG # BLD: 2.2 K/UL (ref 1.7–8.2)
NEUTS SEG NFR BLD AUTO: 77 % (ref 43–78)
NRBC # BLD: 0 K/UL (ref 0–0.2)
PLATELET # BLD AUTO: 41 K/UL (ref 150–450)
PMV BLD AUTO: 9.1 FL (ref 10.8–14.1)
POTASSIUM SERPL-SCNC: 4 MMOL/L (ref 3.5–5.1)
PROT SERPL-MCNC: 5.6 G/DL (ref 6.3–8.2)
RBC # BLD AUTO: 2.42 M/UL (ref 4.05–5.25)
SODIUM SERPL-SCNC: 145 MMOL/L (ref 136–145)
WBC # BLD AUTO: 2.8 K/UL (ref 4.3–11.1)

## 2017-05-10 PROCEDURE — 77030018667 ADMN ST IV BLD FENW -A

## 2017-05-10 PROCEDURE — 83735 ASSAY OF MAGNESIUM: CPT | Performed by: INTERNAL MEDICINE

## 2017-05-10 PROCEDURE — 80053 COMPREHEN METABOLIC PANEL: CPT | Performed by: INTERNAL MEDICINE

## 2017-05-10 PROCEDURE — P9040 RBC LEUKOREDUCED IRRADIATED: HCPCS | Performed by: INTERNAL MEDICINE

## 2017-05-10 PROCEDURE — 74011250637 HC RX REV CODE- 250/637: Performed by: INTERNAL MEDICINE

## 2017-05-10 PROCEDURE — 85025 COMPLETE CBC W/AUTO DIFF WBC: CPT | Performed by: INTERNAL MEDICINE

## 2017-05-10 PROCEDURE — 36430 TRANSFUSION BLD/BLD COMPNT: CPT

## 2017-05-10 PROCEDURE — 74011250636 HC RX REV CODE- 250/636: Performed by: INTERNAL MEDICINE

## 2017-05-10 RX ORDER — SODIUM CHLORIDE 9 MG/ML
250 INJECTION, SOLUTION INTRAVENOUS AS NEEDED
Status: DISCONTINUED | OUTPATIENT
Start: 2017-05-10 | End: 2017-05-10 | Stop reason: SDUPTHER

## 2017-05-10 RX ORDER — ACETAMINOPHEN 325 MG/1
650 TABLET ORAL ONCE
Status: DISCONTINUED | OUTPATIENT
Start: 2017-05-10 | End: 2017-05-10 | Stop reason: SDUPTHER

## 2017-05-10 RX ORDER — DIPHENHYDRAMINE HCL 25 MG
25 CAPSULE ORAL
Status: DISPENSED | OUTPATIENT
Start: 2017-05-10 | End: 2017-05-10

## 2017-05-10 RX ORDER — SODIUM CHLORIDE 9 MG/ML
250 INJECTION, SOLUTION INTRAVENOUS AS NEEDED
Status: DISCONTINUED | OUTPATIENT
Start: 2017-05-10 | End: 2017-05-14 | Stop reason: HOSPADM

## 2017-05-10 RX ORDER — ACETAMINOPHEN 325 MG/1
650 TABLET ORAL ONCE
Status: COMPLETED | OUTPATIENT
Start: 2017-05-10 | End: 2017-05-10

## 2017-05-10 RX ORDER — HEPARIN 100 UNIT/ML
300 SYRINGE INTRAVENOUS AS NEEDED
Status: DISPENSED | OUTPATIENT
Start: 2017-05-10 | End: 2017-05-11

## 2017-05-10 RX ORDER — DIPHENHYDRAMINE HCL 25 MG
25 CAPSULE ORAL
Status: DISCONTINUED | OUTPATIENT
Start: 2017-05-10 | End: 2017-05-10 | Stop reason: SDUPTHER

## 2017-05-10 RX ADMIN — Medication 300 UNITS: at 16:43

## 2017-05-10 RX ADMIN — SODIUM CHLORIDE 250 ML: 900 INJECTION, SOLUTION INTRAVENOUS at 11:50

## 2017-05-10 RX ADMIN — DIPHENHYDRAMINE HYDROCHLORIDE 25 MG: 25 CAPSULE ORAL at 12:02

## 2017-05-10 RX ADMIN — ACETAMINOPHEN 650 MG: 325 TABLET, FILM COATED ORAL at 12:02

## 2017-05-10 NOTE — PROGRESS NOTES
Arrived to the [facility]. 2 units PRBC's completed. Patient tolerated without difficulty. Any issues or concerns during appointment: none. Patient aware of next infusion appointment on 5/16 (date) at 7:15 (time). Discharged in  Wheelchair with family to home.

## 2017-05-11 ENCOUNTER — HOSPITAL ENCOUNTER (OUTPATIENT)
Dept: INFUSION THERAPY | Age: 57
End: 2017-05-11
Payer: COMMERCIAL

## 2017-05-11 LAB
ABO + RH BLD: NORMAL
BLD PROD TYP BPU: NORMAL
BLD PROD TYP BPU: NORMAL
BLOOD GROUP ANTIBODIES SERPL: NORMAL
BPU ID: NORMAL
BPU ID: NORMAL
CROSSMATCH RESULT,%XM: NORMAL
CROSSMATCH RESULT,%XM: NORMAL
SPECIMEN EXP DATE BLD: NORMAL
STATUS OF UNIT,%ST: NORMAL
STATUS OF UNIT,%ST: NORMAL
UNIT DIVISION, %UDIV: 0
UNIT DIVISION, %UDIV: 0

## 2017-05-12 ENCOUNTER — APPOINTMENT (OUTPATIENT)
Dept: INFUSION THERAPY | Age: 57
End: 2017-05-12
Payer: COMMERCIAL

## 2017-05-12 ENCOUNTER — PATIENT OUTREACH (OUTPATIENT)
Dept: CASE MANAGEMENT | Age: 57
End: 2017-05-12

## 2017-05-12 DIAGNOSIS — C85.88 MARGINAL ZONE LYMPHOMA OF LYMPH NODES OF MULTIPLE SITES (HCC): ICD-10-CM

## 2017-05-15 RX ORDER — DIPHENHYDRAMINE HYDROCHLORIDE 50 MG/ML
50 INJECTION, SOLUTION INTRAMUSCULAR; INTRAVENOUS AS NEEDED
Status: CANCELLED
Start: 2017-05-17

## 2017-05-15 RX ORDER — EPINEPHRINE 1 MG/ML
0.3 INJECTION, SOLUTION, CONCENTRATE INTRAVENOUS AS NEEDED
Status: CANCELLED | OUTPATIENT
Start: 2017-05-16

## 2017-05-15 RX ORDER — DIPHENHYDRAMINE HYDROCHLORIDE 50 MG/ML
50 INJECTION, SOLUTION INTRAMUSCULAR; INTRAVENOUS AS NEEDED
Status: CANCELLED
Start: 2017-05-16

## 2017-05-15 RX ORDER — HEPARIN SODIUM 1000 [USP'U]/ML
2000 INJECTION, SOLUTION INTRAVENOUS; SUBCUTANEOUS AS NEEDED
Status: CANCELLED
Start: 2017-05-16

## 2017-05-15 RX ORDER — HEPARIN 100 UNIT/ML
300-500 SYRINGE INTRAVENOUS AS NEEDED
Status: CANCELLED
Start: 2017-05-17

## 2017-05-15 RX ORDER — ALBUTEROL SULFATE 0.83 MG/ML
2.5 SOLUTION RESPIRATORY (INHALATION) AS NEEDED
Status: CANCELLED
Start: 2017-05-16

## 2017-05-15 RX ORDER — ACETAMINOPHEN 325 MG/1
650 TABLET ORAL AS NEEDED
Status: CANCELLED
Start: 2017-05-17

## 2017-05-15 RX ORDER — ACETAMINOPHEN 325 MG/1
650 TABLET ORAL AS NEEDED
Status: CANCELLED
Start: 2017-05-16

## 2017-05-15 RX ORDER — ONDANSETRON 2 MG/ML
8 INJECTION INTRAMUSCULAR; INTRAVENOUS AS NEEDED
Status: CANCELLED | OUTPATIENT
Start: 2017-05-17

## 2017-05-15 RX ORDER — HEPARIN SODIUM 1000 [USP'U]/ML
2000 INJECTION, SOLUTION INTRAVENOUS; SUBCUTANEOUS AS NEEDED
Status: CANCELLED
Start: 2017-05-17

## 2017-05-15 RX ORDER — ONDANSETRON 2 MG/ML
8 INJECTION INTRAMUSCULAR; INTRAVENOUS AS NEEDED
Status: CANCELLED | OUTPATIENT
Start: 2017-05-16

## 2017-05-15 RX ORDER — ALBUTEROL SULFATE 0.83 MG/ML
2.5 SOLUTION RESPIRATORY (INHALATION) AS NEEDED
Status: CANCELLED
Start: 2017-05-17

## 2017-05-15 RX ORDER — EPINEPHRINE 1 MG/ML
0.3 INJECTION, SOLUTION, CONCENTRATE INTRAVENOUS AS NEEDED
Status: CANCELLED | OUTPATIENT
Start: 2017-05-17

## 2017-05-15 RX ORDER — DEXAMETHASONE SODIUM PHOSPHATE 100 MG/10ML
10 INJECTION INTRAMUSCULAR; INTRAVENOUS ONCE
Status: CANCELLED
Start: 2017-05-17 | End: 2017-05-17

## 2017-05-15 RX ORDER — HYDROCORTISONE SODIUM SUCCINATE 100 MG/2ML
100 INJECTION, POWDER, FOR SOLUTION INTRAMUSCULAR; INTRAVENOUS AS NEEDED
Status: CANCELLED | OUTPATIENT
Start: 2017-05-17

## 2017-05-15 RX ORDER — ONDANSETRON 2 MG/ML
8 INJECTION INTRAMUSCULAR; INTRAVENOUS ONCE
Status: CANCELLED | OUTPATIENT
Start: 2017-05-17 | End: 2017-05-17

## 2017-05-15 RX ORDER — SODIUM CHLORIDE 0.9 % (FLUSH) 0.9 %
10 SYRINGE (ML) INJECTION AS NEEDED
Status: CANCELLED
Start: 2017-05-17

## 2017-05-16 ENCOUNTER — HOSPITAL ENCOUNTER (OUTPATIENT)
Dept: INFUSION THERAPY | Age: 57
Discharge: HOME | End: 2017-05-16
Payer: COMMERCIAL

## 2017-05-16 VITALS
HEART RATE: 76 BPM | DIASTOLIC BLOOD PRESSURE: 90 MMHG | OXYGEN SATURATION: 100 % | WEIGHT: 277.2 LBS | TEMPERATURE: 98.9 F | BODY MASS INDEX: 49.1 KG/M2 | SYSTOLIC BLOOD PRESSURE: 145 MMHG | RESPIRATION RATE: 18 BRPM

## 2017-05-16 DIAGNOSIS — C85.88 MARGINAL ZONE LYMPHOMA OF LYMPH NODES OF MULTIPLE SITES (HCC): ICD-10-CM

## 2017-05-16 LAB
ALBUMIN SERPL BCP-MCNC: 2.9 G/DL (ref 3.5–5)
ALBUMIN/GLOB SERPL: 1.2 {RATIO} (ref 1.2–3.5)
ALP SERPL-CCNC: 131 U/L (ref 50–136)
ALT SERPL-CCNC: 19 U/L (ref 12–65)
ANION GAP BLD CALC-SCNC: 7 MMOL/L (ref 7–16)
AST SERPL W P-5'-P-CCNC: 13 U/L (ref 15–37)
BASOPHILS # BLD AUTO: 0 K/UL (ref 0–0.2)
BASOPHILS # BLD: 1 % (ref 0–2)
BILIRUB SERPL-MCNC: 0.4 MG/DL (ref 0.2–1.1)
BUN SERPL-MCNC: 16 MG/DL (ref 6–23)
CALCIUM SERPL-MCNC: 8.3 MG/DL (ref 8.3–10.4)
CHLORIDE SERPL-SCNC: 113 MMOL/L (ref 98–107)
CO2 SERPL-SCNC: 25 MMOL/L (ref 21–32)
CREAT SERPL-MCNC: 1.13 MG/DL (ref 0.6–1)
DIFFERENTIAL METHOD BLD: ABNORMAL
EOSINOPHIL # BLD: 0.6 K/UL (ref 0–0.8)
EOSINOPHIL NFR BLD: 41 % (ref 0.5–7.8)
ERYTHROCYTE [DISTWIDTH] IN BLOOD BY AUTOMATED COUNT: 17.9 % (ref 11.9–14.6)
GLOBULIN SER CALC-MCNC: 2.5 G/DL (ref 2.3–3.5)
GLUCOSE SERPL-MCNC: 103 MG/DL (ref 65–100)
HCT VFR BLD AUTO: 24.2 % (ref 35.8–46.3)
HGB BLD-MCNC: 8.1 G/DL (ref 11.7–15.4)
LYMPHOCYTES # BLD AUTO: 8 % (ref 13–44)
LYMPHOCYTES # BLD: 0.1 K/UL (ref 0.5–4.6)
MCH RBC QN AUTO: 29 PG (ref 26.1–32.9)
MCHC RBC AUTO-ENTMCNC: 33.5 G/DL (ref 31.4–35)
MCV RBC AUTO: 86.7 FL (ref 79.6–97.8)
MONOCYTES # BLD: 0.2 K/UL (ref 0.1–1.3)
MONOCYTES NFR BLD AUTO: 15 % (ref 4–12)
NEUTS SEG # BLD: 0.5 K/UL (ref 1.7–8.2)
NEUTS SEG NFR BLD AUTO: 35 % (ref 43–78)
NRBC # BLD: 0.01 K/UL (ref 0–0.2)
NRBC BLD-RTO: 0.7 PER 100 WBC (ref 0–2)
PLATELET # BLD AUTO: 117 K/UL (ref 150–450)
PMV BLD AUTO: 9.2 FL (ref 10.8–14.1)
POTASSIUM SERPL-SCNC: 3.9 MMOL/L (ref 3.5–5.1)
PROT SERPL-MCNC: 5.4 G/DL (ref 6.3–8.2)
RBC # BLD AUTO: 2.79 M/UL (ref 4.05–5.25)
SODIUM SERPL-SCNC: 145 MMOL/L (ref 136–145)
WBC # BLD AUTO: 1.4 K/UL (ref 4.3–11.1)

## 2017-05-16 PROCEDURE — 96413 CHEMO IV INFUSION 1 HR: CPT

## 2017-05-16 PROCEDURE — 80053 COMPREHEN METABOLIC PANEL: CPT | Performed by: INTERNAL MEDICINE

## 2017-05-16 PROCEDURE — 96411 CHEMO IV PUSH ADDL DRUG: CPT

## 2017-05-16 PROCEDURE — 96415 CHEMO IV INFUSION ADDL HR: CPT

## 2017-05-16 PROCEDURE — 74011250636 HC RX REV CODE- 250/636: Performed by: NURSE PRACTITIONER

## 2017-05-16 PROCEDURE — 85025 COMPLETE CBC W/AUTO DIFF WBC: CPT | Performed by: INTERNAL MEDICINE

## 2017-05-16 PROCEDURE — 74011250636 HC RX REV CODE- 250/636: Performed by: INTERNAL MEDICINE

## 2017-05-16 PROCEDURE — 77030003560 HC NDL HUBR BARD -A

## 2017-05-16 PROCEDURE — 96375 TX/PRO/DX INJ NEW DRUG ADDON: CPT

## 2017-05-16 PROCEDURE — 74011250637 HC RX REV CODE- 250/637: Performed by: INTERNAL MEDICINE

## 2017-05-16 PROCEDURE — 74011000258 HC RX REV CODE- 258: Performed by: INTERNAL MEDICINE

## 2017-05-16 RX ORDER — SODIUM CHLORIDE 0.9 % (FLUSH) 0.9 %
10 SYRINGE (ML) INJECTION AS NEEDED
Status: ACTIVE | OUTPATIENT
Start: 2017-05-16 | End: 2017-05-16

## 2017-05-16 RX ORDER — HEPARIN 100 UNIT/ML
300-500 SYRINGE INTRAVENOUS AS NEEDED
Status: DISPENSED | OUTPATIENT
Start: 2017-05-16 | End: 2017-05-16

## 2017-05-16 RX ORDER — DEXAMETHASONE SODIUM PHOSPHATE 100 MG/10ML
10 INJECTION INTRAMUSCULAR; INTRAVENOUS ONCE
Status: DISCONTINUED | OUTPATIENT
Start: 2017-05-16 | End: 2017-05-16 | Stop reason: SDUPTHER

## 2017-05-16 RX ORDER — DEXAMETHASONE SODIUM PHOSPHATE 100 MG/10ML
10 INJECTION INTRAMUSCULAR; INTRAVENOUS ONCE
Status: COMPLETED | OUTPATIENT
Start: 2017-05-16 | End: 2017-05-16

## 2017-05-16 RX ORDER — HEPARIN 100 UNIT/ML
300 SYRINGE INTRAVENOUS AS NEEDED
Status: ACTIVE | OUTPATIENT
Start: 2017-05-16 | End: 2017-05-16

## 2017-05-16 RX ORDER — LORAZEPAM 2 MG/ML
0.5 INJECTION INTRAMUSCULAR
Status: ACTIVE | OUTPATIENT
Start: 2017-05-16 | End: 2017-05-17

## 2017-05-16 RX ORDER — ONDANSETRON 2 MG/ML
8 INJECTION INTRAMUSCULAR; INTRAVENOUS ONCE
Status: DISCONTINUED | OUTPATIENT
Start: 2017-05-16 | End: 2017-05-16 | Stop reason: SDUPTHER

## 2017-05-16 RX ORDER — HYDROCORTISONE SODIUM SUCCINATE 100 MG/2ML
100 INJECTION, POWDER, FOR SOLUTION INTRAMUSCULAR; INTRAVENOUS AS NEEDED
Status: ACTIVE | OUTPATIENT
Start: 2017-05-16 | End: 2017-05-16

## 2017-05-16 RX ORDER — ACETAMINOPHEN 325 MG/1
650 TABLET ORAL ONCE
Status: COMPLETED | OUTPATIENT
Start: 2017-05-16 | End: 2017-05-16

## 2017-05-16 RX ORDER — SODIUM CHLORIDE 0.9 % (FLUSH) 0.9 %
10 SYRINGE (ML) INJECTION AS NEEDED
Status: DISCONTINUED | OUTPATIENT
Start: 2017-05-16 | End: 2017-05-20 | Stop reason: HOSPADM

## 2017-05-16 RX ORDER — ONDANSETRON 2 MG/ML
8 INJECTION INTRAMUSCULAR; INTRAVENOUS ONCE
Status: COMPLETED | OUTPATIENT
Start: 2017-05-16 | End: 2017-05-16

## 2017-05-16 RX ORDER — DIPHENHYDRAMINE HYDROCHLORIDE 50 MG/ML
50 INJECTION, SOLUTION INTRAMUSCULAR; INTRAVENOUS ONCE
Status: COMPLETED | OUTPATIENT
Start: 2017-05-16 | End: 2017-05-16

## 2017-05-16 RX ADMIN — DIPHENHYDRAMINE HYDROCHLORIDE 50 MG: 50 INJECTION, SOLUTION INTRAMUSCULAR; INTRAVENOUS at 08:06

## 2017-05-16 RX ADMIN — ACETAMINOPHEN 650 MG: 325 TABLET, FILM COATED ORAL at 08:05

## 2017-05-16 RX ADMIN — ONDANSETRON 8 MG: 2 INJECTION INTRAMUSCULAR; INTRAVENOUS at 12:29

## 2017-05-16 RX ADMIN — Medication 10 ML: at 13:20

## 2017-05-16 RX ADMIN — BENDAMUSTINE HYDROCHLORIDE 212.5 MG: 25 INJECTION, SOLUTION INTRAVENOUS at 13:05

## 2017-05-16 RX ADMIN — SODIUM CHLORIDE 500 ML: 900 INJECTION, SOLUTION INTRAVENOUS at 08:07

## 2017-05-16 RX ADMIN — DEXAMETHASONE SODIUM PHOSPHATE 10 MG: 10 INJECTION INTRAMUSCULAR; INTRAVENOUS at 12:30

## 2017-05-16 RX ADMIN — RITUXIMAB 885 MG: 10 INJECTION, SOLUTION INTRAVENOUS at 08:32

## 2017-05-16 RX ADMIN — SODIUM CHLORIDE, PRESERVATIVE FREE 300 UNITS: 5 INJECTION INTRAVENOUS at 13:20

## 2017-05-17 ENCOUNTER — HOSPITAL ENCOUNTER (OUTPATIENT)
Dept: INFUSION THERAPY | Age: 57
Discharge: HOME | End: 2017-05-17
Payer: COMMERCIAL

## 2017-05-17 VITALS
BODY MASS INDEX: 49.71 KG/M2 | SYSTOLIC BLOOD PRESSURE: 129 MMHG | TEMPERATURE: 97.5 F | HEART RATE: 88 BPM | RESPIRATION RATE: 18 BRPM | DIASTOLIC BLOOD PRESSURE: 78 MMHG | OXYGEN SATURATION: 96 % | WEIGHT: 280.6 LBS

## 2017-05-17 DIAGNOSIS — C85.88 MARGINAL ZONE LYMPHOMA OF LYMPH NODES OF MULTIPLE SITES (HCC): ICD-10-CM

## 2017-05-17 PROCEDURE — 96409 CHEMO IV PUSH SNGL DRUG: CPT

## 2017-05-17 PROCEDURE — 74011250636 HC RX REV CODE- 250/636: Performed by: INTERNAL MEDICINE

## 2017-05-17 PROCEDURE — 74011000258 HC RX REV CODE- 258: Performed by: INTERNAL MEDICINE

## 2017-05-17 PROCEDURE — 96377 APPLICATON ON-BODY INJECTOR: CPT

## 2017-05-17 PROCEDURE — 96375 TX/PRO/DX INJ NEW DRUG ADDON: CPT

## 2017-05-17 RX ORDER — SODIUM CHLORIDE 0.9 % (FLUSH) 0.9 %
10 SYRINGE (ML) INJECTION AS NEEDED
Status: ACTIVE | OUTPATIENT
Start: 2017-05-17 | End: 2017-05-17

## 2017-05-17 RX ORDER — HEPARIN 100 UNIT/ML
300-500 SYRINGE INTRAVENOUS AS NEEDED
Status: DISPENSED | OUTPATIENT
Start: 2017-05-17 | End: 2017-05-17

## 2017-05-17 RX ORDER — DEXAMETHASONE SODIUM PHOSPHATE 100 MG/10ML
10 INJECTION INTRAMUSCULAR; INTRAVENOUS ONCE
Status: COMPLETED | OUTPATIENT
Start: 2017-05-17 | End: 2017-05-17

## 2017-05-17 RX ORDER — ONDANSETRON 2 MG/ML
8 INJECTION INTRAMUSCULAR; INTRAVENOUS ONCE
Status: COMPLETED | OUTPATIENT
Start: 2017-05-17 | End: 2017-05-17

## 2017-05-17 RX ADMIN — Medication 10 ML: at 07:15

## 2017-05-17 RX ADMIN — DEXAMETHASONE SODIUM PHOSPHATE 10 MG: 10 INJECTION INTRAMUSCULAR; INTRAVENOUS at 07:44

## 2017-05-17 RX ADMIN — ONDANSETRON 8 MG: 2 INJECTION INTRAMUSCULAR; INTRAVENOUS at 07:43

## 2017-05-17 RX ADMIN — PEGFILGRASTIM 6 MG: KIT SUBCUTANEOUS at 08:32

## 2017-05-17 RX ADMIN — Medication 10 ML: at 08:43

## 2017-05-17 RX ADMIN — BENDAMUSTINE HYDROCHLORIDE 212.5 MG: 25 INJECTION, SOLUTION INTRAVENOUS at 08:09

## 2017-05-17 RX ADMIN — SODIUM CHLORIDE 500 ML: 900 INJECTION, SOLUTION INTRAVENOUS at 07:15

## 2017-05-17 RX ADMIN — SODIUM CHLORIDE, PRESERVATIVE FREE 300 UNITS: 5 INJECTION INTRAVENOUS at 08:44

## 2017-05-17 NOTE — PROGRESS NOTES
Pt arrived ambulatory to C with port previously accessed with dressing dry and intact and with good blood return. NS infusing. Pre meds given IV as ordered. Bendeka infused over 10 minutes. Neulasta wearable applied to upper rt arm. Port flushed and packed with heparin and de accessed. Pt aware of next appt on 6/14/17 at [address] [place] [address]. Pt discharged ambulatory.

## 2017-05-23 ENCOUNTER — PATIENT OUTREACH (OUTPATIENT)
Dept: CASE MANAGEMENT | Age: 57
End: 2017-05-23

## 2017-05-23 ENCOUNTER — HOSPITAL ENCOUNTER (OUTPATIENT)
Dept: LAB | Age: 57
Discharge: HOME | End: 2017-05-23
Payer: COMMERCIAL

## 2017-05-23 DIAGNOSIS — C85.88 MARGINAL ZONE LYMPHOMA OF LYMPH NODES OF MULTIPLE SITES (HCC): ICD-10-CM

## 2017-05-23 LAB
ALBUMIN SERPL BCP-MCNC: 3 G/DL (ref 3.5–5)
ALBUMIN/GLOB SERPL: 1.1 {RATIO} (ref 1.2–3.5)
ALP SERPL-CCNC: 134 U/L (ref 50–136)
ALT SERPL-CCNC: 17 U/L (ref 12–65)
ANION GAP BLD CALC-SCNC: 5 MMOL/L (ref 7–16)
AST SERPL W P-5'-P-CCNC: 16 U/L (ref 15–37)
BASOPHILS # BLD AUTO: 0.3 K/UL (ref 0–0.2)
BASOPHILS NFR BLD MANUAL: 2 % (ref 0–2)
BILIRUB SERPL-MCNC: 0.3 MG/DL (ref 0.2–1.1)
BLASTS NFR BLD: 1 %
BUN SERPL-MCNC: 14 MG/DL (ref 6–23)
CALCIUM SERPL-MCNC: 7.8 MG/DL (ref 8.3–10.4)
CHLORIDE SERPL-SCNC: 111 MMOL/L (ref 98–107)
CO2 SERPL-SCNC: 27 MMOL/L (ref 21–32)
CREAT SERPL-MCNC: 1.01 MG/DL (ref 0.6–1)
DIFFERENTIAL METHOD BLD: ABNORMAL
EOSINOPHIL # BLD: 0.3 K/UL (ref 0–0.8)
EOSINOPHIL NFR BLD MANUAL: 2 % (ref 1–8)
ERYTHROCYTE [DISTWIDTH] IN BLOOD BY AUTOMATED COUNT: 20.8 % (ref 11.9–14.6)
GLOBULIN SER CALC-MCNC: 2.7 G/DL (ref 2.3–3.5)
GLUCOSE SERPL-MCNC: 104 MG/DL (ref 65–100)
HCT VFR BLD AUTO: 25.6 % (ref 35.8–46.3)
HGB BLD-MCNC: 8.3 G/DL (ref 11.7–15.4)
LYMPHOCYTES # BLD: 0.3 K/UL (ref 0.5–4.6)
LYMPHOCYTES NFR BLD MANUAL: 2 % (ref 16–44)
MAGNESIUM SERPL-MCNC: 1.2 MG/DL (ref 1.8–2.4)
MCH RBC QN AUTO: 29.4 PG (ref 26.1–32.9)
MCHC RBC AUTO-ENTMCNC: 32.4 G/DL (ref 31.4–35)
MCV RBC AUTO: 90.8 FL (ref 79.6–97.8)
METAMYELOCYTES NFR BLD MANUAL: 4 %
MONOCYTES # BLD: 2.3 K/UL (ref 0.1–1.3)
MONOCYTES NFR BLD MANUAL: 18 % (ref 3–9)
NEUTS BAND NFR BLD MANUAL: 9 % (ref 0–10)
NEUTS SEG # BLD: 9.7 K/UL (ref 1.7–8.2)
NEUTS SEG NFR BLD MANUAL: 62 % (ref 47–75)
NRBC # BLD: 0.02 K/UL (ref 0–0.2)
PLATELET # BLD AUTO: 134 K/UL (ref 150–450)
PLATELET COMMENTS,PCOM: ADEQUATE
PMV BLD AUTO: 10.1 FL (ref 10.8–14.1)
POTASSIUM SERPL-SCNC: 3.6 MMOL/L (ref 3.5–5.1)
PROT SERPL-MCNC: 5.7 G/DL (ref 6.3–8.2)
RBC # BLD AUTO: 2.82 M/UL (ref 4.05–5.25)
RBC MORPH BLD: ABNORMAL
RBC MORPH BLD: ABNORMAL
SODIUM SERPL-SCNC: 143 MMOL/L (ref 136–145)
WBC # BLD AUTO: 12.9 K/UL (ref 4.3–11.1)
WBC MORPH BLD: ABNORMAL

## 2017-05-23 PROCEDURE — 80053 COMPREHEN METABOLIC PANEL: CPT | Performed by: INTERNAL MEDICINE

## 2017-05-23 PROCEDURE — 85025 COMPLETE CBC W/AUTO DIFF WBC: CPT | Performed by: INTERNAL MEDICINE

## 2017-05-23 PROCEDURE — 83735 ASSAY OF MAGNESIUM: CPT | Performed by: INTERNAL MEDICINE

## 2017-05-23 NOTE — PROGRESS NOTES
Pt was called with lab results. Mag level 1.2. Pt to start Mag Ox per Dr. Alyce Lopez. [facility] Pt reports some SOB/wheezing while ambulating up stairs related to increased abdominal ascites. Paracentesis planned for Friday. Pt given IR scheduling phone number if she wishes to cancel appt.

## 2017-05-24 ENCOUNTER — HOSPITAL ENCOUNTER (OUTPATIENT)
Dept: ONCOLOGY | Age: 57
Discharge: HOME | End: 2017-05-24
Payer: COMMERCIAL

## 2017-05-24 DIAGNOSIS — C85.88 MARGINAL ZONE LYMPHOMA OF LYMPH NODES OF MULTIPLE SITES (HCC): ICD-10-CM

## 2017-05-24 PROCEDURE — 97162 PT EVAL MOD COMPLEX 30 MIN: CPT

## 2017-05-24 NOTE — PROGRESS NOTES
Ambulatory/Rehab Services H2 Model Falls Risk Assessment    Risk Factor Pts. ·   Confusion/Disorientation/Impulsivity  []    4 ·   Symptomatic Depression  []   2 ·   Altered Elimination  []   1 ·   Dizziness/Vertigo  []   1 ·   Gender (Male)  []   1 ·   Any administered antiepileptics (anticonvulsants):  []   2 ·   Any administered benzodiazepines:  []   1 ·   Visual Impairment (specify):  []   1 ·   Portable Oxygen Use  []   1 ·   Orthostatic ? BP  []   1 ·   History of Recent Falls (within 3 mos.)  []   5     Ability to Rise from Chair (choose one) Pts. ·   Ability to rise in a single movement  []   0 ·   Pushes up, successful in one attempt  [x]   1 ·   Multiple attempts, but successful  []   3 ·   Unable to rise without assistance  []   4   Total: (5 or greater = High Risk) 1     Falls Prevention Plan:   []                Physical Limitations to Exercise (specify):   []                Mobility Assistance Device (type):   []                Exercise/Equipment Adaptation (specify):    ©2010 [facility] [address] [place] [address] Patent #[id number].  Federal Law prohibits the replication, distribution or use without written permission from [facility] [employer]

## 2017-05-24 NOTE — PROGRESS NOTES
Serafin Osborn  : 1960 [facility] at [address] [facility]  [place] , [facility], [address] [place] [address]  [phone]   [phone]          OUTPATIENT PHYSICAL THERAPY:Initial Assessment 2017    ICD-10: Treatment Diagnosis: I 89.0 lymphedema not elsewhere classified  Precautions/Allergies:   Review of patient's allergies indicates no known allergies. Fall Risk Score: 1 (? 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 Vivian Donovan presents for a lymphedema assessment due to edema of bilateral lower extremities. She has pitting edema in the bilateral lower extremities. She has previously had short stretch bandaging and MLD following knee surgery 3 years ago. She would like to try this again even though this is not orthopedic post surgical swelling. She was diagnosed with lymphoma in March of this year. She recently completed her second chemo of 6 planned. She will have a PET scan 17. Progress with chemo has been limited due to lab values being abnormal.  She is uncomfortable due to the edema. She will have a paracentesis 17. We will initiate edema management and progress slowly dependent on her tolerance to compression and her response to treatment. PROBLEM LIST (Impacting functional limitations):  1. Decreased Strength  2. Increased Pain  3. Decreased Activity Tolerance  4. Increased Fatigue  5. Increased Shortness of Breath  6. Decreased Flexibility/Joint Mobility  7. Edema/Girth  8. Decreased Knowledge of Precautions  9. Decreased [place] with Home Exercise Program INTERVENTIONS PLANNED:  1. Decongestion Therapy  2.  Home Exercise Program (HEP)  3. Range of Motion (ROM)  4. Therapeutic Exercise/Strengthening   TREATMENT PLAN:  Effective Dates: 5/24/17 TO 8/16/17. Frequency/Duration: 2 times a week for 12 weeks  GOALS: (Goals have been discussed and agreed upon with patient.)  Short-Term Functional Goals: Time Frame: 4 weeks  1. The patient will have knowledge of signs and symptoms of lymphedema and how to manage within 4 weeks. 2. The patient will tolerate short stretch bandaging of bilateral lower extremities for reduction of girth within 4 weeks. 3. The patient and caregiver will be independent with compression bandaging within 4 weeks. Discharge Goals: Time Frame: 8 weeks  1. The patient will have a girth decrease of at least 5 cm in the calf within 8 weeks. 2. The patient will be fit with static compression garments within 8 weeks. 3. The patient and caregiver will be independent with edema management within 8 weeks. Rehabilitation Potential For Stated Goals: [address] [place], [place] [address]s therapy, I certify that the treatment plan above will be carried out by a therapist or under their direction. Thank you for this referral,  Darlene Pierre PT     Referring Physician Signature: Mary Kay King MD              Date                    The information in this section was collected on 5/24/17 (except where otherwise noted). HISTORY:   History of Present Injury/Illness (Reason for Referral):  Lymphoma, ascites, bilateral lower extremity pitting edema  Past Medical History/Comorbidities:   Ms. Deng Salas  has a past medical history of Anemia; Arthritis; Basal cell carcinoma; Chronic pain; Hypertension (10/4/2011); Morbid obesity ([address] Murmur; Non Hodgkin's lymphoma ([address] (3/30/2017); Other ill-defined conditions; Overactive bladder; Rheumatic fever; Shingles; Thromboembolus ([address] (x2); Thyroid disease; and Unspecified adverse effect of anesthesia. She also has no past medical history of Aneurysm ([address] Arrhythmia; Asthma;  Autoimmune disease ([address] CAD (coronary artery disease); Chronic kidney disease; Coagulation defects; COPD; Diabetes ([address] Difficult intubation; GERD (gastroesophageal reflux disease); Heart failure ([address] Liver disease; Malignant hyperthermia due to anesthesia; Nausea & vomiting; Pseudocholinesterase deficiency; Psychiatric disorder; PUD (peptic ulcer disease); Seizures ([address] Stroke [facility]); or Unspecified sleep apnea. Ms. Chanell Chavez  has a past surgical history that includes hc cholecystectomy fnc41; anesth,achilles tendon surg (2/10/2011); cholecystectomy (8239); orthopaedic (2012); and orthopaedic (2013). Past Medical History:   Diagnosis Date    Anemia     in high school, \"received gamma globulin twice a week for awhile\"    Arthritis     osteo-r knee    Basal cell carcinoma     Followed by Dermatology    Chronic pain     KNEEs    Hypertension 10/4/2011    medication    Morbid obesity ([address]     Murmur     \"had it my whole life\", did not appreciate murmur 9/12/2011 during assessment    Non Hodgkin's lymphoma ([address] 3/30/2017    Other ill-defined conditions     skin bumps-med as needed    Overactive bladder     Rheumatic fever     Possibly as a child    Shingles     Thromboembolus ([address] x2    LLE-calf-1990?-only took ASA-resolved in less than a wk-\"a little burned area-not examined\"    Thyroid disease     hypo-medication    Unspecified adverse effect of anesthesia     pt reports waking several times with knee surgery-spinal     Past Surgical History:   Procedure Laterality Date    [facility] CHOLECYSTECTOMY FNC41      HX CHOLECYSTECTOMY  1983    HX ORTHOPAEDIC  2012    right TKA    HX ORTHOPAEDIC  2013    left TKA. Dr. Elizabeth Mccormack.  CT ANESTH,ACHILLES TENDON SURG  2/10/2011    LEFT. Dr. Davidson Valdez.         Social History/Living Environment:     lives with family, 2 flights of stairs  Prior Level of Function/Work/Activity:    Dominant Side:         RIGHT  Current Medications:       Current Outpatient Prescriptions:     magnesium oxide (MAG-OX) 400 mg tablet, Take 1 Tab by mouth two (2) times a day., Disp: 60 Tab, Rfl: 1    hydrOXYzine HCl (ATARAX) 25 mg tablet, Take 1 Tab by mouth every six (6) hours as needed for Itching., Disp: 30 Tab, Rfl: 1    magic mouthwash (ALEXSADNER) susp, Take 10 mL by mouth every four (4) hours as needed. , Disp: 1 Bottle, Rfl: 2    citalopram (CELEXA) 20 mg tablet, Take 1 Tab by mouth daily. , Disp: 30 Tab, Rfl: 0    midodrine (PROAMITINE) 2.5 mg tablet, Take 1 Tab by mouth two (2) times daily (with meals) for 30 days. , Disp: 60 Tab, Rfl: 0    pregabalin (LYRICA) 50 mg capsule, Take 1 Cap by mouth three (3) times daily. Max Daily Amount: 150 mg., Disp: 90 Cap, Rfl: 1    allopurinol (ZYLOPRIM) 300 mg tablet, Take 1 Tab by mouth two (2) times a day., Disp: 90 Tab, Rfl: 2    nystatin (MYCOSTATIN) powder, Apply  to affected area three (3) times daily. , Disp: 60 g, Rfl: 2    lidocaine-prilocaine (EMLA) topical cream, Apply  to affected area as needed for Pain. Apply to port site 45-60 minutes prior to lab appt or infusion. , Disp: 30 g, Rfl: 0    HYDROcodone-acetaminophen (NORCO) 5-325 mg per tablet, Take 1-2 Tabs by mouth every four (4) hours as needed for Pain. Max Daily Amount: 12 Tabs., Disp: 120 Tab, Rfl: 0    promethazine (PHENERGAN) 25 mg tablet, Take 25 mg by mouth every six (6) hours as needed for Nausea. Unsure of dose, Disp: , Rfl:     ondansetron hcl (ZOFRAN, AS HYDROCHLORIDE,) 4 mg tablet, Take 1 Tab by mouth every eight (8) hours as needed for Nausea., Disp: 60 Tab, Rfl: 3    aspirin 81 mg chewable tablet, Take 81 mg by mouth daily. , Disp: , Rfl:     levothyroxine (SYNTHROID) 125 mcg tablet, Take 1 Tab by mouth Daily (before breakfast). , Disp: 90 Tab, Rfl: 1    omeprazole (PRILOSEC) 20 mg capsule, Take 1 Cap by mouth daily. , Disp: 90 Cap, Rfl: 3   Date Last Reviewed:  5/24/17   Number of Personal Factors/Comorbidities that affect the Plan of Care: 3+: HIGH COMPLEXITY   EXAMINATION:   Palpation: Pitting edema, dry skin, shiny skin  ROM:          Limited in bilateral lower extremities due to the amount of edema. She previously has had bilateral knee replacements and an Achilles tendon repair on the left  Strength:          Grossly 4-/5 x 4 extremities  Functional Mobility:         Gait/Ambulation:  Slow, lists side to side        Transfers:  Stand by assist        Bed Mobility:  Minimal assist  Skin Integrity:          Right forearm with red, circular area approximately 3-4 cm in diameter. Warm to touch, nodule in the center. Edema of the elbow region. Advised the patient to seek medical advice. Edema/Girth:  pitting    Left Right    Initial Most Recent Initial Most Recent   Upper  Extremity           Lower  Extremity 5th Tuberosity (cm): 28 (dorsum 28.7)  Ankle (cm): 31 (10 cm above 44.6)  Calf (cm): 55.8  Mid Knee (cm): 50.6  Thigh (cm): 59.5 (10 cm above 68.9)  Groin (cm): 69.5   5th Tuberosity (cm): 27.6 (dorsum 29)  Ankle (cm): 28.6 (10 cm above 42.7)  Calf (cm): 56.3  Mid Knee (cm): 48.9  Thigh (cm): 57.8 (10 cm above 69.1)  Groin (cm): 74.5         Body Structures Involved:  1. Muscles  2. lymphatic system Body Functions Affected:  1. Sensory/Pain  2. Skin Related  3. lymphatic system Activities and Participation Affected:  1. General Tasks and Demands  2. Mobility  3. Self Care   Number of elements (examined above) that affect the Plan of Care: 4+: HIGH COMPLEXITY   CLINICAL PRESENTATION:   Presentation: Evolving clinical presentation with unstable and unpredictable characteristics: HIGH COMPLEXITY   CLINICAL DECISION MAKING:   Outcome Measure: Tool Used: NCCN Distress Thermometer   Score:  Initial:   Most Recent: X    Interpretation of Score: If greater than or equal to 8, then PHQ-9 Depression Scale Score   and JOON-7 Anxiety Scale Score  .   Tool Used: ECOG Performance Survey Score  Score:  Initial: 2 Most Recent:      Interpretation of Score:   0 Fully active, able to carry on all pre-disease performance without restriction   1 Restricted in physically strenuous activity but ambulatory and able to carry out work of a light or sedentary nature, e.g., light house work, office work   2 Ambulatory and capable of all selfcare but unable to carry out any work activities. Up and about more than 50% of waking hours   3 Capable of only limited selfcare, confined to bed or chair more than 50% of waking hours   4 Completely disabled. Cannot carry on any selfcare. Totally confined to bed or chair   5 Dead        Tool Used: Lymphedema Life Impact Scale   Score:  Initial:   Most Recent: X (Date: -- )   Interpretation of Score: The Lymphedema Life Impact Scale (LLIS) is a validated instrument that measures the physical, functional, and psychosocial concerns pertinent to patients with extremity lymphedema. The Scale's questionnaire is administered to patients to gauge impairments, activity limitations, and participation restrictions resulting from their lymphedema. Score 0 1-13 14-26 27-40 41-54 55-67 68   Modifier CH CI CJ CK CL CM CN       Medical Necessity:   · Patient is expected to demonstrate progress in strength and edema management to increase independence with self care and houseold activity. Reason for Services/Other Comments:  · Patient continues to demonstrate capacity to improve strength and edema management which will increase independence.    Use of outcome tool(s) and clinical judgement create a POC that gives a: Questionable prediction of patient's progress: MODERATE COMPLEXITY            TREATMENT:   (In addition to Assessment/Re-Assessment sessions the following treatments were rendered)  Pre-treatment Symptoms/Complaints:  Bilateral lower extremity edema, ascites, shortness of breath  Pain: Initial: minimal      Post Session:   minimal     assessment   Short stretch bandage applied form toes to popliteal crease of the left lower extremity using stockinette, transelast, cotton padding, 6 cm, 8 cm and 2 10 cm short stretch bandages with mild compression. The patient is to remove if the bandage slides down or she becomes short of breath. She will return tomorrow in order for us to make a decision concerning efficacy and feasibility of compression. Treatment/Session Assessment:    · Response to Treatment:  Tolerated the assessment and treatment well. · Compliance with Program/Exercises: Will assess as treatment progresses. · Recommendations/Intent for next treatment session: \"Next visit will focus on assess efficacy of compression\".   Total Treatment Duration:  PT Patient Time In/Time Out  Time In: [address], PT

## 2017-05-24 NOTE — PROGRESS NOTES
[facility] at [address]  [phone]    [phone]    Oncology Rehab Plan of Care  NAME/AGE/GENDER: Eamon Navas is a 62 y.o. female who was born on 1960.   Date: 5/24/2017  Referring Provider: Jc Crenshaw MD   Medical Oncologist: Dr. Katelyn Cody Oncologist:   Primary Care Physician: Yasmin Schaefer MD   Next appointment: 6/13/17 with Dr. Serenity Thakkar  Diagnosis: marginal zone lymphoma  History of Present Illness:  · Date of first cancer diagnosis/type/stage: March 2017  · Surgery: N/A   · Radiation:   · Chemotherapy:   · Current Day Forward Treatment Plan Days   · Treatment Plan: OP BENDAMUSTINE + RITUXIMAB   ·           · Day 1, Cycle 3  (Planned for 6/13/2017)   ·  Chemotherapy: riTUXimab (RITUXAN) 885 mg in 0.9% sodium chloride 885 mL   · infusion, bendamustine (BENDEKA) 212.5 mg in 0.9% sodium chloride 50 mL   · chemo infusion   ·  Supportive Care: acyclovir (ZOVIRAX) 400 mg tablet, fluconazole   · (DIFLUCAN) 200 mg tablet   · Day 2, Cycle 3  (Planned for 6/14/2017)   ·  Chemotherapy: DOSE MODIFICATION, bendamustine (BENDEKA) 212.5 mg in 0.9%   · sodium chloride 50 mL chemo infusion   ·  Supportive Care: pegfilgrastim (NEULASTA) wearable SQ injector 6 mg   · Day 1, Cycle 4  (Planned for 7/11/2017)   ·  Chemotherapy: riTUXimab (RITUXAN) 885 mg in 0.9% sodium chloride 885 mL   · infusion, bendamustine (BENDEKA) 212.5 mg in 0.9% sodium chloride 50 mL   · chemo infusion   ·  Supportive Care: acyclovir (ZOVIRAX) 400 mg tablet, fluconazole   · (DIFLUCAN) 200 mg tablet   · Day 2, Cycle 4  (Planned for 7/12/2017)   ·  Chemotherapy: DOSE MODIFICATION, bendamustine (BENDEKA) 212.5 mg in 0.9%   · sodium chloride 50 mL chemo infusion   ·  Supportive Care: pegfilgrastim (NEULASTA) wearable SQ injector 6 mg   · Day 1, Cycle 5  (Planned for 8/8/2017)   ·  Chemotherapy: riTUXimab (RITUXAN) 885 mg in 0.9% sodium chloride 885 mL   · infusion, bendamustine (BENDEKA) 212.5 mg in 0.9% sodium chloride 50 mL   · chemo infusion   ·  Supportive Care: acyclovir (ZOVIRAX) 400 mg tablet, fluconazole   · (DIFLUCAN) 200 mg tablet   · Day 2, Cycle 5  (Planned for 8/9/2017)   ·  Chemotherapy: DOSE MODIFICATION, bendamustine (BENDEKA) 212.5 mg in 0.9%   · sodium chloride 50 mL chemo infusion   ·  Supportive Care: pegfilgrastim (NEULASTA) wearable SQ injector 6 mg   · Day 1, Cycle 6  (Planned for 9/5/2017)   ·  Chemotherapy: riTUXimab (RITUXAN) 885 mg in 0.9% sodium chloride 885 mL   · infusion, bendamustine (BENDEKA) 212.5 mg in 0.9% sodium chloride 50 mL   · chemo infusion   ·  Supportive Care: acyclovir (ZOVIRAX) 400 mg tablet, fluconazole   · (DIFLUCAN) 200 mg tablet   · Day 2, Cycle 6  (Planned for 9/6/2017)   ·  Chemotherapy: DOSE MODIFICATION, bendamustine (BENDEKA) 212.5 mg in 0.9%   · sodium chloride 50 mL chemo infusion   ·  Supportive Care: pegfilgrastim (NEULASTA) wearable SQ injector 6 mg   ·    · Other treatment: paracentesis as needed  · Clinical Trial: no  · Date of second cancer and/or recurrence of primary cancer and subsequent treatment: N/A  Diagnostic tests/Results:    Labs:   Recent Results (from the past 168 hour(s))   CBC WITH AUTOMATED DIFF    Collection Time: 05/23/17 10:12 AM   Result Value Ref Range    WBC 12.9 (H) 4.3 - 11.1 K/uL    RBC 2.82 (L) 4.05 - 5.25 M/uL    HGB 8.3 (L) 11.7 - 15.4 g/dL    HCT 25.6 (L) 35.8 - 46.3 %    MCV 90.8 79.6 - 97.8 FL    MCH 29.4 26.1 - 32.9 PG    MCHC 32.4 31.4 - 35.0 g/dL    RDW 20.8 (H) 11.9 - 14.6 %    PLATELET 715 (L) 745 - 450 K/uL    MPV 10.1 (L) 10.8 - 14.1 FL    ABSOLUTE NRBC 0.02 0.0 - 0.2 K/uL    NEUTROPHILS 62 47 - 75 %    BAND NEUTROPHILS 9 0 - 10 %    LYMPHOCYTES 2 (L) 16 - 44 %    MONOCYTES 18 (H) 3 - 9 %    EOSINOPHILS 2 1 - 8 %    BASOPHILS 2 0 - 2 %    METAMYELOCYTES 4 %    BLASTS 1 %    ABS. NEUTROPHILS 9.7 (H) 1.7 - 8.2 K/UL    ABS.  LYMPHOCYTES 0.3 (L) 0.5 - 4.6 K/UL ABS. MONOCYTES 2.3 (H) 0.1 - 1.3 K/UL    ABS. EOSINOPHILS 0.3 0.0 - 0.8 K/UL    ABS. BASOPHILS 0.3 (H) 0.0 - 0.2 K/UL    RBC COMMENTS MODERATE  ANISOCYTOSIS + POIKILOCYTOSIS        RBC COMMENTS SLIGHT  POLYCHROMASIA        WBC COMMENTS Result Confirmed By Smear      PLATELET COMMENTS ADEQUATE      DF MANUAL     METABOLIC PANEL, COMPREHENSIVE    Collection Time: 05/23/17 10:12 AM   Result Value Ref Range    Sodium 143 136 - 145 mmol/L    Potassium 3.6 3.5 - 5.1 mmol/L    Chloride 111 (H) 98 - 107 mmol/L    CO2 27 21 - 32 mmol/L    Anion gap 5 (L) 7 - 16 mmol/L    Glucose 104 (H) 65 - 100 mg/dL    BUN 14 6 - 23 MG/DL    Creatinine 1.01 (H) 0.6 - 1.0 MG/DL    GFR est AA >60 >60 ml/min/1.73m2    GFR est non-AA >60 >60 ml/min/1.73m2    Calcium 7.8 (L) 8.3 - 10.4 MG/DL    Bilirubin, total 0.3 0.2 - 1.1 MG/DL    ALT (SGPT) 17 12 - 65 U/L    AST (SGOT) 16 15 - 37 U/L    Alk. phosphatase 134 50 - 136 U/L    Protein, total 5.7 (L) 6.3 - 8.2 g/dL    Albumin 3.0 (L) 3.5 - 5.0 g/dL    Globulin 2.7 2.3 - 3.5 g/dL    A-G Ratio 1.1 (L) 1.2 - 3.5     MAGNESIUM    Collection Time: 05/23/17 10:12 AM   Result Value Ref Range    Magnesium 1.2 (LL) 1.8 - 2.4 mg/dL   ·        SUBJECTIVE     Present Symptoms: ascites; lower extremity lymphedema   Symptom History:    · Pain Intensity:2 on a scale of 0-10  · Pain Aggravating Factors: edema  · Pain Relieving Factors: paracentesis; leg wrapping  · Fatigue Intensity: 7 on a scale of 0-10  Home Situation (including environment, stairs, family support, if living alone, any DME used at home):  Patient lives with  and son in a 2 level home. Patient climbs stairs 2-3 times daily. · Marital status:   · Children: 2  · Personal support: family, friends  Nutrition Intake: [x]Good []Poor - Refer to nutrition counseling []Other(comment):   Work Status: unemployed  · Employer: N/A  · Occupation: N/A  Personal/Social History:   Social History   Substance Use Topics    Smoking status: Never Smoker    Smokeless tobacco: Never Used    Alcohol use Yes      Comment: RARE, ONCE/TWICE A YEAR      Family History:   Family History   Problem Relation Age of Onset    Post-op Nausea/Vomiting Other      X3    Breast Cancer Other 28     cousin    Kidney Disease Father      RENAL FAILURE    Other Son     Other Daughter     Other Maternal Grandmother      Vascular Disorder with leg amputation    Liver Disease Sister      non-alcoholic    Celiac Disease Sister     Malignant Hyperthermia Neg Hx     Pseudocholinesterase Deficiency Neg Hx     Delayed Awakening Neg Hx     Emergence Delirium Neg Hx     Post-op Cognitive Dysfunction Neg Hx      Allergy: No Known Allergies   Current Med's:   Current Outpatient Prescriptions on File Prior to Encounter   Medication Sig Dispense Refill    magnesium oxide (MAG-OX) 400 mg tablet Take 1 Tab by mouth two (2) times a day. 60 Tab 1    hydrOXYzine HCl (ATARAX) 25 mg tablet Take 1 Tab by mouth every six (6) hours as needed for Itching. 30 Tab 1    magic mouthwash (ALEXSANDER) susp Take 10 mL by mouth every four (4) hours as needed. 1 Bottle 2    citalopram (CELEXA) 20 mg tablet Take 1 Tab by mouth daily. 30 Tab 0    midodrine (PROAMITINE) 2.5 mg tablet Take 1 Tab by mouth two (2) times daily (with meals) for 30 days. 60 Tab 0    pregabalin (LYRICA) 50 mg capsule Take 1 Cap by mouth three (3) times daily. Max Daily Amount: 150 mg. 90 Cap 1    allopurinol (ZYLOPRIM) 300 mg tablet Take 1 Tab by mouth two (2) times a day. 90 Tab 2    nystatin (MYCOSTATIN) powder Apply  to affected area three (3) times daily. 60 g 2    lidocaine-prilocaine (EMLA) topical cream Apply  to affected area as needed for Pain. Apply to port site 45-60 minutes prior to lab appt or infusion. 30 g 0    HYDROcodone-acetaminophen (NORCO) 5-325 mg per tablet Take 1-2 Tabs by mouth every four (4) hours as needed for Pain. Max Daily Amount: 12 Tabs.  120 Tab 0    promethazine (PHENERGAN) 25 mg tablet Take 25 mg by mouth every six (6) hours as needed for Nausea. Unsure of dose      ondansetron hcl (ZOFRAN, AS HYDROCHLORIDE,) 4 mg tablet Take 1 Tab by mouth every eight (8) hours as needed for Nausea. 60 Tab 3    aspirin 81 mg chewable tablet Take 81 mg by mouth daily.  levothyroxine (SYNTHROID) 125 mcg tablet Take 1 Tab by mouth Daily (before breakfast). 90 Tab 1    omeprazole (PRILOSEC) 20 mg capsule Take 1 Cap by mouth daily. 90 Cap 3     No current facility-administered medications on file prior to encounter. OBJECTIVE     Past Medical History:   Diagnosis Date    Anemia     in high school, \"received gamma globulin twice a week for awhile\"    Arthritis     osteo-r knee    Basal cell carcinoma     Followed by Dermatology    Chronic pain     KNEEs    Hypertension 10/4/2011    medication    Morbid obesity ([facility]     Murmur     \"had it my whole life\", did not appreciate murmur 9/12/2011 during assessment    Non Hodgkin's lymphoma ([facility] 3/30/2017    Other ill-defined conditions     skin bumps-med as needed    Overactive bladder     Rheumatic fever     Possibly as a child    Shingles     Thromboembolus ([address] x2    LLE-calf-1990?-only took ASA-resolved in less than a wk-\"a little burned area-not examined\"    Thyroid disease     hypo-medication    Unspecified adverse effect of anesthesia     pt reports waking several times with knee surgery-spinal     Past Surgical History:   Procedure Laterality Date    [facility] CHOLE[facility] [id number]      HX CHOLECYSTECTOMY  1983    HX ORTHOPAEDIC  2012    right TKA    HX ORTHOPAEDIC  2013    left TKA. Dr. Elizabeth Mccormack.  RI ANESTH,ACHILLES TENDON SURG  2/10/2011    LEFT. Dr. Davidson Valdez.       Patient Active Problem List   Diagnosis Code    Osteoarthritis of right knee M17.11    S/P total knee replacement using cement Z96.659    Morbid obesity ([facility] E66.01    History of DVT of lower extremity Z86.718    Hypertension I10    Heart murmur R01.1    Osteoarthritis of left knee M17.12    Non Hodgkin's lymphoma (HCC) C85.90    Ascites R18.8    Lymphadenopathy, generalized R59.1    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 (HCC) C85.88    Sepsis (HCC) A41.9    Hypomagnesemia E83.42    Hypotension I95.9    Acute renal failure (ARF) (HCC) N17.9    Snores R06.83    Superficial venous thrombosis of right arm I82.611     Port:  Right chest  · Tubes:   · Open incision:  no  Skin/Soft Tissue:   · Incision/Scars: Yes, surgical  · Other: dry skin      Outcome Measures: Tool Used: ECOG Performance Survey Score  Score:  Initial: 2 Most Recent:     Interpretation of Score:   0 Fully active, able to carry on all pre-disease performance without restriction   1 Restricted in physically strenuous activity but ambulatory and able to carry out work of a light or sedentary nature, e.g., light house work, office work   2 Ambulatory and capable of all selfcare but unable to carry out any work activities. Up and about more than 50% of waking hours   3 Capable of only limited selfcare, confined to bed or chair more than 50% of waking hours   4 Completely disabled. Cannot carry on any self care. Totally confined to bed or chair   5 Dead     Has your activity changed since diagnosis?    yes  Limitations/Prior level of functioning: independent  Patient reports difficulty with the following:  Fatigue, edema limit all activities  []Walking  []Up/down chair  []Standing  []Stairs  []Lifting  []Laundry  []Yard work  []Driving  []Vacuuming  []Cooking  []Balance    []Reaching  []Writing  []Buttoning clothes  []Dressing  []Work activities []Hobbies    Owned Assistive Devices: rollator    Owned Exercise Equipment: none  Dominant Hand: right handed  Personal Goals: \"try to manage lymphedema\"    ASSESSMENT/PROBLEMS   [x]Fatigue 7 on a scale of 0-10  [x]Pain 2 on a scale of 0-10  [x]Ascites requiring paracentesis as needed  [x]Deconditioned  [x]Shortness of Breath With Exertion    PLAN   Short Term Goals:   4 Weeks  1. Follow-up with lymphedema therapist  Long Term Goals:   8-12 Weeks  1.  Manage lower extremity lymphedema per lymphedema therapist  Referrals:   []Nutritionist  [x]Lymphedema Specialist []Support Group  []Home Health []Yoga   []Heal Thy Self []Look Good Feel Better  []Counseling  []Local Gym   []Massage  []Physical Therapy []Occupational Therapy  []Speech Therapy  [][employer] [][facility]    Potential for Stated Goals:  Sangeeta Nicholas RN

## 2017-05-25 ENCOUNTER — HOSPITAL ENCOUNTER (OUTPATIENT)
Dept: ONCOLOGY | Age: 57
Discharge: HOME | End: 2017-05-25
Payer: COMMERCIAL

## 2017-05-25 PROCEDURE — 97140 MANUAL THERAPY 1/> REGIONS: CPT

## 2017-05-25 NOTE — PROGRESS NOTES
Sonya Escalante  : 1960 [facility] [address] [facility]  [place] , [facility], [address] [place] [address]  [phone]   [phone]          OUTPATIENT PHYSICAL THERAPY:Daily Note 2017    ICD-10: Treatment Diagnosis: I 89.0 lymphedema not elsewhere classified  Precautions/Allergies:   Review of patient's allergies indicates no known allergies. Fall Risk Score: 1 (? 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 Bj Meredith presents for a lymphedema assessment due to edema of bilateral lower extremities. She has pitting edema in the bilateral lower extremities. She has previously had short stretch bandaging and MLD following knee surgery 3 years ago. She would like to try this again even though this is not orthopedic post surgical swelling. She was diagnosed with lymphoma in March of this year. She recently completed her second chemo of 6 planned. She will have a PET scan 17. Progress with chemo has been limited due to lab values being abnormal.  She is uncomfortable due to the edema. She will have a paracentesis 17. We will initiate edema management and progress slowly dependent on her tolerance to compression and her response to treatment. PROBLEM LIST (Impacting functional limitations):  1. Decreased Strength  2. Increased Pain  3. Decreased Activity Tolerance  4. Increased Fatigue  5. Increased Shortness of Breath  6. Decreased Flexibility/Joint Mobility  7. Edema/Girth  8. Decreased Knowledge of Precautions  9. Decreased [place] with Home Exercise Program INTERVENTIONS PLANNED:  1. Decongestion Therapy  2.  Home Exercise Program (HEP)  3. Range of Motion (ROM)  4. Therapeutic Exercise/Strengthening   TREATMENT PLAN:  Effective Dates: 5/24/17 TO 8/16/17. Frequency/Duration: 2 times a week for 12 weeks  GOALS: (Goals have been discussed and agreed upon with patient.)  Short-Term Functional Goals: Time Frame: 4 weeks  1. The patient will have knowledge of signs and symptoms of lymphedema and how to manage within 4 weeks. 2. The patient will tolerate short stretch bandaging of bilateral lower extremities for reduction of girth within 4 weeks. 3. The patient and caregiver will be independent with compression bandaging within 4 weeks. Discharge Goals: Time Frame: 8 weeks  1. The patient will have a girth decrease of at least 5 cm in the calf within 8 weeks. 2. The patient will be fit with static compression garments within 8 weeks. 3. The patient and caregiver will be independent with edema management within 8 weeks. Rehabilitation Potential For Stated Goals: [address] [place] [address]'s therapy, I certify that the treatment plan above will be carried out by a therapist or under their direction. Thank you for this referral,  Fred Dangelo PT     Referring Physician Signature: Gene Washington MD              Date                    The information in this section was collected on 5/24/17 (except where otherwise noted). HISTORY:   History of Present Injury/Illness (Reason for Referral):  Lymphoma, ascites, bilateral lower extremity pitting edema  Past Medical History/Comorbidities:   Ms. Zhanna Aquino  has a past medical history of Anemia; Arthritis; Basal cell carcinoma; Chronic pain; Hypertension (10/4/2011); Morbid obesity ([address] Murmur; Non Hodgkin's lymphoma ([address] (3/30/2017); Other ill-defined conditions; Overactive bladder; Rheumatic fever; Shingles; Thromboembolus ([address] (x2); Thyroid disease; and Unspecified adverse effect of anesthesia. She also has no past medical history of Aneurysm ([address] Arrhythmia; Asthma;  Autoimmune disease ([address] CAD (coronary artery disease); Chronic kidney disease; Coagulation defects; COPD; Diabetes ([address] Difficult intubation; GERD (gastroesophageal reflux disease); Heart failure ([address] Liver disease; Malignant hyperthermia due to anesthesia; Nausea & vomiting; Pseudocholinesterase deficiency; Psychiatric disorder; PUD (peptic ulcer disease); Seizures ([address] Stroke [facility]); or Unspecified sleep apnea. Ms. Javier Zuniga  has a past surgical history that includes hc cholecystectomy fnc41; anesth,achilles tendon surg (2/10/2011); cholecystectomy (5958); orthopaedic (2012); and orthopaedic (2013). Past Medical History:   Diagnosis Date    Anemia     in high school, \"received gamma globulin twice a week for awhile\"    Arthritis     osteo-r knee    Basal cell carcinoma     Followed by Dermatology    Chronic pain     KNEEs    Hypertension 10/4/2011    medication    Morbid [facility]     Murmur     \"had it my whole life\", did not appreciate murmur 9/12/2011 during assessment    Non Hodgkin's lymphoma ([facility] 3/30/2017    Other ill-defined conditions     skin bumps-med as needed    Overactive bladder     Rheumatic fever     Possibly as a child    Shingles     Thromboembolus ([address] x2    LLE-calf-1990?-only took ASA-resolved in less than a wk-\"a little burned area-not examined\"    Thyroid disease     hypo-medication    Unspecified adverse effect of anesthesia     pt reports waking several times with knee surgery-spinal     Past Surgical History:   Procedure Laterality Date    [facility] CHOLECYSTECTOMY FNC41      HX CHOLECYSTECTOMY  1983    HX ORTHOPAEDIC  2012    right TKA    HX ORTHOPAEDIC  2013    left TKA. Dr. Trevor Cevallos.  [place] ANESTH,ACHILLES TENDON SURG  2/10/2011    LEFT. Dr. Grafton Bloch.         Social History/Living Environment:     lives with family, 2 flights of stairs  Prior Level of Function/Work/Activity:    Dominant Side:         RIGHT  Current Medications:       Current Outpatient Prescriptions:     magnesium oxide (MAG-OX) 400 mg tablet, Take 1 Tab by mouth two (2) times a day., Disp: 60 Tab, Rfl: 1    hydrOXYzine HCl (ATARAX) 25 mg tablet, Take 1 Tab by mouth every six (6) hours as needed for Itching., Disp: 30 Tab, Rfl: 1    magic mouthwash (ALEXSANDER) susp, Take 10 mL by mouth every four (4) hours as needed. , Disp: 1 Bottle, Rfl: 2    citalopram (CELEXA) 20 mg tablet, Take 1 Tab by mouth daily. , Disp: 30 Tab, Rfl: 0    midodrine (PROAMITINE) 2.5 mg tablet, Take 1 Tab by mouth two (2) times daily (with meals) for 30 days. , Disp: 60 Tab, Rfl: 0    pregabalin (LYRICA) 50 mg capsule, Take 1 Cap by mouth three (3) times daily. Max Daily Amount: 150 mg., Disp: 90 Cap, Rfl: 1    allopurinol (ZYLOPRIM) 300 mg tablet, Take 1 Tab by mouth two (2) times a day., Disp: 90 Tab, Rfl: 2    nystatin (MYCOSTATIN) powder, Apply  to affected area three (3) times daily. , Disp: 60 g, Rfl: 2    lidocaine-prilocaine (EMLA) topical cream, Apply  to affected area as needed for Pain. Apply to port site 45-60 minutes prior to lab appt or infusion. , Disp: 30 g, Rfl: 0    HYDROcodone-acetaminophen (NORCO) 5-325 mg per tablet, Take 1-2 Tabs by mouth every four (4) hours as needed for Pain. Max Daily Amount: 12 Tabs., Disp: 120 Tab, Rfl: 0    promethazine (PHENERGAN) 25 mg tablet, Take 25 mg by mouth every six (6) hours as needed for Nausea. Unsure of dose, Disp: , Rfl:     ondansetron hcl (ZOFRAN, AS HYDROCHLORIDE,) 4 mg tablet, Take 1 Tab by mouth every eight (8) hours as needed for Nausea., Disp: 60 Tab, Rfl: 3    aspirin 81 mg chewable tablet, Take 81 mg by mouth daily. , Disp: , Rfl:     levothyroxine (SYNTHROID) 125 mcg tablet, Take 1 Tab by mouth Daily (before breakfast). , Disp: 90 Tab, Rfl: 1    omeprazole (PRILOSEC) 20 mg capsule, Take 1 Cap by mouth daily. , Disp: 90 Cap, Rfl: 3   Date Last Reviewed:  5/24/17   Number of Personal Factors/Comorbidities that affect the Plan of Care: 3+: HIGH COMPLEXITY   EXAMINATION:   Palpation: Pitting edema, dry skin, shiny skin  ROM:          Limited in bilateral lower extremities due to the amount of edema. She previously has had bilateral knee replacements and an Achilles tendon repair on the left  Strength:          Grossly 4-/5 x 4 extremities  Functional Mobility:         Gait/Ambulation:  Slow, lists side to side        Transfers:  Stand by assist        Bed Mobility:  Minimal assist  Skin Integrity:          Right forearm with red, circular area approximately 3-4 cm in diameter. Warm to touch, nodule in the center. Edema of the elbow region. Advised the patient to seek medical advice. Edema/Girth:  pitting    Left Right    Initial Most Recent Initial Most Recent   Upper  Extremity           Lower  Extremity               Body Structures Involved:  1. Muscles  2. lymphatic system Body Functions Affected:  1. Sensory/Pain  2. Skin Related  3. lymphatic system Activities and Participation Affected:  1. General Tasks and Demands  2. Mobility  3. Self Care   Number of elements (examined above) that affect the Plan of Care: 4+: HIGH COMPLEXITY   CLINICAL PRESENTATION:   Presentation: Evolving clinical presentation with unstable and unpredictable characteristics: HIGH COMPLEXITY   CLINICAL DECISION MAKING:   Outcome Measure: Tool Used: NCCN Distress Thermometer   Score:  Initial:   Most Recent: X    Interpretation of Score: If greater than or equal to 8, then PHQ-9 Depression Scale Score   and JOON-7 Anxiety Scale Score  . Tool Used: ECOG Performance Survey Score  Score:  Initial: 2 Most Recent:      Interpretation of Score:   0 Fully active, able to carry on all pre-disease performance without restriction   1 Restricted in physically strenuous activity but ambulatory and able to carry out work of a light or sedentary nature, e.g., light house work, office work   2 Ambulatory and capable of all selfcare but unable to carry out any work activities.  Up and about more than 50% of waking hours   3 Capable of only limited selfcare, confined to bed or chair more than 50% of waking hours   4 Completely disabled. Cannot carry on any selfcare. Totally confined to bed or chair   5 Dead        Tool Used: Lymphedema Life Impact Scale   Score:  Initial:   Most Recent: X (Date: -- )   Interpretation of Score: The Lymphedema Life Impact Scale (LLIS) is a validated instrument that measures the physical, functional, and psychosocial concerns pertinent to patients with extremity lymphedema. The Scale's questionnaire is administered to patients to gauge impairments, activity limitations, and participation restrictions resulting from their lymphedema. Score 0 1-13 14-26 27-40 41-54 55-67 68   Modifier CH CI CJ CK CL CM CN       Medical Necessity:   · Patient is expected to demonstrate progress in strength and edema management to increase independence with self care and houseold activity. Reason for Services/Other Comments:  · Patient continues to demonstrate capacity to improve strength and edema management which will increase independence. Use of outcome tool(s) and clinical judgement create a POC that gives a: Questionable prediction of patient's progress: MODERATE COMPLEXITY            TREATMENT:   (In addition to Assessment/Re-Assessment sessions the following treatments were rendered)  Pre-treatment Symptoms/Complaints:  Bilateral lower extremity edema, ascites, shortness of breath  Pain: Initial: o      Post Session:  0   The patient presents with her spouse. Bandages have been removed. Tissue intact. Edema visibly improved. The patient reports she had a slight increase in knee girth, but it reduced over night. She reports she urinated frequently through the night. Tissue is dry. Edema is pitting. The patient did not have an increase in shortness of breath. She will have a paracentesis tomorrow. Her spouse is present and willing to be trained in short stretch bandaging.   as above    Treatment/Session Assessment:    · Response to Treatment:  Tolerated the treatment well. Seems pleased and ready to proceed with compression. · Compliance with Program/Exercises: Will assess as treatment progresses. · Recommendations/Intent for next treatment session: \"Next visit will focus on assess efficacy of compression\". Continue with compression bandaging.   Total Treatment Duration:  PT Patient Time In/Time Out  Time In: 1200  Time Out: [address], PT

## 2017-05-26 ENCOUNTER — HOSPITAL ENCOUNTER (OUTPATIENT)
Dept: ULTRASOUND IMAGING | Age: 57
Discharge: HOME | End: 2017-05-26
Attending: INTERNAL MEDICINE
Payer: COMMERCIAL

## 2017-05-26 VITALS
SYSTOLIC BLOOD PRESSURE: 147 MMHG | OXYGEN SATURATION: 100 % | RESPIRATION RATE: 15 BRPM | DIASTOLIC BLOOD PRESSURE: 67 MMHG | HEART RATE: 83 BPM

## 2017-05-26 DIAGNOSIS — C85.88 MARGINAL ZONE LYMPHOMA OF LYMPH NODES OF MULTIPLE SITES (HCC): ICD-10-CM

## 2017-05-26 PROCEDURE — 49083 ABD PARACENTESIS W/IMAGING: CPT

## 2017-05-26 PROCEDURE — P9047 ALBUMIN (HUMAN), 25%, 50ML: HCPCS | Performed by: PHYSICIAN ASSISTANT

## 2017-05-26 PROCEDURE — 74011250636 HC RX REV CODE- 250/636: Performed by: PHYSICIAN ASSISTANT

## 2017-05-26 RX ORDER — ALBUMIN HUMAN 250 G/1000ML
12.5 SOLUTION INTRAVENOUS ONCE
Status: COMPLETED | OUTPATIENT
Start: 2017-05-26 | End: 2017-05-26

## 2017-05-26 RX ORDER — HEPARIN SODIUM (PORCINE) LOCK FLUSH IV SOLN 100 UNIT/ML 100 UNIT/ML
500 SOLUTION INTRAVENOUS ONCE
Status: COMPLETED | OUTPATIENT
Start: 2017-05-26 | End: 2017-05-26

## 2017-05-26 RX ADMIN — ALBUMIN (HUMAN) 12.5 G: 0.25 INJECTION, SOLUTION INTRAVENOUS at 10:43

## 2017-05-26 RX ADMIN — HEPARIN SODIUM (PORCINE) LOCK FLUSH IV SOLN 100 UNIT/ML 500 UNITS: 100 SOLUTION at 12:00

## 2017-05-26 NOTE — DISCHARGE INSTRUCTIONS
[address]  Department of Interventional Radiology  [facility]  [phone] Office  [phone] Fax    PARACENTESIS DISCHARGE INSTRUCTIONS    General Information:  During this procedure, the doctor will insert a needle into the abdomen to drain fluid. After the procedure, you will be able to take a deep breath much easier. The site of the puncture may ooze the first day. This will decrease and eventually stop. Paracentesis (draining fluid from the abdomen) sometimes makes patients hypotensive (low blood pressure). Your doctor may order for you to receive fluids or albumin (a volume booster) during the procedure through an IV site. Home Care Instructions:  Keep the puncture site clean and dry. No tub baths or swimming until puncture site heals. Showering is acceptable. Resume your normal diet, and resume your normal activity slowly and as you tolerate. If you are short of breath, rest. If shortness of breath does not ease, please call your ordering doctor. Fluid can re-accumulate in the chest and/or in the abdomen. If this should occur, your doctor needs to know as you may need to have the procedure done again. Call If:     You should call your Physician and/or the Radiology Nurse if you notice any signs of infection, like pus draining, or if it is swollen or reddened. Also call if you have a fever, or if you are bleeding from the puncture site more than a small amount on the dressing. Call if the puncture site keeps draining fluid. Some oozing is to be expected, but should slow and then stop. Call if you feel like you have pressure in your abdomen. SEEK IMMEDIATE CARE OR CALL 911 IF YOU SUDDENLY HAVE TROUBLE BREATHING, OR IF YOUR LIPS TURN BLUE, OR IF YOU NOTICE BLOOD IN YOUR SPUTUM. Follow-Up Instructions: Please see your ordering doctor as he/she has requested. To Reach Us:     If you have any questions about your procedure, please call the Interventional Radiology department at [phone]. After business hours (5pm) and weekends, call the answering service at [phone] and ask for the Radiologist on call to be paged. Interventional Radiology General Nurse Discharge    After general anesthesia or intravenous sedation, for 24 hours or while taking prescription Narcotics:  · Limit your activities  · Do not drive and operate hazardous machinery  · Do not make important personal or business decisions  · Do  not drink alcoholic beverages  · If you have not urinated within 8 hours after discharge, please contact your surgeon on call. * Please give a list of your current medications to your Primary Care Provider. * Please update this list whenever your medications are discontinued, doses are     changed, or new medications (including over-the-counter products) are added. * Please carry medication information at all times in case of emergency situations. These are general instructions for a healthy lifestyle:    No smoking/ No tobacco products/ Avoid exposure to second hand smoke  Surgeon General's Warning:  Quitting smoking now greatly reduces serious risk to your health. Obesity, smoking, and sedentary lifestyle greatly increases your risk for illness  A healthy diet, regular physical exercise & weight monitoring are important for maintaining a healthy lifestyle    You may be retaining fluid if you have a history of heart failure or if you experience any of the following symptoms:  Weight gain of 3 pounds or more overnight or 5 pounds in a week, increased swelling in our hands or feet or shortness of breath while lying flat in bed. Please call your doctor as soon as you notice any of these symptoms; do not wait until your next office visit.     Recognize signs and symptoms of STROKE:  F-face looks uneven    A-arms unable to move or move unevenly    S-speech slurred or non-existent    T-time-call 911 as soon as signs and symptoms begin-DO NOT go       Back to bed or wait to see if you get better-TIME IS BRAIN. Date: 5/26/2017  Discharging Nurse:  Timmy Millard RN

## 2017-05-26 NOTE — PROGRESS NOTES
Port de-accessed after flushing and packing with 500 units IV Heparin flush. Patient discharged after reviewing discharge instructions and not having any questions. Patient ambulated to [facility] where family was waiting for ride home.  Written discharge instructions in hand

## 2017-05-26 NOTE — IP AVS SNAPSHOT
[address] [facility] 
 
 
 [address] 
[phone] Patient: Evan Sequeira MRN: [id number] SDR:[id number] You are allergic to the following No active allergies Recent Documentation OB Status Smoking Status Postmenopausal Never Smoker Emergency Contacts Name Discharge Info Relation Home Work Mobile Angélica Alvarez  Spouse [3] [phone] Naren Ervin  Son [22] [phone] Phan Graham  Other Relative [6] [phone] Maricarmen Zuniga  Sister [23] [phone] About your hospitalization You were admitted on:  May 26, 2017 You last received care in the:  [facility] RADIOLOGY ULTRASOUND You were discharged on:  May 26, 2017 Unit phone number:  [phone] Why you were hospitalized Your primary diagnosis was:  Not on File Providers Seen During Your Hospitalizations Provider Role Specialty Primary office phone Ken Ely MD Attending Provider Hematology and Oncology [phone] Your Primary Care Physician (PCP) Primary Care Physician Office Phone Office Fax [facility] [phone] Follow-up Information None Your Appointments Friday May 26, 2017 10:00 AM EDT  
US GUIDED PARACENTISIS INIT with D US LOGIC 7 UNIT 1, SFD NURSING, SFD IR PA RESOURCE 2  
[facility] RADIOLOGY ULTRASOUND ([address] [place] [address]) [address]  
[phone] Interventional Radiology Procedure completed with ultrasound guidance. Referring Physicians: 1) Initial paracentesis patients required: H&P/recent office notes and lab work, no older than 30 days (CBC, BMP, PT/PTT) to Interventional Radiology to facilitate prompt scheduling.  2) Obtain clearance to hold blood thinners from prescribing Physician and give Patient instructions prior to arrival. 3) Patient may continue to eat or drink as normal prior to arrival. 4) Pt to arrive 15 minutes early. 5) Responsible adult  required to drive Patient home after recovery period. Recovery period can vary depending on sedation and patient condition. 6) Interventional Radiology Scheduling can be contacted at [phone] Tuesday May 30, 2017 10:45 AM EDT  
SC PT RECUR VISIT LYMPHEDEMA with Nichole Samuels PT  
[facility] ONCOLOGY REHAB ([address] [place] [address]) [facility] [address] [facility] [place], operated by [facility] [address]  
[phone] Wednesday May 31, 2017  2:00 PM EDT  
SC PT RECUR VISIT LYMPHEDEMA with Nichole Samuels PT  
[facility] ONCOLOGY REHAB ([address] [place] [address]) [facility] [address] [facility] [place], operated by [facility] [address]  
[phone] Thursday June 01, 2017  2:30 PM EDT  
SC PT RECUR VISIT LYMPHEDEMA with SFE ONC PT  
[facility] ONCOLOGY REHAB ([address] [place] [address]) [facility] [address] [facility] [place], operated by [facility] [address]  
[phone] Thursday June 08, 2017 10:45 AM EDT  
NM PET/CT DOSE with [facility] NM PET/CT INJ [facility] PET [facility]) RUDDY/ Juan Balbuena 33 [facility], [place], operated by [facility] [address]  
[phone] * FOR ALL APPOINTMENTS BEFORE NOON: Nothing to eat or drink after midnight except for water ONLY. * AFTERNOON APPOINTMENTS: May eat a light breakfast, but without carbohydrates or sugars (We have a list of suggested foods). They must be NPO except for water for at least 4 hours before their appointment time. Patient should be well hydrated (at least 24 oz of water). Do not participate in strenuous activity the day before or the morning before afternoon appointments. Diabetic patients should refrain from insulin 4 hours prior to their appointment.   No pregnant patients may have a PET/CT exam, breast feeding mothers should pump enough breast milk for 24 hours as they will not be able to breast feed for 1 day after the scan. Contact Nuclear Medicine with any questions. Procedure takes anywhere from 2-3 hours. If the patient requires pain or anxiety medications, arrangements must be made prior to patient's arrival with their ordering physician. The patient should wait 8 weeks after radiation therapy and 2 weeks after chemotherapy has been completed. * PATIENT ARRIVAL Please report 30 minutes early to check in, except for 7 am patient 7am arrival.  
  
    
 Tuesday June 13, 2017  7:15 AM EDT  
LAB with [facility] LAB RESOURCE [address] [facility] ([facility] [address] [facility]) [address] [facility] ([facility]) [address]  
[phone] Tuesday June 13, 2017  1:00 PM EDT  
LAB with [address]  
[address] [facility] OUTREACH INSURANCE [address]) Linnea Pratt [address] [place] [address]  
[phone] Tuesday June 13, 2017  1:30 PM EDT PreChemo Follow Up with Juan Luis Stern MD  
3 [facility] Hematology and Oncology [facility]) C/ Juan Balbuena 33 [facility] [address]  
[phone] Tuesday June 13, 2017  1:30 PM EDT Follow Up with MARA Larios [address] [place] [address] Hematology and Oncology [facility]) C/ Juan Balbuena 33 [facility] [address]  
[phone] Wednesday June 14, 2017  7:15 AM EDT Chemo with Trav Barone. [address] [facility] ([address] [phone] [facility] [address]  
[phone] [address] Thursday Mitali 15, 2017  7:15 AM EDT Infusion with NUR2  
ST. [address] [facility] ([address] ) [address] [phone] [facility] [address]  
[phone] [address] Friday June 16, 2017  9:45 AM EDT  
 COMPLETE PHYSICAL with Dick Bear MD  
[facility] [address] [place]  
[phone] Current Discharge Medication List  
  
ASK your doctor about these medications Dose & Instructions Dispensing Information Comments Morning Noon Evening Bedtime  
 allopurinol 300 mg tablet Commonly known as:  Catia Rhodes Your last dose was: Your next dose is:    
   
   
 Dose:  300 mg Take 1 Tab by mouth two (2) times a day. Quantity:  90 Tab Refills:  2  
     
   
   
   
  
 aspirin 81 mg chewable tablet Your last dose was: Your next dose is:    
   
   
 Dose:  81 mg Take 81 mg by mouth daily. Refills:  0  
     
   
   
   
  
 citalopram 20 mg tablet Commonly known as:  Metta Chana Your last dose was: Your next dose is:    
   
   
 Dose:  20 mg Take 1 Tab by mouth daily. Quantity:  30 Tab Refills:  0 HYDROcodone-acetaminophen 5-325 mg per tablet Commonly known as:  Bethel Lewis Your last dose was: Your next dose is:    
   
   
 Dose:  1-2 Tab Take 1-2 Tabs by mouth every four (4) hours as needed for Pain. Max Daily Amount: 12 Tabs. Quantity:  120 Tab Refills:  0  
     
   
   
   
  
 hydrOXYzine HCl 25 mg tablet Commonly known as:  ATARAX Your last dose was: Your next dose is:    
   
   
 Dose:  25 mg Take 1 Tab by mouth every six (6) hours as needed for Itching. Quantity:  30 Tab Refills:  1  
     
   
   
   
  
 levothyroxine 125 mcg tablet Commonly known as:  SYNTHROID Your last dose was: Your next dose is:    
   
   
 Dose:  125 mcg Take 1 Tab by mouth Daily (before breakfast). Quantity:  90 Tab Refills:  1  
     
   
   
   
  
 lidocaine-prilocaine topical cream  
Commonly known as:  EMLA Your last dose was: Your next dose is: Apply  to affected area as needed for Pain. Apply to port site 45-60 minutes prior to lab appt or infusion. Quantity:  30 g Refills:  0  
     
   
   
   
  
 magic mouthwash Susp Commonly known as:  Yesica Daremeterioalam Your last dose was: Your next dose is:    
   
   
 Dose:  10 mL Take 10 mL by mouth every four (4) hours as needed. Quantity:  1 Bottle Refills:  2  
     
   
   
   
  
 magnesium oxide 400 mg tablet Commonly known as:  MAG-OX Your last dose was: Your next dose is:    
   
   
 Dose:  400 mg Take 1 Tab by mouth two (2) times a day. Quantity:  60 Tab Refills:  1  
     
   
   
   
  
 midodrine 2.5 mg tablet Commonly known as:  Marisol Hal Your last dose was: Your next dose is:    
   
   
 Dose:  2.5 mg Take 1 Tab by mouth two (2) times daily (with meals) for 30 days. Quantity:  60 Tab Refills:  0  
     
   
   
   
  
 nystatin powder Commonly known as:  MYCOSTATIN Your last dose was: Your next dose is:    
   
   
 Apply  to affected area three (3) times daily. Quantity:  60 g Refills:  2  
     
   
   
   
  
 omeprazole 20 mg capsule Commonly known as:  PRILOSEC Your last dose was: Your next dose is:    
   
   
 Dose:  20 mg Take 1 Cap by mouth daily. Quantity:  90 Cap Refills:  3  
     
   
   
   
  
 ondansetron hcl 4 mg tablet Commonly known as:  ZOFRAN (AS HYDROCHLORIDE) Your last dose was: Your next dose is:    
   
   
 Dose:  4 mg Take 1 Tab by mouth every eight (8) hours as needed for Nausea. Quantity:  60 Tab Refills:  3  
     
   
   
   
  
 pregabalin 50 mg capsule Commonly known as:  Kai Moreno Your last dose was: Your next dose is:    
   
   
 Dose:  50 mg Take 1 Cap by mouth three (3) times daily. Max Daily Amount: 150 mg.  
 Quantity:  90 Cap Refills:  1  
     
   
   
   
  
 promethazine 25 mg tablet Commonly known as:  PHENERGAN Your last dose was: Your next dose is:    
   
   
 Dose:  25 mg Take 25 mg by mouth every six (6) hours as needed for Nausea. Unsure of dose Refills:  0 Discharge Instructions [address] Department of Interventional Radiology [facility] 
[phone] Office 
[phone] Fax PARACENTESIS DISCHARGE INSTRUCTIONS General Information: 
During this procedure, the doctor will insert a needle into the abdomen to drain fluid. After the procedure, you will be able to take a deep breath much easier. The site of the puncture may ooze the first day. This will decrease and eventually stop. Paracentesis (draining fluid from the abdomen) sometimes makes patients hypotensive (low blood pressure). Your doctor may order for you to receive fluids or albumin (a volume booster) during the procedure through an IV site. Home Care Instructions: 
Keep the puncture site clean and dry. No tub baths or swimming until puncture site heals. Showering is acceptable. Resume your normal diet, and resume your normal activity slowly and as you tolerate. If you are short of breath, rest. If shortness of breath does not ease, please call your ordering doctor. Fluid can re-accumulate in the chest and/or in the abdomen. If this should occur, your doctor needs to know as you may need to have the procedure done again. Call If: 
   You should call your Physician and/or the Radiology Nurse if you notice any signs of infection, like pus draining, or if it is swollen or reddened. Also call if you have a fever, or if you are bleeding from the puncture site more than a small amount on the dressing. Call if the puncture site keeps draining fluid. Some oozing is to be expected, but should slow and then stop.  Call if you feel like you have pressure in your abdomen. SEEK IMMEDIATE CARE OR CALL 911 IF YOU SUDDENLY HAVE TROUBLE BREATHING, OR IF YOUR LIPS TURN BLUE, OR IF YOU NOTICE BLOOD IN YOUR SPUTUM. Follow-Up Instructions: Please see your ordering doctor as he/she has requested. To Reach Us: If you have any questions about your procedure, please call the Interventional Radiology department at [phone]. After business hours (5pm) and weekends, call the answering service at [phone] and ask for the Radiologist on call to be paged. Interventional Radiology General Nurse Discharge After general anesthesia or intravenous sedation, for 24 hours or while taking prescription Narcotics: · Limit your activities · Do not drive and operate hazardous machinery · Do not make important personal or business decisions · Do  not drink alcoholic beverages · If you have not urinated within 8 hours after discharge, please contact your surgeon on call. * Please give a list of your current medications to your Primary Care Provider. * Please update this list whenever your medications are discontinued, doses are 
   changed, or new medications (including over-the-counter products) are added. * Please carry medication information at all times in case of emergency situations. These are general instructions for a healthy lifestyle: No smoking/ No tobacco products/ Avoid exposure to second hand smoke Surgeon General's Warning:  Quitting smoking now greatly reduces serious risk to your health. Obesity, smoking, and sedentary lifestyle greatly increases your risk for illness A healthy diet, regular physical exercise & weight monitoring are important for maintaining a healthy lifestyle You may be retaining fluid if you have a history of heart failure or if you experience any of the following symptoms:  Weight gain of 3 pounds or more overnight or 5 pounds in a week, increased swelling in our hands or feet or shortness of breath while lying flat in bed. Please call your doctor as soon as you notice any of these symptoms; do not wait until your next office visit. Recognize signs and symptoms of STROKE: 
F-face looks uneven A-arms unable to move or move unevenly S-speech slurred or non-existent T-time-call 911 as soon as signs and symptoms begin-DO NOT go Back to bed or wait to see if you get better-TIME IS BRAIN. Date: 5/26/2017 Discharging Nurse: Carmen Hall RN Discharge Orders None [facility]! Dear Alexandra Kumar: Thank you for requesting a [employer] account. Our records indicate that you already have an active [employer] account. You can access your account anytime at [email or web address] Did you know that you can access your hospital and ER discharge instructions at any time in [employer]? You can also review all of your test results from your hospital stay or ER visit. Additional Information If you have questions, please visit the Frequently Asked Questions section of the [employer] website at [email or web address] Remember, [employer] is NOT to be used for urgent needs. For medical emergencies, dial 911. Now available from your iPhone and Android! General Information Please provide this summary of care documentation to your next provider. Patient Signature:  ____________________________________________________________ Date:  ____________________________________________________________  
  
Victor Manuel Rendon Provider Signature:  ____________________________________________________________ Date:  ____________________________________________________________

## 2017-05-29 ENCOUNTER — HOSPITAL ENCOUNTER (EMERGENCY)
Age: 57
Discharge: HOME | End: 2017-05-29
Attending: EMERGENCY MEDICINE
Payer: COMMERCIAL

## 2017-05-29 VITALS
BODY MASS INDEX: 49.61 KG/M2 | OXYGEN SATURATION: 100 % | HEART RATE: 81 BPM | WEIGHT: 280 LBS | HEIGHT: 63 IN | DIASTOLIC BLOOD PRESSURE: 60 MMHG | TEMPERATURE: 98.8 F | RESPIRATION RATE: 18 BRPM | SYSTOLIC BLOOD PRESSURE: 112 MMHG

## 2017-05-29 DIAGNOSIS — T81.31XA OPEN ABDOMINAL INCISION WITH DRAINAGE, INITIAL ENCOUNTER: Primary | ICD-10-CM

## 2017-05-29 PROCEDURE — 75810000293 HC SIMP/SUPERF WND  RPR

## 2017-05-29 PROCEDURE — 99283 EMERGENCY DEPT VISIT LOW MDM: CPT

## 2017-05-29 NOTE — ED TRIAGE NOTES
Patient states draining from paracentesis site. States she had 6.5L off Friday. States that she woke up this morning and noticed it draining. States this has happened before and it needs to be glued back together.

## 2017-05-29 NOTE — ED PROVIDER NOTES
HPI Comments: Patient states she had a paracentesis on Friday. Draining from the site. No abdominal pain. No fever. States had this problem before when the glue did not close the site. Patient is a 62 y.o. female presenting with post-operative complications. The history is provided by the patient, medical records and the spouse. Post OP Complication   This is a new problem. The current episode started yesterday. The problem has not changed since onset. Pertinent negatives include no abdominal pain. The symptoms are aggravated by standing. Nothing relieves the symptoms. Past Medical History:   Diagnosis Date    Anemia     in high school, \"received gamma globulin twice a week for awhile\"    Arthritis     osteo-r knee    Basal cell carcinoma     Followed by Dermatology    Chronic pain     KNEEs    Hypertension 10/4/2011    medication    Morbid obesity ([address]     Murmur     \"had it my whole life\", did not appreciate murmur 9/12/2011 during assessment    Non Hodgkin's lymphoma ([address] 3/30/2017    Other ill-defined conditions     skin bumps-med as needed    Overactive bladder     Rheumatic fever     Possibly as a child    Shingles     Thromboembolus ([address] x2    LLE-calf-1990?-only took ASA-resolved in less than a wk-\"a little burned area-not examined\"    Thyroid disease     hypo-medication    Unspecified adverse effect of anesthesia     pt reports waking several times with knee surgery-spinal       Past Surgical History:   Procedure Laterality Date    [facility] CHOLECYSTECTOMY [id number]      HX CHOLECYSTECTOMY  1983    HX ORTHOPAEDIC  2012    right TKA    HX ORTHOPAEDIC  2013    left TKA. Dr. Alejandra Trinidad.  MI ANESTH,ACHILLES TENDON SURG  2/10/2011    LEFT. Dr. Hemant Pennington.           Family History:   Problem Relation Age of Onset    Post-op Nausea/Vomiting Other      X3    Breast Cancer Other 28     cousin    Kidney Disease Father      RENAL FAILURE    Other Son     Other Daughter     Other Maternal Grandmother      Vascular Disorder with leg amputation    Liver Disease Sister      non-alcoholic    Celiac Disease Sister     Malignant Hyperthermia Neg Hx     Pseudocholinesterase Deficiency Neg Hx     Delayed Awakening Neg Hx     Emergence Delirium Neg Hx     Post-op Cognitive Dysfunction Neg Hx        Social History     Social History    Marital status:      Spouse name: N/A    Number of children: N/A    Years of education: N/A     Occupational History    Not on file. Social History Main Topics    Smoking status: Never Smoker    Smokeless tobacco: Never Used    Alcohol use Yes      Comment: RARE, ONCE/TWICE A YEAR    Drug use: Yes     Special: Prescription    Sexual activity: Not on file     Other Topics Concern    Not on file     Social History Narrative    10/3/11:  PATIENT IS  TO Dagoberto Santo. SHE IS DISABLED. ALLERGIES: Review of patient's allergies indicates no known allergies. Review of Systems   Constitutional: Negative. Respiratory: Negative. Cardiovascular: Negative. Gastrointestinal: Negative for abdominal pain, nausea and vomiting. Skin: Positive for wound. Vitals:    05/29/17 0624 05/29/17 0739   BP: 126/63 112/60   Pulse: 84 81   Resp: 18 18   Temp: 98.8 °F (37.1 °C)    SpO2: 99% 100%   Weight: 127 kg (280 lb)    Height: 5' 3\" (1.6 m)             Physical Exam   Constitutional: She is oriented to person, place, and time. She appears well-developed and well-nourished. Cardiovascular: Normal rate, regular rhythm, normal heart sounds and intact distal pulses. Pulmonary/Chest: Effort normal and breath sounds normal.   Abdominal:   Abdominal swelling consistent with ascites. Not tenderness or redness. Puncture site right abdomen draining clear yellow fluid. Neurological: She is alert and oriented to person, place, and time. Skin: Skin is warm and dry. No erythema.    Psychiatric: She has a normal mood and affect. Nursing note and vitals reviewed. MDM  ED Course       Procedures    Ascites with draining from recent paracentesis puncture site. I pulled off the glue. Applied fresh Dermabond. No further leaking. Reapplied dressing. Watch for signs of infection - fever, pain. Follow up with Dr. Melissa Leung.

## 2017-05-30 ENCOUNTER — HOSPITAL ENCOUNTER (OUTPATIENT)
Dept: ONCOLOGY | Age: 57
Discharge: HOME | End: 2017-05-30
Payer: COMMERCIAL

## 2017-05-30 PROCEDURE — 97140 MANUAL THERAPY 1/> REGIONS: CPT

## 2017-05-30 NOTE — PROGRESS NOTES
Bo Chanel  : 1960 [facility]  [address], [place], [address]  [phone]   [phone]          OUTPATIENT PHYSICAL THERAPY:Daily Note 2017    ICD-10: Treatment Diagnosis: I 89.0 lymphedema not elsewhere classified  Precautions/Allergies:   Review of patient's allergies indicates no known allergies. Fall Risk Score: 1 (? 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 Cristian Peterson presents for a lymphedema assessment due to edema of bilateral lower extremities. She has pitting edema in the bilateral lower extremities. She has previously had short stretch bandaging and MLD following knee surgery 3 years ago. She would like to try this again even though this is not orthopedic post surgical swelling. She was diagnosed with lymphoma in March of this year. She recently completed her second chemo of 6 planned. She will have a PET scan 17. Progress with chemo has been limited due to lab values being abnormal.  She is uncomfortable due to the edema. She will have a paracentesis 17. We will initiate edema management and progress slowly dependent on her tolerance to compression and her response to treatment. PROBLEM LIST (Impacting functional limitations):  1. Decreased Strength  2. Increased Pain  3. Decreased Activity Tolerance  4. Increased Fatigue  5. Increased Shortness of Breath  6. Decreased Flexibility/Joint Mobility  7. Edema/Girth  8. Decreased Knowledge of Precautions  9. Decreased [place] with Home Exercise Program INTERVENTIONS PLANNED:  1. Decongestion Therapy  2.  Home Exercise Program (HEP)  3. Range of Motion (ROM)  4. Therapeutic Exercise/Strengthening   TREATMENT PLAN:  Effective Dates: 5/24/17 TO 8/16/17. Frequency/Duration: 2 times a week for 12 weeks  GOALS: (Goals have been discussed and agreed upon with patient.)  Short-Term Functional Goals: Time Frame: 4 weeks  1. The patient will have knowledge of signs and symptoms of lymphedema and how to manage within 4 weeks. 2. The patient will tolerate short stretch bandaging of bilateral lower extremities for reduction of girth within 4 weeks. 3. The patient and caregiver will be independent with compression bandaging within 4 weeks. Discharge Goals: Time Frame: 8 weeks  1. The patient will have a girth decrease of at least 5 cm in the calf within 8 weeks. 2. The patient will be fit with static compression garments within 8 weeks. 3. The patient and caregiver will be independent with edema management within 8 weeks. Rehabilitation Potential For Stated Goals: [address] [place], [place] [address]s therapy, I certify that the treatment plan above will be carried out by a therapist or under their direction. Thank you for this referral,  Beverly Barry PT     Referring Physician Signature: Keith Valdes MD              Date                    The information in this section was collected on 5/24/17 (except where otherwise noted). HISTORY:   History of Present Injury/Illness (Reason for Referral):  Lymphoma, ascites, bilateral lower extremity pitting edema  Past Medical History/Comorbidities:   Ms. Eduardo Duckworth  has a past medical history of Anemia; Arthritis; Basal cell carcinoma; Chronic pain; Hypertension (10/4/2011); Morbid obesity ([address] Murmur; Non Hodgkin's lymphoma ([address] (3/30/2017); Other ill-defined conditions; Overactive bladder; Rheumatic fever; Shingles; Thromboembolus ([address] (x2); Thyroid disease; and Unspecified adverse effect of anesthesia. She also has no past medical history of Aneurysm ([address] Arrhythmia; Asthma;  Autoimmune disease ([address] CAD (coronary artery disease); Chronic kidney disease; Coagulation defects; COPD; Diabetes ([address] Difficult intubation; GERD (gastroesophageal reflux disease); Heart failure ([address] Liver disease; Malignant hyperthermia due to anesthesia; Nausea & vomiting; Pseudocholinesterase deficiency; Psychiatric disorder; PUD (peptic ulcer disease); Seizures ([address] Stroke [facility]); or Unspecified sleep apnea. Ms. Genie Chand  has a past surgical history that includes hc cholecystectomy fnc41; anesth,achilles tendon surg (2/10/2011); cholecystectomy (3700); orthopaedic (2012); and orthopaedic (2013). Past Medical History:   Diagnosis Date    Anemia     in high school, \"received gamma globulin twice a week for awhile\"    Arthritis     osteo-r knee    Basal cell carcinoma     Followed by Dermatology    Chronic pain     KNEEs    Hypertension 10/4/2011    medication    Morbid obesity ([address]     Murmur     \"had it my whole life\", did not appreciate murmur 9/12/2011 during assessment    Non Hodgkin's lymphoma ([facility] 3/30/2017    Other ill-defined conditions     skin bumps-med as needed    Overactive bladder     Rheumatic fever     Possibly as a child    Shingles     Thromboembolus ([address] x2    LLE-calf-1990?-only took ASA-resolved in less than a wk-\"a little burned area-not examined\"    Thyroid disease     hypo-medication    Unspecified adverse effect of anesthesia     pt reports waking several times with knee surgery-spinal     Past Surgical History:   Procedure Laterality Date    [facility] CHOLECYSTECTOMY FNC41      HX CHOLECYSTECTOMY  1983    HX ORTHOPAEDIC  2012    right TKA    HX ORTHOPAEDIC  2013    left TKA. Dr. Colletta Callander.  AL ANESTH,ACHILLES TENDON SURG  2/10/2011    LEFT. Dr. Levi Bah.         Social History/Living Environment:     lives with family, 2 flights of stairs  Prior Level of Function/Work/Activity:    Dominant Side:         RIGHT  Current Medications:       Current Outpatient Prescriptions:     magnesium oxide (MAG-OX) 400 mg tablet, Take 1 Tab by mouth two (2) times a day., Disp: 60 Tab, Rfl: 1    hydrOXYzine HCl (ATARAX) 25 mg tablet, Take 1 Tab by mouth every six (6) hours as needed for Itching., Disp: 30 Tab, Rfl: 1    magic mouthwash (ALEXSANDER) susp, Take 10 mL by mouth every four (4) hours as needed. , Disp: 1 Bottle, Rfl: 2    citalopram (CELEXA) 20 mg tablet, Take 1 Tab by mouth daily. , Disp: 30 Tab, Rfl: 0    midodrine (PROAMITINE) 2.5 mg tablet, Take 1 Tab by mouth two (2) times daily (with meals) for 30 days. , Disp: 60 Tab, Rfl: 0    pregabalin (LYRICA) 50 mg capsule, Take 1 Cap by mouth three (3) times daily. Max Daily Amount: 150 mg., Disp: 90 Cap, Rfl: 1    allopurinol (ZYLOPRIM) 300 mg tablet, Take 1 Tab by mouth two (2) times a day., Disp: 90 Tab, Rfl: 2    nystatin (MYCOSTATIN) powder, Apply  to affected area three (3) times daily. , Disp: 60 g, Rfl: 2    lidocaine-prilocaine (EMLA) topical cream, Apply  to affected area as needed for Pain. Apply to port site 45-60 minutes prior to lab appt or infusion. , Disp: 30 g, Rfl: 0    HYDROcodone-acetaminophen (NORCO) 5-325 mg per tablet, Take 1-2 Tabs by mouth every four (4) hours as needed for Pain. Max Daily Amount: 12 Tabs., Disp: 120 Tab, Rfl: 0    promethazine (PHENERGAN) 25 mg tablet, Take 25 mg by mouth every six (6) hours as needed for Nausea. Unsure of dose, Disp: , Rfl:     ondansetron hcl (ZOFRAN, AS HYDROCHLORIDE,) 4 mg tablet, Take 1 Tab by mouth every eight (8) hours as needed for Nausea., Disp: 60 Tab, Rfl: 3    aspirin 81 mg chewable tablet, Take 81 mg by mouth daily. , Disp: , Rfl:     levothyroxine (SYNTHROID) 125 mcg tablet, Take 1 Tab by mouth Daily (before breakfast). , Disp: 90 Tab, Rfl: 1    omeprazole (PRILOSEC) 20 mg capsule, Take 1 Cap by mouth daily. , Disp: 90 Cap, Rfl: 3   Date Last Reviewed:  5/24/17   Number of Personal Factors/Comorbidities that affect the Plan of Care: 3+: HIGH COMPLEXITY   EXAMINATION:   Palpation: Pitting edema, dry skin, shiny skin  ROM:          Limited in bilateral lower extremities due to the amount of edema. She previously has had bilateral knee replacements and an Achilles tendon repair on the left  Strength:          Grossly 4-/5 x 4 extremities  Functional Mobility:         Gait/Ambulation:  Slow, lists side to side        Transfers:  Stand by assist        Bed Mobility:  Minimal assist  Skin Integrity:          Right forearm with red, circular area approximately 3-4 cm in diameter. Warm to touch, nodule in the center. Edema of the elbow region. Advised the patient to seek medical advice. Edema/Girth:  pitting    Left Right    Initial Most Recent Initial Most Recent   Upper  Extremity           Lower  Extremity 5th Tuberosity (cm): 27.9 (28 dorsum)  Ankle (cm): 30.5 (42.3)  Calf (cm): 54  Mid Knee (cm): 46.5  Thigh (cm): 54.9   5th Tuberosity (cm): 27.5 (27.5 dorsum)  Ankle (cm): 28.2 (40.2)  Calf (cm): 54  Mid Knee (cm): 45.2  Thigh (cm): 55.4         Body Structures Involved:  1. Muscles  2. lymphatic system Body Functions Affected:  1. Sensory/Pain  2. Skin Related  3. lymphatic system Activities and Participation Affected:  1. General Tasks and Demands  2. Mobility  3. Self Care   Number of elements (examined above) that affect the Plan of Care: 4+: HIGH COMPLEXITY   CLINICAL PRESENTATION:   Presentation: Evolving clinical presentation with unstable and unpredictable characteristics: HIGH COMPLEXITY   CLINICAL DECISION MAKING:   Outcome Measure: Tool Used: NCCN Distress Thermometer   Score:  Initial:   Most Recent: X    Interpretation of Score: If greater than or equal to 8, then PHQ-9 Depression Scale Score   and JOON-7 Anxiety Scale Score  .   Tool Used: ECOG Performance Survey Score  Score:  Initial: 2 Most Recent:      Interpretation of Score:   0 Fully active, able to carry on all pre-disease performance without restriction   1 Restricted in physically strenuous activity but ambulatory and able to carry out work of a light or sedentary nature, e.g., light house work, office work   2 Ambulatory and capable of all selfcare but unable to carry out any work activities. Up and about more than 50% of waking hours   3 Capable of only limited selfcare, confined to bed or chair more than 50% of waking hours   4 Completely disabled. Cannot carry on any selfcare. Totally confined to bed or chair   5 Dead        Tool Used: Lymphedema Life Impact Scale   Score:  Initial:   Most Recent: X (Date: -- )   Interpretation of Score: The Lymphedema Life Impact Scale (LLIS) is a validated instrument that measures the physical, functional, and psychosocial concerns pertinent to patients with extremity lymphedema. The Scale's questionnaire is administered to patients to gauge impairments, activity limitations, and participation restrictions resulting from their lymphedema. Score 0 1-13 14-26 27-40 41-54 55-67 68   Modifier CH CI CJ CK CL CM CN       Medical Necessity:   · Patient is expected to demonstrate progress in strength and edema management to increase independence with self care and houseold activity. Reason for Services/Other Comments:  · Patient continues to demonstrate capacity to improve strength and edema management which will increase independence. Use of outcome tool(s) and clinical judgement create a POC that gives a: Questionable prediction of patient's progress: MODERATE COMPLEXITY            TREATMENT:   (In addition to Assessment/Re-Assessment sessions the following treatments were rendered)  Pre-treatment Symptoms/Complaints:  Bilateral lower extremity edema, ascites, shortness of breath, had paracentesis with 6.5 liters removed. Pain: Initial: o      Post Session:  0   38 min  Short stretch bandage applied to bilateral lower extremities toes to popliteal crease using stockinette, transelast, cotton padding, 6 cm, 8 cm and 2 10 cm short stretch bandages.   The patient's spouse was present for treatment. He was given written instructions and the process was demonstrated to him. Girth measurements were taken prior to bandaging. as above    Treatment/Session Assessment:    · Response to Treatment:  Tolerated the treatment well. · Compliance with Program/Exercises: Will assess as treatment progresses. · Recommendations/Intent for next treatment session: \"Next visit will focus on assess efficacy of compression\". Continue with compression bandaging.   Total Treatment Duration:  PT Patient Time In/Time Out  Time In: 1045  Time Out: 9[address] [facility],

## 2017-05-31 ENCOUNTER — HOSPITAL ENCOUNTER (OUTPATIENT)
Dept: ONCOLOGY | Age: 57
Discharge: HOME | End: 2017-05-31
Payer: COMMERCIAL

## 2017-05-31 PROCEDURE — 97140 MANUAL THERAPY 1/> REGIONS: CPT

## 2017-05-31 NOTE — PROGRESS NOTES
Su Lopez  : [facility] [address] [facility]  [place] , [facility], [address] [place] [address]  [phone]   [phone]          OUTPATIENT PHYSICAL THERAPY:Daily Note 2017    ICD-10: Treatment Diagnosis: I 89.0 lymphedema not elsewhere classified  Precautions/Allergies:   Review of patient's allergies indicates no known allergies. Fall Risk Score: 1 (? 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 Eduardo Duckworth presents for a lymphedema assessment due to edema of bilateral lower extremities. She has pitting edema in the bilateral lower extremities. She has previously had short stretch bandaging and MLD following knee surgery 3 years ago. She would like to try this again even though this is not orthopedic post surgical swelling. She was diagnosed with lymphoma in March of this year. She recently completed her second chemo of 6 planned. She will have a PET scan 17. Progress with chemo has been limited due to lab values being abnormal.  She is uncomfortable due to the edema. She will have a paracentesis 17. We will initiate edema management and progress slowly dependent on her tolerance to compression and her response to treatment. PROBLEM LIST (Impacting functional limitations):  1. Decreased Strength  2. Increased Pain  3. Decreased Activity Tolerance  4. Increased Fatigue  5. Increased Shortness of Breath  6. Decreased Flexibility/Joint Mobility  7. Edema/Girth  8. Decreased Knowledge of Precautions  9. Decreased [place] with Home Exercise Program INTERVENTIONS PLANNED:  1. Decongestion Therapy  2.  Home Exercise Program (HEP)  3. Range of Motion (ROM)  4. Therapeutic Exercise/Strengthening   TREATMENT PLAN:  Effective Dates: 5/24/17 TO 8/16/17. Frequency/Duration: 2 times a week for 12 weeks  GOALS: (Goals have been discussed and agreed upon with patient.)  Short-Term Functional Goals: Time Frame: 4 weeks  1. The patient will have knowledge of signs and symptoms of lymphedema and how to manage within 4 weeks. 2. The patient will tolerate short stretch bandaging of bilateral lower extremities for reduction of girth within 4 weeks. 3. The patient and caregiver will be independent with compression bandaging within 4 weeks. Discharge Goals: Time Frame: 8 weeks  1. The patient will have a girth decrease of at least 5 cm in the calf within 8 weeks. 2. The patient will be fit with static compression garments within 8 weeks. 3. The patient and caregiver will be independent with edema management within 8 weeks. Rehabilitation Potential For Stated Goals: [address] [place], [place] [address]s therapy, I certify that the treatment plan above will be carried out by a therapist or under their direction. Thank you for this referral,  Darlene Pierre PT     Referring Physician Signature: Mary Kay King MD              Date                    The information in this section was collected on 5/24/17 (except where otherwise noted). HISTORY:   History of Present Injury/Illness (Reason for Referral):  Lymphoma, ascites, bilateral lower extremity pitting edema  Past Medical History/Comorbidities:   Ms. Deng Salas  has a past medical history of Anemia; Arthritis; Basal cell carcinoma; Chronic pain; Hypertension (10/4/2011); Morbid obesity ([address] Murmur; Non Hodgkin's lymphoma ([address] (3/30/2017); Other ill-defined conditions; Overactive bladder; Rheumatic fever; Shingles; Thromboembolus ([address] (x2); Thyroid disease; and Unspecified adverse effect of anesthesia. She also has no past medical history of Aneurysm ([address] Arrhythmia; Asthma;  Autoimmune disease ([address] CAD (coronary artery disease); Chronic kidney disease; Coagulation defects; COPD; Diabetes ([address] Difficult intubation; GERD (gastroesophageal reflux disease); Heart failure ([address] Liver disease; Malignant hyperthermia due to anesthesia; Nausea & vomiting; Pseudocholinesterase deficiency; Psychiatric disorder; PUD (peptic ulcer disease); Seizures ([address] Stroke [facility]); or Unspecified sleep apnea. Ms. Izaiah Noonan  has a past surgical history that includes hc cholecystectomy fnc41; anesth,achilles tendon surg (2/10/2011); cholecystectomy (9268); orthopaedic (2012); and orthopaedic (2013). Past Medical History:   Diagnosis Date    Anemia     in high school, \"received gamma globulin twice a week for awhile\"    Arthritis     osteo-r knee    Basal cell carcinoma     Followed by Dermatology    Chronic pain     KNEEs    Hypertension 10/4/2011    medication    Morbid [facility]     Murmur     \"had it my whole life\", did not appreciate murmur 9/12/2011 during assessment    Non Hodgkin's lymphoma ([facility] 3/30/2017    Other ill-defined conditions     skin bumps-med as needed    Overactive bladder     Rheumatic fever     Possibly as a child    Shingles     Thromboembolus ([address] x2    LLE-calf-1990?-only took ASA-resolved in less than a wk-\"a little burned area-not examined\"    Thyroid disease     hypo-medication    Unspecified adverse effect of anesthesia     pt reports waking several times with knee surgery-spinal     Past Surgical History:   Procedure Laterality Date    [facility] CHOLECYST[facility] FNC41      HX CHOLECYSTECTOMY  1983    HX ORTHOPAEDIC  2012    right TKA    HX ORTHOPAEDIC  2013    left TKA. Dr. Joan Mills.  UT ANESTH,ACHILLES TENDON SURG  2/10/2011    LEFT. Dr. Tanna Hardy.         Social History/Living Environment:     lives with family, 2 flights of stairs  Prior Level of Function/Work/Activity:    Dominant Side:         RIGHT  Current Medications:       Current Outpatient Prescriptions:     magnesium oxide (MAG-OX) 400 mg tablet, Take 1 Tab by mouth two (2) times a day., Disp: 60 Tab, Rfl: 1    hydrOXYzine HCl (ATARAX) 25 mg tablet, Take 1 Tab by mouth every six (6) hours as needed for Itching., Disp: 30 Tab, Rfl: 1    magic mouthwash (ALEXSANDER) susp, Take 10 mL by mouth every four (4) hours as needed. , Disp: 1 Bottle, Rfl: 2    citalopram (CELEXA) 20 mg tablet, Take 1 Tab by mouth daily. , Disp: 30 Tab, Rfl: 0    midodrine (PROAMITINE) 2.5 mg tablet, Take 1 Tab by mouth two (2) times daily (with meals) for 30 days. , Disp: 60 Tab, Rfl: 0    pregabalin (LYRICA) 50 mg capsule, Take 1 Cap by mouth three (3) times daily. Max Daily Amount: 150 mg., Disp: 90 Cap, Rfl: 1    allopurinol (ZYLOPRIM) 300 mg tablet, Take 1 Tab by mouth two (2) times a day., Disp: 90 Tab, Rfl: 2    nystatin (MYCOSTATIN) powder, Apply  to affected area three (3) times daily. , Disp: 60 g, Rfl: 2    lidocaine-prilocaine (EMLA) topical cream, Apply  to affected area as needed for Pain. Apply to port site 45-60 minutes prior to lab appt or infusion. , Disp: 30 g, Rfl: 0    HYDROcodone-acetaminophen (NORCO) 5-325 mg per tablet, Take 1-2 Tabs by mouth every four (4) hours as needed for Pain. Max Daily Amount: 12 Tabs., Disp: 120 Tab, Rfl: 0    promethazine (PHENERGAN) 25 mg tablet, Take 25 mg by mouth every six (6) hours as needed for Nausea. Unsure of dose, Disp: , Rfl:     ondansetron hcl (ZOFRAN, AS HYDROCHLORIDE,) 4 mg tablet, Take 1 Tab by mouth every eight (8) hours as needed for Nausea., Disp: 60 Tab, Rfl: 3    aspirin 81 mg chewable tablet, Take 81 mg by mouth daily. , Disp: , Rfl:     levothyroxine (SYNTHROID) 125 mcg tablet, Take 1 Tab by mouth Daily (before breakfast). , Disp: 90 Tab, Rfl: 1    omeprazole (PRILOSEC) 20 mg capsule, Take 1 Cap by mouth daily. , Disp: 90 Cap, Rfl: 3   Date Last Reviewed:  5/24/17   Number of Personal Factors/Comorbidities that affect the Plan of Care: 3+: HIGH COMPLEXITY   EXAMINATION:   Palpation: Pitting edema, dry skin, shiny skin  ROM:          Limited in bilateral lower extremities due to the amount of edema. She previously has had bilateral knee replacements and an Achilles tendon repair on the left  Strength:          Grossly 4-/5 x 4 extremities  Functional Mobility:         Gait/Ambulation:  Slow, lists side to side        Transfers:  Stand by assist        Bed Mobility:  Minimal assist  Skin Integrity:          Right forearm with red, circular area approximately 3-4 cm in diameter. Warm to touch, nodule in the center. Edema of the elbow region. Advised the patient to seek medical advice. Edema/Girth:  pitting    Left Right    Initial Most Recent Initial Most Recent   Upper  Extremity           Lower  Extremity 5th Tuberosity (cm): 26.2 (27.1)  Ankle (cm): 28.4 (43)  Calf (cm): 53.1  Mid Knee (cm): 46  Thigh (cm): 56   5th Tuberosity (cm): 27 (27)  Ankle (cm): 27.9 (38.4)  Calf (cm): 53.6  Mid Knee (cm): 45.4  Thigh (cm): 55.5         Body Structures Involved:  1. Muscles  2. lymphatic system Body Functions Affected:  1. Sensory/Pain  2. Skin Related  3. lymphatic system Activities and Participation Affected:  1. General Tasks and Demands  2. Mobility  3. Self Care   Number of elements (examined above) that affect the Plan of Care: 4+: HIGH COMPLEXITY   CLINICAL PRESENTATION:   Presentation: Evolving clinical presentation with unstable and unpredictable characteristics: HIGH COMPLEXITY   CLINICAL DECISION MAKING:   Outcome Measure: Tool Used: NCCN Distress Thermometer   Score:  Initial:   Most Recent: X    Interpretation of Score: If greater than or equal to 8, then PHQ-9 Depression Scale Score   and JOON-7 Anxiety Scale Score  .   Tool Used: ECOG Performance Survey Score  Score:  Initial: 2 Most Recent:      Interpretation of Score:   0 Fully active, able to carry on all pre-disease performance without restriction   1 Restricted in physically strenuous activity but ambulatory and able to carry out work of a light or sedentary nature, e.g., light house work, office work   2 Ambulatory and capable of all selfcare but unable to carry out any work activities. Up and about more than 50% of waking hours   3 Capable of only limited selfcare, confined to bed or chair more than 50% of waking hours   4 Completely disabled. Cannot carry on any selfcare. Totally confined to bed or chair   5 Dead        Tool Used: Lymphedema Life Impact Scale   Score:  Initial:   Most Recent: X (Date: -- )   Interpretation of Score: The Lymphedema Life Impact Scale (LLIS) is a validated instrument that measures the physical, functional, and psychosocial concerns pertinent to patients with extremity lymphedema. The Scale's questionnaire is administered to patients to gauge impairments, activity limitations, and participation restrictions resulting from their lymphedema. Score 0 1-13 14-26 27-40 41-54 55-67 68   Modifier CH CI CJ CK CL CM CN       Medical Necessity:   · Patient is expected to demonstrate progress in strength and edema management to increase independence with self care and houseold activity. Reason for Services/Other Comments:  · Patient continues to demonstrate capacity to improve strength and edema management which will increase independence. Use of outcome tool(s) and clinical judgement create a POC that gives a: Questionable prediction of patient's progress: MODERATE COMPLEXITY            TREATMENT:   (In addition to Assessment/Re-Assessment sessions the following treatments were rendered)  Pre-treatment Symptoms/Complaints:  Bilateral lower extremity edema, ascites, shortness of breath. The patient reports she had to remove the bandage last night due to left ankle pain, but her spouse re-bandaged her this morning. Pain: Initial: o      Post Session:  0   44 min  Bandages removed and skin inspected. No redness noted. Bandages were loose and drooping. Girth measurements taken.   Short stretch bandage applied to bilateral lower extremities toes to popliteal crease using stockinette, transelast, cotton padding, 6 cm, 8 cm and 2 10 cm short stretch bandages. The patient's son was present for treatment. The patient will return tomorrow. as above    Treatment/Session Assessment:    · Response to Treatment:  Tolerated the treatment well. · Compliance with Program/Exercises: Will assess as treatment progresses. · Recommendations/Intent for next treatment session: \"Next visit will focus on assess efficacy of compression\". Continue with compression bandaging to tolerance.   Total Treatment Duration:  PT Patient Time In/Time Out  Time In: 1357  Time Out: 40[address] [facility] PT

## 2017-06-01 ENCOUNTER — HOSPITAL ENCOUNTER (OUTPATIENT)
Dept: ONCOLOGY | Age: 57
Discharge: HOME | End: 2017-06-01
Payer: COMMERCIAL

## 2017-06-01 PROCEDURE — 97140 MANUAL THERAPY 1/> REGIONS: CPT

## 2017-06-01 NOTE — PROGRESS NOTES
Nabil Velasco  : 1960 [facility] [address] [facility]  [address], [facility], [address] [facility]  [phone]   [phone]          OUTPATIENT PHYSICAL THERAPY:Daily Note 2017    ICD-10: Treatment Diagnosis: I 89.0 lymphedema not elsewhere classified  Precautions/Allergies:   Review of patient's allergies indicates no known allergies. Fall Risk Score: 1 (? 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 Debora Hughes presents for a lymphedema assessment due to edema of bilateral lower extremities. She has pitting edema in the bilateral lower extremities. She has previously had short stretch bandaging and MLD following knee surgery 3 years ago. She would like to try this again even though this is not orthopedic post surgical swelling. She was diagnosed with lymphoma in March of this year. She recently completed her second chemo of 6 planned. She will have a PET scan 17. Progress with chemo has been limited due to lab values being abnormal.  She is uncomfortable due to the edema. She will have a paracentesis 17. We will initiate edema management and progress slowly dependent on her tolerance to compression and her response to treatment. PROBLEM LIST (Impacting functional limitations):  1. Decreased Strength  2. Increased Pain  3. Decreased Activity Tolerance  4. Increased Fatigue  5. Increased Shortness of Breath  6. Decreased Flexibility/Joint Mobility  7. Edema/Girth  8. Decreased Knowledge of Precautions  9. Decreased [place] with Home Exercise Program INTERVENTIONS PLANNED:  1. Decongestion Therapy  2.  Home Exercise Program (HEP)  3. Range of Motion (ROM)  4. Therapeutic Exercise/Strengthening   TREATMENT PLAN:  Effective Dates: 5/24/17 TO 8/16/17. Frequency/Duration: 2 times a week for 12 weeks  GOALS: (Goals have been discussed and agreed upon with patient.)  Short-Term Functional Goals: Time Frame: 4 weeks  1. The patient will have knowledge of signs and symptoms of lymphedema and how to manage within 4 weeks. 2. The patient will tolerate short stretch bandaging of bilateral lower extremities for reduction of girth within 4 weeks. 3. The patient and caregiver will be independent with compression bandaging within 4 weeks. Discharge Goals: Time Frame: 8 weeks  1. The patient will have a girth decrease of at least 5 cm in the calf within 8 weeks. 2. The patient will be fit with static compression garments within 8 weeks. 3. The patient and caregiver will be independent with edema management within 8 weeks. Rehabilitation Potential For Stated Goals: [address] [place], [place] [address]s therapy, I certify that the treatment plan above will be carried out by a therapist or under their direction. Thank you for this referral,  Kendell Hart PT     Referring Physician Signature: Jayde Stockton MD              Date                    The information in this section was collected on 5/24/17 (except where otherwise noted). HISTORY:   History of Present Injury/Illness (Reason for Referral):  Lymphoma, ascites, bilateral lower extremity pitting edema  Past Medical History/Comorbidities:   Ms. Laureen Dumas  has a past medical history of Anemia; Arthritis; Basal cell carcinoma; Chronic pain; Hypertension (10/4/2011); Morbid obesity ([address] Murmur; Non Hodgkin's lymphoma ([address] (3/30/2017); Other ill-defined conditions; Overactive bladder; Rheumatic fever; Shingles; Thromboembolus ([address] (x2); Thyroid disease; and Unspecified adverse effect of anesthesia. She also has no past medical history of Aneurysm ([address] Arrhythmia; Asthma;  Autoimmune disease ([address] CAD (coronary artery disease); Chronic kidney disease; Coagulation defects; COPD; Diabetes ([address] Difficult intubation; GERD (gastroesophageal reflux disease); Heart failure ([address] Liver disease; Malignant hyperthermia due to anesthesia; Nausea & vomiting; Pseudocholinesterase deficiency; Psychiatric disorder; PUD (peptic ulcer disease); Seizures ([address] Stroke [facility]); or Unspecified sleep apnea. Ms. Juan Bradford  has a past surgical history that includes hc cholecystectomy fnc41; anesth,achilles tendon surg (2/10/2011); cholecystectomy (8305); orthopaedic (2012); and orthopaedic (2013). Past Medical History:   Diagnosis Date    Anemia     in high school, \"received gamma globulin twice a week for awhile\"    Arthritis     osteo-r knee    Basal cell carcinoma     Followed by Dermatology    Chronic pain     KNEEs    Hypertension 10/4/2011    medication    Morbid obesity ([facility]     Murmur     \"had it my whole life\", did not appreciate murmur 9/12/2011 during assessment    Non Hodgkin's lymphoma ([facility] 3/30/2017    Other ill-defined conditions     skin bumps-med as needed    Overactive bladder     Rheumatic fever     Possibly as a child    Shingles     Thromboembolus ([facility] x2    LLE-calf-1990?-only took ASA-resolved in less than a wk-\"a little burned area-not examined\"    Thyroid disease     hypo-medication    Unspecified adverse effect of anesthesia     pt reports waking several times with knee surgery-spinal     Past Surgical History:   Procedure Laterality Date    [facility] CHOLECYSTECTOMY FNC41      HX CHOLECYSTECTOMY  1983    HX ORTHOPAEDIC  2012    right TKA    HX ORTHOPAEDIC  2013    left TKA. Dr. Bob Clements.  [place] ANESTH,ACHILLES TENDON SURG  2/10/2011    LEFT. Dr. Chrystie Homans.         Social History/Living Environment:     lives with family, 2 flights of stairs  Prior Level of Function/Work/Activity:    Dominant Side:         RIGHT  Current Medications:       Current Outpatient Prescriptions:     magnesium oxide (MAG-OX) 400 mg tablet, Take 1 Tab by mouth two (2) times a day., Disp: 60 Tab, Rfl: 1    hydrOXYzine HCl (ATARAX) 25 mg tablet, Take 1 Tab by mouth every six (6) hours as needed for Itching., Disp: 30 Tab, Rfl: 1    magic mouthwash (ALEXSANDER) susp, Take 10 mL by mouth every four (4) hours as needed. , Disp: 1 Bottle, Rfl: 2    citalopram (CELEXA) 20 mg tablet, Take 1 Tab by mouth daily. , Disp: 30 Tab, Rfl: 0    midodrine (PROAMITINE) 2.5 mg tablet, Take 1 Tab by mouth two (2) times daily (with meals) for 30 days. , Disp: 60 Tab, Rfl: 0    pregabalin (LYRICA) 50 mg capsule, Take 1 Cap by mouth three (3) times daily. Max Daily Amount: 150 mg., Disp: 90 Cap, Rfl: 1    allopurinol (ZYLOPRIM) 300 mg tablet, Take 1 Tab by mouth two (2) times a day., Disp: 90 Tab, Rfl: 2    nystatin (MYCOSTATIN) powder, Apply  to affected area three (3) times daily. , Disp: 60 g, Rfl: 2    lidocaine-prilocaine (EMLA) topical cream, Apply  to affected area as needed for Pain. Apply to port site 45-60 minutes prior to lab appt or infusion. , Disp: 30 g, Rfl: 0    HYDROcodone-acetaminophen (NORCO) 5-325 mg per tablet, Take 1-2 Tabs by mouth every four (4) hours as needed for Pain. Max Daily Amount: 12 Tabs., Disp: 120 Tab, Rfl: 0    promethazine (PHENERGAN) 25 mg tablet, Take 25 mg by mouth every six (6) hours as needed for Nausea. Unsure of dose, Disp: , Rfl:     ondansetron hcl (ZOFRAN, AS HYDROCHLORIDE,) 4 mg tablet, Take 1 Tab by mouth every eight (8) hours as needed for Nausea., Disp: 60 Tab, Rfl: 3    aspirin 81 mg chewable tablet, Take 81 mg by mouth daily. , Disp: , Rfl:     levothyroxine (SYNTHROID) 125 mcg tablet, Take 1 Tab by mouth Daily (before breakfast). , Disp: 90 Tab, Rfl: 1    omeprazole (PRILOSEC) 20 mg capsule, Take 1 Cap by mouth daily. , Disp: 90 Cap, Rfl: 3   Date Last Reviewed:  5/24/17   Number of Personal Factors/Comorbidities that affect the Plan of Care: 3+: HIGH COMPLEXITY   EXAMINATION:   Palpation: Pitting edema, dry skin, shiny skin  ROM:          Limited in bilateral lower extremities due to the amount of edema. She previously has had bilateral knee replacements and an Achilles tendon repair on the left  Strength:          Grossly 4-/5 x 4 extremities  Functional Mobility:         Gait/Ambulation:  Slow, lists side to side        Transfers:  Stand by assist        Bed Mobility:  Minimal assist  Skin Integrity:          Right forearm with red, circular area approximately 3-4 cm in diameter. Warm to touch, nodule in the center. Edema of the elbow region. Advised the patient to seek medical advice. Edema/Girth:  pitting    Left Right    Initial Most Recent Initial Most Recent   Upper  Extremity           Lower  Extremity               Body Structures Involved:  1. Muscles  2. lymphatic system Body Functions Affected:  1. Sensory/Pain  2. Skin Related  3. lymphatic system Activities and Participation Affected:  1. General Tasks and Demands  2. Mobility  3. Self Care   Number of elements (examined above) that affect the Plan of Care: 4+: HIGH COMPLEXITY   CLINICAL PRESENTATION:   Presentation: Evolving clinical presentation with unstable and unpredictable characteristics: HIGH COMPLEXITY   CLINICAL DECISION MAKING:   Outcome Measure: Tool Used: NCCN Distress Thermometer   Score:  Initial:   Most Recent: X    Interpretation of Score: If greater than or equal to 8, then PHQ-9 Depression Scale Score   and JOON-7 Anxiety Scale Score  . Tool Used: ECOG Performance Survey Score  Score:  Initial: 2 Most Recent:      Interpretation of Score:   0 Fully active, able to carry on all pre-disease performance without restriction   1 Restricted in physically strenuous activity but ambulatory and able to carry out work of a light or sedentary nature, e.g., light house work, office work   2 Ambulatory and capable of all selfcare but unable to carry out any work activities.  Up and about more than 50% of waking hours   3 Capable of only limited selfcare, confined to bed or chair more than 50% of waking hours   4 Completely disabled. Cannot carry on any selfcare. Totally confined to bed or chair   5 Dead        Tool Used: Lymphedema Life Impact Scale   Score:  Initial:  54 Most Recent: X (Date: -- )   Interpretation of Score: The Lymphedema Life Impact Scale (LLIS) is a validated instrument that measures the physical, functional, and psychosocial concerns pertinent to patients with extremity lymphedema. The Scale's questionnaire is administered to patients to gauge impairments, activity limitations, and participation restrictions resulting from their lymphedema. Score 0 1-13 14-26 27-40 41-54 55-67 68   Modifier CH CI CJ CK CL CM CN       Medical Necessity:   · Patient is expected to demonstrate progress in strength and edema management to increase independence with self care and houseold activity. Reason for Services/Other Comments:  · Patient continues to demonstrate capacity to improve strength and edema management which will increase independence. Use of outcome tool(s) and clinical judgement create a POC that gives a: Questionable prediction of patient's progress: MODERATE COMPLEXITY            TREATMENT:   (In addition to Assessment/Re-Assessment sessions the following treatments were rendered)  Pre-treatment Symptoms/Complaints:  Bilateral lower extremity edema, ascites, shortness of breath. The patient reports she had to remove the bandage last night due to left ankle pain again, and also due to having to have her paracentesis drainage opening closed again. Pain: Initial: o      Post Session:  0   30 min  Bandages not in place upon arrival.  Skin intact. Short stretch bandage applied to bilateral lower extremities toes to popliteal crease using stockinette, transelast, cotton padding, 6 cm, 8 cm and 2 10 cm short stretch bandages. The patient's mother in law was present for treatment.   The patient will return next week after her next paracentesis. as above    Treatment/Session Assessment:    · Response to Treatment:  Tolerated the treatment well. · Compliance with Program/Exercises: Will assess as treatment progresses. · Recommendations/Intent for next treatment session: \"Next visit will focus on assess efficacy of compression\". Continue with compression bandaging to tolerance.   Total Treatment Duration:  PT Patient Time In/Time Out  Time In: 1433  Time Out: [id number] [address],

## 2017-06-05 ENCOUNTER — HOSPITAL ENCOUNTER (OUTPATIENT)
Dept: ULTRASOUND IMAGING | Age: 57
Discharge: HOME | End: 2017-06-05
Attending: INTERNAL MEDICINE
Payer: COMMERCIAL

## 2017-06-05 DIAGNOSIS — C85.88 MARGINAL ZONE LYMPHOMA OF LYMPH NODES OF MULTIPLE SITES (HCC): ICD-10-CM

## 2017-06-05 PROCEDURE — 76705 ECHO EXAM OF ABDOMEN: CPT

## 2017-06-05 NOTE — IP AVS SNAPSHOT
[address] 
[phone] Patient: Adeline Hernandez MRN: [id number] You are allergic to the following No active allergies Recent Documentation OB Status Smoking Status Postmenopausal Never Smoker Emergency Contacts Name Discharge Info Relation Home Work Mobile [employer] [address] CAREGIVER [3] Spouse [3] [phone] Molina Barrientos IV DISCHARGE CAREGIVER [3] Son [22] [phone] [facility] [place] [facility] CAREGIVER [3] Other Relative [6] [phone] [facility] DISCHARGE CAREGIVER [3] Sister [23] [phone] About your hospitalization You were admitted on:  June 5, 2017 You last received care in the:  [facility] You were discharged on:  June 5, 2017 Unit phone number:  [phone] Why you were hospitalized Your primary diagnosis was:  Not on File Providers Seen During Your Hospitalizations Provider Role Specialty Primary office phone Lesly Carr MD Attending Provider Hematology and Oncology [phone] Your Primary Care Physician (PCP) Primary Care Physician Office Phone Office Fax Levar Doyle [phone] Follow-up Information None Your Appointments Tuesday June 06, 2017  8:00 AM EDT  
SC PT RECUR VISIT LYMPHEDEMA with Nichole Samuels PT  
[facility] ([address] [place] [address]) [address] [facility] [address]  
[phone] Wednesday June 07, 2017  8:45 AM EDT  
SC PT RECUR VISIT LYMPHEDEMA with Nichole Samuels PT  
[facility] ([address] [place] [address] Avenue) [address] [facility] [address]  
[phone]  Thursday June 08, 2017  8:00 AM EDT  
SC PT RECUR VISIT LYMPHEDEMA with Marbin Lucero PT  
 [facility]COL[facility] ([address]) [address] [facility] [address]  
[phone] Thursday June 08, 2017 10:45 AM EDT  
NM PET/CT DOSE with [facility] NM PET/CT INJ RM  
[facility] PET [facility]) [address] [facility] [address]  
[phone] * FOR ALL APPOINTMENTS BEFORE NOON: Nothing to eat or drink after midnight except for water ONLY. * AFTERNOON APPOINTMENTS: May eat a light breakfast, but without carbohydrates or sugars (We have a list of suggested foods). They must be NPO except for water for at least 4 hours before their appointment time. Patient should be well hydrated (at least 24 oz of water). Do not participate in strenuous activity the day before or the morning before afternoon appointments. Diabetic patients should refrain from insulin 4 hours prior to their appointment. No pregnant patients may have a PET/CT exam, breast feeding mothers should pump enough breast milk for 24 hours as they will not be able to breast feed for 1 day after the scan. Contact Nuclear Medicine with any questions. Procedure takes anywhere from 2-3 hours. If the patient requires pain or anxiety medications, arrangements must be made prior to patient's arrival with their ordering physician. The patient should wait 8 weeks after radiation therapy and 2 weeks after chemotherapy has been completed. * PATIENT ARRIVAL Please report 30 minutes early to check in, except for 7 am patient 7am arrival.  
  
    
 Tuesday June 13, 2017  7:15 AM EDT  
LAB with [facility] LAB RESOURCE 1333 [facility] ([facility] 1051 [facility]) 101 [facility] ([facility]) [address]  
[phone] Tuesday June 13, 2017  1:00 PM EDT  
LAB with [address]  
[facility] OUTREACH INSURANCE [facility]) Linnea Pratt [address] [place] [address]  
[phone]  Tuesday June 13, 2017  1:30 PM EDT  
 PreChemo Follow Up with Juan Luis Stern MD  
[facility] Hematology and Oncology [facility]) C/ Juan Balbuena 33 [facility] [address]  
[phone] Tuesday June 13, 2017  1:30 PM EDT Follow Up with MARA York 52 [facility] Hematology and Oncology [facility]) C/ Juan Sarabias 33 [facility] [address]  
[phone] Wednesday June 14, 2017  7:15 AM EDT Chemo with Trav Riches. [address]  ([facility]) [address] Umang Hackett [phone] [facility] [address]  
[phone] [address] Thursday Mitali 15, 2017  7:15 AM EDT Infusion with NUR2  
ST. [address] [facility] ([facility]) [address] Umang Hackett [phone] [facility] [address]  
[phone] [address] Friday June 16, 2017  9:45 AM EDT  
COMPLETE PHYSICAL with Paige Lee MD  
[address] [place] [address] [address] [place] [address]  
[phone] Monday June 19, 2017  1:45 PM EDT  
SC PT RECUR VISIT LYMPHEDEMA with Nichole Samuels, TONNY  
[facility] ([address]) [address] [facility] [address]  
[phone] Tuesday June 20, 2017  9:30 AM EDT  
SC PT RECUR VISIT LYMPHEDEMA with Nichole Samuels PT  
[facility] ([address]) [address] [facility] [address]  
[phone] Thursday June 22, 2017  8:00 AM EDT  
SC PT RECUR VISIT LYMPHEDEMA with Nichole Samuels PT  
[facility] ([address]) [address] [facility] [address]  
[phone] Current Discharge Medication List  
  
ASK your doctor about these medications Dose & Instructions Dispensing Information Comments Morning Noon Evening Bedtime  
 allopurinol 300 mg tablet Commonly known as:  Marigene  Your last dose was: Your next dose is:    
   
   
 Dose:  300 mg Take 1 Tab by mouth two (2) times a day. Quantity:  90 Tab Refills:  2  
     
   
   
   
  
 aspirin 81 mg chewable tablet Your last dose was: Your next dose is:    
   
   
 Dose:  81 mg Take 81 mg by mouth daily. Refills:  0  
     
   
   
   
  
 citalopram 20 mg tablet Commonly known as:  Lamonte Hope Your last dose was: Your next dose is:    
   
   
 Dose:  20 mg Take 1 Tab by mouth daily. Quantity:  30 Tab Refills:  0 HYDROcodone-acetaminophen 5-325 mg per tablet Commonly known as:  Faraz Royal Your last dose was: Your next dose is:    
   
   
 Dose:  1-2 Tab Take 1-2 Tabs by mouth every four (4) hours as needed for Pain. Max Daily Amount: 12 Tabs. Quantity:  120 Tab Refills:  0  
     
   
   
   
  
 hydrOXYzine HCl 25 mg tablet Commonly known as:  ATARAX Your last dose was: Your next dose is:    
   
   
 Dose:  25 mg Take 1 Tab by mouth every six (6) hours as needed for Itching. Quantity:  30 Tab Refills:  1  
     
   
   
   
  
 levothyroxine 125 mcg tablet Commonly known as:  SYNTHROID Your last dose was: Your next dose is:    
   
   
 Dose:  125 mcg Take 1 Tab by mouth Daily (before breakfast). Quantity:  90 Tab Refills:  1  
     
   
   
   
  
 lidocaine-prilocaine topical cream  
Commonly known as:  EMLA Your last dose was: Your next dose is:    
   
   
 Apply  to affected area as needed for Pain. Apply to port site 45-60 minutes prior to lab appt or infusion. Quantity:  30 g Refills:  0  
     
   
   
   
  
 magic mouthwash Susp Commonly known as:  Yesica Darussalam Your last dose was: Your next dose is: Dose:  10 mL Take 10 mL by mouth every four (4) hours as needed. Quantity:  1 Bottle Refills:  2  
     
   
   
   
  
 magnesium oxide 400 mg tablet Commonly known as:  MAG-OX Your last dose was: Your next dose is:    
   
   
 Dose:  400 mg Take 1 Tab by mouth two (2) times a day. Quantity:  60 Tab Refills:  1  
     
   
   
   
  
 nystatin powder Commonly known as:  MYCOSTATIN Your last dose was: Your next dose is:    
   
   
 Apply  to affected area three (3) times daily. Quantity:  60 g Refills:  2  
     
   
   
   
  
 omeprazole 20 mg capsule Commonly known as:  PRILOSEC Your last dose was: Your next dose is:    
   
   
 Dose:  20 mg Take 1 Cap by mouth daily. Quantity:  90 Cap Refills:  3  
     
   
   
   
  
 ondansetron hcl 4 mg tablet Commonly known as:  ZOFRAN (AS HYDROCHLORIDE) Your last dose was: Your next dose is:    
   
   
 Dose:  4 mg Take 1 Tab by mouth every eight (8) hours as needed for Nausea. Quantity:  60 Tab Refills:  3  
     
   
   
   
  
 pregabalin 50 mg capsule Commonly known as:  Liz Million Your last dose was: Your next dose is:    
   
   
 Dose:  50 mg Take 1 Cap by mouth three (3) times daily. Max Daily Amount: 150 mg.  
 Quantity:  90 Cap Refills:  1  
     
   
   
   
  
 promethazine 25 mg tablet Commonly known as:  PHENERGAN Your last dose was: Your next dose is:    
   
   
 Dose:  25 mg Take 25 mg by mouth every six (6) hours as needed for Nausea. Unsure of dose Refills:  0 Discharge Instructions [place] [address] Department of Interventional Radiology [facility] 
[phone] Office 
[phone] Fax [facility] DISCHARGE INSTRUCTIONS General Information: During this procedure, the doctor will insert a needle into the abdomen to drain fluid. After the procedure, you will be able to take a deep breath much easier. The site of the puncture may ooze the first day. This will decrease and eventually stop. Paracentesis (draining fluid from the abdomen) sometimes makes patients hypotensive (low blood pressure). Your doctor may order for you to receive fluids or albumin (a volume booster) during the procedure through an IV site. Home Care Instructions: 
Keep the puncture site clean and dry. No tub baths or swimming until puncture site heals. Showering is acceptable. Resume your normal diet, and resume your normal activity slowly and as you tolerate. If you are short of breath, rest. If shortness of breath does not ease, please call your ordering doctor. Fluid can re-accumulate in the chest and/or in the abdomen. If this should occur, your doctor needs to know as you may need to have the procedure done again. Call If: 
   You should call your Physician and/or the Radiology Nurse if you notice any signs of infection, like pus draining, or if it is swollen or reddened. Also call if you have a fever, or if you are bleeding from the puncture site more than a small amount on the dressing. Call if the puncture site keeps draining fluid. Some oozing is to be expected, but should slow and then stop. Call if you feel like you have pressure in your abdomen. SEEK IMMEDIATE CARE OR CALL 911 IF YOU SUDDENLY HAVE TROUBLE BREATHING, OR IF YOUR LIPS TURN BLUE, OR IF YOU NOTICE BLOOD IN YOUR SPUTUM. Follow-Up Instructions: Please see your ordering doctor as he/she has requested. To Reach Us: If you have any questions about your procedure, please call the Interventional Radiology department at [phone]. After business hours (5pm) and weekends, call the answering service at [phone] and ask for the Radiologist on call to be paged. Interventional Radiology General Nurse Discharge After general anesthesia or intravenous sedation, for 24 hours or while taking prescription Narcotics: · Limit your activities · Do not drive and operate hazardous machinery · Do not make important personal or business decisions · Do  not drink alcoholic beverages · If you have not urinated within 8 hours after discharge, please contact your surgeon on call. * Please give a list of your current medications to your Primary Care Provider. * Please update this list whenever your medications are discontinued, doses are 
   changed, or new medications (including over-the-counter products) are added. * Please carry medication information at all times in case of emergency situations. These are general instructions for a healthy lifestyle: No smoking/ No tobacco products/ Avoid exposure to second hand smoke Surgeon General's Warning:  Quitting smoking now greatly reduces serious risk to your health. Obesity, smoking, and sedentary lifestyle greatly increases your risk for illness A healthy diet, regular physical exercise & weight monitoring are important for maintaining a healthy lifestyle You may be retaining fluid if you have a history of heart failure or if you experience any of the following symptoms:  Weight gain of 3 pounds or more overnight or 5 pounds in a week, increased swelling in our hands or feet or shortness of breath while lying flat in bed. Please call your doctor as soon as you notice any of these symptoms; do not wait until your next office visit. Recognize signs and symptoms of STROKE: 
F-face looks uneven A-arms unable to move or move unevenly S-speech slurred or non-existent T-time-call 911 as soon as signs and symptoms begin-DO NOT go Back to bed or wait to see if you get better-TIME IS BRAIN. Date: 6/5/2017 Discharging Nurse: Saroj Massey Discharge Orders None Introducing [facility]! Dear Luz Otoole: Thank you for requesting a [employer] account. Our records indicate that you already have an active [employer] account. You can access your account anytime at [email or web address] Did you know that you can access your hospital and ER discharge instructions at any time in [employer]? You can also review all of your test results from your hospital stay or ER visit. Additional Information If you have questions, please visit the Frequently Asked Questions section of the [employer] website at [email or web address] Remember, [employer] is NOT to be used for urgent needs. For medical emergencies, dial 911. Now available from your iPhone and Android! General Information Please provide this summary of care documentation to your next provider. Patient Signature:  ____________________________________________________________ Date:  ____________________________________________________________  
  
Neita Hidden Provider Signature:  ____________________________________________________________ Date:  ____________________________________________________________

## 2017-06-05 NOTE — IP AVS SNAPSHOT
Current Discharge Medication List  
  
ASK your doctor about these medications Dose & Instructions Dispensing Information Comments Morning Noon Evening Bedtime  
 allopurinol 300 mg tablet Commonly known as:  Alvaro Leisure Your last dose was: Your next dose is:    
   
   
 Dose:  300 mg Take 1 Tab by mouth two (2) times a day. Quantity:  90 Tab Refills:  2  
     
   
   
   
  
 aspirin 81 mg chewable tablet Your last dose was: Your next dose is:    
   
   
 Dose:  81 mg Take 81 mg by mouth daily. Refills:  0  
     
   
   
   
  
 citalopram 20 mg tablet Commonly known as:  Cristian Elizondo Your last dose was: Your next dose is:    
   
   
 Dose:  20 mg Take 1 Tab by mouth daily. Quantity:  30 Tab Refills:  0 HYDROcodone-acetaminophen 5-325 mg per tablet Commonly known as:  Denice Hercules Your last dose was: Your next dose is:    
   
   
 Dose:  1-2 Tab Take 1-2 Tabs by mouth every four (4) hours as needed for Pain. Max Daily Amount: 12 Tabs. Quantity:  120 Tab Refills:  0  
     
   
   
   
  
 hydrOXYzine HCl 25 mg tablet Commonly known as:  ATARAX Your last dose was: Your next dose is:    
   
   
 Dose:  25 mg Take 1 Tab by mouth every six (6) hours as needed for Itching. Quantity:  30 Tab Refills:  1  
     
   
   
   
  
 levothyroxine 125 mcg tablet Commonly known as:  SYNTHROID Your last dose was: Your next dose is:    
   
   
 Dose:  125 mcg Take 1 Tab by mouth Daily (before breakfast). Quantity:  90 Tab Refills:  1  
     
   
   
   
  
 lidocaine-prilocaine topical cream  
Commonly known as:  EMLA Your last dose was: Your next dose is:    
   
   
 Apply  to affected area as needed for Pain. Apply to port site 45-60 minutes prior to lab appt or infusion. Quantity:  30 g Refills:  0 magic mouthwash Susp Commonly known as:  [facility] [address] Your last dose was: Your next dose is:    
   
   
 Dose:  10 mL Take 10 mL by mouth every four (4) hours as needed. Quantity:  1 Bottle Refills:  2  
     
   
   
   
  
 magnesium oxide 400 mg tablet Commonly known as:  MAG-OX Your last dose was: Your next dose is:    
   
   
 Dose:  400 mg Take 1 Tab by mouth two (2) times a day. Quantity:  60 Tab Refills:  1  
     
   
   
   
  
 nystatin powder Commonly known as:  MYCOSTATIN Your last dose was: Your next dose is:    
   
   
 Apply  to affected area three (3) times daily. Quantity:  60 g Refills:  2  
     
   
   
   
  
 omeprazole 20 mg capsule Commonly known as:  PRILOSEC Your last dose was: Your next dose is:    
   
   
 Dose:  20 mg Take 1 Cap by mouth daily. Quantity:  90 Cap Refills:  3  
     
   
   
   
  
 ondansetron hcl 4 mg tablet Commonly known as:  ZOFRAN (AS HYDROCHLORIDE) Your last dose was: Your next dose is:    
   
   
 Dose:  4 mg Take 1 Tab by mouth every eight (8) hours as needed for Nausea. Quantity:  60 Tab Refills:  3  
     
   
   
   
  
 pregabalin 50 mg capsule Commonly known as:  Radha Half Your last dose was: Your next dose is:    
   
   
 Dose:  50 mg Take 1 Cap by mouth three (3) times daily. Max Daily Amount: 150 mg.  
 Quantity:  90 Cap Refills:  1  
     
   
   
   
  
 promethazine 25 mg tablet Commonly known as:  PHENERGAN Your last dose was: Your next dose is:    
   
   
 Dose:  25 mg Take 25 mg by mouth every six (6) hours as needed for Nausea. Unsure of dose Refills:  0

## 2017-06-05 NOTE — PROGRESS NOTES
Osmany Humphrey in to examine patient. States no more than a measuring cup worth of fluid in abdomen. No need to complete paracentesis.

## 2017-06-06 ENCOUNTER — HOSPITAL ENCOUNTER (OUTPATIENT)
Dept: ONCOLOGY | Age: 57
Discharge: HOME | End: 2017-06-06
Payer: COMMERCIAL

## 2017-06-06 PROCEDURE — 97140 MANUAL THERAPY 1/> REGIONS: CPT

## 2017-06-06 NOTE — PROGRESS NOTES
Cornelius Son  : 1960 [facility] [address] [facility]  [place] , [facility], [address] [place] [address]  [phone]   [phone]          OUTPATIENT PHYSICAL THERAPY:Daily Note 2017    ICD-10: Treatment Diagnosis: I 89.0 lymphedema not elsewhere classified  Precautions/Allergies:   Review of patient's allergies indicates no known allergies. Fall Risk Score: 1 (? 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 Brain Jeans presents for a lymphedema assessment due to edema of bilateral lower extremities. She has pitting edema in the bilateral lower extremities. She has previously had short stretch bandaging and MLD following knee surgery 3 years ago. She would like to try this again even though this is not orthopedic post surgical swelling. She was diagnosed with lymphoma in March of this year. She recently completed her second chemo of 6 planned. She will have a PET scan 17. Progress with chemo has been limited due to lab values being abnormal.  She is uncomfortable due to the edema. She will have a paracentesis 17. We will initiate edema management and progress slowly dependent on her tolerance to compression and her response to treatment. PROBLEM LIST (Impacting functional limitations):  1. Decreased Strength  2. Increased Pain  3. Decreased Activity Tolerance  4. Increased Fatigue  5. Increased Shortness of Breath  6. Decreased Flexibility/Joint Mobility  7. Edema/Girth  8. Decreased Knowledge of Precautions  9. Decreased [place] with Home Exercise Program INTERVENTIONS PLANNED:  1. Decongestion Therapy  2.  Home Exercise Program (HEP)  3. Range of Motion (ROM)  4. Therapeutic Exercise/Strengthening   TREATMENT PLAN:  Effective Dates: 5/24/17 TO 8/16/17. Frequency/Duration: 2 times a week for 12 weeks  GOALS: (Goals have been discussed and agreed upon with patient.)  Short-Term Functional Goals: Time Frame: 4 weeks  1. The patient will have knowledge of signs and symptoms of lymphedema and how to manage within 4 weeks. 2. The patient will tolerate short stretch bandaging of bilateral lower extremities for reduction of girth within 4 weeks. 3. The patient and caregiver will be independent with compression bandaging within 4 weeks. Discharge Goals: Time Frame: 8 weeks  1. The patient will have a girth decrease of at least 5 cm in the calf within 8 weeks. 2. The patient will be fit with static compression garments within 8 weeks. 3. The patient and caregiver will be independent with edema management within 8 weeks. Rehabilitation Potential For Stated Goals: [address] [place], [place] [address]s therapy, I certify that the treatment plan above will be carried out by a therapist or under their direction. Thank you for this referral,  Alicia Oro PT     Referring Physician Signature: Portia Tijerina MD              Date                    The information in this section was collected on 5/24/17 (except where otherwise noted). HISTORY:   History of Present Injury/Illness (Reason for Referral):  Lymphoma, ascites, bilateral lower extremity pitting edema  Past Medical History/Comorbidities:   Ms. Han Santillan  has a past medical history of Anemia; Arthritis; Basal cell carcinoma; Chronic pain; Hypertension (10/4/2011); Morbid obesity ([address] Murmur; Non Hodgkin's lymphoma ([address] (3/30/2017); Other ill-defined conditions; Overactive bladder; Rheumatic fever; Shingles; Thromboembolus ([address] (x2); Thyroid disease; and Unspecified adverse effect of anesthesia. She also has no past medical history of Aneurysm ([address] Arrhythmia; Asthma;  Autoimmune disease ([address] CAD (coronary artery disease); Chronic kidney disease; Coagulation defects; COPD; Diabetes ([address] Difficult intubation; GERD (gastroesophageal reflux disease); Heart failure ([address] Liver disease; Malignant hyperthermia due to anesthesia; Nausea & vomiting; Pseudocholinesterase deficiency; Psychiatric disorder; PUD (peptic ulcer disease); Seizures ([address] Stroke [facility]); or Unspecified sleep apnea. Ms. Amador Rivera  has a past surgical history that includes hc cholecystectomy fnc41; anesth,achilles tendon surg (2/10/2011); cholecystectomy (5825); orthopaedic (2012); and orthopaedic (2013). Past Medical History:   Diagnosis Date    Anemia     in high school, \"received gamma globulin twice a week for awhile\"    Arthritis     osteo-r knee    Basal cell carcinoma     Followed by Dermatology    Chronic pain     KNEEs    Hypertension 10/4/2011    medication    Morbid obesity ([facility]     Murmur     \"had it my whole life\", did not appreciate murmur 9/12/2011 during assessment    Non Hodgkin's lymphoma ([facility] 3/30/2017    Other ill-defined conditions     skin bumps-med as needed    Overactive bladder     Rheumatic fever     Possibly as a child    Shingles     Thromboembolus ([facility] x2    LLE-calf-1990?-only took ASA-resolved in less than a wk-\"a little burned area-not examined\"    Thyroid disease     hypo-medication    Unspecified adverse effect of anesthesia     pt reports waking several times with knee surgery-spinal     Past Surgical History:   Procedure Laterality Date    [facility] CHOLECYSTECT[facility]Y FNC41      HX CHOLECYSTECTOMY  1983    HX ORTHOPAEDIC  2012    right TKA    HX ORTHOPAEDIC  2013    left TKA. Dr. Malu Bautista.  [place] ANESTH,ACHILLES TENDON SURG  2/10/2011    LEFT. Dr. Caterina Huffman.         Social History/Living Environment:     lives with family, 2 flights of stairs  Prior Level of Function/Work/Activity:    Dominant Side:         RIGHT  Current Medications:       Current Outpatient Prescriptions:     magnesium oxide (MAG-OX) 400 mg tablet, Take 1 Tab by mouth two (2) times a day., Disp: 60 Tab, Rfl: 1    hydrOXYzine HCl (ATARAX) 25 mg tablet, Take 1 Tab by mouth every six (6) hours as needed for Itching., Disp: 30 Tab, Rfl: 1    magic mouthwash (ALEXSANDER) susp, Take 10 mL by mouth every four (4) hours as needed. , Disp: 1 Bottle, Rfl: 2    citalopram (CELEXA) 20 mg tablet, Take 1 Tab by mouth daily. , Disp: 30 Tab, Rfl: 0    pregabalin (LYRICA) 50 mg capsule, Take 1 Cap by mouth three (3) times daily. Max Daily Amount: 150 mg., Disp: 90 Cap, Rfl: 1    allopurinol (ZYLOPRIM) 300 mg tablet, Take 1 Tab by mouth two (2) times a day., Disp: 90 Tab, Rfl: 2    nystatin (MYCOSTATIN) powder, Apply  to affected area three (3) times daily. , Disp: 60 g, Rfl: 2    lidocaine-prilocaine (EMLA) topical cream, Apply  to affected area as needed for Pain. Apply to port site 45-60 minutes prior to lab appt or infusion. , Disp: 30 g, Rfl: 0    HYDROcodone-acetaminophen (NORCO) 5-325 mg per tablet, Take 1-2 Tabs by mouth every four (4) hours as needed for Pain. Max Daily Amount: 12 Tabs., Disp: 120 Tab, Rfl: 0    promethazine (PHENERGAN) 25 mg tablet, Take 25 mg by mouth every six (6) hours as needed for Nausea. Unsure of dose, Disp: , Rfl:     ondansetron hcl (ZOFRAN, AS HYDROCHLORIDE,) 4 mg tablet, Take 1 Tab by mouth every eight (8) hours as needed for Nausea., Disp: 60 Tab, Rfl: 3    aspirin 81 mg chewable tablet, Take 81 mg by mouth daily. , Disp: , Rfl:     levothyroxine (SYNTHROID) 125 mcg tablet, Take 1 Tab by mouth Daily (before breakfast). , Disp: 90 Tab, Rfl: 1    omeprazole (PRILOSEC) 20 mg capsule, Take 1 Cap by mouth daily. , Disp: 90 Cap, Rfl: 3   Date Last Reviewed:  5/24/17   Number of Personal Factors/Comorbidities that affect the Plan of Care: 3+: HIGH COMPLEXITY   EXAMINATION:   Palpation:          Pitting edema, dry skin, shiny skin  ROM:          Limited in bilateral lower extremities due to the amount of edema.   She previously has had bilateral knee replacements and an Achilles tendon repair on the left  Strength:          Grossly 4-/5 x 4 extremities  Functional Mobility:         Gait/Ambulation:  Slow, lists side to side        Transfers:  Stand by assist        Bed Mobility:  Minimal assist  Skin Integrity:          Right forearm with red, circular area approximately 3-4 cm in diameter. Warm to touch, nodule in the center. Edema of the elbow region. Advised the patient to seek medical advice. Edema/Girth:  pitting    Left Right    Initial Most Recent Initial Most Recent   Upper  Extremity           Lower  Extremity               Body Structures Involved:  1. Muscles  2. lymphatic system Body Functions Affected:  1. Sensory/Pain  2. Skin Related  3. lymphatic system Activities and Participation Affected:  1. General Tasks and Demands  2. Mobility  3. Self Care   Number of elements (examined above) that affect the Plan of Care: 4+: HIGH COMPLEXITY   CLINICAL PRESENTATION:   Presentation: Evolving clinical presentation with unstable and unpredictable characteristics: HIGH COMPLEXITY   CLINICAL DECISION MAKING:   Outcome Measure: Tool Used: NCCN Distress Thermometer   Score:  Initial:   Most Recent: X    Interpretation of Score: If greater than or equal to 8, then PHQ-9 Depression Scale Score   and JOON-7 Anxiety Scale Score  . Tool Used: ECOG Performance Survey Score  Score:  Initial: 2 Most Recent:      Interpretation of Score:   0 Fully active, able to carry on all pre-disease performance without restriction   1 Restricted in physically strenuous activity but ambulatory and able to carry out work of a light or sedentary nature, e.g., light house work, office work   2 Ambulatory and capable of all selfcare but unable to carry out any work activities. Up and about more than 50% of waking hours   3 Capable of only limited selfcare, confined to bed or chair more than 50% of waking hours   4 Completely disabled.  Cannot carry on any selfcare. Totally confined to bed or chair   5 Dead        Tool Used: Lymphedema Life Impact Scale   Score:  Initial:  54 Most Recent: X (Date: -- )   Interpretation of Score: The Lymphedema Life Impact Scale (LLIS) is a validated instrument that measures the physical, functional, and psychosocial concerns pertinent to patients with extremity lymphedema. The Scale's questionnaire is administered to patients to gauge impairments, activity limitations, and participation restrictions resulting from their lymphedema. Score 0 1-13 14-26 27-40 41-54 55-67 68   Modifier CH CI CJ CK CL CM CN       Medical Necessity:   · Patient is expected to demonstrate progress in strength and edema management to increase independence with self care and houseold activity. Reason for Services/Other Comments:  · Patient continues to demonstrate capacity to improve strength and edema management which will increase independence. Use of outcome tool(s) and clinical judgement create a POC that gives a: Questionable prediction of patient's progress: MODERATE COMPLEXITY            TREATMENT:   (In addition to Assessment/Re-Assessment sessions the following treatments were rendered)  Pre-treatment Symptoms/Complaints:  Bilateral lower extremity edema, ascites, shortness of breath. The patient reports she was unable to have paracentesis yesterday. My  has been bandaging me. He is getting better at it. i am thinking about getting a second opinion with my cancer. Pain: Initial: o      Post Session:  0   25 min  Bandages not in place upon arrival.  Skin intact. Short stretch bandage applied to bilateral lower extremities toes to popliteal crease using stockinette, transelast, cotton padding, 6 cm, 8 cm and 2 10 cm short stretch bandages. The patient's mother in law was present for treatment. The patient will return tomorrow.    as above    Treatment/Session Assessment:    · Response to Treatment:  Tolerated the treatment well.    · Compliance with Program/Exercises: Will assess as treatment progresses. · Recommendations/Intent for next treatment session: \"Next visit will focus on assess efficacy of compression\". Continue with compression bandaging to tolerance.   Total Treatment Duration:  PT Patient Time In/Time Out  Time In: 0801  Time Out: 2101    [facility] PT

## 2017-06-07 ENCOUNTER — HOSPITAL ENCOUNTER (OUTPATIENT)
Dept: ONCOLOGY | Age: 57
Discharge: HOME | End: 2017-06-07
Payer: COMMERCIAL

## 2017-06-07 PROCEDURE — 97140 MANUAL THERAPY 1/> REGIONS: CPT

## 2017-06-07 NOTE — PROGRESS NOTES
Bekah Sanchez  : 1960 [facility]  [address], [place] [address], [address]  [phone]   [phone]          OUTPATIENT PHYSICAL THERAPY:Daily Note 2017    ICD-10: Treatment Diagnosis: I 89.0 lymphedema not elsewhere classified  Precautions/Allergies:   Review of patient's allergies indicates no known allergies. Fall Risk Score: 1 (? 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 Cecilia Barclay presents for a lymphedema assessment due to edema of bilateral lower extremities. She has pitting edema in the bilateral lower extremities. She has previously had short stretch bandaging and MLD following knee surgery 3 years ago. She would like to try this again even though this is not orthopedic post surgical swelling. She was diagnosed with lymphoma in March of this year. She recently completed her second chemo of 6 planned. She will have a PET scan 17. Progress with chemo has been limited due to lab values being abnormal.  She is uncomfortable due to the edema. She will have a paracentesis 17. We will initiate edema management and progress slowly dependent on her tolerance to compression and her response to treatment. PROBLEM LIST (Impacting functional limitations):  1. Decreased Strength  2. Increased Pain  3. Decreased Activity Tolerance  4. Increased Fatigue  5. Increased Shortness of Breath  6. Decreased Flexibility/Joint Mobility  7. Edema/Girth  8. Decreased Knowledge of Precautions  9. Decreased [place] with Home Exercise Program INTERVENTIONS PLANNED:  1. Decongestion Therapy  2.  Home Exercise Program (HEP)  3. Range of Motion (ROM)  4. Therapeutic Exercise/Strengthening   TREATMENT PLAN:  Effective Dates: 5/24/17 TO 8/16/17. Frequency/Duration: 2 times a week for 12 weeks  Patient would like to hold until after chemo. She will return 6/20/17. GOALS: (Goals have been discussed and agreed upon with patient.)  Short-Term Functional Goals: Time Frame: 4 weeks  1. The patient will have knowledge of signs and symptoms of lymphedema and how to manage within 4 weeks. 2. The patient will tolerate short stretch bandaging of bilateral lower extremities for reduction of girth within 4 weeks. 3. The patient and caregiver will be independent with compression bandaging within 4 weeks. Discharge Goals: Time Frame: 8 weeks  1. The patient will have a girth decrease of at least 5 cm in the calf within 8 weeks. 2. The patient will be fit with static compression garments within 8 weeks. 3. The patient and caregiver will be independent with edema management within 8 weeks. Rehabilitation Potential For Stated Goals: [address] [place], [place] [address]s therapy, I certify that the treatment plan above will be carried out by a therapist or under their direction. Thank you for this referral,  Chelo Keith PT     Referring Physician Signature: Surinder Warner MD              Date                    The information in this section was collected on 5/24/17 (except where otherwise noted). HISTORY:   History of Present Injury/Illness (Reason for Referral):  Lymphoma, ascites, bilateral lower extremity pitting edema  Past Medical History/Comorbidities:   Ms. Helen Elena  has a past medical history of Anemia; Arthritis; Basal cell carcinoma; Chronic pain; Hypertension (10/4/2011); Morbid obesity ([address] Murmur; Non Hodgkin's lymphoma ([address] (3/30/2017); Other ill-defined conditions; Overactive bladder; Rheumatic fever; Shingles; Thromboembolus ([address] (x2); Thyroid disease; and Unspecified adverse effect of anesthesia.  She also has no past medical history of Aneurysm ([address] Arrhythmia; Asthma; Autoimmune disease ([address] CAD (coronary artery disease); Chronic kidney disease; Coagulation defects; COPD; Diabetes ([address] Difficult intubation; GERD (gastroesophageal reflux disease); Heart failure ([address] Liver disease; Malignant hyperthermia due to anesthesia; Nausea & vomiting; Pseudocholinesterase deficiency; Psychiatric disorder; PUD (peptic ulcer disease); Seizures ([address] Stroke [facility]); or Unspecified sleep apnea. Ms. Bj Meredith  has a past surgical history that includes  cholecystectomy fnc41; anesth,achilles tendon surg (2/10/2011); cholecystectomy (0882); orthopaedic (2012); and orthopaedic (2013). Past Medical History:   Diagnosis Date    Anemia     in high school, \"received gamma globulin twice a week for awhile\"    Arthritis     osteo-r knee    Basal cell carcinoma     Followed by Dermatology    Chronic pain     KNEEs    Hypertension 10/4/2011    medication    Morbid obesity ([address]     Murmur     \"had it my whole life\", did not appreciate murmur 9/12/2011 during assessment    Non Hodgkin's lymphoma ([address] 3/30/2017    Other ill-defined conditions     skin bumps-med as needed    Overactive bladder     Rheumatic fever     Possibly as a child    Shingles     Thromboembolus ([address] x2    LLE-calf-1990?-only took ASA-resolved in less than a wk-\"a little burned area-not examined\"    Thyroid disease     hypo-medication    Unspecified adverse effect of anesthesia     pt reports waking several times with knee surgery-spinal     Past Surgical History:   Procedure Laterality Date    [facility]LECY[facility] FNC41      HX CHOLECYSTECTOMY  1983    HX ORTHOPAEDIC  2012    right TKA    HX ORTHOPAEDIC  2013    left TKA. Dr. Maria Luisa Odonnell.  AR ANESTH,ACHILLES TENDON SURG  2/10/2011    LEFT. Dr. Todd Heard.         Social History/Living Environment:     lives with family, 2 flights of stairs  Prior Level of Function/Work/Activity:    Dominant Side:         RIGHT  Current Medications:       Current Outpatient Prescriptions:     magnesium oxide (MAG-OX) 400 mg tablet, Take 1 Tab by mouth two (2) times a day., Disp: 60 Tab, Rfl: 1    hydrOXYzine HCl (ATARAX) 25 mg tablet, Take 1 Tab by mouth every six (6) hours as needed for Itching., Disp: 30 Tab, Rfl: 1    magic mouthwash (ALEXSANDER) susp, Take 10 mL by mouth every four (4) hours as needed. , Disp: 1 Bottle, Rfl: 2    citalopram (CELEXA) 20 mg tablet, Take 1 Tab by mouth daily. , Disp: 30 Tab, Rfl: 0    pregabalin (LYRICA) 50 mg capsule, Take 1 Cap by mouth three (3) times daily. Max Daily Amount: 150 mg., Disp: 90 Cap, Rfl: 1    allopurinol (ZYLOPRIM) 300 mg tablet, Take 1 Tab by mouth two (2) times a day., Disp: 90 Tab, Rfl: 2    nystatin (MYCOSTATIN) powder, Apply  to affected area three (3) times daily. , Disp: 60 g, Rfl: 2    lidocaine-prilocaine (EMLA) topical cream, Apply  to affected area as needed for Pain. Apply to port site 45-60 minutes prior to lab appt or infusion. , Disp: 30 g, Rfl: 0    HYDROcodone-acetaminophen (NORCO) 5-325 mg per tablet, Take 1-2 Tabs by mouth every four (4) hours as needed for Pain. Max Daily Amount: 12 Tabs., Disp: 120 Tab, Rfl: 0    promethazine (PHENERGAN) 25 mg tablet, Take 25 mg by mouth every six (6) hours as needed for Nausea. Unsure of dose, Disp: , Rfl:     ondansetron hcl (ZOFRAN, AS HYDROCHLORIDE,) 4 mg tablet, Take 1 Tab by mouth every eight (8) hours as needed for Nausea., Disp: 60 Tab, Rfl: 3    aspirin 81 mg chewable tablet, Take 81 mg by mouth daily. , Disp: , Rfl:     levothyroxine (SYNTHROID) 125 mcg tablet, Take 1 Tab by mouth Daily (before breakfast). , Disp: 90 Tab, Rfl: 1    omeprazole (PRILOSEC) 20 mg capsule, Take 1 Cap by mouth daily. , Disp: 90 Cap, Rfl: 3   Date Last Reviewed:  5/24/17   Number of Personal Factors/Comorbidities that affect the Plan of Care: 3+: HIGH COMPLEXITY   EXAMINATION:   Palpation:          Pitting edema, dry skin, shiny skin  ROM: Limited in bilateral lower extremities due to the amount of edema. She previously has had bilateral knee replacements and an Achilles tendon repair on the left  Strength:          Grossly 4-/5 x 4 extremities  Functional Mobility:         Gait/Ambulation:  Slow, lists side to side        Transfers:  Stand by assist        Bed Mobility:  Minimal assist  Skin Integrity:          Right forearm with red, circular area approximately 3-4 cm in diameter. Warm to touch, nodule in the center. Edema of the elbow region. Advised the patient to seek medical advice. Edema/Girth:  pitting    Left Right    Initial Most Recent Initial Most Recent   Upper  Extremity           Lower  Extremity               Body Structures Involved:  1. Muscles  2. lymphatic system Body Functions Affected:  1. Sensory/Pain  2. Skin Related  3. lymphatic system Activities and Participation Affected:  1. General Tasks and Demands  2. Mobility  3. Self Care   Number of elements (examined above) that affect the Plan of Care: 4+: HIGH COMPLEXITY   CLINICAL PRESENTATION:   Presentation: Evolving clinical presentation with unstable and unpredictable characteristics: HIGH COMPLEXITY   CLINICAL DECISION MAKING:   Outcome Measure: Tool Used: NCCN Distress Thermometer   Score:  Initial:   Most Recent: X    Interpretation of Score: If greater than or equal to 8, then PHQ-9 Depression Scale Score   and JOON-7 Anxiety Scale Score  . Tool Used: ECOG Performance Survey Score  Score:  Initial: 2 Most Recent:      Interpretation of Score:   0 Fully active, able to carry on all pre-disease performance without restriction   1 Restricted in physically strenuous activity but ambulatory and able to carry out work of a light or sedentary nature, e.g., light house work, office work   2 Ambulatory and capable of all selfcare but unable to carry out any work activities.  Up and about more than 50% of waking hours   3 Capable of only limited selfcare, confined to bed or chair more than 50% of waking hours   4 Completely disabled. Cannot carry on any selfcare. Totally confined to bed or chair   5 Dead        Tool Used: Lymphedema Life Impact Scale   Score:  Initial:  54 Most Recent: X (Date: -- )   Interpretation of Score: The Lymphedema Life Impact Scale (LLIS) is a validated instrument that measures the physical, functional, and psychosocial concerns pertinent to patients with extremity lymphedema. The Scale's questionnaire is administered to patients to gauge impairments, activity limitations, and participation restrictions resulting from their lymphedema. Score 0 1-13 14-26 27-40 41-54 55-67 68   Modifier CH CI CJ CK CL CM CN       Medical Necessity:   · Patient is expected to demonstrate progress in strength and edema management to increase independence with self care and houseold activity. Reason for Services/Other Comments:  · Patient continues to demonstrate capacity to improve strength and edema management which will increase independence. Use of outcome tool(s) and clinical judgement create a POC that gives a: Questionable prediction of patient's progress: MODERATE COMPLEXITY            TREATMENT:   (In addition to Assessment/Re-Assessment sessions the following treatments were rendered)  Pre-treatment Symptoms/Complaints:  Bilateral lower extremity edema, ascites, shortness of breath. The patient reports she has a scan tomorrow so would like to cancel her appointment. She reports she is worried about her coughing issues and lying still for the scan. Pain: Initial: o      Post Session:  0   28 min  Bandages not in place upon arrival.  Skin intact. Short stretch bandage applied to bilateral lower extremities toes to popliteal crease using stockinette, transelast, cotton padding, 6 cm, 8 cm and 2 10 cm short stretch bandages. The patient's mother in law was present for treatment. The patient will not be seen next week due to chemo.   Her spouse will bandage to tolerance. as above    Treatment/Session Assessment:    · Response to Treatment:  Tolerated the treatment well. · Compliance with Program/Exercises: Will assess as treatment progresses. · Recommendations/Intent for next treatment session: \"Next visit will focus on assess efficacy of compression\". Continue with compression bandaging to tolerance. Will see her 6/20/17.   Total Treatment Duration:  PT Patient Time In/Time Out  Time In: 0841  Time Out: 360 Norris Townsend, PT

## 2017-06-08 ENCOUNTER — HOSPITAL ENCOUNTER (OUTPATIENT)
Dept: PET IMAGING | Age: 57
Discharge: HOME | End: 2017-06-08
Payer: COMMERCIAL

## 2017-06-08 ENCOUNTER — HOSPITAL ENCOUNTER (OUTPATIENT)
Dept: ONCOLOGY | Age: 57
Discharge: HOME | End: 2017-06-08
Payer: COMMERCIAL

## 2017-06-08 DIAGNOSIS — C85.88 MARGINAL ZONE LYMPHOMA OF LYMPH NODES OF MULTIPLE SITES (HCC): ICD-10-CM

## 2017-06-08 PROCEDURE — 74011636320 HC RX REV CODE- 636/320: Performed by: INTERNAL MEDICINE

## 2017-06-08 PROCEDURE — A9552 F18 FDG: HCPCS

## 2017-06-08 RX ADMIN — DIATRIZOATE MEGLUMINE AND DIATRIZOATE SODIUM 10 ML: 660; 100 LIQUID ORAL; RECTAL at 10:30

## 2017-06-13 ENCOUNTER — PATIENT OUTREACH (OUTPATIENT)
Dept: CASE MANAGEMENT | Age: 57
End: 2017-06-13

## 2017-06-13 ENCOUNTER — HOSPITAL ENCOUNTER (OUTPATIENT)
Dept: LAB | Age: 57
Discharge: HOME | End: 2017-06-13
Payer: COMMERCIAL

## 2017-06-13 DIAGNOSIS — C85.88 MARGINAL ZONE LYMPHOMA OF LYMPH NODES OF MULTIPLE SITES (HCC): ICD-10-CM

## 2017-06-13 LAB
ALBUMIN SERPL BCP-MCNC: 3.6 G/DL (ref 3.5–5)
ALBUMIN/GLOB SERPL: 1.3 {RATIO} (ref 1.2–3.5)
ALP SERPL-CCNC: 106 U/L (ref 50–136)
ALT SERPL-CCNC: 19 U/L (ref 12–65)
ANION GAP BLD CALC-SCNC: 10 MMOL/L (ref 7–16)
AST SERPL W P-5'-P-CCNC: 15 U/L (ref 15–37)
BASOPHILS # BLD AUTO: 0.1 K/UL (ref 0–0.2)
BASOPHILS # BLD: 1 % (ref 0–2)
BILIRUB SERPL-MCNC: 0.3 MG/DL (ref 0.2–1.1)
BUN SERPL-MCNC: 21 MG/DL (ref 6–23)
CALCIUM SERPL-MCNC: 9.7 MG/DL (ref 8.3–10.4)
CHLORIDE SERPL-SCNC: 103 MMOL/L (ref 98–107)
CO2 SERPL-SCNC: 27 MMOL/L (ref 21–32)
CREAT SERPL-MCNC: 0.72 MG/DL (ref 0.6–1)
DIFFERENTIAL METHOD BLD: ABNORMAL
EOSINOPHIL # BLD: 0.5 K/UL (ref 0–0.8)
EOSINOPHIL NFR BLD: 7 % (ref 0.5–7.8)
ERYTHROCYTE [DISTWIDTH] IN BLOOD BY AUTOMATED COUNT: 20.4 % (ref 11.9–14.6)
GLOBULIN SER CALC-MCNC: 2.8 G/DL (ref 2.3–3.5)
GLUCOSE SERPL-MCNC: 88 MG/DL (ref 65–100)
HCT VFR BLD AUTO: 23.6 % (ref 35.8–46.3)
HGB BLD-MCNC: 8.2 G/DL (ref 11.7–15.4)
LYMPHOCYTES # BLD AUTO: 4 % (ref 13–44)
LYMPHOCYTES # BLD: 0.2 K/UL (ref 0.5–4.6)
MAGNESIUM SERPL-MCNC: 2.1 MG/DL (ref 1.8–2.4)
MCH RBC QN AUTO: 31.7 PG (ref 26.1–32.9)
MCHC RBC AUTO-ENTMCNC: 34.7 G/DL (ref 31.4–35)
MCV RBC AUTO: 91.1 FL (ref 79.6–97.8)
MONOCYTES # BLD: 0.4 K/UL (ref 0.1–1.3)
MONOCYTES NFR BLD AUTO: 6 % (ref 4–12)
NEUTS SEG # BLD: 5.5 K/UL (ref 1.7–8.2)
NEUTS SEG NFR BLD AUTO: 82 % (ref 43–78)
NRBC # BLD: 0 K/UL (ref 0–0.2)
PLATELET # BLD AUTO: 135 K/UL (ref 150–450)
PMV BLD AUTO: 10 FL (ref 10.8–14.1)
POTASSIUM SERPL-SCNC: 4.1 MMOL/L (ref 3.5–5.1)
PROT SERPL-MCNC: 6.4 G/DL (ref 6.3–8.2)
RBC # BLD AUTO: 2.59 M/UL (ref 4.05–5.25)
SODIUM SERPL-SCNC: 140 MMOL/L (ref 136–145)
WBC # BLD AUTO: 6.7 K/UL (ref 4.3–11.1)

## 2017-06-13 PROCEDURE — 85025 COMPLETE CBC W/AUTO DIFF WBC: CPT | Performed by: INTERNAL MEDICINE

## 2017-06-13 PROCEDURE — 83735 ASSAY OF MAGNESIUM: CPT | Performed by: INTERNAL MEDICINE

## 2017-06-13 PROCEDURE — 80053 COMPREHEN METABOLIC PANEL: CPT | Performed by: INTERNAL MEDICINE

## 2017-06-13 RX ORDER — SODIUM CHLORIDE 0.9 % (FLUSH) 0.9 %
10 SYRINGE (ML) INJECTION AS NEEDED
Status: CANCELLED
Start: 2017-06-15

## 2017-06-13 RX ORDER — DIPHENHYDRAMINE HYDROCHLORIDE 50 MG/ML
50 INJECTION, SOLUTION INTRAMUSCULAR; INTRAVENOUS AS NEEDED
Status: CANCELLED
Start: 2017-06-14

## 2017-06-13 RX ORDER — ALBUTEROL SULFATE 0.83 MG/ML
2.5 SOLUTION RESPIRATORY (INHALATION) AS NEEDED
Status: CANCELLED
Start: 2017-06-14

## 2017-06-13 RX ORDER — ONDANSETRON 2 MG/ML
8 INJECTION INTRAMUSCULAR; INTRAVENOUS AS NEEDED
Status: CANCELLED | OUTPATIENT
Start: 2017-06-15

## 2017-06-13 RX ORDER — ONDANSETRON 2 MG/ML
8 INJECTION INTRAMUSCULAR; INTRAVENOUS ONCE
Status: CANCELLED | OUTPATIENT
Start: 2017-06-15 | End: 2017-06-15

## 2017-06-13 RX ORDER — DIPHENHYDRAMINE HYDROCHLORIDE 50 MG/ML
50 INJECTION, SOLUTION INTRAMUSCULAR; INTRAVENOUS AS NEEDED
Status: CANCELLED
Start: 2017-06-15

## 2017-06-13 RX ORDER — ACETAMINOPHEN 325 MG/1
650 TABLET ORAL AS NEEDED
Status: CANCELLED
Start: 2017-06-14

## 2017-06-13 RX ORDER — ONDANSETRON 2 MG/ML
8 INJECTION INTRAMUSCULAR; INTRAVENOUS AS NEEDED
Status: CANCELLED | OUTPATIENT
Start: 2017-06-14

## 2017-06-13 RX ORDER — HYDROCORTISONE SODIUM SUCCINATE 100 MG/2ML
100 INJECTION, POWDER, FOR SOLUTION INTRAMUSCULAR; INTRAVENOUS AS NEEDED
Status: CANCELLED | OUTPATIENT
Start: 2017-06-15

## 2017-06-13 RX ORDER — HYDROCORTISONE SODIUM SUCCINATE 100 MG/2ML
100 INJECTION, POWDER, FOR SOLUTION INTRAMUSCULAR; INTRAVENOUS AS NEEDED
Status: CANCELLED | OUTPATIENT
Start: 2017-06-14

## 2017-06-13 RX ORDER — ACETAMINOPHEN 325 MG/1
650 TABLET ORAL AS NEEDED
Status: CANCELLED
Start: 2017-06-15

## 2017-06-13 RX ORDER — HEPARIN SODIUM 1000 [USP'U]/ML
2000 INJECTION, SOLUTION INTRAVENOUS; SUBCUTANEOUS AS NEEDED
Status: CANCELLED
Start: 2017-06-14

## 2017-06-13 RX ORDER — EPINEPHRINE 1 MG/ML
0.3 INJECTION, SOLUTION, CONCENTRATE INTRAVENOUS AS NEEDED
Status: CANCELLED | OUTPATIENT
Start: 2017-06-14

## 2017-06-13 RX ORDER — HEPARIN 100 UNIT/ML
300-500 SYRINGE INTRAVENOUS AS NEEDED
Status: CANCELLED
Start: 2017-06-15

## 2017-06-13 RX ORDER — DEXAMETHASONE SODIUM PHOSPHATE 100 MG/10ML
10 INJECTION INTRAMUSCULAR; INTRAVENOUS ONCE
Status: CANCELLED
Start: 2017-06-15 | End: 2017-06-15

## 2017-06-13 RX ORDER — ALBUTEROL SULFATE 0.83 MG/ML
2.5 SOLUTION RESPIRATORY (INHALATION) AS NEEDED
Status: CANCELLED
Start: 2017-06-15

## 2017-06-13 RX ORDER — HEPARIN SODIUM 1000 [USP'U]/ML
2000 INJECTION, SOLUTION INTRAVENOUS; SUBCUTANEOUS AS NEEDED
Status: CANCELLED
Start: 2017-06-15

## 2017-06-13 RX ORDER — EPINEPHRINE 1 MG/ML
0.3 INJECTION, SOLUTION, CONCENTRATE INTRAVENOUS AS NEEDED
Status: CANCELLED | OUTPATIENT
Start: 2017-06-15

## 2017-06-13 NOTE — PROGRESS NOTES
Pt was seen and labs reviewed by Dr. Jf Pierce. PET results discussed with patient and family. PET showed great response to therapy, so will continue on current regimen for total of 6 cycles. Will proceed with chemo tomorrow, and pt to get Neulasta injector on day 2. 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 Pt reports feeling much better. Will help facilitate 2nd opinion (per request) once pt and family decide on physician. Will continue to follow.

## 2017-06-14 ENCOUNTER — HOSPITAL ENCOUNTER (OUTPATIENT)
Dept: INFUSION THERAPY | Age: 57
Discharge: HOME | End: 2017-06-14
Payer: COMMERCIAL

## 2017-06-14 VITALS
RESPIRATION RATE: 18 BRPM | DIASTOLIC BLOOD PRESSURE: 61 MMHG | HEART RATE: 76 BPM | SYSTOLIC BLOOD PRESSURE: 123 MMHG | TEMPERATURE: 98.8 F | BODY MASS INDEX: 45.03 KG/M2 | WEIGHT: 254.2 LBS

## 2017-06-14 DIAGNOSIS — C85.88 MARGINAL ZONE LYMPHOMA OF LYMPH NODES OF MULTIPLE SITES (HCC): ICD-10-CM

## 2017-06-14 PROCEDURE — 96375 TX/PRO/DX INJ NEW DRUG ADDON: CPT

## 2017-06-14 PROCEDURE — 96413 CHEMO IV INFUSION 1 HR: CPT

## 2017-06-14 PROCEDURE — 77030003560 HC NDL HUBR BARD -A

## 2017-06-14 PROCEDURE — 96411 CHEMO IV PUSH ADDL DRUG: CPT

## 2017-06-14 PROCEDURE — 96415 CHEMO IV INFUSION ADDL HR: CPT

## 2017-06-14 PROCEDURE — 96361 HYDRATE IV INFUSION ADD-ON: CPT

## 2017-06-14 PROCEDURE — 74011250637 HC RX REV CODE- 250/637: Performed by: NURSE PRACTITIONER

## 2017-06-14 PROCEDURE — 74011250636 HC RX REV CODE- 250/636: Performed by: NURSE PRACTITIONER

## 2017-06-14 PROCEDURE — 74011000258 HC RX REV CODE- 258: Performed by: NURSE PRACTITIONER

## 2017-06-14 RX ORDER — DEXAMETHASONE SODIUM PHOSPHATE 100 MG/10ML
10 INJECTION INTRAMUSCULAR; INTRAVENOUS ONCE
Status: COMPLETED | OUTPATIENT
Start: 2017-06-14 | End: 2017-06-14

## 2017-06-14 RX ORDER — LORAZEPAM 2 MG/ML
0.5 INJECTION INTRAMUSCULAR
Status: ACTIVE | OUTPATIENT
Start: 2017-06-14 | End: 2017-06-14

## 2017-06-14 RX ORDER — ACETAMINOPHEN 325 MG/1
650 TABLET ORAL ONCE
Status: COMPLETED | OUTPATIENT
Start: 2017-06-14 | End: 2017-06-14

## 2017-06-14 RX ORDER — DIPHENHYDRAMINE HYDROCHLORIDE 50 MG/ML
50 INJECTION, SOLUTION INTRAMUSCULAR; INTRAVENOUS ONCE
Status: COMPLETED | OUTPATIENT
Start: 2017-06-14 | End: 2017-06-14

## 2017-06-14 RX ORDER — HEPARIN 100 UNIT/ML
300-500 SYRINGE INTRAVENOUS AS NEEDED
Status: DISPENSED | OUTPATIENT
Start: 2017-06-14 | End: 2017-06-14

## 2017-06-14 RX ORDER — SODIUM CHLORIDE 0.9 % (FLUSH) 0.9 %
10 SYRINGE (ML) INJECTION AS NEEDED
Status: ACTIVE | OUTPATIENT
Start: 2017-06-14 | End: 2017-06-14

## 2017-06-14 RX ORDER — ONDANSETRON 2 MG/ML
8 INJECTION INTRAMUSCULAR; INTRAVENOUS ONCE
Status: COMPLETED | OUTPATIENT
Start: 2017-06-14 | End: 2017-06-14

## 2017-06-14 RX ADMIN — Medication 10 ML: at 07:25

## 2017-06-14 RX ADMIN — DEXAMETHASONE SODIUM PHOSPHATE 10 MG: 10 INJECTION INTRAMUSCULAR; INTRAVENOUS at 08:00

## 2017-06-14 RX ADMIN — Medication 10 ML: at 11:35

## 2017-06-14 RX ADMIN — Medication 10 ML: at 08:13

## 2017-06-14 RX ADMIN — ONDANSETRON 8 MG: 2 INJECTION INTRAMUSCULAR; INTRAVENOUS at 08:02

## 2017-06-14 RX ADMIN — SODIUM CHLORIDE, PRESERVATIVE FREE 500 UNITS: 5 INJECTION INTRAVENOUS at 11:35

## 2017-06-14 RX ADMIN — SODIUM CHLORIDE 500 ML: 900 INJECTION, SOLUTION INTRAVENOUS at 07:25

## 2017-06-14 RX ADMIN — ACETAMINOPHEN 650 MG: 325 TABLET, FILM COATED ORAL at 08:05

## 2017-06-14 RX ADMIN — DIPHENHYDRAMINE HYDROCHLORIDE 50 MG: 50 INJECTION, SOLUTION INTRAMUSCULAR; INTRAVENOUS at 08:13

## 2017-06-14 RX ADMIN — RITUXIMAB 885 MG: 10 INJECTION, SOLUTION INTRAVENOUS at 08:20

## 2017-06-14 RX ADMIN — BENDAMUSTINE HYDROCHLORIDE 212.5 MG: 25 INJECTION, SOLUTION INTRAVENOUS at 11:15

## 2017-06-14 NOTE — PROGRESS NOTES
Pt arrived ambulatory today at [phone], to receive IV chemotherapy. Pt tolerated without difficulty. Patient discharged via ambulatory accompanied by son. Instructed to notify physician of any problems, questions or concerns. Allowed opportunity for patient/family to ask questions. Verbalized understanding. Next appointment is Mitali 15  with Cristal Campbell87.

## 2017-06-14 NOTE — PROGRESS NOTES
Problem: Chemotherapy Treatment  Goal: *Chemotherapy regimen followed  Outcome: Progressing Towards Goal  Verbalizes/demonstrates understanding of purpose/procedure/potential side effects of rituxan and treanda.

## 2017-06-15 ENCOUNTER — HOSPITAL ENCOUNTER (OUTPATIENT)
Dept: INFUSION THERAPY | Age: 57
Discharge: HOME | End: 2017-06-15
Payer: COMMERCIAL

## 2017-06-15 VITALS
BODY MASS INDEX: 45.63 KG/M2 | OXYGEN SATURATION: 98 % | WEIGHT: 257.6 LBS | TEMPERATURE: 99.1 F | HEART RATE: 78 BPM | DIASTOLIC BLOOD PRESSURE: 86 MMHG | SYSTOLIC BLOOD PRESSURE: 142 MMHG | RESPIRATION RATE: 18 BRPM

## 2017-06-15 DIAGNOSIS — C85.88 MARGINAL ZONE LYMPHOMA OF LYMPH NODES OF MULTIPLE SITES (HCC): ICD-10-CM

## 2017-06-15 PROCEDURE — 96375 TX/PRO/DX INJ NEW DRUG ADDON: CPT

## 2017-06-15 PROCEDURE — 96377 APPLICATON ON-BODY INJECTOR: CPT

## 2017-06-15 PROCEDURE — 74011250636 HC RX REV CODE- 250/636: Performed by: NURSE PRACTITIONER

## 2017-06-15 PROCEDURE — 77030003560 HC NDL HUBR BARD -A

## 2017-06-15 PROCEDURE — 96409 CHEMO IV PUSH SNGL DRUG: CPT

## 2017-06-15 PROCEDURE — 96361 HYDRATE IV INFUSION ADD-ON: CPT

## 2017-06-15 PROCEDURE — 74011000258 HC RX REV CODE- 258: Performed by: NURSE PRACTITIONER

## 2017-06-15 RX ORDER — ONDANSETRON 2 MG/ML
8 INJECTION INTRAMUSCULAR; INTRAVENOUS ONCE
Status: COMPLETED | OUTPATIENT
Start: 2017-06-15 | End: 2017-06-15

## 2017-06-15 RX ORDER — HEPARIN 100 UNIT/ML
300-500 SYRINGE INTRAVENOUS AS NEEDED
Status: DISPENSED | OUTPATIENT
Start: 2017-06-15 | End: 2017-06-15

## 2017-06-15 RX ORDER — SODIUM CHLORIDE 0.9 % (FLUSH) 0.9 %
10 SYRINGE (ML) INJECTION AS NEEDED
Status: ACTIVE | OUTPATIENT
Start: 2017-06-15 | End: 2017-06-15

## 2017-06-15 RX ORDER — DEXAMETHASONE SODIUM PHOSPHATE 100 MG/10ML
10 INJECTION INTRAMUSCULAR; INTRAVENOUS ONCE
Status: COMPLETED | OUTPATIENT
Start: 2017-06-15 | End: 2017-06-15

## 2017-06-15 RX ADMIN — Medication 10 ML: at 08:43

## 2017-06-15 RX ADMIN — BENDAMUSTINE HYDROCHLORIDE 212.5 MG: 25 INJECTION, SOLUTION INTRAVENOUS at 08:24

## 2017-06-15 RX ADMIN — ONDANSETRON 8 MG: 2 INJECTION INTRAMUSCULAR; INTRAVENOUS at 07:36

## 2017-06-15 RX ADMIN — SODIUM CHLORIDE 500 ML: 900 INJECTION, SOLUTION INTRAVENOUS at 07:40

## 2017-06-15 RX ADMIN — DEXAMETHASONE SODIUM PHOSPHATE 10 MG: 10 INJECTION INTRAMUSCULAR; INTRAVENOUS at 07:37

## 2017-06-15 RX ADMIN — Medication 10 ML: at 07:36

## 2017-06-15 RX ADMIN — PEGFILGRASTIM 6 MG: KIT SUBCUTANEOUS at 08:40

## 2017-06-15 RX ADMIN — SODIUM CHLORIDE, PRESERVATIVE FREE 500 UNITS: 5 INJECTION INTRAVENOUS at 08:43

## 2017-06-15 NOTE — PROGRESS NOTES
Arrived to [facility] to receive Bendeka. Tolerated well. Issues or concerns during appointment: none. Aware of next appointment on 7/12 at 7:45 AM.  Discharged ambulatory.

## 2017-06-19 ENCOUNTER — APPOINTMENT (OUTPATIENT)
Dept: ONCOLOGY | Age: 57
End: 2017-06-19
Payer: COMMERCIAL

## 2017-06-20 ENCOUNTER — HOSPITAL ENCOUNTER (OUTPATIENT)
Dept: ONCOLOGY | Age: 57
Discharge: HOME | End: 2017-06-20
Payer: COMMERCIAL

## 2017-06-22 ENCOUNTER — HOSPITAL ENCOUNTER (OUTPATIENT)
Dept: ONCOLOGY | Age: 57
Discharge: HOME | End: 2017-06-22
Payer: COMMERCIAL

## 2017-06-22 PROCEDURE — 97110 THERAPEUTIC EXERCISES: CPT

## 2017-06-22 PROCEDURE — 97140 MANUAL THERAPY 1/> REGIONS: CPT

## 2017-07-05 ENCOUNTER — HOSPITAL ENCOUNTER (OUTPATIENT)
Dept: ONCOLOGY | Age: 57
Discharge: HOME | End: 2017-07-05
Payer: COMMERCIAL

## 2017-07-10 ENCOUNTER — HOSPITAL ENCOUNTER (OUTPATIENT)
Dept: ONCOLOGY | Age: 57
Discharge: HOME | End: 2017-07-10
Payer: COMMERCIAL

## 2017-07-12 ENCOUNTER — HOSPITAL ENCOUNTER (OUTPATIENT)
Dept: LAB | Age: 57
Discharge: HOME | End: 2017-07-12
Payer: COMMERCIAL

## 2017-07-12 ENCOUNTER — HOSPITAL ENCOUNTER (OUTPATIENT)
Dept: INFUSION THERAPY | Age: 57
Discharge: HOME | End: 2017-07-12
Payer: COMMERCIAL

## 2017-07-12 ENCOUNTER — PATIENT OUTREACH (OUTPATIENT)
Dept: CASE MANAGEMENT | Age: 57
End: 2017-07-12

## 2017-07-12 VITALS
TEMPERATURE: 98.2 F | HEART RATE: 71 BPM | SYSTOLIC BLOOD PRESSURE: 130 MMHG | OXYGEN SATURATION: 100 % | RESPIRATION RATE: 18 BRPM | DIASTOLIC BLOOD PRESSURE: 73 MMHG

## 2017-07-12 DIAGNOSIS — C85.88 MARGINAL ZONE LYMPHOMA OF LYMPH NODES OF MULTIPLE SITES (HCC): ICD-10-CM

## 2017-07-12 DIAGNOSIS — C85.90 NON-HODGKIN'S LYMPHOMA, UNSPECIFIED BODY REGION, UNSPECIFIED NON-HODGKIN LYMPHOMA TYPE (HCC): ICD-10-CM

## 2017-07-12 LAB
ALBUMIN SERPL BCP-MCNC: 3.3 G/DL (ref 3.5–5)
ALBUMIN/GLOB SERPL: 1.1 {RATIO} (ref 1.2–3.5)
ALP SERPL-CCNC: 116 U/L (ref 50–136)
ALT SERPL-CCNC: 19 U/L (ref 12–65)
ANION GAP BLD CALC-SCNC: 7 MMOL/L (ref 7–16)
AST SERPL W P-5'-P-CCNC: 18 U/L (ref 15–37)
BASOPHILS # BLD AUTO: 0 K/UL (ref 0–0.2)
BASOPHILS # BLD: 1 % (ref 0–2)
BILIRUB SERPL-MCNC: 0.3 MG/DL (ref 0.2–1.1)
BUN SERPL-MCNC: 14 MG/DL (ref 6–23)
CALCIUM SERPL-MCNC: 8.9 MG/DL (ref 8.3–10.4)
CHLORIDE SERPL-SCNC: 106 MMOL/L (ref 98–107)
CO2 SERPL-SCNC: 28 MMOL/L (ref 21–32)
CREAT SERPL-MCNC: 0.8 MG/DL (ref 0.6–1)
DIFFERENTIAL METHOD BLD: ABNORMAL
EOSINOPHIL # BLD: 0.1 K/UL (ref 0–0.8)
EOSINOPHIL NFR BLD: 2 % (ref 0.5–7.8)
ERYTHROCYTE [DISTWIDTH] IN BLOOD BY AUTOMATED COUNT: 14.9 % (ref 11.9–14.6)
GLOBULIN SER CALC-MCNC: 3.1 G/DL (ref 2.3–3.5)
GLUCOSE SERPL-MCNC: 108 MG/DL (ref 65–100)
HCT VFR BLD AUTO: 25.5 % (ref 35.8–46.3)
HGB BLD-MCNC: 8.6 G/DL (ref 11.7–15.4)
LYMPHOCYTES # BLD AUTO: 6 % (ref 13–44)
LYMPHOCYTES # BLD: 0.2 K/UL (ref 0.5–4.6)
MAGNESIUM SERPL-MCNC: 1.7 MG/DL (ref 1.8–2.4)
MCH RBC QN AUTO: 31 PG (ref 26.1–32.9)
MCHC RBC AUTO-ENTMCNC: 33.7 G/DL (ref 31.4–35)
MCV RBC AUTO: 92.1 FL (ref 79.6–97.8)
MONOCYTES # BLD: 0.3 K/UL (ref 0.1–1.3)
MONOCYTES NFR BLD AUTO: 9 % (ref 4–12)
NEUTS SEG # BLD: 2.9 K/UL (ref 1.7–8.2)
NEUTS SEG NFR BLD AUTO: 82 % (ref 43–78)
NRBC # BLD: 0 K/UL (ref 0–0.2)
PLATELET # BLD AUTO: 126 K/UL (ref 150–450)
PLATELET COMMENTS,PCOM: ABNORMAL
PMV BLD AUTO: 9.2 FL (ref 10.8–14.1)
POTASSIUM SERPL-SCNC: 4 MMOL/L (ref 3.5–5.1)
PROT SERPL-MCNC: 6.4 G/DL (ref 6.3–8.2)
RBC # BLD AUTO: 2.77 M/UL (ref 4.05–5.25)
RBC MORPH BLD: ABNORMAL
SODIUM SERPL-SCNC: 141 MMOL/L (ref 136–145)
WBC # BLD AUTO: 3.5 K/UL (ref 4.3–11.1)
WBC MORPH BLD: ABNORMAL

## 2017-07-12 PROCEDURE — 96415 CHEMO IV INFUSION ADDL HR: CPT

## 2017-07-12 PROCEDURE — 74011000258 HC RX REV CODE- 258: Performed by: INTERNAL MEDICINE

## 2017-07-12 PROCEDURE — 80053 COMPREHEN METABOLIC PANEL: CPT | Performed by: INTERNAL MEDICINE

## 2017-07-12 PROCEDURE — 85025 COMPLETE CBC W/AUTO DIFF WBC: CPT | Performed by: INTERNAL MEDICINE

## 2017-07-12 PROCEDURE — 83735 ASSAY OF MAGNESIUM: CPT | Performed by: INTERNAL MEDICINE

## 2017-07-12 PROCEDURE — 96375 TX/PRO/DX INJ NEW DRUG ADDON: CPT

## 2017-07-12 PROCEDURE — 96413 CHEMO IV INFUSION 1 HR: CPT

## 2017-07-12 PROCEDURE — 96361 HYDRATE IV INFUSION ADD-ON: CPT

## 2017-07-12 PROCEDURE — 96411 CHEMO IV PUSH ADDL DRUG: CPT

## 2017-07-12 PROCEDURE — 74011250636 HC RX REV CODE- 250/636: Performed by: INTERNAL MEDICINE

## 2017-07-12 PROCEDURE — 74011250637 HC RX REV CODE- 250/637: Performed by: INTERNAL MEDICINE

## 2017-07-12 RX ORDER — DIPHENHYDRAMINE HYDROCHLORIDE 50 MG/ML
50 INJECTION, SOLUTION INTRAMUSCULAR; INTRAVENOUS ONCE
Status: COMPLETED | OUTPATIENT
Start: 2017-07-12 | End: 2017-07-12

## 2017-07-12 RX ORDER — SODIUM CHLORIDE 0.9 % (FLUSH) 0.9 %
10 SYRINGE (ML) INJECTION AS NEEDED
Status: ACTIVE | OUTPATIENT
Start: 2017-07-12 | End: 2017-07-12

## 2017-07-12 RX ORDER — DEXAMETHASONE SODIUM PHOSPHATE 100 MG/10ML
10 INJECTION INTRAMUSCULAR; INTRAVENOUS ONCE
Status: COMPLETED | OUTPATIENT
Start: 2017-07-12 | End: 2017-07-12

## 2017-07-12 RX ORDER — ONDANSETRON 2 MG/ML
8 INJECTION INTRAMUSCULAR; INTRAVENOUS ONCE
Status: COMPLETED | OUTPATIENT
Start: 2017-07-12 | End: 2017-07-12

## 2017-07-12 RX ORDER — ACETAMINOPHEN 325 MG/1
650 TABLET ORAL ONCE
Status: COMPLETED | OUTPATIENT
Start: 2017-07-12 | End: 2017-07-12

## 2017-07-12 RX ORDER — HEPARIN 100 UNIT/ML
300-500 SYRINGE INTRAVENOUS AS NEEDED
Status: DISPENSED | OUTPATIENT
Start: 2017-07-12 | End: 2017-07-12

## 2017-07-12 RX ADMIN — Medication 10 ML: at 09:29

## 2017-07-12 RX ADMIN — Medication 10 ML: at 13:27

## 2017-07-12 RX ADMIN — RITUXIMAB 885 MG: 10 INJECTION, SOLUTION INTRAVENOUS at 10:05

## 2017-07-12 RX ADMIN — ACETAMINOPHEN 650 MG: 325 TABLET, FILM COATED ORAL at 09:29

## 2017-07-12 RX ADMIN — SODIUM CHLORIDE, PRESERVATIVE FREE 500 UNITS: 5 INJECTION INTRAVENOUS at 13:27

## 2017-07-12 RX ADMIN — DEXAMETHASONE SODIUM PHOSPHATE 10 MG: 10 INJECTION INTRAMUSCULAR; INTRAVENOUS at 12:51

## 2017-07-12 RX ADMIN — BENDAMUSTINE HYDROCHLORIDE 212.5 MG: 25 INJECTION, SOLUTION INTRAVENOUS at 13:15

## 2017-07-12 RX ADMIN — SODIUM CHLORIDE 500 ML: 900 INJECTION, SOLUTION INTRAVENOUS at 09:32

## 2017-07-12 RX ADMIN — ONDANSETRON 8 MG: 2 INJECTION INTRAMUSCULAR; INTRAVENOUS at 12:50

## 2017-07-12 RX ADMIN — DIPHENHYDRAMINE HYDROCHLORIDE 50 MG: 50 INJECTION, SOLUTION INTRAMUSCULAR; INTRAVENOUS at 09:30

## 2017-07-12 NOTE — PROGRESS NOTES
Pt was seen and labs reviewed by Dr. Aaron Alejandro. Will proceed with cycle 4 today. Pt to be referred to Dr. Shelley Solomon at [facility] for 2nd opinion per her request.  Navigator to help facilitate this. Maintenance therapy will (possibly) depend on 2nd opinion. Pt to return to clinic on August 15 for cycle 5. Navigation to cont to follow.

## 2017-07-12 NOTE — PROGRESS NOTES
Arrived to [facility] to receive Rituxan/Bendeka. Tolerated well. Issues or concerns during appointment: none. Aware of next appointment on 7/13 at 7:15 AM.   Discharged ambulatory accompanied by son.

## 2017-07-13 ENCOUNTER — HOSPITAL ENCOUNTER (OUTPATIENT)
Dept: INFUSION THERAPY | Age: 57
Discharge: HOME | End: 2017-07-13
Payer: COMMERCIAL

## 2017-07-13 VITALS
WEIGHT: 255.8 LBS | DIASTOLIC BLOOD PRESSURE: 90 MMHG | SYSTOLIC BLOOD PRESSURE: 149 MMHG | HEART RATE: 86 BPM | RESPIRATION RATE: 18 BRPM | BODY MASS INDEX: 45.31 KG/M2 | OXYGEN SATURATION: 98 % | TEMPERATURE: 98.2 F

## 2017-07-13 DIAGNOSIS — C85.88 MARGINAL ZONE LYMPHOMA OF LYMPH NODES OF MULTIPLE SITES (HCC): ICD-10-CM

## 2017-07-13 PROCEDURE — 74011250636 HC RX REV CODE- 250/636: Performed by: INTERNAL MEDICINE

## 2017-07-13 PROCEDURE — 96377 APPLICATON ON-BODY INJECTOR: CPT

## 2017-07-13 PROCEDURE — 74011000258 HC RX REV CODE- 258: Performed by: INTERNAL MEDICINE

## 2017-07-13 PROCEDURE — 96375 TX/PRO/DX INJ NEW DRUG ADDON: CPT

## 2017-07-13 PROCEDURE — 96409 CHEMO IV PUSH SNGL DRUG: CPT

## 2017-07-13 RX ORDER — HEPARIN 100 UNIT/ML
300-500 SYRINGE INTRAVENOUS AS NEEDED
Status: DISPENSED | OUTPATIENT
Start: 2017-07-13 | End: 2017-07-13

## 2017-07-13 RX ORDER — ONDANSETRON 2 MG/ML
8 INJECTION INTRAMUSCULAR; INTRAVENOUS ONCE
Status: COMPLETED | OUTPATIENT
Start: 2017-07-13 | End: 2017-07-13

## 2017-07-13 RX ORDER — DEXAMETHASONE SODIUM PHOSPHATE 100 MG/10ML
10 INJECTION INTRAMUSCULAR; INTRAVENOUS ONCE
Status: COMPLETED | OUTPATIENT
Start: 2017-07-13 | End: 2017-07-13

## 2017-07-13 RX ORDER — SODIUM CHLORIDE 0.9 % (FLUSH) 0.9 %
10 SYRINGE (ML) INJECTION AS NEEDED
Status: ACTIVE | OUTPATIENT
Start: 2017-07-13 | End: 2017-07-13

## 2017-07-13 RX ADMIN — SODIUM CHLORIDE, PRESERVATIVE FREE 500 UNITS: 5 INJECTION INTRAVENOUS at 08:10

## 2017-07-13 RX ADMIN — DEXAMETHASONE SODIUM PHOSPHATE 10 MG: 10 INJECTION INTRAMUSCULAR; INTRAVENOUS at 07:29

## 2017-07-13 RX ADMIN — BENDAMUSTINE HYDROCHLORIDE 212.5 MG: 25 INJECTION, SOLUTION INTRAVENOUS at 07:55

## 2017-07-13 RX ADMIN — Medication 10 ML: at 08:10

## 2017-07-13 RX ADMIN — PEGFILGRASTIM 6 MG: KIT SUBCUTANEOUS at 08:08

## 2017-07-13 RX ADMIN — SODIUM CHLORIDE 500 ML: 900 INJECTION, SOLUTION INTRAVENOUS at 07:20

## 2017-07-13 RX ADMIN — ONDANSETRON 8 MG: 2 INJECTION INTRAMUSCULAR; INTRAVENOUS at 07:28

## 2017-07-13 RX ADMIN — Medication 10 ML: at 07:20

## 2017-07-13 NOTE — PROGRESS NOTES
Arrived to [facility] to receive Bendeka and Neulasta. Tolerated well. Issues or concerns during appointment: none. Aware of next appointment on 8/15 at 8:15 AM.  Discharged ambulatory accompanied by spouse.

## 2017-07-17 ENCOUNTER — HOSPITAL ENCOUNTER (OUTPATIENT)
Dept: ONCOLOGY | Age: 57
Discharge: HOME | End: 2017-07-17
Payer: COMMERCIAL

## 2017-07-17 PROCEDURE — 97140 MANUAL THERAPY 1/> REGIONS: CPT

## 2017-07-17 NOTE — PROGRESS NOTES
Ruslan Marsh  : 1960 [facility] at [address] [facility]  [place] , [facility], [address] [facility]  [phone]   [phone]          OUTPATIENT PHYSICAL THERAPY:Daily Note 2017    ICD-10: Treatment Diagnosis: I 89.0 lymphedema not elsewhere classified  Precautions/Allergies:   Review of patient's allergies indicates no known allergies. Fall Risk Score: 1 (? 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 Lowell Maza presents for a lymphedema assessment due to edema of bilateral lower extremities. She has pitting edema in the bilateral lower extremities. She has previously had short stretch bandaging and MLD following knee surgery 3 years ago. She would like to try this again even though this is not orthopedic post surgical swelling. She was diagnosed with lymphoma in March of this year. She recently completed her second chemo of 6 planned. She will have a PET scan 17. Progress with chemo has been limited due to lab values being abnormal.  She is uncomfortable due to the edema. She will have a paracentesis 17. We will initiate edema management and progress slowly dependent on her tolerance to compression and her response to treatment. PROBLEM LIST (Impacting functional limitations):  1. Decreased Strength  2. Increased Pain  3. Decreased Activity Tolerance  4. Increased Fatigue  5. Increased Shortness of Breath  6. Decreased Flexibility/Joint Mobility  7. Edema/Girth  8. Decreased Knowledge of Precautions  9. Decreased [place] with Home Exercise Program INTERVENTIONS PLANNED:  1. Decongestion Therapy  2. Home Exercise Program (HEP)  3. Range of Motion (ROM)  4.  Therapeutic Exercise/Strengthening TREATMENT PLAN:  Effective Dates: 5/24/17 TO 8/16/17. Frequency/Duration: 2 times a week for 12 weeks  Patient will return 7/20/17. GOALS: (Goals have been discussed and agreed upon with patient.)  Short-Term Functional Goals: Time Frame: 4 weeks  1. The patient will have knowledge of signs and symptoms of lymphedema and how to manage within 4 weeks. Met   2. The patient will tolerate short stretch bandaging of bilateral lower extremities for reduction of girth within 4 weeks. Met   3. The patient and caregiver will be independent with compression bandaging within 4 weeks. Met   Discharge Goals: Time Frame: 8 weeks  1. The patient will have a girth decrease of at least 5 cm in the calf within 8 weeks. 2. The patient will be fit with static compression garments within 8 weeks. 3. The patient and caregiver will be independent with edema management within 8 weeks. Rehabilitation Potential For Stated Goals: [address] [place], [place] [address]s therapy, I certify that the treatment plan above will be carried out by a therapist or under their direction. Thank you for this referral,  Bev Perez PT     Referring Physician Signature: Karely Ordaz MD              Date                    The information in this section was collected on 5/24/17 (except where otherwise noted). HISTORY:   History of Present Injury/Illness (Reason for Referral):  Lymphoma, ascites, bilateral lower extremity pitting edema  Past Medical History/Comorbidities:   Ms. Amina Aaron  has a past medical history of Anemia; Arthritis; Basal cell carcinoma; Chronic pain; Hypertension (10/4/2011); Morbid obesity ([address] Murmur; Non Hodgkin's lymphoma ([address] (3/30/2017); Other ill-defined conditions; Overactive bladder; Rheumatic fever; Shingles; Thromboembolus ([address] (x2); Thyroid disease; and Unspecified adverse effect of anesthesia. She also has no past medical history of Aneurysm ([address] Arrhythmia; Asthma;  Autoimmune disease ([address] CAD (coronary artery disease); Chronic kidney disease; Coagulation defects; COPD; Diabetes ([address] Difficult intubation; GERD (gastroesophageal reflux disease); Heart failure ([address] Liver disease; Malignant hyperthermia due to anesthesia; Nausea & vomiting; Pseudocholinesterase deficiency; Psychiatric disorder; PUD (peptic ulcer disease); Seizures ([address] Stroke [facility]); or Unspecified sleep apnea. Ms. Stacy Cifuentes  has a past surgical history that includes  cholecystectomy fnc41; anesth,achilles tendon surg (2/10/2011); cholecystectomy (0636); orthopaedic (2012); orthopaedic (2013); and vascular access. Past Medical History:   Diagnosis Date    Anemia     in high school, \"received gamma globulin twice a week for awhile\"    Arthritis     osteo-r knee    Basal cell carcinoma     Followed by Dermatology    Chronic pain     KNEEs    Hypertension 10/4/2011    medication    Morbid obesity ([address]     Murmur     \"had it my whole life\", did not appreciate murmur 9/12/2011 during assessment    Non Hodgkin's lymphoma ([facility] [address] 3/30/2017    Other ill-defined conditions     skin bumps-med as needed    Overactive bladder     Rheumatic fever     Possibly as a child    Shingles     Thromboembolus ([address] x2    LLE-calf-1990?-only took ASA-resolved in less than a wk-\"a little burned area-not examined\"    Thyroid disease     hypo-medication    Unspecified adverse effect of anesthesia     pt reports waking several times with knee surgery-spinal     Past Surgical History:   Procedure Laterality Date    [facility] CHOLECYSTECTOMY FNC41      HX CHOLECYSTECTOMY  1983    HX ORTHOPAEDIC  2012    right TKA    HX ORTHOPAEDIC  2013    left TKA. Dr. Luz Garcia.  HX VASCULAR ACCESS      [place] ANESTH,ACHILLES TENDON SURG  2/10/2011    LEFT. Dr. Frances Angelucci.         Social History/Living Environment:     lives with family, 2 flights of stairs  Prior Level of Function/Work/Activity:    Dominant Side:         RIGHT  Current Medications:       Current Outpatient Prescriptions:     loratadine (CLARITIN) 10 mg tablet, Take 10 mg by mouth., Disp: , Rfl:     fluconazole (DIFLUCAN) 200 mg tablet, , Disp: , Rfl:     pregabalin (LYRICA) 100 mg capsule, Take 1 Cap by mouth three (3) times daily. Max Daily Amount: 300 mg., Disp: 90 Cap, Rfl: 3    oxyCODONE (OXYIR) 5 mg capsule, Take 1-2 Caps by mouth every four (4) hours as needed. Max Daily Amount: 60 mg., Disp: 180 Cap, Rfl: 0    midodrine (PROAMITINE) 2.5 mg tablet, Take 1 Tab by mouth two (2) times daily (with meals). , Disp: 60 Tab, Rfl: 2    omeprazole (PRILOSEC) 20 mg capsule, TAKE 1 CAPSULE DAILY, Disp: 90 Cap, Rfl: 2    benzonatate (TESSALON PERLES) 100 mg capsule, Take 1 Cap by mouth every six (6) hours as needed for Cough. , Disp: 40 Cap, Rfl: 3    levothyroxine (SYNTHROID) 125 mcg tablet, TAKE 1 TABLET DAILY BEFORE BREAKFAST, Disp: 90 Tab, Rfl: 0    magnesium oxide (MAG-OX) 400 mg tablet, Take 1 Tab by mouth two (2) times a day., Disp: 60 Tab, Rfl: 1    magic mouthwash (ALEXSANDER) susp, Take 10 mL by mouth every four (4) hours as needed. , Disp: 1 Bottle, Rfl: 2    allopurinol (ZYLOPRIM) 300 mg tablet, Take 1 Tab by mouth two (2) times a day., Disp: 90 Tab, Rfl: 2    nystatin (MYCOSTATIN) powder, Apply  to affected area three (3) times daily. , Disp: 60 g, Rfl: 2    lidocaine-prilocaine (EMLA) topical cream, Apply  to affected area as needed for Pain. Apply to port site 45-60 minutes prior to lab appt or infusion. , Disp: 30 g, Rfl: 0    promethazine (PHENERGAN) 25 mg tablet, Take 25 mg by mouth every six (6) hours as needed for Nausea. Unsure of dose, Disp: , Rfl:     ondansetron hcl (ZOFRAN, AS HYDROCHLORIDE,) 4 mg tablet, Take 1 Tab by mouth every eight (8) hours as needed for Nausea., Disp: 60 Tab, Rfl: 3    aspirin 81 mg chewable tablet, Take 81 mg by mouth daily. , Disp: , Rfl:    Date Last Reviewed:  5/24/17   Number of Personal Factors/Comorbidities that affect the Plan of Care: 3+: HIGH COMPLEXITY EXAMINATION:   Palpation:          Pitting edema, dry skin, loose skin  ROM:          Within functional limits. She previously has had bilateral knee replacements and an Achilles tendon repair on the left  Strength:          Grossly 4-/5 x 4 extremities  Functional Mobility:         Gait/Ambulation:  Independent with increased speed        Transfers: independent        Bed Mobility:  independent  Skin Integrity:          Intact, but dry. Edema/Girth:  pitting    Left Right    Initial Most Recent Initial Most Recent   Upper  Extremity           Lower  Extremity               Body Structures Involved:  1. Muscles  2. lymphatic system Body Functions Affected:  1. Sensory/Pain  2. Skin Related  3. lymphatic system Activities and Participation Affected:  1. General Tasks and Demands  2. Mobility  3. Self Care   Number of elements (examined above) that affect the Plan of Care: 4+: HIGH COMPLEXITY   CLINICAL PRESENTATION:   Presentation: Evolving clinical presentation with unstable and unpredictable characteristics: HIGH COMPLEXITY   CLINICAL DECISION MAKING:   Outcome Measure: Tool Used: NCCN Distress Thermometer   Score:  Initial:   Most Recent: X    Interpretation of Score: If greater than or equal to 8, then PHQ-9 Depression Scale Score   and JOON-7 Anxiety Scale Score  . Tool Used: ECOG Performance Survey Score  Score:  Initial: 2 Most Recent:  1    Interpretation of Score:   0 Fully active, able to carry on all pre-disease performance without restriction   1 Restricted in physically strenuous activity but ambulatory and able to carry out work of a light or sedentary nature, e.g., light house work, office work   2 Ambulatory and capable of all selfcare but unable to carry out any work activities. Up and about more than 50% of waking hours   3 Capable of only limited selfcare, confined to bed or chair more than 50% of waking hours   4 Completely disabled. Cannot carry on any selfcare.  Totally confined to bed or chair   5 Dead    Tool Used: 6-MINUTE WALK TEST  Score:  Initial: 1110 feet Most Recent: X feet (Date: -- )   Interpretation of Score: Normal range varies but is approximately 5027-4990 Feet      Distance walked: 1110 feet     Baseline End of Test   Heart Rate 72 100   Dyspnea (Luther Scale)     Fatigue (Luther Scale) 0 2   SpO2 97 99   /69 142/72     Score 2133 [phone] [phone] 426-16 15-0   Modifier CH CI CJ CK CL CM CN       Tool Used: Timed Up and Go (TUG)  Score:  Initial: 8 seconds Most Recent: X seconds (Date: -- )   Interpretation of Score: The test measures, in seconds, the time taken by an individual to stand up from a standard arm chair (seat height 46 cm [18 in], arm height 65 cm [25.6 in]), walk a distance of 3 meters (118 in, approx 10 ft), turn, walk back to the chair and sit down. If the individual takes longer than 14 seconds to complete TUG, this indicates risk for falls. Score 7 7.5-10.5 11-14 14.5-17.5 18-21 21.5-24.5 25+   Modifier CH CI CJ CK CL CM CN         Tool Used: Lymphedema Life Impact Scale   Score:  Initial:  54 Most Recent: 48 (Date: 6/22/17 )   Interpretation of Score: The Lymphedema Life Impact Scale (LLIS) is a validated instrument that measures the physical, functional, and psychosocial concerns pertinent to patients with extremity lymphedema. The Scale's questionnaire is administered to patients to gauge impairments, activity limitations, and participation restrictions resulting from their lymphedema. Score 0 1-13 14-26 27-40 41-54 55-67 68   Modifier CH CI CJ CK CL CM CN       Medical Necessity:   · Patient is expected to demonstrate progress in strength and edema management to increase independence with self care and houseold activity. Reason for Services/Other Comments:  · Patient continues to demonstrate capacity to improve strength and edema management which will increase independence.    Use of outcome tool(s) and clinical judgement create a POC that gives a: Questionable prediction of patient's progress: MODERATE COMPLEXITY            TREATMENT:   (In addition to Assessment/Re-Assessment sessions the following treatments were rendered)  Pre-treatment Symptoms/Complaints:  Bilateral lower extremity edema. The patient reports she has been having aching in her extremities and back due to her chemo. She reports she feels bad today. She reports she had a fall last week. She reports her blood work did not come back good. Pain: Initial: o      Post Session:  0   24 min  Bandages not in place upon arrival.  The patient presents with her Cir-caid juxta Lite. She was instructed in the correct donning and doffing. She is to wear the foot stocking and calf piece for edema. as above    Treatment/Session Assessment:    · Response to Treatment:  Tolerated the treatment well. · Compliance with Program/Exercises: Will assess as treatment progresses. · Recommendations/Intent for next treatment session: \"Next visit will focus on assess efficacy of compression\". Continue with compression to tolerance. Will see her 7/20/17.   Will initiate conditioning and strengthening per her request.  Total Treatment Duration:  PT Patient Time In/Time Out  Time In: (P) 2832  Time Out: [address],

## 2017-07-18 ENCOUNTER — PATIENT OUTREACH (OUTPATIENT)
Dept: CASE MANAGEMENT | Age: 57
End: 2017-07-18

## 2017-07-18 DIAGNOSIS — C85.88 MARGINAL ZONE LYMPHOMA OF LYMPH NODES OF MULTIPLE SITES (HCC): ICD-10-CM

## 2017-07-19 ENCOUNTER — APPOINTMENT (OUTPATIENT)
Dept: GENERAL RADIOLOGY | Age: 57
DRG: 809 | End: 2017-07-19
Attending: EMERGENCY MEDICINE
Payer: COMMERCIAL

## 2017-07-19 ENCOUNTER — PATIENT OUTREACH (OUTPATIENT)
Dept: CASE MANAGEMENT | Age: 57
End: 2017-07-19

## 2017-07-19 ENCOUNTER — HOSPITAL ENCOUNTER (INPATIENT)
Age: 57
LOS: 2 days | Discharge: HOME | DRG: 809 | End: 2017-07-21
Attending: EMERGENCY MEDICINE | Admitting: INTERNAL MEDICINE
Payer: COMMERCIAL

## 2017-07-19 DIAGNOSIS — D70.9 NEUTROPENIC FEVER (HCC): Primary | ICD-10-CM

## 2017-07-19 DIAGNOSIS — J06.9 ACUTE URI: ICD-10-CM

## 2017-07-19 DIAGNOSIS — C85.88 MARGINAL ZONE LYMPHOMA OF LYMPH NODES OF MULTIPLE SITES (HCC): ICD-10-CM

## 2017-07-19 DIAGNOSIS — R50.81 NEUTROPENIC FEVER (HCC): Primary | ICD-10-CM

## 2017-07-19 PROBLEM — E03.9 HYPOTHYROID: Chronic | Status: ACTIVE | Noted: 2017-07-19

## 2017-07-19 PROBLEM — I95.89 CHRONIC HYPOTENSION: Chronic | Status: ACTIVE | Noted: 2017-07-19

## 2017-07-19 PROBLEM — T45.1X5A PANCYTOPENIA DUE TO ANTINEOPLASTIC CHEMOTHERAPY (HCC): Chronic | Status: ACTIVE | Noted: 2017-07-19

## 2017-07-19 PROBLEM — D61.810 PANCYTOPENIA DUE TO ANTINEOPLASTIC CHEMOTHERAPY (HCC): Chronic | Status: ACTIVE | Noted: 2017-07-19

## 2017-07-19 LAB
ALBUMIN SERPL BCP-MCNC: 3.1 G/DL (ref 3.5–5)
ALBUMIN/GLOB SERPL: 1 {RATIO} (ref 1.2–3.5)
ALP SERPL-CCNC: 157 U/L (ref 50–136)
ALT SERPL-CCNC: 49 U/L (ref 12–65)
ANION GAP BLD CALC-SCNC: 9 MMOL/L (ref 7–16)
AST SERPL W P-5'-P-CCNC: 36 U/L (ref 15–37)
BILIRUB SERPL-MCNC: 0.4 MG/DL (ref 0.2–1.1)
BUN SERPL-MCNC: 9 MG/DL (ref 6–23)
CALCIUM SERPL-MCNC: 8.5 MG/DL (ref 8.3–10.4)
CHLORIDE SERPL-SCNC: 104 MMOL/L (ref 98–107)
CO2 SERPL-SCNC: 28 MMOL/L (ref 21–32)
CREAT SERPL-MCNC: 0.79 MG/DL (ref 0.6–1)
DEPRECATED S PYO AG THROAT QL EIA: NEGATIVE
DIFFERENTIAL METHOD BLD: ABNORMAL
ERYTHROCYTE [DISTWIDTH] IN BLOOD BY AUTOMATED COUNT: 15.1 % (ref 11.9–14.6)
FLUAV AG NPH QL IA: NEGATIVE
FLUBV AG NPH QL IA: NEGATIVE
GLOBULIN SER CALC-MCNC: 3.1 G/DL (ref 2.3–3.5)
GLUCOSE SERPL-MCNC: 110 MG/DL (ref 65–100)
HCT VFR BLD AUTO: 25.8 % (ref 35.8–46.3)
HGB BLD-MCNC: 8.7 G/DL (ref 11.7–15.4)
LACTATE BLD-SCNC: 0.9 MMOL/L (ref 0.5–1.9)
MCH RBC QN AUTO: 30.3 PG (ref 26.1–32.9)
MCHC RBC AUTO-ENTMCNC: 33.7 G/DL (ref 31.4–35)
MCV RBC AUTO: 89.9 FL (ref 79.6–97.8)
PLATELET # BLD AUTO: 85 K/UL (ref 150–450)
PLATELET COMMENTS,PCOM: ABNORMAL
PMV BLD AUTO: 10.8 FL (ref 10.8–14.1)
POTASSIUM SERPL-SCNC: 3.7 MMOL/L (ref 3.5–5.1)
PROCALCITONIN SERPL-MCNC: 0.1 NG/ML
PROT SERPL-MCNC: 6.2 G/DL (ref 6.3–8.2)
RBC # BLD AUTO: 2.87 M/UL (ref 4.05–5.25)
RBC MORPH BLD: ABNORMAL
SODIUM SERPL-SCNC: 141 MMOL/L (ref 136–145)
WBC # BLD AUTO: 0.7 K/UL (ref 4.3–11.1)
WBC MORPH BLD: ABNORMAL

## 2017-07-19 PROCEDURE — 87880 STREP A ASSAY W/OPTIC: CPT | Performed by: INTERNAL MEDICINE

## 2017-07-19 PROCEDURE — 87040 BLOOD CULTURE FOR BACTERIA: CPT | Performed by: EMERGENCY MEDICINE

## 2017-07-19 PROCEDURE — 74011250637 HC RX REV CODE- 250/637: Performed by: INTERNAL MEDICINE

## 2017-07-19 PROCEDURE — 96375 TX/PRO/DX INJ NEW DRUG ADDON: CPT | Performed by: EMERGENCY MEDICINE

## 2017-07-19 PROCEDURE — 87081 CULTURE SCREEN ONLY: CPT | Performed by: EMERGENCY MEDICINE

## 2017-07-19 PROCEDURE — 74011250636 HC RX REV CODE- 250/636: Performed by: EMERGENCY MEDICINE

## 2017-07-19 PROCEDURE — 87804 INFLUENZA ASSAY W/OPTIC: CPT | Performed by: INTERNAL MEDICINE

## 2017-07-19 PROCEDURE — 74011000258 HC RX REV CODE- 258: Performed by: EMERGENCY MEDICINE

## 2017-07-19 PROCEDURE — 85025 COMPLETE CBC W/AUTO DIFF WBC: CPT | Performed by: EMERGENCY MEDICINE

## 2017-07-19 PROCEDURE — 96365 THER/PROPH/DIAG IV INF INIT: CPT | Performed by: EMERGENCY MEDICINE

## 2017-07-19 PROCEDURE — 84145 PROCALCITONIN (PCT): CPT | Performed by: EMERGENCY MEDICINE

## 2017-07-19 PROCEDURE — 74011250637 HC RX REV CODE- 250/637: Performed by: EMERGENCY MEDICINE

## 2017-07-19 PROCEDURE — 81003 URINALYSIS AUTO W/O SCOPE: CPT | Performed by: EMERGENCY MEDICINE

## 2017-07-19 PROCEDURE — 71020 XR CHEST PA LAT: CPT

## 2017-07-19 PROCEDURE — 99285 EMERGENCY DEPT VISIT HI MDM: CPT | Performed by: EMERGENCY MEDICINE

## 2017-07-19 PROCEDURE — 65270000029 HC RM PRIVATE

## 2017-07-19 PROCEDURE — 83605 ASSAY OF LACTIC ACID: CPT

## 2017-07-19 PROCEDURE — 80053 COMPREHEN METABOLIC PANEL: CPT | Performed by: EMERGENCY MEDICINE

## 2017-07-19 RX ORDER — LANOLIN ALCOHOL/MO/W.PET/CERES
400 CREAM (GRAM) TOPICAL 2 TIMES DAILY
Status: DISCONTINUED | OUTPATIENT
Start: 2017-07-20 | End: 2017-07-21 | Stop reason: HOSPADM

## 2017-07-19 RX ORDER — SODIUM CHLORIDE 0.9 % (FLUSH) 0.9 %
5-10 SYRINGE (ML) INJECTION EVERY 8 HOURS
Status: DISCONTINUED | OUTPATIENT
Start: 2017-07-19 | End: 2017-07-21 | Stop reason: HOSPADM

## 2017-07-19 RX ORDER — ACETAMINOPHEN 325 MG/1
650 TABLET ORAL
Status: DISCONTINUED | OUTPATIENT
Start: 2017-07-19 | End: 2017-07-21 | Stop reason: HOSPADM

## 2017-07-19 RX ORDER — MIDODRINE HYDROCHLORIDE 5 MG/1
2.5 TABLET ORAL 2 TIMES DAILY WITH MEALS
Status: DISCONTINUED | OUTPATIENT
Start: 2017-07-20 | End: 2017-07-21 | Stop reason: HOSPADM

## 2017-07-19 RX ORDER — ACETAMINOPHEN 500 MG
1000 TABLET ORAL
Status: COMPLETED | OUTPATIENT
Start: 2017-07-19 | End: 2017-07-19

## 2017-07-19 RX ORDER — LORATADINE 10 MG/1
10 TABLET ORAL DAILY
Status: DISCONTINUED | OUTPATIENT
Start: 2017-07-20 | End: 2017-07-21 | Stop reason: HOSPADM

## 2017-07-19 RX ORDER — ZOLPIDEM TARTRATE 5 MG/1
5 TABLET ORAL
Status: DISCONTINUED | OUTPATIENT
Start: 2017-07-19 | End: 2017-07-21 | Stop reason: HOSPADM

## 2017-07-19 RX ORDER — ALLOPURINOL 300 MG/1
300 TABLET ORAL 2 TIMES DAILY
Status: DISCONTINUED | OUTPATIENT
Start: 2017-07-20 | End: 2017-07-21 | Stop reason: HOSPADM

## 2017-07-19 RX ORDER — SODIUM CHLORIDE 9 MG/ML
100 INJECTION, SOLUTION INTRAVENOUS CONTINUOUS
Status: DISCONTINUED | OUTPATIENT
Start: 2017-07-19 | End: 2017-07-21 | Stop reason: HOSPADM

## 2017-07-19 RX ORDER — PREGABALIN 50 MG/1
100 CAPSULE ORAL 3 TIMES DAILY
Status: DISCONTINUED | OUTPATIENT
Start: 2017-07-19 | End: 2017-07-21 | Stop reason: HOSPADM

## 2017-07-19 RX ORDER — SODIUM CHLORIDE 0.9 % (FLUSH) 0.9 %
5-10 SYRINGE (ML) INJECTION AS NEEDED
Status: DISCONTINUED | OUTPATIENT
Start: 2017-07-19 | End: 2017-07-21 | Stop reason: HOSPADM

## 2017-07-19 RX ORDER — PANTOPRAZOLE SODIUM 40 MG/1
40 TABLET, DELAYED RELEASE ORAL
Status: DISCONTINUED | OUTPATIENT
Start: 2017-07-20 | End: 2017-07-21 | Stop reason: HOSPADM

## 2017-07-19 RX ORDER — VANCOMYCIN 1.75 GRAM/500 ML IN 0.9 % SODIUM CHLORIDE INTRAVENOUS
1750 ONCE
Status: COMPLETED | OUTPATIENT
Start: 2017-07-19 | End: 2017-07-19

## 2017-07-19 RX ORDER — NYSTATIN 100000 [USP'U]/G
POWDER TOPICAL 3 TIMES DAILY
Status: DISCONTINUED | OUTPATIENT
Start: 2017-07-19 | End: 2017-07-21 | Stop reason: HOSPADM

## 2017-07-19 RX ADMIN — VANCOMYCIN HYDROCHLORIDE 1750 MG: 10 INJECTION, POWDER, LYOPHILIZED, FOR SOLUTION INTRAVENOUS at 21:45

## 2017-07-19 RX ADMIN — ACETAMINOPHEN 650 MG: 325 TABLET ORAL at 23:53

## 2017-07-19 RX ADMIN — PIPERACILLIN SODIUM,TAZOBACTAM SODIUM 4.5 G: 4; .5 INJECTION, POWDER, FOR SOLUTION INTRAVENOUS at 20:54

## 2017-07-19 RX ADMIN — PREGABALIN 100 MG: 50 CAPSULE ORAL at 23:53

## 2017-07-19 RX ADMIN — ZOLPIDEM TARTRATE 5 MG: 5 TABLET ORAL at 23:53

## 2017-07-19 RX ADMIN — ACETAMINOPHEN 1000 MG: 500 TABLET ORAL at 19:35

## 2017-07-19 NOTE — ACP (ADVANCE CARE PLANNING)
Pt called navigator after speaking to triage nurse who told her to go to ER for fever/possible neutropenia. See previous notes. Pt was told to call provider 24/7 with temp 100.5 or higher yesterday and this morning, and she verbalized understanding at the time. Pt states now that her temp is 101.9-102 and she feels \"like she has the flu\". Pt instructed to go to the ER now. Pt verbalized understanding of this and navigator clarified multiple times the importance of calling provider 24/7 with temp of 100.5.

## 2017-07-19 NOTE — IP AVS SNAPSHOT
Silva Frederick 
 
 
 [address] 
[phone] Patient: Kulwant Tariq MRN: [id number] DAYANARA:[birth year] You are allergic to the following No active allergies Recent Documentation Height Weight Breastfeeding? BMI OB Status Smoking Status 1.575 m 114.5 kg No 46.18 kg/m2 Postmenopausal Never Smoker Unresulted Labs Order Current Status CULTURE, BLOOD Preliminary result CULTURE, BLOOD Preliminary result CULTURE, STREP THROAT Preliminary result Emergency Contacts Name Discharge Info Relation Home Work Mobile [employer] 91 CAREGIVER [3] Spouse [3] [phone] Molina Barrientos IV DISCHARGE CAREGIVER [3] Son [22] [phone] [facility] CAREGIVER [3] Other Relative [6] [phone] [facility] DISCHARGE CAREGIVER [3] Sister [23] [phone] About your hospitalization You were admitted on:  July 19, 2017 You last received care in the:  [address] You were discharged on:  July 21, 2017 Unit phone number:  [phone] Why you were hospitalized Your primary diagnosis was:  Neutropenic Fever (Hcc) Your diagnoses also included:  Sepsis (Hcc), 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), Pancytopenia Due To Antineoplastic Chemotherapy (Hcc), Hypothyroid, Chronic Hypotension Providers Seen During Your Hospitalizations Provider Role Specialty Primary office phone Darryn Duong MD Attending Provider Emergency Medicine [phone] Atif Starks MD Attending Provider Internal Medicine [phone] Mike Bennett MD Attending Provider Hematology and Oncology [phone] Your Primary Care Physician (PCP) Primary Care Physician Office Phone Office Fax Reather Sailors [phone] Follow-up Information Follow up With Details Comments Contact Info Jaya Galindo MD   [address] 1333 [facility] 123  Josue Sifuentes 
[phone] Pamela Freitas MD On 7/26/2017 Pt will see NP, lab at 9:00 and deepak at 9:30am per Brend[place] [address] [address] [facility] [address] 
[phone] Your Appointments Monday July 24, 2017 11:00 AM EDT  
SC PT RECUR VISIT LYMPHEDEMA with Nichole Samuels PT  
[facility] ([address] [place] [address] Avenue) [address] [facility] [address]  
[phone] Monday July 24, 2017  3:00 PM EDT  
COMPLETE PHYSICAL with Jaya Galindo MD  
[address] [address]  
[phone] Wednesday July 26, 2017  9:00 AM EDT  
LAB with [address]  
7798 [facility] OUTREACH INSURANCE [facility]) [address] 652 [facility]  
[phone] Wednesday July 26, 2017  9:30 AM EDT HOSPITAL FOLLOW-UP with MARA alf NP1 Josette Moss Hematology and Oncology [facility]) [address] [facility] [address]  
[phone] Thursday July 27, 2017  8:45 AM EDT  
SC PT RECUR VISIT LYMPHEDEMA with Nichole Samuels PT  
[facility] ([address] [place] [address] Avenue) [address] [facility] [address]  
[phone] Monday July 31, 2017 10:15 AM EDT  
SC PT RECUR VISIT LYMPHEDEMA with Nichole Samuels PT  
[facility] ([facility] [address]) [address] [facility] [address]  
[phone] Thursday August 03, 2017  8:45 AM EDT  
SC PT RECUR VISIT LYMPHEDEMA with Nichole Samuels, PT  
[facility] ([address]) [address] [facility] [address]  
[phone]  Tuesday August 15, 2017  8:15 AM EDT  
 LAB with [address]  
1808 [facility] OUTREACH INSURANCE ([address]) Linnea Pratt [address] [place] [address]  
[phone] Tuesday August 15, 2017  8:45 AM EDT PreChemo Follow Up with MD Vickey Andrea Hematology and Oncology [facility]) [address] [facility] [address]  
[phone] Tuesday August 15, 2017  9:00 AM EDT Follow Up with MARA Acosta Hematology and Oncology [facility]) [address] [facility] [address]  
[phone] Tuesday August 15, 2017  9:15 AM EDT Chemo with [address] [employer] [address] ) [address] Moose Hough [phone] [facility] [address]  
[phone] [address] Wednesday August 16, 2017  7:15 AM EDT Infusion with [address] [employer] [facility] [address] ) [address] Virk Gianluca [phone] [facility] [address]  
[phone] [address] Current Discharge Medication List  
  
START taking these medications Dose & Instructions Dispensing Information Comments Morning Noon Evening Bedtime  
 acyclovir 400 mg tablet Commonly known as:  ZOVIRAX Dose:  400 mg Take 1 Tab by mouth two (2) times a day. Quantity:  60 Tab Refills:  3  
     
  
   
   
  
   
  
 amoxicillin-clavulanate 875-125 mg per tablet Commonly known as:  AUGMENTIN Dose:  1 Tab Take 1 Tab by mouth two (2) times a day for 6 days. Quantity:  12 Tab Refills:  0 CONTINUE these medications which have CHANGED Dose & Instructions Dispensing Information Comments Morning Noon Evening Bedtime * SYNTHROID 150 mcg tablet Generic drug:  levothyroxine What changed:  Another medication with the same name was changed. Make sure you understand how and when to take each. Dose:  150 mcg Take 150 mcg by mouth Daily (before breakfast). Refills:  0  
     
  
   
   
   
  
 * levothyroxine 150 mcg tablet Commonly known as:  SYNTHROID What changed:  See the new instructions. Dose:  150 mcg Take 1 Tab by mouth Daily (before breakfast). Quantity:  30 Tab Refills:  0  
     
  
   
   
   
  
 * Notice: This list has 2 medication(s) that are the same as other medications prescribed for you. Read the directions carefully, and ask your doctor or other care provider to review them with you. CONTINUE these medications which have NOT CHANGED Dose & Instructions Dispensing Information Comments Morning Noon Evening Bedtime  
 allopurinol 300 mg tablet Commonly known as:  Lara Groom Dose:  300 mg Take 1 Tab by mouth two (2) times a day. Quantity:  90 Tab Refills:  2  
     
  
   
   
  
   
  
 benzonatate 100 mg capsule Commonly known as:  TESSALON PERLES Dose:  100 mg Take 1 Cap by mouth every six (6) hours as needed for Cough. Quantity:  40 Cap Refills:  3 CLARITIN 10 mg tablet Generic drug:  loratadine Dose:  10 mg Take 10 mg by mouth. Refills:  0  
     
  
   
   
   
  
 fluconazole 200 mg tablet Commonly known as:  DIFLUCAN Refills:  0  
     
   
   
   
  
 lidocaine-prilocaine topical cream  
Commonly known as:  EMLA Apply  to affected area as needed for Pain. Apply to port site 45-60 minutes prior to lab appt or infusion. Quantity:  30 g Refills:  0  
     
   
   
   
  
 magic mouthwash Susp Commonly known as:  [place] Dose:  10 mL Take 10 mL by mouth every four (4) hours as needed. Quantity:  1 Bottle Refills:  2  
     
   
   
   
  
 magnesium oxide 400 mg tablet Commonly known as:  MAG-OX  Dose:  400 mg  
 Take 1 Tab by mouth two (2) times a day. Quantity:  60 Tab Refills:  1  
     
   
   
   
  
 midodrine 2.5 mg tablet Commonly known as:  Kin Bowser Dose:  2.5 mg Take 1 Tab by mouth two (2) times daily (with meals). Quantity:  60 Tab Refills:  2  
     
  
   
   
  
   
  
 nystatin powder Commonly known as:  MYCOSTATIN Apply  to affected area three (3) times daily. Quantity:  60 g Refills:  2  
     
   
   
   
  
 omeprazole 20 mg capsule Commonly known as:  PRILOSEC  
   
 TAKE 1 CAPSULE DAILY Quantity:  90 Cap Refills:  2  
     
   
   
   
  
 ondansetron hcl 4 mg tablet Commonly known as:  ZOFRAN (AS HYDROCHLORIDE) Dose:  4 mg Take 1 Tab by mouth every eight (8) hours as needed for Nausea. Quantity:  60 Tab Refills:  3  
     
   
   
   
  
 oxyCODONE 5 mg capsule Commonly known as:  OXYIR Dose:  5-10 mg Take 1-2 Caps by mouth every four (4) hours as needed. Max Daily Amount: 60 mg.  
 Quantity:  180 Cap Refills:  0  
     
   
   
   
  
 pregabalin 100 mg capsule Commonly known as:  Carlynn Karly Dose:  100 mg Take 1 Cap by mouth three (3) times daily. Max Daily Amount: 300 mg. Quantity:  90 Cap Refills:  3  
     
   
   
   
  
 promethazine 25 mg tablet Commonly known as:  PHENERGAN Dose:  25 mg Take 25 mg by mouth every six (6) hours as needed for Nausea. Unsure of dose Refills:  0 STOP taking these medications   
 levoFLOXacin 500 mg tablet Commonly known as:  Kaelyn Matt Where to Get Your Medications These medications were sent to [facility], [address]  [address], [facility] [address] Phone:  [phone]  
  acyclovir 400 mg tablet  
 amoxicillin-clavulanate 875-125 mg per tablet  
 levothyroxine 150 mcg tablet Discharge Instructions DISCHARGE SUMMARY from Nurse The following personal items are in your possession at time of discharge: 
 
Dental Appliances: None Visual Aid: Glasses, At bedside Jewelry: None Clothing: Pajamas, Shorts, Undergarments, Sweater Other Valuables: Cell Phone, Myrna Mow Personal Items Sent to Safe: none PATIENT INSTRUCTIONS: 
 
After general anesthesia or intravenous sedation, for 24 hours or while taking prescription Narcotics: · Limit your activities · Do not drive and operate hazardous machinery · Do not make important personal or business decisions · Do  not drink alcoholic beverages · If you have not urinated within 8 hours after discharge, please contact your surgeon on call. Report the following to your surgeon: 
· Excessive pain, swelling, redness or odor of or around the surgical area · Temperature over 100.5 · Nausea and vomiting lasting longer than 4 hours or if unable to take medications · Any signs of decreased circulation or nerve impairment to extremity: change in color, persistent  numbness, tingling, coldness or increase pain · Any questions What to do at Home: 
Recommended activity: Activity as tolerated, per MD instructions If you experience any of the following symptoms fever > 100.5, nausea, vomiting, pain, chest pain, shortness of breath please follow up with MD. 
 
 
*  Please give a list of your current medications to your Primary Care Provider. *  Please update this list whenever your medications are discontinued, doses are 
    changed, or new medications (including over-the-counter products) are added. *  Please carry medication information at all times in case of emergency situations. These are general instructions for a healthy lifestyle: No smoking/ No tobacco products/ Avoid exposure to second hand smoke Surgeon General's Warning:  Quitting smoking now greatly reduces serious risk to your health. Obesity, smoking, and sedentary lifestyle greatly increases your risk for illness A healthy diet, regular physical exercise & weight monitoring are important for maintaining a healthy lifestyle You may be retaining fluid if you have a history of heart failure or if you experience any of the following symptoms:  Weight gain of 3 pounds or more overnight or 5 pounds in a week, increased swelling in our hands or feet or shortness of breath while lying flat in bed. Please call your doctor as soon as you notice any of these symptoms; do not wait until your next office visit. Recognize signs and symptoms of STROKE: 
 
F-face looks uneven A-arms unable to move or move unevenly S-speech slurred or non-existent T-time-call 911 as soon as signs and symptoms begin-DO NOT go Back to bed or wait to see if you get better-TIME IS BRAIN. Warning Signs of HEART ATTACK Call 911 if you have these symptoms: 
? Chest discomfort. Most heart attacks involve discomfort in the center of the chest that lasts more than a few minutes, or that goes away and comes back. It can feel like uncomfortable pressure, squeezing, fullness, or pain. ? Discomfort in other areas of the upper body. Symptoms can include pain or discomfort in one or both arms, the back, neck, jaw, or stomach. ? Shortness of breath with or without chest discomfort. ? Other signs may include breaking out in a cold sweat, nausea, or lightheadedness. Don't wait more than five minutes to call [address]! Fast action can save your life. Calling 911 is almost always the fastest way to get lifesaving treatment. Emergency Medical Services staff can begin treatment when they arrive  up to an hour sooner than if someone gets to the hospital by car. The discharge information has been reviewed with the patient. The patient verbalized understanding.  
 
Discharge medications reviewed with the patient and appropriate educational materials and side effects teaching were provided. Learning About Fever What is a fever? A fever is a high body temperature. It's one way your body fights being sick. A fever shows that the body is responding to infection or other illnesses, both minor and severe. A fever is a symptom, not an illness by itself. A fever can be a sign that you are ill, but most fevers are not caused by a serious problem. You may have a fever with a minor illness, such as a cold. But sometimes a very serious infection may cause little or no fever. It is important to look at other symptoms, other conditions you have, and how you feel in general. In children, notice how they act and see what symptoms they complain of. What is a normal body temperature? A normal body temperature is about 98. 6ºF. Some people have a normal temperature that is a little higher or a little lower than this. Your temperature may be a little lower in the morning than it is later in the day. It may go up during hot weather or when you exercise, wear heavy clothes, or take a hot bath. Your temperature may also be different depending on how you take it. A temperature taken in the mouth (oral) or under the arm may be a little lower than your core temperature (rectal). What is a fever temperature? A core temperature of 100.4°F or above is considered a fever. What can cause a fever? A fever may be caused by: · Infections. This is the most common cause of a fever. Examples of infections that can cause a fever include the flu, a kidney infection, or pneumonia. · Some medicines. · Severe trauma or injury, such as a heart attack, stroke, heatstroke, or burns. · Other medical conditions, such as arthritis and some cancers. How can you treat a fever at home?  
· Ask your doctor if you can take an over-the-counter pain medicine, such as acetaminophen (Tylenol), ibuprofen (Advil, Motrin), or naproxen (Aleve). Be safe with medicines. Read and follow all instructions on the label. · To prevent dehydration, drink plenty of fluids. Choose water and other caffeine-free clear liquids until you feel better. If you have kidney, heart, or liver disease and have to limit fluids, talk with your doctor before you increase the amount of fluids you drink. Follow-up care is a key part of your treatment and safety. Be sure to make and go to all appointments, and call your doctor if you are having problems. It's also a good idea to know your test results and keep a list of the medicines you take. Where can you learn more? Go to http://rajatAggamin Pharmaceuticalsrachel.info/. Enter M805 in the search box to learn more about \"Learning About Fever. \" Current as of: March 20, 2017 Content Version: 11.3 © 0537-7814 [employer]. Care instructions adapted under license by [employer] (which disclaims liability or warranty for this information). If you have questions about a medical condition or this instruction, always ask your healthcare professional. [address] any warranty or liability for your use of this information. Low Blood Pressure: Care Instructions Your Care Instructions Blood pressure is a measurement of the force of the blood against the walls of the blood vessels during and after each beat of the heart. Low blood pressure (hypotension) means that your blood pressure is much lower than normal. Some people, especially young, slim women, may have slightly low blood pressure without symptoms. However, in many people, low blood pressure can cause symptoms such as dizziness or lightheadedness. When your blood pressure is too low, your heart, brain, and other organs do not get enough blood. Low blood pressure can be caused by many things, including heart problems and some medicines.  Uncontrolled diabetes can cause your blood pressure to drop, and so can a severe allergic reaction or infection. Another cause is dehydration, which is when your body loses too much fluid. Treatment for low blood pressure depends on the cause. Follow-up care is a key part of your treatment and safety. Be sure to make and go to all appointments, and call your doctor if you are having problems. It's also a good idea to know your test results and keep a list of the medicines you take. How can you care for yourself at home? · Drink plenty of fluids, enough so that your urine is light yellow or clear like water. If you have kidney, heart, or liver disease and have to limit fluids, talk with your doctor before you increase the amount of fluids you drink. · Be safe with medicines. Call your doctor if you think you are having a problem with your medicine. You will get more details on the specific medicines your doctor prescribes. · Stand up or get out of bed very slowly to allow your body to adjust. 
· Get plenty of rest. 
· Do not smoke. Smoking increases your risk of heart attack. If you need help quitting, talk to your doctor about stop-smoking programs and medicines. These can increase your chances of quitting for good. · Limit alcohol to 2 drinks a day for men and 1 drink a day for women. Alcohol may interfere with your medicine. In addition, alcohol can make your low blood pressure worse by causing your body to lose water. When should you call for help? Call 911 anytime you think you may need emergency care. For example, call if: 
· You have symptoms of a heart attack. These may include: ¨ Chest pain or pressure, or a strange feeling in the chest. 
¨ Sweating. ¨ Shortness of breath. ¨ Nausea or vomiting. ¨ Pain, pressure, or a strange feeling in the back, neck, jaw, or upper belly or in one or both shoulders or arms. ¨ Lightheadedness or sudden weakness. ¨ A fast or irregular heartbeat. After you call 911, the  may tell you to chew 1 adult-strength or 2 to 4 low-dose aspirin. Wait for an ambulance. Do not try to drive yourself. · You have symptoms of a stroke. These may include: 
¨ Sudden numbness, tingling, weakness, or loss of movement in your face, arm, or leg, especially on only one side of your body. ¨ Sudden vision changes. ¨ Sudden trouble speaking. ¨ Sudden confusion or trouble understanding simple statements. ¨ Sudden problems with walking or balance. ¨ A sudden, severe headache that is different from past headaches. · You passed out (lost consciousness). Call your doctor now or seek immediate medical care if: 
· You are dizzy or lightheaded, or you feel like you may faint. · You have signs of needing more fluids. You have sunken eyes and a dry mouth, and you pass only a little dark urine. · You cannot keep down fluids. Watch closely for changes in your health, and be sure to contact your doctor if: 
· You do not get better as expected. Where can you learn more? Go to http://rajat-rachel.info/. Enter C304 in the search box to learn more about \"Low Blood Pressure: Care Instructions. \" Current as of: April 21, 2016 Content Version: 11.3 © 5245-4550 [employer]. Care instructions adapted under license by [employer] (which disclaims liability or warranty for this information). If you have questions about a medical condition or this instruction, always ask your healthcare professional. [place] [address] any warranty or liability for your use of this information. Discharge Orders None [facility] [place] Announcement We are excited to announce that we are making your provider's discharge notes available to you in [employer]. You will see these notes when they are completed and signed by the physician that discharged you from your recent hospital stay.   If you have any questions or concerns about any information you see in [facility], please call the Health Information Department where you were seen or reach out to your Primary Care Provider for more information about your plan of care. Introducing [address]! Dear Georgette Contreras: Thank you for requesting a [facility] account. Our records indicate that you already have an active [facility] account. You can access your account anytime at [email or web address] Did you know that you can access your hospital and ER discharge instructions at any time in [facility]? You can also review all of your test results from your hospital stay or ER visit. Additional Information If you have questions, please visit the Frequently Asked Questions section of the [facility] website at [email or web address] Remember, [facility] is NOT to be used for urgent needs. For medical emergencies, dial 911. Now available from your iPhone and Android! General Information Please provide this summary of care documentation to your next provider. Patient Signature:  ____________________________________________________________ Date:  ____________________________________________________________  
  
Marino Hua Provider Signature:  ____________________________________________________________ Date:  ____________________________________________________________

## 2017-07-19 NOTE — ED PROVIDER NOTES
HPI Comments: Patient with history of non-Hodgkin's lymphoma on month 4 of chemotherapy which she takes 3 days a week. Started running a fever yesterday to 101 and was placed on Levaquin, but feeling worse today with temps up above 102. Told by her oncologist come in for further evaluation. Patient is a 62 y.o. female presenting with fever. The history is provided by the patient and the spouse. Fever    This is a new problem. The current episode started yesterday. The problem occurs daily. The problem has been gradually worsening. The maximum temperature noted was 102 - 102.9 F. The temperature was taken using an oral thermometer. Associated symptoms include congestion, sore throat, muscle aches and cough. Pertinent negatives include no chest pain, no sleepiness, no diarrhea, no vomiting, no headaches, no shortness of breath, no mental status change, no neck pain, no rash and no urinary symptoms. She has tried acetaminophen (started on Levaquin yesterday, had second pill this morning.) for the symptoms. The treatment provided no relief.         Past Medical History:   Diagnosis Date    Anemia     in high school, \"received gamma globulin twice a week for awhile\"    Arthritis     osteo-r knee    Basal cell carcinoma     Followed by Dermatology    Chronic pain     KNEEs    Hypertension 10/4/2011    medication    Morbid obesity ([address]     Murmur     \"had it my whole life\", did not appreciate murmur 9/12/2011 during assessment    Non Hodgkin's lymphoma ([address] 3/30/2017    Other ill-defined conditions     skin bumps-med as needed    Overactive bladder     Rheumatic fever     Possibly as a child    Shingles     Thromboembolus ([address] x2    LLE-calf-1990?-only took ASA-resolved in less than a wk-\"a little burned area-not examined\"    Thyroid disease     hypo-medication    Unspecified adverse effect of anesthesia     pt reports waking several times with knee surgery-spinal       Past Surgical History: Procedure Laterality Date    [facility]. CHOLECYSTECTOMY FNC41      HX CHOLECYSTECTOMY  1983    HX ORTHOPAEDIC  2012    right TKA    HX ORTHOPAEDIC  2013    left TKA. Dr. Colletta Plume.  HX VASCULAR ACCESS      [place] ANESTH,ACHILLES TENDON SURG  2/10/2011    LEFT. Dr. Pravin Hernandes. Family History:   Problem Relation Age of Onset    Post-op Nausea/Vomiting Other      X3    Breast Cancer Other 28     cousin    Kidney Disease Father      RENAL FAILURE    Other Son     Other Daughter     Other Maternal Grandmother      Vascular Disorder with leg amputation    Liver Disease Sister      non-alcoholic    Celiac Disease Sister     Malignant Hyperthermia Neg Hx     Pseudocholinesterase Deficiency Neg Hx     Delayed Awakening Neg Hx     Emergence Delirium Neg Hx     Post-op Cognitive Dysfunction Neg Hx        Social History     Social History    Marital status:      Spouse name: N/A    Number of children: N/A    Years of education: N/A     Occupational History    Not on file. Social History Main Topics    Smoking status: Never Smoker    Smokeless tobacco: Never Used    Alcohol use Yes      Comment: RARE, ONCE/TWICE A YEAR    Drug use: Yes     Special: Prescription    Sexual activity: Not on file     Other Topics Concern    Not on file     Social History Narrative    10/3/11:  PATIENT IS  TO [facility]. SHE IS DISABLED. ALLERGIES: Review of patient's allergies indicates no known allergies. Review of Systems   Constitutional: Positive for activity change, appetite change, chills, fatigue and fever. Negative for diaphoresis. HENT: Positive for congestion and sore throat. Negative for trouble swallowing and voice change. Respiratory: Positive for cough. Negative for shortness of breath. Cardiovascular: Negative for chest pain. Gastrointestinal: Negative for diarrhea and vomiting. Musculoskeletal: Negative for neck pain.    Skin: Negative for rash. Neurological: Negative for headaches. All other systems reviewed and are negative. Vitals:    07/19/17 1753   BP: 131/82   Pulse: (!) 105   Resp: 16   Temp: (!) 101 °F (38.3 °C)   SpO2: 100%   Weight: 111.1 kg (245 lb)   Height: 5' 2\" (1.575 m)            Physical Exam   Constitutional: She is oriented to person, place, and time. She appears well-developed and well-nourished. No distress. HENT:   Head: Normocephalic and atraumatic. Right Ear: Tympanic membrane and external ear normal.   Left Ear: Tympanic membrane and external ear normal.   Mouth/Throat: Uvula is midline and mucous membranes are normal. She does not have dentures. Oral lesions (scattered aphthous ulcers are noted) present. No trismus in the jaw. No dental abscesses or uvula swelling. Posterior oropharyngeal erythema present. No oropharyngeal exudate, posterior oropharyngeal edema or tonsillar abscesses. Eyes: Conjunctivae and EOM are normal. Pupils are equal, round, and reactive to light. Neck: Normal range of motion. Neck supple. No tracheal deviation present. Cardiovascular: Normal rate, regular rhythm, normal heart sounds and intact distal pulses. Exam reveals no gallop and no friction rub. No murmur heard. Pulmonary/Chest: Effort normal and breath sounds normal. No respiratory distress. She has no wheezes. Abdominal: Soft. Bowel sounds are normal. She exhibits no distension and no mass. There is no hepatosplenomegaly. There is no tenderness. There is no rebound and no guarding. Musculoskeletal: Normal range of motion. She exhibits no edema. Lymphadenopathy:     She has no cervical adenopathy. Neurological: She is alert and oriented to person, place, and time. She displays normal reflexes. No cranial nerve deficit. Skin: Skin is warm and dry. No rash noted. She is not diaphoretic. No erythema. Psychiatric: She has a normal mood and affect. Nursing note and vitals reviewed.        MDM  Number of Diagnoses or Management Options  Acute URI: new and requires workup  Neutropenic fever ([facility]): new and requires workup     Amount and/or Complexity of Data Reviewed  Clinical lab tests: ordered and reviewed  Tests in the radiology section of CPT®: ordered and reviewed  Decide to obtain previous medical records or to obtain history from someone other than the patient: yes  Obtain history from someone other than the patient: yes  Review and summarize past medical records: yes  Discuss the patient with other providers: yes    Risk of Complications, Morbidity, and/or Mortality  Presenting problems: high  Diagnostic procedures: moderate  Management options: high    Patient Progress  Patient progress: improved    ED Course       Procedures

## 2017-07-19 NOTE — ED TRIAGE NOTES
Pt states that she has recently had chemo treatments but has a fever with sore throat and runny nose with some bleeding.

## 2017-07-20 ENCOUNTER — HOSPITAL ENCOUNTER (OUTPATIENT)
Dept: ONCOLOGY | Age: 57
Discharge: HOME | End: 2017-07-20
Payer: COMMERCIAL

## 2017-07-20 LAB
ALBUMIN SERPL BCP-MCNC: 2.8 G/DL (ref 3.5–5)
ALBUMIN/GLOB SERPL: 0.9 {RATIO} (ref 1.2–3.5)
ALP SERPL-CCNC: 155 U/L (ref 50–136)
ALT SERPL-CCNC: 52 U/L (ref 12–65)
ANION GAP BLD CALC-SCNC: 6 MMOL/L (ref 7–16)
APPEARANCE UR: CLEAR
AST SERPL W P-5'-P-CCNC: 32 U/L (ref 15–37)
BACTERIA URNS QL MICRO: 0 /HPF
BILIRUB SERPL-MCNC: 0.4 MG/DL (ref 0.2–1.1)
BILIRUB UR QL: NEGATIVE
BUN SERPL-MCNC: 8 MG/DL (ref 6–23)
CALCIUM SERPL-MCNC: 8.2 MG/DL (ref 8.3–10.4)
CASTS URNS QL MICRO: ABNORMAL /LPF
CHLORIDE SERPL-SCNC: 107 MMOL/L (ref 98–107)
CO2 SERPL-SCNC: 30 MMOL/L (ref 21–32)
COLOR UR: YELLOW
CREAT SERPL-MCNC: 0.74 MG/DL (ref 0.6–1)
DIFFERENTIAL METHOD BLD: ABNORMAL
EPI CELLS #/AREA URNS HPF: ABNORMAL /HPF
ERYTHROCYTE [DISTWIDTH] IN BLOOD BY AUTOMATED COUNT: 14.9 % (ref 11.9–14.6)
GLOBULIN SER CALC-MCNC: 3 G/DL (ref 2.3–3.5)
GLUCOSE SERPL-MCNC: 109 MG/DL (ref 65–100)
GLUCOSE UR STRIP.AUTO-MCNC: NEGATIVE MG/DL
HCT VFR BLD AUTO: 24.1 % (ref 35.8–46.3)
HGB BLD-MCNC: 8 G/DL (ref 11.7–15.4)
HGB UR QL STRIP: NEGATIVE
KETONES UR QL STRIP.AUTO: NEGATIVE MG/DL
LEUKOCYTE ESTERASE UR QL STRIP.AUTO: NEGATIVE
MCH RBC QN AUTO: 29.9 PG (ref 26.1–32.9)
MCHC RBC AUTO-ENTMCNC: 33.2 G/DL (ref 31.4–35)
MCV RBC AUTO: 89.9 FL (ref 79.6–97.8)
NITRITE UR QL STRIP.AUTO: NEGATIVE
PH UR STRIP: 6.5 [PH] (ref 5–9)
PLATELET # BLD AUTO: 70 K/UL (ref 150–450)
PLATELET COMMENTS,PCOM: ABNORMAL
PMV BLD AUTO: 9.8 FL (ref 10.8–14.1)
POTASSIUM SERPL-SCNC: 3.7 MMOL/L (ref 3.5–5.1)
PROT SERPL-MCNC: 5.8 G/DL (ref 6.3–8.2)
PROT UR STRIP-MCNC: 30 MG/DL
RBC # BLD AUTO: 2.68 M/UL (ref 4.05–5.25)
RBC #/AREA URNS HPF: ABNORMAL /HPF
RBC MORPH BLD: ABNORMAL
SODIUM SERPL-SCNC: 143 MMOL/L (ref 136–145)
SP GR UR REFRACTOMETRY: 1.02 (ref 1–1.02)
UROBILINOGEN UR QL STRIP.AUTO: 0.2 EU/DL (ref 0.2–1)
WBC # BLD AUTO: 0.7 K/UL (ref 4.3–11.1)
WBC MORPH BLD: ABNORMAL
WBC URNS QL MICRO: ABNORMAL /HPF

## 2017-07-20 PROCEDURE — 74011000258 HC RX REV CODE- 258: Performed by: INTERNAL MEDICINE

## 2017-07-20 PROCEDURE — 74011250637 HC RX REV CODE- 250/637: Performed by: INTERNAL MEDICINE

## 2017-07-20 PROCEDURE — 81001 URINALYSIS AUTO W/SCOPE: CPT | Performed by: EMERGENCY MEDICINE

## 2017-07-20 PROCEDURE — 85025 COMPLETE CBC W/AUTO DIFF WBC: CPT | Performed by: INTERNAL MEDICINE

## 2017-07-20 PROCEDURE — 74011250636 HC RX REV CODE- 250/636: Performed by: INTERNAL MEDICINE

## 2017-07-20 PROCEDURE — 74011250637 HC RX REV CODE- 250/637: Performed by: NURSE PRACTITIONER

## 2017-07-20 PROCEDURE — 80053 COMPREHEN METABOLIC PANEL: CPT | Performed by: INTERNAL MEDICINE

## 2017-07-20 PROCEDURE — 74011250636 HC RX REV CODE- 250/636: Performed by: NURSE PRACTITIONER

## 2017-07-20 PROCEDURE — 65270000029 HC RM PRIVATE

## 2017-07-20 RX ORDER — ENOXAPARIN SODIUM 100 MG/ML
40 INJECTION SUBCUTANEOUS EVERY 24 HOURS
Status: DISCONTINUED | OUTPATIENT
Start: 2017-07-20 | End: 2017-07-20 | Stop reason: SDUPTHER

## 2017-07-20 RX ORDER — FLUCONAZOLE 100 MG/1
200 TABLET ORAL DAILY
Status: DISCONTINUED | OUTPATIENT
Start: 2017-07-20 | End: 2017-07-21 | Stop reason: HOSPADM

## 2017-07-20 RX ORDER — ENOXAPARIN SODIUM 100 MG/ML
40 INJECTION SUBCUTANEOUS EVERY 12 HOURS
Status: DISCONTINUED | OUTPATIENT
Start: 2017-07-20 | End: 2017-07-21 | Stop reason: HOSPADM

## 2017-07-20 RX ORDER — VANCOMYCIN/0.9 % SOD CHLORIDE 1.5G/250ML
1500 PLASTIC BAG, INJECTION (ML) INTRAVENOUS EVERY 12 HOURS
Status: DISCONTINUED | OUTPATIENT
Start: 2017-07-20 | End: 2017-07-21 | Stop reason: HOSPADM

## 2017-07-20 RX ADMIN — VANCOMYCIN HYDROCHLORIDE 1500 MG: 10 INJECTION, POWDER, LYOPHILIZED, FOR SOLUTION INTRAVENOUS at 21:52

## 2017-07-20 RX ADMIN — PANTOPRAZOLE SODIUM 40 MG: 40 TABLET, DELAYED RELEASE ORAL at 08:04

## 2017-07-20 RX ADMIN — MIDODRINE HYDROCHLORIDE 2.5 MG: 5 TABLET ORAL at 09:23

## 2017-07-20 RX ADMIN — ALLOPURINOL 300 MG: 300 TABLET ORAL at 09:23

## 2017-07-20 RX ADMIN — Medication 10 ML: at 21:54

## 2017-07-20 RX ADMIN — ENOXAPARIN SODIUM 40 MG: 40 INJECTION SUBCUTANEOUS at 21:52

## 2017-07-20 RX ADMIN — PREGABALIN 100 MG: 50 CAPSULE ORAL at 09:22

## 2017-07-20 RX ADMIN — ZOLPIDEM TARTRATE 5 MG: 5 TABLET ORAL at 23:36

## 2017-07-20 RX ADMIN — LEVOTHYROXINE SODIUM 125 MCG: 125 TABLET ORAL at 08:04

## 2017-07-20 RX ADMIN — VANCOMYCIN HYDROCHLORIDE 1500 MG: 10 INJECTION, POWDER, LYOPHILIZED, FOR SOLUTION INTRAVENOUS at 10:48

## 2017-07-20 RX ADMIN — PIPERACILLIN SODIUM,TAZOBACTAM SODIUM 4.5 G: 4; .5 INJECTION, POWDER, FOR SOLUTION INTRAVENOUS at 17:00

## 2017-07-20 RX ADMIN — PREGABALIN 100 MG: 50 CAPSULE ORAL at 21:53

## 2017-07-20 RX ADMIN — SODIUM CHLORIDE 100 ML/HR: 900 INJECTION, SOLUTION INTRAVENOUS at 00:08

## 2017-07-20 RX ADMIN — FLUCONAZOLE 200 MG: 100 TABLET ORAL at 16:33

## 2017-07-20 RX ADMIN — Medication 400 MG: at 18:54

## 2017-07-20 RX ADMIN — PIPERACILLIN SODIUM,TAZOBACTAM SODIUM 4.5 G: 4; .5 INJECTION, POWDER, FOR SOLUTION INTRAVENOUS at 04:01

## 2017-07-20 RX ADMIN — Medication 400 MG: at 09:22

## 2017-07-20 RX ADMIN — ALLOPURINOL 300 MG: 300 TABLET ORAL at 21:52

## 2017-07-20 RX ADMIN — PREGABALIN 100 MG: 50 CAPSULE ORAL at 17:00

## 2017-07-20 NOTE — ED NOTES
Patient is resting on stretcher with respirations even and unlabored. The patient denies any needs at this time. Will continue to closely monitor.

## 2017-07-20 NOTE — ED NOTES
Patient is resting on stretcher with respirations even and unlabored. Patient denies any needs at this time and is in no acute distress. Call light within reach. Will continue to monitor.

## 2017-07-20 NOTE — PROGRESS NOTES
Pharmacokinetic Consult to Pharmacist    Andrea Ballesteros is a 62 y.o. female being treated for fever with vancomycin and zosyn. Height: 5' 2\" (157.5 cm)  Weight: 111.1 kg (245 lb)  Lab Results   Component Value Date/Time    BUN 9 07/19/2017 08:37 PM    Creatinine 0.79 07/19/2017 08:37 PM    WBC 0.7 07/19/2017 08:37 PM    Procalcitonin 0.1 07/19/2017 08:37 PM      Estimated Creatinine Clearance: 92.4 mL/min (based on Cr of 0.79). CULTURES:  7/19 BCx: pending x2   Flu: negative   Strep: negative    Day 1 of vancomycin. Goal trough is 15-20. Vancomycin dose initiated at 1750mg x1 in ER, then 1.5g q 12hr. Will continue to follow patient.       Thank you,  Zhen Puente, PharmD  Clinical Pharmacist

## 2017-07-20 NOTE — ED NOTES
Morning labs collected and sent to laboratory at this time. Patient ambulatory to restroom and back to room and is in no acute distress. The patient is now resting comfortably on stretcher and denies any needs at this time.

## 2017-07-20 NOTE — ED NOTES
Patient resting on stretcher with respirations even and unlabored. The patient has call light at bedside and denies any needs at this time. Will continue to monitor.

## 2017-07-20 NOTE — H&P
Hospitalist H&P Note     Admit Date:  2017  6:48 PM   Name:  Bea Bernal   Age:  62 y.o.  :  1960   MRN:  [id number]   PCP:  Trip Bridges MD  Treatment Team: Attending Provider: Garfield Biswas MD; Primary Nurse: Selene Whalen    HPI:     Ms. Concepcion Raza is a 61 yo female with a PMH of hypotension on midodrine, hypothyroidism, non HD lymphoma on chemo evaluated with fever () , taking levaquin day 2 per oncology for URI. Has sore throat, runny nose with epistaxis, headache, cough but somewhat improved with tylenol. Denies dysuria, no anorexia, some BM changes but chronically loose at times , has bone pain/ myalgias and cold sores. Son was sick contact with similar complaints. WBC 0.7 with neulasta received last week. CXR shows stable right lung base density , UA / BC pending. States port site is ok    10 systems reviewed and negative except as noted in HPI.   Has Weight loss, sweats resolved,           Past Medical History:   Diagnosis Date    Anemia     in high school, \"received gamma globulin twice a week for awhile\"    Arthritis     osteo-r knee    Basal cell carcinoma     Followed by Dermatology    Chronic pain     KNEEs    Hypertension 10/4/2011    medication    Morbid obesity ([address]     Murmur     \"had it my whole life\", did not appreciate murmur 2011 during assessment    Non Hodgkin's lymphoma ([address] 3/30/2017    Other ill-defined conditions     skin bumps-med as needed    Overactive bladder     Rheumatic fever     Possibly as a child    Shingles     Thromboembolus ([address] x2    LLE-calf-?-only took ASA-resolved in less than a wk-\"a little burned area-not examined\"    Thyroid disease     hypo-medication    Unspecified adverse effect of anesthesia     pt reports waking several times with knee surgery-spinal      Past Surgical History:   Procedure Laterality Date    [facility]. CHOLECYSTECTOMY FNC41      HX CHOLECYSTECTOMY  1983    HX ORTHOPAEDIC  2012    right TKA    HX ORTHOPAEDIC  2013    left TKA. Dr. Darling Ferrari.  HX VASCULAR ACCESS      [place] ANESTH,ACHILLES TENDON SURG  2/10/2011    LEFT. Dr. Ar Cervantes. No Known Allergies   Social History   Substance Use Topics    Smoking status: Never Smoker    Smokeless tobacco: Never Used    Alcohol use Yes      Comment: RARE, ONCE/TWICE A YEAR      Family History   Problem Relation Age of Onset    Post-op Nausea/Vomiting Other      X3    Breast Cancer Other 28     cousin    Kidney Disease Father      RENAL FAILURE    Other Son     Other Daughter     Other Maternal Grandmother      Vascular Disorder with leg amputation    Liver Disease Sister      non-alcoholic    Celiac Disease Sister     Malignant Hyperthermia Neg Hx     Pseudocholinesterase Deficiency Neg Hx     Delayed Awakening Neg Hx     Emergence Delirium Neg Hx     Post-op Cognitive Dysfunction Neg Hx       Immunization History   Administered Date(s) Administered    TB Skin Test (PPD) Intradermal 08/27/2013     PTA Medications:  Prior to Admission Medications   Prescriptions Last Dose Informant Patient Reported? Taking?   allopurinol (ZYLOPRIM) 300 mg tablet   No No   Sig: Take 1 Tab by mouth two (2) times a day. aspirin 81 mg chewable tablet   Yes No   Sig: Take 81 mg by mouth daily. benzonatate (TESSALON PERLES) 100 mg capsule   No No   Sig: Take 1 Cap by mouth every six (6) hours as needed for Cough. fluconazole (DIFLUCAN) 200 mg tablet   Yes No   levoFLOXacin (LEVAQUIN) 500 mg tablet   No No   Sig: Take 1 Tab by mouth daily. Do not take until instructed. levothyroxine (SYNTHROID) 125 mcg tablet   No No   Sig: TAKE 1 TABLET DAILY BEFORE BREAKFAST   lidocaine-prilocaine (EMLA) topical cream   No No   Sig: Apply  to affected area as needed for Pain. Apply to port site 45-60 minutes prior to lab appt or infusion. loratadine (CLARITIN) 10 mg tablet   Yes No   Sig: Take 10 mg by mouth. magic mouthwash (ALEXSANDER) susp   No No   Sig: Take 10 mL by mouth every four (4) hours as needed. magnesium oxide (MAG-OX) 400 mg tablet   No No   Sig: Take 1 Tab by mouth two (2) times a day. midodrine (PROAMITINE) 2.5 mg tablet   No No   Sig: Take 1 Tab by mouth two (2) times daily (with meals). nystatin (MYCOSTATIN) powder   No No   Sig: Apply  to affected area three (3) times daily. omeprazole (PRILOSEC) 20 mg capsule   No No   Sig: TAKE 1 CAPSULE DAILY   ondansetron hcl (ZOFRAN, AS HYDROCHLORIDE,) 4 mg tablet   No No   Sig: Take 1 Tab by mouth every eight (8) hours as needed for Nausea. oxyCODONE (OXYIR) 5 mg capsule   No No   Sig: Take 1-2 Caps by mouth every four (4) hours as needed. Max Daily Amount: 60 mg.   pregabalin (LYRICA) 100 mg capsule   No No   Sig: Take 1 Cap by mouth three (3) times daily. Max Daily Amount: 300 mg.   promethazine (PHENERGAN) 25 mg tablet   Yes No   Sig: Take 25 mg by mouth every six (6) hours as needed for Nausea. Unsure of dose      Facility-Administered Medications: None       Objective:   Patient Vitals for the past 24 hrs:   Temp Pulse Resp BP SpO2   07/19/17 2150 (!) 101.7 °F (38.7 °C) - - - -   07/19/17 2131 - - - 99/53 -   07/19/17 2101 - - - (!) 136/96 -   07/19/17 2030 - - - 124/57 -   07/19/17 2019 - 92 - 116/53 97 %   07/19/17 1930 (!) 102 °F (38.9 °C) (!) 103 - 141/65 100 %   07/19/17 1753 (!) 101 °F (38.3 °C) (!) 105 16 131/82 100 %     Oxygen Therapy  O2 Sat (%): 97 % (07/19/17 2019)  Pulse via Oximetry: 92 beats per minute (07/19/17 2019)  O2 Device: Room air (07/19/17 1753)  No intake or output data in the 24 hours ending 07/19/17 2157    Physical Exam:  General:    Well nourished. Alert. No distress   Eyes:   Normal sclera. Extraocular movements intact. ENT:  Normocephalic, atraumatic. Moist mucous membranes, TM clear   CV:   RRR. No murmur, rub, or gallop. Trace edema  Lungs:  CTAB. No wheezing, rhonchi, or rales.   Abdomen: Soft, nontender, nondistended. Bowel sounds normal.   Extremities: Warm and dry. No cyanosis   Neurologic:  grossly intact. .  Skin:     No rashes or jaundice. Psych:  Normal mood and affect. I reviewed the labs, imaging, EKGs, telemetry, and other studies done this admission. Data Review:   Recent Results (from the past 24 hour(s))   CBC WITH AUTOMATED DIFF    Collection Time: 07/19/17  8:37 PM   Result Value Ref Range    WBC 0.7 (LL) 4.3 - 11.1 K/uL    RBC 2.87 (L) 4.05 - 5.25 M/uL    HGB 8.7 (L) 11.7 - 15.4 g/dL    HCT 25.8 (L) 35.8 - 46.3 %    MCV 89.9 79.6 - 97.8 FL    MCH 30.3 26.1 - 32.9 PG    MCHC 33.7 31.4 - 35.0 g/dL    RDW 15.1 (H) 11.9 - 14.6 %    PLATELET 85 (L) 863 - 450 K/uL    MPV 10.8 10.8 - 14.1 FL    RBC COMMENTS SLIGHT  ANISOCYTOSIS + POIKILOCYTOSIS        WBC COMMENTS WBC TOO FEW TO DIFFERENTIATE      PLATELET COMMENTS MARKED      DF AUTOMATED     METABOLIC PANEL, COMPREHENSIVE    Collection Time: 07/19/17  8:37 PM   Result Value Ref Range    Sodium 141 136 - 145 mmol/L    Potassium 3.7 3.5 - 5.1 mmol/L    Chloride 104 98 - 107 mmol/L    CO2 28 21 - 32 mmol/L    Anion gap 9 7 - 16 mmol/L    Glucose 110 (H) 65 - 100 mg/dL    BUN 9 6 - 23 MG/DL    Creatinine 0.79 0.6 - 1.0 MG/DL    GFR est AA >60 >60 ml/min/1.73m2    GFR est non-AA >60 >60 ml/min/1.73m2    Calcium 8.5 8.3 - 10.4 MG/DL    Bilirubin, total 0.4 0.2 - 1.1 MG/DL    ALT (SGPT) 49 12 - 65 U/L    AST (SGOT) 36 15 - 37 U/L    Alk.  phosphatase 157 (H) 50 - 136 U/L    Protein, total 6.2 (L) 6.3 - 8.2 g/dL    Albumin 3.1 (L) 3.5 - 5.0 g/dL    Globulin 3.1 2.3 - 3.5 g/dL    A-G Ratio 1.0 (L) 1.2 - 3.5     PROCALCITONIN    Collection Time: 07/19/17  8:37 PM   Result Value Ref Range    Procalcitonin 0.1 ng/mL   POC LACTIC ACID    Collection Time: 07/19/17  8:42 PM   Result Value Ref Range    Lactic Acid (POC) 0.9 0.5 - 1.9 mmol/L       All Micro Results     Procedure Component Value Units Date/Time    CULTURE, BLOOD [[id number]] Collected:  07/19/17 8975 Order Status:  Completed Specimen:  Blood Updated:  07/19/17 2128    CULTURE, BLOOD [[id number]] Collected:  07/19/17 2037    Order Status:  Completed Specimen:  Blood Updated:  07/19/17 2128          Other Studies:  Xr Chest Pa Lat    Result Date: 7/19/2017  PA LATERAL CHEST X-RAY HISTORY: Lymphoma. Fever and cough times one day COMPARISON: May 1, 2017 FINDINGS: Clips are present in the right axillary tail. A chest port is present with catheter tip in SVC. Pleural spaces are clear. Faint density in the medial right lung base is unchanged without definite new consolidation. IMPRESSION: No new consolidation.       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

## 2017-07-20 NOTE — CONSULTS
[employer] [facility]        Inpatient Hematology / Oncology Consult Note    Reason for Consult:  Neutropenic fever [facility])  Referring Physician:  No att. providers found    History of Present Illness:  Ms. Samson Portillo is a 62 y.o. female admitted on 7/19/2017 with a primary diagnosis of The primary encounter diagnosis was Neutropenic fever ([address] A diagnosis of Acute URI was also pertinent to this visit. She is a known patient of Dr. Ines Millan with marginal zone lymphoma s/p C4 bendamustine/rituximab on 7/13. She presented to ED with fever (), taking levaquin day 2 for URI. Reports sore throat, runny nose with epistaxis, HA, and cough. Her son was sick with similar symptoms. WBC 0.7. Neulasta given last week. Procal 0.1  Lactic acid 0.9  CXR showed stable right lung base density. UA negative for infx. BCx-NGTD. RST neg. Flu neg. Vanc/Zosyn started in ED. We were consulted for neutropenic fever and to assume care of our patient. Review of Systems:  Constitutional +fever Denies weight loss, appetite changes, fatigue, night sweats. HEENT +sore throat +rhinorrhea with epistaxis. +cold sores. Denies trauma, blurry vision, hearing loss, ear pain, nosebleeds, sore throat, neck pain and ear discharge. Skin Denies lesions or rashes. Lungs +cough Denies dyspnea, sputum production or hemoptysis. Cardiovascular Denies chest pain, palpitations, or lower extremity edema. Gastrointestinal Denies nausea, vomiting, changes in bowel habits, bloody or black stools, abdominal pain.  Denies dysuria, frequency or hesitancy of urination. Neuro +headache. Denies visual changes or ataxia. Denies dizziness, tingling, tremors, sensory change, speech change, focal weakness. Hematology Denies easy bruising or bleeding, denies gingival bleeding or epistaxis. Endo +hypothyroidism. Denies heat/cold intolerance, denies diabetes. MSK +arthralgias/myalgias. Denies back pain or frequent falls. Psychiatric/Behavioral Denies depression and substance abuse. The patient is not nervous/anxious. No Known Allergies  Past Medical History:   Diagnosis Date    Anemia     in high school, \"received gamma globulin twice a week for awhile\"    Arthritis     osteo-r knee    Basal cell carcinoma     Followed by Dermatology    Chronic pain     KNEEs    Hypertension 10/4/2011    medication    Morbid obesity ([address]     Murmur     \"had it my whole life\", did not appreciate murmur 9/12/2011 during assessment    Non Hodgkin's lymphoma ([address] 3/30/2017    Other ill-defined conditions     skin bumps-med as needed    Overactive bladder     Rheumatic fever     Possibly as a child    Shingles     Thromboembolus ([address] x2    LLE-calf-1990?-only took ASA-resolved in less than a wk-\"a little burned area-not examined\"    Thyroid disease     hypo-medication    Unspecified adverse effect of anesthesia     pt reports waking several times with knee surgery-spinal     Past Surgical History:   Procedure Laterality Date    [facility] CHOLECYSTECTOMY [id number]      HX CHOLECYSTECTOMY  1983    HX ORTHOPAEDIC  2012    right TKA    HX ORTHOPAEDIC  2013    left TKA. Dr. Mike Thomason.  HX VASCULAR ACCESS      ND ANESTH,ACHILLES TENDON SURG  2/10/2011    LEFT. Dr. Quinton Andrade.       Family History   Problem Relation Age of Onset    Post-op Nausea/Vomiting Other      X3    Breast Cancer Other 28     cousin    Kidney Disease Father      RENAL FAILURE    Other Son     Other Daughter     Other Maternal Grandmother      Vascular Disorder with leg amputation    Liver Disease Sister      non-alcoholic    Celiac Disease Sister     Malignant Hyperthermia Neg Hx     Pseudocholinesterase Deficiency Neg Hx     Delayed Awakening Neg Hx     Emergence Delirium Neg Hx     Post-op Cognitive Dysfunction Neg Hx      Social History     Social History    Marital status:      Spouse name: N/A    Number of children: N/A    Years of education: N/A     Occupational History    Not on file. Social History Main Topics    Smoking status: Never Smoker    Smokeless tobacco: Never Used    Alcohol use Yes      Comment: RARE, ONCE/TWICE A YEAR    Drug use: Yes     Special: Prescription    Sexual activity: Not on file     Other Topics Concern    Not on file     Social History Narrative    10/3/11:  PATIENT IS  TO Kelley Almanzar. SHE IS DISABLED.        Current Facility-Administered Medications   Medication Dose Route Frequency Provider Last Rate Last Dose    vancomycin (VANCOCIN) 1500 mg in  ml infusion  1,500 mg IntraVENous Q12H Yamel Mandujano MD        piperacillin-tazobactam (ZOSYN) 4.5 g in 0.9% sodium chloride (MBP/ADV) 100 mL  4.5 g IntraVENous Serg Jones MD 25 mL/hr at 07/20/17 0401 4.5 g at 07/20/17 0401    sodium chloride (NS) flush 5-10 mL  5-10 mL IntraVENous Q8H Ian Wahl MD        sodium chloride (NS) flush 5-10 mL  5-10 mL IntraVENous PRN Gauri Montanez MD        allopurinol (ZYLOPRIM) tablet 300 mg  300 mg Oral BID Yamel Mandujano MD   300 mg at 07/20/17 5641    levothyroxine (SYNTHROID) tablet 125 mcg  125 mcg Oral ACB Yamel Mandujano MD   125 mcg at 07/20/17 0804    loratadine (CLARITIN) tablet 10 mg  10 mg Oral DAILY Yamel Mandujano MD        magnesium oxide (MAG-OX) tablet 400 mg  400 mg Oral BID Yamel Mandujano MD   400 mg at 07/20/17 9944    midodrine (PROAMITINE) tablet 2.5 mg  2.5 mg Oral BID WITH MEALS Rand Hubbard MD   2.5 mg at 07/20/17 6049    nystatin (MYCOSTATIN) 100,000 unit/gram powder   Topical TID Yamel Mandujano MD        pantoprazole (PROTONIX) tablet 40 mg  40 mg Oral ACB Osiel Munoz MD   40 mg at 07/20/17 0804    pregabalin (LYRICA) capsule 100 mg  100 mg Oral TID Yamel Mandujano MD   100 mg at 07/20/17 2034    0.9% sodium chloride infusion  100 mL/hr IntraVENous CONTINUOUS Rand MADISON MD Tammy 100 mL/hr at 07/20/17 0008 100 mL/hr at 07/20/17 0008    acetaminophen (TYLENOL) tablet 650 mg  650 mg Oral Q6H PRN Miriam Newell MD   650 mg at 07/19/17 2353    zolpidem (AMBIEN) tablet 5 mg  5 mg Oral QHS PRN Miriam Newell MD   5 mg at 07/19/17 2353     Current Outpatient Prescriptions   Medication Sig Dispense Refill    allopurinol (ZYLOPRIM) 300 mg tablet Take 1 Tab by mouth two (2) times a day. 90 Tab 2    levoFLOXacin (LEVAQUIN) 500 mg tablet Take 1 Tab by mouth daily. Do not take until instructed. 7 Tab 0    loratadine (CLARITIN) 10 mg tablet Take 10 mg by mouth.  fluconazole (DIFLUCAN) 200 mg tablet       pregabalin (LYRICA) 100 mg capsule Take 1 Cap by mouth three (3) times daily. Max Daily Amount: 300 mg. 90 Cap 3    oxyCODONE (OXYIR) 5 mg capsule Take 1-2 Caps by mouth every four (4) hours as needed. Max Daily Amount: 60 mg. 180 Cap 0    midodrine (PROAMITINE) 2.5 mg tablet Take 1 Tab by mouth two (2) times daily (with meals). 60 Tab 2    omeprazole (PRILOSEC) 20 mg capsule TAKE 1 CAPSULE DAILY 90 Cap 2    benzonatate (TESSALON PERLES) 100 mg capsule Take 1 Cap by mouth every six (6) hours as needed for Cough. 40 Cap 3    levothyroxine (SYNTHROID) 125 mcg tablet TAKE 1 TABLET DAILY BEFORE BREAKFAST 90 Tab 0    magnesium oxide (MAG-OX) 400 mg tablet Take 1 Tab by mouth two (2) times a day. 60 Tab 1    magic mouthwash (ALEXSANDER) susp Take 10 mL by mouth every four (4) hours as needed. 1 Bottle 2    nystatin (MYCOSTATIN) powder Apply  to affected area three (3) times daily. 60 g 2    lidocaine-prilocaine (EMLA) topical cream Apply  to affected area as needed for Pain. Apply to port site 45-60 minutes prior to lab appt or infusion. 30 g 0    promethazine (PHENERGAN) 25 mg tablet Take 25 mg by mouth every six (6) hours as needed for Nausea.  Unsure of dose      ondansetron hcl (ZOFRAN, AS HYDROCHLORIDE,) 4 mg tablet Take 1 Tab by mouth every eight (8) hours as needed for Nausea. 60 Tab 3       OBJECTIVE:  Patient Vitals for the past 8 hrs:   BP Temp Pulse Resp SpO2   17 0922 108/68 - 93 - -   17 0401 104/51 98.1 °F (36.7 °C) 95 16 99 %     Temp (24hrs), Av.7 °F (38.2 °C), Min:98.1 °F (36.7 °C), Max:102 °F (38.9 °C)         Physical Exam:  Constitutional: Well developed, well nourished female in no acute distress, sitting comfortably in the hospital bed. HEENT: Normocephalic and atraumatic. Oropharynx is clear, mucous membranes are moist. Extraocular muscles are intact. Sclerae anicteric. Neck supple without JVD. No thyromegaly present. Lymph node   Deferred   Skin Warm and dry. No bruising and no rash noted. No erythema. No pallor. Respiratory Lungs are clear to auscultation bilaterally without wheezes, rales or rhonchi, normal air exchange without accessory muscle use. CVS Normal rate, regular rhythm and normal S1 and S2. No murmurs, gallops, or rubs. Abdomen Soft, nontender and nondistended, normoactive bowel sounds. No palpable mass. No hepatosplenomegaly. Neuro Grossly nonfocal with no obvious sensory or motor deficits. MSK Normal range of motion in general.  No edema and no tenderness. Psych Appropriate mood and affect.         Labs:    Recent Results (from the past 24 hour(s))   CBC WITH AUTOMATED DIFF    Collection Time: 17  8:37 PM   Result Value Ref Range    WBC 0.7 (LL) 4.3 - 11.1 K/uL    RBC 2.87 (L) 4.05 - 5.25 M/uL    HGB 8.7 (L) 11.7 - 15.4 g/dL    HCT 25.8 (L) 35.8 - 46.3 %    MCV 89.9 79.6 - 97.8 FL    MCH 30.3 26.1 - 32.9 PG    MCHC 33.7 31.4 - 35.0 g/dL    RDW 15.1 (H) 11.9 - 14.6 %    PLATELET 85 (L) 576 - 450 K/uL    MPV 10.8 10.8 - 14.1 FL    RBC COMMENTS SLIGHT  ANISOCYTOSIS + POIKILOCYTOSIS        WBC COMMENTS WBC TOO FEW TO DIFFERENTIATE      PLATELET COMMENTS MARKED      DF AUTOMATED     METABOLIC PANEL, COMPREHENSIVE    Collection Time: 17  8:37 PM   Result Value Ref Range    Sodium 141 136 - 145 mmol/L    Potassium 3.7 3.5 - 5.1 mmol/L    Chloride 104 98 - 107 mmol/L    CO2 28 21 - 32 mmol/L    Anion gap 9 7 - 16 mmol/L    Glucose 110 (H) 65 - 100 mg/dL    BUN 9 6 - 23 MG/DL    Creatinine 0.79 0.6 - 1.0 MG/DL    GFR est AA >60 >60 ml/min/1.73m2    GFR est non-AA >60 >60 ml/min/1.73m2    Calcium 8.5 8.3 - 10.4 MG/DL    Bilirubin, total 0.4 0.2 - 1.1 MG/DL    ALT (SGPT) 49 12 - 65 U/L    AST (SGOT) 36 15 - 37 U/L    Alk. phosphatase 157 (H) 50 - 136 U/L    Protein, total 6.2 (L) 6.3 - 8.2 g/dL    Albumin 3.1 (L) 3.5 - 5.0 g/dL    Globulin 3.1 2.3 - 3.5 g/dL    A-G Ratio 1.0 (L) 1.2 - 3.5     PROCALCITONIN    Collection Time: 07/19/17  8:37 PM   Result Value Ref Range    Procalcitonin 0.1 ng/mL   CULTURE, BLOOD    Collection Time: 07/19/17  8:37 PM   Result Value Ref Range    Special Requests:  RT CHEST PORT     Culture result: NO GROWTH AFTER 9 HOURS     POC LACTIC ACID    Collection Time: 07/19/17  8:42 PM   Result Value Ref Range    Lactic Acid (POC) 0.9 0.5 - 1.9 mmol/L   CULTURE, BLOOD    Collection Time: 07/19/17  8:53 PM   Result Value Ref Range    Special Requests: RT CHEST PORT     Culture result: NO GROWTH AFTER 9 HOURS     STREP AG SCREEN, GROUP A    Collection Time: 07/19/17 10:31 PM   Result Value Ref Range    Group A Strep Ag ID NEGATIVE  NEG     INFLUENZA A & B AG (RAPID TEST)    Collection Time: 07/19/17 10:31 PM   Result Value Ref Range    Influenza A Ag NEGATIVE  NEG      Influenza B Ag NEGATIVE  NEG     CULTURE, STREP THROAT    Collection Time: 07/19/17 10:31 PM   Result Value Ref Range    Special Requests: NO SPECIAL REQUESTS      Culture result:        NO GROWTH AFTER SHORT PERIOD OF INCUBATION. FURTHER RESULTS TO FOLLOW AFTER OVERNIGHT INCUBATION.    URINALYSIS W/ RFLX MICROSCOPIC    Collection Time: 07/20/17 12:21 AM   Result Value Ref Range    Color YELLOW      Appearance CLEAR      Specific gravity 1.017 1.001 - 1.023      pH (UA) 6.5 5.0 - 9.0      Protein 30 (A) NEG mg/dL Glucose NEGATIVE  mg/dL    Ketone NEGATIVE  NEG mg/dL    Bilirubin NEGATIVE  NEG      Blood NEGATIVE  NEG      Urobilinogen 0.2 0.2 - 1.0 EU/dL    Nitrites NEGATIVE  NEG      Leukocyte Esterase NEGATIVE  NEG      WBC 0-3 0 /hpf    RBC 0-3 0 /hpf    Epithelial cells 3-5 0 /hpf    Bacteria 0 0 /hpf    Casts 0-3 0 /lpf   METABOLIC PANEL, COMPREHENSIVE    Collection Time: 07/20/17  4:22 AM   Result Value Ref Range    Sodium 143 136 - 145 mmol/L    Potassium 3.7 3.5 - 5.1 mmol/L    Chloride 107 98 - 107 mmol/L    CO2 30 21 - 32 mmol/L    Anion gap 6 (L) 7 - 16 mmol/L    Glucose 109 (H) 65 - 100 mg/dL    BUN 8 6 - 23 MG/DL    Creatinine 0.74 0.6 - 1.0 MG/DL    GFR est AA >60 >60 ml/min/1.73m2    GFR est non-AA >60 >60 ml/min/1.73m2    Calcium 8.2 (L) 8.3 - 10.4 MG/DL    Bilirubin, total 0.4 0.2 - 1.1 MG/DL    ALT (SGPT) 52 12 - 65 U/L    AST (SGOT) 32 15 - 37 U/L    Alk. phosphatase 155 (H) 50 - 136 U/L    Protein, total 5.8 (L) 6.3 - 8.2 g/dL    Albumin 2.8 (L) 3.5 - 5.0 g/dL    Globulin 3.0 2.3 - 3.5 g/dL    A-G Ratio 0.9 (L) 1.2 - 3.5     CBC WITH AUTOMATED DIFF    Collection Time: 07/20/17  4:22 AM   Result Value Ref Range    WBC 0.7 (LL) 4.3 - 11.1 K/uL    RBC 2.68 (L) 4.05 - 5.25 M/uL    HGB 8.0 (L) 11.7 - 15.4 g/dL    HCT 24.1 (L) 35.8 - 46.3 %    MCV 89.9 79.6 - 97.8 FL    MCH 29.9 26.1 - 32.9 PG    MCHC 33.2 31.4 - 35.0 g/dL    RDW 14.9 (H) 11.9 - 14.6 %    PLATELET 70 (L) 668 - 450 K/uL    MPV 9.8 (L) 10.8 - 14.1 FL    RBC COMMENTS ANISOCYTOSIS + POIKILOCYTOSIS      WBC COMMENTS WBC TOO FEW TO DIFFERENTIATE      PLATELET COMMENTS DECREASED      DF AUTOMATED         Imaging:  XR CHEST PA LAT [[id number]] Collected: 07/19/17 1953      Order Status: Completed Updated: 07/19/17 2002     Narrative:       PA LATERAL CHEST X-RAY    HISTORY: Lymphoma. Fever and cough times one day    COMPARISON: May 1, 2017    FINDINGS: Clips are present in the right axillary tail. A chest port is present  with catheter tip in SVC. Pleural spaces are clear. Faint density in the medial  right lung base is unchanged without definite new consolidation.       Impression:       IMPRESSION: No new consolidation.          ASSESSMENT:  Problem List  Date Reviewed: 7/12/2017          Codes Class Noted    * (Principal)Neutropenic fever ([facility] ICD-10-CM: D70.9, R50.81  ICD-9-CM: 288.00, 780.61  7/19/2017        Pancytopenia due to antineoplastic chemotherapy ([facility] (Chronic) ICD-10-CM: D61.810, T45.1X5A  ICD-9-CM: 284.11, E933.1  7/19/2017        Hypothyroid (Chronic) ICD-10-CM: E03.9  ICD-9-CM: 244.9  7/19/2017        Chronic hypotension (Chronic) ICD-10-CM: I95.89  ICD-9-CM: 458.1  7/19/2017        Snores ICD-10-CM: R06.83  ICD-9-CM: 786.09  5/1/2017        Superficial venous thrombosis of right arm ICD-10-CM: I82.611  ICD-9-CM: 453.81  5/1/2017        Hypotension ICD-10-CM: I95.9  ICD-9-CM: 458.9  4/28/2017        Acute renal failure (ARF) ([facility] ICD-10-CM: N17.9  ICD-9-CM: 584.9  4/28/2017        Sepsis ([facility] ICD-10-CM: A41.9  ICD-9-CM: 038.9, 995.91  4/23/2017        Hypomagnesemia ICD-10-CM: E83.42  ICD-9-CM: 275.2  4/23/2017        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 [facility]) ICD-10-CM: C85.88  ICD-9-CM: 200.38  4/7/2017        Non Hodgkin's lymphoma ([facility] ICD-10-CM: C85.90  ICD-9-CM: 202.80  3/30/2017        Ascites ICD-10-CM: R18.8  ICD-9-CM: 789.59  3/30/2017        Lymphadenopathy, generalized ICD-10-CM: R59.1  ICD-9-CM: 785.6  3/30/2017        Osteoarthritis of left knee ICD-10-CM: M17.12  ICD-9-CM: 715.96  8/26/2013        Morbid obesity ([facility] ICD-10-CM: E66.01  ICD-9-CM: 278.01  10/4/2011        History of DVT of lower extremity ICD-10-CM: H63.566  ICD-9-CM: V12.51  10/4/2011    Overview Signed 10/4/2011  1:44 AM by Machelle Willingham NP     X 2               Hypertension ICD-10-CM: F09  ICD-9-CM: 401.9  10/4/2011    Overview Signed 10/4/2011  1:44 AM by Machelle Willingham NP     PRE OP             Heart murmur ICD-10-CM: R01.1  ICD-9-CM: 785.2  10/4/2011        Osteoarthritis of right knee ICD-10-CM: M17.11  ICD-9-CM: 715.96  10/3/2011        S/P total knee replacement using cement ICD-10-CM: Z96.659  ICD-9-CM: V43.65  10/3/2011                RECOMMENDATIONS:  Marginal Zone Lymphoma  - s/p C4 bendamustine/rituximab on 7/12    Febrile Neutropenia  - UA neg for infx  - BCx - NGTD  - CXR showed stable right lung base density  - RST neg  - Flu neg  - Con't Vanc/Zosyn    Pancytopenia secondary to chemo  - Con't to monitor. Transfuse prn    Jammie SOPs  Continue home meds  DVT Prophylaxis: Lovenox unless plt <50,000      Lab studies and imaging studies (CXR) were personally reviewed. Thank you for allowing us to participate in the care of Ms. Cabrera Quintero. We will assume care of Mrs. Cabrera Quintero. Possible discharge home in 2 days if BCx remains with no growth and pt remains afebrile. Negrito Crawley NP   [facility]  [address] [place] [address]  Office : [phone]  Fax : [phone]         Attending Addendum:  I personally evaluated the patient with Negrito Crawley, N.P.,  and agree with the assessment, findings and plan as documented. Appears stable, we will transfer her to our service.               Joe Adorno MD  [address] [place] [address]  Office : [phone]  Fax : [phone]

## 2017-07-21 VITALS
OXYGEN SATURATION: 100 % | TEMPERATURE: 98.9 F | SYSTOLIC BLOOD PRESSURE: 133 MMHG | DIASTOLIC BLOOD PRESSURE: 84 MMHG | RESPIRATION RATE: 19 BRPM | WEIGHT: 252.5 LBS | BODY MASS INDEX: 46.46 KG/M2 | HEART RATE: 83 BPM | HEIGHT: 62 IN

## 2017-07-21 LAB
ALBUMIN SERPL BCP-MCNC: 2.6 G/DL (ref 3.5–5)
ALBUMIN/GLOB SERPL: 0.9 {RATIO} (ref 1.2–3.5)
ALP SERPL-CCNC: 149 U/L (ref 50–136)
ALT SERPL-CCNC: 41 U/L (ref 12–65)
ANION GAP BLD CALC-SCNC: 9 MMOL/L (ref 7–16)
AST SERPL W P-5'-P-CCNC: 22 U/L (ref 15–37)
BASOPHILS # BLD AUTO: 0 K/UL (ref 0–0.2)
BASOPHILS # BLD: 3 % (ref 0–2)
BILIRUB SERPL-MCNC: 0.2 MG/DL (ref 0.2–1.1)
BUN SERPL-MCNC: 10 MG/DL (ref 6–23)
CALCIUM SERPL-MCNC: 8.5 MG/DL (ref 8.3–10.4)
CHLORIDE SERPL-SCNC: 109 MMOL/L (ref 98–107)
CO2 SERPL-SCNC: 26 MMOL/L (ref 21–32)
CREAT SERPL-MCNC: 0.78 MG/DL (ref 0.6–1)
DIFFERENTIAL METHOD BLD: ABNORMAL
EOSINOPHIL # BLD: 0.1 K/UL (ref 0–0.8)
EOSINOPHIL NFR BLD: 10 % (ref 0.5–7.8)
ERYTHROCYTE [DISTWIDTH] IN BLOOD BY AUTOMATED COUNT: 14.9 % (ref 11.9–14.6)
GLOBULIN SER CALC-MCNC: 2.8 G/DL (ref 2.3–3.5)
GLUCOSE SERPL-MCNC: 118 MG/DL (ref 65–100)
HCT VFR BLD AUTO: 23.7 % (ref 35.8–46.3)
HGB BLD-MCNC: 7.8 G/DL (ref 11.7–15.4)
IMM GRANULOCYTES # BLD: 0.2 K/UL (ref 0–0.5)
LYMPHOCYTES # BLD AUTO: 10 % (ref 13–44)
LYMPHOCYTES # BLD: 0.1 K/UL (ref 0.5–4.6)
MAGNESIUM SERPL-MCNC: 1.8 MG/DL (ref 1.8–2.4)
MCH RBC QN AUTO: 29.7 PG (ref 26.1–32.9)
MCHC RBC AUTO-ENTMCNC: 32.9 G/DL (ref 31.4–35)
MCV RBC AUTO: 90.1 FL (ref 79.6–97.8)
MONOCYTES # BLD: 0.2 K/UL (ref 0.1–1.3)
MONOCYTES NFR BLD AUTO: 20 % (ref 4–12)
NEUTS SEG # BLD: 0.6 K/UL (ref 1.7–8.2)
NEUTS SEG NFR BLD AUTO: 57 % (ref 43–78)
PLATELET # BLD AUTO: 78 K/UL (ref 150–450)
PLATELET COMMENTS,PCOM: ABNORMAL
PMV BLD AUTO: 10.8 FL (ref 10.8–14.1)
POTASSIUM SERPL-SCNC: 3.6 MMOL/L (ref 3.5–5.1)
PROT SERPL-MCNC: 5.4 G/DL (ref 6.3–8.2)
RBC # BLD AUTO: 2.63 M/UL (ref 4.05–5.25)
RBC MORPH BLD: ABNORMAL
SODIUM SERPL-SCNC: 144 MMOL/L (ref 136–145)
VANCOMYCIN TROUGH SERPL-MCNC: 13.3 UG/ML (ref 5–20)
WBC # BLD AUTO: 1 K/UL (ref 4.3–11.1)
WBC MORPH BLD: ABNORMAL

## 2017-07-21 PROCEDURE — 74011250636 HC RX REV CODE- 250/636: Performed by: INTERNAL MEDICINE

## 2017-07-21 PROCEDURE — 80202 ASSAY OF VANCOMYCIN: CPT | Performed by: INTERNAL MEDICINE

## 2017-07-21 PROCEDURE — 74011250636 HC RX REV CODE- 250/636: Performed by: NURSE PRACTITIONER

## 2017-07-21 PROCEDURE — 36591 DRAW BLOOD OFF VENOUS DEVICE: CPT

## 2017-07-21 PROCEDURE — 74011250637 HC RX REV CODE- 250/637: Performed by: NURSE PRACTITIONER

## 2017-07-21 PROCEDURE — 83735 ASSAY OF MAGNESIUM: CPT | Performed by: NURSE PRACTITIONER

## 2017-07-21 PROCEDURE — 74011250637 HC RX REV CODE- 250/637: Performed by: INTERNAL MEDICINE

## 2017-07-21 PROCEDURE — 80053 COMPREHEN METABOLIC PANEL: CPT | Performed by: NURSE PRACTITIONER

## 2017-07-21 PROCEDURE — 85025 COMPLETE CBC W/AUTO DIFF WBC: CPT | Performed by: NURSE PRACTITIONER

## 2017-07-21 PROCEDURE — 74011000258 HC RX REV CODE- 258: Performed by: INTERNAL MEDICINE

## 2017-07-21 RX ORDER — LEVOTHYROXINE SODIUM 150 UG/1
150 TABLET ORAL
Status: DISCONTINUED | OUTPATIENT
Start: 2017-07-21 | End: 2017-07-21 | Stop reason: HOSPADM

## 2017-07-21 RX ORDER — AMOXICILLIN AND CLAVULANATE POTASSIUM 875; 125 MG/1; MG/1
1 TABLET, FILM COATED ORAL 2 TIMES DAILY
Qty: 12 TAB | Refills: 0 | Status: SHIPPED | OUTPATIENT
Start: 2017-07-21 | End: 2017-07-27

## 2017-07-21 RX ORDER — LEVOTHYROXINE SODIUM 150 UG/1
150 TABLET ORAL
Qty: 30 TAB | Refills: 0 | Status: SHIPPED | OUTPATIENT
Start: 2017-07-21 | End: 2017-07-24 | Stop reason: SDUPTHER

## 2017-07-21 RX ORDER — ACYCLOVIR 800 MG/1
400 TABLET ORAL 2 TIMES DAILY
Status: DISCONTINUED | OUTPATIENT
Start: 2017-07-21 | End: 2017-07-21 | Stop reason: HOSPADM

## 2017-07-21 RX ORDER — ACYCLOVIR 400 MG/1
400 TABLET ORAL 2 TIMES DAILY
Qty: 60 TAB | Refills: 3 | Status: SHIPPED | OUTPATIENT
Start: 2017-07-21 | End: 2017-11-21 | Stop reason: SDUPTHER

## 2017-07-21 RX ORDER — HEPARIN 100 UNIT/ML
500 SYRINGE INTRAVENOUS ONCE
Status: COMPLETED | OUTPATIENT
Start: 2017-07-21 | End: 2017-07-21

## 2017-07-21 RX ADMIN — SODIUM CHLORIDE 100 ML/HR: 900 INJECTION, SOLUTION INTRAVENOUS at 04:12

## 2017-07-21 RX ADMIN — PIPERACILLIN SODIUM,TAZOBACTAM SODIUM 4.5 G: 4; .5 INJECTION, POWDER, FOR SOLUTION INTRAVENOUS at 09:31

## 2017-07-21 RX ADMIN — Medication 400 MG: at 09:31

## 2017-07-21 RX ADMIN — VANCOMYCIN HYDROCHLORIDE 1500 MG: 10 INJECTION, POWDER, LYOPHILIZED, FOR SOLUTION INTRAVENOUS at 10:25

## 2017-07-21 RX ADMIN — PANTOPRAZOLE SODIUM 40 MG: 40 TABLET, DELAYED RELEASE ORAL at 09:31

## 2017-07-21 RX ADMIN — ALLOPURINOL 300 MG: 300 TABLET ORAL at 09:30

## 2017-07-21 RX ADMIN — PIPERACILLIN SODIUM,TAZOBACTAM SODIUM 4.5 G: 4; .5 INJECTION, POWDER, FOR SOLUTION INTRAVENOUS at 04:11

## 2017-07-21 RX ADMIN — PREGABALIN 100 MG: 50 CAPSULE ORAL at 09:31

## 2017-07-21 RX ADMIN — FLUCONAZOLE 200 MG: 100 TABLET ORAL at 09:31

## 2017-07-21 RX ADMIN — ACYCLOVIR 400 MG: 800 TABLET ORAL at 12:05

## 2017-07-21 RX ADMIN — SODIUM CHLORIDE, PRESERVATIVE FREE 500 UNITS: 5 INJECTION INTRAVENOUS at 14:05

## 2017-07-21 RX ADMIN — LEVOTHYROXINE SODIUM 150 MCG: 150 TABLET ORAL at 09:31

## 2017-07-21 RX ADMIN — ENOXAPARIN SODIUM 40 MG: 40 INJECTION SUBCUTANEOUS at 09:31

## 2017-07-21 RX ADMIN — Medication 10 ML: at 14:04

## 2017-07-21 NOTE — DISCHARGE INSTRUCTIONS
DISCHARGE SUMMARY from Nurse    The following personal items are in your possession at time of discharge:    Dental Appliances: None  Visual Aid: Glasses, At bedside        Jewelry: None  Clothing: Pajamas, Shorts, Undergarments, Sweater  Other Valuables: Cell Phone, [place] Items Sent to Safe: none          PATIENT INSTRUCTIONS:    After general anesthesia or intravenous sedation, for 24 hours or while taking prescription Narcotics:  · Limit your activities  · Do not drive and operate hazardous machinery  · Do not make important personal or business decisions  · Do  not drink alcoholic beverages  · If you have not urinated within 8 hours after discharge, please contact your surgeon on call. Report the following to your surgeon:  · Excessive pain, swelling, redness or odor of or around the surgical area  · Temperature over 100.5  · Nausea and vomiting lasting longer than 4 hours or if unable to take medications  · Any signs of decreased circulation or nerve impairment to extremity: change in color, persistent  numbness, tingling, coldness or increase pain  · Any questions        What to do at Home:  Recommended activity: Activity as tolerated, per MD instructions    If you experience any of the following symptoms fever > 100.5, nausea, vomiting, pain, chest pain, shortness of breath please follow up with MD.      *  Please give a list of your current medications to your Primary Care Provider. *  Please update this list whenever your medications are discontinued, doses are      changed, or new medications (including over-the-counter products) are added. *  Please carry medication information at all times in case of emergency situations. These are general instructions for a healthy lifestyle:    No smoking/ No tobacco products/ Avoid exposure to second hand smoke    Surgeon General's Warning:  Quitting smoking now greatly reduces serious risk to your health.     Obesity, smoking, and sedentary lifestyle greatly increases your risk for illness    A healthy diet, regular physical exercise & weight monitoring are important for maintaining a healthy lifestyle    You may be retaining fluid if you have a history of heart failure or if you experience any of the following symptoms:  Weight gain of 3 pounds or more overnight or 5 pounds in a week, increased swelling in our hands or feet or shortness of breath while lying flat in bed. Please call your doctor as soon as you notice any of these symptoms; do not wait until your next office visit. Recognize signs and symptoms of STROKE:    F-face looks uneven    A-arms unable to move or move unevenly    S-speech slurred or non-existent    T-time-call 911 as soon as signs and symptoms begin-DO NOT go       Back to bed or wait to see if you get better-TIME IS BRAIN. Warning Signs of HEART ATTACK     Call 911 if you have these symptoms:   Chest discomfort. Most heart attacks involve discomfort in the center of the chest that lasts more than a few minutes, or that goes away and comes back. It can feel like uncomfortable pressure, squeezing, fullness, or pain.  Discomfort in other areas of the upper body. Symptoms can include pain or discomfort in one or both arms, the back, neck, jaw, or stomach.  Shortness of breath with or without chest discomfort.  Other signs may include breaking out in a cold sweat, nausea, or lightheadedness. Don't wait more than five minutes to call 911 - MINUTES MATTER! Fast action can save your life. Calling 911 is almost always the fastest way to get lifesaving treatment. Emergency Medical Services staff can begin treatment when they arrive -- up to an hour sooner than if someone gets to the hospital by car. The discharge information has been reviewed with the patient. The patient verbalized understanding.     Discharge medications reviewed with the patient and appropriate educational materials and side effects teaching were provided. Learning About Fever  What is a fever? A fever is a high body temperature. It's one way your body fights being sick. A fever shows that the body is responding to infection or other illnesses, both minor and severe. A fever is a symptom, not an illness by itself. A fever can be a sign that you are ill, but most fevers are not caused by a serious problem. You may have a fever with a minor illness, such as a cold. But sometimes a very serious infection may cause little or no fever. It is important to look at other symptoms, other conditions you have, and how you feel in general. In children, notice how they act and see what symptoms they complain of. What is a normal body temperature? A normal body temperature is about 98. 6ºF. Some people have a normal temperature that is a little higher or a little lower than this. Your temperature may be a little lower in the morning than it is later in the day. It may go up during hot weather or when you exercise, wear heavy clothes, or take a hot bath. Your temperature may also be different depending on how you take it. A temperature taken in the mouth (oral) or under the arm may be a little lower than your core temperature (rectal). What is a fever temperature? A core temperature of 100.4°F or above is considered a fever. What can cause a fever? A fever may be caused by:  · Infections. This is the most common cause of a fever. Examples of infections that can cause a fever include the flu, a kidney infection, or pneumonia. · Some medicines. · Severe trauma or injury, such as a heart attack, stroke, heatstroke, or burns. · Other medical conditions, such as arthritis and some cancers. How can you treat a fever at home? · Ask your doctor if you can take an over-the-counter pain medicine, such as acetaminophen (Tylenol), ibuprofen (Advil, Motrin), or naproxen (Aleve). Be safe with medicines.  Read and follow all instructions on the label.  · To prevent dehydration, drink plenty of fluids. Choose water and other caffeine-free clear liquids until you feel better. If you have kidney, heart, or liver disease and have to limit fluids, talk with your doctor before you increase the amount of fluids you drink. Follow-up care is a key part of your treatment and safety. Be sure to make and go to all appointments, and call your doctor if you are having problems. It's also a good idea to know your test results and keep a list of the medicines you take. Where can you learn more? Go to http://rajatCurserachel.info/. Enter B597 in the search box to learn more about \"Learning About Fever. \"  Current as of: March 20, 2017  Content Version: 11.3  © 7671-6849 [employer]. Care instructions adapted under license by [employer] (which disclaims liability or warranty for this information). If you have questions about a medical condition or this instruction, always ask your healthcare professional. [place] [address] any warranty or liability for your use of this information. Low Blood Pressure: Care Instructions  Your Care Instructions  Blood pressure is a measurement of the force of the blood against the walls of the blood vessels during and after each beat of the heart. Low blood pressure (hypotension) means that your blood pressure is much lower than normal. Some people, especially young, slim women, may have slightly low blood pressure without symptoms. However, in many people, low blood pressure can cause symptoms such as dizziness or lightheadedness. When your blood pressure is too low, your heart, brain, and other organs do not get enough blood. Low blood pressure can be caused by many things, including heart problems and some medicines. Uncontrolled diabetes can cause your blood pressure to drop, and so can a severe allergic reaction or infection.  Another cause is dehydration, which is when your body loses too much fluid. Treatment for low blood pressure depends on the cause. Follow-up care is a key part of your treatment and safety. Be sure to make and go to all appointments, and call your doctor if you are having problems. It's also a good idea to know your test results and keep a list of the medicines you take. How can you care for yourself at home? · Drink plenty of fluids, enough so that your urine is light yellow or clear like water. If you have kidney, heart, or liver disease and have to limit fluids, talk with your doctor before you increase the amount of fluids you drink. · Be safe with medicines. Call your doctor if you think you are having a problem with your medicine. You will get more details on the specific medicines your doctor prescribes. · Stand up or get out of bed very slowly to allow your body to adjust.  · Get plenty of rest.  · Do not smoke. Smoking increases your risk of heart attack. If you need help quitting, talk to your doctor about stop-smoking programs and medicines. These can increase your chances of quitting for good. · Limit alcohol to 2 drinks a day for men and 1 drink a day for women. Alcohol may interfere with your medicine. In addition, alcohol can make your low blood pressure worse by causing your body to lose water. When should you call for help? Call 911 anytime you think you may need emergency care. For example, call if:  · You have symptoms of a heart attack. These may include:  ¨ Chest pain or pressure, or a strange feeling in the chest.  ¨ Sweating. ¨ Shortness of breath. ¨ Nausea or vomiting. ¨ Pain, pressure, or a strange feeling in the back, neck, jaw, or upper belly or in one or both shoulders or arms. ¨ Lightheadedness or sudden weakness. ¨ A fast or irregular heartbeat. After you call 911, the  may tell you to chew 1 adult-strength or 2 to 4 low-dose aspirin. Wait for an ambulance. Do not try to drive yourself.   · You have symptoms of a stroke. These may include:  ¨ Sudden numbness, tingling, weakness, or loss of movement in your face, arm, or leg, especially on only one side of your body. ¨ Sudden vision changes. ¨ Sudden trouble speaking. ¨ Sudden confusion or trouble understanding simple statements. ¨ Sudden problems with walking or balance. ¨ A sudden, severe headache that is different from past headaches. · You passed out (lost consciousness). Call your doctor now or seek immediate medical care if:  · You are dizzy or lightheaded, or you feel like you may faint. · You have signs of needing more fluids. You have sunken eyes and a dry mouth, and you pass only a little dark urine. · You cannot keep down fluids. Watch closely for changes in your health, and be sure to contact your doctor if:  · You do not get better as expected. Where can you learn more? Go to http://rajat-rachel.info/. Enter C304 in the search box to learn more about \"Low Blood Pressure: Care Instructions. \"  Current as of: April 21, 2016  Content Version: 11.3  © 3165-6502 [employer]. Care instructions adapted under license by [employer] (which disclaims liability or warranty for this information). If you have questions about a medical condition or this instruction, always ask your healthcare professional. [place] [address] any warranty or liability for your use of this information.

## 2017-07-21 NOTE — PROGRESS NOTES
Pharmacokinetic Consult to Pharmacist    Tala Paige is a 62 y.o. female being treated for neutropenic fever with vancomycin and zosyn. Height: 5' 2\" (157.5 cm)  Weight: 114.5 kg (252 lb 8 oz)  Lab Results   Component Value Date/Time    BUN 10 07/21/2017 04:19 AM    Creatinine 0.78 07/21/2017 04:19 AM    WBC 1.0 07/21/2017 04:19 AM    Procalcitonin 0.1 07/19/2017 08:37 PM    Lactic acid 1.3 04/25/2017 08:25 AM    Lactic Acid (POC) 0.9 07/19/2017 08:42 PM      Estimated Creatinine Clearance: 95.3 mL/min (based on Cr of 0.78). Lab Results   Component Value Date/Time    Vancomycin,trough 13.3 07/21/2017 09:35 AM    Vancomycin, random 28.0 04/28/2017 06:40 PM       Day 3 of vancomycin. Goal trough is 15-20. No dose change. Move next dose forward to 2000. Continue 1.5g q12h  Will continue to follow patient. Thank you,  Rosa Ruth, Pharm. D.   Clinical Pharmacist  [phone]

## 2017-07-21 NOTE — PROGRESS NOTES
Problem: Nutrition Deficit  Goal: *Optimize nutritional status  Nutrition  Reason for assessment: Referral received from nursing admission Malnutrition Screening Tool for recently lost >33# without trying and eating poorly due to decreased appetite. Assessment:   Diet order(s): regular  Food/Nutrition Patient History:  The patient has a history remarkable for NHL (diagnosed in March), s/p C4 bendamustine/rituximab on 7/13. She reports that she has lost 60-70 pounds over the course of this year. She states that she was sick with n/v/d for approximately 6 weeks during February-March. She states that she had a UBW of ~280 pounds but had gotten up to her maximum weight of 310 pounds prior to diagnosis. Per patient, she and her  have both made quite a few of diet changes (she reports that he had a heart stent in March as well) and they have since gotten rid of \"junk food\", sodas, have limited salt intake, and have tried to eat \"healthy. \"  She believes that this has contributed to weight loss as well. Per patient, she has intermittent abdominal distention and nausea and will drink an ensure vs eat a meal when she feels this way. Anthropometrics:Height: 5' 2\" (157.5 cm),  Weight: 114.5 kg (252 lb 8 oz), Weight Source: Standing scale (comment), Body mass index is 46.18 kg/(m^2). BMI class of morbid obesity class III. WT / BMI 5/29/2017   WEIGHT 280 lb       WT / BMI 6/13/2016   WEIGHT 289 lb   She has had a potential 28 pound, 10% weight loss within ~2 months and a ~37 pound, 12.8% clinically insignificant weight loss over ~ 1 year. Macronutrient needs:  EER:  6508-0296 kcal /day (11-14 kcal/kg listed BW)  EPR:  50-65 grams protein/day (1-1.3 grams/kg IBW)(GFR >60)  Intake/Comparative Standards: Per RD meal rounds: 100% of breakfast. Average intake for past 1 day(s)/1 recorded meal(s): 100%.  This potentially meets ~100% of kcal and ~100% of protein needs     Nutrition Diagnosis: Unintended weight loss r/t altered GI function, as evidenced by patient report of poor oral intake over ~6 weeks d/t n/v/d, weight loss noted above. Note that some of the patient's weight loss was intended and r/t to diet changes. Intervention:  Meals and snacks: Continue current diet. Nutrition Supplement Therapy: ensure high protein (1 daily)  Discharge plan: home at discharge.       Natan Foster [address], [address], [phone]

## 2017-07-21 NOTE — DISCHARGE SUMMARY
[facility] Hematology & Oncology: Inpatient Hematology / Oncology Discharge Summary Note    Patient ID:  Antonietta Bennett  [id number]  80 y.o.  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 Rina Bright is a 62 y.o. female admitted on 7/19/2017 with a primary diagnosis of Neutropenic fever ([address] A diagnosis of Acute URI was also pertinent to this visit. She is a known patient of Dr. Seda Galvez with marginal zone lymphoma s/p C4 bendamustine/rituximab on 7/13. She presented to ED with fever (), taking levaquin x 2 days for URI. Reports sore throat, runny nose with epistaxis, HA, and cough. Her son was sick with similar symptoms. WBC 0.7. Neulasta given last week. Procal 0.1  Lactic acid 0.9  CXR showed stable right lung base density. UA negative for infx. RST neg. Strep cx pending. Flu neg. Vanc/Zosyn started in ED. She also has oral lesion c/w herpes labialis. Pt states she feels much better and is ready to go home. BCx has remained negative x 48 hours. Did have temp last night up to 100.9, but felt to viral in nature. Has remained afebrile since then. We will discharge her home on Augmentin 875mg BID x 6 days to complete antibx therapy. DC levaquin that she was given PTA. Also starting on acyclovir 400mg BID. Dr. Ramiro Russo would like for her to remain on acyclovir until 2 months after her last dose of bendamustine (~Nov). Rx sent to pharmacy for Augmentin and Acyclovir. She is to f/u with Dr. Seda Galvez in 3-5 days.   Per Dr. Ramiro Russo, ok if patient has fever at home - illness felt to viral in nature. WBC up to 1,000/ today. Advised to call with any uncontrollable symptoms or with any concerns. Pt verbalizes understanding. Consults:  IP CONSULT TO ONCOLOGY    Pertinent Diagnostic Studies:   Labs:    Recent Labs      07/21/17 0419 07/20/17 0422 07/19/17 2037   WBC  1.0*  0.7*  0.7*   HGB  7.8*  8.0*  8.7*   PLT  78*  70*  85*   ANEU  0.6*   --    --     Recent Labs      07/21/17 0419 07/20/17 0422 07/19/17 2037   NA  144  143  141   K  3.6  3.7  3.7   CL  109*  107  104   CO2  26  30  28   GLU  118*  109*  110*   BUN  10  8  9   CREA  0.78  0.74  0.79   CA  8.5  8.2*  8.5   SGOT  22  32  36   AP  149*  155*  157*   TP  5.4*  5.8*  6.2*   ALB  2.6*  2.8*  3.1*   MG  1.8   --    --        Imaging:   XR CHEST PA LAT [[id number]] Collected: 07/19/17 1953     Order Status: Completed Updated: 07/19/17 2002     Narrative:       PA LATERAL CHEST X-RAY    HISTORY: Lymphoma. Fever and cough times one day    COMPARISON: May 1, 2017    FINDINGS: Clips are present in the right axillary tail. A chest port is present  with catheter tip in SVC. Pleural spaces are clear. Faint density in the medial  right lung base is unchanged without definite new consolidation.       Impression:       IMPRESSION: No new consolidation.             Current Discharge Medication List      START taking these medications    Details   acyclovir (ZOVIRAX) 400 mg tablet Take 1 Tab by mouth two (2) times a day. Qty: 60 Tab, Refills: 3      amoxicillin-clavulanate (AUGMENTIN) 875-125 mg per tablet Take 1 Tab by mouth two (2) times a day for 6 days. Qty: 12 Tab, Refills: 0         CONTINUE these medications which have CHANGED    Details   !! levothyroxine (SYNTHROID) 150 mcg tablet Take 1 Tab by mouth Daily (before breakfast). Qty: 30 Tab, Refills: 0       !! - Potential duplicate medications found. Please discuss with provider.       CONTINUE these medications which have NOT CHANGED    Details   !! levothyroxine (SYNTHROID) 150 mcg tablet Take 150 mcg by mouth Daily (before breakfast). allopurinol (ZYLOPRIM) 300 mg tablet Take 1 Tab by mouth two (2) times a day. 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 Qty: 60 Tab, Refills: 1      loratadine (CLARITIN) 10 mg tablet Take 10 mg by mouth. 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 Max Daily Amount: 300 mg. Qty: 90 Cap, Refills: 3      oxyCODONE (OXYIR) 5 mg capsule Take 1-2 Caps by mouth every four (4) hours as needed. Max Daily Amount: 60 mg.  Qty: 180 Cap, Refills: 0      midodrine (PROAMITINE) 2.5 mg tablet Take 1 Tab by mouth two (2) times daily (with meals). Qty: 60 Tab, Refills: 2    Associated Diagnoses: Non-Hodgkin's lymphoma, unspecified body region, unspecified non-Hodgkin lymphoma type (HCC)      omeprazole (PRILOSEC) 20 mg capsule TAKE 1 CAPSULE DAILY  Qty: 90 Cap, Refills: 2    Associated Diagnoses: Gastroesophageal reflux disease without esophagitis      benzonatate (TESSALON PERLES) 100 mg capsule Take 1 Cap by mouth every six (6) hours as needed for Cough. Qty: 40 Cap, Refills: 3    Associated Diagnoses: Upper respiratory tract infection, unspecified type      magic mouthwash (ALEXSANDER) susp Take 10 mL by mouth every four (4) hours as needed. Qty: 1 Bottle, Refills: 2      nystatin (MYCOSTATIN) powder Apply  to affected area three (3) times daily. Qty: 60 g, Refills: 2      lidocaine-prilocaine (EMLA) topical cream Apply  to affected area as needed for Pain.  Apply to port site 45-60 minutes prior to lab appt or infusion. 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 Unsure of dose      ondansetron hcl (ZOFRAN, AS HYDROCHLORIDE,) 4 mg tablet Take 1 Tab by mouth every eight (8) hours as needed for Nausea. Qty: 60 Tab, Refills: 3    Associated Diagnoses: Bilious vomiting with nausea       !! - Potential duplicate medications found. Please discuss with provider. STOP taking these medications       levoFLOXacin (LEVAQUIN) 500 mg tablet Comments:   Reason for Stopping:                   OBJECTIVE:  Patient Vitals for the past 8 hrs:   BP Temp Pulse Resp SpO2   17 1132 133/84 98.9 °F (37.2 °C) 83 19 100 %   17 0710 140/76 98.7 °F (37.1 °C) 84 19 98 %     Temp (24hrs), Av.3 °F (37.4 °C), Min:97.7 °F (36.5 °C), Max:100.9 °F (38.3 °C)     0701 -  1900  In: 120 [P.O.:120]  Out: 750 [Urine:750]    Physical Exam:  Constitutional: Well developed, well nourished female in no acute distress, sitting comfortably in bedside chair. HEENT: Normocephalic and atraumatic. Mucous membranes are moist.  Extraocular muscles are intact. Sclerae anicteric. Neck supple without JVD. No thyromegaly present. +1cm erythematous and edematous oral lesion on right lower lip with ulceration posterior to lesion    Lymph node   Deferred   Skin Warm and dry. No bruising and no rash noted. No erythema. No pallor. Respiratory Lungs are clear to auscultation bilaterally without wheezes, rales or rhonchi, normal air exchange without accessory muscle use. CVS Normal rate, regular rhythm and normal S1 and S2. No murmurs, gallops, or rubs. Abdomen Soft, nontender and nondistended, normoactive bowel sounds. No palpable mass. No hepatosplenomegaly. Neuro Grossly nonfocal with no obvious sensory or motor deficits. MSK +trace edema to BLE. Normal range of motion in general.  No tenderness. Psych Appropriate mood and affect. 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 Aaron Alejandro or NP     Please schedule f/u appt in 3-5 days with Dr. Aaron Alejandro or NP     Standing Status:   Standing     Number of Occurrences:   1     Order Specific Question:   Appointment in     Answer:   3 - 5 Days           Over 30 minutes was spent in discharge planning and coordination of care. Niru Hamilton NP  [facility]ematology [facility] On[facility]  [address] [place] [address]  Office : [phone]  Fax : [phone]         Attending Addendum:  I personally evaluated the patient with Niru Hamilton, N.P.,  and agree with the assessment, findings and plan as documented. Appears stable, she will discharged home on PO ABX.               Fatuma Marin MD  [address] [place] [address]  Office : [phone]  Fax : [phone]

## 2017-07-21 NOTE — PROGRESS NOTES
TRANSFER - IN REPORT:    Verbal report received from [employer]) [facility] Dept(unit) for routine progression of care      Report consisted of patients Situation, Background, Assessment and   Recommendations(SBAR). Information from the following report(s) SBAR was reviewed with the receiving nurse. Opportunity for questions and clarification was provided. Assessment completed upon patients arrival to unit and care assumed.

## 2017-07-21 NOTE — PROGRESS NOTES
Skin assessment completed by this nurse and Yanna Luis Rn. Bilateral knee scars from past knee surgeries, right large abdominal scar from past gallbladder surgery. , small scrape on lower right shin, several moles on back. No rash or breakdown. Admission assessment complete. Denies any pain or distress. Alert and oriented x4. Right port intact, patient with positive blood return. IVF's normal saline at 100cc/hr infusing.

## 2017-07-22 LAB
BACTERIA SPEC CULT: NORMAL
SERVICE CMNT-IMP: NORMAL

## 2017-07-24 ENCOUNTER — HOSPITAL ENCOUNTER (OUTPATIENT)
Dept: ONCOLOGY | Age: 57
Discharge: HOME | End: 2017-07-24
Payer: COMMERCIAL

## 2017-07-24 LAB
BACTERIA SPEC CULT: NORMAL
BACTERIA SPEC CULT: NORMAL
SERVICE CMNT-IMP: NORMAL
SERVICE CMNT-IMP: NORMAL

## 2017-07-24 NOTE — PROGRESS NOTES
The patient called to cancel for the week due to being in the hospital recently. She will retun next week.

## 2017-07-26 ENCOUNTER — PATIENT OUTREACH (OUTPATIENT)
Dept: CASE MANAGEMENT | Age: 57
End: 2017-07-26

## 2017-07-26 ENCOUNTER — HOSPITAL ENCOUNTER (OUTPATIENT)
Dept: LAB | Age: 57
Discharge: HOME | End: 2017-07-26
Payer: COMMERCIAL

## 2017-07-26 DIAGNOSIS — C85.88 MARGINAL ZONE LYMPHOMA OF LYMPH NODES OF MULTIPLE SITES (HCC): ICD-10-CM

## 2017-07-26 LAB
ALBUMIN SERPL BCP-MCNC: 3.4 G/DL (ref 3.5–5)
ALBUMIN/GLOB SERPL: 1.1 {RATIO} (ref 1.2–3.5)
ALP SERPL-CCNC: 134 U/L (ref 50–136)
ALT SERPL-CCNC: 29 U/L (ref 12–65)
ANION GAP BLD CALC-SCNC: 5 MMOL/L (ref 7–16)
AST SERPL W P-5'-P-CCNC: 22 U/L (ref 15–37)
BASOPHILS # BLD AUTO: 0.1 K/UL (ref 0–0.2)
BASOPHILS # BLD: 2 % (ref 0–2)
BILIRUB SERPL-MCNC: 0.2 MG/DL (ref 0.2–1.1)
BUN SERPL-MCNC: 14 MG/DL (ref 6–23)
CALCIUM SERPL-MCNC: 9 MG/DL (ref 8.3–10.4)
CHLORIDE SERPL-SCNC: 106 MMOL/L (ref 98–107)
CO2 SERPL-SCNC: 31 MMOL/L (ref 21–32)
CREAT SERPL-MCNC: 0.9 MG/DL (ref 0.6–1)
DIFFERENTIAL METHOD BLD: ABNORMAL
EOSINOPHIL # BLD: 0.2 K/UL (ref 0–0.8)
EOSINOPHIL NFR BLD: 7 % (ref 0.5–7.8)
ERYTHROCYTE [DISTWIDTH] IN BLOOD BY AUTOMATED COUNT: 14.8 % (ref 11.9–14.6)
GLOBULIN SER CALC-MCNC: 3 G/DL (ref 2.3–3.5)
GLUCOSE SERPL-MCNC: 117 MG/DL (ref 65–100)
HCT VFR BLD AUTO: 25.8 % (ref 35.8–46.3)
HGB BLD-MCNC: 8.7 G/DL (ref 11.7–15.4)
LDH SERPL L TO P-CCNC: 343 U/L (ref 100–190)
LYMPHOCYTES # BLD AUTO: 6 % (ref 13–44)
LYMPHOCYTES # BLD: 0.2 K/UL (ref 0.5–4.6)
MAGNESIUM SERPL-MCNC: 2 MG/DL (ref 1.8–2.4)
MCH RBC QN AUTO: 30.4 PG (ref 26.1–32.9)
MCHC RBC AUTO-ENTMCNC: 33.7 G/DL (ref 31.4–35)
MCV RBC AUTO: 90.2 FL (ref 79.6–97.8)
MONOCYTES # BLD: 0.2 K/UL (ref 0.1–1.3)
MONOCYTES NFR BLD AUTO: 7 % (ref 4–12)
NEUTS SEG # BLD: 1.8 K/UL (ref 1.7–8.2)
NEUTS SEG NFR BLD AUTO: 78 % (ref 43–78)
NRBC # BLD: 0 K/UL (ref 0–0.2)
PLATELET # BLD AUTO: 122 K/UL (ref 150–450)
PLATELET COMMENTS,PCOM: ABNORMAL
PMV BLD AUTO: 10.7 FL (ref 10.8–14.1)
POTASSIUM SERPL-SCNC: 4 MMOL/L (ref 3.5–5.1)
PROT SERPL-MCNC: 6.4 G/DL (ref 6.3–8.2)
RBC # BLD AUTO: 2.86 M/UL (ref 4.05–5.25)
RBC MORPH BLD: ABNORMAL
SODIUM SERPL-SCNC: 142 MMOL/L (ref 136–145)
WBC # BLD AUTO: 2.5 K/UL (ref 4.3–11.1)
WBC MORPH BLD: ABNORMAL

## 2017-07-26 PROCEDURE — 80053 COMPREHEN METABOLIC PANEL: CPT | Performed by: INTERNAL MEDICINE

## 2017-07-26 PROCEDURE — 83735 ASSAY OF MAGNESIUM: CPT | Performed by: INTERNAL MEDICINE

## 2017-07-26 PROCEDURE — 85025 COMPLETE CBC W/AUTO DIFF WBC: CPT | Performed by: INTERNAL MEDICINE

## 2017-07-26 PROCEDURE — 83615 LACTATE (LD) (LDH) ENZYME: CPT | Performed by: INTERNAL MEDICINE

## 2017-07-26 NOTE — PROGRESS NOTES
7/26/17:  Patient in for hospital follow-up with NP. Patient was discharged on 7/21 and has continued to improve since discharge. LAbs reviewed by NP and are improving. Patient continuing acyclovir 400mg bid. Patient to return as scheduled in August prior to next cycle of chemotherapy. Patient still planning on visit to [facility] as well.

## 2017-07-27 ENCOUNTER — APPOINTMENT (OUTPATIENT)
Dept: ONCOLOGY | Age: 57
End: 2017-07-27
Payer: COMMERCIAL

## 2017-08-03 ENCOUNTER — HOSPITAL ENCOUNTER (OUTPATIENT)
Dept: ONCOLOGY | Age: 57
Discharge: HOME | End: 2017-08-03
Payer: COMMERCIAL

## 2017-08-03 PROCEDURE — 97110 THERAPEUTIC EXERCISES: CPT

## 2017-08-03 NOTE — PROGRESS NOTES
Beatrice Negrete  : [facility]  [address], [place], [address]  [phone]   [phone]          OUTPATIENT PHYSICAL THERAPY:Daily Note 8/3/2017    ICD-10: Treatment Diagnosis: I 89.0 lymphedema not elsewhere classified  Precautions/Allergies:   Review of patient's allergies indicates no known allergies. Fall Risk Score: 1 (? 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 Nima Dean presents for a lymphedema assessment due to edema of bilateral lower extremities. She has pitting edema in the bilateral lower extremities. She has previously had short stretch bandaging and MLD following knee surgery 3 years ago. She would like to try this again even though this is not orthopedic post surgical swelling. She was diagnosed with lymphoma in March of this year. She recently completed her second chemo of 6 planned. She will have a PET scan 17. Progress with chemo has been limited due to lab values being abnormal.  She is uncomfortable due to the edema. She will have a paracentesis 17. We will initiate edema management and progress slowly dependent on her tolerance to compression and her response to treatment. PROBLEM LIST (Impacting functional limitations):  1. Decreased Strength  2. Increased Pain  3. Decreased Activity Tolerance  4. Increased Fatigue  5. Increased Shortness of Breath  6. Decreased Flexibility/Joint Mobility  7. Edema/Girth  8. Decreased Knowledge of Precautions  9. Decreased [place] with Home Exercise Program INTERVENTIONS PLANNED:  1. Decongestion Therapy  2. Home Exercise Program (HEP)  3. Range of Motion (ROM)  4.  Therapeutic Exercise/Strengthening TREATMENT PLAN:  Effective Dates: 5/24/17 TO 8/16/17. Frequency/Duration: 2 times a week for 12 weeks  Patient will return 8/14/17. She will be going out of town next week. GOALS: (Goals have been discussed and agreed upon with patient.)  Short-Term Functional Goals: Time Frame: 4 weeks  1. The patient will have knowledge of signs and symptoms of lymphedema and how to manage within 4 weeks. Met   2. The patient will tolerate short stretch bandaging of bilateral lower extremities for reduction of girth within 4 weeks. Met   3. The patient and caregiver will be independent with compression bandaging within 4 weeks. Met   Discharge Goals: Time Frame: 8 weeks  1. The patient will have a girth decrease of at least 5 cm in the calf within 8 weeks. 2. The patient will be fit with static compression garments within 8 weeks. 3. The patient and caregiver will be independent with edema management within 8 weeks. Rehabilitation Potential For Stated Goals: [address] [place] [address] therapy, I certify that the treatment plan above will be carried out by a therapist or under their direction. Thank you for this referral,  Romulo Alexander PT     Referring Physician Signature: Zandra Ferraro MD              Date                    The information in this section was collected on 5/24/17 (except where otherwise noted). HISTORY:   History of Present Injury/Illness (Reason for Referral):  Lymphoma, ascites, bilateral lower extremity pitting edema  Past Medical History/Comorbidities:   Ms. Nima Dean  has a past medical history of Anemia; Arthritis; Basal cell carcinoma; Chronic pain; Hypertension (10/4/2011); Morbid obesity ([address] Murmur; Non Hodgkin's lymphoma ([address] (3/30/2017); Other ill-defined conditions; Overactive bladder; Rheumatic fever; Shingles; Thromboembolus ([address] (x2); Thyroid disease; and Unspecified adverse effect of anesthesia. She also has no past medical history of Aneurysm ([address]  Arrhythmia; Asthma; Autoimmune disease ([facility] CAD (coronary artery disease); Chronic kidney disease; Coagulation defects; COPD; Diabetes ([address] Difficult intubation; GERD (gastroesophageal reflux disease); Heart failure ([address] Liver disease; Malignant hyperthermia due to anesthesia; Nausea & vomiting; Pseudocholinesterase deficiency; Psychiatric disorder; PUD (peptic ulcer disease); Seizures ([facility] Stroke [facility]); or Unspecified sleep apnea. Ms. Amina Aaron  has a past surgical history that includes hc cholecystectomy fnc41; anesth,achilles tendon surg (2/10/2011); cholecystectomy (9430); orthopaedic (2012); orthopaedic (2013); and vascular access. Past Medical History:   Diagnosis Date    Anemia     in high school, \"received gamma globulin twice a week for awhile\"    Arthritis     osteo-r knee    Basal cell carcinoma     Followed by Dermatology    Chronic pain     KNEEs    Hypertension 10/4/2011    medication    Morbid obesity [facility]     Murmur     \"had it my whole life\", did not appreciate murmur 9/12/2011 during assessment    Non Hodgkin's lymphoma ([facility] 3/30/2017    Other ill-defined conditions     skin bumps-med as needed    Overactive bladder     Rheumatic fever     Possibly as a child    Shingles     Thromboembolus ([facility] x2    LLE-calf-1990?-only took ASA-resolved in less than a wk-\"a little burned area-not examined\"    Thyroid disease     hypo-medication    Unspecified adverse effect of anesthesia     pt reports waking several times with knee surgery-spinal     Past Surgical History:   Procedure Laterality Date    [facility]. CHOLECYSTECTOMY FNC41      HX CHOLECYSTECTOMY  1983    HX ORTHOPAEDIC  2012    right TKA    HX ORTHOPAEDIC  2013    left TKA. Dr. Clau Cabello.  HX VASCULAR ACCESS      CT ANESTH,ACHILLES TENDON SURG  2/10/2011    LEFT. Dr. Uzma Reyes.         Social History/Living Environment:     lives with family, 2 flights of stairs  Prior Level of Function/Work/Activity:    Dominant Side: RIGHT  Current Medications:       Current Outpatient Prescriptions:     hydrOXYzine HCl (ATARAX) 25 mg tablet, Take  by mouth every six (6) hours as needed for Itching., Disp: , Rfl:     mupirocin calcium (BACTROBAN) 2 % topical cream, Apply  to affected area two (2) times a day., Disp: 30 g, Rfl: 1    levothyroxine (SYNTHROID) 150 mcg tablet, Take 1 Tab by mouth Daily (before breakfast). , Disp: 90 Tab, Rfl: 3    acyclovir (ZOVIRAX) 400 mg tablet, Take 1 Tab by mouth two (2) times a day., Disp: 60 Tab, Rfl: 3    allopurinol (ZYLOPRIM) 300 mg tablet, Take 1 Tab by mouth two (2) times a day. (Patient taking differently: Take 300 mg by mouth daily.), Disp: 90 Tab, Rfl: 2    loratadine (CLARITIN) 10 mg tablet, Take 10 mg by mouth., Disp: , Rfl:     fluconazole (DIFLUCAN) 200 mg tablet, Take  by mouth daily. , Disp: , Rfl:     pregabalin (LYRICA) 100 mg capsule, Take 1 Cap by mouth three (3) times daily. Max Daily Amount: 300 mg., Disp: 90 Cap, Rfl: 3    oxyCODONE (OXYIR) 5 mg capsule, Take 1-2 Caps by mouth every four (4) hours as needed. Max Daily Amount: 60 mg., Disp: 180 Cap, Rfl: 0    midodrine (PROAMITINE) 2.5 mg tablet, Take 1 Tab by mouth two (2) times daily (with meals). , Disp: 60 Tab, Rfl: 2    omeprazole (PRILOSEC) 20 mg capsule, TAKE 1 CAPSULE DAILY, Disp: 90 Cap, Rfl: 2    benzonatate (TESSALON PERLES) 100 mg capsule, Take 1 Cap by mouth every six (6) hours as needed for Cough. , Disp: 40 Cap, Rfl: 3    magnesium oxide (MAG-OX) 400 mg tablet, Take 1 Tab by mouth two (2) times a day., Disp: 60 Tab, Rfl: 1    magic mouthwash (ALEXSANDER) susp, Take 10 mL by mouth every four (4) hours as needed. , Disp: 1 Bottle, Rfl: 2    nystatin (MYCOSTATIN) powder, Apply  to affected area three (3) times daily. , Disp: 60 g, Rfl: 2    lidocaine-prilocaine (EMLA) topical cream, Apply  to affected area as needed for Pain. Apply to port site 45-60 minutes prior to lab appt or infusion. , Disp: 30 g, Rfl: 0   promethazine (PHENERGAN) 25 mg tablet, Take 25 mg by mouth every six (6) hours as needed for Nausea. Unsure of dose, Disp: , Rfl:    Date Last Reviewed:  5/24/17   Number of Personal Factors/Comorbidities that affect the Plan of Care: 3+: HIGH COMPLEXITY   EXAMINATION:   Palpation:          Pitting edema, dry skin, loose skin  ROM:          Within functional limits. She previously has had bilateral knee replacements and an Achilles tendon repair on the left  Strength:          Grossly 4-/5 x 4 extremities  Functional Mobility:         Gait/Ambulation:  Independent with increased speed        Transfers: independent        Bed Mobility:  independent  Skin Integrity:          Intact, but dry. Edema/Girth:  pitting    Left Right    Initial Most Recent Initial Most Recent   Upper  Extremity           Lower  Extremity               Body Structures Involved:  1. Muscles  2. lymphatic system Body Functions Affected:  1. Sensory/Pain  2. Skin Related  3. lymphatic system Activities and Participation Affected:  1. General Tasks and Demands  2. Mobility  3. Self Care   Number of elements (examined above) that affect the Plan of Care: 4+: HIGH COMPLEXITY   CLINICAL PRESENTATION:   Presentation: Evolving clinical presentation with unstable and unpredictable characteristics: HIGH COMPLEXITY   CLINICAL DECISION MAKING:   Outcome Measure: Tool Used: NCCN Distress Thermometer   Score:  Initial:   Most Recent: X    Interpretation of Score: If greater than or equal to 8, then PHQ-9 Depression Scale Score   and JOON-7 Anxiety Scale Score  .   Tool Used: ECOG Performance Survey Score  Score:  Initial: 2 Most Recent:  1    Interpretation of Score:   0 Fully active, able to carry on all pre-disease performance without restriction   1 Restricted in physically strenuous activity but ambulatory and able to carry out work of a light or sedentary nature, e.g., light house work, office work   2 Ambulatory and capable of all selfcare but unable to carry out any work activities. Up and about more than 50% of waking hours   3 Capable of only limited selfcare, confined to bed or chair more than 50% of waking hours   4 Completely disabled. Cannot carry on any selfcare. Totally confined to bed or chair   5 Dead    Tool Used: 6-MINUTE WALK TEST  Score:  Initial: 1110 feet Most Recent: X feet (Date: -- )   Interpretation of Score: Normal range varies but is approximately 9696-0075 Feet      Distance walked: 1110 feet     Baseline End of Test   Heart Rate 72 100   Dyspnea (Luther Scale)     Fatigue (Luther Scale) 0 2   SpO2 97 99   /69 142/72     Score 2133 [id number] [id number] [phone] 426-16 15-0   Modifier CH CI CJ CK CL CM CN       Tool Used: Timed Up and Go (TUG)  Score:  Initial: 8 seconds Most Recent: X seconds (Date: -- )   Interpretation of Score: The test measures, in seconds, the time taken by an individual to stand up from a standard arm chair (seat height 46 cm [18 in], arm height 65 cm [25.6 in]), walk a distance of 3 meters (118 in, approx 10 ft), turn, walk back to the chair and sit down. If the individual takes longer than 14 seconds to complete TUG, this indicates risk for falls. Score 7 7.5-10.5 11-14 14.5-17.5 18-21 21.5-24.5 25+   Modifier CH CI CJ CK CL CM CN         Tool Used: Lymphedema Life Impact Scale   Score:  Initial:  54 Most Recent: 48 (Date: 6/22/17 )   Interpretation of Score: The Lymphedema Life Impact Scale (LLIS) is a validated instrument that measures the physical, functional, and psychosocial concerns pertinent to patients with extremity lymphedema. The Scale's questionnaire is administered to patients to gauge impairments, activity limitations, and participation restrictions resulting from their lymphedema.   Score 0 1-13 14-26 27-40 41-54 55-67 68   Modifier CH CI CJ CK CL CM CN       Medical Necessity:   · Patient is expected to demonstrate progress in strength and edema management to increase independence with self care and houseold activity. Reason for Services/Other Comments:  · Patient continues to demonstrate capacity to improve strength and edema management which will increase independence. Use of outcome tool(s) and clinical judgement create a POC that gives a: Questionable prediction of patient's progress: MODERATE COMPLEXITY            TREATMENT:   (In addition to Assessment/Re-Assessment sessions the following treatments were rendered)  Pre-treatment Symptoms/Complaints:  Fatigue, pain weakness, lower extremity edema  Pain: Initial: 5/10 in leg ( bone pain)  3-4/10 in arm      Post Session:  Same as pre session    min  O2 98 HR 99  Fatigue 5-6/10 has not slept well due to phlegm  Nustep level 2 x 5 min Mets 2° SPM 61 O2 98 HR 86  240' x 1 O2 98   UBE level 1 x 4 min O2 98 HR 92  240' x 1 O2 HR98 94  Sit to stand x 10 reps  Long arc quads x 10 reps with 5 count hold   O2 98 HR 91  Fatigue 4/10  Reviewed correct donning of Cir-Caid Juxta Lite. as above    Treatment/Session Assessment:    · Response to Treatment:  Tolerated the treatment well. · Compliance with Program/Exercises: Will assess as treatment progresses. · Recommendations/Intent for next treatment session: \"Next visit will focus on assess efficacy of compression\". Continue with compression to tolerance. Will see her 8/14/17.   Will continue with conditioning and strengthening per her request.  Total Treatment Duration:  PT Patient Time In/Time Out  Time In: 0850  Time Out: 0930    Euell Later, PT

## 2017-08-07 ENCOUNTER — APPOINTMENT (OUTPATIENT)
Dept: ONCOLOGY | Age: 57
End: 2017-08-07
Payer: COMMERCIAL

## 2017-08-08 ENCOUNTER — HOSPITAL ENCOUNTER (OUTPATIENT)
Dept: LAB | Age: 57
Discharge: HOME | End: 2017-08-08
Payer: COMMERCIAL

## 2017-08-08 ENCOUNTER — PATIENT OUTREACH (OUTPATIENT)
Dept: CASE MANAGEMENT | Age: 57
End: 2017-08-08

## 2017-08-08 DIAGNOSIS — C85.88 MARGINAL ZONE LYMPHOMA OF LYMPH NODES OF MULTIPLE SITES (HCC): ICD-10-CM

## 2017-08-08 LAB
BASOPHILS # BLD AUTO: 0 K/UL (ref 0–0.2)
BASOPHILS # BLD: 3 % (ref 0–2)
DIFFERENTIAL METHOD BLD: ABNORMAL
EOSINOPHIL # BLD: 0.3 K/UL (ref 0–0.8)
EOSINOPHIL NFR BLD: 27 % (ref 0.5–7.8)
ERYTHROCYTE [DISTWIDTH] IN BLOOD BY AUTOMATED COUNT: 15.5 % (ref 11.9–14.6)
HCT VFR BLD AUTO: 27.7 % (ref 35.8–46.3)
HGB BLD-MCNC: 9.2 G/DL (ref 11.7–15.4)
LYMPHOCYTES # BLD AUTO: 17 % (ref 13–44)
LYMPHOCYTES # BLD: 0.2 K/UL (ref 0.5–4.6)
MCH RBC QN AUTO: 29.3 PG (ref 26.1–32.9)
MCHC RBC AUTO-ENTMCNC: 33.2 G/DL (ref 31.4–35)
MCV RBC AUTO: 88.2 FL (ref 79.6–97.8)
MONOCYTES # BLD: 0.5 K/UL (ref 0.1–1.3)
MONOCYTES NFR BLD AUTO: 49 % (ref 4–12)
NEUTS SEG # BLD: 0.1 K/UL (ref 1.7–8.2)
NEUTS SEG NFR BLD AUTO: 5 % (ref 43–78)
NRBC # BLD: 0 K/UL (ref 0–0.2)
NRBC BLD-RTO: 0 PER 100 WBC (ref 0–2)
PLATELET # BLD AUTO: 152 K/UL (ref 150–450)
PMV BLD AUTO: 9.9 FL (ref 10.8–14.1)
RBC # BLD AUTO: 3.14 M/UL (ref 4.05–5.25)
WBC # BLD AUTO: 1 K/UL (ref 4.3–11.1)

## 2017-08-08 PROCEDURE — 85025 COMPLETE CBC W/AUTO DIFF WBC: CPT | Performed by: INTERNAL MEDICINE

## 2017-08-08 NOTE — PROGRESS NOTES
Pt called and stated she has had a sore throat and sinus drainage since last week, for which she was treated with Rocephin and Omnicef by PCP. Pt called navigator today stating her throat has not gotten and better, in fact feels worse, and now she has sores all over her tongue. Pt has already notified her PCP, and he prescribed a steroid pack for her. Dr. Aryan Avila aware and pt came to [facility] for CBC. [address] resulted 100, [place]a aware. Levaquin 500 mg daily for 7 days sent to pharmacy, and pt instructed to stop Omnicef. Pt also instructed to call on call provider 24/7 if temp 100.5 or higher or if she has chills with or without fever. Pt was advised that going out of town at this time is not recommended due to her risk for infection, she verbalized understanding of all of the above.

## 2017-08-14 ENCOUNTER — HOSPITAL ENCOUNTER (OUTPATIENT)
Dept: ONCOLOGY | Age: 57
Discharge: HOME | End: 2017-08-14
Payer: COMMERCIAL

## 2017-08-14 NOTE — PROGRESS NOTES
Bud Trevino  : 1960 [facility] [address] [facility]  [address], [facility], [address]  [phone]   [phone]          OUTPATIENT PHYSICAL THERAPY:Recertification [id number]    ICD-10: Treatment Diagnosis: I 89.0 lymphedema not elsewhere classified  M 62.81 muscle weakness generalized  Precautions/Allergies:   Review of patient's allergies indicates no known allergies. Fall Risk Score: 1 (? 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 Marilu Ace presents for a lymphedema assessment due to edema of bilateral lower extremities. She has pitting edema in the bilateral lower extremities. She has previously had short stretch bandaging and MLD following knee surgery 3 years ago. She would like to try this again even though this is not orthopedic post surgical swelling. She was diagnosed with lymphoma in March of this year. She recently completed her second chemo of 6 planned. She will have a PET scan 17. Progress with chemo has been limited due to lab values being abnormal.  She is uncomfortable due to the edema. She will have a paracentesis 17. We will initiate edema management and progress slowly dependent on her tolerance to compression and her response to treatment. We have now initiated conditioning and strengthening. She is independent with home management of edema. PROBLEM LIST (Impacting functional limitations):  1. Decreased Strength  2. Increased Pain  3. Decreased Activity Tolerance  4. Increased Fatigue  5. Increased Shortness of Breath  6. Decreased Flexibility/Joint Mobility  7. Edema/Girth  8. Decreased Knowledge of Precautions  9.  Decreased Gunpowder with Home Exercise Program INTERVENTIONS PLANNED:  1. Decongestion Therapy  2. Home Exercise Program (HEP)  3. Range of Motion (ROM)  4. Therapeutic Exercise/Strengthening   TREATMENT PLAN:  Effective Dates: 8/14/17 TO 11/7/17/17. Frequency/Duration: 2 times a week for 12 weeks dependent on chemo schedule and counts  GOALS: (Goals have been discussed and agreed upon with patient.)  Short-Term Functional Goals: Time Frame: 4 weeks  1. The patient will have knowledge of signs and symptoms of lymphedema and how to manage within 4 weeks. Met   2. The patient will tolerate short stretch bandaging of bilateral lower extremities for reduction of girth within 4 weeks. Met   3. The patient and caregiver will be independent with compression bandaging within 4 weeks. Met   4. The patient will improve her 6 minute walk by 60 feet within 4 weeks. 5. The patient will report a fatigue of 3 or less within 4 weeks. Discharge Goals: Time Frame: 8 weeks  1. The patient will have a girth decrease of at least 5 cm in the calf within 8 weeks. 2. The patient will be fit with static compression garments within 8 weeks. Met   3. The patient and caregiver will be independent with edema management within 8 weeks. Met  4. The patient will transition to Healthy Self within 8 weeks. 5.   Rehabilitation Potential For Stated Goals: [address] [place] [address]'s therapy, I certify that the treatment plan above will be carried out by a therapist or under their direction. Thank you for this referral,  Gonzalo Alfaro PT     Referring Physician Signature: Latricia Cristina MD              Date                    The information in this section was collected on 5/24/17 (except where otherwise noted). HISTORY:   History of Present Injury/Illness (Reason for Referral):  Lymphoma, ascites, bilateral lower extremity pitting edema  Past Medical History/Comorbidities:   Ms. Arturo Mckenna  has a past medical history of Anemia;  Arthritis; Basal cell carcinoma; Chronic pain; Hypertension (10/4/2011); Morbid obesity ([address] Murmur; Non Hodgkin's lymphoma ([address] (3/30/2017); Other ill-defined conditions; Overactive bladder; Rheumatic fever; Shingles; Thromboembolus ([address] (x2); Thyroid disease; and Unspecified adverse effect of anesthesia. She also has no past medical history of Aneurysm ([address] Arrhythmia; Asthma; Autoimmune disease ([address] CAD (coronary artery disease); Chronic kidney disease; Coagulation defects; COPD; Diabetes ([address] Difficult intubation; GERD (gastroesophageal reflux disease); Heart failure ([address] Liver disease; Malignant hyperthermia due to anesthesia; Nausea & vomiting; Pseudocholinesterase deficiency; Psychiatric disorder; PUD (peptic ulcer disease); Seizures ([address] Stroke [facility]); or Unspecified sleep apnea. Ms. Bhargav Clarke  has a past surgical history that includes  cholecystectomy fnc41; anesth,achilles tendon surg (2/10/2011); cholecystectomy (3830); orthopaedic (2012); orthopaedic (2013); and vascular access.    Past Medical History:   Diagnosis Date    Anemia     in high school, \"received gamma globulin twice a week for awhile\"    Arthritis     osteo-r knee    Basal cell carcinoma     Followed by Dermatology    Chronic pain     KNEEs    Hypertension 10/4/2011    medication    Morbid obesity ([address]     Murmur     \"had it my whole life\", did not appreciate murmur 9/12/2011 during assessment    Non Hodgkin's lymphoma ([address] 3/30/2017    Other ill-defined conditions     skin bumps-med as needed    Overactive bladder     Rheumatic fever     Possibly as a child    Shingles     Thromboembolus ([address] x2    LLE-calf-1990?-only took ASA-resolved in less than a wk-\"a little burned area-not examined\"    Thyroid disease     hypo-medication    Unspecified adverse effect of anesthesia     pt reports waking several times with knee surgery-spinal     Past Surgical History:   Procedure Laterality Date     CHOLECYSTECTOMY FNC41     [id number]  HX ORTHOPAEDIC  2012    right TKA    HX ORTHOPAEDIC  2013    left TKA. Dr. Desire Isidro.  HX VASCULAR ACCESS      [place] ANESTH,ACHILLES TENDON SURG  2/10/2011    LEFT. Dr. Surekha Hernandez. Social History/Living Environment:     lives with family, 2 flights of stairs  Prior Level of Function/Work/Activity:    Dominant Side:         RIGHT  Current Medications:       Current Outpatient Prescriptions:     magic mouthwash (ALEXSANDER) susp, Take 10 mL by mouth every four (4) hours as needed. , Disp: 2 Bottle, Rfl: 2    nystatin (MYCOSTATIN) powder, Apply  to affected area three (3) times daily. , Disp: 60 g, Rfl: 2    nystatin (MYCOSTATIN) 100,000 unit/mL suspension, Take 5 mL by mouth four (4) times daily. swish and spit, Disp: 470 mL, Rfl: 2    predniSONE (STERAPRED DS) 10 mg dose pack, Take 1 Tab by mouth See Admin Instructions. See administration instruction per 10mg dose pack for 6 days, Disp: 21 Tab, Rfl: 0    fluticasone (FLONASE) 50 mcg/actuation nasal spray, 2 Sprays by Both Nostrils route daily. , Disp: 1 Bottle, Rfl: 1    OTHER, Kenalog in oragel to ulcers in mouth TID, Disp: 15 g, Rfl: 0    levoFLOXacin (LEVAQUIN) 500 mg tablet, Take 1 Tab by mouth daily. , Disp: 7 Tab, Rfl: 0    cefdinir (OMNICEF) 300 mg capsule, Take 1 Cap by mouth two (2) times a day., Disp: 20 Cap, Rfl: 0    fluconazole (DIFLUCAN) 200 mg tablet, Take 1 Tab by mouth daily. , Disp: 90 Tab, Rfl: 0    hydrOXYzine HCl (ATARAX) 25 mg tablet, Take  by mouth every six (6) hours as needed for Itching., Disp: , Rfl:     mupirocin calcium (BACTROBAN) 2 % topical cream, Apply  to affected area two (2) times a day., Disp: 30 g, Rfl: 1    levothyroxine (SYNTHROID) 150 mcg tablet, Take 1 Tab by mouth Daily (before breakfast). , Disp: 90 Tab, Rfl: 3    acyclovir (ZOVIRAX) 400 mg tablet, Take 1 Tab by mouth two (2) times a day., Disp: 60 Tab, Rfl: 3    allopurinol (ZYLOPRIM) 300 mg tablet, Take 1 Tab by mouth two (2) times a day.  (Patient taking differently: Take 300 mg by mouth daily.), Disp: 90 Tab, Rfl: 2    loratadine (CLARITIN) 10 mg tablet, Take 10 mg by mouth., Disp: , Rfl:     pregabalin (LYRICA) 100 mg capsule, Take 1 Cap by mouth three (3) times daily. Max Daily Amount: 300 mg., Disp: 90 Cap, Rfl: 3    oxyCODONE (OXYIR) 5 mg capsule, Take 1-2 Caps by mouth every four (4) hours as needed. Max Daily Amount: 60 mg., Disp: 180 Cap, Rfl: 0    midodrine (PROAMITINE) 2.5 mg tablet, Take 1 Tab by mouth two (2) times daily (with meals). , Disp: 60 Tab, Rfl: 2    omeprazole (PRILOSEC) 20 mg capsule, TAKE 1 CAPSULE DAILY, Disp: 90 Cap, Rfl: 2    benzonatate (TESSALON PERLES) 100 mg capsule, Take 1 Cap by mouth every six (6) hours as needed for Cough. , Disp: 40 Cap, Rfl: 3    magnesium oxide (MAG-OX) 400 mg tablet, Take 1 Tab by mouth two (2) times a day., Disp: 60 Tab, Rfl: 1    lidocaine-prilocaine (EMLA) topical cream, Apply  to affected area as needed for Pain. Apply to port site 45-60 minutes prior to lab appt or infusion. , Disp: 30 g, Rfl: 0    promethazine (PHENERGAN) 25 mg tablet, Take 25 mg by mouth every six (6) hours as needed for Nausea. Unsure of dose, Disp: , Rfl:    Date Last Reviewed:  5/24/17   Number of Personal Factors/Comorbidities that affect the Plan of Care: 3+: HIGH COMPLEXITY   EXAMINATION:   Palpation:          Pitting edema, dry skin, loose skin  ROM:          Within functional limits. She previously has had bilateral knee replacements and an Achilles tendon repair on the left  Strength:          Grossly 4/5 x 4 extremities  Functional Mobility:         Gait/Ambulation:  Independent with increased speed        Transfers: independent        Bed Mobility:  independent  Skin Integrity:          Intact, but dry. Edema/Girth:  pitting    Left Right    Initial Most Recent Initial Most Recent   Upper  Extremity           Lower  Extremity               Body Structures Involved:  1.  Muscles  2. lymphatic system Body Functions Affected:  1. Sensory/Pain  2. Skin Related  3. lymphatic system Activities and Participation Affected:  1. General Tasks and Demands  2. Mobility  3. Self Care   Number of elements (examined above) that affect the Plan of Care: 4+: HIGH COMPLEXITY   CLINICAL PRESENTATION:   Presentation: Evolving clinical presentation with unstable and unpredictable characteristics: HIGH COMPLEXITY   CLINICAL DECISION MAKING:   Outcome Measure: Tool Used: [facility] Therm[facility]   Score:  Initial:   Most Recent: X    Interpretation of Score: If greater than or equal to 8, then PHQ-9 Depression Scale Score   and JOON-7 Anxiety Scale Score  . Tool Used: ECOG Performance Survey Score  Score:  Initial: 2 Most Recent:  1    Interpretation of Score:   0 Fully active, able to carry on all pre-disease performance without restriction   1 Restricted in physically strenuous activity but ambulatory and able to carry out work of a light or sedentary nature, e.g., light house work, office work   2 Ambulatory and capable of all selfcare but unable to carry out any work activities. Up and about more than 50% of waking hours   3 Capable of only limited selfcare, confined to bed or chair more than 50% of waking hours   4 Completely disabled. Cannot carry on any selfcare. Totally confined to bed or chair   5 Dead    Tool Used: 6-MINUTE WALK TEST  Score:  Initial: 1110 feet Most Recent: 1230 feet (Date: 8/14/17 )   Interpretation of Score: Normal range varies but is approximately 7248-1160 Feet      Distance walked: 1230 feet     Baseline End of Test   Heart Rate 83 78   Dyspnea (Luther Scale)     Fatigue (Luther Scale) 7 2   SpO2 98 98   /80 138/63     Score 2133 1478-1796 8107-2288 [phone] 426-16 15-0   Modifier CH CI CJ CK CL CM CN       Tool Used: Timed Up and Go (TUG)  Score:  Initial: 8 seconds Most Recent: X seconds (Date: -- )   Interpretation of Score:  The test measures, in seconds, the time taken by an individual to stand up from a standard arm chair (seat height 46 cm [18 in], arm height 65 cm [25.6 in]), walk a distance of 3 meters (118 in, approx 10 ft), turn, walk back to the chair and sit down. If the individual takes longer than 14 seconds to complete TUG, this indicates risk for falls. Score 7 7.5-10.5 11-14 14.5-17.5 18-21 21.5-24.5 25+   Modifier CH CI CJ CK CL CM CN         Tool Used: Lymphedema Life Impact Scale   Score:  Initial:  54 Most Recent: 33 (Date: 8/14/17 )   Interpretation of Score: The Lymphedema Life Impact Scale (LLIS) is a validated instrument that measures the physical, functional, and psychosocial concerns pertinent to patients with extremity lymphedema. The Scale's questionnaire is administered to patients to gauge impairments, activity limitations, and participation restrictions resulting from their lymphedema. Score 0 1-13 14-26 27-40 41-54 55-67 68   Modifier CH CI CJ CK CL CM CN       Medical Necessity:   · Patient is expected to demonstrate progress in strength and edema management to increase independence with self care and houseold activity. Reason for Services/Other Comments:  · Patient continues to demonstrate capacity to improve strength and edema management which will increase independence.    Use of outcome tool(s) and clinical judgement create a POC that gives a: Questionable prediction of patient's progress: MODERATE COMPLEXITY            TREATMENT:   (In addition to Assessment/Re-Assessment sessions the following treatments were rendered)  Pre-treatment Symptoms/Complaints:  Fatigue, pain weakness, lower extremity edema  Pain: Initial: 4/10         Post Session:  Same as pre session    37 min  O2 98 HR 83  Fatigue 7/10   Nustep level 2 x 5 min Mets 2.4 SPM 58 O2 98 HR 83  6 minute walk  LLIS  UBE level 1 x 4 min O2 98 HR 90  Sit to stand x 10 reps  Long arc quads x 10 reps with 5 count hold   O2 98 HR 91        as above    Treatment/Session Assessment:    · Response to Treatment:  Tolerated the treatment well. · Compliance with Program/Exercises: Will assess as treatment progresses. · Recommendations/Intent for next treatment session: \"Next visit will focus on assess efficacy of compression\".    Will continue with conditioning and strengthening per her request.  Total Treatment Duration:  PT Patient Time In/Time Out  Time In: 0853  Time Out: 0930    Nixon Rockwell, PT

## 2017-08-15 ENCOUNTER — PATIENT OUTREACH (OUTPATIENT)
Dept: CASE MANAGEMENT | Age: 57
End: 2017-08-15

## 2017-08-15 ENCOUNTER — HOSPITAL ENCOUNTER (OUTPATIENT)
Dept: INFUSION THERAPY | Age: 57
Discharge: HOME | End: 2017-08-15
Payer: COMMERCIAL

## 2017-08-15 ENCOUNTER — HOSPITAL ENCOUNTER (OUTPATIENT)
Dept: LAB | Age: 57
Discharge: HOME | End: 2017-08-15
Payer: COMMERCIAL

## 2017-08-15 VITALS — DIASTOLIC BLOOD PRESSURE: 55 MMHG | SYSTOLIC BLOOD PRESSURE: 115 MMHG

## 2017-08-15 DIAGNOSIS — C85.90 NON-HODGKIN'S LYMPHOMA, UNSPECIFIED BODY REGION, UNSPECIFIED NON-HODGKIN LYMPHOMA TYPE (HCC): ICD-10-CM

## 2017-08-15 DIAGNOSIS — C85.88 MARGINAL ZONE LYMPHOMA OF LYMPH NODES OF MULTIPLE SITES (HCC): ICD-10-CM

## 2017-08-15 LAB
ALBUMIN SERPL BCP-MCNC: 3.6 G/DL (ref 3.5–5)
ALBUMIN/GLOB SERPL: 1.3 {RATIO} (ref 1.2–3.5)
ALP SERPL-CCNC: 116 U/L (ref 50–136)
ALT SERPL-CCNC: 25 U/L (ref 12–65)
ANION GAP BLD CALC-SCNC: 7 MMOL/L (ref 7–16)
AST SERPL W P-5'-P-CCNC: 12 U/L (ref 15–37)
BASOPHILS # BLD AUTO: 0.1 K/UL (ref 0–0.2)
BASOPHILS # BLD: 1 % (ref 0–2)
BILIRUB SERPL-MCNC: 0.3 MG/DL (ref 0.2–1.1)
BUN SERPL-MCNC: 22 MG/DL (ref 6–23)
CALCIUM SERPL-MCNC: 9 MG/DL (ref 8.3–10.4)
CHLORIDE SERPL-SCNC: 108 MMOL/L (ref 98–107)
CO2 SERPL-SCNC: 27 MMOL/L (ref 21–32)
CREAT SERPL-MCNC: 0.79 MG/DL (ref 0.6–1)
DIFFERENTIAL METHOD BLD: ABNORMAL
EOSINOPHIL # BLD: 0.2 K/UL (ref 0–0.8)
EOSINOPHIL NFR BLD: 5 % (ref 0.5–7.8)
ERYTHROCYTE [DISTWIDTH] IN BLOOD BY AUTOMATED COUNT: 15.7 % (ref 11.9–14.6)
GLOBULIN SER CALC-MCNC: 2.7 G/DL (ref 2.3–3.5)
GLUCOSE SERPL-MCNC: 98 MG/DL (ref 65–100)
HCT VFR BLD AUTO: 32.4 % (ref 35.8–46.3)
HGB BLD-MCNC: 10.6 G/DL (ref 11.7–15.4)
LYMPHOCYTES # BLD AUTO: 2 % (ref 13–44)
LYMPHOCYTES # BLD: 0.1 K/UL (ref 0.5–4.6)
MAGNESIUM SERPL-MCNC: 1.7 MG/DL (ref 1.8–2.4)
MCH RBC QN AUTO: 29.2 PG (ref 26.1–32.9)
MCHC RBC AUTO-ENTMCNC: 32.7 G/DL (ref 31.4–35)
MCV RBC AUTO: 89.3 FL (ref 79.6–97.8)
MONOCYTES # BLD: 0.4 K/UL (ref 0.1–1.3)
MONOCYTES NFR BLD AUTO: 8 % (ref 4–12)
NEUTS SEG # BLD: 4.2 K/UL (ref 1.7–8.2)
NEUTS SEG NFR BLD AUTO: 84 % (ref 43–78)
NRBC # BLD: 0.01 K/UL (ref 0–0.2)
PLATELET # BLD AUTO: 150 K/UL (ref 150–450)
PMV BLD AUTO: 10.2 FL (ref 10.8–14.1)
POTASSIUM SERPL-SCNC: 4 MMOL/L (ref 3.5–5.1)
PROT SERPL-MCNC: 6.3 G/DL (ref 6.3–8.2)
RBC # BLD AUTO: 3.63 M/UL (ref 4.05–5.25)
SODIUM SERPL-SCNC: 142 MMOL/L (ref 136–145)
WBC # BLD AUTO: 5 K/UL (ref 4.3–11.1)

## 2017-08-15 PROCEDURE — 74011250637 HC RX REV CODE- 250/637: Performed by: INTERNAL MEDICINE

## 2017-08-15 PROCEDURE — 96413 CHEMO IV INFUSION 1 HR: CPT

## 2017-08-15 PROCEDURE — 83735 ASSAY OF MAGNESIUM: CPT | Performed by: INTERNAL MEDICINE

## 2017-08-15 PROCEDURE — 85025 COMPLETE CBC W/AUTO DIFF WBC: CPT | Performed by: INTERNAL MEDICINE

## 2017-08-15 PROCEDURE — 96375 TX/PRO/DX INJ NEW DRUG ADDON: CPT

## 2017-08-15 PROCEDURE — 96415 CHEMO IV INFUSION ADDL HR: CPT

## 2017-08-15 PROCEDURE — 74011250636 HC RX REV CODE- 250/636: Performed by: INTERNAL MEDICINE

## 2017-08-15 PROCEDURE — 80053 COMPREHEN METABOLIC PANEL: CPT | Performed by: INTERNAL MEDICINE

## 2017-08-15 PROCEDURE — 96417 CHEMO IV INFUS EACH ADDL SEQ: CPT

## 2017-08-15 PROCEDURE — 74011000258 HC RX REV CODE- 258: Performed by: INTERNAL MEDICINE

## 2017-08-15 RX ORDER — DIPHENHYDRAMINE HYDROCHLORIDE 50 MG/ML
50 INJECTION, SOLUTION INTRAMUSCULAR; INTRAVENOUS ONCE
Status: COMPLETED | OUTPATIENT
Start: 2017-08-15 | End: 2017-08-15

## 2017-08-15 RX ORDER — HEPARIN 100 UNIT/ML
300-500 SYRINGE INTRAVENOUS AS NEEDED
Status: DISPENSED | OUTPATIENT
Start: 2017-08-15 | End: 2017-08-15

## 2017-08-15 RX ORDER — SODIUM CHLORIDE 0.9 % (FLUSH) 0.9 %
10 SYRINGE (ML) INJECTION AS NEEDED
Status: ACTIVE | OUTPATIENT
Start: 2017-08-15 | End: 2017-08-15

## 2017-08-15 RX ORDER — ACETAMINOPHEN 325 MG/1
650 TABLET ORAL ONCE
Status: COMPLETED | OUTPATIENT
Start: 2017-08-15 | End: 2017-08-15

## 2017-08-15 RX ORDER — DEXAMETHASONE SODIUM PHOSPHATE 100 MG/10ML
10 INJECTION INTRAMUSCULAR; INTRAVENOUS ONCE
Status: COMPLETED | OUTPATIENT
Start: 2017-08-15 | End: 2017-08-15

## 2017-08-15 RX ORDER — LORAZEPAM 2 MG/ML
0.5 INJECTION INTRAMUSCULAR
Status: ACTIVE | OUTPATIENT
Start: 2017-08-15 | End: 2017-08-16

## 2017-08-15 RX ORDER — ONDANSETRON 2 MG/ML
8 INJECTION INTRAMUSCULAR; INTRAVENOUS ONCE
Status: COMPLETED | OUTPATIENT
Start: 2017-08-15 | End: 2017-08-15

## 2017-08-15 RX ADMIN — BENDAMUSTINE HYDROCHLORIDE 212.5 MG: 25 INJECTION, SOLUTION INTRAVENOUS at 13:39

## 2017-08-15 RX ADMIN — Medication 10 ML: at 14:00

## 2017-08-15 RX ADMIN — DEXAMETHASONE SODIUM PHOSPHATE 10 MG: 10 INJECTION INTRAMUSCULAR; INTRAVENOUS at 12:36

## 2017-08-15 RX ADMIN — Medication 500 UNITS: at 14:00

## 2017-08-15 RX ADMIN — ACETAMINOPHEN 650 MG: 325 TABLET, FILM COATED ORAL at 10:11

## 2017-08-15 RX ADMIN — ONDANSETRON 8 MG: 2 INJECTION INTRAMUSCULAR; INTRAVENOUS at 12:34

## 2017-08-15 RX ADMIN — SODIUM CHLORIDE 500 ML: 900 INJECTION, SOLUTION INTRAVENOUS at 10:05

## 2017-08-15 RX ADMIN — RITUXIMAB 885 MG: 10 INJECTION, SOLUTION INTRAVENOUS at 10:59

## 2017-08-15 RX ADMIN — DIPHENHYDRAMINE HYDROCHLORIDE 50 MG: 50 INJECTION, SOLUTION INTRAMUSCULAR; INTRAVENOUS at 10:04

## 2017-08-15 NOTE — PROGRESS NOTES
Pt was seen and labs reviewed by Dr. Maria Luisa Mosley. Will proceed with chemo. Pt to have Neulasta injection on day 3 instead of on body injector. Will return for cycle 6 in 4 weeks.

## 2017-08-15 NOTE — PROGRESS NOTES
[facility] D1C5  completed. Patient tolerated well. Any issues or concerns during appointment: no.  Patient aware of next infusion appointment on 8/16/17 (date) at 36 (time). Discharged ambulatory.

## 2017-08-16 ENCOUNTER — HOSPITAL ENCOUNTER (OUTPATIENT)
Dept: INFUSION THERAPY | Age: 57
Discharge: HOME | End: 2017-08-16
Payer: COMMERCIAL

## 2017-08-16 VITALS
TEMPERATURE: 98.8 F | HEART RATE: 77 BPM | SYSTOLIC BLOOD PRESSURE: 112 MMHG | OXYGEN SATURATION: 96 % | DIASTOLIC BLOOD PRESSURE: 72 MMHG | RESPIRATION RATE: 16 BRPM | BODY MASS INDEX: 45.91 KG/M2 | WEIGHT: 251 LBS

## 2017-08-16 DIAGNOSIS — C85.88 MARGINAL ZONE LYMPHOMA OF LYMPH NODES OF MULTIPLE SITES (HCC): ICD-10-CM

## 2017-08-16 PROCEDURE — 96375 TX/PRO/DX INJ NEW DRUG ADDON: CPT

## 2017-08-16 PROCEDURE — 74011000258 HC RX REV CODE- 258: Performed by: INTERNAL MEDICINE

## 2017-08-16 PROCEDURE — 74011250636 HC RX REV CODE- 250/636: Performed by: INTERNAL MEDICINE

## 2017-08-16 PROCEDURE — 96361 HYDRATE IV INFUSION ADD-ON: CPT

## 2017-08-16 PROCEDURE — 96409 CHEMO IV PUSH SNGL DRUG: CPT

## 2017-08-16 RX ORDER — ONDANSETRON 2 MG/ML
8 INJECTION INTRAMUSCULAR; INTRAVENOUS ONCE
Status: COMPLETED | OUTPATIENT
Start: 2017-08-16 | End: 2017-08-16

## 2017-08-16 RX ORDER — DEXAMETHASONE SODIUM PHOSPHATE 100 MG/10ML
10 INJECTION INTRAMUSCULAR; INTRAVENOUS ONCE
Status: COMPLETED | OUTPATIENT
Start: 2017-08-16 | End: 2017-08-16

## 2017-08-16 RX ORDER — HEPARIN 100 UNIT/ML
300-500 SYRINGE INTRAVENOUS AS NEEDED
Status: DISPENSED | OUTPATIENT
Start: 2017-08-16 | End: 2017-08-16

## 2017-08-16 RX ORDER — SODIUM CHLORIDE 0.9 % (FLUSH) 0.9 %
10 SYRINGE (ML) INJECTION AS NEEDED
Status: ACTIVE | OUTPATIENT
Start: 2017-08-16 | End: 2017-08-16

## 2017-08-16 RX ADMIN — SODIUM CHLORIDE, PRESERVATIVE FREE 500 UNITS: 5 INJECTION INTRAVENOUS at 08:56

## 2017-08-16 RX ADMIN — DEXAMETHASONE SODIUM PHOSPHATE 10 MG: 10 INJECTION INTRAMUSCULAR; INTRAVENOUS at 07:51

## 2017-08-16 RX ADMIN — Medication 10 ML: at 07:51

## 2017-08-16 RX ADMIN — BENDAMUSTINE HYDROCHLORIDE 212.5 MG: 25 INJECTION, SOLUTION INTRAVENOUS at 08:34

## 2017-08-16 RX ADMIN — SODIUM CHLORIDE 500 ML: 900 INJECTION, SOLUTION INTRAVENOUS at 07:53

## 2017-08-16 RX ADMIN — ONDANSETRON 8 MG: 2 INJECTION INTRAMUSCULAR; INTRAVENOUS at 07:52

## 2017-08-16 RX ADMIN — Medication 10 ML: at 08:55

## 2017-08-16 NOTE — PROGRESS NOTES
Arrived to the [facility]placed by [facility]. Taz completed. Patient tolerated well. Any issues or concerns during appointment: no.  Patient aware of next infusion appointment on 8/17 (date) at 1400 (time). Discharged ambulatory.

## 2017-08-17 ENCOUNTER — HOSPITAL ENCOUNTER (OUTPATIENT)
Dept: INFUSION THERAPY | Age: 57
Discharge: HOME | End: 2017-08-17
Payer: COMMERCIAL

## 2017-08-17 VITALS
WEIGHT: 250.8 LBS | TEMPERATURE: 99.1 F | OXYGEN SATURATION: 97 % | SYSTOLIC BLOOD PRESSURE: 148 MMHG | BODY MASS INDEX: 45.87 KG/M2 | HEART RATE: 86 BPM | DIASTOLIC BLOOD PRESSURE: 94 MMHG | RESPIRATION RATE: 18 BRPM

## 2017-08-17 DIAGNOSIS — C85.88 MARGINAL ZONE LYMPHOMA OF LYMPH NODES OF MULTIPLE SITES (HCC): ICD-10-CM

## 2017-08-17 PROCEDURE — 74011250636 HC RX REV CODE- 250/636: Performed by: INTERNAL MEDICINE

## 2017-08-17 PROCEDURE — 96372 THER/PROPH/DIAG INJ SC/IM: CPT

## 2017-08-17 RX ADMIN — PEGFILGRASTIM 6 MG: 6 INJECTION SUBCUTANEOUS at 15:03

## 2017-08-17 NOTE — PROGRESS NOTES
Arrived to the [facility]. Neulasta completed completed. Patient tolerated well. Any issues or concerns during appointment: NO.  Patient aware of next infusion appointment on 09/12/17 (date) at 5 (time). Discharged ambulatory.

## 2017-08-23 ENCOUNTER — HOSPITAL ENCOUNTER (OUTPATIENT)
Dept: ONCOLOGY | Age: 57
Discharge: HOME | End: 2017-08-23
Payer: COMMERCIAL

## 2017-08-23 PROCEDURE — 97110 THERAPEUTIC EXERCISES: CPT

## 2017-08-23 NOTE — PROGRESS NOTES
Antonietta Bennett  : 1960 [facility] [address] [facility]  [place], [place], [address]  [phone]   [phone]          OUTPATIENT PHYSICAL THERAPY:Daily Note 2017    ICD-10: Treatment Diagnosis: I 89.0 lymphedema not elsewhere classified  M 62.81 muscle weakness generalized  Precautions/Allergies:   Review of patient's allergies indicates no known allergies. Fall Risk Score: 1 (? 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 Rina Bright presents for a lymphedema assessment due to edema of bilateral lower extremities. She has pitting edema in the bilateral lower extremities. She has previously had short stretch bandaging and MLD following knee surgery 3 years ago. She would like to try this again even though this is not orthopedic post surgical swelling. She was diagnosed with lymphoma in March of this year. She recently completed her second chemo of 6 planned. She will have a PET scan 17. Progress with chemo has been limited due to lab values being abnormal.  She is uncomfortable due to the edema. She will have a paracentesis 17. We will initiate edema management and progress slowly dependent on her tolerance to compression and her response to treatment. We have now initiated conditioning and strengthening. She is independent with home management of edema. PROBLEM LIST (Impacting functional limitations):  1. Decreased Strength  2. Increased Pain  3. Decreased Activity Tolerance  4. Increased Fatigue  5. Increased Shortness of Breath  6. Decreased Flexibility/Joint Mobility  7. Edema/Girth  8. Decreased Knowledge of Precautions  9.  Decreased [place] with Home Exercise Program INTERVENTIONS PLANNED:  1. Decongestion Therapy  2. Home Exercise Program (HEP)  3. Range of Motion (ROM)  4. Therapeutic Exercise/Strengthening   TREATMENT PLAN:  Effective Dates: 8/14/17 TO 11/7/17/17. Frequency/Duration: 2 times a week for 12 weeks dependent on chemo schedule and counts  GOALS: (Goals have been discussed and agreed upon with patient.)  Short-Term Functional Goals: Time Frame: 4 weeks  1. The patient will have knowledge of signs and symptoms of lymphedema and how to manage within 4 weeks. Met   2. The patient will tolerate short stretch bandaging of bilateral lower extremities for reduction of girth within 4 weeks. Met   3. The patient and caregiver will be independent with compression bandaging within 4 weeks. Met   4. The patient will improve her 6 minute walk by 60 feet within 4 weeks. 5. The patient will report a fatigue of 3 or less within 4 weeks. Discharge Goals: Time Frame: 8 weeks  1. The patient will have a girth decrease of at least 5 cm in the calf within 8 weeks. 2. The patient will be fit with static compression garments within 8 weeks. Met   3. The patient and caregiver will be independent with edema management within 8 weeks. Met  4. The patient will transition to Healthy Self within 8 weeks. 5.   Rehabilitation Potential For Stated Goals: [address] [place] [address]'s therapy, I certify that the treatment plan above will be carried out by a therapist or under their direction. Thank you for this referral,  Margarette Pratt PT     Referring Physician Signature: Noy Coulter MD              Date                    The information in this section was collected on 5/24/17 (except where otherwise noted). HISTORY:   History of Present Injury/Illness (Reason for Referral):  Lymphoma, ascites, bilateral lower extremity pitting edema  Past Medical History/Comorbidities:   Ms. Gustavo Grullon  has a past medical history of Anemia;  Arthritis; Basal cell carcinoma; Chronic pain; Hypertension (10/4/2011); Morbid obesity ([address] Murmur; Non Hodgkin's lymphoma ([address] (3/30/2017); Other ill-defined conditions; Overactive bladder; Rheumatic fever; Shingles; Thromboembolus ([address] (x2); Thyroid disease; and Unspecified adverse effect of anesthesia. She also has no past medical history of Aneurysm ([address] Arrhythmia; Asthma; Autoimmune disease ([address] CAD (coronary artery disease); Chronic kidney disease; Coagulation defects; COPD; Diabetes ([address] Difficult intubation; GERD (gastroesophageal reflux disease); Heart failure ([address] Liver disease; Malignant hyperthermia due to anesthesia; Nausea & vomiting; Pseudocholinesterase deficiency; Psychiatric disorder; PUD (peptic ulcer disease); Seizures ([address] Stroke [facility]); or Unspecified sleep apnea. Ms. Evonne Sanchez  has a past surgical history that includes hc cholecystectomy fnc41; anesth,achilles tendon surg (2/10/2011); cholecystectomy (7304); orthopaedic (2012); orthopaedic (2013); and vascular access.    Past Medical History:   Diagnosis Date    Anemia     in high school, \"received gamma globulin twice a week for awhile\"    Arthritis     osteo-r knee    Basal cell carcinoma     Followed by Dermatology    Chronic pain     KNEEs    Hypertension 10/4/2011    medication    Morbid obesity ([address]     Murmur     \"had it my whole life\", did not appreciate murmur 9/12/2011 during assessment    Non Hodgkin's lymphoma ([address] 3/30/2017    Other ill-defined conditions     skin bumps-med as needed    Overactive bladder     Rheumatic fever     Possibly as a child    Shingles     Thromboembolus ([address] x2    LLE-calf-1990?-only took ASA-resolved in less than a wk-\"a little burned area-not examined\"    Thyroid disease     hypo-medication    Unspecified adverse effect of anesthesia     pt reports waking several times with knee surgery-spinal     Past Surgical History:   Procedure Laterality Date    HC CHOLECYSTECTOMY FNC41      HX CHOLECYSTECTOMY  1983    HX ORTHOPAEDIC  2012    right TKA    HX ORTHOPAEDIC  2013    left TKA. Dr. Juanita Belle.  HX VASCULAR ACCESS      [place] ANESTH,ACHILLES TENDON SURG  2/10/2011    LEFT. Dr. Juan J Weinberg. Social History/Living Environment:     lives with family, 2 flights of stairs  Prior Level of Function/Work/Activity:    Dominant Side:         RIGHT  Current Medications:       Current Outpatient Prescriptions:     magnesium oxide (MAG-OX) 400 mg tablet, Take 1 Tab by mouth two (2) times a day., Disp: 60 Tab, Rfl: 1    magic mouthwash (ALEXSANDER) susp, Take 10 mL by mouth every four (4) hours as needed. , Disp: 2 Bottle, Rfl: 2    nystatin (MYCOSTATIN) powder, Apply  to affected area three (3) times daily. , Disp: 60 g, Rfl: 2    nystatin (MYCOSTATIN) 100,000 unit/mL suspension, Take 5 mL by mouth four (4) times daily. swish and spit, Disp: 470 mL, Rfl: 2    predniSONE (STERAPRED DS) 10 mg dose pack, Take 1 Tab by mouth See Admin Instructions. See administration instruction per 10mg dose pack for 6 days, Disp: 21 Tab, Rfl: 0    fluticasone (FLONASE) 50 mcg/actuation nasal spray, 2 Sprays by Both Nostrils route daily. , Disp: 1 Bottle, Rfl: 1    OTHER, Kenalog in oragel to ulcers in mouth TID, Disp: 15 g, Rfl: 0    levoFLOXacin (LEVAQUIN) 500 mg tablet, Take 1 Tab by mouth daily. , Disp: 7 Tab, Rfl: 0    cefdinir (OMNICEF) 300 mg capsule, Take 1 Cap by mouth two (2) times a day., Disp: 20 Cap, Rfl: 0    fluconazole (DIFLUCAN) 200 mg tablet, Take 1 Tab by mouth daily. , Disp: 90 Tab, Rfl: 0    hydrOXYzine HCl (ATARAX) 25 mg tablet, Take  by mouth every six (6) hours as needed for Itching., Disp: , Rfl:     mupirocin calcium (BACTROBAN) 2 % topical cream, Apply  to affected area two (2) times a day., Disp: 30 g, Rfl: 1    levothyroxine (SYNTHROID) 150 mcg tablet, Take 1 Tab by mouth Daily (before breakfast). , Disp: 90 Tab, Rfl: 3    acyclovir (ZOVIRAX) 400 mg tablet, Take 1 Tab by mouth two (2) times a day., Disp: 60 Tab, Rfl: 3    allopurinol (ZYLOPRIM) 300 mg tablet, Take 1 Tab by mouth two (2) times a day. (Patient taking differently: Take 300 mg by mouth daily.), Disp: 90 Tab, Rfl: 2    loratadine (CLARITIN) 10 mg tablet, Take 10 mg by mouth., Disp: , Rfl:     pregabalin (LYRICA) 100 mg capsule, Take 1 Cap by mouth three (3) times daily. Max Daily Amount: 300 mg., Disp: 90 Cap, Rfl: 3    oxyCODONE (OXYIR) 5 mg capsule, Take 1-2 Caps by mouth every four (4) hours as needed. Max Daily Amount: 60 mg., Disp: 180 Cap, Rfl: 0    midodrine (PROAMITINE) 2.5 mg tablet, Take 1 Tab by mouth two (2) times daily (with meals). , Disp: 60 Tab, Rfl: 2    omeprazole (PRILOSEC) 20 mg capsule, TAKE 1 CAPSULE DAILY, Disp: 90 Cap, Rfl: 2    benzonatate (TESSALON PERLES) 100 mg capsule, Take 1 Cap by mouth every six (6) hours as needed for Cough. , Disp: 40 Cap, Rfl: 3    lidocaine-prilocaine (EMLA) topical cream, Apply  to affected area as needed for Pain. Apply to port site 45-60 minutes prior to lab appt or infusion. , Disp: 30 g, Rfl: 0    promethazine (PHENERGAN) 25 mg tablet, Take 25 mg by mouth every six (6) hours as needed for Nausea. Unsure of dose, Disp: , Rfl:    Date Last Reviewed:  5/24/17   Number of Personal Factors/Comorbidities that affect the Plan of Care: 3+: HIGH COMPLEXITY   EXAMINATION:   Palpation:          Pitting edema, dry skin, loose skin  ROM:          Within functional limits. She previously has had bilateral knee replacements and an Achilles tendon repair on the left  Strength:          Grossly 4/5 x 4 extremities  Functional Mobility:         Gait/Ambulation:  Independent with increased speed        Transfers: independent        Bed Mobility:  independent  Skin Integrity:          Intact, but dry. Edema/Girth:  pitting    Left Right    Initial Most Recent Initial Most Recent   Upper  Extremity           Lower  Extremity               Body Structures Involved:  1.  Muscles  2. lymphatic system Body Functions Affected:  1. Sensory/Pain  2. Skin Related  3. lymphatic system Activities and Participation Affected:  1. General Tasks and Demands  2. Mobility  3. Self Care   Number of elements (examined above) that affect the Plan of Care: 4+: HIGH COMPLEXITY   CLINICAL PRESENTATION:   Presentation: Evolving clinical presentation with unstable and unpredictable characteristics: HIGH COMPLEXITY   CLINICAL DECISION MAKING:   Outcome Measure: Tool Used: [facility]   Score:  Initial:   Most Recent: X    Interpretation of Score: If greater than or equal to 8, then PHQ-9 Depression Scale Score   and JOON-7 Anxiety Scale Score  . Tool Used: ECOG Performance Survey Score  Score:  Initial: 2 Most Recent:  1    Interpretation of Score:   0 Fully active, able to carry on all pre-disease performance without restriction   1 Restricted in physically strenuous activity but ambulatory and able to carry out work of a light or sedentary nature, e.g., light house work, office work   2 Ambulatory and capable of all selfcare but unable to carry out any work activities. Up and about more than 50% of waking hours   3 Capable of only limited selfcare, confined to bed or chair more than 50% of waking hours   4 Completely disabled. Cannot carry on any selfcare. Totally confined to bed or chair   5 Dead    Tool Used: 6-MINUTE WALK TEST  Score:  Initial: 1110 feet Most Recent: 1230 feet (Date: 8/14/17 )   Interpretation of Score: Normal range varies but is approximately 2464-3211 Feet      Distance walked: 1230 feet     Baseline End of Test   Heart Rate 83 78   Dyspnea (Luther Scale)     Fatigue (Luther Scale) 7 2   SpO2 98 98   /80 138/63     Score 2133 9170-1384 8346-9124 [phone] 426-16 15-0   Modifier CH CI CJ CK CL CM CN       Tool Used: Timed Up and Go (TUG)  Score:  Initial: 8 seconds Most Recent: X seconds (Date: -- )   Interpretation of Score:  The test measures, in seconds, the time taken by an individual to stand up from a standard arm chair (seat height 46 cm [18 in], arm height 65 cm [25.6 in]), walk a distance of 3 meters (118 in, approx 10 ft), turn, walk back to the chair and sit down. If the individual takes longer than 14 seconds to complete TUG, this indicates risk for falls. Score 7 7.5-10.5 11-14 14.5-17.5 18-21 21.5-24.5 25+   Modifier CH CI CJ CK CL CM CN         Tool Used: Lymphedema Life Impact Scale   Score:  Initial:  54 Most Recent: 33 (Date: 8/14/17 )   Interpretation of Score: The Lymphedema Life Impact Scale (LLIS) is a validated instrument that measures the physical, functional, and psychosocial concerns pertinent to patients with extremity lymphedema. The Scale's questionnaire is administered to patients to gauge impairments, activity limitations, and participation restrictions resulting from their lymphedema. Score 0 1-13 14-26 27-40 41-54 55-67 68   Modifier CH CI CJ CK CL CM CN       Medical Necessity:   · Patient is expected to demonstrate progress in strength and edema management to increase independence with self care and houseold activity. Reason for Services/Other Comments:  · Patient continues to demonstrate capacity to improve strength and edema management which will increase independence.    Use of outcome tool(s) and clinical judgement create a POC that gives a: Questionable prediction of patient's progress: MODERATE COMPLEXITY            TREATMENT:   (In addition to Assessment/Re-Assessment sessions the following treatments were rendered)  Pre-treatment Symptoms/Complaints:  Fatigue, pain weakness, lower extremity edema  Pain: Initial: 5/10         Post Session:  3/10    44 min  O2 96 HR 89  Bp 114/72  Fatigue 5/10  UBE level 1 x 4 min O2 98 HR 97  Walk 3 min O2 98   Nustep level 2 x 7 min Mets 2.5 SPM 64 O2 98 HR 92  Sit to stand x 10 reps  Long arc quads x 10 reps with 5 count hold   Walk 3 min O2 98   Bilateral lower extremity hip abduction, hip extension, hamstring curls, up on toes, hip flexion x 10 reps  Bilateral upper extremity with 2# x 10 reps biceps curls, forward flexion, abduction, triceps extension   Fatigue 3/10      as above    Treatment/Session Assessment:    · Response to Treatment:  Tolerated the treatment well. · Compliance with Program/Exercises: Will assess as treatment progresses. · Recommendations/Intent for next treatment session: \"Next visit will focus on assess efficacy of compression\".    Will continue with conditioning and strengthening per her request.  Total Treatment Duration:  PT Patient Time In/Time Out  Time In: 0852  Time Out: 326 [facility],

## 2017-08-28 ENCOUNTER — HOSPITAL ENCOUNTER (OUTPATIENT)
Dept: ONCOLOGY | Age: 57
Discharge: HOME | End: 2017-08-28
Payer: COMMERCIAL

## 2017-08-28 PROCEDURE — 97110 THERAPEUTIC EXERCISES: CPT

## 2017-08-28 NOTE — PROGRESS NOTES
Kulwant Tariq  : 1960 [facility]  [place], [place], [address]  [phone]   [phone]          OUTPATIENT PHYSICAL THERAPY:Daily Note 2017    ICD-10: Treatment Diagnosis: I 89.0 lymphedema not elsewhere classified  M 62.81 muscle weakness generalized  Precautions/Allergies:   Review of patient's allergies indicates no known allergies. Fall Risk Score: 1 (? 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 Dominga Horton presents for a lymphedema assessment due to edema of bilateral lower extremities. She has pitting edema in the bilateral lower extremities. She has previously had short stretch bandaging and MLD following knee surgery 3 years ago. She would like to try this again even though this is not orthopedic post surgical swelling. She was diagnosed with lymphoma in March of this year. She recently completed her second chemo of 6 planned. She will have a PET scan 17. Progress with chemo has been limited due to lab values being abnormal.  She is uncomfortable due to the edema. She will have a paracentesis 17. We will initiate edema management and progress slowly dependent on her tolerance to compression and her response to treatment. We have now initiated conditioning and strengthening. She is independent with home management of edema. PROBLEM LIST (Impacting functional limitations):  1. Decreased Strength  2. Increased Pain  3. Decreased Activity Tolerance  4. Increased Fatigue  5. Increased Shortness of Breath  6. Decreased Flexibility/Joint Mobility  7. Edema/Girth  8. Decreased Knowledge of Precautions  9.  Decreased [place] with Home Exercise Program INTERVENTIONS PLANNED:  1. Decongestion Therapy  2. Home Exercise Program (HEP)  3. Range of Motion (ROM)  4. Therapeutic Exercise/Strengthening   TREATMENT PLAN:  Effective Dates: 8/14/17 TO 11/7/17/17. Frequency/Duration: 2 times a week for 12 weeks dependent on chemo schedule and counts  GOALS: (Goals have been discussed and agreed upon with patient.)  Short-Term Functional Goals: Time Frame: 4 weeks  1. The patient will have knowledge of signs and symptoms of lymphedema and how to manage within 4 weeks. Met   2. The patient will tolerate short stretch bandaging of bilateral lower extremities for reduction of girth within 4 weeks. Met   3. The patient and caregiver will be independent with compression bandaging within 4 weeks. Met   4. The patient will improve her 6 minute walk by 60 feet within 4 weeks. 5. The patient will report a fatigue of 3 or less within 4 weeks. Discharge Goals: Time Frame: 8 weeks  1. The patient will have a girth decrease of at least 5 cm in the calf within 8 weeks. 2. The patient will be fit with static compression garments within 8 weeks. Met   3. The patient and caregiver will be independent with edema management within 8 weeks. Met  4. The patient will transition to Healthy Self within 8 weeks. 5.   Rehabilitation Potential For Stated Goals: [address] [place] [address]'s therapy, I certify that the treatment plan above will be carried out by a therapist or under their direction. Thank you for this referral,  Maurice Weir PT     Referring Physician Signature: Gunjan Lazaro MD              Date                    The information in this section was collected on 5/24/17 (except where otherwise noted). HISTORY:   History of Present Injury/Illness (Reason for Referral):  Lymphoma, ascites, bilateral lower extremity pitting edema  Past Medical History/Comorbidities:   Ms. Bhargav Clarke  has a past medical history of Anemia;  Arthritis; Basal cell carcinoma; Chronic pain; Hypertension (10/4/2011); Morbid obesity ([address] Murmur; Non Hodgkin's lymphoma ([address] (3/30/2017); Other ill-defined conditions; Overactive bladder; Rheumatic fever; Shingles; Thromboembolus ([address] (x2); Thyroid disease; and Unspecified adverse effect of anesthesia. She also has no past medical history of Aneurysm ([address] Arrhythmia; Asthma; Autoimmune disease ([address] CAD (coronary artery disease); Chronic kidney disease; Coagulation defects; COPD; Diabetes ([address] Difficult intubation; GERD (gastroesophageal reflux disease); Heart failure ([address] Liver disease; Malignant hyperthermia due to anesthesia; Nausea & vomiting; Pseudocholinesterase deficiency; Psychiatric disorder; PUD (peptic ulcer disease); Seizures ([address] Stroke [facility]); or Unspecified sleep apnea. Ms. Bhargav Clarke  has a past surgical history that includes hc cholecystectomy fnc41; anesth,achilles tendon surg (2/10/2011); cholecystectomy (8349); orthopaedic (2012); orthopaedic (2013); and vascular access.    Past Medical History:   Diagnosis Date    Anemia     in high school, \"received gamma globulin twice a week for awhile\"    Arthritis     osteo-r knee    Basal cell carcinoma     Followed by Dermatology    Chronic pain     KNEEs    Hypertension 10/4/2011    medication    Morbid obesity ([address]     Murmur     \"had it my whole life\", did not appreciate murmur 9/12/2011 during assessment    Non Hodgkin's lymphoma ([address] 3/30/2017    Other ill-defined conditions     skin bumps-med as needed    Overactive bladder     Rheumatic fever     Possibly as a child    Shingles     Thromboembolus ([address] x2    LLE-calf-1990?-only took ASA-resolved in less than a wk-\"a little burned area-not examined\"    Thyroid disease     hypo-medication    Unspecified adverse effect of anesthesia     pt reports waking several times with knee surgery-spinal     Past Surgical History:   Procedure Laterality Date    HC CHOLECYSTECTOMY FNC41      HX CHOLECYSTECTOMY  1983    HX ORTHOPAEDIC  2012    right TKA    HX ORTHOPAEDIC  2013    left TKA. Dr. Juanita Belle.  HX VASCULAR ACCESS      SD ANESTH,ACHILLES TENDON SURG  2/10/2011    LEFT. Dr. Juan J Weinberg. Social History/Living Environment:     lives with family, 2 flights of stairs  Prior Level of Function/Work/Activity:    Dominant Side:         RIGHT  Current Medications:       Current Outpatient Prescriptions:     magnesium oxide (MAG-OX) 400 mg tablet, Take 1 Tab by mouth two (2) times a day., Disp: 60 Tab, Rfl: 1    magic mouthwash (ALEXSANDER) susp, Take 10 mL by mouth every four (4) hours as needed. , Disp: 2 Bottle, Rfl: 2    nystatin (MYCOSTATIN) powder, Apply  to affected area three (3) times daily. , Disp: 60 g, Rfl: 2    nystatin (MYCOSTATIN) 100,000 unit/mL suspension, Take 5 mL by mouth four (4) times daily. swish and spit, Disp: 470 mL, Rfl: 2    predniSONE (STERAPRED DS) 10 mg dose pack, Take 1 Tab by mouth See Admin Instructions. See administration instruction per 10mg dose pack for 6 days, Disp: 21 Tab, Rfl: 0    fluticasone (FLONASE) 50 mcg/actuation nasal spray, 2 Sprays by Both Nostrils route daily. , Disp: 1 Bottle, Rfl: 1    OTHER, Kenalog in oragel to ulcers in mouth TID, Disp: 15 g, Rfl: 0    levoFLOXacin (LEVAQUIN) 500 mg tablet, Take 1 Tab by mouth daily. , Disp: 7 Tab, Rfl: 0    cefdinir (OMNICEF) 300 mg capsule, Take 1 Cap by mouth two (2) times a day., Disp: 20 Cap, Rfl: 0    fluconazole (DIFLUCAN) 200 mg tablet, Take 1 Tab by mouth daily. , Disp: 90 Tab, Rfl: 0    hydrOXYzine HCl (ATARAX) 25 mg tablet, Take  by mouth every six (6) hours as needed for Itching., Disp: , Rfl:     mupirocin calcium (BACTROBAN) 2 % topical cream, Apply  to affected area two (2) times a day., Disp: 30 g, Rfl: 1    levothyroxine (SYNTHROID) 150 mcg tablet, Take 1 Tab by mouth Daily (before breakfast). , Disp: 90 Tab, Rfl: 3    acyclovir (ZOVIRAX) 400 mg tablet, Take 1 Tab by mouth two (2) times a day., Disp: 60 Tab, Rfl: 3    allopurinol (ZYLOPRIM) 300 mg tablet, Take 1 Tab by mouth two (2) times a day. (Patient taking differently: Take 300 mg by mouth daily.), Disp: 90 Tab, Rfl: 2    loratadine (CLARITIN) 10 mg tablet, Take 10 mg by mouth., Disp: , Rfl:     pregabalin (LYRICA) 100 mg capsule, Take 1 Cap by mouth three (3) times daily. Max Daily Amount: 300 mg., Disp: 90 Cap, Rfl: 3    oxyCODONE (OXYIR) 5 mg capsule, Take 1-2 Caps by mouth every four (4) hours as needed. Max Daily Amount: 60 mg., Disp: 180 Cap, Rfl: 0    midodrine (PROAMITINE) 2.5 mg tablet, Take 1 Tab by mouth two (2) times daily (with meals). , Disp: 60 Tab, Rfl: 2    omeprazole (PRILOSEC) 20 mg capsule, TAKE 1 CAPSULE DAILY, Disp: 90 Cap, Rfl: 2    benzonatate (TESSALON PERLES) 100 mg capsule, Take 1 Cap by mouth every six (6) hours as needed for Cough. , Disp: 40 Cap, Rfl: 3    lidocaine-prilocaine (EMLA) topical cream, Apply  to affected area as needed for Pain. Apply to port site 45-60 minutes prior to lab appt or infusion. , Disp: 30 g, Rfl: 0    promethazine (PHENERGAN) 25 mg tablet, Take 25 mg by mouth every six (6) hours as needed for Nausea. Unsure of dose, Disp: , Rfl:    Date Last Reviewed:  5/24/17   Number of Personal Factors/Comorbidities that affect the Plan of Care: 3+: HIGH COMPLEXITY   EXAMINATION:   Palpation:          Pitting edema, dry skin, loose skin  ROM:          Within functional limits. She previously has had bilateral knee replacements and an Achilles tendon repair on the left  Strength:          Grossly 4/5 x 4 extremities  Functional Mobility:         Gait/Ambulation:  Independent with increased speed        Transfers: independent        Bed Mobility:  independent  Skin Integrity:          Intact, but dry. Edema/Girth:  pitting    Left Right    Initial Most Recent Initial Most Recent   Upper  Extremity           Lower  Extremity               Body Structures Involved:  1.  Muscles  2. lymphatic system Body Functions Affected:  1. Sensory/Pain  2. Skin Related  3. lymphatic system Activities and Participation Affected:  1. General Tasks and Demands  2. Mobility  3. Self Care   Number of elements (examined above) that affect the Plan of Care: 4+: HIGH COMPLEXITY   CLINICAL PRESENTATION:   Presentation: Evolving clinical presentation with unstable and unpredictable characteristics: HIGH COMPLEXITY   CLINICAL DECISION MAKING:   Outcome Measure: Tool Used: [facility]   Score:  Initial:   Most Recent: X    Interpretation of Score: If greater than or equal to 8, then PHQ-9 Depression Scale Score   and JOON-7 Anxiety Scale Score  . Tool Used: ECOG Performance Survey Score  Score:  Initial: 2 Most Recent:  1    Interpretation of Score:   0 Fully active, able to carry on all pre-disease performance without restriction   1 Restricted in physically strenuous activity but ambulatory and able to carry out work of a light or sedentary nature, e.g., light house work, office work   2 Ambulatory and capable of all selfcare but unable to carry out any work activities. Up and about more than 50% of waking hours   3 Capable of only limited selfcare, confined to bed or chair more than 50% of waking hours   4 Completely disabled. Cannot carry on any selfcare. Totally confined to bed or chair   5 Dead    Tool Used: 6-MINUTE WALK TEST  Score:  Initial: 1110 feet Most Recent: 1230 feet (Date: 8/14/17 )   Interpretation of Score: Normal range varies but is approximately 0071-0851 Feet      Distance walked: 1230 feet     Baseline End of Test   Heart Rate 83 78   Dyspnea (Luther Scale)     Fatigue (Luther Scale) 7 2   SpO2 98 98   /80 138/63     Score 2133 [id number] 2431-0433 [phone] 426-16 15-0   Modifier CH CI CJ CK CL CM CN       Tool Used: Timed Up and Go (TUG)  Score:  Initial: 8 seconds Most Recent: X seconds (Date: -- )   Interpretation of Score:  The test measures, in seconds, the time taken by an individual to stand up from a standard arm chair (seat height 46 cm [18 in], arm height 65 cm [25.6 in]), walk a distance of 3 meters (118 in, approx 10 ft), turn, walk back to the chair and sit down. If the individual takes longer than 14 seconds to complete TUG, this indicates risk for falls. Score 7 7.5-10.5 11-14 14.5-17.5 18-21 21.5-24.5 25+   Modifier CH CI CJ CK CL CM CN         Tool Used: Lymphedema Life Impact Scale   Score:  Initial:  54 Most Recent: 33 (Date: 8/14/17 )   Interpretation of Score: The Lymphedema Life Impact Scale (LLIS) is a validated instrument that measures the physical, functional, and psychosocial concerns pertinent to patients with extremity lymphedema. The Scale's questionnaire is administered to patients to gauge impairments, activity limitations, and participation restrictions resulting from their lymphedema. Score 0 1-13 14-26 27-40 41-54 55-67 68   Modifier CH CI CJ CK CL CM CN       Medical Necessity:   · Patient is expected to demonstrate progress in strength and edema management to increase independence with self care and houseold activity. Reason for Services/Other Comments:  · Patient continues to demonstrate capacity to improve strength and edema management which will increase independence.    Use of outcome tool(s) and clinical judgement create a POC that gives a: Questionable prediction of patient's progress: MODERATE COMPLEXITY            TREATMENT:   (In addition to Assessment/Re-Assessment sessions the following treatments were rendered)  Pre-treatment Symptoms/Complaints:  Fatigue, pain weakness, lower extremity edema  Pain: Initial: 5/10         Post Session: 3 /10    42 min  O2 97 HR 89  Bp 111/66  Fatigue 2/10  UBE level 1 x 4 min O2 98 HR 91  Walk 3 min   Nustep level 2 x 7 min Mets 2.8 SPM 76 O2 99 HR 91  Sit to stand x 10 reps  Long arc quads x 10 reps with 5 count hold   Walk 3 min O2 98   Bilateral lower extremity hip abduction, hip extension, hamstring curls, up on toes, hip flexion x 10 reps O2 98   Bilateral upper extremity with 2# x 10 reps biceps curls, forward flexion, abduction, triceps extension   Step ups x 10 reps O2 98   Fatigue 3/10      as above    Treatment/Session Assessment:    · Response to Treatment:  Tolerated the treatment well. · Compliance with Program/Exercises: Will assess as treatment progresses. · Recommendations/Intent for next treatment session: \"Next visit will focus on assess efficacy of compression\".    Will continue with conditioning and strengthening per her request.  Total Treatment Duration:  PT Patient Time In/Time Out  Time In: 0849  Time Out: [address] [place] [address],

## 2017-08-30 ENCOUNTER — HOSPITAL ENCOUNTER (OUTPATIENT)
Dept: ONCOLOGY | Age: 57
Discharge: HOME | End: 2017-08-30
Payer: COMMERCIAL

## 2017-08-30 PROCEDURE — 97110 THERAPEUTIC EXERCISES: CPT

## 2017-08-30 NOTE — PROGRESS NOTES
Ruslan Marsh  : 1960 [facility] [address] [facility]  Glenny Ulloa, [place], [address]  [phone]   [phone]          OUTPATIENT PHYSICAL THERAPY:Daily Note 2017    ICD-10: Treatment Diagnosis: I 89.0 lymphedema not elsewhere classified  M 62.81 muscle weakness generalized  Precautions/Allergies:   Review of patient's allergies indicates no known allergies. Fall Risk Score: 1 (? 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) [C85.88]    DATE OF ONSET: 3/30/17  REFERRING PHYSICIAN: Eileen Oliver MD  RETURN PHYSICIAN APPOINTMENT: 8/15/17     INITIAL ASSESSMENT:  Ms. Lowell Maza presents for a lymphedema assessment due to edema of bilateral lower extremities. She has pitting edema in the bilateral lower extremities. She has previously had short stretch bandaging and MLD following knee surgery 3 years ago. She would like to try this again even though this is not orthopedic post surgical swelling. She was diagnosed with lymphoma in March of this year. She recently completed her second chemo of 6 planned. She will have a PET scan 17. Progress with chemo has been limited due to lab values being abnormal.  She is uncomfortable due to the edema. She will have a paracentesis 17. We will initiate edema management and progress slowly dependent on her tolerance to compression and her response to treatment. We have now initiated conditioning and strengthening. She is independent with home management of edema. PROBLEM LIST (Impacting functional limitations):  1. Decreased Strength  2. Increased Pain  3. Decreased Activity Tolerance  4. Increased Fatigue  5. Increased Shortness of Breath  6. Decreased Flexibility/Joint Mobility  7. Edema/Girth  8. Decreased Knowledge of Precautions  9.  Decreased [place] with Home Exercise Program INTERVENTIONS PLANNED:  1. Decongestion Therapy  2. Home Exercise Program (HEP)  3. Range of Motion (ROM)  4. Therapeutic Exercise/Strengthening   TREATMENT PLAN:  Effective Dates: 8/14/17 TO 11/7/17/17. Frequency/Duration: 2 times a week for 12 weeks dependent on chemo schedule and counts  GOALS: (Goals have been discussed and agreed upon with patient.)  Short-Term Functional Goals: Time Frame: 4 weeks  1. The patient will have knowledge of signs and symptoms of lymphedema and how to manage within 4 weeks. Met   2. The patient will tolerate short stretch bandaging of bilateral lower extremities for reduction of girth within 4 weeks. Met   3. The patient and caregiver will be independent with compression bandaging within 4 weeks. Met   4. The patient will improve her 6 minute walk by 60 feet within 4 weeks. 5. The patient will report a fatigue of 3 or less within 4 weeks. Discharge Goals: Time Frame: 8 weeks  1. The patient will have a girth decrease of at least 5 cm in the calf within 8 weeks. 2. The patient will be fit with static compression garments within 8 weeks. Met   3. The patient and caregiver will be independent with edema management within 8 weeks. Met  4. The patient will transition to Healthy Self within 8 weeks. 5.   Rehabilitation Potential For Stated Goals: [address] [place] [address]'s therapy, I certify that the treatment plan above will be carried out by a therapist or under their direction. Thank you for this referral,  Giovanny Main PT     Referring Physician Signature: Gordo Astudillo MD              Date                    The information in this section was collected on 5/24/17 (except where otherwise noted). HISTORY:   History of Present Injury/Illness (Reason for Referral):  Lymphoma, ascites, bilateral lower extremity pitting edema  Past Medical History/Comorbidities:   Ms. Chun Parmar  has a past medical history of Anemia;  Arthritis; Basal cell carcinoma; Chronic pain; Hypertension (10/4/2011); Morbid obesity ([address] Murmur; Non Hodgkin's lymphoma ([address] (3/30/2017); Other ill-defined conditions; Overactive bladder; Rheumatic fever; Shingles; Thromboembolus ([address] (x2); Thyroid disease; and Unspecified adverse effect of anesthesia. She also has no past medical history of Aneurysm ([address] Arrhythmia; Asthma; Autoimmune disease ([address] CAD (coronary artery disease); Chronic kidney disease; Coagulation defects; COPD; Diabetes ([address] Difficult intubation; GERD (gastroesophageal reflux disease); Heart failure ([address] Liver disease; Malignant hyperthermia due to anesthesia; Nausea & vomiting; Pseudocholinesterase deficiency; Psychiatric disorder; PUD (peptic ulcer disease); Seizures ([address] Stroke [facility]); or Unspecified sleep apnea. Ms. Nima Dean  has a past surgical history that includes hc cholecystectomy fnc41; anesth,achilles tendon surg (2/10/2011); cholecystectomy (5881); orthopaedic (2012); orthopaedic (2013); and vascular access.    Past Medical History:   Diagnosis Date    Anemia     in high school, \"received gamma globulin twice a week for awhile\"    Arthritis     osteo-r knee    Basal cell carcinoma     Followed by Dermatology    Chronic pain     KNEEs    Hypertension 10/4/2011    medication    Morbid obesity ([address]     Murmur     \"had it my whole life\", did not appreciate murmur 9/12/2011 during assessment    Non Hodgkin's lymphoma ([address] 3/30/2017    Other ill-defined conditions     skin bumps-med as needed    Overactive bladder     Rheumatic fever     Possibly as a child    Shingles     Thromboembolus ([address] x2    LLE-calf-1990?-only took ASA-resolved in less than a wk-\"a little burned area-not examined\"    Thyroid disease     hypo-medication    Unspecified adverse effect of anesthesia     pt reports waking several times with knee surgery-spinal     Past Surgical History:   Procedure Laterality Date    HC CHOLECYSTECTOMY FNC41      HX CHOLECYSTECTOMY  1983    HX ORTHOPAEDIC  2012    right TKA    HX ORTHOPAEDIC  2013    left TKA. Dr. Santos Wilson.  HX VASCULAR ACCESS      [place] ANESTH,ACHILLES TENDON SURG  2/10/2011    LEFT. Dr. Adrianna Cerrato. Social History/Living Environment:     lives with family, 2 flights of stairs  Prior Level of Function/Work/Activity:    Dominant Side:         RIGHT  Current Medications:       Current Outpatient Prescriptions:     magnesium oxide (MAG-OX) 400 mg tablet, Take 1 Tab by mouth two (2) times a day., Disp: 60 Tab, Rfl: 1    magic mouthwash (ALEXSANDER) susp, Take 10 mL by mouth every four (4) hours as needed. , Disp: 2 Bottle, Rfl: 2    nystatin (MYCOSTATIN) powder, Apply  to affected area three (3) times daily. , Disp: 60 g, Rfl: 2    nystatin (MYCOSTATIN) 100,000 unit/mL suspension, Take 5 mL by mouth four (4) times daily. swish and spit, Disp: 470 mL, Rfl: 2    predniSONE (STERAPRED DS) 10 mg dose pack, Take 1 Tab by mouth See Admin Instructions. See administration instruction per 10mg dose pack for 6 days, Disp: 21 Tab, Rfl: 0    fluticasone (FLONASE) 50 mcg/actuation nasal spray, 2 Sprays by Both Nostrils route daily. , Disp: 1 Bottle, Rfl: 1    OTHER, Kenalog in oragel to ulcers in mouth TID, Disp: 15 g, Rfl: 0    levoFLOXacin (LEVAQUIN) 500 mg tablet, Take 1 Tab by mouth daily. , Disp: 7 Tab, Rfl: 0    cefdinir (OMNICEF) 300 mg capsule, Take 1 Cap by mouth two (2) times a day., Disp: 20 Cap, Rfl: 0    fluconazole (DIFLUCAN) 200 mg tablet, Take 1 Tab by mouth daily. , Disp: 90 Tab, Rfl: 0    hydrOXYzine HCl (ATARAX) 25 mg tablet, Take  by mouth every six (6) hours as needed for Itching., Disp: , Rfl:     mupirocin calcium (BACTROBAN) 2 % topical cream, Apply  to affected area two (2) times a day., Disp: 30 g, Rfl: 1    levothyroxine (SYNTHROID) 150 mcg tablet, Take 1 Tab by mouth Daily (before breakfast). , Disp: 90 Tab, Rfl: 3    acyclovir (ZOVIRAX) 400 mg tablet, Take 1 Tab by mouth two (2) times a day., Disp: 60 Tab, Rfl: 3    allopurinol (ZYLOPRIM) 300 mg tablet, Take 1 Tab by mouth two (2) times a day. (Patient taking differently: Take 300 mg by mouth daily.), Disp: 90 Tab, Rfl: 2    loratadine (CLARITIN) 10 mg tablet, Take 10 mg by mouth., Disp: , Rfl:     pregabalin (LYRICA) 100 mg capsule, Take 1 Cap by mouth three (3) times daily. Max Daily Amount: 300 mg., Disp: 90 Cap, Rfl: 3    oxyCODONE (OXYIR) 5 mg capsule, Take 1-2 Caps by mouth every four (4) hours as needed. Max Daily Amount: 60 mg., Disp: 180 Cap, Rfl: 0    midodrine (PROAMITINE) 2.5 mg tablet, Take 1 Tab by mouth two (2) times daily (with meals). , Disp: 60 Tab, Rfl: 2    omeprazole (PRILOSEC) 20 mg capsule, TAKE 1 CAPSULE DAILY, Disp: 90 Cap, Rfl: 2    benzonatate (TESSALON PERLES) 100 mg capsule, Take 1 Cap by mouth every six (6) hours as needed for Cough. , Disp: 40 Cap, Rfl: 3    lidocaine-prilocaine (EMLA) topical cream, Apply  to affected area as needed for Pain. Apply to port site 45-60 minutes prior to lab appt or infusion. , Disp: 30 g, Rfl: 0    promethazine (PHENERGAN) 25 mg tablet, Take 25 mg by mouth every six (6) hours as needed for Nausea. Unsure of dose, Disp: , Rfl:    Date Last Reviewed:  5/24/17   Number of Personal Factors/Comorbidities that affect the Plan of Care: 3+: HIGH COMPLEXITY   EXAMINATION:   Palpation:          Pitting edema, dry skin, loose skin  ROM:          Within functional limits. She previously has had bilateral knee replacements and an Achilles tendon repair on the left  Strength:          Grossly 4/5 x 4 extremities  Functional Mobility:         Gait/Ambulation:  Independent with increased speed        Transfers: independent        Bed Mobility:  independent  Skin Integrity:          Intact, but dry. Edema/Girth:  pitting    Left Right    Initial Most Recent Initial Most Recent   Upper  Extremity           Lower  Extremity               Body Structures Involved:  1.  Muscles  2. lymphatic system Body Functions Affected:  1. Sensory/Pain  2. Skin Related  3. lymphatic system Activities and Participation Affected:  1. General Tasks and Demands  2. Mobility  3. Self Care   Number of elements (examined above) that affect the Plan of Care: 4+: HIGH COMPLEXITY   CLINICAL PRESENTATION:   Presentation: Evolving clinical presentation with unstable and unpredictable characteristics: HIGH COMPLEXITY   CLINICAL DECISION MAKING:   Outcome Measure: Tool Used: [facility] Distress Thermometer   Score:  Initial:   Most Recent: X    Interpretation of Score: If greater than or equal to 8, then PHQ-9 Depression Scale Score   and JOON-7 Anxiety Scale Score  . Tool Used: ECOG Performance Survey Score  Score:  Initial: 2 Most Recent:  1    Interpretation of Score:   0 Fully active, able to carry on all pre-disease performance without restriction   1 Restricted in physically strenuous activity but ambulatory and able to carry out work of a light or sedentary nature, e.g., light house work, office work   2 Ambulatory and capable of all selfcare but unable to carry out any work activities. Up and about more than 50% of waking hours   3 Capable of only limited selfcare, confined to bed or chair more than 50% of waking hours   4 Completely disabled. Cannot carry on any selfcare. Totally confined to bed or chair   5 Dead    Tool Used: 6-MINUTE WALK TEST  Score:  Initial: 1110 feet Most Recent: 1230 feet (Date: 8/14/17 )   Interpretation of Score: Normal range varies but is approximately 2914-4717 Feet      Distance walked: 1230 feet     Baseline End of Test   Heart Rate 83 78   Dyspnea (Luther Scale)     Fatigue (Luther Scale) 7 2   SpO2 98 98   /80 138/63     Score 2133 [id number] 8816-3943 [phone] 426-16 15-0   Modifier CH CI CJ CK CL CM CN       Tool Used: Timed Up and Go (TUG)  Score:  Initial: 8 seconds Most Recent: X seconds (Date: -- )   Interpretation of Score:  The test measures, in seconds, the time taken by an individual to stand up from a standard arm chair (seat height 46 cm [18 in], arm height 65 cm [25.6 in]), walk a distance of 3 meters (118 in, approx 10 ft), turn, walk back to the chair and sit down. If the individual takes longer than 14 seconds to complete TUG, this indicates risk for falls. Score 7 7.5-10.5 11-14 14.5-17.5 18-21 21.5-24.5 25+   Modifier CH CI CJ CK CL CM CN         Tool Used: Lymphedema Life Impact Scale   Score:  Initial:  54 Most Recent: 33 (Date: 8/14/17 )   Interpretation of Score: The Lymphedema Life Impact Scale (LLIS) is a validated instrument that measures the physical, functional, and psychosocial concerns pertinent to patients with extremity lymphedema. The Scale's questionnaire is administered to patients to gauge impairments, activity limitations, and participation restrictions resulting from their lymphedema. Score 0 1-13 14-26 27-40 41-54 55-67 68   Modifier CH CI CJ CK CL CM CN       Medical Necessity:   · Patient is expected to demonstrate progress in strength and edema management to increase independence with self care and houseold activity. Reason for Services/Other Comments:  · Patient continues to demonstrate capacity to improve strength and edema management which will increase independence. Use of outcome tool(s) and clinical judgement create a POC that gives a: Questionable prediction of patient's progress: MODERATE COMPLEXITY            TREATMENT:   (In addition to Assessment/Re-Assessment sessions the following treatments were rendered)  Pre-treatment Symptoms/Complaints:  Fatigue, pain weakness, lower extremity edema   The patient reports worsening numbness in her left lower extremity. She was advised to report this to her physician.   Pain: Initial: 0/10         Post Session: 0 /10    42 min  O2 97 HR 99  Bp 102/67  Fatigue 5/10  UBE level 1 x 4 min O2 98 HR 91  Walk 3 min O2 99   Nustep level 2 x 10 min Mets 3.1 SPM 85 (patient neglected to stop at 7 min by mistake)  Sit to stand x 10 reps  Long arc quads x 10 reps with 5 count hold   Walk 3 min O2 96   Bilateral lower extremity hip abduction, hip extension, hamstring curls, up on toes, hip flexion x 10 reps O2 98 HR 90  Bilateral upper extremity with 2# x 10 reps biceps curls, forward flexion, abduction, triceps extension   Step ups x 10 reps O2 98   Fatigue 2/10      as above    Treatment/Session Assessment:    · Response to Treatment:  Tolerated the treatment well. · Compliance with Program/Exercises: Will assess as treatment progresses. · Recommendations/Intent for next treatment session: \"Next visit will focus on conditioning and strengthening\".        Total Treatment Duration:  PT Patient Time In/Time Out  Time In: 0858  Time Out: 0940    Joaquina Acosta, PT

## 2017-09-06 ENCOUNTER — APPOINTMENT (OUTPATIENT)
Dept: GENERAL RADIOLOGY | Age: 57
DRG: 809 | End: 2017-09-06
Attending: EMERGENCY MEDICINE
Payer: COMMERCIAL

## 2017-09-06 ENCOUNTER — HOSPITAL ENCOUNTER (EMERGENCY)
Age: 57
Discharge: HOME | DRG: 809 | End: 2017-09-06
Attending: EMERGENCY MEDICINE
Payer: COMMERCIAL

## 2017-09-06 VITALS
OXYGEN SATURATION: 100 % | BODY MASS INDEX: 46.01 KG/M2 | TEMPERATURE: 99.3 F | DIASTOLIC BLOOD PRESSURE: 65 MMHG | WEIGHT: 250 LBS | SYSTOLIC BLOOD PRESSURE: 118 MMHG | RESPIRATION RATE: 16 BRPM | HEART RATE: 83 BPM | HEIGHT: 62 IN

## 2017-09-06 DIAGNOSIS — C85.90 NON-HODGKIN'S LYMPHOMA, UNSPECIFIED BODY REGION, UNSPECIFIED NON-HODGKIN LYMPHOMA TYPE (HCC): ICD-10-CM

## 2017-09-06 DIAGNOSIS — D70.9 NEUTROPENIC FEVER (HCC): Primary | ICD-10-CM

## 2017-09-06 DIAGNOSIS — R50.81 NEUTROPENIC FEVER (HCC): Primary | ICD-10-CM

## 2017-09-06 LAB
ALBUMIN SERPL-MCNC: 3.3 G/DL (ref 3.5–5)
ALBUMIN/GLOB SERPL: 0.9 {RATIO} (ref 1.2–3.5)
ALP SERPL-CCNC: 143 U/L (ref 50–136)
ALT SERPL-CCNC: 43 U/L (ref 12–65)
ANION GAP SERPL CALC-SCNC: 8 MMOL/L (ref 7–16)
AST SERPL-CCNC: 32 U/L (ref 15–37)
BASOPHILS # BLD: 0 K/UL (ref 0–0.2)
BASOPHILS NFR BLD: 1 % (ref 0–2)
BILIRUB SERPL-MCNC: 0.5 MG/DL (ref 0.2–1.1)
BUN SERPL-MCNC: 16 MG/DL (ref 6–23)
CALCIUM SERPL-MCNC: 8.7 MG/DL (ref 8.3–10.4)
CHLORIDE SERPL-SCNC: 103 MMOL/L (ref 98–107)
CO2 SERPL-SCNC: 30 MMOL/L (ref 21–32)
CREAT SERPL-MCNC: 0.79 MG/DL (ref 0.6–1)
DIFFERENTIAL METHOD BLD: ABNORMAL
EOSINOPHIL # BLD: 0 K/UL (ref 0–0.8)
EOSINOPHIL NFR BLD: 4 % (ref 0.5–7.8)
ERYTHROCYTE [DISTWIDTH] IN BLOOD BY AUTOMATED COUNT: 18.4 % (ref 11.9–14.6)
GLOBULIN SER CALC-MCNC: 3.5 G/DL (ref 2.3–3.5)
GLUCOSE SERPL-MCNC: 94 MG/DL (ref 65–100)
HCT VFR BLD AUTO: 28.6 % (ref 35.8–46.3)
HGB BLD-MCNC: 9.7 G/DL (ref 11.7–15.4)
IMM GRANULOCYTES # BLD: 0 K/UL (ref 0–0.5)
IMM GRANULOCYTES NFR BLD: 0.9 % (ref 0–5)
LACTATE BLD-SCNC: 0.9 MMOL/L (ref 0.5–1.9)
LYMPHOCYTES # BLD: 0.1 K/UL (ref 0.5–4.6)
LYMPHOCYTES NFR BLD: 8 % (ref 13–44)
MCH RBC QN AUTO: 29.2 PG (ref 26.1–32.9)
MCHC RBC AUTO-ENTMCNC: 33.9 G/DL (ref 31.4–35)
MCV RBC AUTO: 86.1 FL (ref 79.6–97.8)
MONOCYTES # BLD: 0.1 K/UL (ref 0.1–1.3)
MONOCYTES NFR BLD: 11 % (ref 4–12)
NEUTS SEG # BLD: 0.8 K/UL (ref 1.7–8.2)
NEUTS SEG NFR BLD: 75 % (ref 43–78)
PLATELET # BLD AUTO: 69 K/UL (ref 150–450)
PMV BLD AUTO: 9.4 FL (ref 10.8–14.1)
POTASSIUM SERPL-SCNC: 4.3 MMOL/L (ref 3.5–5.1)
PROCALCITONIN SERPL-MCNC: 0.2 NG/ML
PROT SERPL-MCNC: 6.8 G/DL (ref 6.3–8.2)
RBC # BLD AUTO: 3.32 M/UL (ref 4.05–5.25)
SODIUM SERPL-SCNC: 141 MMOL/L (ref 136–145)
WBC # BLD AUTO: 1.1 K/UL (ref 4.3–11.1)

## 2017-09-06 PROCEDURE — 74011250636 HC RX REV CODE- 250/636: Performed by: EMERGENCY MEDICINE

## 2017-09-06 PROCEDURE — 85025 COMPLETE CBC W/AUTO DIFF WBC: CPT | Performed by: EMERGENCY MEDICINE

## 2017-09-06 PROCEDURE — 99285 EMERGENCY DEPT VISIT HI MDM: CPT | Performed by: EMERGENCY MEDICINE

## 2017-09-06 PROCEDURE — 80053 COMPREHEN METABOLIC PANEL: CPT | Performed by: EMERGENCY MEDICINE

## 2017-09-06 PROCEDURE — 96365 THER/PROPH/DIAG IV INF INIT: CPT | Performed by: EMERGENCY MEDICINE

## 2017-09-06 PROCEDURE — 83605 ASSAY OF LACTIC ACID: CPT

## 2017-09-06 PROCEDURE — 96375 TX/PRO/DX INJ NEW DRUG ADDON: CPT | Performed by: EMERGENCY MEDICINE

## 2017-09-06 PROCEDURE — 81003 URINALYSIS AUTO W/O SCOPE: CPT | Performed by: EMERGENCY MEDICINE

## 2017-09-06 PROCEDURE — 87040 BLOOD CULTURE FOR BACTERIA: CPT | Performed by: EMERGENCY MEDICINE

## 2017-09-06 PROCEDURE — 74011000258 HC RX REV CODE- 258: Performed by: EMERGENCY MEDICINE

## 2017-09-06 PROCEDURE — 74011250637 HC RX REV CODE- 250/637: Performed by: EMERGENCY MEDICINE

## 2017-09-06 PROCEDURE — 96367 TX/PROPH/DG ADDL SEQ IV INF: CPT | Performed by: EMERGENCY MEDICINE

## 2017-09-06 PROCEDURE — 87086 URINE CULTURE/COLONY COUNT: CPT | Performed by: EMERGENCY MEDICINE

## 2017-09-06 PROCEDURE — 84145 PROCALCITONIN (PCT): CPT | Performed by: EMERGENCY MEDICINE

## 2017-09-06 PROCEDURE — 71010 XR CHEST PORT: CPT

## 2017-09-06 PROCEDURE — 96366 THER/PROPH/DIAG IV INF ADDON: CPT | Performed by: EMERGENCY MEDICINE

## 2017-09-06 RX ORDER — VANCOMYCIN HYDROCHLORIDE 1 G/20ML
INJECTION, POWDER, LYOPHILIZED, FOR SOLUTION INTRAVENOUS ONCE
Status: DISCONTINUED | OUTPATIENT
Start: 2017-09-06 | End: 2017-09-06

## 2017-09-06 RX ORDER — VANCOMYCIN 2 GRAM/500 ML IN 0.9 % SODIUM CHLORIDE INTRAVENOUS
2000 ONCE
Status: COMPLETED | OUTPATIENT
Start: 2017-09-06 | End: 2017-09-06

## 2017-09-06 RX ORDER — ACETAMINOPHEN 500 MG
1000 TABLET ORAL
Status: COMPLETED | OUTPATIENT
Start: 2017-09-06 | End: 2017-09-06

## 2017-09-06 RX ORDER — HEPARIN 100 UNIT/ML
300 SYRINGE INTRAVENOUS AS NEEDED
Status: DISCONTINUED | OUTPATIENT
Start: 2017-09-06 | End: 2017-09-07 | Stop reason: HOSPADM

## 2017-09-06 RX ADMIN — ACETAMINOPHEN 1000 MG: 500 TABLET, FILM COATED ORAL at 18:33

## 2017-09-06 RX ADMIN — VANCOMYCIN HYDROCHLORIDE 2000 MG: 10 INJECTION, POWDER, LYOPHILIZED, FOR SOLUTION INTRAVENOUS at 20:22

## 2017-09-06 RX ADMIN — SODIUM CHLORIDE, PRESERVATIVE FREE 300 UNITS: 5 INJECTION INTRAVENOUS at 22:29

## 2017-09-06 RX ADMIN — PIPERACILLIN SODIUM,TAZOBACTAM SODIUM 4.5 G: 4; .5 INJECTION, POWDER, FOR SOLUTION INTRAVENOUS at 19:45

## 2017-09-06 NOTE — ED PROVIDER NOTES
HPI Comments: 28-year-old female has a history of lymphoma. Received chemotherapy treatments with the last one being about 4 weeks ago. Also has a history of DVT. Had cholecystectomy in the past.  She was referred by her oncologist and the [facility]. She returned from the [facility] last evening. She felt very fatigued yesterday. She noticed some chills and aches this morning. And fevers clamp 202. History of sepsis and neutropenic fever in the past.    Patient is a 62 y.o. female presenting with fever. The history is provided by the patient. Fever    This is a new problem. The current episode started 6 to 12 hours ago. The problem occurs constantly. The problem has been gradually worsening. The maximum temperature noted was 102 - 102.9 F. Associated symptoms include headaches, muscle aches and cough (chronic). Pertinent negatives include no chest pain, no diarrhea, no vomiting, no sore throat, no shortness of breath, no neck pain, no rash and no urinary symptoms. She has tried nothing for the symptoms.         Past Medical History:   Diagnosis Date    Anemia     in high school, \"received gamma globulin twice a week for awhile\"    Arthritis     osteo-r knee    Basal cell carcinoma     Followed by Dermatology    Chronic pain     KNEEs    Hypertension 10/4/2011    medication    Morbid obesity ([address]     Murmur     \"had it my whole life\", did not appreciate murmur 9/12/2011 during assessment    Non Hodgkin's lymphoma ([address] 3/30/2017    Other ill-defined conditions     skin bumps-med as needed    Overactive bladder     Rheumatic fever     Possibly as a child    Shingles     Thromboembolus ([address] x2    LLE-calf-1990?-only took ASA-resolved in less than a wk-\"a little burned area-not examined\"    Thyroid disease     hypo-medication    Unspecified adverse effect of anesthesia     pt reports waking several times with knee surgery-spinal       Past Surgical History:   Procedure Laterality Date    [facility] CHOLECYSTECTOMY FNC41      HX CHOLECYSTECTOMY  1983    HX ORTHOPAEDIC  2012    right TKA    HX ORTHOPAEDIC  2013    left TKA. Dr. Liborio Sanchez.  HX VASCULAR ACCESS      [place] ANESTH,ACHILLES TENDON SURG  2/10/2011    LEFT. Dr. Nkechi Vasquez. Family History:   Problem Relation Age of Onset    Post-op Nausea/Vomiting Other      X3    Breast Cancer Other 28     cousin    Kidney Disease Father      RENAL FAILURE    Other Son     Other Daughter     Other Maternal Grandmother      Vascular Disorder with leg amputation    Liver Disease Sister      non-alcoholic    Celiac Disease Sister     Malignant Hyperthermia Neg Hx     Pseudocholinesterase Deficiency Neg Hx     Delayed Awakening Neg Hx     Emergence Delirium Neg Hx     Post-op Cognitive Dysfunction Neg Hx        Social History     Social History    Marital status:      Spouse name: N/A    Number of children: N/A    Years of education: N/A     Occupational History    Not on file. Social History Main Topics    Smoking status: Never Smoker    Smokeless tobacco: Never Used    Alcohol use Yes      Comment: RARE, ONCE/TWICE A YEAR    Drug use: Yes     Special: Prescription    Sexual activity: Not on file     Other Topics Concern    Not on file     Social History Narrative    10/3/11:  PATIENT IS  TO Cynthia Zarate. SHE IS DISABLED. ALLERGIES: Review of patient's allergies indicates no known allergies. Review of Systems   Constitutional: Positive for fever. Negative for chills. HENT: Negative for sore throat. Respiratory: Positive for cough (chronic). Negative for shortness of breath. Cardiovascular: Negative for chest pain and palpitations. Gastrointestinal: Negative for abdominal pain, diarrhea and vomiting. Genitourinary: Negative for dysuria and flank pain. Musculoskeletal: Negative for back pain and neck pain. Skin: Negative for color change and rash. Neurological: Positive for headaches. Negative for syncope. All other systems reviewed and are negative. Vitals:    09/06/17 1609   BP: 134/57   Pulse: 99   Resp: 20   Temp: (!) 101.2 °F (38.4 °C)   SpO2: 96%   Weight: 113.4 kg (250 lb)   Height: 5' 2\" (1.575 m)            Physical Exam   Constitutional: She is oriented to person, place, and time. She appears well-developed and well-nourished. No distress. HENT:   Head: Normocephalic and atraumatic. Mouth/Throat: Oropharynx is clear and moist. No oropharyngeal exudate. Eyes: Conjunctivae and EOM are normal. Pupils are equal, round, and reactive to light. Neck: Normal range of motion. Neck supple. Cardiovascular: Normal rate, regular rhythm and intact distal pulses. No murmur heard. Pulmonary/Chest: Breath sounds normal. No respiratory distress. Abdominal: Soft. Bowel sounds are normal. She exhibits no mass. There is no tenderness. There is no rebound and no guarding. No hernia. Neurological: She is alert and oriented to person, place, and time. Gait normal.   Nl speech   Skin: Skin is warm and dry. Psychiatric: She has a normal mood and affect. Her speech is normal.   Nursing note and vitals reviewed. MDM  Number of Diagnoses or Management Options  Diagnosis management comments: Assessment sepsis, pneumonia, UTI, neutropenia       Amount and/or Complexity of Data Reviewed  Clinical lab tests: ordered and reviewed  Tests in the radiology section of CPT®: ordered and reviewed  Discuss the patient with other providers: yes  Independent visualization of images, tracings, or specimens: yes    Risk of Complications, Morbidity, and/or Mortality  Presenting problems: moderate  Diagnostic procedures: low  Management options: moderate    Patient Progress  Patient progress: stable    ED Course       Procedures      7:17 PM  Neutropenia borderline. Head paged oncology for quite a while and just heard back from them.   They do desire to patient received the antibiotics and call hospitalist for admission.

## 2017-09-06 NOTE — ED TRIAGE NOTES
Patient being treated for lymphoma. Last treatment 4 weeks ago. At [facility] recently. Patient with fever of 102.2 at home. Sent by cancer center.

## 2017-09-07 ENCOUNTER — HOSPITAL ENCOUNTER (OUTPATIENT)
Dept: LAB | Age: 57
Discharge: HOME | End: 2017-09-07
Payer: COMMERCIAL

## 2017-09-07 ENCOUNTER — HOSPITAL ENCOUNTER (OUTPATIENT)
Dept: ONCOLOGY | Age: 57
Discharge: HOME | End: 2017-09-07

## 2017-09-07 ENCOUNTER — HOSPITAL ENCOUNTER (OUTPATIENT)
Dept: INFUSION THERAPY | Age: 57
Discharge: HOME | End: 2017-09-07
Payer: COMMERCIAL

## 2017-09-07 VITALS
WEIGHT: 252.8 LBS | BODY MASS INDEX: 46.24 KG/M2 | SYSTOLIC BLOOD PRESSURE: 106 MMHG | TEMPERATURE: 102.1 F | HEART RATE: 108 BPM | RESPIRATION RATE: 18 BRPM | DIASTOLIC BLOOD PRESSURE: 80 MMHG | OXYGEN SATURATION: 99 %

## 2017-09-07 DIAGNOSIS — C85.88 MARGINAL ZONE LYMPHOMA OF LYMPH NODES OF MULTIPLE SITES (HCC): ICD-10-CM

## 2017-09-07 LAB
ALBUMIN SERPL-MCNC: 3.5 G/DL (ref 3.5–5)
ALBUMIN/GLOB SERPL: 1.1 {RATIO} (ref 1.2–3.5)
ALP SERPL-CCNC: 145 U/L (ref 50–136)
ALT SERPL-CCNC: 56 U/L (ref 12–65)
ANION GAP SERPL CALC-SCNC: 8 MMOL/L (ref 7–16)
APPEARANCE UR: ABNORMAL
AST SERPL-CCNC: 44 U/L (ref 15–37)
BASOPHILS # BLD: 0 K/UL (ref 0–0.2)
BASOPHILS NFR BLD: 1 % (ref 0–2)
BILIRUB SERPL-MCNC: 0.6 MG/DL (ref 0.2–1.1)
BILIRUB UR QL: NEGATIVE
BUN SERPL-MCNC: 15 MG/DL (ref 6–23)
CALCIUM SERPL-MCNC: 9 MG/DL (ref 8.3–10.4)
CHLORIDE SERPL-SCNC: 102 MMOL/L (ref 98–107)
CO2 SERPL-SCNC: 28 MMOL/L (ref 21–32)
COLOR UR: YELLOW
CREAT SERPL-MCNC: 0.78 MG/DL (ref 0.6–1)
DIFFERENTIAL METHOD BLD: ABNORMAL
EOSINOPHIL # BLD: 0 K/UL (ref 0–0.8)
EOSINOPHIL NFR BLD: 3 % (ref 0.5–7.8)
ERYTHROCYTE [DISTWIDTH] IN BLOOD BY AUTOMATED COUNT: 18 % (ref 11.9–14.6)
GLOBULIN SER CALC-MCNC: 3.2 G/DL (ref 2.3–3.5)
GLUCOSE SERPL-MCNC: 98 MG/DL (ref 65–100)
GLUCOSE UR STRIP.AUTO-MCNC: NEGATIVE MG/DL
HCT VFR BLD AUTO: 26.7 % (ref 35.8–46.3)
HGB BLD-MCNC: 9.1 G/DL (ref 11.7–15.4)
HGB UR QL STRIP: ABNORMAL
KETONES UR QL STRIP.AUTO: NEGATIVE MG/DL
LEUKOCYTE ESTERASE UR QL STRIP.AUTO: ABNORMAL
LYMPHOCYTES # BLD: 0.1 K/UL (ref 0.5–4.6)
LYMPHOCYTES NFR BLD: 13 % (ref 13–44)
MAGNESIUM SERPL-MCNC: 2 MG/DL (ref 1.8–2.4)
MCH RBC QN AUTO: 29.4 PG (ref 26.1–32.9)
MCHC RBC AUTO-ENTMCNC: 34.1 G/DL (ref 31.4–35)
MCV RBC AUTO: 86.1 FL (ref 79.6–97.8)
MONOCYTES # BLD: 0.1 K/UL (ref 0.1–1.3)
MONOCYTES NFR BLD: 18 % (ref 4–12)
NEUTS SEG # BLD: 0.5 K/UL (ref 1.7–8.2)
NEUTS SEG NFR BLD: 65 % (ref 43–78)
NITRITE UR QL STRIP.AUTO: NEGATIVE
NRBC # BLD: 0 K/UL (ref 0–0.2)
NRBC BLD-RTO: 0 PER 100 WBC (ref 0–2)
PH UR STRIP: 6 [PH] (ref 5–9)
PLATELET # BLD AUTO: 65 K/UL (ref 150–450)
PMV BLD AUTO: 9.1 FL (ref 10.8–14.1)
POTASSIUM SERPL-SCNC: 4.3 MMOL/L (ref 3.5–5.1)
PROT SERPL-MCNC: 6.7 G/DL (ref 6.3–8.2)
PROT UR STRIP-MCNC: 30 MG/DL
RBC # BLD AUTO: 3.1 M/UL (ref 4.05–5.25)
SODIUM SERPL-SCNC: 138 MMOL/L (ref 136–145)
SP GR UR REFRACTOMETRY: 1.01 (ref 1–1.02)
UROBILINOGEN UR QL STRIP.AUTO: 0.2 EU/DL (ref 0.2–1)
WBC # BLD AUTO: 0.7 K/UL (ref 4.3–11.1)

## 2017-09-07 PROCEDURE — 74011000258 HC RX REV CODE- 258: Performed by: INTERNAL MEDICINE

## 2017-09-07 PROCEDURE — 87086 URINE CULTURE/COLONY COUNT: CPT | Performed by: INTERNAL MEDICINE

## 2017-09-07 PROCEDURE — 81003 URINALYSIS AUTO W/O SCOPE: CPT | Performed by: INTERNAL MEDICINE

## 2017-09-07 PROCEDURE — 96366 THER/PROPH/DIAG IV INF ADDON: CPT

## 2017-09-07 PROCEDURE — 85025 COMPLETE CBC W/AUTO DIFF WBC: CPT | Performed by: INTERNAL MEDICINE

## 2017-09-07 PROCEDURE — 80053 COMPREHEN METABOLIC PANEL: CPT | Performed by: INTERNAL MEDICINE

## 2017-09-07 PROCEDURE — 96365 THER/PROPH/DIAG IV INF INIT: CPT

## 2017-09-07 PROCEDURE — 74011250636 HC RX REV CODE- 250/636: Performed by: INTERNAL MEDICINE

## 2017-09-07 PROCEDURE — 96367 TX/PROPH/DG ADDL SEQ IV INF: CPT

## 2017-09-07 PROCEDURE — 83735 ASSAY OF MAGNESIUM: CPT | Performed by: INTERNAL MEDICINE

## 2017-09-07 PROCEDURE — 81015 MICROSCOPIC EXAM OF URINE: CPT | Performed by: INTERNAL MEDICINE

## 2017-09-07 RX ORDER — SODIUM CHLORIDE 0.9 % (FLUSH) 0.9 %
10 SYRINGE (ML) INJECTION AS NEEDED
Status: ACTIVE | OUTPATIENT
Start: 2017-09-07 | End: 2017-09-07

## 2017-09-07 RX ORDER — HEPARIN 100 UNIT/ML
500 SYRINGE INTRAVENOUS AS NEEDED
Status: DISPENSED | OUTPATIENT
Start: 2017-09-07 | End: 2017-09-07

## 2017-09-07 RX ORDER — VANCOMYCIN 1.75 GRAM/500 ML IN 0.9 % SODIUM CHLORIDE INTRAVENOUS
1750 ONCE
Status: COMPLETED | OUTPATIENT
Start: 2017-09-07 | End: 2017-09-07

## 2017-09-07 RX ADMIN — VANCOMYCIN HYDROCHLORIDE 1750 MG: 10 INJECTION, POWDER, LYOPHILIZED, FOR SOLUTION INTRAVENOUS at 16:56

## 2017-09-07 RX ADMIN — Medication 10 ML: at 18:45

## 2017-09-07 RX ADMIN — CEFTRIAXONE 2 G: 2 INJECTION, POWDER, FOR SOLUTION INTRAMUSCULAR; INTRAVENOUS at 16:24

## 2017-09-07 RX ADMIN — Medication 10 ML: at 16:24

## 2017-09-07 RX ADMIN — Medication 10 ML: at 16:55

## 2017-09-07 RX ADMIN — SODIUM CHLORIDE, PRESERVATIVE FREE 500 UNITS: 5 INJECTION INTRAVENOUS at 18:45

## 2017-09-07 NOTE — DISCHARGE INSTRUCTIONS
Neutropenia: Care Instructions  Your Care Instructions  Neutropenia (say \"upi-yqak-CVX-nee-uh\") means that your blood has too few neutrophils. These are white blood cells that help protect the body from infection. They do this by killing bacteria. Neutropenia can be caused by some types of infection. It also can be caused by immune system conditions such as HIV or lupus, a lack of vitamin Q08 or folic acid, or an enlarged spleen. Some medicines can cause it too. It is most often caused by treatments for certain health problems, such as chemotherapy and radiation treatment for cancer. Mild neutropenia usually causes no symptoms. But when it's severe, it increases the risk of infection of your skin and organs. That's because your body can't fight off germs as well as it should. Follow-up care is a key part of your treatment and safety. Be sure to make and go to all appointments, and call your doctor if you are having problems. It's also a good idea to know your test results and keep a list of the medicines you take. How can you care for yourself at home? · Take your medicines exactly as prescribed. Call your doctor if you have any problems with your medicine. · Eat a healthy, balanced diet. Eat foods with a lot of fiber. This helps to prevent constipation. Prevent infections  · Take your temperature several times a day, as your doctor suggests. Keep a written record of your temperature readings. Fever is a common symptom of infection. And it may be the only symptom. · Use a soft toothbrush. Do not floss your teeth. Talk with your doctor about other steps to prevent infections in your mouth. · Wash your hands often with soap and water, especially before you eat and after you use the bathroom. · If you are a woman, use sanitary napkins (pads) instead of tampons. Do not douche. · Do not use rectal thermometers or suppositories.   · Avoid tasks that might expose you to germs, such as disposing of pet feces or urine. · Avoid crowds of people and anyone who might have an infection or an illness such as a cold or the flu. You may need to avoid people who have recently had certain kinds of vaccinations. · Even small injuries can get infected. Take steps to prevent cuts, burns, and sunburns. · If you have severe neutropenia, your doctor may advise you to avoid fresh fruits, vegetables, and flowers. When should you call for help? Call 911 anytime you think you may need emergency care. For example, call if:  · You have severe shortness of breath. · You passed out (lost consciousness). Call your doctor now or seek immediate medical care if:  · You have signs of infection, such as:  ¨ Increased pain, swelling, warmth, or redness of your skin. ¨ Red streaks leading from a wound. ¨ Pus draining from a wound. ¨ A fever. Watch closely for changes in your health, and be sure to contact your doctor if:  · You do not get better as expected. Where can you learn more? Go to http://rajatJoystickersrachel.info/. Enter W659 in the search box to learn more about \"Neutropenia: Care Instructions. \"  Current as of: October 14, 2016  Content Version: 11.3  © 7112-9104 [employer]. Care instructions adapted under license by [employer] (which disclaims liability or warranty for this information). If you have questions about a medical condition or this instruction, always ask your healthcare professional. [place] [address] any warranty or liability for your use of this information. Learning About Fever  What is a fever? A fever is a high body temperature. It's one way your body fights being sick. A fever shows that the body is responding to infection or other illnesses, both minor and severe. A fever is a symptom, not an illness by itself. A fever can be a sign that you are ill, but most fevers are not caused by a serious problem.   You may have a fever with a minor illness, such as a cold. But sometimes a very serious infection may cause little or no fever. It is important to look at other symptoms, other conditions you have, and how you feel in general. In children, notice how they act and see what symptoms they complain of. What is a normal body temperature? A normal body temperature is about 98. 6ºF. Some people have a normal temperature that is a little higher or a little lower than this. Your temperature may be a little lower in the morning than it is later in the day. It may go up during hot weather or when you exercise, wear heavy clothes, or take a hot bath. Your temperature may also be different depending on how you take it. A temperature taken in the mouth (oral) or under the arm may be a little lower than your core temperature (rectal). What is a fever temperature? A core temperature of 100.4°F or above is considered a fever. What can cause a fever? A fever may be caused by:  · Infections. This is the most common cause of a fever. Examples of infections that can cause a fever include the flu, a kidney infection, or pneumonia. · Some medicines. · Severe trauma or injury, such as a heart attack, stroke, heatstroke, or burns. · Other medical conditions, such as arthritis and some cancers. How can you treat a fever at home? · Ask your doctor if you can take an over-the-counter pain medicine, such as acetaminophen (Tylenol), ibuprofen (Advil, Motrin), or naproxen (Aleve). Be safe with medicines. Read and follow all instructions on the label. · To prevent dehydration, drink plenty of fluids. Choose water and other caffeine-free clear liquids until you feel better. If you have kidney, heart, or liver disease and have to limit fluids, talk with your doctor before you increase the amount of fluids you drink. Follow-up care is a key part of your treatment and safety. Be sure to make and go to all appointments, and call your doctor if you are having problems. It's also a good idea to know your test results and keep a list of the medicines you take. Where can you learn more? Go to http://rajat-rachel.info/. Enter F449 in the search box to learn more about \"Learning About Fever. \"  Current as of: March 20, 2017  Content Version: 11.3  © 3949-4012 [employer]. Care instructions adapted under license by [employer] (which disclaims liability or warranty for this information). If you have questions about a medical condition or this instruction, always ask your healthcare professional. [place] [address] any warranty or liability for your use of this information.

## 2017-09-07 NOTE — PROGRESS NOTES
Problem: Chemotherapy Treatment  Goal: *Chemotherapy regimen followed  Outcome: Progressing Towards Goal  Verbalizes/demonstrates understanding of purpose/procedure/potential side effects of rocephin and vancomycin.

## 2017-09-07 NOTE — PROGRESS NOTES
Pt arrived ambulatory today at 1600, to receive IV . Pt tolerated without difficulty. Patient discharged via ambulatory accompanied by self. Instructed to notify physician of any problems, questions or concerns. Allowed opportunity for patient/family to ask questions. Verbalized understanding. Next appointment is Sept 8 at 4 pm  with Cristal Sanchez.

## 2017-09-08 ENCOUNTER — HOSPITAL ENCOUNTER (OUTPATIENT)
Dept: LAB | Age: 57
Discharge: HOME | End: 2017-09-08
Payer: COMMERCIAL

## 2017-09-08 ENCOUNTER — HOSPITAL ENCOUNTER (INPATIENT)
Age: 57
LOS: 4 days | Discharge: HOME | DRG: 809 | End: 2017-09-12
Attending: INTERNAL MEDICINE | Admitting: INTERNAL MEDICINE
Payer: COMMERCIAL

## 2017-09-08 ENCOUNTER — HOSPITAL ENCOUNTER (OUTPATIENT)
Dept: INFUSION THERAPY | Age: 57
Discharge: HOME | End: 2017-09-08
Payer: COMMERCIAL

## 2017-09-08 VITALS
SYSTOLIC BLOOD PRESSURE: 154 MMHG | WEIGHT: 245.4 LBS | TEMPERATURE: 103 F | HEART RATE: 109 BPM | DIASTOLIC BLOOD PRESSURE: 97 MMHG | OXYGEN SATURATION: 100 % | RESPIRATION RATE: 18 BRPM | BODY MASS INDEX: 44.88 KG/M2

## 2017-09-08 DIAGNOSIS — T45.1X5A PANCYTOPENIA DUE TO ANTINEOPLASTIC CHEMOTHERAPY (HCC): Chronic | ICD-10-CM

## 2017-09-08 DIAGNOSIS — C85.88 MARGINAL ZONE LYMPHOMA OF LYMPH NODES OF MULTIPLE SITES (HCC): ICD-10-CM

## 2017-09-08 DIAGNOSIS — D61.810 PANCYTOPENIA DUE TO ANTINEOPLASTIC CHEMOTHERAPY (HCC): Chronic | ICD-10-CM

## 2017-09-08 DIAGNOSIS — D70.9 NEUTROPENIC FEVER (HCC): ICD-10-CM

## 2017-09-08 DIAGNOSIS — R50.81 NEUTROPENIC FEVER (HCC): ICD-10-CM

## 2017-09-08 LAB
ALBUMIN SERPL-MCNC: 3.5 G/DL (ref 3.5–5)
ALBUMIN/GLOB SERPL: 1.1 {RATIO} (ref 1.2–3.5)
ALP SERPL-CCNC: 149 U/L (ref 50–136)
ALT SERPL-CCNC: 57 U/L (ref 12–65)
ANION GAP SERPL CALC-SCNC: 8 MMOL/L (ref 7–16)
AST SERPL-CCNC: 46 U/L (ref 15–37)
BACTERIA URNS QL MICRO: NORMAL /HPF
BILIRUB SERPL-MCNC: 0.5 MG/DL (ref 0.2–1.1)
BUN SERPL-MCNC: 17 MG/DL (ref 6–23)
CALCIUM SERPL-MCNC: 8.7 MG/DL (ref 8.3–10.4)
CASTS URNS QL MICRO: 0 /LPF
CHLORIDE SERPL-SCNC: 100 MMOL/L (ref 98–107)
CO2 SERPL-SCNC: 27 MMOL/L (ref 21–32)
CREAT SERPL-MCNC: 0.77 MG/DL (ref 0.6–1)
CRYSTALS URNS QL MICRO: 0 /LPF
DIFFERENTIAL METHOD BLD: ABNORMAL
EPI CELLS #/AREA URNS HPF: NORMAL /HPF
ERYTHROCYTE [DISTWIDTH] IN BLOOD BY AUTOMATED COUNT: 17.6 % (ref 11.9–14.6)
GLOBULIN SER CALC-MCNC: 3.3 G/DL (ref 2.3–3.5)
GLUCOSE SERPL-MCNC: 112 MG/DL (ref 65–100)
HCT VFR BLD AUTO: 25.2 % (ref 35.8–46.3)
HGB BLD-MCNC: 8.6 G/DL (ref 11.7–15.4)
MCH RBC QN AUTO: 29.2 PG (ref 26.1–32.9)
MCHC RBC AUTO-ENTMCNC: 34.1 G/DL (ref 31.4–35)
MCV RBC AUTO: 85.4 FL (ref 79.6–97.8)
MUCOUS THREADS URNS QL MICRO: 0 /LPF
NRBC # BLD: 0 K/UL (ref 0–0.2)
OTHER OBSERVATIONS,UCOM: NORMAL
PLATELET # BLD AUTO: 55 K/UL (ref 150–450)
PLATELET COMMENTS,PCOM: ABNORMAL
PMV BLD AUTO: 10.6 FL (ref 10.8–14.1)
POTASSIUM SERPL-SCNC: 4.2 MMOL/L (ref 3.5–5.1)
PROT SERPL-MCNC: 6.8 G/DL (ref 6.3–8.2)
RBC # BLD AUTO: 2.95 M/UL (ref 4.05–5.25)
RBC #/AREA URNS HPF: 0 /HPF
RBC MORPH BLD: ABNORMAL
SODIUM SERPL-SCNC: 135 MMOL/L (ref 136–145)
WBC # BLD AUTO: 0.4 K/UL (ref 4.3–11.1)
WBC MORPH BLD: ABNORMAL
WBC URNS QL MICRO: NORMAL /HPF

## 2017-09-08 PROCEDURE — 96365 THER/PROPH/DIAG IV INF INIT: CPT

## 2017-09-08 PROCEDURE — 99223 1ST HOSP IP/OBS HIGH 75: CPT | Performed by: INTERNAL MEDICINE

## 2017-09-08 PROCEDURE — 74011250637 HC RX REV CODE- 250/637: Performed by: INTERNAL MEDICINE

## 2017-09-08 PROCEDURE — 96366 THER/PROPH/DIAG IV INF ADDON: CPT

## 2017-09-08 PROCEDURE — 74011250636 HC RX REV CODE- 250/636: Performed by: INTERNAL MEDICINE

## 2017-09-08 PROCEDURE — 85025 COMPLETE CBC W/AUTO DIFF WBC: CPT | Performed by: INTERNAL MEDICINE

## 2017-09-08 PROCEDURE — 65270000029 HC RM PRIVATE

## 2017-09-08 PROCEDURE — 74011250637 HC RX REV CODE- 250/637: Performed by: NURSE PRACTITIONER

## 2017-09-08 PROCEDURE — 74011000258 HC RX REV CODE- 258: Performed by: INTERNAL MEDICINE

## 2017-09-08 PROCEDURE — 80053 COMPREHEN METABOLIC PANEL: CPT | Performed by: INTERNAL MEDICINE

## 2017-09-08 PROCEDURE — 96367 TX/PROPH/DG ADDL SEQ IV INF: CPT

## 2017-09-08 RX ORDER — PREGABALIN 50 MG/1
100 CAPSULE ORAL 3 TIMES DAILY
Status: DISCONTINUED | OUTPATIENT
Start: 2017-09-08 | End: 2017-09-12 | Stop reason: HOSPADM

## 2017-09-08 RX ORDER — HEPARIN 100 UNIT/ML
500 SYRINGE INTRAVENOUS AS NEEDED
Status: DISPENSED | OUTPATIENT
Start: 2017-09-08 | End: 2017-09-09

## 2017-09-08 RX ORDER — ACETAMINOPHEN 325 MG/1
650 TABLET ORAL
Status: DISCONTINUED | OUTPATIENT
Start: 2017-09-08 | End: 2017-09-12 | Stop reason: HOSPADM

## 2017-09-08 RX ORDER — ACYCLOVIR 800 MG/1
400 TABLET ORAL 2 TIMES DAILY
Status: DISCONTINUED | OUTPATIENT
Start: 2017-09-08 | End: 2017-09-12 | Stop reason: HOSPADM

## 2017-09-08 RX ORDER — FLUTICASONE PROPIONATE 50 MCG
2 SPRAY, SUSPENSION (ML) NASAL DAILY
Status: DISCONTINUED | OUTPATIENT
Start: 2017-09-09 | End: 2017-09-12 | Stop reason: HOSPADM

## 2017-09-08 RX ORDER — MIDODRINE HYDROCHLORIDE 5 MG/1
2.5 TABLET ORAL 2 TIMES DAILY WITH MEALS
Status: DISCONTINUED | OUTPATIENT
Start: 2017-09-09 | End: 2017-09-12 | Stop reason: HOSPADM

## 2017-09-08 RX ORDER — SODIUM CHLORIDE 9 MG/ML
25 INJECTION, SOLUTION INTRAVENOUS ONCE
Status: COMPLETED | OUTPATIENT
Start: 2017-09-08 | End: 2017-09-08

## 2017-09-08 RX ORDER — OXYCODONE HYDROCHLORIDE 5 MG/1
5 TABLET ORAL
Status: DISCONTINUED | OUTPATIENT
Start: 2017-09-08 | End: 2017-09-12 | Stop reason: HOSPADM

## 2017-09-08 RX ORDER — FLUCONAZOLE 100 MG/1
200 TABLET ORAL DAILY
Status: DISCONTINUED | OUTPATIENT
Start: 2017-09-09 | End: 2017-09-12 | Stop reason: HOSPADM

## 2017-09-08 RX ORDER — VANCOMYCIN HYDROCHLORIDE
1250 EVERY 12 HOURS
Status: DISCONTINUED | OUTPATIENT
Start: 2017-09-09 | End: 2017-09-10

## 2017-09-08 RX ORDER — ALLOPURINOL 300 MG/1
300 TABLET ORAL DAILY
Status: DISCONTINUED | OUTPATIENT
Start: 2017-09-09 | End: 2017-09-12 | Stop reason: HOSPADM

## 2017-09-08 RX ORDER — SODIUM CHLORIDE 9 MG/ML
75 INJECTION, SOLUTION INTRAVENOUS CONTINUOUS
Status: DISCONTINUED | OUTPATIENT
Start: 2017-09-08 | End: 2017-09-11

## 2017-09-08 RX ORDER — VANCOMYCIN 1.75 GRAM/500 ML IN 0.9 % SODIUM CHLORIDE INTRAVENOUS
1750 ONCE
Status: COMPLETED | OUTPATIENT
Start: 2017-09-08 | End: 2017-09-08

## 2017-09-08 RX ORDER — SODIUM CHLORIDE 0.9 % (FLUSH) 0.9 %
10-40 SYRINGE (ML) INJECTION AS NEEDED
Status: DISCONTINUED | OUTPATIENT
Start: 2017-09-08 | End: 2017-09-12 | Stop reason: HOSPADM

## 2017-09-08 RX ADMIN — Medication 10 ML: at 16:05

## 2017-09-08 RX ADMIN — SODIUM CHLORIDE 75 ML/HR: 900 INJECTION, SOLUTION INTRAVENOUS at 22:20

## 2017-09-08 RX ADMIN — VANCOMYCIN HYDROCHLORIDE 1750 MG: 10 INJECTION, POWDER, LYOPHILIZED, FOR SOLUTION INTRAVENOUS at 16:48

## 2017-09-08 RX ADMIN — Medication 10 ML: at 18:53

## 2017-09-08 RX ADMIN — SODIUM CHLORIDE, PRESERVATIVE FREE 500 UNITS: 5 INJECTION INTRAVENOUS at 18:54

## 2017-09-08 RX ADMIN — CEFTRIAXONE 2 G: 2 INJECTION, POWDER, FOR SOLUTION INTRAMUSCULAR; INTRAVENOUS at 16:08

## 2017-09-08 RX ADMIN — PREGABALIN 100 MG: 50 CAPSULE ORAL at 22:16

## 2017-09-08 RX ADMIN — ACETAMINOPHEN 650 MG: 325 TABLET ORAL at 22:16

## 2017-09-08 RX ADMIN — ACYCLOVIR 400 MG: 800 TABLET ORAL at 22:16

## 2017-09-08 RX ADMIN — SODIUM CHLORIDE 25 ML/HR: 900 INJECTION, SOLUTION INTRAVENOUS at 16:05

## 2017-09-08 NOTE — PROGRESS NOTES
Arrived to the [facility]. Antibiotics completed. Patient tolerated well. Any issues or concerns during appointment: Fever persists,decreased WBC . Seen in infusion and admission to room 518 by Dr Norah Yee  Discharged ambulatory.

## 2017-09-08 NOTE — IP AVS SNAPSHOT
Jamila Reyes 
 
 
 [address] [facility] 322 [facility] 
[phone] Patient: Felipe Larkin MRN: [id number] CQV:7/27/[id number] You are allergic to the following No active allergies Recent Documentation Weight Breastfeeding? BMI OB Status Smoking Status 116.6 kg No 47.02 kg/m2 Postmenopausal Never Smoker Unresulted Labs Order Current Status CULTURE, BLOOD Preliminary result CULTURE, BLOOD Preliminary result Emergency Contacts Name Discharge Info Relation Home Work Mobile [employer] CAREGIVER [3] Spouse [3] [phone] [place] IV,Molina DISCHARGE CAREGIVER [3] Son [22] [phone] [address] [facility] CAREGIVER [3] Other Relative [6] [phone] [facility] DISCHARGE CAREGIVER [3] Sister [23] [phone] About your hospitalization You were admitted on:  September 8, 2017 You last received care in the:  [address] You were discharged on:  September 12, 2017 Unit phone number:  [phone] Why you were hospitalized Your primary diagnosis was:  Not on File Your diagnoses also included:  Pancytopenia Due To Antineoplastic Chemotherapy (Hcc), Non Hodgkin's Lymphoma (Hcc), Neutropenic Fever (Hcc), Febrile Neutropenia (Hcc) Providers Seen During Your Hospitalizations Provider Role Specialty Primary office phone Milton Whalen MD Attending Provider Hematology and Oncology [phone] Your Primary Care Physician (PCP) Primary Care Physician Office Phone Office Fax Anabelle Kunz [phone] Follow-up Information Follow up With Details Comments Contact Info Jeffrey Duke MD   [phone]  Josue Sifuentes 
[phone] MD zack Johnson will call us back with deepak, [address] [employer] [address] [address] 
[phone] Your Appointments Tuesday October 24, 2017  8:00 AM EDT  
LAB with [facility] LAB RESOURCE [address] [facility] ([facility] [address]) 101 [facility] ([facility]) Rosina Enrique  
[phone] Current Discharge Medication List  
  
START taking these medications Dose & Instructions Dispensing Information Comments Morning Noon Evening Bedtime  
 amoxicillin-clavulanate 875-125 mg per tablet Commonly known as:  AUGMENTIN Dose:  1 Tab Take 1 Tab by mouth two (2) times a day for 7 days. Quantity:  14 Tab Refills:  0 CONTINUE these medications which have CHANGED Dose & Instructions Dispensing Information Comments Morning Noon Evening Bedtime  
 allopurinol 300 mg tablet Commonly known as:  Deepali Fortino What changed:  when to take this Dose:  300 mg Take 1 Tab by mouth two (2) times a day. Quantity:  90 Tab Refills:  2  
     
  
   
   
  
   
  
 levoFLOXacin 750 mg tablet Commonly known as:  Rebecca De La Fuente What changed:   
- medication strength 
- how much to take Dose:  750 mg Take 1 Tab by mouth daily for 7 days. Quantity:  7 Tab Refills:  0  
     
  
   
   
   
  
 nystatin powder Commonly known as:  MYCOSTATIN What changed:  Another medication with the same name was removed. Continue taking this medication, and follow the directions you see here. Apply  to affected area three (3) times daily. Quantity:  60 g Refills:  2 CONTINUE these medications which have NOT CHANGED Dose & Instructions Dispensing Information Comments Morning Noon Evening Bedtime  
 acyclovir 400 mg tablet Commonly known as:  ZOVIRAX Dose:  400 mg Take 1 Tab by mouth two (2) times a day. Quantity:  60 Tab Refills:  3 CLARITIN 10 mg tablet Generic drug:  loratadine  Dose:  10 mg  
 Take 10 mg by mouth. Refills:  0  
     
  
   
   
   
  
 fluconazole 200 mg tablet Commonly known as:  DIFLUCAN Dose:  200 mg Take 1 Tab by mouth daily. Quantity:  90 Tab Refills:  0  
     
  
   
   
   
  
 fluticasone 50 mcg/actuation nasal spray Commonly known as:  Kristen Paget Dose:  2 Spray 2 Sprays by Both Nostrils route daily. Quantity:  1 Bottle Refills:  1  
     
  
   
   
   
  
 hydrOXYzine HCl 25 mg tablet Commonly known as:  ATARAX Notes to Patient:  Take on as needed schedule Take  by mouth every six (6) hours as needed for Itching. Refills:  0  
     
   
   
   
  
 levothyroxine 125 mcg tablet Commonly known as:  SYNTHROID  
   
 TAKE 1 TABLET DAILY BEFORE BREAKFAST Quantity:  90 Tab Refills:  0  
     
  
   
   
   
  
 lidocaine-prilocaine topical cream  
Commonly known as:  EMLA Notes to Patient:  Take on as needed schedule Apply  to affected area as needed for Pain. Apply to port site 45-60 minutes prior to lab appt or infusion. Quantity:  30 g Refills:  0  
     
   
   
   
  
 magic mouthwash Susp Commonly known as:  Yesica Snyder Notes to Patient:  Take on as needed schedule Dose:  10 mL Take 10 mL by mouth every four (4) hours as needed. Quantity:  2 Bottle Refills:  2  
     
   
   
   
  
 magnesium oxide 400 mg tablet Commonly known as:  MAG-OX Dose:  400 mg Take 1 Tab by mouth two (2) times a day. Quantity:  60 Tab Refills:  1  
     
  
   
   
  
   
  
 omeprazole 20 mg capsule Commonly known as:  PRILOSEC  
   
 TAKE 1 CAPSULE DAILY Quantity:  90 Cap Refills:  2  
     
  
   
   
   
  
 oxyCODONE 5 mg capsule Commonly known as:  OXYIR Notes to Patient:  Take on as needed schedule Dose:  5-10 mg Take 1-2 Caps by mouth every four (4) hours as needed. Max Daily Amount: 60 mg.  
 Quantity:  180 Cap Refills:  0 pregabalin 100 mg capsule Commonly known as:  James Buxton Dose:  100 mg Take 1 Cap by mouth three (3) times daily. Max Daily Amount: 300 mg. Quantity:  90 Cap Refills:  3  
     
  
   
  
   
   
  
  
 promethazine 25 mg tablet Commonly known as:  PHENERGAN Notes to Patient:  Take on as needed schedule Dose:  25 mg Take 25 mg by mouth every six (6) hours as needed for Nausea. Unsure of dose Refills:  0 STOP taking these medications   
 benzonatate 100 mg capsule Commonly known as:  TESSALON PERLES  
   
  
 cefdinir 300 mg capsule Commonly known as:  OMNICEF  
   
  
 midodrine 2.5 mg tablet Commonly known as:  PROAMITINE  
   
  
 mupirocin calcium 2 % topical cream  
Commonly known as:  BACTROBAN  
   
  
 OTHER  
   
  
 predniSONE 10 mg dose pack Commonly known as:  STERAPRED DS Where to Get Your Medications These medications were sent to [facility], [address]  [address], [place] [address] Phone:  [phone]  
  amoxicillin-clavulanate 875-125 mg per tablet  
 levoFLOXacin 750 mg tablet Discharge Instructions DISCHARGE SUMMARY from Nurse The following personal items are in your possession at time of discharge: 
 
Dental Appliances: None Visual Aid: Glasses, With patient Home Medications: None Jewelry: None Clothing: Socks, Undergarments, Pants, At bedside, Pajamas Other Valuables: Eyeglasses, [employer], At bedside, Suitcase PATIENT INSTRUCTIONS: 
 
After general anesthesia or intravenous sedation, for 24 hours or while taking prescription Narcotics: · Limit your activities · Do not drive and operate hazardous machinery · Do not make important personal or business decisions · Do  not drink alcoholic beverages · If you have not urinated within 8 hours after discharge, please contact your surgeon on call. Report the following to your surgeon: 
· Excessive pain, swelling, redness or odor of or around the surgical area · Temperature over 100.5 · Nausea and vomiting lasting longer than 4 hours or if unable to take medications · Any signs of decreased circulation or nerve impairment to extremity: change in color, persistent  numbness, tingling, coldness or increase pain · Any questions What to do at Home: 
Recommended activity: Activity as tolerated, per MD instructions If you experience any of the following symptoms fever > 100.5, nausea, vomiting, pain, chest pain, shortness of breath please follow up with MD. 
 
 
*  Please give a list of your current medications to your Primary Care Provider. *  Please update this list whenever your medications are discontinued, doses are 
    changed, or new medications (including over-the-counter products) are added. *  Please carry medication information at all times in case of emergency situations. These are general instructions for a healthy lifestyle: No smoking/ No tobacco products/ Avoid exposure to second hand smoke Surgeon General's Warning:  Quitting smoking now greatly reduces serious risk to your health. Obesity, smoking, and sedentary lifestyle greatly increases your risk for illness A healthy diet, regular physical exercise & weight monitoring are important for maintaining a healthy lifestyle You may be retaining fluid if you have a history of heart failure or if you experience any of the following symptoms:  Weight gain of 3 pounds or more overnight or 5 pounds in a week, increased swelling in our hands or feet or shortness of breath while lying flat in bed. Please call your doctor as soon as you notice any of these symptoms; do not wait until your next office visit. Recognize signs and symptoms of STROKE: 
 
F-face looks uneven A-arms unable to move or move unevenly S-speech slurred or non-existent T-time-call 911 as soon as signs and symptoms begin-DO NOT go Back to bed or wait to see if you get better-TIME IS BRAIN. Warning Signs of HEART ATTACK Call 911 if you have these symptoms: 
? Chest discomfort. Most heart attacks involve discomfort in the center of the chest that lasts more than a few minutes, or that goes away and comes back. It can feel like uncomfortable pressure, squeezing, fullness, or pain. ? Discomfort in other areas of the upper body. Symptoms can include pain or discomfort in one or both arms, the back, neck, jaw, or stomach. ? Shortness of breath with or without chest discomfort. ? Other signs may include breaking out in a cold sweat, nausea, or lightheadedness. Don't wait more than five minutes to call [address]! Fast action can save your life. Calling 911 is almost always the fastest way to get lifesaving treatment. Emergency Medical Services staff can begin treatment when they arrive  up to an hour sooner than if someone gets to the hospital by car. The discharge information has been reviewed with the patient. The patient verbalized understanding. Discharge medications reviewed with the patient and appropriate educational materials and side effects teaching were provided. Neutropenia: Care Instructions Your Care Instructions Neutropenia (say \"kkw-eqvw-OQX-nee-uh\") means that your blood has too few neutrophils. These are white blood cells that help protect the body from infection. They do this by killing bacteria. Neutropenia can be caused by some types of infection. It also can be caused by immune system conditions such as HIV or lupus, a lack of vitamin I64 or folic acid, or an enlarged spleen. Some medicines can cause it too. It is most often caused by treatments for certain health problems, such as chemotherapy and radiation treatment for cancer. Mild neutropenia usually causes no symptoms.  But when it's severe, it increases the risk of infection of your skin and organs. That's because your body can't fight off germs as well as it should. Follow-up care is a key part of your treatment and safety. Be sure to make and go to all appointments, and call your doctor if you are having problems. It's also a good idea to know your test results and keep a list of the medicines you take. How can you care for yourself at home? · Take your medicines exactly as prescribed. Call your doctor if you have any problems with your medicine. · Eat a healthy, balanced diet. Eat foods with a lot of fiber. This helps to prevent constipation. Prevent infections · Take your temperature several times a day, as your doctor suggests. Keep a written record of your temperature readings. Fever is a common symptom of infection. And it may be the only symptom. · Use a soft toothbrush. Do not floss your teeth. Talk with your doctor about other steps to prevent infections in your mouth. · Wash your hands often with soap and water, especially before you eat and after you use the bathroom. · If you are a woman, use sanitary napkins (pads) instead of tampons. Do not douche. · Do not use rectal thermometers or suppositories. · Avoid tasks that might expose you to germs, such as disposing of pet feces or urine. · Avoid crowds of people and anyone who might have an infection or an illness such as a cold or the flu. You may need to avoid people who have recently had certain kinds of vaccinations. · Even small injuries can get infected. Take steps to prevent cuts, burns, and sunburns. · If you have severe neutropenia, your doctor may advise you to avoid fresh fruits, vegetables, and flowers. When should you call for help? Call 911 anytime you think you may need emergency care. For example, call if: 
· You have severe shortness of breath. · You passed out (lost consciousness). Call your doctor now or seek immediate medical care if: · You have signs of infection, such as: 
¨ Increased pain, swelling, warmth, or redness of your skin. ¨ Red streaks leading from a wound. ¨ Pus draining from a wound. ¨ A fever. Watch closely for changes in your health, and be sure to contact your doctor if: 
· You do not get better as expected. Where can you learn more? Go to http://rajat-rachel.info/. Enter E915 in the search box to learn more about \"Neutropenia: Care Instructions. \" Current as of: October 14, 2016 Content Version: 11.3 © 4269-8943 [employer]. Care instructions adapted under license by [employer] (which disclaims liability or warranty for this information). If you have questions about a medical condition or this instruction, always ask your healthcare professional. [address] any warranty or liability for your use of this information. Thrombocytopenia: Care Instructions Your Care Instructions Thrombocytopenia is a low number of platelets in the blood. Platelets are the cells that help blood clot. If you don't have enough of them, your blood cannot clot well. So it is harder to stop bleeding. You may have low platelets because your bone marrow does not make them. Or your body's defenses (immune system) may destroy them. Having an enlarged spleen can also reduce the number of platelets in your blood. This is because they can get trapped in the enlarged spleen. Some diseases or medicines may also cause low platelets. But platelets may go back to normal levels if the disease is treated or the medicine is stopped. You may not need treatment if your problem is mild. If you do need treatment, you may have platelets added to your blood. Or you may get medicine to stop the loss of platelets or help your body make them. Follow-up care is a key part of your treatment and safety.  Be sure to make and go to all appointments, and call your doctor if you are having problems. It's also a good idea to know your test results and keep a list of the medicines you take. How can you care for yourself at home? · Be safe with medicines. Take your medicines exactly as prescribed. Call your doctor if you think you are having a problem with your medicine. · Do not take aspirin or anti-inflammatory medicines unless your doctor says it is okay. Examples are ibuprofen (Advil, Motrin) and naproxen (Aleve). They may increase the risk of bleeding. · Avoid contact sports or activities that could cause you to fall. When should you call for help? Call 911 anytime you think you may need emergency care. For example, call if: 
· You have symptoms of a stroke. These may include: 
¨ Sudden numbness, tingling, weakness, or loss of movement in your face, arm, or leg, especially on only one side of your body. ¨ Sudden vision changes. ¨ Sudden trouble speaking. ¨ Sudden confusion or trouble understanding simple statements. ¨ Sudden problems with walking or balance. ¨ A sudden, severe headache that is different from past headaches. Call your doctor now or seek immediate medical care if: 
· You have any abnormal bleeding, such as: 
¨ Nosebleeds. ¨ Vaginal bleeding that is different (heavier, more frequent, at a different time of the month) than what you are used to. ¨ Bloody or black stools, or rectal bleeding. ¨ Bloody or pink urine. · You have severe pain that does not get better. Watch closely for changes in your health, and be sure to contact your doctor if: 
· You do not get better as expected. Where can you learn more? Go to http://rajat-rachel.info/. Enter Y127 in the search box to learn more about \"Thrombocytopenia: Care Instructions. \" Current as of: October 13, 2016 Content Version: 11.3 © 4170-6463 [employer].  Care instructions adapted under license by [employer] (which disclaims liability or warranty for this information). If you have questions about a medical condition or this instruction, always ask your healthcare professional. [address] any warranty or liability for your use of this information. Discharge Orders None [employer] Announcement We are excited to announce that we are making your provider's discharge notes available to you in [employer]ell. You will see these notes when they are completed and signed by the physician that discharged you from your recent hospital stay. If you have any questions or concerns about any information you see in Pl[employer]ell, please call the Health Information Department where you were seen or reach out to your Primary Care Provider for more information about your plan of care. Introducing [facility]! Dear Pablo Ribeiro: Thank you for requesting a Pl[employer]ell account. Our records indicate that you already have an active [employer]ell account. You can access your account anytime at [email or web address] Did you know that you can access your hospital and ER discharge instructions at any time in Plasticell? You can also review all of your test results from your hospital stay or ER visit. Additional Information If you have questions, please visit the Frequently Asked Questions section of the [employer]ell website at [email or web address] Remember, Plasticell is NOT to be used for urgent needs. For medical emergencies, dial 911. Now available from your iPhone and Android! General Information Please provide this summary of care documentation to your next provider. Patient Signature:  ____________________________________________________________ Date:  ____________________________________________________________  
  
Alisa Velasquez Provider Signature:  ____________________________________________________________ Date:  ____________________________________________________________

## 2017-09-08 NOTE — H&P
[facility]: Inpatient Hematology / Oncology History & Physical Note    Reason for Admission:  Febrile neutropenia  PCP Physician:  Wei Box MD    History of Present Illness:  63 y/o well known to [facility] for history of MZL, on BR therapy and completed 5 cycles. Seen in ED for fever on Wednesday, temp to 101 but ANC was 0.8 and she was feeling well otherwise, given Zosyn/vanc and discharged per her request.  Seen in infusion last two days, today with worsening WBC to 0.4 (ANC not measured). She remains febrile despite IV Rocephin and vanc. We recommended admission for febrile neutropenia. Review of Systems:  Constitutional: Positive for fever, malaise/fatigue. HENT: Negative. Eyes: Negative. Respiratory: Negative. Cardiovascular: Negative. Gastrointestinal: Negative. Genitourinary: Negative. Musculoskeletal: Negative. Skin: Negative. Neurological: Positive for jaw pain and leg pain. Endo/Heme/Allergies: Negative. Psychiatric/Behavioral: Negative. All other systems reviewed and are negative.        No Known Allergies  Past Medical History:   Diagnosis Date    Anemia     in high school, \"received gamma globulin twice a week for awhile\"    Arthritis     osteo-r knee    Basal cell carcinoma     Followed by Dermatology    Chronic pain     KNEEs    Hypertension 10/4/2011    medication    Morbid obesity ([address]     Murmur     \"had it my whole life\", did not appreciate murmur 9/12/2011 during assessment    Non Hodgkin's lymphoma ([address] 3/30/2017    Other ill-defined conditions     skin bumps-med as needed    Overactive bladder     Rheumatic fever     Possibly as a child    Shingles     Thromboembolus ([address] x2    LLE-calf-1990?-only took ASA-resolved in less than a wk-\"a little burned area-not examined\"    Thyroid disease     hypo-medication    Unspecified adverse effect of anesthesia     pt reports waking several times with knee surgery-spinal Past Surgical History:   Procedure Laterality Date    [facility]. CHOLECYSTECTOMY [id number]      HX CHOLECYSTECTOMY  1983    HX ORTHOPAEDIC  2012    right TKA    HX ORTHOPAEDIC  2013    left TKA. Dr. Kianna Garcia.  HX VASCULAR ACCESS      CA ANESTH,ACHILLES TENDON SURG  2/10/2011    LEFT. Dr. Susy Bartlett. Family History   Problem Relation Age of Onset    Post-op Nausea/Vomiting Other      X3    Breast Cancer Other 28     cousin    Kidney Disease Father      RENAL FAILURE    Other Son     Other Daughter     Other Maternal Grandmother      Vascular Disorder with leg amputation    Liver Disease Sister      non-alcoholic    Celiac Disease Sister     Malignant Hyperthermia Neg Hx     Pseudocholinesterase Deficiency Neg Hx     Delayed Awakening Neg Hx     Emergence Delirium Neg Hx     Post-op Cognitive Dysfunction Neg Hx      Social History     Social History    Marital status:      Spouse name: N/A    Number of children: N/A    Years of education: N/A     Occupational History    Not on file. Social History Main Topics    Smoking status: Never Smoker    Smokeless tobacco: Never Used    Alcohol use Yes      Comment: RARE, ONCE/TWICE A YEAR    Drug use: Yes     Special: Prescription    Sexual activity: Not on file     Other Topics Concern    Not on file     Social History Narrative    10/3/11:  PATIENT IS  TO Jakob Cheng. SHE IS DISABLED. Current Outpatient Prescriptions   Medication Sig Dispense Refill    levothyroxine (SYNTHROID) 125 mcg tablet TAKE 1 TABLET DAILY BEFORE BREAKFAST 90 Tab 0    magnesium oxide (MAG-OX) 400 mg tablet Take 1 Tab by mouth two (2) times a day. 60 Tab 1    magic mouthwash (ALEXSANDER) susp Take 10 mL by mouth every four (4) hours as needed. 2 Bottle 2    nystatin (MYCOSTATIN) powder Apply  to affected area three (3) times daily.  60 g 2    nystatin (MYCOSTATIN) 100,000 unit/mL suspension Take 5 mL by mouth four (4) times daily. swish and spit 470 mL 2    predniSONE (STERAPRED DS) 10 mg dose pack Take 1 Tab by mouth See Admin Instructions. See administration instruction per 10mg dose pack for 6 days 21 Tab 0    fluticasone (FLONASE) 50 mcg/actuation nasal spray 2 Sprays by Both Nostrils route daily. 1 Bottle 1    OTHER Kenalog in oragel to ulcers in mouth TID 15 g 0    levoFLOXacin (LEVAQUIN) 500 mg tablet Take 1 Tab by mouth daily. 7 Tab 0    cefdinir (OMNICEF) 300 mg capsule Take 1 Cap by mouth two (2) times a day. 20 Cap 0    fluconazole (DIFLUCAN) 200 mg tablet Take 1 Tab by mouth daily. 90 Tab 0    hydrOXYzine HCl (ATARAX) 25 mg tablet Take  by mouth every six (6) hours as needed for Itching.  mupirocin calcium (BACTROBAN) 2 % topical cream Apply  to affected area two (2) times a day. 30 g 1    acyclovir (ZOVIRAX) 400 mg tablet Take 1 Tab by mouth two (2) times a day. 60 Tab 3    allopurinol (ZYLOPRIM) 300 mg tablet Take 1 Tab by mouth two (2) times a day. (Patient taking differently: Take 300 mg by mouth daily.) 90 Tab 2    loratadine (CLARITIN) 10 mg tablet Take 10 mg by mouth.  pregabalin (LYRICA) 100 mg capsule Take 1 Cap by mouth three (3) times daily. Max Daily Amount: 300 mg. 90 Cap 3    oxyCODONE (OXYIR) 5 mg capsule Take 1-2 Caps by mouth every four (4) hours as needed. Max Daily Amount: 60 mg. 180 Cap 0    midodrine (PROAMITINE) 2.5 mg tablet Take 1 Tab by mouth two (2) times daily (with meals). 60 Tab 2    omeprazole (PRILOSEC) 20 mg capsule TAKE 1 CAPSULE DAILY 90 Cap 2    benzonatate (TESSALON PERLES) 100 mg capsule Take 1 Cap by mouth every six (6) hours as needed for Cough. 40 Cap 3    lidocaine-prilocaine (EMLA) topical cream Apply  to affected area as needed for Pain. Apply to port site 45-60 minutes prior to lab appt or infusion. 30 g 0    promethazine (PHENERGAN) 25 mg tablet Take 25 mg by mouth every six (6) hours as needed for Nausea.  Unsure of dose       Facility-Administered Medications Ordered in Other Encounters   Medication Dose Route Frequency Provider Last Rate Last Dose    vancomycin (VANCOCIN) 1750 mg in  ml infusion  1,750 mg IntraVENous ONCE Maria C Torres  mL/hr at 17 1648 1,750 mg at 17 1648    heparin (porcine) pf 500 Units  500 Units InterCATHeter PRN Maria C Torres MD        sodium chloride (NS) flush 10-40 mL  10-40 mL IntraVENous PRN Maria C Torres MD   10 mL at 17 1605       OBJECTIVE:  No data found. Temp (24hrs), Av °F (39.4 °C), Min:103 °F (39.4 °C), Max:103 °F (39.4 °C)         Physical Exam:  Constitutional: Well developed, well nourished female in no acute distress, sitting comfortably in the hospital bed. Appears fatigued but in good spirits. HEENT: Normocephalic and atraumatic. Oropharynx is clear, mucous membranes are moist.  Sclerae anicteric. Neck supple without JVD. No thyromegaly present. Lymph node   No palpable submandibular, cervical, supraclavicular lymph nodes. Skin Warm and dry. No bruising and no rash noted. No erythema. No pallor. Respiratory Lungs are clear to auscultation bilaterally without wheezes, rales or rhonchi, normal air exchange without accessory muscle use. CVS Normal rate, regular rhythm and normal S1 and S2. No murmurs, gallops, or rubs. Abdomen Soft, obese, nontender and nondistended, normoactive bowel sounds. No palpable mass. Neuro Grossly nonfocal with no obvious sensory or motor deficits. MSK Normal range of motion in general.  No edema and no tenderness. Psych Appropriate mood and affect.       Labs:    Recent Results (from the past 24 hour(s))   CBC WITH AUTOMATED DIFF    Collection Time: 17  3:40 PM   Result Value Ref Range    WBC 0.4 (LL) 4.3 - 11.1 K/uL    RBC 2.95 (L) 4.05 - 5.25 M/uL    HGB 8.6 (L) 11.7 - 15.4 g/dL    HCT 25.2 (L) 35.8 - 46.3 %    MCV 85.4 79.6 - 97.8 FL    MCH 29.2 26.1 - 32.9 PG    MCHC 34.1 31.4 - 35.0 g/dL    RDW 17.6 (H) 11.9 - 14.6 %    PLATELET 55 (L) 660 - 450 K/uL    MPV 10.6 (L) 10.8 - 14.1 FL    ABSOLUTE NRBC 0.00 0.0 - 0.2 K/uL    RBC COMMENTS SLIGHT  ANISOCYTOSIS + POIKILOCYTOSIS        WBC COMMENTS WBC TOO FEW TO DIFFERENTIATE      PLATELET COMMENTS DECREASED      DF AUTOMATED     METABOLIC PANEL, COMPREHENSIVE    Collection Time: 09/08/17  3:40 PM   Result Value Ref Range    Sodium 135 (L) 136 - 145 mmol/L    Potassium 4.2 3.5 - 5.1 mmol/L    Chloride 100 98 - 107 mmol/L    CO2 27 21 - 32 mmol/L    Anion gap 8 7 - 16 mmol/L    Glucose 112 (H) 65 - 100 mg/dL    BUN 17 6 - 23 MG/DL    Creatinine 0.77 0.6 - 1.0 MG/DL    GFR est AA >60 >60 ml/min/1.73m2    GFR est non-AA >60 >60 ml/min/1.73m2    Calcium 8.7 8.3 - 10.4 MG/DL    Bilirubin, total 0.5 0.2 - 1.1 MG/DL    ALT (SGPT) 57 12 - 65 U/L    AST (SGOT) 46 (H) 15 - 37 U/L    Alk. phosphatase 149 (H) 50 - 136 U/L    Protein, total 6.8 6.3 - 8.2 g/dL    Albumin 3.5 3.5 - 5.0 g/dL    Globulin 3.3 2.3 - 3.5 g/dL    A-G Ratio 1.1 (L) 1.2 - 3.5         Imaging:  No images are attached to the encounter. ASSESSMENT:    Active Problems:    Non Hodgkin's lymphoma ([facility] (3/30/2017)      Neutropenic fever ([facility] (7/19/2017)      Pancytopenia due to antineoplastic chemotherapy ([facility] (7/19/2017)        PLAN:  Febrile neutropenia  Failed outpatient management while ANC was still >500, now total  and fevers continue  Admit for IV abx, change to Zosyn/vanc  Recheck cultures tomorrow if fevers continue - bcx from 9/6 NGTD    NHL  S/p 5 cycles of BR  Plan was for 1 more cycle then restaging - she recently returned from [facility] after seeing Dr. Harlan Headley - some discussion about necessity of 6th cycle, possibly moving up PET/CT and making decision about further therapy based on presence/absence of residual disease. I agree that if she has residual disease biopsy of a representative lesion to r/o HT is appropriate. Will d/w Dr. Doretha Matthew.     Pancytopenia  Due to therapy  Monitor platelets/Hgb, Jammie SOPs for transfusions    Proph  SCDs due to platelets dropping and will likely be under 50k tomorrow  Likely 3-4 days inpatient            Sapna Pena MD  [facility]  [address] [place] [address]  Office : [phone]  Fax : [phone]

## 2017-09-08 NOTE — PROGRESS NOTES
TRANSFER - OUT REPORT:    [facility]  [facility] to [facility] for routine progression of care       Report consisted of patients Situation, Background, Assessment and   Recommendations(SBAR). Information from the following report(s) SBAR was reviewed with the receiving nurse. Opportunity for questions and clarification was provided.       Patient transported with:   Patients medications from home

## 2017-09-09 LAB
ALBUMIN SERPL-MCNC: 3 G/DL (ref 3.5–5)
ALBUMIN/GLOB SERPL: 1 {RATIO} (ref 1.2–3.5)
ALP SERPL-CCNC: 136 U/L (ref 50–136)
ALT SERPL-CCNC: 51 U/L (ref 12–65)
ANION GAP SERPL CALC-SCNC: 7 MMOL/L (ref 7–16)
AST SERPL-CCNC: 40 U/L (ref 15–37)
BACTERIA SPEC CULT: NORMAL
BILIRUB SERPL-MCNC: 0.5 MG/DL (ref 0.2–1.1)
BUN SERPL-MCNC: 16 MG/DL (ref 6–23)
CALCIUM SERPL-MCNC: 8.6 MG/DL (ref 8.3–10.4)
CHLORIDE SERPL-SCNC: 102 MMOL/L (ref 98–107)
CO2 SERPL-SCNC: 29 MMOL/L (ref 21–32)
CREAT SERPL-MCNC: 0.78 MG/DL (ref 0.6–1)
DIFFERENTIAL METHOD BLD: ABNORMAL
ERYTHROCYTE [DISTWIDTH] IN BLOOD BY AUTOMATED COUNT: 18.1 % (ref 11.9–14.6)
GLOBULIN SER CALC-MCNC: 3 G/DL (ref 2.3–3.5)
GLUCOSE SERPL-MCNC: 118 MG/DL (ref 65–100)
HCT VFR BLD AUTO: 22.5 % (ref 35.8–46.3)
HGB BLD-MCNC: 7.8 G/DL (ref 11.7–15.4)
MAGNESIUM SERPL-MCNC: 1.7 MG/DL (ref 1.8–2.4)
MCH RBC QN AUTO: 28.7 PG (ref 26.1–32.9)
MCHC RBC AUTO-ENTMCNC: 34.7 G/DL (ref 31.4–35)
MCV RBC AUTO: 82.7 FL (ref 79.6–97.8)
PLATELET # BLD AUTO: 47 K/UL (ref 150–450)
PLATELET COMMENTS,PCOM: ABNORMAL
PMV BLD AUTO: 9 FL (ref 10.8–14.1)
POTASSIUM SERPL-SCNC: 4.1 MMOL/L (ref 3.5–5.1)
PROT SERPL-MCNC: 6 G/DL (ref 6.3–8.2)
RBC # BLD AUTO: 2.72 M/UL (ref 4.05–5.25)
RBC MORPH BLD: ABNORMAL
SERVICE CMNT-IMP: NORMAL
SODIUM SERPL-SCNC: 138 MMOL/L (ref 136–145)
WBC # BLD AUTO: 0.4 K/UL (ref 4.3–11.1)
WBC MORPH BLD: ABNORMAL

## 2017-09-09 PROCEDURE — 77030032490 HC SLV COMPR SCD KNE COVD -B

## 2017-09-09 PROCEDURE — 36591 DRAW BLOOD OFF VENOUS DEVICE: CPT

## 2017-09-09 PROCEDURE — 83735 ASSAY OF MAGNESIUM: CPT | Performed by: INTERNAL MEDICINE

## 2017-09-09 PROCEDURE — 80053 COMPREHEN METABOLIC PANEL: CPT | Performed by: INTERNAL MEDICINE

## 2017-09-09 PROCEDURE — 74011250636 HC RX REV CODE- 250/636: Performed by: INTERNAL MEDICINE

## 2017-09-09 PROCEDURE — 74011250637 HC RX REV CODE- 250/637: Performed by: NURSE PRACTITIONER

## 2017-09-09 PROCEDURE — 65270000029 HC RM PRIVATE

## 2017-09-09 PROCEDURE — 74011250637 HC RX REV CODE- 250/637: Performed by: INTERNAL MEDICINE

## 2017-09-09 PROCEDURE — 87040 BLOOD CULTURE FOR BACTERIA: CPT | Performed by: INTERNAL MEDICINE

## 2017-09-09 PROCEDURE — 36415 COLL VENOUS BLD VENIPUNCTURE: CPT | Performed by: INTERNAL MEDICINE

## 2017-09-09 PROCEDURE — 74011000258 HC RX REV CODE- 258: Performed by: INTERNAL MEDICINE

## 2017-09-09 PROCEDURE — 85025 COMPLETE CBC W/AUTO DIFF WBC: CPT | Performed by: INTERNAL MEDICINE

## 2017-09-09 PROCEDURE — 99232 SBSQ HOSP IP/OBS MODERATE 35: CPT | Performed by: INTERNAL MEDICINE

## 2017-09-09 RX ORDER — DIPHENHYDRAMINE HCL 25 MG
25 CAPSULE ORAL
Status: DISCONTINUED | OUTPATIENT
Start: 2017-09-09 | End: 2017-09-10

## 2017-09-09 RX ORDER — LANOLIN ALCOHOL/MO/W.PET/CERES
400 CREAM (GRAM) TOPICAL 2 TIMES DAILY
Status: DISCONTINUED | OUTPATIENT
Start: 2017-09-09 | End: 2017-09-12 | Stop reason: HOSPADM

## 2017-09-09 RX ADMIN — Medication 400 MG: at 21:02

## 2017-09-09 RX ADMIN — LEVOTHYROXINE SODIUM 125 MCG: 75 TABLET ORAL at 08:27

## 2017-09-09 RX ADMIN — MIDODRINE HYDROCHLORIDE 2.5 MG: 5 TABLET ORAL at 08:26

## 2017-09-09 RX ADMIN — PIPERACILLIN SODIUM,TAZOBACTAM SODIUM 3.38 G: 3; .375 INJECTION, POWDER, FOR SOLUTION INTRAVENOUS at 08:26

## 2017-09-09 RX ADMIN — PREGABALIN 100 MG: 50 CAPSULE ORAL at 08:26

## 2017-09-09 RX ADMIN — PIPERACILLIN SODIUM,TAZOBACTAM SODIUM 3.38 G: 3; .375 INJECTION, POWDER, FOR SOLUTION INTRAVENOUS at 15:30

## 2017-09-09 RX ADMIN — PREGABALIN 100 MG: 50 CAPSULE ORAL at 21:07

## 2017-09-09 RX ADMIN — DIPHENHYDRAMINE HYDROCHLORIDE 25 MG: 25 CAPSULE ORAL at 21:02

## 2017-09-09 RX ADMIN — ALLOPURINOL 300 MG: 300 TABLET ORAL at 08:27

## 2017-09-09 RX ADMIN — ACETAMINOPHEN 650 MG: 325 TABLET ORAL at 19:57

## 2017-09-09 RX ADMIN — FLUCONAZOLE 200 MG: 100 TABLET ORAL at 08:27

## 2017-09-09 RX ADMIN — ACYCLOVIR 400 MG: 800 TABLET ORAL at 17:19

## 2017-09-09 RX ADMIN — PIPERACILLIN SODIUM,TAZOBACTAM SODIUM 3.38 G: 3; .375 INJECTION, POWDER, FOR SOLUTION INTRAVENOUS at 00:22

## 2017-09-09 RX ADMIN — VANCOMYCIN HYDROCHLORIDE 1250 MG: 10 INJECTION, POWDER, LYOPHILIZED, FOR SOLUTION INTRAVENOUS at 05:23

## 2017-09-09 RX ADMIN — ACETAMINOPHEN 650 MG: 325 TABLET ORAL at 08:29

## 2017-09-09 RX ADMIN — ACETAMINOPHEN 650 MG: 325 TABLET ORAL at 14:21

## 2017-09-09 RX ADMIN — ACYCLOVIR 400 MG: 800 TABLET ORAL at 08:27

## 2017-09-09 RX ADMIN — PREGABALIN 100 MG: 50 CAPSULE ORAL at 17:19

## 2017-09-09 RX ADMIN — VANCOMYCIN HYDROCHLORIDE 1250 MG: 10 INJECTION, POWDER, LYOPHILIZED, FOR SOLUTION INTRAVENOUS at 17:18

## 2017-09-09 RX ADMIN — ACETAMINOPHEN 650 MG: 325 TABLET ORAL at 04:47

## 2017-09-09 RX ADMIN — FLUTICASONE PROPIONATE 2 SPRAY: 50 SPRAY, METERED NASAL at 08:28

## 2017-09-09 RX ADMIN — MIDODRINE HYDROCHLORIDE 2.5 MG: 5 TABLET ORAL at 17:19

## 2017-09-09 NOTE — PROGRESS NOTES
[address] [facility]ology Oncology    Inpatient Hematology / Oncology Progress Note      Admission Date: 2017  8:16 PM  Reason for Admission/Hospital Course: febrile neutropenia  Febrile neutropenia (HCC)      24 Hour Events:  Remains febrile - tmax 101. 2. No infectious symptoms. Feels well overall. ROS:  Constitutional: Positive for fatigue, fever. Negative for weakness, malaise. CV: Negative for chest pain, palpitations, edema. Respiratory: Negative for dyspnea, cough, wheezing. GI: Negative for nausea, abdominal pain, diarrhea. 10 point review of systems is otherwise negative with the exception of the elements mentioned above in the HPI. No Known Allergies    OBJECTIVE:  Patient Vitals for the past 8 hrs:   BP Temp Pulse Resp SpO2   17 1421 - (!) 101.4 °F (38.6 °C) - - -   17 1247 124/64 100 °F (37.8 °C) 82 18 94 %   17 0833 125/67 (!) 101.2 °F (38.4 °C) 88 18 96 %     Temp (24hrs), Av.4 °F (38.6 °C), Min:100 °F (37.8 °C), Max:103 °F (39.4 °C)     0701 -  1900  In: -   Out: 800     Physical Exam:  Constitutional: Well developed, well nourished female in no acute distress, sitting comfortably in the bedside chair. HEENT: Normocephalic and atraumatic. Oropharynx is clear, mucous membranes are moist.  Extraocular muscles are intact. Sclerae anicteric. Neck supple. Skin Warm and dry. No bruising and no rash noted. No erythema. No pallor. Respiratory Lungs are clear to auscultation bilaterally without wheezes, rales or rhonchi, normal air exchange without accessory muscle use. CVS Normal rate, regular rhythm and normal S1 and S2. No murmurs, gallops, or rubs. Abdomen Soft, nontender and nondistended, normoactive bowel sounds. No palpable mass. No hepatosplenomegaly. Neuro Grossly nonfocal with no obvious sensory or motor deficits. MSK Normal range of motion in general.  No edema and no tenderness.    Psych Appropriate mood and affect.         Labs:    Recent Labs      09/09/17 0441 09/08/17   1540  09/07/17   1556  09/06/17   1707   WBC  0.4*  0.4*  0.7*  1.1*   RBC  2.72*  2.95*  3.10*  3.32*   HGB  7.8*  8.6*  9.1*  9.7*   HCT  22.5*  25.2*  26.7*  28.6*   MCV  82.7  85.4  86.1  86.1   MCH  28.7  29.2  29.4  29.2   MCHC  34.7  34.1  34.1  33.9   RDW  18.1*  17.6*  18.0*  18.4*   PLT  47*  55*  65*  69*   GRANS   --    --   65  75   LYMPH   --    --   13  8*   MONOS   --    --   18*  11   EOS   --    --   3  4   BASOS   --    --   1  1   IG   --    --    --   0.9   DF  MANUAL  AUTOMATED  AUTOMATED  AUTOMATED   ANEU   --    --   0.5*  0.8*   ABL   --    --   0.1*  0.1*   ABM   --    --   0.1  0.1   HOMERO   --    --   0.0  0.0   ABB   --    --   0.0  0.0   AIG   --    --    --   0.0      Recent Labs      09/09/17 0441 09/08/17   1540  09/07/17   1556   NA  138  135*  138   K  4.1  4.2  4.3   CL  102  100  102   CO2  29  27  28   AGAP  7  8  8   GLU  118*  112*  98   BUN  16  17  15   CREA  0.78  0.77  0.78   GFRAA  >60  >60  >60   GFRNA  >60  >60  >60   CA  8.6  8.7  9.0   SGOT  40*  46*  44*   AP  136  149*  145*   TP  6.0*  6.8  6.7   ALB  3.0*  3.5  3.5   GLOB  3.0  3.3  3.2   AGRAT  1.0*  1.1*  1.1*   MG  1.7*   --   2.0         Imaging: none       ASSESSMENT:    Problem List  Date Reviewed: 8/15/2017          Codes Class Noted    Febrile neutropenia ([facility] ICD-10-CM: D70.9, R50.81  ICD-9-CM: 288.00, 780.61  9/8/2017        Neutropenic fever (HCC) ICD-10-CM: D70.9, R50.81  ICD-9-CM: 288.00, 780.61  7/19/2017        Pancytopenia due to antineoplastic chemotherapy [facility]) (Chronic) ICD-10-CM: D61.810, T45.1X5A  ICD-9-CM: 284.11, E933.1  7/19/2017        Hypothyroid (Chronic) ICD-10-CM: E03.9  ICD-9-CM: 244.9  7/19/2017        Chronic hypotension (Chronic) ICD-10-CM: S46.45  ICD-9-CM: 458.1  7/19/2017        Snores ICD-10-CM: T98.51  ICD-9-CM: 786.09  5/1/2017        Superficial venous thrombosis of right arm ICD-10-CM: I82.611  ICD-9-CM: 453.81  5/1/2017        Hypotension ICD-10-CM: I95.9  ICD-9-CM: 458.9  4/28/2017        Acute renal failure (ARF) ([facility] ICD-10-CM: N17.9  ICD-9-CM: 584.9  4/28/2017        Sepsis ([facility] ICD-10-CM: A41.9  ICD-9-CM: 038.9, 995.91  4/23/2017        Hypomagnesemia ICD-10-CM: E83.42  ICD-9-CM: 275.2  4/23/2017        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) ICD-10-CM: C85.88  ICD-9-CM: 200.38  4/7/2017        Non Hodgkin's lymphoma ([facility] ICD-10-CM: C85.90  ICD-9-CM: 202.80  3/30/2017        Ascites ICD-10-CM: R18.8  ICD-9-CM: 789.59  3/30/2017        Lymphadenopathy, generalized ICD-10-CM: R59.1  ICD-9-CM: 785.6  3/30/2017        Osteoarthritis of left knee ICD-10-CM: M17.12  ICD-9-CM: 715.96  8/26/2013        Morbid obesity ([facility] ICD-10-CM: E66.01  ICD-9-CM: 278.01  10/4/2011        History of DVT of lower extremity ICD-10-CM: Z73.243  ICD-9-CM: V12.51  10/4/2011    Overview Signed 10/4/2011  1:44 AM by Myriam Bhat NP     X 2               Hypertension ICD-10-CM: I10  ICD-9-CM: 401.9  10/4/2011    Overview Signed 10/4/2011  1:44 AM by Myriam Bhat NP     PRE OP             Heart murmur ICD-10-CM: R01.1  ICD-9-CM: 785.2  10/4/2011        Osteoarthritis of right knee ICD-10-CM: M17.11  ICD-9-CM: 715.96  10/3/2011        S/P total knee replacement using cement ICD-10-CM: O20.352  ICD-9-CM: V43.65  10/3/2011                PLAN:  Febrile neutropenia  Failed outpatient management while ANC was still >500, now total  and fevers continue  Admit for IV abx, change to Zosyn/vanc  Recheck cultures tomorrow if fevers continue - bcx from 9/6 NGTD  09/09 . BC from 09/06 - NGTD but fevers continue. Repeat cultures this morning.  Continue Vancomycin and Zosyn.       NHL  S/p 5 cycles of BR  Plan was for 1 more cycle then restaging - she recently returned from [facility] after seeing Dr. Bae Matters - some discussion about necessity of 6th cycle, possibly moving up PET/CT and making decision about further therapy based on presence/absence of residual disease. I agree that if she has residual disease biopsy of a representative lesion to r/o HT is appropriate.   Will d/w Dr. Brito Standard.     Pancytopenia  Due to therapy  Monitor platelets/Hgb, Jammie SOPs for transfusions     Proph  SCDs due to platelets dropping and will likely be under 50k tomorrow  Likely 3-4 days inpatient          [facility]  [address] [place] [address]  Office : [phone]  Fax : [phone]

## 2017-09-09 NOTE — PROGRESS NOTES
END OF SHIFT NOTE:    Intake/Output  09/08 1901 - 09/09 0700  In: -   Out: 800 [Urine:800]   Voiding: YES  Catheter: NO  Drain:              Stool:  0 occurrences. Emesis:  0 occurrences. VITAL SIGNS  Patient Vitals for the past 12 hrs:   Temp Pulse Resp BP SpO2   09/09/17 0435 (!) 101.9 °F (38.8 °C) 96 18 118/63 98 %   09/08/17 2331 (!) 100.9 °F (38.3 °C) 93 18 114/52 97 %   09/08/17 2035 (!) 101.6 °F (38.7 °C) (!) 104 18 130/64 100 %       Pain Assessment  Pain 1  Pain Scale 1: Numeric (0 - 10) (09/09/17 0517)  Pain Intensity 1: 4 (09/09/17 0517)  Patient Stated Pain Goal: 0 (09/09/17 0019)  Pain Reassessment 1: Yes (09/09/17 0517)  Pain Location 1: Head (09/09/17 0447)  Pain Orientation 1: Mid (09/09/17 0019)  Pain Description 1: Aching (09/09/17 0447)  Pain Intervention(s) 1: Medication (see MAR) (09/09/17 0447)    Ambulating  Yes    Additional Information: uneventful night. Patient slept throughout the night. Patient still having fevers. Tylenol given and fever reduced to 100.9. Blood cultures drawn this morning. Shift report given to oncoming nurse at the bedside.     Parish Sloan RN

## 2017-09-09 NOTE — PROGRESS NOTES
Pharmacokinetic Consult to Pharmacist    John Correa is a 62 y.o. female being treated for neutropenic fever with vancomycin and Zosyn. Weight: 115.4 kg (254 lb 8 oz)  Lab Results   Component Value Date/Time    BUN 17 09/08/2017 03:40 PM    Creatinine 0.77 09/08/2017 03:40 PM    WBC 0.4 09/08/2017 03:40 PM    Procalcitonin 0.2 09/06/2017 04:59 PM    Lactic acid 1.3 04/25/2017 08:25 AM    Lactic Acid (POC) 0.9 09/06/2017 05:12 PM      Estimated Creatinine Clearance: 97 mL/min (based on Cr of 0.77). All Micro Results     None          Day 1 of vancomycin. Goal trough is 15-20. Vancomycin dose initiated at 1.25 g q12h. Expected trough ~18.6. Will continue to follow patient.       Thank you,  Colton Ormond, MathewD

## 2017-09-09 NOTE — PROGRESS NOTES
Dual Skin assessment completed with Too Gutierrez. Patient has one scar on her belly. Rest of skin unremarkable.

## 2017-09-09 NOTE — PROGRESS NOTES
Paged doctor on called due to patient needing tylenol for fever. CIARRA Ram returned page. Orders received.

## 2017-09-09 NOTE — PROGRESS NOTES
Dual Skin assessment completed with Gretchen Meier. One scar on her abdomen. Rest of skin unremarkable.

## 2017-09-10 LAB
ALBUMIN SERPL-MCNC: 2.7 G/DL (ref 3.5–5)
ALBUMIN/GLOB SERPL: 0.8 {RATIO} (ref 1.2–3.5)
ALP SERPL-CCNC: 141 U/L (ref 50–136)
ALT SERPL-CCNC: 49 U/L (ref 12–65)
ANION GAP SERPL CALC-SCNC: 9 MMOL/L (ref 7–16)
AST SERPL-CCNC: 32 U/L (ref 15–37)
BACTERIA SPEC CULT: NORMAL
BASOPHILS # BLD: 0 K/UL (ref 0–0.2)
BASOPHILS NFR BLD: 0 % (ref 0–2)
BILIRUB SERPL-MCNC: 0.5 MG/DL (ref 0.2–1.1)
BUN SERPL-MCNC: 12 MG/DL (ref 6–23)
CALCIUM SERPL-MCNC: 8.2 MG/DL (ref 8.3–10.4)
CHLORIDE SERPL-SCNC: 106 MMOL/L (ref 98–107)
CO2 SERPL-SCNC: 25 MMOL/L (ref 21–32)
CREAT SERPL-MCNC: 0.73 MG/DL (ref 0.6–1)
DIFFERENTIAL METHOD BLD: ABNORMAL
EOSINOPHIL # BLD: 0 K/UL (ref 0–0.8)
EOSINOPHIL NFR BLD: 4 % (ref 0.5–7.8)
ERYTHROCYTE [DISTWIDTH] IN BLOOD BY AUTOMATED COUNT: 17.5 % (ref 11.9–14.6)
GLOBULIN SER CALC-MCNC: 3.3 G/DL (ref 2.3–3.5)
GLUCOSE SERPL-MCNC: 106 MG/DL (ref 65–100)
HCT VFR BLD AUTO: 21.9 % (ref 35.8–46.3)
HGB BLD-MCNC: 7.6 G/DL (ref 11.7–15.4)
IMM GRANULOCYTES # BLD: 0 K/UL (ref 0–0.5)
IMM GRANULOCYTES NFR BLD: 0 % (ref 0–5)
LYMPHOCYTES # BLD: 0.2 K/UL (ref 0.5–4.6)
LYMPHOCYTES NFR BLD: 32 % (ref 13–44)
MAGNESIUM SERPL-MCNC: 1.7 MG/DL (ref 1.8–2.4)
MCH RBC QN AUTO: 28.8 PG (ref 26.1–32.9)
MCHC RBC AUTO-ENTMCNC: 34.7 G/DL (ref 31.4–35)
MCV RBC AUTO: 83 FL (ref 79.6–97.8)
MONOCYTES # BLD: 0.2 K/UL (ref 0.1–1.3)
MONOCYTES NFR BLD: 33 % (ref 4–12)
NEUTS SEG # BLD: 0.1 K/UL (ref 1.7–8.2)
NEUTS SEG NFR BLD: 31 % (ref 43–78)
PLATELET # BLD AUTO: 44 K/UL (ref 150–450)
PLATELET COMMENTS,PCOM: ABNORMAL
PMV BLD AUTO: 9.8 FL (ref 10.8–14.1)
POTASSIUM SERPL-SCNC: 3.7 MMOL/L (ref 3.5–5.1)
PROT SERPL-MCNC: 6 G/DL (ref 6.3–8.2)
RBC # BLD AUTO: 2.64 M/UL (ref 4.05–5.25)
RBC MORPH BLD: ABNORMAL
SERVICE CMNT-IMP: NORMAL
SODIUM SERPL-SCNC: 140 MMOL/L (ref 136–145)
VANCOMYCIN TROUGH SERPL-MCNC: 11.7 UG/ML (ref 5–20)
WBC # BLD AUTO: 0.5 K/UL (ref 4.3–11.1)
WBC MORPH BLD: ABNORMAL

## 2017-09-10 PROCEDURE — 80202 ASSAY OF VANCOMYCIN: CPT | Performed by: INTERNAL MEDICINE

## 2017-09-10 PROCEDURE — 83735 ASSAY OF MAGNESIUM: CPT | Performed by: INTERNAL MEDICINE

## 2017-09-10 PROCEDURE — 65270000029 HC RM PRIVATE

## 2017-09-10 PROCEDURE — 80053 COMPREHEN METABOLIC PANEL: CPT | Performed by: INTERNAL MEDICINE

## 2017-09-10 PROCEDURE — 99233 SBSQ HOSP IP/OBS HIGH 50: CPT | Performed by: INTERNAL MEDICINE

## 2017-09-10 PROCEDURE — 85025 COMPLETE CBC W/AUTO DIFF WBC: CPT | Performed by: INTERNAL MEDICINE

## 2017-09-10 PROCEDURE — 74011250637 HC RX REV CODE- 250/637: Performed by: INTERNAL MEDICINE

## 2017-09-10 PROCEDURE — 74011250636 HC RX REV CODE- 250/636: Performed by: INTERNAL MEDICINE

## 2017-09-10 PROCEDURE — 74011250637 HC RX REV CODE- 250/637: Performed by: NURSE PRACTITIONER

## 2017-09-10 PROCEDURE — 74011000258 HC RX REV CODE- 258: Performed by: INTERNAL MEDICINE

## 2017-09-10 PROCEDURE — 74011250636 HC RX REV CODE- 250/636: Performed by: NURSE PRACTITIONER

## 2017-09-10 RX ORDER — VANCOMYCIN 1.75 GRAM/500 ML IN 0.9 % SODIUM CHLORIDE INTRAVENOUS
1750 EVERY 12 HOURS
Status: DISCONTINUED | OUTPATIENT
Start: 2017-09-10 | End: 2017-09-12 | Stop reason: HOSPADM

## 2017-09-10 RX ORDER — MAGNESIUM SULFATE HEPTAHYDRATE 40 MG/ML
2 INJECTION, SOLUTION INTRAVENOUS ONCE
Status: COMPLETED | OUTPATIENT
Start: 2017-09-10 | End: 2017-09-10

## 2017-09-10 RX ADMIN — PIPERACILLIN SODIUM,TAZOBACTAM SODIUM 3.38 G: 3; .375 INJECTION, POWDER, FOR SOLUTION INTRAVENOUS at 07:43

## 2017-09-10 RX ADMIN — PIPERACILLIN SODIUM,TAZOBACTAM SODIUM 3.38 G: 3; .375 INJECTION, POWDER, FOR SOLUTION INTRAVENOUS at 23:46

## 2017-09-10 RX ADMIN — Medication 400 MG: at 07:41

## 2017-09-10 RX ADMIN — Medication 400 MG: at 17:14

## 2017-09-10 RX ADMIN — MIDODRINE HYDROCHLORIDE 2.5 MG: 5 TABLET ORAL at 17:14

## 2017-09-10 RX ADMIN — MIDODRINE HYDROCHLORIDE 2.5 MG: 5 TABLET ORAL at 07:41

## 2017-09-10 RX ADMIN — ACETAMINOPHEN: 500 TABLET, FILM COATED ORAL at 22:32

## 2017-09-10 RX ADMIN — SODIUM CHLORIDE 75 ML/HR: 900 INJECTION, SOLUTION INTRAVENOUS at 22:32

## 2017-09-10 RX ADMIN — MAGNESIUM SULFATE HEPTAHYDRATE 2 G: 40 INJECTION, SOLUTION INTRAVENOUS at 08:27

## 2017-09-10 RX ADMIN — ACYCLOVIR 400 MG: 800 TABLET ORAL at 17:14

## 2017-09-10 RX ADMIN — PREGABALIN 100 MG: 50 CAPSULE ORAL at 07:40

## 2017-09-10 RX ADMIN — ALLOPURINOL 300 MG: 300 TABLET ORAL at 07:41

## 2017-09-10 RX ADMIN — PIPERACILLIN SODIUM,TAZOBACTAM SODIUM 3.38 G: 3; .375 INJECTION, POWDER, FOR SOLUTION INTRAVENOUS at 00:17

## 2017-09-10 RX ADMIN — FLUCONAZOLE 200 MG: 100 TABLET ORAL at 08:00

## 2017-09-10 RX ADMIN — ACYCLOVIR 400 MG: 800 TABLET ORAL at 07:41

## 2017-09-10 RX ADMIN — LEVOTHYROXINE SODIUM 125 MCG: 75 TABLET ORAL at 06:19

## 2017-09-10 RX ADMIN — PREGABALIN 100 MG: 50 CAPSULE ORAL at 22:32

## 2017-09-10 RX ADMIN — VANCOMYCIN HYDROCHLORIDE 1250 MG: 10 INJECTION, POWDER, LYOPHILIZED, FOR SOLUTION INTRAVENOUS at 05:01

## 2017-09-10 RX ADMIN — VANCOMYCIN HYDROCHLORIDE 1750 MG: 10 INJECTION, POWDER, LYOPHILIZED, FOR SOLUTION INTRAVENOUS at 17:14

## 2017-09-10 RX ADMIN — PIPERACILLIN SODIUM,TAZOBACTAM SODIUM 3.38 G: 3; .375 INJECTION, POWDER, FOR SOLUTION INTRAVENOUS at 15:22

## 2017-09-10 RX ADMIN — PREGABALIN 100 MG: 50 CAPSULE ORAL at 15:21

## 2017-09-10 NOTE — PROGRESS NOTES
End of Shift Note: 7p ~7a    Max Temp 103.1 @ 2100, last 2 temps 99.6 & 99.9. HR & BP stable. Received Tylenol x 1. Pt extremely anxious to go home and spend time with dtr. Educated on neutropenic fever, IVAB & neutropenic precautions at home. Pt verbalizes understanding. Continuing with current POC.     Report to oncoming RN at [facility]

## 2017-09-10 NOTE — PROGRESS NOTES
Pharmacokinetic Consult to Pharmacist    Tray Chungbourne is a 62 y.o. female being treated for neutropenic fever with vancomycin and Zosyn. Pt noted to still be neutropenic today, but WBC starting to trend upwards. Febrile overnight, but no fever so far this morning. Weight: 115.1 kg (253 lb 11.2 oz)  Lab Results   Component Value Date/Time    BUN 12 09/10/2017 04:15 AM    Creatinine 0.73 09/10/2017 04:15 AM    WBC 0.5 09/10/2017 04:18 AM    Procalcitonin 0.2 09/06/2017 04:59 PM    Lactic acid 1.3 04/25/2017 08:25 AM    Lactic Acid (POC) 0.9 09/06/2017 05:12 PM      Estimated Creatinine Clearance: 102.1 mL/min (based on Cr of 0.73). All Micro Results     Procedure Component Value Units Date/Time    CULTURE, BLOOD [[id number]] Collected:  09/09/17 0800    Order Status:  Completed Specimen:  Blood from Blood Updated:  09/10/17 0622     Special Requests: --        RIGHT  Antecubital       Culture result: NO GROWTH AFTER 21 HOURS       CULTURE, BLOOD [[id number]] Collected:  09/09/17 0441    Order Status:  Completed Specimen:  Blood from Blood Updated:  09/10/17 0619        Lab Results   Component Value Date/Time    Vancomycin,trough 11.7 09/10/2017 04:15 AM    Vancomycin, random 28.0 04/28/2017 06:40 PM       Day 3 of vancomycin. Goal trough is 15-20. Vancomycin trough level resulted below goal at 11.7, so will increase dose to 1750 mg Q12H. Trough prior to the 4th dose. Will continue to follow patient.       Thank you,  Taylor Vega, PharmD  Clinical Pharmacist  [phone]

## 2017-09-10 NOTE — PROGRESS NOTES
[address] Hematology Oncology    Inpatient Hematology / Oncology Progress Note      Admission Date: 2017  8:16 PM  Reason for Admission/Hospital Course: febrile neutropenia  Febrile neutropenia (HCC)      24 Hour Events:  Afebrile since 2100 last night. No infectious symptoms. Feels well overall. ROS:  Constitutional: Positive for fatigue, fever. Negative for weakness, malaise. CV: Negative for chest pain, palpitations, edema. Respiratory: Negative for dyspnea, cough, wheezing. GI: Negative for nausea, abdominal pain, diarrhea. 10 point review of systems is otherwise negative with the exception of the elements mentioned above in the HPI. No Known Allergies    OBJECTIVE:  Patient Vitals for the past 8 hrs:   BP Temp Pulse Resp SpO2 Weight   09/10/17 0741 131/73 98.9 °F (37.2 °C) 86 20 95 % -   09/10/17 0436 118/42 99.9 °F (37.7 °C) 82 20 95 % 253 lb 11.2 oz (115.1 kg)     Temp (24hrs), Av.9 °F (38.3 °C), Min:98.9 °F (37.2 °C), Max:103.1 °F (39.5 °C)         Physical Exam:  Constitutional: Well developed, well nourished female in no acute distress, sitting comfortably in the bedside chair. HEENT: Normocephalic and atraumatic. Oropharynx is clear, mucous membranes are moist.  Extraocular muscles are intact. Sclerae anicteric. Neck supple. Skin Warm and dry. No bruising and no rash noted. No erythema. No pallor. Respiratory Lungs are clear to auscultation bilaterally without wheezes, rales or rhonchi, normal air exchange without accessory muscle use. CVS Normal rate, regular rhythm and normal S1 and S2. No murmurs, gallops, or rubs. Abdomen Soft, nontender and nondistended, normoactive bowel sounds. No palpable mass. No hepatosplenomegaly. Neuro Grossly nonfocal with no obvious sensory or motor deficits. MSK Normal range of motion in general.  No edema and no tenderness. Psych Appropriate mood and affect.         Labs:      Recent Labs      09/10/17 7783  09/09/17 0441 09/08/17   1540  09/07/17   1556   WBC  0.5*  0.4*  0.4*  0.7*   RBC  2.64*  2.72*  2.95*  3.10*   HGB  7.6*  7.8*  8.6*  9.1*   HCT  21.9*  22.5*  25.2*  26.7*   MCV  83.0  82.7  85.4  86.1   MCH  28.8  28.7  29.2  29.4   MCHC  34.7  34.7  34.1  34.1   RDW  17.5*  18.1*  17.6*  18.0*   PLT  44*  47*  55*  65*   GRANS  31*   --    --   65   LYMPH  32   --    --   13   MONOS  33*   --    --   18*   EOS  4   --    --   3   BASOS  0   --    --   1   IG  0   --    --    --    DF  AUTOMATED  MANUAL  AUTOMATED  AUTOMATED   ANEU  0.1*   --    --   0.5*   ABL  0.2*   --    --   0.1*   ABM  0.2   --    --   0.1   HOMERO  0.0   --    --   0.0   ABB  0.0   --    --   0.0   AIG  0.0   --    --    --         Recent Labs      09/10/17   0415  09/09/17 0441 09/08/17   1540  09/07/17   1556   NA  140  138  135*  138   K  3.7  4.1  4.2  4.3   CL  106  102  100  102   CO2  25  29  27  28   AGAP  9  7  8  8   GLU  106*  118*  112*  98   BUN  12  16  17  15   CREA  0.73  0.78  0.77  0.78   GFRAA  >60  >60  >60  >60   GFRNA  >60  >60  >60  >60   CA  8.2*  8.6  8.7  9.0   SGOT  32  40*  46*  44*   AP  141*  136  149*  145*   TP  6.0*  6.0*  6.8  6.7   ALB  2.7*  3.0*  3.5  3.5   GLOB  3.3  3.0  3.3  3.2   AGRAT  0.8*  1.0*  1.1*  1.1*   MG  1.7*  1.7*   --   2.0         Imaging: none       ASSESSMENT:    Problem List  Date Reviewed: 8/15/2017          Codes Class Noted    Feb[facility]rop[facility] ([facility] ICD-10-CM: D70.9, R50.81  ICD-9-CM: 288.00, 780.61  9/8/2017        Neutropenic fever (HCC) ICD-10-CM: D70.9, R50.81  ICD-9-CM: 288.00, 780.61  7/19/2017        Pancytopenia due to antineoplastic chemotherapy [facility]) (Chronic) ICD-10-CM: D61.810, T45.1X5A  ICD-9-CM: 284.11, E933.1  7/19/2017        Hypothyroid (Chronic) ICD-10-CM: E03.9  ICD-9-CM: 244.9  7/19/2017        Chronic hypotension (Chronic) ICD-10-CM: N26.12  ICD-9-CM: 458.1  7/19/2017        Snores ICD-10-CM: G20.04  ICD-9-CM: 786.09  5/1/2017        Superficial venous thrombosis of right arm ICD-10-CM: I82.611  ICD-9-CM: 453.81  5/1/2017        Hypotension ICD-10-CM: I95.9  ICD-9-CM: 458.9  4/28/2017        Acute renal failure (ARF) (HCC) ICD-10-CM: N17.9  ICD-9-CM: 584.9  4/28/2017        Sepsis (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.) ICD-10-CM: C85.90  ICD-9-CM: 202.80  3/30/2017        Ascites ICD-10-CM: R18.8  ICD-9-CM: 789.59  3/30/2017        Lymphadenopathy, generalized ICD-10-CM: R59.1  ICD-9-CM: 785.6  3/30/2017        Osteoarthritis of left knee ICD-10-CM: M17.12  ICD-9-CM: 715.96  8/26/2013        Morbid obesity ([facility] ICD-10-CM: E66.01  ICD-9-CM: 278.01  10/4/2011        History of DVT of lower extremity ICD-10-CM: E72.577  ICD-9-CM: V12.51  10/4/2011    Overview Signed 10/4/2011  1:44 AM by Lois Ho NP     X 2               Hypertension ICD-10-CM: I10  ICD-9-CM: 401.9  10/4/2011    Overview Signed 10/4/2011  1:44 AM by Lois Ho NP     PRE OP             Heart murmur ICD-10-CM: R01.1  ICD-9-CM: 785.2  10/4/2011        Osteoarthritis of right knee ICD-10-CM: M17.11  ICD-9-CM: 715.96  10/3/2011        S/P total knee replacement using cement ICD-10-CM: G52.777  ICD-9-CM: V43.65  10/3/2011                PLAN:  Febrile neutropenia  Failed outpatient management while ANC was still >500, now total  and fevers continue  Admit for IV abx, change to Zosyn/vanc  Recheck cultures tomorrow if fevers continue - bcx from 9/6 NGTD  09/09 . BC from 09/06 - NGTD but fevers continue. Repeat cultures this morning. Continue Vancomycin and Zosyn. 09/10 . . BC from 09/06 and 09/09 - NGTD. Afebrile for over 12 hours now.  Continue Vancomycin and Zosyn.      NHL  S/p 5 cycles of BR  Plan was for 1 more cycle then restaging - she recently returned from [facility] after seeing Dr. Harlan Headley - some discussion about necessity of 6th cycle, possibly moving up PET/CT and making decision about further therapy based on presence/absence of residual disease. I agree that if she has residual disease biopsy of a representative lesion to r/o HT is appropriate. Will d/w Dr. Doretha Matthew.     Pancytopenia  Due to therapy  Monitor platelets/Hgb, Jammie SOPs for transfusions     Proph  SCDs due to platelets dropping and will likely be under 50k tomorrow    Disposition: Continue antibiotics and supportive care. Will discharge once afebrile over 24 hours, neutrophils recovered, and blood cultures remain negative.            Naresh Colorado NP   [address] Hematology Oncology  [address] [place] [address]  Office : [phone]  Fax : [phone]

## 2017-09-10 NOTE — PROGRESS NOTES
0655-Bedside report received from Michoacano Lawson., RN. Resting in bed. No needs voiced. No s/s of acute distress. 1149-MD stated okay for patient to leave floor if unhooked from fluids. Pts fluids stopped for now and is off floor accompanied by daughter.

## 2017-09-11 ENCOUNTER — HOSPITAL ENCOUNTER (OUTPATIENT)
Dept: PHYSICAL THERAPY | Age: 57
Discharge: HOME | End: 2017-09-11

## 2017-09-11 LAB
ALBUMIN SERPL-MCNC: 2.7 G/DL (ref 3.5–5)
ALBUMIN/GLOB SERPL: 0.8 {RATIO} (ref 1.2–3.5)
ALP SERPL-CCNC: 153 U/L (ref 50–136)
ALT SERPL-CCNC: 54 U/L (ref 12–65)
ANION GAP SERPL CALC-SCNC: 11 MMOL/L (ref 7–16)
AST SERPL-CCNC: 28 U/L (ref 15–37)
BASOPHILS # BLD: 0 K/UL (ref 0–0.2)
BASOPHILS NFR BLD: 0 % (ref 0–2)
BILIRUB SERPL-MCNC: 0.4 MG/DL (ref 0.2–1.1)
BUN SERPL-MCNC: 13 MG/DL (ref 6–23)
CALCIUM SERPL-MCNC: 8.7 MG/DL (ref 8.3–10.4)
CHLORIDE SERPL-SCNC: 106 MMOL/L (ref 98–107)
CO2 SERPL-SCNC: 25 MMOL/L (ref 21–32)
CREAT SERPL-MCNC: 0.75 MG/DL (ref 0.6–1)
DIFFERENTIAL METHOD BLD: ABNORMAL
EOSINOPHIL # BLD: 0 K/UL (ref 0–0.8)
EOSINOPHIL NFR BLD: 6 % (ref 0.5–7.8)
ERYTHROCYTE [DISTWIDTH] IN BLOOD BY AUTOMATED COUNT: 17.6 % (ref 11.9–14.6)
GLOBULIN SER CALC-MCNC: 3.5 G/DL (ref 2.3–3.5)
GLUCOSE SERPL-MCNC: 107 MG/DL (ref 65–100)
HCT VFR BLD AUTO: 22.6 % (ref 35.8–46.3)
HGB BLD-MCNC: 7.8 G/DL (ref 11.7–15.4)
IMM GRANULOCYTES # BLD: 0 K/UL (ref 0–0.5)
IMM GRANULOCYTES NFR BLD: 0 % (ref 0–5)
LYMPHOCYTES # BLD: 0.2 K/UL (ref 0.5–4.6)
LYMPHOCYTES NFR BLD: 27 % (ref 13–44)
MAGNESIUM SERPL-MCNC: 2 MG/DL (ref 1.8–2.4)
MCH RBC QN AUTO: 28.7 PG (ref 26.1–32.9)
MCHC RBC AUTO-ENTMCNC: 34.5 G/DL (ref 31.4–35)
MCV RBC AUTO: 83.1 FL (ref 79.6–97.8)
MONOCYTES # BLD: 0.2 K/UL (ref 0.1–1.3)
MONOCYTES NFR BLD: 28 % (ref 4–12)
NEUTS SEG # BLD: 0.3 K/UL (ref 1.7–8.2)
NEUTS SEG NFR BLD: 39 % (ref 43–78)
PLATELET # BLD AUTO: 53 K/UL (ref 150–450)
PMV BLD AUTO: 10.2 FL (ref 10.8–14.1)
POTASSIUM SERPL-SCNC: 4.1 MMOL/L (ref 3.5–5.1)
PROT SERPL-MCNC: 6.2 G/DL (ref 6.3–8.2)
RBC # BLD AUTO: 2.72 M/UL (ref 4.05–5.25)
SODIUM SERPL-SCNC: 142 MMOL/L (ref 136–145)
WBC # BLD AUTO: 0.7 K/UL (ref 4.3–11.1)

## 2017-09-11 PROCEDURE — 36591 DRAW BLOOD OFF VENOUS DEVICE: CPT

## 2017-09-11 PROCEDURE — 99233 SBSQ HOSP IP/OBS HIGH 50: CPT | Performed by: INTERNAL MEDICINE

## 2017-09-11 PROCEDURE — 85025 COMPLETE CBC W/AUTO DIFF WBC: CPT | Performed by: INTERNAL MEDICINE

## 2017-09-11 PROCEDURE — 83735 ASSAY OF MAGNESIUM: CPT | Performed by: INTERNAL MEDICINE

## 2017-09-11 PROCEDURE — 74011000258 HC RX REV CODE- 258: Performed by: INTERNAL MEDICINE

## 2017-09-11 PROCEDURE — 80053 COMPREHEN METABOLIC PANEL: CPT | Performed by: INTERNAL MEDICINE

## 2017-09-11 PROCEDURE — 74011250637 HC RX REV CODE- 250/637: Performed by: INTERNAL MEDICINE

## 2017-09-11 PROCEDURE — 74011250637 HC RX REV CODE- 250/637: Performed by: NURSE PRACTITIONER

## 2017-09-11 PROCEDURE — 74011250636 HC RX REV CODE- 250/636: Performed by: INTERNAL MEDICINE

## 2017-09-11 PROCEDURE — 65270000029 HC RM PRIVATE

## 2017-09-11 RX ADMIN — VANCOMYCIN HYDROCHLORIDE 1750 MG: 10 INJECTION, POWDER, LYOPHILIZED, FOR SOLUTION INTRAVENOUS at 04:23

## 2017-09-11 RX ADMIN — ALLOPURINOL 300 MG: 300 TABLET ORAL at 09:09

## 2017-09-11 RX ADMIN — MIDODRINE HYDROCHLORIDE 2.5 MG: 5 TABLET ORAL at 07:48

## 2017-09-11 RX ADMIN — ACYCLOVIR 400 MG: 800 TABLET ORAL at 09:10

## 2017-09-11 RX ADMIN — PREGABALIN 100 MG: 50 CAPSULE ORAL at 22:30

## 2017-09-11 RX ADMIN — PIPERACILLIN SODIUM,TAZOBACTAM SODIUM 4.5 G: 4; .5 INJECTION, POWDER, FOR SOLUTION INTRAVENOUS at 16:00

## 2017-09-11 RX ADMIN — VANCOMYCIN HYDROCHLORIDE 1750 MG: 10 INJECTION, POWDER, LYOPHILIZED, FOR SOLUTION INTRAVENOUS at 17:05

## 2017-09-11 RX ADMIN — LEVOTHYROXINE SODIUM 125 MCG: 75 TABLET ORAL at 06:31

## 2017-09-11 RX ADMIN — ACETAMINOPHEN 650 MG: 325 TABLET ORAL at 04:35

## 2017-09-11 RX ADMIN — PIPERACILLIN SODIUM,TAZOBACTAM SODIUM 3.38 G: 3; .375 INJECTION, POWDER, FOR SOLUTION INTRAVENOUS at 07:48

## 2017-09-11 RX ADMIN — MIDODRINE HYDROCHLORIDE 2.5 MG: 5 TABLET ORAL at 17:04

## 2017-09-11 RX ADMIN — FLUCONAZOLE 200 MG: 100 TABLET ORAL at 09:09

## 2017-09-11 RX ADMIN — PREGABALIN 100 MG: 50 CAPSULE ORAL at 15:39

## 2017-09-11 RX ADMIN — Medication 400 MG: at 17:04

## 2017-09-11 RX ADMIN — PREGABALIN 100 MG: 50 CAPSULE ORAL at 09:09

## 2017-09-11 RX ADMIN — Medication 400 MG: at 09:09

## 2017-09-11 RX ADMIN — ACYCLOVIR 400 MG: 800 TABLET ORAL at 17:04

## 2017-09-11 NOTE — PROGRESS NOTES
END OF SHIFT NOTE:    Intake/Output      Voiding: YES  Catheter: NO  Drain:              Stool:  0 occurrences. Emesis:  0 occurrences. VITAL SIGNS  Patient Vitals for the past 12 hrs:   Temp Pulse Resp BP SpO2   09/11/17 1550 98.9 °F (37.2 °C) 86 19 143/74 99 %   09/11/17 1127 99.5 °F (37.5 °C) 84 20 143/62 98 %   09/11/17 0724 99.3 °F (37.4 °C) 80 20 141/51 98 %       Pain Assessment  Pain 1  Pain Scale 1: Numeric (0 - 10) (09/11/17 0745)  Pain Intensity 1: 0 (09/11/17 0745)  Patient Stated Pain Goal: 0 (09/11/17 0425)  Pain Reassessment 1: Yes (09/09/17 0517)  Pain Location 1: Head (09/09/17 0833)  Pain Orientation 1: Mid (09/09/17 0019)  Pain Description 1: Aching (09/09/17 2880)  Pain Intervention(s) 1: Medication (see MAR) (Tylenol) (09/09/17 8293)    Ambulating  Yes    Additional Information: afebrile whole shift. Really wants to go home tomorrow      Shift report given to oncoming nurse at the bedside.     Nisha Alberto RN

## 2017-09-11 NOTE — PROGRESS NOTES
[address] Hematology Oncology    Inpatient Hematology / Oncology Progress Note      Admission Date: 2017  8:16 PM  Reason for Admission/Hospital Course: febrile neutropenia  Febrile neutropenia (HCC)      24 Hour Events:  Fever last pm 100.8  No infectious symptoms. Feels well overall. ANC 0.3      ROS:  Constitutional: Positive for fatigue, fever. Negative for weakness, malaise. CV: Negative for chest pain, palpitations, edema. Respiratory: Negative for dyspnea, cough, wheezing. GI: Negative for nausea, abdominal pain, diarrhea. 10 point review of systems is otherwise negative with the exception of the elements mentioned above in the HPI. No Known Allergies    OBJECTIVE:  Patient Vitals for the past 8 hrs:   BP Temp Pulse Resp SpO2   17 1127 143/62 99.5 °F (37.5 °C) 84 20 98 %   17 0724 141/51 99.3 °F (37.4 °C) 80 20 98 %   17 0425 118/61 (!) 100.8 °F (38.2 °C) 89 20 100 %     Temp (24hrs), Av.8 °F (37.7 °C), Min:98.9 °F (37.2 °C), Max:100.9 °F (38.3 °C)     07 -  1900  In: 665 [P.O.:240; I.V.:612]  Out: -     Physical Exam:  Constitutional: Well developed, well nourished female in no acute distress, sitting comfortably in the bedside chair. HEENT: Normocephalic and atraumatic. Oropharynx is clear, mucous membranes are moist.  Extraocular muscles are intact. Sclerae anicteric. Skin Warm and dry. No bruising and no rash noted. No erythema. No pallor. Respiratory Lungs are clear to auscultation bilaterally without wheezes, rales or rhonchi, normal air exchange without accessory muscle use. CVS Normal rate, regular rhythm and normal S1 and S2. No murmurs, gallops, or rubs. Abdomen Soft, nontender and nondistended, normoactive bowel sounds. Neuro Grossly nonfocal with no obvious sensory or motor deficits. MSK Normal range of motion in general.  No edema and no tenderness. Psych Appropriate mood and affect.         Labs: Recent Labs      09/11/17   0424  09/10/17   0418  09/09/17   0441   WBC  0.7*  0.5*  0.4*   RBC  2.72*  2.64*  2.72*   HGB  7.8*  7.6*  7.8*   HCT  22.6*  21.9*  22.5*   MCV  83.1  83.0  82.7   MCH  28.7  28.8  28.7   MCHC  34.5  34.7  34.7   RDW  17.6*  17.5*  18.1*   PLT  53*  44*  47*   GRANS  39*  31*   --    LYMPH  27  32   --    MONOS  28*  33*   --    EOS  6  4   --    BASOS  0  0   --    IG  0.0  0   --    DF  AUTOMATED  AUTOMATED  MANUAL   ANEU  0.3*  0.1*   --    ABL  0.2*  0.2*   --    ABM  0.2  0.2   --    HOMERO  0.0  0.0   --    ABB  0.0  0.0   --    AIG  0.0  0.0   --         Recent Labs      09/11/17   0424  09/10/17   0415  09/09/17   0441   NA  142  140  138   K  4.1  3.7  4.1   CL  106  106  102   CO2  25  25  29   AGAP  11  9  7   GLU  107*  106*  118*   BUN  13  12  16   CREA  0.75  0.73  0.78   GFRAA  >60  >60  >60   GFRNA  >60  >60  >60   CA  8.7  8.2*  8.6   SGOT  28  32  40*   AP  153*  141*  136   TP  6.2*  6.0*  6.0*   ALB  2.7*  2.7*  3.0*   GLOB  3.5  3.3  3.0   AGRAT  0.8*  0.8*  1.0*   MG  2.0  1.7*  1.7*         Imaging: none       ASSESSMENT:    Problem List  Date Reviewed: 8/15/2017          Codes Class Noted    Febrile neutropenia ([facility] ICD-10-CM: D70.9, R50.81  ICD-9-CM: 288.00, 780.61  9/8/2017        Neutropenic fever ([facility] ICD-10-CM: D70.9, R50.81  ICD-9-CM: 288.00, 780.61  7/19/2017        Pancytopenia due to antineoplastic chemotherapy [facility]) (Chronic) ICD-10-CM: D61.810, T45.1X5A  ICD-9-CM: 284.11, E933.1  7/19/2017        Hypothyroid (Chronic) ICD-10-CM: E03.9  ICD-9-CM: 244.9  7/19/2017        Chronic hypotension (Chronic) ICD-10-CM: Y95.57  ICD-9-CM: 458.1  7/19/2017        Snores ICD-10-CM: U90.49  ICD-9-CM: 786.09  5/1/2017        Superficial venous thrombosis of right arm ICD-10-CM: I82.611  ICD-9-CM: 453.81  5/1/2017        Hypotension ICD-10-CM: I95.9  ICD-9-CM: 458.9  4/28/2017        Acute renal failure (ARF) ([facility] ICD-10-CM: N17.9  ICD-9-CM: 584.9  4/28/2017 Sepsis ([facility] ICD-10-CM: A41.9  ICD-9-CM: 038.9, 995.91  4/23/2017        Hypomagnesemia ICD-10-CM: E83.42  ICD-9-CM: 275.2  4/23/2017        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 [facility]) ICD-10-CM: C85.88  ICD-9-CM: 200.38  4/7/2017        Non Hodgkin's lymphoma ([facility] ICD-10-CM: C85.90  ICD-9-CM: 202.80  3/30/2017        Ascites ICD-10-CM: R18.8  ICD-9-CM: 789.59  3/30/2017        Lymphadenopathy, generalized ICD-10-CM: R59.1  ICD-9-CM: 785.6  3/30/2017        Osteoarthritis of left knee ICD-10-CM: M17.12  ICD-9-CM: 715.96  8/26/2013        Morbid obesity ([facility] ICD-10-CM: E66.01  ICD-9-CM: 278.01  10/4/2011        History of DVT of lower extremity ICD-10-CM: F47.880  ICD-9-CM: V12.51  10/4/2011    Overview Signed 10/4/2011  1:44 AM by Ash [place] NP     X 2               Hypertension ICD-10-CM: I10  ICD-9-CM: 401.9  10/4/2011    Overview Signed 10/4/2011  1:44 AM by Ash [place] NP     PRE OP             Heart murmur ICD-10-CM: R01.1  ICD-9-CM: 785.2  10/4/2011        Osteoarthritis of right knee ICD-10-CM: M17.11  ICD-9-CM: 715.96  10/3/2011        S/P total knee replacement using cement ICD-10-CM: B74.248  ICD-9-CM: V43.65  10/3/2011                PLAN:  Febrile neutropenia  Failed outpatient management while ANC was still >500, now total  and fevers continue  Admit for IV abx, change to Zosyn/vanc  Recheck cultures tomorrow if fevers continue - bcx from 9/6 NGTD  09/09 . BC from 09/06 - NGTD but fevers continue. Repeat cultures this morning. Continue Vancomycin and Zosyn. 09/10 . . BC from 09/06 and 09/09 - NGTD. Afebrile for over 12 hours now. Continue Vancomycin and Zosyn. 09/11 ANC 0.3.  Fever last pm 100.8.      NHL  S/p 5 cycles of BR  Plan was for 1 more cycle then restaging - she recently returned from [facility] after seeing Dr. Franc Lewis - some discussion about necessity of 6th cycle, possibly moving up PET/CT and making decision about further therapy based on presence/absence of residual disease. I agree that if she has residual disease biopsy of a representative lesion to r/o HT is appropriate. Will d/w Dr. Margy Montiel.  09/11 Discussed completing 6 cycle BR then repeating PET     Pancytopenia  Due to therapy  Monitor platelets/Hgb, Jammie SOPs for transfusions     Proph  SCDs due to platelets dropping and will likely be under 50k tomorrow      Disposition: Continue antibiotics and supportive care. Will discharge once afebrile over 24 hours, neutrophils recovered, and blood cultures remain negative. [facility]  [address] [place] [address]  Office : [phone]  Fax : [phone]       Attending Addendum:  I personally evaluated the patient with Heather Valenzuela, N.P.,  and agree with the assessment, findings and plan as documented. Appears stable, heart regular without murmur, lungs clear, abdomen benign. She may be able to go home tomorrow.               Tamanna Walters MD  [address]  Office : [phone]  Fax : [phone]

## 2017-09-11 NOTE — PROGRESS NOTES
End of Shift Note: 7p ~7a     Max Temp 100.8 this shift. Received Tylenol x 1. Pt extremely anxious to go home. Educated on neutropenic fever & neutropenic precautions at home. Pt verbalizes understanding.   Continuing with current POC.     Report to Jose Manuel Man RN at [facility]

## 2017-09-11 NOTE — PROGRESS NOTES
's initial visit. Ms. Adele Contreras was receiving a visit from a [facility] to receive [facility] upon my arrival. Briefly conveyed care and concern and assurance that chaplains remain available for support if desired.      Amos Long 68  Board Certified

## 2017-09-12 ENCOUNTER — HOSPITAL ENCOUNTER (OUTPATIENT)
Dept: INFUSION THERAPY | Age: 57
End: 2017-09-12
Payer: COMMERCIAL

## 2017-09-12 VITALS
RESPIRATION RATE: 20 BRPM | TEMPERATURE: 98.7 F | HEART RATE: 82 BPM | BODY MASS INDEX: 47.02 KG/M2 | WEIGHT: 257.1 LBS | SYSTOLIC BLOOD PRESSURE: 130 MMHG | DIASTOLIC BLOOD PRESSURE: 71 MMHG | OXYGEN SATURATION: 99 %

## 2017-09-12 LAB
ALBUMIN SERPL-MCNC: 2.5 G/DL (ref 3.5–5)
ALBUMIN/GLOB SERPL: 0.8 {RATIO} (ref 1.2–3.5)
ALP SERPL-CCNC: 151 U/L (ref 50–136)
ALT SERPL-CCNC: 46 U/L (ref 12–65)
ANION GAP SERPL CALC-SCNC: 9 MMOL/L (ref 7–16)
AST SERPL-CCNC: 21 U/L (ref 15–37)
BACTERIA SPEC CULT: NORMAL
BACTERIA SPEC CULT: NORMAL
BILIRUB SERPL-MCNC: 0.3 MG/DL (ref 0.2–1.1)
BUN SERPL-MCNC: 14 MG/DL (ref 6–23)
CALCIUM SERPL-MCNC: 8.6 MG/DL (ref 8.3–10.4)
CHLORIDE SERPL-SCNC: 111 MMOL/L (ref 98–107)
CO2 SERPL-SCNC: 25 MMOL/L (ref 21–32)
CREAT SERPL-MCNC: 0.7 MG/DL (ref 0.6–1)
DIFFERENTIAL METHOD BLD: ABNORMAL
ERYTHROCYTE [DISTWIDTH] IN BLOOD BY AUTOMATED COUNT: 17.1 % (ref 11.9–14.6)
GLOBULIN SER CALC-MCNC: 3.3 G/DL (ref 2.3–3.5)
GLUCOSE SERPL-MCNC: 96 MG/DL (ref 65–100)
HCT VFR BLD AUTO: 21.1 % (ref 35.8–46.3)
HGB BLD-MCNC: 7.1 G/DL (ref 11.7–15.4)
MAGNESIUM SERPL-MCNC: 2 MG/DL (ref 1.8–2.4)
MCH RBC QN AUTO: 28.6 PG (ref 26.1–32.9)
MCHC RBC AUTO-ENTMCNC: 34.8 G/DL (ref 31.4–35)
MCV RBC AUTO: 82.3 FL (ref 79.6–97.8)
PLATELET # BLD AUTO: 49 K/UL (ref 150–450)
PLATELET COMMENTS,PCOM: ABNORMAL
PMV BLD AUTO: 10.4 FL (ref 10.8–14.1)
POTASSIUM SERPL-SCNC: 4.1 MMOL/L (ref 3.5–5.1)
PROT SERPL-MCNC: 5.8 G/DL (ref 6.3–8.2)
RBC # BLD AUTO: 2.48 M/UL (ref 4.05–5.25)
RBC MORPH BLD: ABNORMAL
RBC MORPH BLD: ABNORMAL
SERVICE CMNT-IMP: NORMAL
SERVICE CMNT-IMP: NORMAL
SODIUM SERPL-SCNC: 145 MMOL/L (ref 136–145)
VANCOMYCIN TROUGH SERPL-MCNC: 18.3 UG/ML (ref 5–20)
WBC # BLD AUTO: 0.5 K/UL (ref 4.3–11.1)
WBC MORPH BLD: ABNORMAL

## 2017-09-12 PROCEDURE — 80053 COMPREHEN METABOLIC PANEL: CPT | Performed by: INTERNAL MEDICINE

## 2017-09-12 PROCEDURE — 80202 ASSAY OF VANCOMYCIN: CPT | Performed by: INTERNAL MEDICINE

## 2017-09-12 PROCEDURE — 36591 DRAW BLOOD OFF VENOUS DEVICE: CPT

## 2017-09-12 PROCEDURE — 99238 HOSP IP/OBS DSCHRG MGMT 30/<: CPT | Performed by: INTERNAL MEDICINE

## 2017-09-12 PROCEDURE — 74011250637 HC RX REV CODE- 250/637: Performed by: INTERNAL MEDICINE

## 2017-09-12 PROCEDURE — 85025 COMPLETE CBC W/AUTO DIFF WBC: CPT | Performed by: INTERNAL MEDICINE

## 2017-09-12 PROCEDURE — 74011000258 HC RX REV CODE- 258: Performed by: INTERNAL MEDICINE

## 2017-09-12 PROCEDURE — 83735 ASSAY OF MAGNESIUM: CPT | Performed by: INTERNAL MEDICINE

## 2017-09-12 PROCEDURE — 74011250636 HC RX REV CODE- 250/636: Performed by: INTERNAL MEDICINE

## 2017-09-12 PROCEDURE — 74011250636 HC RX REV CODE- 250/636: Performed by: NURSE PRACTITIONER

## 2017-09-12 RX ORDER — AMOXICILLIN AND CLAVULANATE POTASSIUM 875; 125 MG/1; MG/1
1 TABLET, FILM COATED ORAL 2 TIMES DAILY
Qty: 14 TAB | Refills: 0 | Status: SHIPPED | OUTPATIENT
Start: 2017-09-12 | End: 2017-09-19

## 2017-09-12 RX ORDER — HEPARIN 100 UNIT/ML
300 SYRINGE INTRAVENOUS AS NEEDED
Status: DISCONTINUED | OUTPATIENT
Start: 2017-09-12 | End: 2017-09-12 | Stop reason: HOSPADM

## 2017-09-12 RX ORDER — LEVOFLOXACIN 750 MG/1
750 TABLET ORAL DAILY
Qty: 7 TAB | Refills: 0 | Status: SHIPPED | OUTPATIENT
Start: 2017-09-12 | End: 2017-09-19

## 2017-09-12 RX ADMIN — PREGABALIN 100 MG: 50 CAPSULE ORAL at 08:03

## 2017-09-12 RX ADMIN — ALLOPURINOL 300 MG: 300 TABLET ORAL at 08:03

## 2017-09-12 RX ADMIN — LEVOTHYROXINE SODIUM 125 MCG: 75 TABLET ORAL at 08:04

## 2017-09-12 RX ADMIN — FLUCONAZOLE 200 MG: 100 TABLET ORAL at 08:03

## 2017-09-12 RX ADMIN — PIPERACILLIN SODIUM,TAZOBACTAM SODIUM 4.5 G: 4; .5 INJECTION, POWDER, FOR SOLUTION INTRAVENOUS at 00:28

## 2017-09-12 RX ADMIN — MIDODRINE HYDROCHLORIDE 2.5 MG: 5 TABLET ORAL at 08:04

## 2017-09-12 RX ADMIN — PIPERACILLIN SODIUM,TAZOBACTAM SODIUM 4.5 G: 4; .5 INJECTION, POWDER, FOR SOLUTION INTRAVENOUS at 07:31

## 2017-09-12 RX ADMIN — Medication 400 MG: at 08:03

## 2017-09-12 RX ADMIN — SODIUM CHLORIDE, PRESERVATIVE FREE 300 UNITS: 5 INJECTION INTRAVENOUS at 12:50

## 2017-09-12 RX ADMIN — ACYCLOVIR 400 MG: 800 TABLET ORAL at 08:04

## 2017-09-12 RX ADMIN — VANCOMYCIN HYDROCHLORIDE 1750 MG: 10 INJECTION, POWDER, LYOPHILIZED, FOR SOLUTION INTRAVENOUS at 05:05

## 2017-09-12 RX ADMIN — TBO-FILGRASTIM 480 MCG: 480 INJECTION, SOLUTION SUBCUTANEOUS at 12:50

## 2017-09-12 NOTE — PROGRESS NOTES
Pharmacokinetic Consult to Pharmacist    Ale Starr is a 62 y.o. female being treated for neutropenic fever with vancomycin and pip/tazo. Weight: 116.6 kg (257 lb 1.6 oz)  Lab Results   Component Value Date/Time    BUN 14 09/12/2017 03:45 AM    Creatinine 0.70 09/12/2017 03:45 AM    WBC 0.5 09/12/2017 03:45 AM    Procalcitonin 0.2 09/06/2017 04:59 PM    Lactic acid 1.3 04/25/2017 08:25 AM    Lactic Acid (POC) 0.9 09/06/2017 05:12 PM      Estimated Creatinine Clearance: 107.4 mL/min (based on Cr of 0.7). All Micro Results     Procedure Component Value Units Date/Time    CULTURE, BLOOD [[id number]] Collected:  09/09/17 0800    Order Status:  Completed Specimen:  Blood from Blood Updated:  09/12/17 0633     Special Requests: --        RIGHT  Antecubital       Culture result: NO GROWTH 3 DAYS       CULTURE, BLOOD [[id number]] Collected:  09/09/17 0441    Order Status:  Completed Specimen:  Blood from Blood Updated:  09/12/17 0633     Special Requests: --        NO SPECIAL REQUESTS  PORT       Culture result: NO GROWTH 2 DAYS           Lab Results   Component Value Date/Time    Vancomycin,trough 18.3 09/12/2017 03:45 AM    Vancomycin, random 28.0 04/28/2017 06:40 PM       Day 5 of vancomycin. Goal trough is 15-20. Trough is therapeutic at 18. 3. Continue vancomycin 1750 mg IV q12h. Further levels will be ordered as clinically indicated. Pharmacy will continue to follow. Please call with any questions.     Thank you,  Anupama Franco, PharmD  Clinical Pharmacist  [phone]

## 2017-09-12 NOTE — PROGRESS NOTES
END OF SHIFT NOTE:    Intake/Output  09/11 1901 - 09/12 0700  In: -   Out: 1000 [Urine:1000]   Voiding: YES  Catheter: NO  Drain:              Stool:  0 occurrences. Emesis:  0 occurrences. VITAL SIGNS  Patient Vitals for the past 12 hrs:   Temp Pulse Resp BP SpO2   09/12/17 0454 98 °F (36.7 °C) 73 20 107/66 99 %   09/12/17 0023 98.6 °F (37 °C) 76 20 140/86 99 %   09/11/17 1951 98.3 °F (36.8 °C) 83 18 139/82 99 %       Pain Assessment  Pain 1  Pain Scale 1: Numeric (0 - 10) (09/12/17 0130)  Pain Intensity 1: 0 (09/12/17 0130)  Patient Stated Pain Goal: 0 (09/11/17 0425)  Pain Reassessment 1: Yes (09/09/17 0517)  Pain Location 1: Head (09/09/17 0833)  Pain Orientation 1: Mid (09/09/17 0019)  Pain Description 1: Aching (09/09/17 9707)  Pain Intervention(s) 1: Medication (see MAR) (Tylenol) (09/09/17 9594)    Ambulating  Yes    Additional Information: uneventful night. Patient slept throughout the night. Critical labs called on WBC of 0.5. Vitals stable. Has been afebrile for 24 hours. Shift report given to oncoming nurse at the bedside.     Alfredo Reyes RN

## 2017-09-12 NOTE — DISCHARGE SUMMARY
[facility]: Inpatient Hematology / Oncology Discharge Summary Note    Patient ID:  David Birmingham  [id number]  5 y.o.  1960    Admit Date: 9/8/2017    Discharge Date: 9/12/2017    Admission Diagnoses: febrile neutropenia  Febrile neutropenia ([facility] [address]    Discharge Diagnoses:  Principal Diagnosis: <principal problem not specified>  Active Problems:    Non Hodgkin's lymphoma ([facility] [address] (3/30/2017)      Neutropenic fever ([facility] [address] (7/19/2017)      Pancytopenia due to antineoplastic chemotherapy ([facility] [address] (7/19/2017)      Febrile neutropenia ([facility] [address] (9/8/2017)        Hospital Course:  61 y/o well known to [facility] for history of MZL, on BR therapy and completed 5 cycles. Seen in ED for fever on Wednesday 9/6 , temp to 101 but ANC was 0.8 and she was feeling well otherwise, given Zosyn/vanc and discharged per her request.  Seen in infusion last over the next two days. Friday 9/8 with worsening WBC to 0.4 (ANC not measured). She remains febrile despite IV Rocephin and vanc. therefore she was admitted for febrile neutropenia. BCNTD. Her WBC today is 0.5. We are giving one dose granix prior to discharge. . She remained afebrile overnight. She will be discharged on 7 days Augmentin and Levaquin. She is feeling well and is anxious to go home. She will have follow up with Dr. Dean Peraza one week.        Consults:  None    Pertinent Diagnostic Studies:   Labs:    Recent Labs      09/12/17   0345  09/11/17   0424  09/10/17   0418   WBC  0.5*  0.7*  0.5*   HGB  7.1*  7.8*  7.6*   PLT  49*  53*  44*   ANEU   --   0.3*  0.1*    Recent Labs      09/12/17   0345  09/11/17   0424  09/10/17   0415   NA  145  142  140   K  4.1  4.1  3.7   CL  111*  106  106   CO2  25  25  25   GLU  96  107*  106*   BUN  14  13  12   CREA  0.70  0.75  0.73   CA  8.6  8.7  8.2*   SGOT  21  28  32   AP  151*  153*  141*   TP  5.8*  6.2*  6.0*   ALB  2.5*  2.7*  2.7*   MG  2.0  2.0  1.7*           OBJECTIVE:  Patient Vitals for the past 8 hrs:   BP Temp Pulse Resp SpO2 Weight   17 0756 111/58 97.4 °F (36.3 °C) 70 20 100 % -   17 0454 107/66 98 °F (36.7 °C) 73 20 99 % 257 lb 1.6 oz (116.6 kg)     Temp (24hrs), Av.5 °F (36.9 °C), Min:97.4 °F (36.3 °C), Max:99.5 °F (37.5 °C)         Physical Exam:  Constitutional: Well developed, well nourished female in no acute distress, sitting comfortably in bedside chair. HEENT: Normocephalic and atraumatic. Extraocular muscles are intact. Sclerae anicteric. Skin Warm and dry. No bruising and no rash noted. No erythema. No pallor. Respiratory Lungs are clear to auscultation bilaterally without wheezes, rales or rhonchi, normal air exchange without accessory muscle use. CVS Normal rate, regular rhythm and normal S1 and S2. No murmurs, gallops, or rubs. Neuro Grossly nonfocal with no obvious sensory or motor deficits. MSK Normal range of motion in general.     Psych Appropriate mood and affect. ASSESSMENT:    Active Problems:    Non Hodgkin's lymphoma ([facility] [address] (3/30/2017)      Neutropenic fever ([facility] [address] (2017)      Pancytopenia due to antineoplastic chemotherapy ([facility] [address] (2017)      Febrile neutropenia ([facility] [address] (2017)      Current Discharge Medication List      START taking these medications    Details   amoxicillin-clavulanate (AUGMENTIN) 875-125 mg per tablet Take 1 Tab by mouth two (2) times a day for 7 days. Qty: 14 Tab, Refills: 0         CONTINUE these medications which have CHANGED    Details   levoFLOXacin (LEVAQUIN) 750 mg tablet Take 1 Tab by mouth daily for 7 days. Qty: 7 Tab, Refills: 0         CONTINUE these medications which have NOT CHANGED    Details   levothyroxine (SYNTHROID) 125 mcg tablet TAKE 1 TABLET DAILY BEFORE BREAKFAST  Qty: 90 Tab, Refills: 0      magnesium oxide (MAG-OX) 400 mg tablet Take 1 Tab by mouth two (2) times a day. Qty: 60 Tab, Refills: 1      magic mouthwash (ALEXSANDER) susp Take 10 mL by mouth every four (4) hours as needed.   Qty: 2 Bottle, Refills: 2      nystatin (MYCOSTATIN) powder Apply  to affected area three (3) times daily. Qty: 60 g, Refills: 2      fluticasone (FLONASE) 50 mcg/actuation nasal spray 2 Sprays by Both Nostrils route daily. Qty: 1 Bottle, Refills: 1      fluconazole (DIFLUCAN) 200 mg tablet Take 1 Tab by mouth daily. 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 acyclovir (ZOVIRAX) 400 mg tablet Take 1 Tab by mouth two (2) times a day. Qty: 60 Tab, Refills: 3      allopurinol (ZYLOPRIM) 300 mg tablet Take 1 Tab by mouth two (2) times a day. 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 Max Daily Amount: 300 mg. Qty: 90 Cap, Refills: 3      oxyCODONE (OXYIR) 5 mg capsule Take 1-2 Caps by mouth every four (4) hours as needed. Max Daily Amount: 60 mg.  Qty: 180 Cap, Refills: 0      omeprazole (PRILOSEC) 20 mg capsule TAKE 1 CAPSULE DAILY  Qty: 90 Cap, Refills: 2    Associated Diagnoses: Gastroesophageal reflux disease without esophagitis      lidocaine-prilocaine (EMLA) topical cream Apply  to affected area as needed for Pain. Apply to port site 45-60 minutes prior to lab appt or infusion. 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  Unsure of dose         STOP taking these medications       nystatin (MYCOSTATIN) 100,000 unit/mL suspension Comments:   Reason for Stopping:         predniSONE (STERAPRED DS) 10 mg dose pack Comments:   Reason for Stopping:         OTHER Comments:   Reason for Stopping:         cefdinir (OMNICEF) 300 mg capsule Comments:   Reason for Stopping:         mupirocin calcium (BACTROBAN) 2 % topical cream Comments:   Reason for Stopping:         midodrine (PROAMITINE) 2.5 mg tablet Comments:   Reason for Stopping:         benzonatate (TESSALON PERLES) 100 mg capsule Comments:   Reason for Stopping:             DISPOSITION:  Follow-up Appointments   Procedures    FOLLOW UP VISIT Appointment in: One Week Please make follow up appointment with  one week. Please make follow up appointment with  one week. Standing Status:   Standing     Number of Occurrences:   1     Order Specific Question:   Appointment in     Answer: [facility], NP  [facility]  [address] [place] [address]  Office : [phone]  Fax : [phone]           Attending Addendum:  I personally evaluated the patient with Celestina Cobos N.P.,  and agree with the assessment, findings and plan as documented. Appears stable, heart regular without murmur, lungs clear, abdomen benign. She will be discharged on PO ABX and is scheduled to follow up in clinic next week.               Ari Patino MD  [facility]  [address] [place] [address]  Office : [phone]  Fax : [phone]

## 2017-09-12 NOTE — PROGRESS NOTES
Problem: Falls - Risk of  Goal: *Absence of Falls  Document Audi Fall Risk and appropriate interventions in the flowsheet. Outcome: Progressing Towards Goal  Fall Risk Interventions:              Medication Interventions: Teach patient to arise slowly           History of Falls Interventions:  Investigate reason for fall

## 2017-09-12 NOTE — DISCHARGE INSTRUCTIONS
DISCHARGE SUMMARY from Nurse    The following personal items are in your possession at time of discharge:    Dental Appliances: None  Visual Aid: Glasses, With patient     Home Medications: None  Jewelry: None  Clothing: Socks, Undergarments, Pants, At bedside, Pajamas  Other Valuables: Eyeglasses, Cell Phone, At bedside, Suitcase             PATIENT INSTRUCTIONS:    After general anesthesia or intravenous sedation, for 24 hours or while taking prescription Narcotics:  · Limit your activities  · Do not drive and operate hazardous machinery  · Do not make important personal or business decisions  · Do  not drink alcoholic beverages  · If you have not urinated within 8 hours after discharge, please contact your surgeon on call. Report the following to your surgeon:  · Excessive pain, swelling, redness or odor of or around the surgical area  · Temperature over 100.5  · Nausea and vomiting lasting longer than 4 hours or if unable to take medications  · Any signs of decreased circulation or nerve impairment to extremity: change in color, persistent  numbness, tingling, coldness or increase pain  · Any questions        What to do at Home:  Recommended activity: Activity as tolerated, per MD instructions    If you experience any of the following symptoms fever > 100.5, nausea, vomiting, pain, chest pain, shortness of breath please follow up with MD.      *  Please give a list of your current medications to your Primary Care Provider. *  Please update this list whenever your medications are discontinued, doses are      changed, or new medications (including over-the-counter products) are added. *  Please carry medication information at all times in case of emergency situations.           These are general instructions for a healthy lifestyle:    No smoking/ No tobacco products/ Avoid exposure to second hand smoke    Surgeon General's Warning:  Quitting smoking now greatly reduces serious risk to your health. Obesity, smoking, and sedentary lifestyle greatly increases your risk for illness    A healthy diet, regular physical exercise & weight monitoring are important for maintaining a healthy lifestyle    You may be retaining fluid if you have a history of heart failure or if you experience any of the following symptoms:  Weight gain of 3 pounds or more overnight or 5 pounds in a week, increased swelling in our hands or feet or shortness of breath while lying flat in bed. Please call your doctor as soon as you notice any of these symptoms; do not wait until your next office visit. Recognize signs and symptoms of STROKE:    F-face looks uneven    A-arms unable to move or move unevenly    S-speech slurred or non-existent    T-time-call 911 as soon as signs and symptoms begin-DO NOT go       Back to bed or wait to see if you get better-TIME IS BRAIN. Warning Signs of HEART ATTACK     Call 911 if you have these symptoms:   Chest discomfort. Most heart attacks involve discomfort in the center of the chest that lasts more than a few minutes, or that goes away and comes back. It can feel like uncomfortable pressure, squeezing, fullness, or pain.  Discomfort in other areas of the upper body. Symptoms can include pain or discomfort in one or both arms, the back, neck, jaw, or stomach.  Shortness of breath with or without chest discomfort.  Other signs may include breaking out in a cold sweat, nausea, or lightheadedness. Don't wait more than five minutes to call 911 - MINUTES MATTER! Fast action can save your life. Calling 911 is almost always the fastest way to get lifesaving treatment. Emergency Medical Services staff can begin treatment when they arrive -- up to an hour sooner than if someone gets to the hospital by car. The discharge information has been reviewed with the patient. The patient verbalized understanding.     Discharge medications reviewed with the patient and appropriate educational materials and side effects teaching were provided. Neutropenia: Care Instructions  Your Care Instructions  Neutropenia (say \"cox-omiw-UGX-nee-uh\") means that your blood has too few neutrophils. These are white blood cells that help protect the body from infection. They do this by killing bacteria. Neutropenia can be caused by some types of infection. It also can be caused by immune system conditions such as HIV or lupus, a lack of vitamin K52 or folic acid, or an enlarged spleen. Some medicines can cause it too. It is most often caused by treatments for certain health problems, such as chemotherapy and radiation treatment for cancer. Mild neutropenia usually causes no symptoms. But when it's severe, it increases the risk of infection of your skin and organs. That's because your body can't fight off germs as well as it should. Follow-up care is a key part of your treatment and safety. Be sure to make and go to all appointments, and call your doctor if you are having problems. It's also a good idea to know your test results and keep a list of the medicines you take. How can you care for yourself at home? · Take your medicines exactly as prescribed. Call your doctor if you have any problems with your medicine. · Eat a healthy, balanced diet. Eat foods with a lot of fiber. This helps to prevent constipation. Prevent infections  · Take your temperature several times a day, as your doctor suggests. Keep a written record of your temperature readings. Fever is a common symptom of infection. And it may be the only symptom. · Use a soft toothbrush. Do not floss your teeth. Talk with your doctor about other steps to prevent infections in your mouth. · Wash your hands often with soap and water, especially before you eat and after you use the bathroom. · If you are a woman, use sanitary napkins (pads) instead of tampons. Do not douche. · Do not use rectal thermometers or suppositories.   · Avoid tasks that might expose you to germs, such as disposing of pet feces or urine. · Avoid crowds of people and anyone who might have an infection or an illness such as a cold or the flu. You may need to avoid people who have recently had certain kinds of vaccinations. · Even small injuries can get infected. Take steps to prevent cuts, burns, and sunburns. · If you have severe neutropenia, your doctor may advise you to avoid fresh fruits, vegetables, and flowers. When should you call for help? Call 911 anytime you think you may need emergency care. For example, call if:  · You have severe shortness of breath. · You passed out (lost consciousness). Call your doctor now or seek immediate medical care if:  · You have signs of infection, such as:  ¨ Increased pain, swelling, warmth, or redness of your skin. ¨ Red streaks leading from a wound. ¨ Pus draining from a wound. ¨ A fever. Watch closely for changes in your health, and be sure to contact your doctor if:  · You do not get better as expected. Where can you learn more? Go to http://rajatK-MOTION Interactiverachel.info/. Enter N281 in the search box to learn more about \"Neutropenia: Care Instructions. \"  Current as of: October 14, 2016  Content Version: 11.3  © 9397-6967 [employer]. Care instructions adapted under license by [employer] (which disclaims liability or warranty for this information). If you have questions about a medical condition or this instruction, always ask your healthcare professional. [place] [address] any warranty or liability for your use of this information. Thrombocytopenia: Care Instructions  Your Care Instructions    Thrombocytopenia is a low number of platelets in the blood. Platelets are the cells that help blood clot. If you don't have enough of them, your blood cannot clot well. So it is harder to stop bleeding.   You may have low platelets because your bone marrow does not make them. Or your body's defenses (immune system) may destroy them. Having an enlarged spleen can also reduce the number of platelets in your blood. This is because they can get trapped in the enlarged spleen. Some diseases or medicines may also cause low platelets. But platelets may go back to normal levels if the disease is treated or the medicine is stopped. You may not need treatment if your problem is mild. If you do need treatment, you may have platelets added to your blood. Or you may get medicine to stop the loss of platelets or help your body make them. Follow-up care is a key part of your treatment and safety. Be sure to make and go to all appointments, and call your doctor if you are having problems. It's also a good idea to know your test results and keep a list of the medicines you take. How can you care for yourself at home? · Be safe with medicines. Take your medicines exactly as prescribed. Call your doctor if you think you are having a problem with your medicine. · Do not take aspirin or anti-inflammatory medicines unless your doctor says it is okay. Examples are ibuprofen (Advil, Motrin) and naproxen (Aleve). They may increase the risk of bleeding. · Avoid contact sports or activities that could cause you to fall. When should you call for help? Call 911 anytime you think you may need emergency care. For example, call if:  · You have symptoms of a stroke. These may include:  ¨ Sudden numbness, tingling, weakness, or loss of movement in your face, arm, or leg, especially on only one side of your body. ¨ Sudden vision changes. ¨ Sudden trouble speaking. ¨ Sudden confusion or trouble understanding simple statements. ¨ Sudden problems with walking or balance. ¨ A sudden, severe headache that is different from past headaches. Call your doctor now or seek immediate medical care if:  · You have any abnormal bleeding, such as:  ¨ Nosebleeds.   ¨ Vaginal bleeding that is different (heavier, more frequent, at a different time of the month) than what you are used to. ¨ Bloody or black stools, or rectal bleeding. ¨ Bloody or pink urine. · You have severe pain that does not get better. Watch closely for changes in your health, and be sure to contact your doctor if:  · You do not get better as expected. Where can you learn more? Go to http://rajat-rachel.info/. Enter R189 in the search box to learn more about \"Thrombocytopenia: Care Instructions. \"  Current as of: October 13, 2016  Content Version: 11.3  © 5619-8877 [employer]. Care instructions adapted under license by [employer] (which disclaims liability or warranty for this information). If you have questions about a medical condition or this instruction, always ask your healthcare professional. [address] any warranty or liability for your use of this information.

## 2017-09-13 ENCOUNTER — HOSPITAL ENCOUNTER (OUTPATIENT)
Dept: INFUSION THERAPY | Age: 57
End: 2017-09-13
Payer: COMMERCIAL

## 2017-09-14 ENCOUNTER — APPOINTMENT (OUTPATIENT)
Dept: INFUSION THERAPY | Age: 57
End: 2017-09-14
Payer: COMMERCIAL

## 2017-09-14 LAB
BACTERIA SPEC CULT: NORMAL
SERVICE CMNT-IMP: NORMAL

## 2017-09-15 ENCOUNTER — PATIENT OUTREACH (OUTPATIENT)
Dept: CASE MANAGEMENT | Age: 57
End: 2017-09-15

## 2017-09-15 ENCOUNTER — HOSPITAL ENCOUNTER (OUTPATIENT)
Dept: LAB | Age: 57
Discharge: HOME | End: 2017-09-15
Payer: COMMERCIAL

## 2017-09-15 DIAGNOSIS — C85.88 MARGINAL ZONE LYMPHOMA OF LYMPH NODES OF MULTIPLE SITES (HCC): ICD-10-CM

## 2017-09-15 LAB
ALBUMIN SERPL-MCNC: 3.5 G/DL (ref 3.5–5)
ALBUMIN/GLOB SERPL: 1.2 {RATIO} (ref 1.2–3.5)
ALP SERPL-CCNC: 140 U/L (ref 50–136)
ALT SERPL-CCNC: 34 U/L (ref 12–65)
ANION GAP SERPL CALC-SCNC: 5 MMOL/L (ref 7–16)
AST SERPL-CCNC: 16 U/L (ref 15–37)
BACTERIA SPEC CULT: NORMAL
BASOPHILS # BLD: 0 K/UL (ref 0–0.2)
BASOPHILS NFR BLD: 2 % (ref 0–2)
BILIRUB SERPL-MCNC: 0.3 MG/DL (ref 0.2–1.1)
BUN SERPL-MCNC: 18 MG/DL (ref 6–23)
CALCIUM SERPL-MCNC: 9.2 MG/DL (ref 8.3–10.4)
CHLORIDE SERPL-SCNC: 107 MMOL/L (ref 98–107)
CO2 SERPL-SCNC: 28 MMOL/L (ref 21–32)
CREAT SERPL-MCNC: 0.67 MG/DL (ref 0.6–1)
DIFFERENTIAL METHOD BLD: ABNORMAL
EOSINOPHIL # BLD: 0.1 K/UL (ref 0–0.8)
EOSINOPHIL NFR BLD: 8 % (ref 0.5–7.8)
ERYTHROCYTE [DISTWIDTH] IN BLOOD BY AUTOMATED COUNT: 16.4 % (ref 11.9–14.6)
GLOBULIN SER CALC-MCNC: 3 G/DL (ref 2.3–3.5)
GLUCOSE SERPL-MCNC: 92 MG/DL (ref 65–100)
HCT VFR BLD AUTO: 23.3 % (ref 35.8–46.3)
HGB BLD-MCNC: 8 G/DL (ref 11.7–15.4)
LDH SERPL L TO P-CCNC: 186 U/L (ref 100–190)
LYMPHOCYTES # BLD: 0.2 K/UL (ref 0.5–4.6)
LYMPHOCYTES NFR BLD: 22 % (ref 13–44)
MAGNESIUM SERPL-MCNC: 2 MG/DL (ref 1.8–2.4)
MCH RBC QN AUTO: 29 PG (ref 26.1–32.9)
MCHC RBC AUTO-ENTMCNC: 34.3 G/DL (ref 31.4–35)
MCV RBC AUTO: 84.4 FL (ref 79.6–97.8)
MONOCYTES # BLD: 0.5 K/UL (ref 0.1–1.3)
MONOCYTES NFR BLD: 45 % (ref 4–12)
NEUTS SEG # BLD: 0.2 K/UL (ref 1.7–8.2)
NEUTS SEG NFR BLD: 23 % (ref 43–78)
NRBC # BLD: 0.01 K/UL (ref 0–0.2)
PLATELET # BLD AUTO: 77 K/UL (ref 150–450)
PMV BLD AUTO: 10.3 FL (ref 10.8–14.1)
POTASSIUM SERPL-SCNC: 3.9 MMOL/L (ref 3.5–5.1)
PROT SERPL-MCNC: 6.5 G/DL (ref 6.3–8.2)
RBC # BLD AUTO: 2.76 M/UL (ref 4.05–5.25)
SERVICE CMNT-IMP: NORMAL
SODIUM SERPL-SCNC: 140 MMOL/L (ref 136–145)
WBC # BLD AUTO: 1 K/UL (ref 4.3–11.1)

## 2017-09-15 PROCEDURE — 83615 LACTATE (LD) (LDH) ENZYME: CPT | Performed by: INTERNAL MEDICINE

## 2017-09-15 PROCEDURE — 80053 COMPREHEN METABOLIC PANEL: CPT | Performed by: INTERNAL MEDICINE

## 2017-09-15 PROCEDURE — 85025 COMPLETE CBC W/AUTO DIFF WBC: CPT | Performed by: INTERNAL MEDICINE

## 2017-09-15 PROCEDURE — 83735 ASSAY OF MAGNESIUM: CPT | Performed by: INTERNAL MEDICINE

## 2017-09-15 NOTE — PROGRESS NOTES
Pt was seen and labs reviewed by Dr. Rochelle Mcdonald. Plan is to repeat a PET scan in 2 weeks (6 weeks from last chemo cycle) and also a MRI of the brain. Pt is still neutropenic, so will repeat labs in 1 week. Will return on October 3rd for results of scans and proceed with chcle 6 of BR. Pt to get Neulasta on body injector with next cycle.

## 2017-09-20 ENCOUNTER — HOSPITAL ENCOUNTER (OUTPATIENT)
Dept: PHYSICAL THERAPY | Age: 57
Discharge: HOME | End: 2017-09-20
Payer: COMMERCIAL

## 2017-09-20 PROCEDURE — 97110 THERAPEUTIC EXERCISES: CPT

## 2017-09-20 NOTE — PROGRESS NOTES
Jenn Martha  : [facility] [address] [facility]  Reneehemyjsophie , [facility], [address] [place] [address]  [phone]   [phone]          OUTPATIENT PHYSICAL THERAPY:Daily Note 2017    ICD-10: Treatment Diagnosis: I 89.0 lymphedema not elsewhere classified  M 62.81 muscle weakness generalized  Precautions/Allergies:   Review of patient's allergies indicates no known allergies. Fall Risk Score: 1 (? 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) [C85.88]    DATE OF ONSET: 3/30/17  REFERRING PHYSICIAN: Juvencio Villa MD  RETURN PHYSICIAN APPOINTMENT: 10/3/17     INITIAL ASSESSMENT:  Ms. Cabrera Quintero presents for a lymphedema assessment due to edema of bilateral lower extremities. She has pitting edema in the bilateral lower extremities. She has previously had short stretch bandaging and MLD following knee surgery 3 years ago. She would like to try this again even though this is not orthopedic post surgical swelling. She was diagnosed with lymphoma in March of this year. She recently completed her second chemo of 6 planned. She will have a PET scan 17. Progress with chemo has been limited due to lab values being abnormal.  She is uncomfortable due to the edema. She will have a paracentesis 17. We will initiate edema management and progress slowly dependent on her tolerance to compression and her response to treatment. We have now initiated conditioning and strengthening. She is independent with home management of edema. PROBLEM LIST (Impacting functional limitations):  1. Decreased Strength  2. Increased Pain  3. Decreased Activity Tolerance  4. Increased Fatigue  5. Increased Shortness of Breath  6. Decreased Flexibility/Joint Mobility  7. Edema/Girth  8. Decreased Knowledge of Precautions  9.  Decreased [place] with Home Exercise Program INTERVENTIONS PLANNED:  1. Decongestion Therapy  2. Home Exercise Program (HEP)  3. Range of Motion (ROM)  4. Therapeutic Exercise/Strengthening   TREATMENT PLAN:  Effective Dates: 8/14/17 TO 11/7/17/17. Frequency/Duration: 2 times a week for 12 weeks dependent on chemo schedule and counts, the patient has been out due to hospitalization. GOALS: (Goals have been discussed and agreed upon with patient.)  Short-Term Functional Goals: Time Frame: 4 weeks  1. The patient will have knowledge of signs and symptoms of lymphedema and how to manage within 4 weeks. Met   2. The patient will tolerate short stretch bandaging of bilateral lower extremities for reduction of girth within 4 weeks. Met   3. The patient and caregiver will be independent with compression bandaging within 4 weeks. Met   4. The patient will improve her 6 minute walk by 60 feet within 4 weeks. 5. The patient will report a fatigue of 3 or less within 4 weeks. Discharge Goals: Time Frame: 8 weeks  1. The patient will have a girth decrease of at least 5 cm in the calf within 8 weeks. 2. The patient will be fit with static compression garments within 8 weeks. Met   3. The patient and caregiver will be independent with edema management within 8 weeks. Met  4. The patient will transition to Healthy Self within 8 weeks. 5.   Rehabilitation Potential For Stated Goals: [address] [place] [address]'s therapy, I certify that the treatment plan above will be carried out by a therapist or under their direction. Thank you for this referral,  Edward Anand PT     Referring Physician Signature: Dayday Wells MD              Date                    The information in this section was collected on 5/24/17 (except where otherwise noted).   HISTORY:   History of Present Injury/Illness (Reason for Referral):  Lymphoma, ascites, bilateral lower extremity pitting edema  Past Medical History/Comorbidities:   Ms. Stacy Cifuentes  has a past medical history of Anemia; Arthritis; Basal cell carcinoma; Chronic pain; Hypertension (10/4/2011); Morbid obesity ([address] Murmur; Non Hodgkin's lymphoma ([address] (3/30/2017); Other ill-defined conditions; Overactive bladder; Rheumatic fever; Shingles; Thromboembolus ([address] (x2); Thyroid disease; and Unspecified adverse effect of anesthesia. She also has no past medical history of Aneurysm ([address] Arrhythmia; Asthma; Autoimmune disease ([address] CAD (coronary artery disease); Chronic kidney disease; Coagulation defects; COPD; Diabetes ([address] Difficult intubation; GERD (gastroesophageal reflux disease); Heart failure ([address] Liver disease; Malignant hyperthermia due to anesthesia; Nausea & vomiting; Pseudocholinesterase deficiency; Psychiatric disorder; PUD (peptic ulcer disease); Seizures ([address] Stroke [facility]); or Unspecified sleep apnea. Ms. Alisson Rodriguez  has a past surgical history that includes hc cholecystectomy fnc41; anesth,achilles tendon surg (2/10/2011); cholecystectomy (6867); orthopaedic (2012); orthopaedic (2013); and vascular access.    Past Medical History:   Diagnosis Date    Anemia     in high school, \"received gamma globulin twice a week for awhile\"    Arthritis     osteo-r knee    Basal cell carcinoma     Followed by Dermatology    Chronic pain     KNEEs    Hypertension 10/4/2011    medication    Morbid obesity ([address]     Murmur     \"had it my whole life\", did not appreciate murmur 9/12/2011 during assessment    Non Hodgkin's lymphoma ([address] 3/30/2017    Other ill-defined conditions     skin bumps-med as needed    Overactive bladder     Rheumatic fever     Possibly as a child    Shingles     Thromboembolus ([address] x2    LLE-calf-1990?-only took ASA-resolved in less than a wk-\"a little burned area-not examined\"    Thyroid disease     hypo-medication    Unspecified adverse effect of anesthesia     pt reports waking several times with knee surgery-spinal     Past Surgical History:   Procedure Laterality Date    [facility] CHOLECYSTECTOMY FNC41      HX CHOLECYSTECTOMY  1983    HX ORTHOPAEDIC  2012    right TKA    HX ORTHOPAEDIC  2013    left TKA. Dr. Gaby Wong.  HX VASCULAR ACCESS      ID ANESTH,ACHILLES TENDON SURG  2/10/2011    LEFT. Dr. Aron Vance. Social History/Living Environment:     lives with family, 2 flights of stairs  Prior Level of Function/Work/Activity:    Dominant Side:         RIGHT  Current Medications:       Current Outpatient Prescriptions:     levothyroxine (SYNTHROID) 150 mcg tablet, Take 1 Tab by mouth Daily (before breakfast). , Disp: 90 Tab, Rfl: 3    magnesium oxide (MAG-OX) 400 mg tablet, Take 1 Tab by mouth two (2) times a day., Disp: 60 Tab, Rfl: 1    magic mouthwash (ALEXSANDER) susp, Take 10 mL by mouth every four (4) hours as needed. , Disp: 2 Bottle, Rfl: 2    nystatin (MYCOSTATIN) powder, Apply  to affected area three (3) times daily. , Disp: 60 g, Rfl: 2    fluticasone (FLONASE) 50 mcg/actuation nasal spray, 2 Sprays by Both Nostrils route daily. , Disp: 1 Bottle, Rfl: 1    fluconazole (DIFLUCAN) 200 mg tablet, Take 1 Tab by mouth daily. , Disp: 90 Tab, Rfl: 0    acyclovir (ZOVIRAX) 400 mg tablet, Take 1 Tab by mouth two (2) times a day., Disp: 60 Tab, Rfl: 3    allopurinol (ZYLOPRIM) 300 mg tablet, Take 1 Tab by mouth two (2) times a day. (Patient taking differently: Take 300 mg by mouth daily.), Disp: 90 Tab, Rfl: 2    loratadine (CLARITIN) 10 mg tablet, Take 10 mg by mouth., Disp: , Rfl:     pregabalin (LYRICA) 100 mg capsule, Take 1 Cap by mouth three (3) times daily. Max Daily Amount: 300 mg., Disp: 90 Cap, Rfl: 3    oxyCODONE (OXYIR) 5 mg capsule, Take 1-2 Caps by mouth every four (4) hours as needed. Max Daily Amount: 60 mg., Disp: 180 Cap, Rfl: 0    omeprazole (PRILOSEC) 20 mg capsule, TAKE 1 CAPSULE DAILY, Disp: 90 Cap, Rfl: 2    lidocaine-prilocaine (EMLA) topical cream, Apply  to affected area as needed for Pain.  Apply to port site 45-60 minutes prior to lab appt or infusion. , Disp: 30 g, Rfl: 0    promethazine (PHENERGAN) 25 mg tablet, Take 25 mg by mouth every six (6) hours as needed for Nausea. Unsure of dose, Disp: , Rfl:    Date Last Reviewed:  5/24/17   Number of Personal Factors/Comorbidities that affect the Plan of Care: 3+: HIGH COMPLEXITY   EXAMINATION:   Palpation:          Pitting edema, dry skin, loose skin  ROM:          Within functional limits. She previously has had bilateral knee replacements and an Achilles tendon repair on the left  Strength:          Grossly 4/5 x 4 extremities  Functional Mobility:         Gait/Ambulation:  Independent with increased speed        Transfers: independent        Bed Mobility:  independent  Skin Integrity:          Intact, but dry. Edema/Girth:  pitting    Left Right    Initial Most Recent Initial Most Recent   Upper  Extremity           Lower  Extremity               Body Structures Involved:  1. Muscles  2. lymphatic system Body Functions Affected:  1. Sensory/Pain  2. Skin Related  3. lymphatic system Activities and Participation Affected:  1. General Tasks and Demands  2. Mobility  3. Self Care   Number of elements (examined above) that affect the Plan of Care: 4+: HIGH COMPLEXITY   CLINICAL PRESENTATION:   Presentation: Evolving clinical presentation with unstable and unpredictable characteristics: HIGH COMPLEXITY   CLINICAL DECISION MAKING:   Outcome Measure: Tool Used: NCCN Distress Thermometer   Score:  Initial:   Most Recent: X    Interpretation of Score: If greater than or equal to 8, then PHQ-9 Depression Scale Score   and JOON-7 Anxiety Scale Score  .   Tool Used: ECOG Performance Survey Score  Score:  Initial: 2 Most Recent:  1    Interpretation of Score:   0 Fully active, able to carry on all pre-disease performance without restriction   1 Restricted in physically strenuous activity but ambulatory and able to carry out work of a light or sedentary nature, e.g., light house work, office work   2 Ambulatory and capable of all selfcare but unable to carry out any work activities. Up and about more than 50% of waking hours   3 Capable of only limited selfcare, confined to bed or chair more than 50% of waking hours   4 Completely disabled. Cannot carry on any selfcare. Totally confined to bed or chair   5 Dead    Tool Used: 6-MINUTE WALK TEST  Score:  Initial: 1110 feet Most Recent: 1230 feet (Date: 8/14/17 )   Interpretation of Score: Normal range varies but is approximately 2320-5370 Feet      Distance walked: 1230 feet     Baseline End of Test   Heart Rate 83 78   Dyspnea (Luther Scale)     Fatigue (Luther Scale) 7 2   SpO2 98 98   /80 138/63     Score 2133 1344-3253 8213-3558 [phone] 426-16 15-0   Modifier CH CI CJ CK CL CM CN       Tool Used: Timed Up and Go (TUG)  Score:  Initial: 8 seconds Most Recent: X seconds (Date: -- )   Interpretation of Score: The test measures, in seconds, the time taken by an individual to stand up from a standard arm chair (seat height 46 cm [18 in], arm height 65 cm [25.6 in]), walk a distance of 3 meters (118 in, approx 10 ft), turn, walk back to the chair and sit down. If the individual takes longer than 14 seconds to complete TUG, this indicates risk for falls. Score 7 7.5-10.5 11-14 14.5-17.5 18-21 21.5-24.5 25+   Modifier CH CI CJ CK CL CM CN         Tool Used: Lymphedema Life Impact Scale   Score:  Initial:  54 Most Recent: 33 (Date: 8/14/17 )   Interpretation of Score: The Lymphedema Life Impact Scale (LLIS) is a validated instrument that measures the physical, functional, and psychosocial concerns pertinent to patients with extremity lymphedema. The Scale's questionnaire is administered to patients to gauge impairments, activity limitations, and participation restrictions resulting from their lymphedema.   Score 0 1-13 14-26 27-40 41-54 55-67 68   Modifier CH CI CJ CK CL CM CN       Medical Necessity:   · Patient is expected to demonstrate progress in strength and edema management to increase independence with self care and houseold activity. Reason for Services/Other Comments:  · Patient continues to demonstrate capacity to improve strength and edema management which will increase independence. Use of outcome tool(s) and clinical judgement create a POC that gives a: Questionable prediction of patient's progress: MODERATE COMPLEXITY            TREATMENT:   (In addition to Assessment/Re-Assessment sessions the following treatments were rendered)  Pre-treatment Symptoms/Complaints:  Fatigue, pain weakness, lower extremity edema     Pain: Initial: 5-6/10         Post Session: 2 /10    43 min  The patient returns after having been out due to a hospitalization. O2 98 HR 89  Bp 113/70  Fatigue 9/10  UBE level 1 x 4 min  Walk 650'  Nustep level 2 x 7 min   Walk 650'  Sit to stand x 10 reps  Bilateral upper extremity with 2# x 10 reps biceps curls, forward flexion, abduction, triceps extension   Walk 650'  O2 97   Fatigue 0/10      as above    Treatment/Session Assessment:    · Response to Treatment:  Tolerated the treatment well. · Compliance with Program/Exercises: Will assess as treatment progresses. · Recommendations/Intent for next treatment session: \"Next visit will focus on conditioning and strengthening\".        Total Treatment Duration:  PT Patient Time In/Time Out  Time In: 0934  Time Out: 910 Deon Crain, PT

## 2017-09-21 ENCOUNTER — HOSPITAL ENCOUNTER (OUTPATIENT)
Dept: PHYSICAL THERAPY | Age: 57
Discharge: HOME | End: 2017-09-21
Payer: COMMERCIAL

## 2017-09-21 PROCEDURE — 97110 THERAPEUTIC EXERCISES: CPT

## 2017-09-21 NOTE — PROGRESS NOTES
Donna Ray  : 1960 [facility]  [address], [place], [address]  [phone]   [phone]          OUTPATIENT PHYSICAL THERAPY:Daily Note 2017    ICD-10: Treatment Diagnosis: I 89.0 lymphedema not elsewhere classified  M 62.81 muscle weakness generalized  Precautions/Allergies:   Review of patient's allergies indicates no known allergies. Fall Risk Score: 1 (? 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) [C85.88]    DATE OF ONSET: 3/30/17  REFERRING PHYSICIAN: Gutierrez Hudson MD  RETURN PHYSICIAN APPOINTMENT: 10/3/17     INITIAL ASSESSMENT:  Ms. Alvina Chisholm presents for a lymphedema assessment due to edema of bilateral lower extremities. She has pitting edema in the bilateral lower extremities. She has previously had short stretch bandaging and MLD following knee surgery 3 years ago. She would like to try this again even though this is not orthopedic post surgical swelling. She was diagnosed with lymphoma in March of this year. She recently completed her second chemo of 6 planned. She will have a PET scan 17. Progress with chemo has been limited due to lab values being abnormal.  She is uncomfortable due to the edema. She will have a paracentesis 17. We will initiate edema management and progress slowly dependent on her tolerance to compression and her response to treatment. We have now initiated conditioning and strengthening. She is independent with home management of edema. PROBLEM LIST (Impacting functional limitations):  1. Decreased Strength  2. Increased Pain  3. Decreased Activity Tolerance  4. Increased Fatigue  5. Increased Shortness of Breath  6. Decreased Flexibility/Joint Mobility  7. Edema/Girth  8. Decreased Knowledge of Precautions  9.  Decreased [place] with Home Exercise Program INTERVENTIONS PLANNED:  1. Decongestion Therapy  2. Home Exercise Program (HEP)  3. Range of Motion (ROM)  4. Therapeutic Exercise/Strengthening   TREATMENT PLAN:  Effective Dates: 8/14/17 TO 11/7/17/17. Frequency/Duration: 2 times a week for 12 weeks dependent on chemo schedule and counts, the patient has been out due to hospitalization. GOALS: (Goals have been discussed and agreed upon with patient.)  Short-Term Functional Goals: Time Frame: 4 weeks  1. The patient will have knowledge of signs and symptoms of lymphedema and how to manage within 4 weeks. Met   2. The patient will tolerate short stretch bandaging of bilateral lower extremities for reduction of girth within 4 weeks. Met   3. The patient and caregiver will be independent with compression bandaging within 4 weeks. Met   4. The patient will improve her 6 minute walk by 60 feet within 4 weeks. 5. The patient will report a fatigue of 3 or less within 4 weeks. Discharge Goals: Time Frame: 8 weeks  1. The patient will have a girth decrease of at least 5 cm in the calf within 8 weeks. 2. The patient will be fit with static compression garments within 8 weeks. Met   3. The patient and caregiver will be independent with edema management within 8 weeks. Met  4. The patient will transition to Healthy Self within 8 weeks. 5.   Rehabilitation Potential For Stated Goals: [address] [place] [address]'s therapy, I certify that the treatment plan above will be carried out by a therapist or under their direction. Thank you for this referral,  Darrell Real PT     Referring Physician Signature: Ebenezer Bailey MD              Date                    The information in this section was collected on 5/24/17 (except where otherwise noted).   HISTORY:   History of Present Injury/Illness (Reason for Referral):  Lymphoma, ascites, bilateral lower extremity pitting edema  Past Medical History/Comorbidities:   Ms. Teresa Martinez  has a past medical history of Anemia; Arthritis; Basal cell carcinoma; Chronic pain; Hypertension (10/4/2011); Morbid obesity ([address] Murmur; Non Hodgkin's lymphoma ([address] (3/30/2017); Other ill-defined conditions; Overactive bladder; Rheumatic fever; Shingles; Thromboembolus ([address] (x2); Thyroid disease; and Unspecified adverse effect of anesthesia. She also has no past medical history of Aneurysm ([address] Arrhythmia; Asthma; Autoimmune disease ([address] CAD (coronary artery disease); Chronic kidney disease; Coagulation defects; COPD; Diabetes ([address] Difficult intubation; GERD (gastroesophageal reflux disease); Heart failure ([address] Liver disease; Malignant hyperthermia due to anesthesia; Nausea & vomiting; Pseudocholinesterase deficiency; Psychiatric disorder; PUD (peptic ulcer disease); Seizures ([address] Stroke [facility]); or Unspecified sleep apnea. Ms. Bishnu Smith  has a past surgical history that includes hc cholecystectomy fnc41; anesth,achilles tendon surg (2/10/2011); cholecystectomy (0148); orthopaedic (2012); orthopaedic (2013); and vascular access.    Past Medical History:   Diagnosis Date    Anemia     in high school, \"received gamma globulin twice a week for awhile\"    Arthritis     osteo-r knee    Basal cell carcinoma     Followed by Dermatology    Chronic pain     KNEEs    Hypertension 10/4/2011    medication    Morbid obesity ([address]     Murmur     \"had it my whole life\", did not appreciate murmur 9/12/2011 during assessment    Non Hodgkin's lymphoma ([address] 3/30/2017    Other ill-defined conditions     skin bumps-med as needed    Overactive bladder     Rheumatic fever     Possibly as a child    Shingles     Thromboembolus ([address] x2    LLE-calf-1990?-only took ASA-resolved in less than a wk-\"a little burned area-not examined\"    Thyroid disease     hypo-medication    Unspecified adverse effect of anesthesia     pt reports waking several times with knee surgery-spinal     Past Surgical History:   Procedure Laterality Date    [facility] CHOLECYSTECT[facility] FNC41      HX CHOLECYSTECTOMY  1983    HX ORTHOPAEDIC  2012    right TKA    HX ORTHOPAEDIC  2013    left TKA. Dr. Gaby Wong.  HX VASCULAR ACCESS      [facility] ANESTH,ACHILLES TENDON SURG  2/10/2011    LEFT. Dr. Aron Vance. Social History/Living Environment:     lives with family, 2 flights of stairs  Prior Level of Function/Work/Activity:    Dominant Side:         RIGHT  Current Medications:       Current Outpatient Prescriptions:     levothyroxine (SYNTHROID) 150 mcg tablet, Take 1 Tab by mouth Daily (before breakfast). , Disp: 90 Tab, Rfl: 3    magnesium oxide (MAG-OX) 400 mg tablet, Take 1 Tab by mouth two (2) times a day., Disp: 60 Tab, Rfl: 1    magic mouthwash (ALEXSANDER) susp, Take 10 mL by mouth every four (4) hours as needed. , Disp: 2 Bottle, Rfl: 2    nystatin (MYCOSTATIN) powder, Apply  to affected area three (3) times daily. , Disp: 60 g, Rfl: 2    fluticasone (FLONASE) 50 mcg/actuation nasal spray, 2 Sprays by Both Nostrils route daily. , Disp: 1 Bottle, Rfl: 1    fluconazole (DIFLUCAN) 200 mg tablet, Take 1 Tab by mouth daily. , Disp: 90 Tab, Rfl: 0    acyclovir (ZOVIRAX) 400 mg tablet, Take 1 Tab by mouth two (2) times a day., Disp: 60 Tab, Rfl: 3    allopurinol (ZYLOPRIM) 300 mg tablet, Take 1 Tab by mouth two (2) times a day. (Patient taking differently: Take 300 mg by mouth daily.), Disp: 90 Tab, Rfl: 2    loratadine (CLARITIN) 10 mg tablet, Take 10 mg by mouth., Disp: , Rfl:     pregabalin (LYRICA) 100 mg capsule, Take 1 Cap by mouth three (3) times daily. Max Daily Amount: 300 mg., Disp: 90 Cap, Rfl: 3    oxyCODONE (OXYIR) 5 mg capsule, Take 1-2 Caps by mouth every four (4) hours as needed. Max Daily Amount: 60 mg., Disp: 180 Cap, Rfl: 0    omeprazole (PRILOSEC) 20 mg capsule, TAKE 1 CAPSULE DAILY, Disp: 90 Cap, Rfl: 2    lidocaine-prilocaine (EMLA) topical cream, Apply  to affected area as needed for Pain.  Apply to port site 45-60 minutes prior to lab appt or infusion. , Disp: 30 g, Rfl: 0    promethazine (PHENERGAN) 25 mg tablet, Take 25 mg by mouth every six (6) hours as needed for Nausea. Unsure of dose, Disp: , Rfl:    Date Last Reviewed:  5/24/17   Number of Personal Factors/Comorbidities that affect the Plan of Care: 3+: HIGH COMPLEXITY   EXAMINATION:   Palpation:          Pitting edema, dry skin, loose skin  ROM:          Within functional limits. She previously has had bilateral knee replacements and an Achilles tendon repair on the left  Strength:          Grossly 4/5 x 4 extremities  Functional Mobility:         Gait/Ambulation:  Independent with increased speed        Transfers: independent        Bed Mobility:  independent  Skin Integrity:          Intact, but dry. Edema/Girth:  pitting    Left Right    Initial Most Recent Initial Most Recent   Upper  Extremity           Lower  Extremity               Body Structures Involved:  1. Muscles  2. lymphatic system Body Functions Affected:  1. Sensory/Pain  2. Skin Related  3. lymphatic system Activities and Participation Affected:  1. General Tasks and Demands  2. Mobility  3. Self Care   Number of elements (examined above) that affect the Plan of Care: 4+: HIGH COMPLEXITY   CLINICAL PRESENTATION:   Presentation: Evolving clinical presentation with unstable and unpredictable characteristics: HIGH COMPLEXITY   CLINICAL DECISION MAKING:   Outcome Measure: Tool Used: NCCN Distress Thermometer   Score:  Initial:   Most Recent: X    Interpretation of Score: If greater than or equal to 8, then PHQ-9 Depression Scale Score   and JOON-7 Anxiety Scale Score  .   Tool Used: ECOG Performance Survey Score  Score:  Initial: 2 Most Recent:  1    Interpretation of Score:   0 Fully active, able to carry on all pre-disease performance without restriction   1 Restricted in physically strenuous activity but ambulatory and able to carry out work of a light or sedentary nature, e.g., light house work, office work   2 Ambulatory and capable of all selfcare but unable to carry out any work activities. Up and about more than 50% of waking hours   3 Capable of only limited selfcare, confined to bed or chair more than 50% of waking hours   4 Completely disabled. Cannot carry on any selfcare. Totally confined to bed or chair   5 Dead    Tool Used: 6-MINUTE WALK TEST  Score:  Initial: 1110 feet Most Recent: 1230 feet (Date: 8/14/17 )   Interpretation of Score: Normal range varies but is approximately 1592-4094 Feet      Distance walked: 1230 feet     Baseline End of Test   Heart Rate 83 78   Dyspnea (Luther Scale)     Fatigue (Luther Scale) 7 2   SpO2 98 98   /80 138/63     Score 2133 [id number] [id number] [phone] 426-16 15-0   Modifier CH CI CJ CK CL CM CN       Tool Used: Timed Up and Go (TUG)  Score:  Initial: 8 seconds Most Recent: X seconds (Date: -- )   Interpretation of Score: The test measures, in seconds, the time taken by an individual to stand up from a standard arm chair (seat height 46 cm [18 in], arm height 65 cm [25.6 in]), walk a distance of 3 meters (118 in, approx 10 ft), turn, walk back to the chair and sit down. If the individual takes longer than 14 seconds to complete TUG, this indicates risk for falls. Score 7 7.5-10.5 11-14 14.5-17.5 18-21 21.5-24.5 25+   Modifier CH CI CJ CK CL CM CN         Tool Used: Lymphedema Life Impact Scale   Score:  Initial:  54 Most Recent: 33 (Date: 8/14/17 )   Interpretation of Score: The Lymphedema Life Impact Scale (LLIS) is a validated instrument that measures the physical, functional, and psychosocial concerns pertinent to patients with extremity lymphedema. The Scale's questionnaire is administered to patients to gauge impairments, activity limitations, and participation restrictions resulting from their lymphedema.   Score 0 1-13 14-26 27-40 41-54 55-67 68   Modifier CH CI CJ CK CL CM CN       Medical Necessity:   · Patient is expected to demonstrate progress in strength and edema management to increase independence with self care and houseold activity. Reason for Services/Other Comments:  · Patient continues to demonstrate capacity to improve strength and edema management which will increase independence. Use of outcome tool(s) and clinical judgement create a POC that gives a: Questionable prediction of patient's progress: MODERATE COMPLEXITY            TREATMENT:   (In addition to Assessment/Re-Assessment sessions the following treatments were rendered)  Pre-treatment Symptoms/Complaints:  Fatigue, pain weakness, lower extremity edema     Pain: Initial: 4/10         Post Session: 2 /10    44 min  Next visit 6 minute walk, TUG  O2 98 HR 90  Bp 121/73  Fatigue 2/10  UBE level 1 x 4 min  Walk 650'  Nustep level 2 x 7 min   Walk 650'  Sit to stand x 10 reps  Bilateral upper extremity with 2# x 10 reps biceps curls, forward flexion, abduction, triceps extension   Long arc quads x 10 repos with 5 count hold  Walk 650'  O2 98   Fatigue 0/10      as above    Treatment/Session Assessment:    · Response to Treatment:  Tolerated the treatment well. · Compliance with Program/Exercises: Will assess as treatment progresses. · Recommendations/Intent for next treatment session: \"Next visit will focus on conditioning and strengthening\".        Total Treatment Duration:  PT Patient Time In/Time Out  Time In: 0801  Time Out: 0845    Pat Patten PT

## 2017-09-22 ENCOUNTER — HOSPITAL ENCOUNTER (OUTPATIENT)
Dept: LAB | Age: 57
Discharge: HOME | End: 2017-09-22
Payer: COMMERCIAL

## 2017-09-22 DIAGNOSIS — C85.88 MARGINAL ZONE LYMPHOMA OF LYMPH NODES OF MULTIPLE SITES (HCC): ICD-10-CM

## 2017-09-22 LAB
ALBUMIN SERPL-MCNC: 3.6 G/DL (ref 3.5–5)
ALBUMIN/GLOB SERPL: 1.3 {RATIO} (ref 1.2–3.5)
ALP SERPL-CCNC: 119 U/L (ref 50–136)
ALT SERPL-CCNC: 36 U/L (ref 12–65)
ANION GAP SERPL CALC-SCNC: 7 MMOL/L (ref 7–16)
AST SERPL-CCNC: 21 U/L (ref 15–37)
BASOPHILS # BLD: 0 K/UL (ref 0–0.2)
BASOPHILS NFR BLD: 1 % (ref 0–2)
BILIRUB SERPL-MCNC: 0.3 MG/DL (ref 0.2–1.1)
BUN SERPL-MCNC: 23 MG/DL (ref 6–23)
CALCIUM SERPL-MCNC: 9.2 MG/DL (ref 8.3–10.4)
CHLORIDE SERPL-SCNC: 105 MMOL/L (ref 98–107)
CO2 SERPL-SCNC: 29 MMOL/L (ref 21–32)
CREAT SERPL-MCNC: 0.73 MG/DL (ref 0.6–1)
DIFFERENTIAL METHOD BLD: ABNORMAL
EOSINOPHIL # BLD: 0.1 K/UL (ref 0–0.8)
EOSINOPHIL NFR BLD: 3 % (ref 0.5–7.8)
ERYTHROCYTE [DISTWIDTH] IN BLOOD BY AUTOMATED COUNT: 19.6 % (ref 11.9–14.6)
GLOBULIN SER CALC-MCNC: 2.8 G/DL (ref 2.3–3.5)
GLUCOSE SERPL-MCNC: 99 MG/DL (ref 65–100)
HCT VFR BLD AUTO: 24.9 % (ref 35.8–46.3)
HGB BLD-MCNC: 8.5 G/DL (ref 11.7–15.4)
LYMPHOCYTES # BLD: 0.2 K/UL (ref 0.5–4.6)
LYMPHOCYTES NFR BLD: 9 % (ref 13–44)
MAGNESIUM SERPL-MCNC: 2.1 MG/DL (ref 1.8–2.4)
MCH RBC QN AUTO: 30.2 PG (ref 26.1–32.9)
MCHC RBC AUTO-ENTMCNC: 34.1 G/DL (ref 31.4–35)
MCV RBC AUTO: 88.6 FL (ref 79.6–97.8)
MONOCYTES # BLD: 0.3 K/UL (ref 0.1–1.3)
MONOCYTES NFR BLD: 14 % (ref 4–12)
NEUTS SEG # BLD: 1.5 K/UL (ref 1.7–8.2)
NEUTS SEG NFR BLD: 73 % (ref 43–78)
NRBC # BLD: 0 K/UL (ref 0–0.2)
NRBC BLD-RTO: 0 PER 100 WBC (ref 0–2)
PLATELET # BLD AUTO: 96 K/UL (ref 150–450)
PMV BLD AUTO: 10.9 FL (ref 10.8–14.1)
POTASSIUM SERPL-SCNC: 4.1 MMOL/L (ref 3.5–5.1)
PROT SERPL-MCNC: 6.4 G/DL (ref 6.3–8.2)
RBC # BLD AUTO: 2.81 M/UL (ref 4.05–5.25)
SODIUM SERPL-SCNC: 141 MMOL/L (ref 136–145)
WBC # BLD AUTO: 2.1 K/UL (ref 4.3–11.1)

## 2017-09-22 PROCEDURE — 83735 ASSAY OF MAGNESIUM: CPT | Performed by: INTERNAL MEDICINE

## 2017-09-22 PROCEDURE — 85025 COMPLETE CBC W/AUTO DIFF WBC: CPT | Performed by: INTERNAL MEDICINE

## 2017-09-22 PROCEDURE — 80053 COMPREHEN METABOLIC PANEL: CPT | Performed by: INTERNAL MEDICINE

## 2017-09-26 ENCOUNTER — HOSPITAL ENCOUNTER (OUTPATIENT)
Dept: PHYSICAL THERAPY | Age: 57
Discharge: HOME | End: 2017-09-26
Payer: COMMERCIAL

## 2017-09-26 PROCEDURE — 97110 THERAPEUTIC EXERCISES: CPT

## 2017-09-28 ENCOUNTER — HOSPITAL ENCOUNTER (OUTPATIENT)
Dept: PET IMAGING | Age: 57
Discharge: HOME | End: 2017-09-28
Payer: COMMERCIAL

## 2017-09-28 DIAGNOSIS — C85.88 MARGINAL ZONE LYMPHOMA OF LYMPH NODES OF MULTIPLE SITES (HCC): ICD-10-CM

## 2017-09-28 PROCEDURE — 74011636320 HC RX REV CODE- 636/320: Performed by: INTERNAL MEDICINE

## 2017-09-28 PROCEDURE — A9552 F18 FDG: HCPCS

## 2017-09-28 RX ADMIN — DIATRIZOATE MEGLUMINE AND DIATRIZOATE SODIUM 10 ML: 660; 100 LIQUID ORAL; RECTAL at 11:50

## 2017-10-02 ENCOUNTER — HOSPITAL ENCOUNTER (OUTPATIENT)
Dept: MRI IMAGING | Age: 57
Discharge: HOME | End: 2017-10-02
Attending: INTERNAL MEDICINE
Payer: COMMERCIAL

## 2017-10-02 DIAGNOSIS — C85.88 MARGINAL ZONE LYMPHOMA OF LYMPH NODES OF MULTIPLE SITES (HCC): ICD-10-CM

## 2017-10-02 PROCEDURE — A9577 INJ MULTIHANCE: HCPCS | Performed by: INTERNAL MEDICINE

## 2017-10-02 PROCEDURE — 74011250636 HC RX REV CODE- 250/636: Performed by: INTERNAL MEDICINE

## 2017-10-02 PROCEDURE — 70553 MRI BRAIN STEM W/O & W/DYE: CPT

## 2017-10-02 RX ORDER — SODIUM CHLORIDE 0.9 % (FLUSH) 0.9 %
10 SYRINGE (ML) INJECTION
Status: COMPLETED | OUTPATIENT
Start: 2017-10-02 | End: 2017-10-02

## 2017-10-02 RX ORDER — HEPARIN 100 UNIT/ML
500 SYRINGE INTRAVENOUS ONCE
Status: COMPLETED | OUTPATIENT
Start: 2017-10-02 | End: 2017-10-02

## 2017-10-02 RX ADMIN — Medication 10 ML: at 13:10

## 2017-10-02 RX ADMIN — GADOBENATE DIMEGLUMINE 20 ML: 529 INJECTION, SOLUTION INTRAVENOUS at 13:10

## 2017-10-02 RX ADMIN — Medication 500 UNITS: at 13:30

## 2017-10-03 ENCOUNTER — PATIENT OUTREACH (OUTPATIENT)
Dept: CASE MANAGEMENT | Age: 57
End: 2017-10-03

## 2017-10-03 ENCOUNTER — HOSPITAL ENCOUNTER (OUTPATIENT)
Dept: LAB | Age: 57
Discharge: HOME | End: 2017-10-03
Payer: COMMERCIAL

## 2017-10-03 DIAGNOSIS — C85.88 MARGINAL ZONE LYMPHOMA OF LYMPH NODES OF MULTIPLE SITES (HCC): ICD-10-CM

## 2017-10-03 LAB
ALBUMIN SERPL-MCNC: 3.7 G/DL (ref 3.5–5)
ALBUMIN/GLOB SERPL: 1.3 {RATIO} (ref 1.2–3.5)
ALP SERPL-CCNC: 107 U/L (ref 50–136)
ALT SERPL-CCNC: 29 U/L (ref 12–65)
ANION GAP SERPL CALC-SCNC: 4 MMOL/L (ref 7–16)
AST SERPL-CCNC: 18 U/L (ref 15–37)
BASOPHILS # BLD: 0 K/UL (ref 0–0.2)
BASOPHILS NFR BLD: 1 % (ref 0–2)
BILIRUB SERPL-MCNC: 0.3 MG/DL (ref 0.2–1.1)
BUN SERPL-MCNC: 23 MG/DL (ref 6–23)
CALCIUM SERPL-MCNC: 9.3 MG/DL (ref 8.3–10.4)
CHLORIDE SERPL-SCNC: 105 MMOL/L (ref 98–107)
CO2 SERPL-SCNC: 32 MMOL/L (ref 21–32)
CREAT SERPL-MCNC: 0.66 MG/DL (ref 0.6–1)
DIFFERENTIAL METHOD BLD: ABNORMAL
EOSINOPHIL # BLD: 0.1 K/UL (ref 0–0.8)
EOSINOPHIL NFR BLD: 3 % (ref 0.5–7.8)
ERYTHROCYTE [DISTWIDTH] IN BLOOD BY AUTOMATED COUNT: 19 % (ref 11.9–14.6)
GLOBULIN SER CALC-MCNC: 2.8 G/DL (ref 2.3–3.5)
GLUCOSE SERPL-MCNC: 104 MG/DL (ref 65–100)
HCT VFR BLD AUTO: 27.1 % (ref 35.8–46.3)
HGB BLD-MCNC: 9.3 G/DL (ref 11.7–15.4)
LYMPHOCYTES # BLD: 0.2 K/UL (ref 0.5–4.6)
LYMPHOCYTES NFR BLD: 7 % (ref 13–44)
MAGNESIUM SERPL-MCNC: 1.9 MG/DL (ref 1.8–2.4)
MCH RBC QN AUTO: 30.7 PG (ref 26.1–32.9)
MCHC RBC AUTO-ENTMCNC: 34.3 G/DL (ref 31.4–35)
MCV RBC AUTO: 89.4 FL (ref 79.6–97.8)
MONOCYTES # BLD: 0.4 K/UL (ref 0.1–1.3)
MONOCYTES NFR BLD: 15 % (ref 4–12)
NEUTS SEG # BLD: 2.1 K/UL (ref 1.7–8.2)
NEUTS SEG NFR BLD: 75 % (ref 43–78)
NRBC # BLD: 0 K/UL (ref 0–0.2)
PLATELET # BLD AUTO: 95 K/UL (ref 150–450)
PMV BLD AUTO: 9.3 FL (ref 10.8–14.1)
POTASSIUM SERPL-SCNC: 4.3 MMOL/L (ref 3.5–5.1)
PROT SERPL-MCNC: 6.5 G/DL (ref 6.3–8.2)
RBC # BLD AUTO: 3.03 M/UL (ref 4.05–5.25)
SODIUM SERPL-SCNC: 141 MMOL/L (ref 136–145)
WBC # BLD AUTO: 2.9 K/UL (ref 4.3–11.1)

## 2017-10-03 PROCEDURE — 85025 COMPLETE CBC W/AUTO DIFF WBC: CPT | Performed by: INTERNAL MEDICINE

## 2017-10-03 PROCEDURE — 80053 COMPREHEN METABOLIC PANEL: CPT | Performed by: INTERNAL MEDICINE

## 2017-10-03 PROCEDURE — 83735 ASSAY OF MAGNESIUM: CPT | Performed by: INTERNAL MEDICINE

## 2017-10-03 NOTE — PROGRESS NOTES
Pt was seen and labs reviewed by Dr. Nori Matos. Will proceed with cycle 6 BR tomorrow despite platelet count 46Q. Dr. Nori Matos went over PET scan results and MRI results with pt. Will do Echo to further look into enlarged heart as seen on PET scan. Pt to return to clinic in 4 weeks to start 1st dose of Maintenance therapy. Will do CT scan in about 3 months. Navigation to cont to follow.

## 2017-10-04 ENCOUNTER — HOSPITAL ENCOUNTER (OUTPATIENT)
Dept: INFUSION THERAPY | Age: 57
Discharge: HOME | End: 2017-10-04
Payer: COMMERCIAL

## 2017-10-04 VITALS
HEART RATE: 68 BPM | RESPIRATION RATE: 16 BRPM | DIASTOLIC BLOOD PRESSURE: 74 MMHG | WEIGHT: 261 LBS | TEMPERATURE: 98.4 F | SYSTOLIC BLOOD PRESSURE: 127 MMHG | OXYGEN SATURATION: 100 % | BODY MASS INDEX: 47.74 KG/M2

## 2017-10-04 DIAGNOSIS — C85.88 MARGINAL ZONE LYMPHOMA OF LYMPH NODES OF MULTIPLE SITES (HCC): ICD-10-CM

## 2017-10-04 PROCEDURE — 77030003560 HC NDL HUBR BARD -A

## 2017-10-04 PROCEDURE — 96415 CHEMO IV INFUSION ADDL HR: CPT

## 2017-10-04 PROCEDURE — 96413 CHEMO IV INFUSION 1 HR: CPT

## 2017-10-04 PROCEDURE — 74011250637 HC RX REV CODE- 250/637: Performed by: INTERNAL MEDICINE

## 2017-10-04 PROCEDURE — 96411 CHEMO IV PUSH ADDL DRUG: CPT

## 2017-10-04 PROCEDURE — 74011000258 HC RX REV CODE- 258: Performed by: INTERNAL MEDICINE

## 2017-10-04 PROCEDURE — 96375 TX/PRO/DX INJ NEW DRUG ADDON: CPT

## 2017-10-04 PROCEDURE — 74011250636 HC RX REV CODE- 250/636: Performed by: INTERNAL MEDICINE

## 2017-10-04 RX ORDER — DIPHENHYDRAMINE HYDROCHLORIDE 50 MG/ML
50 INJECTION, SOLUTION INTRAMUSCULAR; INTRAVENOUS AS NEEDED
Status: ACTIVE | OUTPATIENT
Start: 2017-10-04 | End: 2017-10-04

## 2017-10-04 RX ORDER — HYDROCORTISONE SODIUM SUCCINATE 100 MG/2ML
100 INJECTION, POWDER, FOR SOLUTION INTRAMUSCULAR; INTRAVENOUS AS NEEDED
Status: ACTIVE | OUTPATIENT
Start: 2017-10-04 | End: 2017-10-04

## 2017-10-04 RX ORDER — HEPARIN 100 UNIT/ML
300-500 SYRINGE INTRAVENOUS AS NEEDED
Status: DISPENSED | OUTPATIENT
Start: 2017-10-04 | End: 2017-10-04

## 2017-10-04 RX ORDER — DIPHENHYDRAMINE HYDROCHLORIDE 50 MG/ML
50 INJECTION, SOLUTION INTRAMUSCULAR; INTRAVENOUS ONCE
Status: COMPLETED | OUTPATIENT
Start: 2017-10-04 | End: 2017-10-04

## 2017-10-04 RX ORDER — ALBUTEROL SULFATE 0.83 MG/ML
2.5 SOLUTION RESPIRATORY (INHALATION) AS NEEDED
Status: ACTIVE | OUTPATIENT
Start: 2017-10-04 | End: 2017-10-04

## 2017-10-04 RX ORDER — ACETAMINOPHEN 325 MG/1
650 TABLET ORAL ONCE
Status: COMPLETED | OUTPATIENT
Start: 2017-10-04 | End: 2017-10-04

## 2017-10-04 RX ORDER — EPINEPHRINE 1 MG/ML
0.3 INJECTION, SOLUTION, CONCENTRATE INTRAVENOUS AS NEEDED
Status: ACTIVE | OUTPATIENT
Start: 2017-10-04 | End: 2017-10-04

## 2017-10-04 RX ORDER — SODIUM CHLORIDE 0.9 % (FLUSH) 0.9 %
10 SYRINGE (ML) INJECTION AS NEEDED
Status: ACTIVE | OUTPATIENT
Start: 2017-10-04 | End: 2017-10-04

## 2017-10-04 RX ORDER — DEXAMETHASONE SODIUM PHOSPHATE 100 MG/10ML
10 INJECTION INTRAMUSCULAR; INTRAVENOUS ONCE
Status: COMPLETED | OUTPATIENT
Start: 2017-10-04 | End: 2017-10-04

## 2017-10-04 RX ORDER — ONDANSETRON 2 MG/ML
8 INJECTION INTRAMUSCULAR; INTRAVENOUS ONCE
Status: COMPLETED | OUTPATIENT
Start: 2017-10-04 | End: 2017-10-04

## 2017-10-04 RX ADMIN — ONDANSETRON 8 MG: 2 INJECTION INTRAMUSCULAR; INTRAVENOUS at 12:22

## 2017-10-04 RX ADMIN — ACETAMINOPHEN 650 MG: 325 TABLET, FILM COATED ORAL at 08:32

## 2017-10-04 RX ADMIN — BENDAMUSTINE HYDROCHLORIDE 212.5 MG: 25 INJECTION, SOLUTION INTRAVENOUS at 13:23

## 2017-10-04 RX ADMIN — RITUXIMAB 885 MG: 10 INJECTION, SOLUTION INTRAVENOUS at 09:20

## 2017-10-04 RX ADMIN — DEXAMETHASONE SODIUM PHOSPHATE 10 MG: 10 INJECTION INTRAMUSCULAR; INTRAVENOUS at 12:25

## 2017-10-04 RX ADMIN — DIPHENHYDRAMINE HYDROCHLORIDE 50 MG: 50 INJECTION, SOLUTION INTRAMUSCULAR; INTRAVENOUS at 08:34

## 2017-10-04 RX ADMIN — SODIUM CHLORIDE 500 ML: 900 INJECTION, SOLUTION INTRAVENOUS at 08:38

## 2017-10-04 RX ADMIN — Medication 10 ML: at 08:32

## 2017-10-04 RX ADMIN — SODIUM CHLORIDE, PRESERVATIVE FREE 500 UNITS: 5 INJECTION INTRAVENOUS at 13:39

## 2017-10-04 NOTE — PROGRESS NOTES
[facility]ux[facility]Bendeka. Tolerated well. Encouraged precautions with right arm sticks and BP d/t lymph node excision and risk of lymphedema. Issues or concerns during appointment: none. Aware of next appointment on 10/5/17 @ 8547. Discharged ambulatory accompanied by son.

## 2017-10-05 ENCOUNTER — APPOINTMENT (OUTPATIENT)
Dept: PHYSICAL THERAPY | Age: 57
End: 2017-10-05
Payer: COMMERCIAL

## 2017-10-05 ENCOUNTER — HOSPITAL ENCOUNTER (OUTPATIENT)
Dept: INFUSION THERAPY | Age: 57
Discharge: HOME | End: 2017-10-05
Payer: COMMERCIAL

## 2017-10-05 VITALS
OXYGEN SATURATION: 97 % | RESPIRATION RATE: 16 BRPM | HEART RATE: 90 BPM | BODY MASS INDEX: 48.29 KG/M2 | SYSTOLIC BLOOD PRESSURE: 96 MMHG | TEMPERATURE: 99.2 F | DIASTOLIC BLOOD PRESSURE: 45 MMHG | WEIGHT: 264 LBS

## 2017-10-05 DIAGNOSIS — C85.88 MARGINAL ZONE LYMPHOMA OF LYMPH NODES OF MULTIPLE SITES (HCC): ICD-10-CM

## 2017-10-05 PROCEDURE — 96409 CHEMO IV PUSH SNGL DRUG: CPT

## 2017-10-05 PROCEDURE — 74011250636 HC RX REV CODE- 250/636: Performed by: INTERNAL MEDICINE

## 2017-10-05 PROCEDURE — 74011000258 HC RX REV CODE- 258: Performed by: INTERNAL MEDICINE

## 2017-10-05 PROCEDURE — 96375 TX/PRO/DX INJ NEW DRUG ADDON: CPT

## 2017-10-05 PROCEDURE — 96377 APPLICATON ON-BODY INJECTOR: CPT

## 2017-10-05 RX ORDER — HEPARIN 100 UNIT/ML
300-500 SYRINGE INTRAVENOUS AS NEEDED
Status: DISPENSED | OUTPATIENT
Start: 2017-10-05 | End: 2017-10-05

## 2017-10-05 RX ORDER — ONDANSETRON 2 MG/ML
8 INJECTION INTRAMUSCULAR; INTRAVENOUS ONCE
Status: COMPLETED | OUTPATIENT
Start: 2017-10-05 | End: 2017-10-05

## 2017-10-05 RX ORDER — SODIUM CHLORIDE 0.9 % (FLUSH) 0.9 %
10 SYRINGE (ML) INJECTION AS NEEDED
Status: ACTIVE | OUTPATIENT
Start: 2017-10-05 | End: 2017-10-05

## 2017-10-05 RX ORDER — DEXAMETHASONE SODIUM PHOSPHATE 100 MG/10ML
10 INJECTION INTRAMUSCULAR; INTRAVENOUS ONCE
Status: COMPLETED | OUTPATIENT
Start: 2017-10-05 | End: 2017-10-05

## 2017-10-05 RX ADMIN — ONDANSETRON 8 MG: 2 INJECTION INTRAMUSCULAR; INTRAVENOUS at 07:42

## 2017-10-05 RX ADMIN — Medication 10 ML: at 08:29

## 2017-10-05 RX ADMIN — PEGFILGRASTIM 6 MG: KIT SUBCUTANEOUS at 08:25

## 2017-10-05 RX ADMIN — SODIUM CHLORIDE, PRESERVATIVE FREE 500 UNITS: 5 INJECTION INTRAVENOUS at 08:30

## 2017-10-05 RX ADMIN — Medication 10 ML: at 07:40

## 2017-10-05 RX ADMIN — SODIUM CHLORIDE 500 ML: 900 INJECTION, SOLUTION INTRAVENOUS at 07:41

## 2017-10-05 RX ADMIN — BENDAMUSTINE HYDROCHLORIDE 212.5 MG: 25 INJECTION, SOLUTION INTRAVENOUS at 08:11

## 2017-10-05 RX ADMIN — DEXAMETHASONE SODIUM PHOSPHATE 10 MG: 10 INJECTION INTRAMUSCULAR; INTRAVENOUS at 07:43

## 2017-10-05 NOTE — PROGRESS NOTES
Pt arrived ambulatory to [facility] with port previously accessed with dressing dry and intact and with good blood return. NS infusing. Pre meds given IV as ordered. Bendeka infused over 10 minutes. Neulasta wearable applied to upper left arm. Port flushed and packed with heparin and de accessed. Pt aware of next appt on 10/31/17 at 0915. Pt discharged ambulatory.

## 2017-10-10 ENCOUNTER — HOSPITAL ENCOUNTER (OUTPATIENT)
Dept: PHYSICAL THERAPY | Age: 57
Discharge: HOME | End: 2017-10-10
Payer: COMMERCIAL

## 2017-10-10 PROCEDURE — 97110 THERAPEUTIC EXERCISES: CPT

## 2017-10-10 NOTE — PROGRESS NOTES
Esme Mccormack  : 1960 [facility] [address] [facility]  [place], [place], [address] [facility]  [phone]   [phone]          OUTPATIENT PHYSICAL THERAPY:Daily Note 10/10/2017    ICD-10: Treatment Diagnosis: I 89.0 lymphedema not elsewhere classified  M 62.81 muscle weakness generalized  Precautions/Allergies:   Review of patient's allergies indicates no known allergies. Fall Risk Score: 1 (? 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 Brianna Sunshine presents for a lymphedema assessment due to edema of bilateral lower extremities. She has pitting edema in the bilateral lower extremities. She has previously had short stretch bandaging and MLD following knee surgery 3 years ago. She would like to try this again even though this is not orthopedic post surgical swelling. She was diagnosed with lymphoma in March of this year. She recently completed her second chemo of 6 planned. She will have a PET scan 17. Progress with chemo has been limited due to lab values being abnormal.  She is uncomfortable due to the edema. She will have a paracentesis 17. We will initiate edema management and progress slowly dependent on her tolerance to compression and her response to treatment. We have now initiated conditioning and strengthening. She is independent with home management of edema. PROBLEM LIST (Impacting functional limitations):  1. Decreased Strength  2. Increased Pain  3. Decreased Activity Tolerance  4. Increased Fatigue  5. Increased Shortness of Breath  6. Decreased Flexibility/Joint Mobility  7. Edema/Girth  8. Decreased Knowledge of Precautions  9.  Decreased [place] with Home Exercise Program INTERVENTIONS PLANNED:  1. Decongestion Therapy  2. Home Exercise Program (HEP)  3. Range of Motion (ROM)  4. Therapeutic Exercise/Strengthening   TREATMENT PLAN:  Effective Dates: 8/14/17 TO 11/7/17. Frequency/Duration: 2 times a week for 12 weeks dependent on chemo schedule and counts, the patient has been out due to hospitalization. GOALS: (Goals have been discussed and agreed upon with patient.)  Short-Term Functional Goals: Time Frame: 4 weeks  1. The patient will have knowledge of signs and symptoms of lymphedema and how to manage within 4 weeks. Met   2. The patient will tolerate short stretch bandaging of bilateral lower extremities for reduction of girth within 4 weeks. Met   3. The patient and caregiver will be independent with compression bandaging within 4 weeks. Met   4. The patient will improve her 6 minute walk by 60 feet within 4 weeks. Met   5. The patient will report a fatigue of 3 or less within 4 weeks. Met   Discharge Goals: Time Frame: 8 weeks  1. The patient will have a girth decrease of at least 5 cm in the calf within 8 weeks. 2. The patient will be fit with static compression garments within 8 weeks. Met   3. The patient and caregiver will be independent with edema management within 8 weeks. Met  4. The patient will transition to Healthy Self within 8 weeks. 5.   Rehabilitation Potential For Stated Goals: [address] [place] [address]'s therapy, I certify that the treatment plan above will be carried out by a therapist or under their direction. Thank you for this referral,  Anival Khan PT     Referring Physician Signature: Alpheus Dubin, MD              Date                    The information in this section was collected on 5/24/17 (except where otherwise noted).   HISTORY:   History of Present Injury/Illness (Reason for Referral):  Lymphoma, ascites, bilateral lower extremity pitting edema  Past Medical History/Comorbidities:   Ms. Roseanne Bumpers  has a past medical history of Anemia; Arthritis; Basal cell carcinoma; Chronic pain; Hypertension (10/4/2011); Morbid obesity ([address] Murmur; Non Hodgkin's lymphoma ([address] (3/30/2017); Other ill-defined conditions(799.89); Overactive bladder; Rheumatic fever; Shingles; Thromboembolus ([address] (x2); Thyroid disease; and Unspecified adverse effect of anesthesia. She also has no past medical history of Aneurysm ([address] Arrhythmia; Asthma; Autoimmune disease ([address] CAD (coronary artery disease); Chronic kidney disease; Coagulation defects; COPD; Diabetes ([address] Difficult intubation; GERD (gastroesophageal reflux disease); Heart failure ([address] Liver disease; Malignant hyperthermia due to anesthesia; Nausea & vomiting; Pseudocholinesterase deficiency; Psychiatric disorder; PUD (peptic ulcer disease); Seizures ([address] Stroke [facility]); or Unspecified sleep apnea. Ms. Jailyn Warner  has a past surgical history that includes hc cholecystectomy fnc41; anesth,achilles tendon surg (2/10/2011); cholecystectomy (0142); orthopaedic (2012); orthopaedic (2013); and vascular access.    Past Medical History:   Diagnosis Date    Anemia     in high school, \"received gamma globulin twice a week for awhile\"    Arthritis     osteo-r knee    Basal cell carcinoma     Followed by Dermatology    Chronic pain     KNEEs    Hypertension 10/4/2011    medication    Morbid obesity ([address]     Murmur     \"had it my whole life\", did not appreciate murmur 9/12/2011 during assessment    Non Hodgkin's lymphoma ([address] 3/30/2017    Other ill-defined conditions(799.89)     skin bumps-med as needed    Overactive bladder     Rheumatic fever     Possibly as a child    Shingles     Thromboembolus ([address] x2    LLE-calf-1990?-only took ASA-resolved in less than a wk-\"a little burned area-not examined\"    Thyroid disease     hypo-medication    Unspecified adverse effect of anesthesia     pt reports waking several times with knee surgery-spinal     Past Surgical History:   Procedure Laterality Date    [facility]. CHOLECYSTECTOMY FNC41      HX CHOLECYSTECTOMY  1983    HX ORTHOPAEDIC  2012    right TKA    HX ORTHOPAEDIC  2013    left TKA. Dr. Shirley Avila.  HX VASCULAR ACCESS      [place] ANESTH,ACHILLES TENDON SURG  2/10/2011    LEFT. Dr. Jony Peña. Social History/Living Environment:     lives with family, 2 flights of stairs  Prior Level of Function/Work/Activity:    Dominant Side:         RIGHT  Current Medications:       Current Outpatient Prescriptions:     levothyroxine (SYNTHROID) 150 mcg tablet, Take 1 Tab by mouth Daily (before breakfast). , Disp: 90 Tab, Rfl: 3    magnesium oxide (MAG-OX) 400 mg tablet, Take 1 Tab by mouth two (2) times a day., Disp: 60 Tab, Rfl: 1    magic mouthwash (ALEXSANDER) susp, Take 10 mL by mouth every four (4) hours as needed. , Disp: 2 Bottle, Rfl: 2    nystatin (MYCOSTATIN) powder, Apply  to affected area three (3) times daily. , Disp: 60 g, Rfl: 2    fluticasone (FLONASE) 50 mcg/actuation nasal spray, 2 Sprays by Both Nostrils route daily. , Disp: 1 Bottle, Rfl: 1    fluconazole (DIFLUCAN) 200 mg tablet, Take 1 Tab by mouth daily. , Disp: 90 Tab, Rfl: 0    acyclovir (ZOVIRAX) 400 mg tablet, Take 1 Tab by mouth two (2) times a day., Disp: 60 Tab, Rfl: 3    allopurinol (ZYLOPRIM) 300 mg tablet, Take 1 Tab by mouth two (2) times a day. (Patient taking differently: Take 300 mg by mouth daily.), Disp: 90 Tab, Rfl: 2    loratadine (CLARITIN) 10 mg tablet, Take 10 mg by mouth., Disp: , Rfl:     pregabalin (LYRICA) 100 mg capsule, Take 1 Cap by mouth three (3) times daily. Max Daily Amount: 300 mg., Disp: 90 Cap, Rfl: 3    oxyCODONE (OXYIR) 5 mg capsule, Take 1-2 Caps by mouth every four (4) hours as needed. Max Daily Amount: 60 mg., Disp: 180 Cap, Rfl: 0    omeprazole (PRILOSEC) 20 mg capsule, TAKE 1 CAPSULE DAILY, Disp: 90 Cap, Rfl: 2    lidocaine-prilocaine (EMLA) topical cream, Apply  to affected area as needed for Pain.  Apply to port site 45-60 minutes prior to lab appt or infusion. , Disp: 30 g, Rfl: 0    promethazine (PHENERGAN) 25 mg tablet, Take 25 mg by mouth every six (6) hours as needed for Nausea. Unsure of dose, Disp: , Rfl:    Date Last Reviewed:  5/24/17   Number of Personal Factors/Comorbidities that affect the Plan of Care: 3+: HIGH COMPLEXITY   EXAMINATION:   Palpation:          Pitting edema, dry skin, loose skin  ROM:          Within functional limits. She previously has had bilateral knee replacements and an Achilles tendon repair on the left  Strength:          Grossly 4+/5 x 4 extremities  Functional Mobility:         Gait/Ambulation:  Independent with increased speed        Transfers: independent        Bed Mobility:  independent  Skin Integrity:          Intact, but dry. Edema/Girth:  pitting    Left Right    Initial Most Recent Initial Most Recent   Upper  Extremity           Lower  Extremity               Body Structures Involved:  1. Muscles  2. lymphatic system Body Functions Affected:  1. Sensory/Pain  2. Skin Related  3. lymphatic system Activities and Participation Affected:  1. General Tasks and Demands  2. Mobility  3. Self Care   Number of elements (examined above) that affect the Plan of Care: 4+: HIGH COMPLEXITY   CLINICAL PRESENTATION:   Presentation: Evolving clinical presentation with unstable and unpredictable characteristics: HIGH COMPLEXITY   CLINICAL DECISION MAKING:   Outcome Measure: Tool Used: NCCN Distress Thermometer   Score:  Initial:   Most Recent: X    Interpretation of Score: If greater than or equal to 8, then PHQ-9 Depression Scale Score   and JOON-7 Anxiety Scale Score  .   Tool Used: ECOG Performance Survey Score  Score:  Initial: 2 Most Recent:  1    Interpretation of Score:   0 Fully active, able to carry on all pre-disease performance without restriction   1 Restricted in physically strenuous activity but ambulatory and able to carry out work of a light or sedentary nature, e.g., light house work, office work   2 Ambulatory and capable of all selfcare but unable to carry out any work activities. Up and about more than 50% of waking hours   3 Capable of only limited selfcare, confined to bed or chair more than 50% of waking hours   4 Completely disabled. Cannot carry on any selfcare. Totally confined to bed or chair   5 Dead    Tool Used: 6-MINUTE WALK TEST  Score:  Initial: 1110 feet Most Recent: 1631 feet (Date: 9/26/17 )   Interpretation of Score: Normal range varies but is approximately 8837-0752 Feet      Distance walked: 1631 feet     Baseline End of Test   Heart Rate 93 116   Dyspnea (Luther Scale)     Fatigue (Luther Scale) 2 2   SpO2 98 98   /78 178/80     Score 2133 1206-0026 4161-5237 1279-853 852-427 426-16 15-0   Modifier CH CI CJ CK CL CM CN       Tool Used: Timed Up and Go (TUG)  Score:  Initial: 8 seconds Most Recent: 6 seconds (Date: 9/26/17 )   Interpretation of Score: The test measures, in seconds, the time taken by an individual to stand up from a standard arm chair (seat height 46 cm [18 in], arm height 65 cm [25.6 in]), walk a distance of 3 meters (118 in, approx 10 ft), turn, walk back to the chair and sit down. If the individual takes longer than 14 seconds to complete TUG, this indicates risk for falls. Score 7 7.5-10.5 11-14 14.5-17.5 18-21 21.5-24.5 25+   Modifier CH CI CJ CK CL CM CN         Tool Used: Lymphedema Life Impact Scale   Score:  Initial:  54 Most Recent:  (Date: 9/26/17 )   Interpretation of Score: The Lymphedema Life Impact Scale (LLIS) is a validated instrument that measures the physical, functional, and psychosocial concerns pertinent to patients with extremity lymphedema. The Scale's questionnaire is administered to patients to gauge impairments, activity limitations, and participation restrictions resulting from their lymphedema.   Score 0 1-13 14-26 27-40 41-54 55-67 68   Modifier CH CI CJ CK CL CM CN       Medical Necessity:   · Patient is expected to demonstrate progress in strength and edema management to increase independence with self care and houseold activity. Reason for Services/Other Comments:  · Patient continues to demonstrate capacity to improve strength and edema management which will increase independence. Use of outcome tool(s) and clinical judgement create a POC that gives a: Questionable prediction of patient's progress: MODERATE COMPLEXITY            TREATMENT:   (In addition to Assessment/Re-Assessment sessions the following treatments were rendered)  Pre-treatment Symptoms/Complaints:  Fatigue, pain weakness, lower extremity edema     Pain: Initial: 5/10         Post Session: 5 /10    52 min   patient reports she has an enlarged heart and is having an echo tomorrow. O2 97 HR 94  Fatigue 5/10        Walk 350'  O2 98   UBE level 1 x 4 min  Nustep level 1 x 7 min O2 99 HR  Walk 350'   Sit to stand x 10 reps  Long arc quads x 10 reps with 5 count hold  Bilateral upper extremity with 2# x 10 reps biceps curls, forward flexion, abduction, triceps extension   Long arc quads x 10 reps with 5 count hold        as above    Treatment/Session Assessment:    · Response to Treatment:  Tolerated the treatment well. · Compliance with Program/Exercises: Will assess as treatment progresses. · Recommendations/Intent for next treatment session: \"Next visit will focus on conditioning and strengthening\".        Total Treatment Duration:  PT Patient Time In/Time Out  Time In: 0835  Time Out: [place], PT

## 2017-10-11 ENCOUNTER — HOSPITAL ENCOUNTER (OUTPATIENT)
Dept: NON INVASIVE DIAGNOSTICS | Age: 57
Discharge: HOME | End: 2017-10-11
Attending: INTERNAL MEDICINE
Payer: COMMERCIAL

## 2017-10-11 DIAGNOSIS — C85.88 MARGINAL ZONE LYMPHOMA OF LYMPH NODES OF MULTIPLE SITES (HCC): ICD-10-CM

## 2017-10-11 PROCEDURE — 74011000250 HC RX REV CODE- 250: Performed by: INTERNAL MEDICINE

## 2017-10-11 PROCEDURE — 74011250636 HC RX REV CODE- 250/636: Performed by: INTERNAL MEDICINE

## 2017-10-11 PROCEDURE — C8929 TTE W OR WO FOL WCON,DOPPLER: HCPCS

## 2017-10-11 RX ADMIN — PERFLUTREN 1 ML: 6.52 INJECTION, SUSPENSION INTRAVENOUS at 09:00

## 2017-10-12 ENCOUNTER — APPOINTMENT (OUTPATIENT)
Dept: PHYSICAL THERAPY | Age: 57
End: 2017-10-12
Payer: COMMERCIAL

## 2017-10-17 ENCOUNTER — HOSPITAL ENCOUNTER (OUTPATIENT)
Dept: PHYSICAL THERAPY | Age: 57
Discharge: HOME | End: 2017-10-17
Payer: COMMERCIAL

## 2017-10-17 PROCEDURE — 97110 THERAPEUTIC EXERCISES: CPT

## 2017-10-17 NOTE — PROGRESS NOTES
Rahul Jara  : 1960 [facility] [address] [facility]  [place], [place], [address]  [phone]   [phone]          OUTPATIENT PHYSICAL THERAPY:Daily Note 10/17/2017    ICD-10: Treatment Diagnosis: I 89.0 lymphedema not elsewhere classified  M 62.81 muscle weakness generalized  Precautions/Allergies:   Review of patient's allergies indicates no known allergies. Fall Risk Score: 1 (? 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 Schwab presents for a lymphedema assessment due to edema of bilateral lower extremities. She has pitting edema in the bilateral lower extremities. She has previously had short stretch bandaging and MLD following knee surgery 3 years ago. She would like to try this again even though this is not orthopedic post surgical swelling. She was diagnosed with lymphoma in March of this year. She recently completed her second chemo of 6 planned. She will have a PET scan 17. Progress with chemo has been limited due to lab values being abnormal.  She is uncomfortable due to the edema. She will have a paracentesis 17. We will initiate edema management and progress slowly dependent on her tolerance to compression and her response to treatment. We have now initiated conditioning and strengthening. She is independent with home management of edema. PROBLEM LIST (Impacting functional limitations):  1. Decreased Strength  2. Increased Pain  3. Decreased Activity Tolerance  4. Increased Fatigue  5. Increased Shortness of Breath  6. Decreased Flexibility/Joint Mobility  7. Edema/Girth  8. Decreased Knowledge of Precautions  9.  Decreased Thomas with Home Exercise Program INTERVENTIONS PLANNED:  1. Decongestion Therapy  2. Home Exercise Program (HEP)  3. Range of Motion (ROM)  4. Therapeutic Exercise/Strengthening   TREATMENT PLAN:  Effective Dates: 8/14/17 TO 11/7/17. Frequency/Duration: 2 times a week for 12 weeks dependent on chemo schedule and counts  GOALS: (Goals have been discussed and agreed upon with patient.)  Short-Term Functional Goals: Time Frame: 4 weeks  1. The patient will have knowledge of signs and symptoms of lymphedema and how to manage within 4 weeks. Met   2. The patient will tolerate short stretch bandaging of bilateral lower extremities for reduction of girth within 4 weeks. Met   3. The patient and caregiver will be independent with compression bandaging within 4 weeks. Met   4. The patient will improve her 6 minute walk by 60 feet within 4 weeks. Met   5. The patient will report a fatigue of 3 or less within 4 weeks. Met   Discharge Goals: Time Frame: 8 weeks  1. The patient will have a girth decrease of at least 5 cm in the calf within 8 weeks. 2. The patient will be fit with static compression garments within 8 weeks. Met   3. The patient and caregiver will be independent with edema management within 8 weeks. Met  4. The patient will transition to Healthy Self within 8 weeks. 5.   Rehabilitation Potential For Stated Goals: [address] [place] [address]'s therapy, I certify that the treatment plan above will be carried out by a therapist or under their direction. Thank you for this referral,  Amrit Mcmullen PT     Referring Physician Signature: Jessica Chamorro MD              Date                    The information in this section was collected on 5/24/17 (except where otherwise noted). HISTORY:   History of Present Injury/Illness (Reason for Referral):  Lymphoma, ascites, bilateral lower extremity pitting edema  Past Medical History/Comorbidities:   Ms. Alfredo Quintana  has a past medical history of Anemia;  Arthritis; Basal cell carcinoma; Chronic pain; Hypertension (10/4/2011); Morbid obesity ([address] Murmur; Non Hodgkin's lymphoma ([address] (3/30/2017); Other ill-defined conditions(799.89); Overactive bladder; Rheumatic fever; Shingles; Thromboembolus ([address] (x2); Thyroid disease; and Unspecified adverse effect of anesthesia. She also has no past medical history of Aneurysm ([address] Arrhythmia; Asthma; Autoimmune disease ([address] CAD (coronary artery disease); Chronic kidney disease; Coagulation defects; COPD; Diabetes ([address] Difficult intubation; GERD (gastroesophageal reflux disease); Heart failure ([address] Liver disease; Malignant hyperthermia due to anesthesia; Nausea & vomiting; Pseudocholinesterase deficiency; Psychiatric disorder; PUD (peptic ulcer disease); Seizures ([address] Stroke [facility]); or Unspecified sleep apnea. Ms. Sanjuanita Carbajal  has a past surgical history that includes  cholecystectomy fnc41; anesth,achilles tendon surg (2/10/2011); cholecystectomy (9298); orthopaedic (2012); orthopaedic (2013); and vascular access.    Past Medical History:   Diagnosis Date    Anemia     in high school, \"received gamma globulin twice a week for awhile\"    Arthritis     osteo-r knee    Basal cell carcinoma     Followed by Dermatology    Chronic pain     KNEEs    Hypertension 10/4/2011    medication    Morbid obesity ([address]     Murmur     \"had it my whole life\", did not appreciate murmur 9/12/2011 during assessment    Non Hodgkin's lymphoma ([address] 3/30/2017    Other ill-defined conditions(799.89)     skin bumps-med as needed    Overactive bladder     Rheumatic fever     Possibly as a child    Shingles     Thromboembolus ([address] x2    LLE-calf-1990?-only took ASA-resolved in less than a wk-\"a little burned area-not examined\"    Thyroid disease     hypo-medication    Unspecified adverse effect of anesthesia     pt reports waking several times with knee surgery-spinal     Past Surgical History:   Procedure Laterality Date    [facility] CHOLECYST[facility] FNC41      HX CHOLECYSTECTOMY  1983    HX ORTHOPAEDIC  2012    right TKA    HX ORTHOPAEDIC  2013    left TKA. Dr. Murrell No.  HX VASCULAR ACCESS      HI ANESTH,ACHILLES TENDON SURG  2/10/2011    LEFT. Dr. Carole Stiles. Social History/Living Environment:     lives with family, 2 flights of stairs  Prior Level of Function/Work/Activity:    Dominant Side:         RIGHT  Current Medications:       Current Outpatient Prescriptions:     levothyroxine (SYNTHROID) 150 mcg tablet, Take 1 Tab by mouth Daily (before breakfast). , Disp: 90 Tab, Rfl: 3    magnesium oxide (MAG-OX) 400 mg tablet, Take 1 Tab by mouth two (2) times a day., Disp: 60 Tab, Rfl: 1    magic mouthwash (ALEXSANDER) susp, Take 10 mL by mouth every four (4) hours as needed. , Disp: 2 Bottle, Rfl: 2    nystatin (MYCOSTATIN) powder, Apply  to affected area three (3) times daily. , Disp: 60 g, Rfl: 2    fluticasone (FLONASE) 50 mcg/actuation nasal spray, 2 Sprays by Both Nostrils route daily. , Disp: 1 Bottle, Rfl: 1    fluconazole (DIFLUCAN) 200 mg tablet, Take 1 Tab by mouth daily. , Disp: 90 Tab, Rfl: 0    acyclovir (ZOVIRAX) 400 mg tablet, Take 1 Tab by mouth two (2) times a day., Disp: 60 Tab, Rfl: 3    allopurinol (ZYLOPRIM) 300 mg tablet, Take 1 Tab by mouth two (2) times a day. (Patient taking differently: Take 300 mg by mouth daily.), Disp: 90 Tab, Rfl: 2    loratadine (CLARITIN) 10 mg tablet, Take 10 mg by mouth., Disp: , Rfl:     pregabalin (LYRICA) 100 mg capsule, Take 1 Cap by mouth three (3) times daily. Max Daily Amount: 300 mg., Disp: 90 Cap, Rfl: 3    oxyCODONE (OXYIR) 5 mg capsule, Take 1-2 Caps by mouth every four (4) hours as needed. Max Daily Amount: 60 mg., Disp: 180 Cap, Rfl: 0    omeprazole (PRILOSEC) 20 mg capsule, TAKE 1 CAPSULE DAILY, Disp: 90 Cap, Rfl: 2    lidocaine-prilocaine (EMLA) topical cream, Apply  to affected area as needed for Pain. Apply to port site 45-60 minutes prior to lab appt or infusion. , Disp: 30 g, Rfl: 0   promethazine (PHENERGAN) 25 mg tablet, Take 25 mg by mouth every six (6) hours as needed for Nausea. Unsure of dose, Disp: , Rfl:    Date Last Reviewed:  5/24/17   Number of Personal Factors/Comorbidities that affect the Plan of Care: 3+: HIGH COMPLEXITY   EXAMINATION:   Palpation:          Pitting edema, dry skin, loose skin  ROM:          Within functional limits. She previously has had bilateral knee replacements and an Achilles tendon repair on the left  Strength:          Grossly 4+/5 x 4 extremities  Functional Mobility:         Gait/Ambulation:  Independent with increased speed        Transfers: independent        Bed Mobility:  independent  Skin Integrity:          Intact, but dry. Edema/Girth:  pitting    Left Right    Initial Most Recent Initial Most Recent   Upper  Extremity           Lower  Extremity               Body Structures Involved:  1. Muscles  2. lymphatic system Body Functions Affected:  1. Sensory/Pain  2. Skin Related  3. lymphatic system Activities and Participation Affected:  1. General Tasks and Demands  2. Mobility  3. Self Care   Number of elements (examined above) that affect the Plan of Care: 4+: HIGH COMPLEXITY   CLINICAL PRESENTATION:   Presentation: Evolving clinical presentation with unstable and unpredictable characteristics: HIGH COMPLEXITY   CLINICAL DECISION MAKING:   Outcome Measure: Tool Used: NCCN Distress Thermometer   Score:  Initial:   Most Recent: X    Interpretation of Score: If greater than or equal to 8, then PHQ-9 Depression Scale Score   and JOON-7 Anxiety Scale Score  .   Tool Used: ECOG Performance Survey Score  Score:  Initial: 2 Most Recent:  1    Interpretation of Score:   0 Fully active, able to carry on all pre-disease performance without restriction   1 Restricted in physically strenuous activity but ambulatory and able to carry out work of a light or sedentary nature, e.g., light house work, office work   2 Ambulatory and capable of all selfcare but unable to carry out any work activities. Up and about more than 50% of waking hours   3 Capable of only limited selfcare, confined to bed or chair more than 50% of waking hours   4 Completely disabled. Cannot carry on any selfcare. Totally confined to bed or chair   5 Dead    Tool Used: 6-MINUTE WALK TEST  Score:  Initial: 1110 feet Most Recent: 1631 feet (Date: 9/26/17 )   Interpretation of Score: Normal range varies but is approximately 0574-2958 Feet      Distance walked: 1631 feet     Baseline End of Test   Heart Rate 93 116   Dyspnea (Luther Scale)     Fatigue (Luther Scale) 2 2   SpO2 98 98   /78 178/80     Score 2133 [id number] 1410-2626 [phone] 426-16 15-0   Modifier CH CI CJ CK CL CM CN       Tool Used: Timed Up and Go (TUG)  Score:  Initial: 8 seconds Most Recent: 6 seconds (Date: 9/26/17 )   Interpretation of Score: The test measures, in seconds, the time taken by an individual to stand up from a standard arm chair (seat height 46 cm [18 in], arm height 65 cm [25.6 in]), walk a distance of 3 meters (118 in, approx 10 ft), turn, walk back to the chair and sit down. If the individual takes longer than 14 seconds to complete TUG, this indicates risk for falls. Score 7 7.5-10.5 11-14 14.5-17.5 18-21 21.5-24.5 25+   Modifier CH CI CJ CK CL CM CN         Tool Used: Lymphedema Life Impact Scale   Score:  Initial:  54 Most Recent:  (Date: 9/26/17 )   Interpretation of Score: The Lymphedema Life Impact Scale (LLIS) is a validated instrument that measures the physical, functional, and psychosocial concerns pertinent to patients with extremity lymphedema. The Scale's questionnaire is administered to patients to gauge impairments, activity limitations, and participation restrictions resulting from their lymphedema.   Score 0 1-13 14-26 27-40 41-54 55-67 68   Modifier CH CI CJ CK CL CM CN       Medical Necessity:   · Patient is expected to demonstrate progress in strength and edema management to increase independence with self care and houseold activity. Reason for Services/Other Comments:  · Patient continues to demonstrate capacity to improve strength and edema management which will increase independence. Use of outcome tool(s) and clinical judgement create a POC that gives a: Questionable prediction of patient's progress: MODERATE COMPLEXITY            TREATMENT:   (In addition to Assessment/Re-Assessment sessions the following treatments were rendered)  Pre-treatment Symptoms/Complaints:  Fatigue, pain weakness, lower extremity edema     Pain: Initial: 3/10         Post Session: 3 /10    44 min   patient reports she has an enlarged left ventricle and is waiting for her doctor to advise her. O2 98 HR 90  /76  Fatigue 2/10        Walk 350'  O2 97   UBE level 1 x 4 min O2 100   Walk 350' O2 99   Nustep level 1 x 7 min O2 99 HR 99  Sit to stand x 10 reps  Bilateral upper extremity with 2# x 10 reps biceps curls, forward flexion, abduction, triceps extension O2 99   Long arc quads x 10 reps with 5 count hold  Allowed for slow pace and multiple rest breaks. as above    Treatment/Session Assessment:    · Response to Treatment:  Tolerated the treatment well. Allowed for multiple rest breaks. · Compliance with Program/Exercises: Will assess as treatment progresses. · Recommendations/Intent for next treatment session: \"Next visit will focus on conditioning and strengthening\".        Total Treatment Duration:  PT Patient Time In/Time Out  Time In: 0801  Time Out: 0845    Stewart Cushing, PT

## 2017-10-19 ENCOUNTER — HOSPITAL ENCOUNTER (OUTPATIENT)
Dept: PHYSICAL THERAPY | Age: 57
Discharge: HOME | End: 2017-10-19
Payer: COMMERCIAL

## 2017-10-19 PROCEDURE — 97110 THERAPEUTIC EXERCISES: CPT

## 2017-10-19 NOTE — PROGRESS NOTES
Kaleb Boyd  : [facility]  [place], [place], [address]  [phone]   [phone]          OUTPATIENT PHYSICAL THERAPY:Daily Note 10/19/2017    ICD-10: Treatment Diagnosis: I 89.0 lymphedema not elsewhere classified  M 62.81 muscle weakness generalized  Precautions/Allergies:   Review of patient's allergies indicates no known allergies. Fall Risk Score: 1 (? 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 Anna Hoffman presents for a lymphedema assessment due to edema of bilateral lower extremities. She has pitting edema in the bilateral lower extremities. She has previously had short stretch bandaging and MLD following knee surgery 3 years ago. She would like to try this again even though this is not orthopedic post surgical swelling. She was diagnosed with lymphoma in March of this year. She recently completed her second chemo of 6 planned. She will have a PET scan 17. Progress with chemo has been limited due to lab values being abnormal.  She is uncomfortable due to the edema. She will have a paracentesis 17. We will initiate edema management and progress slowly dependent on her tolerance to compression and her response to treatment. We have now initiated conditioning and strengthening. She is independent with home management of edema. PROBLEM LIST (Impacting functional limitations):  1. Decreased Strength  2. Increased Pain  3. Decreased Activity Tolerance  4. Increased Fatigue  5. Increased Shortness of Breath  6. Decreased Flexibility/Joint Mobility  7. Edema/Girth  8. Decreased Knowledge of Precautions  9.  Decreased [place] with Home Exercise Program INTERVENTIONS PLANNED:  1. Decongestion Therapy  2. Home Exercise Program (HEP)  3. Range of Motion (ROM)  4. Therapeutic Exercise/Strengthening   TREATMENT PLAN:  Effective Dates: 8/14/17 TO 11/7/17. Frequency/Duration: 2 times a week for 12 weeks dependent on chemo schedule and counts  GOALS: (Goals have been discussed and agreed upon with patient.)  Short-Term Functional Goals: Time Frame: 4 weeks  1. The patient will have knowledge of signs and symptoms of lymphedema and how to manage within 4 weeks. Met   2. The patient will tolerate short stretch bandaging of bilateral lower extremities for reduction of girth within 4 weeks. Met   3. The patient and caregiver will be independent with compression bandaging within 4 weeks. Met   4. The patient will improve her 6 minute walk by 60 feet within 4 weeks. Met   5. The patient will report a fatigue of 3 or less within 4 weeks. Met   Discharge Goals: Time Frame: 8 weeks  1. The patient will have a girth decrease of at least 5 cm in the calf within 8 weeks. 2. The patient will be fit with static compression garments within 8 weeks. Met   3. The patient and caregiver will be independent with edema management within 8 weeks. Met  4. The patient will transition to Healthy Self within 8 weeks. 5.   Rehabilitation Potential For Stated Goals: [address] [place] [address]'s therapy, I certify that the treatment plan above will be carried out by a therapist or under their direction. Thank you for this referral,  Clarissa Addison, PT     Referring Physician Signature: Lee Roldan MD              Date                    The information in this section was collected on 5/24/17 (except where otherwise noted). HISTORY:   History of Present Injury/Illness (Reason for Referral):  Lymphoma, ascites, bilateral lower extremity pitting edema  Past Medical History/Comorbidities:   Ms. Schwab  has a past medical history of Anemia;  Arthritis; Basal cell carcinoma; Chronic pain; Hypertension (10/4/2011); Morbid obesity ([address] Murmur; Non Hodgkin's lymphoma ([address] (3/30/2017); Other ill-defined conditions(799.89); Overactive bladder; Rheumatic fever; Shingles; Thromboembolus ([address] (x2); Thyroid disease; and Unspecified adverse effect of anesthesia. She also has no past medical history of Aneurysm ([address] Arrhythmia; Asthma; Autoimmune disease ([address] CAD (coronary artery disease); Chronic kidney disease; Coagulation defects; COPD; Diabetes ([address] Difficult intubation; GERD (gastroesophageal reflux disease); Heart failure ([address] Liver disease; Malignant hyperthermia due to anesthesia; Nausea & vomiting; Pseudocholinesterase deficiency; Psychiatric disorder; PUD (peptic ulcer disease); Seizures ([address] Stroke [facility]); or Unspecified sleep apnea. Ms. Roseanne Bumpers  has a past surgical history that includes  cholecystectomy fnc41; anesth,achilles tendon surg (2/10/2011); cholecystectomy (8417); orthopaedic (2012); orthopaedic (2013); and vascular access.    Past Medical History:   Diagnosis Date    Anemia     in high school, \"received gamma globulin twice a week for awhile\"    Arthritis     osteo-r knee    Basal cell carcinoma     Followed by Dermatology    Chronic pain     KNEEs    Hypertension 10/4/2011    medication    Morbid obesity ([address]     Murmur     \"had it my whole life\", did not appreciate murmur 9/12/2011 during assessment    Non Hodgkin's lymphoma ([address] 3/30/2017    Other ill-defined conditions(799.89)     skin bumps-med as needed    Overactive bladder     Rheumatic fever     Possibly as a child    Shingles     Thromboembolus ([address] x2    LLE-calf-1990?-only took ASA-resolved in less than a wk-\"a little burned area-not examined\"    Thyroid disease     hypo-medication    Unspecified adverse effect of anesthesia     pt reports waking several times with knee surgery-spinal     Past Surgical History:   Procedure Laterality Date    [facility] CHOLECYST[facility] FNC41      HX CHOLECYSTECTOMY  1983    HX ORTHOPAEDIC  2012    right TKA    HX ORTHOPAEDIC  2013    left TKA. Dr. Pankaj Mccain.  HX VASCULAR ACCESS      ME ANESTH,ACHILLES TENDON SURG  2/10/2011    LEFT. Dr. Araujo Favorite. Social History/Living Environment:     lives with family, 2 flights of stairs  Prior Level of Function/Work/Activity:    Dominant Side:         RIGHT  Current Medications:       Current Outpatient Prescriptions:     levothyroxine (SYNTHROID) 150 mcg tablet, Take 1 Tab by mouth Daily (before breakfast). , Disp: 90 Tab, Rfl: 3    magnesium oxide (MAG-OX) 400 mg tablet, Take 1 Tab by mouth two (2) times a day., Disp: 60 Tab, Rfl: 1    magic mouthwash (ALEXSANDER) susp, Take 10 mL by mouth every four (4) hours as needed. , Disp: 2 Bottle, Rfl: 2    nystatin (MYCOSTATIN) powder, Apply  to affected area three (3) times daily. , Disp: 60 g, Rfl: 2    fluticasone (FLONASE) 50 mcg/actuation nasal spray, 2 Sprays by Both Nostrils route daily. , Disp: 1 Bottle, Rfl: 1    fluconazole (DIFLUCAN) 200 mg tablet, Take 1 Tab by mouth daily. , Disp: 90 Tab, Rfl: 0    acyclovir (ZOVIRAX) 400 mg tablet, Take 1 Tab by mouth two (2) times a day., Disp: 60 Tab, Rfl: 3    allopurinol (ZYLOPRIM) 300 mg tablet, Take 1 Tab by mouth two (2) times a day. (Patient taking differently: Take 300 mg by mouth daily.), Disp: 90 Tab, Rfl: 2    loratadine (CLARITIN) 10 mg tablet, Take 10 mg by mouth., Disp: , Rfl:     pregabalin (LYRICA) 100 mg capsule, Take 1 Cap by mouth three (3) times daily. Max Daily Amount: 300 mg., Disp: 90 Cap, Rfl: 3    oxyCODONE (OXYIR) 5 mg capsule, Take 1-2 Caps by mouth every four (4) hours as needed. Max Daily Amount: 60 mg., Disp: 180 Cap, Rfl: 0    omeprazole (PRILOSEC) 20 mg capsule, TAKE 1 CAPSULE DAILY, Disp: 90 Cap, Rfl: 2    lidocaine-prilocaine (EMLA) topical cream, Apply  to affected area as needed for Pain. Apply to port site 45-60 minutes prior to lab appt or infusion. , Disp: 30 g, Rfl: 0   promethazine (PHENERGAN) 25 mg tablet, Take 25 mg by mouth every six (6) hours as needed for Nausea. Unsure of dose, Disp: , Rfl:    Date Last Reviewed:  5/24/17   Number of Personal Factors/Comorbidities that affect the Plan of Care: 3+: HIGH COMPLEXITY   EXAMINATION:   Palpation:          Pitting edema, dry skin, loose skin  ROM:          Within functional limits. She previously has had bilateral knee replacements and an Achilles tendon repair on the left  Strength:          Grossly 4+/5 x 4 extremities  Functional Mobility:         Gait/Ambulation:  Independent with increased speed        Transfers: independent        Bed Mobility:  independent  Skin Integrity:          Intact, but dry. Edema/Girth:  pitting    Left Right    Initial Most Recent Initial Most Recent   Upper  Extremity           Lower  Extremity               Body Structures Involved:  1. Muscles  2. lymphatic system Body Functions Affected:  1. Sensory/Pain  2. Skin Related  3. lymphatic system Activities and Participation Affected:  1. General Tasks and Demands  2. Mobility  3. Self Care   Number of elements (examined above) that affect the Plan of Care: 4+: HIGH COMPLEXITY   CLINICAL PRESENTATION:   Presentation: Evolving clinical presentation with unstable and unpredictable characteristics: HIGH COMPLEXITY   CLINICAL DECISION MAKING:   Outcome Measure: Tool Used: NCCN Distress Thermometer   Score:  Initial:   Most Recent: X    Interpretation of Score: If greater than or equal to 8, then PHQ-9 Depression Scale Score   and JOON-7 Anxiety Scale Score  .   Tool Used: ECOG Performance Survey Score  Score:  Initial: 2 Most Recent:  1    Interpretation of Score:   0 Fully active, able to carry on all pre-disease performance without restriction   1 Restricted in physically strenuous activity but ambulatory and able to carry out work of a light or sedentary nature, e.g., light house work, office work   2 Ambulatory and capable of all selfcare but unable to carry out any work activities. Up and about more than 50% of waking hours   3 Capable of only limited selfcare, confined to bed or chair more than 50% of waking hours   4 Completely disabled. Cannot carry on any selfcare. Totally confined to bed or chair   5 Dead    Tool Used: 6-MINUTE WALK TEST  Score:  Initial: 1110 feet Most Recent: 1631 feet (Date: 9/26/17 )   Interpretation of Score: Normal range varies but is approximately 1390-5448 Feet      Distance walked: 1631 feet     Baseline End of Test   Heart Rate 93 116   Dyspnea (Luther Scale)     Fatigue (Luther Scale) 2 2   SpO2 98 98   /78 178/80     Score 2133 [phone] 8988-8337 [phone] 426-16 15-0   Modifier CH CI CJ CK CL CM CN       Tool Used: Timed Up and Go (TUG)  Score:  Initial: 8 seconds Most Recent: 6 seconds (Date: 9/26/17 )   Interpretation of Score: The test measures, in seconds, the time taken by an individual to stand up from a standard arm chair (seat height 46 cm [18 in], arm height 65 cm [25.6 in]), walk a distance of 3 meters (118 in, approx 10 ft), turn, walk back to the chair and sit down. If the individual takes longer than 14 seconds to complete TUG, this indicates risk for falls. Score 7 7.5-10.5 11-14 14.5-17.5 18-21 21.5-24.5 25+   Modifier CH CI CJ CK CL CM CN         Tool Used: Lymphedema Life Impact Scale   Score:  Initial:  54 Most Recent:  (Date: 9/26/17 )   Interpretation of Score: The Lymphedema Life Impact Scale (LLIS) is a validated instrument that measures the physical, functional, and psychosocial concerns pertinent to patients with extremity lymphedema. The Scale's questionnaire is administered to patients to gauge impairments, activity limitations, and participation restrictions resulting from their lymphedema.   Score 0 1-13 14-26 27-40 41-54 55-67 68   Modifier CH CI CJ CK CL CM CN       Medical Necessity:   · Patient is expected to demonstrate progress in strength and edema management to increase independence with self care and houseold activity. Reason for Services/Other Comments:  · Patient continues to demonstrate capacity to improve strength and edema management which will increase independence. Use of outcome tool(s) and clinical judgement create a POC that gives a: Questionable prediction of patient's progress: MODERATE COMPLEXITY            TREATMENT:   (In addition to Assessment/Re-Assessment sessions the following treatments were rendered)  Pre-treatment Symptoms/Complaints:  Fatigue, weakness  Pain: Initial: 0/10         Post Session: 3 /10    38 min   patient reports she has an enlarged left ventricle and is waiting for her doctor to advise her. O2 97 HR 75  /78  Fatigue 2/10        Walk 350'  O2 97   UBE level 1 x 4 min O2 96   Walk 350' O2 99   Nustep level 1 x 7 min O2 97   Walk 350' x 10 O2 98   Sit to stand x 10 reps  Bilateral upper extremity with 2# x 10 reps biceps curls, forward flexion, abduction, triceps extension O2 98   Long arc quads x 10 reps with 5 count hold  Allowed for slow pace and multiple rest breaks. Patient with significant cough today. as above    Treatment/Session Assessment:    · Response to Treatment:  Tolerated the treatment well. Allowed for multiple rest breaks. · Compliance with Program/Exercises: Will assess as treatment progresses. · Recommendations/Intent for next treatment session: \"Next visit will focus on conditioning and strengthening\".        Total Treatment Duration:  PT Patient Time In/Time Out  Time In: 0851  Time Out: 0929    Praveen Anthony, PT

## 2017-10-24 ENCOUNTER — PATIENT OUTREACH (OUTPATIENT)
Dept: CASE MANAGEMENT | Age: 57
End: 2017-10-24

## 2017-10-24 ENCOUNTER — HOSPITAL ENCOUNTER (OUTPATIENT)
Dept: LAB | Age: 57
Discharge: HOME | End: 2017-10-24
Payer: COMMERCIAL

## 2017-10-24 ENCOUNTER — HOSPITAL ENCOUNTER (OUTPATIENT)
Dept: PHYSICAL THERAPY | Age: 57
Discharge: HOME | End: 2017-10-24
Payer: COMMERCIAL

## 2017-10-24 DIAGNOSIS — C85.88 MARGINAL ZONE LYMPHOMA OF LYMPH NODES OF MULTIPLE SITES (HCC): ICD-10-CM

## 2017-10-24 LAB
ALBUMIN SERPL-MCNC: 3.8 G/DL (ref 3.5–5)
ALBUMIN/GLOB SERPL: 1.4 {RATIO} (ref 1.2–3.5)
ALP SERPL-CCNC: 153 U/L (ref 50–136)
ALT SERPL-CCNC: 68 U/L (ref 12–65)
ANION GAP SERPL CALC-SCNC: 8 MMOL/L (ref 7–16)
AST SERPL-CCNC: 28 U/L (ref 15–37)
BASOPHILS # BLD: 0 K/UL (ref 0–0.2)
BASOPHILS NFR BLD: 1 % (ref 0–2)
BILIRUB SERPL-MCNC: 0.3 MG/DL (ref 0.2–1.1)
BUN SERPL-MCNC: 15 MG/DL (ref 6–23)
CALCIUM SERPL-MCNC: 9.2 MG/DL (ref 8.3–10.4)
CHLORIDE SERPL-SCNC: 104 MMOL/L (ref 98–107)
CO2 SERPL-SCNC: 28 MMOL/L (ref 21–32)
CREAT SERPL-MCNC: 0.87 MG/DL (ref 0.6–1)
DIFFERENTIAL METHOD BLD: ABNORMAL
EOSINOPHIL # BLD: 0.1 K/UL (ref 0–0.8)
EOSINOPHIL NFR BLD: 2 % (ref 0.5–7.8)
ERYTHROCYTE [DISTWIDTH] IN BLOOD BY AUTOMATED COUNT: 17.5 % (ref 11.9–14.6)
GLOBULIN SER CALC-MCNC: 2.8 G/DL (ref 2.3–3.5)
GLUCOSE SERPL-MCNC: 147 MG/DL (ref 65–100)
HCT VFR BLD AUTO: 27.7 % (ref 35.8–46.3)
HGB BLD-MCNC: 9.7 G/DL (ref 11.7–15.4)
LYMPHOCYTES # BLD: 0.4 K/UL (ref 0.5–4.6)
LYMPHOCYTES NFR BLD: 6 % (ref 13–44)
MAGNESIUM SERPL-MCNC: 1.8 MG/DL (ref 1.8–2.4)
MCH RBC QN AUTO: 32.2 PG (ref 26.1–32.9)
MCHC RBC AUTO-ENTMCNC: 35 G/DL (ref 31.4–35)
MCV RBC AUTO: 92 FL (ref 79.6–97.8)
MONOCYTES # BLD: 0.5 K/UL (ref 0.1–1.3)
MONOCYTES NFR BLD: 8 % (ref 4–12)
NEUTS SEG # BLD: 5.2 K/UL (ref 1.7–8.2)
NEUTS SEG NFR BLD: 84 % (ref 43–78)
NRBC # BLD: 0 K/UL (ref 0–0.2)
PLATELET # BLD AUTO: 101 K/UL (ref 150–450)
PMV BLD AUTO: 10.4 FL (ref 10.8–14.1)
POTASSIUM SERPL-SCNC: 3.8 MMOL/L (ref 3.5–5.1)
PROT SERPL-MCNC: 6.6 G/DL (ref 6.3–8.2)
RBC # BLD AUTO: 3.01 M/UL (ref 4.05–5.25)
SODIUM SERPL-SCNC: 140 MMOL/L (ref 136–145)
WBC # BLD AUTO: 6.1 K/UL (ref 4.3–11.1)

## 2017-10-24 PROCEDURE — 80053 COMPREHEN METABOLIC PANEL: CPT | Performed by: INTERNAL MEDICINE

## 2017-10-24 PROCEDURE — 97110 THERAPEUTIC EXERCISES: CPT

## 2017-10-24 PROCEDURE — 85025 COMPLETE CBC W/AUTO DIFF WBC: CPT | Performed by: INTERNAL MEDICINE

## 2017-10-24 PROCEDURE — 83735 ASSAY OF MAGNESIUM: CPT | Performed by: INTERNAL MEDICINE

## 2017-10-24 NOTE — PROGRESS NOTES
Pt presented to Cancer center with spot on her leg that was erythemic, hot to the touch and painful. Dr. Alexandre Fuel aware, and Keflex 500 mg BID x 7 days called to pharmacy. Pt sent to lab for lab draw. Pt instructed to call 24/7 with temp 100.5 or higher or if cellulitis is not improved with antibiotics.

## 2017-10-26 ENCOUNTER — HOSPITAL ENCOUNTER (OUTPATIENT)
Dept: PHYSICAL THERAPY | Age: 57
Discharge: HOME | End: 2017-10-26
Payer: COMMERCIAL

## 2017-10-26 NOTE — PROGRESS NOTES
Cancel Oncology Rehab today due to cellulitis of the lower extremity. We will resume next week as medically able.

## 2017-10-31 ENCOUNTER — HOSPITAL ENCOUNTER (OUTPATIENT)
Dept: LAB | Age: 57
Discharge: HOME | End: 2017-10-31
Payer: COMMERCIAL

## 2017-10-31 ENCOUNTER — HOSPITAL ENCOUNTER (OUTPATIENT)
Dept: INFUSION THERAPY | Age: 57
Discharge: HOME | End: 2017-10-31
Payer: COMMERCIAL

## 2017-10-31 ENCOUNTER — PATIENT OUTREACH (OUTPATIENT)
Dept: CASE MANAGEMENT | Age: 57
End: 2017-10-31

## 2017-10-31 VITALS
RESPIRATION RATE: 20 BRPM | WEIGHT: 262 LBS | SYSTOLIC BLOOD PRESSURE: 97 MMHG | BODY MASS INDEX: 47.92 KG/M2 | HEART RATE: 75 BPM | TEMPERATURE: 98.6 F | DIASTOLIC BLOOD PRESSURE: 56 MMHG

## 2017-10-31 DIAGNOSIS — C85.88 MARGINAL ZONE LYMPHOMA OF LYMPH NODES OF MULTIPLE SITES (HCC): ICD-10-CM

## 2017-10-31 LAB
ALBUMIN SERPL-MCNC: 3.8 G/DL (ref 3.5–5)
ALBUMIN/GLOB SERPL: 1.4 {RATIO} (ref 1.2–3.5)
ALP SERPL-CCNC: 125 U/L (ref 50–136)
ALT SERPL-CCNC: 42 U/L (ref 12–65)
ANION GAP SERPL CALC-SCNC: 7 MMOL/L (ref 7–16)
AST SERPL-CCNC: 23 U/L (ref 15–37)
BASOPHILS # BLD: 0 K/UL (ref 0–0.2)
BASOPHILS NFR BLD: 1 % (ref 0–2)
BILIRUB SERPL-MCNC: 0.4 MG/DL (ref 0.2–1.1)
BUN SERPL-MCNC: 31 MG/DL (ref 6–23)
CALCIUM SERPL-MCNC: 9.1 MG/DL (ref 8.3–10.4)
CHLORIDE SERPL-SCNC: 104 MMOL/L (ref 98–107)
CO2 SERPL-SCNC: 28 MMOL/L (ref 21–32)
CREAT SERPL-MCNC: 0.87 MG/DL (ref 0.6–1)
DIFFERENTIAL METHOD BLD: ABNORMAL
EOSINOPHIL # BLD: 0.1 K/UL (ref 0–0.8)
EOSINOPHIL NFR BLD: 2 % (ref 0.5–7.8)
ERYTHROCYTE [DISTWIDTH] IN BLOOD BY AUTOMATED COUNT: 17.1 % (ref 11.9–14.6)
GLOBULIN SER CALC-MCNC: 2.8 G/DL (ref 2.3–3.5)
GLUCOSE SERPL-MCNC: 97 MG/DL (ref 65–100)
HCT VFR BLD AUTO: 27.3 % (ref 35.8–46.3)
HGB BLD-MCNC: 9.6 G/DL (ref 11.7–15.4)
LDH SERPL L TO P-CCNC: 192 U/L (ref 100–190)
LYMPHOCYTES # BLD: 0.3 K/UL (ref 0.5–4.6)
LYMPHOCYTES NFR BLD: 6 % (ref 13–44)
MAGNESIUM SERPL-MCNC: 2 MG/DL (ref 1.8–2.4)
MCH RBC QN AUTO: 32 PG (ref 26.1–32.9)
MCHC RBC AUTO-ENTMCNC: 35.2 G/DL (ref 31.4–35)
MCV RBC AUTO: 91 FL (ref 79.6–97.8)
MONOCYTES # BLD: 0.6 K/UL (ref 0.1–1.3)
MONOCYTES NFR BLD: 12 % (ref 4–12)
NEUTS SEG # BLD: 4.2 K/UL (ref 1.7–8.2)
NEUTS SEG NFR BLD: 80 % (ref 43–78)
NRBC # BLD: 0 K/UL (ref 0–0.2)
PLATELET # BLD AUTO: 97 K/UL (ref 150–450)
PMV BLD AUTO: 11 FL (ref 10.8–14.1)
POTASSIUM SERPL-SCNC: 4.1 MMOL/L (ref 3.5–5.1)
PROT SERPL-MCNC: 6.6 G/DL (ref 6.3–8.2)
RBC # BLD AUTO: 3 M/UL (ref 4.05–5.25)
SODIUM SERPL-SCNC: 139 MMOL/L (ref 136–145)
WBC # BLD AUTO: 5.2 K/UL (ref 4.3–11.1)

## 2017-10-31 PROCEDURE — 96375 TX/PRO/DX INJ NEW DRUG ADDON: CPT

## 2017-10-31 PROCEDURE — 83615 LACTATE (LD) (LDH) ENZYME: CPT | Performed by: INTERNAL MEDICINE

## 2017-10-31 PROCEDURE — 83735 ASSAY OF MAGNESIUM: CPT | Performed by: INTERNAL MEDICINE

## 2017-10-31 PROCEDURE — 96413 CHEMO IV INFUSION 1 HR: CPT

## 2017-10-31 PROCEDURE — 74011250636 HC RX REV CODE- 250/636: Performed by: INTERNAL MEDICINE

## 2017-10-31 PROCEDURE — 90471 IMMUNIZATION ADMIN: CPT

## 2017-10-31 PROCEDURE — 74011250637 HC RX REV CODE- 250/637: Performed by: INTERNAL MEDICINE

## 2017-10-31 PROCEDURE — 96361 HYDRATE IV INFUSION ADD-ON: CPT

## 2017-10-31 PROCEDURE — 90686 IIV4 VACC NO PRSV 0.5 ML IM: CPT | Performed by: INTERNAL MEDICINE

## 2017-10-31 PROCEDURE — 80053 COMPREHEN METABOLIC PANEL: CPT | Performed by: INTERNAL MEDICINE

## 2017-10-31 PROCEDURE — 85025 COMPLETE CBC W/AUTO DIFF WBC: CPT | Performed by: INTERNAL MEDICINE

## 2017-10-31 PROCEDURE — 96415 CHEMO IV INFUSION ADDL HR: CPT

## 2017-10-31 RX ORDER — ACETAMINOPHEN 325 MG/1
650 TABLET ORAL ONCE
Status: COMPLETED | OUTPATIENT
Start: 2017-10-31 | End: 2017-10-31

## 2017-10-31 RX ORDER — DIPHENHYDRAMINE HYDROCHLORIDE 50 MG/ML
50 INJECTION, SOLUTION INTRAMUSCULAR; INTRAVENOUS ONCE
Status: COMPLETED | OUTPATIENT
Start: 2017-10-31 | End: 2017-10-31

## 2017-10-31 RX ORDER — SODIUM CHLORIDE 0.9 % (FLUSH) 0.9 %
10 SYRINGE (ML) INJECTION AS NEEDED
Status: ACTIVE | OUTPATIENT
Start: 2017-10-31 | End: 2017-10-31

## 2017-10-31 RX ORDER — HEPARIN 100 UNIT/ML
300-500 SYRINGE INTRAVENOUS AS NEEDED
Status: DISPENSED | OUTPATIENT
Start: 2017-10-31 | End: 2017-10-31

## 2017-10-31 RX ADMIN — SODIUM CHLORIDE 500 ML: 900 INJECTION, SOLUTION INTRAVENOUS at 10:25

## 2017-10-31 RX ADMIN — SODIUM CHLORIDE, PRESERVATIVE FREE 500 UNITS: 5 INJECTION INTRAVENOUS at 14:45

## 2017-10-31 RX ADMIN — Medication 10 ML: at 14:45

## 2017-10-31 RX ADMIN — Medication 10 ML: at 10:25

## 2017-10-31 RX ADMIN — ACETAMINOPHEN 650 MG: 325 TABLET, FILM COATED ORAL at 11:02

## 2017-10-31 RX ADMIN — Medication 10 ML: at 11:02

## 2017-10-31 RX ADMIN — DIPHENHYDRAMINE HYDROCHLORIDE 50 MG: 50 INJECTION, SOLUTION INTRAMUSCULAR; INTRAVENOUS at 11:02

## 2017-10-31 RX ADMIN — INFLUENZA VIRUS VACCINE 0.5 ML: 15; 15; 15; 15 SUSPENSION INTRAMUSCULAR at 11:03

## 2017-10-31 RX ADMIN — RITUXIMAB 855 MG: 10 INJECTION, SOLUTION INTRAVENOUS at 11:30

## 2017-10-31 NOTE — PROGRESS NOTES
Pt was seen and lbs reviewed by Dr. Melly Bliss. Dr. 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 Spleen pain was possibly related to Neulasta, so we will see if it occurs with just the Rituxan this time. Pt to be referred to endo for fluctuating TSH per the pt and family's request.  Will return to clinic in 2 months with PET prior and 2nd dose of maintenance Rituxan.

## 2017-10-31 NOTE — PROGRESS NOTES
Problem: Chemotherapy Treatment  Goal: *Chemotherapy regimen followed  Outcome: Progressing Towards Goal  Verbalizes/demonstrates understanding of purpose/procedure/potential side effects of rituxan.

## 2017-10-31 NOTE — PROGRESS NOTES
Pt arrived ambulatory today at 1020, to receive IV chemotherapy. Pt tolerated without difficulty. Patient discharged via ambulatory accompanied by son. Instructed to notify physician of any problems, questions or concerns. Allowed opportunity for patient/family to ask questions. Verbalized understanding. Next appointment is Dec 27 at 200  with Cristal Sanchez.

## 2017-11-02 ENCOUNTER — HOSPITAL ENCOUNTER (OUTPATIENT)
Dept: PHYSICAL THERAPY | Age: 57
Discharge: HOME | End: 2017-11-02
Payer: COMMERCIAL

## 2017-11-02 PROCEDURE — 97110 THERAPEUTIC EXERCISES: CPT

## 2017-11-02 NOTE — PROGRESS NOTES
Esme Mccormack  : 1960 [facility] [address] [facility]  [place], [place] [address], [address] [facility]  [phone]   [phone]          OUTPATIENT PHYSICAL THERAPY:Daily Note 2017    ICD-10: Treatment Diagnosis: I 89.0 lymphedema not elsewhere classified  M 62.81 muscle weakness generalized  Precautions/Allergies:   Review of patient's allergies indicates no known allergies. Fall Risk Score: 1 (? 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 Brianna Sunshine presents for a lymphedema assessment due to edema of bilateral lower extremities. She has pitting edema in the bilateral lower extremities. She has previously had short stretch bandaging and MLD following knee surgery 3 years ago. She would like to try this again even though this is not orthopedic post surgical swelling. She was diagnosed with lymphoma in March of this year. She recently completed her second chemo of 6 planned. She will have a PET scan 17. Progress with chemo has been limited due to lab values being abnormal.  She is uncomfortable due to the edema. She will have a paracentesis 17. We will initiate edema management and progress slowly dependent on her tolerance to compression and her response to treatment. We have now initiated conditioning and strengthening. She is independent with home management of edema. PROBLEM LIST (Impacting functional limitations):  1. Decreased Strength  2. Increased Pain  3. Decreased Activity Tolerance  4. Increased Fatigue  5. Increased Shortness of Breath  6. Decreased Flexibility/Joint Mobility  7. Edema/Girth  8. Decreased Knowledge of Precautions  9.  Decreased [place] with Home Exercise Program INTERVENTIONS PLANNED:  1. Decongestion Therapy  2. Home Exercise Program (HEP)  3. Range of Motion (ROM)  4. Therapeutic Exercise/Strengthening   TREATMENT PLAN:  Effective Dates: 8/14/17 TO 11/7/17. Frequency/Duration: 2 times a week for 12 weeks dependent on chemo schedule and counts  GOALS: (Goals have been discussed and agreed upon with patient.)  Short-Term Functional Goals: Time Frame: 4 weeks  1. The patient will have knowledge of signs and symptoms of lymphedema and how to manage within 4 weeks. Met   2. The patient will tolerate short stretch bandaging of bilateral lower extremities for reduction of girth within 4 weeks. Met   3. The patient and caregiver will be independent with compression bandaging within 4 weeks. Met   4. The patient will improve her 6 minute walk by 60 feet within 4 weeks. Met   5. The patient will report a fatigue of 3 or less within 4 weeks. Met   Discharge Goals: Time Frame: 8 weeks  1. The patient will have a girth decrease of at least 5 cm in the calf within 8 weeks. 2. The patient will be fit with static compression garments within 8 weeks. Met   3. The patient and caregiver will be independent with edema management within 8 weeks. Met  4. The patient will transition to Healthy Self within 8 weeks. 5.   Rehabilitation Potential For Stated Goals: [address] [place] [address]'s therapy, I certify that the treatment plan above will be carried out by a therapist or under their direction. Thank you for this referral,  Lorren Galeazzi, PT     Referring Physician Signature: Valentino Standard, MD              Date                    The information in this section was collected on 5/24/17 (except where otherwise noted). HISTORY:   History of Present Injury/Illness (Reason for Referral):  Lymphoma, ascites, bilateral lower extremity pitting edema  Past Medical History/Comorbidities:   Ms. Bryson Snowden  has a past medical history of Anemia;  Arthritis; Basal cell carcinoma; Chronic pain; Hypertension (10/4/2011); Morbid obesity ([address] Murmur; Non Hodgkin's lymphoma ([address] (3/30/2017); Other ill-defined conditions(799.89); Overactive bladder; Rheumatic fever; Shingles; Thromboembolus ([address] (x2); Thyroid disease; and Unspecified adverse effect of anesthesia. She also has no past medical history of Aneurysm ([address] Arrhythmia; Asthma; Autoimmune disease ([address] CAD (coronary artery disease); Chronic kidney disease; Coagulation defects; COPD; Diabetes ([address] Difficult intubation; GERD (gastroesophageal reflux disease); Heart failure ([address] Liver disease; Malignant hyperthermia due to anesthesia; Nausea & vomiting; Pseudocholinesterase deficiency; Psychiatric disorder; PUD (peptic ulcer disease); Seizures ([address] Stroke [facility]); or Unspecified sleep apnea. Ms. Domenico Sidhu  has a past surgical history that includes  cholecystectomy fnc41; anesth,achilles tendon surg (2/10/2011); cholecystectomy (1431); orthopaedic (2012); orthopaedic (2013); and vascular access.    Past Medical History:   Diagnosis Date    Anemia     in high school, \"received gamma globulin twice a week for awhile\"    Arthritis     osteo-r knee    Basal cell carcinoma     Followed by Dermatology    Chronic pain     KNEEs    Hypertension 10/4/2011    medication    Morbid obesity ([address]     Murmur     \"had it my whole life\", did not appreciate murmur 9/12/2011 during assessment    Non Hodgkin's lymphoma ([address] 3/30/2017    Other ill-defined conditions(799.89)     skin bumps-med as needed    Overactive bladder     Rheumatic fever     Possibly as a child    Shingles     Thromboembolus ([address] x2    LLE-calf-1990?-only took ASA-resolved in less than a wk-\"a little burned area-not examined\"    Thyroid disease     hypo-medication    Unspecified adverse effect of anesthesia     pt reports waking several times with knee surgery-spinal     Past Surgical History:   Procedure Laterality Date    [facility] CHOLECYSTECTOMY FNC41      HX CHOLECYSTECTOMY  1983    HX ORTHOPAEDIC  2012    right TKA    HX ORTHOPAEDIC  2013    left TKA. Dr. Ursula Galvan.  HX VASCULAR ACCESS      PA ANESTH,ACHILLES TENDON SURG  2/10/2011    LEFT. Dr. Bisi Blake. Social History/Living Environment:     lives with family, 2 flights of stairs  Prior Level of Function/Work/Activity:    Dominant Side:         RIGHT  Current Medications:       Current Outpatient Prescriptions:     oxyCODONE (OXYIR) 5 mg capsule, Take 1-2 Caps by mouth every four (4) hours as needed. Max Daily Amount: 60 mg., Disp: 180 Cap, Rfl: 0    fluconazole (DIFLUCAN) 200 mg tablet, Take 1 Tab by mouth daily. , Disp: 90 Tab, Rfl: 0    pregabalin (LYRICA) 100 mg capsule, Take 1 Cap by mouth three (3) times daily. Max Daily Amount: 300 mg., Disp: 90 Cap, Rfl: 3    levothyroxine (SYNTHROID) 175 mcg tablet, Take 1 Tab by mouth Daily (before breakfast). , Disp: 30 Tab, Rfl: 5    magnesium oxide (MAG-OX) 400 mg tablet, Take 1 Tab by mouth two (2) times a day., Disp: 60 Tab, Rfl: 1    magic mouthwash (ALEXSANDER) susp, Take 10 mL by mouth every four (4) hours as needed. , Disp: 2 Bottle, Rfl: 2    nystatin (MYCOSTATIN) powder, Apply  to affected area three (3) times daily. , Disp: 60 g, Rfl: 2    fluticasone (FLONASE) 50 mcg/actuation nasal spray, 2 Sprays by Both Nostrils route daily. , Disp: 1 Bottle, Rfl: 1    acyclovir (ZOVIRAX) 400 mg tablet, Take 1 Tab by mouth two (2) times a day., Disp: 60 Tab, Rfl: 3    allopurinol (ZYLOPRIM) 300 mg tablet, Take 1 Tab by mouth two (2) times a day. (Patient taking differently: Take 300 mg by mouth daily.), Disp: 90 Tab, Rfl: 2    loratadine (CLARITIN) 10 mg tablet, Take 10 mg by mouth., Disp: , Rfl:     omeprazole (PRILOSEC) 20 mg capsule, TAKE 1 CAPSULE DAILY, Disp: 90 Cap, Rfl: 2    lidocaine-prilocaine (EMLA) topical cream, Apply  to affected area as needed for Pain. Apply to port site 45-60 minutes prior to lab appt or infusion. , Disp: 30 g, Rfl: 0   promethazine (PHENERGAN) 25 mg tablet, Take 25 mg by mouth every six (6) hours as needed for Nausea. Unsure of dose, Disp: , Rfl:    Date Last Reviewed:  5/24/17   Number of Personal Factors/Comorbidities that affect the Plan of Care: 3+: HIGH COMPLEXITY   EXAMINATION:   Palpation:          Pitting edema, dry skin, loose skin  ROM:          Within functional limits. She previously has had bilateral knee replacements and an Achilles tendon repair on the left  Strength:          Grossly 4+/5 x 4 extremities  Functional Mobility:         Gait/Ambulation:  Independent with increased speed        Transfers: independent        Bed Mobility:  independent  Skin Integrity:          Intact, but dry. Edema/Girth:  pitting    Left Right    Initial Most Recent Initial Most Recent   Upper  Extremity           Lower  Extremity               Body Structures Involved:  1. Muscles  2. lymphatic system Body Functions Affected:  1. Sensory/Pain  2. Skin Related  3. lymphatic system Activities and Participation Affected:  1. General Tasks and Demands  2. Mobility  3. Self Care   Number of elements (examined above) that affect the Plan of Care: 4+: HIGH COMPLEXITY   CLINICAL PRESENTATION:   Presentation: Evolving clinical presentation with unstable and unpredictable characteristics: HIGH COMPLEXITY   CLINICAL DECISION MAKING:   Outcome Measure: Tool Used: NCCN Distress Thermometer   Score:  Initial:   Most Recent: X    Interpretation of Score: If greater than or equal to 8, then PHQ-9 Depression Scale Score   and JOON-7 Anxiety Scale Score  .   Tool Used: ECOG Performance Survey Score  Score:  Initial: 2 Most Recent:  1    Interpretation of Score:   0 Fully active, able to carry on all pre-disease performance without restriction   1 Restricted in physically strenuous activity but ambulatory and able to carry out work of a light or sedentary nature, e.g., light house work, office work   2 Ambulatory and capable of all selfcare but unable to carry out any work activities. Up and about more than 50% of waking hours   3 Capable of only limited selfcare, confined to bed or chair more than 50% of waking hours   4 Completely disabled. Cannot carry on any selfcare. Totally confined to bed or chair   5 Dead    Tool Used: 6-MINUTE WALK TEST  Score:  Initial: 1110 feet Most Recent: 1631 feet (Date: 9/26/17 )   Interpretation of Score: Normal range varies but is approximately 4823-2416 Feet      Distance walked: 1631 feet     Baseline End of Test   Heart Rate 93 116   Dyspnea (Luther Scale)     Fatigue (Luther Scale) 2 2   SpO2 98 98   /78 178/80     Score 2133 [phone] [phone] 426-16 15-0   Modifier CH CI CJ CK CL CM CN       Tool Used: Timed Up and Go (TUG)  Score:  Initial: 8 seconds Most Recent: 6 seconds (Date: 9/26/17 )   Interpretation of Score: The test measures, in seconds, the time taken by an individual to stand up from a standard arm chair (seat height 46 cm [18 in], arm height 65 cm [25.6 in]), walk a distance of 3 meters (118 in, approx 10 ft), turn, walk back to the chair and sit down. If the individual takes longer than 14 seconds to complete TUG, this indicates risk for falls. Score 7 7.5-10.5 11-14 14.5-17.5 18-21 21.5-24.5 25+   Modifier CH CI CJ CK CL CM CN         Tool Used: Lymphedema Life Impact Scale   Score:  Initial:  54 Most Recent:  (Date: 9/26/17 )   Interpretation of Score: The Lymphedema Life Impact Scale (LLIS) is a validated instrument that measures the physical, functional, and psychosocial concerns pertinent to patients with extremity lymphedema. The Scale's questionnaire is administered to patients to gauge impairments, activity limitations, and participation restrictions resulting from their lymphedema.   Score 0 1-13 14-26 27-40 41-54 55-67 68   Modifier CH CI CJ CK CL CM CN       Medical Necessity:   · Patient is expected to demonstrate progress in strength and edema management to increase independence with self care and houseold activity. Reason for Services/Other Comments:  · Patient continues to demonstrate capacity to improve strength and edema management which will increase independence. Use of outcome tool(s) and clinical judgement create a POC that gives a: Questionable prediction of patient's progress: MODERATE COMPLEXITY            TREATMENT:   (In addition to Assessment/Re-Assessment sessions the following treatments were rendered)  Pre-treatment Symptoms/Complaints:  Fatigue, weakness  Pain: Initial: 5/10 shoulders and toe        Post Session: 4 /10    45 min   the patient reports she has an in grow toenail which hurts. She will have a PET scan 12/26/17. She is to take Rituxan every 2 weeks for 2 years at this point. She has had an increase in her thyroid medication. She had the flu shot. Fatigue 5/10  O2 98 HR 79  /75  Fatigue 2/10        Walk 350'  O2 96   UBE level 1 x 4 min O2 99 HR 84  Walk 350' O2 99   Nustep level 1 x 7 min O2 98 HR 90  Walk 350' x 10 O2 98   Sit to stand x 10 reps  Bilateral upper extremity with 2# x 10 reps biceps curls, forward flexion, abduction, triceps extension O2 98 HR 87  Long arc quads x 10 reps with 5 count hold  Fatigue 2/10  as above    Treatment/Session Assessment:    · Response to Treatment:  Tolerated the treatment well. Allowed for multiple rest breaks. · Compliance with Program/Exercises: Will assess as treatment progresses. · Recommendations/Intent for next treatment session: \"Next visit will focus on conditioning and strengthening\".        Total Treatment Duration:  PT Patient Time In/Time Out  Time In: 0803  Time Out: 0848    Gege Garcia PT

## 2017-11-07 ENCOUNTER — HOSPITAL ENCOUNTER (OUTPATIENT)
Dept: PHYSICAL THERAPY | Age: 57
Discharge: HOME | End: 2017-11-07
Payer: COMMERCIAL

## 2017-11-07 PROCEDURE — 97110 THERAPEUTIC EXERCISES: CPT

## 2017-11-07 NOTE — PROGRESS NOTES
Maxi Walters  : [facility]  [place] , [facility], [address] [place] [address]  [phone]   [phone]          OUTPATIENT PHYSICAL THERAPY:Recertification     ICD-10: Treatment Diagnosis: I 89.0 lymphedema not elsewhere classified  M 62.81 muscle weakness generalized  Precautions/Allergies:   Review of patient's allergies indicates no known allergies. Fall Risk Score: 1 (? 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 Danita Lindo presents for a lymphedema assessment due to edema of bilateral lower extremities. She has pitting edema in the bilateral lower extremities. She has previously had short stretch bandaging and MLD following knee surgery 3 years ago. She would like to try this again even though this is not orthopedic post surgical swelling. She was diagnosed with lymphoma in March of this year. She recently completed her second chemo of 6 planned. She will have a PET scan 17. Progress with chemo has been limited due to lab values being abnormal.  She is uncomfortable due to the edema. She will have a paracentesis 17. We will initiate edema management and progress slowly dependent on her tolerance to compression and her response to treatment. We have now initiated conditioning and strengthening. She is independent with home management of edema. 17: The patient's edema has resolved. She has now been focusing on conditioning and strengthening. She will benefit from therapeutic exercises in order to address decreased strength and overall conditioning. PROBLEM LIST (Impacting functional limitations):  1. Decreased Strength  2.  Increased Pain  3. Decreased Activity Tolerance  4. Increased Fatigue  5. Increased Shortness of Breath  6. Decreased Flexibility/Joint Mobility  7. Decreased Tensas with Home Exercise Program INTERVENTIONS PLANNED:  1. Home Exercise Program (HEP)  2. Range of Motion (ROM)  3. Therapeutic Exercise/Strengthening   TREATMENT PLAN:  Effective Dates: 11/7/17 TO 1/31/18. Frequency/Duration: 2 times a week for 12 weeks dependent on chemo schedule and counts  GOALS: (Goals have been discussed and agreed upon with patient.)  Short-Term Functional Goals: Time Frame: 4 weeks  1. The patient will have knowledge of signs and symptoms of lymphedema and how to manage within 4 weeks. Met   2. The patient will tolerate short stretch bandaging of bilateral lower extremities for reduction of girth within 4 weeks. Met   3. The patient and caregiver will be independent with compression bandaging within 4 weeks. Met   4. The patient will improve her 6 minute walk by 60 feet within 4 weeks. Met   5. The patient will report a fatigue of 3 or less within 4 weeks. Met   Discharge Goals: Time Frame: 8 weeks  1. The patient will have a girth decrease of at least 5 cm in the calf within 8 weeks. 2. The patient will be fit with static compression garments within 8 weeks. Met   3. The patient and caregiver will be independent with edema management within 8 weeks. Met  4. The patient will transition to Healthy Self within 8 weeks. 5.   Rehabilitation Potential For Stated Goals: [address] [place] [address]'s therapy, I certify that the treatment plan above will be carried out by a therapist or under their direction. Thank you for this referral,  Anival Khan PT     Referring Physician Signature: Alpheus Dubin, MD              Date                    The information in this section was collected on 5/24/17 (except where otherwise noted).   HISTORY:   History of Present Injury/Illness (Reason for Referral):  Lymphoma, decreased activity tolerance, decreased strength  Past Medical History/Comorbidities:   Ms. Roseanne Bumpers  has a past medical history of Anemia; Arthritis; Basal cell carcinoma; Chronic pain; Hypertension (10/4/2011); Morbid obesity ([address] Murmur; Non Hodgkin's lymphoma ([address] (3/30/2017); Other ill-defined conditions(799.89); Overactive bladder; Rheumatic fever; Shingles; Thromboembolus ([address] (x2); Thyroid disease; and Unspecified adverse effect of anesthesia. She also has no past medical history of Aneurysm ([address] Arrhythmia; Asthma; Autoimmune disease ([address] CAD (coronary artery disease); Chronic kidney disease; Coagulation defects; COPD; Diabetes ([address] Difficult intubation; GERD (gastroesophageal reflux disease); Heart failure ([address] Liver disease; Malignant hyperthermia due to anesthesia; Nausea & vomiting; Pseudocholinesterase deficiency; Psychiatric disorder; PUD (peptic ulcer disease); Seizures ([address] Stroke [facility]); or Unspecified sleep apnea. Ms. Roseanne Bumpers  has a past surgical history that includes hc cholecystectomy fnc41; anesth,achilles tendon surg (2/10/2011); cholecystectomy (0538); orthopaedic (2012); orthopaedic (2013); and vascular access.    Past Medical History:   Diagnosis Date    Anemia     in high school, \"received gamma globulin twice a week for awhile\"    Arthritis     osteo-r knee    Basal cell carcinoma     Followed by Dermatology    Chronic pain     KNEEs    Hypertension 10/4/2011    medication    Morbid obesity ([address]     Murmur     \"had it my whole life\", did not appreciate murmur 9/12/2011 during assessment    Non Hodgkin's lymphoma ([address] 3/30/2017    Other ill-defined conditions(799.89)     skin bumps-med as needed    Overactive bladder     Rheumatic fever     Possibly as a child    Shingles     Thromboembolus ([address] x2    LLE-calf-1990?-only took ASA-resolved in less than a wk-\"a little burned area-not examined\"    Thyroid disease     hypo-medication    Unspecified adverse effect of anesthesia     pt reports waking several times with knee surgery-spinal     Past Surgical History:   Procedure Laterality Date    [facility]. CHOLECYSTECTOMY FNC41      HX CHOLECYSTECTOMY  1983    HX ORTHOPAEDIC  2012    right TKA    HX ORTHOPAEDIC  2013    left TKA. Dr. Deisi Sanz.  HX VASCULAR ACCESS      AL ANESTH,ACHILLES TENDON SURG  2/10/2011    LEFT. Dr. Nori Negrete. Social History/Living Environment:     lives with family, 2 flights of stairs  Prior Level of Function/Work/Activity:    Dominant Side:         RIGHT  Current Medications:       Current Outpatient Prescriptions:     oxyCODONE (OXYIR) 5 mg capsule, Take 1-2 Caps by mouth every four (4) hours as needed. Max Daily Amount: 60 mg., Disp: 180 Cap, Rfl: 0    fluconazole (DIFLUCAN) 200 mg tablet, Take 1 Tab by mouth daily. , Disp: 90 Tab, Rfl: 0    pregabalin (LYRICA) 100 mg capsule, Take 1 Cap by mouth three (3) times daily. Max Daily Amount: 300 mg., Disp: 90 Cap, Rfl: 3    levothyroxine (SYNTHROID) 175 mcg tablet, Take 1 Tab by mouth Daily (before breakfast). , Disp: 30 Tab, Rfl: 5    magnesium oxide (MAG-OX) 400 mg tablet, Take 1 Tab by mouth two (2) times a day., Disp: 60 Tab, Rfl: 1    magic mouthwash (ALEXSANDER) susp, Take 10 mL by mouth every four (4) hours as needed. , Disp: 2 Bottle, Rfl: 2    nystatin (MYCOSTATIN) powder, Apply  to affected area three (3) times daily. , Disp: 60 g, Rfl: 2    fluticasone (FLONASE) 50 mcg/actuation nasal spray, 2 Sprays by Both Nostrils route daily. , Disp: 1 Bottle, Rfl: 1    acyclovir (ZOVIRAX) 400 mg tablet, Take 1 Tab by mouth two (2) times a day., Disp: 60 Tab, Rfl: 3    allopurinol (ZYLOPRIM) 300 mg tablet, Take 1 Tab by mouth two (2) times a day.  (Patient taking differently: Take 300 mg by mouth daily.), Disp: 90 Tab, Rfl: 2    loratadine (CLARITIN) 10 mg tablet, Take 10 mg by mouth., Disp: , Rfl:     omeprazole (PRILOSEC) 20 mg capsule, TAKE 1 CAPSULE DAILY, Disp: 90 Cap, Rfl: 2    lidocaine-prilocaine (EMLA) topical cream, Apply  to affected area as needed for Pain. Apply to port site 45-60 minutes prior to lab appt or infusion. , Disp: 30 g, Rfl: 0    promethazine (PHENERGAN) 25 mg tablet, Take 25 mg by mouth every six (6) hours as needed for Nausea. Unsure of dose, Disp: , Rfl:    Date Last Reviewed:  11/7/17   Number of Personal Factors/Comorbidities that affect the Plan of Care: 3+: HIGH COMPLEXITY   EXAMINATION:   Palpation:           dry skin, loose skin  ROM:          Within functional limits. She previously has had bilateral knee replacements and an Achilles tendon repair on the left  Strength:          Grossly 4+/5 x 4 extremities  Functional Mobility:         Gait/Ambulation:  Independent         Transfers: independent        Bed Mobility:  independent  Skin Integrity:          Intact, but dry. Edema/Girth:  pitting    Left Right    Initial Most Recent Initial Most Recent   Upper  Extremity           Lower  Extremity               Body Structures Involved:  1. Muscles Body Functions Affected:  1. Sensory/Pain Activities and Participation Affected:  1. None   Number of elements (examined above) that affect the Plan of Care: 1-2: LOW COMPLEXITY   CLINICAL PRESENTATION:   Presentation: Evolving clinical presentation with unstable and unpredictable characteristics: HIGH COMPLEXITY   CLINICAL DECISION MAKING:   Outcome Measure: Tool Used: NCCN Distress Thermometer   Score:  Initial:   Most Recent: X    Interpretation of Score: If greater than or equal to 8, then PHQ-9 Depression Scale Score   and JOON-7 Anxiety Scale Score  .   Tool Used: ECOG Performance Survey Score  Score:  Initial: 2 Most Recent:  1    Interpretation of Score:   0 Fully active, able to carry on all pre-disease performance without restriction   1 Restricted in physically strenuous activity but ambulatory and able to carry out work of a light or sedentary nature, e.g., light house work, office work   2 Ambulatory and capable of all selfcare but unable to carry out any work activities. Up and about more than 50% of waking hours   3 Capable of only limited selfcare, confined to bed or chair more than 50% of waking hours   4 Completely disabled. Cannot carry on any selfcare. Totally confined to bed or chair   5 Dead    Tool Used: 6-MINUTE WALK TEST  Score:  Initial: 1110 feet Most Recent: 1630 feet (Date: 11/7/17 )   Interpretation of Score: Normal range varies but is approximately 3655-8055 Feet      Distance walked: 1631 feet     Baseline End of Test   Heart Rate 90 119   Dyspnea (Luther Scale)     Fatigue (Luther Scale) 2 2   SpO2 99 96   /87 174/74     Score 2133 [id number] 9299-1124 [phone] 426-16 15-0   Modifier CH CI CJ CK CL CM CN       Tool Used: Timed Up and Go (TUG)  Score:  Initial: 8 seconds Most Recent: 6 seconds (Date: 11/7/17 )   Interpretation of Score: The test measures, in seconds, the time taken by an individual to stand up from a standard arm chair (seat height 46 cm [18 in], arm height 65 cm [25.6 in]), walk a distance of 3 meters (118 in, approx 10 ft), turn, walk back to the chair and sit down. If the individual takes longer than 14 seconds to complete TUG, this indicates risk for falls. Score 7 7.5-10.5 11-14 14.5-17.5 18-21 21.5-24.5 25+   Modifier CH CI CJ CK CL CM CN         Tool Used: Lymphedema Life Impact Scale   Score:  Initial:  54 Most Recent:33  (Date: 9/26/17 )   Interpretation of Score: The Lymphedema Life Impact Scale (LLIS) is a validated instrument that measures the physical, functional, and psychosocial concerns pertinent to patients with extremity lymphedema. The Scale's questionnaire is administered to patients to gauge impairments, activity limitations, and participation restrictions resulting from their lymphedema.   Score 0 1-13 14-26 27-40 41-54 55-67 68   Modifier CH CI CJ CK CL CM CN       Medical Necessity:   · Patient is expected to demonstrate progress in strength to increase independence with self care and houseold activity. Reason for Services/Other Comments:  · Patient continues to demonstrate capacity to improve strength and conditioning which will increase independence. Use of outcome tool(s) and clinical judgement create a POC that gives a: Questionable prediction of patient's progress: MODERATE COMPLEXITY            TREATMENT:   (In addition to Assessment/Re-Assessment sessions the following treatments were rendered)  Pre-treatment Symptoms/Complaints:  Fatigue, weakness  Pain: Initial: 4-5/10         Post Session: 4 /10    33 min  Patient late     Fatigue 3/10  O2 99 HR 90  /87  TUG, 6 minute walk  UBE level 1 x 4 min O2 99 HR 84  Sit to stand x 10 reps  Long arc quads x 10 reps with 5 count hold  Fatigue 2/10  as above    Treatment/Session Assessment:    · Response to Treatment:  Tolerated the treatment well. Limit strenuous activity due to enlarged heart. · Compliance with Program/Exercises: Will assess as treatment progresses. · Recommendations/Intent for next treatment session: \"Next visit will focus on conditioning and strengthening\".        Total Treatment Duration:  PT Patient Time In/Time Out  Time In: 2072  Time Out: 0930    Kenyatta Guaman PT

## 2017-11-08 ENCOUNTER — HOSPITAL ENCOUNTER (OUTPATIENT)
Dept: PHYSICAL THERAPY | Age: 57
Discharge: HOME | End: 2017-11-08
Payer: COMMERCIAL

## 2017-11-08 PROCEDURE — 97110 THERAPEUTIC EXERCISES: CPT

## 2017-11-08 NOTE — PROGRESS NOTES
Rahul Jara  : 1960 [facility] [address] [facility]  [place], [place], [address]  [phone]   [phone]          OUTPATIENT PHYSICAL THERAPY:Daily Note 2017    ICD-10: Treatment Diagnosis: I 89.0 lymphedema not elsewhere classified  M 62.81 muscle weakness generalized  Precautions/Allergies:   Review of patient's allergies indicates no known allergies. Fall Risk Score: 1 (? 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 Schwab presents for a lymphedema assessment due to edema of bilateral lower extremities. She has pitting edema in the bilateral lower extremities. She has previously had short stretch bandaging and MLD following knee surgery 3 years ago. She would like to try this again even though this is not orthopedic post surgical swelling. She was diagnosed with lymphoma in March of this year. She recently completed her second chemo of 6 planned. She will have a PET scan 17. Progress with chemo has been limited due to lab values being abnormal.  She is uncomfortable due to the edema. She will have a paracentesis 17. We will initiate edema management and progress slowly dependent on her tolerance to compression and her response to treatment. We have now initiated conditioning and strengthening. She is independent with home management of edema. 17: The patient's edema has resolved. She has now been focusing on conditioning and strengthening. She will benefit from therapeutic exercises in order to address decreased strength and overall conditioning. PROBLEM LIST (Impacting functional limitations):  1. Decreased Strength  2. Increased Pain  3.  Decreased Activity Tolerance  4. Increased Fatigue  5. Increased Shortness of Breath  6. Decreased Flexibility/Joint Mobility  7. Decreased [place] with Home Exercise Program INTERVENTIONS PLANNED:  1. Home Exercise Program (HEP)  2. Range of Motion (ROM)  3. Therapeutic Exercise/Strengthening   TREATMENT PLAN:  Effective Dates: 11/7/17 TO 1/31/18. Frequency/Duration: 2 times a week for 12 weeks dependent on chemo schedule and counts  GOALS: (Goals have been discussed and agreed upon with patient.)  Short-Term Functional Goals: Time Frame: 4 weeks  1. The patient will have knowledge of signs and symptoms of lymphedema and how to manage within 4 weeks. Met   2. The patient will tolerate short stretch bandaging of bilateral lower extremities for reduction of girth within 4 weeks. Met   3. The patient and caregiver will be independent with compression bandaging within 4 weeks. Met   4. The patient will improve her 6 minute walk by 60 feet within 4 weeks. Met   5. The patient will report a fatigue of 3 or less within 4 weeks. Met   Discharge Goals: Time Frame: 8 weeks  1. The patient will have a girth decrease of at least 5 cm in the calf within 8 weeks. 2. The patient will be fit with static compression garments within 8 weeks. Met   3. The patient and caregiver will be independent with edema management within 8 weeks. Met  4. The patient will transition to Healthy Self within 8 weeks. 5.   Rehabilitation Potential For Stated Goals: [address] [place] [address]'s therapy, I certify that the treatment plan above will be carried out by a therapist or under their direction. Thank you for this referral,  Praveen Anthony PT     Referring Physician Signature: Ebony Granger MD              Date                    The information in this section was collected on 5/24/17 (except where otherwise noted).   HISTORY:   History of Present Injury/Illness (Reason for Referral):  Lymphoma, decreased activity tolerance, decreased strength  Past Medical History/Comorbidities:   Ms. Randy Mccallum  has a past medical history of Anemia; Arthritis; Basal cell carcinoma; Chronic pain; Hypertension (10/4/2011); Morbid obesity ([address] Murmur; Non Hodgkin's lymphoma ([address] (3/30/2017); Other ill-defined conditions(799.89); Overactive bladder; Rheumatic fever; Shingles; Thromboembolus ([address] (x2); Thyroid disease; and Unspecified adverse effect of anesthesia. She also has no past medical history of Aneurysm ([address] Arrhythmia; Asthma; Autoimmune disease ([address] CAD (coronary artery disease); Chronic kidney disease; Coagulation defects; COPD; Diabetes ([address] Difficult intubation; GERD (gastroesophageal reflux disease); Heart failure ([address] Liver disease; Malignant hyperthermia due to anesthesia; Nausea & vomiting; Pseudocholinesterase deficiency; Psychiatric disorder; PUD (peptic ulcer disease); Seizures ([address] Stroke [facility]); or Unspecified sleep apnea. Ms. Randy Mccallum  has a past surgical history that includes hc cholecystectomy fnc41; anesth,achilles tendon surg (2/10/2011); cholecystectomy (5462); orthopaedic (2012); orthopaedic (2013); and vascular access.    Past Medical History:   Diagnosis Date    Anemia     in high school, \"received gamma globulin twice a week for awhile\"    Arthritis     osteo-r knee    Basal cell carcinoma     Followed by Dermatology    Chronic pain     KNEEs    Hypertension 10/4/2011    medication    Morbid obesity ([address]     Murmur     \"had it my whole life\", did not appreciate murmur 9/12/2011 during assessment    Non Hodgkin's lymphoma ([address] 3/30/2017    Other ill-defined conditions(799.89)     skin bumps-med as needed    Overactive bladder     Rheumatic fever     Possibly as a child    Shingles     Thromboembolus ([address] x2    LLE-calf-1990?-only took ASA-resolved in less than a wk-\"a little burned area-not examined\"    Thyroid disease     hypo-medication    Unspecified adverse effect of anesthesia     pt reports waking several times with knee surgery-spinal     Past Surgical History:   Procedure Laterality Date    [facility]. CHOLECYSTECTOMY FNC41      HX CHOLECYSTECTOMY  1983    HX ORTHOPAEDIC  2012    right TKA    HX ORTHOPAEDIC  2013    left TKA. Dr. Hitchcock Reason.  HX VASCULAR ACCESS      SD ANESTH,ACHILLES TENDON SURG  2/10/2011    LEFT. Dr. Patel Wahl. Social History/Living Environment:     lives with family, 2 flights of stairs  Prior Level of Function/Work/Activity:    Dominant Side:         RIGHT  Current Medications:       Current Outpatient Prescriptions:     oxyCODONE (OXYIR) 5 mg capsule, Take 1-2 Caps by mouth every four (4) hours as needed. Max Daily Amount: 60 mg., Disp: 180 Cap, Rfl: 0    fluconazole (DIFLUCAN) 200 mg tablet, Take 1 Tab by mouth daily. , Disp: 90 Tab, Rfl: 0    pregabalin (LYRICA) 100 mg capsule, Take 1 Cap by mouth three (3) times daily. Max Daily Amount: 300 mg., Disp: 90 Cap, Rfl: 3    levothyroxine (SYNTHROID) 175 mcg tablet, Take 1 Tab by mouth Daily (before breakfast). , Disp: 30 Tab, Rfl: 5    magnesium oxide (MAG-OX) 400 mg tablet, Take 1 Tab by mouth two (2) times a day., Disp: 60 Tab, Rfl: 1    magic mouthwash (ALEXSANDER) susp, Take 10 mL by mouth every four (4) hours as needed. , Disp: 2 Bottle, Rfl: 2    nystatin (MYCOSTATIN) powder, Apply  to affected area three (3) times daily. , Disp: 60 g, Rfl: 2    fluticasone (FLONASE) 50 mcg/actuation nasal spray, 2 Sprays by Both Nostrils route daily. , Disp: 1 Bottle, Rfl: 1    acyclovir (ZOVIRAX) 400 mg tablet, Take 1 Tab by mouth two (2) times a day., Disp: 60 Tab, Rfl: 3    allopurinol (ZYLOPRIM) 300 mg tablet, Take 1 Tab by mouth two (2) times a day.  (Patient taking differently: Take 300 mg by mouth daily.), Disp: 90 Tab, Rfl: 2    loratadine (CLARITIN) 10 mg tablet, Take 10 mg by mouth., Disp: , Rfl:     omeprazole (PRILOSEC) 20 mg capsule, TAKE 1 CAPSULE DAILY, Disp: 90 Cap, Rfl: 2    lidocaine-prilocaine (EMLA) topical cream, Apply  to affected area as needed for Pain. Apply to port site 45-60 minutes prior to lab appt or infusion. , Disp: 30 g, Rfl: 0    promethazine (PHENERGAN) 25 mg tablet, Take 25 mg by mouth every six (6) hours as needed for Nausea. Unsure of dose, Disp: , Rfl:    Date Last Reviewed:  11/7/17   Number of Personal Factors/Comorbidities that affect the Plan of Care: 3+: HIGH COMPLEXITY   EXAMINATION:   Palpation:           dry skin, loose skin  ROM:          Within functional limits. She previously has had bilateral knee replacements and an Achilles tendon repair on the left  Strength:          Grossly 4+/5 x 4 extremities  Functional Mobility:         Gait/Ambulation:  Independent         Transfers: independent        Bed Mobility:  independent  Skin Integrity:          Intact, but dry. Edema/Girth:  pitting    Left Right    Initial Most Recent Initial Most Recent   Upper  Extremity           Lower  Extremity               Body Structures Involved:  1. Muscles Body Functions Affected:  1. Sensory/Pain Activities and Participation Affected:  1. None   Number of elements (examined above) that affect the Plan of Care: 1-2: LOW COMPLEXITY   CLINICAL PRESENTATION:   Presentation: Evolving clinical presentation with unstable and unpredictable characteristics: HIGH COMPLEXITY   CLINICAL DECISION MAKING:   Outcome Measure: Tool Used: NCCN Distress Thermometer   Score:  Initial:   Most Recent: X    Interpretation of Score: If greater than or equal to 8, then PHQ-9 Depression Scale Score   and JOON-7 Anxiety Scale Score  .   Tool Used: ECOG Performance Survey Score  Score:  Initial: 2 Most Recent:  1    Interpretation of Score:   0 Fully active, able to carry on all pre-disease performance without restriction   1 Restricted in physically strenuous activity but ambulatory and able to carry out work of a light or sedentary nature, e.g., light house work, office work   2 Ambulatory and capable of all selfcare but unable to carry out any work activities. Up and about more than 50% of waking hours   3 Capable of only limited selfcare, confined to bed or chair more than 50% of waking hours   4 Completely disabled. Cannot carry on any selfcare. Totally confined to bed or chair   5 Dead    Tool Used: 6-MINUTE WALK TEST  Score:  Initial: 1110 feet Most Recent: 1630 feet (Date: 11/7/17 )   Interpretation of Score: Normal range varies but is approximately 1285-1416 Feet      Distance walked: 1631 feet     Baseline End of Test   Heart Rate 90 119   Dyspnea (Luther Scale)     Fatigue (Luther Scale) 2 2   SpO2 99 96   /87 174/74     Score 2133 [id number] 0327-3448 [phone] 426-16 15-0   Modifier CH CI CJ CK CL CM CN       Tool Used: Timed Up and Go (TUG)  Score:  Initial: 8 seconds Most Recent: 6 seconds (Date: 11/7/17 )   Interpretation of Score: The test measures, in seconds, the time taken by an individual to stand up from a standard arm chair (seat height 46 cm [18 in], arm height 65 cm [25.6 in]), walk a distance of 3 meters (118 in, approx 10 ft), turn, walk back to the chair and sit down. If the individual takes longer than 14 seconds to complete TUG, this indicates risk for falls. Score 7 7.5-10.5 11-14 14.5-17.5 18-21 21.5-24.5 25+   Modifier CH CI CJ CK CL CM CN         Tool Used: Lymphedema Life Impact Scale   Score:  Initial:  54 Most Recent:33  (Date: 9/26/17 )   Interpretation of Score: The Lymphedema Life Impact Scale (LLIS) is a validated instrument that measures the physical, functional, and psychosocial concerns pertinent to patients with extremity lymphedema. The Scale's questionnaire is administered to patients to gauge impairments, activity limitations, and participation restrictions resulting from their lymphedema.   Score 0 1-13 14-26 27-40 41-54 55-67 68   Modifier CH CI CJ CK CL CM CN       Medical Necessity:   · Patient is expected to demonstrate progress in strength to increase independence with self care and houseold activity. Reason for Services/Other Comments:  · Patient continues to demonstrate capacity to improve strength and conditioning which will increase independence. Use of outcome tool(s) and clinical judgement create a POC that gives a: Questionable prediction of patient's progress: MODERATE COMPLEXITY            TREATMENT:   (In addition to Assessment/Re-Assessment sessions the following treatments were rendered)  Pre-treatment Symptoms/Complaints:  Fatigue, weakness  Pain: Initial: 4-5/10         Post Session: 4 /10    39 min       Fatigue 0/10  O2 99 HR 86  /76  650' x 1 O2 96   Nustep level 2 x 7 min spm 69 mets 2.6  650' x 1  UBE level 1 x 4 min  650' x 1 O294   Sit to stand x 10 reps  Long arc quads x 10 reps with 5 count hold  Bilateral upper extremity with 2# biceps curls, triceps extension, bent over row, forward flexion, abduciton x 10 reps  O2 98 HR 92  Fatigue 0/10  as above    Treatment/Session Assessment:    · Response to Treatment:  Tolerated the treatment well. Limit strenuous activity due to enlarged heart. · Compliance with Program/Exercises: Will assess as treatment progresses. · Recommendations/Intent for next treatment session: \"Next visit will focus on conditioning and strengthening\".        Total Treatment Duration:  PT Patient Time In/Time Out  Time In: 0902  Time Out: [address], PT

## 2017-11-09 ENCOUNTER — APPOINTMENT (OUTPATIENT)
Dept: PHYSICAL THERAPY | Age: 57
End: 2017-11-09
Payer: COMMERCIAL

## 2017-11-14 ENCOUNTER — HOSPITAL ENCOUNTER (OUTPATIENT)
Dept: PHYSICAL THERAPY | Age: 57
Discharge: HOME | End: 2017-11-14
Payer: COMMERCIAL

## 2017-11-14 PROCEDURE — 97110 THERAPEUTIC EXERCISES: CPT

## 2017-11-14 NOTE — PROGRESS NOTES
Riddhi Manzanares  : 1960 [facility] [address] [facility]  [place] , [facility], [address] [facility]  [phone]   [phone]          OUTPATIENT PHYSICAL THERAPY:Daily Note 2017    ICD-10: Treatment Diagnosis: I 89.0 lymphedema not elsewhere classified  M 62.81 muscle weakness generalized  Precautions/Allergies:   Review of patient's allergies indicates no known allergies. Fall Risk Score: 1 (? 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 Adeel Toledo presents for a lymphedema assessment due to edema of bilateral lower extremities. She has pitting edema in the bilateral lower extremities. She has previously had short stretch bandaging and MLD following knee surgery 3 years ago. She would like to try this again even though this is not orthopedic post surgical swelling. She was diagnosed with lymphoma in March of this year. She recently completed her second chemo of 6 planned. She will have a PET scan 17. Progress with chemo has been limited due to lab values being abnormal.  She is uncomfortable due to the edema. She will have a paracentesis 17. We will initiate edema management and progress slowly dependent on her tolerance to compression and her response to treatment. We have now initiated conditioning and strengthening. She is independent with home management of edema. 17: The patient's edema has resolved. She has now been focusing on conditioning and strengthening. She will benefit from therapeutic exercises in order to address decreased strength and overall conditioning. PROBLEM LIST (Impacting functional limitations):  1. Decreased Strength  2. Increased Pain  3.  Decreased Activity Tolerance  4. Increased Fatigue  5. Increased Shortness of Breath  6. Decreased Flexibility/Joint Mobility  7. Decreased [place] with Home Exercise Program INTERVENTIONS PLANNED:  1. Home Exercise Program (HEP)  2. Range of Motion (ROM)  3. Therapeutic Exercise/Strengthening   TREATMENT PLAN:  Effective Dates: 11/7/17 TO 1/31/18. Frequency/Duration: 2 times a week for 12 weeks dependent on chemo schedule and counts  GOALS: (Goals have been discussed and agreed upon with patient.)  Short-Term Functional Goals: Time Frame: 4 weeks  1. The patient will have knowledge of signs and symptoms of lymphedema and how to manage within 4 weeks. Met   2. The patient will tolerate short stretch bandaging of bilateral lower extremities for reduction of girth within 4 weeks. Met   3. The patient and caregiver will be independent with compression bandaging within 4 weeks. Met   4. The patient will improve her 6 minute walk by 60 feet within 4 weeks. Met   5. The patient will report a fatigue of 3 or less within 4 weeks. Met   Discharge Goals: Time Frame: 8 weeks  1. The patient will have a girth decrease of at least 5 cm in the calf within 8 weeks. 2. The patient will be fit with static compression garments within 8 weeks. Met   3. The patient and caregiver will be independent with edema management within 8 weeks. Met  4. The patient will transition to Healthy Self within 8 weeks. 5.   Rehabilitation Potential For Stated Goals: [address] [place] [address]'s therapy, I certify that the treatment plan above will be carried out by a therapist or under their direction. Thank you for this referral,  Suzon Hodgkins, PT     Referring Physician Signature: Lyric Godinez MD              Date                    The information in this section was collected on 5/24/17 (except where otherwise noted).   HISTORY:   History of Present Injury/Illness (Reason for Referral):  Lymphoma, decreased activity tolerance, decreased strength  Past Medical History/Comorbidities:   Ms. Leslie Solorzano  has a past medical history of Anemia; Arthritis; Basal cell carcinoma; Chronic pain; Hypertension (10/4/2011); Morbid obesity ([address] Murmur; Non Hodgkin's lymphoma ([address] (3/30/2017); Other ill-defined conditions(799.89); Overactive bladder; Rheumatic fever; Shingles; Thromboembolus ([address] (x2); Thyroid disease; and Unspecified adverse effect of anesthesia. She also has no past medical history of Aneurysm ([address] Arrhythmia; Asthma; Autoimmune disease ([address] CAD (coronary artery disease); Chronic kidney disease; Coagulation defects; COPD; Diabetes ([address] Difficult intubation; GERD (gastroesophageal reflux disease); Heart failure ([address] Liver disease; Malignant hyperthermia due to anesthesia; Nausea & vomiting; Pseudocholinesterase deficiency; Psychiatric disorder; PUD (peptic ulcer disease); Seizures ([address] Stroke [facility]); or Unspecified sleep apnea. Ms. Leslie Solorzano  has a past surgical history that includes hc cholecystectomy fnc41; anesth,achilles tendon surg (2/10/2011); cholecystectomy (1715); orthopaedic (2012); orthopaedic (2013); and vascular access.    Past Medical History:   Diagnosis Date    Anemia     in high school, \"received gamma globulin twice a week for awhile\"    Arthritis     osteo-r knee    Basal cell carcinoma     Followed by Dermatology    Chronic pain     KNEEs    Hypertension 10/4/2011    medication    Morbid obesity ([address]     Murmur     \"had it my whole life\", did not appreciate murmur 9/12/2011 during assessment    Non Hodgkin's lymphoma ([address] 3/30/2017    Other ill-defined conditions(799.89)     skin bumps-med as needed    Overactive bladder     Rheumatic fever     Possibly as a child    Shingles     Thromboembolus ([address] x2    LLE-calf-1990?-only took ASA-resolved in less than a wk-\"a little burned area-not examined\"    Thyroid disease     hypo-medication    Unspecified adverse effect of anesthesia     pt reports waking several times with knee surgery-spinal     Past Surgical History:   Procedure Laterality Date    [facility]. CHOLECYSTECTOMY FNC41      HX CHOLECYSTECTOMY  1983    HX ORTHOPAEDIC  2012    right TKA    HX ORTHOPAEDIC  2013    left TKA. Dr. Candis Sandifer.  HX VASCULAR ACCESS      MO ANESTH,ACHILLES TENDON SURG  2/10/2011    LEFT. Dr. Parish Tobin. Social History/Living Environment:     lives with family, 2 flights of stairs  Prior Level of Function/Work/Activity:    Dominant Side:         RIGHT  Current Medications:       Current Outpatient Prescriptions:     oxyCODONE (OXYIR) 5 mg capsule, Take 1-2 Caps by mouth every four (4) hours as needed. Max Daily Amount: 60 mg., Disp: 180 Cap, Rfl: 0    fluconazole (DIFLUCAN) 200 mg tablet, Take 1 Tab by mouth daily. , Disp: 90 Tab, Rfl: 0    pregabalin (LYRICA) 100 mg capsule, Take 1 Cap by mouth three (3) times daily. Max Daily Amount: 300 mg., Disp: 90 Cap, Rfl: 3    levothyroxine (SYNTHROID) 175 mcg tablet, Take 1 Tab by mouth Daily (before breakfast). , Disp: 30 Tab, Rfl: 5    magnesium oxide (MAG-OX) 400 mg tablet, Take 1 Tab by mouth two (2) times a day., Disp: 60 Tab, Rfl: 1    magic mouthwash (ALEXSANDER) susp, Take 10 mL by mouth every four (4) hours as needed. , Disp: 2 Bottle, Rfl: 2    nystatin (MYCOSTATIN) powder, Apply  to affected area three (3) times daily. , Disp: 60 g, Rfl: 2    fluticasone (FLONASE) 50 mcg/actuation nasal spray, 2 Sprays by Both Nostrils route daily. , Disp: 1 Bottle, Rfl: 1    acyclovir (ZOVIRAX) 400 mg tablet, Take 1 Tab by mouth two (2) times a day., Disp: 60 Tab, Rfl: 3    allopurinol (ZYLOPRIM) 300 mg tablet, Take 1 Tab by mouth two (2) times a day.  (Patient taking differently: Take 300 mg by mouth daily.), Disp: 90 Tab, Rfl: 2    loratadine (CLARITIN) 10 mg tablet, Take 10 mg by mouth., Disp: , Rfl:     omeprazole (PRILOSEC) 20 mg capsule, TAKE 1 CAPSULE DAILY, Disp: 90 Cap, Rfl: 2    lidocaine-prilocaine (EMLA) topical cream, Apply  to affected area as needed for Pain. Apply to port site 45-60 minutes prior to lab appt or infusion. , Disp: 30 g, Rfl: 0    promethazine (PHENERGAN) 25 mg tablet, Take 25 mg by mouth every six (6) hours as needed for Nausea. Unsure of dose, Disp: , Rfl:    Date Last Reviewed:  11/7/17   Number of Personal Factors/Comorbidities that affect the Plan of Care: 3+: HIGH COMPLEXITY   EXAMINATION:   Palpation:           dry skin, loose skin  ROM:          Within functional limits. She previously has had bilateral knee replacements and an Achilles tendon repair on the left  Strength:          Grossly 4+/5 x 4 extremities  Functional Mobility:         Gait/Ambulation:  Independent         Transfers: independent        Bed Mobility:  independent  Skin Integrity:          Intact, but dry. Edema/Girth:  pitting    Left Right    Initial Most Recent Initial Most Recent   Upper  Extremity           Lower  Extremity               Body Structures Involved:  1. Muscles Body Functions Affected:  1. Sensory/Pain Activities and Participation Affected:  1. None   Number of elements (examined above) that affect the Plan of Care: 1-2: LOW COMPLEXITY   CLINICAL PRESENTATION:   Presentation: Evolving clinical presentation with unstable and unpredictable characteristics: HIGH COMPLEXITY   CLINICAL DECISION MAKING:   Outcome Measure: Tool Used: NCCN Distress Thermometer   Score:  Initial:   Most Recent: X    Interpretation of Score: If greater than or equal to 8, then PHQ-9 Depression Scale Score   and JOON-7 Anxiety Scale Score  .   Tool Used: ECOG Performance Survey Score  Score:  Initial: 2 Most Recent:  1    Interpretation of Score:   0 Fully active, able to carry on all pre-disease performance without restriction   1 Restricted in physically strenuous activity but ambulatory and able to carry out work of a light or sedentary nature, e.g., light house work, office work   2 Ambulatory and capable of all selfcare but unable to carry out any work activities. Up and about more than 50% of waking hours   3 Capable of only limited selfcare, confined to bed or chair more than 50% of waking hours   4 Completely disabled. Cannot carry on any selfcare. Totally confined to bed or chair   5 Dead    Tool Used: 6-MINUTE WALK TEST  Score:  Initial: 1110 feet Most Recent: 1630 feet (Date: 11/7/17 )   Interpretation of Score: Normal range varies but is approximately 1843-1778 Feet      Distance walked: 1631 feet     Baseline End of Test   Heart Rate 90 119   Dyspnea (Luther Scale)     Fatigue (Luther Scale) 2 2   SpO2 99 96   /87 174/74     Score 2133 [phone] 0524-7547 [phone] 426-16 15-0   Modifier CH CI CJ CK CL CM CN       Tool Used: Timed Up and Go (TUG)  Score:  Initial: 8 seconds Most Recent: 6 seconds (Date: 11/7/17 )   Interpretation of Score: The test measures, in seconds, the time taken by an individual to stand up from a standard arm chair (seat height 46 cm [18 in], arm height 65 cm [25.6 in]), walk a distance of 3 meters (118 in, approx 10 ft), turn, walk back to the chair and sit down. If the individual takes longer than 14 seconds to complete TUG, this indicates risk for falls. Score 7 7.5-10.5 11-14 14.5-17.5 18-21 21.5-24.5 25+   Modifier CH CI CJ CK CL CM CN         Tool Used: Lymphedema Life Impact Scale   Score:  Initial:  54 Most Recent:33  (Date: 9/26/17 )   Interpretation of Score: The Lymphedema Life Impact Scale (LLIS) is a validated instrument that measures the physical, functional, and psychosocial concerns pertinent to patients with extremity lymphedema. The Scale's questionnaire is administered to patients to gauge impairments, activity limitations, and participation restrictions resulting from their lymphedema.   Score 0 1-13 14-26 27-40 41-54 55-67 68   Modifier CH CI CJ CK CL CM CN       Medical Necessity:   · Patient is expected to demonstrate progress in strength to increase independence with self care and houseold activity. Reason for Services/Other Comments:  · Patient continues to demonstrate capacity to improve strength and conditioning which will increase independence. Use of outcome tool(s) and clinical judgement create a POC that gives a: Questionable prediction of patient's progress: MODERATE COMPLEXITY            TREATMENT:   (In addition to Assessment/Re-Assessment sessions the following treatments were rendered)  Pre-treatment Symptoms/Complaints:  Right shoulder pain, right scapular pain  Pain: Initial: 4/10  Arm pain and rhomboid pain        Post Session: 4 /10    44 min       Fatigue 2/10  O2 98 HR 85  BP   650' x 1 O2 97   Nustep level 2 x 6 min spm 73 mets 2.3  650' x 1  UBE level 1 x 4 min  650' x 1 O294   Sit to stand x 10 reps  Long arc quads x 10 reps with 5 count hold  Bilateral upper extremity with 3# biceps curls, triceps extension, bent over row, forward flexion x 10 reps  O2 98 HR 82  Fatigue 0/10  as above    Treatment/Session Assessment:    · Response to Treatment:  Tolerated the treatment well. Limit strenuous activity due to enlarged heart. · Compliance with Program/Exercises: Will assess as treatment progresses. · Recommendations/Intent for next treatment session: \"Next visit will focus on conditioning and strengthening\".        Total Treatment Duration:  PT Patient Time In/Time Out  Time In: 2691  Time Out: [address] [place] [address],

## 2017-11-16 ENCOUNTER — HOSPITAL ENCOUNTER (OUTPATIENT)
Dept: PHYSICAL THERAPY | Age: 57
Discharge: HOME | End: 2017-11-16
Payer: COMMERCIAL

## 2017-11-16 PROCEDURE — 97110 THERAPEUTIC EXERCISES: CPT

## 2017-11-16 NOTE — PROGRESS NOTES
Marta Portillo  : [facility] [address] [facility]  [place]enny Ull[place], [place], [address] [facility]  [phone]   [phone]          OUTPATIENT PHYSICAL THERAPY:Daily Note 2017    ICD-10: Treatment Diagnosis: I 89.0 lymphedema not elsewhere classified  M 62.81 muscle weakness generalized  Precautions/Allergies:   Review of patient's allergies indicates no known allergies. Fall Risk Score: 1 (? 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 Chelsey Lopez presents for a lymphedema assessment due to edema of bilateral lower extremities. She has pitting edema in the bilateral lower extremities. She has previously had short stretch bandaging and MLD following knee surgery 3 years ago. She would like to try this again even though this is not orthopedic post surgical swelling. She was diagnosed with lymphoma in March of this year. She recently completed her second chemo of 6 planned. She will have a PET scan 17. Progress with chemo has been limited due to lab values being abnormal.  She is uncomfortable due to the edema. She will have a paracentesis 17. We will initiate edema management and progress slowly dependent on her tolerance to compression and her response to treatment. We have now initiated conditioning and strengthening. She is independent with home management of edema. 17: The patient's edema has resolved. She has now been focusing on conditioning and strengthening. She will benefit from therapeutic exercises in order to address decreased strength and overall conditioning. PROBLEM LIST (Impacting functional limitations):  1. Decreased Strength  2. Increased Pain  3.  Decreased Activity Tolerance  4. Increased Fatigue  5. Increased Shortness of Breath  6. Decreased Flexibility/Joint Mobility  7. Decreased Rock with Home Exercise Program INTERVENTIONS PLANNED:  1. Home Exercise Program (HEP)  2. Range of Motion (ROM)  3. Therapeutic Exercise/Strengthening   TREATMENT PLAN:  Effective Dates: 11/7/17 TO 1/31/18. Frequency/Duration: 2 times a week for 12 weeks dependent on chemo schedule and counts  GOALS: (Goals have been discussed and agreed upon with patient.)  Short-Term Functional Goals: Time Frame: 4 weeks  1. The patient will have knowledge of signs and symptoms of lymphedema and how to manage within 4 weeks. Met   2. The patient will tolerate short stretch bandaging of bilateral lower extremities for reduction of girth within 4 weeks. Met   3. The patient and caregiver will be independent with compression bandaging within 4 weeks. Met   4. The patient will improve her 6 minute walk by 60 feet within 4 weeks. Met   5. The patient will report a fatigue of 3 or less within 4 weeks. Met   Discharge Goals: Time Frame: 8 weeks  1. The patient will have a girth decrease of at least 5 cm in the calf within 8 weeks. 2. The patient will be fit with static compression garments within 8 weeks. Met   3. The patient and caregiver will be independent with edema management within 8 weeks. Met  4. The patient will transition to Healthy Self within 8 weeks. 5.   Rehabilitation Potential For Stated Goals: [address] [place] [address]'s therapy, I certify that the treatment plan above will be carried out by a therapist or under their direction. Thank you for this referral,  Jonathon Nielsen PT     Referring Physician Signature: Maxi Salmon MD              Date                    The information in this section was collected on 5/24/17 (except where otherwise noted).   HISTORY:   History of Present Injury/Illness (Reason for Referral):  Lymphoma, decreased activity tolerance, decreased strength  Past Medical History/Comorbidities:   Ms. Auther Koyanagi  has a past medical history of Anemia; Arthritis; Basal cell carcinoma; Chronic pain; Hypertension (10/4/2011); Morbid obesity ([address] Murmur; Non Hodgkin's lymphoma ([address] (3/30/2017); Other ill-defined conditions(799.89); Overactive bladder; Rheumatic fever; Shingles; Thromboembolus ([address] (x2); Thyroid disease; and Unspecified adverse effect of anesthesia. She also has no past medical history of Aneurysm ([address] Arrhythmia; Asthma; Autoimmune disease ([address] CAD (coronary artery disease); Chronic kidney disease; Coagulation defects; COPD; Diabetes ([address] Difficult intubation; GERD (gastroesophageal reflux disease); Heart failure ([address] Liver disease; Malignant hyperthermia due to anesthesia; Nausea & vomiting; Pseudocholinesterase deficiency; Psychiatric disorder; PUD (peptic ulcer disease); Seizures ([address] Stroke [facility]); or Unspecified sleep apnea. Ms. Auther Koyanagi  has a past surgical history that includes hc cholecystectomy fnc41; anesth,achilles tendon surg (2/10/2011); cholecystectomy (6723); orthopaedic (2012); orthopaedic (2013); and vascular access.    Past Medical History:   Diagnosis Date    Anemia     in high school, \"received gamma globulin twice a week for awhile\"    Arthritis     osteo-r knee    Basal cell carcinoma     Followed by Dermatology    Chronic pain     KNEEs    Hypertension 10/4/2011    medication    Morbid obesity ([address]     Murmur     \"had it my whole life\", did not appreciate murmur 9/12/2011 during assessment    Non Hodgkin's lymphoma ([address] 3/30/2017    Other ill-defined conditions(799.89)     skin bumps-med as needed    Overactive bladder     Rheumatic fever     Possibly as a child    Shingles     Thromboembolus ([address] x2    LLE-calf-1990?-only took ASA-resolved in less than a wk-\"a little burned area-not examined\"    Thyroid disease     hypo-medication    Unspecified adverse effect of anesthesia     pt reports waking several times with knee surgery-spinal     Past Surgical History:   Procedure Laterality Date    [facility]. CHOLECYSTECTOMY FNC41      HX CHOLECYSTECTOMY  1983    HX ORTHOPAEDIC  2012    right TKA    HX ORTHOPAEDIC  2013    left TKA. Dr. Elie Chirinos.  HX VASCULAR ACCESS      ND ANESTH,ACHILLES TENDON SURG  2/10/2011    LEFT. Dr. Bandar Thomason. Social History/Living Environment:     lives with family, 2 flights of stairs  Prior Level of Function/Work/Activity:    Dominant Side:         RIGHT  Current Medications:       Current Outpatient Prescriptions:     oxyCODONE (OXYIR) 5 mg capsule, Take 1-2 Caps by mouth every four (4) hours as needed. Max Daily Amount: 60 mg., Disp: 180 Cap, Rfl: 0    fluconazole (DIFLUCAN) 200 mg tablet, Take 1 Tab by mouth daily. , Disp: 90 Tab, Rfl: 0    pregabalin (LYRICA) 100 mg capsule, Take 1 Cap by mouth three (3) times daily. Max Daily Amount: 300 mg., Disp: 90 Cap, Rfl: 3    levothyroxine (SYNTHROID) 175 mcg tablet, Take 1 Tab by mouth Daily (before breakfast). , Disp: 30 Tab, Rfl: 5    magnesium oxide (MAG-OX) 400 mg tablet, Take 1 Tab by mouth two (2) times a day., Disp: 60 Tab, Rfl: 1    magic mouthwash (ALEXSANDER) susp, Take 10 mL by mouth every four (4) hours as needed. , Disp: 2 Bottle, Rfl: 2    nystatin (MYCOSTATIN) powder, Apply  to affected area three (3) times daily. , Disp: 60 g, Rfl: 2    fluticasone (FLONASE) 50 mcg/actuation nasal spray, 2 Sprays by Both Nostrils route daily. , Disp: 1 Bottle, Rfl: 1    acyclovir (ZOVIRAX) 400 mg tablet, Take 1 Tab by mouth two (2) times a day., Disp: 60 Tab, Rfl: 3    allopurinol (ZYLOPRIM) 300 mg tablet, Take 1 Tab by mouth two (2) times a day.  (Patient taking differently: Take 300 mg by mouth daily.), Disp: 90 Tab, Rfl: 2    loratadine (CLARITIN) 10 mg tablet, Take 10 mg by mouth., Disp: , Rfl:     omeprazole (PRILOSEC) 20 mg capsule, TAKE 1 CAPSULE DAILY, Disp: 90 Cap, Rfl: 2    lidocaine-prilocaine (EMLA) topical cream, Apply  to affected area as needed for Pain. Apply to port site 45-60 minutes prior to lab appt or infusion. , Disp: 30 g, Rfl: 0    promethazine (PHENERGAN) 25 mg tablet, Take 25 mg by mouth every six (6) hours as needed for Nausea. Unsure of dose, Disp: , Rfl:    Date Last Reviewed:  11/7/17   Number of Personal Factors/Comorbidities that affect the Plan of Care: 3+: HIGH COMPLEXITY   EXAMINATION:   Palpation:           dry skin, loose skin  ROM:          Within functional limits. She previously has had bilateral knee replacements and an Achilles tendon repair on the left  Strength:          Grossly 4+/5 x 4 extremities  Functional Mobility:         Gait/Ambulation:  Independent         Transfers: independent        Bed Mobility:  independent  Skin Integrity:          Intact, but dry. Edema/Girth:  pitting    Left Right    Initial Most Recent Initial Most Recent   Upper  Extremity           Lower  Extremity               Body Structures Involved:  1. Muscles Body Functions Affected:  1. Sensory/Pain Activities and Participation Affected:  1. None   Number of elements (examined above) that affect the Plan of Care: 1-2: LOW COMPLEXITY   CLINICAL PRESENTATION:   Presentation: Evolving clinical presentation with unstable and unpredictable characteristics: HIGH COMPLEXITY   CLINICAL DECISION MAKING:   Outcome Measure: Tool Used: NCCN Distress Thermometer   Score:  Initial:   Most Recent: X    Interpretation of Score: If greater than or equal to 8, then PHQ-9 Depression Scale Score   and JOON-7 Anxiety Scale Score  .   Tool Used: ECOG Performance Survey Score  Score:  Initial: 2 Most Recent:  1    Interpretation of Score:   0 Fully active, able to carry on all pre-disease performance without restriction   1 Restricted in physically strenuous activity but ambulatory and able to carry out work of a light or sedentary nature, e.g., light house work, office work   2 Ambulatory and capable of all selfcare but unable to carry out any work activities. Up and about more than 50% of waking hours   3 Capable of only limited selfcare, confined to bed or chair more than 50% of waking hours   4 Completely disabled. Cannot carry on any selfcare. Totally confined to bed or chair   5 Dead    Tool Used: 6-MINUTE WALK TEST  Score:  Initial: 1110 feet Most Recent: 1630 feet (Date: 11/7/17 )   Interpretation of Score: Normal range varies but is approximately 9742-6231 Feet      Distance walked: 1631 feet     Baseline End of Test   Heart Rate 90 119   Dyspnea (Luther Scale)     Fatigue (Luther Scale) 2 2   SpO2 99 96   /87 174/74     Score 2133 5541-1547 1464-9769 [phone] 426-16 15-0   Modifier CH CI CJ CK CL CM CN       Tool Used: Timed Up and Go (TUG)  Score:  Initial: 8 seconds Most Recent: 6 seconds (Date: 11/7/17 )   Interpretation of Score: The test measures, in seconds, the time taken by an individual to stand up from a standard arm chair (seat height 46 cm [18 in], arm height 65 cm [25.6 in]), walk a distance of 3 meters (118 in, approx 10 ft), turn, walk back to the chair and sit down. If the individual takes longer than 14 seconds to complete TUG, this indicates risk for falls. Score 7 7.5-10.5 11-14 14.5-17.5 18-21 21.5-24.5 25+   Modifier CH CI CJ CK CL CM CN         Tool Used: Lymphedema Life Impact Scale   Score:  Initial:  54 Most Recent:33  (Date: 9/26/17 )   Interpretation of Score: The Lymphedema Life Impact Scale (LLIS) is a validated instrument that measures the physical, functional, and psychosocial concerns pertinent to patients with extremity lymphedema. The Scale's questionnaire is administered to patients to gauge impairments, activity limitations, and participation restrictions resulting from their lymphedema.   Score 0 1-13 14-26 27-40 41-54 55-67 68   Modifier CH CI CJ CK CL CM CN       Medical Necessity:   · Patient is expected to demonstrate progress in strength to increase independence with self care and houseold activity. Reason for Services/Other Comments:  · Patient continues to demonstrate capacity to improve strength and conditioning which will increase independence. Use of outcome tool(s) and clinical judgement create a POC that gives a: Questionable prediction of patient's progress: MODERATE COMPLEXITY            TREATMENT:   (In addition to Assessment/Re-Assessment sessions the following treatments were rendered)  Pre-treatment Symptoms/Complaints:  Right shoulder pain, right scapular pain. Numbness in left leg is worse today. Pain: Initial: 2/10  Arm pain and rhomboid pain        Post Session: 2 /10    40 min       Fatigue 3/10  O2 99 HR 85  BP   650' x 1 O2 98   Nustep level 2 x 8 min spm 79 mets 2.5  650' x 1 O2 97   UBE level 1 x 4 min O2 98 Hr 104  650' x 1 O298   Sit to stand x 10 reps  Long arc quads x 10 reps with 5 count hold  Bilateral upper extremity with 3# biceps curls, triceps extension, bent over row, forward flexion x 10 reps  O2 98 HR 82  Fatigue 0/10  as above    Treatment/Session Assessment:    · Response to Treatment:  Tolerated the treatment well. Limit strenuous activity due to enlarged heart. Able to increase minutes on Nustep. · Compliance with Program/Exercises: Will assess as treatment progresses. · Recommendations/Intent for next treatment session: \"Next visit will focus on conditioning and strengthening\".        Total Treatment Duration:  PT Patient Time In/Time Out  Time In: 0846  Time Out: 0926    Jack Ho, PT

## 2017-11-21 ENCOUNTER — HOSPITAL ENCOUNTER (OUTPATIENT)
Dept: PHYSICAL THERAPY | Age: 57
Discharge: HOME | End: 2017-11-21
Payer: COMMERCIAL

## 2017-11-21 PROCEDURE — 97110 THERAPEUTIC EXERCISES: CPT

## 2017-11-21 NOTE — PROGRESS NOTES
Chente Buck  : [facility] [address] [facility]  [place], [facility], [address] [facility]  [phone]   [phone]          OUTPATIENT PHYSICAL THERAPY:Daily Note 2017    ICD-10: Treatment Diagnosis: I 89.0 lymphedema not elsewhere classified  M 62.81 muscle weakness generalized  Precautions/Allergies:   Review of patient's allergies indicates no known allergies. Fall Risk Score: 1 (? 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 Sonia Parker presents for a lymphedema assessment due to edema of bilateral lower extremities. She has pitting edema in the bilateral lower extremities. She has previously had short stretch bandaging and MLD following knee surgery 3 years ago. She would like to try this again even though this is not orthopedic post surgical swelling. She was diagnosed with lymphoma in March of this year. She recently completed her second chemo of 6 planned. She will have a PET scan 17. Progress with chemo has been limited due to lab values being abnormal.  She is uncomfortable due to the edema. She will have a paracentesis 17. We will initiate edema management and progress slowly dependent on her tolerance to compression and her response to treatment. We have now initiated conditioning and strengthening. She is independent with home management of edema. 17: The patient's edema has resolved. She has now been focusing on conditioning and strengthening. She will benefit from therapeutic exercises in order to address decreased strength and overall conditioning. PROBLEM LIST (Impacting functional limitations):  1. Decreased Strength  2. Increased Pain  3.  Decreased Activity Tolerance  4. Increased Fatigue  5. Increased Shortness of Breath  6. Decreased Flexibility/Joint Mobility  7. Decreased [place] with Home Exercise Program INTERVENTIONS PLANNED:  1. Home Exercise Program (HEP)  2. Range of Motion (ROM)  3. Therapeutic Exercise/Strengthening   TREATMENT PLAN:  Effective Dates: 11/7/17 TO 1/31/18. Frequency/Duration: 2 times a week for 12 weeks dependent on chemo schedule and counts  GOALS: (Goals have been discussed and agreed upon with patient.)  Short-Term Functional Goals: Time Frame: 4 weeks  1. The patient will have knowledge of signs and symptoms of lymphedema and how to manage within 4 weeks. Met   2. The patient will tolerate short stretch bandaging of bilateral lower extremities for reduction of girth within 4 weeks. Met   3. The patient and caregiver will be independent with compression bandaging within 4 weeks. Met   4. The patient will improve her 6 minute walk by 60 feet within 4 weeks. Met   5. The patient will report a fatigue of 3 or less within 4 weeks. Met   Discharge Goals: Time Frame: 8 weeks  1. The patient will have a girth decrease of at least 5 cm in the calf within 8 weeks. 2. The patient will be fit with static compression garments within 8 weeks. Met   3. The patient and caregiver will be independent with edema management within 8 weeks. Met  4. The patient will transition to Healthy Self within 8 weeks. 5.   Rehabilitation Potential For Stated Goals: [address] [place] [address]'s therapy, I certify that the treatment plan above will be carried out by a therapist or under their direction. Thank you for this referral,  Michele Brower PT     Referring Physician Signature: Kary Michael MD              Date                    The information in this section was collected on 5/24/17 (except where otherwise noted).   HISTORY:   History of Present Injury/Illness (Reason for Referral):  Lymphoma, decreased activity tolerance, decreased strength  Past Medical History/Comorbidities:   Ms. Mandy You  has a past medical history of Anemia; Arthritis; Basal cell carcinoma; Chronic pain; Hypertension (10/4/2011); Morbid obesity ([address] Murmur; Non Hodgkin's lymphoma ([address] (3/30/2017); Other ill-defined conditions(799.89); Overactive bladder; Rheumatic fever; Shingles; Thromboembolus ([address] (x2); Thyroid disease; and Unspecified adverse effect of anesthesia. She also has no past medical history of Aneurysm ([address] Arrhythmia; Asthma; Autoimmune disease ([address] CAD (coronary artery disease); Chronic kidney disease; Coagulation defects; COPD; Diabetes ([address] Difficult intubation; GERD (gastroesophageal reflux disease); Heart failure ([address] Liver disease; Malignant hyperthermia due to anesthesia; Nausea & vomiting; Pseudocholinesterase deficiency; Psychiatric disorder; PUD (peptic ulcer disease); Seizures ([address] Stroke [facility]); or Unspecified sleep apnea. Ms. Mandy You  has a past surgical history that includes  cholecystectomy fnc41; anesth,achilles tendon surg (2/10/2011); cholecystectomy (1214); orthopaedic (2012); orthopaedic (2013); and vascular access.    Past Medical History:   Diagnosis Date    Anemia     in high school, \"received gamma globulin twice a week for awhile\"    Arthritis     osteo-r knee    Basal cell carcinoma     Followed by Dermatology    Chronic pain     KNEEs    Hypertension 10/4/2011    medication    Morbid obesity ([address]     Murmur     \"had it my whole life\", did not appreciate murmur 9/12/2011 during assessment    Non Hodgkin's lymphoma ([address] 3/30/2017    Other ill-defined conditions(799.89)     skin bumps-med as needed    Overactive bladder     Rheumatic fever     Possibly as a child    Shingles     Thromboembolus ([address] x2    LLE-calf-1990?-only took ASA-resolved in less than a wk-\"a little burned area-not examined\"    Thyroid disease     hypo-medication    Unspecified adverse effect of anesthesia     pt reports waking several times with knee surgery-spinal     Past Surgical History:   Procedure Laterality Date    [facility]. CHOLECYSTECTOMY FNC41      HX CHOLECYSTECTOMY  1983    HX ORTHOPAEDIC  2012    right TKA    HX ORTHOPAEDIC  2013    left TKA. Dr. Carmen Villagran.  HX VASCULAR ACCESS      MS ANESTH,ACHILLES TENDON SURG  2/10/2011    LEFT. Dr. Lucía Epstein. Social History/Living Environment:     lives with family, 2 flights of stairs  Prior Level of Function/Work/Activity:    Dominant Side:         RIGHT  Current Medications:       Current Outpatient Prescriptions:     oxyCODONE (OXYIR) 5 mg capsule, Take 1-2 Caps by mouth every four (4) hours as needed. Max Daily Amount: 60 mg., Disp: 180 Cap, Rfl: 0    fluconazole (DIFLUCAN) 200 mg tablet, Take 1 Tab by mouth daily. , Disp: 90 Tab, Rfl: 0    pregabalin (LYRICA) 100 mg capsule, Take 1 Cap by mouth three (3) times daily. Max Daily Amount: 300 mg., Disp: 90 Cap, Rfl: 3    levothyroxine (SYNTHROID) 175 mcg tablet, Take 1 Tab by mouth Daily (before breakfast). , Disp: 30 Tab, Rfl: 5    magnesium oxide (MAG-OX) 400 mg tablet, Take 1 Tab by mouth two (2) times a day., Disp: 60 Tab, Rfl: 1    magic mouthwash (ALEXSANDER) susp, Take 10 mL by mouth every four (4) hours as needed. , Disp: 2 Bottle, Rfl: 2    nystatin (MYCOSTATIN) powder, Apply  to affected area three (3) times daily. , Disp: 60 g, Rfl: 2    fluticasone (FLONASE) 50 mcg/actuation nasal spray, 2 Sprays by Both Nostrils route daily. , Disp: 1 Bottle, Rfl: 1    acyclovir (ZOVIRAX) 400 mg tablet, Take 1 Tab by mouth two (2) times a day., Disp: 60 Tab, Rfl: 3    allopurinol (ZYLOPRIM) 300 mg tablet, Take 1 Tab by mouth two (2) times a day.  (Patient taking differently: Take 300 mg by mouth daily.), Disp: 90 Tab, Rfl: 2    loratadine (CLARITIN) 10 mg tablet, Take 10 mg by mouth., Disp: , Rfl:     omeprazole (PRILOSEC) 20 mg capsule, TAKE 1 CAPSULE DAILY, Disp: 90 Cap, Rfl: 2    lidocaine-prilocaine (EMLA) topical cream, Apply  to affected area as needed for Pain. Apply to port site 45-60 minutes prior to lab appt or infusion. , Disp: 30 g, Rfl: 0    promethazine (PHENERGAN) 25 mg tablet, Take 25 mg by mouth every six (6) hours as needed for Nausea. Unsure of dose, Disp: , Rfl:    Date Last Reviewed:  11/7/17   Number of Personal Factors/Comorbidities that affect the Plan of Care: 3+: HIGH COMPLEXITY   EXAMINATION:   Palpation:           dry skin, loose skin  ROM:          Within functional limits. She previously has had bilateral knee replacements and an Achilles tendon repair on the left  Strength:          Grossly 4+/5 x 4 extremities  Functional Mobility:         Gait/Ambulation:  Independent         Transfers: independent        Bed Mobility:  independent  Skin Integrity:          Intact, but dry. Edema/Girth:  pitting    Left Right    Initial Most Recent Initial Most Recent   Upper  Extremity           Lower  Extremity               Body Structures Involved:  1. Muscles Body Functions Affected:  1. Sensory/Pain Activities and Participation Affected:  1. None   Number of elements (examined above) that affect the Plan of Care: 1-2: LOW COMPLEXITY   CLINICAL PRESENTATION:   Presentation: Evolving clinical presentation with unstable and unpredictable characteristics: HIGH COMPLEXITY   CLINICAL DECISION MAKING:   Outcome Measure: Tool Used: NCCN Distress Thermometer   Score:  Initial:   Most Recent: X    Interpretation of Score: If greater than or equal to 8, then PHQ-9 Depression Scale Score   and JOON-7 Anxiety Scale Score  .   Tool Used: ECOG Performance Survey Score  Score:  Initial: 2 Most Recent:  1    Interpretation of Score:   0 Fully active, able to carry on all pre-disease performance without restriction   1 Restricted in physically strenuous activity but ambulatory and able to carry out work of a light or sedentary nature, e.g., light house work, office work   2 Ambulatory and capable of all selfcare but unable to carry out any work activities. Up and about more than 50% of waking hours   3 Capable of only limited selfcare, confined to bed or chair more than 50% of waking hours   4 Completely disabled. Cannot carry on any selfcare. Totally confined to bed or chair   5 Dead    Tool Used: 6-MINUTE WALK TEST  Score:  Initial: 1110 feet Most Recent: 1630 feet (Date: 11/7/17 )   Interpretation of Score: Normal range varies but is approximately 4231-6717 Feet      Distance walked: 1631 feet     Baseline End of Test   Heart Rate 90 119   Dyspnea (Luther Scale)     Fatigue (Luther Scale) 2 2   SpO2 99 96   /87 174/74     Score 2133 [id number] 7434-5931 [phone] 426-16 15-0   Modifier CH CI CJ CK CL CM CN       Tool Used: Timed Up and Go (TUG)  Score:  Initial: 8 seconds Most Recent: 6 seconds (Date: 11/7/17 )   Interpretation of Score: The test measures, in seconds, the time taken by an individual to stand up from a standard arm chair (seat height 46 cm [18 in], arm height 65 cm [25.6 in]), walk a distance of 3 meters (118 in, approx 10 ft), turn, walk back to the chair and sit down. If the individual takes longer than 14 seconds to complete TUG, this indicates risk for falls. Score 7 7.5-10.5 11-14 14.5-17.5 18-21 21.5-24.5 25+   Modifier CH CI CJ CK CL CM CN         Tool Used: Lymphedema Life Impact Scale   Score:  Initial:  54 Most Recent:33  (Date: 9/26/17 )   Interpretation of Score: The Lymphedema Life Impact Scale (LLIS) is a validated instrument that measures the physical, functional, and psychosocial concerns pertinent to patients with extremity lymphedema. The Scale's questionnaire is administered to patients to gauge impairments, activity limitations, and participation restrictions resulting from their lymphedema.   Score 0 1-13 14-26 27-40 41-54 55-67 68   Modifier CH CI CJ CK CL CM CN       Medical Necessity:   · Patient is expected to demonstrate progress in strength to increase independence with self care and houseold activity. Reason for Services/Other Comments:  · Patient continues to demonstrate capacity to improve strength and conditioning which will increase independence. Use of outcome tool(s) and clinical judgement create a POC that gives a: Questionable prediction of patient's progress: MODERATE COMPLEXITY            TREATMENT:   (In addition to Assessment/Re-Assessment sessions the following treatments were rendered)  Pre-treatment Symptoms/Complaints:   Pain: Initial: 5/10  Right upper arm pain        Post Session: 2-3 /10    42 min       Fatigue 0/10  O2 99 HR 82  /78  650' x 1 O2 98   Nustep level 2 x 6 1/2 min spm 79 mets 2.4 ( shoe coming off repetitively, stopped early)  650' x 1 O2 97   UBE level 1 x 4 min   650' x 1 O298   Sit to stand x 10 reps  Long arc quads x 10 reps with 5 count hold  Bilateral upper extremity with 3# biceps curls, triceps extension, bent over row, forward flexion x 10 reps  O2 98 HR 82  Fatigue 0/10  as above    Treatment/Session Assessment:    · Response to Treatment:  Tolerated the treatment well. · Compliance with Program/Exercises: Will assess as treatment progresses. · Recommendations/Intent for next treatment session: \"Next visit will focus on conditioning and strengthening\".        Total Treatment Duration:  PT Patient Time In/Time Out  Time In: 0930  Time Out: [id number], PT

## 2017-12-05 ENCOUNTER — HOSPITAL ENCOUNTER (OUTPATIENT)
Dept: PHYSICAL THERAPY | Age: 57
Discharge: HOME | End: 2017-12-05
Payer: COMMERCIAL

## 2017-12-05 ENCOUNTER — APPOINTMENT (OUTPATIENT)
Dept: PHYSICAL THERAPY | Age: 57
End: 2017-12-05
Payer: COMMERCIAL

## 2017-12-05 PROBLEM — E03.9 HYPOTHYROID: Chronic | Status: RESOLVED | Noted: 2017-07-19 | Resolved: 2017-12-05

## 2017-12-05 PROCEDURE — 97110 THERAPEUTIC EXERCISES: CPT

## 2017-12-05 NOTE — PROGRESS NOTES
Cassie Shrestha  : 1960 [facility]  [place], [place], [address]  [phone]   [phone]          OUTPATIENT PHYSICAL THERAPY:Daily Note 2017    ICD-10: Treatment Diagnosis: I 89.0 lymphedema not elsewhere classified  M 62.81 muscle weakness generalized  Precautions/Allergies:   Review of patient's allergies indicates no known allergies. Fall Risk Score: 1 (? 5 = High Risk)  MD Orders: lymphedema assessment MEDICAL/REFERRING DIAGNOSIS:  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 [facility]) [C85.88]    DATE OF ONSET: 3/30/17  REFERRING PHYSICIAN: Eyal Duvall MD  RETURN PHYSICIAN APPOINTMENT: 17     INITIAL ASSESSMENT:  Ms. Leslie Solorzano presents for a lymphedema assessment due to edema of bilateral lower extremities. She has pitting edema in the bilateral lower extremities. She has previously had short stretch bandaging and MLD following knee surgery 3 years ago. She would like to try this again even though this is not orthopedic post surgical swelling. She was diagnosed with lymphoma in March of this year. She recently completed her second chemo of 6 planned. She will have a PET scan 17. Progress with chemo has been limited due to lab values being abnormal.  She is uncomfortable due to the edema. She will have a paracentesis 17. We will initiate edema management and progress slowly dependent on her tolerance to compression and her response to treatment. We have now initiated conditioning and strengthening. She is independent with home management of edema. 17: The patient's edema has resolved. She has now been focusing on conditioning and strengthening. She will benefit from therapeutic exercises in order to address decreased strength and overall conditioning. PROBLEM LIST (Impacting functional limitations):  1. Decreased Strength  2. Increased Pain  3.  Decreased Activity Tolerance  4. Increased Fatigue  5. Increased Shortness of Breath  6. Decreased Flexibility/Joint Mobility  7. Decreased [place] with Home Exercise Program INTERVENTIONS PLANNED:  1. Home Exercise Program (HEP)  2. Range of Motion (ROM)  3. Therapeutic Exercise/Strengthening   TREATMENT PLAN:  Effective Dates: 11/7/17 TO 1/31/18. Frequency/Duration: 2 times a week for 12 weeks dependent on chemo schedule and counts  GOALS: (Goals have been discussed and agreed upon with patient.)  Short-Term Functional Goals: Time Frame: 4 weeks  1. The patient will have knowledge of signs and symptoms of lymphedema and how to manage within 4 weeks. Met   2. The patient will tolerate short stretch bandaging of bilateral lower extremities for reduction of girth within 4 weeks. Met   3. The patient and caregiver will be independent with compression bandaging within 4 weeks. Met   4. The patient will improve her 6 minute walk by 60 feet within 4 weeks. Met   5. The patient will report a fatigue of 3 or less within 4 weeks. Met   Discharge Goals: Time Frame: 8 weeks  1. The patient will have a girth decrease of at least 5 cm in the calf within 8 weeks. 2. The patient will be fit with static compression garments within 8 weeks. Met   3. The patient and caregiver will be independent with edema management within 8 weeks. Met  4. The patient will transition to Healthy Self within 8 weeks. 5.   Rehabilitation Potential For Stated Goals: [address] [place] [address]'s therapy, I certify that the treatment plan above will be carried out by a therapist or under their direction. Thank you for this referral,  Suzon Hodgkins, PT     Referring Physician Signature: Lyric Godinez MD              Date                    The information in this section was collected on 5/24/17 (except where otherwise noted).   HISTORY:   History of Present Injury/Illness (Reason for Referral):  Lymphoma, decreased activity tolerance, decreased strength  Past Medical History/Comorbidities:   Ms. Jailyn Warner  has a past medical history of Anemia; Basal cell carcinoma; Cardiomegaly; Deep vein thrombosis (DVT) ([address] History of stroke; Hypertension; Marginal zone lymphoma ([address] (03/30/2017); Morbid obesity ([address] Osteoarthritis; Overactive bladder; Primary hypothyroidism; Rheumatic fever; and Shingles. She also has no past medical history of Aneurysm ([address] Arrhythmia; Asthma; Autoimmune disease ([address] CAD (coronary artery disease); Chronic kidney disease; Coagulation defects; COPD; Diabetes ([address] Difficult intubation; GERD (gastroesophageal reflux disease); Heart failure ([address] Liver disease; Malignant hyperthermia due to anesthesia; Nausea & vomiting; Pseudocholinesterase deficiency; Psychiatric disorder; PUD (peptic ulcer disease); Seizures ([facility] Stroke [facility]); or Unspecified sleep apnea. Ms. Jailyn Warner  has a past surgical history that includes anesth,achilles tendon surg (Left, 02/10/2011); cholecystectomy (1983); vascular access; knee replacement (Left, 2013); and knee replacement (Right, 2012).    Past Medical History:   Diagnosis Date    Anemia     Basal cell carcinoma     Cardiomegaly     Deep vein thrombosis (DVT) (HCC)     History of stroke     Hypertension     Marginal zone lymphoma ([address] 03/30/2017    Morbid obesity (HCC)     Osteoarthritis     Overactive bladder     Primary hypothyroidism     Rheumatic fever     Shingles      Past Surgical History:   Procedure Laterality Date    HX CHOLECYSTECTOMY  1983    HX KNEE REPLACEMENT Left 2013    Dr. Jud Ospina Right 2012    Dr. Anthony Cai Left 02/10/2011    Dr. Tracie Brennan:     lives with family, 2 flights of stairs  Prior Level of Function/Work/Activity:    Dominant Side:         RIGHT  Current Medications:       Current Outpatient Prescriptions:     allopurinol (ZYLOPRIM) 300 mg tablet, Take  by mouth daily. , Disp: , Rfl:     levothyroxine (SYNTHROID) 175 mcg tablet, Take 1 Tab by mouth Daily (before breakfast). , Disp: 30 Tab, Rfl: 5    acyclovir (ZOVIRAX) 400 mg tablet, Take 1 Tab by mouth two (2) times a day., Disp: 60 Tab, Rfl: 3    pregabalin (LYRICA) 100 mg capsule, Take 1 Cap by mouth three (3) times daily. Max Daily Amount: 300 mg., Disp: 90 Cap, Rfl: 3    magnesium oxide (MAG-OX) 400 mg tablet, Take 1 Tab by mouth two (2) times a day., Disp: 60 Tab, Rfl: 1    magic mouthwash (ALEXSANDER) susp, Take 10 mL by mouth every four (4) hours as needed. , Disp: 2 Bottle, Rfl: 2    nystatin (MYCOSTATIN) powder, Apply  to affected area three (3) times daily. , Disp: 60 g, Rfl: 2    fluticasone (FLONASE) 50 mcg/actuation nasal spray, 2 Sprays by Both Nostrils route daily. , Disp: 1 Bottle, Rfl: 1    lidocaine-prilocaine (EMLA) topical cream, Apply  to affected area as needed for Pain. Apply to port site 45-60 minutes prior to lab appt or infusion. , Disp: 30 g, Rfl: 0    promethazine (PHENERGAN) 25 mg tablet, Take 25 mg by mouth every six (6) hours as needed for Nausea. Unsure of dose, Disp: , Rfl:    Date Last Reviewed:  11/7/17   Number of Personal Factors/Comorbidities that affect the Plan of Care: 3+: HIGH COMPLEXITY   EXAMINATION:   Palpation:           dry skin, loose skin  ROM:          Within functional limits. She previously has had bilateral knee replacements and an Achilles tendon repair on the left  Strength:          Grossly 4+/5 x 4 extremities  Functional Mobility:         Gait/Ambulation:  Independent         Transfers: independent        Bed Mobility:  independent  Skin Integrity:          Intact, but dry. Edema/Girth:  pitting    Left Right    Initial Most Recent Initial Most Recent   Upper  Extremity           Lower  Extremity               Body Structures Involved:  1. Muscles Body Functions Affected:  1.  Sensory/Pain Activities and Participation Affected:  1. None   Number of elements (examined above) that affect the Plan of Care: 1-2: LOW COMPLEXITY   CLINICAL PRESENTATION:   Presentation: Evolving clinical presentation with unstable and unpredictable characteristics: HIGH COMPLEXITY   CLINICAL DECISION MAKING:   Outcome Measure: Tool Used: NCCN Distress Thermometer   Score:  Initial:   Most Recent: X    Interpretation of Score: If greater than or equal to 8, then PHQ-9 Depression Scale Score   and JOON-7 Anxiety Scale Score  . Tool Used: ECOG Performance Survey Score  Score:  Initial: 2 Most Recent:  1    Interpretation of Score:   0 Fully active, able to carry on all pre-disease performance without restriction   1 Restricted in physically strenuous activity but ambulatory and able to carry out work of a light or sedentary nature, e.g., light house work, office work   2 Ambulatory and capable of all selfcare but unable to carry out any work activities. Up and about more than 50% of waking hours   3 Capable of only limited selfcare, confined to bed or chair more than 50% of waking hours   4 Completely disabled. Cannot carry on any selfcare. Totally confined to bed or chair   5 Dead    Tool Used: 6-MINUTE WALK TEST  Score:  Initial: 1110 feet Most Recent: 1630 feet (Date: 11/7/17 )   Interpretation of Score: Normal range varies but is approximately 5947-3033 Feet      Distance walked: 1631 feet     Baseline End of Test   Heart Rate 90 119   Dyspnea (Luther Scale)     Fatigue (Luther Scale) 2 2   SpO2 99 96   /87 174/74     Score 2133 [id number] [id number] [phone] 426-16 15-0   Modifier CH CI CJ CK CL CM CN       Tool Used: Timed Up and Go (TUG)  Score:  Initial: 8 seconds Most Recent: 6 seconds (Date: 11/7/17 )   Interpretation of Score:  The test measures, in seconds, the time taken by an individual to stand up from a standard arm chair (seat height 46 cm [18 in], arm height 65 cm [25.6 in]), walk a distance of 3 meters (118 in, approx 10 ft), turn, walk back to the chair and sit down. If the individual takes longer than 14 seconds to complete TUG, this indicates risk for falls. Score 7 7.5-10.5 11-14 14.5-17.5 18-21 21.5-24.5 25+   Modifier CH CI CJ CK CL CM CN         Tool Used: Lymphedema Life Impact Scale   Score:  Initial:  54 Most Recent:33  (Date: 9/26/17 )   Interpretation of Score: The Lymphedema Life Impact Scale (LLIS) is a validated instrument that measures the physical, functional, and psychosocial concerns pertinent to patients with extremity lymphedema. The Scale's questionnaire is administered to patients to gauge impairments, activity limitations, and participation restrictions resulting from their lymphedema. Score 0 1-13 14-26 27-40 41-54 55-67 68   Modifier CH CI CJ CK CL CM CN       Medical Necessity:   · Patient is expected to demonstrate progress in strength to increase independence with self care and houseold activity. Reason for Services/Other Comments:  · Patient continues to demonstrate capacity to improve strength and conditioning which will increase independence. Use of outcome tool(s) and clinical judgement create a POC that gives a: Questionable prediction of patient's progress: MODERATE COMPLEXITY            TREATMENT:   (In addition to Assessment/Re-Assessment sessions the following treatments were rendered)  Pre-treatment Symptoms/Complaints: the patient reports she hasn't been able to exercise as much since she has been out of town.     Pain: Initial: 8/10  Right upper arm pain and right thigh pain at night        Post Session: 2 /10    43 min       Fatigue 2/10  O2 97 HR 83  /82  650' x 1 O2 98   Nustep level 2 x 6 1/2 min spm 69 mets 2.3  650' x 1   UBE level 1 x 4 min   650' x 1 O298   Sit to stand x 10 reps  Long arc quads x 10 reps with 5 count hold  Bilateral upper extremity with 3# biceps curls, triceps extension, bent over row, forward flexion x 10 reps  O2 98 HR 90  Fatigue 0/10  as above    Treatment/Session Assessment:    · Response to Treatment:  Tolerated the treatment well. · Compliance with Program/Exercises: Will assess as treatment progresses. · Recommendations/Intent for next treatment session: \"Next visit will focus on conditioning and strengthening\".        Total Treatment Duration:  PT Patient Time In/Time Out  Time In: 1505  Time Out: 1101 [facility],

## 2017-12-07 ENCOUNTER — HOSPITAL ENCOUNTER (OUTPATIENT)
Dept: PHYSICAL THERAPY | Age: 57
Discharge: HOME | End: 2017-12-07
Payer: COMMERCIAL

## 2017-12-07 PROCEDURE — 97110 THERAPEUTIC EXERCISES: CPT

## 2017-12-11 ENCOUNTER — HOSPITAL ENCOUNTER (OUTPATIENT)
Dept: PHYSICAL THERAPY | Age: 57
Discharge: HOME | End: 2017-12-11
Payer: COMMERCIAL

## 2017-12-11 PROCEDURE — 97110 THERAPEUTIC EXERCISES: CPT

## 2017-12-11 NOTE — PROGRESS NOTES
Manatravonchaitanya Sarayamel  : 1960 [facility] at [address] [facility]  [address], [place], [address] [facility]  [phone]   [phone]          OUTPATIENT PHYSICAL THERAPY:Daily Note 2017    ICD-10: Treatment Diagnosis: I 89.0 lymphedema not elsewhere classified  M 62.81 muscle weakness generalized  Precautions/Allergies:   Review of patient's allergies indicates no known allergies. Fall Risk Score: 1 (? 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) [C85.88]    DATE OF ONSET: 3/30/17  REFERRING PHYSICIAN: Niru Colin MD  RETURN PHYSICIAN APPOINTMENT: 17     INITIAL ASSESSMENT:  Ms. Sanjuanita Carbajal presents for a lymphedema assessment due to edema of bilateral lower extremities. She has pitting edema in the bilateral lower extremities. She has previously had short stretch bandaging and MLD following knee surgery 3 years ago. She would like to try this again even though this is not orthopedic post surgical swelling. She was diagnosed with lymphoma in March of this year. She recently completed her second chemo of 6 planned. She will have a PET scan 17. Progress with chemo has been limited due to lab values being abnormal.  She is uncomfortable due to the edema. She will have a paracentesis 17. We will initiate edema management and progress slowly dependent on her tolerance to compression and her response to treatment. We have now initiated conditioning and strengthening. She is independent with home management of edema. 17: The patient's edema has resolved. She has now been focusing on conditioning and strengthening. She will benefit from therapeutic exercises in order to address decreased strength and overall conditioning. PROBLEM LIST (Impacting functional limitations):  1. Decreased Strength  2. Increased Pain  3.  Decreased Activity Tolerance  4. Increased Fatigue  5. Increased Shortness of Breath  6. Decreased Flexibility/Joint Mobility  7. Decreased [place] with Home Exercise Program INTERVENTIONS PLANNED:  1. Home Exercise Program (HEP)  2. Range of Motion (ROM)  3. Therapeutic Exercise/Strengthening   TREATMENT PLAN:  Effective Dates: 11/7/17 TO 1/31/18. Frequency/Duration: 2 times a week for 12 weeks dependent on chemo schedule and counts  GOALS: (Goals have been discussed and agreed upon with patient.)  Short-Term Functional Goals: Time Frame: 4 weeks  1. The patient will have knowledge of signs and symptoms of lymphedema and how to manage within 4 weeks. Met   2. The patient will tolerate short stretch bandaging of bilateral lower extremities for reduction of girth within 4 weeks. Met   3. The patient and caregiver will be independent with compression bandaging within 4 weeks. Met   4. The patient will improve her 6 minute walk by 60 feet within 4 weeks. Met   5. The patient will report a fatigue of 3 or less within 4 weeks. Met   Discharge Goals: Time Frame: 8 weeks  1. The patient will have a girth decrease of at least 5 cm in the calf within 8 weeks. 2. The patient will be fit with static compression garments within 8 weeks. Met   3. The patient and caregiver will be independent with edema management within 8 weeks. Met  4. The patient will transition to Healthy Self within 8 weeks. 5.   Rehabilitation Potential For Stated Goals: [address] [place] [address]'s therapy, I certify that the treatment plan above will be carried out by a therapist or under their direction. Thank you for this referral,  Anival Khan PT     Referring Physician Signature: Alpheus Dubin, MD              Date                    The information in this section was collected on 5/24/17 (except where otherwise noted).   HISTORY:   History of Present Injury/Illness (Reason for Referral):  Lymphoma, decreased activity tolerance, decreased strength  Past Medical History/Comorbidities:   Ms. Susy Jc  has a past medical history of Anemia; Basal cell carcinoma; Cardiomegaly; Deep vein thrombosis (DVT) ([address] History of stroke; Hypertension; Marginal zone lymphoma ([address] (03/30/2017); Morbid obesity ([address] Osteoarthritis; Overactive bladder; Primary hypothyroidism; Rheumatic fever; and Shingles. She also has no past medical history of Aneurysm ([address] Arrhythmia; Asthma; Autoimmune disease ([address] CAD (coronary artery disease); Chronic kidney disease; Coagulation defects; COPD; Diabetes ([address] Difficult intubation; GERD (gastroesophageal reflux disease); Heart failure ([address] Liver disease; Malignant hyperthermia due to anesthesia; Nausea & vomiting; Pseudocholinesterase deficiency; Psychiatric disorder; PUD (peptic ulcer disease); Seizures ([address] Stroke [facility]); or Unspecified sleep apnea. Ms. Susy Jc  has a past surgical history that includes anesth,achilles tendon surg (Left, 02/10/2011); cholecystectomy (1983); vascular access; knee replacement (Left, 2013); and knee replacement (Right, 2012).    Past Medical History:   Diagnosis Date    Anemia     Basal cell carcinoma     Cardiomegaly     Deep vein thrombosis (DVT) (HCC)     History of stroke     Hypertension     Marginal zone lymphoma ([address] 03/30/2017    Morbid obesity (HCC)     Osteoarthritis     Overactive bladder     Primary hypothyroidism     Rheumatic fever     Shingles      Past Surgical History:   Procedure Laterality Date    HX CHOLECYSTECTOMY  1983    HX KNEE REPLACEMENT Left 2013    Dr. Stephanie Smith Right 2012    Dr. Ara Manzanares Left 02/10/2011    Dr. Junior Rivera:     lives with family, 2 flights of stairs  Prior Level of Function/Work/Activity:    Dominant Side:         RIGHT  Current Medications:       Current Outpatient Prescriptions:     allopurinol (ZYLOPRIM) 300 mg tablet, Take  by mouth daily. , Disp: , Rfl:     levothyroxine (SYNTHROID) 175 mcg tablet, Take 1 Tab by mouth Daily (before breakfast). , Disp: 30 Tab, Rfl: 5    acyclovir (ZOVIRAX) 400 mg tablet, Take 1 Tab by mouth two (2) times a day., Disp: 60 Tab, Rfl: 3    pregabalin (LYRICA) 100 mg capsule, Take 1 Cap by mouth three (3) times daily. Max Daily Amount: 300 mg., Disp: 90 Cap, Rfl: 3    magnesium oxide (MAG-OX) 400 mg tablet, Take 1 Tab by mouth two (2) times a day., Disp: 60 Tab, Rfl: 1    magic mouthwash (ALEXSANDER) susp, Take 10 mL by mouth every four (4) hours as needed. , Disp: 2 Bottle, Rfl: 2    nystatin (MYCOSTATIN) powder, Apply  to affected area three (3) times daily. , Disp: 60 g, Rfl: 2    fluticasone (FLONASE) 50 mcg/actuation nasal spray, 2 Sprays by Both Nostrils route daily. , Disp: 1 Bottle, Rfl: 1    lidocaine-prilocaine (EMLA) topical cream, Apply  to affected area as needed for Pain. Apply to port site 45-60 minutes prior to lab appt or infusion. , Disp: 30 g, Rfl: 0    promethazine (PHENERGAN) 25 mg tablet, Take 25 mg by mouth every six (6) hours as needed for Nausea. Unsure of dose, Disp: , Rfl:    Date Last Reviewed:  11/7/17   Number of Personal Factors/Comorbidities that affect the Plan of Care: 3+: HIGH COMPLEXITY   EXAMINATION:   Palpation:           dry skin, loose skin  ROM:          Within functional limits. She previously has had bilateral knee replacements and an Achilles tendon repair on the left  Strength:          Grossly 4+/5 x 4 extremities  Functional Mobility:         Gait/Ambulation:  Independent         Transfers: independent        Bed Mobility:  independent  Skin Integrity:          Intact, but dry. Edema/Girth:  pitting    Left Right    Initial Most Recent Initial Most Recent   Upper  Extremity           Lower  Extremity               Body Structures Involved:  1. Muscles Body Functions Affected:  1.  Sensory/Pain Activities and Participation Affected:  1. None   Number of elements (examined above) that affect the Plan of Care: 1-2: LOW COMPLEXITY   CLINICAL PRESENTATION:   Presentation: Evolving clinical presentation with unstable and unpredictable characteristics: HIGH COMPLEXITY   CLINICAL DECISION MAKING:   Outcome Measure: Tool Used: NCCN Distress Thermometer   Score:  Initial:   Most Recent: X    Interpretation of Score: If greater than or equal to 8, then PHQ-9 Depression Scale Score   and JOON-7 Anxiety Scale Score  . Tool Used: ECOG Performance Survey Score  Score:  Initial: 2 Most Recent:  1    Interpretation of Score:   0 Fully active, able to carry on all pre-disease performance without restriction   1 Restricted in physically strenuous activity but ambulatory and able to carry out work of a light or sedentary nature, e.g., light house work, office work   2 Ambulatory and capable of all selfcare but unable to carry out any work activities. Up and about more than 50% of waking hours   3 Capable of only limited selfcare, confined to bed or chair more than 50% of waking hours   4 Completely disabled. Cannot carry on any selfcare. Totally confined to bed or chair   5 Dead    Tool Used: 6-MINUTE WALK TEST  Score:  Initial: 1110 feet Most Recent: 1630 feet (Date: 11/7/17 )   Interpretation of Score: Normal range varies but is approximately 9281-9207 Feet      Distance walked: 1631 feet     Baseline End of Test   Heart Rate 90 119   Dyspnea (Luther Scale)     Fatigue (Luther Scale) 2 2   SpO2 99 96   /87 174/74     Score 2133 8548-1667 5676-4670 [phone] 426-16 15-0   Modifier CH CI CJ CK CL CM CN       Tool Used: Timed Up and Go (TUG)  Score:  Initial: 8 seconds Most Recent: 6 seconds (Date: 11/7/17 )   Interpretation of Score:  The test measures, in seconds, the time taken by an individual to stand up from a standard arm chair (seat height 46 cm [18 in], arm height 65 cm [25.6 in]), walk a distance of 3 meters (118 in, approx 10 ft), turn, walk back to the chair and sit down. If the individual takes longer than 14 seconds to complete TUG, this indicates risk for falls. Score 7 7.5-10.5 11-14 14.5-17.5 18-21 21.5-24.5 25+   Modifier CH CI CJ CK CL CM CN         Tool Used: Lymphedema Life Impact Scale   Score:  Initial:  54 Most Recent:33  (Date: 9/26/17 )   Interpretation of Score: The Lymphedema Life Impact Scale (LLIS) is a validated instrument that measures the physical, functional, and psychosocial concerns pertinent to patients with extremity lymphedema. The Scale's questionnaire is administered to patients to gauge impairments, activity limitations, and participation restrictions resulting from their lymphedema. Score 0 1-13 14-26 27-40 41-54 55-67 68   Modifier CH CI CJ CK CL CM CN       Medical Necessity:   · Patient is expected to demonstrate progress in strength to increase independence with self care and houseold activity. Reason for Services/Other Comments:  · Patient continues to demonstrate capacity to improve strength and conditioning which will increase independence. Use of outcome tool(s) and clinical judgement create a POC that gives a: Questionable prediction of patient's progress: MODERATE COMPLEXITY            TREATMENT:   (In addition to Assessment/Re-Assessment sessions the following treatments were rendered)  Pre-treatment Symptoms/Complaints: the patient reports she feels better today. She will see Dr. Danna Mccallum on 12/21/18  Pain: Initial: 5/10  Right upper arm pain and bilateral knee        Post Session: 5 /10    45 min       Fatigue 2/10  O2 99 HR 77  /74    650' x 1   Nustep level 2 x 7 min spm 84 mets 3.1  650' x 1   UBE level 1 x 6 min   650' x 1 O298 HR 94  Sit to stand x 10 reps  Long arc quads x 10 reps with 5 count hold    as above    Treatment/Session Assessment:    · Response to Treatment:  Tolerated the treatment well. Participated well today. · Compliance with Program/Exercises:  Will assess as treatment progresses. · Recommendations/Intent for next treatment session: \"Next visit will focus on conditioning and strengthening\".        Total Treatment Duration:  PT Patient Time In/Time Out  Time In: 0802  Time Out: 6707    Celeste Montes, PT

## 2017-12-13 ENCOUNTER — HOSPITAL ENCOUNTER (OUTPATIENT)
Dept: PHYSICAL THERAPY | Age: 57
Discharge: HOME | End: 2017-12-13
Payer: COMMERCIAL

## 2017-12-13 PROCEDURE — 97110 THERAPEUTIC EXERCISES: CPT

## 2017-12-13 NOTE — PROGRESS NOTES
Rosalind Ho  : 1960 [facility] at [address] [facility]  [address], [place], [address]  [phone]   [phone]          OUTPATIENT PHYSICAL THERAPY:Daily Note 2017    ICD-10: Treatment Diagnosis: I 89.0 lymphedema not elsewhere classified  M 62.81 muscle weakness generalized  Precautions/Allergies:   Review of patient's allergies indicates no known allergies. Fall Risk Score: 1 (? 5 = High Risk)  MD Orders: lymphedema assessment MEDICAL/REFERRING DIAGNOSIS:  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 [facility]) [C85.88]    DATE OF ONSET: 3/30/17  REFERRING PHYSICIAN: Kary Michael MD  RETURN PHYSICIAN APPOINTMENT: 17     INITIAL ASSESSMENT:  Ms. Lobito Garcia presents for a lymphedema assessment due to edema of bilateral lower extremities. She has pitting edema in the bilateral lower extremities. She has previously had short stretch bandaging and MLD following knee surgery 3 years ago. She would like to try this again even though this is not orthopedic post surgical swelling. She was diagnosed with lymphoma in March of this year. She recently completed her second chemo of 6 planned. She will have a PET scan 17. Progress with chemo has been limited due to lab values being abnormal.  She is uncomfortable due to the edema. She will have a paracentesis 17. We will initiate edema management and progress slowly dependent on her tolerance to compression and her response to treatment. We have now initiated conditioning and strengthening. She is independent with home management of edema. 17: The patient's edema has resolved. She has now been focusing on conditioning and strengthening. She will benefit from therapeutic exercises in order to address decreased strength and overall conditioning. PROBLEM LIST (Impacting functional limitations):  1. Decreased Strength  2. Increased Pain  3.  Decreased Activity Tolerance  4. Increased Fatigue  5. Increased Shortness of Breath  6. Decreased Flexibility/Joint Mobility  7. Decreased [place] with Home Exercise Program INTERVENTIONS PLANNED:  1. Home Exercise Program (HEP)  2. Range of Motion (ROM)  3. Therapeutic Exercise/Strengthening   TREATMENT PLAN:  Effective Dates: 11/7/17 TO 1/31/18. Frequency/Duration: 2 times a week for 12 weeks dependent on chemo schedule and counts  GOALS: (Goals have been discussed and agreed upon with patient.)  Short-Term Functional Goals: Time Frame: 4 weeks  1. The patient will have knowledge of signs and symptoms of lymphedema and how to manage within 4 weeks. Met   2. The patient will tolerate short stretch bandaging of bilateral lower extremities for reduction of girth within 4 weeks. Met   3. The patient and caregiver will be independent with compression bandaging within 4 weeks. Met   4. The patient will improve her 6 minute walk by 60 feet within 4 weeks. Met   5. The patient will report a fatigue of 3 or less within 4 weeks. Met   Discharge Goals: Time Frame: 8 weeks  1. The patient will have a girth decrease of at least 5 cm in the calf within 8 weeks. 2. The patient will be fit with static compression garments within 8 weeks. Met   3. The patient and caregiver will be independent with edema management within 8 weeks. Met  4. The patient will transition to Healthy Self within 8 weeks. 5.   Rehabilitation Potential For Stated Goals: [address] [place] [address]'s therapy, I certify that the treatment plan above will be carried out by a therapist or under their direction. Thank you for this referral,  Cuong Dalton PT     Referring Physician Signature: Ej Izquierdo MD              Date                    The information in this section was collected on 5/24/17 (except where otherwise noted).   HISTORY:   History of Present Injury/Illness (Reason for Referral):  Lymphoma, decreased activity tolerance, decreased strength  Past Medical History/Comorbidities:   Ms. Jailyn Warner  has a past medical history of Anemia; Basal cell carcinoma; Cardiomegaly; Deep vein thrombosis (DVT) ([address] History of stroke; Hypertension; Marginal zone lymphoma ([address] (03/30/2017); Morbid obesity ([address] Osteoarthritis; Overactive bladder; Primary hypothyroidism; Rheumatic fever; and Shingles. She also has no past medical history of Aneurysm ([address] Arrhythmia; Asthma; Autoimmune disease ([address] CAD (coronary artery disease); Chronic kidney disease; Coagulation defects; COPD; Diabetes ([address] Difficult intubation; GERD (gastroesophageal reflux disease); Heart failure ([address] Liver disease; Malignant hyperthermia due to anesthesia; Nausea & vomiting; Pseudocholinesterase deficiency; Psychiatric disorder; PUD (peptic ulcer disease); Seizures [facility] Stroke [facility]); or Unspecified sleep apnea. Ms. Jailyn Warner  has a past surgical history that includes anesth,achilles tendon surg (Left, 02/10/2011); cholecystectomy (1983); vascular access; knee replacement (Left, 2013); and knee replacement (Right, 2012).    Past Medical History:   Diagnosis Date    Anemia     Basal cell carcinoma     Cardiomegaly     Deep vein thrombosis (DVT) (HCC)     History of stroke     Hypertension     Marginal zone lymphoma ([address] 03/30/2017    Morbid obesity (HCC)     Osteoarthritis     Overactive bladder     Primary hypothyroidism     Rheumatic fever     Shingles      Past Surgical History:   Procedure Laterality Date    HX CHOLECYSTECTOMY  1983    HX KNEE REPLACEMENT Left 2013    Dr. Jud Ospina Right 2012    Dr. Anthony Cai Left 02/10/2011    Dr. Tracie Brennan:     lives with family, 2 flights of stairs  Prior Level of Function/Work/Activity:    Dominant Side:         RIGHT  Current Medications:       Current Outpatient Prescriptions:     allopurinol (ZYLOPRIM) 300 mg tablet, Take  by mouth daily. , Disp: , Rfl:     levothyroxine (SYNTHROID) 175 mcg tablet, Take 1 Tab by mouth Daily (before breakfast). , Disp: 30 Tab, Rfl: 5    acyclovir (ZOVIRAX) 400 mg tablet, Take 1 Tab by mouth two (2) times a day., Disp: 60 Tab, Rfl: 3    pregabalin (LYRICA) 100 mg capsule, Take 1 Cap by mouth three (3) times daily. Max Daily Amount: 300 mg., Disp: 90 Cap, Rfl: 3    magnesium oxide (MAG-OX) 400 mg tablet, Take 1 Tab by mouth two (2) times a day., Disp: 60 Tab, Rfl: 1    magic mouthwash (ALEXSANDER) susp, Take 10 mL by mouth every four (4) hours as needed. , Disp: 2 Bottle, Rfl: 2    nystatin (MYCOSTATIN) powder, Apply  to affected area three (3) times daily. , Disp: 60 g, Rfl: 2    fluticasone (FLONASE) 50 mcg/actuation nasal spray, 2 Sprays by Both Nostrils route daily. , Disp: 1 Bottle, Rfl: 1    lidocaine-prilocaine (EMLA) topical cream, Apply  to affected area as needed for Pain. Apply to port site 45-60 minutes prior to lab appt or infusion. , Disp: 30 g, Rfl: 0    promethazine (PHENERGAN) 25 mg tablet, Take 25 mg by mouth every six (6) hours as needed for Nausea. Unsure of dose, Disp: , Rfl:    Date Last Reviewed:  11/7/17   Number of Personal Factors/Comorbidities that affect the Plan of Care: 3+: HIGH COMPLEXITY   EXAMINATION:   Palpation:           dry skin, loose skin  ROM:          Within functional limits. She previously has had bilateral knee replacements and an Achilles tendon repair on the left  Strength:          Grossly 4+/5 x 4 extremities  Functional Mobility:         Gait/Ambulation:  Independent         Transfers: independent        Bed Mobility:  independent  Skin Integrity:          Intact, but dry. Edema/Girth:  pitting    Left Right    Initial Most Recent Initial Most Recent   Upper  Extremity           Lower  Extremity               Body Structures Involved:  1. Muscles Body Functions Affected:  1.  Sensory/Pain Activities and Participation Affected:  1. None   Number of elements (examined above) that affect the Plan of Care: 1-2: LOW COMPLEXITY   CLINICAL PRESENTATION:   Presentation: Evolving clinical presentation with unstable and unpredictable characteristics: HIGH COMPLEXITY   CLINICAL DECISION MAKING:   Outcome Measure: Tool Used: NCCN Distress Thermometer   Score:  Initial:   Most Recent: X    Interpretation of Score: If greater than or equal to 8, then PHQ-9 Depression Scale Score   and JOON-7 Anxiety Scale Score  . Tool Used: ECOG Performance Survey Score  Score:  Initial: 2 Most Recent:  1    Interpretation of Score:   0 Fully active, able to carry on all pre-disease performance without restriction   1 Restricted in physically strenuous activity but ambulatory and able to carry out work of a light or sedentary nature, e.g., light house work, office work   2 Ambulatory and capable of all selfcare but unable to carry out any work activities. Up and about more than 50% of waking hours   3 Capable of only limited selfcare, confined to bed or chair more than 50% of waking hours   4 Completely disabled. Cannot carry on any selfcare. Totally confined to bed or chair   5 Dead    Tool Used: 6-MINUTE WALK TEST  Score:  Initial: 1110 feet Most Recent: 1630 feet (Date: 11/7/17 )   Interpretation of Score: Normal range varies but is approximately 0238-6031 Feet      Distance walked: 1631 feet     Baseline End of Test   Heart Rate 90 119   Dyspnea (Luther Scale)     Fatigue (Luther Scale) 2 2   SpO2 99 96   /87 174/74     Score 2133 3927-4588 7914-1185 1279-853 852-427 426-16 15-0   Modifier CH CI CJ CK CL CM CN       Tool Used: Timed Up and Go (TUG)  Score:  Initial: 8 seconds Most Recent: 6 seconds (Date: 11/7/17 )   Interpretation of Score:  The test measures, in seconds, the time taken by an individual to stand up from a standard arm chair (seat height 46 cm [18 in], arm height 65 cm [25.6 in]), walk a distance of 3 meters (118 in, approx 10 ft), turn, walk back to the chair and sit down. If the individual takes longer than 14 seconds to complete TUG, this indicates risk for falls. Score 7 7.5-10.5 11-14 14.5-17.5 18-21 21.5-24.5 25+   Modifier CH CI CJ CK CL CM CN         Tool Used: Lymphedema Life Impact Scale   Score:  Initial:  54 Most Recent:33  (Date: 9/26/17 )   Interpretation of Score: The Lymphedema Life Impact Scale (LLIS) is a validated instrument that measures the physical, functional, and psychosocial concerns pertinent to patients with extremity lymphedema. The Scale's questionnaire is administered to patients to gauge impairments, activity limitations, and participation restrictions resulting from their lymphedema. Score 0 1-13 14-26 27-40 41-54 55-67 68   Modifier CH CI CJ CK CL CM CN       Medical Necessity:   · Patient is expected to demonstrate progress in strength to increase independence with self care and houseold activity. Reason for Services/Other Comments:  · Patient continues to demonstrate capacity to improve strength and conditioning which will increase independence. Use of outcome tool(s) and clinical judgement create a POC that gives a: Questionable prediction of patient's progress: MODERATE COMPLEXITY            TREATMENT:   (In addition to Assessment/Re-Assessment sessions the following treatments were rendered)  Pre-treatment Symptoms/Complaints: the patient reports she feels better today. The patient reports she is now on a schedule with pain medicine and it has helped. Pain: Initial: 2/10  Right upper arm pain and bilateral knee        Post Session: 0 /10    47 min       Fatigue 0/10  O2 98 HR 80  /72    975' x 1   Nustep level 2 x 7 min spm 78 mets 2.9  975' x 1   UBE level 1 x 6 min   975' x 1 O298 HR 94  Sit to stand x 10 reps  Long arc quads x 10 reps with 5 count hold    as above    Treatment/Session Assessment:    · Response to Treatment:  Tolerated the treatment well. Participated well today.   Able to increase laps. Improving strength and conditioning. · Compliance with Program/Exercises: Will assess as treatment progresses. · Recommendations/Intent for next treatment session: \"Next visit will focus on conditioning and strengthening\".        Total Treatment Duration:  PT Patient Time In/Time Out  Time In: 0846  Time Out: 0933    Suzon Hodgkins, PT

## 2017-12-20 ENCOUNTER — HOSPITAL ENCOUNTER (OUTPATIENT)
Dept: PHYSICAL THERAPY | Age: 57
Discharge: HOME | End: 2017-12-20
Payer: COMMERCIAL

## 2017-12-20 PROCEDURE — 97110 THERAPEUTIC EXERCISES: CPT

## 2017-12-20 NOTE — PROGRESS NOTES
Michael No  : [facility] [address] [facility]  [place] , [facility], [address] [facility]  [phone]   [phone]          OUTPATIENT PHYSICAL THERAPY:Daily Note and Progress Report 2017    ICD-10: Treatment Diagnosis: I 89.0 lymphedema not elsewhere classified  M 62.81 muscle weakness generalized  Precautions/Allergies:   Review of patient's allergies indicates no known allergies. Fall Risk Score: 1 (? 5 = High Risk)  MD Orders: lymphedema assessment MEDICAL/REFERRING DIAGNOSIS:  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 [facility]) [C85.88]    DATE OF ONSET: 3/30/17  REFERRING PHYSICIAN: Marcos Robles MD  RETURN PHYSICIAN APPOINTMENT: 17     INITIAL ASSESSMENT:  Ms. Radha Martinez presents for a lymphedema assessment due to edema of bilateral lower extremities. She has pitting edema in the bilateral lower extremities. She has previously had short stretch bandaging and MLD following knee surgery 3 years ago. She would like to try this again even though this is not orthopedic post surgical swelling. She was diagnosed with lymphoma in March of this year. She recently completed her second chemo of 6 planned. She will have a PET scan 17. Progress with chemo has been limited due to lab values being abnormal.  She is uncomfortable due to the edema. She will have a paracentesis 17. We will initiate edema management and progress slowly dependent on her tolerance to compression and her response to treatment. We have now initiated conditioning and strengthening. She is independent with home management of edema. 17:  The patient's edema has resolved. She has now been focusing on conditioning and strengthening. Orthopedic issues have limited her progress. She is to see her orthopedist tomorrow and her Oncologist 17. We will adjust her program accordingly.   She will benefit from therapeutic exercises in order to address decreased strength and overall conditioning. PROBLEM LIST (Impacting functional limitations):  1. Decreased Strength  2. Increased Pain  3. Decreased Activity Tolerance  4. Increased Fatigue  5. Increased Shortness of Breath  6. Decreased Flexibility/Joint Mobility  7. Decreased [place] with Home Exercise Program INTERVENTIONS PLANNED:  1. Home Exercise Program (HEP)  2. Range of Motion (ROM)  3. Therapeutic Exercise/Strengthening   TREATMENT PLAN:  Effective Dates: 11/7/17 TO 1/31/18. Frequency/Duration: 2 times a week for 12 weeks dependent on chemo schedule and counts  GOALS: (Goals have been discussed and agreed upon with patient.)  Short-Term Functional Goals: Time Frame: 4 weeks  1. The patient will have knowledge of signs and symptoms of lymphedema and how to manage within 4 weeks. Met   2. The patient will tolerate short stretch bandaging of bilateral lower extremities for reduction of girth within 4 weeks. Met   3. The patient and caregiver will be independent with compression bandaging within 4 weeks. Met   4. The patient will improve her 6 minute walk by 60 feet within 4 weeks. Met   5. The patient will report a fatigue of 3 or less within 4 weeks. Met   Discharge Goals: Time Frame: 8 weeks  1. The patient will have a girth decrease of at least 5 cm in the calf within 8 weeks. 2. The patient will be fit with static compression garments within 8 weeks. Met   3. The patient and caregiver will be independent with edema management within 8 weeks. Met  4. The patient will transition to Healthy Self within 8 weeks. 5.   Rehabilitation Potential For Stated Goals: [address] [place] [address]'s therapy, I certify that the treatment plan above will be carried out by a therapist or under their direction.   Thank you for this referral,  Cuong Dalton PT     Referring Physician Signature: Ej Izquierdo MD              Date                    The information in this section was collected on 5/24/17 (except where otherwise noted). HISTORY:   History of Present Injury/Illness (Reason for Referral):  Lymphoma, decreased activity tolerance, decreased strength  Past Medical History/Comorbidities:   Ms. Roseanne Bumpers  has a past medical history of Anemia; Basal cell carcinoma; Cardiomegaly; Deep vein thrombosis (DVT) ([address] History of stroke; Hypertension; Marginal zone lymphoma ([address] (03/30/2017); Morbid obesity ([address] Osteoarthritis; Overactive bladder; Primary hypothyroidism; Rheumatic fever; and Shingles. She also has no past medical history of Aneurysm ([address] Arrhythmia; Asthma; Autoimmune disease ([address] CAD (coronary artery disease); Chronic kidney disease; Coagulation defects; COPD; Diabetes ([address] Difficult intubation; GERD (gastroesophageal reflux disease); Heart failure ([address] Liver disease; Malignant hyperthermia due to anesthesia; Nausea & vomiting; Pseudocholinesterase deficiency; Psychiatric disorder; PUD (peptic ulcer disease); Seizures ([address] Stroke [facility]); or Unspecified sleep apnea. Ms. Roseanne Bumpers  has a past surgical history that includes pr anesth,achilles tendon surg (Left, 02/10/2011); hx cholecystectomy (1983); hx vascular access; hx knee replacement (Left, 2013); and hx knee replacement (Right, 2012).    Past Medical History:   Diagnosis Date    Anemia     Basal cell carcinoma     Cardiomegaly     Deep vein thrombosis (DVT) (HCC)     History of stroke     Hypertension     Marginal zone lymphoma ([address] 03/30/2017    Morbid obesity ([address]     Osteoarthritis     Overactive bladder     Primary hypothyroidism     Rheumatic fever     Shingles      Past Surgical History:   Procedure Laterality Date    HX CHOLECYSTECTOMY  1983    HX KNEE REPLACEMENT Left 2013    Dr. Ajith Gonzalez Right 2012    Dr. Elisa Jimenez Left 02/10/2011    Dr. Praveena Sharif Environment:     lives with family, 2 flights of stairs  Prior Level of Function/Work/Activity:    Dominant Side:         RIGHT  Current Medications:       Current Outpatient Prescriptions:     allopurinol (ZYLOPRIM) 300 mg tablet, Take  by mouth daily. , Disp: , Rfl:     levothyroxine (SYNTHROID) 175 mcg tablet, Take 1 Tab by mouth Daily (before breakfast). , Disp: 30 Tab, Rfl: 5    acyclovir (ZOVIRAX) 400 mg tablet, Take 1 Tab by mouth two (2) times a day., Disp: 60 Tab, Rfl: 3    pregabalin (LYRICA) 100 mg capsule, Take 1 Cap by mouth three (3) times daily. Max Daily Amount: 300 mg., Disp: 90 Cap, Rfl: 3    magnesium oxide (MAG-OX) 400 mg tablet, Take 1 Tab by mouth two (2) times a day., Disp: 60 Tab, Rfl: 1    magic mouthwash (ALEXSANDER) susp, Take 10 mL by mouth every four (4) hours as needed. , Disp: 2 Bottle, Rfl: 2    nystatin (MYCOSTATIN) powder, Apply  to affected area three (3) times daily. , Disp: 60 g, Rfl: 2    fluticasone (FLONASE) 50 mcg/actuation nasal spray, 2 Sprays by Both Nostrils route daily. , Disp: 1 Bottle, Rfl: 1    lidocaine-prilocaine (EMLA) topical cream, Apply  to affected area as needed for Pain. Apply to port site 45-60 minutes prior to lab appt or infusion. , Disp: 30 g, Rfl: 0    promethazine (PHENERGAN) 25 mg tablet, Take 25 mg by mouth every six (6) hours as needed for Nausea. Unsure of dose, Disp: , Rfl:    Date Last Reviewed:  11/7/17   Number of Personal Factors/Comorbidities that affect the Plan of Care: 3+: HIGH COMPLEXITY   EXAMINATION:   Palpation:           dry skin, loose skin  ROM:          Within functional limits. She previously has had bilateral knee replacements and an Achilles tendon repair on the left  Strength:          Grossly 4+/5 x 4 extremities  Functional Mobility:         Gait/Ambulation:  Independent         Transfers: independent        Bed Mobility:  independent  Skin Integrity:          Intact, but dry.   Edema/Girth:  pitting    Left Right Initial Most Recent Initial Most Recent   Upper  Extremity           Lower  Extremity               Body Structures Involved:  1. Muscles Body Functions Affected:  1. Sensory/Pain Activities and Participation Affected:  1. None   Number of elements (examined above) that affect the Plan of Care: 1-2: LOW COMPLEXITY   CLINICAL PRESENTATION:   Presentation: Evolving clinical presentation with unstable and unpredictable characteristics: HIGH COMPLEXITY   CLINICAL DECISION MAKING:   Outcome Measure: Tool Used: NCCN Distress Thermometer   Score:  Initial:   Most Recent: X    Interpretation of Score: If greater than or equal to 8, then PHQ-9 Depression Scale Score   and JOON-7 Anxiety Scale Score  . Tool Used: ECOG Performance Survey Score  Score:  Initial: 2 Most Recent:  1    Interpretation of Score:   0 Fully active, able to carry on all pre-disease performance without restriction   1 Restricted in physically strenuous activity but ambulatory and able to carry out work of a light or sedentary nature, e.g., light house work, office work   2 Ambulatory and capable of all selfcare but unable to carry out any work activities. Up and about more than 50% of waking hours   3 Capable of only limited selfcare, confined to bed or chair more than 50% of waking hours   4 Completely disabled. Cannot carry on any selfcare.  Totally confined to bed or chair   5 Dead    Tool Used: 6-MINUTE WALK TEST  Score:  Initial: 1110 feet Most Recent: 1610 feet (Date: 12/19/17 )   Interpretation of Score: Normal range varies but is approximately 4268-8363 Feet      Distance walked: 1610 feet     Baseline End of Test   Heart Rate 87 126   Dyspnea (Luther Scale)     Fatigue (Luther Scale) 4 4   SpO2 98 96   /69 175/76     Score 2133 [id number] 5594-7420 [phone] 426-16 15-0   Modifier CH CI CJ CK CL CM CN       Tool Used: Timed Up and Go (TUG)  Score:  Initial: 8 seconds Most Recent: 6 seconds (Date: 12/19/17 )   Interpretation of Score: The test measures, in seconds, the time taken by an individual to stand up from a standard arm chair (seat height 46 cm [18 in], arm height 65 cm [25.6 in]), walk a distance of 3 meters (118 in, approx 10 ft), turn, walk back to the chair and sit down. If the individual takes longer than 14 seconds to complete TUG, this indicates risk for falls. Score 7 7.5-10.5 11-14 14.5-17.5 18-21 21.5-24.5 25+   Modifier CH CI CJ CK CL CM CN         Tool Used: Lymphedema Life Impact Scale   Score:  Initial:  54 Most Recent:33  (Date: 9/26/17 )   Interpretation of Score: The Lymphedema Life Impact Scale (LLIS) is a validated instrument that measures the physical, functional, and psychosocial concerns pertinent to patients with extremity lymphedema. The Scale's questionnaire is administered to patients to gauge impairments, activity limitations, and participation restrictions resulting from their lymphedema. Score 0 1-13 14-26 27-40 41-54 55-67 68   Modifier CH CI CJ CK CL CM CN       Medical Necessity:   · Patient is expected to demonstrate progress in strength to increase independence with self care and houseold activity. Reason for Services/Other Comments:  · Patient continues to demonstrate capacity to improve strength and conditioning which will increase independence. Use of outcome tool(s) and clinical judgement create a POC that gives a: Questionable prediction of patient's progress: MODERATE COMPLEXITY            TREATMENT:   (In addition to Assessment/Re-Assessment sessions the following treatments were rendered)  Pre-treatment Symptoms/Complaints: the patient reports she sees her orthopedist tomorrow and her oncologist 12/26/17. Pain: Initial: 4/10 bone pain        Post Session: 4 /10    36 min  Patient late.      Fatigue 4/10  O2 98 HR 87  /69    6 minute walk, TUG, UBE level 2 x 4 min, Nustep level 2 x 7 min  O2 98 HR 85  as above    Treatment/Session Assessment:    · Response to Treatment:  Tolerated the treatment well. Participated well today. Orthopedic issues limit progress. · Compliance with Program/Exercises: Will assess as treatment progresses. · Recommendations/Intent for next treatment session: \"Next visit will focus on conditioning and strengthening\". Adjust program according to physician advice.   Total Treatment Duration:  PT Patient Time In/Time Out  Time In: 0853  Time Out: 0929    Gege Garcia PT

## 2017-12-21 ENCOUNTER — HOSPITAL ENCOUNTER (OUTPATIENT)
Dept: LAB | Age: 57
Discharge: HOME | End: 2017-12-21
Attending: ORTHOPAEDIC SURGERY
Payer: COMMERCIAL

## 2017-12-21 LAB
BASOPHILS # BLD: 0 K/UL (ref 0–0.2)
BASOPHILS NFR BLD: 0 % (ref 0–2)
CRP SERPL-MCNC: 1.1 MG/DL (ref 0–0.9)
DIFFERENTIAL METHOD BLD: ABNORMAL
EOSINOPHIL # BLD: 0.1 K/UL (ref 0–0.8)
EOSINOPHIL NFR BLD: 2 % (ref 0.5–7.8)
ERYTHROCYTE [DISTWIDTH] IN BLOOD BY AUTOMATED COUNT: 14.6 % (ref 11.9–14.6)
ERYTHROCYTE [SEDIMENTATION RATE] IN BLOOD: 42 MM/HR (ref 0–30)
HCT VFR BLD AUTO: 32.8 % (ref 35.8–46.3)
HGB BLD-MCNC: 11 G/DL (ref 11.7–15.4)
IMM GRANULOCYTES # BLD: 0 K/UL (ref 0–0.5)
IMM GRANULOCYTES NFR BLD AUTO: 0 % (ref 0–5)
LYMPHOCYTES # BLD: 0.3 K/UL (ref 0.5–4.6)
LYMPHOCYTES NFR BLD: 6 % (ref 13–44)
MCH RBC QN AUTO: 31.3 PG (ref 26.1–32.9)
MCHC RBC AUTO-ENTMCNC: 33.5 G/DL (ref 31.4–35)
MCV RBC AUTO: 93.2 FL (ref 79.6–97.8)
MONOCYTES # BLD: 0.4 K/UL (ref 0.1–1.3)
MONOCYTES NFR BLD: 8 % (ref 4–12)
NEUTS SEG # BLD: 4 K/UL (ref 1.7–8.2)
NEUTS SEG NFR BLD: 84 % (ref 43–78)
PLATELET # BLD AUTO: 122 K/UL (ref 150–450)
PMV BLD AUTO: 10.3 FL (ref 10.8–14.1)
RBC # BLD AUTO: 3.52 M/UL (ref 4.05–5.25)
WBC # BLD AUTO: 4.8 K/UL (ref 4.3–11.1)

## 2017-12-21 PROCEDURE — 86140 C-REACTIVE PROTEIN: CPT | Performed by: ORTHOPAEDIC SURGERY

## 2017-12-21 PROCEDURE — 85025 COMPLETE CBC W/AUTO DIFF WBC: CPT | Performed by: ORTHOPAEDIC SURGERY

## 2017-12-21 PROCEDURE — 36415 COLL VENOUS BLD VENIPUNCTURE: CPT | Performed by: ORTHOPAEDIC SURGERY

## 2017-12-21 PROCEDURE — 85652 RBC SED RATE AUTOMATED: CPT | Performed by: ORTHOPAEDIC SURGERY

## 2017-12-26 ENCOUNTER — HOSPITAL ENCOUNTER (OUTPATIENT)
Dept: PET IMAGING | Age: 57
Discharge: HOME | End: 2017-12-26
Payer: COMMERCIAL

## 2017-12-26 DIAGNOSIS — C85.88 MARGINAL ZONE LYMPHOMA OF LYMPH NODES OF MULTIPLE SITES (HCC): ICD-10-CM

## 2017-12-26 PROCEDURE — A9552 F18 FDG: HCPCS

## 2017-12-26 PROCEDURE — 74011636320 HC RX REV CODE- 636/320: Performed by: INTERNAL MEDICINE

## 2017-12-26 RX ORDER — SODIUM CHLORIDE 0.9 % (FLUSH) 0.9 %
5-10 SYRINGE (ML) INJECTION
Status: COMPLETED | OUTPATIENT
Start: 2017-12-26 | End: 2017-12-26

## 2017-12-26 RX ADMIN — DIATRIZOATE MEGLUMINE AND DIATRIZOATE SODIUM 10 ML: 660; 100 LIQUID ORAL; RECTAL at 07:18

## 2017-12-26 RX ADMIN — Medication 10 ML: at 07:18

## 2017-12-27 ENCOUNTER — HOSPITAL ENCOUNTER (OUTPATIENT)
Dept: LAB | Age: 57
Discharge: HOME | End: 2017-12-27
Payer: COMMERCIAL

## 2017-12-27 ENCOUNTER — HOSPITAL ENCOUNTER (OUTPATIENT)
Dept: INFUSION THERAPY | Age: 57
Discharge: HOME | End: 2017-12-27

## 2017-12-27 ENCOUNTER — PATIENT OUTREACH (OUTPATIENT)
Dept: CASE MANAGEMENT | Age: 57
End: 2017-12-27

## 2017-12-27 DIAGNOSIS — C85.88 MARGINAL ZONE LYMPHOMA OF LYMPH NODES OF MULTIPLE SITES (HCC): ICD-10-CM

## 2017-12-27 LAB
ALBUMIN SERPL-MCNC: 3.9 G/DL (ref 3.5–5)
ALBUMIN/GLOB SERPL: 1.3 {RATIO} (ref 1.2–3.5)
ALP SERPL-CCNC: 128 U/L (ref 50–136)
ALT SERPL-CCNC: 50 U/L (ref 12–65)
ANION GAP SERPL CALC-SCNC: 8 MMOL/L (ref 7–16)
AST SERPL-CCNC: 30 U/L (ref 15–37)
BASOPHILS # BLD: 0 K/UL (ref 0–0.2)
BASOPHILS NFR BLD: 1 % (ref 0–2)
BILIRUB SERPL-MCNC: 0.3 MG/DL (ref 0.2–1.1)
BUN SERPL-MCNC: 19 MG/DL (ref 6–23)
CALCIUM SERPL-MCNC: 9.3 MG/DL (ref 8.3–10.4)
CHLORIDE SERPL-SCNC: 103 MMOL/L (ref 98–107)
CO2 SERPL-SCNC: 30 MMOL/L (ref 21–32)
CREAT SERPL-MCNC: 0.7 MG/DL (ref 0.6–1)
DIFFERENTIAL METHOD BLD: ABNORMAL
EOSINOPHIL # BLD: 0.1 K/UL (ref 0–0.8)
EOSINOPHIL NFR BLD: 3 % (ref 0.5–7.8)
ERYTHROCYTE [DISTWIDTH] IN BLOOD BY AUTOMATED COUNT: 13.7 % (ref 11.9–14.6)
GLOBULIN SER CALC-MCNC: 2.9 G/DL (ref 2.3–3.5)
GLUCOSE SERPL-MCNC: 100 MG/DL (ref 65–100)
HCT VFR BLD AUTO: 30.6 % (ref 35.8–46.3)
HGB BLD-MCNC: 10.6 G/DL (ref 11.7–15.4)
LYMPHOCYTES # BLD: 0.3 K/UL (ref 0.5–4.6)
LYMPHOCYTES NFR BLD: 6 % (ref 13–44)
MAGNESIUM SERPL-MCNC: 2.2 MG/DL (ref 1.8–2.4)
MCH RBC QN AUTO: 32.1 PG (ref 26.1–32.9)
MCHC RBC AUTO-ENTMCNC: 34.6 G/DL (ref 31.4–35)
MCV RBC AUTO: 92.7 FL (ref 79.6–97.8)
MONOCYTES # BLD: 0.4 K/UL (ref 0.1–1.3)
MONOCYTES NFR BLD: 10 % (ref 4–12)
NEUTS SEG # BLD: 3.5 K/UL (ref 1.7–8.2)
NEUTS SEG NFR BLD: 80 % (ref 43–78)
NRBC # BLD: 0 K/UL (ref 0–0.2)
PLATELET # BLD AUTO: 115 K/UL (ref 150–450)
PMV BLD AUTO: 10.2 FL (ref 10.8–14.1)
POTASSIUM SERPL-SCNC: 4.3 MMOL/L (ref 3.5–5.1)
PROT SERPL-MCNC: 6.8 G/DL (ref 6.3–8.2)
RBC # BLD AUTO: 3.3 M/UL (ref 4.05–5.25)
SODIUM SERPL-SCNC: 141 MMOL/L (ref 136–145)
WBC # BLD AUTO: 4.4 K/UL (ref 4.3–11.1)

## 2017-12-27 PROCEDURE — 85025 COMPLETE CBC W/AUTO DIFF WBC: CPT | Performed by: INTERNAL MEDICINE

## 2017-12-27 PROCEDURE — 83735 ASSAY OF MAGNESIUM: CPT | Performed by: INTERNAL MEDICINE

## 2017-12-27 PROCEDURE — 80053 COMPREHEN METABOLIC PANEL: CPT | Performed by: INTERNAL MEDICINE

## 2017-12-27 NOTE — PROGRESS NOTES
Pt was seen and PET results gone over by Dr. Azra Joyner. Pt has 2 new nodules seen on PET scan. Will need to biopsy left breast nodule to confirm progression. Pt to be scheduled to have biopsy at breast center next Wednesday and will return for results on 1/10. Z pack called to pharmacy for sinus infection. Rituxan cancelled today per Dr. Azra Joyner.   Will discuss further treatment after results from biopsy,

## 2018-01-03 ENCOUNTER — HOSPITAL ENCOUNTER (OUTPATIENT)
Dept: MAMMOGRAPHY | Age: 58
Discharge: HOME | End: 2018-01-03
Attending: INTERNAL MEDICINE
Payer: COMMERCIAL

## 2018-01-03 VITALS — DIASTOLIC BLOOD PRESSURE: 63 MMHG | SYSTOLIC BLOOD PRESSURE: 137 MMHG | HEART RATE: 57 BPM | TEMPERATURE: 97.1 F

## 2018-01-03 DIAGNOSIS — R93.5 ABNORMAL ABDOMINAL CT SCAN: ICD-10-CM

## 2018-01-03 DIAGNOSIS — C85.88 MARGINAL ZONE LYMPHOMA OF LYMPH NODES OF MULTIPLE SITES (HCC): ICD-10-CM

## 2018-01-03 DIAGNOSIS — N63.20 LEFT BREAST MASS: ICD-10-CM

## 2018-01-03 PROCEDURE — 76642 ULTRASOUND BREAST LIMITED: CPT

## 2018-01-03 PROCEDURE — 74011000250 HC RX REV CODE- 250: Performed by: INTERNAL MEDICINE

## 2018-01-03 PROCEDURE — 77065 DX MAMMO INCL CAD UNI: CPT

## 2018-01-03 PROCEDURE — 88341 IMHCHEM/IMCYTCHM EA ADD ANTB: CPT | Performed by: INTERNAL MEDICINE

## 2018-01-03 PROCEDURE — 88184 FLOWCYTOMETRY/ TC 1 MARKER: CPT | Performed by: INTERNAL MEDICINE

## 2018-01-03 PROCEDURE — A4648 IMPLANTABLE TISSUE MARKER: HCPCS

## 2018-01-03 PROCEDURE — 77066 DX MAMMO INCL CAD BI: CPT

## 2018-01-03 PROCEDURE — 88342 IMHCHEM/IMCYTCHM 1ST ANTB: CPT | Performed by: INTERNAL MEDICINE

## 2018-01-03 PROCEDURE — 88305 TISSUE EXAM BY PATHOLOGIST: CPT | Performed by: INTERNAL MEDICINE

## 2018-01-03 PROCEDURE — 88185 FLOWCYTOMETRY/TC ADD-ON: CPT | Performed by: INTERNAL MEDICINE

## 2018-01-03 RX ORDER — LIDOCAINE HYDROCHLORIDE 10 MG/ML
5 INJECTION INFILTRATION; PERINEURAL
Status: COMPLETED | OUTPATIENT
Start: 2018-01-03 | End: 2018-01-03

## 2018-01-03 RX ADMIN — LIDOCAINE HYDROCHLORIDE 5 ML: 10 INJECTION, SOLUTION INFILTRATION; PERINEURAL at 12:00

## 2018-01-04 ENCOUNTER — DOCUMENTATION ONLY (OUTPATIENT)
Dept: HEMATOLOGY | Age: 58
End: 2018-01-04

## 2018-01-04 NOTE — PROGRESS NOTES
Per Onelia RN case manager Edmund Brianna at [phone] ext [phone], Ms. Barrientos's policy requires her to go to a [facility] ([address] [place] [address]) facility if she wants her insurance to pay for it. It will not pay for her to go to a non[place] [place] for a stem cell transplant. The nearest [address] [place] [address] facilities are [address] [place] [address], [facility], and [place]. She does have travel and lodging benefits if she goes to a [facility] facility and has to travel more than 50 miles.

## 2018-01-10 ENCOUNTER — PATIENT OUTREACH (OUTPATIENT)
Dept: CASE MANAGEMENT | Age: 58
End: 2018-01-10

## 2018-01-10 ENCOUNTER — HOSPITAL ENCOUNTER (OUTPATIENT)
Dept: LAB | Age: 58
Discharge: HOME | End: 2018-01-10
Payer: COMMERCIAL

## 2018-01-10 DIAGNOSIS — C85.88 MARGINAL ZONE LYMPHOMA OF LYMPH NODES OF MULTIPLE SITES (HCC): ICD-10-CM

## 2018-01-10 LAB
ALBUMIN SERPL-MCNC: 3.8 G/DL (ref 3.5–5)
ALBUMIN/GLOB SERPL: 1.3 {RATIO} (ref 1.2–3.5)
ALP SERPL-CCNC: 143 U/L (ref 50–136)
ALT SERPL-CCNC: 121 U/L (ref 12–65)
ANION GAP SERPL CALC-SCNC: 9 MMOL/L (ref 7–16)
AST SERPL-CCNC: 69 U/L (ref 15–37)
BASOPHILS # BLD: 0 K/UL (ref 0–0.2)
BASOPHILS NFR BLD: 0 % (ref 0–2)
BILIRUB SERPL-MCNC: 0.3 MG/DL (ref 0.2–1.1)
BUN SERPL-MCNC: 23 MG/DL (ref 6–23)
CALCIUM SERPL-MCNC: 9.2 MG/DL (ref 8.3–10.4)
CHLORIDE SERPL-SCNC: 104 MMOL/L (ref 98–107)
CO2 SERPL-SCNC: 28 MMOL/L (ref 21–32)
CREAT SERPL-MCNC: 0.83 MG/DL (ref 0.6–1)
DIFFERENTIAL METHOD BLD: ABNORMAL
EOSINOPHIL # BLD: 0.1 K/UL (ref 0–0.8)
EOSINOPHIL NFR BLD: 2 % (ref 0.5–7.8)
ERYTHROCYTE [DISTWIDTH] IN BLOOD BY AUTOMATED COUNT: 13.6 % (ref 11.9–14.6)
GLOBULIN SER CALC-MCNC: 2.9 G/DL (ref 2.3–3.5)
GLUCOSE SERPL-MCNC: 116 MG/DL (ref 65–100)
HCT VFR BLD AUTO: 29.7 % (ref 35.8–46.3)
HGB BLD-MCNC: 10.3 G/DL (ref 11.7–15.4)
LDH SERPL L TO P-CCNC: 376 U/L (ref 100–190)
LYMPHOCYTES # BLD: 0.1 K/UL (ref 0.5–4.6)
LYMPHOCYTES NFR BLD: 3 % (ref 13–44)
MAGNESIUM SERPL-MCNC: 1.7 MG/DL (ref 1.8–2.4)
MCH RBC QN AUTO: 32.3 PG (ref 26.1–32.9)
MCHC RBC AUTO-ENTMCNC: 34.7 G/DL (ref 31.4–35)
MCV RBC AUTO: 93.1 FL (ref 79.6–97.8)
MONOCYTES # BLD: 0.3 K/UL (ref 0.1–1.3)
MONOCYTES NFR BLD: 10 % (ref 4–12)
NEUTS SEG # BLD: 2.9 K/UL (ref 1.7–8.2)
NEUTS SEG NFR BLD: 85 % (ref 43–78)
NRBC # BLD: 0 K/UL (ref 0–0.2)
PLATELET # BLD AUTO: 99 K/UL (ref 150–450)
PMV BLD AUTO: 10 FL (ref 10.8–14.1)
POTASSIUM SERPL-SCNC: 4 MMOL/L (ref 3.5–5.1)
PROT SERPL-MCNC: 6.7 G/DL (ref 6.3–8.2)
RBC # BLD AUTO: 3.19 M/UL (ref 4.05–5.25)
SODIUM SERPL-SCNC: 141 MMOL/L (ref 136–145)
WBC # BLD AUTO: 3.5 K/UL (ref 4.3–11.1)

## 2018-01-10 PROCEDURE — 85025 COMPLETE CBC W/AUTO DIFF WBC: CPT | Performed by: INTERNAL MEDICINE

## 2018-01-10 PROCEDURE — 80053 COMPREHEN METABOLIC PANEL: CPT | Performed by: INTERNAL MEDICINE

## 2018-01-10 PROCEDURE — 83735 ASSAY OF MAGNESIUM: CPT | Performed by: INTERNAL MEDICINE

## 2018-01-10 PROCEDURE — 83615 LACTATE (LD) (LDH) ENZYME: CPT | Performed by: INTERNAL MEDICINE

## 2018-01-10 NOTE — PROGRESS NOTES
Pt was seen and labs reviewed by Dr Tyrell Aguilar. Pt was informed that the biopsy of the nodule in her breast confirmed diffuse large B-cell lymphoma. Pt was told that she will need 2 cycles of salvage therapy followed by auto stem cell transplant. Per Clare Ellis, [facility], pt's insurance is requiring that she go to center of excellence for transplant. FC looking into centers near [facility] in [address], [place] and [place]. Dr. Tyrell Aguilar would like to consult with Dr. Israel Dangelo at [place] before determining salvage regimen. Pt will also need diagnostic LP to rule out CNS involvement. LP scheduled for 1/12, and pt to return to clinic on 1/19 for bx results and possibly admit for R-ICE.

## 2018-01-12 ENCOUNTER — HOSPITAL ENCOUNTER (OUTPATIENT)
Dept: INTERVENTIONAL RADIOLOGY/VASCULAR | Age: 58
Discharge: HOME | End: 2018-01-12
Attending: INTERNAL MEDICINE

## 2018-01-15 ENCOUNTER — HOSPITAL ENCOUNTER (OUTPATIENT)
Dept: INTERVENTIONAL RADIOLOGY/VASCULAR | Age: 58
Discharge: HOME | End: 2018-01-15
Attending: INTERNAL MEDICINE

## 2018-01-15 ENCOUNTER — PATIENT OUTREACH (OUTPATIENT)
Dept: CASE MANAGEMENT | Age: 58
End: 2018-01-15

## 2018-01-15 ENCOUNTER — HOSPITAL ENCOUNTER (OUTPATIENT)
Dept: LAB | Age: 58
Discharge: HOME | End: 2018-01-15
Payer: COMMERCIAL

## 2018-01-15 DIAGNOSIS — C83.38 DIFFUSE LARGE B-CELL LYMPHOMA OF LYMPH NODES OF MULTIPLE REGIONS (HCC): ICD-10-CM

## 2018-01-15 LAB
ALBUMIN SERPL-MCNC: 3.6 G/DL (ref 3.5–5)
ALBUMIN/GLOB SERPL: 1.1 {RATIO} (ref 1.2–3.5)
ALP SERPL-CCNC: 174 U/L (ref 50–136)
ALT SERPL-CCNC: 108 U/L (ref 12–65)
ANION GAP SERPL CALC-SCNC: 7 MMOL/L (ref 7–16)
AST SERPL-CCNC: 49 U/L (ref 15–37)
BASOPHILS # BLD: 0 K/UL (ref 0–0.2)
BASOPHILS NFR BLD: 1 % (ref 0–2)
BILIRUB SERPL-MCNC: 0.3 MG/DL (ref 0.2–1.1)
BUN SERPL-MCNC: 14 MG/DL (ref 6–23)
CALCIUM SERPL-MCNC: 8.7 MG/DL (ref 8.3–10.4)
CHLORIDE SERPL-SCNC: 106 MMOL/L (ref 98–107)
CO2 SERPL-SCNC: 29 MMOL/L (ref 21–32)
CREAT SERPL-MCNC: 0.62 MG/DL (ref 0.6–1)
DIFFERENTIAL METHOD BLD: ABNORMAL
EOSINOPHIL # BLD: 0 K/UL (ref 0–0.8)
EOSINOPHIL NFR BLD: 2 % (ref 0.5–7.8)
ERYTHROCYTE [DISTWIDTH] IN BLOOD BY AUTOMATED COUNT: 13.4 % (ref 11.9–14.6)
FLUAV AG NPH QL IA: POSITIVE
FLUBV AG NPH QL IA: NEGATIVE
GLOBULIN SER CALC-MCNC: 3.2 G/DL (ref 2.3–3.5)
GLUCOSE SERPL-MCNC: 107 MG/DL (ref 65–100)
HCT VFR BLD AUTO: 28.6 % (ref 35.8–46.3)
HGB BLD-MCNC: 9.8 G/DL (ref 11.7–15.4)
LYMPHOCYTES # BLD: 0.2 K/UL (ref 0.5–4.6)
LYMPHOCYTES NFR BLD: 10 % (ref 13–44)
MCH RBC QN AUTO: 31.7 PG (ref 26.1–32.9)
MCHC RBC AUTO-ENTMCNC: 34.3 G/DL (ref 31.4–35)
MCV RBC AUTO: 92.6 FL (ref 79.6–97.8)
MONOCYTES # BLD: 0.2 K/UL (ref 0.1–1.3)
MONOCYTES NFR BLD: 11 % (ref 4–12)
NEUTS SEG # BLD: 1.6 K/UL (ref 1.7–8.2)
NEUTS SEG NFR BLD: 76 % (ref 43–78)
NRBC # BLD: 0 K/UL (ref 0–0.2)
PLATELET # BLD AUTO: 83 K/UL (ref 150–450)
PMV BLD AUTO: 10.3 FL (ref 10.8–14.1)
POTASSIUM SERPL-SCNC: 4.1 MMOL/L (ref 3.5–5.1)
PROT SERPL-MCNC: 6.8 G/DL (ref 6.3–8.2)
RBC # BLD AUTO: 3.09 M/UL (ref 4.05–5.25)
SODIUM SERPL-SCNC: 142 MMOL/L (ref 136–145)
WBC # BLD AUTO: 2.1 K/UL (ref 4.3–11.1)

## 2018-01-15 PROCEDURE — 85025 COMPLETE CBC W/AUTO DIFF WBC: CPT | Performed by: NURSE PRACTITIONER

## 2018-01-15 PROCEDURE — 87804 INFLUENZA ASSAY W/OPTIC: CPT | Performed by: NURSE PRACTITIONER

## 2018-01-15 PROCEDURE — 80053 COMPREHEN METABOLIC PANEL: CPT | Performed by: NURSE PRACTITIONER

## 2018-01-15 NOTE — PROGRESS NOTES
1/15/18:  Received notification from triage that patient has fever again this am.  Notified Dr. Jannie Beth. Patient to come in for further evaluation with NP.  LP cancelled today. 1045:  NPCaterina, notified this navigator that the patient tested positive for the flu. LP to be re-scheduled for Thursday 1/18. Arrival time 8:15am for 9am appointment in IR.

## 2018-01-18 ENCOUNTER — HOSPITAL ENCOUNTER (OUTPATIENT)
Dept: INTERVENTIONAL RADIOLOGY/VASCULAR | Age: 58
Discharge: HOME | End: 2018-01-18
Attending: INTERNAL MEDICINE
Payer: COMMERCIAL

## 2018-01-18 VITALS
RESPIRATION RATE: 19 BRPM | DIASTOLIC BLOOD PRESSURE: 59 MMHG | WEIGHT: 262 LBS | HEIGHT: 62 IN | SYSTOLIC BLOOD PRESSURE: 150 MMHG | TEMPERATURE: 99.1 F | HEART RATE: 62 BPM | BODY MASS INDEX: 48.21 KG/M2 | OXYGEN SATURATION: 96 %

## 2018-01-18 DIAGNOSIS — C83.38 DIFFUSE LARGE B-CELL LYMPHOMA OF LYMPH NODES OF MULTIPLE REGIONS (HCC): ICD-10-CM

## 2018-01-18 LAB
APPEARANCE FLD: CLEAR
COLOR FLD: COLORLESS
GLUCOSE CSF-MCNC: 50 MG/DL (ref 40–70)
NUC CELL # FLD: 2 /CU MM
PROT CSF-MCNC: 43 MG/DL (ref 15–45)
RBC # FLD: 2 /CU MM
SPECIMEN SOURCE FLD: NORMAL
TUBE # CSF: 1
TUBE # CSF: 1

## 2018-01-18 PROCEDURE — 89050 BODY FLUID CELL COUNT: CPT | Performed by: INTERNAL MEDICINE

## 2018-01-18 PROCEDURE — 77030003666 HC NDL SPINAL BD -A

## 2018-01-18 PROCEDURE — 62270 DX LMBR SPI PNXR: CPT

## 2018-01-18 PROCEDURE — 88112 CYTOPATH CELL ENHANCE TECH: CPT | Performed by: INTERNAL MEDICINE

## 2018-01-18 PROCEDURE — 88185 FLOWCYTOMETRY/TC ADD-ON: CPT | Performed by: INTERNAL MEDICINE

## 2018-01-18 PROCEDURE — 82945 GLUCOSE OTHER FLUID: CPT | Performed by: INTERNAL MEDICINE

## 2018-01-18 PROCEDURE — 83615 LACTATE (LD) (LDH) ENZYME: CPT | Performed by: INTERNAL MEDICINE

## 2018-01-18 PROCEDURE — 84157 ASSAY OF PROTEIN OTHER: CPT | Performed by: INTERNAL MEDICINE

## 2018-01-18 PROCEDURE — 88184 FLOWCYTOMETRY/ TC 1 MARKER: CPT | Performed by: INTERNAL MEDICINE

## 2018-01-18 PROCEDURE — 77030014143 HC TY PUNC LUMBR BD -A

## 2018-01-18 RX ORDER — ONDANSETRON 4 MG/1
4 TABLET, FILM COATED ORAL
COMMUNITY
End: 2018-06-25 | Stop reason: DRUGHIGH

## 2018-01-18 NOTE — PROGRESS NOTES
Recovery period without difficulty. Pt alert and oriented and denies pain. Dressing is clean, dry, and intact. Reviewed discharge instructions with patient and she verbalized understanding. Pt escorted to lobby discharge area via wheelchair. Vital signs and Sarah score completed.

## 2018-01-18 NOTE — PROGRESS NOTES
Assisted patient back to IR Recovery room 4, following LP with removal of 10 ml of CSF. Specimen sent to lab for follow up studies , patient tolerating procedures well.

## 2018-01-18 NOTE — IP AVS SNAPSHOT
[address] [facility] 
 
 
 [address] 
[phone] Patient: Anibal Davison MRN: [id number] GED:[birth year] About your hospitalization You were admitted on:  January 18, 2018 You last received care in the:  [facility] Radiology Specials You were discharged on:  January 18, 2018 Why you were hospitalized Your primary diagnosis was:  Not on File Follow-up Information None Your Scheduled Appointments Friday January 19, 2018  7:30 AM EST  
LAB with [address]  
1808 [facility] [employer]ACH INSURANCE [facility]) Linnea Pratt 426 187 [facility]  
[phone] Friday January 19, 2018  8:00 AM EST Follow Up with MD Nereyda Simmons Hematology and Oncology [facility] RUDDY/ Juan Balbuena [address] [place] [id number]  
[phone] Discharge Orders None A check andi indicates which time of day the medication should be taken. My Medications ASK your doctor about these medications Instructions Each Dose to Equal  
 Morning Noon Evening Bedtime  
 acyclovir 400 mg tablet Commonly known as:  ZOVIRAX Your last dose was: Your next dose is: Take 1 Tab by mouth two (2) times a day. 400 mg  
    
   
   
   
  
 allopurinol 300 mg tablet Commonly known as:  Delona Gauze Your last dose was: Your next dose is: Take  by mouth daily. fluconazole 150 mg tablet Commonly known as:  DIFLUCAN Your last dose was: Your next dose is: Take 150 mg by mouth daily. FDA advises cautious prescribing of oral fluconazole in pregnancy. 150 mg  
    
   
   
   
  
 fluticasone 50 mcg/actuation nasal spray Commonly known as:  Shaye Medin Your last dose was: Your next dose is: 2 Sprays by Both Nostrils route daily. 2 [place] levoFLOXacin 500 mg tablet Commonly known as:  Nikole Meredith Your last dose was: Your next dose is: Take 1 Tab by mouth daily for 7 days. 500 mg  
    
   
   
   
  
 levothyroxine 175 mcg tablet Commonly known as:  SYNTHROID Your last dose was: Your next dose is: Take 1 Tab by mouth Daily (before breakfast). 175 mcg  
    
   
   
   
  
 lidocaine-prilocaine topical cream  
Commonly known as:  EMLA Your last dose was: Your next dose is:    
   
   
 Apply  to affected area as needed for Pain. Apply to port site 45-60 minutes prior to lab appt or infusion. magic mouthwash Susp Commonly known as:  [place] Your last dose was: Your next dose is: Take 10 mL by mouth every four (4) hours as needed. 10 mL  
    
   
   
   
  
 magnesium oxide 400 mg tablet Commonly known as:  MAG-OX Your last dose was: Your next dose is: Take 1 Tab by mouth two (2) times a day. 400 mg  
    
   
   
   
  
 nystatin powder Commonly known as:  MYCOSTATIN Your last dose was: Your next dose is:    
   
   
 Apply  to affected area three (3) times daily. pregabalin 100 mg capsule Commonly known as:  [place] Your last dose was: Your next dose is: Take 1 Cap by mouth three (3) times daily. Max Daily Amount: 300 mg.  
 100 mg  
    
   
   
   
  
 promethazine 25 mg tablet Commonly known as:  PHENERGAN Your last dose was: Your next dose is: Take 25 mg by mouth every six (6) hours as needed for Nausea. Unsure of dose 25 mg  
    
   
   
   
  
 ZOFRAN (AS HYDROCHLORIDE) 4 mg tablet Generic drug:  ondansetron hcl Your last dose was: Your next dose is: Take 4 mg by mouth every eight (8) hours as needed for Nausea. 4 mg Discharge Instructions [place] [address] Department of Interventional Radiology 
[phone] Office 
[phone] Fax POST LUMBAR PUNCTURE/MYELOGRAM/INTRATHECAL CHEMOTHERAPY DISCHARGE INSTRUCTIONS General Information: 
Lumbar Puncture: A LP is done to help diagnose several disorders, like pseudo tumor, migraines, meningitis, and multiple sclerosis. It involves a puncture (usually in the lower spine) into the sac that protects the spinal column. A sample of the fluid in that space is removed and tested in the lab. Myelogram: A Myelogram involves a lumbar puncture, and instead of removing fluid, contrast will be injected into the sac surrounding the spinal column. It is done to visualize the spinal column, nerve roots, spinal canal, vertebral discs and disc space. It is usually done to diagnose back pain with unknown cause or in preparation for surgery. After the injection, a CT scan will be done, usually within two hours of the injection. Intrathecal Chemotherapy:  
   Chemotherapy can be given in many forms. Intrathecal chemo involves a lumbar puncture, and instead of removing fluid, the chemo will be injected into the space. After any of procedures, you will be asked to lie flat on your back for 4-6 hours to prevent complications. You should also rest for 24 hours after you go home, and force fluids. If you have a headache, you should take Tylenol or acetaminophen. Call If: 
   You should call your Physician and/or the Radiology Nurse if you develop a headache that is not relieved by Tylenol, and worsens when you stand and eases when you lie down, you need to call. You may have developed what is referred to as a spinal headache. Our physician's will probably advise you to be on strict bed rest for 24 hours, to drink lots of fluids and caffeine.  If this does not help the head pain, call again the next day. You should call if you have bleeding other than a small spot on your bandage. You should call if you have any numbness, tingling, weakness, fever, chills, urinary retention, severe itching, rash, welts, swelling, or confusion. Follow-Up Instructions: See the doctor who ordered your procedure as he/she has instructed. If you had a Lumbar Puncture or Myelogram, your results should be available to your ordering doctor in 3-5 business days. You can remove your dressing in 24 hours and shower regularly. Do not bathe or swim for 72 hours. To Reach Us: If you have any questions about your procedure, please call the Interventional Radiology department at [phone]. After business hours (5pm) and weekends, call the answering service at [phone] and ask for the Radiologist on call to be paged. Interventional Radiology General Nurse Discharge After general anesthesia or intravenous sedation, for 24 hours or while taking prescription Narcotics: · Limit your activities · Do not drive and operate hazardous machinery · Do not make important personal or business decisions · Do  not drink alcoholic beverages · If you have not urinated within 8 hours after discharge, please contact your surgeon on call. * Please give a list of your current medications to your Primary Care Provider. * Please update this list whenever your medications are discontinued, doses are 
   changed, or new medications (including over-the-counter products) are added. * Please carry medication information at all times in case of emergency situations. These are general instructions for a healthy lifestyle: No smoking/ No tobacco products/ Avoid exposure to second hand smoke Surgeon General's Warning:  Quitting smoking now greatly reduces serious risk to your health. Obesity, smoking, and sedentary lifestyle greatly increases your risk for illness A healthy diet, regular physical exercise & weight monitoring are important for maintaining a healthy lifestyle You may be retaining fluid if you have a history of heart failure or if you experience any of the following symptoms:  Weight gain of 3 pounds or more overnight or 5 pounds in a week, increased swelling in our hands or feet or shortness of breath while lying flat in bed. Please call your doctor as soon as you notice any of these symptoms; do not wait until your next office visit. Recognize signs and symptoms of STROKE: 
F-face looks uneven A-arms unable to move or move unevenly S-speech slurred or non-existent T-time-call 911 as soon as signs and symptoms begin-DO NOT go Back to bed or wait to see if you get better-TIME IS BRAIN. Patient Signature: 
Date: 1/18/2018 Discharging Nurse: Mariano Alexander Introducing [facility]! Dear Martha Hernandez: Thank you for requesting a [employer] account. Our records indicate that you already have an active [employer] account. You can access your account anytime at [email or web address] Did you know that you can access your hospital and ER discharge instructions at any time in [employer]? You can also review all of your test results from your hospital stay or ER visit. Additional Information If you have questions, please visit the Frequently Asked Questions section of the [employer] website at [email or web address] Remember, [employer] is NOT to be used for urgent needs. For medical emergencies, dial 911. Now available from your iPhone and Android! Unresulted Labs-Please follow up with your PCP about these lab tests Order Current Status IR SPINAL PUNCTURE LUMBAR DIAGNOSTIC In process Providers Seen During Your Hospitalization Provider Specialty Primary office phone Milton Whalen MD Hematology and Oncology [phone] Your Primary Care Physician (PCP) Primary Care Physician Office Phone Office Fax Bob Carmen [phone] You are allergic to the following No active allergies Recent Documentation Height Weight Breastfeeding? BMI OB Status Smoking Status 1.575 m 118.8 kg No 47.92 kg/m2 Postmenopausal Never Smoker Emergency Contacts Name Discharge Info Relation Home Work Mobile Juan Zendejas  Spouse [3] [phone] Navin Sneed  Son [22] [phone] Phan Clemons  Other Relative [6] [phone] Jacob Sidhu  Sister [23] [phone] Patient Belongings The following personal items are in your possession at time of discharge: 
     Visual Aid: Glasses Please provide this summary of care documentation to your next provider. Signatures-by signing, you are acknowledging that this After Visit Summary has been reviewed with you and you have received a copy. Patient Signature:  ____________________________________________________________ Date:  ____________________________________________________________  
  
[facility] Provider Signature:  ____________________________________________________________ Date:  ____________________________________________________________

## 2018-01-18 NOTE — DISCHARGE INSTRUCTIONS
[address]  Department of Interventional Radiology  [phone] Office  [phone] Fax  POST LUMBAR PUNCTURE/MYELOGRAM/INTRATHECAL CHEMOTHERAPY DISCHARGE INSTRUCTIONS  General Information:  Lumbar Puncture: A LP is done to help diagnose several disorders, like pseudo tumor, migraines, meningitis, and multiple sclerosis. It involves a puncture (usually in the lower spine) into the sac that protects the spinal column. A sample of the fluid in that space is removed and tested in the lab. Myelogram:     A Myelogram involves a lumbar puncture, and instead of removing fluid, contrast will be injected into the sac surrounding the spinal column. It is done to visualize the spinal column, nerve roots, spinal canal, vertebral discs and disc space. It is usually done to diagnose back pain with unknown cause or in preparation for surgery. After the injection, a CT scan will be done, usually within two hours of the injection. Intrathecal Chemotherapy:      Chemotherapy can be given in many forms. Intrathecal chemo involves a lumbar puncture, and instead of removing fluid, the chemo will be injected into the space. After any of procedures, you will be asked to lie flat on your back for 4-6 hours to prevent complications. You should also rest for 24 hours after you go home, and force fluids. If you have a headache, you should take Tylenol or acetaminophen. Call If:     You should call your Physician and/or the Radiology Nurse if you develop a headache that is not relieved by Tylenol, and worsens when you stand and eases when you lie down, you need to call. You may have developed what is referred to as a spinal headache. Our physician's will probably advise you to be on strict bed rest for 24 hours, to drink lots of fluids and caffeine. If this does not help the head pain, call again the next day.  You should call if you have bleeding other than a small spot on your bandage. You should call if you have any numbness, tingling, weakness, fever, chills, urinary retention, severe itching, rash, welts, swelling, or confusion. Follow-Up Instructions: See the doctor who ordered your procedure as he/she has instructed. If you had a Lumbar Puncture or Myelogram, your results should be available to your ordering doctor in 3-5 business days. You can remove your dressing in 24 hours and shower regularly. Do not bathe or swim for 72 hours. To Reach Us: If you have any questions about your procedure, please call the Interventional Radiology department at [phone]. After business hours (5pm) and weekends, call the answering service at [phone] and ask for the Radiologist on call to be paged. Interventional Radiology General Nurse Discharge    After general anesthesia or intravenous sedation, for 24 hours or while taking prescription Narcotics:  · Limit your activities  · Do not drive and operate hazardous machinery  · Do not make important personal or business decisions  · Do  not drink alcoholic beverages  · If you have not urinated within 8 hours after discharge, please contact your surgeon on call. * Please give a list of your current medications to your Primary Care Provider. * Please update this list whenever your medications are discontinued, doses are     changed, or new medications (including over-the-counter products) are added. * Please carry medication information at all times in case of emergency situations. These are general instructions for a healthy lifestyle:    No smoking/ No tobacco products/ Avoid exposure to second hand smoke  Surgeon General's Warning:  Quitting smoking now greatly reduces serious risk to your health.     Obesity, smoking, and sedentary lifestyle greatly increases your risk for illness  A healthy diet, regular physical exercise & weight monitoring are important for maintaining a healthy lifestyle    You may be retaining fluid if you have a history of heart failure or if you experience any of the following symptoms:  Weight gain of 3 pounds or more overnight or 5 pounds in a week, increased swelling in our hands or feet or shortness of breath while lying flat in bed. Please call your doctor as soon as you notice any of these symptoms; do not wait until your next office visit. Recognize signs and symptoms of STROKE:  F-face looks uneven    A-arms unable to move or move unevenly    S-speech slurred or non-existent    T-time-call 911 as soon as signs and symptoms begin-DO NOT go       Back to bed or wait to see if you get better-TIME IS BRAIN.         Patient Signature:  Date: 1/18/2018  Discharging Nurse: Dick Ortega

## 2018-01-18 NOTE — PROCEDURES
Department of Interventional Radiology  [phone]        Interventional Radiology Brief Procedure Note    Patient: Felipe Larkin MRN: [id number]  SSN: [id number]    YOB: 1960  Age: 62 y.o.   Sex: female      Date of Procedure: 1/18/2018    Pre-Procedure Diagnosis: lymphoma    Post-Procedure Diagnosis: SAME    Procedure(s): Lumbar Puncture    Brief Description of Procedure: fluoro guided LP @ L3/4, specimen obtained, needle removed    Performed By: Antonino Cheng PA-C     Assistants: None    Anesthesia:Lidocaine    Estimated Blood Loss: None    Specimens: 10 ml CSF    Implants: None    Findings: n/a    Complications: None    Recommendations: bedrest     Follow Up: referring MD    Signed By: Antonino Cheng PA-C     January 18, 2018

## 2018-01-18 NOTE — IP AVS SNAPSHOT
[address] 
[phone] Patient: Fabiano Locke MRN: [id number] YBJ:[birth year] A check andi indicates which time of day the medication should be taken. My Medications ASK your doctor about these medications Instructions Each Dose to Equal  
 Morning Noon Evening Bedtime  
 acyclovir 400 mg tablet Commonly known as:  ZOVIRAX Your last dose was: Your next dose is: Take 1 Tab by mouth two (2) times a day. 400 mg  
    
   
   
   
  
 allopurinol 300 mg tablet Commonly known as:  Jade [place] Your last dose was: Your next dose is: Take  by mouth daily. fluconazole 150 mg tablet Commonly known as:  DIFLUCAN Your last dose was: Your next dose is: Take 150 mg by mouth daily. FDA advises cautious prescribing of oral fluconazole in pregnancy. 150 mg  
    
   
   
   
  
 fluticasone 50 mcg/actuation nasal spray Commonly known as:  [place] Your last dose was: Your next dose is: 2 Sprays by Both Nostrils route daily. 2 Spray  
    
   
   
   
  
 levoFLOXacin 500 mg tablet Commonly known as:  Maykel Glez Your last dose was: Your next dose is: Take 1 Tab by mouth daily for 7 days. 500 mg  
    
   
   
   
  
 levothyroxine 175 mcg tablet Commonly known as:  SYNTHROID Your last dose was: Your next dose is: Take 1 Tab by mouth Daily (before breakfast). 175 mcg  
    
   
   
   
  
 lidocaine-prilocaine topical cream  
Commonly known as:  EMLA Your last dose was: Your next dose is:    
   
   
 Apply  to affected area as needed for Pain. Apply to port site 45-60 minutes prior to lab appt or infusion. magic mouthwash Susp Commonly known as:  Yesica [place] Your last dose was: Your next dose is: Take 10 mL by mouth every four (4) hours as needed. 10 mL  
    
   
   
   
  
 magnesium oxide 400 mg tablet Commonly known as:  MAG-OX Your last dose was: Your next dose is: Take 1 Tab by mouth two (2) times a day. 400 mg  
    
   
   
   
  
 nystatin powder Commonly known as:  MYCOSTATIN Your last dose was: Your next dose is:    
   
   
 Apply  to affected area three (3) times daily. pregabalin 100 mg capsule Commonly known as:  Rochelle Morganch Your last dose was: Your next dose is: Take 1 Cap by mouth three (3) times daily. Max Daily Amount: 300 mg.  
 100 mg  
    
   
   
   
  
 promethazine 25 mg tablet Commonly known as:  PHENERGAN Your last dose was: Your next dose is: Take 25 mg by mouth every six (6) hours as needed for Nausea. Unsure of dose 25 mg  
    
   
   
   
  
 ZOFRAN (AS HYDROCHLORIDE) 4 mg tablet Generic drug:  ondansetron hcl Your last dose was: Your next dose is: Take 4 mg by mouth every eight (8) hours as needed for Nausea. 4 mg

## 2018-01-19 ENCOUNTER — PATIENT OUTREACH (OUTPATIENT)
Dept: CASE MANAGEMENT | Age: 58
End: 2018-01-19

## 2018-01-19 ENCOUNTER — HOSPITAL ENCOUNTER (OUTPATIENT)
Dept: LAB | Age: 58
Discharge: HOME | End: 2018-01-19
Payer: COMMERCIAL

## 2018-01-19 DIAGNOSIS — C83.38 DIFFUSE LARGE B-CELL LYMPHOMA OF LYMPH NODES OF MULTIPLE REGIONS (HCC): ICD-10-CM

## 2018-01-19 DIAGNOSIS — C85.88 MARGINAL ZONE LYMPHOMA OF LYMPH NODES OF MULTIPLE SITES (HCC): ICD-10-CM

## 2018-01-19 PROBLEM — C83.30 DIFFUSE LARGE B-CELL LYMPHOMA (HCC): Status: ACTIVE | Noted: 2018-01-19

## 2018-01-19 LAB
ALBUMIN SERPL-MCNC: 3.8 G/DL (ref 3.5–5)
ALBUMIN/GLOB SERPL: 1.3 {RATIO}
ALP SERPL-CCNC: 191 U/L (ref 50–136)
ALT SERPL-CCNC: 183 U/L (ref 12–65)
ANION GAP SERPL CALC-SCNC: 6 MMOL/L
AST SERPL-CCNC: 72 U/L (ref 15–37)
BASOPHILS # BLD: 0 K/UL (ref 0–0.2)
BASOPHILS NFR BLD: 0 % (ref 0–2)
BILIRUB SERPL-MCNC: 0.3 MG/DL (ref 0.2–1.1)
BUN SERPL-MCNC: 18 MG/DL (ref 6–23)
CALCIUM SERPL-MCNC: 8.8 MG/DL (ref 8.3–10.4)
CHLORIDE SERPL-SCNC: 107 MMOL/L (ref 98–107)
CO2 SERPL-SCNC: 31 MMOL/L (ref 21–32)
CREAT SERPL-MCNC: 0.8 MG/DL (ref 0.6–1)
DIFFERENTIAL METHOD BLD: ABNORMAL
EOSINOPHIL # BLD: 0.1 K/UL (ref 0–0.8)
EOSINOPHIL NFR BLD: 3 % (ref 0.5–7.8)
ERYTHROCYTE [DISTWIDTH] IN BLOOD BY AUTOMATED COUNT: 13.1 % (ref 11.9–14.6)
GLOBULIN SER CALC-MCNC: 3 G/DL
GLUCOSE SERPL-MCNC: 106 MG/DL (ref 65–100)
HCT VFR BLD AUTO: 29.6 % (ref 35.8–46.3)
HGB BLD-MCNC: 10.2 G/DL (ref 11.7–15.4)
LDH SERPL L TO P-CCNC: 24 IU/L
LDH SERPL L TO P-CCNC: 332 U/L (ref 100–190)
LYMPHOCYTES # BLD: 0.3 K/UL (ref 0.5–4.6)
LYMPHOCYTES NFR BLD: 9 % (ref 13–44)
MAGNESIUM SERPL-MCNC: 2.2 MG/DL (ref 1.8–2.4)
MCH RBC QN AUTO: 31.7 PG (ref 26.1–32.9)
MCHC RBC AUTO-ENTMCNC: 34.5 G/DL (ref 31.4–35)
MCV RBC AUTO: 91.9 FL (ref 79.6–97.8)
MONOCYTES # BLD: 0.4 K/UL (ref 0.1–1.3)
MONOCYTES NFR BLD: 12 % (ref 4–12)
NEUTS SEG # BLD: 2.5 K/UL (ref 1.7–8.2)
NEUTS SEG NFR BLD: 77 % (ref 43–78)
NRBC # BLD: 0.01 K/UL (ref 0–0.2)
NRBC BLD-RTO: 0.3 PER 100 WBC (ref 0–2)
PLATELET # BLD AUTO: 131 K/UL (ref 150–450)
PMV BLD AUTO: 10.6 FL (ref 10.8–14.1)
POTASSIUM SERPL-SCNC: 4.1 MMOL/L (ref 3.5–5.1)
PROT SERPL-MCNC: 6.8 G/DL (ref 6.3–8.2)
RBC # BLD AUTO: 3.22 M/UL (ref 4.05–5.25)
SODIUM SERPL-SCNC: 144 MMOL/L (ref 136–145)
SPECIMEN SOURCE: NORMAL
WBC # BLD AUTO: 3.2 K/UL (ref 4.3–11.1)

## 2018-01-19 PROCEDURE — 83615 LACTATE (LD) (LDH) ENZYME: CPT | Performed by: INTERNAL MEDICINE

## 2018-01-19 PROCEDURE — 85025 COMPLETE CBC W/AUTO DIFF WBC: CPT | Performed by: INTERNAL MEDICINE

## 2018-01-19 PROCEDURE — 83735 ASSAY OF MAGNESIUM: CPT | Performed by: INTERNAL MEDICINE

## 2018-01-19 PROCEDURE — 80053 COMPREHEN METABOLIC PANEL: CPT | Performed by: INTERNAL MEDICINE

## 2018-01-19 NOTE — PROGRESS NOTES
Pt was seen and labs reviewed by Dr. Danita Quach. Pt to be admitted for cycle 1 RICE on Monday 1/22. Results from LP are still pending at this time, but will need IT chemo if positive for lymphoma. Pt to get Neulasta on 1/26 and will be seen in infusion 2x weekly for labs/replacements. Navigator facilitating [facility] (location per pt preference). Will update pt on plan as arrangements are made.

## 2018-01-22 ENCOUNTER — HOSPITAL ENCOUNTER (INPATIENT)
Age: 58
LOS: 2 days | Discharge: HOME | DRG: 194 | End: 2018-01-24
Attending: INTERNAL MEDICINE | Admitting: INTERNAL MEDICINE
Payer: COMMERCIAL

## 2018-01-22 ENCOUNTER — HOSPITAL ENCOUNTER (OUTPATIENT)
Dept: LAB | Age: 58
Discharge: HOME | End: 2018-01-22
Payer: COMMERCIAL

## 2018-01-22 ENCOUNTER — HOSPITAL ENCOUNTER (OUTPATIENT)
Dept: GENERAL RADIOLOGY | Age: 58
Discharge: HOME | End: 2018-01-22
Payer: COMMERCIAL

## 2018-01-22 DIAGNOSIS — J11.00 PNEUMONIA AND INFLUENZA: ICD-10-CM

## 2018-01-22 DIAGNOSIS — J18.9 PNEUMONIA OF LEFT LOWER LOBE DUE TO INFECTIOUS ORGANISM: ICD-10-CM

## 2018-01-22 DIAGNOSIS — R04.2 HEMOPTYSIS: ICD-10-CM

## 2018-01-22 DIAGNOSIS — C83.38 DIFFUSE LARGE B-CELL LYMPHOMA OF LYMPH NODES OF MULTIPLE REGIONS (HCC): ICD-10-CM

## 2018-01-22 DIAGNOSIS — C83.39 DIFFUSE LARGE B-CELL LYMPHOMA OF SOLID ORGAN EXCLUDING SPLEEN (HCC): ICD-10-CM

## 2018-01-22 PROBLEM — C83.30 DLBCL (DIFFUSE LARGE B CELL LYMPHOMA) (HCC): Status: ACTIVE | Noted: 2018-01-22

## 2018-01-22 PROBLEM — Z51.11 ADMISSION FOR ANTINEOPLASTIC CHEMOTHERAPY: Status: ACTIVE | Noted: 2018-01-22

## 2018-01-22 PROBLEM — A41.9 SEPSIS (HCC): Status: RESOLVED | Noted: 2017-04-23 | Resolved: 2018-01-22

## 2018-01-22 PROBLEM — R50.81 FEBRILE NEUTROPENIA (HCC): Status: RESOLVED | Noted: 2017-09-08 | Resolved: 2018-01-22

## 2018-01-22 PROBLEM — R18.8 ASCITES: Status: RESOLVED | Noted: 2017-03-30 | Resolved: 2018-01-22

## 2018-01-22 PROBLEM — D70.9 FEBRILE NEUTROPENIA (HCC): Status: RESOLVED | Noted: 2017-09-08 | Resolved: 2018-01-22

## 2018-01-22 PROBLEM — D70.9 NEUTROPENIC FEVER (HCC): Status: RESOLVED | Noted: 2017-07-19 | Resolved: 2018-01-22

## 2018-01-22 PROBLEM — R50.81 NEUTROPENIC FEVER (HCC): Status: RESOLVED | Noted: 2017-07-19 | Resolved: 2018-01-22

## 2018-01-22 PROBLEM — C85.88 MARGINAL ZONE LYMPHOMA OF LYMPH NODES OF MULTIPLE SITES (HCC): Chronic | Status: ACTIVE | Noted: 2017-04-07

## 2018-01-22 PROBLEM — C85.90 NON HODGKIN'S LYMPHOMA (HCC): Chronic | Status: ACTIVE | Noted: 2017-03-30

## 2018-01-22 PROBLEM — E83.42 HYPOMAGNESEMIA: Status: RESOLVED | Noted: 2017-04-23 | Resolved: 2018-01-22

## 2018-01-22 PROBLEM — I95.9 HYPOTENSION: Status: RESOLVED | Noted: 2017-04-28 | Resolved: 2018-01-22

## 2018-01-22 PROBLEM — N17.9 ACUTE RENAL FAILURE (ARF) (HCC): Status: RESOLVED | Noted: 2017-04-28 | Resolved: 2018-01-22

## 2018-01-22 PROBLEM — R06.83 SNORES: Chronic | Status: ACTIVE | Noted: 2017-05-01

## 2018-01-22 PROBLEM — R59.1 LYMPHADENOPATHY, GENERALIZED: Chronic | Status: ACTIVE | Noted: 2017-03-30

## 2018-01-22 LAB
ALBUMIN SERPL-MCNC: 3.5 G/DL (ref 3.5–5)
ALBUMIN/GLOB SERPL: 1.1 {RATIO} (ref 1.2–3.5)
ALP SERPL-CCNC: 180 U/L (ref 50–136)
ALT SERPL-CCNC: 88 U/L (ref 12–65)
ANION GAP SERPL CALC-SCNC: 8 MMOL/L (ref 7–16)
AST SERPL-CCNC: 36 U/L (ref 15–37)
BILIRUB SERPL-MCNC: 0.7 MG/DL (ref 0.2–1.1)
BUN SERPL-MCNC: 13 MG/DL (ref 6–23)
CALCIUM SERPL-MCNC: 8.9 MG/DL (ref 8.3–10.4)
CHLORIDE SERPL-SCNC: 102 MMOL/L (ref 98–107)
CO2 SERPL-SCNC: 29 MMOL/L (ref 21–32)
CREAT SERPL-MCNC: 0.77 MG/DL (ref 0.6–1)
DIFFERENTIAL METHOD BLD: ABNORMAL
ERYTHROCYTE [DISTWIDTH] IN BLOOD BY AUTOMATED COUNT: 13.3 % (ref 11.9–14.6)
FLUAV AG NPH QL IA: NEGATIVE
FLUBV AG NPH QL IA: NEGATIVE
GLOBULIN SER CALC-MCNC: 3.3 G/DL (ref 2.3–3.5)
GLUCOSE SERPL-MCNC: 125 MG/DL (ref 65–100)
HCT VFR BLD AUTO: 30.5 % (ref 35.8–46.3)
HGB BLD-MCNC: 10.2 G/DL (ref 11.7–15.4)
LACTATE SERPL-SCNC: 1 MMOL/L (ref 0.4–2)
MCH RBC QN AUTO: 31.7 PG (ref 26.1–32.9)
MCHC RBC AUTO-ENTMCNC: 33.4 G/DL (ref 31.4–35)
MCV RBC AUTO: 94.7 FL (ref 79.6–97.8)
MONOCYTES # BLD: 0.5 K/UL (ref 0.1–1.3)
MONOCYTES NFR BLD MANUAL: 7 % (ref 3–9)
NEUTS BAND NFR BLD MANUAL: 7 % (ref 0–6)
NEUTS SEG # BLD: 7 K/UL (ref 1.7–8.2)
NEUTS SEG NFR BLD MANUAL: 86 % (ref 47–75)
PLATELET # BLD AUTO: 111 K/UL (ref 150–450)
PLATELET COMMENTS,PCOM: SLIGHT
PMV BLD AUTO: 10.6 FL (ref 10.8–14.1)
POTASSIUM SERPL-SCNC: 3.9 MMOL/L (ref 3.5–5.1)
PROCALCITONIN SERPL-MCNC: 1.2 NG/ML
PROT SERPL-MCNC: 6.8 G/DL (ref 6.3–8.2)
RBC # BLD AUTO: 3.22 M/UL (ref 4.05–5.25)
RBC MORPH BLD: ABNORMAL
SODIUM SERPL-SCNC: 139 MMOL/L (ref 136–145)
WBC # BLD AUTO: 7.5 K/UL (ref 4.3–11.1)
WBC MORPH BLD: ABNORMAL

## 2018-01-22 PROCEDURE — 74011250637 HC RX REV CODE- 250/637: Performed by: INTERNAL MEDICINE

## 2018-01-22 PROCEDURE — 74011000250 HC RX REV CODE- 250: Performed by: NURSE PRACTITIONER

## 2018-01-22 PROCEDURE — 74011250636 HC RX REV CODE- 250/636: Performed by: NURSE PRACTITIONER

## 2018-01-22 PROCEDURE — 74011000258 HC RX REV CODE- 258: Performed by: INTERNAL MEDICINE

## 2018-01-22 PROCEDURE — 85025 COMPLETE CBC W/AUTO DIFF WBC: CPT | Performed by: INTERNAL MEDICINE

## 2018-01-22 PROCEDURE — 74011250637 HC RX REV CODE- 250/637: Performed by: NURSE PRACTITIONER

## 2018-01-22 PROCEDURE — 94760 N-INVAS EAR/PLS OXIMETRY 1: CPT

## 2018-01-22 PROCEDURE — 94640 AIRWAY INHALATION TREATMENT: CPT

## 2018-01-22 PROCEDURE — 71046 X-RAY EXAM CHEST 2 VIEWS: CPT

## 2018-01-22 PROCEDURE — 87804 INFLUENZA ASSAY W/OPTIC: CPT | Performed by: INTERNAL MEDICINE

## 2018-01-22 PROCEDURE — 80053 COMPREHEN METABOLIC PANEL: CPT | Performed by: INTERNAL MEDICINE

## 2018-01-22 PROCEDURE — 99223 1ST HOSP IP/OBS HIGH 75: CPT | Performed by: INTERNAL MEDICINE

## 2018-01-22 PROCEDURE — 77030003560 HC NDL HUBR BARD -A

## 2018-01-22 PROCEDURE — 83605 ASSAY OF LACTIC ACID: CPT | Performed by: NURSE PRACTITIONER

## 2018-01-22 PROCEDURE — 87040 BLOOD CULTURE FOR BACTERIA: CPT | Performed by: INTERNAL MEDICINE

## 2018-01-22 PROCEDURE — 65270000015 HC RM PRIVATE ONCOLOGY

## 2018-01-22 PROCEDURE — 74011250636 HC RX REV CODE- 250/636: Performed by: INTERNAL MEDICINE

## 2018-01-22 PROCEDURE — 84145 PROCALCITONIN (PCT): CPT | Performed by: NURSE PRACTITIONER

## 2018-01-22 PROCEDURE — 87070 CULTURE OTHR SPECIMN AEROBIC: CPT | Performed by: NURSE PRACTITIONER

## 2018-01-22 PROCEDURE — 36415 COLL VENOUS BLD VENIPUNCTURE: CPT | Performed by: INTERNAL MEDICINE

## 2018-01-22 RX ORDER — ALBUTEROL SULFATE 0.83 MG/ML
2.5 SOLUTION RESPIRATORY (INHALATION)
Status: DISCONTINUED | OUTPATIENT
Start: 2018-01-22 | End: 2018-01-24 | Stop reason: HOSPADM

## 2018-01-22 RX ORDER — IBUPROFEN 200 MG
200 TABLET ORAL
Status: DISCONTINUED | OUTPATIENT
Start: 2018-01-22 | End: 2018-01-24 | Stop reason: HOSPADM

## 2018-01-22 RX ORDER — FLUCONAZOLE 100 MG/1
150 TABLET ORAL DAILY
Status: DISCONTINUED | OUTPATIENT
Start: 2018-01-23 | End: 2018-01-24 | Stop reason: HOSPADM

## 2018-01-22 RX ORDER — PREGABALIN 100 MG/1
100 CAPSULE ORAL 3 TIMES DAILY
Status: DISCONTINUED | OUTPATIENT
Start: 2018-01-22 | End: 2018-01-24 | Stop reason: HOSPADM

## 2018-01-22 RX ORDER — SODIUM CHLORIDE 9 MG/ML
75 INJECTION, SOLUTION INTRAVENOUS CONTINUOUS
Status: DISCONTINUED | OUTPATIENT
Start: 2018-01-22 | End: 2018-01-24 | Stop reason: HOSPADM

## 2018-01-22 RX ORDER — VANCOMYCIN/0.9 % SOD CHLORIDE 1.5G/250ML
1500 PLASTIC BAG, INJECTION (ML) INTRAVENOUS EVERY 12 HOURS
Status: DISCONTINUED | OUTPATIENT
Start: 2018-01-22 | End: 2018-01-23

## 2018-01-22 RX ORDER — FLUTICASONE PROPIONATE 50 MCG
2 SPRAY, SUSPENSION (ML) NASAL DAILY
Status: DISCONTINUED | OUTPATIENT
Start: 2018-01-23 | End: 2018-01-24 | Stop reason: HOSPADM

## 2018-01-22 RX ORDER — HYDROCODONE BITARTRATE AND ACETAMINOPHEN 5; 325 MG/1; MG/1
1 TABLET ORAL
Status: DISCONTINUED | OUTPATIENT
Start: 2018-01-22 | End: 2018-01-24 | Stop reason: HOSPADM

## 2018-01-22 RX ORDER — ACETAMINOPHEN 325 MG/1
650 TABLET ORAL
Status: DISCONTINUED | OUTPATIENT
Start: 2018-01-22 | End: 2018-01-24 | Stop reason: HOSPADM

## 2018-01-22 RX ORDER — ALLOPURINOL 300 MG/1
300 TABLET ORAL DAILY
Status: DISCONTINUED | OUTPATIENT
Start: 2018-01-23 | End: 2018-01-24 | Stop reason: HOSPADM

## 2018-01-22 RX ORDER — PROMETHAZINE HYDROCHLORIDE 25 MG/1
25 TABLET ORAL
Status: DISCONTINUED | OUTPATIENT
Start: 2018-01-22 | End: 2018-01-24 | Stop reason: HOSPADM

## 2018-01-22 RX ORDER — MORPHINE SULFATE 4 MG/ML
2 INJECTION, SOLUTION INTRAMUSCULAR; INTRAVENOUS
Status: DISCONTINUED | OUTPATIENT
Start: 2018-01-22 | End: 2018-01-24 | Stop reason: HOSPADM

## 2018-01-22 RX ORDER — ONDANSETRON 2 MG/ML
4 INJECTION INTRAMUSCULAR; INTRAVENOUS
Status: DISCONTINUED | OUTPATIENT
Start: 2018-01-22 | End: 2018-01-24 | Stop reason: HOSPADM

## 2018-01-22 RX ORDER — ACYCLOVIR 800 MG/1
400 TABLET ORAL 2 TIMES DAILY
Status: DISCONTINUED | OUTPATIENT
Start: 2018-01-22 | End: 2018-01-24 | Stop reason: HOSPADM

## 2018-01-22 RX ORDER — LANOLIN ALCOHOL/MO/W.PET/CERES
400 CREAM (GRAM) TOPICAL 2 TIMES DAILY
Status: DISCONTINUED | OUTPATIENT
Start: 2018-01-22 | End: 2018-01-24 | Stop reason: HOSPADM

## 2018-01-22 RX ORDER — ENOXAPARIN SODIUM 100 MG/ML
40 INJECTION SUBCUTANEOUS EVERY 12 HOURS
Status: DISCONTINUED | OUTPATIENT
Start: 2018-01-22 | End: 2018-01-24 | Stop reason: HOSPADM

## 2018-01-22 RX ORDER — GUAIFENESIN 600 MG/1
1200 TABLET, EXTENDED RELEASE ORAL EVERY 12 HOURS
Status: DISCONTINUED | OUTPATIENT
Start: 2018-01-22 | End: 2018-01-24 | Stop reason: HOSPADM

## 2018-01-22 RX ADMIN — PREGABALIN 100 MG: 100 CAPSULE ORAL at 22:21

## 2018-01-22 RX ADMIN — SODIUM CHLORIDE 75 ML/HR: 900 INJECTION, SOLUTION INTRAVENOUS at 12:51

## 2018-01-22 RX ADMIN — ONDANSETRON 4 MG: 2 INJECTION INTRAMUSCULAR; INTRAVENOUS at 13:54

## 2018-01-22 RX ADMIN — PIPERACILLIN SODIUM,TAZOBACTAM SODIUM 4.5 G: 4; .5 INJECTION, POWDER, FOR SOLUTION INTRAVENOUS at 12:49

## 2018-01-22 RX ADMIN — ACYCLOVIR 400 MG: 800 TABLET ORAL at 17:29

## 2018-01-22 RX ADMIN — TOBRAMYCIN SULFATE 400 MG: 40 INJECTION, SOLUTION INTRAMUSCULAR; INTRAVENOUS at 14:33

## 2018-01-22 RX ADMIN — Medication 400 MG: at 18:00

## 2018-01-22 RX ADMIN — PIPERACILLIN SODIUM,TAZOBACTAM SODIUM 4.5 G: 4; .5 INJECTION, POWDER, FOR SOLUTION INTRAVENOUS at 22:22

## 2018-01-22 RX ADMIN — HYDROCODONE BITARTRATE AND ACETAMINOPHEN 1 TABLET: 5; 325 TABLET ORAL at 13:51

## 2018-01-22 RX ADMIN — VANCOMYCIN HYDROCHLORIDE 1500 MG: 10 INJECTION, POWDER, LYOPHILIZED, FOR SOLUTION INTRAVENOUS at 15:06

## 2018-01-22 RX ADMIN — ALBUTEROL SULFATE 2.5 MG: 2.5 SOLUTION RESPIRATORY (INHALATION) at 21:00

## 2018-01-22 RX ADMIN — GUAIFENESIN 1200 MG: 600 TABLET, EXTENDED RELEASE ORAL at 12:51

## 2018-01-22 RX ADMIN — ENOXAPARIN SODIUM 40 MG: 40 INJECTION SUBCUTANEOUS at 17:29

## 2018-01-22 RX ADMIN — IBUPROFEN 200 MG: 200 TABLET, FILM COATED ORAL at 17:30

## 2018-01-22 RX ADMIN — GUAIFENESIN 1200 MG: 600 TABLET, EXTENDED RELEASE ORAL at 22:21

## 2018-01-22 RX ADMIN — ACETAMINOPHEN 650 MG: 325 TABLET, FILM COATED ORAL at 12:40

## 2018-01-22 RX ADMIN — PREGABALIN 100 MG: 100 CAPSULE ORAL at 17:30

## 2018-01-22 NOTE — PROGRESS NOTES
Pharmacokinetic Consult to Pharmacist    Heribertoelizabeth Shereen is a 62 y.o. female being treated for HAP. Height: 5' 2\" (157.5 cm)  Weight: 120.5 kg (265 lb 11.2 oz)  Lab Results   Component Value Date/Time    BUN 13 01/22/2018 10:28 AM    Creatinine 0.77 01/22/2018 10:28 AM    WBC 7.5 01/22/2018 10:28 AM    Procalcitonin 0.2 09/06/2017 04:59 PM    Lactic acid 1.3 04/25/2017 08:25 AM    Lactic Acid (POC) 0.9 09/06/2017 05:12 PM      Estimated Creatinine Clearance: 99.6 mL/min (based on Cr of 0.77). CULTURES:  All Micro Results     None            Lab Results   Component Value Date/Time    Vancomycin,trough 18.3 09/12/2017 03:45 AM    Vancomycin, random 28.0 04/28/2017 06:40 PM       Day 1 of vancomycin. Goal trough is 15-20. I will begin 1500mg q12h today with a plan for trough level before the 4th dose of this regimen. Day 1 of tobramycin. I will dose based on calculated adjusted body weight based and plan for a random level after the 3rd dose for dose adjustments. Pharmacist will continue to follow patient. Please call with any questions. Thank you,  Gregory Serrano.  Theresa Blanco, PharmD  Clinical Pharmacist  [phone]

## 2018-01-22 NOTE — IP AVS SNAPSHOT
Umang Almeida 
 
 
 [address] [facility] [address] [facility] 
[phone] Patient: Kira Palafox MRN: [id number] About your hospitalization You were admitted on:  January 22, 2018 You last received care in the:  [facility] [facility] [facility] You were discharged on:  January 24, 2018 Why you were hospitalized Your primary diagnosis was:  Not on File Your diagnoses also included:  Admission For Antineoplastic Chemotherapy, Dlbcl (Diffuse Large B Cell Lymphoma) (Hcc), Left Lower Lobe Pneumonia (Hcc), Pneumonia And Influenza, Hemoptysis Follow-up Information Follow up With Details Comments Contact Info Siddharth Bucio MD  LEFT MESSAGE. [address] [facility] [address] [facility] [address] 
[phone] Your Scheduled Appointments Friday January 26, 2018  7:15 AM EST Injection with NUR7  
ST. [address] ([address] Healthcare ) [address] Christiano Clark [phone] [facility] [address]  
[phone] [address] Tuesday January 30, 2018  7:15 AM EST Sc Replacements with Akin Marques. [address] ([address] Healthcare ) [address] Christiano Clark [phone] [facility] [id number] [phone] [address] Friday February 02, 2018  8:05 AM EST  
LAB with [address]  
[address] [facility] INSURANCE 1 Healthcare James Pratt [address] [place] [address]  
[phone] Friday February 02, 2018  8:30 AM EST Follow Up with MARA JAMIL NP1 [employer] Hematology and Oncology [facility]) C/ Juan Balbuena [address] [facility] [address]  
[phone] Tuesday February 06, 2018  7:15 AM EST Sc Replacements with Bernardino Inman. [address] ([address] Healthcare ) [address] Jose Sheppard [phone] [facility] [address]  
[phone] [address] Friday February 09, 2018  7:15 AM EST Sc Replacements with Tracie Hastings. [address] [facility] ([facility]) [address] Jose Sheppard [phone] [facility] [address]  
[phone] [address] Friday February 16, 2018  9:00 AM EST  
LAB with [address]  
1808 [facility] OUTREACH INSURANCE [facility]) Linnea Pratt [address] [place] [address]  
[phone] Friday February 16, 2018  9:30 AM EST Follow Up with MARA JAMIL NP1 [facility] Hematology and Oncology [place] RUDDY/ Juan Balbuena [address] [facility] [address]  
[phone] Discharge Orders None A check andi indicates which time of day the medication should be taken. My Medications START taking these medications Instructions Each Dose to Equal  
 Morning Noon Evening Bedtime  
 albuterol 90 mcg/actuation inhaler Commonly known as:  PROAIR HFA Your next dose is: Take on as needed schedule Take 2 Puffs by inhalation every four (4) hours as needed for Wheezing. 2 Puff  
    
   
   
   
  
 amoxicillin-clavulanate 875-125 mg per tablet Commonly known as:  AUGMENTIN Your next dose is: This evening Take 1 Tab by mouth two (2) times a day for 10 days. 1 Tab  
    
  
   
   
  
   
  
 azithromycin 500 mg Tab Commonly known as:  Jackie Mitchell Your next dose is:  Tomorrow Morning Take 1 Tab by mouth daily for 10 days. 500 mg  
    
  
   
   
   
  
 guaiFENesin 1,200 mg Ta12 ER tablet Commonly known as:  [employer] Your next dose is: This evening Take 1 Tab by mouth every twelve (12) hours for 14 days. 1200 mg CHANGE how you take these medications Instructions Each Dose to Equal  
 Morning Noon Evening Bedtime  
 allopurinol 300 mg tablet Commonly known as:  Larabar Valdez Start taking on:  1/25/2018 What changed:  how much to take Take 1 Tab by mouth daily. 300 mg CONTINUE taking these medications Instructions Each Dose to Equal  
 Morning Noon Evening Bedtime  
 acyclovir 400 mg tablet Commonly known as:  ZOVIRAX Your next dose is: This evening Take 1 Tab by mouth two (2) times a day. 400 mg  
    
  
   
   
  
   
  
 fluconazole 150 mg tablet Commonly known as:  DIFLUCAN Your next dose is:  Tomorrow Morning Take 150 mg by mouth daily. FDA advises cautious prescribing of oral fluconazole in pregnancy. 150 mg  
    
  
   
   
   
  
 fluticasone 50 mcg/actuation nasal spray Commonly known as:  Mansi Manners Your next dose is:  Tomorrow Morning 2 Sprays by Both Nostrils route daily. 2 Spray  
    
  
   
   
   
  
 levothyroxine 175 mcg tablet Commonly known as:  SYNTHROID Your next dose is:  Tomorrow Morning Take 1 Tab by mouth Daily (before breakfast). 175 mcg  
    
  
   
   
   
  
 lidocaine-prilocaine topical cream  
Commonly known as:  EMLA Your next dose is: Take on as needed schedule Apply  to affected area as needed for Pain. Apply to port site 45-60 minutes prior to lab appt or infusion. magic mouthwash Susp Commonly known as:  Yesica Daryari Your next dose is: Take on as needed schedule Take 10 mL by mouth every four (4) hours as needed. 10 mL  
    
   
   
   
  
 magnesium oxide 400 mg tablet Commonly known as:  MAG-OX Your next dose is: This evening Take 1 Tab by mouth two (2) times a day. 400 mg  
    
  
   
   
  
   
  
 nystatin powder Commonly known as:  MYCOSTATIN Your next dose is: This afternoon Apply  to affected area three (3) times daily. pregabalin 100 mg capsule Commonly known as:  Esme Fernandez Your next dose is: This afternoon Take 1 Cap by mouth three (3) times daily. Max Daily Amount: 300 mg.  
 100 mg  
    
  
   
  
   
  
   
  
 promethazine 25 mg tablet Commonly known as:  PHENERGAN Your next dose is: Take on as needed schedule Take 25 mg by mouth every six (6) hours as needed for Nausea. Unsure of dose 25 mg  
    
   
   
   
  
 ZOFRAN (AS HYDROCHLORIDE) 4 mg tablet Generic drug:  ondansetron hcl Your next dose is: Take on as needed schedule Take 4 mg by mouth every eight (8) hours as needed for Nausea. 4 mg Where to Get Your Medications These medications were sent to [facility], [address]  [address] [place] [address] Phone:  [phone]  
  albuterol 90 mcg/actuation inhaler  
 allopurinol 300 mg tablet  
 amoxicillin-clavulanate 875-125 mg per tablet  
 azithromycin 500 mg Tab  
 guaiFENesin 1,200 mg Ta12 ER tablet Discharge Instructions DISCHARGE SUMMARY from Nurse PATIENT INSTRUCTIONS: 
 
 
F-face looks uneven A-arms unable to move or move unevenly S-speech slurred or non-existent T-time-call 911 as soon as signs and symptoms begin-DO NOT go Back to bed or wait to see if you get better-TIME IS BRAIN. Warning Signs of HEART ATTACK Call 911 if you have these symptoms: 
? Chest discomfort.  Most heart attacks involve discomfort in the center of the chest that lasts more than a few minutes, or that goes away and comes back. It can feel like uncomfortable pressure, squeezing, fullness, or pain. ? Discomfort in other areas of the upper body. Symptoms can include pain or discomfort in one or both arms, the back, neck, jaw, or stomach. ? Shortness of breath with or without chest discomfort. ? Other signs may include breaking out in a cold sweat, nausea, or lightheadedness. Don't wait more than five minutes to call [address]! Fast action can save your life. Calling 911 is almost always the fastest way to get lifesaving treatment. Emergency Medical Services staff can begin treatment when they arrive  up to an hour sooner than if someone gets to the hospital by car. The discharge information has been reviewed with the patient. The patient verbalized understanding. Discharge medications reviewed with the patient and appropriate educational materials and side effects teaching were provided. ___________________________________________________________________________________________________________________________________ Introducing [facility]! Dear Rosa Cody: Thank you for requesting a [employer] account. Our records indicate that you already have an active [employer] account. You can access your account anytime at [email or web address] Did you know that you can access your hospital and ER discharge instructions at any time in [employer] You can also review all of your test results from your hospital stay or ER visit. Additional Information If you have questions, please visit the Frequently Asked Questions section of the [employer] website at [email or web address] Remember, [employer] is NOT to be used for urgent needs. For medical emergencies, dial 911. Now available from your iPhone and Android! Unresulted Labs-Please follow up with your PCP about these lab tests Order Current Status CULTURE, RESPIRATORY/SPUTUM/BRONCH W GRAM STAIN Preliminary result Providers Seen During Your Hospitalization Provider Specialty Primary office phone Braydon Vega MD Hematology and Oncology [phone] Your Primary Care Physician (PCP) Primary Care Physician Office Phone Office Fax Ben Tena [phone] You are allergic to the following No active allergies Recent Documentation Height Weight BMI OB Status Smoking Status 1.575 m 123.9 kg 49.95 kg/m2 Postmenopausal Never Smoker Emergency Contacts Name Discharge Info Relation Home Work Mobile Brennan Rehman  Spouse [3] [phone] Josephine Cerna  Son [22] [phone] [address] [facility]  Other Relative [6] [phone] Ata Ellsworth  Sister [23] [phone] Patient Belongings The following personal items are in your possession at time of discharge: 
  Dental Appliances: None  Visual Aid: None      Home Medications: None   Jewelry: None  Clothing: Pajamas, Undergarments, Footwear, With patient    Other Valuables: None Discharge Instructions Attachments/References PNEUMONIA (ENGLISH) Patient Handouts Pneumonia: Care Instructions Your Care Instructions Pneumonia is an infection of the lungs. Most cases are caused by infections from bacteria or viruses. Pneumonia may be mild or very severe. If it is caused by bacteria, you will be treated with antibiotics. It may take a few weeks to a few months to recover fully from pneumonia, depending on how sick you were and whether your overall health is good. Follow-up care is a key part of your treatment and safety. Be sure to make and go to all appointments, and call your doctor if you are having problems. It's also a good idea to know your test results and keep a list of the medicines you take. How can you care for yourself at home? · Take your antibiotics exactly as directed. Do not stop taking the medicine just because you are feeling better. You need to take the full course of antibiotics. · Take your medicines exactly as prescribed. Call your doctor if you think you are having a problem with your medicine. · Get plenty of rest and sleep. You may feel weak and tired for a while, but your energy level will improve with time. · To prevent dehydration, drink plenty of fluids, enough so that your urine is light yellow or clear like water. Choose water and other caffeine-free clear liquids until you feel better. If you have kidney, heart, or liver disease and have to limit fluids, talk with your doctor before you increase the amount of fluids you drink. · Take care of your cough so you can rest. A cough that brings up mucus from your lungs is common with pneumonia. It is one way your body gets rid of the infection. But if coughing keeps you from resting or causes severe fatigue and chest-wall pain, talk to your doctor. He or she may suggest that you take a medicine to reduce the cough. · Use a vaporizer or humidifier to add moisture to your bedroom. Follow the directions for cleaning the machine. · Do not smoke or allow others to smoke around you. Smoke will make your cough last longer. If you need help quitting, talk to your doctor about stop-smoking programs and medicines. These can increase your chances of quitting for good. · Take an over-the-counter pain medicine, such as acetaminophen (Tylenol), ibuprofen (Advil, Motrin), or naproxen (Aleve). Read and follow all instructions on the label. · Do not take two or more pain medicines at the same time unless the doctor told you to. Many pain medicines have acetaminophen, which is Tylenol. Too much acetaminophen (Tylenol) can be harmful.  
· If you were given a spirometer to measure how well your lungs are working, use it as instructed. This can help your doctor tell how your recovery is going. · To prevent pneumonia in the future, talk to your doctor about getting a flu vaccine (once a year) and a pneumococcal vaccine (one time only for most people). When should you call for help? Call 911 anytime you think you may need emergency care. For example, call if: 
? · You have severe trouble breathing. ?Call your doctor now or seek immediate medical care if: 
? · You cough up dark brown or bloody mucus (sputum). ? · You have new or worse trouble breathing. ? · You are dizzy or lightheaded, or you feel like you may faint. ? Watch closely for changes in your health, and be sure to contact your doctor if: 
? · You have a new or higher fever. ? · You are coughing more deeply or more often. ? · You are not getting better after 2 days (48 hours). ? · You do not get better as expected. Where can you learn more? Go to http://rajat-rachel.info/. Enter [id number] in the search box to learn more about \"Pneumonia: Care Instructions. \" Current as of: May 12, 2017 Content Version: 11.4 © 6165-4230 Healthwise, [employer]. Care instructions adapted under license by [employer] (which disclaims liability or warranty for this information). If you have questions about a medical condition or this instruction, always ask your healthcare professional. [address] any warranty or liability for your use of this information. Please provide this summary of care documentation to your next provider. Signatures-by signing, you are acknowledging that this After Visit Summary has been reviewed with you and you have received a copy. Patient Signature:  ____________________________________________________________ Date:  ____________________________________________________________  
  
Shea Moritz  Provider Signature: ____________________________________________________________ Date:  ____________________________________________________________

## 2018-01-22 NOTE — PROGRESS NOTES
Visit by spiritual volunteer Jeb Cantu, staff [address] [facility]  /   [email or web address]

## 2018-01-22 NOTE — CONSULTS
CONSULT NOTE    Cinthya Keys    1/22/2018    Date of Admission:  1/22/2018    The patient's chart is reviewed and the patient is discussed with the staff. Subjective:     Patient is a 62 y.o.  female seen and evaluated at the request of Dr. Carole Grimes. She has lymphoma that was initially diagnosed in 2107. She was scheduled for admission today for chemo but she presented last week with fever. She had sinusitis just after Elle and was treated with zithromax and improved some but then last week she developed fever, congestion with cough. She tested positive for the flu but was not treated due to timing of diagnosis. She was treated with she believes Cephalexin last and she felt some better by Friday but then over the weekend she has been coughing up bloody secretions and running more of a fever. Her temp today was 103. 1. Her flu swab today was negative. Her CXR shows an infiltrate in the left base. Her WBC is normal. She still has sinus congestion and her sinus drainage is the same as her chest congestion. She has been started on Tobra, Zosyn and Vancomycin today. The blood cultures are pending.        Review of Systems  A comprehensive review of systems was negative except for: Constitutional: positive for fevers  Eyes: positive for none  Ears, nose, mouth, throat, and face: positive for nasal congestion  Respiratory: positive for cough, sputum or hemoptysis  Cardiovascular: positive for some lower exremity edema  Gastrointestinal: positive for decreased appetite  Genitourinary: positive for none  Integument/breast: positive for none  Hematologic/lymphatic: positive for none  Musculoskeletal: positive for none  Neurological: positive for none  Behvioral/Psych: positive for none  Endocrine: positive for none  Allergic/Immunologic: positive for none    Patient Active Problem List   Diagnosis Code    Osteoarthritis of right knee M17.11    Morbid obesity ([facility] E66.01    History of DVT of lower extremity Z86.718    Hypertension I10    Heart murmur R01.1    Osteoarthritis of left knee M17.12    Non Hodgkin's lymphoma (HCC) C85.90    Lymphadenopathy, generalized R59.1    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 Taking?   acyclovir (ZOVIRAX) 400 mg tablet 2018 at Unknown time  No Yes   Sig: Take 1 Tab by mouth two (2) times a day. allopurinol (ZYLOPRIM) 300 mg tablet 2018 at Unknown time  Yes Yes   Sig: Take  by mouth daily. fluconazole (DIFLUCAN) 150 mg tablet 2018 at Unknown time  Yes Yes   Sig: Take 150 mg by mouth daily. FDA advises cautious prescribing of oral fluconazole in pregnancy. fluticasone (FLONASE) 50 mcg/actuation nasal spray 2018 at Unknown time  No Yes   Si Sprays by Both Nostrils route daily. levothyroxine (SYNTHROID) 175 mcg tablet 2018 at Unknown time  No Yes   Sig: Take 1 Tab by mouth Daily (before breakfast). lidocaine-prilocaine (EMLA) topical cream 2017 at Unknown time  No Yes   Sig: Apply  to affected area as needed for Pain. Apply to port site 45-60 minutes prior to lab appt or infusion. magic mouthwash (ALEXSANDER) susp 2017 at Unknown time  No Yes   Sig: Take 10 mL by mouth every four (4) hours as needed. magnesium oxide (MAG-OX) 400 mg tablet 2018 at Unknown time  No Yes   Sig: Take 1 Tab by mouth two (2) times a day. nystatin (MYCOSTATIN) powder 2018 at Unknown time  No Yes   Sig: Apply  to affected area three (3) times daily.    ondansetron hcl (ZOFRAN, AS HYDROCHLORIDE,) 4 mg tablet 12/22/2017 at Unknown time  Yes Yes   Sig: Take 4 mg by mouth every eight (8) hours as needed for Nausea. pregabalin (LYRICA) 100 mg capsule 1/21/2018 at Unknown time  No Yes   Sig: Take 1 Cap by mouth three (3) times daily. Max Daily Amount: 300 mg.   promethazine (PHENERGAN) 25 mg tablet 12/22/2017 at Unknown time  Yes Yes   Sig: Take 25 mg by mouth every six (6) hours as needed for Nausea.  Unsure of dose      Facility-Administered Medications Last Administration Doses Remaining   heparin (porcine) pf 500 Units 1/22/2018 10:28 AM 0          Past Medical History:   Diagnosis Date    Anemia     Basal cell carcinoma     Cardiomegaly     Deep vein thrombosis (DVT) (HCC)     History of stroke     Hypertension     Marginal zone lymphoma ([address] 03/30/2017    Morbid obesity (HCC)     Osteoarthritis     Overactive bladder     Primary hypothyroidism     Radiation therapy complication     Rheumatic fever     S/P total knee replacement using cement 10/3/2011    Shingles     Superficial venous thrombosis of right arm 5/1/2017     Active Ambulatory Problems     Diagnosis Date Noted    Osteoarthritis of right knee 10/03/2011    Morbid obesity ([address] 10/04/2011    History of DVT of lower extremity 10/04/2011    Hypertension 10/04/2011    Heart murmur 10/04/2011    Osteoarthritis of left knee 08/26/2013    Non Hodgkin's lymphoma ([address] 03/30/2017    Lymphadenopathy, generalized 03/30/2017    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 ([address] 04/07/2017    Snores 05/01/2017    Pancytopenia due to antineoplastic chemotherapy ([address] 07/19/2017    Chronic hypotension 07/19/2017    Primary hypothyroidism      Resolved Ambulatory Problems     Diagnosis Date Noted    Ascites 03/30/2017    Sepsis ([address] 04/23/2017    Hypomagnesemia 04/23/2017    Hypotension 04/28/2017    Acute renal failure (ARF) ([address] 04/28/2017    Neutropenic fever ([address] 07/19/2017    Hypothyroid 07/19/2017    Febrile neutropenia ([facility] 09/08/2017     Past Medical History:   Diagnosis Date    Anemia     Basal cell carcinoma     Cardiomegaly     Deep vein thrombosis (DVT) (HCC)     History of stroke     Hypertension     Marginal zone lymphoma ([facility] 03/30/2017    Morbid obesity ([facility]     Osteoarthritis     Overactive bladder     Primary hypothyroidism     Radiation therapy complication     Rheumatic fever     S/P total knee replacement using cement 10/3/2011    Shingles     Superficial venous thrombosis of right arm 5/1/2017     Past Surgical History:   Procedure Laterality Date    HX CHOLECYSTECTOMY  1983    HX KNEE REPLACEMENT Left 2013    Dr. Maximo Gonzales Right 2012    Dr. Jonas Toribio Left 02/10/2011    Dr. Regan Senior Marital status:      Spouse name: N/A    Number of children: N/A    Years of education: N/A     Occupational History    Not on file. Social History Main Topics    Smoking status: Never Smoker    Smokeless tobacco: Never Used    Alcohol use No      Comment: RARE, ONCE/TWICE A YEAR    Drug use: Yes     Special: Prescription    Sexual activity: Not on file     Other Topics Concern    Not on file     Social History Narrative    10/3/11:  PATIENT IS  TO Geena Elder. SHE IS DISABLED.        Family History   Problem Relation Age of Onset    Kidney Disease Father     Other Maternal Grandmother      Vascular Disorder with leg amputation    Liver Disease Sister      non-alcoholic    Celiac Disease Sister     Breast Cancer Cousin 48    Thyroid Disease Sister      hypothyroidism    Malignant Hyperthermia Neg Hx     Pseudocholinesterase Deficiency Neg Hx     Delayed Awakening Neg Hx     Emergence Delirium Neg Hx     Post-op Cognitive Dysfunction Neg Hx     Thyroid Cancer Neg Hx      No Known Allergies    Current Facility-Administered Medications   Medication Dose Route Frequency    acyclovir (ZOVIRAX) tablet 400 mg  400 mg Oral BID    [START ON 1/23/2018] allopurinol (ZYLOPRIM) tablet 300 mg  300 mg Oral DAILY    [START ON 1/23/2018] fluconazole (DIFLUCAN) tablet 150 mg  150 mg Oral DAILY    [START ON 1/23/2018] fluticasone (FLONASE) 50 mcg/actuation nasal spray 2 Spray  2 Spray Both Nostrils DAILY    [START ON 1/23/2018] levothyroxine (SYNTHROID) tablet 175 mcg  175 mcg Oral ACB    magic mouthwash (ALEXSANDER) oral suspension 10 mL  10 mL Oral Q4H PRN    magnesium oxide (MAG-OX) tablet 400 mg  400 mg Oral BID    pregabalin (LYRICA) capsule 100 mg  100 mg Oral TID    promethazine (PHENERGAN) tablet 25 mg  25 mg Oral Q6H PRN    ondansetron (ZOFRAN) injection 4 mg  4 mg IntraVENous Q4H PRN    HYDROcodone-acetaminophen (NORCO) 5-325 mg per tablet 1 Tab  1 Tab Oral Q6H PRN    morphine injection 2 mg  2 mg IntraVENous Q4H PRN    0.9% sodium chloride infusion  75 mL/hr IntraVENous CONTINUOUS    enoxaparin (LOVENOX) injection 40 mg  40 mg SubCUTAneous Q12H    guaiFENesin ER (MUCINEX) tablet 1,200 mg  1,200 mg Oral Q12H    acetaminophen (TYLENOL) tablet 650 mg  650 mg Oral Q4H PRN    piperacillin-tazobactam (ZOSYN) 4.5 g in 0.9% sodium chloride (MBP/ADV) 100 mL  4.5 g IntraVENous Q8H    vancomycin (VANCOCIN) 1500 mg in  ml infusion  1,500 mg IntraVENous Q12H    tobramycin (NEBCIN) 400 mg in 0.9% sodium chloride 100 mL IVPB  400 mg IntraVENous Q24H    ibuprofen (MOTRIN) tablet 200 mg  200 mg Oral Q4H PRN    albuterol (PROVENTIL VENTOLIN) nebulizer solution 2.5 mg  2.5 mg Nebulization QID RT         Objective:     Vitals:    01/22/18 1239 01/22/18 1241 01/22/18 1529 01/22/18 1636   BP:  125/70 116/60    Pulse:  (!) 111 96    Resp:   20    Temp: (!) 104 °F (40 °C) (!) 103.9 °F (39.9 °C) (!) 101.7 °F (38.7 °C) (!) 101.3 °F (38.5 °C)   SpO2:  94% 94%    Weight:       Height:           PHYSICAL EXAM Constitutional:  the patient is well developed and in no acute distress  HEENT:  Sclera clear, pupils equal, oral mucosa moist  Lungs: rhonchi bilaterally. Coughing with exam  Cardiovascular:  RRR without M,G,R  Abd/GI: soft and non-tender; with positive bowel sounds. Ext: warm without cyanosis. There is 1+ lower leg edema. Skin:  no jaundice or rashes, no wounds   Neuro: no gross neuro deficits. Alert and oriented  Musculoskeletal: moves all four extremities. No deformities. Psychiatric: calm. Does not appear anxious or depressed    Chest X-ray:        Recent Labs      01/22/18   1028   WBC  7.5   HGB  10.2*   HCT  30.5*   PLT  111*     Recent Labs      01/22/18   1028   NA  139   K  3.9   CL  102   GLU  125*   CO2  29   BUN  13   CREA  0.77   CA  8.9   ALB  3.5   SGOT  36       Assessment:  (Medical Decision Making)     Hospital Problems  Date Reviewed: 1/19/2018          Codes Class Noted POA    Admission for antineoplastic chemotherapy ICD-10-CM: Z51.11  ICD-9-CM: V58.11  1/22/2018 Yes        DLBCL (diffuse large B cell lymphoma) ([facility] ICD-10-CM: C83.30  ICD-9-CM: 202.80  1/22/2018 Yes        Left lower lobe pneumonia [facility]) ICD-10-CM: J18.1  ICD-9-CM: 041  1/22/2018 Yes              Plan:  (Medical Decision Making)   1. Triple antibiotics ordered by oncology. Will stop tobra for now - continue zosyn and vanc - check blood and sputum cultures. Monitor fever  2. Add albuterol for cough  3. Chemo on hold for now    Francisco Torres NP      More than 50% of time documented was spent face-to-face contact with the patient and in the care of the patient on the floor/unit where the patient is located  The patient has been seen and examined by me personally, the chart,labs, and radiographic studies have been reviewed. Chest: CTA occasional LLL crackle  Extremities: trace edema    I agree with the above assessment and plan.   She has not been hospitalized in past 3 months- Vanc and zosyn may be \"overkill\" - stop tobra, post flu PNA may be caused by staph (often is)- await cultures and taper ABX accordingly.     Miguel A Swift MD.

## 2018-01-23 PROBLEM — R04.2 HEMOPTYSIS: Status: ACTIVE | Noted: 2018-01-23

## 2018-01-23 LAB
ALBUMIN SERPL-MCNC: 2.8 G/DL (ref 3.5–5)
ALBUMIN/GLOB SERPL: 0.9 {RATIO} (ref 1.2–3.5)
ALP SERPL-CCNC: 156 U/L (ref 50–136)
ALT SERPL-CCNC: 73 U/L (ref 12–65)
ANION GAP SERPL CALC-SCNC: 7 MMOL/L (ref 7–16)
AST SERPL-CCNC: 28 U/L (ref 15–37)
BASOPHILS # BLD: 0 K/UL (ref 0–0.2)
BASOPHILS NFR BLD: 0 % (ref 0–2)
BILIRUB SERPL-MCNC: 0.6 MG/DL (ref 0.2–1.1)
BUN SERPL-MCNC: 14 MG/DL (ref 6–23)
CALCIUM SERPL-MCNC: 8.6 MG/DL (ref 8.3–10.4)
CHLORIDE SERPL-SCNC: 104 MMOL/L (ref 98–107)
CO2 SERPL-SCNC: 30 MMOL/L (ref 21–32)
CREAT SERPL-MCNC: 0.65 MG/DL (ref 0.6–1)
DIFFERENTIAL METHOD BLD: ABNORMAL
EOSINOPHIL # BLD: 0 K/UL (ref 0–0.8)
EOSINOPHIL NFR BLD: 1 % (ref 0.5–7.8)
ERYTHROCYTE [DISTWIDTH] IN BLOOD BY AUTOMATED COUNT: 14 % (ref 11.9–14.6)
GLOBULIN SER CALC-MCNC: 3.1 G/DL (ref 2.3–3.5)
GLUCOSE SERPL-MCNC: 113 MG/DL (ref 65–100)
HCT VFR BLD AUTO: 24.7 % (ref 35.8–46.3)
HGB BLD-MCNC: 8.1 G/DL (ref 11.7–15.4)
IMM GRANULOCYTES # BLD: 0 K/UL (ref 0–0.5)
IMM GRANULOCYTES NFR BLD AUTO: 1 % (ref 0–5)
LYMPHOCYTES # BLD: 0.2 K/UL (ref 0.5–4.6)
LYMPHOCYTES NFR BLD: 4 % (ref 13–44)
MAGNESIUM SERPL-MCNC: 2.2 MG/DL (ref 1.8–2.4)
MCH RBC QN AUTO: 30.7 PG (ref 26.1–32.9)
MCHC RBC AUTO-ENTMCNC: 32.8 G/DL (ref 31.4–35)
MCV RBC AUTO: 93.6 FL (ref 79.6–97.8)
MONOCYTES # BLD: 0.4 K/UL (ref 0.1–1.3)
MONOCYTES NFR BLD: 10 % (ref 4–12)
NEUTS SEG # BLD: 3.4 K/UL (ref 1.7–8.2)
NEUTS SEG NFR BLD: 84 % (ref 43–78)
PLATELET # BLD AUTO: 85 K/UL (ref 150–450)
PMV BLD AUTO: 11.2 FL (ref 10.8–14.1)
POTASSIUM SERPL-SCNC: 3.9 MMOL/L (ref 3.5–5.1)
PROT SERPL-MCNC: 5.9 G/DL (ref 6.3–8.2)
RBC # BLD AUTO: 2.64 M/UL (ref 4.05–5.25)
SODIUM SERPL-SCNC: 141 MMOL/L (ref 136–145)
TOBRAMYCIN SERPL-MCNC: 0.8 UG/ML
WBC # BLD AUTO: 4.1 K/UL (ref 4.3–11.1)

## 2018-01-23 PROCEDURE — 80053 COMPREHEN METABOLIC PANEL: CPT | Performed by: NURSE PRACTITIONER

## 2018-01-23 PROCEDURE — 94760 N-INVAS EAR/PLS OXIMETRY 1: CPT

## 2018-01-23 PROCEDURE — 85025 COMPLETE CBC W/AUTO DIFF WBC: CPT | Performed by: NURSE PRACTITIONER

## 2018-01-23 PROCEDURE — 65270000015 HC RM PRIVATE ONCOLOGY

## 2018-01-23 PROCEDURE — 74011000258 HC RX REV CODE- 258: Performed by: INTERNAL MEDICINE

## 2018-01-23 PROCEDURE — 83735 ASSAY OF MAGNESIUM: CPT | Performed by: NURSE PRACTITIONER

## 2018-01-23 PROCEDURE — 74011250637 HC RX REV CODE- 250/637: Performed by: NURSE PRACTITIONER

## 2018-01-23 PROCEDURE — 94640 AIRWAY INHALATION TREATMENT: CPT

## 2018-01-23 PROCEDURE — 80200 ASSAY OF TOBRAMYCIN: CPT | Performed by: INTERNAL MEDICINE

## 2018-01-23 PROCEDURE — 99233 SBSQ HOSP IP/OBS HIGH 50: CPT | Performed by: INTERNAL MEDICINE

## 2018-01-23 PROCEDURE — 74011250636 HC RX REV CODE- 250/636: Performed by: NURSE PRACTITIONER

## 2018-01-23 PROCEDURE — 74011000250 HC RX REV CODE- 250: Performed by: NURSE PRACTITIONER

## 2018-01-23 PROCEDURE — 74011250636 HC RX REV CODE- 250/636: Performed by: INTERNAL MEDICINE

## 2018-01-23 PROCEDURE — 99232 SBSQ HOSP IP/OBS MODERATE 35: CPT | Performed by: INTERNAL MEDICINE

## 2018-01-23 PROCEDURE — 74011250637 HC RX REV CODE- 250/637: Performed by: INTERNAL MEDICINE

## 2018-01-23 RX ADMIN — ACETAMINOPHEN 650 MG: 325 TABLET, FILM COATED ORAL at 16:33

## 2018-01-23 RX ADMIN — FLUCONAZOLE 150 MG: 100 TABLET ORAL at 09:18

## 2018-01-23 RX ADMIN — ACYCLOVIR 400 MG: 800 TABLET ORAL at 09:18

## 2018-01-23 RX ADMIN — ALBUTEROL SULFATE 2.5 MG: 2.5 SOLUTION RESPIRATORY (INHALATION) at 20:50

## 2018-01-23 RX ADMIN — VANCOMYCIN HYDROCHLORIDE 1500 MG: 10 INJECTION, POWDER, LYOPHILIZED, FOR SOLUTION INTRAVENOUS at 02:30

## 2018-01-23 RX ADMIN — WATER 1 G: 1 INJECTION INTRAMUSCULAR; INTRAVENOUS; SUBCUTANEOUS at 18:07

## 2018-01-23 RX ADMIN — Medication 400 MG: at 09:20

## 2018-01-23 RX ADMIN — ENOXAPARIN SODIUM 40 MG: 40 INJECTION SUBCUTANEOUS at 09:17

## 2018-01-23 RX ADMIN — GUAIFENESIN 1200 MG: 600 TABLET, EXTENDED RELEASE ORAL at 21:18

## 2018-01-23 RX ADMIN — VANCOMYCIN HYDROCHLORIDE 1500 MG: 10 INJECTION, POWDER, LYOPHILIZED, FOR SOLUTION INTRAVENOUS at 14:33

## 2018-01-23 RX ADMIN — PIPERACILLIN SODIUM,TAZOBACTAM SODIUM 4.5 G: 4; .5 INJECTION, POWDER, FOR SOLUTION INTRAVENOUS at 05:47

## 2018-01-23 RX ADMIN — PREGABALIN 100 MG: 100 CAPSULE ORAL at 16:33

## 2018-01-23 RX ADMIN — LEVOTHYROXINE SODIUM 175 MCG: 50 TABLET ORAL at 09:20

## 2018-01-23 RX ADMIN — ALBUTEROL SULFATE 2.5 MG: 2.5 SOLUTION RESPIRATORY (INHALATION) at 15:08

## 2018-01-23 RX ADMIN — SODIUM CHLORIDE 75 ML/HR: 900 INJECTION, SOLUTION INTRAVENOUS at 05:50

## 2018-01-23 RX ADMIN — GUAIFENESIN 1200 MG: 600 TABLET, EXTENDED RELEASE ORAL at 09:19

## 2018-01-23 RX ADMIN — ALBUTEROL SULFATE 2.5 MG: 2.5 SOLUTION RESPIRATORY (INHALATION) at 09:09

## 2018-01-23 RX ADMIN — ACYCLOVIR 400 MG: 800 TABLET ORAL at 16:33

## 2018-01-23 RX ADMIN — PIPERACILLIN SODIUM,TAZOBACTAM SODIUM 4.5 G: 4; .5 INJECTION, POWDER, FOR SOLUTION INTRAVENOUS at 14:29

## 2018-01-23 RX ADMIN — ALLOPURINOL 300 MG: 300 TABLET ORAL at 09:19

## 2018-01-23 RX ADMIN — TOBRAMYCIN SULFATE 400 MG: 40 INJECTION, SOLUTION INTRAMUSCULAR; INTRAVENOUS at 12:58

## 2018-01-23 RX ADMIN — PREGABALIN 100 MG: 100 CAPSULE ORAL at 09:00

## 2018-01-23 RX ADMIN — PREGABALIN 100 MG: 100 CAPSULE ORAL at 21:19

## 2018-01-23 RX ADMIN — ALBUTEROL SULFATE 2.5 MG: 2.5 SOLUTION RESPIRATORY (INHALATION) at 12:18

## 2018-01-23 RX ADMIN — Medication 400 MG: at 16:33

## 2018-01-23 RX ADMIN — ENOXAPARIN SODIUM 40 MG: 40 INJECTION SUBCUTANEOUS at 21:19

## 2018-01-23 RX ADMIN — SODIUM CHLORIDE 75 ML/HR: 900 INJECTION, SOLUTION INTRAVENOUS at 23:47

## 2018-01-23 NOTE — PROGRESS NOTES
[facility] Hematology & Oncology        Inpatient Hematology / Oncology Progress Note      Admission Date: 2018 12:15 PM  Reason for Admission/Hospital Course: Large B Cell Lymphoma  Chemo  Admission for antineoplastic chemotherapy  DLBCL (diffuse large B cell lymphoma) (HCC)    24 Hour Events:    Afebrile overnight  Breathing stable, feeling better      ROS:  Constitutional: Positive for fatigue; negative for fever, chills, weakness, malaise   CV: Negative for chest pain, palpitations, edema. Respiratory: Positive for dyspnea, cough   GI: Negative for nausea, abdominal pain, diarrhea. 10 point review of systems is otherwise negative with the exception of the elements mentioned above in the HPI. No Known Allergies    OBJECTIVE:  Patient Vitals for the past 8 hrs:   BP Temp Pulse Resp SpO2   18 1129 110/69 99.5 °F (37.5 °C) 78 18 96 %   18 0909 - - - - 98 %   18 0439 128/65 97.8 °F (36.6 °C) 63 18 96 %     Temp (24hrs), Av °F (38.3 °C), Min:97.8 °F (36.6 °C), Max:104 °F (40 °C)         Physical Exam:  Constitutional: Well developed, well nourished female in no acute distress, sitting comfortably in the hospital bed. HEENT: Normocephalic and atraumatic. Oropharynx is clear, mucous membranes are moist.  Neck supple    Lymph node   Deferred   Skin Warm and dry. No bruising and no rash noted. No erythema. No pallor. Respiratory Lungs coarse to left lower lobe, normal air exchange without accessory muscle use. CVS Normal rate, regular rhythm and normal S1 and S2. No murmurs, gallops, or rubs. Abdomen Soft, nontender and nondistended, normoactive bowel sounds. No palpable mass. No hepatosplenomegaly. Neuro Grossly nonfocal with no obvious sensory or motor deficits. MSK Normal range of motion in general.  No edema and no tenderness. Psych Appropriate mood and affect.         Labs:    Recent Labs      18   0434  18   1028   WBC  4.1*  7.5   RBC  2.64* 3.22*   HGB  8.1*  10.2*   HCT  24.7*  30.5*   MCV  93.6  94.7   MCH  30.7  31.7   MCHC  32.8  33.4   RDW  14.0  13.3   PLT  85*  111*   GRANS  84*  86*   LYMPH  4*   --    MONOS  10  7   EOS  1   --    BASOS  0   --    IG  1   --    DF  AUTOMATED  MANUAL   ANEU  3.4  7.0   ABL  0.2*   --    ABM  0.4  0.5   HOMERO  0.0   --    ABB  0.0   --    AIG  0.0   --       Recent Labs      01/23/18   0434  01/22/18   1028   NA  141  139   K  3.9  3.9   CL  104  102   CO2  30  29   AGAP  7  8   GLU  113*  125*   BUN  14  13   CREA  0.65  0.77   GFRAA  >60  >60   GFRNA  >60  >60   CA  8.6  8.9   SGOT  28  36   AP  156*  180*   TP  5.9*  6.8   ALB  2.8*  3.5   GLOB  3.1  3.3   AGRAT  0.9*  1.1*   MG  2.2   --      Imaging:    CXR 1/22  Findings: A right internal jugular Mediport catheter is unchanged. The heart and  mediastinal silhouette are normal in size and configuration. There has been the  interval development of left lower lobe airspace opacities. There are no  significant pleural effusions. The pulmonary vascularity is within normal  limits.  The visualized osseous structures are unremarkable.     Impression: Left lower lobe airspace opacity.         ASSESSMENT:    Problem List  Date Reviewed: 1/19/2018          Codes Class Noted    Admission for antineoplastic chemotherapy ICD-10-CM: Z51.11  ICD-9-CM: V58.11  1/22/2018        DLBCL (diffuse large B cell lymphoma) ([facility] ICD-10-CM: C83.30  ICD-9-CM: 202.80  1/22/2018        Left lower lobe pneumonia ([facility] ICD-10-CM: J18.1  ICD-9-CM: 567  1/22/2018        Pneumonia and influenza ICD-10-CM: J11.00  ICD-9-CM: 487.0  1/22/2018        Primary hypothyroidism (Chronic) ICD-10-CM: E03.9  ICD-9-CM: 244.9  Unknown        Pancytopenia due to antineoplastic chemotherapy ([facility] (Chronic) ICD-10-CM: D61.810, T45.1X5A  ICD-9-CM: 284.11, E933.1  7/19/2017        Chronic hypotension (Chronic) ICD-10-CM: V43.53  ICD-9-CM: 458.1  7/19/2017        Snores (Chronic) ICD-10-CM: R06.83  ICD-9-CM: 786.09  5/1/2017        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 [facility]) (Chronic) ICD-10-CM: C85.88  ICD-9-CM: 200.38  4/7/2017        Non Hodgkin's lymphoma ([facility] (Chronic) ICD-10-CM: C85.90  ICD-9-CM: 202.80  3/30/2017        Lymphadenopathy, generalized (Chronic) ICD-10-CM: R59.1  ICD-9-CM: 785.6  3/30/2017        Osteoarthritis of left knee (Chronic) ICD-10-CM: M17.12  ICD-9-CM: 715.96  8/26/2013        Morbid obesity ([facility] (Chronic) ICD-10-CM: E66.01  ICD-9-CM: 278.01  10/4/2011        History of DVT of lower extremity (Chronic) ICD-10-CM: E18.841  ICD-9-CM: V12.51  10/4/2011    Overview Signed 10/4/2011  1:44 AM by Axel Gaona NP     X 2               Hypertension (Chronic) ICD-10-CM: I10  ICD-9-CM: 401.9  10/4/2011    Overview Signed 10/4/2011  1:44 AM by Axel Gaona NP     PRE OP             Heart murmur (Chronic) ICD-10-CM: R01.1  ICD-9-CM: 785.2  10/4/2011        Osteoarthritis of right knee (Chronic) ICD-10-CM: M17.11  ICD-9-CM: 715.96  10/3/2011            Ms. Dago Jerez is a 62 y.o. female admitted on 1/22/18 with a primary diagnosis of Pneumonia and DLBCL. She was seen last week in the office for fevers, chills, arthralgia/myalgias, head congestion, nausea and vomiting. She tested positive for Flu A, but was out of the 48 hours window to start Tamiflu. She states feeling better until Friday afternoon. She began feeling achy again and with mild fevers. She notes declining over the weekend, especially last night. She now has a course cough with thick, blood tinged phlegm. She continues with head congestion and headaches. Her GI symptoms are improved and she is taking her antiemetics. She states being able to drink water, but has not been eating much. She states running fever over the weekend, on arrival to the office today it was 103.1. She has blood cultures obtained along with CXR that revealed a LLL opacity.   She was planned to start C1 RICE today, therefore she will be admitted to treat her PNA and we will plan to start treatment when she is recovered. PLAN:  -DLBCL  She was planned to start C1 RICE today, on hold until PNA resolved, CSF-no malignant cells  1/23 Will hold treatment until pneumonia resolves - likely treat at end of next week     -LLL Pneumonia  S/P Flu A last week. Blood cultures pending. Start Zosyn, Tobra, Vanc, incentive spirometry, mucinex, consult Pulmonary to rule out any other needs  1/23 Appreciate pulm help - now on zosyn, vanc only. BC NGTD. No fevers overnight     -Nausea/Vomiting  Continue with Antiemetics prn     -Supportive Care  -Lovenox for DVT prophylaxis   -Continue home meds                 [facility]  [address] [place] [address]  Office : [phone]  Fax : [phone]         Attending Addendum:  I personally evaluated the patient with Dmitri Alert, N.P.,  and agree with the assessment, findings and plan as documented. Appears stable, continue ABX and follow-up cultures.               Pat Keenan MD  [facility]  [address] [address]  Office : [phone]  Fax : [phone]

## 2018-01-23 NOTE — PROGRESS NOTES
Christin Montesinos  Admission Date: 1/22/2018             Daily Progress Note: 1/23/2018    The patient's chart is reviewed and the patient is discussed with the staff. She has lymphoma that was initially diagnosed in 2017. She was scheduled for admission today for chemo but she presented last week with fever. She had sinusitis just after [place] and was treated with zithromax and improved some but then last week she developed fever, congestion with cough. She tested positive for the flu but was not treated due to timing of diagnosis. She was treated with she believes Cephalexin last and she felt some better by Friday but then over the weekend she has been coughing up bloody secretions and running more of a fever. Her temp on admission was 103. 1. Her flu swab here was negative. Her CXR shows an infiltrate in the left base. Her WBC is normal. She still has sinus congestion and her sinus drainage is the same as her chest congestion. She was started on Tobra, Zosyn and Vancomycin by oncology but we adjusted for Rx of CAP. The blood  And sputum cultures are pending. Subjective:     Feels better today. Temperature down. Still coughing but easier to clear secretions.      Current Facility-Administered Medications   Medication Dose Route Frequency    [START ON 1/24/2018] Vancomycin trough reminder   Other ONCE    tobramycin (NEBCIN) 400 mg in 0.9% sodium chloride 100 mL IVPB  400 mg IntraVENous Q24H    acyclovir (ZOVIRAX) tablet 400 mg  400 mg Oral BID    allopurinol (ZYLOPRIM) tablet 300 mg  300 mg Oral DAILY    fluconazole (DIFLUCAN) tablet 150 mg  150 mg Oral DAILY    fluticasone (FLONASE) 50 mcg/actuation nasal spray 2 Spray  2 Spray Both Nostrils DAILY    levothyroxine (SYNTHROID) tablet 175 mcg  175 mcg Oral ACB    magic mouthwash (ALEXSANDER) oral suspension 10 mL  10 mL Oral Q4H PRN    magnesium oxide (MAG-OX) tablet 400 mg  400 mg Oral BID    pregabalin (LYRICA) capsule 100 mg  100 mg Oral TID    promethazine (PHENERGAN) tablet 25 mg  25 mg Oral Q6H PRN    ondansetron (ZOFRAN) injection 4 mg  4 mg IntraVENous Q4H PRN    HYDROcodone-acetaminophen (NORCO) 5-325 mg per tablet 1 Tab  1 Tab Oral Q6H PRN    morphine injection 2 mg  2 mg IntraVENous Q4H PRN    0.9% sodium chloride infusion  75 mL/hr IntraVENous CONTINUOUS    enoxaparin (LOVENOX) injection 40 mg  40 mg SubCUTAneous Q12H    guaiFENesin ER (MUCINEX) tablet 1,200 mg  1,200 mg Oral Q12H    acetaminophen (TYLENOL) tablet 650 mg  650 mg Oral Q4H PRN    piperacillin-tazobactam (ZOSYN) 4.5 g in 0.9% sodium chloride (MBP/ADV) 100 mL  4.5 g IntraVENous Q8H    vancomycin (VANCOCIN) 1500 mg in  ml infusion  1,500 mg IntraVENous Q12H    ibuprofen (MOTRIN) tablet 200 mg  200 mg Oral Q4H PRN    albuterol (PROVENTIL VENTOLIN) nebulizer solution 2.5 mg  2.5 mg Nebulization QID RT         Objective:     Vitals:    01/23/18 1129 01/23/18 1218 01/23/18 1508 01/23/18 1531   BP: 110/69   122/76   Pulse: 78   86   Resp: 18   18   Temp: 99.5 °F (37.5 °C)   99.7 °F (37.6 °C)   SpO2: 96% 97% 98% 97%   Weight:       Height:         Intake and Output:   01/21 1901 - 01/23 0700  In: 6907 [P.O.:1240; I.V.:2163]  Out: 1150 [Urine:1150]  01/23 0701 - 01/23 1900  In: 240 [P.O.:240]  Out: 400 [Urine:400]    Physical Exam:   Constitutional:  the patient is well developed and in no acute distress  HEENT:  Sclera clear, pupils equal, oral mucosa moist  Lungs:  Harsh sounding cough with exam. Is able to take deeper breaths today. On room air  Cardiovascular:  RRR without M,G,R  Abd/GI: soft and non-tender; with positive bowel sounds. Ext: warm without cyanosis. There is 1+ lower leg edema. Musculoskeletal: moves all four extremities with equal strength  Skin:  no jaundice or rashes, no wounds   Neuro: no gross neuro deficits   Musculoskeletal: can ambulate. No deformity  Psychiatric: Calm.      ROS:  Appetite better, less dyspnea  Lines: port    CHEST XRAY:  None today    LAB  Recent Labs      01/23/18   0434  01/22/18   1028   WBC  4.1*  7.5   HGB  8.1*  10.2*   HCT  24.7*  30.5*   PLT  85*  111*     Recent Labs      01/23/18   0434  01/22/18   1028   NA  141  139   K  3.9  3.9   CL  104  102   CO2  30  29   GLU  113*  125*   BUN  14  13   CREA  0.65  0.77   MG  2.2   --    ALB  2.8*  3.5   SGOT  28  36           Assessment:  (Medical Decision Making)     Hospital Problems  Date Reviewed: 1/19/2018          Codes Class Noted POA    Admission for antineoplastic chemotherapy ICD-10-CM: Z51.11  ICD-9-CM: V58.11  1/22/2018 Yes    On hold due to pneumonia    DLBCL (diffuse large B cell lymphoma) ([facility] ICD-10-CM: C83.30  ICD-9-CM: 202.80  1/22/2018 Yes        Left lower lobe pneumonia ([facility] ICD-10-CM: J18.1  ICD-9-CM: 801  1/22/2018 Yes    Blood and sputum cultures pending. Nl WBC, fever down    Pneumonia and influenza ICD-10-CM: J11.00  ICD-9-CM: 487.0  1/22/2018 Yes    Study here negative. Positive for B last week - no Rx          Plan:  (Medical Decision Making)   1. Day 2 zosyn and vanc. Cultures pending. Fever down and she feels better. Will change her to zithromax and rocephin for CAP and she does not have HCAP. ? Viral with recent flu  2. Chemo on hold due to pneumonia    Evan Perkins NP    More than 50% of time documented was spent face-to-face contact with the patient and in the care of the patient on the floor/unit where the patient is located. Lungs:  Rhonchi/crackles at left base. Heart:  RRR with no Murmur/Rubs/Gallops    Additional Comments:    Patient improving from CAP. Ok to narrow scope of abx, though if she worsens would broaden again. Hemoptysis improving. No bronch needed. I have spoken with and examined the patient. I agree with the above assessment and plan as documented.     Moe Olivares MD

## 2018-01-23 NOTE — PROGRESS NOTES
END OF SHIFT NOTE:    Intake/Output      Voiding: YES  Catheter: NO  Drain:              Stool:  0 occurrences. Emesis:  0 occurrences. VITAL SIGNS  Patient Vitals for the past 12 hrs:   Temp Pulse Resp BP SpO2   01/22/18 1636 (!) 101.3 °F (38.5 °C) - - - -   01/22/18 1529 (!) 101.7 °F (38.7 °C) 96 20 116/60 94 %   01/22/18 1241 (!) 103.9 °F (39.9 °C) (!) 111 - 125/70 94 %   01/22/18 1239 (!) 104 °F (40 °C) - - - -       Pain Assessment  Pain 1  Pain Scale 1: Numeric (0 - 10) (01/22/18 1350)  Pain Intensity 1: 5 (01/22/18 1350)  Patient Stated Pain Goal: 0 (01/22/18 1339)  Pain Location 1: Head (01/22/18 1350)  Pain Description 1: Aching (01/22/18 1350)  Pain Intervention(s) 1: Medication (see MAR) (01/22/18 1350)    Ambulating  Yes    Additional Information: Abx given, tylenol and ibuprofen given for fever, pt states she is feeling better    Shift report will be given to oncoming nurse at the bedside.     Milton Nichols RN

## 2018-01-24 VITALS
OXYGEN SATURATION: 98 % | WEIGHT: 273.1 LBS | DIASTOLIC BLOOD PRESSURE: 83 MMHG | BODY MASS INDEX: 50.26 KG/M2 | HEART RATE: 79 BPM | TEMPERATURE: 99.5 F | SYSTOLIC BLOOD PRESSURE: 127 MMHG | HEIGHT: 62 IN | RESPIRATION RATE: 18 BRPM

## 2018-01-24 LAB
ALBUMIN SERPL-MCNC: 3 G/DL (ref 3.5–5)
ALBUMIN/GLOB SERPL: 0.9 {RATIO} (ref 1.2–3.5)
ALP SERPL-CCNC: 169 U/L (ref 50–136)
ALT SERPL-CCNC: 73 U/L (ref 12–65)
ANION GAP SERPL CALC-SCNC: 9 MMOL/L (ref 7–16)
AST SERPL-CCNC: 31 U/L (ref 15–37)
BASOPHILS # BLD: 0 K/UL (ref 0–0.2)
BASOPHILS NFR BLD: 0 % (ref 0–2)
BILIRUB SERPL-MCNC: 0.3 MG/DL (ref 0.2–1.1)
BUN SERPL-MCNC: 13 MG/DL (ref 6–23)
CALCIUM SERPL-MCNC: 8.8 MG/DL (ref 8.3–10.4)
CHLORIDE SERPL-SCNC: 106 MMOL/L (ref 98–107)
CO2 SERPL-SCNC: 29 MMOL/L (ref 21–32)
CREAT SERPL-MCNC: 0.75 MG/DL (ref 0.6–1)
DIFFERENTIAL METHOD BLD: ABNORMAL
EOSINOPHIL # BLD: 0.1 K/UL (ref 0–0.8)
EOSINOPHIL NFR BLD: 2 % (ref 0.5–7.8)
ERYTHROCYTE [DISTWIDTH] IN BLOOD BY AUTOMATED COUNT: 13.7 % (ref 11.9–14.6)
GLOBULIN SER CALC-MCNC: 3.4 G/DL (ref 2.3–3.5)
GLUCOSE SERPL-MCNC: 125 MG/DL (ref 65–100)
HCT VFR BLD AUTO: 26.3 % (ref 35.8–46.3)
HGB BLD-MCNC: 8.6 G/DL (ref 11.7–15.4)
IMM GRANULOCYTES # BLD: 0 K/UL (ref 0–0.5)
IMM GRANULOCYTES NFR BLD AUTO: 1 % (ref 0–5)
LYMPHOCYTES # BLD: 0.3 K/UL (ref 0.5–4.6)
LYMPHOCYTES NFR BLD: 10 % (ref 13–44)
MAGNESIUM SERPL-MCNC: 2.1 MG/DL (ref 1.8–2.4)
MCH RBC QN AUTO: 30.7 PG (ref 26.1–32.9)
MCHC RBC AUTO-ENTMCNC: 32.7 G/DL (ref 31.4–35)
MCV RBC AUTO: 93.9 FL (ref 79.6–97.8)
MONOCYTES # BLD: 0.3 K/UL (ref 0.1–1.3)
MONOCYTES NFR BLD: 9 % (ref 4–12)
NEUTS SEG # BLD: 2.3 K/UL (ref 1.7–8.2)
NEUTS SEG NFR BLD: 78 % (ref 43–78)
PLATELET # BLD AUTO: 107 K/UL (ref 150–450)
PMV BLD AUTO: 11.4 FL (ref 10.8–14.1)
POTASSIUM SERPL-SCNC: 4 MMOL/L (ref 3.5–5.1)
PROT SERPL-MCNC: 6.4 G/DL (ref 6.3–8.2)
RBC # BLD AUTO: 2.8 M/UL (ref 4.05–5.25)
SODIUM SERPL-SCNC: 144 MMOL/L (ref 136–145)
WBC # BLD AUTO: 2.9 K/UL (ref 4.3–11.1)

## 2018-01-24 PROCEDURE — 74011000250 HC RX REV CODE- 250: Performed by: NURSE PRACTITIONER

## 2018-01-24 PROCEDURE — 74011250636 HC RX REV CODE- 250/636: Performed by: NURSE PRACTITIONER

## 2018-01-24 PROCEDURE — 77030020120 HC VLV RESP PEP HI -B

## 2018-01-24 PROCEDURE — 94760 N-INVAS EAR/PLS OXIMETRY 1: CPT

## 2018-01-24 PROCEDURE — 99232 SBSQ HOSP IP/OBS MODERATE 35: CPT | Performed by: INTERNAL MEDICINE

## 2018-01-24 PROCEDURE — 83735 ASSAY OF MAGNESIUM: CPT | Performed by: INTERNAL MEDICINE

## 2018-01-24 PROCEDURE — 74011250637 HC RX REV CODE- 250/637: Performed by: NURSE PRACTITIONER

## 2018-01-24 PROCEDURE — 74011250637 HC RX REV CODE- 250/637: Performed by: INTERNAL MEDICINE

## 2018-01-24 PROCEDURE — 85025 COMPLETE CBC W/AUTO DIFF WBC: CPT | Performed by: INTERNAL MEDICINE

## 2018-01-24 PROCEDURE — 99239 HOSP IP/OBS DSCHRG MGMT >30: CPT | Performed by: INTERNAL MEDICINE

## 2018-01-24 PROCEDURE — 80053 COMPREHEN METABOLIC PANEL: CPT | Performed by: INTERNAL MEDICINE

## 2018-01-24 PROCEDURE — 94640 AIRWAY INHALATION TREATMENT: CPT

## 2018-01-24 RX ORDER — HEPARIN 100 UNIT/ML
300 SYRINGE INTRAVENOUS ONCE
Status: DISCONTINUED | OUTPATIENT
Start: 2018-01-24 | End: 2018-01-24 | Stop reason: HOSPADM

## 2018-01-24 RX ORDER — AZITHROMYCIN 500 MG/1
500 TABLET, FILM COATED ORAL DAILY
Qty: 10 TAB | Refills: 0 | Status: SHIPPED | OUTPATIENT
Start: 2018-01-24 | End: 2018-02-03

## 2018-01-24 RX ORDER — BENZONATATE 100 MG/1
100 CAPSULE ORAL
Status: DISCONTINUED | OUTPATIENT
Start: 2018-01-24 | End: 2018-01-24 | Stop reason: HOSPADM

## 2018-01-24 RX ORDER — ALBUTEROL SULFATE 90 UG/1
2 AEROSOL, METERED RESPIRATORY (INHALATION)
Qty: 1 INHALER | Refills: 2 | Status: SHIPPED | OUTPATIENT
Start: 2018-01-24 | End: 2018-06-25 | Stop reason: ALTCHOICE

## 2018-01-24 RX ORDER — ALLOPURINOL 300 MG/1
300 TABLET ORAL DAILY
Qty: 30 TAB | Refills: 1 | Status: SHIPPED | OUTPATIENT
Start: 2018-01-25 | End: 2018-03-23 | Stop reason: SDUPTHER

## 2018-01-24 RX ORDER — AMOXICILLIN AND CLAVULANATE POTASSIUM 875; 125 MG/1; MG/1
1 TABLET, FILM COATED ORAL 2 TIMES DAILY
Qty: 20 TAB | Refills: 0 | Status: SHIPPED | OUTPATIENT
Start: 2018-01-24 | End: 2018-02-03

## 2018-01-24 RX ADMIN — ALLOPURINOL 300 MG: 300 TABLET ORAL at 09:00

## 2018-01-24 RX ADMIN — ENOXAPARIN SODIUM 40 MG: 40 INJECTION SUBCUTANEOUS at 09:00

## 2018-01-24 RX ADMIN — ALBUTEROL SULFATE 2.5 MG: 2.5 SOLUTION RESPIRATORY (INHALATION) at 09:05

## 2018-01-24 RX ADMIN — FLUTICASONE PROPIONATE 2 SPRAY: 50 SPRAY, METERED NASAL at 09:00

## 2018-01-24 RX ADMIN — GUAIFENESIN 1200 MG: 600 TABLET, EXTENDED RELEASE ORAL at 09:00

## 2018-01-24 RX ADMIN — AZITHROMYCIN MONOHYDRATE 500 MG: 500 INJECTION, POWDER, LYOPHILIZED, FOR SOLUTION INTRAVENOUS at 09:00

## 2018-01-24 RX ADMIN — LEVOTHYROXINE SODIUM 175 MCG: 50 TABLET ORAL at 07:30

## 2018-01-24 RX ADMIN — PREGABALIN 100 MG: 100 CAPSULE ORAL at 09:00

## 2018-01-24 RX ADMIN — SODIUM CHLORIDE 75 ML/HR: 900 INJECTION, SOLUTION INTRAVENOUS at 09:14

## 2018-01-24 RX ADMIN — BENZONATATE 100 MG: 100 CAPSULE ORAL at 05:37

## 2018-01-24 RX ADMIN — ACYCLOVIR 400 MG: 800 TABLET ORAL at 09:00

## 2018-01-24 RX ADMIN — FLUCONAZOLE 150 MG: 100 TABLET ORAL at 09:00

## 2018-01-24 RX ADMIN — Medication 400 MG: at 09:00

## 2018-01-24 NOTE — DISCHARGE SUMMARY
[facility] Hematology & Oncology: Inpatient Hematology / Oncology Discharge Summary Note    Patient ID:  Leobardo Jalloh  [id number]  89 y.o.  1960    Admit Date: 1/22/2018    Discharge Date: 1/24/2018    Admission Diagnoses: Large B Cell Lymphoma  Chemo  Admission for antineoplastic chemotherapy  DLBCL (diffuse large B cell lymphoma) ([facility]    Discharge Diagnoses:  Principal Diagnosis: <principal problem not specified>  Active Problems:    Admission for antineoplastic chemotherapy (1/22/2018)      DLBCL (diffuse large B cell lymphoma) ([facility] (1/22/2018)      Left lower lobe pneumonia ([facility] (1/22/2018)      Pneumonia and influenza (1/22/2018)      Hemoptysis (1/23/2018)    Hospital Course:  Ms. Dmitri Rojo is a 62 y.o. female admitted on 1/22/18 with a primary diagnosis of Pneumonia and DLBCL. She was planning to be admitted for chemo on 1/22/18 for cycle 1 RICE. However, she was positive for the flu last week and begun to feel better but then developed fevers again over the weekend. She had a CXR done prior to admission that showed pneumonia. She was admitted and started on broad spectrum antibiotics. Pulmonary was consulted. She is now afebrile and clinically feeling much better. She will be discharged home today with an additional 10 days of azithromycin and augmentin. She will f/u with Dr. Stefany Elliott on Wednesday of next week with repeat chest xray and to determine plan for chemo admit. She is aware to call with any recurrent fevers, chills or other new dyspnea, cough or worrisome symptoms.       Consults:  IP CONSULT TO PULMONOLOGY    Pertinent Diagnostic Studies:   Labs:    Recent Labs      01/24/18   0418  01/23/18   0434  01/22/18   1028   WBC  2.9*  4.1*  7.5   HGB  8.6*  8.1*  10.2*   PLT  107*  85*  111*   ANEU  2.3  3.4  7.0    Recent Labs      01/24/18   0418  01/23/18   0434  01/22/18   1028   NA  144  141  139   K  4.0  3.9  3.9   CL  106  104  102   CO2  29  30  29   GLU  125*  113*  125*   BUN  13  14 13   CREA  0.75  0.65  0.77   CA  8.8  8.6  8.9   SGOT  31  28  36   AP  169*  156*  180*   TP  6.4  5.9*  6.8   ALB  3.0*  2.8*  3.5   MG  2.1  2.2   --        Imaging:  CXR 1/22  Findings: A right internal jugular Mediport catheter is unchanged. The heart and  mediastinal silhouette are normal in size and configuration. There has been the  interval development of left lower lobe airspace opacities. There are no  significant pleural effusions. The pulmonary vascularity is within normal  limits. The visualized osseous structures are unremarkable.     Impression: Left lower lobe airspace opacity. Discharge Medication List as of 1/24/2018 11:30 AM      START taking these medications    Details   guaiFENesin ER (MUCINEX) 1,200 mg Ta12 ER tablet Take 1 Tab by mouth every twelve (12) hours for 14 days. , Normal, Disp-28 Tab, R-0      azithromycin (ZITHROMAX) 500 mg tab Take 1 Tab by mouth daily for 10 days. , Normal, Disp-10 Tab, R-0      amoxicillin-clavulanate (AUGMENTIN) 875-125 mg per tablet Take 1 Tab by mouth two (2) times a day for 10 days. , Normal, Disp-20 Tab, R-0      albuterol (PROAIR HFA) 90 mcg/actuation inhaler Take 2 Puffs by inhalation every four (4) hours as needed for Wheezing., Normal, Disp-1 Inhaler, R-2         CONTINUE these medications which have CHANGED    Details   allopurinol (ZYLOPRIM) 300 mg tablet Take 1 Tab by mouth daily. , Normal, Disp-30 Tab, R-1         CONTINUE these medications which have NOT CHANGED    Details   ondansetron hcl (ZOFRAN, AS HYDROCHLORIDE,) 4 mg tablet Take 4 mg by mouth every eight (8) hours as needed for Nausea., Historical Med      fluconazole (DIFLUCAN) 150 mg tablet Take 150 mg by mouth daily. FDA advises cautious prescribing of oral fluconazole in pregnancy. , Historical Med      acyclovir (ZOVIRAX) 400 mg tablet Take 1 Tab by mouth two (2) times a day., Normal, Disp-60 Tab, R-3      levothyroxine (SYNTHROID) 175 mcg tablet Take 1 Tab by mouth Daily (before breakfast). , No Print, Disp-30 Tab, R-5      pregabalin (LYRICA) 100 mg capsule Take 1 Cap by mouth three (3) times daily. Max Daily Amount: 300 mg., Print, Disp-90 Cap, R-3      magnesium oxide (MAG-OX) 400 mg tablet Take 1 Tab by mouth two (2) times a day., Normal, Disp-60 Tab, R-1      magic mouthwash (ALEXSANDER) susp Take 10 mL by mouth every four (4) hours as needed. , Phone In, Disp-2 Bottle, R-2      nystatin (MYCOSTATIN) powder Apply  to affected area three (3) times daily. , Normal, Disp-60 g, R-2      fluticasone (FLONASE) 50 mcg/actuation nasal spray 2 Sprays by Both Nostrils route daily. , Normal, Disp-1 Bottle, R-1      lidocaine-prilocaine (EMLA) topical cream Apply  to affected area as needed for Pain. Apply to port site 45-60 minutes prior to lab appt or infusion. , Normal, Disp-30 g, R-0      promethazine (PHENERGAN) 25 mg tablet Take 25 mg by mouth every six (6) hours as needed for Nausea. Unsure of dose, Historical Med           OBJECTIVE:  Patient Vitals for the past 8 hrs:   BP Temp Pulse Resp SpO2   18 0905 - - - - 98 %   18 0735 127/83 99.5 °F (37.5 °C) 79 18 97 %     Temp (24hrs), Av.3 °F (37.4 °C), Min:98.7 °F (37.1 °C), Max:99.9 °F (37.7 °C)     0701 -  1900  In: 500 [P.O.:500]  Out: 300 [Urine:300]    Physical Exam:  Constitutional: Well developed, well nourished female in no acute distress, sitting up in recliner in room   HEENT: Normocephalic and atraumatic. Oropharynx is clear, mucous membranes are moist.  Neck supple    Lymph node   Deferred   Skin Warm and dry. No bruising and no rash noted. No erythema. No pallor. Respiratory Lungs are clear to auscultation bilaterally without wheezes, rales or rhonchi, normal air exchange without accessory muscle use. CVS Normal rate, regular rhythm and normal S1 and S2. No murmurs, gallops, or rubs. Abdomen Soft, nontender and nondistended, normoactive bowel sounds. No palpable mass. No hepatosplenomegaly.    Neuro Grossly nonfocal with no obvious sensory or motor deficits. MSK Normal range of motion in general.  No edema and no tenderness. Psych Appropriate mood and affect. ASSESSMENT:    Active Problems:    Admission for antineoplastic chemotherapy (1/22/2018)      DLBCL (diffuse large B cell lymphoma) ([facility] (1/22/2018)      Left lower lobe pneumonia ([facility] (1/22/2018)      Pneumonia and influenza (1/22/2018)      Hemoptysis (1/23/2018)        DISPOSITION:  Follow-up Appointments   Procedures    FOLLOW UP VISIT Appointment in: One Week F/u with Feliciajoe Farreller on Wed 1/31 with labs from vein prior: CBC, CMP, Mag     F/u with Felicia Panda on Wed 1/31 with labs from vein prior: CBC, CMP, Mag     Standing Status:   Standing     Number of Occurrences:   1     Order Specific Question:   Appointment in     Answer: One Week     Over 30 minutes was spent in discharge planning and coordination of care. Claribel Matamoros NP  [facility] Hematology & On[facility]  [address] [place] [address]  Office : [phone]  Fax : [phone]       Attending Addendum:  I personally evaluated the patient with Erlin Berkowitz NHORTENSIA,  and agree with the assessment, findings and plan as documented. Appears stable, she will go home on PO ABX.               Bea Warner MD  [address] [place] [address]  [id number] [place] [address]  Office : [phone]  Fax : [phone]

## 2018-01-24 NOTE — DISCHARGE INSTRUCTIONS
DISCHARGE SUMMARY from Nurse    PATIENT INSTRUCTIONS:    After general anesthesia or intravenous sedation, for 24 hours or while taking prescription Narcotics:  · Limit your activities  · Do not drive and operate hazardous machinery  · Do not make important personal or business decisions  · Do  not drink alcoholic beverages  · If you have not urinated within 8 hours after discharge, please contact your surgeon on call. Report the following to your surgeon:  · Excessive pain, swelling, redness or odor of or around the surgical area  · Temperature over 100.5  · Nausea and vomiting lasting longer than 4 hours or if unable to take medications  · Any signs of decreased circulation or nerve impairment to extremity: change in color, persistent  numbness, tingling, coldness or increase pain  · Any questions    What to do at Home:  Recommended activity: Activity as tolerated,     If you experience any of the following symptoms fever, chills, new or unrelieved pain, persistent nausea or vomiting, or any other worrisome symptoms, please follow up with Dr. Poncho Powell. *  Please give a list of your current medications to your Primary Care Provider. *  Please update this list whenever your medications are discontinued, doses are      changed, or new medications (including over-the-counter products) are added. *  Please carry medication information at all times in case of emergency situations. These are general instructions for a healthy lifestyle:    No smoking/ No tobacco products/ Avoid exposure to second hand smoke  Surgeon General's Warning:  Quitting smoking now greatly reduces serious risk to your health.     Obesity, smoking, and sedentary lifestyle greatly increases your risk for illness    A healthy diet, regular physical exercise & weight monitoring are important for maintaining a healthy lifestyle    You may be retaining fluid if you have a history of heart failure or if you experience any of the following symptoms:  Weight gain of 3 pounds or more overnight or 5 pounds in a week, increased swelling in our hands or feet or shortness of breath while lying flat in bed. Please call your doctor as soon as you notice any of these symptoms; do not wait until your next office visit. Recognize signs and symptoms of STROKE:    F-face looks uneven    A-arms unable to move or move unevenly    S-speech slurred or non-existent    T-time-call 911 as soon as signs and symptoms begin-DO NOT go       Back to bed or wait to see if you get better-TIME IS BRAIN. Warning Signs of HEART ATTACK     Call 911 if you have these symptoms:   Chest discomfort. Most heart attacks involve discomfort in the center of the chest that lasts more than a few minutes, or that goes away and comes back. It can feel like uncomfortable pressure, squeezing, fullness, or pain.  Discomfort in other areas of the upper body. Symptoms can include pain or discomfort in one or both arms, the back, neck, jaw, or stomach.  Shortness of breath with or without chest discomfort.  Other signs may include breaking out in a cold sweat, nausea, or lightheadedness. Don't wait more than five minutes to call 911 - MINUTES MATTER! Fast action can save your life. Calling 911 is almost always the fastest way to get lifesaving treatment. Emergency Medical Services staff can begin treatment when they arrive -- up to an hour sooner than if someone gets to the hospital by car. The discharge information has been reviewed with the patient. The patient verbalized understanding. Discharge medications reviewed with the patient and appropriate educational materials and side effects teaching were provided.   ___________________________________________________________________________________________________________________________________

## 2018-01-24 NOTE — PROGRESS NOTES
Port de-accessed. Pt awaiting ride. D/C instructions and medications given and reviewed by D/C nurse. Will continue to monitor until D/C.

## 2018-01-24 NOTE — PROGRESS NOTES
Edyta Khan  Admission Date: 1/22/2018             Daily Progress Note: 1/24/2018    The patient's chart is reviewed and the patient is discussed with the staff. She has lymphoma that was initially diagnosed in 2017. She was scheduled for admission today for chemo but she presented last week with fever. She had sinusitis just after [place] and was treated with zithromax and improved some but then last week she developed fever, congestion with cough. She tested positive for the flu but was not treated due to timing of diagnosis. She was treated with she believes Cephalexin last and she felt some better by Friday but then over the weekend she has been coughing up bloody secretions and running more of a fever. Her temp on admission was 103. 1. Her flu swab here was negative. Her CXR shows an infiltrate in the left base. Her WBC is normal. She still has sinus congestion and her sinus drainage is the same as her chest congestion. She was started on Tobra, Zosyn and Vancomycin by oncology but we adjusted for Rx of CAP. The blood cultures are pending, sputum with normal respiratory diandra. Subjective:   Feels better today. Temperature down T max 99.9 F.        Current Facility-Administered Medications   Medication Dose Route Frequency    benzonatate (TESSALON) capsule 100 mg  100 mg Oral TID PRN    azithromycin (ZITHROMAX) 500 mg in 0.9% sodium chloride 250 mL IVPB  500 mg IntraVENous DAILY    cefTRIAXone (ROCEPHIN) 1 g in sterile water (preservative free) 10 mL IV syringe  1 g IntraVENous Q24H    acyclovir (ZOVIRAX) tablet 400 mg  400 mg Oral BID    allopurinol (ZYLOPRIM) tablet 300 mg  300 mg Oral DAILY    fluconazole (DIFLUCAN) tablet 150 mg  150 mg Oral DAILY    fluticasone (FLONASE) 50 mcg/actuation nasal spray 2 Spray  2 Spray Both Nostrils DAILY    levothyroxine (SYNTHROID) tablet 175 mcg  175 mcg Oral ACB    magic mouthwash (ALEXSANDER) oral suspension 10 mL  10 mL Oral Q4H PRN  magnesium oxide (MAG-OX) tablet 400 mg  400 mg Oral BID    pregabalin (LYRICA) capsule 100 mg  100 mg Oral TID    promethazine (PHENERGAN) tablet 25 mg  25 mg Oral Q6H PRN    ondansetron (ZOFRAN) injection 4 mg  4 mg IntraVENous Q4H PRN    HYDROcodone-acetaminophen (NORCO) 5-325 mg per tablet 1 Tab  1 Tab Oral Q6H PRN    morphine injection 2 mg  2 mg IntraVENous Q4H PRN    0.9% sodium chloride infusion  75 mL/hr IntraVENous CONTINUOUS    enoxaparin (LOVENOX) injection 40 mg  40 mg SubCUTAneous Q12H    guaiFENesin ER (MUCINEX) tablet 1,200 mg  1,200 mg Oral Q12H    acetaminophen (TYLENOL) tablet 650 mg  650 mg Oral Q4H PRN    ibuprofen (MOTRIN) tablet 200 mg  200 mg Oral Q4H PRN    albuterol (PROVENTIL VENTOLIN) nebulizer solution 2.5 mg  2.5 mg Nebulization QID RT         Objective:     Vitals:    01/23/18 2305 01/24/18 0430 01/24/18 0735 01/24/18 0905   BP:  136/85 127/83    Pulse:  86 79    Resp:  18 18    Temp:  98.9 °F (37.2 °C) 99.5 °F (37.5 °C)    SpO2:  100% 97% 98%   Weight: 273 lb 1.6 oz (123.9 kg)      Height:         Intake and Output:   01/22 1901 - 01/24 0700  In: 0780 [P.O.:1020; I.V.:2840]  Out: 2750 [Urine:2750]  01/24 0701 - 01/24 1900  In: 500 [P.O.:500]  Out: 300 [Urine:300]    Physical Exam:   Constitutional:  the patient is well developed and in no acute distress  HEENT:  Sclera clear, pupils equal, oral mucosa moist  Lungs:  Harsh sounding cough with exam. Is able to take deeper breaths today. On room air  Cardiovascular:  RRR without M,G,R  Abd/GI: soft and non-tender; with positive bowel sounds. Ext: warm without cyanosis. There is 1+ lower leg edema. Musculoskeletal: moves all four extremities with equal strength  Skin:  no jaundice or rashes, no wounds   Neuro: no gross neuro deficits   Musculoskeletal: can ambulate. No deformity  Psychiatric: Calm.      ROS:  Appetite better, less dyspnea  Lines: port    CHEST XRAY:  None today    LAB  Recent Labs      01/24/18   9787 01/23/18   0434  01/22/18   1028   WBC  2.9*  4.1*  7.5   HGB  8.6*  8.1*  10.2*   HCT  26.3*  24.7*  30.5*   PLT  107*  85*  111*     Recent Labs      01/24/18   0418  01/23/18   0434  01/22/18   1028   NA  144  141  139   K  4.0  3.9  3.9   CL  106  104  102   CO2  29  30  29   GLU  125*  113*  125*   BUN  13  14  13   CREA  0.75  0.65  0.77   MG  2.1  2.2   --    ALB  3.0*  2.8*  3.5   SGOT  31  28  36           Assessment:  (Medical Decision Making)     Hospital Problems  Date Reviewed: 1/19/2018          Codes Class Noted POA    Admission for antineoplastic chemotherapy ICD-10-CM: Z51.11  ICD-9-CM: V58.11  1/22/2018 Yes    On hold due to pneumonia    DLBCL (diffuse large B cell lymphoma) ([facility] ICD-10-CM: C83.30  ICD-9-CM: 202.80  1/22/2018 Yes        [facility] ICD-10-CM: J18.1  ICD-9-CM: 774  1/22/2018 Yes    Blood and sputum cultures pending. Nl WBC, fever down    Pneumonia and influenza ICD-10-CM: J11.00  ICD-9-CM: 487.0  1/22/2018 Yes    Study here negative. Positive for B last week - no Rx          Plan:  (Medical Decision Making)   1. S/P 2days  Of zosyn and vanc-switched to Rocephin and zithromax yesterday. Cultures pending. Fever down and she feels better. She does not have HCAP. ? Viral with recent flu  Would give 24 more hours of IV ABX and provided sputum cultures are negative(thus far sputum shows normal diandra) and she continues to improve would switch to PO zithromax at 500 mg daily and Rx a total of 14 days since she failed OP Rx. Nothing to add from pulmonary stand point if patient worse- feel free to re-consult us. Zia Galeas MD    More than 50% of time documented was spent face-to-face contact with the patient and in the care of the patient on the floor/unit where the patient is located.

## 2018-01-24 NOTE — PROGRESS NOTES
Discharge instructions and prescriptions provided and explained to patient, patient voiced understanding. Medication side effect sheet reviewed with pt. No home meds or valuables to return. Opportunity for questions provided. pt waiting on ride and Instructed to call once ready to leave the floor. Primary RN to [facility].

## 2018-01-24 NOTE — PROGRESS NOTES
Problem: Breathing Pattern - Ineffective  Goal: *Absence of hypoxia  Outcome: Resolved/Met Date Met: 01/24/18  No longer requiring oxygen.

## 2018-01-25 LAB
BACTERIA SPEC CULT: NORMAL
GRAM STN SPEC: NORMAL
SERVICE CMNT-IMP: NORMAL

## 2018-01-25 NOTE — PROGRESS NOTES
Manatravonchaitanya Castillo  : [facility] [address] [facility]  [place], [place], [address] [facility]  [phone]   [phone]          OUTPATIENT PHYSICAL THERAPY:Discontinuation Summary 2018    ICD-10: Treatment Diagnosis: I 89.0 lymphedema not elsewhere classified  M 62.81 muscle weakness generalized  Precautions/Allergies:   Review of patient's allergies indicates no known allergies. Fall Risk Score: 1 (? 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) [C85.88]    DATE OF ONSET: 3/30/17  REFERRING PHYSICIAN: Niru Colin MD  RETURN PHYSICIAN APPOINTMENT: 17     INITIAL ASSESSMENT:  Ms. Sanjuanita Carbajal presents for a lymphedema assessment due to edema of bilateral lower extremities. She has pitting edema in the bilateral lower extremities. She has previously had short stretch bandaging and MLD following knee surgery 3 years ago. She would like to try this again even though this is not orthopedic post surgical swelling. She was diagnosed with lymphoma in March of this year. She recently completed her second chemo of 6 planned. She will have a PET scan 17. Progress with chemo has been limited due to lab values being abnormal.  She is uncomfortable due to the edema. She will have a paracentesis 17. We will initiate edema management and progress slowly dependent on her tolerance to compression and her response to treatment. We have now initiated conditioning and strengthening. She is independent with home management of edema. 17:  The patient's edema has resolved. She has now been focusing on conditioning and strengthening. Orthopedic issues have limited her progress. She is to see her orthopedist tomorrow and her Oncologist 17. We will adjust her program accordingly.   She will benefit from therapeutic exercises in order to address decreased strength and overall conditioning. 1/25/18:  Discontinue physical therapy. The patient has been unable to attend due to recent illness. She may benefit from oncology rehab once she is medically stable. PROBLEM LIST (Impacting functional limitations):  1. Decreased Strength  2. Increased Pain  3. Decreased Activity Tolerance  4. Increased Fatigue  5. Increased Shortness of Breath  6. Decreased Flexibility/Joint Mobility  7. Decreased [place] with Home Exercise Program INTERVENTIONS PLANNED:  1. Home Exercise Program (HEP)  2. Range of Motion (ROM)  3. Therapeutic Exercise/Strengthening   TREATMENT PLAN:  Effective Dates: 11/7/17 TO 1/31/18. Frequency/Duration: discontinue physical therapy. GOALS: (Goals have been discussed and agreed upon with patient.)  Short-Term Functional Goals: Time Frame: 4 weeks  1. The patient will have knowledge of signs and symptoms of lymphedema and how to manage within 4 weeks. Met   2. The patient will tolerate short stretch bandaging of bilateral lower extremities for reduction of girth within 4 weeks. Met   3. The patient and caregiver will be independent with compression bandaging within 4 weeks. Met   4. The patient will improve her 6 minute walk by 60 feet within 4 weeks. Met   5. The patient will report a fatigue of 3 or less within 4 weeks. Met   Discharge Goals: Time Frame: 8 weeks  1. The patient will have a girth decrease of at least 5 cm in the calf within 8 weeks. 2. The patient will be fit with static compression garments within 8 weeks. Met   3. The patient and caregiver will be independent with edema management within 8 weeks. Met  4. The patient will transition to Healthy Self within 8 weeks. 5.   Rehabilitation Potential For Stated Goals: [address] [place] [address] David Barrientos's therapy, I certify that the treatment plan above will be carried out by a therapist or under their direction.   Thank you for this referral,  Hal Florentino PT     Referring Physician Signature: Jessica Ballard MD              Date                    The information in this section was collected on 5/24/17 (except where otherwise noted). HISTORY:   History of Present Injury/Illness (Reason for Referral):  Lymphoma, decreased activity tolerance, decreased strength  Past Medical History/Comorbidities:   Ms. 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 She also has no past medical history of Aneurysm ([address] Arrhythmia; Asthma; Autoimmune disease ([address] CAD (coronary artery disease); Chronic kidney disease; Coagulation defects; COPD; Diabetes ([address] Difficult intubation; GERD (gastroesophageal reflux disease); Heart failure ([address] Liver disease; Malignant hyperthermia due to anesthesia; Nausea & vomiting; Pseudocholinesterase deficiency; Psychiatric disorder; PUD (peptic ulcer disease); Seizures ([address] Stroke [facility]); or Unspecified sleep apnea. Ms. Pauline Perez  has a past surgical history that includes pr anesth,achilles tendon surg (Left, 02/10/2011); hx cholecystectomy (1983); hx vascular access; hx knee replacement (Left, 2013); and hx knee replacement (Right, 2012).    Past Medical History:   Diagnosis Date    Anemia     Basal cell carcinoma     Cardiomegaly     Deep vein thrombosis (DVT) (HCC)     History of stroke     Hypertension     Marginal zone lymphoma ([address] 03/30/2017    Morbid obesity (HCC)     Osteoarthritis     Overactive bladder     Primary hypothyroidism     Radiation therapy complication     Rheumatic fever     S/P total knee replacement using cement 10/3/2011    Shingles     Superficial venous thrombosis of right arm 5/1/2017     Past Surgical History:   Procedure Laterality Date    HX CHOLECYSTECTOMY  1983    HX KNEE REPLACEMENT Left 2013    Dr. Rebecca Escalante Right 2012    Dr. Emery Crane Left 02/10/2011    Dr. Claudio Isidro:     lives with family, 2 flights of stairs  Prior Level of Function/Work/Activity:    Dominant Side:         RIGHT  Current Medications:       Current Outpatient Prescriptions:     allopurinol (ZYLOPRIM) 300 mg tablet, Take 1 Tab by mouth daily. , Disp: 30 Tab, Rfl: 1    guaiFENesin ER (MUCINEX) 1,200 mg Ta12 ER tablet, Take 1 Tab by mouth every twelve (12) hours for 14 days. , Disp: 28 Tab, Rfl: 0    azithromycin (ZITHROMAX) 500 mg tab, Take 1 Tab by mouth daily for 10 days. , Disp: 10 Tab, Rfl: 0    amoxicillin-clavulanate (AUGMENTIN) 875-125 mg per tablet, Take 1 Tab by mouth two (2) times a day for 10 days. , Disp: 20 Tab, Rfl: 0    albuterol (PROAIR HFA) 90 mcg/actuation inhaler, Take 2 Puffs by inhalation every four (4) hours as needed for Wheezing., Disp: 1 Inhaler, Rfl: 2    ondansetron hcl (ZOFRAN, AS HYDROCHLORIDE,) 4 mg tablet, Take 4 mg by mouth every eight (8) hours as needed for Nausea., Disp: , Rfl:     fluconazole (DIFLUCAN) 150 mg tablet, Take 150 mg by mouth daily. FDA advises cautious prescribing of oral fluconazole in pregnancy. , Disp: , Rfl:     acyclovir (ZOVIRAX) 400 mg tablet, Take 1 Tab by mouth two (2) times a day., Disp: 60 Tab, Rfl: 3    levothyroxine (SYNTHROID) 175 mcg tablet, Take 1 Tab by mouth Daily (before breakfast). , Disp: 30 Tab, Rfl: 5    pregabalin (LYRICA) 100 mg capsule, Take 1 Cap by mouth three (3) times daily. Max Daily Amount: 300 mg., Disp: 90 Cap, Rfl: 3    magnesium oxide (MAG-OX) 400 mg tablet, Take 1 Tab by mouth two (2) times a day. , Disp: 60 Tab, Rfl: 1    magic mouthwash (ALEXSANDER) susp, Take 10 mL by mouth every four (4) hours as needed. , Disp: 2 Bottle, Rfl: 2    nystatin (MYCOSTATIN) powder, Apply  to affected area three (3) times daily. , Disp: 60 g, Rfl: 2    fluticasone (FLONASE) 50 mcg/actuation nasal spray, 2 Sprays by Both Nostrils route daily. , Disp: 1 Bottle, Rfl: 1    lidocaine-prilocaine (EMLA) topical cream, Apply  to affected area as needed for Pain. Apply to port site 45-60 minutes prior to lab appt or infusion. , Disp: 30 g, Rfl: 0    promethazine (PHENERGAN) 25 mg tablet, Take 25 mg by mouth every six (6) hours as needed for Nausea. Unsure of dose, Disp: , Rfl:   No current facility-administered medications for this encounter. Date Last Reviewed:  11/7/17   Number of Personal Factors/Comorbidities that affect the Plan of Care: 3+: HIGH COMPLEXITY   EXAMINATION:   Palpation:           dry skin, loose skin  ROM:          Within functional limits. She previously has had bilateral knee replacements and an Achilles tendon repair on the left  Strength:          Grossly 4+/5 x 4 extremities  Functional Mobility:         Gait/Ambulation:  Independent         Transfers: independent        Bed Mobility:  independent  Skin Integrity:          Intact, but dry. Edema/Girth:  pitting    Left Right    Initial Most Recent Initial Most Recent   Upper  Extremity           Lower  Extremity               Body Structures Involved:  1. Muscles Body Functions Affected:  1. Sensory/Pain Activities and Participation Affected:  1. None   Number of elements (examined above) that affect the Plan of Care: 1-2: LOW COMPLEXITY   CLINICAL PRESENTATION:   Presentation: Evolving clinical presentation with unstable and unpredictable characteristics: HIGH COMPLEXITY   CLINICAL DECISION MAKING:   Outcome Measure: Tool Used: NCCN Distress Thermometer   Score:  Initial:   Most Recent: X    Interpretation of Score:  If greater than or equal to 8, then PHQ-9 Depression Scale Score   and JOON-7 Anxiety Scale Score  . Tool Used: ECOG Performance Survey Score  Score:  Initial: 2 Most Recent:  1    Interpretation of Score:   0 Fully active, able to carry on all pre-disease performance without restriction   1 Restricted in physically strenuous activity but ambulatory and able to carry out work of a light or sedentary nature, e.g., light house work, office work   2 Ambulatory and capable of all selfcare but unable to carry out any work activities. Up and about more than 50% of waking hours   3 Capable of only limited selfcare, confined to bed or chair more than 50% of waking hours   4 Completely disabled. Cannot carry on any selfcare. Totally confined to bed or chair   5 Dead    Tool Used: 6-MINUTE WALK TEST  Score:  Initial: 1110 feet Most Recent: 1610 feet (Date: 12/19/17 )   Interpretation of Score: Normal range varies but is approximately 4863-8144 Feet      Distance walked: 1610 feet     Baseline End of Test   Heart Rate 87 126   Dyspnea (Luther Scale)     Fatigue (Luther Scale) 4 4   SpO2 98 96   /69 175/76     Score 2133 [id number] 3704-1998 [phone] 426-16 15-0   Modifier CH CI CJ CK CL CM CN       Tool Used: Timed Up and Go (TUG)  Score:  Initial: 8 seconds Most Recent: 6 seconds (Date: 12/19/17 )   Interpretation of Score: The test measures, in seconds, the time taken by an individual to stand up from a standard arm chair (seat height 46 cm [18 in], arm height 65 cm [25.6 in]), walk a distance of 3 meters (118 in, approx 10 ft), turn, walk back to the chair and sit down. If the individual takes longer than 14 seconds to complete TUG, this indicates risk for falls. Score 7 7.5-10.5 11-14 14.5-17.5 18-21 21.5-24.5 25+   Modifier CH CI CJ CK CL CM CN         Tool Used: Lymphedema Life Impact Scale   Score:  Initial:  54 Most Recent:33  (Date: 9/26/17 )   Interpretation of Score:  The Lymphedema Life Impact Scale (LLIS) is a validated instrument that measures the physical, functional, and psychosocial concerns pertinent to patients with extremity lymphedema. The Scale's questionnaire is administered to patients to gauge impairments, activity limitations, and participation restrictions resulting from their lymphedema. Score 0 1-13 14-26 27-40 41-54 55-67 68   Modifier CH CI CJ CK CL CM CN       Medical Necessity:   · Patient is expected to demonstrate progress in strength to increase independence with self care and houseold activity. Reason for Services/Other Comments:  · Patient continues to demonstrate capacity to improve strength and conditioning which will increase independence. Use of outcome tool(s) and clinical judgement create a POC that gives a: Questionable prediction of patient's progress: MODERATE COMPLEXITY            TREATMENT:   (In addition to Assessment/Re-Assessment sessions the following treatments were rendered)  Pre-treatment Symptoms/Complaints: the patient reports she sees her orthopedist tomorrow and her oncologist 12/26/17. Pain: Initial: 4/10 bone pain        Post Session: 4 /10    36 min  Patient late. Fatigue 4/10  O2 98 HR 87  /69    6 minute walk, TUG, UBE level 2 x 4 min, Nustep level 2 x 7 min  O2 98 HR 85  as above    Treatment/Session Assessment:    · Response to Treatment:  Tolerated the treatment well. Participated well today. Orthopedic issues limit progress. · Compliance with Program/Exercises: Will assess as treatment progresses. · Recommendations/Intent for next treatment session: discontinue physical therapy at this time. We will reassess as the patient becomes medically stable. She will nee ordrs at that time.   Total Treatment Duration:  PT Patient Time In/Time Out  Time In: 0853  Time Out: 0929    Stewart Cushing, PT

## 2018-01-26 ENCOUNTER — HOSPITAL ENCOUNTER (OUTPATIENT)
Dept: INFUSION THERAPY | Age: 58
End: 2018-01-26

## 2018-01-27 LAB
BACTERIA SPEC CULT: NORMAL
SERVICE CMNT-IMP: NORMAL

## 2018-01-29 ENCOUNTER — HOSPITAL ENCOUNTER (OUTPATIENT)
Dept: LAB | Age: 58
Discharge: HOME | End: 2018-01-29
Payer: COMMERCIAL

## 2018-01-29 ENCOUNTER — HOSPITAL ENCOUNTER (OUTPATIENT)
Dept: GENERAL RADIOLOGY | Age: 58
Discharge: HOME | End: 2018-01-29
Payer: COMMERCIAL

## 2018-01-29 DIAGNOSIS — C85.88 MARGINAL ZONE LYMPHOMA OF LYMPH NODES OF MULTIPLE SITES (HCC): ICD-10-CM

## 2018-01-29 DIAGNOSIS — J18.9 COMMUNITY ACQUIRED PNEUMONIA OF LEFT LOWER LOBE OF LUNG: ICD-10-CM

## 2018-01-29 DIAGNOSIS — C83.30 DIFFUSE LARGE B-CELL LYMPHOMA, UNSPECIFIED BODY REGION (HCC): ICD-10-CM

## 2018-01-29 LAB
ALBUMIN SERPL-MCNC: 3.5 G/DL (ref 3.5–5)
ALBUMIN/GLOB SERPL: 1.1 {RATIO} (ref 1.2–3.5)
ALP SERPL-CCNC: 170 U/L (ref 50–136)
ALT SERPL-CCNC: 55 U/L (ref 12–65)
ANION GAP SERPL CALC-SCNC: 8 MMOL/L (ref 7–16)
AST SERPL-CCNC: 30 U/L (ref 15–37)
BACTERIA SPEC CULT: NORMAL
BASOPHILS # BLD: 0 K/UL (ref 0–0.2)
BASOPHILS NFR BLD: 1 % (ref 0–2)
BILIRUB SERPL-MCNC: 0.2 MG/DL (ref 0.2–1.1)
BUN SERPL-MCNC: 20 MG/DL (ref 6–23)
CALCIUM SERPL-MCNC: 9.2 MG/DL (ref 8.3–10.4)
CHLORIDE SERPL-SCNC: 104 MMOL/L (ref 98–107)
CO2 SERPL-SCNC: 30 MMOL/L (ref 21–32)
CREAT SERPL-MCNC: 0.94 MG/DL (ref 0.6–1)
DIFFERENTIAL METHOD BLD: ABNORMAL
EOSINOPHIL # BLD: 0.1 K/UL (ref 0–0.8)
EOSINOPHIL NFR BLD: 2 % (ref 0.5–7.8)
ERYTHROCYTE [DISTWIDTH] IN BLOOD BY AUTOMATED COUNT: 13.6 % (ref 11.9–14.6)
GLOBULIN SER CALC-MCNC: 3.3 G/DL (ref 2.3–3.5)
GLUCOSE SERPL-MCNC: 146 MG/DL (ref 65–100)
HCT VFR BLD AUTO: 29 % (ref 35.8–46.3)
HGB BLD-MCNC: 9.8 G/DL (ref 11.7–15.4)
LYMPHOCYTES # BLD: 0.2 K/UL (ref 0.5–4.6)
LYMPHOCYTES NFR BLD: 7 % (ref 13–44)
MAGNESIUM SERPL-MCNC: 2.3 MG/DL (ref 1.8–2.4)
MCH RBC QN AUTO: 31.4 PG (ref 26.1–32.9)
MCHC RBC AUTO-ENTMCNC: 33.8 G/DL (ref 31.4–35)
MCV RBC AUTO: 92.9 FL (ref 79.6–97.8)
MONOCYTES # BLD: 0.3 K/UL (ref 0.1–1.3)
MONOCYTES NFR BLD: 8 % (ref 4–12)
NEUTS SEG # BLD: 2.6 K/UL (ref 1.7–8.2)
NEUTS SEG NFR BLD: 82 % (ref 43–78)
NRBC # BLD: 0.01 K/UL (ref 0–0.2)
NRBC BLD-RTO: 0.3 PER 100 WBC (ref 0–2)
PLATELET # BLD AUTO: 138 K/UL (ref 150–450)
PMV BLD AUTO: 11.1 FL (ref 10.8–14.1)
POTASSIUM SERPL-SCNC: 4.3 MMOL/L (ref 3.5–5.1)
PROT SERPL-MCNC: 6.8 G/DL (ref 6.3–8.2)
RBC # BLD AUTO: 3.12 M/UL (ref 4.05–5.25)
SERVICE CMNT-IMP: NORMAL
SODIUM SERPL-SCNC: 142 MMOL/L (ref 136–145)
WBC # BLD AUTO: 3.2 K/UL (ref 4.3–11.1)

## 2018-01-29 PROCEDURE — 71046 X-RAY EXAM CHEST 2 VIEWS: CPT

## 2018-01-29 PROCEDURE — 83735 ASSAY OF MAGNESIUM: CPT | Performed by: INTERNAL MEDICINE

## 2018-01-29 PROCEDURE — 85025 COMPLETE CBC W/AUTO DIFF WBC: CPT | Performed by: INTERNAL MEDICINE

## 2018-01-29 PROCEDURE — 80053 COMPREHEN METABOLIC PANEL: CPT | Performed by: INTERNAL MEDICINE

## 2018-01-30 ENCOUNTER — APPOINTMENT (OUTPATIENT)
Dept: INFUSION THERAPY | Age: 58
End: 2018-01-30

## 2018-01-31 ENCOUNTER — HOSPITAL ENCOUNTER (OUTPATIENT)
Dept: LAB | Age: 58
Discharge: HOME | End: 2018-01-31
Payer: COMMERCIAL

## 2018-01-31 ENCOUNTER — PATIENT OUTREACH (OUTPATIENT)
Dept: CASE MANAGEMENT | Age: 58
End: 2018-01-31

## 2018-01-31 DIAGNOSIS — C83.38 DIFFUSE LARGE B-CELL LYMPHOMA OF LYMPH NODES OF MULTIPLE REGIONS (HCC): ICD-10-CM

## 2018-01-31 DIAGNOSIS — C85.88 MARGINAL ZONE LYMPHOMA OF LYMPH NODES OF MULTIPLE SITES (HCC): Chronic | ICD-10-CM

## 2018-01-31 LAB
ALBUMIN SERPL-MCNC: 3.8 G/DL (ref 3.5–5)
ALBUMIN/GLOB SERPL: 1.3 {RATIO} (ref 1.2–3.5)
ALP SERPL-CCNC: 160 U/L (ref 50–136)
ALT SERPL-CCNC: 47 U/L (ref 12–65)
ANION GAP SERPL CALC-SCNC: 4 MMOL/L (ref 7–16)
AST SERPL-CCNC: 22 U/L (ref 15–37)
BASOPHILS # BLD: 0 K/UL (ref 0–0.2)
BASOPHILS NFR BLD: 0 % (ref 0–2)
BILIRUB SERPL-MCNC: 0.4 MG/DL (ref 0.2–1.1)
BUN SERPL-MCNC: 24 MG/DL (ref 6–23)
CALCIUM SERPL-MCNC: 9.2 MG/DL (ref 8.3–10.4)
CHLORIDE SERPL-SCNC: 104 MMOL/L (ref 98–107)
CO2 SERPL-SCNC: 32 MMOL/L (ref 21–32)
CREAT SERPL-MCNC: 0.85 MG/DL (ref 0.6–1)
DIFFERENTIAL METHOD BLD: ABNORMAL
EOSINOPHIL # BLD: 0.1 K/UL (ref 0–0.8)
EOSINOPHIL NFR BLD: 2 % (ref 0.5–7.8)
ERYTHROCYTE [DISTWIDTH] IN BLOOD BY AUTOMATED COUNT: 13.7 % (ref 11.9–14.6)
GLOBULIN SER CALC-MCNC: 3 G/DL (ref 2.3–3.5)
GLUCOSE SERPL-MCNC: 92 MG/DL (ref 65–100)
HCT VFR BLD AUTO: 29.3 % (ref 35.8–46.3)
HGB BLD-MCNC: 9.9 G/DL (ref 11.7–15.4)
LYMPHOCYTES # BLD: 0.3 K/UL (ref 0.5–4.6)
LYMPHOCYTES NFR BLD: 8 % (ref 13–44)
MCH RBC QN AUTO: 31.6 PG (ref 26.1–32.9)
MCHC RBC AUTO-ENTMCNC: 33.8 G/DL (ref 31.4–35)
MCV RBC AUTO: 93.6 FL (ref 79.6–97.8)
MONOCYTES # BLD: 0.3 K/UL (ref 0.1–1.3)
MONOCYTES NFR BLD: 7 % (ref 4–12)
NEUTS SEG # BLD: 3.1 K/UL (ref 1.7–8.2)
NEUTS SEG NFR BLD: 83 % (ref 43–78)
NRBC # BLD: 0 K/UL (ref 0–0.2)
PLATELET # BLD AUTO: 118 K/UL (ref 150–450)
PMV BLD AUTO: 10.3 FL (ref 10.8–14.1)
POTASSIUM SERPL-SCNC: 4.7 MMOL/L (ref 3.5–5.1)
PROT SERPL-MCNC: 6.8 G/DL (ref 6.3–8.2)
RBC # BLD AUTO: 3.13 M/UL (ref 4.05–5.25)
SODIUM SERPL-SCNC: 140 MMOL/L (ref 136–145)
WBC # BLD AUTO: 3.7 K/UL (ref 4.3–11.1)

## 2018-01-31 PROCEDURE — 80053 COMPREHEN METABOLIC PANEL: CPT | Performed by: NURSE PRACTITIONER

## 2018-01-31 PROCEDURE — 36415 COLL VENOUS BLD VENIPUNCTURE: CPT | Performed by: NURSE PRACTITIONER

## 2018-01-31 PROCEDURE — 85025 COMPLETE CBC W/AUTO DIFF WBC: CPT | Performed by: NURSE PRACTITIONER

## 2018-01-31 NOTE — PROGRESS NOTES
Pt was seen and labs reviewed by Dr. Tyrell Aguilar. Pt is feeling much better and feels ready to start chemo. Pt is cleared to start by Dr. Tyrell Aguilar, however we must wait on shipment of Etoposide (currently national shortage of drug)  before scheduling admission. Pharmacy to notify navigator once drug has shipped and will arrange admission at that time.

## 2018-02-05 ENCOUNTER — HOSPITAL ENCOUNTER (INPATIENT)
Age: 58
LOS: 3 days | Discharge: HOME | DRG: 846 | End: 2018-02-08
Attending: INTERNAL MEDICINE | Admitting: INTERNAL MEDICINE
Payer: COMMERCIAL

## 2018-02-05 DIAGNOSIS — C83.38 DIFFUSE LARGE B-CELL LYMPHOMA OF LYMPH NODES OF MULTIPLE REGIONS (HCC): ICD-10-CM

## 2018-02-05 DIAGNOSIS — C83.39 DIFFUSE LARGE B-CELL LYMPHOMA OF SOLID ORGAN EXCLUDING SPLEEN (HCC): ICD-10-CM

## 2018-02-05 DIAGNOSIS — Z51.11 ADMISSION FOR ANTINEOPLASTIC CHEMOTHERAPY: ICD-10-CM

## 2018-02-05 LAB
ALBUMIN SERPL-MCNC: 3.9 G/DL (ref 3.5–5)
ALBUMIN/GLOB SERPL: 1.3 {RATIO} (ref 1.2–3.5)
ALP SERPL-CCNC: 152 U/L (ref 50–136)
ALT SERPL-CCNC: 48 U/L (ref 12–65)
ANION GAP SERPL CALC-SCNC: 10 MMOL/L (ref 7–16)
AST SERPL-CCNC: 27 U/L (ref 15–37)
BASOPHILS # BLD: 0 K/UL (ref 0–0.2)
BASOPHILS NFR BLD: 1 % (ref 0–2)
BILIRUB SERPL-MCNC: 0.3 MG/DL (ref 0.2–1.1)
BUN SERPL-MCNC: 25 MG/DL (ref 6–23)
CALCIUM SERPL-MCNC: 9.3 MG/DL (ref 8.3–10.4)
CHLORIDE SERPL-SCNC: 107 MMOL/L (ref 98–107)
CO2 SERPL-SCNC: 26 MMOL/L (ref 21–32)
CREAT SERPL-MCNC: 0.73 MG/DL (ref 0.6–1)
DIFFERENTIAL METHOD BLD: ABNORMAL
EOSINOPHIL # BLD: 0.1 K/UL (ref 0–0.8)
EOSINOPHIL NFR BLD: 3 % (ref 0.5–7.8)
ERYTHROCYTE [DISTWIDTH] IN BLOOD BY AUTOMATED COUNT: 14.7 % (ref 11.9–14.6)
GLOBULIN SER CALC-MCNC: 3 G/DL (ref 2.3–3.5)
GLUCOSE SERPL-MCNC: 81 MG/DL (ref 65–100)
HCT VFR BLD AUTO: 30.6 % (ref 35.8–46.3)
HGB BLD-MCNC: 10.1 G/DL (ref 11.7–15.4)
IMM GRANULOCYTES # BLD: 0 K/UL (ref 0–0.5)
IMM GRANULOCYTES NFR BLD AUTO: 1 % (ref 0–5)
LYMPHOCYTES # BLD: 0.5 K/UL (ref 0.5–4.6)
LYMPHOCYTES NFR BLD: 12 % (ref 13–44)
MAGNESIUM SERPL-MCNC: 2.1 MG/DL (ref 1.8–2.4)
MCH RBC QN AUTO: 30.5 PG (ref 26.1–32.9)
MCHC RBC AUTO-ENTMCNC: 33 G/DL (ref 31.4–35)
MCV RBC AUTO: 92.4 FL (ref 79.6–97.8)
MONOCYTES # BLD: 0.4 K/UL (ref 0.1–1.3)
MONOCYTES NFR BLD: 11 % (ref 4–12)
NEUTS SEG # BLD: 2.6 K/UL (ref 1.7–8.2)
NEUTS SEG NFR BLD: 72 % (ref 43–78)
PLATELET # BLD AUTO: 140 K/UL (ref 150–450)
PMV BLD AUTO: 10.7 FL (ref 10.8–14.1)
POTASSIUM SERPL-SCNC: 4.3 MMOL/L (ref 3.5–5.1)
PROT SERPL-MCNC: 6.9 G/DL (ref 6.3–8.2)
RBC # BLD AUTO: 3.31 M/UL (ref 4.05–5.25)
SODIUM SERPL-SCNC: 143 MMOL/L (ref 136–145)
WBC # BLD AUTO: 3.7 K/UL (ref 4.3–11.1)

## 2018-02-05 PROCEDURE — 99222 1ST HOSP IP/OBS MODERATE 55: CPT | Performed by: INTERNAL MEDICINE

## 2018-02-05 PROCEDURE — 74011250637 HC RX REV CODE- 250/637: Performed by: NURSE PRACTITIONER

## 2018-02-05 PROCEDURE — 74011250636 HC RX REV CODE- 250/636: Performed by: NURSE PRACTITIONER

## 2018-02-05 PROCEDURE — 74011250637 HC RX REV CODE- 250/637: Performed by: INTERNAL MEDICINE

## 2018-02-05 PROCEDURE — 85025 COMPLETE CBC W/AUTO DIFF WBC: CPT | Performed by: NURSE PRACTITIONER

## 2018-02-05 PROCEDURE — 36415 COLL VENOUS BLD VENIPUNCTURE: CPT | Performed by: NURSE PRACTITIONER

## 2018-02-05 PROCEDURE — 65270000029 HC RM PRIVATE

## 2018-02-05 PROCEDURE — 3E04305 INTRODUCTION OF OTHER ANTINEOPLASTIC INTO CENTRAL VEIN, PERCUTANEOUS APPROACH: ICD-10-PCS | Performed by: INTERNAL MEDICINE

## 2018-02-05 PROCEDURE — 83735 ASSAY OF MAGNESIUM: CPT | Performed by: NURSE PRACTITIONER

## 2018-02-05 PROCEDURE — 80053 COMPREHEN METABOLIC PANEL: CPT | Performed by: NURSE PRACTITIONER

## 2018-02-05 PROCEDURE — 74011250636 HC RX REV CODE- 250/636: Performed by: INTERNAL MEDICINE

## 2018-02-05 RX ORDER — ENOXAPARIN SODIUM 100 MG/ML
40 INJECTION SUBCUTANEOUS EVERY 12 HOURS
Status: DISCONTINUED | OUTPATIENT
Start: 2018-02-05 | End: 2018-02-08 | Stop reason: HOSPADM

## 2018-02-05 RX ORDER — DIPHENHYDRAMINE HYDROCHLORIDE 50 MG/ML
50 INJECTION, SOLUTION INTRAMUSCULAR; INTRAVENOUS ONCE
Status: COMPLETED | OUTPATIENT
Start: 2018-02-05 | End: 2018-02-05

## 2018-02-05 RX ORDER — FLUCONAZOLE 100 MG/1
150 TABLET ORAL DAILY
Status: DISCONTINUED | OUTPATIENT
Start: 2018-02-05 | End: 2018-02-08 | Stop reason: HOSPADM

## 2018-02-05 RX ORDER — ACETAMINOPHEN 325 MG/1
650 TABLET ORAL AS NEEDED
Status: DISCONTINUED | OUTPATIENT
Start: 2018-02-05 | End: 2018-02-08 | Stop reason: HOSPADM

## 2018-02-05 RX ORDER — ACYCLOVIR 800 MG/1
400 TABLET ORAL 2 TIMES DAILY
Status: DISCONTINUED | OUTPATIENT
Start: 2018-02-05 | End: 2018-02-08 | Stop reason: HOSPADM

## 2018-02-05 RX ORDER — MORPHINE SULFATE 8 MG/ML
2 INJECTION, SOLUTION INTRAMUSCULAR; INTRAVENOUS
Status: DISCONTINUED | OUTPATIENT
Start: 2018-02-05 | End: 2018-02-08 | Stop reason: HOSPADM

## 2018-02-05 RX ORDER — ONDANSETRON 2 MG/ML
8 INJECTION INTRAMUSCULAR; INTRAVENOUS AS NEEDED
Status: DISCONTINUED | OUTPATIENT
Start: 2018-02-05 | End: 2018-02-08 | Stop reason: HOSPADM

## 2018-02-05 RX ORDER — PROMETHAZINE HYDROCHLORIDE 25 MG/1
25 TABLET ORAL
Status: DISCONTINUED | OUTPATIENT
Start: 2018-02-05 | End: 2018-02-08 | Stop reason: HOSPADM

## 2018-02-05 RX ORDER — SODIUM CHLORIDE 9 MG/ML
25 INJECTION, SOLUTION INTRAVENOUS CONTINUOUS
Status: DISCONTINUED | OUTPATIENT
Start: 2018-02-05 | End: 2018-02-08 | Stop reason: HOSPADM

## 2018-02-05 RX ORDER — DIPHENHYDRAMINE HYDROCHLORIDE 50 MG/ML
50 INJECTION, SOLUTION INTRAMUSCULAR; INTRAVENOUS AS NEEDED
Status: DISCONTINUED | OUTPATIENT
Start: 2018-02-05 | End: 2018-02-08 | Stop reason: HOSPADM

## 2018-02-05 RX ORDER — LORAZEPAM 2 MG/ML
0.5 INJECTION INTRAMUSCULAR
Status: DISCONTINUED | OUTPATIENT
Start: 2018-02-05 | End: 2018-02-08 | Stop reason: HOSPADM

## 2018-02-05 RX ORDER — DEXAMETHASONE SODIUM PHOSPHATE 100 MG/10ML
10 INJECTION INTRAMUSCULAR; INTRAVENOUS EVERY 24 HOURS
Status: COMPLETED | OUTPATIENT
Start: 2018-02-06 | End: 2018-02-08

## 2018-02-05 RX ORDER — FLUTICASONE PROPIONATE 50 MCG
2 SPRAY, SUSPENSION (ML) NASAL DAILY
Status: DISCONTINUED | OUTPATIENT
Start: 2018-02-06 | End: 2018-02-08 | Stop reason: HOSPADM

## 2018-02-05 RX ORDER — SODIUM CHLORIDE 9 MG/ML
75 INJECTION, SOLUTION INTRAVENOUS CONTINUOUS
Status: DISCONTINUED | OUTPATIENT
Start: 2018-02-05 | End: 2018-02-08 | Stop reason: HOSPADM

## 2018-02-05 RX ORDER — HYDROCODONE BITARTRATE AND ACETAMINOPHEN 7.5; 325 MG/1; MG/1
1 TABLET ORAL
Status: DISCONTINUED | OUTPATIENT
Start: 2018-02-05 | End: 2018-02-08 | Stop reason: HOSPADM

## 2018-02-05 RX ORDER — ONDANSETRON 2 MG/ML
8 INJECTION INTRAMUSCULAR; INTRAVENOUS EVERY 8 HOURS
Status: DISCONTINUED | OUTPATIENT
Start: 2018-02-06 | End: 2018-02-08 | Stop reason: HOSPADM

## 2018-02-05 RX ORDER — ALLOPURINOL 300 MG/1
300 TABLET ORAL DAILY
Status: DISCONTINUED | OUTPATIENT
Start: 2018-02-05 | End: 2018-02-08 | Stop reason: HOSPADM

## 2018-02-05 RX ORDER — ACETAMINOPHEN 325 MG/1
650 TABLET ORAL ONCE
Status: COMPLETED | OUTPATIENT
Start: 2018-02-05 | End: 2018-02-05

## 2018-02-05 RX ORDER — ALBUTEROL SULFATE 90 UG/1
2 AEROSOL, METERED RESPIRATORY (INHALATION)
Status: DISCONTINUED | OUTPATIENT
Start: 2018-02-05 | End: 2018-02-08 | Stop reason: HOSPADM

## 2018-02-05 RX ORDER — PROCHLORPERAZINE EDISYLATE 5 MG/ML
10 INJECTION INTRAMUSCULAR; INTRAVENOUS
Status: DISCONTINUED | OUTPATIENT
Start: 2018-02-05 | End: 2018-02-08 | Stop reason: HOSPADM

## 2018-02-05 RX ORDER — DIPHENHYDRAMINE HYDROCHLORIDE 50 MG/ML
25 INJECTION, SOLUTION INTRAMUSCULAR; INTRAVENOUS
Status: DISCONTINUED | OUTPATIENT
Start: 2018-02-05 | End: 2018-02-08 | Stop reason: HOSPADM

## 2018-02-05 RX ORDER — LANOLIN ALCOHOL/MO/W.PET/CERES
400 CREAM (GRAM) TOPICAL 2 TIMES DAILY
Status: DISCONTINUED | OUTPATIENT
Start: 2018-02-05 | End: 2018-02-08 | Stop reason: HOSPADM

## 2018-02-05 RX ORDER — ONDANSETRON 2 MG/ML
4 INJECTION INTRAMUSCULAR; INTRAVENOUS
Status: DISCONTINUED | OUTPATIENT
Start: 2018-02-05 | End: 2018-02-08 | Stop reason: HOSPADM

## 2018-02-05 RX ORDER — PREGABALIN 100 MG/1
100 CAPSULE ORAL 3 TIMES DAILY
Status: DISCONTINUED | OUTPATIENT
Start: 2018-02-05 | End: 2018-02-08 | Stop reason: HOSPADM

## 2018-02-05 RX ADMIN — ONDANSETRON 4 MG: 2 INJECTION INTRAMUSCULAR; INTRAVENOUS at 16:43

## 2018-02-05 RX ADMIN — ENOXAPARIN SODIUM 40 MG: 40 INJECTION SUBCUTANEOUS at 13:18

## 2018-02-05 RX ADMIN — ACETAMINOPHEN 650 MG: 325 TABLET ORAL at 16:37

## 2018-02-05 RX ADMIN — RITUXIMAB 863 MG: 10 INJECTION, SOLUTION INTRAVENOUS at 17:41

## 2018-02-05 RX ADMIN — PREGABALIN 100 MG: 100 CAPSULE ORAL at 21:01

## 2018-02-05 RX ADMIN — ENOXAPARIN SODIUM 40 MG: 40 INJECTION SUBCUTANEOUS at 23:44

## 2018-02-05 RX ADMIN — PREGABALIN 100 MG: 100 CAPSULE ORAL at 16:37

## 2018-02-05 RX ADMIN — SODIUM CHLORIDE 25 ML/HR: 900 INJECTION, SOLUTION INTRAVENOUS at 15:00

## 2018-02-05 RX ADMIN — SODIUM CHLORIDE 75 ML/HR: 900 INJECTION, SOLUTION INTRAVENOUS at 13:18

## 2018-02-05 RX ADMIN — ACYCLOVIR 400 MG: 800 TABLET ORAL at 21:02

## 2018-02-05 RX ADMIN — DIPHENHYDRAMINE HYDROCHLORIDE 50 MG: 50 INJECTION, SOLUTION INTRAMUSCULAR; INTRAVENOUS at 16:37

## 2018-02-05 RX ADMIN — Medication 400 MG: at 21:01

## 2018-02-05 NOTE — H&P
[employer] [facility]        Inpatient Hematology / Oncology History and Physical    Reason for Asmission:  chemo difuse large be cell lymphoma  Admission for antineoplastic chemotherapy    History of Present Illness:  Ms. Tomás Colón is a 62 y.o. female admitted on 2/5/2018 with a primary diagnosis of Diffuse Large B-Cell Lymphoma. She is being admitted for salvage chemotherapy with R-ICE. She is feeling well and is ready to begin treatment. Heme History (copied from previous):  Ms. Tomás Colón is a 62 y.o. female who returns today for management of marginal zone lymphoma. She was in previously good health, seen as an urgent work-in in clinic for lymphadenopathy on 3/30/17. She was having abdominal pain and swelling which has progressed over the previous several weeks, CT was recommended but was initially denied by insurance. She had GI evaluation including EGD/colonoscopy which showed no intraluminal pathology, but finally had a CT at [facility] that showed diffuse lymphadenopathy with moderate ascites, splenomegaly, and possible infiltration of the left kidney, all consistent with lymphoproliferative disorder. We recommended inpatient admission for expedited work-up, including excisional biopsy of an axillary node, bone marrow biopsy, labs and PET/CT. The biopsy returned showing marginal zone lymphoma. Her anemia, ascites, and constitutional symptoms are an indication for treatment. I recommend Rituxan and bendamustine for therapy. She was hospitalized for recurrent fevers after cycle 1. She was also noted to have hypotension requiring a brief ICU stay with Levophed, and acute kidney injury either from antibiotics or ATN from hypotension (or both). She completed repeat paracentesis with good relief of abdominal symptoms. Restaging after cycle 2 showed partial response in lymphadenopathy and splenomegaly, continue current therapy.   Restaging after 5 cycles showed essentially complete response, she was continued on to 6 cycles of BR and then entered R maintenance. PET/CT prior to her 2nd cycle of maintenance showed a new hypermetabolic breast nodule and a mesenteric nodule. Biopsy of the breast nodule shows diffuse large B cell lymphoma. 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 In any case, at this point, I would recommend salvage chemotherapy with R-ICE. This was scheduled for 1/24 but delayed due to post-influenza pneumonia. Review of Systems:  Constitutional Denies fever, chills, weight loss, appetite changes, fatigue, night sweats. HEENT Denies trauma, blurry vision, hearing loss, ear pain, nosebleeds, sore throat, neck pain and ear discharge. Skin Denies lesions or rashes. Lungs +cough +sputum production. Denies dyspnea or hemoptysis. Cardiovascular Denies chest pain, palpitations, or lower extremity edema. Gastrointestinal Denies nausea, vomiting, changes in bowel habits, bloody or black stools, abdominal pain.  Denies dysuria, frequency or hesitancy of urination. Neuro Denies headaches, visual changes or ataxia. Denies dizziness, tingling, tremors, sensory change, speech change, focal weakness or headaches. Hematology Denies easy bruising or bleeding, denies gingival bleeding or epistaxis. Endo Denies heat/cold intolerance, denies diabetes or thyroid abnormalities. MSK Denies back pain, arthralgias, myalgias or frequent falls. Psychiatric/Behavioral Denies depression and substance abuse. The patient is not nervous/anxious.          No Known Allergies  Past Medical History:   Diagnosis Date    Anemia     Basal cell carcinoma     Cardiomegaly     Deep vein thrombosis (DVT) (HCC)     History of stroke     Hypertension     Marginal zone lymphoma ([address] 03/30/2017    Morbid obesity ([address]     Osteoarthritis  Overactive bladder     Primary hypothyroidism     Radiation therapy complication     Rheumatic fever     S/P total knee replacement using cement 10/3/2011    Shingles     Superficial venous thrombosis of right arm 5/1/2017     Past Surgical History:   Procedure Laterality Date    HX CHOLECYSTECTOMY  1983    HX KNEE REPLACEMENT Left 2013    Dr. Aide You Right 2012    Dr. Blease Jeans [facility] Left 02/10/2011    Dr. Lissy Massey     Family History   Problem Relation Age of Onset    Kidney Disease Father     Other Maternal Grandmother      Vascular Disorder with leg amputation    Liver Disease Sister      non-alcoholic    Celiac Disease Sister     Breast Cancer Cousin 48    Thyroid Disease Sister      hypothyroidism    Malignant Hyperthermia Neg Hx     Pseudocholinesterase Deficiency Neg Hx     Delayed Awakening Neg Hx     Emergence Delirium Neg Hx     Post-op Cognitive Dysfunction Neg Hx     Thyroid Cancer Neg Hx      Social History     Social History    Marital status:      Spouse name: N/A    Number of children: N/A    Years of education: N/A     Occupational History    Not on file. Social History Main Topics    Smoking status: Never Smoker    Smokeless tobacco: Never Used    Alcohol use No      Comment: RARE, ONCE/TWICE A YEAR    Drug use: Yes     Special: Prescription    Sexual activity: Not on file     Other Topics Concern    Not on file     Social History Narrative    10/3/11:  PATIENT IS  TO Ari Morales. SHE IS DISABLED.        Current Facility-Administered Medications   Medication Dose Route Frequency Provider Last Rate Last Dose    acyclovir (ZOVIRAX) tablet 400 mg  400 mg Oral BID Kevon Dickerson NP   Stopped at 02/05/18 1100    albuterol (PROVENTIL HFA, VENTOLIN HFA, PROAIR HFA) inhaler 2 Puff  2 Puff Inhalation Q4H PRN Kevon Dickerson NP        allopurinol (ZYLOPRIM) tablet 300 mg  300 mg Oral DAILY Kye De La Rosa NP   Stopped at 18 1100    fluconazole (DIFLUCAN) tablet 150 mg  150 mg Oral DAILY Kye De La Rosa NP   Stopped at 18 1100    [START ON 2018] fluticasone (FLONASE) 50 mcg/actuation nasal spray 2 Spray  2 Spray Both Nostrils DAILY Kye De La Rosa NP        [START ON 2018] levothyroxine (SYNTHROID) tablet 175 mcg  175 mcg Oral ACB Kye De La Rosa NP        magic mouthwash [facility]) oral suspension 10 mL  10 mL Oral Q4H PRN Kye De La Rosa NP        magnesium oxide (MAG-OX) tablet 400 mg  400 mg Oral BID Kye De La Rosa NP   Stopped at 18 1100    pregabalin (LYRICA) capsule 100 mg  100 mg Oral TID Kye De La Rosa NP   Stopped at 18 1100    promethazine (PHENERGAN) tablet 25 mg  25 mg Oral Q6H PRN Kye De La Rosa NP        ondansetron [facility]) injection 4 mg  4 mg IntraVENous Q4H PRN Kye De La Rosa NP        HYDROcodone-acetaminophen (NORCO) 7.5-325 mg per tablet 1 Tab  1 Tab Oral Q4H PRN Kye De La Rosa NP        morphine injection 2 mg  2 mg IntraVENous Q4H PRN Kye De La Rosa NP        0.9% sodium chloride infusion  75 mL/hr IntraVENous CONTINUOUS Kye De La Rosa NP        enoxaparin (LOVENOX) injection 40 mg  40 mg SubCUTAneous Q12H Kye De La Rosa NP           OBJECTIVE:  Patient Vitals for the past 8 hrs:   BP Temp Pulse Resp SpO2 Weight   18 1209 131/82 98.1 °F (36.7 °C) 75 19 98 % -   18 1010 137/78 99.2 °F (37.3 °C) 78 19 98 % 267 lb 8 oz (121.3 kg)     Temp (24hrs), Av.7 °F (37.1 °C), Min:98.1 °F (36.7 °C), Max:99.2 °F (37.3 °C)         Physical Exam:  Constitutional: Well developed, well nourished female in no acute distress, sitting comfortably in the hospital bed.  at bedside. HEENT: Normocephalic and atraumatic. Oropharynx is clear, mucous membranes are moist.  Extraocular muscles are intact. Sclerae anicteric. Neck supple without JVD. No thyromegaly present. Lymph node   No palpable submandibular, cervical, or supraclavicular lymph nodes. Skin Warm and dry. No bruising and no rash noted. No erythema. No pallor. Respiratory Lungs are clear to auscultation bilaterally without wheezes, rales or rhonchi, normal air exchange without accessory muscle use. CVS Normal rate, regular rhythm and normal S1 and S2. No murmurs, gallops, or rubs. Abdomen Soft, nontender and nondistended, normoactive bowel sounds. No palpable mass. No hepatosplenomegaly. Neuro Grossly nonfocal with no obvious sensory or motor deficits. MSK Normal range of motion in general.  No edema and no tenderness. Psych Appropriate mood and affect. Labs:    Recent Results (from the past 24 hour(s))   METABOLIC PANEL, COMPREHENSIVE    Collection Time: 02/05/18 10:35 AM   Result Value Ref Range    Sodium 143 136 - 145 mmol/L    Potassium 4.3 3.5 - 5.1 mmol/L    Chloride 107 98 - 107 mmol/L    CO2 26 21 - 32 mmol/L    Anion gap 10 7 - 16 mmol/L    Glucose 81 65 - 100 mg/dL    BUN 25 (H) 6 - 23 MG/DL    Creatinine 0.73 0.6 - 1.0 MG/DL    GFR est AA >60 >60 ml/min/1.73m2    GFR est non-AA >60 >60 ml/min/1.73m2    Calcium 9.3 8.3 - 10.4 MG/DL    Bilirubin, total 0.3 0.2 - 1.1 MG/DL    ALT (SGPT) 48 12 - 65 U/L    AST (SGOT) 27 15 - 37 U/L    Alk.  phosphatase 152 (H) 50 - 136 U/L    Protein, total 6.9 6.3 - 8.2 g/dL    Albumin 3.9 3.5 - 5.0 g/dL    Globulin 3.0 2.3 - 3.5 g/dL    A-G Ratio 1.3 1.2 - 3.5     MAGNESIUM    Collection Time: 02/05/18 10:35 AM   Result Value Ref Range    Magnesium 2.1 1.8 - 2.4 mg/dL   CBC WITH AUTOMATED DIFF    Collection Time: 02/05/18 10:35 AM   Result Value Ref Range    WBC 3.7 (L) 4.3 - 11.1 K/uL    RBC 3.31 (L) 4.05 - 5.25 M/uL    HGB 10.1 (L) 11.7 - 15.4 g/dL    HCT 30.6 (L) 35.8 - 46.3 %    MCV 92.4 79.6 - 97.8 FL    MCH 30.5 26.1 - 32.9 PG    MCHC 33.0 31.4 - 35.0 g/dL    RDW 14.7 (H) 11.9 - 14.6 %    PLATELET 721 (L) 493 - 450 K/uL    MPV 10.7 (L) 10.8 - 14.1 FL    DF AUTOMATED      NEUTROPHILS 72 43 - 78 %    LYMPHOCYTES 12 (L) 13 - 44 %    MONOCYTES 11 4.0 - 12.0 %    EOSINOPHILS 3 0.5 - 7.8 %    BASOPHILS 1 0.0 - 2.0 %    IMMATURE GRANULOCYTES 1 0.0 - 5.0 %    ABS. NEUTROPHILS 2.6 1.7 - 8.2 K/UL    ABS. LYMPHOCYTES 0.5 0.5 - 4.6 K/UL    ABS. MONOCYTES 0.4 0.1 - 1.3 K/UL    ABS. EOSINOPHILS 0.1 0.0 - 0.8 K/UL    ABS. BASOPHILS 0.0 0.0 - 0.2 K/UL    ABS. IMM.  GRANS. 0.0 0.0 - 0.5 K/UL       Imaging:  n/a    ASSESSMENT:  Problem List  Date Reviewed: 1/31/2018          Codes Class Noted    Hemoptysis ICD-10-CM: R04.2  ICD-9-CM: 786.30  1/23/2018        Admission for antineoplastic chemotherapy ICD-10-CM: Z51.11  ICD-9-CM: V58.11  1/22/2018        DLBCL (diffuse large B cell lymphoma) ([facility] ICD-10-CM: C83.30  ICD-9-CM: 202.80  1/22/2018        Left lower lobe pneumonia ([facility] ICD-10-CM: J18.1  ICD-9-CM: 486  1/22/2018        Pneumonia and influenza ICD-10-CM: J11.00  ICD-9-CM: 487.0  1/22/2018        Primary hypothyroidism (Chronic) ICD-10-CM: E03.9  ICD-9-CM: 244.9  Unknown        Pancytopenia due to antineoplastic chemotherapy ([facility] (Chronic) ICD-10-CM: D61.810, T45.1X5A  ICD-9-CM: 284.11, E933.1  7/19/2017        Chronic hypotension (Chronic) ICD-10-CM: I95.89  ICD-9-CM: 458.1  7/19/2017        Snores (Chronic) ICD-10-CM: R06.83  ICD-9-CM: 786.09  5/1/2017        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 [facility]) (Chronic) ICD-10-CM: C85.88  ICD-9-CM: 200.38  4/7/2017        Non Hodgkin's lymphoma ([facility] (Chronic) ICD-10-CM: C85.90  ICD-9-CM: 202.80  3/30/2017        Lymphadenopathy, generalized (Chronic) ICD-10-CM: R59.1  ICD-9-CM: 785.6  3/30/2017        Osteoarthritis of left knee (Chronic) ICD-10-CM: M17.12  ICD-9-CM: 715.96  8/26/2013        Morbid obesity ([facility] (Chronic) ICD-10-CM: E66.01  ICD-9-CM: 278.01  10/4/2011        History of DVT of lower extremity (Chronic) ICD-10-CM: L10.046  ICD-9-CM: V12.51  10/4/2011    Overview Signed 10/4/2011  1:44 AM by Evonne Colbert, NP     X 2               Hypertension (Chronic) ICD-10-CM: I10  ICD-9-CM: 401.9  10/4/2011    Overview Signed 10/4/2011  1:44 AM by Jass Gruber NP     PRE OP             Heart murmur (Chronic) ICD-10-CM: R01.1  ICD-9-CM: 785.2  10/4/2011        Osteoarthritis of right knee (Chronic) ICD-10-CM: M17.11  ICD-9-CM: 715.96  10/3/2011                PLAN:  Marginal Zone Lymphoma/DLBCL  - s/p BR, then R maintenance  - PET/CT prior to her 2nd cycle of maintenance showed a new hypermetabolic breast nodule and a mesenteric nodule. Biopsy of the breast nodule shows diffuse large B cell lymphoma  - Here for salvage chemotherapy with C1 R-ICE    Risk for TLS  - IVF, allopurinol    Continue home meds  Jammie SOPs  Prophylactic Antibx: Acyclovir, Diflucan  Lovenox for DVT prophylaxis (hold for plt <50k)  Anticipate discharge after completion of chemotherapy with OV f/u and Neulasta support    Lab studies and imaging studies were personally reviewed. Thank you for allowing us to participate in the care of Ms. Dmitri Rojo.               CIARRA Howe[facility]  [address] [address]  Office : [phone]  Fax : [phone]

## 2018-02-05 NOTE — IP AVS SNAPSHOT
[address] 
[phone] Patient: Berto Camargo MRN: [id number] PTU:[id number] About your hospitalization You were admitted on:  February 5, 2018 You last received care in the:  [address] You were discharged on:  February 8, 2018 Why you were hospitalized Your primary diagnosis was:  Not on File Your diagnoses also included:  Admission For Antineoplastic Chemotherapy Follow-up Information Follow up With Details Comments Contact Info Maxi Ruth MD   [address]  Josue Sifuentes 
[phone] Your Scheduled Appointments Friday February 09, 2018  5:00 PM EST Injection with NUR2  
ST. [address] ([address]) [address] Oj Ochoa [phone] [facility] [address]  
[phone] [address] Monday February 12, 2018  7:00 AM EST Infusion Lab with LAB CK  
 [facility] [address]) [address] Oj Ochoa [phone] [facility] [address]  
[phone] [address] Monday February 12, 2018  7:15 AM EST Sc Replacements with Julisa Lo. [address] ([address]) [address] Oj Ochoa [phone] [facility] [address] [phone] [address] Thursday February 15, 2018  9:45 AM EST  
LAB with [address]  
1808 [facility] OUTREACH INSURANCE [address] [place] [address]  
[phone] Thursday February 15, 2018 10:15 AM EST Follow Up with MARA JAMIL NP1 [facility] Hematology and Oncology [facility]) RUDDY/ Juan Balbuena 33 [facility] [address]  
[phone] Thursday February 15, 2018 11:15 AM EST Sc Replacements with [address] [facility]) [address] Geovany Spence [phone] [facility] [address]  
[phone] [address] Saturday February 24, 2018  7:00 AM EST Sc Replacements with Ana [facility] (300 [facility]) 5th Floor Infusion Pollo Spence [phone]  [facility] [address]  
[phone] For [facility] SURGICAL [facility] Floor [phone] ext. [phone] [facility] Tuesday February 27, 2018 10:30 AM EST  
LAB with [address]  
1808 [facility] OUTREACH INSURANCE [facility]) Linnea Pratt 426 187 [facility]  
[phone] Tuesday February 27, 2018 11:00 AM EST Follow Up with MARA JAMIL NP1 Hugo Wilcox Hematology and Oncology [facility]) RUDDY/ Juan Balbuena 33 [facility] [address]  
[phone] Discharge Orders None A check andi indicates which time of day the medication should be taken. My Medications START taking these medications Instructions Each Dose to Equal  
 Morning Noon Evening Bedtime  
 loratadine 10 mg tablet Commonly known as:  Alaniz Ely Take 1 Tab by mouth daily. 10 mg CHANGE how you take these medications Instructions Each Dose to Equal  
 Morning Noon Evening Bedtime * fluconazole 150 mg tablet Commonly known as:  DIFLUCAN What changed:  Another medication with the same name was added. Make sure you understand how and when to take each. Take 150 mg by mouth daily. FDA advises cautious prescribing of oral fluconazole in pregnancy. 150 mg  
    
  
   
   
   
  
 * fluconazole 150 mg tablet Commonly known as:  DIFLUCAN What changed:   You were already taking a medication with the same name, and this prescription was added. Make sure you understand how and when to take each. Take 1 Tab by mouth daily. FDA advises cautious prescribing of oral fluconazole in pregnancy. 150 mg  
    
   
   
   
  
 * ZOFRAN (AS HYDROCHLORIDE) 4 mg tablet Generic drug:  ondansetron hcl What changed:  Another medication with the same name was added. Make sure you understand how and when to take each. Take 4 mg by mouth every eight (8) hours as needed for Nausea. 4 mg * ondansetron hcl 8 mg tablet Commonly known as:  ZOFRAN (AS HYDROCHLORIDE) What changed: You were already taking a medication with the same name, and this prescription was added. Make sure you understand how and when to take each. Take 1 Tab by mouth every eight (8) hours as needed for Nausea (or vomiting). 8 mg * Notice: This list has 4 medication(s) that are the same as other medications prescribed for you. Read the directions carefully, and ask your doctor or other care provider to review them with you. CONTINUE taking these medications Instructions Each Dose to Equal  
 Morning Noon Evening Bedtime  
 acyclovir 400 mg tablet Commonly known as:  ZOVIRAX Take 1 Tab by mouth two (2) times a day. 400 mg  
    
  
   
   
  
   
  
 albuterol 90 mcg/actuation inhaler Commonly known as:  PROAIR HFA Take 2 Puffs by inhalation every four (4) hours as needed for Wheezing. 2 Puff  
    
   
   
   
  
 allopurinol 300 mg tablet Commonly known as:  Ozella Furrow Take 1 Tab by mouth daily. 300 mg  
    
  
   
   
   
  
 fluticasone 50 mcg/actuation nasal spray Commonly known as:  Fabiene Gift 2 Sprays by Both Nostrils route daily. 2 Spray HYDROcodone-homatropine 5-1.5 mg/5 mL (5 mL) syrup Commonly known as:  HYCODAN Take 5 mL by mouth four (4) times daily as needed. Max Daily Amount: 20 mL. 5 mL levothyroxine 175 mcg tablet Commonly known as:  SYNTHROID Take 1 Tab by mouth Daily (before breakfast). 175 mcg  
    
  
   
   
   
  
 lidocaine-prilocaine topical cream  
Commonly known as:  EMLA Apply  to affected area as needed for Pain. Apply to port site 45-60 minutes prior to lab appt or infusion. magic mouthwash Susp Commonly known as:  [place] Take 10 mL by mouth every four (4) hours as needed. 10 mL  
    
   
   
   
  
 magnesium oxide 400 mg tablet Commonly known as:  MAG-OX Take 1 Tab by mouth two (2) times a day. 400 mg  
    
  
   
   
   
  
 nystatin powder Commonly known as:  MYCOSTATIN Apply  to affected area three (3) times daily. pregabalin 100 mg capsule Commonly known as:  Shiloh Kelp Take 1 Cap by mouth three (3) times daily. Max Daily Amount: 300 mg.  
 100 mg  
    
  
   
   
  
   
  
  
 promethazine 25 mg tablet Commonly known as:  PHENERGAN Take 25 mg by mouth every six (6) hours as needed for Nausea. Unsure of dose 25 mg ASK your doctor about these medications Instructions Each Dose to Equal  
 Morning Noon Evening Bedtime  
 guaiFENesin 1,200 mg Ta12 ER tablet Commonly known as:  ScaleDB Ask about: Should I take this medication? Take 1 Tab by mouth every twelve (12) hours for 14 days. 1200 mg Where to Get Your Medications These medications were sent to [facility], [address]  [address], [place] [address] Phone:  [phone]  
  fluconazole 150 mg tablet  
 ondansetron hcl 8 mg tablet Discharge Instructions DISCHARGE SUMMARY from Nurse PATIENT INSTRUCTIONS: 
 
After general anesthesia or intravenous sedation, for 24 hours or while taking prescription Narcotics: · Limit your activities · Do not drive and operate hazardous machinery · Do not make important personal or business decisions · Do  not drink alcoholic beverages · If you have not urinated within 8 hours after discharge, please contact your surgeon on call. What to do at Home: 
Recommended activity: Activity as tolerated If you experience any of the following symptoms fever, unrelieved nausea or vomiting, unrelieved pain, redness or drainage from port site, please follow up with your oncologist or primary care doctor. *  Please give a list of your current medications to your Primary Care Provider. *  Please update this list whenever your medications are discontinued, doses are 
    changed, or new medications (including over-the-counter products) are added. *  Please carry medication information at all times in case of emergency situations. These are general instructions for a healthy lifestyle: No smoking/ No tobacco products/ Avoid exposure to second hand smoke Surgeon General's Warning:  Quitting smoking now greatly reduces serious risk to your health. Obesity, smoking, and sedentary lifestyle greatly increases your risk for illness A healthy diet, regular physical exercise & weight monitoring are important for maintaining a healthy lifestyle You may be retaining fluid if you have a history of heart failure or if you experience any of the following symptoms:  Weight gain of 3 pounds or more overnight or 5 pounds in a week, increased swelling in our hands or feet or shortness of breath while lying flat in bed. Please call your doctor as soon as you notice any of these symptoms; do not wait until your next office visit. Recognize signs and symptoms of STROKE: 
 
F-face looks uneven A-arms unable to move or move unevenly S-speech slurred or non-existent T-time-call 911 as soon as signs and symptoms begin-DO NOT go Back to bed or wait to see if you get better-TIME IS BRAIN. Warning Signs of HEART ATTACK Call 911 if you have these symptoms: 
? Chest discomfort. Most heart attacks involve discomfort in the center of the chest that lasts more than a few minutes, or that goes away and comes back. It can feel like uncomfortable pressure, squeezing, fullness, or pain. ? Discomfort in other areas of the upper body. Symptoms can include pain or discomfort in one or both arms, the back, neck, jaw, or stomach. ? Shortness of breath with or without chest discomfort. ? Other signs may include breaking out in a cold sweat, nausea, or lightheadedness. Don't wait more than five minutes to call [address]! Fast action can save your life. Calling 911 is almost always the fastest way to get lifesaving treatment. Emergency Medical Services staff can begin treatment when they arrive  up to an hour sooner than if someone gets to the hospital by car. The discharge information has been reviewed with the patient and spouse. The patient and spouse verbalized understanding. Discharge medications reviewed with the patient and spouse and appropriate educational materials and side effects teaching were provided. ___________________________________________________________________________________________________________________________________ Managing Side Effects of Chemotherapy: Care Instructions Your Care Instructions Cancer is often treated with medicines that destroy the cancer cells (chemotherapy). These medicines may slow cancer growth and prevent or stop the spread of cancer. Chemotherapy also can affect healthy cells and cause side effects. Most people can work and do their normal activities after and even during chemotherapy, but they may need to limit their schedules. Side effects of chemotherapy may include nausea and vomiting, loss of appetite, pain, and being tired. Some medicines can cause diarrhea or mouth sores.  Your doctor may prescribe medicines to treat the side effects. Your doctor will advise you to take extra care to prevent illnesses and infections, because chemotherapy weakens your natural defenses. Follow-up care is a key part of your treatment and safety. Be sure to make and go to all appointments, and call your doctor if you are having problems. It's also a good idea to know your test results and keep a list of the medicines you take. How can you care for yourself at home? Medicines ? · Take your medicines exactly as prescribed. Call your doctor if you think you are having a problem with your medicine. You may get medicine for nausea and vomiting if you have these side effects. ?Nausea and vomiting ? · A light meal or snack before chemotherapy may help prevent nausea. If you do have nausea during your treatment, try eating earlier-at least an hour or two before your next treatment. After your treatment, you may want to wait one or more hours before you eat again. ? · Drink fluids with your meals and an hour before or after meals. ? · After vomiting has stopped for 1 hour, sip a rehydration drink, such as Powerade or Gatorade. ? · Drink plenty of fluids to prevent dehydration. Choose water and other caffeine-free clear liquids until you feel better. Try clear fluids, such as apple or grape juice mixed to half strength with water, rehydration drinks, weak tea with sugar, clear broth, and gelatin dessert. Do not drink citrus juices. If you have kidney, heart, or liver disease and have to limit fluids, talk with your doctor before you increase the amount of fluids you drink. ? · When you are feeling better, begin eating clear soups and mild foods until all symptoms are gone for 12 to 48 hours. Other good choices include dry toast, crackers, cooked cereal, and gelatin dessert, such as Jell-O.  
? · If your vomiting is not getting better or is getting worse, call your doctor right away. ? Loss of appetite ? · It's important to eat healthy food. If you do not feel like eating, try to eat food that has protein and extra calories to keep up your strength and prevent weight loss. You can drink liquid meal replacements for extra calories and protein. ? · Try eating several smaller meals throughout the day. Set a schedule for meals and snacks, and plan for times when it feels best to eat. Try to eat your main meal early. ? · After treatment, you may want to wait for a while to eat. You can also try eating earlier before treatment. ? · Try to eat more of the foods you like during the days and times when your appetite is good. ? · When you don't feel like eating your normal foods, try clear broths/soups and mild foods like toast, crackers, cooked cereal like oatmeal, and gelatin dessert. Eating soft, bland foods may help. ?Pain control ? · If your doctor prescribes medicines to control pain, take them as directed. Often your doctor will have you take these medicines regularly to keep your pain under control. Medicine for pain may cause side effects. Let your doctor know if you feel constipated, have trouble urinating, or have nausea. ? · Try using relaxation exercises to lower your anxiety and stress, which can increase pain. ? · Keep track of your pain so you can tell your doctor what your pain is like. Write down where you feel pain, how long it lasts, what seems to bring it on, and how it feels. Also note what makes the pain feel better or worse. ? · If you have mouth pain, your doctor may prescribe a special mouth rinse that can help relieve the pain. ?Weakness and feeling tired ? · Get extra rest. Plan ahead so you can take breaks or naps. ? · Save your energy for the most important things you want to do. ? · Try to get some exercise, such as walking, but stop if you are too tired. ? · Eat a balanced diet.  Do not skip meals, especially breakfast.  
 ? · Do something you enjoy. Do you like to listen to music? Spend some time listening to your favorite music. Or find another way to relax by reading, watching a movie, or playing games. ? · Ask family and friends to help with home chores and other tasks. ? To prevent infections ? · Wash your hands often during the day, especially before you eat and after you use the bathroom. ? · Stay away from people who have illnesses that you might catch, such as the flu or a cold. ? · Try to stay out of crowds. ? · Clean cuts and scrapes right away with warm water and soap. Clean them daily until they are healed. ? · Keep track of your temperature, if your doctor recommends it. You can do this by taking your temperature at regular times and writing it down. ? Hair loss ? · Use a mild shampoo and a soft hair brush. ? · Use a low setting on your hair dryer. Do not color or perm your hair. ? · Have your hair cut short. It will look thicker and barnett, and it will not be such a shock if you lose hair. ? · Use sunscreen and a hat, scarf, or turban to protect your scalp from the sun. ? · Ask your doctor about other treatments that you may try to prevent or minimize hair loss. These may include the use of a cooling cap. When should you call for help? Call 911 anytime you think you may need emergency care. For example, call if: 
? · You passed out (lost consciousness). ?Call your doctor now or seek immediate medical care if: 
? · You have a fever. ? · You have abnormal bleeding. ? · You have new or worse pain. ? · You think you have an infection. ? · You have new symptoms, such as a cough, belly pain, vomiting, diarrhea, or a rash. ? Watch closely for changes in your health, and be sure to contact your doctor if: 
? · You are much more tired than usual.  
? · You have swollen glands in your armpits, groin, or neck. ? · You do not get better as expected. Where can you learn more? Go to http://rajat-rachel.info/. Enter ([phone] 1837-961 in the search box to learn more about \"Managing Side Effects of Chemotherapy: Care Instructions. \" Current as of: May 12, 2017 Content Version: 11.4 © 0243-3182 [employer]. Care instructions adapted under license by [employer] (which disclaims liability or warranty for this information). If you have questions about a medical condition or this instruction, always ask your healthcare professional. [address] any warranty or liability for your use of this information. [employer] Announcement We are excited to announce that we are making your provider's discharge notes available to you in [employer]. You will see these notes when they are completed and signed by the physician that discharged you from your recent hospital stay. If you have any questions or concerns about any information you see in [employer], please call the Health Information Department where you were seen or reach out to your Primary Care Provider for more information about your plan of care. Introducing [facility]! Dear Mickey Cedeño: Thank you for requesting a [employer] account. Our records indicate that you already have an active [employer] account. You can access your account anytime at [email or web address] [email or web address] Did you know that you can access your hospital and ER discharge instructions at any time in [employer]? You can also review all of your test results from your hospital stay or ER visit. Additional Information If you have questions, please visit the Frequently Asked Questions section of the [employer] website at [email or web address] Remember, [employer] is NOT to be used for urgent needs. For medical emergencies, dial 911. Now available from your iPhone and Android! Providers Seen During Your Hospitalization Provider Specialty Primary office phone Rk Meier MD Hematology and Oncology [phone] Your Primary Care Physician (PCP) Primary Care Physician Office Phone Office Fax Sami Weinberg [phone] You are allergic to the following No active allergies Recent Documentation Weight BMI OB Status Smoking Status 125.4 kg 50.55 kg/m2 Postmenopausal Never Smoker Emergency Contacts Name Discharge Info Relation Home Work Mobile Merwin Kayser  Spouse [3] [phone] Karen Rivera  Son [22] [phone] Jose M Graham  Other Relative [6] [phone] Liborio Aguilar  Sister [23] [phone] Patient Belongings The following personal items are in your possession at time of discharge: 
  Dental Appliances: None  Visual Aid: Glasses, At bedside      Home Medications: None   Jewelry: With patient, Watch, Ring  Clothing: With patient, Undergarments, Socks, Shirt, Pants, Jacket/Coat, Footwear, Pajamas, Slippers    Other Valuables: Cell Phone, At bedside Please provide this summary of care documentation to your next provider. Signatures-by signing, you are acknowledging that this After Visit Summary has been reviewed with you and you have received a copy. Patient Signature:  ____________________________________________________________ Date:  ____________________________________________________________  
  
Alisa Velasquez Provider Signature:  ____________________________________________________________ Date:  ____________________________________________________________

## 2018-02-05 NOTE — PROGRESS NOTES
Problem: Falls - Risk of  Goal: *Absence of Falls  Document Audi Fall Risk and appropriate interventions in the flowsheet.    Outcome: Progressing Towards Goal  Fall Risk Interventions:            Medication Interventions: Patient to call before getting OOB

## 2018-02-06 ENCOUNTER — APPOINTMENT (OUTPATIENT)
Dept: INFUSION THERAPY | Age: 58
End: 2018-02-06

## 2018-02-06 LAB
ALBUMIN SERPL-MCNC: 3.3 G/DL (ref 3.5–5)
ALBUMIN/GLOB SERPL: 1.3 {RATIO} (ref 1.2–3.5)
ALP SERPL-CCNC: 126 U/L (ref 50–136)
ALT SERPL-CCNC: 37 U/L (ref 12–65)
ANION GAP SERPL CALC-SCNC: 9 MMOL/L (ref 7–16)
AST SERPL-CCNC: 20 U/L (ref 15–37)
BASOPHILS # BLD: 0 K/UL (ref 0–0.2)
BASOPHILS NFR BLD: 0 % (ref 0–2)
BILIRUB SERPL-MCNC: 0.2 MG/DL (ref 0.2–1.1)
BUN SERPL-MCNC: 22 MG/DL (ref 6–23)
CALCIUM SERPL-MCNC: 8.6 MG/DL (ref 8.3–10.4)
CHLORIDE SERPL-SCNC: 108 MMOL/L (ref 98–107)
CO2 SERPL-SCNC: 27 MMOL/L (ref 21–32)
CREAT SERPL-MCNC: 0.9 MG/DL (ref 0.6–1)
DIFFERENTIAL METHOD BLD: ABNORMAL
EOSINOPHIL # BLD: 0.1 K/UL (ref 0–0.8)
EOSINOPHIL NFR BLD: 5 % (ref 0.5–7.8)
ERYTHROCYTE [DISTWIDTH] IN BLOOD BY AUTOMATED COUNT: 14.8 % (ref 11.9–14.6)
GLOBULIN SER CALC-MCNC: 2.5 G/DL (ref 2.3–3.5)
GLUCOSE SERPL-MCNC: 99 MG/DL (ref 65–100)
HCT VFR BLD AUTO: 26.2 % (ref 35.8–46.3)
HGB BLD-MCNC: 8.4 G/DL (ref 11.7–15.4)
IMM GRANULOCYTES # BLD: 0 K/UL (ref 0–0.5)
IMM GRANULOCYTES NFR BLD AUTO: 0 % (ref 0–5)
LDH SERPL L TO P-CCNC: 242 U/L (ref 100–190)
LYMPHOCYTES # BLD: 0.4 K/UL (ref 0.5–4.6)
LYMPHOCYTES NFR BLD: 16 % (ref 13–44)
MAGNESIUM SERPL-MCNC: 2.1 MG/DL (ref 1.8–2.4)
MCH RBC QN AUTO: 30.1 PG (ref 26.1–32.9)
MCHC RBC AUTO-ENTMCNC: 32.1 G/DL (ref 31.4–35)
MCV RBC AUTO: 93.9 FL (ref 79.6–97.8)
MONOCYTES # BLD: 0.3 K/UL (ref 0.1–1.3)
MONOCYTES NFR BLD: 13 % (ref 4–12)
NEUTS SEG # BLD: 1.5 K/UL (ref 1.7–8.2)
NEUTS SEG NFR BLD: 66 % (ref 43–78)
PLATELET # BLD AUTO: 121 K/UL (ref 150–450)
PMV BLD AUTO: 11.3 FL (ref 10.8–14.1)
POTASSIUM SERPL-SCNC: 4.3 MMOL/L (ref 3.5–5.1)
PROT SERPL-MCNC: 5.8 G/DL (ref 6.3–8.2)
RBC # BLD AUTO: 2.79 M/UL (ref 4.05–5.25)
SODIUM SERPL-SCNC: 144 MMOL/L (ref 136–145)
URATE SERPL-MCNC: 4.2 MG/DL (ref 2.6–6)
WBC # BLD AUTO: 2.2 K/UL (ref 4.3–11.1)

## 2018-02-06 PROCEDURE — 85025 COMPLETE CBC W/AUTO DIFF WBC: CPT | Performed by: NURSE PRACTITIONER

## 2018-02-06 PROCEDURE — 84550 ASSAY OF BLOOD/URIC ACID: CPT | Performed by: NURSE PRACTITIONER

## 2018-02-06 PROCEDURE — 74011250636 HC RX REV CODE- 250/636: Performed by: INTERNAL MEDICINE

## 2018-02-06 PROCEDURE — 74011250636 HC RX REV CODE- 250/636: Performed by: NURSE PRACTITIONER

## 2018-02-06 PROCEDURE — 83615 LACTATE (LD) (LDH) ENZYME: CPT | Performed by: NURSE PRACTITIONER

## 2018-02-06 PROCEDURE — 74011250637 HC RX REV CODE- 250/637: Performed by: NURSE PRACTITIONER

## 2018-02-06 PROCEDURE — 99232 SBSQ HOSP IP/OBS MODERATE 35: CPT | Performed by: INTERNAL MEDICINE

## 2018-02-06 PROCEDURE — 65270000029 HC RM PRIVATE

## 2018-02-06 PROCEDURE — 36591 DRAW BLOOD OFF VENOUS DEVICE: CPT

## 2018-02-06 PROCEDURE — 83735 ASSAY OF MAGNESIUM: CPT | Performed by: NURSE PRACTITIONER

## 2018-02-06 PROCEDURE — 80053 COMPREHEN METABOLIC PANEL: CPT | Performed by: NURSE PRACTITIONER

## 2018-02-06 RX ORDER — LORATADINE 10 MG/1
10 TABLET ORAL DAILY
Status: DISCONTINUED | OUTPATIENT
Start: 2018-02-06 | End: 2018-02-08 | Stop reason: HOSPADM

## 2018-02-06 RX ADMIN — PREGABALIN 100 MG: 100 CAPSULE ORAL at 08:47

## 2018-02-06 RX ADMIN — ENOXAPARIN SODIUM 40 MG: 40 INJECTION SUBCUTANEOUS at 22:42

## 2018-02-06 RX ADMIN — DEXAMETHASONE SODIUM PHOSPHATE 10 MG: 10 INJECTION INTRAMUSCULAR; INTRAVENOUS at 11:29

## 2018-02-06 RX ADMIN — LEVOTHYROXINE SODIUM 175 MCG: 100 TABLET ORAL at 08:48

## 2018-02-06 RX ADMIN — ETOPOSIDE 230 MG: 20 INJECTION, SOLUTION INTRAVENOUS at 12:07

## 2018-02-06 RX ADMIN — LORATADINE 10 MG: 10 TABLET ORAL at 08:47

## 2018-02-06 RX ADMIN — PREGABALIN 100 MG: 100 CAPSULE ORAL at 22:42

## 2018-02-06 RX ADMIN — ACYCLOVIR 400 MG: 800 TABLET ORAL at 17:53

## 2018-02-06 RX ADMIN — ACYCLOVIR 400 MG: 800 TABLET ORAL at 08:46

## 2018-02-06 RX ADMIN — Medication 400 MG: at 08:47

## 2018-02-06 RX ADMIN — SODIUM CHLORIDE 75 ML/HR: 900 INJECTION, SOLUTION INTRAVENOUS at 17:59

## 2018-02-06 RX ADMIN — FLUCONAZOLE 150 MG: 100 TABLET ORAL at 08:47

## 2018-02-06 RX ADMIN — SODIUM CHLORIDE 75 ML/HR: 900 INJECTION, SOLUTION INTRAVENOUS at 03:18

## 2018-02-06 RX ADMIN — PREGABALIN 100 MG: 100 CAPSULE ORAL at 17:53

## 2018-02-06 RX ADMIN — ONDANSETRON 8 MG: 2 INJECTION INTRAMUSCULAR; INTRAVENOUS at 17:53

## 2018-02-06 RX ADMIN — ENOXAPARIN SODIUM 40 MG: 40 INJECTION SUBCUTANEOUS at 13:42

## 2018-02-06 RX ADMIN — ALLOPURINOL 300 MG: 300 TABLET ORAL at 08:47

## 2018-02-06 RX ADMIN — ONDANSETRON 8 MG: 2 INJECTION INTRAMUSCULAR; INTRAVENOUS at 11:29

## 2018-02-06 RX ADMIN — Medication 400 MG: at 17:53

## 2018-02-06 NOTE — PROGRESS NOTES
END OF SHIFT NOTE:    Patient received Rituxan infusion at 100 ml/hr without adverse reaction. Rested well. No c/o pain, nausea or vomiting. Ambulating to restroom, voiding without difficulty. Concerned about getting Claritin ordered prior to Neulasta, assured patient that we would be sure that it was ordered. Intake/Output      Voiding: YES  Catheter: NO  Drain:              Stool:  0 occurrences. Emesis:  0 occurrences. VITAL SIGNS  Patient Vitals for the past 12 hrs:   Temp Pulse Resp BP SpO2   02/06/18 0419 97.7 °F (36.5 °C) 64 18 128/65 98 %   02/05/18 2324 98 °F (36.7 °C) 70 18 123/60 96 %   02/05/18 2033 98.6 °F (37 °C) 68 18 122/64 100 %       Pain Assessment  Pain 1  Pain Scale 1: Numeric (0 - 10) (02/06/18 0319)  Pain Intensity 1: 0 (02/06/18 0319)  Patient Stated Pain Goal: 0 (02/06/18 0319)    Ambulating  Yes    Additional Information:     Shift report given to oncoming nurse at the bedside.     Marry Medley

## 2018-02-06 NOTE — PROGRESS NOTES
Met with patient at bedside. Patient is alert and oriented. Patient lives in own home with . Home has about 4 steps to enter. Prior to admission, patient was independent, currently still drives. Patient currently has rolling walker at home. Has no issues getting medications. Patient states discharge plan is to return home with . No needs voiced at this time. CM will continue to follow for discharge needs. Care Management Interventions  PCP Verified by CM: Yes (june)  Mode of Transport at Discharge:  Other (see comment)  Transition of Care Consult (CM Consult): Discharge Planning  Discharge Durable Medical Equipment: No  Physical Therapy Consult: No  Occupational Therapy Consult: No  Current Support Network: Own Home, Lives with Spouse  Confirm Follow Up Transport: Family  Plan discussed with Pt/Family/Caregiver: Yes  Freedom of Choice Offered: Yes  Discharge Location  Discharge Placement: Home

## 2018-02-06 NOTE — PROGRESS NOTES
[facility]        Inpatient Hematology / Oncology Progress Note      Admission Date: 2018 10:10 AM  Reason for Admission/Hospital Course: chemo difuse large be cell lymphoma  Admission for antineoplastic chemotherapy      24 Hour Events:  Afebrile, VSS  Rec'd Rituxan yesterday  Day 1 ICE today  Doing well, no complaints     ROS:  Constitutional: Negative for fever, chills, weakness, malaise, fatigue. CV: Negative for chest pain, palpitations, edema. Respiratory: Negative for dyspnea, cough, wheezing. GI: Negative for nausea, abdominal pain, diarrhea. 10 point review of systems is otherwise negative with the exception of the elements mentioned above in the HPI. No Known Allergies    OBJECTIVE:  Patient Vitals for the past 8 hrs:   BP Temp Pulse Resp SpO2   18 0840 127/64 98.9 °F (37.2 °C) 67 18 98 %   18 0419 128/65 97.7 °F (36.5 °C) 64 18 98 %     Temp (24hrs), Av.5 °F (36.9 °C), Min:97.7 °F (36.5 °C), Max:99.2 °F (37.3 °C)     0701 -  1900  In: 240 [P.O.:240]  Out: 1000 [Urine:1000]    Physical Exam:  Constitutional: Well developed, well nourished female in no acute distress, sitting comfortably in the bedside chair. HEENT: Normocephalic and atraumatic. Oropharynx is clear, mucous membranes are moist.   Extraocular muscles are intact. Sclerae anicteric. Neck supple without JVD. No thyromegaly present. Skin Warm and dry. No bruising and no rash noted. No erythema. No pallor. Respiratory Lungs are clear to auscultation bilaterally without wheezes, rales or rhonchi, normal air exchange without accessory muscle use. CVS Normal rate, regular rhythm and normal S1 and S2. No murmurs, gallops, or rubs. Abdomen Soft, nontender and nondistended, normoactive bowel sounds. No palpable mass. No hepatosplenomegaly. Neuro Grossly nonfocal with no obvious sensory or motor deficits. MSK Normal range of motion in general.  No tenderness. Trace edema to BLE. Psych Appropriate mood and affect.         Labs:    Recent Labs      02/06/18   0328  02/05/18   1035   WBC  2.2*  3.7*   RBC  2.79*  3.31*   HGB  8.4*  10.1*   HCT  26.2*  30.6*   MCV  93.9  92.4   MCH  30.1  30.5   MCHC  32.1  33.0   RDW  14.8*  14.7*   PLT  121*  140*   GRANS  66  72   LYMPH  16  12*   MONOS  13*  11   EOS  5  3   BASOS  0  1   IG  0  1   DF  AUTOMATED  AUTOMATED   ANEU  1.5*  2.6   ABL  0.4*  0.5   ABM  0.3  0.4   HOMERO  0.1  0.1   ABB  0.0  0.0   AIG  0.0  0.0      Recent Labs      02/06/18   0328  02/05/18   1035   NA  144  143   K  4.3  4.3   CL  108*  107   CO2  27  26   AGAP  9  10   GLU  99  81   BUN  22  25*   CREA  0.90  0.73   GFRAA  >60  >60   GFRNA  >60  >60   CA  8.6  9.3   SGOT  20  27   AP  126  152*   TP  5.8*  6.9   ALB  3.3*  3.9   GLOB  2.5  3.0   AGRAT  1.3  1.3   MG  2.1  2.1         Imaging:  n/a    ASSESSMENT:    Problem List  Date Reviewed: 1/31/2018          Codes Class Noted    Hemoptysis ICD-10-CM: R04.2  ICD-9-CM: 786.30  1/23/2018        Admission for antineoplastic chemotherapy ICD-10-CM: Z51.11  ICD-9-CM: V58.11  1/22/2018        DLBCL (diffuse large B cell lymphoma) (HCC) ICD-10-CM: C83.30  ICD-9-CM: 202.80  1/22/2018        Left lower lobe pneumonia ([facility] [address] ICD-10-CM: J18.1  ICD-9-CM: 244  1/22/2018        Pneumonia and influenza ICD-10-CM: J11.00  ICD-9-CM: 487.0  1/22/2018        Primary hypothyroidism (Chronic) ICD-10-CM: E03.9  ICD-9-CM: 244.9  Unknown        Pancytopenia due to antineoplastic chemotherapy (HCC) (Chronic) ICD-10-CM: D61.810, T45.1X5A  ICD-9-CM: 284.11, E933.1  7/19/2017        Chronic hypotension (Chronic) ICD-10-CM: I95.89  ICD-9-CM: 458.1  7/19/2017        Snores (Chronic) ICD-10-CM: R06.83  ICD-9-CM: 786.09  5/1/2017        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) (Chronic) ICD-10-CM: C85.88  ICD-9-CM: 200.38  4/7/2017        Non Hodgkin's lymphoma (HCC) (Chronic) ICD-10-CM: C85.90  ICD-9-CM: 202.80 3/30/2017        Lymphadenopathy, generalized (Chronic) ICD-10-CM: R59.1  ICD-9-CM: 785.6  3/30/2017        Osteoarthritis of left knee (Chronic) ICD-10-CM: M17.12  ICD-9-CM: 715.96  8/26/2013        Morbid obesity ([address] (Chronic) ICD-10-CM: E66.01  ICD-9-CM: 278.01  10/4/2011        History of DVT of lower extremity (Chronic) ICD-10-CM: T34.029  ICD-9-CM: V12.51  10/4/2011    Overview Signed 10/4/2011  1:44 AM by Jena Ayala NP     X 2               Hypertension (Chronic) ICD-10-CM: I10  ICD-9-CM: 401.9  10/4/2011    Overview Signed 10/4/2011  1:44 AM by Jena Ayala NP     PRE OP             Heart murmur (Chronic) ICD-10-CM: R01.1  ICD-9-CM: 785.2  10/4/2011        Osteoarthritis of right knee (Chronic) ICD-10-CM: M17.11  ICD-9-CM: 715.96  10/3/2011                PLAN:  Marginal Zone Lymphoma/DLBCL  - s/p BR, then R maintenance  - PET/CT prior to her 2nd cycle of maintenance showed a new hypermetabolic breast nodule and a mesenteric nodule.  Biopsy of the breast nodule shows diffuse large B cell lymphoma  - Here for salvage chemotherapy with C1 R-ICE  - She will be going to [facility] in 2.5 weeks for transplant consult, then returning for cycle 2 R-ICE and restaging, then autoSCT if response. 2/6 Rec'd Rituxan yesterday without any problems.   Day 1 ICE to begin today.     Risk for TLS  - IVF, allopurinol    Pancytopenia secondary to chemotherapy  - Transfuse prn per Jammie SOPs     Continue home meds  Jammie SOPs  Prophylactic Antibx: Acyclovir, Diflucan  Lovenox for DVT prophylaxis (hold for plt <50k)  Anticipate discharge after completion of chemotherapy with OV f/u and Neulasta support              [facility]  [address] [address]  Office : [phone]  Fax : [phone]

## 2018-02-06 NOTE — PROGRESS NOTES
END OF SHIFT NOTE:    Intake/Output  02/06 0701 - 02/06 1900  In: 240 [P.O.:240]  Out: 2500 [Urine:2500]   Voiding: YES  Catheter: NO  Drain:              Stool:  4 occurrences. Emesis:  0 occurrences. VITAL SIGNS  Patient Vitals for the past 12 hrs:   Temp Pulse Resp BP SpO2   02/06/18 1525 99.6 °F (37.6 °C) 83 18 151/73 95 %   02/06/18 1147 98.9 °F (37.2 °C) 64 18 141/76 99 %   02/06/18 0840 98.9 °F (37.2 °C) 67 18 127/64 98 %       Pain Assessment  Pain 1  Pain Scale 1: Numeric (0 - 10) (02/06/18 0319)  Pain Intensity 1: 0 (02/06/18 0319)  Patient Stated Pain Goal: 0 (02/06/18 0319)    Ambulating  Yes    Additional Information:     Pt sitting in room with . C/o frequent BMs with the last one runny-watery. Will continue to monitor. Shift report given to oncoming nurse at the bedside.     Lupe Chilel

## 2018-02-07 LAB
ALBUMIN SERPL-MCNC: 3.5 G/DL (ref 3.5–5)
ALBUMIN/GLOB SERPL: 1.3 {RATIO} (ref 1.2–3.5)
ALP SERPL-CCNC: 140 U/L (ref 50–136)
ALT SERPL-CCNC: 66 U/L (ref 12–65)
ANION GAP SERPL CALC-SCNC: 8 MMOL/L (ref 7–16)
AST SERPL-CCNC: 39 U/L (ref 15–37)
BASOPHILS # BLD: 0 K/UL (ref 0–0.2)
BASOPHILS NFR BLD: 0 % (ref 0–2)
BILIRUB SERPL-MCNC: 0.3 MG/DL (ref 0.2–1.1)
BUN SERPL-MCNC: 16 MG/DL (ref 6–23)
CALCIUM SERPL-MCNC: 9.3 MG/DL (ref 8.3–10.4)
CHLORIDE SERPL-SCNC: 108 MMOL/L (ref 98–107)
CO2 SERPL-SCNC: 26 MMOL/L (ref 21–32)
CREAT SERPL-MCNC: 0.82 MG/DL (ref 0.6–1)
DIFFERENTIAL METHOD BLD: ABNORMAL
EOSINOPHIL # BLD: 0 K/UL (ref 0–0.8)
EOSINOPHIL NFR BLD: 0 % (ref 0.5–7.8)
ERYTHROCYTE [DISTWIDTH] IN BLOOD BY AUTOMATED COUNT: 14.5 % (ref 11.9–14.6)
GLOBULIN SER CALC-MCNC: 2.8 G/DL (ref 2.3–3.5)
GLUCOSE SERPL-MCNC: 144 MG/DL (ref 65–100)
HCT VFR BLD AUTO: 28.1 % (ref 35.8–46.3)
HGB BLD-MCNC: 9.3 G/DL (ref 11.7–15.4)
IMM GRANULOCYTES # BLD: 0 K/UL (ref 0–0.5)
IMM GRANULOCYTES NFR BLD AUTO: 1 % (ref 0–5)
LYMPHOCYTES # BLD: 0.2 K/UL (ref 0.5–4.6)
LYMPHOCYTES NFR BLD: 6 % (ref 13–44)
MAGNESIUM SERPL-MCNC: 2.1 MG/DL (ref 1.8–2.4)
MCH RBC QN AUTO: 29.9 PG (ref 26.1–32.9)
MCHC RBC AUTO-ENTMCNC: 33.1 G/DL (ref 31.4–35)
MCV RBC AUTO: 90.4 FL (ref 79.6–97.8)
MONOCYTES # BLD: 0.1 K/UL (ref 0.1–1.3)
MONOCYTES NFR BLD: 3 % (ref 4–12)
NEUTS SEG # BLD: 3.9 K/UL (ref 1.7–8.2)
NEUTS SEG NFR BLD: 90 % (ref 43–78)
PLATELET # BLD AUTO: 119 K/UL (ref 150–450)
PMV BLD AUTO: 10.9 FL (ref 10.8–14.1)
POTASSIUM SERPL-SCNC: 4.3 MMOL/L (ref 3.5–5.1)
PROT SERPL-MCNC: 6.3 G/DL (ref 6.3–8.2)
RBC # BLD AUTO: 3.11 M/UL (ref 4.05–5.25)
SODIUM SERPL-SCNC: 142 MMOL/L (ref 136–145)
WBC # BLD AUTO: 4.2 K/UL (ref 4.3–11.1)

## 2018-02-07 PROCEDURE — 74011250637 HC RX REV CODE- 250/637: Performed by: NURSE PRACTITIONER

## 2018-02-07 PROCEDURE — 36591 DRAW BLOOD OFF VENOUS DEVICE: CPT

## 2018-02-07 PROCEDURE — 85025 COMPLETE CBC W/AUTO DIFF WBC: CPT | Performed by: NURSE PRACTITIONER

## 2018-02-07 PROCEDURE — 74011250636 HC RX REV CODE- 250/636: Performed by: NURSE PRACTITIONER

## 2018-02-07 PROCEDURE — 74011250636 HC RX REV CODE- 250/636: Performed by: INTERNAL MEDICINE

## 2018-02-07 PROCEDURE — 65270000029 HC RM PRIVATE

## 2018-02-07 PROCEDURE — 99232 SBSQ HOSP IP/OBS MODERATE 35: CPT | Performed by: INTERNAL MEDICINE

## 2018-02-07 PROCEDURE — 74011250637 HC RX REV CODE- 250/637: Performed by: INTERNAL MEDICINE

## 2018-02-07 PROCEDURE — 83735 ASSAY OF MAGNESIUM: CPT | Performed by: NURSE PRACTITIONER

## 2018-02-07 PROCEDURE — 74011000258 HC RX REV CODE- 258: Performed by: INTERNAL MEDICINE

## 2018-02-07 PROCEDURE — 80053 COMPREHEN METABOLIC PANEL: CPT | Performed by: NURSE PRACTITIONER

## 2018-02-07 RX ORDER — LORATADINE 10 MG/1
10 TABLET ORAL DAILY
Qty: 30 TAB | Refills: 0 | Status: SHIPPED | COMMUNITY
Start: 2018-02-08 | End: 2018-02-08

## 2018-02-07 RX ORDER — LORAZEPAM 2 MG/ML
1 INJECTION INTRAMUSCULAR
Status: DISCONTINUED | OUTPATIENT
Start: 2018-02-07 | End: 2018-02-08 | Stop reason: HOSPADM

## 2018-02-07 RX ORDER — ONDANSETRON HYDROCHLORIDE 8 MG/1
8 TABLET, FILM COATED ORAL
Qty: 90 TAB | Refills: 2 | Status: SHIPPED | OUTPATIENT
Start: 2018-02-07 | End: 2019-05-20 | Stop reason: ALTCHOICE

## 2018-02-07 RX ORDER — FLUCONAZOLE 150 MG/1
150 TABLET ORAL DAILY
Qty: 30 TAB | Refills: 0 | Status: SHIPPED | OUTPATIENT
Start: 2018-02-08 | End: 2018-03-23 | Stop reason: SDUPTHER

## 2018-02-07 RX ADMIN — PREGABALIN 100 MG: 100 CAPSULE ORAL at 21:06

## 2018-02-07 RX ADMIN — ACETAMINOPHEN 650 MG: 325 TABLET ORAL at 21:33

## 2018-02-07 RX ADMIN — ETOPOSIDE 230 MG: 20 INJECTION, SOLUTION INTRAVENOUS at 10:25

## 2018-02-07 RX ADMIN — DEXAMETHASONE SODIUM PHOSPHATE 10 MG: 10 INJECTION INTRAMUSCULAR; INTRAVENOUS at 08:06

## 2018-02-07 RX ADMIN — FLUCONAZOLE 150 MG: 100 TABLET ORAL at 08:07

## 2018-02-07 RX ADMIN — MESNA 11500 MG: 100 INJECTION, SOLUTION INTRAVENOUS at 12:14

## 2018-02-07 RX ADMIN — PREGABALIN 100 MG: 100 CAPSULE ORAL at 08:09

## 2018-02-07 RX ADMIN — ONDANSETRON 8 MG: 2 INJECTION INTRAMUSCULAR; INTRAVENOUS at 08:06

## 2018-02-07 RX ADMIN — ONDANSETRON 8 MG: 2 INJECTION INTRAMUSCULAR; INTRAVENOUS at 01:21

## 2018-02-07 RX ADMIN — ONDANSETRON 8 MG: 2 INJECTION INTRAMUSCULAR; INTRAVENOUS at 16:02

## 2018-02-07 RX ADMIN — ACETAMINOPHEN 650 MG: 325 TABLET ORAL at 16:01

## 2018-02-07 RX ADMIN — Medication 400 MG: at 16:08

## 2018-02-07 RX ADMIN — CARBOPLATIN 600 MG: 10 INJECTION, SOLUTION INTRAVENOUS at 12:14

## 2018-02-07 RX ADMIN — ENOXAPARIN SODIUM 40 MG: 40 INJECTION SUBCUTANEOUS at 12:22

## 2018-02-07 RX ADMIN — SODIUM CHLORIDE 75 ML/HR: 900 INJECTION, SOLUTION INTRAVENOUS at 21:54

## 2018-02-07 RX ADMIN — SODIUM CHLORIDE 75 ML/HR: 900 INJECTION, SOLUTION INTRAVENOUS at 08:05

## 2018-02-07 RX ADMIN — LEVOTHYROXINE SODIUM 175 MCG: 100 TABLET ORAL at 08:07

## 2018-02-07 RX ADMIN — ACYCLOVIR 400 MG: 800 TABLET ORAL at 16:02

## 2018-02-07 RX ADMIN — PREGABALIN 100 MG: 100 CAPSULE ORAL at 16:02

## 2018-02-07 RX ADMIN — Medication 400 MG: at 08:08

## 2018-02-07 RX ADMIN — ACYCLOVIR 400 MG: 800 TABLET ORAL at 08:09

## 2018-02-07 RX ADMIN — ALLOPURINOL 300 MG: 300 TABLET ORAL at 08:09

## 2018-02-07 RX ADMIN — LORATADINE 10 MG: 10 TABLET ORAL at 08:08

## 2018-02-07 NOTE — PROGRESS NOTES
END OF SHIFT NOTE:    Patient had a good night, excited to continue chemo treatment plan today. Up early, showered and dressed in anticipation of a busy day. Son is planning to be here with her today. Ambulating and voiding without difficulty. No loose stools this shift, two during prior shift. Intake/Output      Voiding: YES  Catheter: NO  Drain:              Stool:  0 occurrences. Stool Assessment  Stool Color: Evins Lusher (02/06/18 2017)  Stool Appearance: Loose (02/06/18 2017)  Stool Amount: Medium (02/06/18 2017)  Stool Source/Status: Rectum (02/06/18 2017)    Emesis:  0 occurrences. VITAL SIGNS  Patient Vitals for the past 12 hrs:   Temp Pulse Resp BP SpO2   02/07/18 0415 98.6 °F (37 °C) 85 18 (!) 154/95 98 %   02/06/18 2350 98.8 °F (37.1 °C) 81 18 147/76 96 %   02/06/18 1950 99 °F (37.2 °C) 88 18 139/74 96 %       Pain Assessment  Pain 1  Pain Scale 1: Numeric (0 - 10) (02/07/18 0344)  Pain Intensity 1: 0 (02/07/18 0344)  Patient Stated Pain Goal: 0 (02/07/18 0344)    Ambulating  Yes    Additional Information:     Shift report given to oncoming nurse at the bedside.     Jeff Hurd

## 2018-02-07 NOTE — PROGRESS NOTES
END OF SHIFT NOTE:    Intake/Output  02/07 0701 - 02/07 1900  In: 0650 [P.O.:217; I.V.:1112]  Out: 1200 [Urine:1200]   Voiding: YES  Catheter: NO  Drain:              Stool:  0 occurrences. Stool Assessment  Stool Color: Zigmund Harlan (02/06/18 2017)  Stool Appearance: Loose (02/06/18 2017)  Stool Amount: Medium (02/06/18 2017)  Stool Source/Status: Rectum (02/06/18 2017)    Emesis:  0 occurrences. VITAL SIGNS  Patient Vitals for the past 12 hrs:   Temp Pulse Resp BP SpO2   02/07/18 1622 99.1 °F (37.3 °C) 82 18 134/75 96 %   02/07/18 1138 99 °F (37.2 °C) 78 18 118/77 96 %   02/07/18 0754 98.7 °F (37.1 °C) 73 18 133/62 97 %       Pain Assessment  Pain 1  Pain Scale 1: Numeric (0 - 10) (02/07/18 1700)  Pain Intensity 1: 1 (02/07/18 1700)  Patient Stated Pain Goal: 0 (02/07/18 0344)  Pain Reassessment 1: Yes (02/07/18 1700)  Pain Location 1: Head (02/07/18 1604)  Pain Intervention(s) 1: Medication (see MAR) (02/07/18 1604)    Ambulating  Yes    Additional Information: extremely anxious today    Shift report given to oncoming nurse at the bedside.     Mahi Duke

## 2018-02-07 NOTE — PROGRESS NOTES
[facility]        Inpatient Hematology / Oncology Progress Note      Admission Date: 2018 10:10 AM  Reason for Admission/Hospital Course: chemo difuse large be cell lymphoma  Admission for antineoplastic chemotherapy      24 Hour Events:  Afebrile, VSS  Day 2 ICE  Son at bedside    ROS:  Constitutional: Negative for fever, chills, weakness, malaise, fatigue. CV: Negative for chest pain, palpitations, edema. Respiratory: +cough. Negative for dyspnea, wheezing. GI: +diarrhea - none today. Negative for nausea, abdominal pain. 10 point review of systems is otherwise negative with the exception of the elements mentioned above in the HPI. No Known Allergies    OBJECTIVE:  Patient Vitals for the past 8 hrs:   BP Temp Pulse Resp SpO2   18 0754 133/62 98.7 °F (37.1 °C) 73 18 97 %   18 0415 (!) 154/95 98.6 °F (37 °C) 85 18 98 %     Temp (24hrs), Av.9 °F (37.2 °C), Min:98.6 °F (37 °C), Max:99.6 °F (37.6 °C)     0701 -  1900  In: -   Out: 200 [Urine:200]    Physical Exam:  Constitutional: Well developed, well nourished female in no acute distress, sitting comfortably in the bedside chair. HEENT: Normocephalic and atraumatic. Oropharynx is clear, mucous membranes are moist.   Extraocular muscles are intact. Sclerae anicteric. Neck supple without JVD. No thyromegaly present. Skin Warm and dry. No bruising and no rash noted. No erythema. No pallor. Respiratory Lungs are clear to auscultation bilaterally without wheezes, rales or rhonchi, normal air exchange without accessory muscle use. CVS Normal rate, regular rhythm and normal S1 and S2. No murmurs, gallops, or rubs. Abdomen Soft, nontender and nondistended, normoactive bowel sounds. No palpable mass. No hepatosplenomegaly. Neuro Grossly nonfocal with no obvious sensory or motor deficits. MSK Normal range of motion in general.  No tenderness. Trace edema to BLE.    Psych Appropriate mood and affect.         Labs:      Recent Labs      02/07/18 0328 02/06/18 0328 02/05/18   1035   WBC  4.2*  2.2*  3.7*   RBC  3.11*  2.79*  3.31*   HGB  9.3*  8.4*  10.1*   HCT  28.1*  26.2*  30.6*   MCV  90.4  93.9  92.4   MCH  29.9  30.1  30.5   MCHC  33.1  32.1  33.0   RDW  14.5  14.8*  14.7*   PLT  119*  121*  140*   GRANS  90*  66  72   LYMPH  6*  16  12*   MONOS  3*  13*  11   EOS  0*  5  3   BASOS  0  0  1   IG  1  0  1   DF  AUTOMATED  AUTOMATED  AUTOMATED   ANEU  3.9  1.5*  2.6   ABL  0.2*  0.4*  0.5   ABM  0.1  0.3  0.4   HOMERO  0.0  0.1  0.1   ABB  0.0  0.0  0.0   AIG  0.0  0.0  0.0        Recent Labs      02/07/18 0328 02/06/18 0328 02/05/18   1035   NA  142  144  143   K  4.3  4.3  4.3   CL  108*  108*  107   CO2  26  27  26   AGAP  8  9  10   GLU  144*  99  81   BUN  16  22  25*   CREA  0.82  0.90  0.73   GFRAA  >60  >60  >60   GFRNA  >60  >60  >60   CA  9.3  8.6  9.3   SGOT  39*  20  27   AP  140*  126  152*   TP  6.3  5.8*  6.9   ALB  3.5  3.3*  3.9   GLOB  2.8  2.5  3.0   AGRAT  1.3  1.3  1.3   MG  2.1  2.1  2.1         Imaging:  n/a    ASSESSMENT:    Problem List  Date Reviewed: 1/31/2018          Codes Class Noted    Hemoptysis ICD-10-CM: R04.2  ICD-9-CM: 786.30  1/23/2018        Admission for antineoplastic chemotherapy ICD-10-CM: Z51.11  ICD-9-CM: V58.11  1/22/2018        DLBCL (diffuse large B cell lymphoma) (HCC) ICD-10-CM: C83.30  ICD-9-CM: 202.80  1/22/2018        Left lower lobe pneumonia (HCC) ICD-10-CM: J18.1  ICD-9-CM: 933  1/22/2018        Pneumonia and influenza ICD-10-CM: J11.00  ICD-9-CM: 487.0  1/22/2018        Primary hypothyroidism (Chronic) ICD-10-CM: E03.9  ICD-9-CM: 244.9  Unknown        Pancytopenia due to antineoplastic chemotherapy (HCC) (Chronic) ICD-10-CM: D61.810, T45.1X5A  ICD-9-CM: 284.11, E933.1  7/19/2017        Chronic hypotension (Chronic) ICD-10-CM: I95.89  ICD-9-CM: 458.1  7/19/2017        Snores (Chronic) ICD-10-CM: R06.83  ICD-9-CM: 786.09  5/1/2017 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 [facility]) (Chronic) ICD-10-CM: C85.88  ICD-9-CM: 200.38  4/7/2017        Non Hodgkin's lymphoma ([facility] (Chronic) ICD-10-CM: C85.90  ICD-9-CM: 202.80  3/30/2017        Lymphadenopathy, generalized (Chronic) ICD-10-CM: R59.1  ICD-9-CM: 785.6  3/30/2017        Osteoarthritis of left knee (Chronic) ICD-10-CM: M17.12  ICD-9-CM: 715.96  8/26/2013        Morbid obesity ([facility] [address] (Chronic) ICD-10-CM: E66.01  ICD-9-CM: 278.01  10/4/2011        History of DVT of lower extremity (Chronic) ICD-10-CM: E73.110  ICD-9-CM: V12.51  10/4/2011    Overview Signed 10/4/2011  1:44 AM by Carmina Khan NP     X 2               Hypertension (Chronic) ICD-10-CM: I10  ICD-9-CM: 401.9  10/4/2011    Overview Signed 10/4/2011  1:44 AM by Carmina Khan NP     PRE OP             Heart murmur (Chronic) ICD-10-CM: R01.1  ICD-9-CM: 785.2  10/4/2011        Osteoarthritis of right knee (Chronic) ICD-10-CM: M17.11  ICD-9-CM: 715.96  10/3/2011                PLAN:  Marginal Zone Lymphoma/DLBCL  - s/p BR, then R maintenance  - PET/CT prior to her 2nd cycle of maintenance showed a new hypermetabolic breast nodule and a mesenteric nodule.  Biopsy of the breast nodule shows diffuse large B cell lymphoma  - Here for salvage chemotherapy with C1 R-ICE  - She will be going to [facility] in [facility].5 weeks for transplant consult, then returning for cycle 2 R-ICE and restaging, then autoSCT if response. 2/6 Rec'd Rituxan yesterday without any problems. Day 1 ICE to begin today. 2/7 Day 2 ICE. Doing well with treatment, no complications.     Risk for TLS  - IVF, allopurinol    Pancytopenia secondary to chemotherapy  - Transfuse prn per Jammie SOPs    Elevated LFTs  2/7 LFTs trending upwards. Bili WNL.   Con't to monitor.     Continue home meds  Jammie SOPs  Prophylactic Antibx: Acyclovir, Diflucan  Lovenox for DVT prophylaxis (hold for plt <50k)  Anticipate discharge after completion of chemotherapy tomorrow with OV f/u and Neulasta support              CIRARA Cui [facility]  [address] [address]  Office : [phone]  Fax : [phone]

## 2018-02-07 NOTE — PROGRESS NOTES
Problem: Falls - Risk of  Goal: *Absence of Falls  Document Audi Fall Risk and appropriate interventions in the flowsheet.    Outcome: Progressing Towards Goal  Fall Risk Interventions:            Medication Interventions: Teach patient to arise slowly, Patient to call before getting OOB

## 2018-02-07 NOTE — DISCHARGE SUMMARY
[facility] Hematology & Oncology: Inpatient Hematology / Oncology Discharge Summary Note    Patient ID:  Canelo Chang  [id number]  35 y.o.  1960    Admit Date: 2/5/2018    Discharge Date: 2/8/2018    Admission Diagnoses: chemo difuse large be cell lymphoma  Admission for antineoplastic chemotherapy    Discharge Diagnoses:  Principal Diagnosis: <principal problem not specified>  Active Problems:    Admission for antineoplastic chemotherapy (1/22/2018)        Hospital Course:  Ms. Vaelntín Reyes is a 62 y.o. female admitted on 2/5/2018 with a primary diagnosis of Diffuse Large B-Cell Lymphoma. She was admitted for salvage chemotherapy with R-ICE. She will be going to [facility] in 2 weeks for transplant consult, then returning for cycle 2 R-ICE and restaging, then autoSCT if response. She tolerated R-ICE well without any complications. She is feeling well and is ready to be discharged home. Advised to call with fever, chills, uncontrollable symptoms, or with any other concerns. Pt verbalizes understanding.        Heme History (copied from previous):  Ms. Barrientos is a 62 y. o. female who returns today for management of marginal zone lymphoma.  She was in previously good health, seen as an urgent work-in in clinic for lymphadenopathy on 3/30/17. She was having abdominal pain and swelling which has progressed over the previous several weeks, CT was recommended but was initially denied by insurance.  She had GI evaluation including EGD/colonoscopy which showed no intraluminal pathology, but finally had a CT at [facility] that showed diffuse lymphadenopathy with moderate ascites, splenomegaly, and possible infiltration of the left kidney, all consistent with lymphoproliferative disorder.  We recommended inpatient admission for expedited work-up, including excisional biopsy of an axillary node, bone marrow biopsy, labs and PET/CT.  The biopsy returned showing marginal zone lymphoma.  Her anemia, ascites, and constitutional symptoms are an indication for treatment.  I recommend Rituxan and bendamustine for therapy.  She was hospitalized for recurrent fevers after cycle 1. She was also noted to have hypotension requiring a brief ICU stay with Levophed, and acute kidney injury either from antibiotics or ATN from hypotension (or both).  She completed repeat paracentesis with good relief of abdominal symptoms.  Restaging after cycle 2 showed partial response in lymphadenopathy and splenomegaly, continue current therapy.  Restaging after 5 cycles showed essentially complete response, she was continued on to 6 cycles of BR and then entered R maintenance.  PET/CT prior to her 2nd cycle of maintenance showed a new hypermetabolic breast nodule and a mesenteric nodule.  Biopsy of the breast nodule shows diffuse large B cell lymphoma.  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  In any case, at this point, I would recommend salvage chemotherapy with R-ICE.  This was scheduled for 1/24 but delayed due to post-influenza pneumonia.      Consults:  None    Pertinent Diagnostic Studies:   Labs:    Recent Labs      02/07/18   0328  02/06/18   0328  02/05/18   1035   WBC  4.2*  2.2*  3.7*   HGB  9.3*  8.4*  10.1*   PLT  119*  121*  140*   ANEU  3.9  1.5*  2.6    Recent Labs      02/07/18   0328  02/06/18   0328  02/05/18   1035   NA  142  144  143   K  4.3  4.3  4.3   CL  108*  108*  107   CO2  26  27  26   GLU  144*  99  81   BUN  16  22  25*   CREA  0.82  0.90  0.73   CA  9.3  8.6  9.3   SGOT  39*  20  27   AP  140*  126  152*   TP  6.3  5.8*  6.9   ALB  3.5  3.3*  3.9   MG  2.1  2.1  2.1       Imaging:  n/a    Current Discharge Medication List      START taking these medications    Details   loratadine (CLARITIN) 10 mg tablet Take 1 Tab by mouth daily.   Qty: 30 Tab, Refills: 0      !! ondansetron hcl (ZOFRAN, AS HYDROCHLORIDE,) 8 mg tablet Take 1 Tab by mouth every eight (8) hours as needed for Nausea (or vomiting). Qty: 90 Tab, Refills: 2       !! - Potential duplicate medications found. Please discuss with provider. CONTINUE these medications which have CHANGED    Details   !! fluconazole (DIFLUCAN) 150 mg tablet Take 1 Tab by mouth daily. FDA advises cautious prescribing of oral fluconazole in pregnancy. Qty: 30 Tab, Refills: 0       !! - Potential duplicate medications found. Please discuss with provider. CONTINUE these medications which have NOT CHANGED    Details   HYDROcodone-homatropine (HYCODAN) 5-1.5 mg/5 mL (5 mL) syrup Take 5 mL by mouth four (4) times daily as needed. Max Daily Amount: 20 mL. Qty: 400 mL, Refills: 0    Associated Diagnoses: Cough      allopurinol (ZYLOPRIM) 300 mg tablet Take 1 Tab by mouth daily. Qty: 30 Tab, Refills: 1      guaiFENesin ER (MUCINEX) 1,200 mg Ta12 ER tablet Take 1 Tab by mouth every twelve (12) hours for 14 days. Qty: 28 Tab, Refills: 0      albuterol (PROAIR HFA) 90 mcg/actuation inhaler Take 2 Puffs by inhalation every four (4) hours as needed for Wheezing. Qty: 1 Inhaler, Refills: 2      !! fluconazole (DIFLUCAN) 150 mg tablet Take 150 mg by mouth daily. FDA advises cautious prescribing of oral fluconazole in pregnancy. Associated Diagnoses: 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 (HCC)      acyclovir (ZOVIRAX) 400 mg tablet Take 1 Tab by mouth two (2) times a day. Qty: 60 Tab, Refills: 3      levothyroxine (SYNTHROID) 175 mcg tablet Take 1 Tab by mouth Daily (before breakfast). Qty: 30 Tab, Refills: 5    Associated Diagnoses: Primary hypothyroidism      pregabalin (LYRICA) 100 mg capsule Take 1 Cap by mouth three (3) times daily. Max Daily Amount: 300 mg. Qty: 90 Cap, Refills: 3      magnesium oxide (MAG-OX) 400 mg tablet Take 1 Tab by mouth two (2) times a day.   Qty: 60 Tab, Refills: 1      magic mouthwash (ALEXSANDER) susp Take 10 mL by mouth every four (4) hours as needed. Qty: 2 Bottle, Refills: 2      nystatin (MYCOSTATIN) powder Apply  to affected area three (3) times daily. Qty: 60 g, Refills: 2      fluticasone (FLONASE) 50 mcg/actuation nasal spray 2 Sprays by Both Nostrils route daily. Qty: 1 Bottle, Refills: 1      lidocaine-prilocaine (EMLA) topical cream Apply  to affected area as needed for Pain. Apply to port site 45-60 minutes prior to lab appt or infusion. Qty: 30 g, Refills: 0    Associated Diagnoses: 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 [facility])      ! ! ondansetron hcl (ZOFRAN, AS HYDROCHLORIDE,) 4 mg tablet Take 4 mg by mouth every eight (8) hours as needed for Nausea. promethazine (PHENERGAN) 25 mg tablet Take 25 mg by mouth every six (6) hours as needed for Nausea. Unsure of dose       !! - Potential duplicate medications found. Please discuss with provider. OBJECTIVE:  Patient Vitals for the past 8 hrs:   BP Temp Pulse Resp SpO2   18 1138 118/77 99 °F (37.2 °C) 78 18 96 %   18 0754 133/62 98.7 °F (37.1 °C) 73 18 97 %     Temp (24hrs), Av °F (37.2 °C), Min:98.6 °F (37 °C), Max:99.6 °F (37.6 °C)     07 -  1900  In: 6431 [P.O.:217; I.V.:1112]  Out: 1200 [Urine:1200]    Physical Exam:  Constitutional: Well developed, well nourished female in no acute distress, sitting comfortably in the bedside chair. HEENT: Normocephalic and atraumatic. Oropharynx is clear, mucous membranes are moist.   Extraocular muscles are intact. Sclerae anicteric. Neck supple without JVD. No thyromegaly present. Skin Warm and dry. No bruising and no rash noted. No erythema. No pallor. Respiratory Lungs are clear to auscultation bilaterally without wheezes, rales or rhonchi, normal air exchange without accessory muscle use. CVS Normal rate, regular rhythm and normal S1 and S2. No murmurs, gallops, or rubs.    Abdomen Soft, nontender and nondistended, normoactive bowel sounds. No palpable mass. No hepatosplenomegaly. Neuro Grossly nonfocal with no obvious sensory or motor deficits. MSK Normal range of motion in general.  No tenderness. Trace BLE edema. Psych Appropriate mood and affect. ASSESSMENT:    Active Problems:    Admission for antineoplastic chemotherapy (1/22/2018)        DISPOSITION:  Follow-up Appointments   Procedures    FOLLOW UP VISIT Appointment in: Other (Maco Proctor) Please follow-up as previously scheduled: 2/9 5pm for Neulasta 2/12 7am labs/replacements 2/15 9:45am OV f/u with provider     Please follow-up as previously scheduled:    2/9 5pm for Neulasta  2/12 7am labs/replacements  2/15 9:45am OV f/u with provider     Standing Status:   Standing     Number of Occurrences:   1     Order Specific Question:   Appointment in     Answer: Other (Specify)           Over 30 minutes was spent in discharge planning and coordination of care.             [facility]  [address] [address]  Office : [phone]  Fax : [phone]

## 2018-02-08 VITALS
DIASTOLIC BLOOD PRESSURE: 70 MMHG | HEART RATE: 78 BPM | WEIGHT: 276.4 LBS | TEMPERATURE: 98.7 F | OXYGEN SATURATION: 98 % | RESPIRATION RATE: 18 BRPM | SYSTOLIC BLOOD PRESSURE: 163 MMHG | BODY MASS INDEX: 50.55 KG/M2

## 2018-02-08 LAB
ALBUMIN SERPL-MCNC: 3.4 G/DL (ref 3.5–5)
ALBUMIN/GLOB SERPL: 1.3 {RATIO} (ref 1.2–3.5)
ALP SERPL-CCNC: 124 U/L (ref 50–136)
ALT SERPL-CCNC: 73 U/L (ref 12–65)
ANION GAP SERPL CALC-SCNC: 9 MMOL/L (ref 7–16)
AST SERPL-CCNC: 46 U/L (ref 15–37)
BASOPHILS # BLD: 0 K/UL (ref 0–0.2)
BASOPHILS NFR BLD: 0 % (ref 0–2)
BILIRUB SERPL-MCNC: 0.3 MG/DL (ref 0.2–1.1)
BUN SERPL-MCNC: 20 MG/DL (ref 6–23)
CALCIUM SERPL-MCNC: 9.1 MG/DL (ref 8.3–10.4)
CHLORIDE SERPL-SCNC: 110 MMOL/L (ref 98–107)
CO2 SERPL-SCNC: 22 MMOL/L (ref 21–32)
CREAT SERPL-MCNC: 0.83 MG/DL (ref 0.6–1)
DIFFERENTIAL METHOD BLD: ABNORMAL
EOSINOPHIL # BLD: 0 K/UL (ref 0–0.8)
EOSINOPHIL NFR BLD: 0 % (ref 0.5–7.8)
ERYTHROCYTE [DISTWIDTH] IN BLOOD BY AUTOMATED COUNT: 15 % (ref 11.9–14.6)
GLOBULIN SER CALC-MCNC: 2.7 G/DL (ref 2.3–3.5)
GLUCOSE SERPL-MCNC: 126 MG/DL (ref 65–100)
HCT VFR BLD AUTO: 27.2 % (ref 35.8–46.3)
HGB BLD-MCNC: 9 G/DL (ref 11.7–15.4)
IMM GRANULOCYTES # BLD: 0 K/UL (ref 0–0.5)
IMM GRANULOCYTES NFR BLD AUTO: 1 % (ref 0–5)
LYMPHOCYTES # BLD: 0.2 K/UL (ref 0.5–4.6)
LYMPHOCYTES NFR BLD: 3 % (ref 13–44)
MAGNESIUM SERPL-MCNC: 2.1 MG/DL (ref 1.8–2.4)
MCH RBC QN AUTO: 30.2 PG (ref 26.1–32.9)
MCHC RBC AUTO-ENTMCNC: 33.1 G/DL (ref 31.4–35)
MCV RBC AUTO: 91.3 FL (ref 79.6–97.8)
MONOCYTES # BLD: 0.3 K/UL (ref 0.1–1.3)
MONOCYTES NFR BLD: 6 % (ref 4–12)
NEUTS SEG # BLD: 5 K/UL (ref 1.7–8.2)
NEUTS SEG NFR BLD: 90 % (ref 43–78)
PLATELET # BLD AUTO: 120 K/UL (ref 150–450)
PMV BLD AUTO: 11 FL (ref 10.8–14.1)
POTASSIUM SERPL-SCNC: 4 MMOL/L (ref 3.5–5.1)
PROT SERPL-MCNC: 6.1 G/DL (ref 6.3–8.2)
RBC # BLD AUTO: 2.98 M/UL (ref 4.05–5.25)
SODIUM SERPL-SCNC: 141 MMOL/L (ref 136–145)
WBC # BLD AUTO: 5.6 K/UL (ref 4.3–11.1)

## 2018-02-08 PROCEDURE — 74011250637 HC RX REV CODE- 250/637: Performed by: NURSE PRACTITIONER

## 2018-02-08 PROCEDURE — 85025 COMPLETE CBC W/AUTO DIFF WBC: CPT | Performed by: NURSE PRACTITIONER

## 2018-02-08 PROCEDURE — 83735 ASSAY OF MAGNESIUM: CPT | Performed by: NURSE PRACTITIONER

## 2018-02-08 PROCEDURE — 80053 COMPREHEN METABOLIC PANEL: CPT | Performed by: NURSE PRACTITIONER

## 2018-02-08 PROCEDURE — 99239 HOSP IP/OBS DSCHRG MGMT >30: CPT | Performed by: INTERNAL MEDICINE

## 2018-02-08 PROCEDURE — 74011250636 HC RX REV CODE- 250/636: Performed by: INTERNAL MEDICINE

## 2018-02-08 PROCEDURE — 36591 DRAW BLOOD OFF VENOUS DEVICE: CPT

## 2018-02-08 PROCEDURE — 74011250636 HC RX REV CODE- 250/636: Performed by: NURSE PRACTITIONER

## 2018-02-08 RX ORDER — LORATADINE 10 MG/1
10 TABLET ORAL DAILY
Qty: 30 TAB | Refills: 0 | Status: SHIPPED | OUTPATIENT
Start: 2018-02-08 | End: 2018-06-25 | Stop reason: ALTCHOICE

## 2018-02-08 RX ORDER — HEPARIN 100 UNIT/ML
300 SYRINGE INTRAVENOUS AS NEEDED
Status: DISCONTINUED | OUTPATIENT
Start: 2018-02-08 | End: 2018-02-08 | Stop reason: HOSPADM

## 2018-02-08 RX ADMIN — FLUCONAZOLE 150 MG: 100 TABLET ORAL at 07:58

## 2018-02-08 RX ADMIN — ENOXAPARIN SODIUM 40 MG: 40 INJECTION SUBCUTANEOUS at 00:34

## 2018-02-08 RX ADMIN — ONDANSETRON 8 MG: 2 INJECTION INTRAMUSCULAR; INTRAVENOUS at 00:34

## 2018-02-08 RX ADMIN — LORATADINE 10 MG: 10 TABLET ORAL at 07:58

## 2018-02-08 RX ADMIN — Medication 400 MG: at 07:58

## 2018-02-08 RX ADMIN — DEXAMETHASONE SODIUM PHOSPHATE 10 MG: 10 INJECTION INTRAMUSCULAR; INTRAVENOUS at 07:57

## 2018-02-08 RX ADMIN — SODIUM CHLORIDE, PRESERVATIVE FREE 300 UNITS: 5 INJECTION INTRAVENOUS at 12:17

## 2018-02-08 RX ADMIN — PREGABALIN 100 MG: 100 CAPSULE ORAL at 07:59

## 2018-02-08 RX ADMIN — LEVOTHYROXINE SODIUM 175 MCG: 100 TABLET ORAL at 07:58

## 2018-02-08 RX ADMIN — ALLOPURINOL 300 MG: 300 TABLET ORAL at 07:58

## 2018-02-08 RX ADMIN — ONDANSETRON 8 MG: 2 INJECTION INTRAMUSCULAR; INTRAVENOUS at 07:57

## 2018-02-08 RX ADMIN — ACYCLOVIR 400 MG: 800 TABLET ORAL at 07:58

## 2018-02-08 RX ADMIN — ETOPOSIDE 230 MG: 20 INJECTION, SOLUTION INTRAVENOUS at 10:26

## 2018-02-08 NOTE — PROGRESS NOTES
END OF SHIFT NOTE:    Patient had a restless night. Was anxious R/T chemo, discussion with son regarding his desire to get a tattoo, upcoming trip to [place] [address], etc. Hot tea offered and accepted, patient declined any medication to help her rest. Tylenol given x1 for nagging headache with good results. Ifex/[place] infused without difficulty overnight, should complete around noon today. Last infusion of Etoposide due today at 10am.     Voiding without difficulty, good output, but weight up today? Possibly due to large amts of fluids/chemo? Lung sounds clear, pt has no complaints of SOB, etc. Excited to be finishing up this cycle, expecting discharge this afternoon following Etoposide infusion. Intake/Output  02/07 1901 - 02/08 0700  In: 2505 [P.O.:240; I.V.:2265]  Out: 2300 [Urine:2300]   Voiding: YES  Catheter: NO  Drain:              Stool:  0 occurrences. Stool Assessment  Stool Color: Jessica Gallus (02/06/18 2017)  Stool Appearance: Loose (02/06/18 2017)  Stool Amount: Medium (02/06/18 2017)  Stool Source/Status: Rectum (02/06/18 2017)    Emesis:  0 occurrences. VITAL SIGNS  Patient Vitals for the past 12 hrs:   Temp Pulse Resp BP SpO2   02/08/18 0351 98.5 °F (36.9 °C) 75 18 146/85 98 %   02/08/18 0034 98.1 °F (36.7 °C) 78 18 135/60 95 %   02/07/18 2107 98.8 °F (37.1 °C) 81 18 147/71 97 %       Pain Assessment  Pain 1  Pain Scale 1: Numeric (0 - 10) (02/08/18 0351)  Pain Intensity 1: 0 (02/08/18 0351)  Patient Stated Pain Goal: 0 (02/08/18 0351)  Pain Reassessment 1: Yes (02/07/18 1937)  Pain Location 1: Head (02/07/18 1937)  Pain Orientation 1: Anterior (02/07/18 1937)  Pain Description 1: Dull (02/07/18 1937)  Pain Intervention(s) 1: Distraction;Declines (states she recieved Tylenol already, and will wait ) (02/07/18 1937)    Ambulating  Yes    Additional Information:     Shift report given to oncoming nurse at the bedside.     Juan Tobias

## 2018-02-08 NOTE — DISCHARGE INSTRUCTIONS
DISCHARGE SUMMARY from Nurse    PATIENT INSTRUCTIONS:    After general anesthesia or intravenous sedation, for 24 hours or while taking prescription Narcotics:  · Limit your activities  · Do not drive and operate hazardous machinery  · Do not make important personal or business decisions  · Do  not drink alcoholic beverages  · If you have not urinated within 8 hours after discharge, please contact your surgeon on call. What to do at Home:  Recommended activity: Activity as tolerated    If you experience any of the following symptoms fever, unrelieved nausea or vomiting, unrelieved pain, redness or drainage from port site, please follow up with your oncologist or primary care doctor. *  Please give a list of your current medications to your Primary Care Provider. *  Please update this list whenever your medications are discontinued, doses are      changed, or new medications (including over-the-counter products) are added. *  Please carry medication information at all times in case of emergency situations. These are general instructions for a healthy lifestyle:    No smoking/ No tobacco products/ Avoid exposure to second hand smoke  Surgeon General's Warning:  Quitting smoking now greatly reduces serious risk to your health. Obesity, smoking, and sedentary lifestyle greatly increases your risk for illness    A healthy diet, regular physical exercise & weight monitoring are important for maintaining a healthy lifestyle    You may be retaining fluid if you have a history of heart failure or if you experience any of the following symptoms:  Weight gain of 3 pounds or more overnight or 5 pounds in a week, increased swelling in our hands or feet or shortness of breath while lying flat in bed. Please call your doctor as soon as you notice any of these symptoms; do not wait until your next office visit.     Recognize signs and symptoms of STROKE:    F-face looks uneven    A-arms unable to move or move unevenly    S-speech slurred or non-existent    T-time-call 911 as soon as signs and symptoms begin-DO NOT go       Back to bed or wait to see if you get better-TIME IS BRAIN. Warning Signs of HEART ATTACK     Call 911 if you have these symptoms:   Chest discomfort. Most heart attacks involve discomfort in the center of the chest that lasts more than a few minutes, or that goes away and comes back. It can feel like uncomfortable pressure, squeezing, fullness, or pain.  Discomfort in other areas of the upper body. Symptoms can include pain or discomfort in one or both arms, the back, neck, jaw, or stomach.  Shortness of breath with or without chest discomfort.  Other signs may include breaking out in a cold sweat, nausea, or lightheadedness. Don't wait more than five minutes to call 911 - MINUTES MATTER! Fast action can save your life. Calling 911 is almost always the fastest way to get lifesaving treatment. Emergency Medical Services staff can begin treatment when they arrive -- up to an hour sooner than if someone gets to the hospital by car. The discharge information has been reviewed with the patient and spouse. The patient and spouse verbalized understanding. Discharge medications reviewed with the patient and spouse and appropriate educational materials and side effects teaching were provided. ___________________________________________________________________________________________________________________________________             Managing Side Effects of Chemotherapy: Care Instructions  Your Care Instructions    Cancer is often treated with medicines that destroy the cancer cells (chemotherapy). These medicines may slow cancer growth and prevent or stop the spread of cancer. Chemotherapy also can affect healthy cells and cause side effects. Most people can work and do their normal activities after and even during chemotherapy, but they may need to limit their schedules.  Side effects of chemotherapy may include nausea and vomiting, loss of appetite, pain, and being tired. Some medicines can cause diarrhea or mouth sores. Your doctor may prescribe medicines to treat the side effects. Your doctor will advise you to take extra care to prevent illnesses and infections, because chemotherapy weakens your natural defenses. Follow-up care is a key part of your treatment and safety. Be sure to make and go to all appointments, and call your doctor if you are having problems. It's also a good idea to know your test results and keep a list of the medicines you take. How can you care for yourself at home? Medicines  ? · Take your medicines exactly as prescribed. Call your doctor if you think you are having a problem with your medicine. You may get medicine for nausea and vomiting if you have these side effects. ?Nausea and vomiting  ? · A light meal or snack before chemotherapy may help prevent nausea. If you do have nausea during your treatment, try eating earlier-at least an hour or two before your next treatment. After your treatment, you may want to wait one or more hours before you eat again. ? · Drink fluids with your meals and an hour before or after meals. ? · After vomiting has stopped for 1 hour, sip a rehydration drink, such as Powerade or Gatorade. ? · Drink plenty of fluids to prevent dehydration. Choose water and other caffeine-free clear liquids until you feel better. Try clear fluids, such as apple or grape juice mixed to half strength with water, rehydration drinks, weak tea with sugar, clear broth, and gelatin dessert. Do not drink citrus juices. If you have kidney, heart, or liver disease and have to limit fluids, talk with your doctor before you increase the amount of fluids you drink. ? · When you are feeling better, begin eating clear soups and mild foods until all symptoms are gone for 12 to 48 hours.  Other good choices include dry toast, crackers, cooked cereal, and gelatin dessert, such as Jell-O.   ? · If your vomiting is not getting better or is getting worse, call your doctor right away. ? Loss of appetite  ? · It's important to eat healthy food. If you do not feel like eating, try to eat food that has protein and extra calories to keep up your strength and prevent weight loss. You can drink liquid meal replacements for extra calories and protein. ? · Try eating several smaller meals throughout the day. Set a schedule for meals and snacks, and plan for times when it feels best to eat. Try to eat your main meal early. ? · After treatment, you may want to wait for a while to eat. You can also try eating earlier before treatment. ? · Try to eat more of the foods you like during the days and times when your appetite is good. ? · When you don't feel like eating your normal foods, try clear broths/soups and mild foods like toast, crackers, cooked cereal like oatmeal, and gelatin dessert. Eating soft, bland foods may help. ?Pain control  ? · If your doctor prescribes medicines to control pain, take them as directed. Often your doctor will have you take these medicines regularly to keep your pain under control. Medicine for pain may cause side effects. Let your doctor know if you feel constipated, have trouble urinating, or have nausea. ? · Try using relaxation exercises to lower your anxiety and stress, which can increase pain. ? · Keep track of your pain so you can tell your doctor what your pain is like. Write down where you feel pain, how long it lasts, what seems to bring it on, and how it feels. Also note what makes the pain feel better or worse. ? · If you have mouth pain, your doctor may prescribe a special mouth rinse that can help relieve the pain. ?Weakness and feeling tired  ? · Get extra rest. Plan ahead so you can take breaks or naps. ? · Save your energy for the most important things you want to do.    ? · Try to get some exercise, such as walking, but stop if you are too tired. ? · Eat a balanced diet. Do not skip meals, especially breakfast.   ? · Do something you enjoy. Do you like to listen to music? Spend some time listening to your favorite music. Or find another way to relax by reading, watching a movie, or playing games. ? · Ask family and friends to help with home chores and other tasks. ? To prevent infections  ? · Wash your hands often during the day, especially before you eat and after you use the bathroom. ? · Stay away from people who have illnesses that you might catch, such as the flu or a cold. ? · Try to stay out of crowds. ? · Clean cuts and scrapes right away with warm water and soap. Clean them daily until they are healed. ? · Keep track of your temperature, if your doctor recommends it. You can do this by taking your temperature at regular times and writing it down. ? Hair loss  ? · Use a mild shampoo and a soft hair brush. ? · Use a low setting on your hair dryer. Do not color or perm your hair. ? · Have your hair cut short. It will look thicker and barnett, and it will not be such a shock if you lose hair. ? · Use sunscreen and a hat, scarf, or turban to protect your scalp from the sun. ? · Ask your doctor about other treatments that you may try to prevent or minimize hair loss. These may include the use of a cooling cap. When should you call for help? Call 911 anytime you think you may need emergency care. For example, call if:  ? · You passed out (lost consciousness). ?Call your doctor now or seek immediate medical care if:  ? · You have a fever. ? · You have abnormal bleeding. ? · You have new or worse pain. ? · You think you have an infection. ? · You have new symptoms, such as a cough, belly pain, vomiting, diarrhea, or a rash. ? Watch closely for changes in your health, and be sure to contact your doctor if:  ? · You are much more tired than usual.   ? · You have swollen glands in your armpits, groin, or neck. ? · You do not get better as expected. Where can you learn more? Go to http://rajat-rachel.info/. Enter ([phone] 5747-157 in the search box to learn more about \"Managing Side Effects of Chemotherapy: Care Instructions. \"  Current as of: May 12, 2017  Content Version: 11.4  © 1451-6378 [employer] Application. Care instructions adapted under license by [employer] (which disclaims liability or warranty for this information). If you have questions about a medical condition or this instruction, always ask your healthcare professional. [place] [address] any warranty or liability for your use of this information.

## 2018-02-09 ENCOUNTER — HOSPITAL ENCOUNTER (OUTPATIENT)
Dept: INFUSION THERAPY | Age: 58
Discharge: HOME | End: 2018-02-09
Payer: COMMERCIAL

## 2018-02-09 ENCOUNTER — APPOINTMENT (OUTPATIENT)
Dept: INFUSION THERAPY | Age: 58
End: 2018-02-09

## 2018-02-09 ENCOUNTER — PATIENT OUTREACH (OUTPATIENT)
Dept: CASE MANAGEMENT | Age: 58
End: 2018-02-09

## 2018-02-09 VITALS
WEIGHT: 274.4 LBS | OXYGEN SATURATION: 97 % | HEART RATE: 88 BPM | SYSTOLIC BLOOD PRESSURE: 128 MMHG | TEMPERATURE: 99.2 F | RESPIRATION RATE: 18 BRPM | DIASTOLIC BLOOD PRESSURE: 75 MMHG | BODY MASS INDEX: 50.19 KG/M2

## 2018-02-09 DIAGNOSIS — C83.38 DIFFUSE LARGE B-CELL LYMPHOMA OF LYMPH NODES OF MULTIPLE REGIONS (HCC): ICD-10-CM

## 2018-02-09 PROCEDURE — 74011250636 HC RX REV CODE- 250/636: Performed by: INTERNAL MEDICINE

## 2018-02-09 PROCEDURE — 96372 THER/PROPH/DIAG INJ SC/IM: CPT

## 2018-02-09 RX ADMIN — PEGFILGRASTIM 6 MG: 6 INJECTION SUBCUTANEOUS at 17:10

## 2018-02-09 NOTE — PROGRESS NOTES
Transition of Care Discharge Follow-up Questionnaire   Date/Time of Call:   2/9/2018 9:40 AM   What was the patient hospitalized for? Patient was hospitalized for Antineoplastic Chemo Therapy       Does the patient understand his/her diagnosis and/or treatment and what happened during the hospitalization? Spoke to  who verbalized understanding of treatment and diagnosis. Did the patient receive discharge instructions?  states d/c instructions received. Review any discharge instructions (see notes in [facility]). Ask patient if they understand these. Do they have any questions?  discussed discharge plan and instruction as  understood it to be with Care Coordinator. Which I have documented in the pertinent areas throughout this progress note. Were home services ordered (nursing, PT, OT, ST, etc.)? No HH ordered at d/c. If so, has the first visit occurred? If not, why? (Assist with coordination of services if necessary.) N/A. Was any DME ordered? No DME ordered at d/c. If so, has it been received? If not, why?  (Assist with coordination of arranging DME orders if necessary.) N/A   Complete a review of all medications (new, continued and discontinued meds per the D/C instructions and medication tab in [address] [place] [address]). Attempted Med Review  states we have everything we need and are well set up we picked up her Claritin and Zofran as well.    Were all new prescriptions filled? If not, why?  (Assist with obtainment of medications if necessary.) Yes. Does the patient understand the purpose and dosing instructions for all medications? (If patient has questions, provide explanation and education.) Indicated by  to Care Coordinator, the purpose and dosing instructions for all medications were understood, as he states we have been at this for quite some time now.     Does the patient have any problems in performing ADLs? (If patient is unable to perform ADLs  what is the limiting factor(s)? Do they have a support system that can assist? If no support system is present, discuss possible assistance that they may be able to obtain.)  states patient is independent with all ADLs. Does the patient have all follow-up appointments scheduled? 7 day f/up with PCP?    7-14 day f/up with specialist?    If f/up has not been made  what actions has the care coordinator made to accomplish this? Has transportation been arranged? [facility] follow-up should be within 7 days of discharge; all others should have PCP follow-up within 7 days of discharge; follow-ups with other specialists as appropriate or ordered.)  declined PCP f/u as he states she was in the hospital for chemo there is no need for that. We are already booked excessively for at least 2 times a week with Oncology including for today and again on Tuesday. She will not be going to her family doctor.    states he will provide transportation to all appts. Offered referral to [facility] for multiple admissions, but  declined for same reasons stated above, including that pt. has only been in hospital due to chemo.       Oncology appt today 2/9/18. Any other questions or concerns expressed by the patient? Gratitude stated and no further questions asked or needs identified. RODRIGUEZ Call ComplThis note will not be viewable in [employer].      eted By:   Canelo Barboza LPN/ Care Coordinator  [address]  [address] [place] [address]  [email or web address]

## 2018-02-09 NOTE — PROGRESS NOTES
Arrived to the [facility]. Neulasta completed. Patient tolerated well. Any issues or concerns during appointment: None. Patient aware of next infusion appointment on 2/12 (date) at [address] [place] [address] (time). Discharged ambulatory in stable condition.

## 2018-02-12 ENCOUNTER — HOSPITAL ENCOUNTER (OUTPATIENT)
Dept: INFUSION THERAPY | Age: 58
Discharge: HOME | End: 2018-02-12
Payer: COMMERCIAL

## 2018-02-12 VITALS
HEART RATE: 71 BPM | BODY MASS INDEX: 49.27 KG/M2 | DIASTOLIC BLOOD PRESSURE: 73 MMHG | OXYGEN SATURATION: 100 % | TEMPERATURE: 98.2 F | SYSTOLIC BLOOD PRESSURE: 119 MMHG | RESPIRATION RATE: 18 BRPM | WEIGHT: 269.4 LBS

## 2018-02-12 DIAGNOSIS — C85.88 MARGINAL ZONE LYMPHOMA OF LYMPH NODES OF MULTIPLE SITES (HCC): ICD-10-CM

## 2018-02-12 LAB
ALBUMIN SERPL-MCNC: 3.7 G/DL (ref 3.5–5)
ALBUMIN/GLOB SERPL: 1.4 {RATIO} (ref 1.2–3.5)
ALP SERPL-CCNC: 116 U/L (ref 50–136)
ALT SERPL-CCNC: 62 U/L (ref 12–65)
ANION GAP SERPL CALC-SCNC: 8 MMOL/L (ref 7–16)
AST SERPL-CCNC: 22 U/L (ref 15–37)
BILIRUB SERPL-MCNC: 0.5 MG/DL (ref 0.2–1.1)
BUN SERPL-MCNC: 23 MG/DL (ref 6–23)
CALCIUM SERPL-MCNC: 8.6 MG/DL (ref 8.3–10.4)
CHLORIDE SERPL-SCNC: 107 MMOL/L (ref 98–107)
CO2 SERPL-SCNC: 26 MMOL/L (ref 21–32)
CREAT SERPL-MCNC: 0.96 MG/DL (ref 0.6–1)
DIFFERENTIAL METHOD BLD: ABNORMAL
EOSINOPHIL # BLD: 0.2 K/UL (ref 0–0.8)
EOSINOPHIL NFR BLD MANUAL: 7 % (ref 1–8)
ERYTHROCYTE [DISTWIDTH] IN BLOOD BY AUTOMATED COUNT: 14.6 % (ref 11.9–14.6)
GLOBULIN SER CALC-MCNC: 2.6 G/DL (ref 2.3–3.5)
GLUCOSE SERPL-MCNC: 93 MG/DL (ref 65–100)
HCT VFR BLD AUTO: 26.8 % (ref 35.8–46.3)
HGB BLD-MCNC: 9 G/DL (ref 11.7–15.4)
LYMPHOCYTES # BLD: 0.3 K/UL (ref 0.5–4.6)
LYMPHOCYTES NFR BLD MANUAL: 11 % (ref 16–44)
MAGNESIUM SERPL-MCNC: 2.1 MG/DL (ref 1.8–2.4)
MCH RBC QN AUTO: 31.3 PG (ref 26.1–32.9)
MCHC RBC AUTO-ENTMCNC: 33.6 G/DL (ref 31.4–35)
MCV RBC AUTO: 93.1 FL (ref 79.6–97.8)
MONOCYTES # BLD: 0.1 K/UL (ref 0.1–1.3)
MONOCYTES NFR BLD MANUAL: 2 % (ref 3–9)
MYELOCYTES NFR BLD MANUAL: 1 %
NEUTS SEG # BLD: 1.9 K/UL (ref 1.7–8.2)
NEUTS SEG NFR BLD MANUAL: 79 % (ref 47–75)
NRBC # BLD: 0 K/UL (ref 0–0.2)
PLATELET # BLD AUTO: 66 K/UL (ref 150–450)
PLATELET COMMENTS,PCOM: ABNORMAL
PMV BLD AUTO: 11.9 FL (ref 10.8–14.1)
POTASSIUM SERPL-SCNC: 3.9 MMOL/L (ref 3.5–5.1)
PROT SERPL-MCNC: 6.3 G/DL (ref 6.3–8.2)
RBC # BLD AUTO: 2.88 M/UL (ref 4.05–5.25)
RBC MORPH BLD: ABNORMAL
SODIUM SERPL-SCNC: 141 MMOL/L (ref 136–145)
WBC # BLD AUTO: 2.5 K/UL (ref 4.3–11.1)
WBC MORPH BLD: ABNORMAL

## 2018-02-12 PROCEDURE — 85025 COMPLETE CBC W/AUTO DIFF WBC: CPT | Performed by: INTERNAL MEDICINE

## 2018-02-12 PROCEDURE — 96374 THER/PROPH/DIAG INJ IV PUSH: CPT

## 2018-02-12 PROCEDURE — 77030003560 HC NDL HUBR BARD -A

## 2018-02-12 PROCEDURE — 74011250636 HC RX REV CODE- 250/636: Performed by: INTERNAL MEDICINE

## 2018-02-12 PROCEDURE — 80053 COMPREHEN METABOLIC PANEL: CPT | Performed by: INTERNAL MEDICINE

## 2018-02-12 PROCEDURE — 74011250636 HC RX REV CODE- 250/636: Performed by: NURSE PRACTITIONER

## 2018-02-12 PROCEDURE — 83735 ASSAY OF MAGNESIUM: CPT | Performed by: INTERNAL MEDICINE

## 2018-02-12 RX ORDER — ONDANSETRON 2 MG/ML
8 INJECTION INTRAMUSCULAR; INTRAVENOUS ONCE
Status: COMPLETED | OUTPATIENT
Start: 2018-02-12 | End: 2018-02-12

## 2018-02-12 RX ORDER — SODIUM CHLORIDE 0.9 % (FLUSH) 0.9 %
10 SYRINGE (ML) INJECTION AS NEEDED
Status: DISCONTINUED | OUTPATIENT
Start: 2018-02-12 | End: 2018-02-16 | Stop reason: HOSPADM

## 2018-02-12 RX ORDER — HEPARIN 100 UNIT/ML
500 SYRINGE INTRAVENOUS AS NEEDED
Status: DISPENSED | OUTPATIENT
Start: 2018-02-12 | End: 2018-02-12

## 2018-02-12 RX ORDER — SODIUM CHLORIDE 9 MG/ML
1000 INJECTION, SOLUTION INTRAVENOUS ONCE
Status: COMPLETED | OUTPATIENT
Start: 2018-02-12 | End: 2018-02-12

## 2018-02-12 RX ADMIN — ONDANSETRON 8 MG: 2 INJECTION INTRAMUSCULAR; INTRAVENOUS at 08:24

## 2018-02-12 RX ADMIN — Medication 10 ML: at 07:25

## 2018-02-12 RX ADMIN — SODIUM CHLORIDE 1000 ML: 900 INJECTION, SOLUTION INTRAVENOUS at 07:31

## 2018-02-12 RX ADMIN — SODIUM CHLORIDE, PRESERVATIVE FREE 500 UNITS: 5 INJECTION INTRAVENOUS at 08:35

## 2018-02-12 NOTE — PROGRESS NOTES
Arrived to the [facility]. Labs completed. Patient tolerated well. Any issues or concerns during appointment: Has had diarrhea/nausea w/emesis. Fluids given. No replacements required. Zofran IV given. Patient states good relief from nausea with Zofran and fluid. Patient aware of next infusion appointment on 2/15/18 @1115 . Discharged ambulatory in stable condition.

## 2018-02-15 ENCOUNTER — HOSPITAL ENCOUNTER (OUTPATIENT)
Dept: LAB | Age: 58
Discharge: HOME | End: 2018-02-15
Payer: COMMERCIAL

## 2018-02-15 ENCOUNTER — PATIENT OUTREACH (OUTPATIENT)
Dept: CASE MANAGEMENT | Age: 58
End: 2018-02-15

## 2018-02-15 ENCOUNTER — HOSPITAL ENCOUNTER (OUTPATIENT)
Dept: INFUSION THERAPY | Age: 58
Discharge: HOME | End: 2018-02-15
Payer: COMMERCIAL

## 2018-02-15 VITALS
BODY MASS INDEX: 49.09 KG/M2 | RESPIRATION RATE: 18 BRPM | DIASTOLIC BLOOD PRESSURE: 58 MMHG | SYSTOLIC BLOOD PRESSURE: 110 MMHG | WEIGHT: 268.4 LBS | TEMPERATURE: 98.8 F | OXYGEN SATURATION: 100 % | HEART RATE: 107 BPM

## 2018-02-15 DIAGNOSIS — C83.38 DIFFUSE LARGE B-CELL LYMPHOMA OF LYMPH NODES OF MULTIPLE REGIONS (HCC): Chronic | ICD-10-CM

## 2018-02-15 DIAGNOSIS — C85.88 MARGINAL ZONE LYMPHOMA OF LYMPH NODES OF MULTIPLE SITES (HCC): Chronic | ICD-10-CM

## 2018-02-15 DIAGNOSIS — C83.38 DIFFUSE LARGE B-CELL LYMPHOMA OF LYMPH NODES OF MULTIPLE REGIONS (HCC): ICD-10-CM

## 2018-02-15 LAB
ALBUMIN SERPL-MCNC: 3.9 G/DL (ref 3.5–5)
ALBUMIN/GLOB SERPL: 1.4 {RATIO} (ref 1.2–3.5)
ALP SERPL-CCNC: 157 U/L (ref 50–136)
ALT SERPL-CCNC: 87 U/L (ref 12–65)
ANION GAP SERPL CALC-SCNC: 9 MMOL/L (ref 7–16)
AST SERPL-CCNC: 34 U/L (ref 15–37)
BILIRUB SERPL-MCNC: 0.4 MG/DL (ref 0.2–1.1)
BUN SERPL-MCNC: 22 MG/DL (ref 6–23)
CALCIUM SERPL-MCNC: 9 MG/DL (ref 8.3–10.4)
CHLORIDE SERPL-SCNC: 106 MMOL/L (ref 98–107)
CO2 SERPL-SCNC: 25 MMOL/L (ref 21–32)
CREAT SERPL-MCNC: 0.82 MG/DL (ref 0.6–1)
DIFFERENTIAL METHOD BLD: ABNORMAL
ERYTHROCYTE [DISTWIDTH] IN BLOOD BY AUTOMATED COUNT: 13.5 % (ref 11.9–14.6)
GLOBULIN SER CALC-MCNC: 2.8 G/DL (ref 2.3–3.5)
GLUCOSE SERPL-MCNC: 101 MG/DL (ref 65–100)
HCT VFR BLD AUTO: 23.3 % (ref 35.8–46.3)
HGB BLD-MCNC: 8.1 G/DL (ref 11.7–15.4)
MCH RBC QN AUTO: 31 PG (ref 26.1–32.9)
MCHC RBC AUTO-ENTMCNC: 34.8 G/DL (ref 31.4–35)
MCV RBC AUTO: 89.3 FL (ref 79.6–97.8)
NRBC # BLD: 0 K/UL (ref 0–0.2)
PLATELET # BLD AUTO: 15 K/UL (ref 150–450)
PLATELET COMMENTS,PCOM: ABNORMAL
POTASSIUM SERPL-SCNC: 4.1 MMOL/L (ref 3.5–5.1)
PROT SERPL-MCNC: 6.7 G/DL (ref 6.3–8.2)
RBC # BLD AUTO: 2.61 M/UL (ref 4.05–5.25)
RBC MORPH BLD: ABNORMAL
SODIUM SERPL-SCNC: 140 MMOL/L (ref 136–145)
WBC # BLD AUTO: 0.2 K/UL (ref 4.3–11.1)
WBC MORPH BLD: ABNORMAL

## 2018-02-15 PROCEDURE — 74011250636 HC RX REV CODE- 250/636: Performed by: INTERNAL MEDICINE

## 2018-02-15 PROCEDURE — P9037 PLATE PHERES LEUKOREDU IRRAD: HCPCS | Performed by: INTERNAL MEDICINE

## 2018-02-15 PROCEDURE — 86644 CMV ANTIBODY: CPT | Performed by: INTERNAL MEDICINE

## 2018-02-15 PROCEDURE — 36430 TRANSFUSION BLD/BLD COMPNT: CPT

## 2018-02-15 PROCEDURE — 80053 COMPREHEN METABOLIC PANEL: CPT | Performed by: INTERNAL MEDICINE

## 2018-02-15 PROCEDURE — 74011250637 HC RX REV CODE- 250/637: Performed by: INTERNAL MEDICINE

## 2018-02-15 PROCEDURE — 77030018667 ADMN ST IV BLD FENW -A

## 2018-02-15 PROCEDURE — 85025 COMPLETE CBC W/AUTO DIFF WBC: CPT | Performed by: INTERNAL MEDICINE

## 2018-02-15 RX ORDER — ACETAMINOPHEN 325 MG/1
650 TABLET ORAL ONCE
Status: COMPLETED | OUTPATIENT
Start: 2018-02-15 | End: 2018-02-15

## 2018-02-15 RX ORDER — SODIUM CHLORIDE 0.9 % (FLUSH) 0.9 %
10 SYRINGE (ML) INJECTION AS NEEDED
Status: DISCONTINUED | OUTPATIENT
Start: 2018-02-15 | End: 2018-02-19 | Stop reason: HOSPADM

## 2018-02-15 RX ORDER — HEPARIN 100 UNIT/ML
500 SYRINGE INTRAVENOUS AS NEEDED
Status: DISCONTINUED | OUTPATIENT
Start: 2018-02-15 | End: 2018-02-19 | Stop reason: HOSPADM

## 2018-02-15 RX ORDER — SODIUM CHLORIDE 9 MG/ML
250 INJECTION, SOLUTION INTRAVENOUS AS NEEDED
Status: DISCONTINUED | OUTPATIENT
Start: 2018-02-15 | End: 2018-02-19 | Stop reason: HOSPADM

## 2018-02-15 RX ORDER — DIPHENHYDRAMINE HCL 25 MG
25 CAPSULE ORAL ONCE
Status: COMPLETED | OUTPATIENT
Start: 2018-02-15 | End: 2018-02-15

## 2018-02-15 RX ADMIN — SODIUM CHLORIDE, PRESERVATIVE FREE 500 UNITS: 5 INJECTION INTRAVENOUS at 17:08

## 2018-02-15 RX ADMIN — Medication 10 ML: at 17:08

## 2018-02-15 RX ADMIN — ACETAMINOPHEN 650 MG: 325 TABLET, FILM COATED ORAL at 16:21

## 2018-02-15 RX ADMIN — SODIUM CHLORIDE 250 ML: 900 INJECTION, SOLUTION INTRAVENOUS at 16:22

## 2018-02-15 RX ADMIN — DIPHENHYDRAMINE HYDROCHLORIDE 25 MG: 25 CAPSULE ORAL at 16:20

## 2018-02-15 NOTE — PROGRESS NOTES
Pt was seen and labs reviewed by Daniela Clemens NP. Pt states she is feeling well, but did report that she had neck/back pain on Saturday after receiving Neulasta shot. Pt is headed to [facility] tomorrow for BMT consult. Will give 1 unit platelets today to boost her up before trip. Pt also instructed on neutropenic precautions ans given masks to wear out in public. Pt to return to clinic on 2/27 prior to cycle 2.

## 2018-02-16 ENCOUNTER — HOSPITAL ENCOUNTER (OUTPATIENT)
Dept: LAB | Age: 58
Discharge: HOME | End: 2018-02-16
Payer: COMMERCIAL

## 2018-02-16 ENCOUNTER — HOSPITAL ENCOUNTER (OUTPATIENT)
Dept: INFUSION THERAPY | Age: 58
Discharge: HOME | End: 2018-02-16
Payer: COMMERCIAL

## 2018-02-16 VITALS
TEMPERATURE: 99.2 F | BODY MASS INDEX: 49.2 KG/M2 | HEART RATE: 105 BPM | SYSTOLIC BLOOD PRESSURE: 128 MMHG | DIASTOLIC BLOOD PRESSURE: 74 MMHG | RESPIRATION RATE: 18 BRPM | OXYGEN SATURATION: 98 % | WEIGHT: 269 LBS

## 2018-02-16 DIAGNOSIS — C83.38 DIFFUSE LARGE B-CELL LYMPHOMA OF LYMPH NODES OF MULTIPLE REGIONS (HCC): Chronic | ICD-10-CM

## 2018-02-16 DIAGNOSIS — C85.88 MARGINAL ZONE LYMPHOMA OF LYMPH NODES OF MULTIPLE SITES (HCC): ICD-10-CM

## 2018-02-16 LAB
ALBUMIN SERPL-MCNC: 3.9 G/DL (ref 3.5–5)
ALBUMIN/GLOB SERPL: 1.4 {RATIO} (ref 1.2–3.5)
ALP SERPL-CCNC: 178 U/L (ref 50–136)
ALT SERPL-CCNC: 172 U/L (ref 12–65)
ANION GAP SERPL CALC-SCNC: 5 MMOL/L (ref 7–16)
AST SERPL-CCNC: 79 U/L (ref 15–37)
BILIRUB SERPL-MCNC: 0.5 MG/DL (ref 0.2–1.1)
BLD PROD TYP BPU: NORMAL
BPU ID: NORMAL
BUN SERPL-MCNC: 19 MG/DL (ref 6–23)
CALCIUM SERPL-MCNC: 9.2 MG/DL (ref 8.3–10.4)
CHLORIDE SERPL-SCNC: 111 MMOL/L (ref 98–107)
CO2 SERPL-SCNC: 25 MMOL/L (ref 21–32)
CREAT SERPL-MCNC: 0.9 MG/DL (ref 0.6–1)
DIFFERENTIAL METHOD BLD: ABNORMAL
ERYTHROCYTE [DISTWIDTH] IN BLOOD BY AUTOMATED COUNT: 13.5 % (ref 11.9–14.6)
FLUAV AG NPH QL IA: NEGATIVE
FLUBV AG NPH QL IA: NEGATIVE
GLOBULIN SER CALC-MCNC: 2.8 G/DL (ref 2.3–3.5)
GLUCOSE SERPL-MCNC: 112 MG/DL (ref 65–100)
HCT VFR BLD AUTO: 20.8 % (ref 35.8–46.3)
HGB BLD-MCNC: 7.3 G/DL (ref 11.7–15.4)
MCH RBC QN AUTO: 31.3 PG (ref 26.1–32.9)
MCHC RBC AUTO-ENTMCNC: 35.1 G/DL (ref 31.4–35)
MCV RBC AUTO: 89.3 FL (ref 79.6–97.8)
NRBC # BLD: 0 K/UL (ref 0–0.2)
PLATELET # BLD AUTO: 24 K/UL (ref 150–450)
PLATELET COMMENTS,PCOM: ABNORMAL
PMV BLD AUTO: 11.1 FL (ref 10.8–14.1)
POTASSIUM SERPL-SCNC: 4.1 MMOL/L (ref 3.5–5.1)
PROT SERPL-MCNC: 6.7 G/DL (ref 6.3–8.2)
RBC # BLD AUTO: 2.33 M/UL (ref 4.05–5.25)
RBC MORPH BLD: ABNORMAL
SODIUM SERPL-SCNC: 141 MMOL/L (ref 136–145)
STATUS OF UNIT,%ST: NORMAL
UNIT DIVISION, %UDIV: 0
WBC # BLD AUTO: 0.3 K/UL (ref 4.3–11.1)
WBC MORPH BLD: ABNORMAL

## 2018-02-16 PROCEDURE — 96365 THER/PROPH/DIAG IV INF INIT: CPT

## 2018-02-16 PROCEDURE — 86644 CMV ANTIBODY: CPT | Performed by: INTERNAL MEDICINE

## 2018-02-16 PROCEDURE — 86901 BLOOD TYPING SEROLOGIC RH(D): CPT | Performed by: INTERNAL MEDICINE

## 2018-02-16 PROCEDURE — 36415 COLL VENOUS BLD VENIPUNCTURE: CPT | Performed by: INTERNAL MEDICINE

## 2018-02-16 PROCEDURE — 87040 BLOOD CULTURE FOR BACTERIA: CPT | Performed by: INTERNAL MEDICINE

## 2018-02-16 PROCEDURE — 74011250636 HC RX REV CODE- 250/636: Performed by: NURSE PRACTITIONER

## 2018-02-16 PROCEDURE — 85025 COMPLETE CBC W/AUTO DIFF WBC: CPT | Performed by: INTERNAL MEDICINE

## 2018-02-16 PROCEDURE — 96366 THER/PROPH/DIAG IV INF ADDON: CPT

## 2018-02-16 PROCEDURE — 87804 INFLUENZA ASSAY W/OPTIC: CPT | Performed by: INTERNAL MEDICINE

## 2018-02-16 PROCEDURE — 86923 COMPATIBILITY TEST ELECTRIC: CPT | Performed by: INTERNAL MEDICINE

## 2018-02-16 PROCEDURE — 80053 COMPREHEN METABOLIC PANEL: CPT | Performed by: INTERNAL MEDICINE

## 2018-02-16 RX ORDER — SODIUM CHLORIDE 0.9 % (FLUSH) 0.9 %
10 SYRINGE (ML) INJECTION AS NEEDED
Status: DISCONTINUED | OUTPATIENT
Start: 2018-02-16 | End: 2018-02-20 | Stop reason: HOSPADM

## 2018-02-16 RX ORDER — VANCOMYCIN 2 GRAM/500 ML IN 0.9 % SODIUM CHLORIDE INTRAVENOUS
2000 ONCE
Status: COMPLETED | OUTPATIENT
Start: 2018-02-16 | End: 2018-02-16

## 2018-02-16 RX ORDER — HEPARIN 100 UNIT/ML
300-500 SYRINGE INTRAVENOUS AS NEEDED
Status: DISPENSED | OUTPATIENT
Start: 2018-02-16 | End: 2018-02-17

## 2018-02-16 RX ADMIN — Medication 10 ML: at 16:53

## 2018-02-16 RX ADMIN — VANCOMYCIN HYDROCHLORIDE 2000 MG: 10 INJECTION, POWDER, LYOPHILIZED, FOR SOLUTION INTRAVENOUS at 14:47

## 2018-02-16 RX ADMIN — Medication 10 ML: at 14:47

## 2018-02-16 RX ADMIN — SODIUM CHLORIDE, PRESERVATIVE FREE 500 UNITS: 5 INJECTION INTRAVENOUS at 16:53

## 2018-02-16 NOTE — PROGRESS NOTES
Arrived to the [facility]. Vancomycin completed. Patient tolerated well. Any issues or concerns during appointment: Patient states she was running a temp at home of 101,otherwise feels good Labs reviewed with patient. Patient aware of next infusion appointment on 02/24  at 0700 . Discharged ambulatory.

## 2018-02-17 ENCOUNTER — HOSPITAL ENCOUNTER (OUTPATIENT)
Dept: INFUSION THERAPY | Age: 58
Discharge: HOME | End: 2018-02-17
Payer: COMMERCIAL

## 2018-02-17 VITALS
HEART RATE: 67 BPM | TEMPERATURE: 98.9 F | BODY MASS INDEX: 49.69 KG/M2 | OXYGEN SATURATION: 99 % | WEIGHT: 271.7 LBS | DIASTOLIC BLOOD PRESSURE: 73 MMHG | RESPIRATION RATE: 16 BRPM | SYSTOLIC BLOOD PRESSURE: 114 MMHG

## 2018-02-17 DIAGNOSIS — C85.88 MARGINAL ZONE LYMPHOMA OF LYMPH NODES OF MULTIPLE SITES (HCC): ICD-10-CM

## 2018-02-17 PROCEDURE — 74011250637 HC RX REV CODE- 250/637: Performed by: INTERNAL MEDICINE

## 2018-02-17 PROCEDURE — P9040 RBC LEUKOREDUCED IRRADIATED: HCPCS | Performed by: INTERNAL MEDICINE

## 2018-02-17 PROCEDURE — 36430 TRANSFUSION BLD/BLD COMPNT: CPT

## 2018-02-17 PROCEDURE — 74011250636 HC RX REV CODE- 250/636: Performed by: INTERNAL MEDICINE

## 2018-02-17 PROCEDURE — 77030018667 ADMN ST IV BLD FENW -A

## 2018-02-17 PROCEDURE — 77030003560 HC NDL HUBR BARD -A

## 2018-02-17 RX ORDER — SODIUM CHLORIDE 9 MG/ML
250 INJECTION, SOLUTION INTRAVENOUS AS NEEDED
Status: DISCONTINUED | OUTPATIENT
Start: 2018-02-17 | End: 2018-02-21 | Stop reason: HOSPADM

## 2018-02-17 RX ORDER — HEPARIN 100 UNIT/ML
500 SYRINGE INTRAVENOUS AS NEEDED
Status: COMPLETED | OUTPATIENT
Start: 2018-02-17 | End: 2018-02-17

## 2018-02-17 RX ORDER — SODIUM CHLORIDE 0.9 % (FLUSH) 0.9 %
10 SYRINGE (ML) INJECTION AS NEEDED
Status: DISCONTINUED | OUTPATIENT
Start: 2018-02-17 | End: 2018-02-21 | Stop reason: HOSPADM

## 2018-02-17 RX ORDER — ACETAMINOPHEN 325 MG/1
650 TABLET ORAL
Status: DISCONTINUED | OUTPATIENT
Start: 2018-02-17 | End: 2018-02-21 | Stop reason: HOSPADM

## 2018-02-17 RX ORDER — DIPHENHYDRAMINE HCL 25 MG
25 CAPSULE ORAL
Status: DISCONTINUED | OUTPATIENT
Start: 2018-02-17 | End: 2018-02-21 | Stop reason: HOSPADM

## 2018-02-17 RX ADMIN — DIPHENHYDRAMINE HYDROCHLORIDE 25 MG: 25 CAPSULE ORAL at 07:39

## 2018-02-17 RX ADMIN — ACETAMINOPHEN 650 MG: 325 TABLET ORAL at 07:39

## 2018-02-17 RX ADMIN — SODIUM CHLORIDE 250 ML: 900 INJECTION, SOLUTION INTRAVENOUS at 07:40

## 2018-02-17 RX ADMIN — SODIUM CHLORIDE, PRESERVATIVE FREE 500 UNITS: 5 INJECTION INTRAVENOUS at 11:59

## 2018-02-17 RX ADMIN — Medication 10 ML: at 11:59

## 2018-02-17 NOTE — PROGRESS NOTES
Arrived to the [facility]. 2 units prbc's completed. Patient tolerated well. Any issues or concerns during appointment: NO.  Patient aware of next infusion appointment on 02/24/18 (date) at 0700 (time). Discharged ambulatory with spouse.

## 2018-02-23 ENCOUNTER — APPOINTMENT (OUTPATIENT)
Dept: INFUSION THERAPY | Age: 58
End: 2018-02-23
Payer: COMMERCIAL

## 2018-02-24 ENCOUNTER — HOSPITAL ENCOUNTER (OUTPATIENT)
Dept: INFUSION THERAPY | Age: 58
Discharge: HOME | End: 2018-02-24
Payer: COMMERCIAL

## 2018-02-24 VITALS
TEMPERATURE: 98.7 F | RESPIRATION RATE: 16 BRPM | HEART RATE: 79 BPM | SYSTOLIC BLOOD PRESSURE: 153 MMHG | BODY MASS INDEX: 49.93 KG/M2 | DIASTOLIC BLOOD PRESSURE: 88 MMHG | WEIGHT: 273 LBS | OXYGEN SATURATION: 98 %

## 2018-02-24 DIAGNOSIS — C82.09 GRADE I FOLLICULAR LYMPHOMA OF EXTRANODAL SITE EXCLUDING SPLEEN AND OTHER SOLID ORGANS (HCC): ICD-10-CM

## 2018-02-24 DIAGNOSIS — C85.88 MARGINAL ZONE LYMPHOMA OF LYMPH NODES OF MULTIPLE SITES (HCC): ICD-10-CM

## 2018-02-24 LAB
ALBUMIN SERPL-MCNC: 3.7 G/DL (ref 3.5–5)
ALBUMIN/GLOB SERPL: 1.4 {RATIO} (ref 1.2–3.5)
ALP SERPL-CCNC: 158 U/L (ref 50–136)
ALT SERPL-CCNC: 66 U/L (ref 12–65)
ANION GAP SERPL CALC-SCNC: 8 MMOL/L (ref 7–16)
AST SERPL-CCNC: 26 U/L (ref 15–37)
BASOPHILS # BLD: 0 K/UL (ref 0–0.2)
BASOPHILS NFR BLD: 0 % (ref 0–2)
BILIRUB SERPL-MCNC: 0.3 MG/DL (ref 0.2–1.1)
BUN SERPL-MCNC: 23 MG/DL (ref 6–23)
CALCIUM SERPL-MCNC: 9 MG/DL (ref 8.3–10.4)
CHLORIDE SERPL-SCNC: 109 MMOL/L (ref 98–107)
CO2 SERPL-SCNC: 27 MMOL/L (ref 21–32)
CREAT SERPL-MCNC: 0.8 MG/DL (ref 0.6–1)
DIFFERENTIAL METHOD BLD: ABNORMAL
EOSINOPHIL # BLD: 0 K/UL (ref 0–0.8)
EOSINOPHIL NFR BLD: 1 % (ref 0.5–7.8)
ERYTHROCYTE [DISTWIDTH] IN BLOOD BY AUTOMATED COUNT: 13.4 % (ref 11.9–14.6)
GLOBULIN SER CALC-MCNC: 2.7 G/DL (ref 2.3–3.5)
GLUCOSE SERPL-MCNC: 100 MG/DL (ref 65–100)
HCT VFR BLD AUTO: 24.8 % (ref 35.8–46.3)
HGB BLD-MCNC: 8.5 G/DL (ref 11.7–15.4)
IMM GRANULOCYTES # BLD: 0.1 K/UL (ref 0–0.5)
IMM GRANULOCYTES NFR BLD AUTO: 3 % (ref 0–5)
LYMPHOCYTES # BLD: 0.3 K/UL (ref 0.5–4.6)
LYMPHOCYTES NFR BLD: 9 % (ref 13–44)
MAGNESIUM SERPL-MCNC: 2.1 MG/DL (ref 1.8–2.4)
MCH RBC QN AUTO: 29.9 PG (ref 26.1–32.9)
MCHC RBC AUTO-ENTMCNC: 34.3 G/DL (ref 31.4–35)
MCV RBC AUTO: 87.3 FL (ref 79.6–97.8)
MONOCYTES # BLD: 0.4 K/UL (ref 0.1–1.3)
MONOCYTES NFR BLD: 12 % (ref 4–12)
NEUTS SEG # BLD: 2.8 K/UL (ref 1.7–8.2)
NEUTS SEG NFR BLD: 78 % (ref 43–78)
PLATELET # BLD AUTO: 46 K/UL (ref 150–450)
PLATELET COMMENTS,PCOM: ABNORMAL
PMV BLD AUTO: 10.9 FL (ref 10.8–14.1)
POTASSIUM SERPL-SCNC: 4 MMOL/L (ref 3.5–5.1)
PROT SERPL-MCNC: 6.4 G/DL (ref 6.3–8.2)
RBC # BLD AUTO: 2.84 M/UL (ref 4.05–5.25)
RBC MORPH BLD: ABNORMAL
RBC MORPH BLD: ABNORMAL
SODIUM SERPL-SCNC: 144 MMOL/L (ref 136–145)
WBC # BLD AUTO: 3.6 K/UL (ref 4.3–11.1)
WBC MORPH BLD: ABNORMAL

## 2018-02-24 PROCEDURE — 74011250636 HC RX REV CODE- 250/636: Performed by: INTERNAL MEDICINE

## 2018-02-24 PROCEDURE — 83735 ASSAY OF MAGNESIUM: CPT | Performed by: INTERNAL MEDICINE

## 2018-02-24 PROCEDURE — 85025 COMPLETE CBC W/AUTO DIFF WBC: CPT | Performed by: INTERNAL MEDICINE

## 2018-02-24 PROCEDURE — 80053 COMPREHEN METABOLIC PANEL: CPT | Performed by: INTERNAL MEDICINE

## 2018-02-24 PROCEDURE — 36591 DRAW BLOOD OFF VENOUS DEVICE: CPT

## 2018-02-24 PROCEDURE — 77030003560 HC NDL HUBR BARD -A

## 2018-02-24 RX ORDER — HEPARIN 100 UNIT/ML
300-500 SYRINGE INTRAVENOUS AS NEEDED
Status: DISCONTINUED | OUTPATIENT
Start: 2018-02-24 | End: 2018-02-28 | Stop reason: HOSPADM

## 2018-02-24 RX ORDER — SODIUM CHLORIDE 0.9 % (FLUSH) 0.9 %
10 SYRINGE (ML) INJECTION AS NEEDED
Status: DISCONTINUED | OUTPATIENT
Start: 2018-02-24 | End: 2018-02-28 | Stop reason: HOSPADM

## 2018-02-24 RX ORDER — HEPARIN 100 UNIT/ML
500 SYRINGE INTRAVENOUS AS NEEDED
Status: DISCONTINUED | OUTPATIENT
Start: 2018-02-24 | End: 2018-02-28 | Stop reason: HOSPADM

## 2018-02-24 RX ORDER — SODIUM CHLORIDE 0.9 % (FLUSH) 0.9 %
5-10 SYRINGE (ML) INJECTION EVERY 8 HOURS
Status: DISCONTINUED | OUTPATIENT
Start: 2018-02-24 | End: 2018-02-28 | Stop reason: HOSPADM

## 2018-02-24 RX ADMIN — Medication 10 ML: at 07:15

## 2018-02-24 RX ADMIN — SODIUM CHLORIDE, PRESERVATIVE FREE 500 UNITS: 5 INJECTION INTRAVENOUS at 08:10

## 2018-02-24 RX ADMIN — Medication 10 ML: at 08:10

## 2018-02-24 NOTE — PROGRESS NOTES
Pt. Discharged ambulatory accompanied by family. Labs within protocol. No treatment needed today. To return to Dr. Starla Delgado on 2/27/18.

## 2018-02-26 ENCOUNTER — APPOINTMENT (OUTPATIENT)
Dept: INFUSION THERAPY | Age: 58
End: 2018-02-26
Payer: COMMERCIAL

## 2018-02-27 ENCOUNTER — PATIENT OUTREACH (OUTPATIENT)
Dept: CASE MANAGEMENT | Age: 58
End: 2018-02-27

## 2018-02-27 ENCOUNTER — HOSPITAL ENCOUNTER (OUTPATIENT)
Dept: LAB | Age: 58
Discharge: HOME | End: 2018-02-27
Payer: COMMERCIAL

## 2018-02-27 DIAGNOSIS — C82.09 GRADE I FOLLICULAR LYMPHOMA OF EXTRANODAL SITE EXCLUDING SPLEEN AND OTHER SOLID ORGANS (HCC): ICD-10-CM

## 2018-02-27 DIAGNOSIS — C83.30 DIFFUSE LARGE B-CELL LYMPHOMA, UNSPECIFIED BODY REGION (HCC): ICD-10-CM

## 2018-02-27 DIAGNOSIS — C85.88 MARGINAL ZONE LYMPHOMA OF LYMPH NODES OF MULTIPLE SITES (HCC): ICD-10-CM

## 2018-02-27 LAB
ALBUMIN SERPL-MCNC: 3.8 G/DL (ref 3.5–5)
ALBUMIN/GLOB SERPL: 1.3 {RATIO}
ALP SERPL-CCNC: 151 U/L (ref 50–136)
ALT SERPL-CCNC: 62 U/L (ref 12–65)
ANION GAP SERPL CALC-SCNC: 8 MMOL/L
AST SERPL-CCNC: 27 U/L (ref 15–37)
BASOPHILS # BLD: 0 K/UL (ref 0–0.2)
BASOPHILS NFR BLD: 0 % (ref 0–2)
BILIRUB SERPL-MCNC: 0.4 MG/DL (ref 0.2–1.1)
BUN SERPL-MCNC: 17 MG/DL (ref 6–23)
CALCIUM SERPL-MCNC: 8.8 MG/DL (ref 8.3–10.4)
CHLORIDE SERPL-SCNC: 106 MMOL/L (ref 98–107)
CO2 SERPL-SCNC: 29 MMOL/L (ref 21–32)
CREAT SERPL-MCNC: 0.8 MG/DL (ref 0.6–1)
DIFFERENTIAL METHOD BLD: ABNORMAL
EOSINOPHIL # BLD: 0 K/UL (ref 0–0.8)
EOSINOPHIL NFR BLD: 1 % (ref 0.5–7.8)
ERYTHROCYTE [DISTWIDTH] IN BLOOD BY AUTOMATED COUNT: 13.6 % (ref 11.9–14.6)
GLOBULIN SER CALC-MCNC: 3 G/DL
GLUCOSE SERPL-MCNC: 131 MG/DL (ref 65–100)
HCT VFR BLD AUTO: 23.9 % (ref 35.8–46.3)
HGB BLD-MCNC: 8.5 G/DL (ref 11.7–15.4)
LDH SERPL L TO P-CCNC: 293 U/L (ref 100–190)
LYMPHOCYTES # BLD: 0.3 K/UL (ref 0.5–4.6)
LYMPHOCYTES NFR BLD: 6 % (ref 13–44)
MAGNESIUM SERPL-MCNC: 2 MG/DL (ref 1.8–2.4)
MCH RBC QN AUTO: 31.7 PG (ref 26.1–32.9)
MCHC RBC AUTO-ENTMCNC: 35.6 G/DL (ref 31.4–35)
MCV RBC AUTO: 89.2 FL (ref 79.6–97.8)
MONOCYTES # BLD: 0.5 K/UL (ref 0.1–1.3)
MONOCYTES NFR BLD: 11 % (ref 4–12)
NEUTS SEG # BLD: 3.5 K/UL (ref 1.7–8.2)
NEUTS SEG NFR BLD: 82 % (ref 43–78)
NRBC # BLD: 0.02 K/UL (ref 0–0.2)
PLATELET # BLD AUTO: 87 K/UL (ref 150–450)
PMV BLD AUTO: 10.8 FL (ref 10.8–14.1)
POTASSIUM SERPL-SCNC: 3.9 MMOL/L (ref 3.5–5.1)
PROT SERPL-MCNC: 6.8 G/DL (ref 6.3–8.2)
RBC # BLD AUTO: 2.68 M/UL (ref 4.05–5.25)
SODIUM SERPL-SCNC: 143 MMOL/L (ref 136–145)
WBC # BLD AUTO: 4.3 K/UL (ref 4.3–11.1)

## 2018-02-27 PROCEDURE — 80053 COMPREHEN METABOLIC PANEL: CPT | Performed by: INTERNAL MEDICINE

## 2018-02-27 PROCEDURE — 83615 LACTATE (LD) (LDH) ENZYME: CPT | Performed by: INTERNAL MEDICINE

## 2018-02-27 PROCEDURE — 83735 ASSAY OF MAGNESIUM: CPT | Performed by: INTERNAL MEDICINE

## 2018-02-27 PROCEDURE — 85025 COMPLETE CBC W/AUTO DIFF WBC: CPT | Performed by: INTERNAL MEDICINE

## 2018-02-27 NOTE — PROGRESS NOTES
Pt was seen and labs reviewed by Dr. Juve Blair. Pt went for BMT consult at [facility] last week and it was recommended there that she complete 6 cycles of R-CHOP instead of going straight to transplant. Options discussed with Dr. Juve Blair, and pt ultimately has decided to go with R-CHOP instead of going to transplant now. Pt set up for D1C1 on Friday, consent was signed. Pt will have ECHO prior to starting treatment. Will re-scan after 3 cycles.

## 2018-02-28 ENCOUNTER — HOSPITAL ENCOUNTER (OUTPATIENT)
Dept: INFUSION THERAPY | Age: 58
End: 2018-02-28
Payer: COMMERCIAL

## 2018-03-01 ENCOUNTER — HOSPITAL ENCOUNTER (OUTPATIENT)
Dept: NON INVASIVE DIAGNOSTICS | Age: 58
Discharge: HOME | End: 2018-03-01
Attending: INTERNAL MEDICINE
Payer: COMMERCIAL

## 2018-03-01 DIAGNOSIS — C85.88 MARGINAL ZONE LYMPHOMA OF LYMPH NODES OF MULTIPLE SITES (HCC): Chronic | ICD-10-CM

## 2018-03-01 PROCEDURE — 74011250636 HC RX REV CODE- 250/636: Performed by: INTERNAL MEDICINE

## 2018-03-01 PROCEDURE — 74011000250 HC RX REV CODE- 250: Performed by: INTERNAL MEDICINE

## 2018-03-01 PROCEDURE — C8924 2D TTE W OR W/O FOL W/CON,FU: HCPCS

## 2018-03-01 RX ADMIN — PERFLUTREN 1 ML: 6.52 INJECTION, SUSPENSION INTRAVENOUS at 08:00

## 2018-03-02 ENCOUNTER — HOSPITAL ENCOUNTER (OUTPATIENT)
Dept: INFUSION THERAPY | Age: 58
Discharge: HOME | End: 2018-03-02
Payer: COMMERCIAL

## 2018-03-02 VITALS
BODY MASS INDEX: 50.59 KG/M2 | SYSTOLIC BLOOD PRESSURE: 132 MMHG | OXYGEN SATURATION: 98 % | RESPIRATION RATE: 18 BRPM | DIASTOLIC BLOOD PRESSURE: 66 MMHG | HEART RATE: 77 BPM | TEMPERATURE: 98.3 F | WEIGHT: 276.6 LBS

## 2018-03-02 DIAGNOSIS — C85.88 MARGINAL ZONE LYMPHOMA OF LYMPH NODES OF MULTIPLE SITES (HCC): ICD-10-CM

## 2018-03-02 DIAGNOSIS — C82.09 GRADE I FOLLICULAR LYMPHOMA OF EXTRANODAL SITE EXCLUDING SPLEEN AND OTHER SOLID ORGANS (HCC): ICD-10-CM

## 2018-03-02 DIAGNOSIS — C83.39 DIFFUSE LARGE B-CELL LYMPHOMA OF SOLID ORGAN EXCLUDING SPLEEN (HCC): ICD-10-CM

## 2018-03-02 LAB
ALBUMIN SERPL-MCNC: 3.8 G/DL (ref 3.5–5)
ALBUMIN/GLOB SERPL: 1.5 {RATIO} (ref 1.2–3.5)
ALP SERPL-CCNC: 136 U/L (ref 50–136)
ALT SERPL-CCNC: 50 U/L (ref 12–65)
ANION GAP SERPL CALC-SCNC: 9 MMOL/L (ref 7–16)
AST SERPL-CCNC: 23 U/L (ref 15–37)
BASOPHILS # BLD: 0 K/UL (ref 0–0.2)
BASOPHILS NFR BLD: 1 % (ref 0–2)
BILIRUB SERPL-MCNC: 0.3 MG/DL (ref 0.2–1.1)
BUN SERPL-MCNC: 19 MG/DL (ref 6–23)
CALCIUM SERPL-MCNC: 8.8 MG/DL (ref 8.3–10.4)
CHLORIDE SERPL-SCNC: 109 MMOL/L (ref 98–107)
CO2 SERPL-SCNC: 26 MMOL/L (ref 21–32)
CREAT SERPL-MCNC: 0.89 MG/DL (ref 0.6–1)
DIFFERENTIAL METHOD BLD: ABNORMAL
EOSINOPHIL # BLD: 0 K/UL (ref 0–0.8)
EOSINOPHIL NFR BLD: 0 % (ref 0.5–7.8)
ERYTHROCYTE [DISTWIDTH] IN BLOOD BY AUTOMATED COUNT: 15.2 % (ref 11.9–14.6)
GLOBULIN SER CALC-MCNC: 2.6 G/DL (ref 2.3–3.5)
GLUCOSE SERPL-MCNC: 142 MG/DL (ref 65–100)
HCT VFR BLD AUTO: 24.3 % (ref 35.8–46.3)
HGB BLD-MCNC: 8.4 G/DL (ref 11.7–15.4)
LYMPHOCYTES # BLD: 0.3 K/UL (ref 0.5–4.6)
LYMPHOCYTES NFR BLD: 6 % (ref 13–44)
MCH RBC QN AUTO: 31.5 PG (ref 26.1–32.9)
MCHC RBC AUTO-ENTMCNC: 34.6 G/DL (ref 31.4–35)
MCV RBC AUTO: 91 FL (ref 79.6–97.8)
MONOCYTES # BLD: 0.7 K/UL (ref 0.1–1.3)
MONOCYTES NFR BLD: 16 % (ref 4–12)
NEUTS SEG # BLD: 3.5 K/UL (ref 1.7–8.2)
NEUTS SEG NFR BLD: 77 % (ref 43–78)
NRBC # BLD: 0.02 K/UL (ref 0–0.2)
PLATELET # BLD AUTO: 124 K/UL (ref 150–450)
PMV BLD AUTO: 11.1 FL (ref 10.8–14.1)
POTASSIUM SERPL-SCNC: 3.7 MMOL/L (ref 3.5–5.1)
PROT SERPL-MCNC: 6.4 G/DL (ref 6.3–8.2)
RBC # BLD AUTO: 2.67 M/UL (ref 4.05–5.25)
SODIUM SERPL-SCNC: 144 MMOL/L (ref 136–145)
WBC # BLD AUTO: 4.5 K/UL (ref 4.3–11.1)

## 2018-03-02 PROCEDURE — 96375 TX/PRO/DX INJ NEW DRUG ADDON: CPT

## 2018-03-02 PROCEDURE — 96367 TX/PROPH/DG ADDL SEQ IV INF: CPT

## 2018-03-02 PROCEDURE — 74011000258 HC RX REV CODE- 258: Performed by: INTERNAL MEDICINE

## 2018-03-02 PROCEDURE — 85025 COMPLETE CBC W/AUTO DIFF WBC: CPT | Performed by: INTERNAL MEDICINE

## 2018-03-02 PROCEDURE — 77030003560 HC NDL HUBR BARD -A

## 2018-03-02 PROCEDURE — 96415 CHEMO IV INFUSION ADDL HR: CPT

## 2018-03-02 PROCEDURE — 74011250637 HC RX REV CODE- 250/637: Performed by: INTERNAL MEDICINE

## 2018-03-02 PROCEDURE — 74011250636 HC RX REV CODE- 250/636: Performed by: INTERNAL MEDICINE

## 2018-03-02 PROCEDURE — 96377 APPLICATON ON-BODY INJECTOR: CPT

## 2018-03-02 PROCEDURE — 96413 CHEMO IV INFUSION 1 HR: CPT

## 2018-03-02 PROCEDURE — 80053 COMPREHEN METABOLIC PANEL: CPT | Performed by: INTERNAL MEDICINE

## 2018-03-02 PROCEDURE — 96361 HYDRATE IV INFUSION ADD-ON: CPT

## 2018-03-02 PROCEDURE — 96411 CHEMO IV PUSH ADDL DRUG: CPT

## 2018-03-02 PROCEDURE — 96417 CHEMO IV INFUS EACH ADDL SEQ: CPT

## 2018-03-02 RX ORDER — ONDANSETRON 2 MG/ML
8 INJECTION INTRAMUSCULAR; INTRAVENOUS ONCE
Status: COMPLETED | OUTPATIENT
Start: 2018-03-02 | End: 2018-03-02

## 2018-03-02 RX ORDER — DOXORUBICIN HYDROCHLORIDE 2 MG/ML
50 INJECTION, SOLUTION INTRAVENOUS ONCE
Status: COMPLETED | OUTPATIENT
Start: 2018-03-02 | End: 2018-03-02

## 2018-03-02 RX ORDER — HEPARIN 100 UNIT/ML
300-500 SYRINGE INTRAVENOUS AS NEEDED
Status: DISPENSED | OUTPATIENT
Start: 2018-03-02 | End: 2018-03-02

## 2018-03-02 RX ORDER — ACETAMINOPHEN 325 MG/1
650 TABLET ORAL ONCE
Status: COMPLETED | OUTPATIENT
Start: 2018-03-02 | End: 2018-03-02

## 2018-03-02 RX ORDER — DIPHENHYDRAMINE HYDROCHLORIDE 50 MG/ML
50 INJECTION, SOLUTION INTRAMUSCULAR; INTRAVENOUS ONCE
Status: COMPLETED | OUTPATIENT
Start: 2018-03-02 | End: 2018-03-02

## 2018-03-02 RX ORDER — SODIUM CHLORIDE 0.9 % (FLUSH) 0.9 %
10 SYRINGE (ML) INJECTION AS NEEDED
Status: ACTIVE | OUTPATIENT
Start: 2018-03-02 | End: 2018-03-02

## 2018-03-02 RX ADMIN — SODIUM CHLORIDE, PRESERVATIVE FREE 500 UNITS: 5 INJECTION INTRAVENOUS at 14:29

## 2018-03-02 RX ADMIN — SODIUM CHLORIDE 500 ML: 900 INJECTION, SOLUTION INTRAVENOUS at 13:56

## 2018-03-02 RX ADMIN — ONDANSETRON 8 MG: 2 INJECTION INTRAMUSCULAR; INTRAVENOUS at 11:40

## 2018-03-02 RX ADMIN — DIPHENHYDRAMINE HYDROCHLORIDE 50 MG: 50 INJECTION, SOLUTION INTRAMUSCULAR; INTRAVENOUS at 08:20

## 2018-03-02 RX ADMIN — SODIUM CHLORIDE 150 MG: 900 INJECTION, SOLUTION INTRAVENOUS at 12:01

## 2018-03-02 RX ADMIN — PEGFILGRASTIM 6 MG: KIT SUBCUTANEOUS at 14:00

## 2018-03-02 RX ADMIN — RITUXIMAB 878 MG: 10 INJECTION, SOLUTION INTRAVENOUS at 08:48

## 2018-03-02 RX ADMIN — ACETAMINOPHEN 650 MG: 325 TABLET, FILM COATED ORAL at 08:20

## 2018-03-02 RX ADMIN — SODIUM CHLORIDE 500 ML: 900 INJECTION, SOLUTION INTRAVENOUS at 07:45

## 2018-03-02 RX ADMIN — VINCRISTINE SULFATE 2 MG: 1 INJECTION, SOLUTION INTRAVENOUS at 12:40

## 2018-03-02 RX ADMIN — DOXORUBICIN HYDROCHLORIDE 117 MG: 2 INJECTION, SOLUTION INTRAVENOUS at 12:31

## 2018-03-02 RX ADMIN — DEXAMETHASONE SODIUM PHOSPHATE 12 MG: 4 INJECTION, SOLUTION INTRAMUSCULAR; INTRAVENOUS at 11:44

## 2018-03-02 RX ADMIN — CYCLOPHOSPHAMIDE 1755 MG: 1 INJECTION, POWDER, FOR SOLUTION INTRAVENOUS; ORAL at 12:55

## 2018-03-02 NOTE — PROGRESS NOTES
Arrived to the [facility] ambulatory. C1 R-CHOP completed. Patient tolerated well. Any issues or concerns during appointment: no.  Patient aware of next infusion appointment on  3/7 at 0915 . Discharged to home ambulatory.

## 2018-03-03 PROCEDURE — 96377 APPLICATON ON-BODY INJECTOR: CPT

## 2018-03-03 PROCEDURE — 96411 CHEMO IV PUSH ADDL DRUG: CPT

## 2018-03-03 PROCEDURE — 96417 CHEMO IV INFUS EACH ADDL SEQ: CPT

## 2018-03-03 PROCEDURE — 96375 TX/PRO/DX INJ NEW DRUG ADDON: CPT

## 2018-03-03 PROCEDURE — 96415 CHEMO IV INFUSION ADDL HR: CPT

## 2018-03-03 PROCEDURE — 96367 TX/PROPH/DG ADDL SEQ IV INF: CPT

## 2018-03-03 PROCEDURE — 96413 CHEMO IV INFUSION 1 HR: CPT

## 2018-03-03 PROCEDURE — 96361 HYDRATE IV INFUSION ADD-ON: CPT

## 2018-03-07 ENCOUNTER — HOSPITAL ENCOUNTER (OUTPATIENT)
Dept: INFUSION THERAPY | Age: 58
Discharge: HOME | End: 2018-03-07
Payer: COMMERCIAL

## 2018-03-07 ENCOUNTER — PATIENT OUTREACH (OUTPATIENT)
Dept: CASE MANAGEMENT | Age: 58
End: 2018-03-07

## 2018-03-07 ENCOUNTER — HOSPITAL ENCOUNTER (OUTPATIENT)
Dept: LAB | Age: 58
Discharge: HOME | End: 2018-03-07
Payer: COMMERCIAL

## 2018-03-07 DIAGNOSIS — C85.88 MARGINAL ZONE LYMPHOMA OF LYMPH NODES OF MULTIPLE SITES (HCC): ICD-10-CM

## 2018-03-07 DIAGNOSIS — C83.39 DIFFUSE LARGE B-CELL LYMPHOMA OF SOLID ORGAN EXCLUDING SPLEEN (HCC): ICD-10-CM

## 2018-03-07 DIAGNOSIS — C85.88 MARGINAL ZONE LYMPHOMA OF LYMPH NODES OF MULTIPLE SITES (HCC): Chronic | ICD-10-CM

## 2018-03-07 DIAGNOSIS — C82.09 GRADE I FOLLICULAR LYMPHOMA OF EXTRANODAL SITE EXCLUDING SPLEEN AND OTHER SOLID ORGANS (HCC): ICD-10-CM

## 2018-03-07 LAB
ALBUMIN SERPL-MCNC: 3.5 G/DL (ref 3.5–5)
ALBUMIN/GLOB SERPL: 1.3 {RATIO} (ref 1.2–3.5)
ALP SERPL-CCNC: 121 U/L (ref 50–136)
ALT SERPL-CCNC: 90 U/L (ref 12–65)
ANION GAP SERPL CALC-SCNC: 7 MMOL/L (ref 7–16)
AST SERPL-CCNC: 28 U/L (ref 15–37)
BASOPHILS # BLD: 0 K/UL (ref 0–0.2)
BASOPHILS NFR BLD: 1 % (ref 0–2)
BILIRUB SERPL-MCNC: 0.4 MG/DL (ref 0.2–1.1)
BUN SERPL-MCNC: 22 MG/DL (ref 6–23)
CALCIUM SERPL-MCNC: 8.8 MG/DL (ref 8.3–10.4)
CHLORIDE SERPL-SCNC: 107 MMOL/L (ref 98–107)
CO2 SERPL-SCNC: 27 MMOL/L (ref 21–32)
CREAT SERPL-MCNC: 0.75 MG/DL (ref 0.6–1)
DIFFERENTIAL METHOD BLD: ABNORMAL
EOSINOPHIL # BLD: 0 K/UL (ref 0–0.8)
EOSINOPHIL NFR BLD: 0 % (ref 0.5–7.8)
ERYTHROCYTE [DISTWIDTH] IN BLOOD BY AUTOMATED COUNT: 15.8 % (ref 11.9–14.6)
GLOBULIN SER CALC-MCNC: 2.6 G/DL (ref 2.3–3.5)
GLUCOSE SERPL-MCNC: 160 MG/DL (ref 65–100)
HCT VFR BLD AUTO: 22.4 % (ref 35.8–46.3)
HGB BLD-MCNC: 7.7 G/DL (ref 11.7–15.4)
LDH SERPL L TO P-CCNC: 323 U/L (ref 100–190)
LYMPHOCYTES # BLD: 0.1 K/UL (ref 0.5–4.6)
LYMPHOCYTES NFR BLD: 3 % (ref 13–44)
MAGNESIUM SERPL-MCNC: 2.1 MG/DL (ref 1.8–2.4)
MCH RBC QN AUTO: 30.9 PG (ref 26.1–32.9)
MCHC RBC AUTO-ENTMCNC: 34.4 G/DL (ref 31.4–35)
MCV RBC AUTO: 90 FL (ref 79.6–97.8)
MONOCYTES # BLD: 0.2 K/UL (ref 0.1–1.3)
MONOCYTES NFR BLD: 5 % (ref 4–12)
NEUTS SEG # BLD: 3.8 K/UL (ref 1.7–8.2)
NEUTS SEG NFR BLD: 91 % (ref 43–78)
NRBC # BLD: 0 K/UL (ref 0–0.2)
PLATELET # BLD AUTO: 78 K/UL (ref 150–450)
PLATELET COMMENTS,PCOM: ABNORMAL
PMV BLD AUTO: 10.9 FL (ref 10.8–14.1)
POTASSIUM SERPL-SCNC: 3.8 MMOL/L (ref 3.5–5.1)
PROT SERPL-MCNC: 6.1 G/DL (ref 6.3–8.2)
RBC # BLD AUTO: 2.49 M/UL (ref 4.05–5.25)
RBC MORPH BLD: ABNORMAL
SODIUM SERPL-SCNC: 141 MMOL/L (ref 136–145)
WBC # BLD AUTO: 4.1 K/UL (ref 4.3–11.1)
WBC MORPH BLD: ABNORMAL

## 2018-03-07 PROCEDURE — 83615 LACTATE (LD) (LDH) ENZYME: CPT | Performed by: INTERNAL MEDICINE

## 2018-03-07 PROCEDURE — 80053 COMPREHEN METABOLIC PANEL: CPT | Performed by: INTERNAL MEDICINE

## 2018-03-07 PROCEDURE — 85025 COMPLETE CBC W/AUTO DIFF WBC: CPT | Performed by: INTERNAL MEDICINE

## 2018-03-07 PROCEDURE — 83735 ASSAY OF MAGNESIUM: CPT | Performed by: INTERNAL MEDICINE

## 2018-03-12 ENCOUNTER — HOSPITAL ENCOUNTER (OUTPATIENT)
Dept: INFUSION THERAPY | Age: 58
Discharge: HOME | End: 2018-03-12
Payer: COMMERCIAL

## 2018-03-12 ENCOUNTER — APPOINTMENT (OUTPATIENT)
Dept: CT IMAGING | Age: 58
End: 2018-03-12
Attending: EMERGENCY MEDICINE
Payer: COMMERCIAL

## 2018-03-12 ENCOUNTER — HOSPITAL ENCOUNTER (EMERGENCY)
Age: 58
Discharge: HOME | End: 2018-03-13
Attending: EMERGENCY MEDICINE
Payer: COMMERCIAL

## 2018-03-12 VITALS
HEART RATE: 92 BPM | RESPIRATION RATE: 18 BRPM | SYSTOLIC BLOOD PRESSURE: 167 MMHG | DIASTOLIC BLOOD PRESSURE: 77 MMHG | WEIGHT: 278 LBS | BODY MASS INDEX: 50.85 KG/M2 | OXYGEN SATURATION: 98 % | TEMPERATURE: 99.2 F

## 2018-03-12 DIAGNOSIS — C85.88 MARGINAL ZONE LYMPHOMA OF LYMPH NODES OF MULTIPLE SITES (HCC): ICD-10-CM

## 2018-03-12 DIAGNOSIS — D64.89 ANEMIA DUE TO OTHER CAUSE, NOT CLASSIFIED: Primary | ICD-10-CM

## 2018-03-12 DIAGNOSIS — R07.89 ATYPICAL CHEST PAIN: ICD-10-CM

## 2018-03-12 LAB
ALBUMIN SERPL-MCNC: 3.8 G/DL (ref 3.5–5)
ALBUMIN/GLOB SERPL: 1.4 {RATIO} (ref 1.2–3.5)
ALP SERPL-CCNC: 118 U/L (ref 50–136)
ALT SERPL-CCNC: 59 U/L (ref 12–65)
ANION GAP SERPL CALC-SCNC: 8 MMOL/L (ref 7–16)
AST SERPL-CCNC: 29 U/L (ref 15–37)
ATRIAL RATE: 87 BPM
BASOPHILS # BLD: 0 K/UL (ref 0–0.2)
BASOPHILS NFR BLD: 1 % (ref 0–2)
BILIRUB SERPL-MCNC: 0.3 MG/DL (ref 0.2–1.1)
BNP SERPL-MCNC: 15 PG/ML
BUN SERPL-MCNC: 15 MG/DL (ref 6–23)
CALCIUM SERPL-MCNC: 9.3 MG/DL (ref 8.3–10.4)
CALCULATED P AXIS, ECG09: 55 DEGREES
CALCULATED R AXIS, ECG10: -11 DEGREES
CALCULATED T AXIS, ECG11: 40 DEGREES
CHLORIDE SERPL-SCNC: 105 MMOL/L (ref 98–107)
CO2 SERPL-SCNC: 28 MMOL/L (ref 21–32)
CREAT SERPL-MCNC: 0.76 MG/DL (ref 0.6–1)
D DIMER PPP FEU-MCNC: 0.39 UG/ML(FEU)
DIAGNOSIS, 93000: NORMAL
DIFFERENTIAL METHOD BLD: ABNORMAL
EOSINOPHIL # BLD: 0 K/UL (ref 0–0.8)
EOSINOPHIL NFR BLD: 1 % (ref 0.5–7.8)
ERYTHROCYTE [DISTWIDTH] IN BLOOD BY AUTOMATED COUNT: 16.2 % (ref 11.9–14.6)
FLUAV AG NPH QL IA: NEGATIVE
FLUBV AG NPH QL IA: NEGATIVE
GLOBULIN SER CALC-MCNC: 2.8 G/DL (ref 2.3–3.5)
GLUCOSE SERPL-MCNC: 93 MG/DL (ref 65–100)
HCT VFR BLD AUTO: 21.3 % (ref 35.8–46.3)
HGB BLD-MCNC: 7.1 G/DL (ref 11.7–15.4)
LYMPHOCYTES # BLD: 0.2 K/UL (ref 0.5–4.6)
LYMPHOCYTES NFR BLD: 7 % (ref 13–44)
MAGNESIUM SERPL-MCNC: 2.3 MG/DL (ref 1.8–2.4)
MCH RBC QN AUTO: 30.5 PG (ref 26.1–32.9)
MCHC RBC AUTO-ENTMCNC: 33.3 G/DL (ref 31.4–35)
MCV RBC AUTO: 91.4 FL (ref 79.6–97.8)
MONOCYTES # BLD: 0.6 K/UL (ref 0.1–1.3)
MONOCYTES NFR BLD: 16 % (ref 4–12)
NEUTS SEG # BLD: 2.7 K/UL (ref 1.7–8.2)
NEUTS SEG NFR BLD: 75 % (ref 43–78)
NRBC # BLD: 0.1 K/UL (ref 0–0.2)
P-R INTERVAL, ECG05: 144 MS
PLATELET # BLD AUTO: 66 K/UL (ref 150–450)
PLATELET COMMENTS,PCOM: ABNORMAL
PMV BLD AUTO: 12.7 FL (ref 10.8–14.1)
POTASSIUM SERPL-SCNC: 4.4 MMOL/L (ref 3.5–5.1)
PROT SERPL-MCNC: 6.6 G/DL (ref 6.3–8.2)
Q-T INTERVAL, ECG07: 374 MS
QRS DURATION, ECG06: 82 MS
QTC CALCULATION (BEZET), ECG08: 450 MS
RBC # BLD AUTO: 2.33 M/UL (ref 4.05–5.25)
RBC MORPH BLD: ABNORMAL
SODIUM SERPL-SCNC: 141 MMOL/L (ref 136–145)
TROPONIN I BLD-MCNC: 0 NG/ML (ref 0.02–0.05)
TROPONIN I SERPL-MCNC: <0.02 NG/ML (ref 0.02–0.05)
VENTRICULAR RATE, ECG03: 87 BPM
WBC # BLD AUTO: 3.5 K/UL (ref 4.3–11.1)
WBC MORPH BLD: ABNORMAL

## 2018-03-12 PROCEDURE — 83735 ASSAY OF MAGNESIUM: CPT | Performed by: INTERNAL MEDICINE

## 2018-03-12 PROCEDURE — 74011636320 HC RX REV CODE- 636/320: Performed by: EMERGENCY MEDICINE

## 2018-03-12 PROCEDURE — 36430 TRANSFUSION BLD/BLD COMPNT: CPT

## 2018-03-12 PROCEDURE — 71260 CT THORAX DX C+: CPT

## 2018-03-12 PROCEDURE — P9040 RBC LEUKOREDUCED IRRADIATED: HCPCS | Performed by: INTERNAL MEDICINE

## 2018-03-12 PROCEDURE — 85025 COMPLETE CBC W/AUTO DIFF WBC: CPT | Performed by: INTERNAL MEDICINE

## 2018-03-12 PROCEDURE — 86644 CMV ANTIBODY: CPT | Performed by: INTERNAL MEDICINE

## 2018-03-12 PROCEDURE — 80053 COMPREHEN METABOLIC PANEL: CPT | Performed by: INTERNAL MEDICINE

## 2018-03-12 PROCEDURE — 85379 FIBRIN DEGRADATION QUANT: CPT | Performed by: EMERGENCY MEDICINE

## 2018-03-12 PROCEDURE — 84484 ASSAY OF TROPONIN QUANT: CPT | Performed by: EMERGENCY MEDICINE

## 2018-03-12 PROCEDURE — 77030003560 HC NDL HUBR BARD -A

## 2018-03-12 PROCEDURE — 83880 ASSAY OF NATRIURETIC PEPTIDE: CPT | Performed by: EMERGENCY MEDICINE

## 2018-03-12 PROCEDURE — 36591 DRAW BLOOD OFF VENOUS DEVICE: CPT

## 2018-03-12 PROCEDURE — 87804 INFLUENZA ASSAY W/OPTIC: CPT | Performed by: EMERGENCY MEDICINE

## 2018-03-12 PROCEDURE — 86900 BLOOD TYPING SEROLOGIC ABO: CPT | Performed by: INTERNAL MEDICINE

## 2018-03-12 PROCEDURE — 99285 EMERGENCY DEPT VISIT HI MDM: CPT | Performed by: EMERGENCY MEDICINE

## 2018-03-12 PROCEDURE — 96360 HYDRATION IV INFUSION INIT: CPT | Performed by: EMERGENCY MEDICINE

## 2018-03-12 PROCEDURE — 74011250636 HC RX REV CODE- 250/636: Performed by: EMERGENCY MEDICINE

## 2018-03-12 PROCEDURE — 74011000258 HC RX REV CODE- 258: Performed by: EMERGENCY MEDICINE

## 2018-03-12 PROCEDURE — 86923 COMPATIBILITY TEST ELECTRIC: CPT | Performed by: INTERNAL MEDICINE

## 2018-03-12 PROCEDURE — 93005 ELECTROCARDIOGRAM TRACING: CPT | Performed by: EMERGENCY MEDICINE

## 2018-03-12 RX ORDER — DIPHENHYDRAMINE HCL 25 MG
25 CAPSULE ORAL
Status: ACTIVE | OUTPATIENT
Start: 2018-03-12 | End: 2018-03-13

## 2018-03-12 RX ORDER — HEPARIN 100 UNIT/ML
300 SYRINGE INTRAVENOUS AS NEEDED
Status: DISCONTINUED | OUTPATIENT
Start: 2018-03-12 | End: 2018-03-13 | Stop reason: HOSPADM

## 2018-03-12 RX ORDER — SODIUM CHLORIDE 0.9 % (FLUSH) 0.9 %
10 SYRINGE (ML) INJECTION
Status: COMPLETED | OUTPATIENT
Start: 2018-03-12 | End: 2018-03-12

## 2018-03-12 RX ORDER — HEPARIN 100 UNIT/ML
100 SYRINGE INTRAVENOUS AS NEEDED
Status: DISCONTINUED | OUTPATIENT
Start: 2018-03-12 | End: 2018-03-12

## 2018-03-12 RX ORDER — SODIUM CHLORIDE 9 MG/ML
250 INJECTION, SOLUTION INTRAVENOUS AS NEEDED
Status: DISCONTINUED | OUTPATIENT
Start: 2018-03-12 | End: 2018-03-16 | Stop reason: HOSPADM

## 2018-03-12 RX ORDER — SODIUM CHLORIDE 9 MG/ML
250 INJECTION, SOLUTION INTRAVENOUS AS NEEDED
Status: DISCONTINUED | OUTPATIENT
Start: 2018-03-12 | End: 2018-03-13 | Stop reason: HOSPADM

## 2018-03-12 RX ORDER — ACETAMINOPHEN 325 MG/1
650 TABLET ORAL ONCE
Status: ACTIVE | OUTPATIENT
Start: 2018-03-12 | End: 2018-03-13

## 2018-03-12 RX ADMIN — IOPAMIDOL 100 ML: 755 INJECTION, SOLUTION INTRAVENOUS at 17:08

## 2018-03-12 RX ADMIN — SODIUM CHLORIDE 100 ML: 900 INJECTION, SOLUTION INTRAVENOUS at 17:08

## 2018-03-12 RX ADMIN — SODIUM CHLORIDE 1000 ML: 900 INJECTION, SOLUTION INTRAVENOUS at 19:48

## 2018-03-12 RX ADMIN — Medication 10 ML: at 17:08

## 2018-03-12 NOTE — ED PROVIDER NOTES
HPI Comments: 4:00 PM Patient was seen in triage, a brief history and physical was done. Labs and/or other studies were ordered pending placement of patient in the back. Patient is at high risk for PE given her active lymphoma,  Will skip d-dimer and go straight to CT scan. Matthew Banda MD    601 PM  55-year-old lymphoma patient. She went to infusion center today for blood transfusion because of low hemoglobin and stated she has had some midsternal region chest pain for several days. She was sent to the emergency department for further evaluation. Denies any significant shortness of breath, leg swelling, abdominal pain or nausea. States 2 weeks ago she was started on a new chemotherapeutic agent. Patient is a 62 y.o. female presenting with chest pain. The history is provided by the patient. No  was used. Chest Pain (Angina)    Pertinent negatives include no abdominal pain, no fever, no headaches and no shortness of breath.         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 Uriel Otero Right 2012    [facility] Left 02/10/2011    Dr. Kevon Carmona         Family History:   Problem Relation Age of Onset    Kidney Disease Father     Other Maternal Grandmother      Vascular Disorder with leg amputation    Liver Disease Sister      non-alcoholic  Celiac Disease Sister     Breast Cancer Cousin 48    Thyroid Disease Sister      hypothyroidism    Malignant Hyperthermia Neg Hx     Pseudocholinesterase Deficiency Neg Hx     Delayed Awakening Neg Hx     Emergence Delirium Neg Hx     Post-op Cognitive Dysfunction Neg Hx     Thyroid Cancer Neg Hx        Social History     Social History    Marital status:      Spouse name: N/A    Number of children: N/A    Years of education: N/A     Occupational History    Not on file. Social History Main Topics    Smoking status: Never Smoker    Smokeless tobacco: Never Used    Alcohol use No      Comment: RARE, ONCE/TWICE A YEAR    Drug use: Yes     Special: Prescription    Sexual activity: Not on file     Other Topics Concern    Not on file     Social History Narrative    10/3/11:  PATIENT IS  TO Patricia Guerrero. SHE IS DISABLED. ALLERGIES: Review of patient's allergies indicates no known allergies. Review of Systems   Constitutional: Negative for fever. HENT: Negative for congestion. Respiratory: Negative for shortness of breath. Cardiovascular: Positive for chest pain. Negative for leg swelling. Gastrointestinal: Negative for abdominal pain. Genitourinary: Negative for dysuria. Neurological: Negative for headaches. Vitals:    03/12/18 1547   BP: 147/64   Pulse: 88   Resp: 16   SpO2: 100%            Physical Exam   Constitutional: She is oriented to person, place, and time. She appears well-developed and well-nourished. No distress. HENT:   Head: Normocephalic and atraumatic. Eyes: Conjunctivae and EOM are normal. Pupils are equal, round, and reactive to light. Neck: Normal range of motion. Neck supple. Cardiovascular: Normal rate, regular rhythm and normal heart sounds. Pulmonary/Chest: Effort normal and breath sounds normal. No respiratory distress. She has no wheezes. She has no rales. Abdominal: Soft.  She exhibits no distension. There is no tenderness. There is no rebound. Musculoskeletal: Normal range of motion. She exhibits no edema or tenderness. Neurological: She is alert and oriented to person, place, and time. Skin: Skin is warm and dry. No rash noted. She is not diaphoretic. There is pallor. Psychiatric: She has a normal mood and affect. Her behavior is normal.   Vitals reviewed. MDM  Number of Diagnoses or Management Options  Anemia due to other cause, not classified: new and does not require workup  Atypical chest pain: new and does not require workup  Diagnosis management comments: I discussed patient with Melo Garnica who is on call for oncology. He agrees with transfusion of 1 unit of blood at this time. Cardiac enzymes negative. D-dimer negative. CT scan demonstrates no evidence of pulmonary embolism. Patient improved after transfusion. Pain was likely secondary to demand ischemia. Patient is to follow-up at the infusion center tomorrow for hemoglobin recheck and possibly another transfusion. She is chest pain-free at time of discharge. Discharged in stable condition. Return precautions discussed.        Amount and/or Complexity of Data Reviewed  Clinical lab tests: reviewed and ordered (Results for orders placed or performed during the hospital encounter of 03/12/18  -INFLUENZA A & B AG (RAPID TEST)       Result                                            Value                         Ref Range                       Influenza A Ag                                    NEGATIVE                      NEG                             Influenza B Ag                                    NEGATIVE                      NEG                        -D DIMER       Result                                            Value                         Ref Range                       D DIMER                                           0.39                          <0.56 ug/ml(FEU)           -TROPONIN I       Result Value                         Ref Range                       Troponin-I, Qt.                                   <0.02 (L)                     0.02 - 0.05 NG/ML          -BNP       Result                                            Value                         Ref Range                       BNP                                               15                            pg/mL                      -POC TROPONIN-I       Result                                            Value                         Ref Range                       Troponin-I (POC)                                  0 (L)                         0.02 - 0.05 ng/ml          -EKG, 12 LEAD, INITIAL       Result                                            Value                         Ref Range                       Ventricular Rate                                  87                            BPM                             Atrial Rate                                       87                            BPM                             P-R Interval                                      144                           ms                              QRS Duration                                      82                            ms                              Q-T Interval                                      374                           ms                              QTC Calculation (Bezet)                           450                           ms                              Calculated P Axis                                 55                            degrees                         Calculated R Axis                                 -11                           degrees                         Calculated T Axis                                 40                            degrees                         Diagnosis                                                                                                   Normal sinus rhythm   Normal ECG   When compared with ECG of 23-APR-2017 15:46,   No significant change was found   Confirmed by ST KENTON BUENO MD (), AARON BOLTON ([id number]) on 3/12/2018 8:23:12 PM     )  Tests in the radiology section of CPT®: ordered and reviewed (Ct Chest W Cont    Result Date: 3/12/2018  CT OF THE CHEST WITH INTRAVENOUS CONTRAST. INDICATION: Shortness of breath. Chest pain. COMPARISON: None. TECHNIQUE:   2.5 mm axial scans from above the aortic arch to the lung bases with 100 cc nonionic intravenous contrast without acute complication. Intravenous contrast was given to evaluate for pulmonary embolism. FINDINGS:  The degree of opacification of the pulmonary arteries is adequate  No intraluminal filling defects within the pulmonary arterial tree to suggest pulmonary embolism. Aorta is normal caliber with a uniform lumen without evidence of dissection. Lungs are clear except for some scarring at the left base. Included portion of the upper abdomen demonstrates cholecystectomy clips. The adrenal glands are normal size. IMPRESSION:  Negative.     )  Review and summarize past medical records: yes  Discuss the patient with other providers: yes  Independent visualization of images, tracings, or specimens: yes    Risk of Complications, Morbidity, and/or Mortality  Presenting problems: high  Diagnostic procedures: moderate  Management options: moderate    Patient Progress  Patient progress: improved        ED Course   Comment By Time   I spoke with  Komal Lopez In regards to patient's presentation and complaints. He suggests may be demand ischemia. He agrees with one unit of packed red blood cells. Blood bank actually aren't he hasn't unit made up for her because they're expecting her at infusion center stay. We'll transfuse one unit and reassess. If she is feeling significantly improved will discharge. Workup in ED essentially negative. Dash Tucker MD 03/12 2021   Patient currently receiving blood transfusion. She reports improvement.  Trini Day MD 03/12 2129       Procedures

## 2018-03-12 NOTE — ED TRIAGE NOTES
Patient complains of left sided chest pain intermittently since Thursday that has been constant starting this afternoon. Patient states she was supposed to get a chemo treatment for lymphoma but they sent her here from the cancer center due to hgb of 7.1 and they trent type and screen there prior to sending her here. Patient states increased shortness of breath with exertion.

## 2018-03-13 VITALS
DIASTOLIC BLOOD PRESSURE: 73 MMHG | RESPIRATION RATE: 18 BRPM | HEART RATE: 79 BPM | TEMPERATURE: 98.5 F | SYSTOLIC BLOOD PRESSURE: 128 MMHG | OXYGEN SATURATION: 98 %

## 2018-03-13 LAB
ABO + RH BLD: NORMAL
BLD PROD TYP BPU: NORMAL
BLOOD GROUP ANTIBODIES SERPL: NORMAL
BPU ID: NORMAL
CROSSMATCH RESULT,%XM: NORMAL
SPECIMEN EXP DATE BLD: NORMAL
STATUS OF UNIT,%ST: NORMAL
UNIT DIVISION, %UDIV: 0

## 2018-03-13 PROCEDURE — 74011250636 HC RX REV CODE- 250/636: Performed by: EMERGENCY MEDICINE

## 2018-03-13 RX ADMIN — SODIUM CHLORIDE, PRESERVATIVE FREE 300 UNITS: 5 INJECTION INTRAVENOUS at 00:08

## 2018-03-13 NOTE — ED NOTES
I have reviewed discharge instructions with the patient. The patient verbalized understanding. Patient left ED via Discharge Method: ambulatory to Home with family member. Opportunity for questions and clarification provided. Patient given 0 scripts. To continue your aftercare when you leave the hospital, you may receive an automated call from our care team to check in on how you are doing. This is a free service and part of our promise to provide the best care and service to meet your aftercare needs.  If you have questions, or wish to unsubscribe from this service please call [phone]. Thank you for Choosing our [facility] Emergency Department.

## 2018-03-13 NOTE — DISCHARGE INSTRUCTIONS
Chest Pain: Care Instructions  Your Care Instructions    There are many things that can cause chest pain. Some are not serious and will get better on their own in a few days. But some kinds of chest pain need more testing and treatment. Your doctor may have recommended a follow-up visit in the next 8 to 12 hours. If you are not getting better, you may need more tests or treatment. Even though your doctor has released you, you still need to watch for any problems. The doctor carefully checked you, but sometimes problems can develop later. If you have new symptoms or if your symptoms do not get better, get medical care right away. If you have worse or different chest pain or pressure that lasts more than 5 minutes or you passed out (lost consciousness), call 911 or seek other emergency help right away. A medical visit is only one step in your treatment. Even if you feel better, you still need to do what your doctor recommends, such as going to all suggested follow-up appointments and taking medicines exactly as directed. This will help you recover and help prevent future problems. How can you care for yourself at home? · Rest until you feel better. · Take your medicine exactly as prescribed. Call your doctor if you think you are having a problem with your medicine. · Do not drive after taking a prescription pain medicine. When should you call for help? Call 911 if:  ? · You passed out (lost consciousness). ? · You have severe difficulty breathing. ? · You have symptoms of a heart attack. These may include:  ¨ Chest pain or pressure, or a strange feeling in your chest.  ¨ Sweating. ¨ Shortness of breath. ¨ Nausea or vomiting. ¨ Pain, pressure, or a strange feeling in your back, neck, jaw, or upper belly or in one or both shoulders or arms. ¨ Lightheadedness or sudden weakness. ¨ A fast or irregular heartbeat.   After you call 911, the  may tell you to chew 1 adult-strength or 2 to 4 low-dose aspirin. Wait for an ambulance. Do not try to drive yourself. ?Call your doctor today if:  ? · You have any trouble breathing. ? · Your chest pain gets worse. ? · You are dizzy or lightheaded, or you feel like you may faint. ? · You are not getting better as expected. ? · You are having new or different chest pain. Where can you learn more? Go to http://rajat-rachel.info/. Enter A120 in the search box to learn more about \"Chest Pain: Care Instructions. \"  Current as of: March 20, 2017  Content Version: 11.4  © 8671-2561 [employer]. Care instructions adapted under license by [employer] (which disclaims liability or warranty for this information). If you have questions about a medical condition or this instruction, always ask your healthcare professional. [address] any warranty or liability for your use of this information.

## 2018-03-15 ENCOUNTER — HOSPITAL ENCOUNTER (OUTPATIENT)
Dept: INFUSION THERAPY | Age: 58
Discharge: HOME | End: 2018-03-15
Payer: COMMERCIAL

## 2018-03-15 VITALS
BODY MASS INDEX: 51.43 KG/M2 | SYSTOLIC BLOOD PRESSURE: 116 MMHG | OXYGEN SATURATION: 98 % | HEART RATE: 85 BPM | TEMPERATURE: 98.7 F | DIASTOLIC BLOOD PRESSURE: 80 MMHG | WEIGHT: 281.2 LBS | RESPIRATION RATE: 18 BRPM

## 2018-03-15 LAB
ALBUMIN SERPL-MCNC: 3.6 G/DL (ref 3.5–5)
ALBUMIN/GLOB SERPL: 1.3 {RATIO} (ref 1.2–3.5)
ALP SERPL-CCNC: 109 U/L (ref 50–136)
ALT SERPL-CCNC: 44 U/L (ref 12–65)
ANION GAP SERPL CALC-SCNC: 7 MMOL/L (ref 7–16)
AST SERPL-CCNC: 19 U/L (ref 15–37)
BILIRUB SERPL-MCNC: 0.3 MG/DL (ref 0.2–1.1)
BUN SERPL-MCNC: 11 MG/DL (ref 6–23)
CALCIUM SERPL-MCNC: 8.8 MG/DL (ref 8.3–10.4)
CHLORIDE SERPL-SCNC: 106 MMOL/L (ref 98–107)
CO2 SERPL-SCNC: 28 MMOL/L (ref 21–32)
CREAT SERPL-MCNC: 0.84 MG/DL (ref 0.6–1)
DIFFERENTIAL METHOD BLD: ABNORMAL
ERYTHROCYTE [DISTWIDTH] IN BLOOD BY AUTOMATED COUNT: 16.4 % (ref 11.9–14.6)
GLOBULIN SER CALC-MCNC: 2.8 G/DL (ref 2.3–3.5)
GLUCOSE SERPL-MCNC: 158 MG/DL (ref 65–100)
HCT VFR BLD AUTO: 23.3 % (ref 35.8–46.3)
HGB BLD-MCNC: 7.9 G/DL (ref 11.7–15.4)
LYMPHOCYTES # BLD: 0.2 K/UL (ref 0.5–4.6)
LYMPHOCYTES NFR BLD MANUAL: 8 % (ref 16–44)
MAGNESIUM SERPL-MCNC: 2 MG/DL (ref 1.8–2.4)
MCH RBC QN AUTO: 31.5 PG (ref 26.1–32.9)
MCHC RBC AUTO-ENTMCNC: 33.9 G/DL (ref 31.4–35)
MCV RBC AUTO: 92.8 FL (ref 79.6–97.8)
METAMYELOCYTES NFR BLD MANUAL: 2 %
MONOCYTES # BLD: 0.2 K/UL (ref 0.1–1.3)
MONOCYTES NFR BLD MANUAL: 6 % (ref 3–9)
NEUTS BAND NFR BLD MANUAL: 10 % (ref 0–10)
NEUTS SEG # BLD: 2.5 K/UL (ref 1.7–8.2)
NEUTS SEG NFR BLD MANUAL: 74 % (ref 47–75)
NRBC # BLD: 0.04 K/UL (ref 0–0.2)
PLATELET # BLD AUTO: 72 K/UL (ref 150–450)
PLATELET COMMENTS,PCOM: ABNORMAL
PMV BLD AUTO: 11.8 FL (ref 10.8–14.1)
POTASSIUM SERPL-SCNC: 4.2 MMOL/L (ref 3.5–5.1)
PROT SERPL-MCNC: 6.4 G/DL (ref 6.3–8.2)
RBC # BLD AUTO: 2.51 M/UL (ref 4.05–5.25)
RBC MORPH BLD: ABNORMAL
SODIUM SERPL-SCNC: 141 MMOL/L (ref 136–145)
WBC # BLD AUTO: 2.9 K/UL (ref 4.3–11.1)
WBC MORPH BLD: ABNORMAL

## 2018-03-15 PROCEDURE — 85025 COMPLETE CBC W/AUTO DIFF WBC: CPT | Performed by: INTERNAL MEDICINE

## 2018-03-15 PROCEDURE — 80053 COMPREHEN METABOLIC PANEL: CPT | Performed by: INTERNAL MEDICINE

## 2018-03-15 PROCEDURE — 36591 DRAW BLOOD OFF VENOUS DEVICE: CPT

## 2018-03-15 PROCEDURE — 83735 ASSAY OF MAGNESIUM: CPT | Performed by: INTERNAL MEDICINE

## 2018-03-15 PROCEDURE — 77030003560 HC NDL HUBR BARD -A

## 2018-03-15 PROCEDURE — 74011250636 HC RX REV CODE- 250/636: Performed by: INTERNAL MEDICINE

## 2018-03-15 RX ORDER — HEPARIN 100 UNIT/ML
300-500 SYRINGE INTRAVENOUS AS NEEDED
Status: CANCELLED | OUTPATIENT
Start: 2018-03-19

## 2018-03-15 RX ORDER — HEPARIN 100 UNIT/ML
500 SYRINGE INTRAVENOUS ONCE
Status: COMPLETED | OUTPATIENT
Start: 2018-03-15 | End: 2018-03-15

## 2018-03-15 RX ORDER — SODIUM CHLORIDE 0.9 % (FLUSH) 0.9 %
10 SYRINGE (ML) INJECTION AS NEEDED
Status: CANCELLED | OUTPATIENT
Start: 2018-03-19

## 2018-03-15 RX ADMIN — SODIUM CHLORIDE, PRESERVATIVE FREE 500 UNITS: 5 INJECTION INTRAVENOUS at 08:30

## 2018-03-15 NOTE — PROGRESS NOTES
Arrived to the [facility]. Labs drawn completed. No replacements needed. Any issues or concerns during appointment: None. Patient aware of next infusion appointment on March 19th at [address] [place] [address]. Discharged ambulatory.

## 2018-03-19 ENCOUNTER — HOSPITAL ENCOUNTER (OUTPATIENT)
Dept: INFUSION THERAPY | Age: 58
Discharge: HOME | End: 2018-03-19
Payer: COMMERCIAL

## 2018-03-19 VITALS
OXYGEN SATURATION: 96 % | SYSTOLIC BLOOD PRESSURE: 119 MMHG | HEART RATE: 76 BPM | BODY MASS INDEX: 50.99 KG/M2 | DIASTOLIC BLOOD PRESSURE: 65 MMHG | RESPIRATION RATE: 20 BRPM | TEMPERATURE: 98.1 F | WEIGHT: 278.8 LBS

## 2018-03-19 LAB
ALBUMIN SERPL-MCNC: 3.8 G/DL (ref 3.5–5)
ALBUMIN/GLOB SERPL: 1.5 {RATIO} (ref 1.2–3.5)
ALP SERPL-CCNC: 97 U/L (ref 50–136)
ALT SERPL-CCNC: 33 U/L (ref 12–65)
ANION GAP SERPL CALC-SCNC: 9 MMOL/L (ref 7–16)
AST SERPL-CCNC: 17 U/L (ref 15–37)
BASOPHILS # BLD: 0 K/UL (ref 0–0.2)
BASOPHILS NFR BLD: 1 % (ref 0–2)
BILIRUB SERPL-MCNC: 0.4 MG/DL (ref 0.2–1.1)
BUN SERPL-MCNC: 24 MG/DL (ref 6–23)
CALCIUM SERPL-MCNC: 9.1 MG/DL (ref 8.3–10.4)
CHLORIDE SERPL-SCNC: 108 MMOL/L (ref 98–107)
CO2 SERPL-SCNC: 27 MMOL/L (ref 21–32)
CREAT SERPL-MCNC: 0.85 MG/DL (ref 0.6–1)
DIFFERENTIAL METHOD BLD: ABNORMAL
EOSINOPHIL # BLD: 0 K/UL (ref 0–0.8)
EOSINOPHIL NFR BLD: 1 % (ref 0.5–7.8)
ERYTHROCYTE [DISTWIDTH] IN BLOOD BY AUTOMATED COUNT: 19 % (ref 11.9–14.6)
GLOBULIN SER CALC-MCNC: 2.5 G/DL (ref 2.3–3.5)
GLUCOSE SERPL-MCNC: 117 MG/DL (ref 65–100)
HCT VFR BLD AUTO: 22.7 % (ref 35.8–46.3)
HGB BLD-MCNC: 7.6 G/DL (ref 11.7–15.4)
LYMPHOCYTES # BLD: 0.2 K/UL (ref 0.5–4.6)
LYMPHOCYTES NFR BLD: 7 % (ref 13–44)
MAGNESIUM SERPL-MCNC: 2.3 MG/DL (ref 1.8–2.4)
MCH RBC QN AUTO: 31.5 PG (ref 26.1–32.9)
MCHC RBC AUTO-ENTMCNC: 33.5 G/DL (ref 31.4–35)
MCV RBC AUTO: 94.2 FL (ref 79.6–97.8)
MONOCYTES # BLD: 0.4 K/UL (ref 0.1–1.3)
MONOCYTES NFR BLD: 12 % (ref 4–12)
NEUTS SEG # BLD: 2.5 K/UL (ref 1.7–8.2)
NEUTS SEG NFR BLD: 78 % (ref 43–78)
NRBC # BLD: 0.02 K/UL (ref 0–0.2)
PLATELET # BLD AUTO: 101 K/UL (ref 150–450)
PMV BLD AUTO: 10.5 FL (ref 10.8–14.1)
POTASSIUM SERPL-SCNC: 4.1 MMOL/L (ref 3.5–5.1)
PROT SERPL-MCNC: 6.3 G/DL (ref 6.3–8.2)
RBC # BLD AUTO: 2.41 M/UL (ref 4.05–5.25)
SODIUM SERPL-SCNC: 144 MMOL/L (ref 136–145)
WBC # BLD AUTO: 3.2 K/UL (ref 4.3–11.1)

## 2018-03-19 PROCEDURE — 74011250637 HC RX REV CODE- 250/637: Performed by: NURSE PRACTITIONER

## 2018-03-19 PROCEDURE — 74011250636 HC RX REV CODE- 250/636: Performed by: NURSE PRACTITIONER

## 2018-03-19 PROCEDURE — 85025 COMPLETE CBC W/AUTO DIFF WBC: CPT | Performed by: INTERNAL MEDICINE

## 2018-03-19 PROCEDURE — 86900 BLOOD TYPING SEROLOGIC ABO: CPT | Performed by: NURSE PRACTITIONER

## 2018-03-19 PROCEDURE — 36430 TRANSFUSION BLD/BLD COMPNT: CPT

## 2018-03-19 PROCEDURE — 86644 CMV ANTIBODY: CPT | Performed by: NURSE PRACTITIONER

## 2018-03-19 PROCEDURE — P9040 RBC LEUKOREDUCED IRRADIATED: HCPCS | Performed by: NURSE PRACTITIONER

## 2018-03-19 PROCEDURE — 83735 ASSAY OF MAGNESIUM: CPT | Performed by: INTERNAL MEDICINE

## 2018-03-19 PROCEDURE — 86923 COMPATIBILITY TEST ELECTRIC: CPT | Performed by: NURSE PRACTITIONER

## 2018-03-19 PROCEDURE — 80053 COMPREHEN METABOLIC PANEL: CPT | Performed by: INTERNAL MEDICINE

## 2018-03-19 PROCEDURE — 77030018667 ADMN ST IV BLD FENW -A

## 2018-03-19 PROCEDURE — 77030003560 HC NDL HUBR BARD -A

## 2018-03-19 RX ORDER — SODIUM CHLORIDE 9 MG/ML
250 INJECTION, SOLUTION INTRAVENOUS AS NEEDED
Status: DISCONTINUED | OUTPATIENT
Start: 2018-03-19 | End: 2018-03-23 | Stop reason: HOSPADM

## 2018-03-19 RX ORDER — ACETAMINOPHEN 325 MG/1
650 TABLET ORAL ONCE
Status: COMPLETED | OUTPATIENT
Start: 2018-03-19 | End: 2018-03-19

## 2018-03-19 RX ADMIN — SODIUM CHLORIDE 250 ML: 900 INJECTION, SOLUTION INTRAVENOUS at 10:50

## 2018-03-19 RX ADMIN — ACETAMINOPHEN 650 MG: 325 TABLET, FILM COATED ORAL at 10:49

## 2018-03-19 NOTE — PROGRESS NOTES
Pt arrived ambulatory, labs drawn, 1 unit PRBC infused, pt tolerated well, discharged home ambulatory

## 2018-03-19 NOTE — PROGRESS NOTES
Massage THERAPY: Daily Note    Referring Physician: Oswaldo Palacios MD  Medical/Referring Diagnosis: Diffuse large B-cell lymphoma, unspecified site [C83.30]   Precautions/Allergies: Review of patient's allergies indicates no known allergies. SUBJECTIVE:  Present Symptoms: Pain in broken left toe     Pre-Treatment Pain: 8/10   Neuropathy Scale:  4/10  Past Medical History:    Ms. Madeleine Cedeño  has a past medical history of Anemia; Basal cell carcinoma; Cardiomegaly; Deep vein thrombosis (DVT) ([address] History of stroke; Hypertension; Marginal zone lymphoma ([address] (03/30/2017); Morbid obesity ([address] Osteoarthritis; Overactive bladder; Primary hypothyroidism; Radiation therapy complication; Rheumatic fever; S/P total knee replacement using cement (10/3/2011); Shingles; and Superficial venous thrombosis of right arm (5/1/2017). She also has no past medical history of Aneurysm ([address] Arrhythmia; Asthma; Autoimmune disease ([address] CAD (coronary artery disease); Chronic kidney disease; Coagulation defects; COPD; Diabetes ([address] Difficult intubation; GERD (gastroesophageal reflux disease); Heart failure ([address] Liver disease; Malignant hyperthermia due to anesthesia; Nausea & vomiting; Pseudocholinesterase deficiency; Psychiatric disorder; PUD (peptic ulcer disease); Seizures ([address] Stroke [facility]); or Unspecified sleep apnea. Ms. Madeleine Cedeño  has a past surgical history that includes pr anesth,achilles tendon surg (Left, 02/10/2011); hx cholecystectomy (1983); hx vascular access; hx knee replacement (Left, 2013); and hx knee replacement (Right, 2012). Current Medications:       Current Outpatient Prescriptions:     predniSONE (DELTASONE) 20 mg tablet, Take 5 Tabs by mouth daily (with breakfast). Take on days 1-5 of your chemo cycle, Disp: 25 Tab, Rfl: 5    loratadine (CLARITIN) 10 mg tablet, Take 1 Tab by mouth daily. , Disp: 30 Tab, Rfl: 0    fluconazole (DIFLUCAN) 150 mg tablet, Take 1 Tab by mouth daily.  FDA advises cautious prescribing of oral fluconazole in pregnancy. , Disp: 30 Tab, Rfl: 0    ondansetron hcl (ZOFRAN, AS HYDROCHLORIDE,) 8 mg tablet, Take 1 Tab by mouth every eight (8) hours as needed for Nausea (or vomiting). , Disp: 90 Tab, Rfl: 2    HYDROcodone-homatropine (HYCODAN) 5-1.5 mg/5 mL (5 mL) syrup, Take 5 mL by mouth four (4) times daily as needed. Max Daily Amount: 20 mL., Disp: 400 mL, Rfl: 0    allopurinol (ZYLOPRIM) 300 mg tablet, Take 1 Tab by mouth daily. , Disp: 30 Tab, Rfl: 1    albuterol (PROAIR HFA) 90 mcg/actuation inhaler, Take 2 Puffs by inhalation every four (4) hours as needed for Wheezing., Disp: 1 Inhaler, Rfl: 2    ondansetron hcl (ZOFRAN, AS HYDROCHLORIDE,) 4 mg tablet, Take 4 mg by mouth every eight (8) hours as needed for Nausea., Disp: , Rfl:     fluconazole (DIFLUCAN) 150 mg tablet, Take 150 mg by mouth daily. FDA advises cautious prescribing of oral fluconazole in pregnancy. , Disp: , Rfl:     acyclovir (ZOVIRAX) 400 mg tablet, Take 1 Tab by mouth two (2) times a day., Disp: 60 Tab, Rfl: 3    levothyroxine (SYNTHROID) 175 mcg tablet, Take 1 Tab by mouth Daily (before breakfast). , Disp: 30 Tab, Rfl: 5    pregabalin (LYRICA) 100 mg capsule, Take 1 Cap by mouth three (3) times daily. Max Daily Amount: 300 mg., Disp: 90 Cap, Rfl: 3    magnesium oxide (MAG-OX) 400 mg tablet, Take 1 Tab by mouth two (2) times a day., Disp: 60 Tab, Rfl: 1    magic mouthwash (ALEXSANDER) susp, Take 10 mL by mouth every four (4) hours as needed. , Disp: 2 Bottle, Rfl: 2    nystatin (MYCOSTATIN) powder, Apply  to affected area three (3) times daily. , Disp: 60 g, Rfl: 2    fluticasone (FLONASE) 50 mcg/actuation nasal spray, 2 Sprays by Both Nostrils route daily. , Disp: 1 Bottle, Rfl: 1    lidocaine-prilocaine (EMLA) topical cream, Apply  to affected area as needed for Pain. Apply to port site 45-60 minutes prior to lab appt or infusion. , Disp: 30 g, Rfl: 0    promethazine (PHENERGAN) 25 mg tablet, Take 25 mg by mouth every six (6) hours as needed for Nausea. Unsure of dose, Disp: , Rfl:     Current Facility-Administered Medications:     0.9% sodium chloride infusion 250 mL, 250 mL, IntraVENous, PRN, Garcia Robin, NP    acetaminophen (TYLENOL) tablet 650 mg, 650 mg, Oral, ONCE, Garcia Robin, NP       OBJECTIVE/ASSESSMENT:  Observations of Patient:  Appreciative of massage  Response To Treatment: Pain better, healing touch for toe   Post-Treatment Pain: 4/10   Neuropathy Scale:  4/10  TREATMENT:    (In addition to Assessment/Re-Assessment sessions the following treatments were rendered)  Treatment Provided:  [x]  Soft tissue massage  [x]  Healing Touch   Location: lower leg(s) bilaterally and feet  Patient Position: Seated  Time: 20 minutes    PLAN OF CARE:    []  I will follow up with this patient as needed. [x]  No follow up visit necessary.     Thank you for this referral.  Edil Russo LMT

## 2018-03-23 ENCOUNTER — PATIENT OUTREACH (OUTPATIENT)
Dept: CASE MANAGEMENT | Age: 58
End: 2018-03-23

## 2018-03-23 ENCOUNTER — HOSPITAL ENCOUNTER (OUTPATIENT)
Dept: LAB | Age: 58
Discharge: HOME | End: 2018-03-23
Payer: COMMERCIAL

## 2018-03-23 ENCOUNTER — HOSPITAL ENCOUNTER (OUTPATIENT)
Dept: INFUSION THERAPY | Age: 58
Discharge: HOME | End: 2018-03-23
Payer: COMMERCIAL

## 2018-03-23 VITALS
TEMPERATURE: 98.1 F | RESPIRATION RATE: 18 BRPM | DIASTOLIC BLOOD PRESSURE: 68 MMHG | HEART RATE: 78 BPM | SYSTOLIC BLOOD PRESSURE: 133 MMHG

## 2018-03-23 DIAGNOSIS — C85.88 MARGINAL ZONE LYMPHOMA OF LYMPH NODES OF MULTIPLE SITES (HCC): ICD-10-CM

## 2018-03-23 DIAGNOSIS — C83.39 DIFFUSE LARGE B-CELL LYMPHOMA OF SOLID ORGAN EXCLUDING SPLEEN (HCC): ICD-10-CM

## 2018-03-23 DIAGNOSIS — C82.09 GRADE I FOLLICULAR LYMPHOMA OF EXTRANODAL SITE EXCLUDING SPLEEN AND OTHER SOLID ORGANS (HCC): ICD-10-CM

## 2018-03-23 LAB
ALBUMIN SERPL-MCNC: 3.9 G/DL (ref 3.5–5)
ALBUMIN/GLOB SERPL: 1.5 {RATIO} (ref 1.2–3.5)
ALP SERPL-CCNC: 95 U/L (ref 50–136)
ALT SERPL-CCNC: 32 U/L (ref 12–65)
ANION GAP SERPL CALC-SCNC: 8 MMOL/L (ref 7–16)
AST SERPL-CCNC: 19 U/L (ref 15–37)
BASOPHILS # BLD: 0.1 K/UL (ref 0–0.2)
BASOPHILS NFR BLD: 1 % (ref 0–2)
BILIRUB SERPL-MCNC: 0.4 MG/DL (ref 0.2–1.1)
BUN SERPL-MCNC: 20 MG/DL (ref 6–23)
CALCIUM SERPL-MCNC: 9.2 MG/DL (ref 8.3–10.4)
CHLORIDE SERPL-SCNC: 108 MMOL/L (ref 98–107)
CO2 SERPL-SCNC: 25 MMOL/L (ref 21–32)
CREAT SERPL-MCNC: 0.78 MG/DL (ref 0.6–1)
DIFFERENTIAL METHOD BLD: ABNORMAL
EOSINOPHIL # BLD: 0.1 K/UL (ref 0–0.8)
EOSINOPHIL NFR BLD: 2 % (ref 0.5–7.8)
ERYTHROCYTE [DISTWIDTH] IN BLOOD BY AUTOMATED COUNT: 19.9 % (ref 11.9–14.6)
GLOBULIN SER CALC-MCNC: 2.6 G/DL (ref 2.3–3.5)
GLUCOSE SERPL-MCNC: 110 MG/DL (ref 65–100)
HCT VFR BLD AUTO: 26 % (ref 35.8–46.3)
HGB BLD-MCNC: 8.8 G/DL (ref 11.7–15.4)
LDH SERPL L TO P-CCNC: 336 U/L (ref 100–190)
LYMPHOCYTES # BLD: 0.4 K/UL (ref 0.5–4.6)
LYMPHOCYTES NFR BLD: 9 % (ref 13–44)
MAGNESIUM SERPL-MCNC: 2 MG/DL (ref 1.8–2.4)
MCH RBC QN AUTO: 31.7 PG (ref 26.1–32.9)
MCHC RBC AUTO-ENTMCNC: 33.8 G/DL (ref 31.4–35)
MCV RBC AUTO: 93.5 FL (ref 79.6–97.8)
MONOCYTES # BLD: 0.5 K/UL (ref 0.1–1.3)
MONOCYTES NFR BLD: 12 % (ref 4–12)
NEUTS SEG # BLD: 3.1 K/UL (ref 1.7–8.2)
NEUTS SEG NFR BLD: 76 % (ref 43–78)
NRBC # BLD: 0.01 K/UL (ref 0–0.2)
PLATELET # BLD AUTO: 109 K/UL (ref 150–450)
PMV BLD AUTO: 10.5 FL (ref 10.8–14.1)
POTASSIUM SERPL-SCNC: 4 MMOL/L (ref 3.5–5.1)
PROT SERPL-MCNC: 6.5 G/DL (ref 6.3–8.2)
RBC # BLD AUTO: 2.78 M/UL (ref 4.05–5.25)
SODIUM SERPL-SCNC: 141 MMOL/L (ref 136–145)
WBC # BLD AUTO: 4.1 K/UL (ref 4.3–11.1)

## 2018-03-23 PROCEDURE — 74011250636 HC RX REV CODE- 250/636

## 2018-03-23 PROCEDURE — 74011250637 HC RX REV CODE- 250/637: Performed by: INTERNAL MEDICINE

## 2018-03-23 PROCEDURE — 83615 LACTATE (LD) (LDH) ENZYME: CPT | Performed by: INTERNAL MEDICINE

## 2018-03-23 PROCEDURE — 80053 COMPREHEN METABOLIC PANEL: CPT | Performed by: INTERNAL MEDICINE

## 2018-03-23 PROCEDURE — 85025 COMPLETE CBC W/AUTO DIFF WBC: CPT | Performed by: INTERNAL MEDICINE

## 2018-03-23 PROCEDURE — 83735 ASSAY OF MAGNESIUM: CPT | Performed by: INTERNAL MEDICINE

## 2018-03-23 PROCEDURE — 96377 APPLICATON ON-BODY INJECTOR: CPT

## 2018-03-23 PROCEDURE — 96417 CHEMO IV INFUS EACH ADDL SEQ: CPT

## 2018-03-23 PROCEDURE — 96413 CHEMO IV INFUSION 1 HR: CPT

## 2018-03-23 PROCEDURE — 74011250636 HC RX REV CODE- 250/636: Performed by: INTERNAL MEDICINE

## 2018-03-23 PROCEDURE — 74011000258 HC RX REV CODE- 258: Performed by: INTERNAL MEDICINE

## 2018-03-23 PROCEDURE — 96415 CHEMO IV INFUSION ADDL HR: CPT

## 2018-03-23 PROCEDURE — 96411 CHEMO IV PUSH ADDL DRUG: CPT

## 2018-03-23 PROCEDURE — 96375 TX/PRO/DX INJ NEW DRUG ADDON: CPT

## 2018-03-23 PROCEDURE — 96367 TX/PROPH/DG ADDL SEQ IV INF: CPT

## 2018-03-23 RX ORDER — HEPARIN 100 UNIT/ML
300-500 SYRINGE INTRAVENOUS AS NEEDED
Status: DISPENSED | OUTPATIENT
Start: 2018-03-23 | End: 2018-03-23

## 2018-03-23 RX ORDER — SODIUM CHLORIDE 0.9 % (FLUSH) 0.9 %
10 SYRINGE (ML) INJECTION AS NEEDED
Status: ACTIVE | OUTPATIENT
Start: 2018-03-23 | End: 2018-03-23

## 2018-03-23 RX ORDER — DOXORUBICIN HYDROCHLORIDE 2 MG/ML
50 INJECTION, SOLUTION INTRAVENOUS ONCE
Status: COMPLETED | OUTPATIENT
Start: 2018-03-23 | End: 2018-03-23

## 2018-03-23 RX ORDER — PREDNISONE 20 MG/1
100 TABLET ORAL DAILY
Refills: 0 | Status: SHIPPED | COMMUNITY
Start: 2018-03-23 | End: 2018-06-25 | Stop reason: ALTCHOICE

## 2018-03-23 RX ORDER — ONDANSETRON 2 MG/ML
8 INJECTION INTRAMUSCULAR; INTRAVENOUS ONCE
Status: COMPLETED | OUTPATIENT
Start: 2018-03-23 | End: 2018-03-23

## 2018-03-23 RX ORDER — ACETAMINOPHEN 325 MG/1
650 TABLET ORAL ONCE
Status: COMPLETED | OUTPATIENT
Start: 2018-03-23 | End: 2018-03-23

## 2018-03-23 RX ORDER — DIPHENHYDRAMINE HYDROCHLORIDE 50 MG/ML
50 INJECTION, SOLUTION INTRAMUSCULAR; INTRAVENOUS ONCE
Status: COMPLETED | OUTPATIENT
Start: 2018-03-23 | End: 2018-03-23

## 2018-03-23 RX ADMIN — SODIUM CHLORIDE 1000 ML: 900 INJECTION, SOLUTION INTRAVENOUS at 10:24

## 2018-03-23 RX ADMIN — PEGFILGRASTIM 6 MG: KIT SUBCUTANEOUS at 17:08

## 2018-03-23 RX ADMIN — Medication 10 ML: at 10:24

## 2018-03-23 RX ADMIN — DIPHENHYDRAMINE HYDROCHLORIDE 50 MG: 50 INJECTION, SOLUTION INTRAMUSCULAR; INTRAVENOUS at 10:29

## 2018-03-23 RX ADMIN — DOXORUBICIN HYDROCHLORIDE 117 MG: 2 INJECTION, SOLUTION INTRAVENOUS at 16:11

## 2018-03-23 RX ADMIN — SODIUM CHLORIDE, PRESERVATIVE FREE 500 UNITS: 5 INJECTION INTRAVENOUS at 16:50

## 2018-03-23 RX ADMIN — ONDANSETRON 8 MG: 2 INJECTION INTRAMUSCULAR; INTRAVENOUS at 14:01

## 2018-03-23 RX ADMIN — CYCLOPHOSPHAMIDE 1755 MG: 1 INJECTION, POWDER, FOR SOLUTION INTRAVENOUS; ORAL at 15:02

## 2018-03-23 RX ADMIN — SODIUM CHLORIDE 150 MG: 900 INJECTION, SOLUTION INTRAVENOUS at 14:20

## 2018-03-23 RX ADMIN — DEXAMETHASONE SODIUM PHOSPHATE 12 MG: 4 INJECTION, SOLUTION INTRAMUSCULAR; INTRAVENOUS at 14:04

## 2018-03-23 RX ADMIN — VINCRISTINE SULFATE 2 MG: 1 INJECTION, SOLUTION INTRAVENOUS at 16:20

## 2018-03-23 RX ADMIN — Medication 10 ML: at 16:50

## 2018-03-23 RX ADMIN — RITUXIMAB 878 MG: 10 INJECTION, SOLUTION INTRAVENOUS at 11:02

## 2018-03-23 RX ADMIN — ACETAMINOPHEN 650 MG: 325 TABLET, FILM COATED ORAL at 10:29

## 2018-03-23 NOTE — PROGRESS NOTES
Pt was seen and labs reviewed by Dr. Tyrell Aguilar. Pt to proceed with cycle 2 R-CHOP today. She is set up for labs/replacements 2x weekly until next cycle. If pt still experiencing episodes of chest pain related to anemia, may need to change PRBC parameters to transfuse if hgb <8. Will repeat PET after this cycle, and pt to return to see Dr. Tyrell Aguilar for PET results. Rx for Norco given for foot pain related to possible broken toe(s). Pt instructed to see her orthopedist for evaluation.

## 2018-03-23 NOTE — PROGRESS NOTES
Arrived to the [facility]. Rituxan and chemo completed. Patient tolerated well. Port flushed and de-accessed. Neulasta on-body injector applied and instructions reviewed with patient. Any issues or concerns during appointment: None. Patient aware of next infusion appointment on 3/27 (date) at 0800 (time). Discharged ambulatory in stable condition.

## 2018-03-27 ENCOUNTER — HOSPITAL ENCOUNTER (OUTPATIENT)
Dept: INFUSION THERAPY | Age: 58
Discharge: HOME | End: 2018-03-27
Payer: COMMERCIAL

## 2018-03-27 VITALS
WEIGHT: 283.2 LBS | BODY MASS INDEX: 51.8 KG/M2 | HEART RATE: 79 BPM | RESPIRATION RATE: 18 BRPM | DIASTOLIC BLOOD PRESSURE: 83 MMHG | TEMPERATURE: 98.3 F | SYSTOLIC BLOOD PRESSURE: 131 MMHG

## 2018-03-27 DIAGNOSIS — C85.88 MARGINAL ZONE LYMPHOMA OF LYMPH NODES OF MULTIPLE SITES (HCC): ICD-10-CM

## 2018-03-27 LAB
ALBUMIN SERPL-MCNC: 3.7 G/DL (ref 3.5–5)
ALBUMIN/GLOB SERPL: 1.4 {RATIO} (ref 1.2–3.5)
ALP SERPL-CCNC: 84 U/L (ref 50–136)
ALT SERPL-CCNC: 49 U/L (ref 12–65)
ANION GAP SERPL CALC-SCNC: 5 MMOL/L (ref 7–16)
AST SERPL-CCNC: 22 U/L (ref 15–37)
BASOPHILS # BLD: 0 K/UL (ref 0–0.2)
BASOPHILS NFR BLD: 0 % (ref 0–2)
BILIRUB SERPL-MCNC: 0.6 MG/DL (ref 0.2–1.1)
BUN SERPL-MCNC: 31 MG/DL (ref 6–23)
CALCIUM SERPL-MCNC: 8.9 MG/DL (ref 8.3–10.4)
CHLORIDE SERPL-SCNC: 107 MMOL/L (ref 98–107)
CO2 SERPL-SCNC: 30 MMOL/L (ref 21–32)
CREAT SERPL-MCNC: 0.81 MG/DL (ref 0.6–1)
DIFFERENTIAL METHOD BLD: ABNORMAL
EOSINOPHIL # BLD: 0.1 K/UL (ref 0–0.8)
EOSINOPHIL NFR BLD: 1 % (ref 0.5–7.8)
ERYTHROCYTE [DISTWIDTH] IN BLOOD BY AUTOMATED COUNT: 19.8 % (ref 11.9–14.6)
GLOBULIN SER CALC-MCNC: 2.6 G/DL (ref 2.3–3.5)
GLUCOSE SERPL-MCNC: 94 MG/DL (ref 65–100)
HCT VFR BLD AUTO: 25.1 % (ref 35.8–46.3)
HGB BLD-MCNC: 8.6 G/DL (ref 11.7–15.4)
LYMPHOCYTES # BLD: 0.3 K/UL (ref 0.5–4.6)
LYMPHOCYTES NFR BLD: 6 % (ref 13–44)
MAGNESIUM SERPL-MCNC: 2.2 MG/DL (ref 1.8–2.4)
MCH RBC QN AUTO: 31.9 PG (ref 26.1–32.9)
MCHC RBC AUTO-ENTMCNC: 34.3 G/DL (ref 31.4–35)
MCV RBC AUTO: 93 FL (ref 79.6–97.8)
MONOCYTES # BLD: 0.3 K/UL (ref 0.1–1.3)
MONOCYTES NFR BLD: 7 % (ref 4–12)
NEUTS SEG # BLD: 4.1 K/UL (ref 1.7–8.2)
NEUTS SEG NFR BLD: 86 % (ref 43–78)
NRBC # BLD: 0 K/UL (ref 0–0.2)
PLATELET # BLD AUTO: 116 K/UL (ref 150–450)
PMV BLD AUTO: 9.9 FL (ref 10.8–14.1)
POTASSIUM SERPL-SCNC: 3.8 MMOL/L (ref 3.5–5.1)
PROT SERPL-MCNC: 6.3 G/DL (ref 6.3–8.2)
RBC # BLD AUTO: 2.7 M/UL (ref 4.05–5.25)
SODIUM SERPL-SCNC: 142 MMOL/L (ref 136–145)
WBC # BLD AUTO: 4.8 K/UL (ref 4.3–11.1)

## 2018-03-27 PROCEDURE — 85025 COMPLETE CBC W/AUTO DIFF WBC: CPT | Performed by: INTERNAL MEDICINE

## 2018-03-27 PROCEDURE — 74011250636 HC RX REV CODE- 250/636: Performed by: INTERNAL MEDICINE

## 2018-03-27 PROCEDURE — 83735 ASSAY OF MAGNESIUM: CPT | Performed by: INTERNAL MEDICINE

## 2018-03-27 PROCEDURE — 80053 COMPREHEN METABOLIC PANEL: CPT | Performed by: INTERNAL MEDICINE

## 2018-03-27 PROCEDURE — 77030003560 HC NDL HUBR BARD -A

## 2018-03-27 PROCEDURE — 36591 DRAW BLOOD OFF VENOUS DEVICE: CPT

## 2018-03-27 RX ORDER — HEPARIN 100 UNIT/ML
500 SYRINGE INTRAVENOUS AS NEEDED
Status: DISPENSED | OUTPATIENT
Start: 2018-03-27 | End: 2018-03-27

## 2018-03-27 RX ORDER — SODIUM CHLORIDE 0.9 % (FLUSH) 0.9 %
10 SYRINGE (ML) INJECTION AS NEEDED
Status: DISCONTINUED | OUTPATIENT
Start: 2018-03-27 | End: 2018-03-31 | Stop reason: HOSPADM

## 2018-03-27 RX ADMIN — SODIUM CHLORIDE, PRESERVATIVE FREE 500 UNITS: 5 INJECTION INTRAVENOUS at 09:16

## 2018-03-27 RX ADMIN — Medication 10 ML: at 09:16

## 2018-03-27 NOTE — PROGRESS NOTES
Arrived to the [facility]. Labs resulted, did not require replacements, patient states that she feels good today. Any issues or concerns during appointment: Neulasta on body injector failed, patient returned it today, given to Mandeep Harden in lab for credit. [facility] notified, per Dr Christiano Tariq, will not give Neulasta today.   Patient aware of next infusion appointment on 3/30/18 at [address] [place] [address]  Discharged ambulatory

## 2018-03-29 RX ORDER — SODIUM CHLORIDE 0.9 % (FLUSH) 0.9 %
10 SYRINGE (ML) INJECTION AS NEEDED
Status: CANCELLED | OUTPATIENT
Start: 2018-04-06

## 2018-03-29 RX ORDER — SODIUM CHLORIDE 0.9 % (FLUSH) 0.9 %
10 SYRINGE (ML) INJECTION AS NEEDED
Status: CANCELLED | OUTPATIENT
Start: 2018-04-04

## 2018-03-29 RX ORDER — HEPARIN 100 UNIT/ML
300-500 SYRINGE INTRAVENOUS AS NEEDED
Status: CANCELLED | OUTPATIENT
Start: 2018-04-06

## 2018-03-29 RX ORDER — HEPARIN 100 UNIT/ML
300-500 SYRINGE INTRAVENOUS AS NEEDED
Status: CANCELLED | OUTPATIENT
Start: 2018-04-04

## 2018-03-30 ENCOUNTER — HOSPITAL ENCOUNTER (OUTPATIENT)
Dept: INFUSION THERAPY | Age: 58
Discharge: HOME | End: 2018-03-30
Payer: COMMERCIAL

## 2018-03-30 VITALS
BODY MASS INDEX: 52.09 KG/M2 | RESPIRATION RATE: 18 BRPM | SYSTOLIC BLOOD PRESSURE: 133 MMHG | HEART RATE: 88 BPM | WEIGHT: 284.8 LBS | DIASTOLIC BLOOD PRESSURE: 58 MMHG | TEMPERATURE: 98.9 F

## 2018-03-30 LAB
ALBUMIN SERPL-MCNC: 3.8 G/DL (ref 3.5–5)
ALBUMIN/GLOB SERPL: 1.6 {RATIO} (ref 1.2–3.5)
ALP SERPL-CCNC: 85 U/L (ref 50–136)
ALT SERPL-CCNC: 46 U/L (ref 12–65)
ANION GAP SERPL CALC-SCNC: 6 MMOL/L (ref 7–16)
AST SERPL-CCNC: 14 U/L (ref 15–37)
BASOPHILS # BLD: 0.1 K/UL (ref 0–0.2)
BASOPHILS NFR BLD MANUAL: 8 % (ref 0–2)
BILIRUB SERPL-MCNC: 0.5 MG/DL (ref 0.2–1.1)
BUN SERPL-MCNC: 27 MG/DL (ref 6–23)
CALCIUM SERPL-MCNC: 9.2 MG/DL (ref 8.3–10.4)
CHLORIDE SERPL-SCNC: 105 MMOL/L (ref 98–107)
CO2 SERPL-SCNC: 30 MMOL/L (ref 21–32)
CREAT SERPL-MCNC: 0.72 MG/DL (ref 0.6–1)
DIFFERENTIAL METHOD BLD: ABNORMAL
EOSINOPHIL # BLD: 0.1 K/UL (ref 0–0.8)
EOSINOPHIL NFR BLD MANUAL: 4 % (ref 1–8)
ERYTHROCYTE [DISTWIDTH] IN BLOOD BY AUTOMATED COUNT: 18.6 % (ref 11.9–14.6)
GLOBULIN SER CALC-MCNC: 2.4 G/DL (ref 2.3–3.5)
GLUCOSE SERPL-MCNC: 99 MG/DL (ref 65–100)
HCT VFR BLD AUTO: 23.2 % (ref 35.8–46.3)
HGB BLD-MCNC: 8 G/DL (ref 11.7–15.4)
LYMPHOCYTES # BLD: 0.2 K/UL (ref 0.5–4.6)
LYMPHOCYTES NFR BLD MANUAL: 10 % (ref 16–44)
MAGNESIUM SERPL-MCNC: 2 MG/DL (ref 1.8–2.4)
MCH RBC QN AUTO: 32.1 PG (ref 26.1–32.9)
MCHC RBC AUTO-ENTMCNC: 34.5 G/DL (ref 31.4–35)
MCV RBC AUTO: 93.2 FL (ref 79.6–97.8)
METAMYELOCYTES NFR BLD MANUAL: 2 %
MONOCYTES # BLD: 0.1 K/UL (ref 0.1–1.3)
MONOCYTES NFR BLD MANUAL: 4 % (ref 3–9)
NEUTS BAND NFR BLD MANUAL: 2 % (ref 0–10)
NEUTS SEG # BLD: 1.2 K/UL (ref 1.7–8.2)
NEUTS SEG NFR BLD MANUAL: 70 % (ref 47–75)
NRBC # BLD: 0 K/UL (ref 0–0.2)
PLATELET # BLD AUTO: 104 K/UL (ref 150–450)
PLATELET COMMENTS,PCOM: SLIGHT
PMV BLD AUTO: 10.5 FL (ref 10.8–14.1)
POTASSIUM SERPL-SCNC: 3.8 MMOL/L (ref 3.5–5.1)
PROT SERPL-MCNC: 6.2 G/DL (ref 6.3–8.2)
RBC # BLD AUTO: 2.49 M/UL (ref 4.05–5.25)
RBC MORPH BLD: ABNORMAL
SODIUM SERPL-SCNC: 141 MMOL/L (ref 136–145)
WBC # BLD AUTO: 1.7 K/UL (ref 4.3–11.1)
WBC MORPH BLD: ABNORMAL

## 2018-03-30 PROCEDURE — 77030003560 HC NDL HUBR BARD -A

## 2018-03-30 PROCEDURE — 36591 DRAW BLOOD OFF VENOUS DEVICE: CPT

## 2018-03-30 PROCEDURE — 80053 COMPREHEN METABOLIC PANEL: CPT | Performed by: INTERNAL MEDICINE

## 2018-03-30 PROCEDURE — 74011250636 HC RX REV CODE- 250/636: Performed by: INTERNAL MEDICINE

## 2018-03-30 PROCEDURE — 85025 COMPLETE CBC W/AUTO DIFF WBC: CPT | Performed by: INTERNAL MEDICINE

## 2018-03-30 PROCEDURE — 83735 ASSAY OF MAGNESIUM: CPT | Performed by: INTERNAL MEDICINE

## 2018-03-30 RX ORDER — SODIUM CHLORIDE 0.9 % (FLUSH) 0.9 %
10 SYRINGE (ML) INJECTION AS NEEDED
Status: DISCONTINUED | OUTPATIENT
Start: 2018-03-30 | End: 2018-04-03 | Stop reason: HOSPADM

## 2018-03-30 RX ORDER — HEPARIN 100 UNIT/ML
300-500 SYRINGE INTRAVENOUS AS NEEDED
Status: DISPENSED | OUTPATIENT
Start: 2018-03-30 | End: 2018-03-30

## 2018-03-30 RX ADMIN — SODIUM CHLORIDE, PRESERVATIVE FREE 500 UNITS: 5 INJECTION INTRAVENOUS at 08:36

## 2018-03-30 RX ADMIN — Medication 10 ML: at 08:36

## 2018-03-30 NOTE — PROGRESS NOTES
Arrived ambulatory to [facility], labs drawn and reveiwed no replacements needed, pt discharged home ambulatory next appointment 4/3 at 1315

## 2018-04-03 ENCOUNTER — HOSPITAL ENCOUNTER (OUTPATIENT)
Dept: INFUSION THERAPY | Age: 58
Discharge: HOME | End: 2018-04-03
Payer: COMMERCIAL

## 2018-04-03 ENCOUNTER — HOSPITAL ENCOUNTER (OUTPATIENT)
Dept: LAB | Age: 58
Discharge: HOME | End: 2018-04-03
Payer: COMMERCIAL

## 2018-04-03 DIAGNOSIS — C83.39 DIFFUSE LARGE B-CELL LYMPHOMA OF SOLID ORGAN EXCLUDING SPLEEN (HCC): ICD-10-CM

## 2018-04-03 DIAGNOSIS — C82.09 GRADE I FOLLICULAR LYMPHOMA OF EXTRANODAL SITE EXCLUDING SPLEEN AND OTHER SOLID ORGANS (HCC): Chronic | ICD-10-CM

## 2018-04-03 DIAGNOSIS — C85.88 MARGINAL ZONE LYMPHOMA OF LYMPH NODES OF MULTIPLE SITES (HCC): Chronic | ICD-10-CM

## 2018-04-03 LAB
ALBUMIN SERPL-MCNC: 3.5 G/DL (ref 3.5–5)
ALBUMIN/GLOB SERPL: 1.3 {RATIO} (ref 1.2–3.5)
ALP SERPL-CCNC: 88 U/L (ref 50–136)
ALT SERPL-CCNC: 50 U/L (ref 12–65)
ANION GAP SERPL CALC-SCNC: 5 MMOL/L (ref 7–16)
AST SERPL-CCNC: 22 U/L (ref 15–37)
BASOPHILS # BLD: 0 K/UL (ref 0–0.2)
BASOPHILS NFR BLD: 2 % (ref 0–2)
BILIRUB SERPL-MCNC: 0.4 MG/DL (ref 0.2–1.1)
BUN SERPL-MCNC: 18 MG/DL (ref 6–23)
CALCIUM SERPL-MCNC: 8.9 MG/DL (ref 8.3–10.4)
CHLORIDE SERPL-SCNC: 105 MMOL/L (ref 98–107)
CO2 SERPL-SCNC: 30 MMOL/L (ref 21–32)
CREAT SERPL-MCNC: 0.67 MG/DL (ref 0.6–1)
DIFFERENTIAL METHOD BLD: ABNORMAL
EOSINOPHIL # BLD: 0 K/UL (ref 0–0.8)
EOSINOPHIL NFR BLD: 7 % (ref 0.5–7.8)
ERYTHROCYTE [DISTWIDTH] IN BLOOD BY AUTOMATED COUNT: 20.2 % (ref 11.9–14.6)
GLOBULIN SER CALC-MCNC: 2.7 G/DL (ref 2.3–3.5)
GLUCOSE SERPL-MCNC: 107 MG/DL (ref 65–100)
HCT VFR BLD AUTO: 21.9 % (ref 35.8–46.3)
HGB BLD-MCNC: 7.5 G/DL (ref 11.7–15.4)
LDH SERPL L TO P-CCNC: 253 U/L (ref 100–190)
LYMPHOCYTES # BLD: 0.1 K/UL (ref 0.5–4.6)
LYMPHOCYTES NFR BLD: 15 % (ref 13–44)
MAGNESIUM SERPL-MCNC: 2.2 MG/DL (ref 1.8–2.4)
MCH RBC QN AUTO: 32.8 PG (ref 26.1–32.9)
MCHC RBC AUTO-ENTMCNC: 34.2 G/DL (ref 31.4–35)
MCV RBC AUTO: 95.6 FL (ref 79.6–97.8)
MONOCYTES # BLD: 0.2 K/UL (ref 0.1–1.3)
MONOCYTES NFR BLD: 32 % (ref 4–12)
NEUTS SEG # BLD: 0.3 K/UL (ref 1.7–8.2)
NEUTS SEG NFR BLD: 44 % (ref 43–78)
NRBC # BLD: 0.01 K/UL (ref 0–0.2)
PLATELET # BLD AUTO: 80 K/UL (ref 150–450)
PMV BLD AUTO: 11.5 FL (ref 10.8–14.1)
POTASSIUM SERPL-SCNC: 4 MMOL/L (ref 3.5–5.1)
PROT SERPL-MCNC: 6.2 G/DL (ref 6.3–8.2)
RBC # BLD AUTO: 2.29 M/UL (ref 4.05–5.25)
SODIUM SERPL-SCNC: 140 MMOL/L (ref 136–145)
WBC # BLD AUTO: 0.6 K/UL (ref 4.3–11.1)

## 2018-04-03 PROCEDURE — 83615 LACTATE (LD) (LDH) ENZYME: CPT | Performed by: INTERNAL MEDICINE

## 2018-04-03 PROCEDURE — 86644 CMV ANTIBODY: CPT | Performed by: NURSE PRACTITIONER

## 2018-04-03 PROCEDURE — 86900 BLOOD TYPING SEROLOGIC ABO: CPT | Performed by: NURSE PRACTITIONER

## 2018-04-03 PROCEDURE — 83735 ASSAY OF MAGNESIUM: CPT | Performed by: INTERNAL MEDICINE

## 2018-04-03 PROCEDURE — 80053 COMPREHEN METABOLIC PANEL: CPT | Performed by: INTERNAL MEDICINE

## 2018-04-03 PROCEDURE — 86923 COMPATIBILITY TEST ELECTRIC: CPT | Performed by: NURSE PRACTITIONER

## 2018-04-03 PROCEDURE — 85025 COMPLETE CBC W/AUTO DIFF WBC: CPT | Performed by: INTERNAL MEDICINE

## 2018-04-04 ENCOUNTER — HOSPITAL ENCOUNTER (OUTPATIENT)
Dept: INFUSION THERAPY | Age: 58
Discharge: HOME | End: 2018-04-04
Payer: COMMERCIAL

## 2018-04-04 VITALS
BODY MASS INDEX: 52.49 KG/M2 | WEIGHT: 287 LBS | OXYGEN SATURATION: 98 % | HEART RATE: 71 BPM | DIASTOLIC BLOOD PRESSURE: 71 MMHG | TEMPERATURE: 98.2 F | SYSTOLIC BLOOD PRESSURE: 142 MMHG | RESPIRATION RATE: 18 BRPM

## 2018-04-04 PROCEDURE — 74011250637 HC RX REV CODE- 250/637: Performed by: INTERNAL MEDICINE

## 2018-04-04 PROCEDURE — 77030018667 ADMN ST IV BLD FENW -A

## 2018-04-04 PROCEDURE — 74011250636 HC RX REV CODE- 250/636: Performed by: INTERNAL MEDICINE

## 2018-04-04 PROCEDURE — 36430 TRANSFUSION BLD/BLD COMPNT: CPT

## 2018-04-04 PROCEDURE — P9040 RBC LEUKOREDUCED IRRADIATED: HCPCS | Performed by: NURSE PRACTITIONER

## 2018-04-04 RX ORDER — SODIUM CHLORIDE 9 MG/ML
250 INJECTION, SOLUTION INTRAVENOUS AS NEEDED
Status: DISCONTINUED | OUTPATIENT
Start: 2018-04-04 | End: 2018-04-08 | Stop reason: HOSPADM

## 2018-04-04 RX ORDER — ACETAMINOPHEN 325 MG/1
650 TABLET ORAL ONCE
Status: COMPLETED | OUTPATIENT
Start: 2018-04-04 | End: 2018-04-04

## 2018-04-04 RX ORDER — HEPARIN 100 UNIT/ML
500 SYRINGE INTRAVENOUS AS NEEDED
Status: DISPENSED | OUTPATIENT
Start: 2018-04-04 | End: 2018-04-04

## 2018-04-04 RX ORDER — DIPHENHYDRAMINE HCL 25 MG
25 CAPSULE ORAL ONCE
Status: COMPLETED | OUTPATIENT
Start: 2018-04-04 | End: 2018-04-04

## 2018-04-04 RX ADMIN — SODIUM CHLORIDE 250 ML: 900 INJECTION, SOLUTION INTRAVENOUS at 08:00

## 2018-04-04 RX ADMIN — ACETAMINOPHEN 650 MG: 325 TABLET, FILM COATED ORAL at 08:08

## 2018-04-04 RX ADMIN — DIPHENHYDRAMINE HYDROCHLORIDE 25 MG: 25 CAPSULE ORAL at 08:09

## 2018-04-04 NOTE — PROGRESS NOTES
Problem: Patient Education:  Go to Education Activity  Goal: Patient/Family Education  Outcome: Progressing Towards Goal  Reviewed blood transfusion POC with patient. Teaching handout reviewed and signed by patient. Verbalizes understanding.

## 2018-04-04 NOTE — PROGRESS NOTES
Tolerated blood transfusion without difficulty. Reports discomfort to mid chest is totally alleviated since transfusion. Patient discharged via ambulation accompanied by self. Instructed to notify physician of any problems, questions or concerns. Allowed opportunity for patient/family to ask questions. Verbalized understanding. Next appointment is 04/06/18 at [address] [place] [address] with Daniel for labs and possible replacement. Jarrett Bill

## 2018-04-06 ENCOUNTER — APPOINTMENT (OUTPATIENT)
Dept: INFUSION THERAPY | Age: 58
End: 2018-04-06
Payer: COMMERCIAL

## 2018-04-06 ENCOUNTER — HOSPITAL ENCOUNTER (OUTPATIENT)
Dept: INFUSION THERAPY | Age: 58
Discharge: HOME | End: 2018-04-06
Payer: COMMERCIAL

## 2018-04-06 VITALS
SYSTOLIC BLOOD PRESSURE: 111 MMHG | RESPIRATION RATE: 18 BRPM | HEART RATE: 84 BPM | TEMPERATURE: 98.6 F | BODY MASS INDEX: 52.68 KG/M2 | WEIGHT: 288 LBS | DIASTOLIC BLOOD PRESSURE: 76 MMHG

## 2018-04-06 LAB
ALBUMIN SERPL-MCNC: 3.7 G/DL (ref 3.5–5)
ALBUMIN/GLOB SERPL: 1.4 {RATIO} (ref 1.2–3.5)
ALP SERPL-CCNC: 93 U/L (ref 50–136)
ALT SERPL-CCNC: 60 U/L (ref 12–65)
ANION GAP SERPL CALC-SCNC: 6 MMOL/L (ref 7–16)
AST SERPL-CCNC: 31 U/L (ref 15–37)
BASOPHILS # BLD: 0 K/UL (ref 0–0.2)
BASOPHILS NFR BLD: 1 % (ref 0–2)
BILIRUB SERPL-MCNC: 0.4 MG/DL (ref 0.2–1.1)
BUN SERPL-MCNC: 22 MG/DL (ref 6–23)
CALCIUM SERPL-MCNC: 9.1 MG/DL (ref 8.3–10.4)
CHLORIDE SERPL-SCNC: 105 MMOL/L (ref 98–107)
CO2 SERPL-SCNC: 29 MMOL/L (ref 21–32)
CREAT SERPL-MCNC: 0.83 MG/DL (ref 0.6–1)
DIFFERENTIAL METHOD BLD: ABNORMAL
EOSINOPHIL # BLD: 0 K/UL (ref 0–0.8)
EOSINOPHIL NFR BLD: 5 % (ref 0.5–7.8)
ERYTHROCYTE [DISTWIDTH] IN BLOOD BY AUTOMATED COUNT: 18.6 % (ref 11.9–14.6)
GLOBULIN SER CALC-MCNC: 2.6 G/DL (ref 2.3–3.5)
GLUCOSE SERPL-MCNC: 124 MG/DL (ref 65–100)
HCT VFR BLD AUTO: 28.5 % (ref 35.8–46.3)
HGB BLD-MCNC: 9.6 G/DL (ref 11.7–15.4)
LYMPHOCYTES # BLD: 0.2 K/UL (ref 0.5–4.6)
LYMPHOCYTES NFR BLD: 22 % (ref 13–44)
MAGNESIUM SERPL-MCNC: 1.9 MG/DL (ref 1.8–2.4)
MCH RBC QN AUTO: 32 PG (ref 26.1–32.9)
MCHC RBC AUTO-ENTMCNC: 33.7 G/DL (ref 31.4–35)
MCV RBC AUTO: 95 FL (ref 79.6–97.8)
MONOCYTES # BLD: 0.3 K/UL (ref 0.1–1.3)
MONOCYTES NFR BLD: 36 % (ref 4–12)
NEUTS SEG # BLD: 0.4 K/UL (ref 1.7–8.2)
NEUTS SEG NFR BLD: 36 % (ref 43–78)
NRBC # BLD: 0 K/UL (ref 0–0.2)
PLATELET # BLD AUTO: 83 K/UL (ref 150–450)
PLATELET COMMENTS,PCOM: SLIGHT
PMV BLD AUTO: 12.1 FL (ref 10.8–14.1)
POTASSIUM SERPL-SCNC: 4.1 MMOL/L (ref 3.5–5.1)
PROT SERPL-MCNC: 6.3 G/DL (ref 6.3–8.2)
RBC # BLD AUTO: 3 M/UL (ref 4.05–5.25)
RBC MORPH BLD: ABNORMAL
SODIUM SERPL-SCNC: 140 MMOL/L (ref 136–145)
WBC # BLD AUTO: 0.9 K/UL (ref 4.3–11.1)
WBC MORPH BLD: ABNORMAL

## 2018-04-06 PROCEDURE — 83735 ASSAY OF MAGNESIUM: CPT | Performed by: INTERNAL MEDICINE

## 2018-04-06 PROCEDURE — 74011250636 HC RX REV CODE- 250/636: Performed by: INTERNAL MEDICINE

## 2018-04-06 PROCEDURE — 85025 COMPLETE CBC W/AUTO DIFF WBC: CPT | Performed by: INTERNAL MEDICINE

## 2018-04-06 PROCEDURE — 36591 DRAW BLOOD OFF VENOUS DEVICE: CPT

## 2018-04-06 PROCEDURE — 77030003560 HC NDL HUBR BARD -A

## 2018-04-06 PROCEDURE — 80053 COMPREHEN METABOLIC PANEL: CPT | Performed by: INTERNAL MEDICINE

## 2018-04-06 RX ORDER — SODIUM CHLORIDE 0.9 % (FLUSH) 0.9 %
10 SYRINGE (ML) INJECTION EVERY 8 HOURS
Status: DISCONTINUED | OUTPATIENT
Start: 2018-04-06 | End: 2018-04-10 | Stop reason: HOSPADM

## 2018-04-06 RX ORDER — HEPARIN 100 UNIT/ML
300-500 SYRINGE INTRAVENOUS AS NEEDED
Status: DISPENSED | OUTPATIENT
Start: 2018-04-06 | End: 2018-04-06

## 2018-04-06 RX ADMIN — SODIUM CHLORIDE, PRESERVATIVE FREE 500 UNITS: 5 INJECTION INTRAVENOUS at 08:43

## 2018-04-06 RX ADMIN — Medication 10 ML: at 08:42

## 2018-04-06 NOTE — PROGRESS NOTES
Arrived to the [facility]. Right Port accessed. Positive blood return noted. Labs drawn from port. No replacements needed. Patient tolerated well. Any issues or concerns during appointment: none. Patient aware of next infusion appointment on 4-9-18 (date) at [address] [place] [address] (time). Discharged via ambulatory.

## 2018-04-09 ENCOUNTER — HOSPITAL ENCOUNTER (OUTPATIENT)
Dept: INFUSION THERAPY | Age: 58
Discharge: HOME | End: 2018-04-09
Payer: COMMERCIAL

## 2018-04-09 VITALS
RESPIRATION RATE: 18 BRPM | SYSTOLIC BLOOD PRESSURE: 149 MMHG | HEART RATE: 81 BPM | DIASTOLIC BLOOD PRESSURE: 80 MMHG | TEMPERATURE: 98.6 F | OXYGEN SATURATION: 95 %

## 2018-04-09 DIAGNOSIS — C85.88 MARGINAL ZONE LYMPHOMA OF LYMPH NODES OF MULTIPLE SITES (HCC): ICD-10-CM

## 2018-04-09 LAB
ALBUMIN SERPL-MCNC: 3.1 G/DL (ref 3.5–5)
ALBUMIN/GLOB SERPL: 1.6 {RATIO} (ref 1.2–3.5)
ALP SERPL-CCNC: 74 U/L (ref 50–136)
ALT SERPL-CCNC: 36 U/L (ref 12–65)
ANION GAP SERPL CALC-SCNC: 8 MMOL/L (ref 7–16)
AST SERPL-CCNC: 22 U/L (ref 15–37)
BASOPHILS # BLD: 0 K/UL (ref 0–0.2)
BASOPHILS NFR BLD MANUAL: 1 % (ref 0–2)
BILIRUB SERPL-MCNC: 0.4 MG/DL (ref 0.2–1.1)
BUN SERPL-MCNC: 19 MG/DL (ref 6–23)
CALCIUM SERPL-MCNC: 7.3 MG/DL (ref 8.3–10.4)
CHLORIDE SERPL-SCNC: 112 MMOL/L (ref 98–107)
CO2 SERPL-SCNC: 24 MMOL/L (ref 21–32)
CREAT SERPL-MCNC: 0.5 MG/DL (ref 0.6–1)
DIFFERENTIAL METHOD BLD: ABNORMAL
EOSINOPHIL # BLD: 0.1 K/UL (ref 0–0.8)
EOSINOPHIL NFR BLD MANUAL: 4 % (ref 1–8)
ERYTHROCYTE [DISTWIDTH] IN BLOOD BY AUTOMATED COUNT: 18 % (ref 11.9–14.6)
GLOBULIN SER CALC-MCNC: 2 G/DL (ref 2.3–3.5)
GLUCOSE SERPL-MCNC: 98 MG/DL (ref 65–100)
HCT VFR BLD AUTO: 27.9 % (ref 35.8–46.3)
HGB BLD-MCNC: 9.5 G/DL (ref 11.7–15.4)
LYMPHOCYTES # BLD: 0.3 K/UL (ref 0.5–4.6)
LYMPHOCYTES NFR BLD MANUAL: 12 % (ref 16–44)
MAGNESIUM SERPL-MCNC: 1.9 MG/DL (ref 1.8–2.4)
MCH RBC QN AUTO: 31.9 PG (ref 26.1–32.9)
MCHC RBC AUTO-ENTMCNC: 34.1 G/DL (ref 31.4–35)
MCV RBC AUTO: 93.6 FL (ref 79.6–97.8)
METAMYELOCYTES NFR BLD MANUAL: 1 %
MONOCYTES # BLD: 0.3 K/UL (ref 0.1–1.3)
MONOCYTES NFR BLD MANUAL: 13 % (ref 3–9)
MYELOCYTES NFR BLD MANUAL: 1 %
NEUTS BAND NFR BLD MANUAL: 2 % (ref 0–6)
NEUTS SEG # BLD: 1.7 K/UL (ref 1.7–8.2)
NEUTS SEG NFR BLD MANUAL: 66 % (ref 47–75)
NRBC # BLD: 0 K/UL (ref 0–0.2)
PLATELET # BLD AUTO: 94 K/UL (ref 150–450)
PLATELET COMMENTS,PCOM: ABNORMAL
PMV BLD AUTO: 11.2 FL (ref 10.8–14.1)
POTASSIUM SERPL-SCNC: 3.3 MMOL/L (ref 3.5–5.1)
PROT SERPL-MCNC: 5.1 G/DL (ref 6.3–8.2)
RBC # BLD AUTO: 2.98 M/UL (ref 4.05–5.25)
RBC MORPH BLD: ABNORMAL
SODIUM SERPL-SCNC: 144 MMOL/L (ref 136–145)
WBC # BLD AUTO: 2.4 K/UL (ref 4.3–11.1)
WBC MORPH BLD: ABNORMAL

## 2018-04-09 PROCEDURE — 80053 COMPREHEN METABOLIC PANEL: CPT | Performed by: INTERNAL MEDICINE

## 2018-04-09 PROCEDURE — 83735 ASSAY OF MAGNESIUM: CPT | Performed by: INTERNAL MEDICINE

## 2018-04-09 PROCEDURE — 74011250636 HC RX REV CODE- 250/636: Performed by: INTERNAL MEDICINE

## 2018-04-09 PROCEDURE — 77030018667 ADMN ST IV BLD FENW -A

## 2018-04-09 PROCEDURE — 36591 DRAW BLOOD OFF VENOUS DEVICE: CPT

## 2018-04-09 PROCEDURE — 77030003560 HC NDL HUBR BARD -A

## 2018-04-09 PROCEDURE — 85025 COMPLETE CBC W/AUTO DIFF WBC: CPT | Performed by: INTERNAL MEDICINE

## 2018-04-09 RX ORDER — SODIUM CHLORIDE 0.9 % (FLUSH) 0.9 %
10 SYRINGE (ML) INJECTION AS NEEDED
Status: DISCONTINUED | OUTPATIENT
Start: 2018-04-09 | End: 2018-04-13 | Stop reason: HOSPADM

## 2018-04-09 RX ORDER — HEPARIN 100 UNIT/ML
300-500 SYRINGE INTRAVENOUS AS NEEDED
Status: DISPENSED | OUTPATIENT
Start: 2018-04-09 | End: 2018-04-09

## 2018-04-09 RX ADMIN — Medication 10 ML: at 08:18

## 2018-04-09 RX ADMIN — SODIUM CHLORIDE, PRESERVATIVE FREE 500 UNITS: 5 INJECTION INTRAVENOUS at 08:18

## 2018-04-09 NOTE — PROGRESS NOTES
Arrived to the [facility]. Labs completed.  Patient did not require replacements  Any issues or concerns during appointment: no  Patient aware of next infusion appointment on 4/12/18 at [address] [place] [address]  Discharged ambulatory

## 2018-04-10 ENCOUNTER — HOSPITAL ENCOUNTER (OUTPATIENT)
Dept: PET IMAGING | Age: 58
Discharge: HOME | End: 2018-04-10
Payer: COMMERCIAL

## 2018-04-10 DIAGNOSIS — C83.39 DIFFUSE LARGE B-CELL LYMPHOMA OF SOLID ORGAN EXCLUDING SPLEEN (HCC): ICD-10-CM

## 2018-04-10 DIAGNOSIS — C85.88 MARGINAL ZONE LYMPHOMA OF LYMPH NODES OF MULTIPLE SITES (HCC): Chronic | ICD-10-CM

## 2018-04-10 PROCEDURE — 74011636320 HC RX REV CODE- 636/320: Performed by: INTERNAL MEDICINE

## 2018-04-10 PROCEDURE — A9552 F18 FDG: HCPCS

## 2018-04-10 RX ORDER — SODIUM CHLORIDE 0.9 % (FLUSH) 0.9 %
10 SYRINGE (ML) INJECTION
Status: COMPLETED | OUTPATIENT
Start: 2018-04-10 | End: 2018-04-10

## 2018-04-10 RX ADMIN — DIATRIZOATE MEGLUMINE AND DIATRIZOATE SODIUM 10 ML: 660; 100 LIQUID ORAL; RECTAL at 08:45

## 2018-04-10 RX ADMIN — Medication 10 ML: at 08:45

## 2018-04-11 ENCOUNTER — HOSPITAL ENCOUNTER (OUTPATIENT)
Dept: LAB | Age: 58
Discharge: HOME | End: 2018-04-11
Payer: COMMERCIAL

## 2018-04-11 ENCOUNTER — PATIENT OUTREACH (OUTPATIENT)
Dept: CASE MANAGEMENT | Age: 58
End: 2018-04-11

## 2018-04-11 DIAGNOSIS — C82.09 GRADE I FOLLICULAR LYMPHOMA OF EXTRANODAL SITE EXCLUDING SPLEEN AND OTHER SOLID ORGANS (HCC): ICD-10-CM

## 2018-04-11 DIAGNOSIS — C83.39 DIFFUSE LARGE B-CELL LYMPHOMA OF SOLID ORGAN EXCLUDING SPLEEN (HCC): ICD-10-CM

## 2018-04-11 DIAGNOSIS — C85.88 MARGINAL ZONE LYMPHOMA OF LYMPH NODES OF MULTIPLE SITES (HCC): Chronic | ICD-10-CM

## 2018-04-11 DIAGNOSIS — C85.88 MARGINAL ZONE LYMPHOMA OF LYMPH NODES OF MULTIPLE SITES (HCC): ICD-10-CM

## 2018-04-11 LAB
ALBUMIN SERPL-MCNC: 3.9 G/DL (ref 3.5–5)
ALBUMIN/GLOB SERPL: 1.5 {RATIO} (ref 1.2–3.5)
ALP SERPL-CCNC: 93 U/L (ref 50–136)
ALT SERPL-CCNC: 40 U/L (ref 12–65)
ANION GAP SERPL CALC-SCNC: 5 MMOL/L (ref 7–16)
AST SERPL-CCNC: 23 U/L (ref 15–37)
BASOPHILS # BLD: 0 K/UL (ref 0–0.2)
BASOPHILS NFR BLD: 1 % (ref 0–2)
BILIRUB SERPL-MCNC: 0.3 MG/DL (ref 0.2–1.1)
BUN SERPL-MCNC: 24 MG/DL (ref 6–23)
CALCIUM SERPL-MCNC: 9.1 MG/DL (ref 8.3–10.4)
CHLORIDE SERPL-SCNC: 105 MMOL/L (ref 98–107)
CO2 SERPL-SCNC: 30 MMOL/L (ref 21–32)
CREAT SERPL-MCNC: 0.8 MG/DL (ref 0.6–1)
DIFFERENTIAL METHOD BLD: ABNORMAL
EOSINOPHIL # BLD: 0.1 K/UL (ref 0–0.8)
EOSINOPHIL NFR BLD: 1 % (ref 0.5–7.8)
ERYTHROCYTE [DISTWIDTH] IN BLOOD BY AUTOMATED COUNT: 18.3 % (ref 11.9–14.6)
GLOBULIN SER CALC-MCNC: 2.6 G/DL (ref 2.3–3.5)
GLUCOSE SERPL-MCNC: 118 MG/DL (ref 65–100)
HCT VFR BLD AUTO: 27.7 % (ref 35.8–46.3)
HGB BLD-MCNC: 9.6 G/DL (ref 11.7–15.4)
LDH SERPL L TO P-CCNC: 347 U/L (ref 100–190)
LYMPHOCYTES # BLD: 0.2 K/UL (ref 0.5–4.6)
LYMPHOCYTES NFR BLD: 6 % (ref 13–44)
MAGNESIUM SERPL-MCNC: 2.3 MG/DL (ref 1.8–2.4)
MCH RBC QN AUTO: 32.5 PG (ref 26.1–32.9)
MCHC RBC AUTO-ENTMCNC: 34.7 G/DL (ref 31.4–35)
MCV RBC AUTO: 93.9 FL (ref 79.6–97.8)
MONOCYTES # BLD: 0.5 K/UL (ref 0.1–1.3)
MONOCYTES NFR BLD: 14 % (ref 4–12)
NEUTS SEG # BLD: 2.8 K/UL (ref 1.7–8.2)
NEUTS SEG NFR BLD: 78 % (ref 43–78)
NRBC # BLD: 0 K/UL (ref 0–0.2)
PLATELET # BLD AUTO: 99 K/UL (ref 150–450)
PMV BLD AUTO: 9.7 FL (ref 10.8–14.1)
POTASSIUM SERPL-SCNC: 3.9 MMOL/L (ref 3.5–5.1)
PROT SERPL-MCNC: 6.5 G/DL (ref 6.3–8.2)
RBC # BLD AUTO: 2.95 M/UL (ref 4.05–5.25)
SODIUM SERPL-SCNC: 140 MMOL/L (ref 136–145)
WBC # BLD AUTO: 3.5 K/UL (ref 4.3–11.1)

## 2018-04-11 PROCEDURE — 83735 ASSAY OF MAGNESIUM: CPT | Performed by: INTERNAL MEDICINE

## 2018-04-11 PROCEDURE — 83615 LACTATE (LD) (LDH) ENZYME: CPT | Performed by: INTERNAL MEDICINE

## 2018-04-11 PROCEDURE — 80053 COMPREHEN METABOLIC PANEL: CPT | Performed by: INTERNAL MEDICINE

## 2018-04-11 PROCEDURE — 85025 COMPLETE CBC W/AUTO DIFF WBC: CPT | Performed by: INTERNAL MEDICINE

## 2018-04-12 ENCOUNTER — HOSPITAL ENCOUNTER (OUTPATIENT)
Dept: INFUSION THERAPY | Age: 58
Discharge: HOME | End: 2018-04-12
Payer: COMMERCIAL

## 2018-04-12 VITALS
TEMPERATURE: 98.2 F | HEART RATE: 67 BPM | DIASTOLIC BLOOD PRESSURE: 56 MMHG | SYSTOLIC BLOOD PRESSURE: 89 MMHG | WEIGHT: 286.6 LBS | RESPIRATION RATE: 16 BRPM | OXYGEN SATURATION: 100 % | BODY MASS INDEX: 52.42 KG/M2

## 2018-04-12 DIAGNOSIS — C85.88 MARGINAL ZONE LYMPHOMA OF LYMPH NODES OF MULTIPLE SITES (HCC): ICD-10-CM

## 2018-04-12 DIAGNOSIS — C83.39 DIFFUSE LARGE B-CELL LYMPHOMA OF SOLID ORGAN EXCLUDING SPLEEN (HCC): ICD-10-CM

## 2018-04-12 DIAGNOSIS — C82.09 GRADE I FOLLICULAR LYMPHOMA OF EXTRANODAL SITE EXCLUDING SPLEEN AND OTHER SOLID ORGANS (HCC): ICD-10-CM

## 2018-04-12 PROCEDURE — 96415 CHEMO IV INFUSION ADDL HR: CPT

## 2018-04-12 PROCEDURE — 74011000258 HC RX REV CODE- 258: Performed by: INTERNAL MEDICINE

## 2018-04-12 PROCEDURE — 96375 TX/PRO/DX INJ NEW DRUG ADDON: CPT

## 2018-04-12 PROCEDURE — 96411 CHEMO IV PUSH ADDL DRUG: CPT

## 2018-04-12 PROCEDURE — 74011250636 HC RX REV CODE- 250/636

## 2018-04-12 PROCEDURE — 96417 CHEMO IV INFUS EACH ADDL SEQ: CPT

## 2018-04-12 PROCEDURE — 96413 CHEMO IV INFUSION 1 HR: CPT

## 2018-04-12 PROCEDURE — 74011250636 HC RX REV CODE- 250/636: Performed by: INTERNAL MEDICINE

## 2018-04-12 PROCEDURE — 96367 TX/PROPH/DG ADDL SEQ IV INF: CPT

## 2018-04-12 PROCEDURE — 74011250637 HC RX REV CODE- 250/637: Performed by: INTERNAL MEDICINE

## 2018-04-12 PROCEDURE — 96361 HYDRATE IV INFUSION ADD-ON: CPT

## 2018-04-12 RX ORDER — ONDANSETRON 2 MG/ML
8 INJECTION INTRAMUSCULAR; INTRAVENOUS ONCE
Status: COMPLETED | OUTPATIENT
Start: 2018-04-12 | End: 2018-04-12

## 2018-04-12 RX ORDER — SODIUM CHLORIDE 0.9 % (FLUSH) 0.9 %
10 SYRINGE (ML) INJECTION AS NEEDED
Status: ACTIVE | OUTPATIENT
Start: 2018-04-12 | End: 2018-04-12

## 2018-04-12 RX ORDER — HEPARIN 100 UNIT/ML
300-500 SYRINGE INTRAVENOUS AS NEEDED
Status: DISPENSED | OUTPATIENT
Start: 2018-04-12 | End: 2018-04-12

## 2018-04-12 RX ORDER — DOXORUBICIN HYDROCHLORIDE 2 MG/ML
50 INJECTION, SOLUTION INTRAVENOUS ONCE
Status: COMPLETED | OUTPATIENT
Start: 2018-04-12 | End: 2018-04-12

## 2018-04-12 RX ORDER — ACETAMINOPHEN 325 MG/1
650 TABLET ORAL ONCE
Status: COMPLETED | OUTPATIENT
Start: 2018-04-12 | End: 2018-04-12

## 2018-04-12 RX ORDER — DIPHENHYDRAMINE HYDROCHLORIDE 50 MG/ML
50 INJECTION, SOLUTION INTRAMUSCULAR; INTRAVENOUS ONCE
Status: COMPLETED | OUTPATIENT
Start: 2018-04-12 | End: 2018-04-12

## 2018-04-12 RX ADMIN — SODIUM CHLORIDE 500 ML: 900 INJECTION, SOLUTION INTRAVENOUS at 14:00

## 2018-04-12 RX ADMIN — DEXAMETHASONE SODIUM PHOSPHATE 12 MG: 4 INJECTION, SOLUTION INTRAMUSCULAR; INTRAVENOUS at 11:48

## 2018-04-12 RX ADMIN — DOXORUBICIN HYDROCHLORIDE 117 MG: 2 INJECTION, SOLUTION INTRAVENOUS at 13:36

## 2018-04-12 RX ADMIN — ONDANSETRON 8 MG: 2 INJECTION INTRAMUSCULAR; INTRAVENOUS at 11:47

## 2018-04-12 RX ADMIN — SODIUM CHLORIDE 150 MG: 900 INJECTION, SOLUTION INTRAVENOUS at 11:59

## 2018-04-12 RX ADMIN — RITUXIMAB 878 MG: 10 INJECTION, SOLUTION INTRAVENOUS at 08:50

## 2018-04-12 RX ADMIN — ACETAMINOPHEN 650 MG: 325 TABLET, FILM COATED ORAL at 08:00

## 2018-04-12 RX ADMIN — DIPHENHYDRAMINE HYDROCHLORIDE 50 MG: 50 INJECTION, SOLUTION INTRAMUSCULAR; INTRAVENOUS at 08:00

## 2018-04-12 RX ADMIN — Medication 10 ML: at 14:38

## 2018-04-12 RX ADMIN — CYCLOPHOSPHAMIDE 1755 MG: 1 INJECTION, POWDER, FOR SOLUTION INTRAVENOUS; ORAL at 12:33

## 2018-04-12 RX ADMIN — SODIUM CHLORIDE, PRESERVATIVE FREE 500 UNITS: 5 INJECTION INTRAVENOUS at 14:38

## 2018-04-12 RX ADMIN — VINCRISTINE SULFATE 2 MG: 1 INJECTION, SOLUTION INTRAVENOUS at 13:45

## 2018-04-12 RX ADMIN — SODIUM CHLORIDE 500 ML: 900 INJECTION, SOLUTION INTRAVENOUS at 08:03

## 2018-04-12 NOTE — PROGRESS NOTES
Arrived to the [facility]. [facility] completed @ 1132. Patient tolerated well. Any issues or concerns during appointment: no.  Patient aware of next infusion appointment on  4/13 at 1600 . Discharged to home ambulatory.

## 2018-04-12 NOTE — PROGRESS NOTES
Massage THERAPY: Daily Note    Referring Physician: Joseph Orta MD  Medical/Referring Diagnosis: Diffuse large B-cell lymphoma ([facility] [C83.30]   Precautions/Allergies: Review of patient's allergies indicates no known allergies. SUBJECTIVE:  Present Symptoms: Neuropathy and discomfort in legs/feet     Pre-Treatment Pain: 6/10   Neuropathy Scale:  7/10  Past Medical History:    Ms. 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 She also has no past medical history of Aneurysm ([facility] Arrhythmia; Asthma; Autoimmune disease ([address] CAD (coronary artery disease); Chronic kidney disease; Coagulation defects; COPD; Diabetes ([address] Difficult intubation; GERD (gastroesophageal reflux disease); Heart failure ([address] Liver disease; Malignant hyperthermia due to anesthesia; Nausea & vomiting; Pseudocholinesterase deficiency; Psychiatric disorder; PUD (peptic ulcer disease); Seizures ([address] Stroke [facility]); or Unspecified sleep apnea. Ms. Abimbola Valladares  has a past surgical history that includes pr anesth,achilles tendon surg (Left, 02/10/2011); hx cholecystectomy (1983); hx vascular access; hx knee replacement (Left, 2013); and hx knee replacement (Right, 2012). Current Medications:       Current Outpatient Prescriptions:     nystatin (MYCOSTATIN) powder, Apply  to affected area three (3) times daily. , Disp: 60 g, Rfl: 2    fluconazole (DIFLUCAN) 150 mg tablet, Take 1 Tab by mouth daily. FDA advises cautious prescribing of oral fluconazole in pregnancy. , Disp: 30 Tab, Rfl: 0    allopurinol (ZYLOPRIM) 300 mg tablet, Take 1 Tab by mouth daily., Disp: 30 Tab, Rfl: 1    pregabalin (LYRICA) 100 mg capsule, Take 1 Cap by mouth three (3) times daily. Max Daily Amount: 300 mg., Disp: 90 Cap, Rfl: 3    HYDROcodone-acetaminophen (NORCO) 5-325 mg per tablet, Take 1 Tab by mouth every six (6) hours as needed for Pain. Max Daily Amount: 4 Tabs., Disp: 30 Tab, Rfl: 0    predniSONE (DELTASONE) 20 mg tablet, Take 5 Tabs by mouth daily. , Disp: , Rfl: 0    omeprazole (PRILOSEC) 20 mg capsule, TAKE 1 CAPSULE DAILY, Disp: 90 Cap, Rfl: 2    predniSONE (DELTASONE) 20 mg tablet, Take 5 Tabs by mouth daily (with breakfast). Take on days 1-5 of your chemo cycle, Disp: 25 Tab, Rfl: 5    loratadine (CLARITIN) 10 mg tablet, Take 1 Tab by mouth daily. , Disp: 30 Tab, Rfl: 0    ondansetron hcl (ZOFRAN, AS HYDROCHLORIDE,) 8 mg tablet, Take 1 Tab by mouth every eight (8) hours as needed for Nausea (or vomiting). , Disp: 90 Tab, Rfl: 2    HYDROcodone-homatropine (HYCODAN) 5-1.5 mg/5 mL (5 mL) syrup, Take 5 mL by mouth four (4) times daily as needed. Max Daily Amount: 20 mL., Disp: 400 mL, Rfl: 0    albuterol (PROAIR HFA) 90 mcg/actuation inhaler, Take 2 Puffs by inhalation every four (4) hours as needed for Wheezing., Disp: 1 Inhaler, Rfl: 2    ondansetron hcl (ZOFRAN, AS HYDROCHLORIDE,) 4 mg tablet, Take 4 mg by mouth every eight (8) hours as needed for Nausea., Disp: , Rfl:     fluconazole (DIFLUCAN) 150 mg tablet, Take 150 mg by mouth daily. FDA advises cautious prescribing of oral fluconazole in pregnancy. , Disp: , Rfl:     acyclovir (ZOVIRAX) 400 mg tablet, Take 1 Tab by mouth two (2) times a day., Disp: 60 Tab, Rfl: 3    levothyroxine (SYNTHROID) 175 mcg tablet, Take 1 Tab by mouth Daily (before breakfast). , Disp: 30 Tab, Rfl: 5    magnesium oxide (MAG-OX) 400 mg tablet, Take 1 Tab by mouth two (2) times a day., Disp: 60 Tab, Rfl: 1    magic mouthwash (ALEXSANDER) susp, Take 10 mL by mouth every four (4) hours as needed. , Disp: 2 Bottle, Rfl: 2    fluticasone (FLONASE) 50 mcg/actuation nasal spray, 2 Sprays by Both Nostrils route daily. , Disp: 1 Bottle, Rfl: 1    lidocaine-prilocaine (EMLA) topical cream, Apply  to affected area as needed for Pain. Apply to port site 45-60 minutes prior to lab appt or infusion. , Disp: 30 g, Rfl: 0    promethazine (PHENERGAN) 25 mg tablet, Take 25 mg by mouth every six (6) hours as needed for Nausea. Unsure of dose, Disp: , Rfl:     Current Facility-Administered Medications:     fosaprepitant (EMEND) 150 mg in 0.9% sodium chloride 150 mL IVPB, 150 mg, IntraVENous, ONCE, Elia Peraza MD    ondansetron [facility]) injection 8 mg, 8 mg, IntraVENous, ONCE, Elia Peraza MD    dexamethasone (DECADRON) 12 mg in 0.9% sodium chloride 50 mL IVPB, 12 mg, IntraVENous, ONCE, Elia Peraza MD    cyclophosphamide (CYTOXAN) 1,755 mg in 0.9% sodium chloride 250 mL chemo infusion, 750 mg/m2 (Treatment Plan Recorded), IntraVENous, ONCE, Elia Peraza MD    DOXOrubicin (ADRIAMYCIN) chemo syringe 117 mg, 50 mg/m2 (Treatment Plan Recorded), IntraVENous, ONCE, Elia Peraza MD    vinCRIStine (VINCASAR PFS) 2 mg in 0.9% sodium chloride 50 mL chemo IVPB, 2 mg, IntraVENous, ONCE, Elia Peraza MD    sodium chloride 0.9 % bolus infusion 500 mL, 500 mL, IntraVENous, ONCE, Elia Peraza MD    saline peripheral flush soln 10 mL, 10 mL, InterCATHeter, PRN, Elia Peraza MD    heparin (porcine) pf 300-500 Units, 300-500 Units, InterCATHeter, PRN, Eila Peraza MD    Facility-Administered Medications Ordered in Other Encounters:     central line flush (saline) syringe 10 mL, 10 mL, InterCATHeter, PRN, Elia Peraza MD, 10 mL at 04/09/18 0818       OBJECTIVE/ASSESSMENT:  Observations of Patient:  Shared info about her health and concerns.  Gave her gift certificate for massage  Response To Treatment: Calmer, less neuropathy   Post-Treatment Pain: 3/10   Neuropathy Scale:  4/10  TREATMENT:    (In addition to Assessment/Re-Assessment sessions the following treatments were rendered)  Treatment Provided:  [x]  Soft tissue massage  []  Healing Touch   Location: lower leg(s) bilaterally and feet  Patient Position: Seated  Time: 20 minutes    PLAN OF CARE:    []  I will follow up with this patient as needed. [x]  No follow up visit necessary.     Thank you for this referral.  Luisa Oseguera LMT

## 2018-04-13 ENCOUNTER — APPOINTMENT (OUTPATIENT)
Dept: INFUSION THERAPY | Age: 58
End: 2018-04-13
Payer: COMMERCIAL

## 2018-04-13 ENCOUNTER — HOSPITAL ENCOUNTER (OUTPATIENT)
Dept: INFUSION THERAPY | Age: 58
Discharge: HOME | End: 2018-04-13
Payer: COMMERCIAL

## 2018-04-13 VITALS
RESPIRATION RATE: 18 BRPM | WEIGHT: 290.4 LBS | SYSTOLIC BLOOD PRESSURE: 112 MMHG | DIASTOLIC BLOOD PRESSURE: 81 MMHG | OXYGEN SATURATION: 96 % | TEMPERATURE: 99.3 F | BODY MASS INDEX: 53.11 KG/M2 | HEART RATE: 87 BPM

## 2018-04-13 DIAGNOSIS — C85.88 MARGINAL ZONE LYMPHOMA OF LYMPH NODES OF MULTIPLE SITES (HCC): ICD-10-CM

## 2018-04-13 PROCEDURE — 96372 THER/PROPH/DIAG INJ SC/IM: CPT

## 2018-04-13 PROCEDURE — 74011250636 HC RX REV CODE- 250/636: Performed by: INTERNAL MEDICINE

## 2018-04-13 RX ADMIN — PEGFILGRASTIM 6 MG: 6 INJECTION SUBCUTANEOUS at 16:37

## 2018-04-13 NOTE — PROGRESS NOTES
Arrived to the [facility]. Neulasta injection completed in left arm. Patient tolerated without problems. Any issues or concerns during appointment: None. Patient aware of next infusion appointment on 4/16/2018 (date) at 12 (time). Discharged ambulatory with spouse.

## 2018-04-14 ENCOUNTER — APPOINTMENT (OUTPATIENT)
Dept: INFUSION THERAPY | Age: 58
End: 2018-04-14

## 2018-04-16 ENCOUNTER — HOSPITAL ENCOUNTER (OUTPATIENT)
Dept: INFUSION THERAPY | Age: 58
Discharge: HOME | End: 2018-04-16
Payer: COMMERCIAL

## 2018-04-16 VITALS
RESPIRATION RATE: 18 BRPM | DIASTOLIC BLOOD PRESSURE: 90 MMHG | WEIGHT: 287.6 LBS | OXYGEN SATURATION: 98 % | TEMPERATURE: 98 F | SYSTOLIC BLOOD PRESSURE: 153 MMHG | BODY MASS INDEX: 52.6 KG/M2 | HEART RATE: 82 BPM

## 2018-04-16 DIAGNOSIS — C85.88 MARGINAL ZONE LYMPHOMA OF LYMPH NODES OF MULTIPLE SITES (HCC): ICD-10-CM

## 2018-04-16 LAB
ALBUMIN SERPL-MCNC: 3.8 G/DL (ref 3.5–5)
ALBUMIN/GLOB SERPL: 1.6 {RATIO} (ref 1.2–3.5)
ALP SERPL-CCNC: 96 U/L (ref 50–136)
ALT SERPL-CCNC: 54 U/L (ref 12–65)
ANION GAP SERPL CALC-SCNC: 8 MMOL/L (ref 7–16)
AST SERPL-CCNC: 20 U/L (ref 15–37)
BILIRUB SERPL-MCNC: 0.6 MG/DL (ref 0.2–1.1)
BUN SERPL-MCNC: 29 MG/DL (ref 6–23)
CALCIUM SERPL-MCNC: 8.8 MG/DL (ref 8.3–10.4)
CHLORIDE SERPL-SCNC: 107 MMOL/L (ref 98–107)
CO2 SERPL-SCNC: 29 MMOL/L (ref 21–32)
CREAT SERPL-MCNC: 0.76 MG/DL (ref 0.6–1)
DIFFERENTIAL METHOD BLD: ABNORMAL
ERYTHROCYTE [DISTWIDTH] IN BLOOD BY AUTOMATED COUNT: 18.2 % (ref 11.9–14.6)
GLOBULIN SER CALC-MCNC: 2.4 G/DL (ref 2.3–3.5)
GLUCOSE SERPL-MCNC: 93 MG/DL (ref 65–100)
HCT VFR BLD AUTO: 26.2 % (ref 35.8–46.3)
HGB BLD-MCNC: 9 G/DL (ref 11.7–15.4)
LYMPHOCYTES # BLD: 0.1 K/UL (ref 0.5–4.6)
LYMPHOCYTES NFR BLD MANUAL: 2 % (ref 16–44)
MAGNESIUM SERPL-MCNC: 2.3 MG/DL (ref 1.8–2.4)
MCH RBC QN AUTO: 32 PG (ref 26.1–32.9)
MCHC RBC AUTO-ENTMCNC: 34.4 G/DL (ref 31.4–35)
MCV RBC AUTO: 93.2 FL (ref 79.6–97.8)
MONOCYTES # BLD: 0.1 K/UL (ref 0.1–1.3)
MONOCYTES NFR BLD MANUAL: 2 % (ref 3–9)
NEUTS BAND NFR BLD MANUAL: 2 % (ref 0–10)
NEUTS SEG # BLD: 6.4 K/UL (ref 1.7–8.2)
NEUTS SEG NFR BLD MANUAL: 94 % (ref 47–75)
NRBC # BLD: 0 K/UL (ref 0–0.2)
PLATELET # BLD AUTO: 71 K/UL (ref 150–450)
PLATELET COMMENTS,PCOM: ABNORMAL
PMV BLD AUTO: 9.8 FL (ref 10.8–14.1)
POTASSIUM SERPL-SCNC: 3.8 MMOL/L (ref 3.5–5.1)
PROT SERPL-MCNC: 6.2 G/DL (ref 6.3–8.2)
RBC # BLD AUTO: 2.81 M/UL (ref 4.05–5.25)
RBC MORPH BLD: ABNORMAL
SODIUM SERPL-SCNC: 144 MMOL/L (ref 136–145)
WBC # BLD AUTO: 6.6 K/UL (ref 4.3–11.1)
WBC MORPH BLD: ABNORMAL

## 2018-04-16 PROCEDURE — 77030003560 HC NDL HUBR BARD -A

## 2018-04-16 PROCEDURE — 74011250636 HC RX REV CODE- 250/636: Performed by: INTERNAL MEDICINE

## 2018-04-16 PROCEDURE — 80053 COMPREHEN METABOLIC PANEL: CPT | Performed by: INTERNAL MEDICINE

## 2018-04-16 PROCEDURE — 85025 COMPLETE CBC W/AUTO DIFF WBC: CPT | Performed by: INTERNAL MEDICINE

## 2018-04-16 PROCEDURE — 83735 ASSAY OF MAGNESIUM: CPT | Performed by: INTERNAL MEDICINE

## 2018-04-16 PROCEDURE — 36591 DRAW BLOOD OFF VENOUS DEVICE: CPT

## 2018-04-16 RX ORDER — HEPARIN 100 UNIT/ML
300-500 SYRINGE INTRAVENOUS AS NEEDED
Status: DISPENSED | OUTPATIENT
Start: 2018-04-16 | End: 2018-04-16

## 2018-04-16 RX ORDER — SODIUM CHLORIDE 0.9 % (FLUSH) 0.9 %
10 SYRINGE (ML) INJECTION AS NEEDED
Status: DISCONTINUED | OUTPATIENT
Start: 2018-04-16 | End: 2018-04-20 | Stop reason: HOSPADM

## 2018-04-16 RX ADMIN — SODIUM CHLORIDE, PRESERVATIVE FREE 500 UNITS: 5 INJECTION INTRAVENOUS at 08:20

## 2018-04-16 RX ADMIN — Medication 10 ML: at 08:20

## 2018-04-16 RX ADMIN — Medication 10 ML: at 07:20

## 2018-04-16 NOTE — PROGRESS NOTES
Problem: Chemotherapy Treatment  Goal: *Chemotherapy regimen followed  Outcome: Progressing Towards Goal  Verbalizes/demonstrates understanding of purpose/procedure of labs and replacements.

## 2018-04-16 NOTE — PROGRESS NOTES
Pt arrived ambulatory today at 1020, to have labs & replacements. Pt tolerated without difficulty. Patient discharged via ambulatory accompanied by self. Instructed to notify physician of any problems, questions or concerns. Allowed opportunity for patient/family to ask questions. Verbalized understanding. Next appointment is April 19 at [facility]  with Cristal [phone].

## 2018-04-19 ENCOUNTER — HOSPITAL ENCOUNTER (OUTPATIENT)
Dept: INFUSION THERAPY | Age: 58
Discharge: HOME | End: 2018-04-19
Payer: COMMERCIAL

## 2018-04-19 VITALS
OXYGEN SATURATION: 98 % | RESPIRATION RATE: 18 BRPM | TEMPERATURE: 99.5 F | SYSTOLIC BLOOD PRESSURE: 146 MMHG | DIASTOLIC BLOOD PRESSURE: 52 MMHG | HEART RATE: 86 BPM

## 2018-04-19 DIAGNOSIS — C85.88 MARGINAL ZONE LYMPHOMA OF LYMPH NODES OF MULTIPLE SITES (HCC): ICD-10-CM

## 2018-04-19 LAB
ALBUMIN SERPL-MCNC: 3.8 G/DL (ref 3.5–5)
ALBUMIN/GLOB SERPL: 1.6 {RATIO} (ref 1.2–3.5)
ALP SERPL-CCNC: 95 U/L (ref 50–136)
ALT SERPL-CCNC: 41 U/L (ref 12–65)
ANION GAP SERPL CALC-SCNC: 7 MMOL/L (ref 7–16)
AST SERPL-CCNC: 9 U/L (ref 15–37)
BILIRUB SERPL-MCNC: 0.6 MG/DL (ref 0.2–1.1)
BUN SERPL-MCNC: 17 MG/DL (ref 6–23)
CALCIUM SERPL-MCNC: 9 MG/DL (ref 8.3–10.4)
CHLORIDE SERPL-SCNC: 101 MMOL/L (ref 98–107)
CO2 SERPL-SCNC: 30 MMOL/L (ref 21–32)
CREAT SERPL-MCNC: 0.75 MG/DL (ref 0.6–1)
DIFFERENTIAL METHOD BLD: ABNORMAL
ERYTHROCYTE [DISTWIDTH] IN BLOOD BY AUTOMATED COUNT: 17.1 % (ref 11.9–14.6)
GLOBULIN SER CALC-MCNC: 2.4 G/DL (ref 2.3–3.5)
GLUCOSE SERPL-MCNC: 115 MG/DL (ref 65–100)
HCT VFR BLD AUTO: 24.6 % (ref 35.8–46.3)
HGB BLD-MCNC: 8.7 G/DL (ref 11.7–15.4)
MAGNESIUM SERPL-MCNC: 2 MG/DL (ref 1.8–2.4)
MCH RBC QN AUTO: 32.5 PG (ref 26.1–32.9)
MCHC RBC AUTO-ENTMCNC: 35.4 G/DL (ref 31.4–35)
MCV RBC AUTO: 91.8 FL (ref 79.6–97.8)
NRBC # BLD: 0 K/UL (ref 0–0.2)
PLATELET # BLD AUTO: 33 K/UL (ref 150–450)
PLATELET COMMENTS,PCOM: ABNORMAL
PMV BLD AUTO: 13.2 FL (ref 10.8–14.1)
POTASSIUM SERPL-SCNC: 3.9 MMOL/L (ref 3.5–5.1)
PROT SERPL-MCNC: 6.2 G/DL (ref 6.3–8.2)
RBC # BLD AUTO: 2.68 M/UL (ref 4.05–5.25)
RBC MORPH BLD: ABNORMAL
SODIUM SERPL-SCNC: 138 MMOL/L (ref 136–145)
WBC # BLD AUTO: 0.4 K/UL (ref 4.3–11.1)
WBC MORPH BLD: ABNORMAL

## 2018-04-19 PROCEDURE — 80053 COMPREHEN METABOLIC PANEL: CPT | Performed by: INTERNAL MEDICINE

## 2018-04-19 PROCEDURE — 83735 ASSAY OF MAGNESIUM: CPT | Performed by: INTERNAL MEDICINE

## 2018-04-19 PROCEDURE — 77030003560 HC NDL HUBR BARD -A

## 2018-04-19 PROCEDURE — 36591 DRAW BLOOD OFF VENOUS DEVICE: CPT

## 2018-04-19 PROCEDURE — 85025 COMPLETE CBC W/AUTO DIFF WBC: CPT | Performed by: INTERNAL MEDICINE

## 2018-04-19 RX ORDER — HEPARIN 100 UNIT/ML
300-500 SYRINGE INTRAVENOUS AS NEEDED
Status: DISPENSED | OUTPATIENT
Start: 2018-04-19 | End: 2018-04-19

## 2018-04-19 RX ORDER — SODIUM CHLORIDE 0.9 % (FLUSH) 0.9 %
10 SYRINGE (ML) INJECTION AS NEEDED
Status: DISCONTINUED | OUTPATIENT
Start: 2018-04-19 | End: 2018-04-23 | Stop reason: HOSPADM

## 2018-04-19 RX ADMIN — Medication 10 ML: at 07:25

## 2018-04-19 NOTE — PROGRESS NOTES
Pt arrived ambulatory to [facility]. Port accessed with good blood return. Blood drawn for CBC CMP and Mag and sent to lab. WBC 0.4 read back and confirmed with Trisha Harris in the lab. No replacements needed today. Port packed with heparin and de accessed. Pt aware of next appt on 4/24/18 1000. Pt discharged ambulatory.

## 2018-04-24 ENCOUNTER — PATIENT OUTREACH (OUTPATIENT)
Dept: CASE MANAGEMENT | Age: 58
End: 2018-04-24

## 2018-04-24 ENCOUNTER — HOSPITAL ENCOUNTER (OUTPATIENT)
Dept: LAB | Age: 58
Discharge: HOME | End: 2018-04-24
Payer: COMMERCIAL

## 2018-04-24 ENCOUNTER — HOSPITAL ENCOUNTER (OUTPATIENT)
Dept: INFUSION THERAPY | Age: 58
Discharge: HOME | End: 2018-04-24
Payer: COMMERCIAL

## 2018-04-24 DIAGNOSIS — E03.9 HYPOTHYROIDISM, UNSPECIFIED TYPE: ICD-10-CM

## 2018-04-24 DIAGNOSIS — C82.09 GRADE I FOLLICULAR LYMPHOMA OF EXTRANODAL SITE EXCLUDING SPLEEN AND OTHER SOLID ORGANS (HCC): Chronic | ICD-10-CM

## 2018-04-24 DIAGNOSIS — C82.09 GRADE I FOLLICULAR LYMPHOMA OF EXTRANODAL SITE EXCLUDING SPLEEN AND OTHER SOLID ORGANS (HCC): ICD-10-CM

## 2018-04-24 DIAGNOSIS — C85.88 MARGINAL ZONE LYMPHOMA OF LYMPH NODES OF MULTIPLE SITES (HCC): ICD-10-CM

## 2018-04-24 DIAGNOSIS — C83.39 DIFFUSE LARGE B-CELL LYMPHOMA OF SOLID ORGAN EXCLUDING SPLEEN (HCC): ICD-10-CM

## 2018-04-24 LAB
ALBUMIN SERPL-MCNC: 3.9 G/DL (ref 3.5–5)
ALBUMIN/GLOB SERPL: 1.5 {RATIO} (ref 1.2–3.5)
ALP SERPL-CCNC: 98 U/L (ref 50–136)
ALT SERPL-CCNC: 45 U/L (ref 12–65)
ANION GAP SERPL CALC-SCNC: 7 MMOL/L (ref 7–16)
AST SERPL-CCNC: 19 U/L (ref 15–37)
BILIRUB SERPL-MCNC: 0.4 MG/DL (ref 0.2–1.1)
BUN SERPL-MCNC: 16 MG/DL (ref 6–23)
CALCIUM SERPL-MCNC: 9.4 MG/DL (ref 8.3–10.4)
CHLORIDE SERPL-SCNC: 103 MMOL/L (ref 98–107)
CO2 SERPL-SCNC: 29 MMOL/L (ref 21–32)
CREAT SERPL-MCNC: 0.8 MG/DL (ref 0.6–1)
DIFFERENTIAL METHOD BLD: ABNORMAL
EOSINOPHIL # BLD: 0.1 K/UL (ref 0–0.8)
EOSINOPHIL NFR BLD MANUAL: 2 % (ref 1–8)
ERYTHROCYTE [DISTWIDTH] IN BLOOD BY AUTOMATED COUNT: 17.7 % (ref 11.9–14.6)
GLOBULIN SER CALC-MCNC: 2.6 G/DL (ref 2.3–3.5)
GLUCOSE SERPL-MCNC: 104 MG/DL (ref 65–100)
HCT VFR BLD AUTO: 22.5 % (ref 35.8–46.3)
HGB BLD-MCNC: 7.7 G/DL (ref 11.7–15.4)
LDH SERPL L TO P-CCNC: 371 U/L (ref 100–190)
LYMPHOCYTES # BLD: 0.2 K/UL (ref 0.5–4.6)
LYMPHOCYTES NFR BLD MANUAL: 6 % (ref 16–44)
MAGNESIUM SERPL-MCNC: 2.2 MG/DL (ref 1.8–2.4)
MCH RBC QN AUTO: 32.4 PG (ref 26.1–32.9)
MCHC RBC AUTO-ENTMCNC: 34.2 G/DL (ref 31.4–35)
MCV RBC AUTO: 94.5 FL (ref 79.6–97.8)
MONOCYTES # BLD: 0.5 K/UL (ref 0.1–1.3)
MONOCYTES NFR BLD MANUAL: 14 % (ref 3–9)
NEUTS BAND NFR BLD MANUAL: 10 % (ref 0–10)
NEUTS SEG # BLD: 2.7 K/UL (ref 1.7–8.2)
NEUTS SEG NFR BLD MANUAL: 68 % (ref 47–75)
NRBC # BLD: 0.02 K/UL (ref 0–0.2)
PLATELET # BLD AUTO: 58 K/UL (ref 150–450)
PLATELET COMMENTS,PCOM: ABNORMAL
PMV BLD AUTO: 12.2 FL (ref 10.8–14.1)
POTASSIUM SERPL-SCNC: 4.2 MMOL/L (ref 3.5–5.1)
PROT SERPL-MCNC: 6.5 G/DL (ref 6.3–8.2)
RBC # BLD AUTO: 2.38 M/UL (ref 4.05–5.25)
RBC MORPH BLD: ABNORMAL
RBC MORPH BLD: ABNORMAL
SODIUM SERPL-SCNC: 139 MMOL/L (ref 136–145)
TSH SERPL DL<=0.005 MIU/L-ACNC: 2.5 UIU/ML (ref 0.36–3.74)
WBC # BLD AUTO: 3.5 K/UL (ref 4.3–11.1)
WBC MORPH BLD: ABNORMAL

## 2018-04-24 PROCEDURE — 85025 COMPLETE CBC W/AUTO DIFF WBC: CPT | Performed by: INTERNAL MEDICINE

## 2018-04-24 PROCEDURE — 84443 ASSAY THYROID STIM HORMONE: CPT | Performed by: NURSE PRACTITIONER

## 2018-04-24 PROCEDURE — 83615 LACTATE (LD) (LDH) ENZYME: CPT | Performed by: INTERNAL MEDICINE

## 2018-04-24 PROCEDURE — 86923 COMPATIBILITY TEST ELECTRIC: CPT | Performed by: NURSE PRACTITIONER

## 2018-04-24 PROCEDURE — 86900 BLOOD TYPING SEROLOGIC ABO: CPT | Performed by: NURSE PRACTITIONER

## 2018-04-24 PROCEDURE — 86644 CMV ANTIBODY: CPT | Performed by: NURSE PRACTITIONER

## 2018-04-24 PROCEDURE — 83735 ASSAY OF MAGNESIUM: CPT | Performed by: INTERNAL MEDICINE

## 2018-04-24 PROCEDURE — 80053 COMPREHEN METABOLIC PANEL: CPT | Performed by: INTERNAL MEDICINE

## 2018-04-24 NOTE — PROGRESS NOTES
Pt was seen and labs reviewed by Ab Ruiz NP. Will give 2 units PRBCs tomorrow for hgb 7.7 and pt leaving to go to [place] this weekend. Pt instructed to stop Allopurinol. Pt to return on 5/9 for cycle 4.

## 2018-04-25 ENCOUNTER — HOSPITAL ENCOUNTER (OUTPATIENT)
Dept: INFUSION THERAPY | Age: 58
Discharge: HOME | End: 2018-04-25
Payer: COMMERCIAL

## 2018-04-25 VITALS
HEART RATE: 72 BPM | DIASTOLIC BLOOD PRESSURE: 75 MMHG | RESPIRATION RATE: 18 BRPM | SYSTOLIC BLOOD PRESSURE: 122 MMHG | TEMPERATURE: 98.7 F | WEIGHT: 286 LBS | BODY MASS INDEX: 52.31 KG/M2 | OXYGEN SATURATION: 97 %

## 2018-04-25 DIAGNOSIS — C85.88 MARGINAL ZONE LYMPHOMA OF LYMPH NODES OF MULTIPLE SITES (HCC): ICD-10-CM

## 2018-04-25 PROCEDURE — 36430 TRANSFUSION BLD/BLD COMPNT: CPT

## 2018-04-25 PROCEDURE — P9040 RBC LEUKOREDUCED IRRADIATED: HCPCS | Performed by: NURSE PRACTITIONER

## 2018-04-25 PROCEDURE — 77030018667 ADMN ST IV BLD FENW -A

## 2018-04-25 PROCEDURE — 74011250637 HC RX REV CODE- 250/637: Performed by: INTERNAL MEDICINE

## 2018-04-25 PROCEDURE — 74011250636 HC RX REV CODE- 250/636: Performed by: INTERNAL MEDICINE

## 2018-04-25 RX ORDER — HEPARIN 100 UNIT/ML
500 SYRINGE INTRAVENOUS AS NEEDED
Status: DISCONTINUED | OUTPATIENT
Start: 2018-04-25 | End: 2018-04-29 | Stop reason: HOSPADM

## 2018-04-25 RX ORDER — SODIUM CHLORIDE 9 MG/ML
25 INJECTION, SOLUTION INTRAVENOUS ONCE
Status: COMPLETED | OUTPATIENT
Start: 2018-04-25 | End: 2018-04-25

## 2018-04-25 RX ORDER — DIPHENHYDRAMINE HCL 25 MG
25 CAPSULE ORAL ONCE
Status: COMPLETED | OUTPATIENT
Start: 2018-04-25 | End: 2018-04-25

## 2018-04-25 RX ORDER — ACETAMINOPHEN 325 MG/1
650 TABLET ORAL ONCE
Status: COMPLETED | OUTPATIENT
Start: 2018-04-25 | End: 2018-04-25

## 2018-04-25 RX ADMIN — SODIUM CHLORIDE, PRESERVATIVE FREE 500 UNITS: 5 INJECTION INTRAVENOUS at 12:05

## 2018-04-25 RX ADMIN — DIPHENHYDRAMINE HYDROCHLORIDE 25 MG: 25 CAPSULE ORAL at 07:44

## 2018-04-25 RX ADMIN — SODIUM CHLORIDE 25 ML/HR: 900 INJECTION, SOLUTION INTRAVENOUS at 07:45

## 2018-04-25 RX ADMIN — ACETAMINOPHEN 650 MG: 325 TABLET, FILM COATED ORAL at 07:43

## 2018-04-25 NOTE — PROGRESS NOTES
Problem: Patient Education:  Go to Education Activity  Goal: Patient/Family Education  Outcome: Progressing Towards Goal  Reviewed blood transfusion with patient. Verbalizes understanding.

## 2018-04-25 NOTE — PROGRESS NOTES
Tolerated 2 units PRBC without difficulty. Reports mid chest discomfort relieved with 1st unit PRBC today. Patient discharged via ambulation accompanied by self. Instructed to notify physician of any problems, questions or concerns. Allowed opportunity for patient/family to ask questions. Verbalized understanding. Next appointment is 04/27/18 at [address] [place] [address] with Daniel. Discharge teaching handout given and reviewed with patient.

## 2018-04-27 ENCOUNTER — HOSPITAL ENCOUNTER (OUTPATIENT)
Dept: INFUSION THERAPY | Age: 58
Discharge: HOME | End: 2018-04-27
Payer: COMMERCIAL

## 2018-04-27 VITALS
HEART RATE: 80 BPM | DIASTOLIC BLOOD PRESSURE: 80 MMHG | OXYGEN SATURATION: 99 % | RESPIRATION RATE: 18 BRPM | TEMPERATURE: 98 F | SYSTOLIC BLOOD PRESSURE: 118 MMHG

## 2018-04-27 DIAGNOSIS — C85.88 MARGINAL ZONE LYMPHOMA OF LYMPH NODES OF MULTIPLE SITES (HCC): ICD-10-CM

## 2018-04-27 LAB
ALBUMIN SERPL-MCNC: 3.8 G/DL (ref 3.5–5)
ALBUMIN/GLOB SERPL: 1.4 {RATIO} (ref 1.2–3.5)
ALP SERPL-CCNC: 94 U/L (ref 50–136)
ALT SERPL-CCNC: 39 U/L (ref 12–65)
ANION GAP SERPL CALC-SCNC: 8 MMOL/L (ref 7–16)
AST SERPL-CCNC: 21 U/L (ref 15–37)
BASOPHILS # BLD: 0 K/UL (ref 0–0.2)
BASOPHILS NFR BLD MANUAL: 1 % (ref 0–2)
BILIRUB SERPL-MCNC: 0.4 MG/DL (ref 0.2–1.1)
BUN SERPL-MCNC: 17 MG/DL (ref 6–23)
CALCIUM SERPL-MCNC: 9.2 MG/DL (ref 8.3–10.4)
CHLORIDE SERPL-SCNC: 107 MMOL/L (ref 98–107)
CO2 SERPL-SCNC: 28 MMOL/L (ref 21–32)
CREAT SERPL-MCNC: 0.81 MG/DL (ref 0.6–1)
DIFFERENTIAL METHOD BLD: ABNORMAL
EOSINOPHIL # BLD: 0.1 K/UL (ref 0–0.8)
EOSINOPHIL NFR BLD MANUAL: 3 % (ref 1–8)
ERYTHROCYTE [DISTWIDTH] IN BLOOD BY AUTOMATED COUNT: 17.4 % (ref 11.9–14.6)
GLOBULIN SER CALC-MCNC: 2.7 G/DL (ref 2.3–3.5)
GLUCOSE SERPL-MCNC: 121 MG/DL (ref 65–100)
HCT VFR BLD AUTO: 26.9 % (ref 35.8–46.3)
HGB BLD-MCNC: 9.5 G/DL (ref 11.7–15.4)
LYMPHOCYTES # BLD: 0.2 K/UL (ref 0.5–4.6)
LYMPHOCYTES NFR BLD MANUAL: 4 % (ref 16–44)
MAGNESIUM SERPL-MCNC: 2.2 MG/DL (ref 1.8–2.4)
MCH RBC QN AUTO: 33 PG (ref 26.1–32.9)
MCHC RBC AUTO-ENTMCNC: 35.3 G/DL (ref 31.4–35)
MCV RBC AUTO: 93.4 FL (ref 79.6–97.8)
MONOCYTES # BLD: 0.2 K/UL (ref 0.1–1.3)
MONOCYTES NFR BLD MANUAL: 6 % (ref 3–9)
MYELOCYTES NFR BLD MANUAL: 2 %
NEUTS BAND NFR BLD MANUAL: 7 % (ref 0–6)
NEUTS SEG # BLD: 3.6 K/UL (ref 1.7–8.2)
NEUTS SEG NFR BLD MANUAL: 77 % (ref 47–75)
NRBC # BLD: 0.02 K/UL (ref 0–0.2)
PLATELET # BLD AUTO: 78 K/UL (ref 150–450)
PLATELET COMMENTS,PCOM: ABNORMAL
PMV BLD AUTO: 10.7 FL (ref 10.8–14.1)
POTASSIUM SERPL-SCNC: 4.1 MMOL/L (ref 3.5–5.1)
PROT SERPL-MCNC: 6.5 G/DL (ref 6.3–8.2)
RBC # BLD AUTO: 2.88 M/UL (ref 4.05–5.25)
RBC MORPH BLD: ABNORMAL
RBC MORPH BLD: ABNORMAL
SODIUM SERPL-SCNC: 143 MMOL/L (ref 136–145)
WBC # BLD AUTO: 4.1 K/UL (ref 4.3–11.1)
WBC MORPH BLD: ABNORMAL

## 2018-04-27 PROCEDURE — 77030003560 HC NDL HUBR BARD -A

## 2018-04-27 PROCEDURE — 83735 ASSAY OF MAGNESIUM: CPT | Performed by: INTERNAL MEDICINE

## 2018-04-27 PROCEDURE — 80053 COMPREHEN METABOLIC PANEL: CPT | Performed by: INTERNAL MEDICINE

## 2018-04-27 PROCEDURE — 85025 COMPLETE CBC W/AUTO DIFF WBC: CPT | Performed by: INTERNAL MEDICINE

## 2018-04-27 PROCEDURE — 36591 DRAW BLOOD OFF VENOUS DEVICE: CPT

## 2018-04-27 RX ORDER — SODIUM CHLORIDE 0.9 % (FLUSH) 0.9 %
10 SYRINGE (ML) INJECTION AS NEEDED
Status: DISCONTINUED | OUTPATIENT
Start: 2018-04-27 | End: 2018-05-01 | Stop reason: HOSPADM

## 2018-04-27 RX ORDER — HEPARIN 100 UNIT/ML
300-500 SYRINGE INTRAVENOUS AS NEEDED
Status: ACTIVE | OUTPATIENT
Start: 2018-04-27 | End: 2018-04-27

## 2018-04-27 RX ADMIN — Medication 10 ML: at 07:38

## 2018-04-27 NOTE — PROGRESS NOTES
Arrived to the [facility]. labs completed. Patient tolerated well. Any issues or concerns during appointment: no.  Patient aware of next infusion appointment on 5/9 (date) at [phone] (time). Discharged home.

## 2018-05-03 ENCOUNTER — HOSPITAL ENCOUNTER (OUTPATIENT)
Dept: LAB | Age: 58
Discharge: HOME | End: 2018-05-03
Payer: COMMERCIAL

## 2018-05-03 ENCOUNTER — PATIENT OUTREACH (OUTPATIENT)
Dept: CASE MANAGEMENT | Age: 58
End: 2018-05-03

## 2018-05-03 DIAGNOSIS — C82.09 GRADE I FOLLICULAR LYMPHOMA OF EXTRANODAL SITE EXCLUDING SPLEEN AND OTHER SOLID ORGANS (HCC): ICD-10-CM

## 2018-05-03 DIAGNOSIS — C83.39 DIFFUSE LARGE B-CELL LYMPHOMA OF SOLID ORGAN EXCLUDING SPLEEN (HCC): ICD-10-CM

## 2018-05-03 DIAGNOSIS — C85.88 MARGINAL ZONE LYMPHOMA OF LYMPH NODES OF MULTIPLE SITES (HCC): ICD-10-CM

## 2018-05-03 DIAGNOSIS — C82.09 GRADE I FOLLICULAR LYMPHOMA OF EXTRANODAL SITE EXCLUDING SPLEEN AND OTHER SOLID ORGANS (HCC): Chronic | ICD-10-CM

## 2018-05-03 LAB
ALBUMIN SERPL-MCNC: 4 G/DL (ref 3.5–5)
ALBUMIN/GLOB SERPL: 1.6 {RATIO} (ref 1.2–3.5)
ALP SERPL-CCNC: 100 U/L (ref 50–136)
ALT SERPL-CCNC: 62 U/L (ref 12–65)
ANION GAP SERPL CALC-SCNC: 6 MMOL/L (ref 7–16)
AST SERPL-CCNC: 37 U/L (ref 15–37)
BASOPHILS # BLD: 0 K/UL (ref 0–0.2)
BASOPHILS NFR BLD: 1 % (ref 0–2)
BILIRUB SERPL-MCNC: 0.4 MG/DL (ref 0.2–1.1)
BUN SERPL-MCNC: 16 MG/DL (ref 6–23)
CALCIUM SERPL-MCNC: 9.2 MG/DL (ref 8.3–10.4)
CHLORIDE SERPL-SCNC: 104 MMOL/L (ref 98–107)
CO2 SERPL-SCNC: 29 MMOL/L (ref 21–32)
CREAT SERPL-MCNC: 0.76 MG/DL (ref 0.6–1)
DIFFERENTIAL METHOD BLD: ABNORMAL
EOSINOPHIL # BLD: 0 K/UL (ref 0–0.8)
EOSINOPHIL NFR BLD: 1 % (ref 0.5–7.8)
ERYTHROCYTE [DISTWIDTH] IN BLOOD BY AUTOMATED COUNT: 18.4 % (ref 11.9–14.6)
GLOBULIN SER CALC-MCNC: 2.5 G/DL (ref 2.3–3.5)
GLUCOSE SERPL-MCNC: 100 MG/DL (ref 65–100)
HCT VFR BLD AUTO: 26.3 % (ref 35.8–46.3)
HGB BLD-MCNC: 9.2 G/DL (ref 11.7–15.4)
LDH SERPL L TO P-CCNC: 348 U/L (ref 100–190)
LYMPHOCYTES # BLD: 0.3 K/UL (ref 0.5–4.6)
LYMPHOCYTES NFR BLD: 6 % (ref 13–44)
MAGNESIUM SERPL-MCNC: 2.2 MG/DL (ref 1.8–2.4)
MCH RBC QN AUTO: 33 PG (ref 26.1–32.9)
MCHC RBC AUTO-ENTMCNC: 35 G/DL (ref 31.4–35)
MCV RBC AUTO: 94.3 FL (ref 79.6–97.8)
MONOCYTES # BLD: 0.6 K/UL (ref 0.1–1.3)
MONOCYTES NFR BLD: 12 % (ref 4–12)
NEUTS SEG # BLD: 3.7 K/UL (ref 1.7–8.2)
NEUTS SEG NFR BLD: 80 % (ref 43–78)
NRBC # BLD: 0.01 K/UL (ref 0–0.2)
PLATELET # BLD AUTO: 106 K/UL (ref 150–450)
PMV BLD AUTO: 10.1 FL (ref 10.8–14.1)
POTASSIUM SERPL-SCNC: 3.8 MMOL/L (ref 3.5–5.1)
PROT SERPL-MCNC: 6.5 G/DL (ref 6.3–8.2)
RBC # BLD AUTO: 2.79 M/UL (ref 4.05–5.25)
SODIUM SERPL-SCNC: 139 MMOL/L (ref 136–145)
WBC # BLD AUTO: 4.7 K/UL (ref 4.3–11.1)

## 2018-05-03 PROCEDURE — 80053 COMPREHEN METABOLIC PANEL: CPT | Performed by: INTERNAL MEDICINE

## 2018-05-03 PROCEDURE — 83615 LACTATE (LD) (LDH) ENZYME: CPT | Performed by: INTERNAL MEDICINE

## 2018-05-03 PROCEDURE — 83735 ASSAY OF MAGNESIUM: CPT | Performed by: INTERNAL MEDICINE

## 2018-05-03 PROCEDURE — 85025 COMPLETE CBC W/AUTO DIFF WBC: CPT | Performed by: INTERNAL MEDICINE

## 2018-05-03 NOTE — PROGRESS NOTES
Pt was seen and labs reviewed by Jet Saleh NP. Pt feeling well and labs ok to proceed with treatment. Will proceed with cycle 4 RCHOP tomorrow with Neulasta the following day. Pt scheduled for labs/replacements twice weekly. She will see NP for tox check and will return to see Dr. Yoselin Carson prior to cycle 5 on 5/23.

## 2018-05-04 ENCOUNTER — HOSPITAL ENCOUNTER (OUTPATIENT)
Dept: INFUSION THERAPY | Age: 58
Discharge: HOME | End: 2018-05-04
Payer: COMMERCIAL

## 2018-05-04 VITALS
BODY MASS INDEX: 52.2 KG/M2 | SYSTOLIC BLOOD PRESSURE: 112 MMHG | TEMPERATURE: 98.6 F | OXYGEN SATURATION: 99 % | HEART RATE: 77 BPM | WEIGHT: 285.4 LBS | RESPIRATION RATE: 16 BRPM | DIASTOLIC BLOOD PRESSURE: 56 MMHG

## 2018-05-04 DIAGNOSIS — C85.88 MARGINAL ZONE LYMPHOMA OF LYMPH NODES OF MULTIPLE SITES (HCC): ICD-10-CM

## 2018-05-04 DIAGNOSIS — C83.39 DIFFUSE LARGE B-CELL LYMPHOMA OF SOLID ORGAN EXCLUDING SPLEEN (HCC): ICD-10-CM

## 2018-05-04 DIAGNOSIS — C82.09 GRADE I FOLLICULAR LYMPHOMA OF EXTRANODAL SITE EXCLUDING SPLEEN AND OTHER SOLID ORGANS (HCC): ICD-10-CM

## 2018-05-04 PROCEDURE — 74011250636 HC RX REV CODE- 250/636

## 2018-05-04 PROCEDURE — 74011250636 HC RX REV CODE- 250/636: Performed by: NURSE PRACTITIONER

## 2018-05-04 PROCEDURE — 74011250637 HC RX REV CODE- 250/637: Performed by: NURSE PRACTITIONER

## 2018-05-04 PROCEDURE — 96417 CHEMO IV INFUS EACH ADDL SEQ: CPT

## 2018-05-04 PROCEDURE — 96413 CHEMO IV INFUSION 1 HR: CPT

## 2018-05-04 PROCEDURE — 96367 TX/PROPH/DG ADDL SEQ IV INF: CPT

## 2018-05-04 PROCEDURE — 96375 TX/PRO/DX INJ NEW DRUG ADDON: CPT

## 2018-05-04 PROCEDURE — 96415 CHEMO IV INFUSION ADDL HR: CPT

## 2018-05-04 PROCEDURE — 96411 CHEMO IV PUSH ADDL DRUG: CPT

## 2018-05-04 PROCEDURE — 96361 HYDRATE IV INFUSION ADD-ON: CPT

## 2018-05-04 PROCEDURE — 74011000258 HC RX REV CODE- 258: Performed by: NURSE PRACTITIONER

## 2018-05-04 RX ORDER — DIPHENHYDRAMINE HYDROCHLORIDE 50 MG/ML
50 INJECTION, SOLUTION INTRAMUSCULAR; INTRAVENOUS ONCE
Status: COMPLETED | OUTPATIENT
Start: 2018-05-04 | End: 2018-05-04

## 2018-05-04 RX ORDER — HYDROCORTISONE SODIUM SUCCINATE 100 MG/2ML
100 INJECTION, POWDER, FOR SOLUTION INTRAMUSCULAR; INTRAVENOUS AS NEEDED
Status: ACTIVE | OUTPATIENT
Start: 2018-05-04 | End: 2018-05-04

## 2018-05-04 RX ORDER — ONDANSETRON 2 MG/ML
8 INJECTION INTRAMUSCULAR; INTRAVENOUS ONCE
Status: COMPLETED | OUTPATIENT
Start: 2018-05-04 | End: 2018-05-04

## 2018-05-04 RX ORDER — HEPARIN 100 UNIT/ML
300-500 SYRINGE INTRAVENOUS AS NEEDED
Status: DISPENSED | OUTPATIENT
Start: 2018-05-04 | End: 2018-05-05

## 2018-05-04 RX ORDER — ACETAMINOPHEN 325 MG/1
650 TABLET ORAL ONCE
Status: COMPLETED | OUTPATIENT
Start: 2018-05-04 | End: 2018-05-04

## 2018-05-04 RX ORDER — DOXORUBICIN HYDROCHLORIDE 2 MG/ML
50 INJECTION, SOLUTION INTRAVENOUS ONCE
Status: COMPLETED | OUTPATIENT
Start: 2018-05-04 | End: 2018-05-04

## 2018-05-04 RX ORDER — SODIUM CHLORIDE 0.9 % (FLUSH) 0.9 %
10 SYRINGE (ML) INJECTION AS NEEDED
Status: ACTIVE | OUTPATIENT
Start: 2018-05-04 | End: 2018-05-04

## 2018-05-04 RX ADMIN — SODIUM CHLORIDE 500 ML: 900 INJECTION, SOLUTION INTRAVENOUS at 07:40

## 2018-05-04 RX ADMIN — DOXORUBICIN HYDROCHLORIDE 117 MG: 2 INJECTION, SOLUTION INTRAVENOUS at 12:50

## 2018-05-04 RX ADMIN — ACETAMINOPHEN 650 MG: 325 TABLET, FILM COATED ORAL at 07:56

## 2018-05-04 RX ADMIN — SODIUM CHLORIDE 500 ML: 900 INJECTION, SOLUTION INTRAVENOUS at 12:49

## 2018-05-04 RX ADMIN — Medication 10 ML: at 15:00

## 2018-05-04 RX ADMIN — RITUXIMAB 878 MG: 10 INJECTION, SOLUTION INTRAVENOUS at 08:38

## 2018-05-04 RX ADMIN — VINCRISTINE SULFATE 2 MG: 1 INJECTION, SOLUTION INTRAVENOUS at 13:10

## 2018-05-04 RX ADMIN — SODIUM CHLORIDE 150 MG: 900 INJECTION, SOLUTION INTRAVENOUS at 12:17

## 2018-05-04 RX ADMIN — DIPHENHYDRAMINE HYDROCHLORIDE 50 MG: 50 INJECTION, SOLUTION INTRAMUSCULAR; INTRAVENOUS at 08:00

## 2018-05-04 RX ADMIN — SODIUM CHLORIDE, PRESERVATIVE FREE 500 UNITS: 5 INJECTION INTRAVENOUS at 15:00

## 2018-05-04 RX ADMIN — ONDANSETRON 8 MG: 2 INJECTION INTRAMUSCULAR; INTRAVENOUS at 11:49

## 2018-05-04 RX ADMIN — DEXAMETHASONE SODIUM PHOSPHATE 12 MG: 4 INJECTION, SOLUTION INTRAMUSCULAR; INTRAVENOUS at 12:00

## 2018-05-04 RX ADMIN — CYCLOPHOSPHAMIDE 1755 MG: 1 INJECTION, POWDER, FOR SOLUTION INTRAVENOUS; ORAL at 13:23

## 2018-05-04 NOTE — PROGRESS NOTES
Arrived to the [facility]. Assessment completed. Labs reviewed. Patient tolerated chemotherapy well today. Patient voided multiple times today, while here in infusion. Any issues or concerns during appointment: none. Patient aware of next infusion appointment on 5/5/18 (date) at 65 (time) with 5th floor infusion center. Discharged ambulatory, per self. Patient instructed to call Dr. Mitali Ramos office immediately for any problems or concerns. She verbalizes understanding.

## 2018-05-04 NOTE — PROGRESS NOTES
Massage THERAPY: Daily Note    Referring Physician: Josh Castillo MD  Medical/Referring Diagnosis: Diffuse large B-cell lymphoma ([facility] [C83.30]   Precautions/Allergies: Review of patient's allergies indicates no known allergies. SUBJECTIVE:  Present Symptoms: neuropathy in legs/feet     Pre-Treatment Pain: 4/10   Neuropathy Scale:  4/10  Past Medical History:    Ms. 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 She also has no past medical history of Aneurysm ([facility] [address] Arrhythmia; Asthma; Autoimmune disease ([address] CAD (coronary artery disease); Chronic kidney disease; Coagulation defects; COPD; Diabetes ([address] Difficult intubation; GERD (gastroesophageal reflux disease); Heart failure ([address] Liver disease; Malignant hyperthermia due to anesthesia; Nausea & vomiting; Pseudocholinesterase deficiency; Psychiatric disorder; PUD (peptic ulcer disease); Seizures ([address] Stroke [facility]); or Unspecified sleep apnea. Ms. Shaunna Valenzuela  has a past surgical history that includes pr anesth,achilles tendon surg (Left, 02/10/2011); hx cholecystectomy (1983); hx vascular access; hx knee replacement (Left, 2013); and hx knee replacement (Right, 2012). Current Medications:       Current Outpatient Prescriptions:     nystatin (MYCOSTATIN) powder, Apply  to affected area three (3) times daily. , Disp: 60 g, Rfl: 2    HYDROcodone-acetaminophen (NORCO) 5-325 mg per tablet, Take 1 Tab by mouth every six (6) hours as needed for Pain.  Max Daily Amount: 4 Tabs., Disp: 30 Tab, Rfl: 0    pregabalin (LYRICA) 100 mg capsule, Take 1 Cap by mouth three (3) times daily. Max Daily Amount: 300 mg., Disp: 90 Cap, Rfl: 3    fluconazole (DIFLUCAN) 150 mg tablet, Take 1 Tab by mouth daily. FDA advises cautious prescribing of oral fluconazole in pregnancy. , Disp: 30 Tab, Rfl: 2    levothyroxine (SYNTHROID) 175 mcg tablet, Take 1 Tab by mouth Daily (before breakfast). , Disp: 30 Tab, Rfl: 5    allopurinol (ZYLOPRIM) 300 mg tablet, Take 1 Tab by mouth daily. , Disp: 30 Tab, Rfl: 1    predniSONE (DELTASONE) 20 mg tablet, Take 5 Tabs by mouth daily. , Disp: , Rfl: 0    omeprazole (PRILOSEC) 20 mg capsule, TAKE 1 CAPSULE DAILY, Disp: 90 Cap, Rfl: 2    predniSONE (DELTASONE) 20 mg tablet, Take 5 Tabs by mouth daily (with breakfast). Take on days 1-5 of your chemo cycle, Disp: 25 Tab, Rfl: 5    loratadine (CLARITIN) 10 mg tablet, Take 1 Tab by mouth daily. , Disp: 30 Tab, Rfl: 0    ondansetron hcl (ZOFRAN, AS HYDROCHLORIDE,) 8 mg tablet, Take 1 Tab by mouth every eight (8) hours as needed for Nausea (or vomiting). , Disp: 90 Tab, Rfl: 2    HYDROcodone-homatropine (HYCODAN) 5-1.5 mg/5 mL (5 mL) syrup, Take 5 mL by mouth four (4) times daily as needed. Max Daily Amount: 20 mL., Disp: 400 mL, Rfl: 0    albuterol (PROAIR HFA) 90 mcg/actuation inhaler, Take 2 Puffs by inhalation every four (4) hours as needed for Wheezing., Disp: 1 Inhaler, Rfl: 2    ondansetron hcl (ZOFRAN, AS HYDROCHLORIDE,) 4 mg tablet, Take 4 mg by mouth every eight (8) hours as needed for Nausea., Disp: , Rfl:     fluconazole (DIFLUCAN) 150 mg tablet, Take 150 mg by mouth daily. FDA advises cautious prescribing of oral fluconazole in pregnancy. , Disp: , Rfl:     acyclovir (ZOVIRAX) 400 mg tablet, Take 1 Tab by mouth two (2) times a day., Disp: 60 Tab, Rfl: 3    magnesium oxide (MAG-OX) 400 mg tablet, Take 1 Tab by mouth two (2) times a day., Disp: 60 Tab, Rfl: 1    magic mouthwash (ALEXSANDER) susp, Take 10 mL by mouth every four (4) hours as needed. , Disp: 2 Bottle, Rfl: 2    fluticasone (FLONASE) 50 mcg/actuation nasal spray, 2 Sprays by Both Nostrils route daily. , Disp: 1 Bottle, Rfl: 1    lidocaine-prilocaine (EMLA) topical cream, Apply  to affected area as needed for Pain. Apply to port site 45-60 minutes prior to lab appt or infusion. , Disp: 30 g, Rfl: 0    promethazine (PHENERGAN) 25 mg tablet, Take 25 mg by mouth every six (6) hours as needed for Nausea.  Unsure of dose, Disp: , Rfl:     Current Facility-Administered Medications:     riTUXimab (RITUXAN) 878 mg in 0.9% sodium chloride 878 mL infusion, 375 mg/m2 (Treatment Plan Recorded), IntraVENous, ONCE TITR, Sylvia P Laurel, NP, 878 mg at 05/04/18 0838    fosaprepitant (EMEND) 150 mg in 0.9% sodium chloride 150 mL IVPB, 150 mg, IntraVENous, ONCE, Sylvia P Aaron, NP    ondansetron (ZOFRAN) injection 8 mg, 8 mg, IntraVENous, ONCE, Sylvia P Laurel, NP    dexamethasone (DECADRON) 12 mg in 0.9% sodium chloride 50 mL IVPB, 12 mg, IntraVENous, ONCE, Sylvia P Laurel, NP    cyclophosphamide (CYTOXAN) 1,755 mg in 0.9% sodium chloride 250 mL chemo infusion, 750 mg/m2 (Treatment Plan Recorded), IntraVENous, ONCE, Slyvia P Laurel, NP    DOXOrubicin (ADRIAMYCIN) chemo syringe 117 mg, 50 mg/m2 (Treatment Plan Recorded), IntraVENous, ONCE, Sylvia P Laurel, NP    vinCRIStine (VINCASAR PFS) 2 mg in 0.9% sodium chloride 50 mL chemo IVPB, 2 mg, IntraVENous, ONCE, Sylvia P Laurel, NP    sodium chloride 0.9 % bolus infusion 500 mL, 500 mL, IntraVENous, ONCE, Sylvia P Laurel, NP    saline peripheral flush soln 10 mL, 10 mL, InterCATHeter, PRN, Mario Quinones NP    hydrocortisone Sod Succ (PF) (SOLU-CORTEF) injection 100 mg, 100 mg, IntraVENous, PRN, Mario Quinones, NP       OBJECTIVE/ASSESSMENT:  Observations of Patient:  No issues related to massage  Response To Treatment: Neuropathy and pain decreased   Post-Treatment Pain: 1/10   Neuropathy Scale:  1/10  TREATMENT:    (In addition to Assessment/Re-Assessment sessions the following treatments were rendered)  Treatment Provided:  [x]  Soft tissue massage  []  Healing Touch   Location: lower leg(s) bilaterally and feet  Patient Position: Seated  Time: 20 minutes    PLAN OF CARE:    []  I will follow up with this patient as needed. [x]  No follow up visit necessary.     Thank you for this referral.  Magaly Rashid LMT

## 2018-05-04 NOTE — PROGRESS NOTES
[facility] to [facility]. Labs reviewed. Assessment and RCHOP completed. Patient tolerated well. Any issues or concerns during appointment: none. Patient aware of next infusion appointment on 05/05/2018 (date) at 1700 (time) with 5th floor infusion. Discharged ambulatory, via self. Patient advised to call Dr. Mitali Ramos office if she has any problems or concerns. Patient verbalized understanding.

## 2018-05-05 ENCOUNTER — HOSPITAL ENCOUNTER (OUTPATIENT)
Dept: INFUSION THERAPY | Age: 58
Discharge: HOME | End: 2018-05-05
Payer: COMMERCIAL

## 2018-05-05 VITALS
SYSTOLIC BLOOD PRESSURE: 123 MMHG | DIASTOLIC BLOOD PRESSURE: 51 MMHG | TEMPERATURE: 99.1 F | OXYGEN SATURATION: 99 % | HEART RATE: 78 BPM | RESPIRATION RATE: 18 BRPM

## 2018-05-05 DIAGNOSIS — C83.39 DIFFUSE LARGE B-CELL LYMPHOMA OF SOLID ORGAN EXCLUDING SPLEEN (HCC): ICD-10-CM

## 2018-05-05 DIAGNOSIS — C82.09 GRADE I FOLLICULAR LYMPHOMA OF EXTRANODAL SITE EXCLUDING SPLEEN AND OTHER SOLID ORGANS (HCC): ICD-10-CM

## 2018-05-05 DIAGNOSIS — C85.88 MARGINAL ZONE LYMPHOMA OF LYMPH NODES OF MULTIPLE SITES (HCC): ICD-10-CM

## 2018-05-05 PROCEDURE — 74011250636 HC RX REV CODE- 250/636: Performed by: NURSE PRACTITIONER

## 2018-05-05 PROCEDURE — 96372 THER/PROPH/DIAG INJ SC/IM: CPT

## 2018-05-05 RX ADMIN — PEGFILGRASTIM 6 MG: 6 INJECTION SUBCUTANEOUS at 17:28

## 2018-05-05 NOTE — PROGRESS NOTES
Pt arrived ambulatory today at , to have neulasta. Pt tolerated without difficulty. Patient discharged via ambulatory accompanied by spouse. Instructed to notify physician of any problems, questions or concerns. Allowed opportunity for patient/family to ask questions. Verbalized understanding. Next appointment is may 8 at 0800  with Cristal Sanchez.

## 2018-05-08 ENCOUNTER — HOSPITAL ENCOUNTER (OUTPATIENT)
Dept: INFUSION THERAPY | Age: 58
Discharge: HOME | End: 2018-05-08
Payer: COMMERCIAL

## 2018-05-08 ENCOUNTER — APPOINTMENT (OUTPATIENT)
Dept: INFUSION THERAPY | Age: 58
End: 2018-05-08

## 2018-05-08 VITALS
TEMPERATURE: 98.7 F | BODY MASS INDEX: 52.64 KG/M2 | DIASTOLIC BLOOD PRESSURE: 71 MMHG | RESPIRATION RATE: 18 BRPM | WEIGHT: 287.8 LBS | HEART RATE: 81 BPM | OXYGEN SATURATION: 98 % | SYSTOLIC BLOOD PRESSURE: 124 MMHG

## 2018-05-08 DIAGNOSIS — C85.88 MARGINAL ZONE LYMPHOMA OF LYMPH NODES OF MULTIPLE SITES (HCC): ICD-10-CM

## 2018-05-08 LAB
ALBUMIN SERPL-MCNC: 3.6 G/DL (ref 3.5–5)
ALBUMIN/GLOB SERPL: 1.5 {RATIO} (ref 1.2–3.5)
ALP SERPL-CCNC: 93 U/L (ref 50–136)
ALT SERPL-CCNC: 47 U/L (ref 12–65)
ANION GAP SERPL CALC-SCNC: 6 MMOL/L (ref 7–16)
AST SERPL-CCNC: 17 U/L (ref 15–37)
BILIRUB SERPL-MCNC: 0.5 MG/DL (ref 0.2–1.1)
BUN SERPL-MCNC: 25 MG/DL (ref 6–23)
CALCIUM SERPL-MCNC: 8.8 MG/DL (ref 8.3–10.4)
CHLORIDE SERPL-SCNC: 107 MMOL/L (ref 98–107)
CO2 SERPL-SCNC: 31 MMOL/L (ref 21–32)
CREAT SERPL-MCNC: 0.78 MG/DL (ref 0.6–1)
DIFFERENTIAL METHOD BLD: ABNORMAL
EOSINOPHIL # BLD: 0.1 K/UL (ref 0–0.8)
EOSINOPHIL NFR BLD MANUAL: 1 % (ref 1–8)
ERYTHROCYTE [DISTWIDTH] IN BLOOD BY AUTOMATED COUNT: 18.6 % (ref 11.9–14.6)
GLOBULIN SER CALC-MCNC: 2.4 G/DL (ref 2.3–3.5)
GLUCOSE SERPL-MCNC: 102 MG/DL (ref 65–100)
HCT VFR BLD AUTO: 25 % (ref 35.8–46.3)
HGB BLD-MCNC: 8.5 G/DL (ref 11.7–15.4)
LYMPHOCYTES # BLD: 0.1 K/UL (ref 0.5–4.6)
LYMPHOCYTES NFR BLD MANUAL: 2 % (ref 16–44)
MAGNESIUM SERPL-MCNC: 2.2 MG/DL (ref 1.8–2.4)
MCH RBC QN AUTO: 33.1 PG (ref 26.1–32.9)
MCHC RBC AUTO-ENTMCNC: 34 G/DL (ref 31.4–35)
MCV RBC AUTO: 97.3 FL (ref 79.6–97.8)
METAMYELOCYTES NFR BLD MANUAL: 1 %
MONOCYTES # BLD: 0.1 K/UL (ref 0.1–1.3)
MONOCYTES NFR BLD MANUAL: 1 % (ref 3–9)
NEUTS BAND NFR BLD MANUAL: 2 % (ref 0–6)
NEUTS SEG # BLD: 6.4 K/UL (ref 1.7–8.2)
NEUTS SEG NFR BLD MANUAL: 93 % (ref 47–75)
NRBC # BLD: 0 K/UL (ref 0–0.2)
PLATELET # BLD AUTO: 73 K/UL (ref 150–450)
PLATELET COMMENTS,PCOM: ABNORMAL
PMV BLD AUTO: 10.2 FL (ref 10.8–14.1)
POTASSIUM SERPL-SCNC: 3.6 MMOL/L (ref 3.5–5.1)
PROT SERPL-MCNC: 6 G/DL (ref 6.3–8.2)
RBC # BLD AUTO: 2.57 M/UL (ref 4.05–5.25)
RBC MORPH BLD: ABNORMAL
SODIUM SERPL-SCNC: 144 MMOL/L (ref 136–145)
WBC # BLD AUTO: 6.7 K/UL (ref 4.3–11.1)
WBC MORPH BLD: ABNORMAL

## 2018-05-08 PROCEDURE — 77030003560 HC NDL HUBR BARD -A

## 2018-05-08 PROCEDURE — 80053 COMPREHEN METABOLIC PANEL: CPT | Performed by: INTERNAL MEDICINE

## 2018-05-08 PROCEDURE — 83735 ASSAY OF MAGNESIUM: CPT | Performed by: INTERNAL MEDICINE

## 2018-05-08 PROCEDURE — 36591 DRAW BLOOD OFF VENOUS DEVICE: CPT

## 2018-05-08 PROCEDURE — 85025 COMPLETE CBC W/AUTO DIFF WBC: CPT | Performed by: INTERNAL MEDICINE

## 2018-05-08 PROCEDURE — 74011250636 HC RX REV CODE- 250/636: Performed by: NURSE PRACTITIONER

## 2018-05-08 RX ORDER — SODIUM CHLORIDE 0.9 % (FLUSH) 0.9 %
10 SYRINGE (ML) INJECTION AS NEEDED
Status: DISCONTINUED | OUTPATIENT
Start: 2018-05-08 | End: 2018-05-12 | Stop reason: HOSPADM

## 2018-05-08 RX ORDER — HEPARIN 100 UNIT/ML
300-500 SYRINGE INTRAVENOUS AS NEEDED
Status: DISPENSED | OUTPATIENT
Start: 2018-05-08 | End: 2018-05-08

## 2018-05-08 RX ADMIN — SODIUM CHLORIDE, PRESERVATIVE FREE 500 UNITS: 5 INJECTION INTRAVENOUS at 08:40

## 2018-05-08 RX ADMIN — Medication 10 ML: at 08:40

## 2018-05-08 NOTE — PROGRESS NOTES
Pt arrived ambulatory today at [phone], to have labs & replacements. Pt tolerated without difficulty. Patient discharged via ambulatory accompanied by self. Instructed to notify physician of any problems, questions or concerns. Allowed opportunity for patient/family to ask questions. Verbalized understanding. Next appointment is May 11 at 1700  with Cristal Sanchez.

## 2018-05-09 ENCOUNTER — APPOINTMENT (OUTPATIENT)
Dept: INFUSION THERAPY | Age: 58
End: 2018-05-09

## 2018-05-10 ENCOUNTER — APPOINTMENT (OUTPATIENT)
Dept: INFUSION THERAPY | Age: 58
End: 2018-05-10

## 2018-05-11 ENCOUNTER — HOSPITAL ENCOUNTER (OUTPATIENT)
Dept: INFUSION THERAPY | Age: 58
Discharge: HOME | End: 2018-05-11
Payer: COMMERCIAL

## 2018-05-11 VITALS
SYSTOLIC BLOOD PRESSURE: 120 MMHG | RESPIRATION RATE: 18 BRPM | DIASTOLIC BLOOD PRESSURE: 69 MMHG | HEART RATE: 99 BPM | TEMPERATURE: 99.4 F | BODY MASS INDEX: 52.6 KG/M2 | OXYGEN SATURATION: 98 % | WEIGHT: 287.6 LBS

## 2018-05-11 LAB
ALBUMIN SERPL-MCNC: 3.7 G/DL (ref 3.5–5)
ALBUMIN/GLOB SERPL: 1.5 {RATIO} (ref 1.2–3.5)
ALP SERPL-CCNC: 97 U/L (ref 50–136)
ALT SERPL-CCNC: 44 U/L (ref 12–65)
ANION GAP SERPL CALC-SCNC: 6 MMOL/L (ref 7–16)
AST SERPL-CCNC: 13 U/L (ref 15–37)
BILIRUB SERPL-MCNC: 0.4 MG/DL (ref 0.2–1.1)
BUN SERPL-MCNC: 18 MG/DL (ref 6–23)
CALCIUM SERPL-MCNC: 8.7 MG/DL (ref 8.3–10.4)
CHLORIDE SERPL-SCNC: 103 MMOL/L (ref 98–107)
CO2 SERPL-SCNC: 30 MMOL/L (ref 21–32)
CREAT SERPL-MCNC: 0.7 MG/DL (ref 0.6–1)
DIFFERENTIAL METHOD BLD: ABNORMAL
ERYTHROCYTE [DISTWIDTH] IN BLOOD BY AUTOMATED COUNT: 17.1 % (ref 11.9–14.6)
GLOBULIN SER CALC-MCNC: 2.5 G/DL (ref 2.3–3.5)
GLUCOSE SERPL-MCNC: 121 MG/DL (ref 65–100)
HCT VFR BLD AUTO: 23.9 % (ref 35.8–46.3)
HGB BLD-MCNC: 8.3 G/DL (ref 11.7–15.4)
MAGNESIUM SERPL-MCNC: 2.1 MG/DL (ref 1.8–2.4)
MCH RBC QN AUTO: 33.1 PG (ref 26.1–32.9)
MCHC RBC AUTO-ENTMCNC: 34.7 G/DL (ref 31.4–35)
MCV RBC AUTO: 95.2 FL (ref 79.6–97.8)
NRBC # BLD: 0.01 K/UL (ref 0–0.2)
PLATELET # BLD AUTO: 37 K/UL (ref 150–450)
PLATELET COMMENTS,PCOM: ABNORMAL
PMV BLD AUTO: 10.2 FL (ref 10.8–14.1)
POTASSIUM SERPL-SCNC: 3.9 MMOL/L (ref 3.5–5.1)
PROT SERPL-MCNC: 6.2 G/DL (ref 6.3–8.2)
RBC # BLD AUTO: 2.51 M/UL (ref 4.05–5.25)
RBC MORPH BLD: ABNORMAL
SODIUM SERPL-SCNC: 139 MMOL/L (ref 136–145)
WBC # BLD AUTO: 0.4 K/UL (ref 4.3–11.1)
WBC MORPH BLD: ABNORMAL

## 2018-05-11 PROCEDURE — 80053 COMPREHEN METABOLIC PANEL: CPT | Performed by: INTERNAL MEDICINE

## 2018-05-11 PROCEDURE — 83735 ASSAY OF MAGNESIUM: CPT | Performed by: INTERNAL MEDICINE

## 2018-05-11 PROCEDURE — 86900 BLOOD TYPING SEROLOGIC ABO: CPT | Performed by: INTERNAL MEDICINE

## 2018-05-11 PROCEDURE — 77030003560 HC NDL HUBR BARD -A

## 2018-05-11 PROCEDURE — 36591 DRAW BLOOD OFF VENOUS DEVICE: CPT

## 2018-05-11 PROCEDURE — 86644 CMV ANTIBODY: CPT | Performed by: INTERNAL MEDICINE

## 2018-05-11 PROCEDURE — 86923 COMPATIBILITY TEST ELECTRIC: CPT | Performed by: INTERNAL MEDICINE

## 2018-05-11 PROCEDURE — 74011250636 HC RX REV CODE- 250/636: Performed by: INTERNAL MEDICINE

## 2018-05-11 PROCEDURE — 85025 COMPLETE CBC W/AUTO DIFF WBC: CPT | Performed by: INTERNAL MEDICINE

## 2018-05-11 RX ORDER — HEPARIN 100 UNIT/ML
500 SYRINGE INTRAVENOUS AS NEEDED
Status: COMPLETED | OUTPATIENT
Start: 2018-05-11 | End: 2018-05-11

## 2018-05-11 RX ORDER — SODIUM CHLORIDE 0.9 % (FLUSH) 0.9 %
10 SYRINGE (ML) INJECTION AS NEEDED
Status: COMPLETED | OUTPATIENT
Start: 2018-05-11 | End: 2018-05-11

## 2018-05-11 RX ADMIN — Medication 10 ML: at 17:55

## 2018-05-11 RX ADMIN — SODIUM CHLORIDE, PRESERVATIVE FREE 500 UNITS: 5 INJECTION INTRAVENOUS at 17:55

## 2018-05-11 NOTE — PROGRESS NOTES
Arrived to the [facility]. Lab results  reviewed with Dr. Laura Baltazar. Orders received for one unit prbc's tomorrow. Any issues or concerns during appointment: Yes. Ymm=743. Temp 99.8. Pt instructed to call MD with temp greater than or equal to 100.5. Pt's main complaint is fatigue and SOB today. She stated she had been outside today working in her garden. Patient aware of next infusion appointment on 05/12/18 (date) at 56 (time) [facility] infusion center. Discharged ambulatory.

## 2018-05-12 ENCOUNTER — HOSPITAL ENCOUNTER (OUTPATIENT)
Dept: INFUSION THERAPY | Age: 58
Discharge: HOME | End: 2018-05-12
Payer: COMMERCIAL

## 2018-05-12 VITALS
RESPIRATION RATE: 18 BRPM | TEMPERATURE: 98.9 F | DIASTOLIC BLOOD PRESSURE: 87 MMHG | HEART RATE: 83 BPM | OXYGEN SATURATION: 92 % | SYSTOLIC BLOOD PRESSURE: 132 MMHG

## 2018-05-12 PROCEDURE — 36430 TRANSFUSION BLD/BLD COMPNT: CPT

## 2018-05-12 PROCEDURE — 74011250637 HC RX REV CODE- 250/637: Performed by: INTERNAL MEDICINE

## 2018-05-12 PROCEDURE — P9040 RBC LEUKOREDUCED IRRADIATED: HCPCS | Performed by: INTERNAL MEDICINE

## 2018-05-12 PROCEDURE — 77030018667 ADMN ST IV BLD FENW -A

## 2018-05-12 PROCEDURE — 77030003560 HC NDL HUBR BARD -A

## 2018-05-12 PROCEDURE — 74011250636 HC RX REV CODE- 250/636: Performed by: INTERNAL MEDICINE

## 2018-05-12 RX ORDER — ACETAMINOPHEN 325 MG/1
650 TABLET ORAL ONCE
Status: COMPLETED | OUTPATIENT
Start: 2018-05-12 | End: 2018-05-12

## 2018-05-12 RX ORDER — SODIUM CHLORIDE 0.9 % (FLUSH) 0.9 %
10 SYRINGE (ML) INJECTION AS NEEDED
Status: DISCONTINUED | OUTPATIENT
Start: 2018-05-12 | End: 2018-05-16 | Stop reason: HOSPADM

## 2018-05-12 RX ORDER — HEPARIN 100 UNIT/ML
300-500 SYRINGE INTRAVENOUS AS NEEDED
Status: DISPENSED | OUTPATIENT
Start: 2018-05-12 | End: 2018-05-12

## 2018-05-12 RX ORDER — SODIUM CHLORIDE 9 MG/ML
250 INJECTION, SOLUTION INTRAVENOUS AS NEEDED
Status: DISPENSED | OUTPATIENT
Start: 2018-05-12 | End: 2018-05-12

## 2018-05-12 RX ORDER — DIPHENHYDRAMINE HCL 25 MG
25 CAPSULE ORAL
Status: DISPENSED | OUTPATIENT
Start: 2018-05-12 | End: 2018-05-12

## 2018-05-12 RX ADMIN — SODIUM CHLORIDE 250 ML: 900 INJECTION, SOLUTION INTRAVENOUS at 10:30

## 2018-05-12 RX ADMIN — ACETAMINOPHEN 650 MG: 325 TABLET ORAL at 10:18

## 2018-05-12 RX ADMIN — DIPHENHYDRAMINE HYDROCHLORIDE 25 MG: 25 CAPSULE ORAL at 10:18

## 2018-05-12 RX ADMIN — SODIUM CHLORIDE, PRESERVATIVE FREE 500 UNITS: 5 INJECTION INTRAVENOUS at 12:18

## 2018-05-12 NOTE — PROGRESS NOTES
Arrived to the [facility] ambulatory. 1unit PRBC's completed. Patient tolerated well. Any issues or concerns during appointment: no.  Patient aware of next infusion appointment on 5/15 at 0915. Discharged to home ambulatory.

## 2018-05-15 ENCOUNTER — PATIENT OUTREACH (OUTPATIENT)
Dept: CASE MANAGEMENT | Age: 58
End: 2018-05-15

## 2018-05-15 ENCOUNTER — HOSPITAL ENCOUNTER (OUTPATIENT)
Dept: INFUSION THERAPY | Age: 58
Discharge: HOME | End: 2018-05-15
Payer: COMMERCIAL

## 2018-05-15 ENCOUNTER — HOSPITAL ENCOUNTER (OUTPATIENT)
Dept: LAB | Age: 58
Discharge: HOME | End: 2018-05-15
Payer: COMMERCIAL

## 2018-05-15 DIAGNOSIS — C85.88 MARGINAL ZONE LYMPHOMA OF LYMPH NODES OF MULTIPLE SITES (HCC): Chronic | ICD-10-CM

## 2018-05-15 DIAGNOSIS — C82.09 GRADE I FOLLICULAR LYMPHOMA OF EXTRANODAL SITE EXCLUDING SPLEEN AND OTHER SOLID ORGANS (HCC): ICD-10-CM

## 2018-05-15 DIAGNOSIS — C85.88 MARGINAL ZONE LYMPHOMA OF LYMPH NODES OF MULTIPLE SITES (HCC): ICD-10-CM

## 2018-05-15 DIAGNOSIS — C83.39 DIFFUSE LARGE B-CELL LYMPHOMA OF SOLID ORGAN EXCLUDING SPLEEN (HCC): ICD-10-CM

## 2018-05-15 DIAGNOSIS — C82.09 GRADE I FOLLICULAR LYMPHOMA OF EXTRANODAL SITE EXCLUDING SPLEEN AND OTHER SOLID ORGANS (HCC): Chronic | ICD-10-CM

## 2018-05-15 LAB
ALBUMIN SERPL-MCNC: 3.8 G/DL (ref 3.5–5)
ALBUMIN/GLOB SERPL: 1.4 {RATIO} (ref 1.2–3.5)
ALP SERPL-CCNC: 93 U/L (ref 50–136)
ALT SERPL-CCNC: 42 U/L (ref 12–65)
ANION GAP SERPL CALC-SCNC: 7 MMOL/L (ref 7–16)
AST SERPL-CCNC: 18 U/L (ref 15–37)
BILIRUB SERPL-MCNC: 0.5 MG/DL (ref 0.2–1.1)
BUN SERPL-MCNC: 18 MG/DL (ref 6–23)
CALCIUM SERPL-MCNC: 9.3 MG/DL (ref 8.3–10.4)
CHLORIDE SERPL-SCNC: 107 MMOL/L (ref 98–107)
CO2 SERPL-SCNC: 29 MMOL/L (ref 21–32)
CREAT SERPL-MCNC: 0.84 MG/DL (ref 0.6–1)
DIFFERENTIAL METHOD BLD: ABNORMAL
ERYTHROCYTE [DISTWIDTH] IN BLOOD BY AUTOMATED COUNT: 17.5 % (ref 11.9–14.6)
GLOBULIN SER CALC-MCNC: 2.8 G/DL (ref 2.3–3.5)
GLUCOSE SERPL-MCNC: 116 MG/DL (ref 65–100)
HCT VFR BLD AUTO: 26.9 % (ref 35.8–46.3)
HGB BLD-MCNC: 9.3 G/DL (ref 11.7–15.4)
LDH SERPL L TO P-CCNC: 378 U/L (ref 100–190)
LYMPHOCYTES # BLD: 0.3 K/UL (ref 0.5–4.6)
LYMPHOCYTES NFR BLD MANUAL: 12 % (ref 16–44)
MAGNESIUM SERPL-MCNC: 2.1 MG/DL (ref 1.8–2.4)
MCH RBC QN AUTO: 33.6 PG (ref 26.1–32.9)
MCHC RBC AUTO-ENTMCNC: 34.6 G/DL (ref 31.4–35)
MCV RBC AUTO: 97.1 FL (ref 79.6–97.8)
MONOCYTES # BLD: 0.2 K/UL (ref 0.1–1.3)
MONOCYTES NFR BLD MANUAL: 8 % (ref 3–9)
MYELOCYTES NFR BLD MANUAL: 3 %
NEUTS BAND NFR BLD MANUAL: 7 % (ref 0–6)
NEUTS SEG # BLD: 2.4 K/UL (ref 1.7–8.2)
NEUTS SEG NFR BLD MANUAL: 70 % (ref 47–75)
NRBC # BLD: 0.03 K/UL (ref 0–0.2)
PLATELET # BLD AUTO: 58 K/UL (ref 150–450)
PLATELET COMMENTS,PCOM: ABNORMAL
PMV BLD AUTO: 11.1 FL (ref 10.8–14.1)
POTASSIUM SERPL-SCNC: 4 MMOL/L (ref 3.5–5.1)
PROT SERPL-MCNC: 6.6 G/DL (ref 6.3–8.2)
RBC # BLD AUTO: 2.77 M/UL (ref 4.05–5.25)
RBC MORPH BLD: ABNORMAL
SODIUM SERPL-SCNC: 143 MMOL/L (ref 136–145)
WBC # BLD AUTO: 2.9 K/UL (ref 4.3–11.1)
WBC MORPH BLD: ABNORMAL

## 2018-05-15 PROCEDURE — 85025 COMPLETE CBC W/AUTO DIFF WBC: CPT | Performed by: INTERNAL MEDICINE

## 2018-05-15 PROCEDURE — 83615 LACTATE (LD) (LDH) ENZYME: CPT | Performed by: INTERNAL MEDICINE

## 2018-05-15 PROCEDURE — 80053 COMPREHEN METABOLIC PANEL: CPT | Performed by: INTERNAL MEDICINE

## 2018-05-15 PROCEDURE — 83735 ASSAY OF MAGNESIUM: CPT | Performed by: INTERNAL MEDICINE

## 2018-05-15 NOTE — PROGRESS NOTES
Pt was seen and labs reviewed by Marisel Gustafson NP. Pt states she is feeling good. No replacements needed today. Pt to have labs replacements as needed, and will return to clinic on 5/23 prior to starting cycle 5.

## 2018-05-18 ENCOUNTER — HOSPITAL ENCOUNTER (OUTPATIENT)
Dept: INFUSION THERAPY | Age: 58
Discharge: HOME | End: 2018-05-18
Payer: COMMERCIAL

## 2018-05-18 VITALS
OXYGEN SATURATION: 97 % | SYSTOLIC BLOOD PRESSURE: 119 MMHG | DIASTOLIC BLOOD PRESSURE: 87 MMHG | TEMPERATURE: 99.1 F | WEIGHT: 287.6 LBS | BODY MASS INDEX: 52.6 KG/M2 | HEART RATE: 87 BPM | RESPIRATION RATE: 18 BRPM

## 2018-05-18 DIAGNOSIS — C85.88 MARGINAL ZONE LYMPHOMA OF LYMPH NODES OF MULTIPLE SITES (HCC): ICD-10-CM

## 2018-05-18 LAB
ALBUMIN SERPL-MCNC: 3.8 G/DL (ref 3.5–5)
ALBUMIN/GLOB SERPL: 1.5 {RATIO}
ALP SERPL-CCNC: 91 U/L (ref 50–136)
ALT SERPL-CCNC: 36 U/L (ref 12–65)
ANION GAP SERPL CALC-SCNC: 10 MMOL/L
AST SERPL-CCNC: 20 U/L (ref 15–37)
BILIRUB SERPL-MCNC: 0.4 MG/DL (ref 0.2–1.1)
BUN SERPL-MCNC: 15 MG/DL (ref 6–23)
CALCIUM SERPL-MCNC: 8.9 MG/DL (ref 8.3–10.4)
CHLORIDE SERPL-SCNC: 106 MMOL/L (ref 98–107)
CO2 SERPL-SCNC: 28 MMOL/L (ref 21–32)
CREAT SERPL-MCNC: 0.8 MG/DL (ref 0.6–1)
DIFFERENTIAL METHOD BLD: ABNORMAL
ERYTHROCYTE [DISTWIDTH] IN BLOOD BY AUTOMATED COUNT: 17.4 % (ref 11.9–14.6)
GLOBULIN SER CALC-MCNC: 2.5 G/DL
GLUCOSE SERPL-MCNC: 103 MG/DL (ref 65–100)
HCT VFR BLD AUTO: 25.1 % (ref 35.8–46.3)
HGB BLD-MCNC: 8.7 G/DL (ref 11.7–15.4)
LYMPHOCYTES # BLD: 0.1 K/UL (ref 0.5–4.6)
LYMPHOCYTES NFR BLD MANUAL: 2 % (ref 16–44)
MAGNESIUM SERPL-MCNC: 1.9 MG/DL (ref 1.8–2.4)
MCH RBC QN AUTO: 33.2 PG (ref 26.1–32.9)
MCHC RBC AUTO-ENTMCNC: 34.7 G/DL (ref 31.4–35)
MCV RBC AUTO: 95.8 FL (ref 79.6–97.8)
MONOCYTES # BLD: 0.2 K/UL (ref 0.1–1.3)
MONOCYTES NFR BLD MANUAL: 6 % (ref 3–9)
NEUTS BAND NFR BLD MANUAL: 2 % (ref 0–10)
NEUTS SEG # BLD: 3.3 K/UL (ref 1.7–8.2)
NEUTS SEG NFR BLD MANUAL: 90 % (ref 47–75)
NRBC # BLD: 0 K/UL (ref 0–0.2)
PLATELET # BLD AUTO: 80 K/UL (ref 150–450)
PLATELET COMMENTS,PCOM: ABNORMAL
PMV BLD AUTO: 11.6 FL (ref 10.8–14.1)
POTASSIUM SERPL-SCNC: 4.1 MMOL/L (ref 3.5–5.1)
PROT SERPL-MCNC: 6.3 G/DL (ref 6.3–8.2)
RBC # BLD AUTO: 2.62 M/UL (ref 4.05–5.25)
RBC MORPH BLD: ABNORMAL
SODIUM SERPL-SCNC: 144 MMOL/L (ref 136–145)
WBC # BLD AUTO: 3.6 K/UL (ref 4.3–11.1)
WBC MORPH BLD: ABNORMAL

## 2018-05-18 PROCEDURE — 36591 DRAW BLOOD OFF VENOUS DEVICE: CPT

## 2018-05-18 PROCEDURE — 80053 COMPREHEN METABOLIC PANEL: CPT | Performed by: INTERNAL MEDICINE

## 2018-05-18 PROCEDURE — 77030003560 HC NDL HUBR BARD -A

## 2018-05-18 PROCEDURE — 85025 COMPLETE CBC W/AUTO DIFF WBC: CPT | Performed by: INTERNAL MEDICINE

## 2018-05-18 PROCEDURE — 83735 ASSAY OF MAGNESIUM: CPT | Performed by: INTERNAL MEDICINE

## 2018-05-18 PROCEDURE — 74011250636 HC RX REV CODE- 250/636: Performed by: INTERNAL MEDICINE

## 2018-05-18 RX ORDER — HEPARIN 100 UNIT/ML
300-500 SYRINGE INTRAVENOUS AS NEEDED
Status: DISPENSED | OUTPATIENT
Start: 2018-05-18 | End: 2018-05-18

## 2018-05-18 RX ORDER — SODIUM CHLORIDE 0.9 % (FLUSH) 0.9 %
10 SYRINGE (ML) INJECTION AS NEEDED
Status: ACTIVE | OUTPATIENT
Start: 2018-05-18 | End: 2018-05-18

## 2018-05-18 RX ADMIN — Medication 10 ML: at 08:30

## 2018-05-18 RX ADMIN — SODIUM CHLORIDE, PRESERVATIVE FREE 500 UNITS: 5 INJECTION INTRAVENOUS at 08:30

## 2018-05-18 NOTE — PROGRESS NOTES
Arrived to the [facility]. No blood products or other replacements needed today. Any issues or concerns during appointment: Yes. Pt c/o vaginal yeast infection. Denies burning or stinging when urinating. She is taking Diflucan as directed. Spoke with Grace Valladares NP with instructions to tell pt to use Monistat suppository OTC and call if no improvement. Patient aware of next infusion appointment on 05/22/18 (date) at [phone] (time). Discharged ambulatory.

## 2018-05-21 ENCOUNTER — APPOINTMENT (OUTPATIENT)
Dept: INFUSION THERAPY | Age: 58
End: 2018-05-21
Payer: COMMERCIAL

## 2018-05-21 ENCOUNTER — HOSPITAL ENCOUNTER (OUTPATIENT)
Dept: INFUSION THERAPY | Age: 58
End: 2018-05-21
Payer: COMMERCIAL

## 2018-05-22 ENCOUNTER — HOSPITAL ENCOUNTER (OUTPATIENT)
Dept: INFUSION THERAPY | Age: 58
Discharge: HOME | End: 2018-05-22
Payer: COMMERCIAL

## 2018-05-22 VITALS
BODY MASS INDEX: 52.27 KG/M2 | DIASTOLIC BLOOD PRESSURE: 75 MMHG | WEIGHT: 285.8 LBS | HEART RATE: 82 BPM | RESPIRATION RATE: 18 BRPM | SYSTOLIC BLOOD PRESSURE: 127 MMHG | OXYGEN SATURATION: 100 % | TEMPERATURE: 98.2 F

## 2018-05-22 DIAGNOSIS — C85.88 MARGINAL ZONE LYMPHOMA OF LYMPH NODES OF MULTIPLE SITES (HCC): ICD-10-CM

## 2018-05-22 LAB
ALBUMIN SERPL-MCNC: 4 G/DL (ref 3.5–5)
ALBUMIN/GLOB SERPL: 1.4 {RATIO} (ref 1.2–3.5)
ALP SERPL-CCNC: 88 U/L (ref 50–136)
ALT SERPL-CCNC: 41 U/L (ref 12–65)
ANION GAP SERPL CALC-SCNC: 4 MMOL/L (ref 7–16)
AST SERPL-CCNC: 23 U/L (ref 15–37)
BASOPHILS # BLD: 0.1 K/UL (ref 0–0.2)
BASOPHILS NFR BLD: 1 % (ref 0–2)
BILIRUB SERPL-MCNC: 0.4 MG/DL (ref 0.2–1.1)
BUN SERPL-MCNC: 18 MG/DL (ref 6–23)
CALCIUM SERPL-MCNC: 9.4 MG/DL (ref 8.3–10.4)
CHLORIDE SERPL-SCNC: 106 MMOL/L (ref 98–107)
CO2 SERPL-SCNC: 30 MMOL/L (ref 21–32)
CREAT SERPL-MCNC: 0.7 MG/DL (ref 0.6–1)
DIFFERENTIAL METHOD BLD: ABNORMAL
EOSINOPHIL # BLD: 0.1 K/UL (ref 0–0.8)
EOSINOPHIL NFR BLD: 1 % (ref 0.5–7.8)
ERYTHROCYTE [DISTWIDTH] IN BLOOD BY AUTOMATED COUNT: 18.7 % (ref 11.9–14.6)
GLOBULIN SER CALC-MCNC: 2.9 G/DL (ref 2.3–3.5)
GLUCOSE SERPL-MCNC: 89 MG/DL (ref 65–100)
HCT VFR BLD AUTO: 26.2 % (ref 35.8–46.3)
HGB BLD-MCNC: 9.1 G/DL (ref 11.7–15.4)
LYMPHOCYTES # BLD: 0.3 K/UL (ref 0.5–4.6)
LYMPHOCYTES NFR BLD: 6 % (ref 13–44)
MAGNESIUM SERPL-MCNC: 2.2 MG/DL (ref 1.8–2.4)
MCH RBC QN AUTO: 34 PG (ref 26.1–32.9)
MCHC RBC AUTO-ENTMCNC: 34.7 G/DL (ref 31.4–35)
MCV RBC AUTO: 97.8 FL (ref 79.6–97.8)
MONOCYTES # BLD: 0.6 K/UL (ref 0.1–1.3)
MONOCYTES NFR BLD: 11 % (ref 4–12)
NEUTS SEG # BLD: 4 K/UL (ref 1.7–8.2)
NEUTS SEG NFR BLD: 81 % (ref 43–78)
NRBC # BLD: 0 K/UL (ref 0–0.2)
NRBC BLD-RTO: 0 PER 100 WBC (ref 0–2)
PLATELET # BLD AUTO: 106 K/UL (ref 150–450)
PMV BLD AUTO: 11.4 FL (ref 10.8–14.1)
POTASSIUM SERPL-SCNC: 4.2 MMOL/L (ref 3.5–5.1)
PROT SERPL-MCNC: 6.9 G/DL (ref 6.3–8.2)
RBC # BLD AUTO: 2.68 M/UL (ref 4.05–5.25)
SODIUM SERPL-SCNC: 140 MMOL/L (ref 136–145)
WBC # BLD AUTO: 4.9 K/UL (ref 4.3–11.1)

## 2018-05-22 PROCEDURE — 77030003560 HC NDL HUBR BARD -A

## 2018-05-22 PROCEDURE — 36591 DRAW BLOOD OFF VENOUS DEVICE: CPT

## 2018-05-22 PROCEDURE — 80053 COMPREHEN METABOLIC PANEL: CPT | Performed by: INTERNAL MEDICINE

## 2018-05-22 PROCEDURE — 85025 COMPLETE CBC W/AUTO DIFF WBC: CPT | Performed by: INTERNAL MEDICINE

## 2018-05-22 PROCEDURE — 83735 ASSAY OF MAGNESIUM: CPT | Performed by: INTERNAL MEDICINE

## 2018-05-22 PROCEDURE — 74011250636 HC RX REV CODE- 250/636: Performed by: INTERNAL MEDICINE

## 2018-05-22 RX ORDER — HEPARIN 100 UNIT/ML
300-500 SYRINGE INTRAVENOUS AS NEEDED
Status: DISPENSED | OUTPATIENT
Start: 2018-05-22 | End: 2018-05-23

## 2018-05-22 RX ORDER — SODIUM CHLORIDE 0.9 % (FLUSH) 0.9 %
10 SYRINGE (ML) INJECTION AS NEEDED
Status: DISCONTINUED | OUTPATIENT
Start: 2018-05-22 | End: 2018-05-26 | Stop reason: HOSPADM

## 2018-05-22 RX ADMIN — SODIUM CHLORIDE, PRESERVATIVE FREE 500 UNITS: 5 INJECTION INTRAVENOUS at 14:28

## 2018-05-22 RX ADMIN — Medication 10 ML: at 14:28

## 2018-05-22 NOTE — PROGRESS NOTES
Arrived to the [facility]. Replacement of blood, platelets, electrolytes and IV fluids not required today. Any issues or concerns during appointment: NO.  Patient aware of next infusion appointment on 05/24/18 (date) at [address] [place] [address] (time). Discharged ambulatory.

## 2018-05-23 ENCOUNTER — PATIENT OUTREACH (OUTPATIENT)
Dept: CASE MANAGEMENT | Age: 58
End: 2018-05-23

## 2018-05-23 ENCOUNTER — HOSPITAL ENCOUNTER (OUTPATIENT)
Dept: LAB | Age: 58
Discharge: HOME | End: 2018-05-23
Payer: COMMERCIAL

## 2018-05-23 DIAGNOSIS — C83.39 DIFFUSE LARGE B-CELL LYMPHOMA OF SOLID ORGAN EXCLUDING SPLEEN (HCC): ICD-10-CM

## 2018-05-23 DIAGNOSIS — C85.88 MARGINAL ZONE LYMPHOMA OF LYMPH NODES OF MULTIPLE SITES (HCC): ICD-10-CM

## 2018-05-23 DIAGNOSIS — C85.88 MARGINAL ZONE LYMPHOMA OF LYMPH NODES OF MULTIPLE SITES (HCC): Chronic | ICD-10-CM

## 2018-05-23 DIAGNOSIS — C82.09 GRADE I FOLLICULAR LYMPHOMA OF EXTRANODAL SITE EXCLUDING SPLEEN AND OTHER SOLID ORGANS (HCC): Chronic | ICD-10-CM

## 2018-05-23 DIAGNOSIS — C82.09 GRADE I FOLLICULAR LYMPHOMA OF EXTRANODAL SITE EXCLUDING SPLEEN AND OTHER SOLID ORGANS (HCC): ICD-10-CM

## 2018-05-23 LAB
ALBUMIN SERPL-MCNC: 3.9 G/DL (ref 3.5–5)
ALBUMIN/GLOB SERPL: 1.5 {RATIO} (ref 1.2–3.5)
ALP SERPL-CCNC: 87 U/L (ref 50–136)
ALT SERPL-CCNC: 40 U/L (ref 12–65)
ANION GAP SERPL CALC-SCNC: 7 MMOL/L (ref 7–16)
AST SERPL-CCNC: 22 U/L (ref 15–37)
BASOPHILS # BLD: 0 K/UL (ref 0–0.2)
BASOPHILS NFR BLD: 1 % (ref 0–2)
BILIRUB SERPL-MCNC: 0.5 MG/DL (ref 0.2–1.1)
BUN SERPL-MCNC: 19 MG/DL (ref 6–23)
CALCIUM SERPL-MCNC: 9.3 MG/DL (ref 8.3–10.4)
CHLORIDE SERPL-SCNC: 105 MMOL/L (ref 98–107)
CO2 SERPL-SCNC: 29 MMOL/L (ref 21–32)
CREAT SERPL-MCNC: 0.75 MG/DL (ref 0.6–1)
DIFFERENTIAL METHOD BLD: ABNORMAL
EOSINOPHIL # BLD: 0.1 K/UL (ref 0–0.8)
EOSINOPHIL NFR BLD: 2 % (ref 0.5–7.8)
ERYTHROCYTE [DISTWIDTH] IN BLOOD BY AUTOMATED COUNT: 18.9 % (ref 11.9–14.6)
GLOBULIN SER CALC-MCNC: 2.6 G/DL (ref 2.3–3.5)
GLUCOSE SERPL-MCNC: 115 MG/DL (ref 65–100)
HCT VFR BLD AUTO: 25.1 % (ref 35.8–46.3)
HGB BLD-MCNC: 8.7 G/DL (ref 11.7–15.4)
LDH SERPL L TO P-CCNC: 299 U/L (ref 100–190)
LYMPHOCYTES # BLD: 0.3 K/UL (ref 0.5–4.6)
LYMPHOCYTES NFR BLD: 7 % (ref 13–44)
MAGNESIUM SERPL-MCNC: 2 MG/DL (ref 1.8–2.4)
MCH RBC QN AUTO: 33.9 PG (ref 26.1–32.9)
MCHC RBC AUTO-ENTMCNC: 34.7 G/DL (ref 31.4–35)
MCV RBC AUTO: 97.7 FL (ref 79.6–97.8)
MONOCYTES # BLD: 0.4 K/UL (ref 0.1–1.3)
MONOCYTES NFR BLD: 10 % (ref 4–12)
NEUTS SEG # BLD: 3.7 K/UL (ref 1.7–8.2)
NEUTS SEG NFR BLD: 82 % (ref 43–78)
NRBC # BLD: 0.01 K/UL (ref 0–0.2)
PLATELET # BLD AUTO: 94 K/UL (ref 150–450)
PMV BLD AUTO: 11.3 FL (ref 10.8–14.1)
POTASSIUM SERPL-SCNC: 4.1 MMOL/L (ref 3.5–5.1)
PROT SERPL-MCNC: 6.5 G/DL (ref 6.3–8.2)
RBC # BLD AUTO: 2.57 M/UL (ref 4.05–5.25)
SODIUM SERPL-SCNC: 141 MMOL/L (ref 136–145)
WBC # BLD AUTO: 4.6 K/UL (ref 4.3–11.1)

## 2018-05-23 PROCEDURE — 83615 LACTATE (LD) (LDH) ENZYME: CPT | Performed by: INTERNAL MEDICINE

## 2018-05-23 PROCEDURE — 85025 COMPLETE CBC W/AUTO DIFF WBC: CPT | Performed by: INTERNAL MEDICINE

## 2018-05-23 PROCEDURE — 80053 COMPREHEN METABOLIC PANEL: CPT | Performed by: INTERNAL MEDICINE

## 2018-05-23 PROCEDURE — 83735 ASSAY OF MAGNESIUM: CPT | Performed by: INTERNAL MEDICINE

## 2018-05-23 NOTE — PROGRESS NOTES
Pt was seen and labs reviewed by Dr. Cecilia Gonzalez. Will proceed with cycle 5 R-CHOP tomorrow despite platelets 61A. Pt will have labs/replacements twice weekly until count recovery, and she will return to clinic in 4 weeks. Will plan to repeat PET and ECHO mid July.

## 2018-05-24 ENCOUNTER — HOSPITAL ENCOUNTER (OUTPATIENT)
Dept: INFUSION THERAPY | Age: 58
Discharge: HOME | End: 2018-05-24
Payer: COMMERCIAL

## 2018-05-24 VITALS
TEMPERATURE: 98.6 F | OXYGEN SATURATION: 100 % | RESPIRATION RATE: 16 BRPM | DIASTOLIC BLOOD PRESSURE: 58 MMHG | HEART RATE: 75 BPM | BODY MASS INDEX: 52.6 KG/M2 | WEIGHT: 287.6 LBS | SYSTOLIC BLOOD PRESSURE: 128 MMHG

## 2018-05-24 DIAGNOSIS — C85.88 MARGINAL ZONE LYMPHOMA OF LYMPH NODES OF MULTIPLE SITES (HCC): ICD-10-CM

## 2018-05-24 DIAGNOSIS — C83.39 DIFFUSE LARGE B-CELL LYMPHOMA OF SOLID ORGAN EXCLUDING SPLEEN (HCC): ICD-10-CM

## 2018-05-24 DIAGNOSIS — C82.09 GRADE I FOLLICULAR LYMPHOMA OF EXTRANODAL SITE EXCLUDING SPLEEN AND OTHER SOLID ORGANS (HCC): ICD-10-CM

## 2018-05-24 PROCEDURE — 96367 TX/PROPH/DG ADDL SEQ IV INF: CPT

## 2018-05-24 PROCEDURE — 96413 CHEMO IV INFUSION 1 HR: CPT

## 2018-05-24 PROCEDURE — 74011250636 HC RX REV CODE- 250/636: Performed by: INTERNAL MEDICINE

## 2018-05-24 PROCEDURE — 77030003560 HC NDL HUBR BARD -A

## 2018-05-24 PROCEDURE — 96375 TX/PRO/DX INJ NEW DRUG ADDON: CPT

## 2018-05-24 PROCEDURE — 96411 CHEMO IV PUSH ADDL DRUG: CPT

## 2018-05-24 PROCEDURE — 96415 CHEMO IV INFUSION ADDL HR: CPT

## 2018-05-24 PROCEDURE — 74011250636 HC RX REV CODE- 250/636

## 2018-05-24 PROCEDURE — 96417 CHEMO IV INFUS EACH ADDL SEQ: CPT

## 2018-05-24 PROCEDURE — 74011250637 HC RX REV CODE- 250/637: Performed by: INTERNAL MEDICINE

## 2018-05-24 PROCEDURE — 74011000258 HC RX REV CODE- 258: Performed by: INTERNAL MEDICINE

## 2018-05-24 RX ORDER — SODIUM CHLORIDE 0.9 % (FLUSH) 0.9 %
10 SYRINGE (ML) INJECTION AS NEEDED
Status: ACTIVE | OUTPATIENT
Start: 2018-05-24 | End: 2018-05-24

## 2018-05-24 RX ORDER — HEPARIN 100 UNIT/ML
300-500 SYRINGE INTRAVENOUS AS NEEDED
Status: DISPENSED | OUTPATIENT
Start: 2018-05-24 | End: 2018-05-24

## 2018-05-24 RX ORDER — DIPHENHYDRAMINE HYDROCHLORIDE 50 MG/ML
50 INJECTION, SOLUTION INTRAMUSCULAR; INTRAVENOUS ONCE
Status: COMPLETED | OUTPATIENT
Start: 2018-05-24 | End: 2018-05-24

## 2018-05-24 RX ORDER — DOXORUBICIN HYDROCHLORIDE 2 MG/ML
50 INJECTION, SOLUTION INTRAVENOUS ONCE
Status: COMPLETED | OUTPATIENT
Start: 2018-05-24 | End: 2018-05-24

## 2018-05-24 RX ORDER — ONDANSETRON 2 MG/ML
8 INJECTION INTRAMUSCULAR; INTRAVENOUS ONCE
Status: COMPLETED | OUTPATIENT
Start: 2018-05-24 | End: 2018-05-24

## 2018-05-24 RX ORDER — ACETAMINOPHEN 325 MG/1
650 TABLET ORAL ONCE
Status: COMPLETED | OUTPATIENT
Start: 2018-05-24 | End: 2018-05-24

## 2018-05-24 RX ADMIN — ONDANSETRON 8 MG: 2 INJECTION INTRAMUSCULAR; INTRAVENOUS at 11:14

## 2018-05-24 RX ADMIN — VINCRISTINE SULFATE 2 MG: 1 INJECTION, SOLUTION INTRAVENOUS at 12:18

## 2018-05-24 RX ADMIN — Medication 10 ML: at 13:38

## 2018-05-24 RX ADMIN — CYCLOPHOSPHAMIDE 1755 MG: 1 INJECTION, POWDER, FOR SOLUTION INTRAVENOUS; ORAL at 12:30

## 2018-05-24 RX ADMIN — DOXORUBICIN HYDROCHLORIDE 117 MG: 2 INJECTION, SOLUTION INTRAVENOUS at 12:10

## 2018-05-24 RX ADMIN — DIPHENHYDRAMINE HYDROCHLORIDE 50 MG: 50 INJECTION, SOLUTION INTRAMUSCULAR; INTRAVENOUS at 07:55

## 2018-05-24 RX ADMIN — RITUXIMAB 878 MG: 10 INJECTION, SOLUTION INTRAVENOUS at 08:16

## 2018-05-24 RX ADMIN — DEXAMETHASONE SODIUM PHOSPHATE 12 MG: 4 INJECTION, SOLUTION INTRAMUSCULAR; INTRAVENOUS at 11:18

## 2018-05-24 RX ADMIN — SODIUM CHLORIDE 500 ML: 900 INJECTION, SOLUTION INTRAVENOUS at 11:11

## 2018-05-24 RX ADMIN — SODIUM CHLORIDE 150 MG: 900 INJECTION, SOLUTION INTRAVENOUS at 11:39

## 2018-05-24 RX ADMIN — SODIUM CHLORIDE 500 ML: 900 INJECTION, SOLUTION INTRAVENOUS at 07:40

## 2018-05-24 RX ADMIN — Medication 10 ML: at 07:40

## 2018-05-24 RX ADMIN — SODIUM CHLORIDE, PRESERVATIVE FREE 500 UNITS: 5 INJECTION INTRAVENOUS at 13:39

## 2018-05-24 RX ADMIN — ACETAMINOPHEN 650 MG: 325 TABLET, FILM COATED ORAL at 07:54

## 2018-05-24 NOTE — PROGRESS NOTES
Pt arrived ambulatory to [facility] with port previously accessed with dressing dry and intact and with good blood return. NS infusing. Pre meds given as ordered. rituxan infusing. Pt tolerated well. Chemo infused. Port flushed and packed with heparin and de accessed. Pt aware of next appt on 5/25/18 at 1700. Pt discharged ambulatory.

## 2018-05-25 ENCOUNTER — HOSPITAL ENCOUNTER (OUTPATIENT)
Dept: LAB | Age: 58
Discharge: HOME | End: 2018-05-25
Payer: COMMERCIAL

## 2018-05-25 ENCOUNTER — HOSPITAL ENCOUNTER (OUTPATIENT)
Dept: INFUSION THERAPY | Age: 58
Discharge: HOME | End: 2018-05-25
Payer: COMMERCIAL

## 2018-05-25 VITALS
OXYGEN SATURATION: 94 % | HEART RATE: 92 BPM | DIASTOLIC BLOOD PRESSURE: 65 MMHG | TEMPERATURE: 98.8 F | SYSTOLIC BLOOD PRESSURE: 124 MMHG | BODY MASS INDEX: 52.49 KG/M2 | WEIGHT: 287 LBS | RESPIRATION RATE: 16 BRPM

## 2018-05-25 DIAGNOSIS — C82.09 GRADE I FOLLICULAR LYMPHOMA OF EXTRANODAL SITE EXCLUDING SPLEEN AND OTHER SOLID ORGANS (HCC): ICD-10-CM

## 2018-05-25 DIAGNOSIS — C83.39 DIFFUSE LARGE B-CELL LYMPHOMA OF SOLID ORGAN EXCLUDING SPLEEN (HCC): ICD-10-CM

## 2018-05-25 DIAGNOSIS — C85.88 MARGINAL ZONE LYMPHOMA OF LYMPH NODES OF MULTIPLE SITES (HCC): ICD-10-CM

## 2018-05-25 LAB
ABO + RH BLD: NORMAL
BASOPHILS # BLD: 0 K/UL (ref 0–0.2)
BASOPHILS NFR BLD: 0 % (ref 0–2)
BLOOD GROUP ANTIBODIES SERPL: NORMAL
DIFFERENTIAL METHOD BLD: ABNORMAL
EOSINOPHIL # BLD: 0 K/UL (ref 0–0.8)
EOSINOPHIL NFR BLD: 0 % (ref 0.5–7.8)
ERYTHROCYTE [DISTWIDTH] IN BLOOD BY AUTOMATED COUNT: 19.1 % (ref 11.9–14.6)
HCT VFR BLD AUTO: 25.1 % (ref 35.8–46.3)
HGB BLD-MCNC: 8.8 G/DL (ref 11.7–15.4)
LYMPHOCYTES # BLD: 0.2 K/UL (ref 0.5–4.6)
LYMPHOCYTES NFR BLD: 2 % (ref 13–44)
MCH RBC QN AUTO: 33.8 PG (ref 26.1–32.9)
MCHC RBC AUTO-ENTMCNC: 35.1 G/DL (ref 31.4–35)
MCV RBC AUTO: 96.5 FL (ref 79.6–97.8)
MONOCYTES # BLD: 0.4 K/UL (ref 0.1–1.3)
MONOCYTES NFR BLD: 5 % (ref 4–12)
NEUTS SEG # BLD: 8 K/UL (ref 1.7–8.2)
NEUTS SEG NFR BLD: 93 % (ref 43–78)
NRBC # BLD: 0.01 K/UL (ref 0–0.2)
PLATELET # BLD AUTO: 103 K/UL (ref 150–450)
PMV BLD AUTO: 10.6 FL (ref 10.8–14.1)
RBC # BLD AUTO: 2.6 M/UL (ref 4.05–5.25)
SPECIMEN EXP DATE BLD: NORMAL
WBC # BLD AUTO: 8.6 K/UL (ref 4.3–11.1)

## 2018-05-25 PROCEDURE — 86900 BLOOD TYPING SEROLOGIC ABO: CPT | Performed by: INTERNAL MEDICINE

## 2018-05-25 PROCEDURE — 85025 COMPLETE CBC W/AUTO DIFF WBC: CPT | Performed by: INTERNAL MEDICINE

## 2018-05-25 PROCEDURE — 96372 THER/PROPH/DIAG INJ SC/IM: CPT

## 2018-05-25 PROCEDURE — 74011250636 HC RX REV CODE- 250/636: Performed by: INTERNAL MEDICINE

## 2018-05-25 RX ADMIN — PEGFILGRASTIM 6 MG: 6 INJECTION SUBCUTANEOUS at 17:20

## 2018-05-25 NOTE — PROGRESS NOTES
Arrived to the [facility]. Neulasta injection completed. Patient tolerated well. Any issues or concerns during appointment: NO.  Patient aware of next infusion appointment on 05/29/18 (date) at [address] [place] [address] (time). Discharged ambulatory.

## 2018-05-29 ENCOUNTER — HOSPITAL ENCOUNTER (OUTPATIENT)
Dept: INFUSION THERAPY | Age: 58
Discharge: HOME | End: 2018-05-29
Payer: COMMERCIAL

## 2018-05-29 VITALS
DIASTOLIC BLOOD PRESSURE: 62 MMHG | SYSTOLIC BLOOD PRESSURE: 115 MMHG | HEART RATE: 78 BPM | OXYGEN SATURATION: 95 % | BODY MASS INDEX: 53.22 KG/M2 | WEIGHT: 291 LBS | RESPIRATION RATE: 18 BRPM | TEMPERATURE: 98.2 F

## 2018-05-29 LAB
ALBUMIN SERPL-MCNC: 3.6 G/DL (ref 3.5–5)
ALBUMIN/GLOB SERPL: 1.5 {RATIO} (ref 1.2–3.5)
ALP SERPL-CCNC: 81 U/L (ref 50–136)
ALT SERPL-CCNC: 53 U/L (ref 12–65)
ANION GAP SERPL CALC-SCNC: 6 MMOL/L (ref 7–16)
AST SERPL-CCNC: 20 U/L (ref 15–37)
BASOPHILS # BLD: 0 K/UL (ref 0–0.2)
BASOPHILS NFR BLD: 1 % (ref 0–2)
BILIRUB SERPL-MCNC: 0.4 MG/DL (ref 0.2–1.1)
BUN SERPL-MCNC: 22 MG/DL (ref 6–23)
CALCIUM SERPL-MCNC: 8.6 MG/DL (ref 8.3–10.4)
CHLORIDE SERPL-SCNC: 108 MMOL/L (ref 98–107)
CO2 SERPL-SCNC: 31 MMOL/L (ref 21–32)
CREAT SERPL-MCNC: 0.65 MG/DL (ref 0.6–1)
DIFFERENTIAL METHOD BLD: ABNORMAL
EOSINOPHIL # BLD: 0 K/UL (ref 0–0.8)
EOSINOPHIL NFR BLD: 2 % (ref 0.5–7.8)
ERYTHROCYTE [DISTWIDTH] IN BLOOD BY AUTOMATED COUNT: 18.2 % (ref 11.9–14.6)
GLOBULIN SER CALC-MCNC: 2.4 G/DL (ref 2.3–3.5)
GLUCOSE SERPL-MCNC: 107 MG/DL (ref 65–100)
HCT VFR BLD AUTO: 21.8 % (ref 35.8–46.3)
HGB BLD-MCNC: 7.5 G/DL (ref 11.7–15.4)
LYMPHOCYTES # BLD: 0.1 K/UL (ref 0.5–4.6)
LYMPHOCYTES NFR BLD: 6 % (ref 13–44)
MAGNESIUM SERPL-MCNC: 2 MG/DL (ref 1.8–2.4)
MCH RBC QN AUTO: 33.9 PG (ref 26.1–32.9)
MCHC RBC AUTO-ENTMCNC: 34.4 G/DL (ref 31.4–35)
MCV RBC AUTO: 98.6 FL (ref 79.6–97.8)
MONOCYTES # BLD: 0.2 K/UL (ref 0.1–1.3)
MONOCYTES NFR BLD: 8 % (ref 4–12)
NEUTS SEG # BLD: 2.1 K/UL (ref 1.7–8.2)
NEUTS SEG NFR BLD: 83 % (ref 43–78)
NRBC # BLD: 0 K/UL (ref 0–0.2)
PLATELET # BLD AUTO: 53 K/UL (ref 150–450)
PLATELET COMMENTS,PCOM: ABNORMAL
PMV BLD AUTO: 10.8 FL (ref 10.8–14.1)
POTASSIUM SERPL-SCNC: 3.5 MMOL/L (ref 3.5–5.1)
PROT SERPL-MCNC: 6 G/DL (ref 6.3–8.2)
RBC # BLD AUTO: 2.21 M/UL (ref 4.05–5.25)
RBC MORPH BLD: ABNORMAL
SODIUM SERPL-SCNC: 145 MMOL/L (ref 136–145)
WBC # BLD AUTO: 2.4 K/UL (ref 4.3–11.1)
WBC MORPH BLD: ABNORMAL

## 2018-05-29 PROCEDURE — 85025 COMPLETE CBC W/AUTO DIFF WBC: CPT | Performed by: INTERNAL MEDICINE

## 2018-05-29 PROCEDURE — 77030003560 HC NDL HUBR BARD -A

## 2018-05-29 PROCEDURE — 77030018667 ADMN ST IV BLD FENW -A

## 2018-05-29 PROCEDURE — 86900 BLOOD TYPING SEROLOGIC ABO: CPT | Performed by: INTERNAL MEDICINE

## 2018-05-29 PROCEDURE — 86644 CMV ANTIBODY: CPT | Performed by: INTERNAL MEDICINE

## 2018-05-29 PROCEDURE — 74011250636 HC RX REV CODE- 250/636: Performed by: INTERNAL MEDICINE

## 2018-05-29 PROCEDURE — 83735 ASSAY OF MAGNESIUM: CPT | Performed by: INTERNAL MEDICINE

## 2018-05-29 PROCEDURE — 74011250637 HC RX REV CODE- 250/637: Performed by: INTERNAL MEDICINE

## 2018-05-29 PROCEDURE — 86923 COMPATIBILITY TEST ELECTRIC: CPT | Performed by: INTERNAL MEDICINE

## 2018-05-29 PROCEDURE — 36430 TRANSFUSION BLD/BLD COMPNT: CPT

## 2018-05-29 PROCEDURE — 80053 COMPREHEN METABOLIC PANEL: CPT | Performed by: INTERNAL MEDICINE

## 2018-05-29 PROCEDURE — P9040 RBC LEUKOREDUCED IRRADIATED: HCPCS | Performed by: INTERNAL MEDICINE

## 2018-05-29 RX ORDER — SODIUM CHLORIDE 9 MG/ML
250 INJECTION, SOLUTION INTRAVENOUS AS NEEDED
Status: DISCONTINUED | OUTPATIENT
Start: 2018-05-29 | End: 2018-06-02 | Stop reason: HOSPADM

## 2018-05-29 RX ORDER — ACETAMINOPHEN 325 MG/1
650 TABLET ORAL
Status: DISCONTINUED | OUTPATIENT
Start: 2018-05-29 | End: 2018-06-02 | Stop reason: HOSPADM

## 2018-05-29 RX ORDER — HEPARIN 100 UNIT/ML
500 SYRINGE INTRAVENOUS AS NEEDED
Status: DISPENSED | OUTPATIENT
Start: 2018-05-29 | End: 2018-05-30

## 2018-05-29 RX ORDER — DIPHENHYDRAMINE HCL 25 MG
25 CAPSULE ORAL
Status: DISCONTINUED | OUTPATIENT
Start: 2018-05-29 | End: 2018-06-02 | Stop reason: HOSPADM

## 2018-05-29 RX ADMIN — SODIUM CHLORIDE: 900 INJECTION, SOLUTION INTRAVENOUS at 10:49

## 2018-05-29 RX ADMIN — ACETAMINOPHEN 650 MG: 325 TABLET, FILM COATED ORAL at 10:43

## 2018-05-29 RX ADMIN — DIPHENHYDRAMINE HYDROCHLORIDE 25 MG: 25 CAPSULE ORAL at 10:43

## 2018-05-29 NOTE — PROGRESS NOTES
Massage THERAPY: Daily Note    Referring Physician: Jhonatan Singh MD  Medical/Referring Diagnosis: Diffuse large B-cell lymphoma, unspecified site [C83.30]   Precautions/Allergies: Review of patient's allergies indicates no known allergies. SUBJECTIVE:  Present Symptoms: neuropathy in both legs, more so  L foot   Pre-Treatment Pain: 6/10   Neuropathy Scale:  6/10  Past Medical History:    Ms. 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 She also has no past medical history of Aneurysm ([address] Arrhythmia; Asthma; Autoimmune disease ([address] CAD (coronary artery disease); Chronic kidney disease; Coagulation defects; COPD; Diabetes ([address] Difficult intubation; GERD (gastroesophageal reflux disease); Heart failure ([address] Liver disease; Malignant hyperthermia due to anesthesia; Nausea & vomiting; Pseudocholinesterase deficiency; Psychiatric disorder; PUD (peptic ulcer disease); Seizures ([address] Stroke [facility]); or Unspecified sleep apnea. Ms. Janny Weathers  has a past surgical history that includes pr anesth,achilles tendon surg (Left, 02/10/2011); hx cholecystectomy (1983); hx vascular access; hx knee replacement (Left, 2013); and hx knee replacement (Right, 2012). Current Medications:       Current Outpatient Prescriptions:     magic mouthwash (ALEXSANDER) susp, Take 10 mL by mouth every four (4) hours as needed. , Disp: 2 Bottle, Rfl: 2    nystatin (MYCOSTATIN) powder, Apply  to affected area three (3) times daily. , Disp: 60 g, Rfl: 2    HYDROcodone-acetaminophen (NORCO) 5-325 mg per tablet, Take 1 Tab by mouth every six (6) hours as needed for Pain. Max Daily Amount: 4 Tabs., Disp: 30 Tab, Rfl: 0    pregabalin (LYRICA) 100 mg capsule, Take 1 Cap by mouth three (3) times daily. Max Daily Amount: 300 mg., Disp: 90 Cap, Rfl: 3    fluconazole (DIFLUCAN) 150 mg tablet, Take 1 Tab by mouth daily. FDA advises cautious prescribing of oral fluconazole in pregnancy. , Disp: 30 Tab, Rfl: 2    levothyroxine (SYNTHROID) 175 mcg tablet, Take 1 Tab by mouth Daily (before breakfast). , Disp: 30 Tab, Rfl: 5    allopurinol (ZYLOPRIM) 300 mg tablet, Take 1 Tab by mouth daily. , Disp: 30 Tab, Rfl: 1    predniSONE (DELTASONE) 20 mg tablet, Take 5 Tabs by mouth daily. , Disp: , Rfl: 0    omeprazole (PRILOSEC) 20 mg capsule, TAKE 1 CAPSULE DAILY, Disp: 90 Cap, Rfl: 2    predniSONE (DELTASONE) 20 mg tablet, Take 5 Tabs by mouth daily (with breakfast). Take on days 1-5 of your chemo cycle, Disp: 25 Tab, Rfl: 5    loratadine (CLARITIN) 10 mg tablet, Take 1 Tab by mouth daily. , Disp: 30 Tab, Rfl: 0    ondansetron hcl (ZOFRAN, AS HYDROCHLORIDE,) 8 mg tablet, Take 1 Tab by mouth every eight (8) hours as needed for Nausea (or vomiting). , Disp: 90 Tab, Rfl: 2    HYDROcodone-homatropine (HYCODAN) 5-1.5 mg/5 mL (5 mL) syrup, Take 5 mL by mouth four (4) times daily as needed. Max Daily Amount: 20 mL., Disp: 400 mL, Rfl: 0    albuterol (PROAIR HFA) 90 mcg/actuation inhaler, Take 2 Puffs by inhalation every four (4) hours as needed for Wheezing., Disp: 1 Inhaler, Rfl: 2    ondansetron hcl (ZOFRAN, AS HYDROCHLORIDE,) 4 mg tablet, Take 4 mg by mouth every eight (8) hours as needed for Nausea., Disp: , Rfl:     fluconazole (DIFLUCAN) 150 mg tablet, Take 150 mg by mouth daily. FDA advises cautious prescribing of oral fluconazole in pregnancy. , Disp: , Rfl:     acyclovir (ZOVIRAX) 400 mg tablet, Take 1 Tab by mouth two (2) times a day., Disp: 60 Tab, Rfl: 3    magnesium oxide (MAG-OX) 400 mg tablet, Take 1 Tab by mouth two (2) times a day., Disp: 60 Tab, Rfl: 1   fluticasone (FLONASE) 50 mcg/actuation nasal spray, 2 Sprays by Both Nostrils route daily. , Disp: 1 Bottle, Rfl: 1    lidocaine-prilocaine (EMLA) topical cream, Apply  to affected area as needed for Pain. Apply to port site 45-60 minutes prior to lab appt or infusion. , Disp: 30 g, Rfl: 0    promethazine (PHENERGAN) 25 mg tablet, Take 25 mg by mouth every six (6) hours as needed for Nausea. Unsure of dose, Disp: , Rfl:     Current Facility-Administered Medications:     0.9% sodium chloride infusion 250 mL, 250 mL, IntraVENous, PRN, Anu Gutierrez MD, Last Rate: 15 mL/hr at 05/29/18 1049    acetaminophen (TYLENOL) tablet 650 mg, 650 mg, Oral, Q6H PRN, Anu Gutierrez MD, 650 mg at 05/29/18 1043    diphenhydrAMINE (BENADRYL) capsule 25 mg, 25 mg, Oral, Q6H PRN, Anu Gutierrez MD, 25 mg at 05/29/18 1043    heparin (porcine) pf 500 Units, 500 Units, InterCATHeter, PRN, Anu Gutierrez MD       OBJECTIVE/ASSESSMENT:  Observations of Patient:  Patient finishing transfusion treatment, alert  Response To Treatment: nueropathy significantly\" decreased per patient   Post-Treatment Pain: 2/10   Neuropathy Scale:  2/10  TREATMENT:    (In addition to Assessment/Re-Assessment sessions the following treatments were rendered)  Treatment Provided:  []  Soft tissue massage  [x]  Healing Touch   Location: lower leg(s) bilaterally, ankle(s) bilaterally and feet  Patient Position: Seated  Time: 20 minutes    PLAN OF CARE:    []  I will follow up with this patient as needed. [x]  No follow up visit necessary.     Thank you for this referral.  Larry Leonard LMT

## 2018-05-29 NOTE — PROGRESS NOTES
Pt. Discharged ambulatory accompanied by self. Tolerated blood well. No distress noted. To return to Infusions on 6/1/18.

## 2018-05-30 LAB
ABO + RH BLD: NORMAL
BLD PROD TYP BPU: NORMAL
BLD PROD TYP BPU: NORMAL
BLOOD BANK CMNT PATIENT-IMP: NORMAL
BLOOD GROUP ANTIBODIES SERPL: NORMAL
BPU ID: NORMAL
BPU ID: NORMAL
CROSSMATCH RESULT,%XM: NORMAL
CROSSMATCH RESULT,%XM: NORMAL
SPECIMEN EXP DATE BLD: NORMAL
STATUS OF UNIT,%ST: NORMAL
STATUS OF UNIT,%ST: NORMAL
UNIT DIVISION, %UDIV: 0
UNIT DIVISION, %UDIV: 0

## 2018-06-01 ENCOUNTER — HOSPITAL ENCOUNTER (OUTPATIENT)
Dept: INFUSION THERAPY | Age: 58
Discharge: HOME | End: 2018-06-01
Payer: COMMERCIAL

## 2018-06-01 VITALS
HEART RATE: 89 BPM | RESPIRATION RATE: 18 BRPM | WEIGHT: 287.4 LBS | SYSTOLIC BLOOD PRESSURE: 143 MMHG | TEMPERATURE: 98.5 F | BODY MASS INDEX: 52.57 KG/M2 | DIASTOLIC BLOOD PRESSURE: 94 MMHG | OXYGEN SATURATION: 96 %

## 2018-06-01 DIAGNOSIS — C85.88 MARGINAL ZONE LYMPHOMA OF LYMPH NODES OF MULTIPLE SITES (HCC): ICD-10-CM

## 2018-06-01 LAB
ALBUMIN SERPL-MCNC: 3.7 G/DL (ref 3.5–5)
ALBUMIN/GLOB SERPL: 1.4 {RATIO} (ref 1.2–3.5)
ALP SERPL-CCNC: 94 U/L (ref 50–136)
ALT SERPL-CCNC: 51 U/L (ref 12–65)
ANION GAP SERPL CALC-SCNC: 6 MMOL/L (ref 7–16)
AST SERPL-CCNC: 24 U/L (ref 15–37)
BASOPHILS # BLD: 0 K/UL (ref 0–0.2)
BASOPHILS NFR BLD MANUAL: 2 % (ref 0–2)
BILIRUB SERPL-MCNC: 0.6 MG/DL (ref 0.2–1.1)
BUN SERPL-MCNC: 13 MG/DL (ref 6–23)
CALCIUM SERPL-MCNC: 8.9 MG/DL (ref 8.3–10.4)
CHLORIDE SERPL-SCNC: 104 MMOL/L (ref 98–107)
CO2 SERPL-SCNC: 30 MMOL/L (ref 21–32)
CREAT SERPL-MCNC: 0.71 MG/DL (ref 0.6–1)
DIFFERENTIAL METHOD BLD: ABNORMAL
EOSINOPHIL # BLD: 0.1 K/UL (ref 0–0.8)
EOSINOPHIL NFR BLD MANUAL: 9 % (ref 1–8)
ERYTHROCYTE [DISTWIDTH] IN BLOOD BY AUTOMATED COUNT: 16.6 % (ref 11.9–14.6)
GLOBULIN SER CALC-MCNC: 2.7 G/DL (ref 2.3–3.5)
GLUCOSE SERPL-MCNC: 107 MG/DL (ref 65–100)
HCT VFR BLD AUTO: 26.8 % (ref 35.8–46.3)
HGB BLD-MCNC: 9.4 G/DL (ref 11.7–15.4)
LYMPHOCYTES # BLD: 0.2 K/UL (ref 0.5–4.6)
LYMPHOCYTES NFR BLD MANUAL: 30 % (ref 16–44)
MAGNESIUM SERPL-MCNC: 2 MG/DL (ref 1.8–2.4)
MCH RBC QN AUTO: 32.8 PG (ref 26.1–32.9)
MCHC RBC AUTO-ENTMCNC: 35.1 G/DL (ref 31.4–35)
MCV RBC AUTO: 93.4 FL (ref 79.6–97.8)
MONOCYTES # BLD: 0.2 K/UL (ref 0.1–1.3)
MONOCYTES NFR BLD MANUAL: 30 % (ref 3–9)
NEUTS BAND NFR BLD MANUAL: 7 % (ref 0–6)
NEUTS SEG # BLD: 0.1 K/UL (ref 1.7–8.2)
NEUTS SEG NFR BLD MANUAL: 22 % (ref 47–75)
NRBC # BLD: 0.04 K/UL (ref 0–0.2)
PLATELET # BLD AUTO: 31 K/UL (ref 150–450)
PLATELET COMMENTS,PCOM: ABNORMAL
PMV BLD AUTO: 11.3 FL (ref 10.8–14.1)
POTASSIUM SERPL-SCNC: 4.2 MMOL/L (ref 3.5–5.1)
PROT SERPL-MCNC: 6.4 G/DL (ref 6.3–8.2)
RBC # BLD AUTO: 2.87 M/UL (ref 4.05–5.25)
RBC MORPH BLD: ABNORMAL
SODIUM SERPL-SCNC: 140 MMOL/L (ref 136–145)
WBC # BLD AUTO: 0.6 K/UL (ref 4.3–11.1)
WBC MORPH BLD: ABNORMAL

## 2018-06-01 PROCEDURE — 80053 COMPREHEN METABOLIC PANEL: CPT | Performed by: INTERNAL MEDICINE

## 2018-06-01 PROCEDURE — 83735 ASSAY OF MAGNESIUM: CPT | Performed by: INTERNAL MEDICINE

## 2018-06-01 PROCEDURE — 77030003560 HC NDL HUBR BARD -A

## 2018-06-01 PROCEDURE — 74011250636 HC RX REV CODE- 250/636: Performed by: INTERNAL MEDICINE

## 2018-06-01 PROCEDURE — 85025 COMPLETE CBC W/AUTO DIFF WBC: CPT | Performed by: INTERNAL MEDICINE

## 2018-06-01 PROCEDURE — 36591 DRAW BLOOD OFF VENOUS DEVICE: CPT

## 2018-06-01 RX ORDER — HEPARIN 100 UNIT/ML
500 SYRINGE INTRAVENOUS AS NEEDED
Status: DISCONTINUED | OUTPATIENT
Start: 2018-06-01 | End: 2018-06-05 | Stop reason: HOSPADM

## 2018-06-01 RX ORDER — SODIUM CHLORIDE 0.9 % (FLUSH) 0.9 %
10 SYRINGE (ML) INJECTION AS NEEDED
Status: DISCONTINUED | OUTPATIENT
Start: 2018-06-01 | End: 2018-06-05 | Stop reason: HOSPADM

## 2018-06-01 RX ADMIN — SODIUM CHLORIDE, PRESERVATIVE FREE 500 UNITS: 5 INJECTION INTRAVENOUS at 08:06

## 2018-06-01 RX ADMIN — Medication 10 ML: at 08:06

## 2018-06-01 NOTE — PROGRESS NOTES
Replacements not required   Any issues or concerns during appointment: no  Patient aware of next infusion appointment on 6/5/18 at [facility]  Discharged ambulatory

## 2018-06-05 ENCOUNTER — HOSPITAL ENCOUNTER (OUTPATIENT)
Dept: INFUSION THERAPY | Age: 58
Discharge: HOME | End: 2018-06-05
Payer: COMMERCIAL

## 2018-06-05 VITALS
SYSTOLIC BLOOD PRESSURE: 143 MMHG | OXYGEN SATURATION: 96 % | RESPIRATION RATE: 18 BRPM | WEIGHT: 286.6 LBS | DIASTOLIC BLOOD PRESSURE: 69 MMHG | TEMPERATURE: 99 F | HEART RATE: 83 BPM | BODY MASS INDEX: 52.42 KG/M2

## 2018-06-05 LAB
ALBUMIN SERPL-MCNC: 3.8 G/DL (ref 3.5–5)
ALBUMIN/GLOB SERPL: 1.3 {RATIO} (ref 1.2–3.5)
ALP SERPL-CCNC: 95 U/L (ref 50–136)
ALT SERPL-CCNC: 39 U/L (ref 12–65)
ANION GAP SERPL CALC-SCNC: 6 MMOL/L (ref 7–16)
AST SERPL-CCNC: 19 U/L (ref 15–37)
BASOPHILS # BLD: 0.1 K/UL (ref 0–0.2)
BASOPHILS NFR BLD MANUAL: 2 % (ref 0–2)
BILIRUB SERPL-MCNC: 0.5 MG/DL (ref 0.2–1.1)
BUN SERPL-MCNC: 21 MG/DL (ref 6–23)
CALCIUM SERPL-MCNC: 9.2 MG/DL (ref 8.3–10.4)
CHLORIDE SERPL-SCNC: 104 MMOL/L (ref 98–107)
CO2 SERPL-SCNC: 29 MMOL/L (ref 21–32)
CREAT SERPL-MCNC: 0.83 MG/DL (ref 0.6–1)
DIFFERENTIAL METHOD BLD: ABNORMAL
ERYTHROCYTE [DISTWIDTH] IN BLOOD BY AUTOMATED COUNT: 17.3 % (ref 11.9–14.6)
GLOBULIN SER CALC-MCNC: 2.9 G/DL (ref 2.3–3.5)
GLUCOSE SERPL-MCNC: 121 MG/DL (ref 65–100)
HCT VFR BLD AUTO: 26.9 % (ref 35.8–46.3)
HGB BLD-MCNC: 9.3 G/DL (ref 11.7–15.4)
LYMPHOCYTES # BLD: 0.2 K/UL (ref 0.5–4.6)
LYMPHOCYTES NFR BLD MANUAL: 6 % (ref 16–44)
MAGNESIUM SERPL-MCNC: 2.1 MG/DL (ref 1.8–2.4)
MCH RBC QN AUTO: 32.9 PG (ref 26.1–32.9)
MCHC RBC AUTO-ENTMCNC: 34.6 G/DL (ref 31.4–35)
MCV RBC AUTO: 95.1 FL (ref 79.6–97.8)
METAMYELOCYTES NFR BLD MANUAL: 2 %
MONOCYTES # BLD: 0.3 K/UL (ref 0.1–1.3)
MONOCYTES NFR BLD MANUAL: 8 % (ref 3–9)
NEUTS BAND NFR BLD MANUAL: 1 % (ref 0–6)
NEUTS SEG # BLD: 2.9 K/UL (ref 1.7–8.2)
NEUTS SEG NFR BLD MANUAL: 81 % (ref 47–75)
NRBC # BLD: 0.02 K/UL (ref 0–0.2)
PLATELET # BLD AUTO: 64 K/UL (ref 150–450)
PLATELET COMMENTS,PCOM: ABNORMAL
PMV BLD AUTO: 12 FL (ref 10.8–14.1)
POTASSIUM SERPL-SCNC: 4.1 MMOL/L (ref 3.5–5.1)
PROT SERPL-MCNC: 6.7 G/DL (ref 6.3–8.2)
RBC # BLD AUTO: 2.83 M/UL (ref 4.05–5.25)
RBC MORPH BLD: ABNORMAL
SODIUM SERPL-SCNC: 139 MMOL/L (ref 136–145)
WBC # BLD AUTO: 3.5 K/UL (ref 4.3–11.1)
WBC MORPH BLD: ABNORMAL

## 2018-06-05 PROCEDURE — 77030003560 HC NDL HUBR BARD -A

## 2018-06-05 PROCEDURE — 83735 ASSAY OF MAGNESIUM: CPT | Performed by: INTERNAL MEDICINE

## 2018-06-05 PROCEDURE — 36591 DRAW BLOOD OFF VENOUS DEVICE: CPT

## 2018-06-05 PROCEDURE — 85025 COMPLETE CBC W/AUTO DIFF WBC: CPT | Performed by: INTERNAL MEDICINE

## 2018-06-05 PROCEDURE — 74011250636 HC RX REV CODE- 250/636: Performed by: INTERNAL MEDICINE

## 2018-06-05 PROCEDURE — 80053 COMPREHEN METABOLIC PANEL: CPT | Performed by: INTERNAL MEDICINE

## 2018-06-05 RX ORDER — SODIUM CHLORIDE 0.9 % (FLUSH) 0.9 %
10 SYRINGE (ML) INJECTION AS NEEDED
Status: ACTIVE | OUTPATIENT
Start: 2018-06-05 | End: 2018-06-05

## 2018-06-05 RX ORDER — HEPARIN 100 UNIT/ML
500 SYRINGE INTRAVENOUS AS NEEDED
Status: DISPENSED | OUTPATIENT
Start: 2018-06-05 | End: 2018-06-05

## 2018-06-05 RX ADMIN — Medication 10 ML: at 08:10

## 2018-06-05 RX ADMIN — SODIUM CHLORIDE, PRESERVATIVE FREE 500 UNITS: 5 INJECTION INTRAVENOUS at 08:10

## 2018-06-05 NOTE — PROGRESS NOTES
Pt arrived ambulatory today at [phone], to have labs & replacements. No replacements needed. Pt tolerated without difficulty. Patient discharged via ambulatory accompanied by self. Instructed to notify physician of any problems, questions or concerns. Allowed opportunity for patient/family to ask questions. Verbalized understanding. Next appointment is June 8 at [facility]  with Cristal 62Porfirio.

## 2018-06-08 ENCOUNTER — HOSPITAL ENCOUNTER (OUTPATIENT)
Dept: INFUSION THERAPY | Age: 58
Discharge: HOME | End: 2018-06-08
Payer: COMMERCIAL

## 2018-06-08 VITALS
BODY MASS INDEX: 52.75 KG/M2 | HEART RATE: 82 BPM | WEIGHT: 288.4 LBS | OXYGEN SATURATION: 98 % | RESPIRATION RATE: 18 BRPM | DIASTOLIC BLOOD PRESSURE: 78 MMHG | TEMPERATURE: 98.4 F | SYSTOLIC BLOOD PRESSURE: 138 MMHG

## 2018-06-08 LAB
ALBUMIN SERPL-MCNC: 3.8 G/DL (ref 3.5–5)
ALBUMIN/GLOB SERPL: 1.5 {RATIO} (ref 1.2–3.5)
ALP SERPL-CCNC: 90 U/L (ref 50–136)
ALT SERPL-CCNC: 34 U/L (ref 12–65)
ANION GAP SERPL CALC-SCNC: 7 MMOL/L (ref 7–16)
AST SERPL-CCNC: 20 U/L (ref 15–37)
BASOPHILS # BLD: 0 K/UL (ref 0–0.2)
BASOPHILS NFR BLD: 1 % (ref 0–2)
BILIRUB SERPL-MCNC: 0.3 MG/DL (ref 0.2–1.1)
BUN SERPL-MCNC: 22 MG/DL (ref 6–23)
CALCIUM SERPL-MCNC: 9.1 MG/DL (ref 8.3–10.4)
CHLORIDE SERPL-SCNC: 104 MMOL/L (ref 98–107)
CO2 SERPL-SCNC: 29 MMOL/L (ref 21–32)
CREAT SERPL-MCNC: 0.91 MG/DL (ref 0.6–1)
DIFFERENTIAL METHOD BLD: ABNORMAL
EOSINOPHIL # BLD: 0.1 K/UL (ref 0–0.8)
EOSINOPHIL NFR BLD: 2 % (ref 0.5–7.8)
ERYTHROCYTE [DISTWIDTH] IN BLOOD BY AUTOMATED COUNT: 17.8 % (ref 11.9–14.6)
GLOBULIN SER CALC-MCNC: 2.6 G/DL (ref 2.3–3.5)
GLUCOSE SERPL-MCNC: 126 MG/DL (ref 65–100)
HCT VFR BLD AUTO: 25.6 % (ref 35.8–46.3)
HGB BLD-MCNC: 8.9 G/DL (ref 11.7–15.4)
LYMPHOCYTES # BLD: 0.3 K/UL (ref 0.5–4.6)
LYMPHOCYTES NFR BLD: 8 % (ref 13–44)
MAGNESIUM SERPL-MCNC: 2.1 MG/DL (ref 1.8–2.4)
MCH RBC QN AUTO: 33.1 PG (ref 26.1–32.9)
MCHC RBC AUTO-ENTMCNC: 34.8 G/DL (ref 31.4–35)
MCV RBC AUTO: 95.2 FL (ref 79.6–97.8)
MONOCYTES # BLD: 0.4 K/UL (ref 0.1–1.3)
MONOCYTES NFR BLD: 10 % (ref 4–12)
NEUTS SEG # BLD: 3.2 K/UL (ref 1.7–8.2)
NEUTS SEG NFR BLD: 80 % (ref 43–78)
NRBC # BLD: 0 K/UL (ref 0–0.2)
PLATELET # BLD AUTO: 87 K/UL (ref 150–450)
PMV BLD AUTO: 11.2 FL (ref 10.8–14.1)
POTASSIUM SERPL-SCNC: 4.1 MMOL/L (ref 3.5–5.1)
PROT SERPL-MCNC: 6.4 G/DL (ref 6.3–8.2)
RBC # BLD AUTO: 2.69 M/UL (ref 4.05–5.25)
SODIUM SERPL-SCNC: 140 MMOL/L (ref 136–145)
WBC # BLD AUTO: 4 K/UL (ref 4.3–11.1)

## 2018-06-08 PROCEDURE — 77030003560 HC NDL HUBR BARD -A

## 2018-06-08 PROCEDURE — 85025 COMPLETE CBC W/AUTO DIFF WBC: CPT | Performed by: INTERNAL MEDICINE

## 2018-06-08 PROCEDURE — 83735 ASSAY OF MAGNESIUM: CPT | Performed by: INTERNAL MEDICINE

## 2018-06-08 PROCEDURE — 96523 IRRIG DRUG DELIVERY DEVICE: CPT

## 2018-06-08 PROCEDURE — 80053 COMPREHEN METABOLIC PANEL: CPT | Performed by: INTERNAL MEDICINE

## 2018-06-08 PROCEDURE — 74011250636 HC RX REV CODE- 250/636: Performed by: INTERNAL MEDICINE

## 2018-06-08 RX ORDER — HEPARIN 100 UNIT/ML
500 SYRINGE INTRAVENOUS AS NEEDED
Status: DISPENSED | OUTPATIENT
Start: 2018-06-08 | End: 2018-06-08

## 2018-06-08 RX ORDER — SODIUM CHLORIDE 0.9 % (FLUSH) 0.9 %
10 SYRINGE (ML) INJECTION AS NEEDED
Status: DISCONTINUED | OUTPATIENT
Start: 2018-06-08 | End: 2018-06-12 | Stop reason: HOSPADM

## 2018-06-08 RX ADMIN — SODIUM CHLORIDE, PRESERVATIVE FREE 500 UNITS: 5 INJECTION INTRAVENOUS at 08:03

## 2018-06-08 RX ADMIN — Medication 10 ML: at 08:03

## 2018-06-08 NOTE — PROGRESS NOTES
Arrived to the [facility]. Labs completed.  Patient did not require replacements   Any issues or concerns during appointment, no  Patient aware of next infusion appointment on 6/12/18 at [facility]  Discharged ambulatory

## 2018-06-12 ENCOUNTER — HOSPITAL ENCOUNTER (OUTPATIENT)
Dept: INFUSION THERAPY | Age: 58
Discharge: HOME | End: 2018-06-12
Payer: COMMERCIAL

## 2018-06-12 VITALS
OXYGEN SATURATION: 95 % | TEMPERATURE: 98.5 F | RESPIRATION RATE: 18 BRPM | DIASTOLIC BLOOD PRESSURE: 80 MMHG | SYSTOLIC BLOOD PRESSURE: 137 MMHG | HEART RATE: 85 BPM | WEIGHT: 289.4 LBS | BODY MASS INDEX: 52.93 KG/M2

## 2018-06-12 DIAGNOSIS — C85.88 MARGINAL ZONE LYMPHOMA OF LYMPH NODES OF MULTIPLE SITES (HCC): ICD-10-CM

## 2018-06-12 LAB
ALBUMIN SERPL-MCNC: 3.9 G/DL (ref 3.5–5)
ALBUMIN/GLOB SERPL: 1.6 {RATIO} (ref 1.2–3.5)
ALP SERPL-CCNC: 86 U/L (ref 50–136)
ALT SERPL-CCNC: 36 U/L (ref 12–65)
ANION GAP SERPL CALC-SCNC: 8 MMOL/L (ref 7–16)
AST SERPL-CCNC: 23 U/L (ref 15–37)
BASOPHILS # BLD: 0 K/UL (ref 0–0.2)
BASOPHILS NFR BLD: 1 % (ref 0–2)
BILIRUB SERPL-MCNC: 0.5 MG/DL (ref 0.2–1.1)
BUN SERPL-MCNC: 20 MG/DL (ref 6–23)
CALCIUM SERPL-MCNC: 8.9 MG/DL (ref 8.3–10.4)
CHLORIDE SERPL-SCNC: 106 MMOL/L (ref 98–107)
CO2 SERPL-SCNC: 27 MMOL/L (ref 21–32)
CREAT SERPL-MCNC: 0.84 MG/DL (ref 0.6–1)
DIFFERENTIAL METHOD BLD: ABNORMAL
EOSINOPHIL # BLD: 0.1 K/UL (ref 0–0.8)
EOSINOPHIL NFR BLD: 1 % (ref 0.5–7.8)
ERYTHROCYTE [DISTWIDTH] IN BLOOD BY AUTOMATED COUNT: 18.9 % (ref 11.9–14.6)
GLOBULIN SER CALC-MCNC: 2.5 G/DL (ref 2.3–3.5)
GLUCOSE SERPL-MCNC: 115 MG/DL (ref 65–100)
HCT VFR BLD AUTO: 25.2 % (ref 35.8–46.3)
HGB BLD-MCNC: 8.8 G/DL (ref 11.7–15.4)
LYMPHOCYTES # BLD: 0.3 K/UL (ref 0.5–4.6)
LYMPHOCYTES NFR BLD: 6 % (ref 13–44)
MAGNESIUM SERPL-MCNC: 2 MG/DL (ref 1.8–2.4)
MCH RBC QN AUTO: 33.3 PG (ref 26.1–32.9)
MCHC RBC AUTO-ENTMCNC: 34.9 G/DL (ref 31.4–35)
MCV RBC AUTO: 95.5 FL (ref 79.6–97.8)
MONOCYTES # BLD: 0.6 K/UL (ref 0.1–1.3)
MONOCYTES NFR BLD: 13 % (ref 4–12)
NEUTS SEG # BLD: 3.6 K/UL (ref 1.7–8.2)
NEUTS SEG NFR BLD: 80 % (ref 43–78)
NRBC # BLD: 0.02 K/UL (ref 0–0.2)
PLATELET # BLD AUTO: 102 K/UL (ref 150–450)
PMV BLD AUTO: 11.3 FL (ref 10.8–14.1)
POTASSIUM SERPL-SCNC: 3.9 MMOL/L (ref 3.5–5.1)
PROT SERPL-MCNC: 6.4 G/DL (ref 6.3–8.2)
RBC # BLD AUTO: 2.64 M/UL (ref 4.05–5.25)
SODIUM SERPL-SCNC: 141 MMOL/L (ref 136–145)
WBC # BLD AUTO: 4.5 K/UL (ref 4.3–11.1)

## 2018-06-12 PROCEDURE — 80053 COMPREHEN METABOLIC PANEL: CPT | Performed by: INTERNAL MEDICINE

## 2018-06-12 PROCEDURE — 85025 COMPLETE CBC W/AUTO DIFF WBC: CPT | Performed by: INTERNAL MEDICINE

## 2018-06-12 PROCEDURE — 83735 ASSAY OF MAGNESIUM: CPT | Performed by: INTERNAL MEDICINE

## 2018-06-12 PROCEDURE — 36591 DRAW BLOOD OFF VENOUS DEVICE: CPT

## 2018-06-12 PROCEDURE — 77030003560 HC NDL HUBR BARD -A

## 2018-06-12 PROCEDURE — 74011250636 HC RX REV CODE- 250/636: Performed by: INTERNAL MEDICINE

## 2018-06-12 RX ORDER — HEPARIN 100 UNIT/ML
300-500 SYRINGE INTRAVENOUS AS NEEDED
Status: CANCELLED | OUTPATIENT
Start: 2018-06-15

## 2018-06-12 RX ORDER — SODIUM CHLORIDE 0.9 % (FLUSH) 0.9 %
10 SYRINGE (ML) INJECTION AS NEEDED
Status: CANCELLED | OUTPATIENT
Start: 2018-06-15

## 2018-06-12 RX ORDER — SODIUM CHLORIDE 0.9 % (FLUSH) 0.9 %
10 SYRINGE (ML) INJECTION AS NEEDED
Status: ACTIVE | OUTPATIENT
Start: 2018-06-12 | End: 2018-06-12

## 2018-06-12 RX ORDER — HEPARIN 100 UNIT/ML
300-500 SYRINGE INTRAVENOUS AS NEEDED
Status: DISPENSED | OUTPATIENT
Start: 2018-06-12 | End: 2018-06-12

## 2018-06-12 RX ADMIN — SODIUM CHLORIDE, PRESERVATIVE FREE 500 UNITS: 5 INJECTION INTRAVENOUS at 08:04

## 2018-06-12 RX ADMIN — Medication 10 ML: at 08:04

## 2018-06-12 NOTE — PROGRESS NOTES
Arrived to the [facility]. Labs reviewed. Patient tolerated well. No replacements needed. Port flushed and de-accessed. Any issues or concerns during appointment: None. Patient aware no future infusion appointments. Patient to follow up with physician. Patient thinking about making an appointment for 6/15. She will call navigator and discuss with her since patient planning on going out of town. Discharged ambulatory in stable condition.

## 2018-06-15 ENCOUNTER — HOSPITAL ENCOUNTER (OUTPATIENT)
Dept: INFUSION THERAPY | Age: 58
Discharge: HOME | End: 2018-06-15
Payer: COMMERCIAL

## 2018-06-15 VITALS
HEART RATE: 96 BPM | TEMPERATURE: 98.8 F | DIASTOLIC BLOOD PRESSURE: 80 MMHG | SYSTOLIC BLOOD PRESSURE: 107 MMHG | BODY MASS INDEX: 52.79 KG/M2 | RESPIRATION RATE: 18 BRPM | WEIGHT: 288.6 LBS | OXYGEN SATURATION: 99 %

## 2018-06-15 DIAGNOSIS — C85.88 MARGINAL ZONE LYMPHOMA OF LYMPH NODES OF MULTIPLE SITES (HCC): ICD-10-CM

## 2018-06-15 LAB
ALBUMIN SERPL-MCNC: 3.8 G/DL (ref 3.5–5)
ALBUMIN/GLOB SERPL: 1.5 {RATIO} (ref 1.2–3.5)
ALP SERPL-CCNC: 90 U/L (ref 50–136)
ALT SERPL-CCNC: 40 U/L (ref 12–65)
ANION GAP SERPL CALC-SCNC: 5 MMOL/L (ref 7–16)
APPEARANCE UR: CLEAR
AST SERPL-CCNC: 27 U/L (ref 15–37)
BASOPHILS # BLD: 0 K/UL (ref 0–0.2)
BASOPHILS NFR BLD: 1 % (ref 0–2)
BILIRUB SERPL-MCNC: 0.6 MG/DL (ref 0.2–1.1)
BILIRUB UR QL: NEGATIVE
BUN SERPL-MCNC: 23 MG/DL (ref 6–23)
CALCIUM SERPL-MCNC: 9 MG/DL (ref 8.3–10.4)
CHLORIDE SERPL-SCNC: 106 MMOL/L (ref 98–107)
CHOLEST SERPL-MCNC: 185 MG/DL
CO2 SERPL-SCNC: 30 MMOL/L (ref 21–32)
COLOR UR: YELLOW
CREAT SERPL-MCNC: 0.88 MG/DL (ref 0.6–1)
DIFFERENTIAL METHOD BLD: ABNORMAL
EOSINOPHIL # BLD: 0 K/UL (ref 0–0.8)
EOSINOPHIL NFR BLD: 1 % (ref 0.5–7.8)
ERYTHROCYTE [DISTWIDTH] IN BLOOD BY AUTOMATED COUNT: 19.6 % (ref 11.9–14.6)
GLOBULIN SER CALC-MCNC: 2.5 G/DL (ref 2.3–3.5)
GLUCOSE SERPL-MCNC: 122 MG/DL (ref 65–100)
GLUCOSE UR STRIP.AUTO-MCNC: NEGATIVE MG/DL
HCT VFR BLD AUTO: 24.9 % (ref 35.8–46.3)
HDLC SERPL-MCNC: 48 MG/DL (ref 40–60)
HDLC SERPL: 3.9 {RATIO}
HGB BLD-MCNC: 8.7 G/DL (ref 11.7–15.4)
HGB UR QL STRIP: NEGATIVE
KETONES UR QL STRIP.AUTO: NEGATIVE MG/DL
LDLC SERPL CALC-MCNC: 93 MG/DL
LEUKOCYTE ESTERASE UR QL STRIP.AUTO: NEGATIVE
LIPID PROFILE,FLP: ABNORMAL
LYMPHOCYTES # BLD: 0.3 K/UL (ref 0.5–4.6)
LYMPHOCYTES NFR BLD: 7 % (ref 13–44)
MAGNESIUM SERPL-MCNC: 2.1 MG/DL (ref 1.8–2.4)
MCH RBC QN AUTO: 33.9 PG (ref 26.1–32.9)
MCHC RBC AUTO-ENTMCNC: 34.9 G/DL (ref 31.4–35)
MCV RBC AUTO: 96.9 FL (ref 79.6–97.8)
MONOCYTES # BLD: 0.6 K/UL (ref 0.1–1.3)
MONOCYTES NFR BLD: 14 % (ref 4–12)
NEUTS SEG # BLD: 3.4 K/UL (ref 1.7–8.2)
NEUTS SEG NFR BLD: 78 % (ref 43–78)
NITRITE UR QL STRIP.AUTO: NEGATIVE
NRBC # BLD: 0.01 K/UL (ref 0–0.2)
PH UR STRIP: 6.5 [PH] (ref 5–9)
PLATELET # BLD AUTO: 93 K/UL (ref 150–450)
PMV BLD AUTO: 11.3 FL (ref 10.8–14.1)
POTASSIUM SERPL-SCNC: 4 MMOL/L (ref 3.5–5.1)
PROT SERPL-MCNC: 6.3 G/DL (ref 6.3–8.2)
PROT UR STRIP-MCNC: NEGATIVE MG/DL
RBC # BLD AUTO: 2.57 M/UL (ref 4.05–5.25)
SODIUM SERPL-SCNC: 141 MMOL/L (ref 136–145)
SP GR UR REFRACTOMETRY: 1.02 (ref 1–1.02)
TRIGL SERPL-MCNC: 220 MG/DL (ref 35–150)
TSH SERPL DL<=0.005 MIU/L-ACNC: 1.06 UIU/ML (ref 0.36–3.74)
UROBILINOGEN UR QL STRIP.AUTO: 0.2 EU/DL (ref 0.2–1)
VLDLC SERPL CALC-MCNC: 44 MG/DL (ref 6–23)
WBC # BLD AUTO: 4.3 K/UL (ref 4.3–11.1)

## 2018-06-15 PROCEDURE — 83735 ASSAY OF MAGNESIUM: CPT | Performed by: FAMILY MEDICINE

## 2018-06-15 PROCEDURE — 36591 DRAW BLOOD OFF VENOUS DEVICE: CPT

## 2018-06-15 PROCEDURE — 80061 LIPID PANEL: CPT | Performed by: FAMILY MEDICINE

## 2018-06-15 PROCEDURE — 80053 COMPREHEN METABOLIC PANEL: CPT | Performed by: FAMILY MEDICINE

## 2018-06-15 PROCEDURE — 85025 COMPLETE CBC W/AUTO DIFF WBC: CPT | Performed by: FAMILY MEDICINE

## 2018-06-15 PROCEDURE — 74011250636 HC RX REV CODE- 250/636: Performed by: INTERNAL MEDICINE

## 2018-06-15 PROCEDURE — 77030003560 HC NDL HUBR BARD -A

## 2018-06-15 PROCEDURE — 81003 URINALYSIS AUTO W/O SCOPE: CPT | Performed by: FAMILY MEDICINE

## 2018-06-15 PROCEDURE — 84443 ASSAY THYROID STIM HORMONE: CPT | Performed by: FAMILY MEDICINE

## 2018-06-15 RX ORDER — HEPARIN 100 UNIT/ML
500 SYRINGE INTRAVENOUS AS NEEDED
Status: COMPLETED | OUTPATIENT
Start: 2018-06-15 | End: 2018-06-15

## 2018-06-15 RX ORDER — SODIUM CHLORIDE 0.9 % (FLUSH) 0.9 %
10-40 SYRINGE (ML) INJECTION AS NEEDED
Status: ACTIVE | OUTPATIENT
Start: 2018-06-15 | End: 2018-06-15

## 2018-06-15 RX ADMIN — SODIUM CHLORIDE, PRESERVATIVE FREE 500 UNITS: 5 INJECTION INTRAVENOUS at 08:31

## 2018-06-15 RX ADMIN — Medication 10 ML: at 08:31

## 2018-06-15 NOTE — PROGRESS NOTES
Arrived to the [facility]. Port accessed and labs drawn. Lab results reviewed, no replacements needed. Port flush completed and port de-accessed. Patient tolerated well. Any issues or concerns during appointment: None. Patient aware no future infusion appointments. Patient to follow up with Dr. Lorne Alford next week  Discharged ambulatory in stable condition.

## 2018-06-20 ENCOUNTER — PATIENT OUTREACH (OUTPATIENT)
Dept: CASE MANAGEMENT | Age: 58
End: 2018-06-20

## 2018-06-20 ENCOUNTER — HOSPITAL ENCOUNTER (OUTPATIENT)
Dept: LAB | Age: 58
Discharge: HOME | End: 2018-06-20
Payer: COMMERCIAL

## 2018-06-20 DIAGNOSIS — C85.88 MARGINAL ZONE LYMPHOMA OF LYMPH NODES OF MULTIPLE SITES (HCC): ICD-10-CM

## 2018-06-20 DIAGNOSIS — C85.88 MARGINAL ZONE LYMPHOMA OF LYMPH NODES OF MULTIPLE SITES (HCC): Chronic | ICD-10-CM

## 2018-06-20 LAB
ALBUMIN SERPL-MCNC: 3.8 G/DL (ref 3.5–5)
ALBUMIN/GLOB SERPL: 1.5 {RATIO} (ref 1.2–3.5)
ALP SERPL-CCNC: 96 U/L (ref 50–136)
ALT SERPL-CCNC: 38 U/L (ref 12–65)
ANION GAP SERPL CALC-SCNC: 6 MMOL/L (ref 7–16)
AST SERPL-CCNC: 21 U/L (ref 15–37)
BASOPHILS # BLD: 0 K/UL (ref 0–0.2)
BASOPHILS NFR BLD: 1 % (ref 0–2)
BILIRUB SERPL-MCNC: 0.3 MG/DL (ref 0.2–1.1)
BUN SERPL-MCNC: 23 MG/DL (ref 6–23)
CALCIUM SERPL-MCNC: 9.1 MG/DL (ref 8.3–10.4)
CHLORIDE SERPL-SCNC: 106 MMOL/L (ref 98–107)
CO2 SERPL-SCNC: 29 MMOL/L (ref 21–32)
CREAT SERPL-MCNC: 0.74 MG/DL (ref 0.6–1)
DIFFERENTIAL METHOD BLD: ABNORMAL
EOSINOPHIL # BLD: 0 K/UL (ref 0–0.8)
EOSINOPHIL NFR BLD: 1 % (ref 0.5–7.8)
ERYTHROCYTE [DISTWIDTH] IN BLOOD BY AUTOMATED COUNT: 20.1 % (ref 11.9–14.6)
GLOBULIN SER CALC-MCNC: 2.6 G/DL (ref 2.3–3.5)
GLUCOSE SERPL-MCNC: 122 MG/DL (ref 65–100)
HCT VFR BLD AUTO: 25.4 % (ref 35.8–46.3)
HGB BLD-MCNC: 8.9 G/DL (ref 11.7–15.4)
LYMPHOCYTES # BLD: 0.3 K/UL (ref 0.5–4.6)
LYMPHOCYTES NFR BLD: 8 % (ref 13–44)
MAGNESIUM SERPL-MCNC: 1.9 MG/DL (ref 1.8–2.4)
MCH RBC QN AUTO: 34.5 PG (ref 26.1–32.9)
MCHC RBC AUTO-ENTMCNC: 35 G/DL (ref 31.4–35)
MCV RBC AUTO: 98.4 FL (ref 79.6–97.8)
MONOCYTES # BLD: 0.5 K/UL (ref 0.1–1.3)
MONOCYTES NFR BLD: 15 % (ref 4–12)
NEUTS SEG # BLD: 2.7 K/UL (ref 1.7–8.2)
NEUTS SEG NFR BLD: 76 % (ref 43–78)
NRBC # BLD: 0.01 K/UL (ref 0–0.2)
NRBC BLD-RTO: 0.3 PER 100 WBC (ref 0–2)
PLATELET # BLD AUTO: 109 K/UL (ref 150–450)
PMV BLD AUTO: 11.1 FL (ref 10.8–14.1)
POTASSIUM SERPL-SCNC: 4.2 MMOL/L (ref 3.5–5.1)
PROT SERPL-MCNC: 6.4 G/DL (ref 6.3–8.2)
RBC # BLD AUTO: 2.58 M/UL (ref 4.05–5.25)
SODIUM SERPL-SCNC: 141 MMOL/L (ref 136–145)
WBC # BLD AUTO: 3.5 K/UL (ref 4.3–11.1)

## 2018-06-20 PROCEDURE — 83735 ASSAY OF MAGNESIUM: CPT | Performed by: INTERNAL MEDICINE

## 2018-06-20 PROCEDURE — 80053 COMPREHEN METABOLIC PANEL: CPT | Performed by: INTERNAL MEDICINE

## 2018-06-20 PROCEDURE — 85025 COMPLETE CBC W/AUTO DIFF WBC: CPT | Performed by: INTERNAL MEDICINE

## 2018-06-20 NOTE — PROGRESS NOTES
Pt was seen and labs reviewed by Dr. Lillian Amezcua. Pt was told that her counts have recovered enough to have her cataract surgery and her spinal procedure. Pt reminded that it will take at least 3 months to see how well her hemoglobin will recover, so to be patient. Pt says she is working with [facility] to help build her strength back. Will repeat PET and ECHO in mid July and pt will return to clinic for results.

## 2018-06-26 ENCOUNTER — HOSPITAL ENCOUNTER (OUTPATIENT)
Dept: PHYSICAL THERAPY | Age: 58
Discharge: HOME | End: 2018-06-26
Payer: COMMERCIAL

## 2018-06-26 DIAGNOSIS — C83.30 DIFFUSE LARGE B-CELL LYMPHOMA, UNSPECIFIED BODY REGION (HCC): ICD-10-CM

## 2018-06-26 PROCEDURE — 97161 PT EVAL LOW COMPLEX 20 MIN: CPT

## 2018-06-26 NOTE — THERAPY EVALUATION
Judith Patel  : 1960  Primary: [facility]  Secondary:  [address] at [address]  [address], [address], [place] [address]  [phone]   [phone]          OUTPATIENT PHYSICAL THERAPY:Initial Assessment    ICD-10: Treatment Diagnosis: muscle weakness generalized M 62.81  Precautions/Allergies:   Review of patient's allergies indicates no known allergies. Fall Risk Score: 0 (? 5 = High Risk)  MD Orders: oncology rehab MEDICAL/REFERRING DIAGNOSIS:  Diffuse large B-cell lymphoma, unspecified body region [facility]) [C83.30]    DATE OF ONSET: 3/30/17  REFERRING PHYSICIAN: Anusha Serrano MD  RETURN PHYSICIAN APPOINTMENT: 18     INITIAL ASSESSMENT:  Ms. Concepcion Raza presents following treatment of lymphoma. She previously was a participant with oncology rehab. She stopped when she resumed chemo. She returns with overall deconditioning and decreased strength. She will benefit from therapeutic exercises to improve strength and activity tolerance. She is currently being treated at the pain center for lumbar pain. She returns to then July 3. She has developed cataracts and is in need of these being removed. Due to the poor eyesight her gait is slightly altered. PROBLEM LIST (Impacting functional limitations):  1. Decreased Strength  2. Increased Pain  3. Decreased Activity Tolerance  4. Increased Fatigue  5. Increased Shortness of Breath  6. Decreased [place] with Home Exercise Program INTERVENTIONS PLANNED:  1. Home Exercise Program (HEP)  2. Therapeutic Exercise/Strengthening   TREATMENT PLAN:  Effective Dates: 2018 TO 2018 (90 days). Frequency/Duration: 2 times a week for 90 Days  GOALS: (Goals have been discussed and agreed upon with patient.)  Short-Term Functional Goals: Time Frame: 4 weeks  1. The patient will be independent with HEP for strength within 4 weeks.   2. The patient will gain 1/2 grade strength of all 4 extremities within 4 weeks. 3. The patient will report walking 10 minutes twice daily within 4 weeks. Discharge Goals: Time Frame: 8 weeks  1. The patient will improve her 6 minute walk by 165' x 1 within 8 weeks. 2. The patient will report a pain level of 3 or less within 8 weeks. 3. The patient will transition to Healthy Self within 12 weeks. Rehabilitation Potential For Stated Goals: Good  Regarding Eduardo Barrientos's therapy, I certify that the treatment plan above will be carried out by a therapist or under their direction. Thank you for this referral,  Sheryl Cruz PT     Referring Physician Signature: Dilcia Bansal MD              Date                    The information in this section was collected on 6/26/18 (except where otherwise noted). HISTORY:   History of Present Injury/Illness (Reason for Referral):  Post lymphoma treatment, lumbar pain, altered gait due to poor eyesight     Past Medical History/Comorbidities:   Ms. 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 She also has no past medical history of Aneurysm ([address] Arrhythmia; Asthma; Autoimmune disease ([address] CAD (coronary artery disease); Chronic kidney disease; Coagulation defects; COPD; Diabetes ([address] Difficult intubation; GERD (gastroesophageal reflux disease); Heart failure ([address] Liver disease; Malignant hyperthermia due to anesthesia; Nausea & vomiting; Pseudocholinesterase deficiency; Psychiatric disorder; PUD (peptic ulcer disease); Seizures ([address] Stroke [facility]); or Unspecified sleep apnea.   Ms. Marilu Ace  has a past surgical history that includes pr anesth,achilles tendon surg (Left, 02/10/2011); hx cholecystectomy (1983); hx vascular access; hx knee replacement (Left, 2013); and hx knee replacement (Right, 2012). Past Medical History:   Diagnosis Date    Anemia     Basal cell carcinoma     Cardiomegaly     Deep vein thrombosis (DVT) (HCC)     History of stroke     Hypertension     Marginal zone lymphoma ([facility] 03/30/2017    Morbid obesity ([facility]     Osteoarthritis     Overactive bladder     Primary hypothyroidism     Radiation therapy complication     Rheumatic fever     S/P total knee replacement using cement 10/3/2011    Shingles     Superficial venous thrombosis of right arm 5/1/2017     Past Surgical History:   Procedure Laterality Date    HX CHOLECYSTECTOMY  1983    HX KNEE REPLACEMENT Left 2013    Dr. Davin Garner Right 2012    Dr. Bria Moulton Left 02/10/2011    Dr. Karmen Walters:     lives with spouse  Prior Level of Function/Work/Activity:    Dominant Side:         RIGHT  Current Medications:       Current Outpatient Prescriptions:     magic mouthwash (ALEXSANDER) susp, Take 10 mL by mouth every four (4) hours as needed. , Disp: 2 Bottle, Rfl: 2    nystatin (MYCOSTATIN) powder, Apply  to affected area three (3) times daily. , Disp: 60 g, Rfl: 2    pregabalin (LYRICA) 100 mg capsule, Take 1 Cap by mouth three (3) times daily. Max Daily Amount: 300 mg., Disp: 90 Cap, Rfl: 3    fluconazole (DIFLUCAN) 150 mg tablet, Take 1 Tab by mouth daily. FDA advises cautious prescribing of oral fluconazole in pregnancy. , Disp: 30 Tab, Rfl: 2    levothyroxine (SYNTHROID) 175 mcg tablet, Take 1 Tab by mouth Daily (before breakfast). , Disp: 30 Tab, Rfl: 5    allopurinol (ZYLOPRIM) 300 mg tablet, Take 1 Tab by mouth daily. , Disp: 30 Tab, Rfl: 1    ondansetron hcl (ZOFRAN, AS HYDROCHLORIDE,) 8 mg tablet, Take 1 Tab by mouth every eight (8) hours as needed for Nausea (or vomiting). , Disp: 90 Tab, Rfl: 2    fluconazole (DIFLUCAN) 150 mg tablet, Take 150 mg by mouth daily. FDA advises cautious prescribing of oral fluconazole in pregnancy. , Disp: , Rfl:     acyclovir (ZOVIRAX) 400 mg tablet, Take 1 Tab by mouth two (2) times a day., Disp: 60 Tab, Rfl: 3    magnesium oxide (MAG-OX) 400 mg tablet, Take 1 Tab by mouth two (2) times a day., Disp: 60 Tab, Rfl: 1    lidocaine-prilocaine (EMLA) topical cream, Apply  to affected area as needed for Pain. Apply to port site 45-60 minutes prior to lab appt or infusion. , Disp: 30 g, Rfl: 0   Date Last Reviewed:  6/26/18   Number of Personal Factors/Comorbidities that affect the Plan of Care: 3+: HIGH COMPLEXITY   EXAMINATION:   ROM:          Within functional limits  Strength:          Bilateral upper extremities grossly 4-/5 x 2 extremities  Bilateral lower extremities 4/5 x 2 extremities    Balance:          Intact, limited due to need for cataracts  Skin Integrity:          intact  Edema/Girth:  pitting    Left Right    Initial Most Recent Initial Most Recent   Upper  Extremity           Lower  Extremity               Body Structures Involved:  1. Muscles Body Functions Affected:  1. Sensory/Pain  2. Neuromusculoskeletal Activities and Participation Affected:  1. None   Number of elements (examined above) that affect the Plan of Care: 3: MODERATE COMPLEXITY   CLINICAL PRESENTATION:   Presentation: Evolving clinical presentation with changing clinical characteristics: MODERATE COMPLEXITY   CLINICAL DECISION MAKING:   Outcome Measure:    Tool Used: 6-MINUTE WALK TEST  Score:  Initial: 1300' feet Most Recent: X feet (Date: -- )   Interpretation of Score: Normal range varies but is approximately 4608-0721 Feet      Distance walked: 1300 feet               Baseline End of Test   Heart Rate 85 118   Dyspnea (Luther Scale)     Fatigue (Luther Scale)  3   SpO2 98 96   /84 183/93     Score 2133 1810-8814 5211-7303 [phone] 426-16 15-0   Modifier  CI CJ CK CL CM CN     ? Mobility - Walking and Moving Around:     - CURRENT STATUS: CJ - 20%-39% impaired, limited or restricted    - GOAL STATUS: CJ - 20%-39% impaired, limited or restricted    - D/C STATUS:  ---------------To be determined---------------    Tool Used: TINETTI  Score:  Initial:   Gait: 11/12  Balance: 16/16  TOTAL: 27/28 Most Recent:  Gait: /12  Balance: /16  TOTAL: /28   Interpretation of Score: The maximum score for the gait component is 12 points. The maximum score for the balance component is 16 points. The maximum total score is 28 points. In general, patients who score below 19 are at a high risk for falls. Patients who score in the range of 19-24 indicate that the patient has a risk for falls. Score 28 27-23 22-18 17-12 11-6 5-1 0   Modifier  CI  CK CL CM CN         Tool Used: Timed Up and Go (TUG)  Score:  Initial: 7 seconds Most Recent: X seconds (Date: -- )   Interpretation of Score: The test measures, in seconds, the time taken by an individual to stand up from a standard arm chair (seat height 46 cm [18 in], arm height 65 cm [25.6 in]), walk a distance of 3 meters (118 in, approx 10 ft), turn, walk back to the chair and sit down. If the individual takes longer than 14 seconds to complete TUG, this indicates risk for falls. Score 7 7.5-10.5 11-14 14.5-17.5 18-21 21.5-24.5 25+   Modifier  CI CJ CK CL CM CN         Tool Used: ECOG Performance Survey Score  Score:  Initial: 1 Most Recent:      Interpretation of Score:   0 Fully active, able to carry on all pre-disease performance without restriction   1 Restricted in physically strenuous activity but ambulatory and able to carry out work of a light or sedentary nature, e.g., light house work, office work   2 Ambulatory and capable of all selfcare but unable to carry out any work activities.  Up and about more than 50% of waking hours   3 Capable of only limited selfcare, confined to bed or chair more than 50% of waking hours   4 Completely disabled. Cannot carry on any selfcare. Totally confined to bed or chair   5 Dead     Medical Necessity:   · Patient is expected to demonstrate progress in strength and overall conditionoing to increase independence with household activity. Reason for Services/Other Comments:  · Patient continues to demonstrate capacity to improve strength and overall conditionoing which will increase independence. Use of outcome tool(s) and clinical judgement create a POC that gives a: Clear prediction of patient's progress: LOW COMPLEXITY            TREATMENT:   (In addition to Assessment/Re-Assessment sessions the following treatments were rendered)  Pre-treatment Symptoms/Complaints:  Lumbar pain, weakness, decreased activity tolerance, altered gait  Pain: Initial:     6 Post Session:  6   Sit to stand x 10 reps O2 99   Walks with wide stance  Assessment only today, no treatment provided. As above  POI Portal  Treatment/Session Assessment:    · Response to Treatment:  Tolerated the assessment well. · Compliance with Program/Exercises: Will assess as treatment progresses. · Recommendations/Intent for next treatment session: \"Next visit will focus on advancements to more challenging activities\".   Total Treatment Duration:       Linda German, PT

## 2018-06-26 NOTE — PROGRESS NOTES
Ambulatory/Rehab Services H2 Model Falls Risk Assessment    Risk Factor Pts. ·   Confusion/Disorientation/Impulsivity  []    4 ·   Symptomatic Depression  []   2 ·   Altered Elimination  []   1 ·   Dizziness/Vertigo  []   1 ·   Gender (Male)  []   1 ·   Any administered antiepileptics (anticonvulsants):  []   2 ·   Any administered benzodiazepines:  []   1 ·   Visual Impairment (specify):  []   1 ·   Portable Oxygen Use  []   1 ·   Orthostatic ? BP  []   1 ·   History of Recent Falls (within 3 mos.)  []   5     Ability to Rise from Chair (choose one) Pts. ·   Ability to rise in a single movement  [x]   0 ·   Pushes up, successful in one attempt  []   1 ·   Multiple attempts, but successful  []   3 ·   Unable to rise without assistance  []   4   Total: (5 or greater = High Risk) 0     Falls Prevention Plan:   []                Physical Limitations to Exercise (specify):   []                Mobility Assistance Device (type):   []                Exercise/Equipment Adaptation (specify):    ©2010 [facility] [place] Patent #[id number].  Federal Law prohibits the replication, distribution or use without written permission from [email or web address]

## 2018-06-28 ENCOUNTER — HOSPITAL ENCOUNTER (OUTPATIENT)
Dept: PHYSICAL THERAPY | Age: 58
Discharge: HOME | End: 2018-06-28
Payer: COMMERCIAL

## 2018-06-28 PROCEDURE — 97110 THERAPEUTIC EXERCISES: CPT

## 2018-06-28 NOTE — PROGRESS NOTES
Eladio Hurtado  : 1960  Primary: [facility]  Secondary:  [address]  at [facility]  [address], [address], [place] [address]  [phone]   [phone]          OUTPATIENT PHYSICAL THERAPY:Daily Note    ICD-10: Treatment Diagnosis: muscle weakness generalized M 62.81  Precautions/Allergies:   Review of patient's allergies indicates no known allergies. Fall Risk Score: 0 (? 5 = High Risk)  MD Orders: oncology rehab MEDICAL/REFERRING DIAGNOSIS:  Other specified types of non-hodgkin lymphoma, lymph nodes of multiple sites [C85.88]    DATE OF ONSET: 3/30/17  REFERRING PHYSICIAN: Niru Colin MD  RETURN PHYSICIAN APPOINTMENT: 18     INITIAL ASSESSMENT:  Ms. Sanjuanita Carbajal presents following treatment of lymphoma. She previously was a participant with oncology rehab. She stopped when she resumed chemo. She returns with overall deconditioning and decreased strength. She will benefit from therapeutic exercises to improve strength and activity tolerance. She is currently being treated at the pain center for lumbar pain. She returns to then July 3. She has developed cataracts and is in need of these being removed. Due to the poor eyesight her gait is slightly altered. PROBLEM LIST (Impacting functional limitations):  1. Decreased Strength  2. Increased Pain  3. Decreased Activity Tolerance  4. Increased Fatigue  5. Increased Shortness of Breath  6. Decreased [place] with Home Exercise Program INTERVENTIONS PLANNED:  1. Home Exercise Program (HEP)  2. Therapeutic Exercise/Strengthening   TREATMENT PLAN:  Effective Dates: 2018 TO 2018 (90 days). Frequency/Duration: 2 times a week for 90 Days  GOALS: (Goals have been discussed and agreed upon with patient.)  Short-Term Functional Goals: Time Frame: 4 weeks  1. The patient will be independent with HEP for strength within 4 weeks.   2. The patient will gain 1/2 grade strength of all 4 extremities within 4 weeks. 3. The patient will report walking 10 minutes twice daily within 4 weeks. Discharge Goals: Time Frame: 8 weeks  1. The patient will improve her 6 minute walk by 165' x 1 within 8 weeks. 2. The patient will report a pain level of 3 or less within 8 weeks. 3. The patient will transition to Healthy Self within 12 weeks. Rehabilitation Potential For Stated Goals: Good  Regarding Guillermo Barrientos's therapy, I certify that the treatment plan above will be carried out by a therapist or under their direction. Thank you for this referral,  Praveen Anthony PT     Referring Physician Signature: Ebony Granger MD              Date                    The information in this section was collected on 6/26/18 (except where otherwise noted). HISTORY:   History of Present Injury/Illness (Reason for Referral):  Post lymphoma treatment, lumbar pain, altered gait due to poor eyesight     Past Medical History/Comorbidities:   Ms. 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 She also has no past medical history of Aneurysm ([address] Arrhythmia; Asthma; Autoimmune disease ([address] CAD (coronary artery disease); Chronic kidney disease; Coagulation defects; COPD; Diabetes ([address] Difficult intubation; GERD (gastroesophageal reflux disease); Heart failure ([address] Liver disease; Malignant hyperthermia due to anesthesia; Nausea & vomiting; Pseudocholinesterase deficiency; Psychiatric disorder; PUD (peptic ulcer disease); Seizures ([address] Stroke [facility]); or Unspecified sleep apnea.   Ms. Shabbir Turcios  has a past surgical history that includes pr anesth,achilles tendon surg (Left, 02/10/2011); hx cholecystectomy (1983); hx vascular access; hx knee replacement (Left, 2013); and hx knee replacement (Right, 2012). Past Medical History:   Diagnosis Date    Anemia     Basal cell carcinoma     Cardiomegaly     Deep vein thrombosis (DVT) (HCC)     History of stroke     Hypertension     Marginal zone lymphoma ([facility] 03/30/2017    Morbid obesity ([facility]     Osteoarthritis     Overactive bladder     Primary hypothyroidism     Radiation therapy complication     Rheumatic fever     S/P total knee replacement using cement 10/3/2011    Shingles     Superficial venous thrombosis of right arm 5/1/2017     Past Surgical History:   Procedure Laterality Date    HX CHOLECYSTECTOMY  1983    HX KNEE REPLACEMENT Left 2013    Dr. Tressa Henning Right 2012    Dr. Marcos Messina Left 02/10/2011    Dr. George Portillo:     lives with spouse  Prior Level of Function/Work/Activity:    Dominant Side:         RIGHT  Current Medications:       Current Outpatient Prescriptions:     magic mouthwash (ALEXSANDER) susp, Take 10 mL by mouth every four (4) hours as needed. , Disp: 2 Bottle, Rfl: 2    nystatin (MYCOSTATIN) powder, Apply  to affected area three (3) times daily. , Disp: 60 g, Rfl: 2    pregabalin (LYRICA) 100 mg capsule, Take 1 Cap by mouth three (3) times daily. Max Daily Amount: 300 mg., Disp: 90 Cap, Rfl: 3    fluconazole (DIFLUCAN) 150 mg tablet, Take 1 Tab by mouth daily. FDA advises cautious prescribing of oral fluconazole in pregnancy. , Disp: 30 Tab, Rfl: 2    levothyroxine (SYNTHROID) 175 mcg tablet, Take 1 Tab by mouth Daily (before breakfast). , Disp: 30 Tab, Rfl: 5    allopurinol (ZYLOPRIM) 300 mg tablet, Take 1 Tab by mouth daily. , Disp: 30 Tab, Rfl: 1    ondansetron hcl (ZOFRAN, AS HYDROCHLORIDE,) 8 mg tablet, Take 1 Tab by mouth every eight (8) hours as needed for Nausea (or vomiting). , Disp: 90 Tab, Rfl: 2    fluconazole (DIFLUCAN) 150 mg tablet, Take 150 mg by mouth daily. FDA advises cautious prescribing of oral fluconazole in pregnancy. , Disp: , Rfl:     acyclovir (ZOVIRAX) 400 mg tablet, Take 1 Tab by mouth two (2) times a day., Disp: 60 Tab, Rfl: 3    magnesium oxide (MAG-OX) 400 mg tablet, Take 1 Tab by mouth two (2) times a day., Disp: 60 Tab, Rfl: 1    lidocaine-prilocaine (EMLA) topical cream, Apply  to affected area as needed for Pain. Apply to port site 45-60 minutes prior to lab appt or infusion. , Disp: 30 g, Rfl: 0   Date Last Reviewed:  6/26/18   Number of Personal Factors/Comorbidities that affect the Plan of Care: 3+: HIGH COMPLEXITY   EXAMINATION:   ROM:          Within functional limits  Strength:          Bilateral upper extremities grossly 4-/5 x 2 extremities  Bilateral lower extremities 4/5 x 2 extremities    Balance:          Intact, limited due to need for cataracts  Skin Integrity:          intact  Edema/Girth:  pitting    Left Right    Initial Most Recent Initial Most Recent   Upper  Extremity           Lower  Extremity               Body Structures Involved:  1. Muscles Body Functions Affected:  1. Sensory/Pain  2. Neuromusculoskeletal Activities and Participation Affected:  1. None   Number of elements (examined above) that affect the Plan of Care: 3: MODERATE COMPLEXITY   CLINICAL PRESENTATION:   Presentation: Evolving clinical presentation with changing clinical characteristics: MODERATE COMPLEXITY   CLINICAL DECISION MAKING:   Outcome Measure:    Tool Used: 6-MINUTE WALK TEST  Score:  Initial: 1300' feet Most Recent: X feet (Date: -- )   Interpretation of Score: Normal range varies but is approximately 1531-9196 Feet      Distance walked: 1300 feet               Baseline End of Test   Heart Rate 85 118   Dyspnea (Luther Scale)     Fatigue (Luther Scale)  3   SpO2 98 96   /84 183/93     Score 2133 2646-8336 0803-4987 [phone] 426-16 15-0   Modifier  CI CJ CK CL CM CN     ? Mobility - Walking and Moving Around:     - CURRENT STATUS: CJ - 20%-39% impaired, limited or restricted    - GOAL STATUS: CJ - 20%-39% impaired, limited or restricted    - D/C STATUS:  ---------------To be determined---------------    Tool Used: TINETTI  Score:  Initial:   Gait: 11/12  Balance: 16/16  TOTAL: 27/28 Most Recent:  Gait: /12  Balance: /16  TOTAL: /28   Interpretation of Score: The maximum score for the gait component is 12 points. The maximum score for the balance component is 16 points. The maximum total score is 28 points. In general, patients who score below 19 are at a high risk for falls. Patients who score in the range of 19-24 indicate that the patient has a risk for falls. Score 28 27-23 22-18 17-12 11-6 5-1 0   Modifier  CI CJ CK CL CM CN         Tool Used: Timed Up and Go (TUG)  Score:  Initial: 7 seconds Most Recent: X seconds (Date: -- )   Interpretation of Score: The test measures, in seconds, the time taken by an individual to stand up from a standard arm chair (seat height 46 cm [18 in], arm height 65 cm [25.6 in]), walk a distance of 3 meters (118 in, approx 10 ft), turn, walk back to the chair and sit down. If the individual takes longer than 14 seconds to complete TUG, this indicates risk for falls. Score 7 7.5-10.5 11-14 14.5-17.5 18-21 21.5-24.5 25+   Modifier  CI CJ CK CL CM CN         Tool Used: ECOG Performance Survey Score  Score:  Initial: 1 Most Recent:      Interpretation of Score:   0 Fully active, able to carry on all pre-disease performance without restriction   1 Restricted in physically strenuous activity but ambulatory and able to carry out work of a light or sedentary nature, e.g., light house work, office work   2 Ambulatory and capable of all selfcare but unable to carry out any work activities.  Up and about more than 50% of waking hours   3 Capable of only limited selfcare, confined to bed or chair more than 50% of waking hours   4 Completely disabled. Cannot carry on any selfcare. Totally confined to bed or chair   5 Dead     Medical Necessity:   · Patient is expected to demonstrate progress in strength and overall conditionoing to increase independence with household activity. Reason for Services/Other Comments:  · Patient continues to demonstrate capacity to improve strength and overall conditionoing which will increase independence. Use of outcome tool(s) and clinical judgement create a POC that gives a: Clear prediction of patient's progress: LOW COMPLEXITY            TREATMENT:   (In addition to Assessment/Re-Assessment sessions the following treatments were rendered)  Pre-treatment Symptoms/Complaints:  Lumbar pain, weakness, decreased activity tolerance, altered gait. The patient reports she has lower extremity numbness below the knee on the left. Pain: Initial:     0 took Tylenol Post Session:  1   136/104   O2 99 HR 95  325' x 1 O2 99   UBE level 1 x 4 min O2 98 HR 93  325'x1 O2 98   Nustep level 2 x 5 min SPM 79 mets 3.0  325' x 1 O2 99   Sit to stand x 10 reps O2 98   Bilateral upper extremity with 2# x 10 reps bicep curls, forward flexion, abduction, triceps extension, bent over rows  Counter top push ups x 10 reps. 325' x 1 O2 96   Fatigue 0        as above  As above  [address] Portal  Treatment/Session Assessment:    · Response to Treatment:  Tolerated the assessment well. · Compliance with Program/Exercises: Will assess as treatment progresses. · Recommendations/Intent for next treatment session: \"Next visit will focus on advancements to more challenging activities\".   Total Treatment Duration: 47 min  PT Patient Time In/Time Out  Time In: 0805  Time Out: 1019    Homero Jackson, PT

## 2018-07-02 ENCOUNTER — HOSPITAL ENCOUNTER (OUTPATIENT)
Dept: PHYSICAL THERAPY | Age: 58
Discharge: HOME | End: 2018-07-02
Payer: COMMERCIAL

## 2018-07-02 PROCEDURE — 97110 THERAPEUTIC EXERCISES: CPT

## 2018-07-02 NOTE — PROGRESS NOTES
Tita Oterogo  : 1960  Primary: [facility]  Secondary:  [address] at [address]  [address] , [address], [place] [address]  [phone]   [phone]          OUTPATIENT PHYSICAL THERAPY:Daily Note    ICD-10: Treatment Diagnosis: muscle weakness generalized M 62.81  Precautions/Allergies:   Review of patient's allergies indicates no known allergies. Fall Risk Score: 0 (? 5 = High Risk)  MD Orders: oncology rehab MEDICAL/REFERRING DIAGNOSIS:  Other specified types of non-hodgkin lymphoma, lymph nodes of multiple sites [C85.88]    DATE OF ONSET: 3/30/17  REFERRING PHYSICIAN: Bautista Cutler MD  RETURN PHYSICIAN APPOINTMENT: 18     INITIAL ASSESSMENT:  Ms. Kristen Mosqueda presents following treatment of lymphoma. She previously was a participant with oncology rehab. She stopped when she resumed chemo. She returns with overall deconditioning and decreased strength. She will benefit from therapeutic exercises to improve strength and activity tolerance. She is currently being treated at the pain center for lumbar pain. She returns to then July 3. She has developed cataracts and is in need of these being removed. Due to the poor eyesight her gait is slightly altered. PROBLEM LIST (Impacting functional limitations):  1. Decreased Strength  2. Increased Pain  3. Decreased Activity Tolerance  4. Increased Fatigue  5. Increased Shortness of Breath  6. Decreased [place] with Home Exercise Program INTERVENTIONS PLANNED:  1. Home Exercise Program (HEP)  2. Therapeutic Exercise/Strengthening   TREATMENT PLAN:  Effective Dates: 2018 TO 2018 (90 days). Frequency/Duration: 2 times a week for 90 Days  GOALS: (Goals have been discussed and agreed upon with patient.)  Short-Term Functional Goals: Time Frame: 4 weeks  1. The patient will be independent with HEP for strength within 4 weeks.   2. The patient will gain 1/2 grade strength of all 4 extremities within 4 weeks. 3. The patient will report walking 10 minutes twice daily within 4 weeks. Discharge Goals: Time Frame: 8 weeks  1. The patient will improve her 6 minute walk by 165' x 1 within 8 weeks. 2. The patient will report a pain level of 3 or less within 8 weeks. 3. The patient will transition to Healthy Self within 12 weeks. Rehabilitation Potential For Stated Goals: Good  Regarding Fausto Barrientos's therapy, I certify that the treatment plan above will be carried out by a therapist or under their direction. Thank you for this referral,  Pilo Camara PT     Referring Physician Signature: Eyal Duvall MD              Date                    The information in this section was collected on 6/26/18 (except where otherwise noted). HISTORY:   History of Present Injury/Illness (Reason for Referral):  Post lymphoma treatment, lumbar pain, altered gait due to poor eyesight     Past Medical History/Comorbidities:   Ms. 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 She also has no past medical history of Aneurysm ([address] Arrhythmia; Asthma; Autoimmune disease ([address] CAD (coronary artery disease); Chronic kidney disease; Coagulation defects; COPD; Diabetes ([address] Difficult intubation; GERD (gastroesophageal reflux disease); Heart failure ([address] Liver disease; Malignant hyperthermia due to anesthesia; Nausea & vomiting; Pseudocholinesterase deficiency; Psychiatric disorder; PUD (peptic ulcer disease); Seizures ([address] Stroke [facility]); or Unspecified sleep apnea.   Ms. Leslie Solorzano  has a past surgical history that includes pr anesth,achilles tendon surg (Left, 02/10/2011); hx cholecystectomy (1983); hx vascular access; hx knee replacement (Left, 2013); and hx knee replacement (Right, 2012). Past Medical History:   Diagnosis Date    Anemia     Basal cell carcinoma     Cardiomegaly     Deep vein thrombosis (DVT) (HCC)     History of stroke     Hypertension     Marginal zone lymphoma ([facility] 03/30/2017    Morbid obesity ([facility]     Osteoarthritis     Overactive bladder     Primary hypothyroidism     Radiation therapy complication     Rheumatic fever     S/P total knee replacement using cement 10/3/2011    Shingles     Superficial venous thrombosis of right arm 5/1/2017     Past Surgical History:   Procedure Laterality Date    HX CHOLECYSTECTOMY  1983    HX KNEE REPLACEMENT Left 2013    Dr. Jackson Lopez Right 2012    Dr. Anusha Hall Left 02/10/2011    Dr. Sebastian Done:     lives with spouse  Prior Level of Function/Work/Activity:    Dominant Side:         RIGHT  Current Medications:       Current Outpatient Prescriptions:     magic mouthwash (ALEXSANDER) susp, Take 10 mL by mouth every four (4) hours as needed. , Disp: 2 Bottle, Rfl: 2    nystatin (MYCOSTATIN) powder, Apply  to affected area three (3) times daily. , Disp: 60 g, Rfl: 2    pregabalin (LYRICA) 100 mg capsule, Take 1 Cap by mouth three (3) times daily. Max Daily Amount: 300 mg., Disp: 90 Cap, Rfl: 3    fluconazole (DIFLUCAN) 150 mg tablet, Take 1 Tab by mouth daily. FDA advises cautious prescribing of oral fluconazole in pregnancy. , Disp: 30 Tab, Rfl: 2    levothyroxine (SYNTHROID) 175 mcg tablet, Take 1 Tab by mouth Daily (before breakfast). , Disp: 30 Tab, Rfl: 5    allopurinol (ZYLOPRIM) 300 mg tablet, Take 1 Tab by mouth daily. , Disp: 30 Tab, Rfl: 1    ondansetron hcl (ZOFRAN, AS HYDROCHLORIDE,) 8 mg tablet, Take 1 Tab by mouth every eight (8) hours as needed for Nausea (or vomiting). , Disp: 90 Tab, Rfl: 2    fluconazole (DIFLUCAN) 150 mg tablet, Take 150 mg by mouth daily. FDA advises cautious prescribing of oral fluconazole in pregnancy. , Disp: , Rfl:     acyclovir (ZOVIRAX) 400 mg tablet, Take 1 Tab by mouth two (2) times a day., Disp: 60 Tab, Rfl: 3    magnesium oxide (MAG-OX) 400 mg tablet, Take 1 Tab by mouth two (2) times a day., Disp: 60 Tab, Rfl: 1    lidocaine-prilocaine (EMLA) topical cream, Apply  to affected area as needed for Pain. Apply to port site 45-60 minutes prior to lab appt or infusion. , Disp: 30 g, Rfl: 0   Date Last Reviewed:  6/26/18   Number of Personal Factors/Comorbidities that affect the Plan of Care: 3+: HIGH COMPLEXITY   EXAMINATION:   ROM:          Within functional limits  Strength:          Bilateral upper extremities grossly 4-/5 x 2 extremities  Bilateral lower extremities 4/5 x 2 extremities    Balance:          Intact, limited due to need for cataracts  Skin Integrity:          intact  Edema/Girth:  pitting    Left Right    Initial Most Recent Initial Most Recent   Upper  Extremity           Lower  Extremity               Body Structures Involved:  1. Muscles Body Functions Affected:  1. Sensory/Pain  2. Neuromusculoskeletal Activities and Participation Affected:  1. None   Number of elements (examined above) that affect the Plan of Care: 3: MODERATE COMPLEXITY   CLINICAL PRESENTATION:   Presentation: Evolving clinical presentation with changing clinical characteristics: MODERATE COMPLEXITY   CLINICAL DECISION MAKING:   Outcome Measure:    Tool Used: 6-MINUTE WALK TEST  Score:  Initial: 1300' feet Most Recent: X feet (Date: -- )   Interpretation of Score: Normal range varies but is approximately 4065-8594 Feet      Distance walked: 1300 feet               Baseline End of Test   Heart Rate 85 118   Dyspnea (Luther Scale)     Fatigue (Luther Scale)  3   SpO2 98 96   /84 183/93     Score 2133 1538-3226 1761-9088 [id number] [phone] 15-0   Modifier  CI CJ CK CL CM CN     ? Mobility - Walking and Moving Around:     - CURRENT STATUS: CJ - 20%-39% impaired, limited or restricted    - GOAL STATUS: CJ - 20%-39% impaired, limited or restricted    - D/C STATUS:  ---------------To be determined---------------    Tool Used: TINETTI  Score:  Initial:   Gait: 11/12  Balance: 16/16  TOTAL: 27/28 Most Recent:  Gait: /12  Balance: /16  TOTAL: /28   Interpretation of Score: The maximum score for the gait component is 12 points. The maximum score for the balance component is 16 points. The maximum total score is 28 points. In general, patients who score below 19 are at a high risk for falls. Patients who score in the range of 19-24 indicate that the patient has a risk for falls. Score 28 27-23 22-18 17-12 11-6 5-1 0   Modifier  CI  CK CL CM CN         Tool Used: Timed Up and Go (TUG)  Score:  Initial: 7 seconds Most Recent: X seconds (Date: -- )   Interpretation of Score: The test measures, in seconds, the time taken by an individual to stand up from a standard arm chair (seat height 46 cm [18 in], arm height 65 cm [25.6 in]), walk a distance of 3 meters (118 in, approx 10 ft), turn, walk back to the chair and sit down. If the individual takes longer than 14 seconds to complete TUG, this indicates risk for falls. Score 7 7.5-10.5 11-14 14.5-17.5 18-21 21.5-24.5 25+   Modifier  CI CJ CK CL CM CN         Tool Used: ECOG Performance Survey Score  Score:  Initial: 1 Most Recent:      Interpretation of Score:   0 Fully active, able to carry on all pre-disease performance without restriction   1 Restricted in physically strenuous activity but ambulatory and able to carry out work of a light or sedentary nature, e.g., light house work, office work   2 Ambulatory and capable of all selfcare but unable to carry out any work activities.  Up and about more than 50% of waking hours   3 Capable of only limited selfcare, confined to bed or chair more than 50% of waking hours   4 Completely disabled. Cannot carry on any selfcare. Totally confined to bed or chair   5 Dead     Medical Necessity:   · Patient is expected to demonstrate progress in strength and overall conditionoing to increase independence with household activity. Reason for Services/Other Comments:  · Patient continues to demonstrate capacity to improve strength and overall conditionoing which will increase independence. Use of outcome tool(s) and clinical judgement create a POC that gives a: Clear prediction of patient's progress: LOW COMPLEXITY            TREATMENT:   (In addition to Assessment/Re-Assessment sessions the following treatments were rendered)  Pre-treatment Symptoms/Complaints:  Lumbar pain, weakness, decreased activity tolerance, altered gait. The patient reports she has lower extremity numbness below the knee on the left. Pain: Initial:     0 took Tylenol Post Session:  1   174/90   O2 99 HR 79  325' x 1 O2 98   UBE level 1 x 4 min  325'x1 O2 98   Nustep level 2 x 5 min SPM 83 mets 3.2  325' x 1 O2 98   Sit to stand x 10 reps O2 98   Bilateral upper extremity with 2# x 10 reps bicep curls, forward flexion, abduction, triceps extension, bent over rows  Counter top push ups x 10 reps. 325' x 1 O2 96   134/82  Fatigue 0        as above  As above  [email or web address] Portal  Treatment/Session Assessment:    · Response to Treatment:  Tolerated the assessment well. · Compliance with Program/Exercises: Will assess as treatment progresses. · Recommendations/Intent for next treatment session: \"Next visit will focus on advancements to more challenging activities\".   Total Treatment Duration: 40 min  PT Patient Time In/Time Out  Time In: 1306  Time Out: [address], PT

## 2018-07-05 ENCOUNTER — HOSPITAL ENCOUNTER (OUTPATIENT)
Dept: PHYSICAL THERAPY | Age: 58
Discharge: HOME | End: 2018-07-05
Payer: COMMERCIAL

## 2018-07-05 PROCEDURE — 97110 THERAPEUTIC EXERCISES: CPT

## 2018-07-05 NOTE — PROGRESS NOTES
Deshawn Dears  : 1960  Primary: [facility]  Secondary:  [address] at [facility]  [address] , [address], [place] [address]  [phone]   [phone]          OUTPATIENT PHYSICAL THERAPY:Daily Note    ICD-10: Treatment Diagnosis: muscle weakness generalized M 62.81  Precautions/Allergies:   Review of patient's allergies indicates no known allergies. Fall Risk Score: 0 (? 5 = High Risk)  MD Orders: oncology rehab MEDICAL/REFERRING DIAGNOSIS:  Other specified types of non-hodgkin lymphoma, lymph nodes of multiple sites [C85.88]    DATE OF ONSET: 3/30/17  REFERRING PHYSICIAN: Jessica Ballard MD  RETURN PHYSICIAN APPOINTMENT: 18     INITIAL ASSESSMENT:  Ms. Pauline Perez presents following treatment of lymphoma. She previously was a participant with oncology rehab. She stopped when she resumed chemo. She returns with overall deconditioning and decreased strength. She will benefit from therapeutic exercises to improve strength and activity tolerance. She is currently being treated at the pain center for lumbar pain. She returns to then July 3. She has developed cataracts and is in need of these being removed. Due to the poor eyesight her gait is slightly altered. PROBLEM LIST (Impacting functional limitations):  1. Decreased Strength  2. Increased Pain  3. Decreased Activity Tolerance  4. Increased Fatigue  5. Increased Shortness of Breath  6. Decreased [place] with Home Exercise Program INTERVENTIONS PLANNED:  1. Home Exercise Program (HEP)  2. Therapeutic Exercise/Strengthening   TREATMENT PLAN:  Effective Dates: 2018 TO 2018 (90 days). Frequency/Duration: 2 times a week for 90 Days  GOALS: (Goals have been discussed and agreed upon with patient.)  Short-Term Functional Goals: Time Frame: 4 weeks  1. The patient will be independent with HEP for strength within 4 weeks.   2. The patient will gain 1/2 grade strength of all 4 extremities within 4 weeks. 3. The patient will report walking 10 minutes twice daily within 4 weeks. Discharge Goals: Time Frame: 8 weeks  1. The patient will improve her 6 minute walk by 165' x 1 within 8 weeks. 2. The patient will report a pain level of 3 or less within 8 weeks. 3. The patient will transition to Healthy Self within 12 weeks. Rehabilitation Potential For Stated Goals: Good  Regarding Sharlene Barrientos's therapy, I certify that the treatment plan above will be carried out by a therapist or under their direction. Thank you for this referral,  Felisha Wylie PT     Referring Physician Signature: Efraín Sloan MD              Date                    The information in this section was collected on 6/26/18 (except where otherwise noted). HISTORY:   History of Present Injury/Illness (Reason for Referral):  Post lymphoma treatment, lumbar pain, altered gait due to poor eyesight     Past Medical History/Comorbidities:   Ms. Olu Arreola  has a past medical history of Anemia; Basal cell carcinoma; Cardiomegaly; Deep vein thrombosis (DVT) ([address] History of stroke; Hypertension; Marginal zone lymphoma ([address] (03/30/2017); Morbid obesity ([address] Osteoarthritis; Overactive bladder; Primary hypothyroidism; Radiation therapy complication; Rheumatic fever; S/P total knee replacement using cement (10/3/2011); Shingles; and Superficial venous thrombosis of right arm (5/1/2017). She also has no past medical history of Aneurysm ([address] Arrhythmia; Asthma; Autoimmune disease ([address] CAD (coronary artery disease); Chronic kidney disease; Coagulation defects; COPD; Diabetes ([address] Difficult intubation; GERD (gastroesophageal reflux disease); Heart failure ([address] Liver disease; Malignant hyperthermia due to anesthesia; Nausea & vomiting; Pseudocholinesterase deficiency; Psychiatric disorder; PUD (peptic ulcer disease); Seizures ([address] Stroke [facility]); or Unspecified sleep apnea.   Ms. Olu Arreola  has a past surgical history that includes pr anesth,achilles tendon surg (Left, 02/10/2011); hx cholecystectomy (1983); hx vascular access; hx knee replacement (Left, 2013); and hx knee replacement (Right, 2012). Past Medical History:   Diagnosis Date    Anemia     Basal cell carcinoma     Cardiomegaly     Deep vein thrombosis (DVT) (HCC)     History of stroke     Hypertension     Marginal zone lymphoma ([facility] 03/30/2017    Morbid obesity ([facility]     Osteoarthritis     Overactive bladder     Primary hypothyroidism     Radiation therapy complication     Rheumatic fever     S/P total knee replacement using cement 10/3/2011    Shingles     Superficial venous thrombosis of right arm 5/1/2017     Past Surgical History:   Procedure Laterality Date    HX CHOLECYSTECTOMY  1983    HX KNEE REPLACEMENT Left 2013    Dr. Yasmin Fontana Right 2012    Dr. Yaneth Dick Left 02/10/2011    Dr. Caryn Linares:     lives with spouse  Prior Level of Function/Work/Activity:    Dominant Side:         RIGHT  Current Medications:       Current Outpatient Prescriptions:     magic mouthwash (ALEXSANDER) susp, Take 10 mL by mouth every four (4) hours as needed. , Disp: 2 Bottle, Rfl: 2    nystatin (MYCOSTATIN) powder, Apply  to affected area three (3) times daily. , Disp: 60 g, Rfl: 2    pregabalin (LYRICA) 100 mg capsule, Take 1 Cap by mouth three (3) times daily. Max Daily Amount: 300 mg., Disp: 90 Cap, Rfl: 3    fluconazole (DIFLUCAN) 150 mg tablet, Take 1 Tab by mouth daily. FDA advises cautious prescribing of oral fluconazole in pregnancy. , Disp: 30 Tab, Rfl: 2    levothyroxine (SYNTHROID) 175 mcg tablet, Take 1 Tab by mouth Daily (before breakfast). , Disp: 30 Tab, Rfl: 5    allopurinol (ZYLOPRIM) 300 mg tablet, Take 1 Tab by mouth daily. , Disp: 30 Tab, Rfl: 1    ondansetron hcl (ZOFRAN, AS HYDROCHLORIDE,) 8 mg tablet, Take 1 Tab by mouth every eight (8) hours as needed for Nausea (or vomiting). , Disp: 90 Tab, Rfl: 2    fluconazole (DIFLUCAN) 150 mg tablet, Take 150 mg by mouth daily. FDA advises cautious prescribing of oral fluconazole in pregnancy. , Disp: , Rfl:     acyclovir (ZOVIRAX) 400 mg tablet, Take 1 Tab by mouth two (2) times a day., Disp: 60 Tab, Rfl: 3    magnesium oxide (MAG-OX) 400 mg tablet, Take 1 Tab by mouth two (2) times a day., Disp: 60 Tab, Rfl: 1    lidocaine-prilocaine (EMLA) topical cream, Apply  to affected area as needed for Pain. Apply to port site 45-60 minutes prior to lab appt or infusion. , Disp: 30 g, Rfl: 0   Date Last Reviewed:  6/26/18   Number of Personal Factors/Comorbidities that affect the Plan of Care: 3+: HIGH COMPLEXITY   EXAMINATION:   ROM:          Within functional limits  Strength:          Bilateral upper extremities grossly 4-/5 x 2 extremities  Bilateral lower extremities 4/5 x 2 extremities    Balance:          Intact, limited due to need for cataracts  Skin Integrity:          intact  Edema/Girth:  pitting    Left Right    Initial Most Recent Initial Most Recent   Upper  Extremity           Lower  Extremity               Body Structures Involved:  1. Muscles Body Functions Affected:  1. Sensory/Pain  2. Neuromusculoskeletal Activities and Participation Affected:  1. None   Number of elements (examined above) that affect the Plan of Care: 3: MODERATE COMPLEXITY   CLINICAL PRESENTATION:   Presentation: Evolving clinical presentation with changing clinical characteristics: MODERATE COMPLEXITY   CLINICAL DECISION MAKING:   Outcome Measure:    Tool Used: 6-MINUTE WALK TEST  Score:  Initial: 1300' feet Most Recent: X feet (Date: -- )   Interpretation of Score: Normal range varies but is approximately 4618-3841 Feet      Distance walked: 1300 feet               Baseline End of Test   Heart Rate 85 118   Dyspnea (Luther Scale)     Fatigue (Luther Scale)  3   SpO2 98 96   /84 183/93     Score 2133 0184-4062 8965-6809 8363-586 [id number] 426-16 15-0   Modifier  CI CJ CK CL CM CN     ? Mobility - Walking and Moving Around:     - CURRENT STATUS: CJ - 20%-39% impaired, limited or restricted    - GOAL STATUS: CJ - 20%-39% impaired, limited or restricted    - D/C STATUS:  ---------------To be determined---------------    Tool Used: TINETTI  Score:  Initial:   Gait: 11/12  Balance: 16/16  TOTAL: 27/28 Most Recent:  Gait: /12  Balance: /16  TOTAL: /28   Interpretation of Score: The maximum score for the gait component is 12 points. The maximum score for the balance component is 16 points. The maximum total score is 28 points. In general, patients who score below 19 are at a high risk for falls. Patients who score in the range of 19-24 indicate that the patient has a risk for falls. Score 28 27-23 22-18 17-12 11-6 5-1 0   Modifier  CI CJ CK CL CM CN         Tool Used: Timed Up and Go (TUG)  Score:  Initial: 7 seconds Most Recent: X seconds (Date: -- )   Interpretation of Score: The test measures, in seconds, the time taken by an individual to stand up from a standard arm chair (seat height 46 cm [18 in], arm height 65 cm [25.6 in]), walk a distance of 3 meters (118 in, approx 10 ft), turn, walk back to the chair and sit down. If the individual takes longer than 14 seconds to complete TUG, this indicates risk for falls. Score 7 7.5-10.5 11-14 14.5-17.5 18-21 21.5-24.5 25+   Modifier  CI CJ CK CL CM CN         Tool Used: ECOG Performance Survey Score  Score:  Initial: 1 Most Recent:      Interpretation of Score:   0 Fully active, able to carry on all pre-disease performance without restriction   1 Restricted in physically strenuous activity but ambulatory and able to carry out work of a light or sedentary nature, e.g., light house work, office work   2 Ambulatory and capable of all selfcare but unable to carry out any work activities.  Up and about more than 50% of waking hours   3 Capable of only limited selfcare, confined to bed or chair more than 50% of waking hours   4 Completely disabled. Cannot carry on any selfcare. Totally confined to bed or chair   5 Dead     Medical Necessity:   · Patient is expected to demonstrate progress in strength and overall conditionoing to increase independence with household activity. Reason for Services/Other Comments:  · Patient continues to demonstrate capacity to improve strength and overall conditionoing which will increase independence. Use of outcome tool(s) and clinical judgement create a POC that gives a: Clear prediction of patient's progress: LOW COMPLEXITY            TREATMENT:   (In addition to Assessment/Re-Assessment sessions the following treatments were rendered)  Pre-treatment Symptoms/Complaints:  Lumbar pain, weakness, decreased activity tolerance, altered gait. The patient reports she has been referred to a pain center for her back pain. She needs more info before agreeing to a procedure involving radio waves. Pain: Initial:     0 took Tylenol Post Session:  0   142/87   O2 99 HR 74  325' x 1 O2 98   UBE level 1 x 4 min  325'x1 O2 98   Nustep level 2 x 7 min SPM 80 mets 2.7  325' x 1 O2 97   Sit to stand x 10 reps O2 98   Bilateral upper extremity with 2# x 10 reps bicep curls, forward flexion, abduction, triceps extension, bent over rows  Counter top push ups x 10 reps. 325' x 1 O2 96   Fatigue 0        as above  As above  [employer] Portal  Treatment/Session Assessment:    · Response to Treatment:  Tolerated the assessment well. Less rest periods needed. · Compliance with Program/Exercises: Will assess as treatment progresses. · Recommendations/Intent for next treatment session: \"Next visit will focus on advancements to more challenging activities\".   Total Treatment Duration: 48 min  PT Patient Time In/Time Out  Time In: 0842  Time Out: 0930    Muriel Healy, PT

## 2018-07-10 ENCOUNTER — HOSPITAL ENCOUNTER (OUTPATIENT)
Dept: PHYSICAL THERAPY | Age: 58
Discharge: HOME | End: 2018-07-10
Payer: COMMERCIAL

## 2018-07-10 PROCEDURE — 97110 THERAPEUTIC EXERCISES: CPT

## 2018-07-10 NOTE — PROGRESS NOTES
Ebb Lacks  : 1960  Primary: [address] [facility]  Secondary:  [address]  at [facility]  [address], [address], [place]  [phone]   [phone]          OUTPATIENT PHYSICAL THERAPY:Daily Note    ICD-10: Treatment Diagnosis: muscle weakness generalized M 62.81  Precautions/Allergies:   Review of patient's allergies indicates no known allergies. Fall Risk Score: 0 (? 5 = High Risk)  MD Orders: oncology rehab MEDICAL/REFERRING DIAGNOSIS:  Other specified types of non-hodgkin lymphoma, lymph nodes of multiple sites [C85.88]    DATE OF ONSET: 3/30/17  REFERRING PHYSICIAN: Efraín Sloan MD  RETURN PHYSICIAN APPOINTMENT: 18     INITIAL ASSESSMENT:  Ms. lOu Arreola presents following treatment of lymphoma. She previously was a participant with oncology rehab. She stopped when she resumed chemo. She returns with overall deconditioning and decreased strength. She will benefit from therapeutic exercises to improve strength and activity tolerance. She is currently being treated at the pain center for lumbar pain. She returns to then July 3. She has developed cataracts and is in need of these being removed. Due to the poor eyesight her gait is slightly altered. PROBLEM LIST (Impacting functional limitations):  1. Decreased Strength  2. Increased Pain  3. Decreased Activity Tolerance  4. Increased Fatigue  5. Increased Shortness of Breath  6. Decreased [place] with Home Exercise Program INTERVENTIONS PLANNED:  1. Home Exercise Program (HEP)  2. Therapeutic Exercise/Strengthening   TREATMENT PLAN:  Effective Dates: 2018 TO 2018 (90 days). Frequency/Duration: 2 times a week for 90 Days  GOALS: (Goals have been discussed and agreed upon with patient.)  Short-Term Functional Goals: Time Frame: 4 weeks  1. The patient will be independent with HEP for strength within 4 weeks.   2. The patient will gain 1/2 grade strength of all 4 extremities within 4 weeks. 3. The patient will report walking 10 minutes twice daily within 4 weeks. Discharge Goals: Time Frame: 8 weeks  1. The patient will improve her 6 minute walk by 165' x 1 within 8 weeks. 2. The patient will report a pain level of 3 or less within 8 weeks. 3. The patient will transition to Healthy Self within 12 weeks. Rehabilitation Potential For Stated Goals: Good  Regarding Sharlene Barrientos's therapy, I certify that the treatment plan above will be carried out by a therapist or under their direction. Thank you for this referral,  Felisha Wylie PT     Referring Physician Signature: Efraín Sloan MD              Date                    The information in this section was collected on 6/26/18 (except where otherwise noted). HISTORY:   History of Present Injury/Illness (Reason for Referral):  Post lymphoma treatment, lumbar pain, altered gait due to poor eyesight     Past Medical History/Comorbidities:   Ms. Olu Arreola  has a past medical history of Anemia; Basal cell carcinoma; Cardiomegaly; Deep vein thrombosis (DVT) ([address] History of stroke; Hypertension; Marginal zone lymphoma ([address] (03/30/2017); Morbid obesity ([address] Osteoarthritis; Overactive bladder; Primary hypothyroidism; Radiation therapy complication; Rheumatic fever; S/P total knee replacement using cement (10/3/2011); Shingles; and Superficial venous thrombosis of right arm (5/1/2017). She also has no past medical history of Aneurysm ([address] Arrhythmia; Asthma; Autoimmune disease ([address] CAD (coronary artery disease); Chronic kidney disease; Coagulation defects; COPD; Diabetes ([address] Difficult intubation; GERD (gastroesophageal reflux disease); Heart failure ([address] Liver disease; Malignant hyperthermia due to anesthesia; Nausea & vomiting; Pseudocholinesterase deficiency; Psychiatric disorder; PUD (peptic ulcer disease); Seizures ([address] Stroke [facility]); or Unspecified sleep apnea.   Ms. Olu Arreola  has a past surgical history that includes pr anesth,achilles tendon surg (Left, 02/10/2011); hx cholecystectomy (1983); hx vascular access; hx knee replacement (Left, 2013); and hx knee replacement (Right, 2012). Past Medical History:   Diagnosis Date    Anemia     Basal cell carcinoma     Cardiomegaly     Deep vein thrombosis (DVT) (HCC)     History of stroke     Hypertension     Marginal zone lymphoma [facility] 03/30/2017    Morbid obesity ([facility]     Osteoarthritis     Overactive bladder     Primary hypothyroidism     Radiation therapy complication     Rheumatic fever     S/P total knee replacement using cement 10/3/2011    Shingles     Superficial venous thrombosis of right arm 5/1/2017     Past Surgical History:   Procedure Laterality Date    HX CHOLECYSTECTOMY  1983    HX KNEE REPLACEMENT Left 2013    Dr. Jud Ospina Right 2012    Dr. Anthony Cai Left 02/10/2011    Dr. Tracie Brennan:     lives with spouse  Prior Level of Function/Work/Activity:    Dominant Side:         RIGHT  Current Medications:       Current Outpatient Prescriptions:     magic mouthwash (ALEXSANDER) susp, Take 10 mL by mouth every four (4) hours as needed. , Disp: 2 Bottle, Rfl: 2    nystatin (MYCOSTATIN) powder, Apply  to affected area three (3) times daily. , Disp: 60 g, Rfl: 2    pregabalin (LYRICA) 100 mg capsule, Take 1 Cap by mouth three (3) times daily. Max Daily Amount: 300 mg., Disp: 90 Cap, Rfl: 3    fluconazole (DIFLUCAN) 150 mg tablet, Take 1 Tab by mouth daily. FDA advises cautious prescribing of oral fluconazole in pregnancy. , Disp: 30 Tab, Rfl: 2    levothyroxine (SYNTHROID) 175 mcg tablet, Take 1 Tab by mouth Daily (before breakfast). , Disp: 30 Tab, Rfl: 5    allopurinol (ZYLOPRIM) 300 mg tablet, Take 1 Tab by mouth daily. , Disp: 30 Tab, Rfl: 1    ondansetron hcl (ZOFRAN, AS HYDROCHLORIDE,) 8 mg tablet, Take 1 Tab by mouth every eight (8) hours as needed for Nausea (or vomiting). , Disp: 90 Tab, Rfl: 2    fluconazole (DIFLUCAN) 150 mg tablet, Take 150 mg by mouth daily. FDA advises cautious prescribing of oral fluconazole in pregnancy. , Disp: , Rfl:     acyclovir (ZOVIRAX) 400 mg tablet, Take 1 Tab by mouth two (2) times a day., Disp: 60 Tab, Rfl: 3    magnesium oxide (MAG-OX) 400 mg tablet, Take 1 Tab by mouth two (2) times a day., Disp: 60 Tab, Rfl: 1    lidocaine-prilocaine (EMLA) topical cream, Apply  to affected area as needed for Pain. Apply to port site 45-60 minutes prior to lab appt or infusion. , Disp: 30 g, Rfl: 0   Date Last Reviewed:  6/26/18   Number of Personal Factors/Comorbidities that affect the Plan of Care: 3+: HIGH COMPLEXITY   EXAMINATION:   ROM:          Within functional limits  Strength:          Bilateral upper extremities grossly 4-/5 x 2 extremities  Bilateral lower extremities 4/5 x 2 extremities    Balance:          Intact, limited due to need for cataracts  Skin Integrity:          intact  Edema/Girth:  pitting    Left Right    Initial Most Recent Initial Most Recent   Upper  Extremity           Lower  Extremity               Body Structures Involved:  1. Muscles Body Functions Affected:  1. Sensory/Pain  2. Neuromusculoskeletal Activities and Participation Affected:  1. None   Number of elements (examined above) that affect the Plan of Care: 3: MODERATE COMPLEXITY   CLINICAL PRESENTATION:   Presentation: Evolving clinical presentation with changing clinical characteristics: MODERATE COMPLEXITY   CLINICAL DECISION MAKING:   Outcome Measure:    Tool Used: 6-MINUTE WALK TEST  Score:  Initial: 1300' feet Most Recent: X feet (Date: -- )   Interpretation of Score: Normal range varies but is approximately 1547-0921 Feet      Distance walked: 1300 feet               Baseline End of Test   Heart Rate 85 118   Dyspnea (Luther Scale)     Fatigue (Luther Scale)  3   SpO2 98 96   /84 183/93     Score 2133 8506-9897 1778-0944 3628-721 857-835 426-16 15-0   Modifier  CI CJ CK CL CM CN     ? Mobility - Walking and Moving Around:     - CURRENT STATUS: CJ - 20%-39% impaired, limited or restricted    - GOAL STATUS: CJ - 20%-39% impaired, limited or restricted    - D/C STATUS:  ---------------To be determined---------------    Tool Used: TINETTI  Score:  Initial:   Gait: 11/12  Balance: 16/16  TOTAL: 27/28 Most Recent:  Gait: /12  Balance: /16  TOTAL: /28   Interpretation of Score: The maximum score for the gait component is 12 points. The maximum score for the balance component is 16 points. The maximum total score is 28 points. In general, patients who score below 19 are at a high risk for falls. Patients who score in the range of 19-24 indicate that the patient has a risk for falls. Score 28 27-23 22-18 17-12 11-6 5-1 0   Modifier  CI CJ CK CL CM CN         Tool Used: Timed Up and Go (TUG)  Score:  Initial: 7 seconds Most Recent: X seconds (Date: -- )   Interpretation of Score: The test measures, in seconds, the time taken by an individual to stand up from a standard arm chair (seat height 46 cm [18 in], arm height 65 cm [25.6 in]), walk a distance of 3 meters (118 in, approx 10 ft), turn, walk back to the chair and sit down. If the individual takes longer than 14 seconds to complete TUG, this indicates risk for falls. Score 7 7.5-10.5 11-14 14.5-17.5 18-21 21.5-24.5 25+   Modifier  CI CJ CK CL CM CN         Tool Used: ECOG Performance Survey Score  Score:  Initial: 1 Most Recent:      Interpretation of Score:   0 Fully active, able to carry on all pre-disease performance without restriction   1 Restricted in physically strenuous activity but ambulatory and able to carry out work of a light or sedentary nature, e.g., light house work, office work   2 Ambulatory and capable of all selfcare but unable to carry out any work activities.  Up and about more than 50% of waking hours   3 Capable of only limited selfcare, confined to bed or chair more than 50% of waking hours   4 Completely disabled. Cannot carry on any selfcare. Totally confined to bed or chair   5 Dead     Medical Necessity:   · Patient is expected to demonstrate progress in strength and overall conditionoing to increase independence with household activity. Reason for Services/Other Comments:  · Patient continues to demonstrate capacity to improve strength and overall conditionoing which will increase independence. Use of outcome tool(s) and clinical judgement create a POC that gives a: Clear prediction of patient's progress: LOW COMPLEXITY            TREATMENT:   (In addition to Assessment/Re-Assessment sessions the following treatments were rendered)  Pre-treatment Symptoms/Complaints:  Lumbar pain, weakness, decreased activity tolerance, altered gait. Pain: Initial:     0 took Tylenol Post Session:  0   129/81   O2 95 HR 86  325' x 1 O2 98   UBE level 1 x 4 min  325'x1 O2 98   Nustep level 2 x 7 min SPM 81 mets 2.5  325' x 1 O2 97   Sit to stand x 10 reps O2 98   Bilateral upper extremity with 2# x 10 reps bicep curls, forward flexion, abduction, triceps extension, bent over rows  Counter top push ups x 10 reps. 325' x 1 O2 98   Standing up on toes, hip abduction, hip extension x 10 reps   Fatigue 0        as above  As above  [facility] Portal  Treatment/Session Assessment:    · Response to Treatment:  Tolerated the assessment well. Less rest periods needed. Able to add lower extremity exercises. · Compliance with Program/Exercises: Will assess as treatment progresses. · Recommendations/Intent for next treatment session: \"Next visit will focus on advancements to more challenging activities\".   Total Treatment Duration: 39 min  PT Patient Time In/Time Out  Time In: 0829  Time Out: 0908    Stewart Cushing, PT

## 2018-07-12 ENCOUNTER — HOSPITAL ENCOUNTER (OUTPATIENT)
Dept: PHYSICAL THERAPY | Age: 58
Discharge: HOME | End: 2018-07-12
Payer: COMMERCIAL

## 2018-07-12 PROCEDURE — 97110 THERAPEUTIC EXERCISES: CPT

## 2018-07-16 ENCOUNTER — HOSPITAL ENCOUNTER (OUTPATIENT)
Dept: NON INVASIVE DIAGNOSTICS | Age: 58
Discharge: HOME | End: 2018-07-16
Attending: INTERNAL MEDICINE
Payer: COMMERCIAL

## 2018-07-16 ENCOUNTER — APPOINTMENT (OUTPATIENT)
Dept: PHYSICAL THERAPY | Age: 58
End: 2018-07-16
Payer: COMMERCIAL

## 2018-07-16 DIAGNOSIS — C83.39 DIFFUSE LARGE B-CELL LYMPHOMA OF SOLID ORGAN EXCLUDING SPLEEN (HCC): ICD-10-CM

## 2018-07-16 DIAGNOSIS — C85.88 MARGINAL ZONE LYMPHOMA OF LYMPH NODES OF MULTIPLE SITES (HCC): Chronic | ICD-10-CM

## 2018-07-16 PROCEDURE — C8929 TTE W OR WO FOL WCON,DOPPLER: HCPCS

## 2018-07-16 PROCEDURE — 74011000250 HC RX REV CODE- 250: Performed by: INTERNAL MEDICINE

## 2018-07-16 PROCEDURE — 74011250636 HC RX REV CODE- 250/636: Performed by: INTERNAL MEDICINE

## 2018-07-16 RX ADMIN — PERFLUTREN 1 ML: 6.52 INJECTION, SUSPENSION INTRAVENOUS at 13:00

## 2018-07-17 ENCOUNTER — HOSPITAL ENCOUNTER (OUTPATIENT)
Dept: PET IMAGING | Age: 58
Discharge: HOME | End: 2018-07-17
Payer: COMMERCIAL

## 2018-07-17 DIAGNOSIS — C85.88 MARGINAL ZONE LYMPHOMA OF LYMPH NODES OF MULTIPLE SITES (HCC): Chronic | ICD-10-CM

## 2018-07-17 DIAGNOSIS — C83.39 DIFFUSE LARGE B-CELL LYMPHOMA OF SOLID ORGAN EXCLUDING SPLEEN (HCC): ICD-10-CM

## 2018-07-17 PROCEDURE — A9552 F18 FDG: HCPCS

## 2018-07-17 PROCEDURE — 74011636320 HC RX REV CODE- 636/320: Performed by: INTERNAL MEDICINE

## 2018-07-17 RX ORDER — SODIUM CHLORIDE 0.9 % (FLUSH) 0.9 %
10 SYRINGE (ML) INJECTION
Status: COMPLETED | OUTPATIENT
Start: 2018-07-17 | End: 2018-07-17

## 2018-07-17 RX ADMIN — DIATRIZOATE MEGLUMINE AND DIATRIZOATE SODIUM 10 ML: 660; 100 LIQUID ORAL; RECTAL at 07:56

## 2018-07-17 RX ADMIN — Medication 10 ML: at 07:56

## 2018-07-18 ENCOUNTER — PATIENT OUTREACH (OUTPATIENT)
Dept: CASE MANAGEMENT | Age: 58
End: 2018-07-18

## 2018-07-18 ENCOUNTER — HOSPITAL ENCOUNTER (OUTPATIENT)
Dept: LAB | Age: 58
Discharge: HOME | End: 2018-07-18
Payer: COMMERCIAL

## 2018-07-18 DIAGNOSIS — C83.39 DIFFUSE LARGE B-CELL LYMPHOMA OF SOLID ORGAN EXCLUDING SPLEEN (HCC): ICD-10-CM

## 2018-07-18 DIAGNOSIS — C85.88 MARGINAL ZONE LYMPHOMA OF LYMPH NODES OF MULTIPLE SITES (HCC): Chronic | ICD-10-CM

## 2018-07-18 DIAGNOSIS — C85.88 MARGINAL ZONE LYMPHOMA OF LYMPH NODES OF MULTIPLE SITES (HCC): ICD-10-CM

## 2018-07-18 DIAGNOSIS — C83.30 DIFFUSE LARGE B-CELL LYMPHOMA, UNSPECIFIED BODY REGION (HCC): ICD-10-CM

## 2018-07-18 LAB
ALBUMIN SERPL-MCNC: 3.9 G/DL (ref 3.5–5)
ALBUMIN/GLOB SERPL: 1.4 {RATIO}
ALP SERPL-CCNC: 125 U/L (ref 50–136)
ALT SERPL-CCNC: 47 U/L (ref 12–65)
ANION GAP SERPL CALC-SCNC: 6 MMOL/L
AST SERPL-CCNC: 26 U/L (ref 15–37)
BASOPHILS # BLD: 0 K/UL (ref 0–0.2)
BASOPHILS NFR BLD: 0 % (ref 0–2)
BILIRUB SERPL-MCNC: 0.3 MG/DL (ref 0.2–1.1)
BUN SERPL-MCNC: 17 MG/DL (ref 6–23)
CALCIUM SERPL-MCNC: 9.1 MG/DL (ref 8.3–10.4)
CHLORIDE SERPL-SCNC: 106 MMOL/L (ref 98–107)
CO2 SERPL-SCNC: 29 MMOL/L (ref 21–32)
CREAT SERPL-MCNC: 0.8 MG/DL (ref 0.6–1)
DIFFERENTIAL METHOD BLD: ABNORMAL
EOSINOPHIL # BLD: 0.1 K/UL (ref 0–0.8)
EOSINOPHIL NFR BLD: 3 % (ref 0.5–7.8)
ERYTHROCYTE [DISTWIDTH] IN BLOOD BY AUTOMATED COUNT: 14.5 % (ref 11.9–14.6)
GLOBULIN SER CALC-MCNC: 2.7 G/DL
GLUCOSE SERPL-MCNC: 117 MG/DL (ref 65–100)
HCT VFR BLD AUTO: 30.5 % (ref 35.8–46.3)
HGB BLD-MCNC: 10.3 G/DL (ref 11.7–15.4)
LDH SERPL L TO P-CCNC: 211 U/L (ref 100–190)
LYMPHOCYTES # BLD: 0.3 K/UL (ref 0.5–4.6)
LYMPHOCYTES NFR BLD: 7 % (ref 13–44)
MAGNESIUM SERPL-MCNC: 2.1 MG/DL (ref 1.8–2.4)
MCH RBC QN AUTO: 33.3 PG (ref 26.1–32.9)
MCHC RBC AUTO-ENTMCNC: 33.8 G/DL (ref 31.4–35)
MCV RBC AUTO: 98.7 FL (ref 79.6–97.8)
MONOCYTES # BLD: 0.5 K/UL (ref 0.1–1.3)
MONOCYTES NFR BLD: 10 % (ref 4–12)
NEUTS SEG # BLD: 3.6 K/UL (ref 1.7–8.2)
NEUTS SEG NFR BLD: 80 % (ref 43–78)
NRBC # BLD: 0 K/UL (ref 0–0.2)
PLATELET # BLD AUTO: 103 K/UL (ref 150–450)
PMV BLD AUTO: 10.4 FL (ref 10.8–14.1)
POTASSIUM SERPL-SCNC: 4.3 MMOL/L (ref 3.5–5.1)
PROT SERPL-MCNC: 6.6 G/DL (ref 6.3–8.2)
RBC # BLD AUTO: 3.09 M/UL (ref 4.05–5.25)
SODIUM SERPL-SCNC: 141 MMOL/L (ref 136–145)
WBC # BLD AUTO: 4.5 K/UL (ref 4.3–11.1)

## 2018-07-18 PROCEDURE — 85025 COMPLETE CBC W/AUTO DIFF WBC: CPT | Performed by: INTERNAL MEDICINE

## 2018-07-18 PROCEDURE — 83735 ASSAY OF MAGNESIUM: CPT | Performed by: INTERNAL MEDICINE

## 2018-07-18 PROCEDURE — 80053 COMPREHEN METABOLIC PANEL: CPT | Performed by: INTERNAL MEDICINE

## 2018-07-18 PROCEDURE — 83615 LACTATE (LD) (LDH) ENZYME: CPT | Performed by: INTERNAL MEDICINE

## 2018-07-18 NOTE — PROGRESS NOTES
Pt was seen and labs reviewed by Dr. Santi Zabala. Pt was told that her PET scan was clear and shows no evidence of disease. OK for pt to proceed with cataract surgery and spine procedure now. Pt with swelling and pain to LLE. Will do doppler to r/o clot. Pt may have nerve study done on her leg once swelling subsides per another provider. Pt to continue Acyclovir and Diflucan x 6 months. She will return for port flush ion 6 weeks and return to clinic in 3 months. Will plan to repeat PET in 6 months.

## 2018-07-19 ENCOUNTER — HOSPITAL ENCOUNTER (OUTPATIENT)
Dept: PHYSICAL THERAPY | Age: 58
Discharge: HOME | End: 2018-07-19
Payer: COMMERCIAL

## 2018-07-19 ENCOUNTER — HOSPITAL ENCOUNTER (OUTPATIENT)
Dept: ULTRASOUND IMAGING | Age: 58
Discharge: HOME | End: 2018-07-19
Attending: INTERNAL MEDICINE
Payer: COMMERCIAL

## 2018-07-19 DIAGNOSIS — C85.88 MARGINAL ZONE LYMPHOMA OF LYMPH NODES OF MULTIPLE SITES (HCC): Chronic | ICD-10-CM

## 2018-07-19 DIAGNOSIS — C83.39 DIFFUSE LARGE B-CELL LYMPHOMA OF SOLID ORGAN EXCLUDING SPLEEN (HCC): ICD-10-CM

## 2018-07-19 PROCEDURE — 93971 EXTREMITY STUDY: CPT

## 2018-07-19 PROCEDURE — 97110 THERAPEUTIC EXERCISES: CPT

## 2018-07-19 NOTE — PROGRESS NOTES
Makayla Hill  : 1960  Primary: [address] [facility]  Secondary:  [address]  at [facility]  [address], [address], [place] [address]  [phone]   [phone]          OUTPATIENT PHYSICAL THERAPY:Daily Note    ICD-10: Treatment Diagnosis: muscle weakness generalized M 62.81  Precautions/Allergies:   Review of patient's allergies indicates no known allergies. Fall Risk Score: 0 (? 5 = High Risk)  MD Orders: oncology rehab MEDICAL/REFERRING DIAGNOSIS:  Other specified types of non-hodgkin lymphoma, lymph nodes of multiple sites [C85.88]    DATE OF ONSET: 3/30/17  REFERRING PHYSICIAN: Romel Willson MD  RETURN PHYSICIAN APPOINTMENT: 18     INITIAL ASSESSMENT:  Ms. Jailyn Warner presents following treatment of lymphoma. She previously was a participant with oncology rehab. She stopped when she resumed chemo. She returns with overall deconditioning and decreased strength. She will benefit from therapeutic exercises to improve strength and activity tolerance. She is currently being treated at the pain center for lumbar pain. She returns to then July 3. She has developed cataracts and is in need of these being removed. Due to the poor eyesight her gait is slightly altered. PROBLEM LIST (Impacting functional limitations):  1. Decreased Strength  2. Increased Pain  3. Decreased Activity Tolerance  4. Increased Fatigue  5. Increased Shortness of Breath  6. Decreased [place] with Home Exercise Program INTERVENTIONS PLANNED:  1. Home Exercise Program (HEP)  2. Therapeutic Exercise/Strengthening   TREATMENT PLAN:  Effective Dates: 2018 TO 2018 (90 days). Frequency/Duration: 2 times a week for 90 Days  GOALS: (Goals have been discussed and agreed upon with patient.)  Short-Term Functional Goals: Time Frame: 4 weeks  1. The patient will be independent with HEP for strength within 4 weeks.   2. The patient will gain 1/2 grade strength of all 4 extremities within 4 weeks. 3. The patient will report walking 10 minutes twice daily within 4 weeks. Discharge Goals: Time Frame: 8 weeks  1. The patient will improve her 6 minute walk by 165' x 1 within 8 weeks. 2. The patient will report a pain level of 3 or less within 8 weeks. 3. The patient will transition to Healthy Self within 12 weeks. Rehabilitation Potential For Stated Goals: Good  Regarding Sharlene Barrientos's therapy, I certify that the treatment plan above will be carried out by a therapist or under their direction. Thank you for this referral,  Felisha Wylie PT     Referring Physician Signature: Efraín Sloan MD              Date                    The information in this section was collected on 6/26/18 (except where otherwise noted). HISTORY:   History of Present Injury/Illness (Reason for Referral):  Post lymphoma treatment, lumbar pain, altered gait due to poor eyesight     Past Medical History/Comorbidities:   Ms. Olu Arreola  has a past medical history of Anemia; Basal cell carcinoma; Cardiomegaly; Deep vein thrombosis (DVT) ([address] History of stroke; Hypertension; Marginal zone lymphoma ([address] (03/30/2017); Morbid obesity ([address] Osteoarthritis; Overactive bladder; Primary hypothyroidism; Radiation therapy complication; Rheumatic fever; S/P total knee replacement using cement (10/3/2011); Shingles; and Superficial venous thrombosis of right arm (5/1/2017). She also has no past medical history of Aneurysm ([address] Arrhythmia; Asthma; Autoimmune disease ([address] CAD (coronary artery disease); Chronic kidney disease; Coagulation defects; COPD; Diabetes ([address] Difficult intubation; GERD (gastroesophageal reflux disease); Heart failure ([address] Liver disease; Malignant hyperthermia due to anesthesia; Nausea & vomiting; Pseudocholinesterase deficiency; Psychiatric disorder; PUD (peptic ulcer disease); Seizures ([address] Stroke [facility]); or Unspecified sleep apnea.   Ms. Olu Arreola  has a past surgical history that includes pr anesth,achilles tendon surg (Left, 02/10/2011); hx cholecystectomy (1983); hx vascular access; hx knee replacement (Left, 2013); and hx knee replacement (Right, 2012). Past Medical History:   Diagnosis Date    Anemia     Basal cell carcinoma     Cardiomegaly     Deep vein thrombosis (DVT) (HCC)     History of stroke     Hypertension     Marginal [facility] 03/30/2017    Morbid obesity ([facility]     Osteoarthritis     Overactive bladder     Primary hypothyroidism     Radiation therapy complication     Rheumatic fever     S/P total knee replacement using cement 10/3/2011    Shingles     Superficial venous thrombosis of right arm 5/1/2017     Past Surgical History:   Procedure Laterality Date    HX CHOLECYSTECTOMY  1983    HX KNEE REPLACEMENT Left 2013    Dr. Lul Gagnon Right 2012    Dr. Lazarus Barrack Left 02/10/2011    Dr. Caleb Arceo:     lives with spouse  Prior Level of Function/Work/Activity:    Dominant Side:         RIGHT  Current Medications:       Current Outpatient Prescriptions:     pregabalin (LYRICA) 100 mg capsule, Take 1 Cap by mouth three (3) times daily. Max Daily Amount: 300 mg., Disp: 90 Cap, Rfl: 3    acyclovir (ZOVIRAX) 400 mg tablet, Take 1 Tab by mouth two (2) times a day., Disp: 60 Tab, Rfl: 5    fluconazole (DIFLUCAN) 150 mg tablet, Take 1 Tab by mouth daily. FDA advises cautious prescribing of oral fluconazole in pregnancy. , Disp: 30 Tab, Rfl: 5    magic mouthwash (ALEXSANDER) susp, Take 10 mL by mouth every four (4) hours as needed. , Disp: 2 Bottle, Rfl: 2    nystatin (MYCOSTATIN) powder, Apply  to affected area three (3) times daily. , Disp: 60 g, Rfl: 2    fluconazole (DIFLUCAN) 150 mg tablet, Take 1 Tab by mouth daily. FDA advises cautious prescribing of oral fluconazole in pregnancy. , Disp: 30 Tab, Rfl: 2   levothyroxine (SYNTHROID) 175 mcg tablet, Take 1 Tab by mouth Daily (before breakfast). , Disp: 30 Tab, Rfl: 5    allopurinol (ZYLOPRIM) 300 mg tablet, Take 1 Tab by mouth daily. , Disp: 30 Tab, Rfl: 1    ondansetron hcl (ZOFRAN, AS HYDROCHLORIDE,) 8 mg tablet, Take 1 Tab by mouth every eight (8) hours as needed for Nausea (or vomiting). , Disp: 90 Tab, Rfl: 2    magnesium oxide (MAG-OX) 400 mg tablet, Take 1 Tab by mouth two (2) times a day., Disp: 60 Tab, Rfl: 1    lidocaine-prilocaine (EMLA) topical cream, Apply  to affected area as needed for Pain. Apply to port site 45-60 minutes prior to lab appt or infusion. , Disp: 30 g, Rfl: 0   Date Last Reviewed:  6/26/18   Number of Personal Factors/Comorbidities that affect the Plan of Care: 3+: HIGH COMPLEXITY   EXAMINATION:   ROM:          Within functional limits  Strength:          Bilateral upper extremities grossly 4-/5 x 2 extremities  Bilateral lower extremities 4/5 x 2 extremities    Balance:          Intact, limited due to need for cataracts  Skin Integrity:          intact  Edema/Girth:  pitting    Left Right    Initial Most Recent Initial Most Recent   Upper  Extremity           Lower  Extremity               Body Structures Involved:  1. Muscles Body Functions Affected:  1. Sensory/Pain  2. Neuromusculoskeletal Activities and Participation Affected:  1. None   Number of elements (examined above) that affect the Plan of Care: 3: MODERATE COMPLEXITY   CLINICAL PRESENTATION:   Presentation: Evolving clinical presentation with changing clinical characteristics: MODERATE COMPLEXITY   CLINICAL DECISION MAKING:   Outcome Measure:    Tool Used: 6-MINUTE WALK TEST  Score:  Initial: 1300' feet Most Recent: X feet (Date: -- )   Interpretation of Score: Normal range varies but is approximately 1463-7049 Feet      Distance walked: 1300 feet               Baseline End of Test   Heart Rate 85 118   Dyspnea (Luther Scale)     Fatigue (Luther Scale)  3   SpO2 98 96   /84 183/93     Score 2133 3870-3709 6212-2374 [phone] 426-16 15-0   Modifier CH CI CJ CK CL CM CN     ? Mobility - Walking and Moving Around:     - CURRENT STATUS: CJ - 20%-39% impaired, limited or restricted    - GOAL STATUS: CJ - 20%-39% impaired, limited or restricted    - D/C STATUS:  ---------------To be determined---------------    Tool Used: TINETTI  Score:  Initial:   Gait: 11/12  Balance: 16/16  TOTAL: 27/28 Most Recent:  Gait: /12  Balance: /16  TOTAL: /28   Interpretation of Score: The maximum score for the gait component is 12 points. The maximum score for the balance component is 16 points. The maximum total score is 28 points. In general, patients who score below 19 are at a high risk for falls. Patients who score in the range of 19-24 indicate that the patient has a risk for falls. Score 28 27-23 22-18 17-12 11-6 5-1 0   Modifier CH CI CJ CK CL CM CN         Tool Used: Timed Up and Go (TUG)  Score:  Initial: 7 seconds Most Recent: X seconds (Date: -- )   Interpretation of Score: The test measures, in seconds, the time taken by an individual to stand up from a standard arm chair (seat height 46 cm [18 in], arm height 65 cm [25.6 in]), walk a distance of 3 meters (118 in, approx 10 ft), turn, walk back to the chair and sit down. If the individual takes longer than 14 seconds to complete TUG, this indicates risk for falls. Score 7 7.5-10.5 11-14 14.5-17.5 18-21 21.5-24.5 25+   Modifier CH CI CJ CK CL CM CN         Tool Used: ECOG Performance Survey Score  Score:  Initial: 1 Most Recent:      Interpretation of Score:   0 Fully active, able to carry on all pre-disease performance without restriction   1 Restricted in physically strenuous activity but ambulatory and able to carry out work of a light or sedentary nature, e.g., light house work, office work   2 Ambulatory and capable of all selfcare but unable to carry out any work activities.  Up and about more than 50% of waking hours 3 Capable of only limited selfcare, confined to bed or chair more than 50% of waking hours   4 Completely disabled. Cannot carry on any selfcare. Totally confined to bed or chair   5 Dead     Medical Necessity:   · Patient is expected to demonstrate progress in strength and overall conditionoing to increase independence with household activity. Reason for Services/Other Comments:  · Patient continues to demonstrate capacity to improve strength and overall conditionoing which will increase independence. Use of outcome tool(s) and clinical judgement create a POC that gives a: Clear prediction of patient's progress: LOW COMPLEXITY            TREATMENT:   (In addition to Assessment/Re-Assessment sessions the following treatments were rendered)  Pre-treatment Symptoms/Complaints:  Lumbar pain, weakness, decreased activity tolerance, altered gait. Reports she has had an increase in pain and swelling in the left lower extremity. She is being checked for a blood clot today. Pain: Initial:     5/10 left lower extremity Post Session:  0   144/83   O2 98   Fatigue   650'' x 1 O2 97   UBE level 1 x 4 min  650' 'x1 O2 9 HR 1  Nustep level 2 x 8 min SPM 78 mets 2.4  650' ' x 1 O2 97   Sit to stand x 10 reps O2 98   Bilateral upper extremity with 2# x 10 reps bicep curls, forward flexion, abduction, triceps extension, bent over rows  Counter top push ups x 10 reps. Standing up on toes, hip abduction, hip extension x 10 reps   Sustained one leg stance          as above  As above  [facility] Portal  Treatment/Session Assessment:    · Response to Treatment:  Tolerated the treatmenmt well. Increase in tightness in left leg, but pain improved. · Compliance with Program/Exercises: Will assess as treatment progresses. · Recommendations/Intent for next treatment session: \"Next visit will focus on advancements to more challenging activities\".   Total Treatment Duration: 49 min  PT Patient Time In/Time Out  Time In: 0801  Time Out: 9997    Fredna Phlegm, PT

## 2018-07-24 ENCOUNTER — HOSPITAL ENCOUNTER (OUTPATIENT)
Dept: LAB | Age: 58
Discharge: HOME | End: 2018-07-24
Payer: COMMERCIAL

## 2018-07-24 ENCOUNTER — HOSPITAL ENCOUNTER (OUTPATIENT)
Dept: PHYSICAL THERAPY | Age: 58
Discharge: HOME | End: 2018-07-24
Payer: COMMERCIAL

## 2018-07-24 DIAGNOSIS — C85.88 MARGINAL ZONE LYMPHOMA OF LYMPH NODES OF MULTIPLE SITES (HCC): Chronic | ICD-10-CM

## 2018-07-24 LAB
APPEARANCE UR: CLEAR
BACTERIA URNS QL MICRO: NORMAL /HPF
BILIRUB UR QL: NEGATIVE
CASTS URNS QL MICRO: 0 /LPF
COLOR UR: YELLOW
CRYSTALS URNS QL MICRO: 0 /LPF
EPI CELLS #/AREA URNS HPF: NORMAL /HPF
GLUCOSE UR STRIP.AUTO-MCNC: NEGATIVE MG/DL
HGB UR QL STRIP: ABNORMAL
KETONES UR QL STRIP.AUTO: NEGATIVE MG/DL
LEUKOCYTE ESTERASE UR QL STRIP.AUTO: NEGATIVE
MUCOUS THREADS URNS QL MICRO: 0 /LPF
NITRITE UR QL STRIP.AUTO: NEGATIVE
OTHER OBSERVATIONS,UCOM: NORMAL
PH UR STRIP: 5.5 [PH] (ref 5–9)
PROT UR STRIP-MCNC: NEGATIVE MG/DL
RBC #/AREA URNS HPF: NORMAL /HPF
SP GR UR REFRACTOMETRY: 1.02 (ref 1–1.02)
UROBILINOGEN UR QL STRIP.AUTO: 0.2 EU/DL (ref 0.2–1)
WBC URNS QL MICRO: NORMAL /HPF

## 2018-07-24 PROCEDURE — 97110 THERAPEUTIC EXERCISES: CPT

## 2018-07-24 PROCEDURE — 81015 MICROSCOPIC EXAM OF URINE: CPT | Performed by: INTERNAL MEDICINE

## 2018-07-24 PROCEDURE — 81003 URINALYSIS AUTO W/O SCOPE: CPT | Performed by: INTERNAL MEDICINE

## 2018-07-24 NOTE — PROGRESS NOTES
Lavelle Frazier  : 1960  Primary: [address] [facility]  Secondary:  Maninder Lombardo at [address]  [place] [address], [address], [place]  [phone]   [phone]          OUTPATIENT PHYSICAL THERAPY:Daily Note    ICD-10: Treatment Diagnosis: muscle weakness generalized M 62.81  Precautions/Allergies:   Review of patient's allergies indicates no known allergies. Fall Risk Score: 0 (? 5 = High Risk)  MD Orders: oncology rehab MEDICAL/REFERRING DIAGNOSIS:  Other specified types of non-hodgkin lymphoma, lymph nodes of multiple sites [C85.88]    DATE OF ONSET: 3/30/17  REFERRING PHYSICIAN: Venu New MD  RETURN PHYSICIAN APPOINTMENT: 18     INITIAL ASSESSMENT:  Ms. Samm Smallwood presents following treatment of lymphoma. She previously was a participant with oncology rehab. She stopped when she resumed chemo. She returns with overall deconditioning and decreased strength. She will benefit from therapeutic exercises to improve strength and activity tolerance. She is currently being treated at the pain center for lumbar pain. She returns to then July 3. She has developed cataracts and is in need of these being removed. Due to the poor eyesight her gait is slightly altered. PROBLEM LIST (Impacting functional limitations):  1. Decreased Strength  2. Increased Pain  3. Decreased Activity Tolerance  4. Increased Fatigue  5. Increased Shortness of Breath  6. Decreased [place] with Home Exercise Program INTERVENTIONS PLANNED:  1. Home Exercise Program (HEP)  2. Therapeutic Exercise/Strengthening   TREATMENT PLAN:  Effective Dates: 2018 TO 2018 (90 days). Frequency/Duration: 2 times a week for 90 Days  GOALS: (Goals have been discussed and agreed upon with patient.)  Short-Term Functional Goals: Time Frame: 4 weeks  1. The patient will be independent with HEP for strength within 4 weeks.   2. The patient will gain 1/2 grade strength of all 4 extremities within 4 weeks. 3. The patient will report walking 10 minutes twice daily within 4 weeks. Discharge Goals: Time Frame: 8 weeks  1. The patient will improve her 6 minute walk by 165' x 1 within 8 weeks. 2. The patient will report a pain level of 3 or less within 8 weeks. 3. The patient will transition to Healthy Self within 12 weeks. Rehabilitation Potential For Stated Goals: Good  Regarding Flora Barrientos's therapy, I certify that the treatment plan above will be carried out by a therapist or under their direction. Thank you for this referral,  Pascual Jimenez PT     Referring Physician Signature: Leydi Patino MD              Date                    The information in this section was collected on 6/26/18 (except where otherwise noted). HISTORY:   History of Present Injury/Illness (Reason for Referral):  Post lymphoma treatment, lumbar pain, altered gait due to poor eyesight     Past Medical History/Comorbidities:   Ms. Sonia Parker  has a past medical history of Anemia; Basal cell carcinoma; Cardiomegaly; Deep vein thrombosis (DVT) ([address] History of stroke; Hypertension; Marginal zone lymphoma ([address] (03/30/2017); Morbid obesity ([address] Osteoarthritis; Overactive bladder; Primary hypothyroidism; Radiation therapy complication; Rheumatic fever; S/P total knee replacement using cement (10/3/2011); Shingles; and Superficial venous thrombosis of right arm (5/1/2017). She also has no past medical history of Aneurysm ([address] Arrhythmia; Asthma; Autoimmune disease ([address] CAD (coronary artery disease); Chronic kidney disease; Coagulation defects; COPD; Diabetes ([address] Difficult intubation; GERD (gastroesophageal reflux disease); Heart failure ([address] Liver disease; Malignant hyperthermia due to anesthesia; Nausea & vomiting; Pseudocholinesterase deficiency; Psychiatric disorder; PUD (peptic ulcer disease); Seizures ([address] Stroke [facility]); or Unspecified sleep apnea.   Ms. Sonia Parker  has a past surgical history that includes pr anesth,achilles tendon surg (Left, 02/10/2011); hx cholecystectomy (1983); hx vascular access; hx knee replacement (Left, 2013); and hx knee replacement (Right, 2012). Past Medical History:   Diagnosis Date    Anemia     Basal cell carcinoma     Cardiomegaly     Deep vein thrombosis (DVT) (HCC)     History of stroke     Hypertension     Marginal zone lymphoma [address] 03/30/2017    Morbid obesity ([facility]     Osteoarthritis     Overactive bladder     Primary hypothyroidism     Radiation therapy complication     Rheumatic fever     S/P total knee replacement using cement 10/3/2011    Shingles     Superficial venous thrombosis of right arm 5/1/2017     Past Surgical History:   Procedure Laterality Date    HX CHOLECYSTECTOMY  1983    HX KNEE REPLACEMENT Left 2013    Dr. Aminta Lawton Right 2012    Dr. Janice Begum Left 02/10/2011    Dr. Yuko Montano:     lives with spouse  Prior Level of Function/Work/Activity:    Dominant Side:         RIGHT  Current Medications:       Current Outpatient Prescriptions:     pregabalin (LYRICA) 100 mg capsule, Take 1 Cap by mouth three (3) times daily. Max Daily Amount: 300 mg., Disp: 270 Cap, Rfl: 1    acyclovir (ZOVIRAX) 400 mg tablet, Take 1 Tab by mouth two (2) times a day., Disp: 180 Tab, Rfl: 1    fluconazole (DIFLUCAN) 150 mg tablet, Take 1 Tab by mouth daily. FDA advises cautious prescribing of oral fluconazole in pregnancy. , Disp: 90 Tab, Rfl: 1    magic mouthwash (ALEXSANDER) susp, Take 10 mL by mouth every four (4) hours as needed. , Disp: 2 Bottle, Rfl: 2    nystatin (MYCOSTATIN) powder, Apply  to affected area three (3) times daily. , Disp: 60 g, Rfl: 2    fluconazole (DIFLUCAN) 150 mg tablet, Take 1 Tab by mouth daily. FDA advises cautious prescribing of oral fluconazole in pregnancy. , Disp: 30 Tab, Rfl: 2   levothyroxine (SYNTHROID) 175 mcg tablet, Take 1 Tab by mouth Daily (before breakfast). , Disp: 30 Tab, Rfl: 5    allopurinol (ZYLOPRIM) 300 mg tablet, Take 1 Tab by mouth daily. , Disp: 30 Tab, Rfl: 1    ondansetron hcl (ZOFRAN, AS HYDROCHLORIDE,) 8 mg tablet, Take 1 Tab by mouth every eight (8) hours as needed for Nausea (or vomiting). , Disp: 90 Tab, Rfl: 2    magnesium oxide (MAG-OX) 400 mg tablet, Take 1 Tab by mouth two (2) times a day., Disp: 60 Tab, Rfl: 1    lidocaine-prilocaine (EMLA) topical cream, Apply  to affected area as needed for Pain. Apply to port site 45-60 minutes prior to lab appt or infusion. , Disp: 30 g, Rfl: 0   Date Last Reviewed:  6/26/18   Number of Personal Factors/Comorbidities that affect the Plan of Care: 3+: HIGH COMPLEXITY   EXAMINATION:   ROM:          Within functional limits  Strength:          Bilateral upper extremities grossly 4-/5 x 2 extremities  Bilateral lower extremities 4/5 x 2 extremities    Balance:          Intact, limited due to need for cataracts  Skin Integrity:          intact  Edema/Girth:  pitting    Left Right    Initial Most Recent Initial Most Recent   Upper  Extremity           Lower  Extremity               Body Structures Involved:  1. Muscles Body Functions Affected:  1. Sensory/Pain  2. Neuromusculoskeletal Activities and Participation Affected:  1. None   Number of elements (examined above) that affect the Plan of Care: 3: MODERATE COMPLEXITY   CLINICAL PRESENTATION:   Presentation: Evolving clinical presentation with changing clinical characteristics: MODERATE COMPLEXITY   CLINICAL DECISION MAKING:   Outcome Measure:    Tool Used: 6-MINUTE WALK TEST  Score:  Initial: 1300' feet Most Recent: X feet (Date: -- )   Interpretation of Score: Normal range varies but is approximately 4404-6843 Feet      Distance walked: 1300 feet               Baseline End of Test   Heart Rate 85 118   Dyspnea (Luther Scale)     Fatigue (Luther Scale)  3   SpO2 98 96   /84 183/93     Score 2133 7356-9185 7057-7077 [phone] 426-16 15-0   Modifier  CI CJ CK CL CM CN     ? Mobility - Walking and Moving Around:     - CURRENT STATUS: CJ - 20%-39% impaired, limited or restricted    - GOAL STATUS: CJ - 20%-39% impaired, limited or restricted    - D/C STATUS:  ---------------To be determined---------------    Tool Used: TINETTI  Score:  Initial:   Gait: 11/12  Balance: 16/16  TOTAL: 27/28 Most Recent:  Gait: /12  Balance: /16  TOTAL: /28   Interpretation of Score: The maximum score for the gait component is 12 points. The maximum score for the balance component is 16 points. The maximum total score is 28 points. In general, patients who score below 19 are at a high risk for falls. Patients who score in the range of 19-24 indicate that the patient has a risk for falls. Score 28 27-23 22-18 17-12 11-6 5-1 0   Modifier  CI CJ CK CL CM CN         Tool Used: Timed Up and Go (TUG)  Score:  Initial: 7 seconds Most Recent: X seconds (Date: -- )   Interpretation of Score: The test measures, in seconds, the time taken by an individual to stand up from a standard arm chair (seat height 46 cm [18 in], arm height 65 cm [25.6 in]), walk a distance of 3 meters (118 in, approx 10 ft), turn, walk back to the chair and sit down. If the individual takes longer than 14 seconds to complete TUG, this indicates risk for falls. Score 7 7.5-10.5 11-14 14.5-17.5 18-21 21.5-24.5 25+   Modifier  CI CJ CK CL CM CN         Tool Used: ECOG Performance Survey Score  Score:  Initial: 1 Most Recent:      Interpretation of Score:   0 Fully active, able to carry on all pre-disease performance without restriction   1 Restricted in physically strenuous activity but ambulatory and able to carry out work of a light or sedentary nature, e.g., light house work, office work   2 Ambulatory and capable of all selfcare but unable to carry out any work activities.  Up and about more than 50% of waking hours 3 Capable of only limited selfcare, confined to bed or chair more than 50% of waking hours   4 Completely disabled. Cannot carry on any selfcare. Totally confined to bed or chair   5 Dead     Medical Necessity:   · Patient is expected to demonstrate progress in strength and overall conditionoing to increase independence with household activity. Reason for Services/Other Comments:  · Patient continues to demonstrate capacity to improve strength and overall conditionoing which will increase independence. Use of outcome tool(s) and clinical judgement create a POC that gives a: Clear prediction of patient's progress: LOW COMPLEXITY            TREATMENT:   (In addition to Assessment/Re-Assessment sessions the following treatments were rendered)  Pre-treatment Symptoms/Complaints:  Lumbar pain, weakness, decreased activity tolerance, altered gait. Reports she had no blood clot. She reports she has burning with urination. She also reports her spouse has been bandaging her leg at night and she has seen a reduction in fluid. Pain: Initial:     2-3/10  Post Session:  2   145/95   O2 98 HR 74  Fatigue 5  650'' x 1 O2 95   UBE level 1 x 6 min  650' 'x1 O2 98   Nustep level 2 x 8 min SPM 80 mets 2.3  650' ' x 1 O2 96   Sit to stand x 10 reps O2 98   Bilateral upper extremity with 3# x 10 reps bicep curls, forward flexion, abduction, triceps extension, bent over rows  Counter top push ups x 10 reps. Standing up on toes, hip abduction, hip extension x 10 reps   Sustained one leg stance          as above  As above  [facility] Portal  Treatment/Session Assessment:    · Response to Treatment:  Tolerated the treatmenmt well. Cough medicine increases patient's fatigue. · Compliance with Program/Exercises: Will assess as treatment progresses. · Recommendations/Intent for next treatment session: \"Next visit will focus on advancements to more challenging activities\".   Total Treatment Duration: 48 min  PT Patient Time In/Time Out  Time In: 0803  Time Out: 6323    Maximino Major, PT

## 2018-07-25 ENCOUNTER — PATIENT OUTREACH (OUTPATIENT)
Dept: CASE MANAGEMENT | Age: 58
End: 2018-07-25

## 2018-07-25 DIAGNOSIS — C85.88 MARGINAL ZONE LYMPHOMA OF LYMPH NODES OF MULTIPLE SITES (HCC): ICD-10-CM

## 2018-07-25 DIAGNOSIS — C83.30 DIFFUSE LARGE B-CELL LYMPHOMA, UNSPECIFIED BODY REGION (HCC): ICD-10-CM

## 2018-07-25 NOTE — PROGRESS NOTES
Pt called stating that she feels like she may have a UTI with symptoms of burning with urination and frequency. Pt came in for U/A and results showed trace of blood and trace of bacteria. Lela Farrell, NP aware, and no new medications needed at this time. Pt encouraged to see her PCP for evaluation to determine cause of burning. Pt verbalized understanding.

## 2018-07-26 ENCOUNTER — HOSPITAL ENCOUNTER (OUTPATIENT)
Dept: PHYSICAL THERAPY | Age: 58
Discharge: HOME | End: 2018-07-26
Payer: COMMERCIAL

## 2018-07-26 PROCEDURE — 97110 THERAPEUTIC EXERCISES: CPT

## 2018-07-26 NOTE — PROGRESS NOTES
Makayla Hill  : 1960  Primary: [address] [facility]  Secondary:  [address]  at [facility]  [address], [address], [place]  [phone]   [phone]          OUTPATIENT PHYSICAL THERAPY:Daily Note    ICD-10: Treatment Diagnosis: muscle weakness generalized M 62.81  Precautions/Allergies:   Review of patient's allergies indicates no known allergies. Fall Risk Score: 0 (? 5 = High Risk)  MD Orders: oncology rehab MEDICAL/REFERRING DIAGNOSIS:  Other specified types of non-hodgkin lymphoma, lymph nodes of multiple sites [C85.88]    DATE OF ONSET: 3/30/17  REFERRING PHYSICIAN: Romel Willson MD  RETURN PHYSICIAN APPOINTMENT: 18     INITIAL ASSESSMENT:  Ms. Jailyn Warner presents following treatment of lymphoma. She previously was a participant with oncology rehab. She stopped when she resumed chemo. She returns with overall deconditioning and decreased strength. She will benefit from therapeutic exercises to improve strength and activity tolerance. She is currently being treated at the pain center for lumbar pain. She returns to then July 3. She has developed cataracts and is in need of these being removed. Due to the poor eyesight her gait is slightly altered. PROBLEM LIST (Impacting functional limitations):  1. Decreased Strength  2. Increased Pain  3. Decreased Activity Tolerance  4. Increased Fatigue  5. Increased Shortness of Breath  6. Decreased [place] with Home Exercise Program INTERVENTIONS PLANNED:  1. Home Exercise Program (HEP)  2. Therapeutic Exercise/Strengthening   TREATMENT PLAN:  Effective Dates: 2018 TO 2018 (90 days). Frequency/Duration: 2 times a week for 90 Days, due to vacation, she will return 18. GOALS: (Goals have been discussed and agreed upon with patient.)  Short-Term Functional Goals: Time Frame: 4 weeks  1. The patient will be independent with HEP for strength within 4 weeks.   2. The patient will gain 1/2 grade strength of all 4 extremities within 4 weeks. 3. The patient will report walking 10 minutes twice daily within 4 weeks. Discharge Goals: Time Frame: 8 weeks  1. The patient will improve her 6 minute walk by 165' x 1 within 8 weeks. 2. The patient will report a pain level of 3 or less within 8 weeks. 3. The patient will transition to Healthy Self within 12 weeks. Rehabilitation Potential For Stated Goals: Good  Regarding Guillermo Barrientos's therapy, I certify that the treatment plan above will be carried out by a therapist or under their direction. Thank you for this referral,  Praveen Anthony PT     Referring Physician Signature: Ebony Granger MD              Date                    The information in this section was collected on 6/26/18 (except where otherwise noted). HISTORY:   History of Present Injury/Illness (Reason for Referral):  Post lymphoma treatment, lumbar pain, altered gait due to poor eyesight     Past Medical History/Comorbidities:   Ms. 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 She also has no past medical history of Aneurysm ([address] Arrhythmia; Asthma; Autoimmune disease ([address] CAD (coronary artery disease); Chronic kidney disease; Coagulation defects; COPD; Diabetes ([address] Difficult intubation; GERD (gastroesophageal reflux disease); Heart failure ([address] Liver disease; Malignant hyperthermia due to anesthesia; Nausea & vomiting; Pseudocholinesterase deficiency; Psychiatric disorder; PUD (peptic ulcer disease); Seizures ([address]  Stroke [facility]); or Unspecified sleep apnea.  Ms. Lobito Garcia  has a past surgical history that includes pr anesth,achilles tendon surg (Left, 02/10/2011); hx cholecystectomy (1983); hx vascular access; hx knee replacement (Left, 2013); and hx knee replacement (Right, 2012). Past Medical History:   Diagnosis Date    Anemia     Basal cell carcinoma     Cardiomegaly     Deep vein thrombosis (DVT) (HCC)     History of stroke     Hypertension     [facility]inal [facility] 03/30/2017    Morbid obesity ([facility]     Osteoarthritis     Overactive bladder     Primary hypothyroidism     Radiation therapy complication     Rheumatic fever     S/P total knee replacement using cement 10/3/2011    Shingles     Superficial venous thrombosis of right arm 5/1/2017     Past Surgical History:   Procedure Laterality Date    HX CHOLECYSTECTOMY  1983    HX KNEE REPLACEMENT Left 2013    Dr. Aminta Lawton Right 2012    Dr. Janice Begum Left 02/10/2011    Dr. Yuko Montano:     lives with spouse  Prior Level of Function/Work/Activity:    Dominant Side:         RIGHT  Current Medications:       Current Outpatient Prescriptions:     pregabalin (LYRICA) 100 mg capsule, Take 1 Cap by mouth three (3) times daily. Max Daily Amount: 300 mg., Disp: 270 Cap, Rfl: 1    acyclovir (ZOVIRAX) 400 mg tablet, Take 1 Tab by mouth two (2) times a day., Disp: 180 Tab, Rfl: 1    fluconazole (DIFLUCAN) 150 mg tablet, Take 1 Tab by mouth daily. FDA advises cautious prescribing of oral fluconazole in pregnancy. , Disp: 90 Tab, Rfl: 1    magic mouthwash (ALEXSANDER) susp, Take 10 mL by mouth every four (4) hours as needed. , Disp: 2 Bottle, Rfl: 2    nystatin (MYCOSTATIN) powder, Apply  to affected area three (3) times daily. , Disp: 60 g, Rfl: 2    fluconazole (DIFLUCAN) 150 mg tablet, Take 1 Tab by mouth daily.  FDA advises cautious prescribing of oral fluconazole in pregnancy. , Disp: 30 Tab, Rfl: 2    levothyroxine (SYNTHROID) 175 mcg tablet, Take 1 Tab by mouth Daily (before breakfast). , Disp: 30 Tab, Rfl: 5    allopurinol (ZYLOPRIM) 300 mg tablet, Take 1 Tab by mouth daily. , Disp: 30 Tab, Rfl: 1    ondansetron hcl (ZOFRAN, AS HYDROCHLORIDE,) 8 mg tablet, Take 1 Tab by mouth every eight (8) hours as needed for Nausea (or vomiting). , Disp: 90 Tab, Rfl: 2    magnesium oxide (MAG-OX) 400 mg tablet, Take 1 Tab by mouth two (2) times a day., Disp: 60 Tab, Rfl: 1    lidocaine-prilocaine (EMLA) topical cream, Apply  to affected area as needed for Pain. Apply to port site 45-60 minutes prior to lab appt or infusion. , Disp: 30 g, Rfl: 0   Date Last Reviewed:  6/26/18   Number of Personal Factors/Comorbidities that affect the Plan of Care: 3+: HIGH COMPLEXITY   EXAMINATION:   ROM:          Within functional limits  Strength:          Bilateral upper extremities grossly 4-/5 x 2 extremities  Bilateral lower extremities 4/5 x 2 extremities    Balance:          Intact, limited due to need for cataracts  Skin Integrity:          intact  Edema/Girth:  pitting    Left Right    Initial Most Recent Initial Most Recent   Upper  Extremity           Lower  Extremity               Body Structures Involved:  1. Muscles Body Functions Affected:  1. Sensory/Pain  2. Neuromusculoskeletal Activities and Participation Affected:  1. None   Number of elements (examined above) that affect the Plan of Care: 3: MODERATE COMPLEXITY   CLINICAL PRESENTATION:   Presentation: Evolving clinical presentation with changing clinical characteristics: MODERATE COMPLEXITY   CLINICAL DECISION MAKING:   Outcome Measure:    Tool Used: 6-MINUTE WALK TEST  Score:  Initial: 1300' feet Most Recent: X feet (Date: -- )   Interpretation of Score: Normal range varies but is approximately 2705-7461 Feet      Distance walked: 1300 feet               Baseline End of Test   Heart Rate 85 118   Dyspnea (Luther Scale)     Fatigue (Luther Scale)  3   SpO2 98 96   /84 183/93     Score 2133 [id number] 5072-0139 [phone] 426-16 15-0   Modifier CH CI CJ CK CL CM CN     ? Mobility - Walking and Moving Around:     - CURRENT STATUS: CJ - 20%-39% impaired, limited or restricted    - GOAL STATUS: CJ - 20%-39% impaired, limited or restricted    - D/C STATUS:  ---------------To be determined---------------    Tool Used: TINETTI  Score:  Initial:   Gait: 11/12  Balance: 16/16  TOTAL: 27/28 Most Recent:  Gait: /12  Balance: /16  TOTAL: /28   Interpretation of Score: The maximum score for the gait component is 12 points. The maximum score for the balance component is 16 points. The maximum total score is 28 points. In general, patients who score below 19 are at a high risk for falls. Patients who score in the range of 19-24 indicate that the patient has a risk for falls. Score 28 27-23 22-18 17-12 11-6 5-1 0   Modifier CH CI CJ CK CL CM CN         Tool Used: Timed Up and Go (TUG)  Score:  Initial: 7 seconds Most Recent: X seconds (Date: -- )   Interpretation of Score: The test measures, in seconds, the time taken by an individual to stand up from a standard arm chair (seat height 46 cm [18 in], arm height 65 cm [25.6 in]), walk a distance of 3 meters (118 in, approx 10 ft), turn, walk back to the chair and sit down. If the individual takes longer than 14 seconds to complete TUG, this indicates risk for falls. Score 7 7.5-10.5 11-14 14.5-17.5 18-21 21.5-24.5 25+   Modifier CH CI CJ CK CL CM CN         Tool Used: ECOG Performance Survey Score  Score:  Initial: 1 Most Recent:      Interpretation of Score:   0 Fully active, able to carry on all pre-disease performance without restriction   1 Restricted in physically strenuous activity but ambulatory and able to carry out work of a light or sedentary nature, e.g., light house work, office work   2 Ambulatory and capable of all selfcare but unable to carry out any work activities.  Up and about more than 50% of waking hours   3 Capable of only limited selfcare, confined to bed or chair more than 50% of waking hours   4 Completely disabled. Cannot carry on any selfcare. Totally confined to bed or chair   5 Dead     Medical Necessity:   · Patient is expected to demonstrate progress in strength and overall conditionoing to increase independence with household activity. Reason for Services/Other Comments:  · Patient continues to demonstrate capacity to improve strength and overall conditionoing which will increase independence. Use of outcome tool(s) and clinical judgement create a POC that gives a: Clear prediction of patient's progress: LOW COMPLEXITY            TREATMENT:   (In addition to Assessment/Re-Assessment sessions the following treatments were rendered)  Pre-treatment Symptoms/Complaints:  Lumbar pain, weakness, decreased activity tolerance, altered gait. Reports she is doing well. She is leaving for vacation. Pain: Initial:     2-3/10  Post Session:  2   164/91   O2 94 HR 91  Fatigue 5  650'' x 1 O2 98   UBE level 1 x 7 min  650' 'x1 O2 95   Nustep level 2 x 8 min SPM 79 mets 2.3  650' ' x 1 O2 93   Sit to stand x 10 reps O2 98   Bilateral upper extremity with 3# x 10 reps bicep curls, forward flexion, abduction, triceps extension, bent over rows  Counter top push ups x 10 reps. Standing up on toes, hip abduction, hip extension x 10 reps           as above  As above  [facility] Portal  Treatment/Session Assessment:    · Response to Treatment:  Tolerated the treatmenmt well. · Compliance with Program/Exercises: Will assess as treatment progresses. · Recommendations/Intent for next treatment session: \"Next visit will focus on advancements to more challenging activities\".   Total Treatment Duration: 43 min  PT Patient Time In/Time Out  Time In: 0809  Time Out: 2047    Maximino Major, PT

## 2018-07-31 ENCOUNTER — APPOINTMENT (OUTPATIENT)
Dept: PHYSICAL THERAPY | Age: 58
End: 2018-07-31
Payer: COMMERCIAL

## 2018-08-02 ENCOUNTER — APPOINTMENT (OUTPATIENT)
Dept: PHYSICAL THERAPY | Age: 58
End: 2018-08-02
Payer: COMMERCIAL

## 2018-08-07 ENCOUNTER — HOSPITAL ENCOUNTER (OUTPATIENT)
Dept: PHYSICAL THERAPY | Age: 58
Discharge: HOME | End: 2018-08-07
Payer: COMMERCIAL

## 2018-08-07 PROCEDURE — 97110 THERAPEUTIC EXERCISES: CPT

## 2018-08-07 NOTE — PROGRESS NOTES
Dana Mccain  : 1960  Primary: [facility]  Secondary:  [address]  at [facility]  [address], [address], [place] [address]  [phone]   [phone]          OUTPATIENT PHYSICAL THERAPY:Daily Note    ICD-10: Treatment Diagnosis: muscle weakness generalized M 62.81  Precautions/Allergies:   Review of patient's allergies indicates no known allergies. Fall Risk Score: 0 (? 5 = High Risk)  MD Orders: oncology rehab MEDICAL/REFERRING DIAGNOSIS:  Other specified types of non-hodgkin lymphoma, lymph nodes of multiple sites [C85.88]    DATE OF ONSET: 3/30/17  REFERRING PHYSICIAN: Ernesto Munroe MD  RETURN PHYSICIAN APPOINTMENT: 18     INITIAL ASSESSMENT:  Ms. Caryn Jimenez presents following treatment of lymphoma. She previously was a participant with oncology rehab. She stopped when she resumed chemo. She returns with overall deconditioning and decreased strength. She will benefit from therapeutic exercises to improve strength and activity tolerance. She is currently being treated at the pain center for lumbar pain. She returns to then July 3. She has developed cataracts and is in need of these being removed. Due to the poor eyesight her gait is slightly altered. PROBLEM LIST (Impacting functional limitations):  1. Decreased Strength  2. Increased Pain  3. Decreased Activity Tolerance  4. Increased Fatigue  5. Increased Shortness of Breath  6. Decreased [place] with Home Exercise Program INTERVENTIONS PLANNED:  1. Home Exercise Program (HEP)  2. Therapeutic Exercise/Strengthening   TREATMENT PLAN:  Effective Dates: 2018 TO 2018 (90 days). Frequency/Duration: 2 times a week for 90 Days. The patient will return 18 due to recent death in the family. GOALS: (Goals have been discussed and agreed upon with patient.)  Short-Term Functional Goals: Time Frame: 4 weeks  1.  The patient will be independent with HEP for strength within 4 weeks. 2. The patient will gain 1/2 grade strength of all 4 extremities within 4 weeks. 3. The patient will report walking 10 minutes twice daily within 4 weeks. Discharge Goals: Time Frame: 8 weeks  1. The patient will improve her 6 minute walk by 165' x 1 within 8 weeks. 2. The patient will report a pain level of 3 or less within 8 weeks. 3. The patient will transition to Healthy Self within 12 weeks. Rehabilitation Potential For Stated Goals: Good  Regarding Virginia Barrientos's therapy, I certify that the treatment plan above will be carried out by a therapist or under their direction. Thank you for this referral,  Levar Arroyo PT     Referring Physician Signature: Mayra Gonzalez MD              Date                    The information in this section was collected on 6/26/18 (except where otherwise noted). HISTORY:   History of Present Injury/Illness (Reason for Referral):  Post lymphoma treatment, lumbar pain, altered gait due to poor eyesight     Past Medical History/Comorbidities:   Ms. 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 She also has no past medical history of Aneurysm ([address] Arrhythmia; Asthma; Autoimmune disease ([address] CAD (coronary artery disease); Chronic kidney disease; Coagulation defects; COPD; Diabetes ([address] Difficult intubation; GERD (gastroesophageal reflux disease); Heart failure ([address] Liver disease; Malignant hyperthermia due to anesthesia; Nausea & vomiting; Pseudocholinesterase deficiency; Psychiatric disorder; PUD (peptic ulcer disease); Seizures ([address]  Stroke ([facility] or Unspecified sleep apnea. Ms. Sarahi Diop  has a past surgical history that includes pr anesth,achilles tendon surg (Left, 02/10/2011); hx cholecystectomy (1983); hx vascular access; hx knee replacement (Left, 2013); and hx knee replacement (Right, 2012). Past Medical History:   Diagnosis Date    Anemia     Basal cell carcinoma     Cardiomegaly     Deep vein thrombosis (DVT) (HCC)     History of stroke     Hypertension     Marginal zone lymphoma ([facility] 03/30/2017    Morbid obesity ([facility]     Osteoarthritis     Overactive bladder     Primary hypothyroidism     Radiation therapy complication     Rheumatic fever     S/P total knee replacement using cement 10/3/2011    Shingles     Superficial venous thrombosis of right arm 5/1/2017     Past Surgical History:   Procedure Laterality Date    HX CHOLECYSTECTOMY  1983    HX KNEE REPLACEMENT Left 2013    Dr. Edvin Pa Right 2012    Dr. Marry Reilly Left 02/10/2011    Dr. Brii Last:     lives with spouse  Prior Level of Function/Work/Activity:    Dominant Side:         RIGHT  Current Medications:       Current Outpatient Prescriptions:     pregabalin (LYRICA) 100 mg capsule, Take 1 Cap by mouth three (3) times daily. Max Daily Amount: 300 mg., Disp: 270 Cap, Rfl: 1    acyclovir (ZOVIRAX) 400 mg tablet, Take 1 Tab by mouth two (2) times a day., Disp: 180 Tab, Rfl: 1    fluconazole (DIFLUCAN) 150 mg tablet, Take 1 Tab by mouth daily. FDA advises cautious prescribing of oral fluconazole in pregnancy. , Disp: 90 Tab, Rfl: 1    magic mouthwash (ALEXSANDER) susp, Take 10 mL by mouth every four (4) hours as needed. , Disp: 2 Bottle, Rfl: 2    nystatin (MYCOSTATIN) powder, Apply  to affected area three (3) times daily. , Disp: 60 g, Rfl: 2    fluconazole (DIFLUCAN) 150 mg tablet, Take 1 Tab by mouth daily.  FDA advises cautious prescribing of oral fluconazole in pregnancy. , Disp: 30 Tab, Rfl: 2    levothyroxine (SYNTHROID) 175 mcg tablet, Take 1 Tab by mouth Daily (before breakfast). , Disp: 30 Tab, Rfl: 5    allopurinol (ZYLOPRIM) 300 mg tablet, Take 1 Tab by mouth daily. , Disp: 30 Tab, Rfl: 1    ondansetron hcl (ZOFRAN, AS HYDROCHLORIDE,) 8 mg tablet, Take 1 Tab by mouth every eight (8) hours as needed for Nausea (or vomiting). , Disp: 90 Tab, Rfl: 2    magnesium oxide (MAG-OX) 400 mg tablet, Take 1 Tab by mouth two (2) times a day., Disp: 60 Tab, Rfl: 1    lidocaine-prilocaine (EMLA) topical cream, Apply  to affected area as needed for Pain. Apply to port site 45-60 minutes prior to lab appt or infusion. , Disp: 30 g, Rfl: 0   Date Last Reviewed:  6/26/18   Number of Personal Factors/Comorbidities that affect the Plan of Care: 3+: HIGH COMPLEXITY   EXAMINATION:   ROM:          Within functional limits  Strength:          Bilateral upper extremities grossly 4-/5 x 2 extremities  Bilateral lower extremities 4/5 x 2 extremities    Balance:          Intact, limited due to need for cataracts  Skin Integrity:          intact  Edema/Girth:  pitting    Left Right    Initial Most Recent Initial Most Recent   Upper  Extremity           Lower  Extremity               Body Structures Involved:  1. Muscles Body Functions Affected:  1. Sensory/Pain  2. Neuromusculoskeletal Activities and Participation Affected:  1. None   Number of elements (examined above) that affect the Plan of Care: 3: MODERATE COMPLEXITY   CLINICAL PRESENTATION:   Presentation: Evolving clinical presentation with changing clinical characteristics: MODERATE COMPLEXITY   CLINICAL DECISION MAKING:   Outcome Measure:    Tool Used: 6-MINUTE WALK TEST  Score:  Initial: 1300' feet Most Recent: X feet (Date: -- )   Interpretation of Score: Normal range varies but is approximately 7471-7073 Feet      Distance walked: 1300 feet               Baseline End of Test   Heart Rate 85 118   Dyspnea (Luther Scale)     Fatigue (Luther Scale)  3   SpO2 98 96   /84 183/93     Score 2133 8512-2727 3427-9024 [phone] 15-0   Modifier CH CI CJ CK CL CM CN     ? Mobility - Walking and Moving Around:     - CURRENT STATUS:  - 20%-39% impaired, limited or restricted    - GOAL STATUS:  - 20%-39% impaired, limited or restricted    - D/C STATUS:  ---------------To be determined---------------    Tool Used: TINETTI  Score:  Initial:   Gait: 11/12  Balance: 16/16  TOTAL: 27/28 Most Recent:  Gait: /12  Balance: /16  TOTAL: /28   Interpretation of Score: The maximum score for the gait component is 12 points. The maximum score for the balance component is 16 points. The maximum total score is 28 points. In general, patients who score below 19 are at a high risk for falls. Patients who score in the range of 19-24 indicate that the patient has a risk for falls. Score 28 27-23 22-18 17-12 11-6 5-1 0   Modifier CH CI CJ CK CL CM CN         Tool Used: Timed Up and Go (TUG)  Score:  Initial: 7 seconds Most Recent: X seconds (Date: -- )   Interpretation of Score: The test measures, in seconds, the time taken by an individual to stand up from a standard arm chair (seat height 46 cm [18 in], arm height 65 cm [25.6 in]), walk a distance of 3 meters (118 in, approx 10 ft), turn, walk back to the chair and sit down. If the individual takes longer than 14 seconds to complete TUG, this indicates risk for falls.   Score 7 7.5-10.5 11-14 14.5-17.5 18-21 21.5-24.5 25+   Modifier CH CI CJ CK CL CM CN         Tool Used: ECOG Performance Survey Score  Score:  Initial: 1 Most Recent:      Interpretation of Score:   0 Fully active, able to carry on all pre-disease performance without restriction   1 Restricted in physically strenuous activity but ambulatory and able to carry out work of a light or sedentary nature, e.g., light house work, office work   2 Ambulatory and capable of all selfcare but unable to carry out any work activities. Up and about more than 50% of waking hours   3 Capable of only limited selfcare, confined to bed or chair more than 50% of waking hours   4 Completely disabled. Cannot carry on any selfcare. Totally confined to bed or chair   5 Dead     Medical Necessity:   · Patient is expected to demonstrate progress in strength and overall conditionoing to increase independence with household activity. Reason for Services/Other Comments:  · Patient continues to demonstrate capacity to improve strength and overall conditionoing which will increase independence. Use of outcome tool(s) and clinical judgement create a POC that gives a: Clear prediction of patient's progress: LOW COMPLEXITY            TREATMENT:   (In addition to Assessment/Re-Assessment sessions the following treatments were rendered)  Pre-treatment Symptoms/Complaints:  Lumbar pain, weakness, decreased activity tolerance, altered gait. Reports she had a death in the family. Pain: Initial:     2-3/10  Post Session:  2   The patient was late. Unable to perform full treatment. Fatigue 5  UBE level 1 x 4 min  650' 'x1 O2 95   Nustep level 2 x 8 min SPM 79 mets 2.3    Will perform progress note next visits. as above  As above  [facility] Portal  Treatment/Session Assessment:    · Response to Treatment:  Tolerated the treatment well. · Compliance with Program/Exercises: Will assess as treatment progresses. · Recommendations/Intent for next treatment session: \"Next visit will focus on advancements to more challenging activities\".   Total Treatment Duration: 35 min  PT Patient Time In/Time Out  Time In: 1356  Time Out: Karli Mcbride 148, PT

## 2018-08-09 ENCOUNTER — APPOINTMENT (OUTPATIENT)
Dept: PHYSICAL THERAPY | Age: 58
End: 2018-08-09
Payer: COMMERCIAL

## 2018-08-14 ENCOUNTER — APPOINTMENT (OUTPATIENT)
Dept: PHYSICAL THERAPY | Age: 58
End: 2018-08-14
Payer: COMMERCIAL

## 2018-08-16 ENCOUNTER — HOSPITAL ENCOUNTER (OUTPATIENT)
Dept: PHYSICAL THERAPY | Age: 58
Discharge: HOME | End: 2018-08-16
Payer: COMMERCIAL

## 2018-08-16 PROCEDURE — 97110 THERAPEUTIC EXERCISES: CPT

## 2018-08-16 NOTE — PROGRESS NOTES
Liz Alex  : 1960  Primary: [address] [facility]  Secondary:  [address]  at [facility]  [address] , [address], [place] [address]  [phone]   [phone]          OUTPATIENT PHYSICAL THERAPY:Daily Note    ICD-10: Treatment Diagnosis: muscle weakness generalized M 62.81  Precautions/Allergies:   Review of patient's allergies indicates no known allergies. Fall Risk Score: 0 (? 5 = High Risk)  MD Orders: oncology rehab MEDICAL/REFERRING DIAGNOSIS:  Other specified types of non-hodgkin lymphoma, lymph nodes of multiple sites [C85.88]    DATE OF ONSET: 3/30/17  REFERRING PHYSICIAN: Mayra Gonzalez MD  RETURN PHYSICIAN APPOINTMENT: 18     INITIAL ASSESSMENT:  Ms. Star Rivas presents following treatment of lymphoma. She previously was a participant with oncology rehab. She stopped when she resumed chemo. She returns with overall deconditioning and decreased strength. She will benefit from therapeutic exercises to improve strength and activity tolerance. She is currently being treated at the pain center for lumbar pain. She returns to then July 3. She has developed cataracts and is in need of these being removed. Due to the poor eyesight her gait is slightly altered. PROBLEM LIST (Impacting functional limitations):  1. Decreased Strength  2. Increased Pain  3. Decreased Activity Tolerance  4. Increased Fatigue  5. Increased Shortness of Breath  6. Decreased [place] with Home Exercise Program INTERVENTIONS PLANNED:  1. Home Exercise Program (HEP)  2. Therapeutic Exercise/Strengthening   TREATMENT PLAN:  Effective Dates: 2018 TO 2018 (90 days). Frequency/Duration: 2 times a week for 90 Days. The patient will return 18 due to recent death in the family. GOALS: (Goals have been discussed and agreed upon with patient.)  Short-Term Functional Goals: Time Frame: 4 weeks  1.  The patient will be independent with HEP for strength within 4 weeks. 2. The patient will gain 1/2 grade strength of all 4 extremities within 4 weeks. 3. The patient will report walking 10 minutes twice daily within 4 weeks. Discharge Goals: Time Frame: 8 weeks  1. The patient will improve her 6 minute walk by 165' x 1 within 8 weeks. 2. The patient will report a pain level of 3 or less within 8 weeks. 3. The patient will transition to Healthy Self within 12 weeks. Rehabilitation Potential For Stated Goals: Good  Regarding Bulmaro Barrientos's therapy, I certify that the treatment plan above will be carried out by a therapist or under their direction. Thank you for this referral,  Homero Jackson PT     Referring Physician Signature: Venu New MD              Date                    The information in this section was collected on 6/26/18 (except where otherwise noted). HISTORY:   History of Present Injury/Illness (Reason for Referral):  Post lymphoma treatment, lumbar pain, altered gait due to poor eyesight     Past Medical History/Comorbidities:   Ms. Samm Smallwood  has a past medical history of Anemia; Basal cell carcinoma; Cardiomegaly; Deep vein thrombosis (DVT) ([address] History of stroke; Hypertension; Marginal zone lymphoma ([address] (03/30/2017); Morbid obesity ([address] Osteoarthritis; Overactive bladder; Primary hypothyroidism; Radiation therapy complication; Rheumatic fever; S/P total knee replacement using cement (10/3/2011); Shingles; and Superficial venous thrombosis of right arm (5/1/2017). She also has no past medical history of Aneurysm ([address] Arrhythmia; Asthma; Autoimmune disease ([address] CAD (coronary artery disease); Chronic kidney disease; Coagulation defects; COPD; Diabetes ([address] Difficult intubation; GERD (gastroesophageal reflux disease); Heart failure ([address] Liver disease; Malignant hyperthermia due to anesthesia; Nausea & vomiting; Pseudocholinesterase deficiency; Psychiatric disorder; PUD (peptic ulcer disease); Seizures ([address]  Stroke ([facility] or Unspecified sleep apnea. Ms. Randy Mccallum  has a past surgical history that includes pr anesth,achilles tendon surg (Left, 02/10/2011); hx cholecystectomy (1983); hx vascular access; hx knee replacement (Left, 2013); and hx knee replacement (Right, 2012). Past Medical History:   Diagnosis Date    Anemia     Basal cell carcinoma     Cardiomegaly     Deep vein thrombosis (DVT) (HCC)     History of stroke     Hypertension     Marginal zone lymphoma ([facility] 03/30/2017    Morbid obesity ([facility]     Osteoarthritis     Overactive bladder     Primary hypothyroidism     Radiation therapy complication     Rheumatic fever     S/P total knee replacement using cement 10/3/2011    Shingles     Superficial venous thrombosis of right arm 5/1/2017     Past Surgical History:   Procedure Laterality Date    HX CHOLECYSTECTOMY  1983    HX KNEE REPLACEMENT Left 2013    Dr. Tiera Tariq Right 2012    Dr. Rhett Cordoba Left 02/10/2011    Dr. Chase Gutierrez:     lives with spouse  Prior Level of Function/Work/Activity:    Dominant Side:         RIGHT  Current Medications:       Current Outpatient Prescriptions:     pregabalin (LYRICA) 100 mg capsule, Take 1 Cap by mouth three (3) times daily. Max Daily Amount: 300 mg., Disp: 270 Cap, Rfl: 1    acyclovir (ZOVIRAX) 400 mg tablet, Take 1 Tab by mouth two (2) times a day., Disp: 180 Tab, Rfl: 1    fluconazole (DIFLUCAN) 150 mg tablet, Take 1 Tab by mouth daily. FDA advises cautious prescribing of oral fluconazole in pregnancy. , Disp: 90 Tab, Rfl: 1    magic mouthwash (ALEXSANDER) susp, Take 10 mL by mouth every four (4) hours as needed. , Disp: 2 Bottle, Rfl: 2    nystatin (MYCOSTATIN) powder, Apply  to affected area three (3) times daily. , Disp: 60 g, Rfl: 2    fluconazole (DIFLUCAN) 150 mg tablet, Take 1 Tab by mouth daily.  FDA advises cautious prescribing of oral fluconazole in pregnancy. , Disp: 30 Tab, Rfl: 2    levothyroxine (SYNTHROID) 175 mcg tablet, Take 1 Tab by mouth Daily (before breakfast). , Disp: 30 Tab, Rfl: 5    allopurinol (ZYLOPRIM) 300 mg tablet, Take 1 Tab by mouth daily. , Disp: 30 Tab, Rfl: 1    ondansetron hcl (ZOFRAN, AS HYDROCHLORIDE,) 8 mg tablet, Take 1 Tab by mouth every eight (8) hours as needed for Nausea (or vomiting). , Disp: 90 Tab, Rfl: 2    magnesium oxide (MAG-OX) 400 mg tablet, Take 1 Tab by mouth two (2) times a day., Disp: 60 Tab, Rfl: 1    lidocaine-prilocaine (EMLA) topical cream, Apply  to affected area as needed for Pain. Apply to port site 45-60 minutes prior to lab appt or infusion. , Disp: 30 g, Rfl: 0   Date Last Reviewed:  6/26/18   Number of Personal Factors/Comorbidities that affect the Plan of Care: 3+: HIGH COMPLEXITY   EXAMINATION:   ROM:          Within functional limits  Strength:          Bilateral upper extremities grossly 4-/5 x 2 extremities  Bilateral lower extremities 4/5 x 2 extremities    Balance:          Intact, limited due to need for cataracts  Skin Integrity:          intact  Edema/Girth:  pitting    Left Right    Initial Most Recent Initial Most Recent   Upper  Extremity           Lower  Extremity               Body Structures Involved:  1. Muscles Body Functions Affected:  1. Sensory/Pain  2. Neuromusculoskeletal Activities and Participation Affected:  1. None   Number of elements (examined above) that affect the Plan of Care: 3: MODERATE COMPLEXITY   CLINICAL PRESENTATION:   Presentation: Evolving clinical presentation with changing clinical characteristics: MODERATE COMPLEXITY   CLINICAL DECISION MAKING:   Outcome Measure:    Tool Used: 6-MINUTE WALK TEST  Score:  Initial: 1300' feet Most Recent: X feet (Date: -- )   Interpretation of Score: Normal range varies but is approximately 0541-3028 Feet      Distance walked: 1300 feet               Baseline End of Test   Heart Rate 85 118   Dyspnea (Luther Scale)     Fatigue (Luther Scale)  3   SpO2 98 96   /84 183/93     Score 2133 0363-3807 7387-5477 [phone] 15-0   Modifier CH CI CJ CK CL CM CN     ? Mobility - Walking and Moving Around:     - CURRENT STATUS:  - 20%-39% impaired, limited or restricted    - GOAL STATUS:  - 20%-39% impaired, limited or restricted    - D/C STATUS:  ---------------To be determined---------------    Tool Used: TINETTI  Score:  Initial:   Gait: 11/12  Balance: 16/16  TOTAL: 27/28 Most Recent:  Gait: /12  Balance: /16  TOTAL: /28   Interpretation of Score: The maximum score for the gait component is 12 points. The maximum score for the balance component is 16 points. The maximum total score is 28 points. In general, patients who score below 19 are at a high risk for falls. Patients who score in the range of 19-24 indicate that the patient has a risk for falls. Score 28 27-23 22-18 17-12 11-6 5-1 0   Modifier CH CI CJ CK CL CM CN         Tool Used: Timed Up and Go (TUG)  Score:  Initial: 7 seconds Most Recent: X seconds (Date: -- )   Interpretation of Score: The test measures, in seconds, the time taken by an individual to stand up from a standard arm chair (seat height 46 cm [18 in], arm height 65 cm [25.6 in]), walk a distance of 3 meters (118 in, approx 10 ft), turn, walk back to the chair and sit down. If the individual takes longer than 14 seconds to complete TUG, this indicates risk for falls.   Score 7 7.5-10.5 11-14 14.5-17.5 18-21 21.5-24.5 25+   Modifier CH CI CJ CK CL CM CN         Tool Used: ECOG Performance Survey Score  Score:  Initial: 1 Most Recent:      Interpretation of Score:   0 Fully active, able to carry on all pre-disease performance without restriction   1 Restricted in physically strenuous activity but ambulatory and able to carry out work of a light or sedentary nature, e.g., light house work, office work   2 Ambulatory and capable of all selfcare but unable to carry out any work activities. Up and about more than 50% of waking hours   3 Capable of only limited selfcare, confined to bed or chair more than 50% of waking hours   4 Completely disabled. Cannot carry on any selfcare. Totally confined to bed or chair   5 Dead     Medical Necessity:   · Patient is expected to demonstrate progress in strength and overall conditionoing to increase independence with household activity. Reason for Services/Other Comments:  · Patient continues to demonstrate capacity to improve strength and overall conditionoing which will increase independence. Use of outcome tool(s) and clinical judgement create a POC that gives a: Clear prediction of patient's progress: LOW COMPLEXITY            TREATMENT:   (In addition to Assessment/Re-Assessment sessions the following treatments were rendered)  Pre-treatment Symptoms/Complaints:  Lumbar pain, weakness, decreased activity tolerance, altered gait. Reports is doing OK after the .  To have cataract surgery next week. Pain: Initial:     3/10  Post Session:     O2 99 HR 95  Fatigue 4  650' x 1 O2 97   UBE level 1 x 6 min  650' 'x1 O2 98   Nustep level 2 x 10 min SPM 87 mets 2.5  650' x 1  Sit to stand x 10 reps  Bilateral upper extremity with 3# x 10 reps  Countertop push ups x 10 reps  650' x 1  Standing up on toes, hip abduction, hip extension, hamstring curls x 10 reps        as above  As above  [email or web address]  Treatment/Session Assessment:    · Response to Treatment:  Tolerated the treatment well. Able to fully participate. · Compliance with Program/Exercises: Will assess as treatment progresses. · Recommendations/Intent for next treatment session: \"Next visit will focus on advancements to more challenging activities\".   Total Treatment Duration: 47 min  PT Patient Time In/Time Out  Time In: 0845  Time Out: 6051    Gege Garcia PT

## 2018-08-20 ENCOUNTER — HOSPITAL ENCOUNTER (OUTPATIENT)
Dept: PHYSICAL THERAPY | Age: 58
Discharge: HOME | End: 2018-08-20
Payer: COMMERCIAL

## 2018-08-20 NOTE — PROGRESS NOTES
William Cheatham  : 1960  Primary: [facility]  Secondary:  [address] at [address]  [address] , [address], [place] [address]  [phone]   [phone]          OUTPATIENT PHYSICAL THERAPY:Daily Note    ICD-10: Treatment Diagnosis: muscle weakness generalized M 62.81  Precautions/Allergies:   Review of patient's allergies indicates no known allergies. Fall Risk Score: 0 (? 5 = High Risk)  MD Orders: oncology rehab MEDICAL/REFERRING DIAGNOSIS:  Other specified types of non-hodgkin lymphoma, lymph nodes of multiple sites [C85.88]    DATE OF ONSET: 3/30/17  REFERRING PHYSICIAN: Valentino Standard, MD  RETURN PHYSICIAN APPOINTMENT: 18     INITIAL ASSESSMENT:  Ms. Bryson Snowden presents following treatment of lymphoma. She previously was a participant with oncology rehab. She stopped when she resumed chemo. She returns with overall deconditioning and decreased strength. She will benefit from therapeutic exercises to improve strength and activity tolerance. She is currently being treated at the pain center for lumbar pain. She returns to then July 3. She has developed cataracts and is in need of these being removed. Due to the poor eyesight her gait is slightly altered. PROBLEM LIST (Impacting functional limitations):  1. Decreased Strength  2. Increased Pain  3. Decreased Activity Tolerance  4. Increased Fatigue  5. Increased Shortness of Breath  6. Decreased [place] with Home Exercise Program INTERVENTIONS PLANNED:  1. Home Exercise Program (HEP)  2. Therapeutic Exercise/Strengthening   TREATMENT PLAN:  Effective Dates: 2018 TO 2018 (90 days). Frequency/Duration: 2 times a week for 90 Days. The patient will return 18 due to recent death in the family. GOALS: (Goals have been discussed and agreed upon with patient.)  Short-Term Functional Goals: Time Frame: 4 weeks  1.  The patient will be independent with HEP for strength within 4 weeks. 2. The patient will gain 1/2 grade strength of all 4 extremities within 4 weeks. 3. The patient will report walking 10 minutes twice daily within 4 weeks. Discharge Goals: Time Frame: 8 weeks  1. The patient will improve her 6 minute walk by 165' x 1 within 8 weeks. 2. The patient will report a pain level of 3 or less within 8 weeks. 3. The patient will transition to Healthy Self within 12 weeks. Rehabilitation Potential For Stated Goals: Good  Regarding Flakito Barrientos's therapy, I certify that the treatment plan above will be carried out by a therapist or under their direction. Thank you for this referral,  Amrit Mcmullen PT     Referring Physician Signature: Jessica Chamorro MD              Date                    The information in this section was collected on 6/26/18 (except where otherwise noted). HISTORY:   History of Present Injury/Illness (Reason for Referral):  Post lymphoma treatment, lumbar pain, altered gait due to poor eyesight     Past Medical History/Comorbidities:   Ms. Alfredo Quintana  has a past medical history of Anemia; Basal cell carcinoma; Cardiomegaly; Deep vein thrombosis (DVT) ([address] History of stroke; Hypertension; Marginal zone lymphoma ([address] (03/30/2017); Morbid obesity ([address] Osteoarthritis; Overactive bladder; Primary hypothyroidism; Radiation therapy complication; Rheumatic fever; S/P total knee replacement using cement (10/3/2011); Shingles; and Superficial venous thrombosis of right arm (5/1/2017). She also has no past medical history of Aneurysm ([address] Arrhythmia; Asthma; Autoimmune disease ([address] CAD (coronary artery disease); Chronic kidney disease; Coagulation defects; COPD; Diabetes ([address] Difficult intubation; GERD (gastroesophageal reflux disease); Heart failure ([address] Liver disease; Malignant hyperthermia due to anesthesia; Nausea & vomiting; Pseudocholinesterase deficiency; Psychiatric disorder; PUD (peptic ulcer disease); Seizures ([address]  Stroke ([facility] or Unspecified sleep apnea. Ms. Radha Martinez  has a past surgical history that includes pr anesth,achilles tendon surg (Left, 02/10/2011); hx cholecystectomy (1983); hx vascular access; hx knee replacement (Left, 2013); and hx knee replacement (Right, 2012). Past Medical History:   Diagnosis Date    Anemia     Basal cell carcinoma     Cardiomegaly     Deep vein thrombosis (DVT) (HCC)     History of stroke     Hypertension     Marginal zone lymphoma ([facility] 03/30/2017    Morbid obesity ([facility]     Osteoarthritis     Overactive bladder     Primary hypothyroidism     Radiation therapy complication     Rheumatic fever     S/P total knee replacement using cement 10/3/2011    Shingles     Superficial venous thrombosis of right arm 5/1/2017     Past Surgical History:   Procedure Laterality Date    HX CHOLECYSTECTOMY  1983    HX KNEE REPLACEMENT Left 2013    Dr. Farzad Julian Right 2012    Dr. Jade Mcgill Left 02/10/2011    Dr. Guido Wagner:     lives with spouse  Prior Level of Function/Work/Activity:    Dominant Side:         RIGHT  Current Medications:       Current Outpatient Prescriptions:     pregabalin (LYRICA) 100 mg capsule, Take 1 Cap by mouth three (3) times daily. Max Daily Amount: 300 mg., Disp: 270 Cap, Rfl: 1    acyclovir (ZOVIRAX) 400 mg tablet, Take 1 Tab by mouth two (2) times a day., Disp: 180 Tab, Rfl: 1    fluconazole (DIFLUCAN) 150 mg tablet, Take 1 Tab by mouth daily. FDA advises cautious prescribing of oral fluconazole in pregnancy. , Disp: 90 Tab, Rfl: 1    magic mouthwash (ALEXSANDER) susp, Take 10 mL by mouth every four (4) hours as needed. , Disp: 2 Bottle, Rfl: 2    nystatin (MYCOSTATIN) powder, Apply  to affected area three (3) times daily. , Disp: 60 g, Rfl: 2    fluconazole (DIFLUCAN) 150 mg tablet, Take 1 Tab by mouth daily.  FDA advises cautious prescribing of oral fluconazole in pregnancy. , Disp: 30 Tab, Rfl: 2    levothyroxine (SYNTHROID) 175 mcg tablet, Take 1 Tab by mouth Daily (before breakfast). , Disp: 30 Tab, Rfl: 5    allopurinol (ZYLOPRIM) 300 mg tablet, Take 1 Tab by mouth daily. , Disp: 30 Tab, Rfl: 1    ondansetron hcl (ZOFRAN, AS HYDROCHLORIDE,) 8 mg tablet, Take 1 Tab by mouth every eight (8) hours as needed for Nausea (or vomiting). , Disp: 90 Tab, Rfl: 2    magnesium oxide (MAG-OX) 400 mg tablet, Take 1 Tab by mouth two (2) times a day., Disp: 60 Tab, Rfl: 1    lidocaine-prilocaine (EMLA) topical cream, Apply  to affected area as needed for Pain. Apply to port site 45-60 minutes prior to lab appt or infusion. , Disp: 30 g, Rfl: 0   Date Last Reviewed:  6/26/18   Number of Personal Factors/Comorbidities that affect the Plan of Care: 3+: HIGH COMPLEXITY   EXAMINATION:   ROM:          Within functional limits  Strength:          Bilateral upper extremities grossly 4-/5 x 2 extremities  Bilateral lower extremities 4/5 x 2 extremities    Balance:          Intact, limited due to need for cataracts  Skin Integrity:          intact  Edema/Girth:  pitting    Left Right    Initial Most Recent Initial Most Recent   Upper  Extremity           Lower  Extremity               Body Structures Involved:  1. Muscles Body Functions Affected:  1. Sensory/Pain  2. Neuromusculoskeletal Activities and Participation Affected:  1. None   Number of elements (examined above) that affect the Plan of Care: 3: MODERATE COMPLEXITY   CLINICAL PRESENTATION:   Presentation: Evolving clinical presentation with changing clinical characteristics: MODERATE COMPLEXITY   CLINICAL DECISION MAKING:   Outcome Measure:    Tool Used: 6-MINUTE WALK TEST  Score:  Initial: 1300' feet Most Recent: X feet (Date: -- )   Interpretation of Score: Normal range varies but is approximately 6329-1773 Feet      Distance walked: 1300 feet               Baseline End of Test   Heart Rate 85 118   Dyspnea (Luther Scale)     Fatigue (Luther Scale)  3   SpO2 98 96   /84 183/93     Score 2133 5684-4917 0420-7747 [phone] 426-16 15-0   Modifier CH CI CJ CK CL CM CN     ? Mobility - Walking and Moving Around:     - CURRENT STATUS:  - 20%-39% impaired, limited or restricted    - GOAL STATUS:  - 20%-39% impaired, limited or restricted    - D/C STATUS:  ---------------To be determined---------------    Tool Used: TINETTI  Score:  Initial:   Gait: 11/12  Balance: 16/16  TOTAL: 27/28 Most Recent:  Gait: /12  Balance: /16  TOTAL: /28   Interpretation of Score: The maximum score for the gait component is 12 points. The maximum score for the balance component is 16 points. The maximum total score is 28 points. In general, patients who score below 19 are at a high risk for falls. Patients who score in the range of 19-24 indicate that the patient has a risk for falls. Score 28 27-23 22-18 17-12 11-6 5-1 0   Modifier CH CI CJ CK CL CM CN         Tool Used: Timed Up and Go (TUG)  Score:  Initial: 7 seconds Most Recent: X seconds (Date: -- )   Interpretation of Score: The test measures, in seconds, the time taken by an individual to stand up from a standard arm chair (seat height 46 cm [18 in], arm height 65 cm [25.6 in]), walk a distance of 3 meters (118 in, approx 10 ft), turn, walk back to the chair and sit down. If the individual takes longer than 14 seconds to complete TUG, this indicates risk for falls.   Score 7 7.5-10.5 11-14 14.5-17.5 18-21 21.5-24.5 25+   Modifier CH CI CJ CK CL CM CN         Tool Used: ECOG Performance Survey Score  Score:  Initial: 1 Most Recent:      Interpretation of Score:   0 Fully active, able to carry on all pre-disease performance without restriction   1 Restricted in physically strenuous activity but ambulatory and able to carry out work of a light or sedentary nature, e.g., light house work, office work   2 Ambulatory and capable of all selfcare but unable to carry out any work activities. Up and about more than 50% of waking hours   3 Capable of only limited selfcare, confined to bed or chair more than 50% of waking hours   4 Completely disabled. Cannot carry on any selfcare. Totally confined to bed or chair   5 Dead     Medical Necessity:   · Patient is expected to demonstrate progress in strength and overall conditionoing to increase independence with household activity. Reason for Services/Other Comments:  · Patient continues to demonstrate capacity to improve strength and overall conditionoing which will increase independence. Use of outcome tool(s) and clinical judgement create a POC that gives a: Clear prediction of patient's progress: LOW COMPLEXITY            TREATMENT:   (In addition to Assessment/Re-Assessment sessions the following treatments were rendered)  Pre-treatment Symptoms/Complaints:  Lumbar pain, weakness, decreased activity tolerance, altered gait. Reports is doing OK. To have cataract surgery this week. Still recovering from the loss of her 's mother. Pain: Initial:     4/10  Post Session:     O2 99 HR 78  Fatigue 2  650' x 1 O2 97   UBE level 1 x 6 min  650' 'x1 O2 98   Nustep level 2 x 10 min  650' x 1  Sit to stand x 10 reps  Bilateral upper extremity with 3# x 10 reps  Countertop push ups x 10 reps  650' x 1  Standing up on toes, hip abduction, hip extension, hamstring curls x 10 reps        as above  As above  [facility]  Treatment/Session Assessment:    · Response to Treatment:  Tolerated the treatment well. Able to fully participate. · Compliance with Program/Exercises: Will assess as treatment progresses. · Recommendations/Intent for next treatment session: \"Next visit will focus on advancements to more challenging activities\".   Total Treatment Duration: 41 min  PT Patient Time In/Time Out  Time In: 1133  Time Out: Michael Morgan 74, PT

## 2018-08-29 ENCOUNTER — HOSPITAL ENCOUNTER (OUTPATIENT)
Dept: PHYSICAL THERAPY | Age: 58
Discharge: HOME | End: 2018-08-29
Payer: COMMERCIAL

## 2018-08-29 PROCEDURE — 97110 THERAPEUTIC EXERCISES: CPT

## 2018-08-29 NOTE — PROGRESS NOTES
Haylie Viveros  : 1960  Primary: [facility]  Secondary:  [address] at [facility]  [address], [address], [place] [address]  [phone]   [phone]          OUTPATIENT PHYSICAL THERAPY:Daily Note    ICD-10: Treatment Diagnosis: muscle weakness generalized M 62.81  Precautions/Allergies:   Review of patient's allergies indicates no known allergies. Fall Risk Score: 0 (? 5 = High Risk)  MD Orders: oncology rehab MEDICAL/REFERRING DIAGNOSIS:  Other specified types of non-hodgkin lymphoma, lymph nodes of multiple sites [C85.88]    DATE OF ONSET: 3/30/17  REFERRING PHYSICIAN: Bertha Song MD  RETURN PHYSICIAN APPOINTMENT: 18     INITIAL ASSESSMENT:  Ms. Chelsey Lopez presents following treatment of lymphoma. She previously was a participant with oncology rehab. She stopped when she resumed chemo. She returns with overall deconditioning and decreased strength. She will benefit from therapeutic exercises to improve strength and activity tolerance. She is currently being treated at the pain center for lumbar pain. She returns to then July 3. She has developed cataracts and is in need of these being removed. Due to the poor eyesight her gait is slightly altered. PROBLEM LIST (Impacting functional limitations):  1. Decreased Strength  2. Increased Pain  3. Decreased Activity Tolerance  4. Increased Fatigue  5. Increased Shortness of Breath  6. Decreased [place] with Home Exercise Program INTERVENTIONS PLANNED:  1. Home Exercise Program (HEP)  2. Therapeutic Exercise/Strengthening   TREATMENT PLAN:  Effective Dates: 2018 TO 2018 (90 days). Frequency/Duration: 2 times a week for 90 Days. The patient will return 18 due to recent death in the family. GOALS: (Goals have been discussed and agreed upon with patient.)  Short-Term Functional Goals: Time Frame: 4 weeks  1.  The patient will be independent with HEP for strength within 4 weeks. 2. The patient will gain 1/2 grade strength of all 4 extremities within 4 weeks. 3. The patient will report walking 10 minutes twice daily within 4 weeks. Discharge Goals: Time Frame: 8 weeks  1. The patient will improve her 6 minute walk by 165' x 1 within 8 weeks. 2. The patient will report a pain level of 3 or less within 8 weeks. 3. The patient will transition to Healthy Self within 12 weeks. Rehabilitation Potential For Stated Goals: Good  Regarding Garfield Barrientos's therapy, I certify that the treatment plan above will be carried out by a therapist or under their direction. Thank you for this referral,  Jack Ho PT     Referring Physician Signature: Yony Nñuez MD              Date                    The information in this section was collected on 6/26/18 (except where otherwise noted). HISTORY:   History of Present Injury/Illness (Reason for Referral):  Post lymphoma treatment, lumbar pain, altered gait due to poor eyesight     Past Medical History/Comorbidities:   Ms. Mame Ashton  has a past medical history of Anemia; Basal cell carcinoma; Cardiomegaly; Deep vein thrombosis (DVT) ([address] History of stroke; Hypertension; Marginal zone lymphoma ([address] (03/30/2017); Morbid obesity ([address] Osteoarthritis; Overactive bladder; Primary hypothyroidism; Radiation therapy complication; Rheumatic fever; S/P total knee replacement using cement (10/3/2011); Shingles; and Superficial venous thrombosis of right arm (5/1/2017). She also has no past medical history of Aneurysm ([address] Arrhythmia; Asthma; Autoimmune disease ([address] CAD (coronary artery disease); Chronic kidney disease; Coagulation defects; COPD; Diabetes ([address] Difficult intubation; GERD (gastroesophageal reflux disease); Heart failure ([address] Liver disease; Malignant hyperthermia due to anesthesia; Nausea & vomiting; Pseudocholinesterase deficiency; Psychiatric disorder; PUD (peptic ulcer disease); Seizures ([address]  Stroke ([facility] or Unspecified sleep apnea. Ms. Henry Wolfe  has a past surgical history that includes pr anesth,achilles tendon surg (Left, 02/10/2011); hx cholecystectomy (1983); hx vascular access; hx knee replacement (Left, 2013); and hx knee replacement (Right, 2012). Past Medical History:   Diagnosis Date    Anemia     Basal cell carcinoma     Cardiomegaly     Deep vein thrombosis (DVT) (HCC)     History of stroke     Hypertension     Marginal zone lymphoma ([facility] 03/30/2017    Morbid obesity ([facility]     Osteoarthritis     Overactive bladder     Primary hypothyroidism     Radiation therapy complication     Rheumatic fever     S/P total knee replacement using cement 10/3/2011    Shingles     Superficial venous thrombosis of right arm 5/1/2017     Past Surgical History:   Procedure Laterality Date    HX CHOLECYSTECTOMY  1983    HX KNEE REPLACEMENT Left 2013    Dr. Meeta Hampton Right 2012    Dr. Jessica Tellez Left 02/10/2011    Dr. Anil Verduzco:     lives with spouse  Prior Level of Function/Work/Activity:    Dominant Side:         RIGHT  Current Medications:       Current Outpatient Prescriptions:     pregabalin (LYRICA) 100 mg capsule, Take 1 Cap by mouth three (3) times daily. Max Daily Amount: 300 mg., Disp: 270 Cap, Rfl: 1    acyclovir (ZOVIRAX) 400 mg tablet, Take 1 Tab by mouth two (2) times a day., Disp: 180 Tab, Rfl: 1    fluconazole (DIFLUCAN) 150 mg tablet, Take 1 Tab by mouth daily. FDA advises cautious prescribing of oral fluconazole in pregnancy. , Disp: 90 Tab, Rfl: 1    magic mouthwash (ALEXSANDER) susp, Take 10 mL by mouth every four (4) hours as needed. , Disp: 2 Bottle, Rfl: 2    nystatin (MYCOSTATIN) powder, Apply  to affected area three (3) times daily. , Disp: 60 g, Rfl: 2    fluconazole (DIFLUCAN) 150 mg tablet, Take 1 Tab by mouth daily.  FDA advises cautious prescribing of oral fluconazole in pregnancy. , Disp: 30 Tab, Rfl: 2    levothyroxine (SYNTHROID) 175 mcg tablet, Take 1 Tab by mouth Daily (before breakfast). , Disp: 30 Tab, Rfl: 5    allopurinol (ZYLOPRIM) 300 mg tablet, Take 1 Tab by mouth daily. , Disp: 30 Tab, Rfl: 1    ondansetron hcl (ZOFRAN, AS HYDROCHLORIDE,) 8 mg tablet, Take 1 Tab by mouth every eight (8) hours as needed for Nausea (or vomiting). , Disp: 90 Tab, Rfl: 2    magnesium oxide (MAG-OX) 400 mg tablet, Take 1 Tab by mouth two (2) times a day., Disp: 60 Tab, Rfl: 1    lidocaine-prilocaine (EMLA) topical cream, Apply  to affected area as needed for Pain. Apply to port site 45-60 minutes prior to lab appt or infusion. , Disp: 30 g, Rfl: 0  No current facility-administered medications for this encounter. Date Last Reviewed:  6/26/18   Number of Personal Factors/Comorbidities that affect the Plan of Care: 3+: HIGH COMPLEXITY   EXAMINATION:   ROM:          Within functional limits  Strength:          Bilateral upper extremities grossly 4-/5 x 2 extremities  Bilateral lower extremities 4/5 x 2 extremities    Balance:          Intact, limited due to need for cataracts  Skin Integrity:          intact  Edema/Girth:  pitting    Left Right    Initial Most Recent Initial Most Recent   Upper  Extremity           Lower  Extremity               Body Structures Involved:  1. Muscles Body Functions Affected:  1. Sensory/Pain  2. Neuromusculoskeletal Activities and Participation Affected:  1. None   Number of elements (examined above) that affect the Plan of Care: 3: MODERATE COMPLEXITY   CLINICAL PRESENTATION:   Presentation: Evolving clinical presentation with changing clinical characteristics: MODERATE COMPLEXITY   CLINICAL DECISION MAKING:   Outcome Measure:    Tool Used: 6-MINUTE WALK TEST  Score:  Initial: 1300' feet Most Recent: X feet (Date: -- )   Interpretation of Score: Normal range varies but is approximately 7785-3059 Feet      Distance walked: 1300 feet Baseline End of Test   Heart Rate 85 118   Dyspnea (Luther Scale)     Fatigue (Luther Scale)  3   SpO2 98 96   /84 183/93     Score 2133 7450-6941 1783-3254 [phone] 426-16 15-0   Modifier CH CI CJ CK CL CM CN     ? Mobility - Walking and Moving Around:     - CURRENT STATUS: CJ - 20%-39% impaired, limited or restricted    - GOAL STATUS: CJ - 20%-39% impaired, limited or restricted    - D/C STATUS:  ---------------To be determined---------------    Tool Used: TINETTI  Score:  Initial:   Gait: 11/12  Balance: 16/16  TOTAL: 27/28 Most Recent:  Gait: /12  Balance: /16  TOTAL: /28   Interpretation of Score: The maximum score for the gait component is 12 points. The maximum score for the balance component is 16 points. The maximum total score is 28 points. In general, patients who score below 19 are at a high risk for falls. Patients who score in the range of 19-24 indicate that the patient has a risk for falls. Score 28 27-23 22-18 17-12 11-6 5-1 0   Modifier CH CI CJ CK CL CM CN         Tool Used: Timed Up and Go (TUG)  Score:  Initial: 7 seconds Most Recent: X seconds (Date: -- )   Interpretation of Score: The test measures, in seconds, the time taken by an individual to stand up from a standard arm chair (seat height 46 cm [18 in], arm height 65 cm [25.6 in]), walk a distance of 3 meters (118 in, approx 10 ft), turn, walk back to the chair and sit down. If the individual takes longer than 14 seconds to complete TUG, this indicates risk for falls.   Score 7 7.5-10.5 11-14 14.5-17.5 18-21 21.5-24.5 25+   Modifier  CI CJ CK CL CM CN         Tool Used: ECOG Performance Survey Score  Score:  Initial: 1 Most Recent:      Interpretation of Score:   0 Fully active, able to carry on all pre-disease performance without restriction   1 Restricted in physically strenuous activity but ambulatory and able to carry out work of a light or sedentary nature, e.g., light house work, office work   2 Ambulatory and capable of all selfcare but unable to carry out any work activities. Up and about more than 50% of waking hours   3 Capable of only limited selfcare, confined to bed or chair more than 50% of waking hours   4 Completely disabled. Cannot carry on any selfcare. Totally confined to bed or chair   5 Dead     Medical Necessity:   · Patient is expected to demonstrate progress in strength and overall conditionoing to increase independence with household activity. Reason for Services/Other Comments:  · Patient continues to demonstrate capacity to improve strength and overall conditionoing which will increase independence. Use of outcome tool(s) and clinical judgement create a POC that gives a: Clear prediction of patient's progress: LOW COMPLEXITY            TREATMENT:   (In addition to Assessment/Re-Assessment sessions the following treatments were rendered)  Pre-treatment Symptoms/Complaints:  Lumbar pain, weakness, decreased activity tolerance, altered gait. Reports is doing Ok. She reports the pain in her left leg is getting worse. Pain: Initial:     4/10  Post Session:  4/10   O2 94 HR 82  Fatigue 0  650' x 1 O2 97   UBE level 1 x 6 min  650' 'x1 O2 98   Nustep level 2 x 10 min SPM 87 mets 2.5  650' x 1  Sit to stand x 10 reps  Bilateral upper extremity with 3# x 10 reps  Countertop push ups x 10 reps  650' x 1  Standing up on toes, hip abduction, hip extension, hamstring curls x 10 reps  Advised to seek medical advice concerning worsening left lower leg pain. as above  As above  [facility] Portal  Treatment/Session Assessment:    · Response to Treatment:  Tolerated the treatment well. Able to fully participate. Complains of left leg pain. · Compliance with Program/Exercises: Will assess as treatment progresses. · Recommendations/Intent for next treatment session: \"Next visit will focus on advancements to more challenging activities\".   Total Treatment Duration: 43 min  PT Patient Time In/Time Out  Time In: 0846  Time Out: 0929    Flory Willett, PT

## 2018-08-30 ENCOUNTER — HOSPITAL ENCOUNTER (OUTPATIENT)
Dept: PHYSICAL THERAPY | Age: 58
Discharge: HOME | End: 2018-08-30
Payer: COMMERCIAL

## 2018-08-30 PROCEDURE — 97110 THERAPEUTIC EXERCISES: CPT

## 2018-08-30 NOTE — PROGRESS NOTES
Makayla Hill  : 1960  Primary: [facility]  Secondary:  [address]  at [facility]  [address], [address], [place] [address]  [phone]   [phone]          OUTPATIENT PHYSICAL THERAPY:Daily Note    ICD-10: Treatment Diagnosis: muscle weakness generalized M 62.81  Precautions/Allergies:   Review of patient's allergies indicates no known allergies. Fall Risk Score: 0 (? 5 = High Risk)  MD Orders: oncology rehab MEDICAL/REFERRING DIAGNOSIS:  Other specified types of non-hodgkin lymphoma, lymph nodes of multiple sites [C85.88]    DATE OF ONSET: 3/30/17  REFERRING PHYSICIAN: Romel Willson MD  RETURN PHYSICIAN APPOINTMENT: 18     INITIAL ASSESSMENT:  Ms. Jailyn Warner presents following treatment of lymphoma. She previously was a participant with oncology rehab. She stopped when she resumed chemo. She returns with overall deconditioning and decreased strength. She will benefit from therapeutic exercises to improve strength and activity tolerance. She is currently being treated at the pain center for lumbar pain. She returns to then July 3. She has developed cataracts and is in need of these being removed. Due to the poor eyesight her gait is slightly altered. PROBLEM LIST (Impacting functional limitations):  1. Decreased Strength  2. Increased Pain  3. Decreased Activity Tolerance  4. Increased Fatigue  5. Increased Shortness of Breath  6. Decreased [place] with Home Exercise Program INTERVENTIONS PLANNED:  1. Home Exercise Program (HEP)  2. Therapeutic Exercise/Strengthening   TREATMENT PLAN:  Effective Dates: 2018 TO 2018 (90 days). Frequency/Duration: 2 times a week for 90 Days. The patient will return 18 due to recent death in the family. GOALS: (Goals have been discussed and agreed upon with patient.)  Short-Term Functional Goals: Time Frame: 4 weeks  1.  The patient will be independent with HEP for strength within 4 weeks. 2. The patient will gain 1/2 grade strength of all 4 extremities within 4 weeks. 3. The patient will report walking 10 minutes twice daily within 4 weeks. Discharge Goals: Time Frame: 8 weeks  1. The patient will improve her 6 minute walk by 165' x 1 within 8 weeks. 2. The patient will report a pain level of 3 or less within 8 weeks. 3. The patient will transition to Healthy Self within 12 weeks. Rehabilitation Potential For Stated Goals: Good  Regarding Linda Barrientos's therapy, I certify that the treatment plan above will be carried out by a therapist or under their direction. Thank you for this referral,  Hal Florentino PT     Referring Physician Signature: Jessica Ballard MD              Date                    The information in this section was collected on 6/26/18 (except where otherwise noted). HISTORY:   History of Present Injury/Illness (Reason for Referral):  Post lymphoma treatment, lumbar pain, altered gait due to poor eyesight     Past Medical History/Comorbidities:   Ms. 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 She also has no past medical history of Aneurysm ([address] Arrhythmia; Asthma; Autoimmune disease ([address] CAD (coronary artery disease); Chronic kidney disease; Coagulation defects; COPD; Diabetes ([address] Difficult intubation; GERD (gastroesophageal reflux disease); Heart failure ([address] Liver disease; Malignant hyperthermia due to anesthesia; Nausea & vomiting; Pseudocholinesterase deficiency; Psychiatric disorder; PUD (peptic ulcer disease); Seizures ([address]  Stroke ([facility] or Unspecified sleep apnea. Ms. Ben Mireles  has a past surgical history that includes pr anesth,achilles tendon surg (Left, 02/10/2011); hx cholecystectomy (1983); hx vascular access; hx knee replacement (Left, 2013); and hx knee replacement (Right, 2012). Past Medical History:   Diagnosis Date    Anemia     Basal cell carcinoma     Cardiomegaly     Deep vein thrombosis (DVT) (HCC)     History of stroke     Hypertension     Marginal zone lymphoma ([facility] 03/30/2017    Morbid obesity ([facility]     Osteoarthritis     Overactive bladder     Primary hypothyroidism     Radiation therapy complication     Rheumatic fever     S/P total knee replacement using cement 10/3/2011    Shingles     Superficial venous thrombosis of right arm 5/1/2017     Past Surgical History:   Procedure Laterality Date    HX CHOLECYSTECTOMY  1983    HX KNEE REPLACEMENT Left 2013    Dr. Tressa Henning Right 2012    Dr. Marcos Messina Left 02/10/2011    Dr. George Portillo:     lives with spouse  Prior Level of Function/Work/Activity:    Dominant Side:         RIGHT  Current Medications:       Current Outpatient Prescriptions:     pregabalin (LYRICA) 100 mg capsule, Take 1 Cap by mouth three (3) times daily. Max Daily Amount: 300 mg., Disp: 270 Cap, Rfl: 1    acyclovir (ZOVIRAX) 400 mg tablet, Take 1 Tab by mouth two (2) times a day., Disp: 180 Tab, Rfl: 1    fluconazole (DIFLUCAN) 150 mg tablet, Take 1 Tab by mouth daily. FDA advises cautious prescribing of oral fluconazole in pregnancy. , Disp: 90 Tab, Rfl: 1    magic mouthwash (ALEXSANDER) susp, Take 10 mL by mouth every four (4) hours as needed. , Disp: 2 Bottle, Rfl: 2    nystatin (MYCOSTATIN) powder, Apply  to affected area three (3) times daily. , Disp: 60 g, Rfl: 2    fluconazole (DIFLUCAN) 150 mg tablet, Take 1 Tab by mouth daily.  FDA advises cautious prescribing of oral fluconazole in pregnancy. , Disp: 30 Tab, Rfl: 2    levothyroxine (SYNTHROID) 175 mcg tablet, Take 1 Tab by mouth Daily (before breakfast). , Disp: 30 Tab, Rfl: 5    allopurinol (ZYLOPRIM) 300 mg tablet, Take 1 Tab by mouth daily. , Disp: 30 Tab, Rfl: 1    ondansetron hcl (ZOFRAN, AS HYDROCHLORIDE,) 8 mg tablet, Take 1 Tab by mouth every eight (8) hours as needed for Nausea (or vomiting). , Disp: 90 Tab, Rfl: 2    magnesium oxide (MAG-OX) 400 mg tablet, Take 1 Tab by mouth two (2) times a day., Disp: 60 Tab, Rfl: 1    lidocaine-prilocaine (EMLA) topical cream, Apply  to affected area as needed for Pain. Apply to port site 45-60 minutes prior to lab appt or infusion. , Disp: 30 g, Rfl: 0   Date Last Reviewed:  6/26/18   Number of Personal Factors/Comorbidities that affect the Plan of Care: 3+: HIGH COMPLEXITY   EXAMINATION:   ROM:          Within functional limits  Strength:          Bilateral upper extremities grossly 4-/5 x 2 extremities  Bilateral lower extremities 4/5 x 2 extremities    Balance:          Intact, limited due to need for cataracts  Skin Integrity:          intact  Edema/Girth:  pitting    Left Right    Initial Most Recent Initial Most Recent   Upper  Extremity           Lower  Extremity               Body Structures Involved:  1. Muscles Body Functions Affected:  1. Sensory/Pain  2. Neuromusculoskeletal Activities and Participation Affected:  1. None   Number of elements (examined above) that affect the Plan of Care: 3: MODERATE COMPLEXITY   CLINICAL PRESENTATION:   Presentation: Evolving clinical presentation with changing clinical characteristics: MODERATE COMPLEXITY   CLINICAL DECISION MAKING:   Outcome Measure:    Tool Used: 6-MINUTE WALK TEST  Score:  Initial: 1300' feet Most Recent: X feet (Date: -- )   Interpretation of Score: Normal range varies but is approximately 3521-9277 Feet      Distance walked: 1300 feet               Baseline End of Test   Heart Rate 85 118   Dyspnea (Luther Scale)     Fatigue (Luther Scale)  3   SpO2 98 96   /84 183/93     Score 2133 [id number] 5568-3318 [phone] 426-16 15-0   Modifier CH CI CJ CK CL CM CN     ? Mobility - Walking and Moving Around:     - CURRENT STATUS:  - 20%-39% impaired, limited or restricted    - GOAL STATUS:  - 20%-39% impaired, limited or restricted    - D/C STATUS:  ---------------To be determined---------------    Tool Used: TINETTI  Score:  Initial:   Gait: 11/12  Balance: 16/16  TOTAL: 27/28 Most Recent:  Gait: /12  Balance: /16  TOTAL: /28   Interpretation of Score: The maximum score for the gait component is 12 points. The maximum score for the balance component is 16 points. The maximum total score is 28 points. In general, patients who score below 19 are at a high risk for falls. Patients who score in the range of 19-24 indicate that the patient has a risk for falls. Score 28 27-23 22-18 17-12 11-6 5-1 0   Modifier CH CI CJ CK CL CM CN         Tool Used: Timed Up and Go (TUG)  Score:  Initial: 7 seconds Most Recent: X seconds (Date: -- )   Interpretation of Score: The test measures, in seconds, the time taken by an individual to stand up from a standard arm chair (seat height 46 cm [18 in], arm height 65 cm [25.6 in]), walk a distance of 3 meters (118 in, approx 10 ft), turn, walk back to the chair and sit down. If the individual takes longer than 14 seconds to complete TUG, this indicates risk for falls.   Score 7 7.5-10.5 11-14 14.5-17.5 18-21 21.5-24.5 25+   Modifier CH CI CJ CK CL CM CN         Tool Used: ECOG Performance Survey Score  Score:  Initial: 1 Most Recent:      Interpretation of Score:   0 Fully active, able to carry on all pre-disease performance without restriction   1 Restricted in physically strenuous activity but ambulatory and able to carry out work of a light or sedentary nature, e.g., light house work, office work   2 Ambulatory and capable of all selfcare but unable to carry out any work activities. Up and about more than 50% of waking hours   3 Capable of only limited selfcare, confined to bed or chair more than 50% of waking hours   4 Completely disabled. Cannot carry on any selfcare. Totally confined to bed or chair   5 Dead     Medical Necessity:   · Patient is expected to demonstrate progress in strength and overall conditionoing to increase independence with household activity. Reason for Services/Other Comments:  · Patient continues to demonstrate capacity to improve strength and overall conditionoing which will increase independence. Use of outcome tool(s) and clinical judgement create a POC that gives a: Clear prediction of patient's progress: LOW COMPLEXITY            TREATMENT:   (In addition to Assessment/Re-Assessment sessions the following treatments were rendered)  Pre-treatment Symptoms/Complaints:  Lumbar pain, weakness, decreased activity tolerance, altered gait. Reports is doing Ok. She reports she feels a little better. Pain: Initial:     4/10  Post Session:  4/10   O2 97 HR 85  Fatigue 0  650' x 1 O2 97   UBE level 1 x 6 min  650' 'x1 O2 97   Nustep level 2 x 12 min SPM 84 mets 2.5  650' x 1  Sit to stand x 10 reps  Bilateral upper extremity with 3# x 10 reps  Countertop push ups x 10 reps  650' x 1  Standing up on toes, hip abduction, hip extension, hamstring curls x 10 reps        as above  As above  [facility] Portal  Treatment/Session Assessment:    · Response to Treatment:  Tolerated the treatment well. Able to fully participate. Complains of less left leg pain. · Compliance with Program/Exercises: Will assess as treatment progresses. · Recommendations/Intent for next treatment session: \"Next visit will focus on advancements to more challenging activities\".   Total Treatment Duration: 48 min  PT Patient Time In/Time Out  Time In: 3719  Time Out: 1192    Pascual Jimenez, PT

## 2018-09-10 ENCOUNTER — HOSPITAL ENCOUNTER (OUTPATIENT)
Dept: PHYSICAL THERAPY | Age: 58
Discharge: HOME | End: 2018-09-10
Payer: COMMERCIAL

## 2018-09-10 PROCEDURE — 97110 THERAPEUTIC EXERCISES: CPT

## 2018-09-10 NOTE — PROGRESS NOTES
Joelle Humphreys  : 1960  Primary: [address] [facility]  Secondary:  [address]  at [facility]  [address], [address], [place] [address]  [phone]   [phone]          OUTPATIENT PHYSICAL THERAPY:Daily Note    ICD-10: Treatment Diagnosis: muscle weakness generalized M 62.81  Precautions/Allergies:   Review of patient's allergies indicates no known allergies. Fall Risk Score: 0 (? 5 = High Risk)  MD Orders: oncology rehab MEDICAL/REFERRING DIAGNOSIS:  Other specified types of non-hodgkin lymphoma, lymph nodes of multiple sites [C85.88]    DATE OF ONSET: 3/30/17  REFERRING PHYSICIAN: Eyal Duvall MD  RETURN PHYSICIAN APPOINTMENT: 18     INITIAL ASSESSMENT:  Ms. Leslie Solorzano presents following treatment of lymphoma. She previously was a participant with oncology rehab. She stopped when she resumed chemo. She returns with overall deconditioning and decreased strength. She will benefit from therapeutic exercises to improve strength and activity tolerance. She is currently being treated at the pain center for lumbar pain. She returns to then July 3. She has developed cataracts and is in need of these being removed. Due to the poor eyesight her gait is slightly altered. PROBLEM LIST (Impacting functional limitations):  1. Decreased Strength  2. Increased Pain  3. Decreased Activity Tolerance  4. Increased Fatigue  5. Increased Shortness of Breath  6. Decreased [place] with Home Exercise Program INTERVENTIONS PLANNED:  1. Home Exercise Program (HEP)  2. Therapeutic Exercise/Strengthening   TREATMENT PLAN:  Effective Dates: 2018 TO 2018 (90 days). Frequency/Duration: 2 times a week for 90 Days. The patient will return 18 due to recent death in the family. GOALS: (Goals have been discussed and agreed upon with patient.)  Short-Term Functional Goals: Time Frame: 4 weeks  1.  The patient will be independent with HEP for strength within 4 weeks. 2. The patient will gain 1/2 grade strength of all 4 extremities within 4 weeks. 3. The patient will report walking 10 minutes twice daily within 4 weeks. Discharge Goals: Time Frame: 8 weeks  1. The patient will improve her 6 minute walk by 165' x 1 within 8 weeks. 2. The patient will report a pain level of 3 or less within 8 weeks. 3. The patient will transition to Healthy Self within 12 weeks. Rehabilitation Potential For Stated Goals: Good  Regarding Guillermo Barrientos's therapy, I certify that the treatment plan above will be carried out by a therapist or under their direction. Thank you for this referral,  Praveen Anthony PT     Referring Physician Signature: Ebony Granger MD              Date                    The information in this section was collected on 6/26/18 (except where otherwise noted). HISTORY:   History of Present Injury/Illness (Reason for Referral):  Post lymphoma treatment, lumbar pain, altered gait due to poor eyesight     Past Medical History/Comorbidities:   Ms. 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 She also has no past medical history of Aneurysm ([address] Arrhythmia; Asthma; Autoimmune disease ([address] CAD (coronary artery disease); Chronic kidney disease; Coagulation defects; COPD; Diabetes ([address] Difficult intubation; GERD (gastroesophageal reflux disease); Heart failure ([address] Liver disease; Malignant hyperthermia due to anesthesia; Nausea & vomiting; Pseudocholinesterase deficiency; Psychiatric disorder; PUD (peptic ulcer disease); Seizures ([address]  Stroke ([facility] or Unspecified sleep apnea. Ms. Randy Mccallum  has a past surgical history that includes pr anesth,achilles tendon surg (Left, 02/10/2011); hx cholecystectomy (1983); hx vascular access; hx knee replacement (Left, 2013); and hx knee replacement (Right, 2012). Past Medical History:   Diagnosis Date    Anemia     Basal cell carcinoma     Cardiomegaly     Deep vein thrombosis (DVT) (HCC)     History of stroke     Hypertension     Marginal zone lymphoma ([facility] 03/30/2017    Morbid obesity ([facility]     Osteoarthritis     Overactive bladder     Primary hypothyroidism     Radiation therapy complication     Rheumatic fever     S/P total knee replacement using cement 10/3/2011    Shingles     Superficial venous thrombosis of right arm 5/1/2017     Past Surgical History:   Procedure Laterality Date    HX CHOLECYSTECTOMY  1983    HX KNEE REPLACEMENT Left 2013    Dr. Tiera Tairq Right 2012    Dr. Rhett Cordoba Left 02/10/2011    Dr. Chase Gutierrez:     lives with spouse  Prior Level of Function/Work/Activity:    Dominant Side:         RIGHT  Current Medications:       Current Outpatient Prescriptions:     magic mouthwash solution, Magic mouth wash  Maalox Lidocaine 2% viscous  Diphenhydramine oral solution  5 mL 4 times a day as needed Pharmacy to mix equal portions of ingredients to a total volume as indicated in the dispense amount., Disp: 480 mL, Rfl: 2    pregabalin (LYRICA) 100 mg capsule, Take 1 Cap by mouth three (3) times daily. Max Daily Amount: 300 mg., Disp: 270 Cap, Rfl: 1    acyclovir (ZOVIRAX) 400 mg tablet, Take 1 Tab by mouth two (2) times a day., Disp: 180 Tab, Rfl: 1    fluconazole (DIFLUCAN) 150 mg tablet, Take 1 Tab by mouth daily. FDA advises cautious prescribing of oral fluconazole in pregnancy. , Disp: 90 Tab, Rfl: 1    magic mouthwash (ALEXSANDER) susp, Take 10 mL by mouth every four (4) hours as needed. , Disp: 2 Bottle, Rfl: 2    nystatin (MYCOSTATIN) powder, Apply  to affected area three (3) times daily. , Disp: 60 g, Rfl: 2    fluconazole (DIFLUCAN) 150 mg tablet, Take 1 Tab by mouth daily. FDA advises cautious prescribing of oral fluconazole in pregnancy. , Disp: 30 Tab, Rfl: 2    levothyroxine (SYNTHROID) 175 mcg tablet, Take 1 Tab by mouth Daily (before breakfast). , Disp: 30 Tab, Rfl: 5    allopurinol (ZYLOPRIM) 300 mg tablet, Take 1 Tab by mouth daily. , Disp: 30 Tab, Rfl: 1    ondansetron hcl (ZOFRAN, AS HYDROCHLORIDE,) 8 mg tablet, Take 1 Tab by mouth every eight (8) hours as needed for Nausea (or vomiting). , Disp: 90 Tab, Rfl: 2    magnesium oxide (MAG-OX) 400 mg tablet, Take 1 Tab by mouth two (2) times a day., Disp: 60 Tab, Rfl: 1    lidocaine-prilocaine (EMLA) topical cream, Apply  to affected area as needed for Pain. Apply to port site 45-60 minutes prior to lab appt or infusion. , Disp: 30 g, Rfl: 0   Date Last Reviewed:  6/26/18   Number of Personal Factors/Comorbidities that affect the Plan of Care: 3+: HIGH COMPLEXITY   EXAMINATION:   ROM:          Within functional limits  Strength:          Bilateral upper extremities grossly 4-/5 x 2 extremities  Bilateral lower extremities 4/5 x 2 extremities    Balance:          Intact, limited due to need for cataracts  Skin Integrity:          intact  Edema/Girth:  pitting    Left Right    Initial Most Recent Initial Most Recent   Upper  Extremity           Lower  Extremity               Body Structures Involved:  1. Muscles Body Functions Affected:  1. Sensory/Pain  2. Neuromusculoskeletal Activities and Participation Affected:  1. None   Number of elements (examined above) that affect the Plan of Care: 3: MODERATE COMPLEXITY   CLINICAL PRESENTATION:   Presentation: Evolving clinical presentation with changing clinical characteristics: MODERATE COMPLEXITY   CLINICAL DECISION MAKING:   Outcome Measure:    Tool Used: 6-MINUTE WALK TEST  Score:  Initial: 1300' feet Most Recent: X feet (Date: -- )   Interpretation of Score: Normal range varies but is approximately 3589-1001 Feet      Distance walked: 1300 feet               Baseline End of Test   Heart Rate 85 118   Dyspnea (Luther Scale)     Fatigue (Luther Scale)  3   SpO2 98 96   /84 183/93     Score 2133 3400-3473 4386-3898 [phone] 426-16 15-0   Modifier CH CI CJ CK CL CM CN     ? Mobility - Walking and Moving Around:     - CURRENT STATUS: CJ - 20%-39% impaired, limited or restricted    - GOAL STATUS: CJ - 20%-39% impaired, limited or restricted    - D/C STATUS:  ---------------To be determined---------------    Tool Used: TINETTI  Score:  Initial:   Gait: 11/12  Balance: 16/16  TOTAL: 27/28 Most Recent:  Gait: /12  Balance: /16  TOTAL: /28   Interpretation of Score: The maximum score for the gait component is 12 points. The maximum score for the balance component is 16 points. The maximum total score is 28 points. In general, patients who score below 19 are at a high risk for falls. Patients who score in the range of 19-24 indicate that the patient has a risk for falls. Score 28 27-23 22-18 17-12 11-6 5-1 0   Modifier CH CI CJ CK CL CM CN         Tool Used: Timed Up and Go (TUG)  Score:  Initial: 7 seconds Most Recent: X seconds (Date: -- )   Interpretation of Score: The test measures, in seconds, the time taken by an individual to stand up from a standard arm chair (seat height 46 cm [18 in], arm height 65 cm [25.6 in]), walk a distance of 3 meters (118 in, approx 10 ft), turn, walk back to the chair and sit down. If the individual takes longer than 14 seconds to complete TUG, this indicates risk for falls.   Score 7 7.5-10.5 11-14 14.5-17.5 18-21 21.5-24.5 25+   Modifier CH CI CJ CK CL CM CN         Tool Used: ECOG Performance Survey Score  Score:  Initial: 1 Most Recent:      Interpretation of Score:   0 Fully active, able to carry on all pre-disease performance without restriction   1 Restricted in physically strenuous activity but ambulatory and able to carry out work of a light or sedentary nature, e.g., light house work, office work   2 Ambulatory and capable of all selfcare but unable to carry out any work activities. Up and about more than 50% of waking hours   3 Capable of only limited selfcare, confined to bed or chair more than 50% of waking hours   4 Completely disabled. Cannot carry on any selfcare. Totally confined to bed or chair   5 Dead     Medical Necessity:   · Patient is expected to demonstrate progress in strength and overall conditionoing to increase independence with household activity. Reason for Services/Other Comments:  · Patient continues to demonstrate capacity to improve strength and overall conditionoing which will increase independence. Use of outcome tool(s) and clinical judgement create a POC that gives a: Clear prediction of patient's progress: LOW COMPLEXITY            TREATMENT:   (In addition to Assessment/Re-Assessment sessions the following treatments were rendered)  Pre-treatment Symptoms/Complaints:  Lumbar pain, weakness, decreased activity tolerance, altered gait. Reports she isn't feeling well. She reports she has new onset pain in her upper mouth and jaw. She reports she is running a low grade fever and is taking tylenol. Pain: Initial:     6/10  mouth Post Session: 2 /10   O2 97   Fatigue 4 due to not going to bed until 2:30 a.m.  650' x 1  UBE level 1 x 6 min  650' 'x1   Nustep level 2 x 12 min SPM 76 mets 2.4  650' x 1  Sit to stand x 10 reps  Countertop push ups x 10 reps        as above  As above  [facility] Portal  Treatment/Session Assessment:    · Response to Treatment:  Tolerated the treatment fair. Very distracted with new onset mouth pain, fever and abdominal bloating. She feels these were the symptoms when she was first diagnosed. · Compliance with Program/Exercises:  Will assess as treatment progresses. · Recommendations/Intent for next treatment session: \"Next visit will focus on advancements to more challenging activities\".   Total Treatment Duration: 38 min  PT Patient Time In/Time Out  Time In: 0805  Time Out: 4128    Marj Lo, PT

## 2018-09-12 ENCOUNTER — HOSPITAL ENCOUNTER (OUTPATIENT)
Dept: PHYSICAL THERAPY | Age: 58
Discharge: HOME | End: 2018-09-12
Payer: COMMERCIAL

## 2018-09-12 PROCEDURE — 97110 THERAPEUTIC EXERCISES: CPT

## 2018-09-12 NOTE — PROGRESS NOTES
Milla Kennedy  : 1960  Primary: [facility]  Secondary:  [address]  at [address] , [address], [place]  [phone]   [phone]          OUTPATIENT PHYSICAL THERAPY:Daily Note    ICD-10: Treatment Diagnosis: muscle weakness generalized M 62.81  Precautions/Allergies:   Review of patient's allergies indicates no known allergies. Fall Risk Score: 0 (? 5 = High Risk)  MD Orders: oncology rehab MEDICAL/REFERRING DIAGNOSIS:  Other specified types of non-hodgkin lymphoma, lymph nodes of multiple sites [C85.88]    DATE OF ONSET: 3/30/17  REFERRING PHYSICIAN: Constance Reeder MD  RETURN PHYSICIAN APPOINTMENT: 18     INITIAL ASSESSMENT:  Ms. Sarahi Diop presents following treatment of lymphoma. She previously was a participant with oncology rehab. She stopped when she resumed chemo. She returns with overall deconditioning and decreased strength. She will benefit from therapeutic exercises to improve strength and activity tolerance. She is currently being treated at the pain center for lumbar pain. She returns to then July 3. She has developed cataracts and is in need of these being removed. Due to the poor eyesight her gait is slightly altered. PROBLEM LIST (Impacting functional limitations):  1. Decreased Strength  2. Increased Pain  3. Decreased Activity Tolerance  4. Increased Fatigue  5. Increased Shortness of Breath  6. Decreased [place] with Home Exercise Program INTERVENTIONS PLANNED:  1. Home Exercise Program (HEP)  2. Therapeutic Exercise/Strengthening   TREATMENT PLAN:  Effective Dates: 2018 TO 2018 (90 days). Frequency/Duration: 2 times a week for 90 Days. The patient will return 18 due to recent death in the family. GOALS: (Goals have been discussed and agreed upon with patient.)  Short-Term Functional Goals: Time Frame: 4 weeks  1.  The patient will be independent with HEP for strength within 4 weeks. 2. The patient will gain 1/2 grade strength of all 4 extremities within 4 weeks. 3. The patient will report walking 10 minutes twice daily within 4 weeks. Discharge Goals: Time Frame: 8 weeks  1. The patient will improve her 6 minute walk by 165' x 1 within 8 weeks. 2. The patient will report a pain level of 3 or less within 8 weeks. 3. The patient will transition to Healthy Self within 12 weeks. Rehabilitation Potential For Stated Goals: Good  Regarding Daria Barrientos's therapy, I certify that the treatment plan above will be carried out by a therapist or under their direction. Thank you for this referral,  González Mcginnis PT     Referring Physician Signature: Niru Colin MD              Date                    The information in this section was collected on 6/26/18 (except where otherwise noted). HISTORY:   History of Present Injury/Illness (Reason for Referral):  Post lymphoma treatment, lumbar pain, altered gait due to poor eyesight     Past Medical History/Comorbidities:   Ms. Sanjuanita Carbajal  has a past medical history of Anemia; Basal cell carcinoma; Cardiomegaly; Deep vein thrombosis (DVT) ([address] History of stroke; Hypertension; Marginal zone lymphoma ([address] (03/30/2017); Morbid obesity ([address] Osteoarthritis; Overactive bladder; Primary hypothyroidism; Radiation therapy complication; Rheumatic fever; S/P total knee replacement using cement (10/3/2011); Shingles; and Superficial venous thrombosis of right arm (5/1/2017). She also has no past medical history of Aneurysm ([address] Arrhythmia; Asthma; Autoimmune disease ([address] CAD (coronary artery disease); Chronic kidney disease; Coagulation defects; COPD; Diabetes ([address] Difficult intubation; GERD (gastroesophageal reflux disease); Heart failure ([address] Liver disease; Malignant hyperthermia due to anesthesia; Nausea & vomiting; Pseudocholinesterase deficiency; Psychiatric disorder; PUD (peptic ulcer disease); Seizures ([address]  Stroke ([facility] or Unspecified sleep apnea. Ms. Chelsey Lopez  has a past surgical history that includes pr anesth,achilles tendon surg (Left, 02/10/2011); hx cholecystectomy (1983); hx vascular access; hx knee replacement (Left, 2013); and hx knee replacement (Right, 2012). Past Medical History:   Diagnosis Date    Anemia     Basal cell carcinoma     Cardiomegaly     Deep vein thrombosis (DVT) (HCC)     History of stroke     Hypertension     Marginal zone lymphoma ([facility] 03/30/2017    Morbid obesity ([facility]     Osteoarthritis     Overactive bladder     Primary hypothyroidism     Radiation therapy complication     Rheumatic fever     S/P total knee replacement using cement 10/3/2011    Shingles     Superficial venous thrombosis of right arm 5/1/2017     Past Surgical History:   Procedure Laterality Date    HX CHOLECYSTECTOMY  1983    HX KNEE REPLACEMENT Left 2013    Dr. Gerhardt Haley Right 2012    Dr. Nisha Paniagua Left 02/10/2011    Dr. Radha Millan:     lives with spouse  Prior Level of Function/Work/Activity:    Dominant Side:         RIGHT  Current Medications:       Current Outpatient Prescriptions:     amoxicillin (AMOXIL) 500 mg capsule, Take 1 Cap by mouth two (2) times a day for 10 days. , Disp: 20 Cap, Rfl: 0    magic mouthwash solution, Magic mouth wash  Maalox Lidocaine 2% viscous  Diphenhydramine oral solution  5 mL 4 times a day as needed Pharmacy to mix equal portions of ingredients to a total volume as indicated in the dispense amount., Disp: 480 mL, Rfl: 2    pregabalin (LYRICA) 100 mg capsule, Take 1 Cap by mouth three (3) times daily. Max Daily Amount: 300 mg., Disp: 270 Cap, Rfl: 1    acyclovir (ZOVIRAX) 400 mg tablet, Take 1 Tab by mouth two (2) times a day., Disp: 180 Tab, Rfl: 1    fluconazole (DIFLUCAN) 150 mg tablet, Take 1 Tab by mouth daily.  FDA advises cautious prescribing of oral fluconazole in pregnancy. , Disp: 90 Tab, Rfl: 1    magic mouthwash (ALEXSANDER) susp, Take 10 mL by mouth every four (4) hours as needed. , Disp: 2 Bottle, Rfl: 2    nystatin (MYCOSTATIN) powder, Apply  to affected area three (3) times daily. , Disp: 60 g, Rfl: 2    fluconazole (DIFLUCAN) 150 mg tablet, Take 1 Tab by mouth daily. FDA advises cautious prescribing of oral fluconazole in pregnancy. , Disp: 30 Tab, Rfl: 2    levothyroxine (SYNTHROID) 175 mcg tablet, Take 1 Tab by mouth Daily (before breakfast). , Disp: 30 Tab, Rfl: 5    allopurinol (ZYLOPRIM) 300 mg tablet, Take 1 Tab by mouth daily. , Disp: 30 Tab, Rfl: 1    ondansetron hcl (ZOFRAN, AS HYDROCHLORIDE,) 8 mg tablet, Take 1 Tab by mouth every eight (8) hours as needed for Nausea (or vomiting). , Disp: 90 Tab, Rfl: 2    magnesium oxide (MAG-OX) 400 mg tablet, Take 1 Tab by mouth two (2) times a day., Disp: 60 Tab, Rfl: 1    lidocaine-prilocaine (EMLA) topical cream, Apply  to affected area as needed for Pain. Apply to port site 45-60 minutes prior to lab appt or infusion. , Disp: 30 g, Rfl: 0   Date Last Reviewed:  6/26/18   Number of Personal Factors/Comorbidities that affect the Plan of Care: 3+: HIGH COMPLEXITY   EXAMINATION:   ROM:          Within functional limits  Strength:          Bilateral upper extremities grossly 4-/5 x 2 extremities  Bilateral lower extremities 4/5 x 2 extremities    Balance:          Intact, limited due to need for cataracts  Skin Integrity:          intact  Edema/Girth:  pitting    Left Right    Initial Most Recent Initial Most Recent   Upper  Extremity           Lower  Extremity               Body Structures Involved:  1. Muscles Body Functions Affected:  1. Sensory/Pain  2. Neuromusculoskeletal Activities and Participation Affected:  1.  None   Number of elements (examined above) that affect the Plan of Care: 3: MODERATE COMPLEXITY   CLINICAL PRESENTATION:   Presentation: Evolving clinical presentation with changing clinical characteristics: MODERATE COMPLEXITY   CLINICAL DECISION MAKING:   Outcome Measure: Tool Used: 6-MINUTE WALK TEST  Score:  Initial: 1300' feet Most Recent: X feet (Date: -- )   Interpretation of Score: Normal range varies but is approximately 9327-9366 Feet      Distance walked: 1300 feet               Baseline End of Test   Heart Rate 85 118   Dyspnea (Luther Scale)     Fatigue (Luther Scale)  3   SpO2 98 96   /84 183/93     Score 2133 1193-7477 4608-8706 [phone] 426-16 15-0   Modifier CH CI CJ CK CL CM CN     ? Mobility - Walking and Moving Around:     - CURRENT STATUS: CJ - 20%-39% impaired, limited or restricted    - GOAL STATUS: CJ - 20%-39% impaired, limited or restricted    - D/C STATUS:  ---------------To be determined---------------    Tool Used: TINETTI  Score:  Initial:   Gait: 11/12  Balance: 16/16  TOTAL: 27/28 Most Recent:  Gait: /12  Balance: /16  TOTAL: /28   Interpretation of Score: The maximum score for the gait component is 12 points. The maximum score for the balance component is 16 points. The maximum total score is 28 points. In general, patients who score below 19 are at a high risk for falls. Patients who score in the range of 19-24 indicate that the patient has a risk for falls. Score 28 27-23 22-18 17-12 11-6 5-1 0   Modifier CH CI CJ CK CL CM CN         Tool Used: Timed Up and Go (TUG)  Score:  Initial: 7 seconds Most Recent: X seconds (Date: -- )   Interpretation of Score: The test measures, in seconds, the time taken by an individual to stand up from a standard arm chair (seat height 46 cm [18 in], arm height 65 cm [25.6 in]), walk a distance of 3 meters (118 in, approx 10 ft), turn, walk back to the chair and sit down. If the individual takes longer than 14 seconds to complete TUG, this indicates risk for falls.   Score 7 7.5-10.5 11-14 14.5-17.5 18-21 21.5-24.5 25+   Modifier CH CI CJ CK CL CM CN         Tool Used: ECOG Performance Survey Score  Score:  Initial: 1 Most Recent:      Interpretation of Score:   0 Fully active, able to carry on all pre-disease performance without restriction   1 Restricted in physically strenuous activity but ambulatory and able to carry out work of a light or sedentary nature, e.g., light house work, office work   2 Ambulatory and capable of all selfcare but unable to carry out any work activities. Up and about more than 50% of waking hours   3 Capable of only limited selfcare, confined to bed or chair more than 50% of waking hours   4 Completely disabled. Cannot carry on any selfcare. Totally confined to bed or chair   5 Dead     Medical Necessity:   · Patient is expected to demonstrate progress in strength and overall conditionoing to increase independence with household activity. Reason for Services/Other Comments:  · Patient continues to demonstrate capacity to improve strength and overall conditionoing which will increase independence. Use of outcome tool(s) and clinical judgement create a POC that gives a: Clear prediction of patient's progress: LOW COMPLEXITY            TREATMENT:   (In addition to Assessment/Re-Assessment sessions the following treatments were rendered)  Pre-treatment Symptoms/Complaints:  Lumbar pain, weakness, decreased activity tolerance, altered gait. Reports she isn't feeling well. She reports she saw her physician and was placed on an antibiotic for what appears to be a tooth abcess. She is to see her dentist.  She is running a fever. Pain: Initial:     /10  mouth Post Session: 2 /10   O2 98 HR 95  Advised to rest and seek medical care prior to resuming exercise due to starting an antibiotic.        as above  As above  [employer] Portal  Treatment/Session Assessment:    · Response to Treatment:  The patient will benefit from holding exercise until her infection is addressed. · Compliance with Program/Exercises: Will assess as treatment progresses.   · Recommendations/Intent for next treatment session: \"Next visit will focus on advancements to more challenging activities\".   Total Treatment Duration: 10 min  PT Patient Time In/Time Out  Time In: 0806  Time Out: 0816    Michele Brower PT

## 2018-09-18 ENCOUNTER — HOSPITAL ENCOUNTER (OUTPATIENT)
Dept: PHYSICAL THERAPY | Age: 58
Discharge: HOME | End: 2018-09-18
Payer: COMMERCIAL

## 2018-09-18 PROCEDURE — 97110 THERAPEUTIC EXERCISES: CPT

## 2018-09-18 NOTE — PROGRESS NOTES
Ronald Mitchell  : 1960  Primary: [address] [facility]  Secondary:  [address]  at [facility]  [address], [address], [place] [address]  [phone]   [phone]          OUTPATIENT PHYSICAL THERAPY:Daily Note    ICD-10: Treatment Diagnosis: muscle weakness generalized M 62.81  Precautions/Allergies:   Review of patient's allergies indicates no known allergies. Fall Risk Score: 0 (? 5 = High Risk)  MD Orders: oncology rehab MEDICAL/REFERRING DIAGNOSIS:  Other specified types of non-hodgkin lymphoma, lymph nodes of multiple sites [C85.88]    DATE OF ONSET: 3/30/17  REFERRING PHYSICIAN: César Smith MD  RETURN PHYSICIAN APPOINTMENT: 18     INITIAL ASSESSMENT:  Ms. Oneyda Humphries presents following treatment of lymphoma. She previously was a participant with oncology rehab. She stopped when she resumed chemo. She returns with overall deconditioning and decreased strength. She will benefit from therapeutic exercises to improve strength and activity tolerance. She is currently being treated at the pain center for lumbar pain. She returns to then July 3. She has developed cataracts and is in need of these being removed. Due to the poor eyesight her gait is slightly altered. PROBLEM LIST (Impacting functional limitations):  1. Decreased Strength  2. Increased Pain  3. Decreased Activity Tolerance  4. Increased Fatigue  5. Increased Shortness of Breath  6. Decreased [place] with Home Exercise Program INTERVENTIONS PLANNED:  1. Home Exercise Program (HEP)  2. Therapeutic Exercise/Strengthening   TREATMENT PLAN:  Effective Dates: 2018 TO 2018 (90 days). Frequency/Duration: 2 times a week for 90 Days. The patient will return 18 due to recent death in the family. GOALS: (Goals have been discussed and agreed upon with patient.)  Short-Term Functional Goals: Time Frame: 4 weeks  1.  The patient will be independent with HEP for strength within 4 weeks. 2. The patient will gain 1/2 grade strength of all 4 extremities within 4 weeks. 3. The patient will report walking 10 minutes twice daily within 4 weeks. Discharge Goals: Time Frame: 8 weeks  1. The patient will improve her 6 minute walk by 165' x 1 within 8 weeks. 2. The patient will report a pain level of 3 or less within 8 weeks. 3. The patient will transition to Healthy Self within 12 weeks. Rehabilitation Potential For Stated Goals: Good  Regarding Olivia Andree 's therapy, I certify that the treatment plan above will be carried out by a therapist or under their direction. Thank you for this referral,  Ana Luisa Whalen PT     Referring Physician Signature: Bertha Song MD              Date                    The information in this section was collected on 6/26/18 (except where otherwise noted). HISTORY:   History of Present Injury/Illness (Reason for Referral):  Post lymphoma treatment, lumbar pain, altered gait due to poor eyesight     Past Medical History/Comorbidities:   Ms. Chelsey Lopez  has a past medical history of Anemia; Basal cell carcinoma; Cardiomegaly; Deep vein thrombosis (DVT) ([address] History of stroke; Hypertension; Marginal zone lymphoma ([address] (03/30/2017); Morbid obesity ([address] Osteoarthritis; Overactive bladder; Primary hypothyroidism; Radiation therapy complication; Rheumatic fever; S/P total knee replacement using cement (10/3/2011); Shingles; and Superficial venous thrombosis of right arm (5/1/2017). She also has no past medical history of Aneurysm ([address] Arrhythmia; Asthma; Autoimmune disease ([address] CAD (coronary artery disease); Chronic kidney disease; Coagulation defects; COPD; Diabetes ([address] Difficult intubation; GERD (gastroesophageal reflux disease); Heart failure ([address] Liver disease; Malignant hyperthermia due to anesthesia; Nausea & vomiting; Pseudocholinesterase deficiency; Psychiatric disorder; PUD (peptic ulcer disease); Seizures ([address]  Stroke ([facility] or Unspecified sleep apnea. Ms. Janny Cool  has a past surgical history that includes pr anesth,achilles tendon surg (Left, 02/10/2011); hx cholecystectomy (1983); hx vascular access; hx knee replacement (Left, 2013); and hx knee replacement (Right, 2012). Past Medical History:   Diagnosis Date    Anemia     Basal cell carcinoma     Cardiomegaly     Deep vein thrombosis (DVT) (HCC)     History of stroke     Hypertension     Marginal zone lymphoma ([facility] 03/30/2017    Morbid obesity ([facility]     Osteoarthritis     Overactive bladder     Primary hypothyroidism     Radiation therapy complication     Rheumatic fever     S/P total knee replacement using cement 10/3/2011    Shingles     Superficial venous thrombosis of right arm 5/1/2017     Past Surgical History:   Procedure Laterality Date    HX CHOLECYSTECTOMY  1983    HX KNEE REPLACEMENT Left 2013    Dr. Graciela Lawton Right 2012    Dr. Rica Hawley Left 02/10/2011    Dr. Joshua Giron:     lives with spouse  Prior Level of Function/Work/Activity:    Dominant Side:         RIGHT  Current Medications:       Current Outpatient Prescriptions:     amoxicillin (AMOXIL) 500 mg capsule, Take 1 Cap by mouth two (2) times a day for 10 days. , Disp: 20 Cap, Rfl: 0    magic mouthwash solution, Magic mouth wash  Maalox Lidocaine 2% viscous  Diphenhydramine oral solution  5 mL 4 times a day as needed Pharmacy to mix equal portions of ingredients to a total volume as indicated in the dispense amount., Disp: 480 mL, Rfl: 2    pregabalin (LYRICA) 100 mg capsule, Take 1 Cap by mouth three (3) times daily. Max Daily Amount: 300 mg., Disp: 270 Cap, Rfl: 1    acyclovir (ZOVIRAX) 400 mg tablet, Take 1 Tab by mouth two (2) times a day., Disp: 180 Tab, Rfl: 1    fluconazole (DIFLUCAN) 150 mg tablet, Take 1 Tab by mouth daily.  FDA advises cautious prescribing of oral fluconazole in pregnancy. , Disp: 90 Tab, Rfl: 1    magic mouthwash (ALEXSANDER) susp, Take 10 mL by mouth every four (4) hours as needed. , Disp: 2 Bottle, Rfl: 2    nystatin (MYCOSTATIN) powder, Apply  to affected area three (3) times daily. , Disp: 60 g, Rfl: 2    fluconazole (DIFLUCAN) 150 mg tablet, Take 1 Tab by mouth daily. FDA advises cautious prescribing of oral fluconazole in pregnancy. , Disp: 30 Tab, Rfl: 2    levothyroxine (SYNTHROID) 175 mcg tablet, Take 1 Tab by mouth Daily (before breakfast). , Disp: 30 Tab, Rfl: 5    allopurinol (ZYLOPRIM) 300 mg tablet, Take 1 Tab by mouth daily. , Disp: 30 Tab, Rfl: 1    ondansetron hcl (ZOFRAN, AS HYDROCHLORIDE,) 8 mg tablet, Take 1 Tab by mouth every eight (8) hours as needed for Nausea (or vomiting). , Disp: 90 Tab, Rfl: 2    magnesium oxide (MAG-OX) 400 mg tablet, Take 1 Tab by mouth two (2) times a day., Disp: 60 Tab, Rfl: 1    lidocaine-prilocaine (EMLA) topical cream, Apply  to affected area as needed for Pain. Apply to port site 45-60 minutes prior to lab appt or infusion. , Disp: 30 g, Rfl: 0   Date Last Reviewed:  6/26/18   Number of Personal Factors/Comorbidities that affect the Plan of Care: 3+: HIGH COMPLEXITY   EXAMINATION:   ROM:          Within functional limits  Strength:          Bilateral upper extremities grossly 4-/5 x 2 extremities  Bilateral lower extremities 4/5 x 2 extremities    Balance:          Intact, limited due to need for cataracts  Skin Integrity:          intact  Edema/Girth:  pitting    Left Right    Initial Most Recent Initial Most Recent   Upper  Extremity           Lower  Extremity               Body Structures Involved:  1. Muscles Body Functions Affected:  1. Sensory/Pain  2. Neuromusculoskeletal Activities and Participation Affected:  1.  None   Number of elements (examined above) that affect the Plan of Care: 3: MODERATE COMPLEXITY   CLINICAL PRESENTATION:   Presentation: Evolving clinical presentation with changing clinical characteristics: MODERATE COMPLEXITY   CLINICAL DECISION MAKING:   Outcome Measure: Tool Used: 6-MINUTE WALK TEST  Score:  Initial: 1300' feet Most Recent: X feet (Date: -- )   Interpretation of Score: Normal range varies but is approximately 2427-1257 Feet      Distance walked: 1300 feet               Baseline End of Test   Heart Rate 85 118   Dyspnea (Luther Scale)     Fatigue (Luther Scale)  3   SpO2 98 96   /84 183/93     Score 2133 4326-5176 6628-8047 [phone] 426-16 15-0   Modifier CH CI CJ CK CL CM CN     ? Mobility - Walking and Moving Around:     - CURRENT STATUS: CJ - 20%-39% impaired, limited or restricted    - GOAL STATUS: CJ - 20%-39% impaired, limited or restricted    - D/C STATUS:  ---------------To be determined---------------    Tool Used: TINETTI  Score:  Initial:   Gait: 11/12  Balance: 16/16  TOTAL: 27/28 Most Recent:  Gait: /12  Balance: /16  TOTAL: /28   Interpretation of Score: The maximum score for the gait component is 12 points. The maximum score for the balance component is 16 points. The maximum total score is 28 points. In general, patients who score below 19 are at a high risk for falls. Patients who score in the range of 19-24 indicate that the patient has a risk for falls. Score 28 27-23 22-18 17-12 11-6 5-1 0   Modifier CH CI CJ CK CL CM CN         Tool Used: Timed Up and Go (TUG)  Score:  Initial: 7 seconds Most Recent: X seconds (Date: -- )   Interpretation of Score: The test measures, in seconds, the time taken by an individual to stand up from a standard arm chair (seat height 46 cm [18 in], arm height 65 cm [25.6 in]), walk a distance of 3 meters (118 in, approx 10 ft), turn, walk back to the chair and sit down. If the individual takes longer than 14 seconds to complete TUG, this indicates risk for falls.   Score 7 7.5-10.5 11-14 14.5-17.5 18-21 21.5-24.5 25+   Modifier CH CI CJ CK CL CM CN         Tool Used: ECOG Performance Survey Score  Score:  Initial: 1 Most Recent:      Interpretation of Score:   0 Fully active, able to carry on all pre-disease performance without restriction   1 Restricted in physically strenuous activity but ambulatory and able to carry out work of a light or sedentary nature, e.g., light house work, office work   2 Ambulatory and capable of all selfcare but unable to carry out any work activities. Up and about more than 50% of waking hours   3 Capable of only limited selfcare, confined to bed or chair more than 50% of waking hours   4 Completely disabled. Cannot carry on any selfcare. Totally confined to bed or chair   5 Dead     Medical Necessity:   · Patient is expected to demonstrate progress in strength and overall conditionoing to increase independence with household activity. Reason for Services/Other Comments:  · Patient continues to demonstrate capacity to improve strength and overall conditionoing which will increase independence. Use of outcome tool(s) and clinical judgement create a POC that gives a: Clear prediction of patient's progress: LOW COMPLEXITY            TREATMENT:   (In addition to Assessment/Re-Assessment sessions the following treatments were rendered)  Pre-treatment Symptoms/Complaints:  Lumbar pain, weakness, decreased activity tolerance, altered gait. She reports she is feeling better. She has completed her antibiotic. She reports she doesn't have an abscess. Pain: Initial:    4 /10  mouth Post Session: 2 /10   O2 99 HR 80  650' x 1  UBE level 1 x 6 min  650' x 1  Nustep level 2 x 12 min  650' x 1  Sit to stand x 10 reps  Counter top push ups x 10 reps  Bilateral upper extremities with 3# x 10 reps bicep curls, overhead press, forward flexion, abduction              as above  As above  [facility] Portal  Treatment/Session Assessment:    · Response to Treatment:  The patient tolerated the treatment well. · Compliance with Program/Exercises:  Will assess as treatment progresses. · Recommendations/Intent for next treatment session: \"Next visit will focus on advancements to more challenging activities\".   Total Treatment Duration: 44 min  PT Patient Time In/Time Out  Time In: 0801  Time Out: 0845    Cuong Dalton PT

## 2018-09-20 ENCOUNTER — APPOINTMENT (OUTPATIENT)
Dept: PHYSICAL THERAPY | Age: 58
End: 2018-09-20
Payer: COMMERCIAL

## 2018-09-25 ENCOUNTER — PATIENT OUTREACH (OUTPATIENT)
Dept: CASE MANAGEMENT | Age: 58
End: 2018-09-25

## 2018-09-25 NOTE — PROGRESS NOTES
Care Coordinator resolved episode and removed self from care team. 
 
 
Lori Erwin LPN/ Care Coordinator LOLA:[phone] [address] [place] [address] 
[email or web address] Hospital[facility]. [facility] note will not be viewable in [address]

## 2018-10-01 ENCOUNTER — HOSPITAL ENCOUNTER (OUTPATIENT)
Dept: PHYSICAL THERAPY | Age: 58
Discharge: HOME | End: 2018-10-01
Payer: COMMERCIAL

## 2018-10-01 PROCEDURE — G8978 MOBILITY CURRENT STATUS: HCPCS

## 2018-10-01 PROCEDURE — G8979 MOBILITY GOAL STATUS: HCPCS

## 2018-10-01 PROCEDURE — 97110 THERAPEUTIC EXERCISES: CPT

## 2018-10-01 NOTE — PROGRESS NOTES
Charlotte Diaz : 1960 Primary: [facility] Secondary:  [facility], [address], [address] [phone]   [phone] OUTPATIENT PHYSICAL THERAPY:Daily Note ICD-10: Treatment Diagnosis: muscle weakness generalized M 62.81 Precautions/Allergies:  
Review of patient's allergies indicates no known allergies. Fall Risk Score: 0 (? 5 = High Risk) MD Orders: oncology rehab MEDICAL/REFERRING DIAGNOSIS: 
Other specified types of non-hodgkin lymphoma, lymph nodes of multiple sites [C85.88] DATE OF ONSET: 3/30/17 REFERRING PHYSICIAN: Mumtaz Medina MD 
RETURN PHYSICIAN APPOINTMENT: 18 INITIAL ASSESSMENT:  Ms. Daniel Pedroza presents following treatment of lymphoma. She previously was a participant with oncology rehab. She stopped when she resumed chemo. She returns with overall deconditioning and decreased strength. She will benefit from therapeutic exercises to improve strength and activity tolerance. She is currently being treated at the pain center for lumbar pain. She returns to then July 3. She has developed cataracts and is in need of these being removed. Due to the poor eyesight her gait is slightly altered. PROBLEM LIST (Impacting functional limitations): 1. Decreased Strength 2. Increased Pain 3. Decreased Activity Tolerance 4. Increased Fatigue 5. Increased Shortness of Breath 6. Decreased Banner with Home Exercise Program INTERVENTIONS PLANNED: 
1. Home Exercise Program (HEP) 2. Therapeutic Exercise/Strengthening TREATMENT PLAN: 
Effective Dates: 2018 TO 2018 (90 days). Frequency/Duration: 2 times a week for 90 Days. GOALS: (Goals have been discussed and agreed upon with patient.) Short-Term Functional Goals: Time Frame: 4 weeks 1. The patient will be independent with HEP for strength within 4 weeks. 2. The patient will gain 1/2 grade strength of all 4 extremities within 4 weeks. 3. The patient will report walking 10 minutes twice daily within 4 weeks. Discharge Goals: Time Frame: 8 weeks 1. The patient will improve her 6 minute walk by 165' x 1 within 8 weeks. 2. The patient will report a pain level of 3 or less within 8 weeks. 3. The patient will transition to Healthy Self within 12 weeks. Rehabilitation Potential For Stated Goals: Good Regarding Lemuel Barrientos's therapy, I certify that the treatment plan above will be carried out by a therapist or under their direction. Thank you for this referral, Remi Hays PT Referring Physician Signature: Jessica Mix MD              Date The information in this section was collected on 6/26/18 (except where otherwise noted). HISTORY:  
History of Present Injury/Illness (Reason for Referral): 
Post lymphoma treatment, lumbar pain, altered gait due to poor eyesight Past Medical History/Comorbidities: Ms. Berenice Rendon  has a past medical history of Anemia; Basal cell carcinoma; Cardiomegaly; Deep vein thrombosis (DVT) ([address] History of stroke; Hypertension; Marginal zone lymphoma ([address] (03/30/2017); Morbid obesity ([address] Osteoarthritis; Overactive bladder; Primary hypothyroidism; Radiation therapy complication; Rheumatic fever; S/P total knee replacement using cement (10/3/2011); Shingles; and Superficial venous thrombosis of right arm (5/1/2017). She also has no past medical history of Aneurysm ([address] Arrhythmia; Asthma; Autoimmune disease ([address] CAD (coronary artery disease); Chronic kidney disease; Coagulation defects; COPD; Diabetes ([address] Difficult intubation; GERD (gastroesophageal reflux disease); Heart failure ([address] Liver disease; Malignant hyperthermia due to anesthesia; Nausea & vomiting; Pseudocholinesterase deficiency; Psychiatric disorder; PUD (peptic ulcer disease); Seizures ([address]  Stroke ([facility] or Unspecified sleep apnea. Ms. Christina Chavez  has a past surgical history that includes pr anesth,achilles tendon surg (Left, 02/10/2011); hx cholecystectomy (1983); hx vascular access; hx knee replacement (Left, 2013); and hx knee replacement (Right, 2012). Past Medical History:  
Diagnosis Date  Anemia  Basal cell carcinoma  Cardiomegaly  Deep vein thrombosis (DVT) (HCC)  History of stroke  Hypertension  Marginal zone lymphoma ([facility] 03/30/2017  Morbid obesity ([facility]  Osteoarthritis  Overactive bladder  Primary hypothyroidism  Radiation therapy complication  Rheumatic fever  S/P total knee replacement using cement 10/3/2011  Shingles  Superficial venous thrombosis of right arm 5/1/2017 Past Surgical History:  
Procedure Laterality Date [address]  HX KNEE REPLACEMENT Left 2013 Dr. David Arita  HX KNEE REPLACEMENT Right 2012 Dr. David Arita  HX VASCULAR ACCESS    
 SD ANESTH,ACHILLES TENDON SURG Left 02/10/2011 Dr. Lowell Cardoza Social History/Living Environment:  
  lives with spouse Prior Level of Function/Work/Activity: 
 Dominant Side:  
      RIGHT Current Medications:   
  
Current Outpatient Prescriptions:  
  magic mouthwash solution, Magic mouth wash  Maalox Lidocaine 2% viscous  Diphenhydramine oral solution  5 mL 4 times a day as needed Pharmacy to mix equal portions of ingredients to a total volume as indicated in the dispense amount., Disp: 480 mL, Rfl: 2 
  pregabalin (LYRICA) 100 mg capsule, Take 1 Cap by mouth three (3) times daily. Max Daily Amount: 300 mg., Disp: 270 Cap, Rfl: 1 
  acyclovir (ZOVIRAX) 400 mg tablet, Take 1 Tab by mouth two (2) times a day., Disp: 180 Tab, Rfl: 1 
  fluconazole (DIFLUCAN) 150 mg tablet, Take 1 Tab by mouth daily. FDA advises cautious prescribing of oral fluconazole in pregnancy. , Disp: 90 Tab, Rfl: 1   magic mouthwash (ALEXSANDER) susp, Take 10 mL by mouth every four (4) hours as needed. , Disp: 2 Bottle, Rfl: 2 
  nystatin (MYCOSTATIN) powder, Apply  to affected area three (3) times daily. , Disp: 60 g, Rfl: 2 
  fluconazole (DIFLUCAN) 150 mg tablet, Take 1 Tab by mouth daily. FDA advises cautious prescribing of oral fluconazole in pregnancy. , Disp: 30 Tab, Rfl: 2 
  levothyroxine (SYNTHROID) 175 mcg tablet, Take 1 Tab by mouth Daily (before breakfast). , Disp: 30 Tab, Rfl: 5 
  allopurinol (ZYLOPRIM) 300 mg tablet, Take 1 Tab by mouth daily. , Disp: 30 Tab, Rfl: 1 
  ondansetron hcl (ZOFRAN, AS HYDROCHLORIDE,) 8 mg tablet, Take 1 Tab by mouth every eight (8) hours as needed for Nausea (or vomiting). , Disp: 90 Tab, Rfl: 2 
  magnesium oxide (MAG-OX) 400 mg tablet, Take 1 Tab by mouth two (2) times a day., Disp: 60 Tab, Rfl: 1 
  lidocaine-prilocaine (EMLA) topical cream, Apply  to affected area as needed for Pain. Apply to port site 45-60 minutes prior to lab appt or infusion. , Disp: 30 g, Rfl: 0 Date Last Reviewed:  6/26/18 Number of Personal Factors/Comorbidities that affect the Plan of Care: 3+: HIGH COMPLEXITY EXAMINATION:  
ROM:   
      Within functional limits Strength:   
      Bilateral upper extremities grossly 4-/5 x 2 extremities Bilateral lower extremities 4/5 x 2 extremities Balance:   
      Intact, limited due to need for cataracts Skin Integrity:   
      intact Edema/Girth:  pitting Left Right Initial Most Recent Initial Most Recent Upper Extremity Lower Extremity Body Structures Involved: 1. Muscles Body Functions Affected: 1. Sensory/Pain 2. Neuromusculoskeletal Activities and Participation Affected: 1. None Number of elements (examined above) that affect the Plan of Care: 3: MODERATE COMPLEXITY CLINICAL PRESENTATION:  
Presentation: Evolving clinical presentation with changing clinical characteristics: MODERATE COMPLEXITY CLINICAL DECISION MAKING:  
Outcome Measure: Tool Used: 6-MINUTE WALK TEST Score:  Initial: 1300' feet Most Recent: X feet (Date: -- ) Interpretation of Score: Normal range varies but is approximately 5231-6377 Feet Distance walked: 1300 feet Baseline End of Test  
Heart Rate 85 118 Dyspnea (Luther Scale) Fatigue (Luther Scale)  3 SpO2 98 96 /84 183/93 Score 2133 [id number] 2637-4970 [phone] 15-0 Modifier CH CI CJ CK CL CM CN  
 
? Mobility - Walking and Moving Around:  
  - CURRENT STATUS: CJ - 20%-39% impaired, limited or restricted  - GOAL STATUS: CJ - 20%-39% impaired, limited or restricted  - D/C STATUS:  ---------------To be determined--------------- Tool Used: Hina Marinelli Score:  Initial:  
Gait: 11/12 Balance: 16/16 TOTAL: 27/28 Most Recent: 
Gait: /12 Balance: /16 TOTAL: /28 Interpretation of Score: The maximum score for the gait component is 12 points. The maximum score for the balance component is 16 points. The maximum total score is 28 points. In general, patients who score below 19 are at a high risk for falls. Patients who score in the range of 19-24 indicate that the patient has a risk for falls. Score 28 27-23 22-18 17-12 11-6 5-1 0 Modifier  CI CJ CK CL CM CN Tool Used: Timed Up and Go (TUG) Score:  Initial: 7 seconds Most Recent: X seconds (Date: -- ) Interpretation of Score: The test measures, in seconds, the time taken by an individual to stand up from a standard arm chair (seat height 46 cm [18 in], arm height 65 cm [25.6 in]), walk a distance of 3 meters (118 in, approx 10 ft), turn, walk back to the chair and sit down. If the individual takes longer than 14 seconds to complete TUG, this indicates risk for falls. Score 7 7.5-10.5 11-14 14.5-17.5 18-21 21.5-24.5 25+ Modifier CH CI CJ CK CL CM CN Tool Used: ECOG Performance Survey Score Score:  Initial: 1 Most Recent: Interpretation of Score:  
0 Fully active, able to carry on all pre-disease performance without restriction 1 Restricted in physically strenuous activity but ambulatory and able to carry out work of a light or sedentary nature, e.g., light house work, office work 2 Ambulatory and capable of all selfcare but unable to carry out any work activities. Up and about more than 50% of waking hours 3 Capable of only limited selfcare, confined to bed or chair more than 50% of waking hours 4 Completely disabled. Cannot carry on any selfcare. Totally confined to bed or chair 5 Dead Medical Necessity:  
· Patient is expected to demonstrate progress in strength and overall conditionoing to increase independence with household activity. Reason for Services/Other Comments: 
· Patient continues to demonstrate capacity to improve strength and overall conditionoing which will increase independence. Use of outcome tool(s) and clinical judgement create a POC that gives a: Clear prediction of patient's progress: LOW COMPLEXITY  
  
 
 
 
TREATMENT:  
(In addition to Assessment/Re-Assessment sessions the following treatments were rendered) Pre-treatment Symptoms/Complaints:  Lumbar pain, weakness, decreased activity tolerance, altered gait. The patient reports she is doing well this morning. She will have cataract surgery tomorrow. She will be out of town this weekend to [place] and next weekend to [facility]. She will have injections in her back she thinks next week. Pain: Initial:  
 4 /10  mouth Post Session: 2 /10 O2 97 HR 82 
650' x 1 
UBE level 1 x 6 min 
650' x 1 Nustep level 2 x 12 min 
650' x 1 Sit to stand x 10 reps Counter top push ups x 10 reps Bilateral upper extremities with 2# x 10 reps bicep curls, overhead press, forward flexion, abduction, bent over rows 650' x 1 
 
 
 
 
 
 
as above As above [facility] 
Treatment/Session Assessment: · Response to Treatment:  The patient tolerated the treatment well. · Compliance with Program/Exercises: Will assess as treatment progresses. · Recommendations/Intent for next treatment session: \"Next visit will focus on advancements to more challenging activities\". Total Treatment Duration: 43 min PT Patient Time In/Time Out Time In: 0801 Time Out: 0014 Adrian Tidwell PT

## 2018-10-01 NOTE — THERAPY RECERTIFICATION
Bria Barcenas  : 1960  Primary: [facility]  Secondary:  [address] at [facility]  [address], [address], [place] [address]  [phone]   [phone]          OUTPATIENT PHYSICAL THERAPY:Recertification    WEB-44: Treatment Diagnosis: muscle weakness generalized M 62.81  Precautions/Allergies:   Review of patient's allergies indicates no known allergies. Fall Risk Score: 0 (? 5 = High Risk)  MD Orders: oncology rehab MEDICAL/REFERRING DIAGNOSIS:  Other specified types of non-hodgkin lymphoma, lymph nodes of multiple sites [C85.88]    DATE OF ONSET: 3/30/17  REFERRING PHYSICIAN: Kai Hill MD  RETURN PHYSICIAN APPOINTMENT: 18     INITIAL ASSESSMENT:  Ms. Emmy Posadas presents following treatment of lymphoma. She previously was a participant with oncology rehab. She stopped when she resumed chemo. She returns with overall deconditioning and decreased strength. She will benefit from therapeutic exercises to improve strength and activity tolerance. She is currently being treated at the pain center for lumbar pain. She returns to then July 3. She has developed cataracts and is in need of these being removed. Due to the poor eyesight her gait is slightly altered. 10/1/18: The patient has attended sporadically due to multiple health issues. She has overall benefit from oncology rehab. She has made progress, but has not met all goals. She will benefit from continued therapy to reach her goals. PROBLEM LIST (Impacting functional limitations):  1. Decreased Strength  2. Increased Pain  3. Decreased Activity Tolerance  4. Increased Fatigue  5. Increased Shortness of Breath  6. Decreased [place] with Home Exercise Program INTERVENTIONS PLANNED:  1. Home Exercise Program (HEP)  2. Therapeutic Exercise/Strengthening   TREATMENT PLAN:  Effective Dates: 2018 TO 2018 (90 days).   Frequency/Duration: 2 times a week for 90 Days.    GOALS: (Goals have been discussed and agreed upon with patient.)  Short-Term Functional Goals: Time Frame: 4 weeks  1. The patient will be independent with HEP for strength within 4 weeks. Met   2. The patient will gain 1/2 grade strength of all 4 extremities within 4 weeks. Met   3. The patient will report walking 10 minutes twice daily within 4 weeks. Met   Discharge Goals: Time Frame: 8 weeks  1. The patient will improve her 6 minute walk by 165' x 1 within 8 weeks. 2. The patient will report a pain level of 3 or less within 8 weeks. Met   3. The patient will transition to Healthy Self within 12 weeks. Rehabilitation Potential For Stated Goals: Good  Regarding [place]'s therapy, I certify that the treatment plan above will be carried out by a therapist or under their direction. Thank you for this referral,  Kelly Chen PT     Referring Physician Signature: Madeleine Dominguez MD              Date                    The information in this section was collected on 6/26/18 (except where otherwise noted). HISTORY:   History of Present Injury/Illness (Reason for Referral):  Post lymphoma treatment, lumbar pain, altered gait due to poor eyesight     Past Medical History/Comorbidities:   Ms. 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 She also has no past medical history of Aneurysm ([address] Arrhythmia; Asthma; Autoimmune disease ([address] CAD (coronary artery disease); Chronic kidney disease; Coagulation defects; COPD; Diabetes ([address]  Difficult intubation; GERD (gastroesophageal reflux disease); Heart failure ([facility] Liver disease; Malignant hyperthermia due to anesthesia; Nausea & vomiting; Pseudocholinesterase deficiency; Psychiatric disorder; PUD (peptic ulcer disease); Seizures ([facility] Stroke [facility]); or Unspecified sleep apnea. Ms. Luciano Bautista  has a past surgical history that includes pr anesth,achilles tendon surg (Left, 02/10/2011); hx cholecystectomy (1983); hx vascular access; hx knee replacement (Left, 2013); and hx knee replacement (Right, 2012). Past Medical History:   Diagnosis Date    Anemia     Basal cell carcinoma     Cardiomegaly     Deep vein thrombosis (DVT) (HCC)     History of stroke     Hypertension     Marginal zone lymphoma ([facility] 03/30/2017    Morbid obesity ([facility]     Osteoarthritis     Overactive bladder     Primary hypothyroidism     Radiation therapy complication     Rheumatic fever     S/P total knee replacement using cement 10/3/2011    Shingles     Superficial venous thrombosis of right arm 5/1/2017     Past Surgical History:   Procedure Laterality Date    HX CHOLECYSTECTOMY  1983    HX KNEE REPLACEMENT Left 2013    Dr. Galindo Sanchez Right 2012    Dr. Beba Hernandez Left 02/10/2011    Dr. Donovan Lux:     lives with spouse  Prior Level of Function/Work/Activity:    Dominant Side:         RIGHT  Current Medications:       Current Outpatient Prescriptions:     magic mouthwash solution, Magic mouth wash  Maalox Lidocaine 2% viscous  Diphenhydramine oral solution  5 mL 4 times a day as needed Pharmacy to mix equal portions of ingredients to a total volume as indicated in the dispense amount., Disp: 480 mL, Rfl: 2    pregabalin (LYRICA) 100 mg capsule, Take 1 Cap by mouth three (3) times daily.  Max Daily Amount: 300 mg., Disp: 270 Cap, Rfl: 1    acyclovir (ZOVIRAX) 400 mg tablet, Take 1 Tab by mouth two (2) times a day., Disp: 180 Tab, Rfl: 1   fluconazole (DIFLUCAN) 150 mg tablet, Take 1 Tab by mouth daily. FDA advises cautious prescribing of oral fluconazole in pregnancy. , Disp: 90 Tab, Rfl: 1    magic mouthwash (ALEXSANDER) susp, Take 10 mL by mouth every four (4) hours as needed. , Disp: 2 Bottle, Rfl: 2    nystatin (MYCOSTATIN) powder, Apply  to affected area three (3) times daily. , Disp: 60 g, Rfl: 2    fluconazole (DIFLUCAN) 150 mg tablet, Take 1 Tab by mouth daily. FDA advises cautious prescribing of oral fluconazole in pregnancy. , Disp: 30 Tab, Rfl: 2    levothyroxine (SYNTHROID) 175 mcg tablet, Take 1 Tab by mouth Daily (before breakfast). , Disp: 30 Tab, Rfl: 5    allopurinol (ZYLOPRIM) 300 mg tablet, Take 1 Tab by mouth daily. , Disp: 30 Tab, Rfl: 1    ondansetron hcl (ZOFRAN, AS HYDROCHLORIDE,) 8 mg tablet, Take 1 Tab by mouth every eight (8) hours as needed for Nausea (or vomiting). , Disp: 90 Tab, Rfl: 2    magnesium oxide (MAG-OX) 400 mg tablet, Take 1 Tab by mouth two (2) times a day., Disp: 60 Tab, Rfl: 1    lidocaine-prilocaine (EMLA) topical cream, Apply  to affected area as needed for Pain. Apply to port site 45-60 minutes prior to lab appt or infusion. , Disp: 30 g, Rfl: 0   Date Last Reviewed:  6/26/18   Number of Personal Factors/Comorbidities that affect the Plan of Care: 3+: HIGH COMPLEXITY   EXAMINATION:   ROM:          Within functional limits  Strength:          Bilateral upper extremities grossly 4-/5 x 2 extremities  Bilateral lower extremities 4/5 x 2 extremities    Balance:          Intact, limited due to need for cataracts  Skin Integrity:          intact  Edema/Girth:  pitting    Left Right    Initial Most Recent Initial Most Recent   Upper  Extremity           Lower  Extremity               Body Structures Involved:  1. Muscles Body Functions Affected:  1. Sensory/Pain  2. Neuromusculoskeletal Activities and Participation Affected:  1.  None   Number of elements (examined above) that affect the Plan of Care: 3: MODERATE COMPLEXITY   CLINICAL PRESENTATION:   Presentation: Evolving clinical presentation with changing clinical characteristics: MODERATE COMPLEXITY   CLINICAL DECISION MAKING:   Outcome Measure: Tool Used: 6-MINUTE WALK TEST  Score:  Initial: 1300' feet Most Recent: X feet (Date: -- )   Interpretation of Score: Normal range varies but is approximately 5499-1714 Feet      Distance walked: 1300 feet               Baseline End of Test   Heart Rate 85 118   Dyspnea (Luther Scale)     Fatigue (Luther Scale)  3   SpO2 98 96   /84 183/93     Score 2133 2806-2523 6049-1850 [phone] 426-16 15-0   Modifier  CI CJ CK CL CM CN     ? Mobility - Walking and Moving Around:     - CURRENT STATUS: CJ - 20%-39% impaired, limited or restricted    - GOAL STATUS: CJ - 20%-39% impaired, limited or restricted    - D/C STATUS:  ---------------To be determined---------------    Tool Used: TINETTI  Score:  Initial:   Gait: 11/12  Balance: 16/16  TOTAL: 27/28 Most Recent:  Gait: /12  Balance: /16  TOTAL: /28   Interpretation of Score: The maximum score for the gait component is 12 points. The maximum score for the balance component is 16 points. The maximum total score is 28 points. In general, patients who score below 19 are at a high risk for falls. Patients who score in the range of 19-24 indicate that the patient has a risk for falls. Score 28 27-23 22-18 17-12 11-6 5-1 0   Modifier CH CI CJ CK CL CM CN         Tool Used: Timed Up and Go (TUG)  Score:  Initial: 7 seconds Most Recent: X seconds (Date: -- )   Interpretation of Score: The test measures, in seconds, the time taken by an individual to stand up from a standard arm chair (seat height 46 cm [18 in], arm height 65 cm [25.6 in]), walk a distance of 3 meters (118 in, approx 10 ft), turn, walk back to the chair and sit down. If the individual takes longer than 14 seconds to complete TUG, this indicates risk for falls.   Score 7 7.5-10.5 11-14 14.5-17.5 18-21 21.5-24.5 25+   Modifier CH CI CJ CK CL CM CN         Tool Used: ECOG Performance Survey Score  Score:  Initial: 1 Most Recent:      Interpretation of Score:   0 Fully active, able to carry on all pre-disease performance without restriction   1 Restricted in physically strenuous activity but ambulatory and able to carry out work of a light or sedentary nature, e.g., light house work, office work   2 Ambulatory and capable of all selfcare but unable to carry out any work activities. Up and about more than 50% of waking hours   3 Capable of only limited selfcare, confined to bed or chair more than 50% of waking hours   4 Completely disabled. Cannot carry on any selfcare. Totally confined to bed or chair   5 Dead     Medical Necessity:   · Patient is expected to demonstrate progress in strength and overall conditionoing to increase independence with household activity. Reason for Services/Other Comments:  · Patient continues to demonstrate capacity to improve strength and overall conditionoing which will increase independence. Use of outcome tool(s) and clinical judgement create a POC that gives a: Clear prediction of patient's progress: LOW COMPLEXITY            TREATMENT:   (In addition to Assessment/Re-Assessment sessions the following treatments were rendered)  Pre-treatment Symptoms/Complaints:  Lumbar pain, weakness, decreased activity tolerance, altered gait. The patient reports she is doing well this morning. She will have cataract surgery tomorrow. She will be out of town this weekend to [place] and next weekend to [facility]. She will have injections in her back she thinks next week.   Pain: Initial:    4 /10  mouth Post Session: 2 /10   O2 97 HR 82  650' x 1  UBE level 1 x 6 min  650' x 1  Nustep level 2 x 12 min  650' x 1  Sit to stand x 10 reps  Counter top push ups x 10 reps  Bilateral upper extremities with 2# x 10 reps bicep curls, overhead press, forward flexion, abduction, bent over rows  650' x 1              as above  As above  [employer]  Treatment/Session Assessment:    · Response to Treatment:  The patient tolerated the treatment well. · Compliance with Program/Exercises: Will assess as treatment progresses. · Recommendations/Intent for next treatment session: \"Next visit will focus on advancements to more challenging activities\".   Total Treatment Duration: 43 min  PT Patient Time In/Time Out  Time In: 0801  Time Out: 1738    Adiel Lemos PT

## 2018-10-08 ENCOUNTER — HOSPITAL ENCOUNTER (OUTPATIENT)
Dept: PHYSICAL THERAPY | Age: 58
Discharge: HOME | End: 2018-10-08
Payer: COMMERCIAL

## 2018-10-11 ENCOUNTER — HOSPITAL ENCOUNTER (OUTPATIENT)
Dept: PHYSICAL THERAPY | Age: 58
Discharge: HOME | End: 2018-10-11
Payer: COMMERCIAL

## 2018-10-17 ENCOUNTER — PATIENT OUTREACH (OUTPATIENT)
Dept: CASE MANAGEMENT | Age: 58
End: 2018-10-17

## 2018-10-17 ENCOUNTER — HOSPITAL ENCOUNTER (OUTPATIENT)
Dept: LAB | Age: 58
Discharge: HOME | End: 2018-10-17
Payer: COMMERCIAL

## 2018-10-17 DIAGNOSIS — C85.88 MARGINAL ZONE LYMPHOMA OF LYMPH NODES OF MULTIPLE SITES (HCC): Chronic | ICD-10-CM

## 2018-10-17 DIAGNOSIS — C85.88 MARGINAL ZONE LYMPHOMA OF LYMPH NODES OF MULTIPLE SITES (HCC): ICD-10-CM

## 2018-10-17 DIAGNOSIS — C83.39 DIFFUSE LARGE B-CELL LYMPHOMA OF SOLID ORGAN EXCLUDING SPLEEN (HCC): ICD-10-CM

## 2018-10-17 DIAGNOSIS — C83.30 DIFFUSE LARGE B-CELL LYMPHOMA, UNSPECIFIED BODY REGION (HCC): ICD-10-CM

## 2018-10-17 LAB
ALBUMIN SERPL-MCNC: 3.9 G/DL (ref 3.5–5)
ALBUMIN/GLOB SERPL: 1.4 {RATIO} (ref 1.2–3.5)
ALP SERPL-CCNC: 121 U/L (ref 50–136)
ALT SERPL-CCNC: 48 U/L (ref 12–65)
ANION GAP SERPL CALC-SCNC: 7 MMOL/L (ref 7–16)
AST SERPL-CCNC: 22 U/L (ref 15–37)
BASOPHILS # BLD: 0 K/UL (ref 0–0.2)
BASOPHILS NFR BLD: 0 % (ref 0–2)
BILIRUB SERPL-MCNC: 0.4 MG/DL (ref 0.2–1.1)
BUN SERPL-MCNC: 23 MG/DL (ref 6–23)
CALCIUM SERPL-MCNC: 9.4 MG/DL (ref 8.3–10.4)
CHLORIDE SERPL-SCNC: 105 MMOL/L (ref 98–107)
CO2 SERPL-SCNC: 28 MMOL/L (ref 21–32)
CREAT SERPL-MCNC: 1.01 MG/DL (ref 0.6–1)
DIFFERENTIAL METHOD BLD: ABNORMAL
EOSINOPHIL # BLD: 0.1 K/UL (ref 0–0.8)
EOSINOPHIL NFR BLD: 2 % (ref 0.5–7.8)
ERYTHROCYTE [DISTWIDTH] IN BLOOD BY AUTOMATED COUNT: 14.6 % (ref 11.9–14.6)
GLOBULIN SER CALC-MCNC: 2.8 G/DL (ref 2.3–3.5)
GLUCOSE SERPL-MCNC: 115 MG/DL (ref 65–100)
HCT VFR BLD AUTO: 31.9 % (ref 35.8–46.3)
HGB BLD-MCNC: 10.9 G/DL (ref 11.7–15.4)
IMM GRANULOCYTES # BLD: 0 K/UL (ref 0–0.5)
IMM GRANULOCYTES NFR BLD AUTO: 1 % (ref 0–5)
LDH SERPL L TO P-CCNC: 206 U/L (ref 100–190)
LYMPHOCYTES # BLD: 0.3 K/UL (ref 0.5–4.6)
LYMPHOCYTES NFR BLD: 6 % (ref 13–44)
MAGNESIUM SERPL-MCNC: 2.1 MG/DL (ref 1.8–2.4)
MCH RBC QN AUTO: 31.7 PG (ref 26.1–32.9)
MCHC RBC AUTO-ENTMCNC: 34.2 G/DL (ref 31.4–35)
MCV RBC AUTO: 92.7 FL (ref 79.6–97.8)
MONOCYTES # BLD: 0.5 K/UL (ref 0.1–1.3)
MONOCYTES NFR BLD: 9 % (ref 4–12)
NEUTS SEG # BLD: 4.4 K/UL (ref 1.7–8.2)
NEUTS SEG NFR BLD: 83 % (ref 43–78)
NRBC # BLD: 0 K/UL (ref 0–0.2)
PLATELET # BLD AUTO: 127 K/UL (ref 150–450)
PMV BLD AUTO: 10.1 FL (ref 9.4–12.3)
POTASSIUM SERPL-SCNC: 3.9 MMOL/L (ref 3.5–5.1)
PROT SERPL-MCNC: 6.7 G/DL (ref 6.3–8.2)
RBC # BLD AUTO: 3.44 M/UL (ref 4.05–5.25)
SODIUM SERPL-SCNC: 140 MMOL/L (ref 136–145)
WBC # BLD AUTO: 5.3 K/UL (ref 4.3–11.1)

## 2018-10-17 PROCEDURE — 83615 LACTATE (LD) (LDH) ENZYME: CPT

## 2018-10-17 PROCEDURE — 85025 COMPLETE CBC W/AUTO DIFF WBC: CPT

## 2018-10-17 PROCEDURE — 83735 ASSAY OF MAGNESIUM: CPT

## 2018-10-17 PROCEDURE — 80053 COMPREHEN METABOLIC PANEL: CPT

## 2018-10-17 NOTE — PROGRESS NOTES
Pt was seen and labs reviewed by Dr. Adebayo Vazquez (chemistries pending). Pt is doing well off of treatment and will begin onc rehab again after she heals from back procedure. Pt will return to clinic in 3 months with a PET scan.

## 2018-11-24 NOTE — H&P
Inpatient Hematology / Oncology History and Physical    Reason for Asmission:  Large B Cell Lymphoma  Chemo    History of Present Illness:  Ms. Sue Zambrano is a 62 y.o. female admitted on 1/22/18 with a primary diagnosis of Pneumonia and DLBCL. She was seen last week in the office for fevers, chills, arthralgia/myalgias, head congestion, nausea and vomiting. She tested positive for Flu A, but was out of the 48 hours window to start Tamiflu. She states feeling better until Friday afternoon. She began feeling achy again and with mild fevers. She notes declining over the weekend, especially last night. She now has a course cough with thick, blood tinged phlegm. She continues with head congestion and headaches. Her GI symptoms are improved and she is taking her antiemetics. She states being able to drink water, but has not been eating much. She states running fever over the weekend, on arrival to the office today it was 103.1. She has blood cultures obtained along with CXR that revealed a LLL opacity. She was planned to start C1 RICE today, therefore she will be admitted to treat her PNA and we will plan to start treatment when she is recovered. Review of Systems:  Constitutional +fever, chills, fatigue, decreased appetite and weight loss   HEENT +head congestion, sinus pressure. Denies trauma, blurry vision, hearing loss, ear pain, nosebleeds, sore throat, neck pain and ear discharge. Skin Denies lesions or rashes. Lungs +cough, shortness of breath, blood tinged sputum    Cardiovascular Denies chest pain, palpitations, or lower extremity edema. Gastrointestinal +nausea. Denies vomiting, changes in bowel habits, bloody or black stools, abdominal pain.  Denies dysuria, frequency or hesitancy of urination. Neuro +headache Denies visual changes or ataxia. Denies dizziness, tingling, tremors, sensory change, speech change, focal weakness or headaches.      Hematology Denies easy bruising or Problem: Patient Care Overview  Goal: Plan of Care/Patient Progress Review  Outcome: No Change  Pt denies pain. Neuros started, intact and no changes. Calm and appropriate with staff, denies suicidal ideation. Pt continues to have softer BP's, 90's/50's, remains WDL. Sleeping through night, up to BR x1, needing x1 assist. Continue to monitor.       bleeding, denies gingival bleeding or epistaxis. Endo Denies heat/cold intolerance, denies diabetes or thyroid abnormalities. MSK +arthralgia/myalgia Denies back pain or frequent falls. Psychiatric/Behavioral The patient is not nervous/anxious. No Known Allergies  Past Medical History:   Diagnosis Date    Anemia     Basal cell carcinoma     Cardiomegaly     Deep vein thrombosis (DVT) (HCC)     History of stroke     Hypertension     Marginal zone lymphoma ([facility] 03/30/2017    Morbid obesity ([facility]     Osteoarthritis     Overactive bladder     Primary hypothyroidism     Radiation therapy complication     Rheumatic fever     Shingles      Past Surgical History:   Procedure Laterality Date    HX CHOLECYSTECTOMY  1983    HX KNEE REPLACEMENT Left 2013    Dr. Jennifer Catalan Right 2012    Dr. Delta Jose [address] [facility]  Left 02/10/2011    Dr. Erinn Kwan     Family History   Problem Relation Age of Onset    Kidney Disease Father     Other Maternal Grandmother      Vascular Disorder with leg amputation    Liver Disease Sister      non-alcoholic    Celiac Disease Sister     Breast Cancer Cousin 48    Thyroid Disease Sister      hypothyroidism    Malignant Hyperthermia Neg Hx     Pseudocholinesterase Deficiency Neg Hx     Delayed Awakening Neg Hx     Emergence Delirium Neg Hx     Post-op Cognitive Dysfunction Neg Hx     Thyroid Cancer Neg Hx      Social History     Social History    Marital status:      Spouse name: N/A    Number of children: N/A    Years of education: N/A     Occupational History    Not on file.      Social History Main Topics    Smoking status: Never Smoker    Smokeless tobacco: Never Used    Alcohol use No      Comment: RARE, ONCE/TWICE A YEAR    Drug use: Yes     Special: Prescription    Sexual activity: Not on file     Other Topics Concern    Not on file     Social History Narrative    10/3/11: PATIENT IS  TO DEJA.  THEY HAVE A GROWN SON AND DAUGHTER. SHE IS DISABLED. Current Outpatient Prescriptions   Medication Sig Dispense Refill    ondansetron hcl (ZOFRAN, AS HYDROCHLORIDE,) 4 mg tablet Take 4 mg by mouth every eight (8) hours as needed for Nausea.  fluconazole (DIFLUCAN) 150 mg tablet Take 150 mg by mouth daily. FDA advises cautious prescribing of oral fluconazole in pregnancy.  acyclovir (ZOVIRAX) 400 mg tablet Take 1 Tab by mouth two (2) times a day. 60 Tab 3    allopurinol (ZYLOPRIM) 300 mg tablet Take  by mouth daily.  levothyroxine (SYNTHROID) 175 mcg tablet Take 1 Tab by mouth Daily (before breakfast). 30 Tab 5    pregabalin (LYRICA) 100 mg capsule Take 1 Cap by mouth three (3) times daily. Max Daily Amount: 300 mg. 90 Cap 3    magnesium oxide (MAG-OX) 400 mg tablet Take 1 Tab by mouth two (2) times a day. 60 Tab 1    magic mouthwash (ALEXSANDER) susp Take 10 mL by mouth every four (4) hours as needed. 2 Bottle 2    nystatin (MYCOSTATIN) powder Apply  to affected area three (3) times daily. 60 g 2    fluticasone (FLONASE) 50 mcg/actuation nasal spray 2 Sprays by Both Nostrils route daily. 1 Bottle 1    lidocaine-prilocaine (EMLA) topical cream Apply  to affected area as needed for Pain. Apply to port site 45-60 minutes prior to lab appt or infusion. 30 g 0    promethazine (PHENERGAN) 25 mg tablet Take 25 mg by mouth every six (6) hours as needed for Nausea. Unsure of dose         OBJECTIVE:  No data found. Temp (24hrs), Av.1 °F (39.5 °C), Min:103.1 °F (39.5 °C), Max:103.1 °F (39.5 °C)         Physical Exam:  Constitutional: Ill appearing female in no acute distress, sitting comfortably in the exam chair. HEENT: Normocephalic and atraumatic. Oropharynx is clear, mucous membranes are moist.  Sclerae anicteric. Neck supple without JVD. No thyromegaly present. Skin Warm and dry. No bruising and no rash noted. No erythema. No pallor.     Respiratory Lungs are clear to auscultation bilaterally without wheezes, rales or rhonchi, normal air exchange without accessory muscle use. CVS Normal rate, regular rhythm and normal S1 and S2. No murmurs, gallops, or rubs. Abdomen Soft, nontender and nondistended, normoactive bowel sounds. No palpable mass. No hepatosplenomegaly. Neuro Grossly nonfocal with no obvious sensory or motor deficits. MSK Normal range of motion in general.  No edema and no tenderness. Psych Appropriate mood and affect. Labs:    Recent Results (from the past 24 hour(s))   METABOLIC PANEL, COMPREHENSIVE    Collection Time: 01/22/18 10:28 AM   Result Value Ref Range    Sodium 139 136 - 145 mmol/L    Potassium 3.9 3.5 - 5.1 mmol/L    Chloride 102 98 - 107 mmol/L    CO2 29 21 - 32 mmol/L    Anion gap 8 7 - 16 mmol/L    Glucose 125 (H) 65 - 100 mg/dL    BUN 13 6 - 23 MG/DL    Creatinine 0.77 0.6 - 1.0 MG/DL    GFR est AA >60 >60 ml/min/1.73m2    GFR est non-AA >60 >60 ml/min/1.73m2    Calcium 8.9 8.3 - 10.4 MG/DL    Bilirubin, total 0.7 0.2 - 1.1 MG/DL    ALT (SGPT) 88 (H) 12 - 65 U/L    AST (SGOT) 36 15 - 37 U/L    Alk. phosphatase 180 (H) 50 - 136 U/L    Protein, total 6.8 6.3 - 8.2 g/dL    Albumin 3.5 3.5 - 5.0 g/dL    Globulin 3.3 2.3 - 3.5 g/dL    A-G Ratio 1.1 (L) 1.2 - 3.5     CBC WITH AUTOMATED DIFF    Collection Time: 01/22/18 10:28 AM   Result Value Ref Range    WBC 7.5 4.3 - 11.1 K/uL    RBC 3.22 (L) 4.05 - 5.25 M/uL    HGB 10.2 (L) 11.7 - 15.4 g/dL    HCT 30.5 (L) 35.8 - 46.3 %    MCV 94.7 79.6 - 97.8 FL    MCH 31.7 26.1 - 32.9 PG    MCHC 33.4 31.4 - 35.0 g/dL    RDW 13.3 11.9 - 14.6 %    PLATELET 493 (L) 403 - 450 K/uL    MPV 10.6 (L) 10.8 - 14.1 FL    NEUTROPHILS 86 (H) 47 - 75 %    BAND NEUTROPHILS 7 (H) 0 - 6 %    MONOCYTES 7 3 - 9 %    ABS. NEUTROPHILS 7.0 1.7 - 8.2 K/UL    ABS.  MONOCYTES 0.5 0.1 - 1.3 K/UL    RBC COMMENTS SLIGHT  ANISOCYTOSIS + POIKILOCYTOSIS        WBC COMMENTS Result Confirmed By Smear PLATELET COMMENTS SLIGHT      DF MANUAL     INFLUENZA A & B AG (RAPID TEST)    Collection Time: 01/22/18 10:29 AM   Result Value Ref Range    Influenza A Ag NEGATIVE  NEG      Influenza B Ag NEGATIVE  NEG         Imaging:  Two view chest:  1/22/2018 9:45 AM     History: temp 103, productive cough, lymphoma.      Comparison: 09/06/2017     Findings: A right internal jugular Mediport catheter is unchanged. The heart and  mediastinal silhouette are normal in size and configuration. There has been the  interval development of left lower lobe airspace opacities. There are no  significant pleural effusions. The pulmonary vascularity is within normal  limits. The visualized osseous structures are unremarkable.     IMPRESSION  Impression: Left lower lobe airspace opacity.     ASSESSMENT:  Problem List  Date Reviewed: 1/19/2018          Codes Class Noted    Diffuse large B-cell lymphoma ([facility] ICD-10-CM: C83.30  ICD-9-CM: 202.80  1/19/2018        Primary hypothyroidism ICD-10-CM: E03.9  ICD-9-CM: 244.9  Unknown        Febrile neutropenia ([facility] ICD-10-CM: D70.9, R50.81  ICD-9-CM: 288.00, 780.61  9/8/2017        Neutropenic fever ([facility] ICD-10-CM: D70.9, R50.81  ICD-9-CM: 288.00, 780.61  7/19/2017        Pancytopenia due to antineoplastic chemotherapy [facility]) (Chronic) ICD-10-CM: D61.810, T45.1X5A  ICD-9-CM: 284.11, E933.1  7/19/2017        Chronic hypotension (Chronic) ICD-10-CM: I95.89  ICD-9-CM: 458.1  7/19/2017        Snores ICD-10-CM: R06.83  ICD-9-CM: 786.09  5/1/2017        Superficial venous thrombosis of right arm ICD-10-CM: I82.611  ICD-9-CM: 453.81  5/1/2017        Hypotension ICD-10-CM: I95.9  ICD-9-CM: 458.9  4/28/2017        Acute renal failure (ARF) ([facility] ICD-10-CM: N17.9  ICD-9-CM: 584.9  4/28/2017        Sepsis ([address] ICD-10-CM: A41.9  ICD-9-CM: 038.9, 995.91  4/23/2017        Hypomagnesemia ICD-10-CM: S58.81  ICD-9-CM: 275.2  4/23/2017        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 [facility]) ICD-10-CM: C85.88  ICD-9-CM: 200.38  4/7/2017        Non Hodgkin's lymphoma ([facility] ICD-10-CM: C85.90  ICD-9-CM: 202.80  3/30/2017        Ascites ICD-10-CM: R18.8  ICD-9-CM: 789.59  3/30/2017        Lymphadenopathy, generalized ICD-10-CM: R59.1  ICD-9-CM: 785.6  3/30/2017        Osteoarthritis of left knee ICD-10-CM: M17.12  ICD-9-CM: 715.96  8/26/2013        Morbid obesity ([facility] ICD-10-CM: E66.01  ICD-9-CM: 278.01  10/4/2011        History of DVT of lower extremity ICD-10-CM: V89.211  ICD-9-CM: V12.51  10/4/2011    Overview Signed 10/4/2011  1:44 AM by Rosemarie Teran NP     X 2               Hypertension ICD-10-CM: I10  ICD-9-CM: 401.9  10/4/2011    Overview Signed 10/4/2011  1:44 AM by Rosemarie Teran NP     PRE OP             Heart murmur ICD-10-CM: R01.1  ICD-9-CM: 785.2  10/4/2011        Osteoarthritis of right knee ICD-10-CM: M17.11  ICD-9-CM: 715.96  10/3/2011        S/P total knee replacement using cement ICD-10-CM: C93.593  ICD-9-CM: V43.65  10/3/2011                PLAN:  -DLBCL  She was planned to start C1 RICE today, on hold until PNA resolved, CSF-no malignant cells    -LLL Pneumonia  S/P Flu A last week. Blood cultures pending. Start Zosyn, Tobra, Vanc, incentive spirometry, mucinex, consult Pulmonary to rule out any other needs    -Nausea/Vomiting  Continue with Antiemetics prn    -Supportive Care  -Lovenox for DVT prophylaxis   -Continue home meds    Lab studies and imaging studies (CXR) were personally reviewed.             Sofie Ferrer NP  [facility] Hematology and Oncology  [address] [place] [address]  Office : [phone]  Fax : [phone]

## 2019-01-15 ENCOUNTER — HOSPITAL ENCOUNTER (OUTPATIENT)
Dept: PET IMAGING | Age: 59
Discharge: HOME | End: 2019-01-15
Payer: COMMERCIAL

## 2019-01-15 VITALS — BODY MASS INDEX: 46.07 KG/M2 | HEIGHT: 63 IN | WEIGHT: 260 LBS

## 2019-01-15 DIAGNOSIS — C83.30 DIFFUSE LARGE B-CELL LYMPHOMA, UNSPECIFIED BODY REGION (HCC): ICD-10-CM

## 2019-01-15 PROCEDURE — A9552 F18 FDG: HCPCS

## 2019-01-15 PROCEDURE — 74011636320 HC RX REV CODE- 636/320: Performed by: INTERNAL MEDICINE

## 2019-01-15 RX ORDER — SODIUM CHLORIDE 0.9 % (FLUSH) 0.9 %
10 SYRINGE (ML) INJECTION
Status: COMPLETED | OUTPATIENT
Start: 2019-01-15 | End: 2019-01-15

## 2019-01-15 RX ADMIN — Medication 10 ML: at 07:22

## 2019-01-15 RX ADMIN — DIATRIZOATE MEGLUMINE AND DIATRIZOATE SODIUM 10 ML: 660; 100 LIQUID ORAL; RECTAL at 07:31

## 2019-01-16 ENCOUNTER — HOSPITAL ENCOUNTER (OUTPATIENT)
Dept: LAB | Age: 59
Discharge: HOME | End: 2019-01-16
Payer: COMMERCIAL

## 2019-01-16 ENCOUNTER — PATIENT OUTREACH (OUTPATIENT)
Dept: CASE MANAGEMENT | Age: 59
End: 2019-01-16

## 2019-01-16 DIAGNOSIS — C83.30 DIFFUSE LARGE B-CELL LYMPHOMA, UNSPECIFIED BODY REGION (HCC): ICD-10-CM

## 2019-01-16 DIAGNOSIS — C85.88 MARGINAL ZONE LYMPHOMA OF LYMPH NODES OF MULTIPLE SITES (HCC): ICD-10-CM

## 2019-01-16 LAB
ALBUMIN SERPL-MCNC: 3.9 G/DL (ref 3.5–5)
ALBUMIN/GLOB SERPL: 1.4 {RATIO} (ref 1.2–3.5)
ALP SERPL-CCNC: 117 U/L (ref 50–136)
ALT SERPL-CCNC: 43 U/L (ref 12–65)
ANION GAP SERPL CALC-SCNC: 6 MMOL/L (ref 7–16)
AST SERPL-CCNC: 17 U/L (ref 15–37)
BASOPHILS # BLD: 0 K/UL (ref 0–0.2)
BASOPHILS NFR BLD: 1 % (ref 0–2)
BILIRUB SERPL-MCNC: 0.4 MG/DL (ref 0.2–1.1)
BUN SERPL-MCNC: 21 MG/DL (ref 6–23)
CALCIUM SERPL-MCNC: 8.9 MG/DL (ref 8.3–10.4)
CHLORIDE SERPL-SCNC: 107 MMOL/L (ref 98–107)
CO2 SERPL-SCNC: 28 MMOL/L (ref 21–32)
CREAT SERPL-MCNC: 0.8 MG/DL (ref 0.6–1)
DIFFERENTIAL METHOD BLD: ABNORMAL
EOSINOPHIL # BLD: 0.1 K/UL (ref 0–0.8)
EOSINOPHIL NFR BLD: 2 % (ref 0.5–7.8)
ERYTHROCYTE [DISTWIDTH] IN BLOOD BY AUTOMATED COUNT: 13.5 % (ref 11.9–14.6)
GLOBULIN SER CALC-MCNC: 2.8 G/DL (ref 2.3–3.5)
GLUCOSE SERPL-MCNC: 117 MG/DL (ref 65–100)
HCT VFR BLD AUTO: 33.5 % (ref 35.8–46.3)
HGB BLD-MCNC: 11.6 G/DL (ref 11.7–15.4)
IMM GRANULOCYTES # BLD AUTO: 0 K/UL (ref 0–0.5)
IMM GRANULOCYTES NFR BLD AUTO: 0 % (ref 0–5)
LDH SERPL L TO P-CCNC: 227 U/L (ref 100–190)
LYMPHOCYTES # BLD: 0.3 K/UL (ref 0.5–4.6)
LYMPHOCYTES NFR BLD: 6 % (ref 13–44)
MAGNESIUM SERPL-MCNC: 2.1 MG/DL (ref 1.8–2.4)
MCH RBC QN AUTO: 33 PG (ref 26.1–32.9)
MCHC RBC AUTO-ENTMCNC: 34.6 G/DL (ref 31.4–35)
MCV RBC AUTO: 95.2 FL (ref 79.6–97.8)
MONOCYTES # BLD: 0.5 K/UL (ref 0.1–1.3)
MONOCYTES NFR BLD: 9 % (ref 4–12)
NEUTS SEG # BLD: 4.6 K/UL (ref 1.7–8.2)
NEUTS SEG NFR BLD: 83 % (ref 43–78)
NRBC # BLD: 0 K/UL (ref 0–0.2)
PLATELET # BLD AUTO: 136 K/UL (ref 150–450)
PMV BLD AUTO: 10 FL (ref 9.4–12.3)
POTASSIUM SERPL-SCNC: 4.1 MMOL/L (ref 3.5–5.1)
PROT SERPL-MCNC: 6.7 G/DL (ref 6.3–8.2)
RBC # BLD AUTO: 3.52 M/UL (ref 4.05–5.25)
SODIUM SERPL-SCNC: 141 MMOL/L (ref 136–145)
WBC # BLD AUTO: 5.5 K/UL (ref 4.3–11.1)

## 2019-01-16 PROCEDURE — 80053 COMPREHEN METABOLIC PANEL: CPT

## 2019-01-16 PROCEDURE — 85025 COMPLETE CBC W/AUTO DIFF WBC: CPT

## 2019-01-16 PROCEDURE — 83735 ASSAY OF MAGNESIUM: CPT

## 2019-01-16 PROCEDURE — 83615 LACTATE (LD) (LDH) ENZYME: CPT

## 2019-01-16 NOTE — PROGRESS NOTES
Pt was seen and labs reviewed by Dr. Eriberto Wilcox. Pt is doing well despite some minor issues with a broken toe and muscle pain after spine procedure. She also continues with lip numbness/pain that has been ongoing since diagnosis. Pt has a \"cyst\" to right breast, but she is due for mammogram and will schedule this in the near future. Pt instructed to d/c acyclovir and diflucan. Plan will be to continue to observe. Pt to return to clinic in 3 months with NP and 6 months with Dr. Eriberto Wilcox with CT scan.

## 2019-01-23 NOTE — PROGRESS NOTES
Dual skin assessment completed with Fatoumata SANTANA. Excoriation found on gluteal cleft. EPC creme applied. No pressure wounds found on the patient. Cellulitis on the legs noted bilaterally. Time for questions and explanations provided for the patient. 26.7

## 2019-01-28 ENCOUNTER — HOSPITAL ENCOUNTER (OUTPATIENT)
Dept: PHYSICAL THERAPY | Age: 59
Discharge: HOME | End: 2019-01-28
Attending: INTERNAL MEDICINE
Payer: COMMERCIAL

## 2019-01-28 DIAGNOSIS — C83.30 DIFFUSE LARGE B-CELL LYMPHOMA, UNSPECIFIED BODY REGION (HCC): Chronic | ICD-10-CM

## 2019-01-28 PROCEDURE — 97161 PT EVAL LOW COMPLEX 20 MIN: CPT

## 2019-01-28 NOTE — THERAPY EVALUATION
August Munson  : 1960  Primary: [facility]  Secondary:  Maynor Romero at [address]  [place] [address], [address], [address]  [phone]   [phone]          OUTPATIENT PHYSICAL THERAPY:Initial Assessment    ICD-10: Treatment Diagnosis: muscle weakness generalized M 62.81  Precautions/Allergies:   Patient has no known allergies. Fall Risk Score: 0 (? 5 = High Risk)  MD Orders: oncology rehab MEDICAL/REFERRING DIAGNOSIS:  Diffuse large B-cell lymphoma, unspecified body region [facility]) [C83.30]    DATE OF ONSET: 3/30/17  REFERRING PHYSICIAN: Tania Quintana MD  RETURN PHYSICIAN APPOINTMENT: 19     INITIAL ASSESSMENT:  Ms. Marco A Rivera presents following treatment of lymphoma. She previously was a participant with oncology rehab. She has undergone bilateral cataract surgery, back surgery x 2 and has had 2 broken toes. She returns with overall deconditioning and decreased strength. She will benefit from therapeutic exercises to improve strength and activity tolerance. She is currently being treated at the pain center for lumbar pain. PROBLEM LIST (Impacting functional limitations):  1. Decreased Strength  2. Increased Pain  3. Decreased Activity Tolerance  4. Increased Fatigue  5. Increased Shortness of Breath  6. Decreased [place] with Home Exercise Program INTERVENTIONS PLANNED:  1. Home Exercise Program (HEP)  2. Therapeutic Exercise/Strengthening   TREATMENT PLAN:  Effective Dates: 19 TO 19 (90 days). Frequency/Duration: 2 times a week for 90 Days. GOALS: (Goals have been discussed and agreed upon with patient.)  Short-Term Functional Goals: Time Frame: 4 weeks  1. The patient will be independent with HEP for strength within 4 weeks. 2. The patient will gain 1/2 grade strength of all 4 extremities within 4 weeks. 3. The patient will report walking 10 minutes twice daily within 4 weeks.     Discharge Goals: Time Frame: 8 weeks  1. The patient will improve her 6 minute walk by 165' x 1 within 8 weeks. 2. The patient will report a fatigue level of 3 or less within 8 weeks. 3. The patient will transition to Healthy Self within 12 weeks. Rehabilitation Potential For Stated Goals: Good  Regarding Talya Barrientos's therapy, I certify that the treatment plan above will be carried out by a therapist or under their direction. Thank you for this referral,  Fabián Amaya, PT                 The information in this section was collected on 1/28/19 (except where otherwise noted). HISTORY:   History of Present Injury/Illness (Reason for Referral):  Post lymphoma treatment, lumbar pain, lumbar surgery x 2, bilateral broken great toes     Past Medical History/Comorbidities:   Ms. 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 She also has no past medical history of Aneurysm ([address] Arrhythmia, Asthma, Autoimmune disease ([address] CAD (coronary artery disease), Chronic kidney disease, Coagulation defects, COPD, Diabetes ([address] Difficult intubation, GERD (gastroesophageal reflux disease), Heart failure ([address] Liver disease, Malignant hyperthermia due to anesthesia, Nausea & vomiting, Pseudocholinesterase deficiency, Psychiatric disorder, PUD (peptic ulcer disease), Seizures ([address] Stroke ([address] or Unspecified sleep apnea.   Ms. Cabrera Quintero  has a past surgical history that includes pr anesth,achilles tendon surg (Left, 02/10/2011); hx cholecystectomy (1983); hx vascular access; hx knee replacement (Left, 2013); and hx knee replacement (Right, 2012). Past Medical History:   Diagnosis Date    Anemia     Basal cell carcinoma     Cardiomegaly     Deep vein thrombosis (DVT) (HCC)     History of stroke     Hypertension     Marginal zone lymphoma ([facility] 03/30/2017    Morbid obesity ([facility]     Osteoarthritis     Overactive bladder     Primary hypothyroidism     Radiation therapy complication     Rheumatic fever     S/P total knee replacement using cement 10/3/2011    Shingles     Superficial venous thrombosis of right arm 5/1/2017     Past Surgical History:   Procedure Laterality Date    HX CHOLECYSTECTOMY  1983    HX KNEE REPLACEMENT Left 2013    Dr. Catarino Mahmood Right 2012    Dr. Benedict Vela Left 02/10/2011    Dr. Surekha Hernandez        Ambulatory/Rehab Services H2 Model Falls Risk Assessment  1/28/19    Risk Factors:       No Risk Factors Identified Ability to Rise from Chair:       (0)  Ability to rise in a single movement    Falls Prevention Plan:       No modifications necessary   Total: (5 or greater = High Risk): 0    ©2010 [facility] [address] [place] [address] States Patent #[id number]. Federal Law prohibits the replication, distribution or use without written permission from [facility] of [address] [place] [address] History/Living Environment:     lives with spouse  Prior Level of Function/Work/Activity:    Dominant Side:         RIGHT  Current Medications:       Current Outpatient Medications:     cyclobenzaprine (FLEXERIL) 5 mg tablet, TAKE 1-2 TABS BY MOUTH EVERY EIGHT (8) HOURS AS NEEDED FOR MUSCLE SPASM(S) FOR UP TO 30 DAYS., Disp: , Rfl: 0    pregabalin (LYRICA) 100 mg capsule, Take 1 Cap by mouth three (3) times daily. Max Daily Amount: 300 mg., Disp: 270 Cap, Rfl: 1    levothyroxine (SYNTHROID) 175 mcg tablet, Take 1 Tab by mouth Daily (before breakfast). , Disp: 90 Tab, Rfl: 1    magic mouthwash solution, Magic mouth wash  Maalox Lidocaine 2% viscous  Diphenhydramine oral solution  5 mL 4 times a day as needed Pharmacy to mix equal portions of ingredients to a total volume as indicated in the dispense amount., Disp: 480 mL, Rfl: 2    nystatin (MYCOSTATIN) powder, Apply  to affected area three (3) times daily. , Disp: 60 g, Rfl: 2    ondansetron hcl (ZOFRAN, AS HYDROCHLORIDE,) 8 mg tablet, Take 1 Tab by mouth every eight (8) hours as needed for Nausea (or vomiting). , Disp: 90 Tab, Rfl: 2    magnesium oxide (MAG-OX) 400 mg tablet, Take 1 Tab by mouth two (2) times a day., Disp: 60 Tab, Rfl: 1    lidocaine-prilocaine (EMLA) topical cream, Apply  to affected area as needed for Pain. Apply to port site 45-60 minutes prior to lab appt or infusion. , Disp: 30 g, Rfl: 0   Date Last Reviewed:  1/28/19   Number of Personal Factors/Comorbidities that affect the Plan of Care: 3+: HIGH COMPLEXITY   EXAMINATION:   ROM:          Within functional limits  Strength:          Bilateral upper extremities grossly 4-/5 x 2 extremities  Bilateral lower extremities 4/5 x 2 extremities    Balance:          Intact, limited due to need for cataracts  Skin Integrity:          intact  Edema/Girth:  pitting    Left Right    Initial Most Recent Initial Most Recent   Upper  Extremity           Lower  Extremity               Body Structures Involved:  1. Muscles Body Functions Affected:  1. Sensory/Pain  2. Neuromusculoskeletal Activities and Participation Affected:  1. None   Number of elements (examined above) that affect the Plan of Care: 3: MODERATE COMPLEXITY   CLINICAL PRESENTATION:   Presentation: Stable and uncomplicated: LOW COMPLEXITY   CLINICAL DECISION MAKING:   Outcome Measure:    Tool Used: 6-MINUTE WALK TEST  Score:  Initial: 1445' feet Most Recent: X feet (Date: -- )   Interpretation of Score: Normal range varies but is approximately 2630-8531 Feet      Distance walked: 1445 feet               Baseline End of Test   Heart Rate 91 129   Dyspnea (Luther Scale) Fatigue (Luther Scale) 4 5   SpO2 97 97   /69      Score 2133 [phone] 6131-0790 1279-853 852-427 426-16 15-0   Modifier CH CI CJ CK CL CM CN       Tool Used: TINETTI  Score:  Initial:   Gait: 11/12  Balance: 16/16  TOTAL: 27/28 Most Recent:  Gait: /12  Balance: /16  TOTAL: /28   Interpretation of Score: The maximum score for the gait component is 12 points. The maximum score for the balance component is 16 points. The maximum total score is 28 points. In general, patients who score below 19 are at a high risk for falls. Patients who score in the range of 19-24 indicate that the patient has a risk for falls. Score 28 27-23 22-18 17-12 11-6 5-1 0   Modifier CH CI CJ CK CL CM CN         Tool Used: Timed Up and Go (TUG)  Score:  Initial: 7 seconds Most Recent: X seconds (Date: -- )   Interpretation of Score: The test measures, in seconds, the time taken by an individual to stand up from a standard arm chair (seat height 46 cm [18 in], arm height 65 cm [25.6 in]), walk a distance of 3 meters (118 in, approx 10 ft), turn, walk back to the chair and sit down. If the individual takes longer than 14 seconds to complete TUG, this indicates risk for falls. Score 7 7.5-10.5 11-14 14.5-17.5 18-21 21.5-24.5 25+   Modifier CH CI CJ CK CL CM CN         Tool Used: ECOG Performance Survey Score  Score:  Initial: 1 Most Recent:      Interpretation of Score:   0 Fully active, able to carry on all pre-disease performance without restriction   1 Restricted in physically strenuous activity but ambulatory and able to carry out work of a light or sedentary nature, e.g., light house work, office work   2 Ambulatory and capable of all selfcare but unable to carry out any work activities. Up and about more than 50% of waking hours   3 Capable of only limited selfcare, confined to bed or chair more than 50% of waking hours   4 Completely disabled. Cannot carry on any selfcare.  Totally confined to bed or chair   5 Dead     Medical Necessity:   · Patient is expected to demonstrate progress in strength and overall conditionoing to increase independence with household activity. Reason for Services/Other Comments:  · Patient continues to demonstrate capacity to improve strength and overall conditionoing which will increase independence. Use of outcome tool(s) and clinical judgement create a POC that gives a: Clear prediction of patient's progress: LOW COMPLEXITY            TREATMENT:   (In addition to Assessment/Re-Assessment sessions the following treatments were rendered)  Pre-treatment Symptoms/Complaints:  Left broken toe, fatigue 4/10, deconditioned  Pain: Initial:     0/10  Post Session: 0 /10         assessment  [employer]  Treatment/Session Assessment:    · Response to Treatment:  The patient tolerated the treatment well. · Compliance with Program/Exercises: Will assess as treatment progresses. · Recommendations/Intent for next treatment session: \"Next visit will focus on advancements to more challenging activities\".   Total Treatment Duration: 50 min    PT Patient Time In/Time Out  Time In: 1300  Time Out: [place], PT

## 2019-01-31 ENCOUNTER — HOSPITAL ENCOUNTER (OUTPATIENT)
Dept: PHYSICAL THERAPY | Age: 59
Discharge: HOME | End: 2019-01-31
Attending: INTERNAL MEDICINE
Payer: COMMERCIAL

## 2019-01-31 PROCEDURE — 97110 THERAPEUTIC EXERCISES: CPT

## 2019-01-31 NOTE — PROGRESS NOTES
Calvin Peter  : 1960  Primary: [facility]  Secondary:  [address] at [facility]  [address], [place] [address]  [phone]   [phone]          OUTPATIENT PHYSICAL THERAPY:Daily Note    ICD-10: Treatment Diagnosis: muscle weakness generalized M 62.81  Precautions/Allergies:   Patient has no known allergies. Fall Risk Score: 0 (? 5 = High Risk)  MD Orders: oncology rehab MEDICAL/REFERRING DIAGNOSIS:  Diffuse large B-cell lymphoma, unspecified site [C83.30]    DATE OF ONSET: 3/30/17  REFERRING PHYSICIAN: Juvencio Villa MD  RETURN PHYSICIAN APPOINTMENT: 19     INITIAL ASSESSMENT:  Ms. Cabrera Quintero presents following treatment of lymphoma. She previously was a participant with oncology rehab. She has undergone bilateral cataract surgery, back surgery x 2 and has had 2 broken toes. She returns with overall deconditioning and decreased strength. She will benefit from therapeutic exercises to improve strength and activity tolerance. She is currently being treated at the pain center for lumbar pain. PROBLEM LIST (Impacting functional limitations):  1. Decreased Strength  2. Increased Pain  3. Decreased Activity Tolerance  4. Increased Fatigue  5. Increased Shortness of Breath  6. Decreased [place] with Home Exercise Program INTERVENTIONS PLANNED:  1. Home Exercise Program (HEP)  2. Therapeutic Exercise/Strengthening   TREATMENT PLAN:  Effective Dates: 19 TO 19 (90 days). Frequency/Duration: 2 times a week for 90 Days. GOALS: (Goals have been discussed and agreed upon with patient.)  Short-Term Functional Goals: Time Frame: 4 weeks  1. The patient will be independent with HEP for strength within 4 weeks. 2. The patient will gain 1/2 grade strength of all 4 extremities within 4 weeks. 3. The patient will report walking 10 minutes twice daily within 4 weeks.     Discharge Goals: Time Frame: 8 weeks  1. The patient will improve her 6 minute walk by 165' x 1 within 8 weeks. 2. The patient will report a fatigue level of 3 or less within 8 weeks. 3. The patient will transition to Healthy Self within 12 weeks. Rehabilitation Potential For Stated Goals: Good  Regarding Arvind Barrientos's therapy, I certify that the treatment plan above will be carried out by a therapist or under their direction. Thank you for this referral,  Jagjit Lopez PT                 The information in this section was collected on 1/28/19 (except where otherwise noted). HISTORY:   History of Present Injury/Illness (Reason for Referral):  Post lymphoma treatment, lumbar pain, lumbar surgery x 2, bilateral broken great toes     Past Medical History/Comorbidities:   Ms. 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 She also has no past medical history of Aneurysm ([address] Arrhythmia, Asthma, Autoimmune disease ([address] CAD (coronary artery disease), Chronic kidney disease, Coagulation defects, COPD, Diabetes ([address] Difficult intubation, GERD (gastroesophageal reflux disease), Heart failure ([address] Liver disease, Malignant hyperthermia due to anesthesia, Nausea & vomiting, Pseudocholinesterase deficiency, Psychiatric disorder, PUD (peptic ulcer disease), Seizures ([address] Stroke ([address] or Unspecified sleep apnea. Ms. Lowell Maza  has a past surgical history that includes pr anesth,achilles tendon surg (Left, 02/10/2011); hx cholecystectomy (1983); hx vascular access; hx knee replacement (Left, 2013); and hx knee replacement (Right, 2012). Past Medical History:   Diagnosis Date    Anemia     Basal cell carcinoma     Cardiomegaly     Deep vein thrombosis (DVT) (HCC)     History of stroke     Hypertension     Marginal zone lymphoma ([facility] 03/30/2017    Morbid obesity ([facility]     Osteoarthritis     Overactive bladder     Primary hypothyroidism     Radiation therapy complication     Rheumatic fever     S/P total knee replacement using cement 10/3/2011    Shingles     Superficial venous thrombosis of right arm 5/1/2017     Past Surgical History:   Procedure Laterality Date    HX CHOLECYSTECTOMY  1983    HX KNEE REPLACEMENT Left 2013    Dr. Ingrid Garrido Right 2012    Dr. Gerber Jewels Left 02/10/2011    Dr. Chrissie De La O        Ambulatory/Rehab Services H2 Model Falls Risk Assessment  1/28/19    Risk Factors:       No Risk Factors Identified Ability to Rise from Chair:       (0)  Ability to rise in a single movement    Falls Prevention Plan:       No modifications necessary   Total: (5 or greater = High Risk): 0    ©2010 [facility] [address] [place] [address] States Patent #[id number]. Federal Law prohibits the replication, distribution or use without written permission from [facility] of [address] [place] [address] History/Living Environment:     lives with spouse  Prior Level of Function/Work/Activity:    Dominant Side:         RIGHT  Current Medications:       Current Outpatient Medications:     cyclobenzaprine (FLEXERIL) 5 mg tablet, TAKE 1-2 TABS BY MOUTH EVERY EIGHT (8) HOURS AS NEEDED FOR MUSCLE SPASM(S) FOR UP TO 30 DAYS., Disp: , Rfl: 0    pregabalin (LYRICA) 100 mg capsule, Take 1 Cap by mouth three (3) times daily. Max Daily Amount: 300 mg., Disp: 270 Cap, Rfl: 1    levothyroxine (SYNTHROID) 175 mcg tablet, Take 1 Tab by mouth Daily (before breakfast). , Disp: 90 Tab, Rfl: 1    magic mouthwash solution, Magic mouth wash  Maalox Lidocaine 2% viscous  Diphenhydramine oral solution  5 mL 4 times a day as needed Pharmacy to mix equal portions of ingredients to a total volume as indicated in the dispense amount., Disp: 480 mL, Rfl: 2    nystatin (MYCOSTATIN) powder, Apply  to affected area three (3) times daily. , Disp: 60 g, Rfl: 2    ondansetron hcl (ZOFRAN, AS HYDROCHLORIDE,) 8 mg tablet, Take 1 Tab by mouth every eight (8) hours as needed for Nausea (or vomiting). , Disp: 90 Tab, Rfl: 2    magnesium oxide (MAG-OX) 400 mg tablet, Take 1 Tab by mouth two (2) times a day., Disp: 60 Tab, Rfl: 1    lidocaine-prilocaine (EMLA) topical cream, Apply  to affected area as needed for Pain. Apply to port site 45-60 minutes prior to lab appt or infusion. , Disp: 30 g, Rfl: 0   Date Last Reviewed:  1/28/19       EXAMINATION:   ROM:          Within functional limits  Strength:          Bilateral upper extremities grossly 4-/5 x 2 extremities  Bilateral lower extremities 4/5 x 2 extremities    Balance:          Intact, limited due to need for cataracts  Skin Integrity:          intact  Edema/Girth:  pitting    Left Right    Initial Most Recent Initial Most Recent   Upper  Extremity           Lower  Extremity               Body Structures Involved:  1. Muscles Body Functions Affected:  1. Sensory/Pain  2. Neuromusculoskeletal Activities and Participation Affected:  1. None       CLINICAL PRESENTATION:       CLINICAL DECISION MAKING:   Outcome Measure:    Tool Used: 6-MINUTE WALK TEST  Score:  Initial: 1445' feet Most Recent: X feet (Date: -- )   Interpretation of Score: Normal range varies but is approximately 9075-3884 Feet      Distance walked: 1445 feet               Baseline End of Test   Heart Rate 91 129   Dyspnea (Luther Scale)     Fatigue (Luther Scale) 4 5   SpO2 97 97   /69      Score 2133 2025-8032 8285-3297 [phone] 15-0   Modifier CH CI CJ CK CL CM CN       Tool Used: TINETTI  Score:  Initial:   Gait: 11/12  Balance: 16/16  TOTAL: 27/28 Most Recent:  Gait: /12  Balance: /16  TOTAL: /28   Interpretation of Score: The maximum score for the gait component is 12 points. The maximum score for the balance component is 16 points. The maximum total score is 28 points. In general, patients who score below 19 are at a high risk for falls. Patients who score in the range of 19-24 indicate that the patient has a risk for falls. Score 28 27-23 22-18 17-12 11-6 5-1 0   Modifier CH CI CJ CK CL CM CN         Tool Used: Timed Up and Go (TUG)  Score:  Initial: 7 seconds Most Recent: X seconds (Date: -- )   Interpretation of Score: The test measures, in seconds, the time taken by an individual to stand up from a standard arm chair (seat height 46 cm [18 in], arm height 65 cm [25.6 in]), walk a distance of 3 meters (118 in, approx 10 ft), turn, walk back to the chair and sit down. If the individual takes longer than 14 seconds to complete TUG, this indicates risk for falls. Score 7 7.5-10.5 11-14 14.5-17.5 18-21 21.5-24.5 25+   Modifier CH CI CJ CK CL CM CN         Tool Used: ECOG Performance Survey Score  Score:  Initial: 1 Most Recent:      Interpretation of Score:   0 Fully active, able to carry on all pre-disease performance without restriction   1 Restricted in physically strenuous activity but ambulatory and able to carry out work of a light or sedentary nature, e.g., light house work, office work   2 Ambulatory and capable of all selfcare but unable to carry out any work activities. Up and about more than 50% of waking hours   3 Capable of only limited selfcare, confined to bed or chair more than 50% of waking hours   4 Completely disabled. Cannot carry on any selfcare. Totally confined to bed or chair   5 Dead     Medical Necessity:   · Patient is expected to demonstrate progress in strength and overall conditionoing to increase independence with household activity.   Reason for Services/Other Comments:  · Patient continues to demonstrate capacity to improve strength and overall conditionoing which will increase independence. TREATMENT:   (In addition to Assessment/Re-Assessment sessions the following treatments were rendered)  Pre-treatment Symptoms/Complaints:  Left broken toe, fatigue 2/10, deconditioned, has a cold, coughing increased today with activity. Pain: Initial:     0/10  Post Session: 0 /10   O2 98 HR 79  650' x 1  Nustep level 1 x 5 min  650' x 1  UBE level 1 x 4 min  650' x 1  Sit to stand x 10 reps  Bilateral upper extremities with 2# x 10 reps forward flexion, abduction, biceps curls, triceps extension  Long arc quads x 10 reps with 5 count hold  650' x 1  O2 98       as above  [employer] Portal  Treatment/Session Assessment:    · Response to Treatment:  The patient tolerated the treatment well. · Compliance with Program/Exercises: Will assess as treatment progresses. · Recommendations/Intent for next treatment session: \"Next visit will focus on advancements to more challenging activities\".   Total Treatment Duration: 37 min    PT Patient Time In/Time Out  Time In: 1050  Time Out: [address], PT

## 2019-02-04 ENCOUNTER — APPOINTMENT (OUTPATIENT)
Dept: PHYSICAL THERAPY | Age: 59
End: 2019-02-04
Attending: INTERNAL MEDICINE
Payer: COMMERCIAL

## 2019-02-07 ENCOUNTER — APPOINTMENT (OUTPATIENT)
Dept: PHYSICAL THERAPY | Age: 59
End: 2019-02-07
Attending: INTERNAL MEDICINE
Payer: COMMERCIAL

## 2019-02-18 ENCOUNTER — HOSPITAL ENCOUNTER (OUTPATIENT)
Dept: PHYSICAL THERAPY | Age: 59
Discharge: HOME | End: 2019-02-18
Attending: INTERNAL MEDICINE
Payer: COMMERCIAL

## 2019-02-18 PROCEDURE — 97110 THERAPEUTIC EXERCISES: CPT

## 2019-02-18 NOTE — PROGRESS NOTES
Beto Record  : 1960  Primary: [facility]  Secondary:  [address] at [facility]  [address], [address], [place] [address]  [phone]   [phone]          OUTPATIENT PHYSICAL THERAPY:Daily Note    ICD-10: Treatment Diagnosis: muscle weakness generalized M 62.81  Precautions/Allergies:   Patient has no known allergies. Fall Risk Score: 0 (? 5 = High Risk)  MD Orders: oncology rehab MEDICAL/REFERRING DIAGNOSIS:  Diffuse large B-cell lymphoma, unspecified site [C83.30]    DATE OF ONSET: 3/30/17  REFERRING PHYSICIAN: Mara Bahena MD  RETURN PHYSICIAN APPOINTMENT: 19     INITIAL ASSESSMENT:  Ms. Bishnu Smith presents following treatment of lymphoma. She previously was a participant with oncology rehab. She has undergone bilateral cataract surgery, back surgery x 2 and has had 2 broken toes. She returns with overall deconditioning and decreased strength. She will benefit from therapeutic exercises to improve strength and activity tolerance. She is currently being treated at the pain center for lumbar pain. PROBLEM LIST (Impacting functional limitations):  1. Decreased Strength  2. Increased Pain  3. Decreased Activity Tolerance  4. Increased Fatigue  5. Increased Shortness of Breath  6. Decreased [place] with Home Exercise Program INTERVENTIONS PLANNED:  1. Home Exercise Program (HEP)  2. Therapeutic Exercise/Strengthening   TREATMENT PLAN:  Effective Dates: 19 TO 19 (90 days). Frequency/Duration: 2 times a week for 90 Days. GOALS: (Goals have been discussed and agreed upon with patient.)  Short-Term Functional Goals: Time Frame: 4 weeks  1. The patient will be independent with HEP for strength within 4 weeks. 2. The patient will gain 1/2 grade strength of all 4 extremities within 4 weeks. 3. The patient will report walking 10 minutes twice daily within 4 weeks.     Discharge Goals: Time Frame: 8 weeks  1. The patient will improve her 6 minute walk by 165' x 1 within 8 weeks. 2. The patient will report a fatigue level of 3 or less within 8 weeks. 3. The patient will transition to Healthy Self within 12 weeks. Rehabilitation Potential For Stated Goals: Good  Regarding Yessica Barrientos's therapy, I certify that the treatment plan above will be carried out by a therapist or under their direction. Thank you for this referral,  Catie Cotto, PT                 The information in this section was collected on 1/28/19 (except where otherwise noted). HISTORY:   History of Present Injury/Illness (Reason for Referral):  Post lymphoma treatment, lumbar pain, lumbar surgery x 2, bilateral broken great toes     Past Medical History/Comorbidities:   Ms. 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 She also has no past medical history of Aneurysm ([address] Arrhythmia, Asthma, Autoimmune disease ([address] CAD (coronary artery disease), Chronic kidney disease, Coagulation defects, COPD, Diabetes ([address] Difficult intubation, GERD (gastroesophageal reflux disease), Heart failure ([address] Liver disease, Malignant hyperthermia due to anesthesia, Nausea & vomiting, Pseudocholinesterase deficiency, Psychiatric disorder, PUD (peptic ulcer disease), Seizures ([address] Stroke ([address] or Unspecified sleep apnea. Ms. Saeed Carrillo  has a past surgical history that includes pr anesth,achilles tendon surg (Left, 02/10/2011); hx cholecystectomy (1983); hx vascular access; hx knee replacement (Left, 2013); and hx knee replacement (Right, 2012). Past Medical History:   Diagnosis Date    Anemia     Basal cell carcinoma     Cardiomegaly     Deep vein thrombosis (DVT) (HCC)     History of stroke     Hypertension     Marginal zone lymphoma ([facility] 03/30/2017    Morbid obesity ([facility]     Osteoarthritis     Overactive bladder     Primary hypothyroidism     Radiation therapy complication     Rheumatic fever     S/P total knee replacement using cement 10/3/2011    Shingles     Superficial venous thrombosis of right arm 5/1/2017     Past Surgical History:   Procedure Laterality Date    HX CHOLECYSTECTOMY  1983    HX KNEE REPLACEMENT Left 2013    Dr. Mondragon Render Right 2012    Dr. Drue Castleman Left 02/10/2011    Dr. Ofe Sloan        Ambulatory/Rehab Services H2 Model Falls Risk Assessment  1/28/19    Risk Factors:       No Risk Factors Identified Ability to Rise from Chair:       (0)  Ability to rise in a single movement    Falls Prevention Plan:       No modifications necessary   Total: (5 or greater = High Risk): 0    ©2010 [facility] [address] [place] [address] States Patent #[id number]. Federal Law prohibits the replication, distribution or use without written permission from [facility] of [address] [place] [address] History/Living Environment:     lives with spouse  Prior Level of Function/Work/Activity:    Dominant Side:         RIGHT  Current Medications:       Current Outpatient Medications:     cyclobenzaprine (FLEXERIL) 5 mg tablet, TAKE 1-2 TABS BY MOUTH EVERY EIGHT (8) HOURS AS NEEDED FOR MUSCLE SPASM(S) FOR UP TO 30 DAYS., Disp: , Rfl: 0    pregabalin (LYRICA) 100 mg capsule, Take 1 Cap by mouth three (3) times daily. Max Daily Amount: 300 mg., Disp: 270 Cap, Rfl: 1    levothyroxine (SYNTHROID) 175 mcg tablet, Take 1 Tab by mouth Daily (before breakfast). , Disp: 90 Tab, Rfl: 1    magic mouthwash solution, Magic mouth wash  Maalox Lidocaine 2% viscous  Diphenhydramine oral solution  5 mL 4 times a day as needed Pharmacy to mix equal portions of ingredients to a total volume as indicated in the dispense amount., Disp: 480 mL, Rfl: 2    nystatin (MYCOSTATIN) powder, Apply  to affected area three (3) times daily. , Disp: 60 g, Rfl: 2    ondansetron hcl (ZOFRAN, AS HYDROCHLORIDE,) 8 mg tablet, Take 1 Tab by mouth every eight (8) hours as needed for Nausea (or vomiting). , Disp: 90 Tab, Rfl: 2    magnesium oxide (MAG-OX) 400 mg tablet, Take 1 Tab by mouth two (2) times a day., Disp: 60 Tab, Rfl: 1    lidocaine-prilocaine (EMLA) topical cream, Apply  to affected area as needed for Pain. Apply to port site 45-60 minutes prior to lab appt or infusion. , Disp: 30 g, Rfl: 0   Date Last Reviewed:  1/28/19       EXAMINATION:   ROM:          Within functional limits  Strength:          Bilateral upper extremities grossly 4-/5 x 2 extremities  Bilateral lower extremities 4/5 x 2 extremities    Balance:          Intact, limited due to need for cataracts  Skin Integrity:          intact  Edema/Girth:  pitting    Left Right    Initial Most Recent Initial Most Recent   Upper  Extremity           Lower  Extremity               Body Structures Involved:  1. Muscles Body Functions Affected:  1. Sensory/Pain  2. Neuromusculoskeletal Activities and Participation Affected:  1. None       CLINICAL PRESENTATION:       CLINICAL DECISION MAKING:   Outcome Measure:    Tool Used: 6-MINUTE WALK TEST  Score:  Initial: 1445' feet Most Recent: X feet (Date: -- )   Interpretation of Score: Normal range varies but is approximately 2124-2490 Feet      Distance walked: 1445 feet               Baseline End of Test   Heart Rate 91 129   Dyspnea (Luther Scale)     Fatigue (Luther Scale) 4 5   SpO2 97 97   /69      Score 2133 9036-3016 7601-8602 [phone] 15-0   Modifier CH CI CJ CK CL CM CN       Tool Used: TINETTI  Score:  Initial:   Gait: 11/12  Balance: 16/16  TOTAL: 27/28 Most Recent:  Gait: /12  Balance: /16  TOTAL: /28   Interpretation of Score: The maximum score for the gait component is 12 points. The maximum score for the balance component is 16 points. The maximum total score is 28 points. In general, patients who score below 19 are at a high risk for falls. Patients who score in the range of 19-24 indicate that the patient has a risk for falls. Score 28 27-23 22-18 17-12 11-6 5-1 0   Modifier CH CI CJ CK CL CM CN         Tool Used: Timed Up and Go (TUG)  Score:  Initial: 7 seconds Most Recent: X seconds (Date: -- )   Interpretation of Score: The test measures, in seconds, the time taken by an individual to stand up from a standard arm chair (seat height 46 cm [18 in], arm height 65 cm [25.6 in]), walk a distance of 3 meters (118 in, approx 10 ft), turn, walk back to the chair and sit down. If the individual takes longer than 14 seconds to complete TUG, this indicates risk for falls. Score 7 7.5-10.5 11-14 14.5-17.5 18-21 21.5-24.5 25+   Modifier CH CI CJ CK CL CM CN         Tool Used: ECOG Performance Survey Score  Score:  Initial: 1 Most Recent:      Interpretation of Score:   0 Fully active, able to carry on all pre-disease performance without restriction   1 Restricted in physically strenuous activity but ambulatory and able to carry out work of a light or sedentary nature, e.g., light house work, office work   2 Ambulatory and capable of all selfcare but unable to carry out any work activities. Up and about more than 50% of waking hours   3 Capable of only limited selfcare, confined to bed or chair more than 50% of waking hours   4 Completely disabled. Cannot carry on any selfcare. Totally confined to bed or chair   5 Dead     Medical Necessity:   · Patient is expected to demonstrate progress in strength and overall conditionoing to increase independence with household activity.   Reason for Services/Other Comments:  · Patient continues to demonstrate capacity to improve strength and overall conditionoing which will increase independence. TREATMENT:   (In addition to Assessment/Re-Assessment sessions the following treatments were rendered)  Pre-treatment Symptoms/Complaints:  Left broken toe, fatigue 2/10, deconditioned. Reports worsening neuropathy pain in the left lower extremity. New right knee pain. Pain: Initial:     3-4/10  Post Session: 2 /10   O2 98 HR 83  650' x 1  Nustep level 1 x 7 min  650' x 1  UBE level 1 x 4 min  650' x 1  Sit to stand x 10 reps  Bilateral upper extremities with 2# x 10 reps forward flexion, abduction, biceps curls, triceps extension  Long arc quads x 10 reps with 5 count hold  650' x 1  O2 98       as above  [employer] Portal  Treatment/Session Assessment:    · Response to Treatment:  The patient tolerated the treatment well. · Compliance with Program/Exercises: Will assess as treatment progresses. · Recommendations/Intent for next treatment session: \"Next visit will focus on advancements to more challenging activities\".   Total Treatment Duration: 40 min    PT Patient Time In/Time Out  Time In: 1052  Time Out: [address], PT

## 2019-02-21 ENCOUNTER — HOSPITAL ENCOUNTER (OUTPATIENT)
Dept: PHYSICAL THERAPY | Age: 59
Discharge: HOME | End: 2019-02-21
Attending: INTERNAL MEDICINE
Payer: COMMERCIAL

## 2019-02-21 PROCEDURE — 97110 THERAPEUTIC EXERCISES: CPT

## 2019-02-21 NOTE — PROGRESS NOTES
[facility]  : 1960  Primary: [address] [facility]  Secondary:  [address] at [address]  [address], [address], [place] [address]  [phone]   [phone]          OUTPATIENT PHYSICAL THERAPY:Daily Note    ICD-10: Treatment Diagnosis: muscle weakness generalized M 62.81  Precautions/Allergies:   Patient has no known allergies. Fall Risk Score: 0 (? 5 = High Risk)  MD Orders: oncology rehab MEDICAL/REFERRING DIAGNOSIS:  Diffuse large B-cell lymphoma, unspecified site [C83.30]    DATE OF ONSET: 3/30/17  REFERRING PHYSICIAN: Kina Obrien MD  RETURN PHYSICIAN APPOINTMENT: 19     INITIAL ASSESSMENT:  Ms. Saeed Carrillo presents following treatment of lymphoma. She previously was a participant with oncology rehab. She has undergone bilateral cataract surgery, back surgery x 2 and has had 2 broken toes. She returns with overall deconditioning and decreased strength. She will benefit from therapeutic exercises to improve strength and activity tolerance. She is currently being treated at the pain center for lumbar pain. PROBLEM LIST (Impacting functional limitations):  1. Decreased Strength  2. Increased Pain  3. Decreased Activity Tolerance  4. Increased Fatigue  5. Increased Shortness of Breath  6. Decreased [place] with Home Exercise Program INTERVENTIONS PLANNED:  1. Home Exercise Program (HEP)  2. Therapeutic Exercise/Strengthening   TREATMENT PLAN:  Effective Dates: 19 TO 19 (90 days). Frequency/Duration: 2 times a week for 90 Days. GOALS: (Goals have been discussed and agreed upon with patient.)  Short-Term Functional Goals: Time Frame: 4 weeks  1. The patient will be independent with HEP for strength within 4 weeks. 2. The patient will gain 1/2 grade strength of all 4 extremities within 4 weeks. 3. The patient will report walking 10 minutes twice daily within 4 weeks.     Discharge Goals: Time Frame: 8 weeks  1. The patient will improve her 6 minute walk by 165' x 1 within 8 weeks. 2. The patient will report a fatigue level of 3 or less within 8 weeks. 3. The patient will transition to Healthy Self within 12 weeks. Rehabilitation Potential For Stated Goals: Good  Regarding Rui Barrientos's therapy, I certify that the treatment plan above will be carried out by a therapist or under their direction. Thank you for this referral,  Juana Vickers PT                 The information in this section was collected on 1/28/19 (except where otherwise noted). HISTORY:   History of Present Injury/Illness (Reason for Referral):  Post lymphoma treatment, lumbar pain, lumbar surgery x 2, bilateral broken great toes     Past Medical History/Comorbidities:   Ms. Esetfani Silva  has a past medical history of Anemia, Basal cell carcinoma, Cardiomegaly, Deep vein thrombosis (DVT) ([address] History of stroke, Hypertension, Marginal zone lymphoma ([address] (03/30/2017), Morbid obesity ([address] Osteoarthritis, Overactive bladder, Primary hypothyroidism, Radiation therapy complication, Rheumatic fever, S/P total knee replacement using cement (10/3/2011), Shingles, and Superficial venous thrombosis of right arm (5/1/2017). She also has no past medical history of Aneurysm ([address] Arrhythmia, Asthma, Autoimmune disease ([address] CAD (coronary artery disease), Chronic kidney disease, Coagulation defects, COPD, Diabetes ([address] Difficult intubation, GERD (gastroesophageal reflux disease), Heart failure ([address] Liver disease, Malignant hyperthermia due to anesthesia, Nausea & vomiting, Pseudocholinesterase deficiency, Psychiatric disorder, PUD (peptic ulcer disease), Seizures ([address] Stroke ([address] or Unspecified sleep apnea. Ms. Estefani Silva  has a past surgical history that includes pr anesth,achilles tendon surg (Left, 02/10/2011); hx cholecystectomy (1983); hx vascular access; hx knee replacement (Left, 2013); and hx knee replacement (Right, 2012). Past Medical History:   Diagnosis Date    Anemia     Basal cell carcinoma     Cardiomegaly     Deep vein thrombosis (DVT) (HCC)     History of stroke     Hypertension     Marginal zone lymphoma ([facility] 03/30/2017    Morbid obesity ([facility]     Osteoarthritis     Overactive bladder     Primary hypothyroidism     Radiation therapy complication     Rheumatic fever     S/P total knee replacement using cement 10/3/2011    Shingles     Superficial venous thrombosis of right arm 5/1/2017     Past Surgical History:   Procedure Laterality Date    HX CHOLECYSTECTOMY  1983    HX KNEE REPLACEMENT Left 2013    Dr. Sandie Eason Right 2012    Dr. Bea Swift Left 02/10/2011    Dr. Naomie Cisse        Ambulatory/Rehab Services H2 Model Falls Risk Assessment  1/28/19    Risk Factors:       No Risk Factors Identified Ability to Rise from Chair:       (0)  Ability to rise in a single movement    Falls Prevention Plan:       No modifications necessary   Total: (5 or greater = High Risk): 0    ©2010 [facility] [address] [place] [address] States Patent #[id number]. Federal Law prohibits the replication, distribution or use without written permission from [facility] [address] [place] [address] History/Living Environment:     lives with spouse  Prior Level of Function/Work/Activity:    Dominant Side:         RIGHT  Current Medications:       Current Outpatient Medications:     cyclobenzaprine (FLEXERIL) 5 mg tablet, TAKE 1-2 TABS BY MOUTH EVERY EIGHT (8) HOURS AS NEEDED FOR MUSCLE SPASM(S) FOR UP TO 30 DAYS., Disp: , Rfl: 0    pregabalin (LYRICA) 100 mg capsule, Take 1 Cap by mouth three (3) times daily. Max Daily Amount: 300 mg., Disp: 270 Cap, Rfl: 1    levothyroxine (SYNTHROID) 175 mcg tablet, Take 1 Tab by mouth Daily (before breakfast). , Disp: 90 Tab, Rfl: 1    magic mouthwash solution, Magic mouth wash  Maalox Lidocaine 2% viscous  Diphenhydramine oral solution  5 mL 4 times a day as needed Pharmacy to mix equal portions of ingredients to a total volume as indicated in the dispense amount., Disp: 480 mL, Rfl: 2    nystatin (MYCOSTATIN) powder, Apply  to affected area three (3) times daily. , Disp: 60 g, Rfl: 2    ondansetron hcl (ZOFRAN, AS HYDROCHLORIDE,) 8 mg tablet, Take 1 Tab by mouth every eight (8) hours as needed for Nausea (or vomiting). , Disp: 90 Tab, Rfl: 2    magnesium oxide (MAG-OX) 400 mg tablet, Take 1 Tab by mouth two (2) times a day., Disp: 60 Tab, Rfl: 1    lidocaine-prilocaine (EMLA) topical cream, Apply  to affected area as needed for Pain. Apply to port site 45-60 minutes prior to lab appt or infusion. , Disp: 30 g, Rfl: 0   Date Last Reviewed:  1/28/19       EXAMINATION:   ROM:          Within functional limits  Strength:          Bilateral upper extremities grossly 4-/5 x 2 extremities  Bilateral lower extremities 4/5 x 2 extremities    Balance:          Intact, limited due to need for cataracts  Skin Integrity:          intact  Edema/Girth:  pitting    Left Right    Initial Most Recent Initial Most Recent   Upper  Extremity           Lower  Extremity               Body Structures Involved:  1. Muscles Body Functions Affected:  1. Sensory/Pain  2. Neuromusculoskeletal Activities and Participation Affected:  1. None       CLINICAL PRESENTATION:       CLINICAL DECISION MAKING:   Outcome Measure:    Tool Used: 6-MINUTE WALK TEST  Score:  Initial: 1445' feet Most Recent: X feet (Date: -- )   Interpretation of Score: Normal range varies but is approximately 8991-0977 Feet      Distance walked: 1445 feet               Baseline End of Test   Heart Rate 91 129   Dyspnea (Luther Scale)     Fatigue (Luther Scale) 4 5   SpO2 97 97   /69      Score 2133 2291-9131 0833-3247 [phone] 426-16 15-0   Modifier CH CI CJ CK CL CM CN       Tool Used: TINETTI  Score:  Initial:   Gait: 11/12  Balance: 16/16  TOTAL: 27/28 Most Recent:  Gait: /12  Balance: /16  TOTAL: /28   Interpretation of Score: The maximum score for the gait component is 12 points. The maximum score for the balance component is 16 points. The maximum total score is 28 points. In general, patients who score below 19 are at a high risk for falls. Patients who score in the range of 19-24 indicate that the patient has a risk for falls. Score 28 27-23 22-18 17-12 11-6 5-1 0   Modifier CH CI CJ CK CL CM CN         Tool Used: Timed Up and Go (TUG)  Score:  Initial: 7 seconds Most Recent: X seconds (Date: -- )   Interpretation of Score: The test measures, in seconds, the time taken by an individual to stand up from a standard arm chair (seat height 46 cm [18 in], arm height 65 cm [25.6 in]), walk a distance of 3 meters (118 in, approx 10 ft), turn, walk back to the chair and sit down. If the individual takes longer than 14 seconds to complete TUG, this indicates risk for falls. Score 7 7.5-10.5 11-14 14.5-17.5 18-21 21.5-24.5 25+   Modifier CH CI CJ CK CL CM CN         Tool Used: ECOG Performance Survey Score  Score:  Initial: 1 Most Recent:      Interpretation of Score:   0 Fully active, able to carry on all pre-disease performance without restriction   1 Restricted in physically strenuous activity but ambulatory and able to carry out work of a light or sedentary nature, e.g., light house work, office work   2 Ambulatory and capable of all selfcare but unable to carry out any work activities. Up and about more than 50% of waking hours   3 Capable of only limited selfcare, confined to bed or chair more than 50% of waking hours   4 Completely disabled. Cannot carry on any selfcare. Totally confined to bed or chair   5 Dead     Medical Necessity:   · Patient is expected to demonstrate progress in strength and overall conditionoing to increase independence with household activity.   Reason for Services/Other Comments:  · Patient continues to demonstrate capacity to improve strength and overall conditionoing which will increase independence. TREATMENT:   (In addition to Assessment/Re-Assessment sessions the following treatments were rendered)  Pre-treatment Symptoms/Complaints:  Left broken toe, fatigue 2/10, deconditioned. Pain: Initial:     3/10  Post Session: 2 /10   O2 95 HR 85  650' x 1  Nustep level 1 x 7 min  650' x 1  UBE level 1 x 4 min  650' x 1 (not performed)  Sit to stand x 10 reps  Bilateral upper extremities with 2# x 10 reps forward flexion, abduction, biceps curls, triceps extension  Long arc quads x 10 reps with 5 count hold  650' x 1  O2 98       as above  [email or web address] Portal  Treatment/Session Assessment:    · Response to Treatment:  The patient tolerated the treatment well. · Compliance with Program/Exercises: Will assess as treatment progresses. · Recommendations/Intent for next treatment session: \"Next visit will focus on advancements to more challenging activities\".   Total Treatment Duration: 40 min    PT Patient Time In/Time Out  Time In: 1055  Time Out: [place] [address] [place], PT

## 2019-02-25 ENCOUNTER — HOSPITAL ENCOUNTER (OUTPATIENT)
Dept: PHYSICAL THERAPY | Age: 59
Discharge: HOME | End: 2019-02-25
Attending: INTERNAL MEDICINE
Payer: COMMERCIAL

## 2019-02-25 PROCEDURE — 97110 THERAPEUTIC EXERCISES: CPT

## 2019-02-25 NOTE — PROGRESS NOTES
Beto Record  : 1960  [facility]  Secondary:  [address] at [facility]  [address], [address], [place] [address]  [phone]   [phone]        OUTPATIENT PHYSICAL THERAPY: Daily Treatment Note 2019  Pre-treatment Symptoms/Complaints:  Fatigue 5/10, continued cough that is productive. Pain: Initial:   3 Post Session:  3/10   Medications Last Reviewed:  19  Updated Objective Findings:  continues with a cough, able to increase walking speed with laps. TREATMENT:   THERAPEUTIC EXERCISE: (41 min minutes):  Exercises per grid below to improve mobility and strength. Required minimal verbal cues to promote proper body breathing techniques. Progressed resistance as indicated.   Activity  Date   19 Date    Date    Date  Date  Date  Date    Fatigue Score   weekly  5          Resting BP            Heart Rate   Before   After    99  130         O2 level Before   After    98  98         Machines  Wt/   sets/   reps  Wt/   sets/   reps  Wt/   sets/   reps  Wt/   sets/   reps  Wt/   sets/   reps  Wt/   sets/   reps  Wt/   sets/   reps    Leg Press            Leg Extension   x 10 reps         Leg Curl            Dips/Pull Ups            Overhead Press   3# x 10 reps         Compound Row            Low Back Ext            Cardiovascular  Time   Intensity, etc  Time   Intensity, etc  Time   Intensity, etc  Time   Intensity, etc  Time   Intensity, etc  Time   Intensity, etc  Time   Intensity, etc    Upper body ergometer  Level 1 x 4 min         Airdyne bikes            Recumbent Bike            walking  4 sets of 650'         Nustep  Level 2 x 7 min  SPM 85  Mets 2.5         Elliptical            Other Exercises/   Activities  Parameters  Parameters  Parameters  Parameters  Parameters  Parameters  Parameters    Forward flexion, biceps curls, abduction  x 10 reps with 3#                                [employer] Portal  Treatment/Session Summary:    · Response to Treatment:  tolerated the treatment well, increased cough with activity, winded with walking. · Communication/Consultation:  None today  · Equipment provided today:  None today  · Recommendations/Intent for next treatment session: Next visit will focus on advance therapeutic exercises. Transition to Healthy Self in 4 weeks.   Treatment Plan of Care Effective Dates:  1/28/19 to 4/28/19  Total Treatment Billable Duration:  41 min  PT Patient Time In/Time Out  Time In: 6206  Time Out: [address], TONNY    Future Appointments   Date Time Provider Trinidad Cm   2/28/2019  9:30 AM Reddy DUNCAN, PT [facility]   3/4/2019 10:15 AM Juan A Samuels, PT [facility]   3/6/2019  9:45 AM Nichole Gonzáles, PT [facility]   3/12/2019 10:15 AM Reddy DUNCAN, PT [facility]   3/14/2019  9:30 AM Reddy DUNCAN, PT [facility]   4/23/2019  8:50 AM [facility]   4/23/2019  9:30 AM Neris Bond NP [facility]   7/15/2019  5:53 AM SFD CT 64 SLICE UNIT 1 SFDRCT D   7/17/2019  8:30 AM [facility]   7/17/2019  9:00 AM Diaz Pisano MD [facility]

## 2019-02-28 ENCOUNTER — HOSPITAL ENCOUNTER (OUTPATIENT)
Dept: PHYSICAL THERAPY | Age: 59
Discharge: HOME | End: 2019-02-28
Attending: INTERNAL MEDICINE
Payer: COMMERCIAL

## 2019-02-28 PROCEDURE — 97110 THERAPEUTIC EXERCISES: CPT

## 2019-02-28 NOTE — PROGRESS NOTES
Mayuri Torres  : 1960  Primary: [address] [facility]  Secondary:  [address]  at [facility]  [address], [address], [place] [address]  [phone]   [phone]        OUTPATIENT PHYSICAL THERAPY: Daily Treatment Note 2019  Pre-treatment Symptoms/Complaints:  The patient reports she attended the pain center yesterday and it was suggested she have a nerve conduction test due to pain and numbness of the right lower extremity which is new onset. She si trying new supportive shoes today. Pain: Initial: Pain Intensity 1: 3  Post Session:  -2/10   Medications Last Reviewed:  19  Updated Objective Findings:  continues with a cough, able to increase walking speed with laps. TREATMENT:   THERAPEUTIC EXERCISE: (36 minutes):  Exercises per grid below to improve mobility and strength. Required minimal verbal cues to promote proper body breathing techniques. Progressed minutes on UBE as indicated.   Activity  Date   19 Date   19   Date    Date  Date  Date  Date    Fatigue Score   weekly  5  4/10  7/10          Resting BP            Heart Rate   Before   After    99  130 81  105        O2 level Before   After    98  98 97  97        Machines  Wt/   sets/   reps  Wt/   sets/   reps  Wt/   sets/   reps  Wt/   sets/   reps  Wt/   sets/   reps  Wt/   sets/   reps  Wt/   sets/   reps    Leg Press            Leg Extension   x 10 reps X 10 with 5 count hold        Leg Curl            Dips/Pull Ups            Overhead Press   3# x 10 reps X 10 reps with 3#        Compound Row            Low Back Ext            Cardiovascular  Time   Intensity, etc  Time   Intensity, etc  Time   Intensity, etc  Time   Intensity, etc  Time   Intensity, etc  Time   Intensity, etc  Time   Intensity, etc    Upper body ergometer  Level 1 x 4 min Level 1 x 5 min        Airdyne bikes            Recumbent Bike            walking  4 sets of 650' 650' x 1  650' x 1  650' x 1  650' x 1        Nustep  Level 2 x 7 min  SPM 85  Mets 2.5 Level 2 x 7 min  Spm  mets        Elliptical            Other Exercises/   Activities  Parameters  Parameters  Parameters  Parameters  Parameters  Parameters  Parameters    Forward flexion, biceps curls, abduction  x 10 reps with 3# X 10 reps with 3#                               [employer] Portal  Treatment/Session Summary:    · Response to Treatment:  the patient was able to complete all exercises with no rest breaks. she is winded with activity. .  · Communication/Consultation:  None today  · Equipment provided today:  None today  · Recommendations/Intent for next treatment session: Next visit will focus on advance therapeutic exercises. Transition to Healthy Self in 4 weeks.   Treatment Plan of Care Effective Dates:  1/28/19 to 4/28/19  Total Treatment Billable Duration:  36 min  PT Patient Time In/Time Out  Time In: 4596  Time Out: [address], PT    Future Appointments   Date Time Provider Trinidad Cm   3/4/2019 10:15 AM Kaleta Duane D, PT [facility]   3/6/2019  9:45 AM Nichole Samuels, PT [facility]   3/12/2019 10:15 AM Nichole Dozier, PT [facility]   3/14/2019  9:30 AM Kaleta Duane D, PT [facility]   4/23/2019  8:50 AM GCC OUTREACH INSURANCE [facility]   4/23/2019  9:30 AM Lashonda Hammond NP [facility]   7/15/2019  0:09 AM SFD CT 64 SLICE UNIT 1 SFDRCT D   7/17/2019  8:30 AM GCC OUTREACH INSURANCE [facility]   7/17/2019  9:00 AM Sharyle Barefoot, MD [facility]

## 2019-03-04 ENCOUNTER — HOSPITAL ENCOUNTER (OUTPATIENT)
Dept: PHYSICAL THERAPY | Age: 59
Discharge: HOME | End: 2019-03-04
Attending: INTERNAL MEDICINE
Payer: COMMERCIAL

## 2019-03-04 PROCEDURE — 97110 THERAPEUTIC EXERCISES: CPT

## 2019-03-04 NOTE — PROGRESS NOTES
Yelena Jiang : 1960 Primary: [facility] Secondary:  Therapy Center at [facility] [address], [address], [place] [phone]   [phone] OUTPATIENT PHYSICAL THERAPY: Daily Treatment Note 3/4/2019 Pre-treatment Symptoms/Complaints:  The patient reports she did not sleep well last night and feels wired today. She reports her arms feel stronger, but her left leg is not improving. Pain: Initial: Pain Intensity 1: 2  Post Session: 0 /10 Medications Last Reviewed:  3/4/19 Updated Objective Findings:  continues with a cough, able to increase walking speed with laps. TREATMENT:  
THERAPEUTIC EXERCISE: (36 minutes):  Exercises per grid below to improve mobility and strength. Required minimal verbal cues to promote proper body breathing techniques. Progressed minutes on UBE as indicated. Activity  Date 19 Date 19 Date 3/4/19 Date  Date  Date  Date Fatigue Score  
weekly  5  4/10 
7/10 
 3/10 
3-4/10 Resting BP Heart Rate Before After 99 
130 81 
105 85 
105 O2 level Before After 98 
98 97 
97 97 
97 Machines  Wt/  
sets/  
reps  Wt/  
sets/  
reps  Wt/  
sets/  
reps  Wt/  
sets/  
reps  Wt/  
sets/  
reps  Wt/  
sets/  
reps  Wt/  
sets/  
reps Leg Press Leg Extension   x 10 reps X 10 with 5 count hold 7) x 10 reps with 5 count hold Leg Curl           
Dips/Pull Ups Overhead Press   3# x 10 reps X 10 reps with 3# 8) x 10 reps with 3# Compound Row Low Back Ext Cardiovascular  Time Intensity, etc  Time Intensity, etc  Time Intensity, etc  Time Intensity, etc  Time Intensity, etc  Time Intensity, etc  Time Intensity, etc   
Upper body ergometer  Level 1 x 4 min Level 1 x 5 min 2) level 1 x 6 min Airdyne bikes Recumbent Bike           
walking  4 sets of 650' 650' x 1 
 650' x 1 
650' x 1 
650' x 1 1) 650' x 1 
O2 99  
3) 650' x 1 
5) 650' x 1 
9) 650' x 1 Nustep  Level 2 x 7 min SPM 85 Mets 2.5 Level 2 x 7 min Spm 
mets 4) level 2 x 9 min SPM 83 METS 2.2 Elliptical           
Other Exercises/ Activities  Parameters  Parameters  Parameters  Parameters  Parameters  Parameters  Parameters Forward flexion, biceps curls, abduction  x 10 reps with 3# X 10 reps with 3# 8) x 10 reps with 3# Sit to stand   6) x 10 reps [employer] 
Treatment/Session Summary: · Response to Treatment:  the patient was able to complete all exercises with no rest breaks. she is winded with activity. Dayday Yepez · Communication/Consultation:  None today · Equipment provided today:  None today · Recommendations/Intent for next treatment session: Next visit will focus on advance therapeutic exercises. Transition to Healthy Self in 4 weeks. Treatment Plan of Care Effective Dates:  1/28/19 to 4/28/19 Total Treatment Billable Duration:  44 min PT Patient Time In/Time Out Time In: 5841 Time Out: [address], [place] Future Appointments Date Time Provider Trinidad Cm 3/6/2019  9:45 AM Nichole Samuels, PT [facility]  
3/12/2019 10:15 AM Nichole Evans, PT [facility]  
3/14/2019  9:30 AM Jorge DUNCAN, PT [facility]  
4/23/2019  8:50 AM GCC OUTREACH INSURANCE [facility]  
4/23/2019  9:30 AM Jeffy Montejo NP [facility]  
7/15/2019  3:20 AM SFD CT 64 SLICE UNIT 1 SFDRCT [facility]  
7/17/2019  8:30 AM GCC OUTREACH INSURANCE [facility]  
7/17/2019  9:00 AM Kristian Ingram MD [facility]

## 2019-03-06 ENCOUNTER — HOSPITAL ENCOUNTER (OUTPATIENT)
Dept: PHYSICAL THERAPY | Age: 59
Discharge: HOME | End: 2019-03-06
Attending: INTERNAL MEDICINE
Payer: COMMERCIAL

## 2019-03-06 PROCEDURE — 97110 THERAPEUTIC EXERCISES: CPT

## 2019-03-06 NOTE — PROGRESS NOTES
Shahid Nobles  : 1960  Primary: [facility]  Secondary:  [address]  at [facility]  [address], [address], [place] [address]  [phone]   [phone]        OUTPATIENT PHYSICAL THERAPY: Daily Treatment Note 3/6/2019  Pre-treatment Symptoms/Complaints:  The patient reports she did not sleep well last night and feels wired today. She reports her arms feel stronger, but her left leg is not improving. Pain: Initial: Pain Intensity 1: 2  Post Session: 0 /10   Medications Last Reviewed:  3/4/19  Updated Objective Findings:  continues with a cough, able to increase walking speed with laps. TREATMENT:   THERAPEUTIC EXERCISE: (36 minutes):  Exercises per grid below to improve mobility and strength. Required minimal verbal cues to promote proper body breathing techniques. Progressed minutes on UBE as indicated.   Activity  Date   19 Date   19   Date   3/4/19   Date   3/6/19 Date  Date  Date    Fatigue Score   weekly  5  4/10  7/10   3/10  3-4/10 3/10      Resting BP            Heart Rate   Before   After    99  130 81  105 85  105 86  103      O2 level Before   After    98  98 97  97 97  97 96  98      Machines  Wt/   sets/   reps  Wt/   sets/   reps  Wt/   sets/   reps  Wt/   sets/   reps  Wt/   sets/   reps  Wt/   sets/   reps  Wt/   sets/   reps    Leg Press            Leg Extension   x 10 reps X 10 with 5 count hold 7) x 10 reps with 5 count hold 7) x 10 reps with 5 count hold      Leg Curl            Dips/Pull Ups            Overhead Press   3# x 10 reps X 10 reps with 3# 8) x 10 reps with 3# 8) x 10 reps with 3#      Compound Row            Low Back Ext            Cardiovascular  Time   Intensity, etc  Time   Intensity, etc  Time   Intensity, etc  Time   Intensity, etc  Time   Intensity, etc  Time   Intensity, etc  Time   Intensity, etc    Upper body ergometer  Level 1 x 4 min Level 1 x 5 min 2) level 1 x 6 min 4) level 1 x 6 min Airdyne bikes            Recumbent Bike            walking  4 sets of 650' 650' x 1  650' x 1  650' x 1  650' x 1 1) 650' x 1  O2 99   3) 650' x 1  5) 650' x 1  9) 650' x 1 1) 650' x 1  3) 650' x 1  5) 650' x 1  9)650' x 1      Nustep  Level 2 x 7 min  SPM 85  Mets 2.5 Level 2 x 7 min  Spm  mets 4) level 2 x 9 min   SPM 83  METS 2.2 2) nustep level 2 x 9 min  Spm 81  METS 2.3      Elliptical            Other Exercises/   Activities  Parameters  Parameters  Parameters  Parameters  Parameters  Parameters  Parameters    Forward flexion, biceps curls, abduction  x 10 reps with 3# X 10 reps with 3# 8) x 10 reps with 3# 8) x 10 reps with 3#      Sit to stand   6) x 10 reps 6) x 10 reps                   [facility] Portal  Treatment/Session Summary:    · Response to Treatment:  the patient was able to complete all exercises with no rest breaks. she is winded with activity. .  · Communication/Consultation:  None today  · Equipment provided today:  None today  · Recommendations/Intent for next treatment session: Next visit will focus on advance therapeutic exercises. Transition to Healthy Self in 4 weeks.   Treatment Plan of Care Effective Dates:  1/28/19 to 4/28/19  Total Treatment Billable Duration:  44 min  PT Patient Time In/Time Out  Time In: 0948  Time Out: [address], PT    Future Appointments   Date Time Provider Trinidad Cm   3/12/2019 10:15 AM Meka DUNCAN, PT [facility]   3/14/2019  9:30 AM Nichole Samuels, PT [facility]   4/23/2019  8:50 AM [facility] OUTREACH INSURANCE [facility]   4/23/2019  9:30 AM Dina Cerna NP [facility]   7/15/2019  2:95 AM SFD CT 64 SLICE UNIT 1 [facility]DRCT [facility]   7/17/2019  8:30 AM [facility] OUTREACH INSURANCE [facility]   7/17/2019  9:00 AM Maximiliano Arboleda MD [facility]

## 2019-03-12 ENCOUNTER — HOSPITAL ENCOUNTER (OUTPATIENT)
Dept: PHYSICAL THERAPY | Age: 59
Discharge: HOME | End: 2019-03-12
Attending: INTERNAL MEDICINE
Payer: COMMERCIAL

## 2019-03-14 ENCOUNTER — HOSPITAL ENCOUNTER (OUTPATIENT)
Dept: PHYSICAL THERAPY | Age: 59
Discharge: HOME | End: 2019-03-14
Attending: INTERNAL MEDICINE
Payer: COMMERCIAL

## 2019-04-25 ENCOUNTER — HOSPITAL ENCOUNTER (OUTPATIENT)
Dept: PHYSICAL THERAPY | Age: 59
Discharge: HOME | End: 2019-04-25
Attending: INTERNAL MEDICINE
Payer: COMMERCIAL

## 2019-04-25 PROCEDURE — 97110 THERAPEUTIC EXERCISES: CPT

## 2019-04-25 NOTE — THERAPY EVALUATION
Beth Orozco  : 1960  Primary: [address] [facility]  Secondary:  [address] at [address] beds at the [facility]  [address], [address], [place] [address]  [phone]   [phone]          OUTPATIENT PHYSICAL THERAPY:Recertification    [id number]: Treatment Diagnosis: muscle weakness generalized M 62.81  Precautions/Allergies:   Patient has no known allergies. Fall Risk Score: 0 (? 5 = High Risk)  MD Orders: oncology rehab MEDICAL/REFERRING DIAGNOSIS:  Diffuse large B-cell lymphoma, unspecified site [C83.30]    DATE OF ONSET: 3/30/17  REFERRING PHYSICIAN: Lj Guido MD  RETURN PHYSICIAN APPOINTMENT: 19     INITIAL ASSESSMENT:  Ms. Brianna Sunshine presents following treatment of lymphoma. She previously was a participant with oncology rehab. She has undergone bilateral cataract surgery, back surgery x 2 and has had 2 broken toes. She returns with overall deconditioning and decreased strength. She will benefit from therapeutic exercises to improve strength and activity tolerance. She is currently being treated at the pain center for lumbar pain. 19:  The patient has attended a total of 9 visits. She has not attended since 3/6/19 due to a death in the [place]. She has returned to resume oncology rehab. She would like to transition to Healthy Self in 2-3 weeks. She has regressed due to her absence. We will resume strengthening and conditioning. PROBLEM LIST (Impacting functional limitations):  1. Decreased Strength  2. Increased Pain  3. Decreased Activity Tolerance  4. Increased Fatigue  5. Increased Shortness of Breath  6. Decreased [place] with Home Exercise Program INTERVENTIONS PLANNED:  1. Home Exercise Program (HEP)  2. Therapeutic Exercise/Strengthening   TREATMENT PLAN:  Effective Dates: 19 TO 19 (90 days). Frequency/Duration: 2 times a week for 90 Days.     GOALS: (Goals have been discussed and agreed upon with patient.)  Short-Term Functional Goals: Time Frame: 4 weeks  1. The patient will be independent with HEP for strength within 4 weeks. Met    2. The patient will gain 1/2 grade strength of all 4 extremities within 4 weeks. 3. The patient will report walking 10 minutes twice daily within 4 weeks. Discharge Goals: Time Frame: 8 weeks  1. The patient will improve her 6 minute walk by 165' x 1 within 8 weeks. Met   2. The patient will report a fatigue level of 3 or less within 8 weeks. 3. The patient will transition to Healthy Self within 12 weeks. Rehabilitation Potential For Stated Goals: Good  Regarding Flora Barrientos's therapy, I certify that the treatment plan above will be carried out by a therapist or under their direction. Thank you for this referral,  Pascual Jimenez PT                 The information in this section was collected on 1/28/19 (except where otherwise noted). HISTORY:   History of Present Injury/Illness (Reason for Referral):  Post lymphoma treatment, lumbar pain, lumbar surgery x 2     Past Medical History/Comorbidities:   Ms. 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  She also has no past medical history of Aneurysm ([address] Arrhythmia, Asthma, Autoimmune disease ([address] CAD (coronary artery disease), Chronic kidney disease, Coagulation defects, COPD, Diabetes ([address] Difficult intubation, GERD (gastroesophageal reflux disease), Heart failure ([address] Liver disease, Malignant hyperthermia due to anesthesia, Nausea & vomiting, Pseudocholinesterase deficiency, Psychiatric disorder, PUD (peptic ulcer disease), Seizures ([facility] Stroke ([facility] or Unspecified sleep apnea. Ms. Adeel Toledo  has a past surgical history that includes pr anesth,achilles tendon surg (Left, 02/10/2011); hx cholecystectomy (1983); hx vascular access; hx knee replacement (Left, 2013); and hx knee replacement (Right, 2012). Past Medical History:   Diagnosis Date    Anemia     Basal cell carcinoma     Cardiomegaly     Deep vein thrombosis (DVT) (HCC)     History of stroke     Hypertension     Marginal zone lymphoma ([facility] 03/30/2017    Morbid obesity ([facility]     Osteoarthritis     Overactive bladder     Primary hypothyroidism     Radiation therapy complication     Rheumatic fever     S/P total knee replacement using cement 10/3/2011    Shingles     Superficial venous thrombosis of right arm 5/1/2017     Past Surgical History:   Procedure Laterality Date    HX CHOLECYSTECTOMY  1983    HX KNEE REPLACEMENT Left 2013    Dr. Jodee Joseph Right 2012    Dr. Ruben Barrett Left 02/10/2011    Dr. Miranda Jones        Ambulatory/Rehab Services H2 Model Falls Risk Assessment  1/28/19    Risk Factors:       No Risk Factors Identified Ability to Rise from Chair:       (0)  Ability to rise in a single movement    Falls Prevention Plan:       No modifications necessary   Total: (5 or greater = High Risk): 0    ©2010 [facility] [address] [place] [address] States Patent #[id number].  Federal Law prohibits the replication, distribution or use without written permission from [facility] of [address] [place] [address] History/Living Environment:     lives with spouse  Prior Level of Function/Work/Activity:    Dominant Side:         RIGHT  Current Medications:       Current Outpatient Medications:     cyclobenzaprine (FLEXERIL) 5 mg tablet, TAKE 1-2 TABS BY MOUTH EVERY EIGHT (8) HOURS AS NEEDED FOR MUSCLE SPASM(S) FOR UP TO 30 DAYS., Disp: , Rfl: 0   pregabalin (LYRICA) 100 mg capsule, Take 1 Cap by mouth three (3) times daily. Max Daily Amount: 300 mg., Disp: 270 Cap, Rfl: 1    levothyroxine (SYNTHROID) 175 mcg tablet, Take 1 Tab by mouth Daily (before breakfast). , Disp: 90 Tab, Rfl: 1    magic mouthwash solution, Magic mouth wash  Maalox Lidocaine 2% viscous  Diphenhydramine oral solution  5 mL 4 times a day as needed Pharmacy to mix equal portions of ingredients to a total volume as indicated in the dispense amount., Disp: 480 mL, Rfl: 2    nystatin (MYCOSTATIN) powder, Apply  to affected area three (3) times daily. , Disp: 60 g, Rfl: 2    ondansetron hcl (ZOFRAN, AS HYDROCHLORIDE,) 8 mg tablet, Take 1 Tab by mouth every eight (8) hours as needed for Nausea (or vomiting). , Disp: 90 Tab, Rfl: 2    magnesium oxide (MAG-OX) 400 mg tablet, Take 1 Tab by mouth two (2) times a day., Disp: 60 Tab, Rfl: 1    lidocaine-prilocaine (EMLA) topical cream, Apply  to affected area as needed for Pain. Apply to port site 45-60 minutes prior to lab appt or infusion. , Disp: 30 g, Rfl: 0   Date Last Reviewed:  4/25/19       EXAMINATION:   ROM:          Within functional limits  Strength:          Bilateral upper extremities grossly 4-/5 x 2 extremities  Bilateral lower extremities 4/5 x 2 extremities    Balance:          Intact, limited due to need for cataracts  Skin Integrity:          intact  Edema/Girth:  pitting    Left Right    Initial Most Recent Initial Most Recent   Upper  Extremity           Lower  Extremity               Body Structures Involved:  1. Muscles Body Functions Affected:  1. Sensory/Pain  2. Neuromusculoskeletal Activities and Participation Affected:  1. None   Number of elements (examined above) that affect the Plan of Care: 3: MODERATE COMPLEXITY   CLINICAL PRESENTATION:   Presentation: Stable and uncomplicated: LOW COMPLEXITY   CLINICAL DECISION MAKING:   Outcome Measure:    Tool Used: 6-MINUTE WALK TEST  Score:  Initial: 1445' feet Most Recent: 1510 feet (Date: 4/25/19 )   Interpretation of Score: Normal range varies but is approximately 6542-8779 Feet      Distance walked: 1445 feet               Baseline End of Test   Heart Rate 92 125   Dyspnea (Luther Scale)     Fatigue (Luther Scale) 3 4   SpO2 82 93   /88      Tool Used: TINETTI  Score:  Initial:   Gait: 11/12  Balance: 16/16  TOTAL: 27/28 Most Recent:  Gait: /12  Balance: /16  TOTAL: /28         Tool Used: Timed Up and Go (TUG)  Score:  Initial: 7 seconds Most Recent: X seconds (Date: -- )   Interpretation of Score: The test measures, in seconds, the time taken by an individual to stand up from a standard arm chair (seat height 46 cm [18 in], arm height 65 cm [25.6 in]), walk a distance of 3 meters (118 in, approx 10 ft), turn, walk back to the chair and sit down. If the individual takes longer than 14 seconds to complete TUG, this indicates risk for falls. Tool Used: ECOG Performance Survey Score  Score:  Initial: 1 Most Recent:  1    Interpretation of Score:   0 Fully active, able to carry on all pre-disease performance without restriction   1 Restricted in physically strenuous activity but ambulatory and able to carry out work of a light or sedentary nature, e.g., light house work, office work   2 Ambulatory and capable of all selfcare but unable to carry out any work activities. Up and about more than 50% of waking hours   3 Capable of only limited selfcare, confined to bed or chair more than 50% of waking hours   4 Completely disabled. Cannot carry on any selfcare. Totally confined to bed or chair   5 Dead     Medical Necessity:   · Patient is expected to demonstrate progress in strength and overall conditionoing to increase independence with household activity. Reason for Services/Other Comments:  · Patient continues to demonstrate capacity to improve strength and overall conditionoing which will increase independence.    Use of outcome tool(s) and clinical judgement create a POC that gives a: Clear prediction of patient's progress: LOW COMPLEXITY            TREATMENT:   (In addition to Assessment/Re-Assessment sessions the following treatments were rendered)  Pre-treatment Symptoms/Complaints:   fatigue 4/10, deconditioned  Pain: Initial:   Pain Intensity 1: 4 0/10  Post Session: 4 /10         assessment  AssertID  Treatment/Session Assessment:    · Response to Treatment:  The patient tolerated the treatment well. · Compliance with Program/Exercises: Will assess as treatment progresses. · Recommendations/Intent for next treatment session: \"Next visit will focus on advancements to more challenging activities\". Transition to Healthy Self in 2-3 weeks.   Total Treatment Duration: 43 min    PT Patient Time In/Time Out  Time In: 9593  Time Out: [address], PT

## 2019-04-29 ENCOUNTER — HOSPITAL ENCOUNTER (OUTPATIENT)
Dept: PHYSICAL THERAPY | Age: 59
Discharge: HOME | End: 2019-04-29
Attending: INTERNAL MEDICINE
Payer: COMMERCIAL

## 2019-04-29 PROCEDURE — 97110 THERAPEUTIC EXERCISES: CPT

## 2019-04-29 NOTE — PROGRESS NOTES
Josep Kothari  : 1960  [facility]  Secondary:  [address] at [facility]  [address], [address], [place] [address]  [phone]   [phone]        OUTPATIENT PHYSICAL THERAPY: Daily Treatment Note 2019  Pre-treatment Symptoms/Complaints:  The patient reports she is doing ok today. Pain: Initial: Pain Intensity 1: 3  Post Session: 0 /10   Medications Last Reviewed:  19  Updated Objective Findings:  None Today   TREATMENT:   THERAPEUTIC EXERCISE: (41 minutes):  Exercises per grid below to improve mobility and strength. Required minimal verbal cues to promote proper body breathing techniques. Progressed resistance as indicated.   Activity  Date   19 Date   19   Date   3/4/19   Date   3/6/19 Date  19  Date  Date    Fatigue Score   weekly  5  4/10  7/10   3/10  3-4/10 3/10 4     Resting BP            Heart Rate   Before   After    99  130 81  105 85  105 86  103 70  109     O2 level Before   After    98  98 97  97 97  97 96  98 98  94     Machines  Wt/   sets/   reps  Wt/   sets/   reps  Wt/   sets/   reps  Wt/   sets/   reps  Wt/   sets/   reps  Wt/   sets/   reps  Wt/   sets/   reps    Leg Press            Leg Extension   x 10 reps X 10 with 5 count hold 7) x 10 reps with 5 count hold 7) x 10 reps with 5 count hold 7) x 10 reps with 5 count hold     Leg Curl            Dips/Pull Ups            Overhead Press   3# x 10 reps X 10 reps with 3# 8) x 10 reps with 3# 8) x 10 reps with 3# 6) x 10 reps with 3#     Compound Row            Low Back Ext            Cardiovascular  Time   Intensity, etc  Time   Intensity, etc  Time   Intensity, etc  Time   Intensity, etc  Time   Intensity, etc  Time   Intensity, etc  Time   Intensity, etc    Upper body ergometer  Level 1 x 4 min Level 1 x 5 min 2) level 1 x 6 min 4) level 1 x 6 min 2) level 1 x 6 min     Airdyne bikes            Recumbent Bike            walking  4 sets of 650' 650' x 1  650' x 1  650' x 1  650' x 1 1) 650' x 1  O2 99   3) 650' x 1  5) 650' x 1  9) 650' x 1 1) 650' x 1  3) 650' x 1  5) 650' x 1  9)650' x 1 1) 650' x 1  3) 650' x 1  5) 650' x 1  9) 650' x 1     Nustep  Level 2 x 7 min  SPM 85  Mets 2.5 Level 2 x 7 min  Spm  mets 4) level 2 x 9 min   SPM 83  METS 2.2 2) nustep level 2 x 9 min  Spm 81  METS 2.3 4) nustep level 2 x 9 min  Spm  mets     Elliptical            Other Exercises/   Activities  Parameters  Parameters  Parameters  Parameters  Parameters  Parameters  Parameters    Forward flexion, biceps curls, abduction  x 10 reps with 3# X 10 reps with 3# 8) x 10 reps with 3# 8) x 10 reps with 3# 6) x 10 reps with 3#     Sit to stand   6) x 10 reps 6) x 10 reps Unable due to pain                  [facility] Portal  Treatment/Session Summary:    · Response to Treatment:  the patient was able to complete all exercises with no rest breaks. she is winded with activity. .  · Communication/Consultation:  None today  · Equipment provided today:  None today  · Recommendations/Intent for next treatment session: Next visit will focus on advance therapeutic exercises. Transition to Healthy Self in 4 weeks.   Treatment Plan of Care Effective Dates:  4/25/19 to 7/24/19  Total Treatment Billable Duration:  41 min  PT Patient Time In/Time Out  Time In: 1120  Time Out: [address], PT    Future Appointments   Date Time Provider Trinidad Cm   5/2/2019  7:50 AM [address]   5/2/2019  8:00 AM Dwayne Cantrell NP [facility]   5/7/2019 11:00 AM Nichole Samuels, PT [facility]   5/9/2019  8:00 AM Nichole Samuels, PT [facility]   7/15/2019  7:99 AM SFD CT 64 SLICE UNIT 1 SFDRCT SFD   7/17/2019  8:30 AM [facility] OUTREACH INSURANCE [facility] [address] [place] [address]   7/17/2019  9:00 AM Yony Nuñez MD [facility]

## 2019-05-02 ENCOUNTER — HOSPITAL ENCOUNTER (OUTPATIENT)
Dept: LAB | Age: 59
Discharge: HOME | End: 2019-05-02
Payer: COMMERCIAL

## 2019-05-02 ENCOUNTER — PATIENT OUTREACH (OUTPATIENT)
Dept: CASE MANAGEMENT | Age: 59
End: 2019-05-02

## 2019-05-02 DIAGNOSIS — C85.88 MARGINAL ZONE LYMPHOMA OF LYMPH NODES OF MULTIPLE SITES (HCC): ICD-10-CM

## 2019-05-02 DIAGNOSIS — C83.30 DIFFUSE LARGE B-CELL LYMPHOMA, UNSPECIFIED BODY REGION (HCC): ICD-10-CM

## 2019-05-02 LAB
ALBUMIN SERPL-MCNC: 3.8 G/DL (ref 3.5–5)
ALBUMIN/GLOB SERPL: 1.5 {RATIO} (ref 1.2–3.5)
ALP SERPL-CCNC: 111 U/L (ref 50–136)
ALT SERPL-CCNC: 29 U/L (ref 12–65)
ANION GAP SERPL CALC-SCNC: 5 MMOL/L (ref 7–16)
AST SERPL-CCNC: 18 U/L (ref 15–37)
BASOPHILS # BLD: 0 K/UL (ref 0–0.2)
BASOPHILS NFR BLD: 1 % (ref 0–2)
BILIRUB SERPL-MCNC: 0.3 MG/DL (ref 0.2–1.1)
BUN SERPL-MCNC: 17 MG/DL (ref 6–23)
CALCIUM SERPL-MCNC: 8.9 MG/DL (ref 8.3–10.4)
CHLORIDE SERPL-SCNC: 108 MMOL/L (ref 98–107)
CO2 SERPL-SCNC: 30 MMOL/L (ref 21–32)
CREAT SERPL-MCNC: 0.97 MG/DL (ref 0.6–1)
DIFFERENTIAL METHOD BLD: ABNORMAL
EOSINOPHIL # BLD: 0.2 K/UL (ref 0–0.8)
EOSINOPHIL NFR BLD: 3 % (ref 0.5–7.8)
ERYTHROCYTE [DISTWIDTH] IN BLOOD BY AUTOMATED COUNT: 13.3 % (ref 11.9–14.6)
GLOBULIN SER CALC-MCNC: 2.6 G/DL (ref 2.3–3.5)
GLUCOSE SERPL-MCNC: 105 MG/DL (ref 65–100)
HCT VFR BLD AUTO: 34.2 % (ref 35.8–46.3)
HGB BLD-MCNC: 11.3 G/DL (ref 11.7–15.4)
IMM GRANULOCYTES # BLD AUTO: 0 K/UL (ref 0–0.5)
IMM GRANULOCYTES NFR BLD AUTO: 0 % (ref 0–5)
LDH SERPL L TO P-CCNC: 207 U/L (ref 100–190)
LYMPHOCYTES # BLD: 0.5 K/UL (ref 0.5–4.6)
LYMPHOCYTES NFR BLD: 8 % (ref 13–44)
MAGNESIUM SERPL-MCNC: 2.1 MG/DL (ref 1.8–2.4)
MCH RBC QN AUTO: 31.7 PG (ref 26.1–32.9)
MCHC RBC AUTO-ENTMCNC: 33 G/DL (ref 31.4–35)
MCV RBC AUTO: 96.1 FL (ref 79.6–97.8)
MONOCYTES # BLD: 0.5 K/UL (ref 0.1–1.3)
MONOCYTES NFR BLD: 8 % (ref 4–12)
NEUTS SEG # BLD: 4.5 K/UL (ref 1.7–8.2)
NEUTS SEG NFR BLD: 80 % (ref 43–78)
NRBC # BLD: 0.01 K/UL (ref 0–0.2)
PLATELET # BLD AUTO: 127 K/UL (ref 150–450)
PMV BLD AUTO: 10.2 FL (ref 9.4–12.3)
POTASSIUM SERPL-SCNC: 4 MMOL/L (ref 3.5–5.1)
PROT SERPL-MCNC: 6.4 G/DL (ref 6.3–8.2)
RBC # BLD AUTO: 3.56 M/UL (ref 4.05–5.25)
SODIUM SERPL-SCNC: 143 MMOL/L (ref 136–145)
WBC # BLD AUTO: 5.6 K/UL (ref 4.3–11.1)

## 2019-05-02 PROCEDURE — 80053 COMPREHEN METABOLIC PANEL: CPT

## 2019-05-02 PROCEDURE — 83735 ASSAY OF MAGNESIUM: CPT

## 2019-05-02 PROCEDURE — 85025 COMPLETE CBC W/AUTO DIFF WBC: CPT

## 2019-05-02 PROCEDURE — 83615 LACTATE (LD) (LDH) ENZYME: CPT

## 2019-05-02 NOTE — PROGRESS NOTES
Pt was seen and labs reviewed by Simba Elizabeth NP. Pt says she is doing well despite some increasing pain that she believes is neuropathy to her left leg. Pt is seeing pain specialist for this and says they want to do nerve conduction studies soon. Pt also says she has an intermittent pain to her right neck that lasts around 15 seconds to 1 minute that also sounds like nerve pain. New Rx for Lyrica given by Simba Elizabeth. Pt denies \"B-symptoms\" at this time. She will return to see Dr. Wing Zafar on 7/17 after CT scan.

## 2019-05-07 ENCOUNTER — HOSPITAL ENCOUNTER (OUTPATIENT)
Dept: PHYSICAL THERAPY | Age: 59
Discharge: HOME | End: 2019-05-07
Attending: INTERNAL MEDICINE
Payer: COMMERCIAL

## 2019-05-07 PROCEDURE — 97110 THERAPEUTIC EXERCISES: CPT

## 2019-05-07 NOTE — PROGRESS NOTES
Mell Clark : 1960 Primary: [facility] Secondary:  Therapy Center at [facility] [address], [address], [place] [address] [phone]   [phone] OUTPATIENT PHYSICAL THERAPY: Daily Treatment Note 2019 Pre-treatment Symptoms/Complaints:  The patient reports she is doing ok today. Pain: Initial:    Post Session: 0 /10 Medications Last Reviewed:  19 Updated Objective Findings:  None Today TREATMENT:  
THERAPEUTIC EXERCISE: (46 minutes):  Exercises per grid below to improve mobility and strength. Required minimal verbal cues to promote proper body breathing techniques. Progressed repetitions as indicated. Activity  Date 19 Date 19 Date 3/4/19 Date 3/6/19 Date 19  Date 19  Date Fatigue Score  
weekly  5  4/10 
7/10 
 3/10 
3-4/10 3/10 4 4 Resting BP Heart Rate Before After 99 
130 81 
105 85 
105 86 
103 70 
109 79 
87 O2 level Before After 98 
98 97 
97 97 
97 96 
98 98 
94 98 
97 Machines  Wt/  
sets/  
reps  Wt/  
sets/  
reps  Wt/  
sets/  
reps  Wt/  
sets/  
reps  Wt/  
sets/  
reps  Wt/  
sets/  
reps  Wt/  
sets/  
reps Leg Press Leg Extension   x 10 reps X 10 with 5 count hold 7) x 10 reps with 5 count hold 7) x 10 reps with 5 count hold 7) x 10 reps with 5 count hold 6) x 10 rep with 5 count hold Leg Curl           
Dips/Pull Ups Overhead Press   3# x 10 reps X 10 reps with 3# 8) x 10 reps with 3# 8) x 10 reps with 3# 6) x 10 reps with 3# 5) x 10 reps with 3# Compound Row Low Back Ext Cardiovascular  Time Intensity, etc  Time Intensity, etc  Time Intensity, etc  Time Intensity, etc  Time Intensity, etc  Time Intensity, etc  Time Intensity, etc   
Upper body ergometer  Level 1 x 4 min Level 1 x 5 min 2) level 1 x 6 min 4) level 1 x 6 min 2) level 1 x 6 min 2) level 1 x 6 min Airdyne bikes Recumbent Bike           
walking  4 sets of 650' 650' x 1 
650' x 1 
650' x 1 
650' x 1 1) 650' x 1 
O2 99  
3) 650' x 1 
5) 650' x 1 
9) 650' x 1 1) 650' x 1 
3) 650' x 1 
5) 650' x 1 
9)650' x 1 1) 650' x 1 
3) 650' x 1 
5) 650' x 1 
9) 650' x 1 1) 650' x 1 
3) 650' x 1 
5) 650' x 1 Nustep  Level 2 x 7 min SPM 85 Mets 2.5 Level 2 x 7 min Spm 
mets 4) level 2 x 9 min SPM 83 METS 2.2 2) nustep level 2 x 9 min Spm 81 METS 2.3 4) nustep level 2 x 9 min Spm 
mets 4) nustep level 2 x 12 min Elliptical           
Other Exercises/ Activities  Parameters  Parameters  Parameters  Parameters  Parameters  Parameters  Parameters Forward flexion, biceps curls, abduction  x 10 reps with 3# X 10 reps with 3# 8) x 10 reps with 3# 8) x 10 reps with 3# 6) x 10 reps with 3# 5) x 10 reps with 3# Sit to stand   6) x 10 reps 6) x 10 reps Unable due to pain x [facility] Portal 
Treatment/Session Summary: · Response to Treatment:  the patient was able to complete all exercises with no rest breaks. she is winded with activity. Antonino Palacios · Communication/Consultation:  None today · Equipment provided today:  None today · Recommendations/Intent for next treatment session: Next visit will focus on advance therapeutic exercises. Transition to Healthy Self in 4 weeks. Treatment Plan of Care Effective Dates:  4/25/19 to 7/24/19 Total Treatment Billable Duration:  46 min PT Patient Time In/Time Out Time In: 1104 Time Out: [address] PT Future Appointments Date Time Provider Trinidad Cm 5/9/2019  8:00 AM Nichole Samuels, PT [facility]  
7/15/2019  4:83 AM SFD CT 64 SLICE UNIT 1 SFDRCT SFD  
7/17/2019  8:30 AM [facility] OUTREACH INSURANCE [facility] [address] [place] [address]  
7/17/2019  9:00 AM Niru Colin MD [facility]

## 2019-05-09 ENCOUNTER — HOSPITAL ENCOUNTER (OUTPATIENT)
Dept: PHYSICAL THERAPY | Age: 59
Discharge: HOME | End: 2019-05-09
Attending: INTERNAL MEDICINE
Payer: COMMERCIAL

## 2019-05-09 PROCEDURE — 97110 THERAPEUTIC EXERCISES: CPT

## 2019-05-09 NOTE — PROGRESS NOTES
Lavelle Frazier : 1960 Primary: General [place] Secondary:  Therapy Center at [facility] [address], [address], [place] [address] [phone]   [phone] OUTPATIENT PHYSICAL THERAPY: Daily Treatment Note 2019 Pre-treatment Symptoms/Complaints:  The patient reports she is doing ok today. She reports she is having lumbar and left lower extremity pain which hinders her sleep. Pain: Initial: Pain Intensity 1: 5  Post Session: 5 /10 Medications Last Reviewed:  19 Updated Objective Findings:  None Today TREATMENT:  
THERAPEUTIC EXERCISE: (40 minutes):  Exercises per grid below to improve mobility and strength. Required minimal verbal cues to promote proper body breathing techniques. Progressed repetitions as indicated. Activity  Date 19 Date 19 Date 3/4/19 Date 3/6/19 Date 19  Date 19  Date  
19 Fatigue Score  
weekly  5  4/10 
7/10 
 3/10 
3-4/10 3/10 4 4 3 Resting BP Heart Rate Before After 99 
130 81 
105 85 
105 86 
103 70 
109 79 
87 86 O2 level Before After 98 
98 97 
97 97 
97 96 
98 98 
94 98 
97 96 Machines  Wt/  
sets/  
reps  Wt/  
sets/  
reps  Wt/  
sets/  
reps  Wt/  
sets/  
reps  Wt/  
sets/  
reps  Wt/  
sets/  
reps  Wt/  
sets/  
reps Leg Press Leg Extension   x 10 reps X 10 with 5 count hold 7) x 10 reps with 5 count hold 7) x 10 reps with 5 count hold 7) x 10 reps with 5 count hold 6) x 10 rep with 5 count hold 7) x 10 reps with 5 count hold Leg Curl           
Dips/Pull Ups Overhead Press   3# x 10 reps X 10 reps with 3# 8) x 10 reps with 3# 8) x 10 reps with 3# 6) x 10 reps with 3# 5) x 10 reps with 3# 6) x 10 reps with 3# Compound Row Low Back Ext Cardiovascular  Time Intensity, etc  Time Intensity, etc  Time Intensity, etc  Time Intensity, etc  Time Intensity, etc  Time Intensity, etc  Time Intensity, etc   
Upper body ergometer  Level 1 x 4 min Level 1 x 5 min 2) level 1 x 6 min 4) level 1 x 6 min 2) level 1 x 6 min 2) level 1 x 6 min 2) level 1 x 6 min Airdyne bikes Recumbent Bike           
walking  4 sets of 650' 650' x 1 
650' x 1 
650' x 1 
650' x 1 1) 650' x 1 
O2 99  
3) 650' x 1 
5) 650' x 1 
9) 650' x 1 1) 650' x 1 
3) 650' x 1 
5) 650' x 1 
9)650' x 1 1) 650' x 1 
3) 650' x 1 
5) 650' x 1 
9) 650' x 1 1) 650' x 1 
3) 650' x 1 
5) 650' x 1 
 1) 650' x 1 
3) 650' x 1 
5) 650' x 1 Nustep  Level 2 x 7 min SPM 85 Mets 2.5 Level 2 x 7 min Spm 
mets 4) level 2 x 9 min SPM 83 METS 2.2 2) nustep level 2 x 9 min Spm 81 METS 2.3 4) nustep level 2 x 9 min Spm 
mets 4) nustep level 2 x 12 min 4) nustep level 2 x 13 min Elliptical           
Other Exercises/ Activities  Parameters  Parameters  Parameters  Parameters  Parameters  Parameters  Parameters Forward flexion, biceps curls, abduction  x 10 reps with 3# X 10 reps with 3# 8) x 10 reps with 3# 8) x 10 reps with 3# 6) x 10 reps with 3# 5) x 10 reps with 3# 6) x 10 reps with 3# Sit to stand   6) x 10 reps 6) x 10 reps Unable due to pain x [facility] Portal 
Treatment/Session Summary: · Response to Treatment:  the patient was able to complete all exercises with no rest breaks. she is winded with activity. lumbar and left lower extremity pain improved with exercises. · Communication/Consultation:  None today · Equipment provided today:  None today · Recommendations/Intent for next treatment session: Next visit will focus on advance therapeutic exercises. Transition to Healthy Self in 4 weeks. Treatment Plan of Care Effective Dates:  4/25/19 to 7/24/19 Total Treatment Billable Duration:  40 min PT Patient Time In/Time Out Time In: 0803 Time Out: 9927 Suzon Hodgkins, PT Future Appointments Date Time Provider Trinidad Cm 5/13/2019  8:00 AM [facility]els[facility]  
5/14/2019  8:00 AM Nichole Samuels, PT BRITTANY [facility]  
7/15/2019  6:27 AM SFD CT 64 SLICE UNIT 1 SFDRCT D  
7/17/2019  8:30 AM [facility] OUTREACH INSURANCE [facility] [address] [facility]  
7/17/2019  9:00 AM Yony Nuñez MD [facility]

## 2019-05-13 ENCOUNTER — HOSPITAL ENCOUNTER (OUTPATIENT)
Dept: PHYSICAL THERAPY | Age: 59
Discharge: HOME | End: 2019-05-13
Attending: INTERNAL MEDICINE
Payer: COMMERCIAL

## 2019-05-13 PROCEDURE — 97110 THERAPEUTIC EXERCISES: CPT

## 2019-05-13 NOTE — PROGRESS NOTES
Sherry Mckay : 1960 Primary: General [place] Secondary:  [facility] [address], [place] [address] [phone]   [phone] OUTPATIENT PHYSICAL THERAPY: Daily Treatment Note 2019 Pre-treatment Symptoms/Complaints:  The patient reports she is doing ok today. The pain isn't so bad today. Pain: Initial: Pain Intensity 1: 3  Post Session: 3 /10 Medications Last Reviewed:  19 Updated Objective Findings:  None Today TREATMENT:  
THERAPEUTIC EXERCISE: (38 minutes):  Exercises per grid below to improve mobility and strength. Required minimal verbal cues to promote proper body breathing techniques. Progressed repetitions as indicated. Activity  Date 19 Date Date Date Date Date Date Fatigue Score  
weekly  4 Resting BP Heart Rate Before After 88 
103 O2 level Before After 98 
98 Machines  Wt/  
sets/  
reps       Wt/  
sets/  
reps Leg Press Leg Extension  6) x 10 reps with 5 count hold Leg Curl           
Dips/Pull Ups Overhead Press  7) 3# x 10 reps Compound Row Low Back Ext Cardiovascular  Time Intensity, etc  Time Intensity, etc  Time Intensity, etc  Time Intensity, etc  Time Intensity, etc  Time Intensity, etc  Time Intensity, etc   
Upper body ergometer  2)Level 1 x 6 min Airdyne bikes Recumbent Bike           
walking 1) 650' 3) 650' x 1 
5) 650' x 1 Nustep  4)Level 2 x 12 min SPM 83 Mets 2.3 Elliptical           
Other Exercises/ Activities  Parameters  Parameters  Parameters  Parameters  Parameters  Parameters  Parameters Forward flexion, biceps curls, abduction 7) x 10 reps with 4# Sit to stand [employer] Portal 
Treatment/Session Summary: · Response to Treatment:  the patient was able to complete all exercises with no rest breaks. she is winded with activity. able to increase upper body weights today. · Communication/Consultation:  None today · Equipment provided today:  None today · Recommendations/Intent for next treatment session: Next visit will focus on advance therapeutic exercises. Transition to Healthy Self in 4 weeks. Treatment Plan of Care Effective Dates:  4/25/19 to 7/24/19 Total Treatment Billable Duration:  38 min PT Patient Time In/Time Out Time In: 4457 Time Out: 9123 Flory Willett PT Future Appointments Date Time Provider Trinidad Cm 5/14/2019  8:00 AM Nichole Samuels, PT SFOORPT [facility]  
7/15/2019  7:67 AM SFD CT 64 SLICE UNIT 1 SFDRCT SFD  
7/17/2019  8:30 AM GCC OUTREACH INSURANCE GCC[facility] [address] [place] [address]  
7/17/2019  9:00 AM Constance Reeder MD [facility]

## 2019-05-14 ENCOUNTER — HOSPITAL ENCOUNTER (OUTPATIENT)
Dept: PHYSICAL THERAPY | Age: 59
Discharge: HOME | End: 2019-05-14
Attending: INTERNAL MEDICINE
Payer: COMMERCIAL

## 2019-05-14 PROCEDURE — 97110 THERAPEUTIC EXERCISES: CPT

## 2019-05-14 NOTE — PROGRESS NOTES
Chandan Heaton : 1960 Primary: General [place] Secondary:  Therapy Center at [facility] [address], [address], [place] [address] [phone]   [phone] OUTPATIENT PHYSICAL THERAPY: Daily Treatment Note 2019 Pre-treatment Symptoms/Complaints:  The patient reports she is doing ok today. She reports she rested well. Pain: Initial:    Post Session: 3 /10 Medications Last Reviewed:  19 Updated Objective Findings:  None Today TREATMENT:  
THERAPEUTIC EXERCISE: (41 minutes):  Exercises per grid below to improve mobility and strength. Required minimal verbal cues to promote proper body breathing techniques. Progressed repetitions as indicated. Activity  Date 19 Date 19 Date Date Date Date Date Fatigue Score  
weekly  4  2 Resting BP Heart Rate Before After 88 
103 80 
90 O2 level Before After 98 
98 
 96 
95 Machines  Wt/  
sets/  
reps       Wt/  
sets/  
reps Leg Press Leg Extension  6) x 10 reps with 5 count hold 6) x 10 reps with 5 count hold Leg Curl           
Dips/Pull Ups Overhead Press  7) 3# x 10 reps 7) x 10 reps with 4# Compound Row Low Back Ext Cardiovascular  Time Intensity, etc  Time Intensity, etc  Time Intensity, etc  Time Intensity, etc  Time Intensity, etc  Time Intensity, etc  Time Intensity, etc   
Upper body ergometer  2)Level 1 x 6 min 2) level 1 x 6 min Airdyne bikes Recumbent Bike           
walking 1) 650' 3) 650' x 1 
5) 650' x 1 1) 650' x 1 
3) 650' x 1 
5) 650' x 1 Nustep  4)Level 2 x 12 min SPM 83 Mets 2.3 4) level 2 x 12 min Spm 
mets Elliptical           
Other Exercises/ Activities  Parameters  Parameters  Parameters  Parameters  Parameters  Parameters  Parameters Forward flexion, biceps curls, abduction 7) x 10 reps with 4# 7) x 10 reps with 4# Sit to stand [employer] Portal 
Treatment/Session Summary: · Response to Treatment:  tolerated the treatment well.  able to participate with minimal windedness. able to increase upper body weights today. · Communication/Consultation:  continue activity on vacation · Equipment provided today:  None today · Recommendations/Intent for next treatment session: Next visit will focus on advance therapeutic exercises. Transition to Healthy Self in 4 weeks. She will return in 1 week after vacation. Treatment Plan of Care Effective Dates:  4/25/19 to 7/24/19 Total Treatment Billable Duration:  41 min PT Patient Time In/Time Out Time In: 0805 Time Out: 9841 Tammy Ruiz, TONNY Future Appointments Date Time Provider Trinidad Cm 5/29/2019  9:45 AM [facility]  
5/30/2019  8:00 AM Ezequiel DUNCAN, PT [facility]  
7/15/2019  5:40 AM SFD CT 64 SLICE UNIT 1 SFDRCT SFD  
7/17/2019  8:30 AM GCC OUTREACH INSURANCE [facility] [address] [facility]  
7/17/2019  9:00 AM Sha Simeon MD [facility]

## 2019-05-29 ENCOUNTER — HOSPITAL ENCOUNTER (OUTPATIENT)
Dept: PHYSICAL THERAPY | Age: 59
Discharge: HOME | End: 2019-05-29
Attending: INTERNAL MEDICINE
Payer: COMMERCIAL

## 2019-05-29 PROCEDURE — 97110 THERAPEUTIC EXERCISES: CPT

## 2019-05-29 NOTE — PROGRESS NOTES
Rachael Parker  : 1960  Primary: [address] [facility]  Secondary:  [address] at [facility]  [address], [address], [place] [address]  [phone]   [phone]        OUTPATIENT PHYSICAL THERAPY: Daily Treatment Note 2019  Pre-treatment Symptoms/Complaints:  The patient reports she is exhausted from her trip. She reports she is being treated for a UTI. Pain: Initial: Pain Intensity 1: 2  Post Session: 2 /10   Medications Last Reviewed:  19  Updated Objective Findings:  None Today   TREATMENT:   THERAPEUTIC EXERCISE: (33 minutes):  Exercises per grid below to improve mobility and strength. Required minimal verbal cues to promote proper body breathing techniques. Progressed repetitions as indicated.   Activity  Date   19 Date   19   Date   19     Date    Date   Date    Date      Fatigue Score   weekly  4  2 6  5       Resting BP            Heart Rate   Before   After    88  103 80  90 77  97       O2 level Before   After    98  98   96  95 97  97       Machines  Wt/   sets/   reps       Wt/   sets/   reps    Leg Press            Leg Extension  6) x 10 reps with 5 count hold 6) x 10 reps with 5 count hold        Leg Curl            Dips/Pull Ups            Overhead Press  7) 3# x 10 reps 7) x 10 reps with 4#        Compound Row            Low Back Ext            Cardiovascular  Time   Intensity, etc  Time   Intensity, etc  Time   Intensity, etc  Time   Intensity, etc  Time   Intensity, etc  Time   Intensity, etc  Time   Intensity, etc    Upper body ergometer  2)Level 1 x 6 min 2) level 1 x 6 min 2) level 1 x 6 min       Airdyne bikes            Recumbent Bike            walking 1) 650'  3) 650' x 1  5) 650' x 1 1) 650' x 1  3) 650' x 1  5) 650' x 1 1) 650' x 1  3) 650' x 1       Nustep  4)Level 2 x 12 min  SPM 83  Mets 2.3 4) level 2 x 12 min  Spm  mets 4) level 2 x 12 min       Elliptical            Other Exercises/   Activities Parameters  Parameters  Parameters  Parameters  Parameters  Parameters  Parameters    Forward flexion, biceps curls, abduction 7) x 10 reps with 4# 7) x 10 reps with 4#        Sit to stand                       [employer] Portal  Treatment/Session Summary:    · Response to Treatment:  fair. unable to complete treatment deto being late and feeling fatigued. able to increase upper body weights today. · Communication/Consultation:  continue activity on vacation  · Equipment provided today:  None today  · Recommendations/Intent for next treatment session: Next visit will focus on advance therapeutic exercises. Transition to Healthy Self in 4 weeks.    Treatment Plan of Care Effective Dates:  4/25/19 to 7/24/19  Total Treatment Billable Duration:  33 min  PT Patient Time In/Time Out  Time In: 0957  Time Out: Janes Rose PT    Future Appointments   Date Time Provider Trinidad Cm   5/30/2019  8:00 AM Ever Lagos PT [facility]   7/15/2019  6:05 AM SFD CT 64 SLICE UNIT 1 SFDRCT [facility]   7/17/2019  8:30 AM GCC OUTREACH INSURANCE GCCOIG [facility]   7/17/2019  9:00 AM Venu New MD [facility]

## 2019-05-30 ENCOUNTER — HOSPITAL ENCOUNTER (OUTPATIENT)
Dept: PHYSICAL THERAPY | Age: 59
Discharge: HOME | End: 2019-05-30
Attending: INTERNAL MEDICINE
Payer: COMMERCIAL

## 2019-05-30 PROCEDURE — 97110 THERAPEUTIC EXERCISES: CPT

## 2019-05-30 NOTE — PROGRESS NOTES
Joelle Humphreys  : 1960  [facility]  Secondary:  [address] at [facility]  [address], [address], [place] [address]  [phone]   [phone]        OUTPATIENT PHYSICAL THERAPY: Daily Treatment Note 2019  Pre-treatment Symptoms/Complaints:  The patient reports she is exhausted still today. She reports she is being treated for a UTI. Pain: Initial: Pain Intensity 1: 5  Post Session: 5 /10   Medications Last Reviewed:  19  Updated Objective Findings:  None Today   TREATMENT:   THERAPEUTIC EXERCISE: (37 minutes):  Exercises per grid below to improve mobility and strength. Required minimal verbal cues to promote proper body breathing techniques. Progressed repetitions as indicated.   Activity  Date   19 Date   19   Date   19     Date   19   Date   Date    Date      Fatigue Score   weekly  4  2 6  5 4  5      Resting BP            Heart Rate   Before   After    88  103 80  90 77  97 87      O2 level Before   After    98  98   96  95 97  97 98      Machines  Wt/   sets/   reps       Wt/   sets/   reps    Leg Press            Leg Extension  6) x 10 reps with 5 count hold 6) x 10 reps with 5 count hold        Leg Curl            Dips/Pull Ups            Overhead Press  7) 3# x 10 reps 7) x 10 reps with 4#        Compound Row            Low Back Ext            Cardiovascular  Time   Intensity, etc  Time   Intensity, etc  Time   Intensity, etc  Time   Intensity, etc  Time   Intensity, etc  Time   Intensity, etc  Time   Intensity, etc    Upper body ergometer  2)Level 1 x 6 min 2) level 1 x 6 min 2) level 1 x 6 min 2) level 1 x 6 min      Airdyne bikes            Recumbent Bike            walking 1) 650'  3) 650' x 1  5) 650' x 1 1) 650' x 1  3) 650' x 1  5) 650' x 1 1) 650' x 1  3) 650' x 1 1) 650' x 1  3) 650' x 1  5) 650' x 1      Nustep  4)Level 2 x 12 min  SPM 83  Mets 2.3 4) level 2 x 12 min  Spm  mets 4) level 2 x 12 min 4) level 2 x 12 min  Spm 87  Mets 2.3      Elliptical            Other Exercises/   Activities  Parameters  Parameters  Parameters  Parameters  Parameters  Parameters  Parameters    Forward flexion, biceps curls, abduction 7) x 10 reps with 4# 7) x 10 reps with 4#        Sit to stand                       [employer] Portal  Treatment/Session Summary:    · Response to Treatment:  fair. unable to complete treatment deto being late and feeling fatigued. · Communication/Consultation:  continue activity on vacation  · Equipment provided today:  None today  · Recommendations/Intent for next treatment session: Next visit will focus on advance therapeutic exercises. Transition to Healthy Self in 4 weeks.    Treatment Plan of Care Effective Dates:  4/25/19 to 7/24/19  Total Treatment Billable Duration:  37 min  PT Patient Time In/Time Out  Time In: 0808  Time Out: 0845  Clearance TONNY Dick    Future Appointments   Date Time Provider Trinidad Cm   6/6/2019  8:00 AM Nichole Samuels, [facility]   6/11/2019  8:00 AM Nichole Samuels, PT [facility]   6/13/2019  8:00 AM Nichole Samuels, PT [facility]   7/15/2019  3:23 AM SFD CT 64 SLICE UNIT 1 SFDRCT SFD   7/17/2019  8:30 AM GCC OUTREACH INSURANCE GCCOIG GVL [facility]   7/17/2019  9:00 AM Juvenal Rodriguez MD [facility]

## 2019-06-06 ENCOUNTER — HOSPITAL ENCOUNTER (OUTPATIENT)
Dept: PHYSICAL THERAPY | Age: 59
Discharge: HOME | End: 2019-06-06
Attending: INTERNAL MEDICINE
Payer: COMMERCIAL

## 2019-06-06 PROCEDURE — 97110 THERAPEUTIC EXERCISES: CPT

## 2019-06-06 NOTE — PROGRESS NOTES
Hilda   : 1960  Primary: [address] [facility]  Secondary:  [address] at [facility]  [address], [address], [place] [address]  [phone]   [phone]        OUTPATIENT PHYSICAL THERAPY: Daily Treatment Note 2019  Pre-treatment Symptoms/Complaints:  The patient reports she has a prolapsed bladder and will be having therapy for that. She reports she has been on prednisone for a week for swelling. Pain: Initial:    Post Session:  /10   Medications Last Reviewed:  19  Updated Objective Findings:  None Today   TREATMENT:   THERAPEUTIC EXERCISE: (48 minutes):  Exercises per grid below to improve mobility and strength. Required minimal verbal cues to promote proper body breathing techniques. Progressed repetitions as indicated.   Activity  Date   19 Date   19   Date   19     Date   19   Date  19   Date    Date      Fatigue Score   weekly  4  2 6  5 4  5      Resting BP            Heart Rate   Before   After    88  103 80  90 77  97 87 80     O2 level Before   After    98  98   96  95 97  97 98 98     Machines  Wt/   sets/   reps       Wt/   sets/   reps    Leg Press            Leg Extension  6) x 10 reps with 5 count hold 6) x 10 reps with 5 count hold   6) x 10 reps with 5 count hold     Leg Curl            Dips/Pull Ups            Overhead Press  7) 3# x 10 reps 7) x 10 reps with 4#   7) x 10 reps with 4#     Compound Row            Low Back Ext            Cardiovascular  Time   Intensity, etc  Time   Intensity, etc  Time   Intensity, etc  Time   Intensity, etc  Time   Intensity, etc  Time   Intensity, etc  Time   Intensity, etc    Upper body ergometer  2)Level 1 x 6 min 2) level 1 x 6 min 2) level 1 x 6 min 2) level 1 x 6 min 2) level 1 x 6 min     Airdyne bikes            Recumbent Bike            walking 1) 650'  3) 650' x 1  5) 650' x 1 1) 650' x 1  3) 650' x 1  5) 650' x 1 1) 650' x 1  3) 650' x 1 1) 650' x 1  3) 650' x 1  5) 650' x 1 1) 650' x 1  3) 650' x 1  5) 650' x 1     Nustep  4)Level 2 x 12 min  SPM 83  Mets 2.3 4) level 2 x 12 min  Spm  mets 4) level 2 x 12 min 4) level 2 x 12 min  Spm 87  Mets 2.3 4) level 2 x 15 min     Elliptical            Other Exercises/   Activities  Parameters  Parameters  Parameters  Parameters  Parameters  Parameters  Parameters    Forward flexion, biceps curls, abduction 7) x 10 reps with 4# 7) x 10 reps with 4#   7) x 10 reps with 4#     Sit to stand                       [employer] Portal  Treatment/Session Summary:    · Response to Treatment:  tolerated the treatment well.  able to fully participate. prednisone ahs reduced edema. cough less bothersome with new medication. · Communication/Consultation:  None today  · Equipment provided today:  None today  · Recommendations/Intent for next treatment session: Next visit will focus on advance therapeutic exercises. The patient reports her physician would like for her to continue strengthening and then add pelvic floor therapy.   Treatment Plan of Care Effective Dates:  4/25/19 to 7/24/19  Total Treatment Billable Duration:  48 min  PT Patient Time In/Time Out  Time In: 0800  Time Out: 0848  Marj Lo, TONNY    Future Appointments   Date Time Provider Trinidad Cm   6/11/2019  8:00 AM Nichole Samuels, PT SFOORPT [facility]   6/13/2019  8:00 AM Nichole Samuels, PT SFOORPT [facility]   7/15/2019  5:90 AM SFD CT 64 SLICE UNIT 1 SFDRCT SFD   7/17/2019  8:30 AM GCC OUTREACH INSURANCE GCCOIG [facility]   7/17/2019  9:00 AM Romel Willson MD SSA [facility]   6/2/2020  8:00 AM HTF LAB RESOURCE SSA HTF HTF   6/5/2020  9:45 AM MD KRISSY Ordoñez HTF HTF

## 2019-06-11 ENCOUNTER — HOSPITAL ENCOUNTER (OUTPATIENT)
Dept: PHYSICAL THERAPY | Age: 59
Discharge: HOME | End: 2019-06-11
Attending: INTERNAL MEDICINE
Payer: COMMERCIAL

## 2019-06-11 PROCEDURE — 97110 THERAPEUTIC EXERCISES: CPT

## 2019-06-11 NOTE — PROGRESS NOTES
Jez Lopez  : 1960  Primary: [facility]  Secondary:  [address]  at [facility]  [address] , [address], [place] [address]  [phone]   [phone]          OUTPATIENT PHYSICAL THERAPY:Progress Report    ICD-10: Treatment Diagnosis: muscle weakness generalized M 62.81  Precautions/Allergies:   Patient has no known allergies. Fall Risk Score: 0 (? 5 = High Risk)  MD Orders: oncology rehab MEDICAL/REFERRING DIAGNOSIS:  Diffuse large B-cell lymphoma, unspecified site [C83.30]    DATE OF ONSET: 3/30/17  REFERRING PHYSICIAN: Marcos Robles MD  RETURN PHYSICIAN APPOINTMENT:      INITIAL ASSESSMENT:  Ms. Radha Martinez presents following treatment of lymphoma. She previously was a participant with oncology rehab. She has undergone bilateral cataract surgery, back surgery x 2 and has had 2 broken toes. She returns with overall deconditioning and decreased strength. She will benefit from therapeutic exercises to improve strength and activity tolerance. She is currently being treated at the pain center for lumbar pain. 19:  The patient has attended a total of 9 visits. She has not attended since 3/6/19 due to a death in the family out of state. She has returned to resume oncology rehab. She would like to transition to Healthy Self in 2-3 weeks. She has regressed due to her absence. We will resume strengthening and conditioning.  19: The patient has attended a total of 18 visits. She has made progress towards her goals. She will transition to Healthy Self after she sees her oncologist again. She has improved her 6 minute walk and strength. She is walking at home. We will continue with strengthening and transition her to Healthy Self soon. PROBLEM LIST (Impacting functional limitations):  1. Decreased Strength  2. Increased Pain  3. Decreased Activity Tolerance  4. Increased Fatigue  5.  Increased Shortness of Breath  6. Decreased [place] with Home Exercise Program INTERVENTIONS PLANNED:  1. Home Exercise Program (HEP)  2. Therapeutic Exercise/Strengthening   TREATMENT PLAN:  Effective Dates: 4/25/19 TO 7/24/19 (90 days). Frequency/Duration: 2 times a week for 90 Days. GOALS: (Goals have been discussed and agreed upon with patient.)  Short-Term Functional Goals: Time Frame: 4 weeks  1. The patient will be independent with HEP for strength within 4 weeks. Met    2. The patient will gain 1/2 grade strength of all 4 extremities within 4 weeks. 3. The patient will report walking 10 minutes twice daily within 4 weeks. Met   Discharge Goals: Time Frame: 8 weeks  1. The patient will improve her 6 minute walk by 165' x 1 within 8 weeks. Met   2. The patient will report a fatigue level of 3 or less within 8 weeks. Met   3. The patient will transition to Healthy Self within 12 weeks. Rehabilitation Potential For Stated Goals: Good  Regarding Misa Barrientos's therapy, I certify that the treatment plan above will be carried out by a therapist or under their direction. Thank you for this referral,  Karon Teran, PT                 The information in this section was collected on 6/11/19 (except where otherwise noted). HISTORY:   History of Present Injury/Illness (Reason for Referral):  Post lymphoma treatment, lumbar pain, lumbar surgery x 2     Past Medical History/Comorbidities:   Ms. Danita Lindo  has a past medical history of Anemia, Basal cell carcinoma, Cardiomegaly, Deep vein thrombosis (DVT) ([address] History of stroke, Hypertension, Marginal zone lymphoma ([address] (03/30/2017), Morbid obesity ([address] Osteoarthritis, Overactive bladder, Primary hypothyroidism, Radiation therapy complication, Rheumatic fever, S/P total knee replacement using cement (10/3/2011), Shingles, and Superficial venous thrombosis of right arm (5/1/2017).  She also has no past medical history of Aneurysm ([address] Arrhythmia, Asthma, Autoimmune disease ([facility] CAD (coronary artery disease), Chronic kidney disease, Coagulation defects, COPD, Diabetes ([address] Difficult intubation, GERD (gastroesophageal reflux disease), Heart failure ([address] Liver disease, Malignant hyperthermia due to anesthesia, Nausea & vomiting, Pseudocholinesterase deficiency, Psychiatric disorder, PUD (peptic ulcer disease), Seizures ([address] Stroke ([address] or Unspecified sleep apnea. Ms. Olu Arreola  has a past surgical history that includes pr anesth,achilles tendon surg (Left, 02/10/2011); hx cholecystectomy (1983); hx vascular access; hx knee replacement (Left, 2013); hx knee replacement (Right, 2012); hx cataract removal (Bilateral, 2018); and hx colonoscopy (03/2017). Past Medical History:   Diagnosis Date    Anemia     Basal cell carcinoma     Cardiomegaly     Deep vein thrombosis (DVT) (HCC)     History of stroke     Hypertension     Marginal zone lymphoma ([facility] 03/30/2017    Diffuse Large B-cell Lymphoma.  Completed Chemotherapy 5/28/18    Morbid obesity ([address]     Osteoarthritis     Overactive bladder     Primary hypothyroidism     Radiation therapy complication     Rheumatic fever     S/P total knee replacement using cement 10/3/2011    Shingles     Superficial venous thrombosis of right arm 5/1/2017     Past Surgical History:   Procedure Laterality Date    HX CATARACT REMOVAL Bilateral 2018    HX CHOLECYSTECTOMY  1983    HX COLONOSCOPY  03/2017    Clear    HX KNEE REPLACEMENT Left 2013    Dr. Makenzie Aragon Right 2012    Dr. Saige Rodriguez Left 02/10/2011    Dr. Megan Cameron        Ambulatory/Rehab Services H2 Model Falls Risk Assessment  1/28/19    Risk Factors:       No Risk Factors Identified Ability to Rise from Chair:       (0)  Ability to rise in a single movement    Falls Prevention Plan:       No modifications necessary   Total: (5 or greater = High Risk): 0    ©2010 [facility] [address] [facility]. [facility] Patent #[id number]. Federal Law prohibits the replication, distribution or use without written permission from [place] [address]:     lives with spouse  Prior Level of Function/Work/Activity:    Dominant Side:         RIGHT  Current Medications:       Current Outpatient Medications:     montelukast (SINGULAIR) 10 mg tablet, Take 1 Tab by mouth daily. , Disp: 30 Tab, Rfl: 5    triamterene-hydroCHLOROthiazide (MAXZIDE) 75-50 mg per tablet, Take 0.5-1 Tabs by mouth daily as needed (qam prn swelling). , Disp: 60 Tab, Rfl: 3    levothyroxine (SYNTHROID) 175 mcg tablet, Take 1 Tab by mouth Daily (before breakfast). , Disp: 90 Tab, Rfl: 1    cetirizine (ZYRTEC) 10 mg tablet, Take 1 Tab by mouth daily. , Disp: 30 Tab, Rfl: 5    magnesium oxide (MAG-OX) 400 mg tablet, Take 1 Tab by mouth two (2) times a day. (Patient taking differently: Take 400 mg by mouth two (2) times a week.), Disp: 60 Tab, Rfl: 1    pregabalin (LYRICA) 100 mg capsule, Take 1 Cap by mouth three (3) times daily. Max Daily Amount: 300 mg. (Patient taking differently: Take 100 mg by mouth two (2) times a day.), Disp: 270 Cap, Rfl: 1   Date Last Reviewed:  46/11/19       EXAMINATION:   ROM:          Within functional limits  Strength:          Bilateral upper extremities grossly 4+/5 x 2 extremities  Bilateral lower extremities 4/5 x 2 extremities    Balance:          Intact  Skin Integrity:          intact  Edema/Girth:  pitting    Left Right    Initial Most Recent Initial Most Recent   Upper  Extremity           Lower  Extremity               Body Structures Involved:  1. Muscles Body Functions Affected:  1. Sensory/Pain  2. Neuromusculoskeletal Activities and Participation Affected:  1.  None   Number of elements (examined above) that affect the Plan of Care: 3: MODERATE COMPLEXITY   CLINICAL PRESENTATION:   Presentation: Stable and uncomplicated: LOW COMPLEXITY   CLINICAL DECISION MAKING:   Outcome Measure: Tool Used: 6-MINUTE WALK TEST  Score:  Initial: 1445' feet Most Recent: 1565 feet (Date: 6/11/19 )   Interpretation of Score: Normal range varies but is approximately 6465-3300 Feet      Distance walked: 1565 feet               Baseline End of Test   Heart Rate 85 123   Dyspnea (Luther Scale)     Fatigue (Luther Scale) 0 4   SpO2 98 93   BP       Tool Used: TINETTI  Score:  Initial:   Gait: 11/12  Balance: 16/16  TOTAL: 27/28 Most Recent:  Gait: 12/12  Balance: 16/16  TOTAL:28 /28         Tool Used: Timed Up and Go (TUG)  Score:  Initial: 7 seconds Most Recent: X seconds (Date: -- )   Interpretation of Score: The test measures, in seconds, the time taken by an individual to stand up from a standard arm chair (seat height 46 cm [18 in], arm height 65 cm [25.6 in]), walk a distance of 3 meters (118 in, approx 10 ft), turn, walk back to the chair and sit down. If the individual takes longer than 14 seconds to complete TUG, this indicates risk for falls. Tool Used: ECOG Performance Survey Score  Score:  Initial: 1 Most Recent:  1    Interpretation of Score:   0 Fully active, able to carry on all pre-disease performance without restriction   1 Restricted in physically strenuous activity but ambulatory and able to carry out work of a light or sedentary nature, e.g., light house work, office work   2 Ambulatory and capable of all selfcare but unable to carry out any work activities. Up and about more than 50% of waking hours   3 Capable of only limited selfcare, confined to bed or chair more than 50% of waking hours   4 Completely disabled. Cannot carry on any selfcare. Totally confined to bed or chair   5 Dead     Medical Necessity:   · Patient is expected to demonstrate progress in strength and overall conditionoing to increase independence with household activity.   Reason for Services/Other Comments:  · Patient continues to demonstrate capacity to improve strength and overall conditionoing which will increase independence. Use of outcome tool(s) and clinical judgement create a POC that gives a: Clear prediction of patient's progress: LOW COMPLEXITY            TREATMENT:   (In addition to Assessment/Re-Assessment sessions the following treatments were rendered)  Pre-treatment Symptoms/Complaints:   The patient reports she feels good today. Pain: Initial:     0/10  Post Session: 0 /10   6 minute walk test  UBE level 1 x 6 min  Nustep level 2 x 15 min  Long arc quads x 10 reps with 5 count hold  Bilateral upper extremity with 4# biceps curls, abduction, flexion, overhead press          [facility] Portal  Treatment/Session Assessment:    · Response to Treatment:  The patient tolerated the treatment well. · Compliance with Program/Exercises: Will assess as treatment progresses. · Recommendations/Intent for next treatment session: \"Next visit will focus on advancements to more challenging activities\".    Total Treatment Duration: 45 min    PT Patient Time In/Time Out  Time In: 0803  Time Out: 0848    Jesus Wan, PT

## 2019-06-11 NOTE — THERAPY EVALUATION
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  
Patient has no known allergies. TREATMENT PLAN: 
Effective Dates: 2019 TO 2019 (90 days). Frequency/Duration: 1 time a week for 90 Day(s) MEDICAL/REFERRING DIAGNOSIS: 
Urinary incontinence, unspecified type [R32] DATE OF ONSET: Chronic REFERRING PHYSICIAN: Sonia Bell MD MD Orders: Evaluate and Treat Return MD Appointment: 3 months INITIAL ASSESSMENT:  Ms. Radha Martinez presents to physical therapy with urgency, frequency, and leakage. Patient demonstrates increased tightness and tone to pelvic floor muscles, decreased strength and coordination to muscles, and prolapse of bladder was felt upon evaluation. Patient would benefit from skilled physical therapy to address her deficits and maximize her function. PROBLEM LIST (Impacting functional limitations): 1. Decreased Strength 2. Decreased Flexibility/Joint Mobility 3. Decreased Skin Integrity/Hygeine 4. Decreased [place] with Home Exercise Program 
5. Decreased coordination INTERVENTIONS PLANNED: 
1. Family Education 2. Home Exercise Program (HEP) 3. Manual Therapy 4. Neuromuscular Re-education/Strengthening 5. Range of Motion (ROM) 6. Therapeutic Activites 7. Therapeutic Exercise/Strengthening GOALS: (Goals have been discussed and agreed upon with patient.) 1.  Patient will verbalize an understanding of pelvic anatomy and causes of incontinence 2. Patient will demonstrate I with basic PFM HEP to improve awareness, coordination, and timing of PFM 3. Patient will demonstrate \"the knack\" to eliminate UI during cough/sneeze 4. Patient will demonstrate 10 quick flicks of the pelvic floor muscle group, without compensation, to implement urge suppression appropriately with urgency of urination and decrease the number of pads used per day. 5. Patient will demonstrate decreased accessory muscle activity during PFM contraction 6. Pt with demonstrate normal voluntary relaxation of the pelvic floor muscle group to improve pelvic floor ROM and decrease pain. 7. Pt will independently perform proper toilet position 8. Pt will decrease urinary leakage episodes to 1 per day 9. Pt will decrease pad usage to 1/day 10. Pt will delay voiding for 60 minutes Outcome Measure:  
Pelvic Floor Impact Questionnaire (PFIQ-7) Score (out of 300) Initial:  
Bladder/urinary: 0 Bowel/rectum: 80 Vagina/pelvis: 33 Total: 114 Most Recent:   
Interpretation of Score: This survey asks questions concerning certain bowel, bladder, or pelvic symptoms and how much these symptoms interfere with daily activities. Each section is scored on a 0-3 scale, 3 representing the greatest disability. The scores of each section (out of 100) are added together for a total score out of 300. Medical Necessity:  
· Patient demonstrates good rehab potential due to higher previous functional level. Reason for Services/Other Comments: 
· Patient continues to require skilled intervention due to urgency, frequency, leakage. Total Treatment Duration: PT Patient Time In/Time Out Time In: 0800 Time Out: 0900 Kina Avalos DPT Rehabilitation Potential For Stated Goals: Good Regarding Oksana Barrientos's therapy, I certify that the treatment plan above will be carried out by a therapist or under their direction.  
Thank you for this referral, 
 Emilie Arredondo DPT Referring Physician Signature: Armaan Leon MD             Date The information in this section was collected on 6-12-19 (except where otherwise noted). HISTORY:  
History of Present Injury/Illness (Reason for Referral): 
Patient reports urinary incontinence that started 3 years ago that was mild. In the past two months she has had 3 UTI's. The only thing that works is Prednisone that fixes them. She has been coughing a lot which doesn't help. She was on chemo for her stomach and had fluid drained from her stomach. She has been seeing Nichole for a yea ([facility]). She has been diagnosed with a prolapse. She was wearing a pad now she is in a depends. History of 3 vaginal deliveries. Sometimes she doesn't feel the urine coming out. Urinary: Frequency every half an hour x/day, 5 x/night. Positive for  mixed urinary incontinence (FRANCIA), urgency, frequency, incomplete emptying. Pt uses pads for protects; 10  pads per day (PPD). Denies stress urinary incontinence (YVONNE), urge urinary incontinence (UUI), urinary hesitancy, dysuria, hematuria. Fluid intake: 64 oz water/day; bladder irritants include: tea Bowel: Frequency once a day. Denies pain with bowel movement (BM), pushing/straining with BM, incomplete emptying, fecal incontinence, constipation. Does have a history of constipation but due to chemo drugs she is more regular. Sexual: Pt is not sexually active. Male partners. Positive for dyspareunia. Pelvic Organ Prolapse/Pelvic Pain: Location: bladder Past Medical History/Comorbidities: Ms. 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 She also has no past medical history of Aneurysm ([address] Arrhythmia, Asthma, Autoimmune disease ([address] CAD (coronary artery disease), Chronic kidney disease, Coagulation defects, COPD, Diabetes ([address] Difficult intubation, GERD (gastroesophageal reflux disease), Heart failure ([address] Liver disease, Malignant hyperthermia due to anesthesia, Nausea & vomiting, Pseudocholinesterase deficiency, Psychiatric disorder, PUD (peptic ulcer disease), Seizures ([address] Stroke ([address] or Unspecified sleep apnea. Ms. Mandy You  has a past surgical history that includes pr anesth,achilles tendon surg (Left, 02/10/2011); hx cholecystectomy (1983); hx vascular access; hx knee replacement (Left, 2013); hx knee replacement (Right, 2012); hx cataract removal (Bilateral, 2018); and hx colonoscopy (03/2017). Social History/Living Environment:  
 live with  Have you ever had any pelvic trauma (orthopedic in nature, fall, MVA, etc.)? no 
Have you ever experienced any unwanted physical or sexual contact? no 
Have you ever experienced any form of medical trauma (GYN, urological, GI, etc)? no 
 
Prior Level of Function/Work/Activity: 
Exercise- Nichole for PT Personal Factors:   
      Sex:  female Age:  61 y.o. Date: 6-11-19 Ambulatory/Rehab Services H2 Model Falls Risk Assessment Risk Factors: 
     No Risk Factors Identified Ability to Rise from Chair: 
     (0)  Ability to rise in a single movement Falls Prevention Plan: No modifications necessary Total: (5 or greater = High Risk): 0  
 ©2010 [facility] [place] [address] [place] [address] [place] Patent #[id number]. Federal Law prohibits the replication, distribution or use without written permission from [facility] [employer] [employer] Current Medications:   
  
Current Outpatient Medications:   montelukast (SINGULAIR) 10 mg tablet, Take 1 Tab by mouth daily. , Disp: 30 Tab, Rfl: 5 
  triamterene-hydroCHLOROthiazide (MAXZIDE) 75-50 mg per tablet, Take 0.5-1 Tabs by mouth daily as needed (qam prn swelling). , Disp: 60 Tab, Rfl: 3 
  levothyroxine (SYNTHROID) 175 mcg tablet, Take 1 Tab by mouth Daily (before breakfast). , Disp: 90 Tab, Rfl: 1   cetirizine (ZYRTEC) 10 mg tablet, Take 1 Tab by mouth daily. , Disp: 30 Tab, Rfl: 5 
  magnesium oxide (MAG-OX) 400 mg tablet, Take 1 Tab by mouth two (2) times a day. (Patient taking differently: Take 400 mg by mouth two (2) times a week.), Disp: 60 Tab, Rfl: 1   pregabalin (LYRICA) 100 mg capsule, Take 1 Cap by mouth three (3) times daily. Max Daily Amount: 300 mg. (Patient taking differently: Take 100 mg by mouth two (2) times a day.), Disp: 270 Cap, Rfl: 1 Date Last Reviewed:  6/12/2019 Number of Personal Factors/Comorbidities that affect the Plan of Care: 0: LOW COMPLEXITY EXAMINATION:  
 
Observation/Orthostatic Postural Assessment:   
      Redness to labia majora. Absent labia minora. Inability to wink or bulge. Increased use of abdominal with contractions. Tends to hold breath. Palpation:   
      Increased tone and tenderness to all layers of pelvic floor ROM:   
      Decreased due to tone Strength:   
      P: Power, E: Endurance, R: Repetitions, QF: Quick Flicks, TrA: Transverse Abdominus, DB: Diaphragmatic Breathing P 1 E   
R   
QF TrA   
DB Neurological Screen: Myotomes:  intact Dermatomes:  intact Body Structures Involved: 1. Muscles Body Functions Affected: 1. Sensory/Pain 2. Neuromusculoskeletal 
3. Movement Related 4. Skin Related Activities and Participation Affected: 1. General Tasks and Demands 2. Mobility 3. Self Care 4. Domestic Life 5. Interpersonal Interactions and Relationships 6. Community, Social and [place] Number of elements (examined above) that affect the Plan of Care: 3: MODERATE COMPLEXITY CLINICAL PRESENTATION:  
Presentation: Evolving clinical presentation with changing clinical characteristics: MODERATE COMPLEXITY CLINICAL DECISION MAKING:  
  
Use of outcome tool(s) and clinical judgement create a POC that gives a: Questionable prediction of patient's progress: MODERATE COMPLEXITY

## 2019-06-11 NOTE — PROGRESS NOTES
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 TREATMENT PLAN:  Effective Dates: 2019 TO 2019 (90 days). Frequency/Duration: 1 time a week for 90 Day(s) MEDICAL/REFERRING DIAGNOSIS:  Urinary incontinence, unspecified type [R32]   DATE OF ONSET: Chronic  REFERRING PHYSICIAN: Jia Wong MD MD Orders: Evaluate and Treat  Return MD Appointment: 3 months     Pre-treatment Symptoms/Complaints:  See evaluation  Pain: Initial:   0 Post Session:  0/10   Medications Last Reviewed:  2019   Updated Objective Findings:  See evaluation note from today   TREATMENT:   THERAPEUTIC EXERCISE: (10 minutes):  Exercises per grid below to improve mobility, strength and coordination. Required minimal visual, verbal and manual cues to promote proper body alignment, promote proper body posture and promote proper body mechanics. Progressed resistance, range and repetitions as indicated. Date:  19 Date:   Date:     Activity/Exercise Parameters Parameters Parameters   Patient Education Discussed HEP and POc     Bladder health reviewed     breathing x5 minutes     kegels with drop x10                          Pt gives verbal consent to internal  assessment/treatment no chaperon present.      TYMR Portal     Treatment/Session Summary:  Pt reports good understanding of plan of care, as well as prescribed home exercise program.  All questions were answered to pt's satisfaction. Pt was invited to call with any further questions or concerns. · Response to Treatment:  see evaluation. · Communication/Consultation:  None today  · Equipment provided today:  None today  · Recommendations/Intent for next treatment session: Next visit will focus on manual therapy, stretches, biofeedback, toilet mechanics, urge suppression.   Total Treatment Billable Duration:  10 minutes  PT Patient Time In/Time Out  Time In: 0800  Time Out: 0900  Martha Smith DPT    Future Appointments   Date Time Provider Trindiad Soila   6/13/2019  8:00 AM [facility]   6/19/2019  8:00 AM [facility]   6/26/2019  8:00 AM MELIA CraigPT [facility]   7/3/2019  8:00 AM MELIA CraigPT [facility]   7/15/2019  0:53 AM SFD CT 64 SLICE UNIT 1 SFDRCT SFD   7/17/2019  8:30 AM [facility] INSURANCE [facility]   7/17/2019  9:00 AM Sha Simeon MD [facility]   6/2/2020  8:00 AM HTF LAB RESOURCE [facility] HTF HT   6/5/2020  9:45 AM Rafael Moore MD [facility] HTF HTF

## 2019-06-12 ENCOUNTER — HOSPITAL ENCOUNTER (OUTPATIENT)
Dept: PHYSICAL THERAPY | Age: 59
Discharge: HOME | End: 2019-06-12
Attending: FAMILY MEDICINE
Payer: COMMERCIAL

## 2019-06-12 ENCOUNTER — APPOINTMENT (RX ONLY)
Dept: URBAN - METROPOLITAN AREA CLINIC 349 | Facility: CLINIC | Age: 59
Setting detail: DERMATOLOGY
End: 2019-06-12

## 2019-06-12 DIAGNOSIS — R32 URINARY INCONTINENCE, UNSPECIFIED TYPE: ICD-10-CM

## 2019-06-12 DIAGNOSIS — Z71.89 OTHER SPECIFIED COUNSELING: ICD-10-CM

## 2019-06-12 DIAGNOSIS — D22 MELANOCYTIC NEVI: ICD-10-CM

## 2019-06-12 DIAGNOSIS — L82.1 OTHER SEBORRHEIC KERATOSIS: ICD-10-CM

## 2019-06-12 DIAGNOSIS — L57.0 ACTINIC KERATOSIS: ICD-10-CM

## 2019-06-12 DIAGNOSIS — D18.0 HEMANGIOMA: ICD-10-CM

## 2019-06-12 PROBLEM — D22.5 MELANOCYTIC NEVI OF TRUNK: Status: ACTIVE | Noted: 2019-06-12

## 2019-06-12 PROBLEM — D18.01 HEMANGIOMA OF SKIN AND SUBCUTANEOUS TISSUE: Status: ACTIVE | Noted: 2019-06-12

## 2019-06-12 PROBLEM — C44.319 BASAL CELL CARCINOMA OF SKIN OF OTHER PARTS OF FACE: Status: ACTIVE | Noted: 2019-06-12

## 2019-06-12 PROBLEM — C44.41 BASAL CELL CARCINOMA OF SKIN OF SCALP AND NECK: Status: ACTIVE | Noted: 2019-06-12

## 2019-06-12 PROCEDURE — 17000 DESTRUCT PREMALG LESION: CPT | Mod: 59

## 2019-06-12 PROCEDURE — ? LIQUID NITROGEN

## 2019-06-12 PROCEDURE — A4550 SURGICAL TRAYS: HCPCS

## 2019-06-12 PROCEDURE — ? COUNSELING

## 2019-06-12 PROCEDURE — ? EDUCATIONAL RESOURCES PROVIDED

## 2019-06-12 PROCEDURE — 99242 OFF/OP CONSLTJ NEW/EST SF 20: CPT | Mod: 25

## 2019-06-12 PROCEDURE — ? BIOPSY BY SHAVE METHOD

## 2019-06-12 PROCEDURE — 97161 PT EVAL LOW COMPLEX 20 MIN: CPT

## 2019-06-12 PROCEDURE — 97110 THERAPEUTIC EXERCISES: CPT

## 2019-06-12 PROCEDURE — 11102 TANGNTL BX SKIN SINGLE LES: CPT

## 2019-06-12 ASSESSMENT — LOCATION DETAILED DESCRIPTION DERM
LOCATION DETAILED: RIGHT SUPERIOR MEDIAL FOREHEAD
LOCATION DETAILED: RIGHT SUPERIOR UPPER BACK
LOCATION DETAILED: INFERIOR THORACIC SPINE
LOCATION DETAILED: LEFT CENTRAL FRONTAL SCALP
LOCATION DETAILED: LEFT MEDIAL UPPER BACK

## 2019-06-12 ASSESSMENT — LOCATION ZONE DERM
LOCATION ZONE: SCALP
LOCATION ZONE: FACE
LOCATION ZONE: TRUNK

## 2019-06-12 ASSESSMENT — LOCATION SIMPLE DESCRIPTION DERM
LOCATION SIMPLE: LEFT SCALP
LOCATION SIMPLE: RIGHT UPPER BACK
LOCATION SIMPLE: UPPER BACK
LOCATION SIMPLE: LEFT UPPER BACK
LOCATION SIMPLE: RIGHT FOREHEAD

## 2019-06-12 NOTE — PROCEDURE: LIQUID NITROGEN
Consent: The patient's consent was obtained including but not limited to risks of crusting, scabbing, blistering, scarring, darker or lighter pigmentary change, recurrence, incomplete removal and infection.
Post-Care Instructions: I reviewed with the patient in detail post-care instructions. Patient is to wear sunprotection, and avoid picking at any of the treated lesions. Pt may apply Vaseline to crusted or scabbing areas.
Render Note In Bullet Format When Appropriate: No
Number Of Freeze-Thaw Cycles: 1 freeze-thaw cycle
Duration Of Freeze Thaw-Cycle (Seconds): 3
Detail Level: Detailed

## 2019-06-12 NOTE — HPI: SKIN LESIONS
How Severe Is Your Skin Lesion?: mild
Have Your Skin Lesions Been Treated?: not been treated
Is This A New Presentation, Or A Follow-Up?: Skin Lesions
Which Family Member (Optional)?: Father
Additional History: Patient states she has been battling cancer and just finished chemo in May. She states her hair fell out during treatment but is starting to come back now. Since it has started growing back she has noticed ridges in her scalp and little red bumps on her scalp. She would like them looked at today. Patient states she did wear a wig when her hair was falling out. Patient denies personal history of melanoma but states her father had melanoma. She also has a few places on her face and back that she would like looked at.

## 2019-06-12 NOTE — PROCEDURE: BIOPSY BY SHAVE METHOD
Detail Level: Detailed
Curettage Text: The wound bed was treated with curettage after the biopsy was performed.
Accession #: global
Was A Bandage Applied: Yes
Silver Nitrate Text: The wound bed was treated with silver nitrate after the biopsy was performed.
Cryotherapy Text: The wound bed was treated with cryotherapy after the biopsy was performed.
Anesthesia Type: 2% lidocaine with epinephrine
Consent: Written consent was obtained and risks were reviewed including but not limited to scarring, infection, bleeding, scabbing, incomplete removal, nerve damage and allergy to anesthesia.
Billing Type: Third-Party Bill
Bill [id number] For Specimen Handling/Conveyance To Laboratory?: no
Anesthesia Volume In Cc (Will Not Render If 0): 0.3
Biopsy Method: Dermablade
Electrodesiccation And Curettage Text: The wound bed was treated with electrodesiccation and curettage after the biopsy was performed.
Wound Care: Vaseline
Additional Anesthesia Volume In Cc (Will Not Render If 0): 0
Dressing: Band-Aid
Hemostasis: Electrocautery
Post-Care Instructions: I reviewed with the patient in detail post-care instructions. Patient is to keep the biopsy site dry overnight, and then apply bacitracin twice daily until healed. Patient may apply hydrogen peroxide soaks to remove any crusting.
Type Of Destruction Used: Curettage
Biopsy Type: H and E
Electrodesiccation Text: The wound bed was treated with electrodesiccation after the biopsy was performed.
Depth Of Biopsy: dermis
Notification Instructions: Patient will be notified of biopsy results. However, patient instructed to call the office if not contacted within 2 weeks.

## 2019-06-13 ENCOUNTER — HOSPITAL ENCOUNTER (OUTPATIENT)
Dept: PHYSICAL THERAPY | Age: 59
Discharge: HOME | End: 2019-06-13
Attending: INTERNAL MEDICINE
Payer: COMMERCIAL

## 2019-06-13 PROCEDURE — 97110 THERAPEUTIC EXERCISES: CPT

## 2019-06-13 NOTE — PROGRESS NOTES
Carlos Noel  : 1960  [facility]  Secondary:  [address] [facility]  [address], [address], [place]  [phone]   [phone]        OUTPATIENT PHYSICAL THERAPY: Daily Treatment Note 2019  Pre-treatment Symptoms/Complaints:  The patient reports she has been able to see the pelvic health specialist.  She also had a biopsy from her scalp. Pain: Initial: Pain Intensity 1: 0  Post Session:  /10   Medications Last Reviewed:  19  Updated Objective Findings:  None Today   TREATMENT:   THERAPEUTIC EXERCISE: (50 minutes):  Exercises per grid below to improve mobility and strength. Required minimal verbal cues to promote proper body breathing techniques. Progressed repetitions as indicated.   Activity  Date   19 Date   19   Date   19     Date   19   Date  19   Date  19    Date      Fatigue Score   weekly  4  2 6  5 4  5      Resting BP            Heart Rate   Before   After    88  103 80  90 77  97 87 80 80  95    O2 level Before   After    98  98   96  95 97  97 98 98 98  97    Machines  Wt/   sets/   reps       Wt/   sets/   reps    Leg Press            Leg Extension  6) x 10 reps with 5 count hold 6) x 10 reps with 5 count hold   6) x 10 reps with 5 count hold 6) x 10 reps with 5 count hold    Leg Curl            Dips/Pull Ups            Overhead Press  7) 3# x 10 reps 7) x 10 reps with 4#   7) x 10 reps with 4# 5) x 10 reps with 4 #    Compound Row            Low Back Ext            Cardiovascular  Time   Intensity, etc  Time   Intensity, etc  Time   Intensity, etc  Time   Intensity, etc  Time   Intensity, etc  Time   Intensity, etc  Time   Intensity, etc    Upper body ergometer  2)Level 1 x 6 min 2) level 1 x 6 min 2) level 1 x 6 min 2) level 1 x 6 min 2) level 1 x 6 min 2) level 1 x 6 min    Airdyne bikes            Recumbent Bike            walking 1) 650'  3) 650' x 1  5) 650' x 1 1) 650' x 1  3) 650' x 1  5) 650' x 1 1) 650' x 1  3) 650' x 1 1) 650' x 1  3) 650' x 1  5) 650' x 1 1) 650' x 1  3) 650' x 1  5) 650' x 1 1) 650' x 1  3) 1300' x 1    Nustep  4)Level 2 x 12 min  SPM 83  Mets 2.3 4) level 2 x 12 min  Spm  mets 4) level 2 x 12 min 4) level 2 x 12 min  Spm 87  Mets 2.3 4) level 2 x 15 min 4) level 2 x 15 min    Elliptical            Other Exercises/   Activities  Parameters  Parameters  Parameters  Parameters  Parameters  Parameters  Parameters    Forward flexion, biceps curls, abduction 7) x 10 reps with 4# 7) x 10 reps with 4#   7) x 10 reps with 4# 5) x 10 with 4 #    Sit to stand                       [employer] Portal  Treatment/Session Summary:    · Response to Treatment:  tolerated the treatment well.  able to fully participate. prednisone ahs reduced edema. cough less bothersome with new medication. · Communication/Consultation:  None today  · Equipment provided today:  None today  · Recommendations/Intent for next treatment session: Next visit will focus on advance therapeutic exercises. The patient reports her physician would like for her to continue strengthening.   Treatment Plan of Care Effective Dates:  4/25/19 to 7/24/19  Total Treatment Billable Duration:  50 min  PT Patient Time In/Time Out  Time In: 0800  Time Out: 0850  Anival Khan PT    Future Appointments   Date Time Provider Trinidad Cm   6/18/2019  8:00 AM Nichole Samuels, PT [facility]   6/19/2019  8:00 AM MELIA Bishop [facility]   6/20/2019  8:00 AM Jair DUNCAN, PT [facility]   6/25/2019  8:00 AM Nichole Samuels, PT [facility]   6/26/2019  8:00 AM MELIA Bishop [facility]   6/27/2019  8:00 AM Jair DUNCAN, PT [facility]   7/1/2019  8:00 AM Jair DUNCAN, PT [facility]   7/3/2019  8:00 AM Cheri Barrett DPT [facility]   7/15/2019  7:22 AM SFDA[address] CT 64 SLICE UNIT 1 SFDRCT D   7/17/2019  8:30 AM [address] [facility] INSURANCE GCCOIG G[facility]   7/17/2019  9:00 AM Julieta Hassan MD [facility]   6/2/2020  8:00 AM HTF LAB RESOURCE [facility] HTF HTF   6/5/2020  9:45 AM Ronald Mccall MD [facility] HTF HTF

## 2019-06-18 ENCOUNTER — HOSPITAL ENCOUNTER (OUTPATIENT)
Dept: PHYSICAL THERAPY | Age: 59
Discharge: HOME | End: 2019-06-18
Attending: INTERNAL MEDICINE
Payer: COMMERCIAL

## 2019-06-18 PROCEDURE — 97110 THERAPEUTIC EXERCISES: CPT

## 2019-06-18 NOTE — PROGRESS NOTES
Clara Mitchell  : 1960  Primary: [address] [facility]  Secondary:  [address] at [facility]  [address], [address], [place]  [phone]   [phone]        OUTPATIENT PHYSICAL THERAPY: Daily Treatment Note 2019  Pre-treatment Symptoms/Complaints:  The patient reports she started a new diet today. Pain: Initial: Pain Intensity 1: 3  Post Session:  /10   Medications Last Reviewed:  19  Updated Objective Findings:  None Today   TREATMENT:   THERAPEUTIC EXERCISE: (41 minutes):  Exercises per grid below to improve mobility and strength. Required minimal verbal cues to promote proper body breathing techniques. Progressed repetitions as indicated.   Activity  Date   19 Date   19   Date   19     Date   19   Date  19   Date  19    Date   19     Fatigue Score   weekly  4  2 6  5 4  5   1   Resting BP            Heart Rate   Before   After    88  103 80  90 77  97 87 80 80  95 87  97   O2 level Before   After    98  98   96  95 97  97 98 98 98  97 97  97   Machines  Wt/   sets/   reps       Wt/   sets/   reps    Leg Press            Leg Extension  6) x 10 reps with 5 count hold 6) x 10 reps with 5 count hold   6) x 10 reps with 5 count hold 6) x 10 reps with 5 count hold    Leg Curl            Dips/Pull Ups            Overhead Press  7) 3# x 10 reps 7) x 10 reps with 4#   7) x 10 reps with 4# 5) x 10 reps with 4 # 5) x 10 with 4#   Compound Row            Low Back Ext            Cardiovascular  Time   Intensity, etc  Time   Intensity, etc  Time   Intensity, etc  Time   Intensity, etc  Time   Intensity, etc  Time   Intensity, etc  Time   Intensity, etc    Upper body ergometer  2)Level 1 x 6 min 2) level 1 x 6 min 2) level 1 x 6 min 2) level 1 x 6 min 2) level 1 x 6 min 2) level 1 x 6 min 2) level 1 x 6 min   Airdyne bikes            Recumbent Bike            walking 1) 650'  3) 650' x 1  5) 650' x 1 1) 650' x 1  3) 650' x 1  5) 650' x 1 1) 650' x 1  3) 650' x 1 1) 650' x 1  3) 650' x 1  5) 650' x 1 1) 650' x 1  3) 650' x 1  5) 650' x 1 1) 650' x 1  3) 1300' x 1 1) 650' x 1  3) 1300' x 1   Nustep  4)Level 2 x 12 min  SPM 83  Mets 2.3 4) level 2 x 12 min  Spm  mets 4) level 2 x 12 min 4) level 2 x 12 min  Spm 87  Mets 2.3 4) level 2 x 15 min 4) level 2 x 15 min 4) level 2 x 15 min   Elliptical            Other Exercises/   Activities  Parameters  Parameters  Parameters  Parameters  Parameters  Parameters  Parameters    Forward flexion, biceps curls, abduction 7) x 10 reps with 4# 7) x 10 reps with 4#   7) x 10 reps with 4# 5) x 10 with 4 # 5) x 10 reps with 4#   Sit to stand                       [facility]  Treatment/Session Summary:    · Response to Treatment:  tolerated the treatment well.  able to participate. continues to feel winded with minimal activity.  gaining in overall strength. · Communication/Consultation:  None today  · Equipment provided today:  None today  · Recommendations/Intent for next treatment session: Next visit will focus on advance therapeutic exercises. The patient reports her physician would like for her to continue strengthening.   Treatment Plan of Care Effective Dates:  4/25/19 to 7/24/19  Total Treatment Billable Duration:  41 min  PT Patient Time In/Time Out  Time In: 0804  Time Out: 0845  Laura Quintero PT    Future Appointments   Date Time Provider Trinidad Cm   6/19/2019  8:00 AM Zee Johnson DPT [facility]   6/20/2019  8:00 AM Mono DUNCAN, PT [facility]   6/25/2019  8:00 AM Nichole Samuels, PT [facility]   6/26/2019  8:00 AM MELIA Dumont [facility]   6/27/2019  8:00 AM Mono DUNCAN, PT [facility]   7/1/2019  8:00 AM Mono DUNCAN, PT [facility]   7/3/2019  8:00 AM Zee Johnson DPT [facility]   7/15/2019  6:98 AM SFD CT 64 SLICE UNIT 1 SFDRCT D   7/17/2019  8:30 AM [facility] OUTREACH INSURANCE GCCOIG [facility]   7/17/2019  9:00 AM Bea Messer MD [facility]   6/2/2020  8:00 AM HTF LAB RESOURCE [facility] HTF HTF   6/5/2020  9:45 AM Jesenia Kramer MD [facility] HTF HTF

## 2019-06-19 ENCOUNTER — HOSPITAL ENCOUNTER (OUTPATIENT)
Dept: PHYSICAL THERAPY | Age: 59
Discharge: HOME | End: 2019-06-19
Attending: FAMILY MEDICINE
Payer: COMMERCIAL

## 2019-06-19 PROCEDURE — 97140 MANUAL THERAPY 1/> REGIONS: CPT

## 2019-06-19 PROCEDURE — 97110 THERAPEUTIC EXERCISES: CPT

## 2019-06-19 PROCEDURE — 97530 THERAPEUTIC ACTIVITIES: CPT

## 2019-06-19 NOTE — PROGRESS NOTES
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 TREATMENT PLAN:  Effective Dates: 2019 TO 2019 (90 days). Frequency/Duration: 1 time a week for 90 Day(s) MEDICAL/REFERRING DIAGNOSIS:  Urinary incontinence, unspecified type [R32]   DATE OF ONSET: Chronic  REFERRING PHYSICIAN: Hernando Saini MD MD Orders: Evaluate and Treat  Return MD Appointment: 3 months     Pre-treatment Symptoms/Complaints:  Patient reports she cut out pineapple and some other stuff on the list and has noticed improvements. She just stared a new diet today called [facility]. She has better luck sitting in a chair doing the exercises. She has been getting up less at night for the bathroom. Pain: Initial:   0 Post Session:  0/10   Medications Last Reviewed:  2019   Updated Objective Findings:  see below   Observation/Orthostatic Postural Assessment:          Redness to labia majora. Absent labia minora. Inability to wink or bulge. Increased use of abdominal with contractions. Tends to hold breath. Palpation:          Increased tone and tenderness to all layers of pelvic floor  ROM:          Decreased due to tone  Strength:          P: Power, E: Endurance, R: Repetitions, QF: Quick Flicks, TrA: Transverse Abdominus, DB: Diaphragmatic Breathing  P 1   E     R     QF     TrA     DB         Urinary: Frequency every half an hour x/day, 5 x/night.  Positive for mixed urinary incontinence (FRANCIA), urgency, frequency, incomplete emptying. Pt uses pads for protects; 10  pads per day (PPD). Denies stress urinary incontinence (YVONNE), urge urinary incontinence (UUI), urinary hesitancy, dysuria, hematuria. Fluid intake: 64 oz water/day; bladder irritants include: tea   Bowel: Frequency once a day. Denies pain with bowel movement (BM), pushing/straining with BM, incomplete emptying, fecal incontinence, constipation. Does have a history of constipation but due to chemo drugs she is more regular. Sexual: Pt is not sexually active. Male partners. Positive for dyspareunia. Pelvic Organ Prolapse/Pelvic Pain: Location: bladder   TREATMENT:   THERAPEUTIC EXERCISE: (15 minutes):  Exercises per grid below to improve mobility, strength and coordination. Required minimal visual, verbal and manual cues to promote proper body alignment, promote proper body posture and promote proper body mechanics. Progressed resistance, range and repetitions as indicated. Date:  6-12-19 Date:  6-19-19 Date:     Activity/Exercise Parameters Parameters Parameters   Patient Education Discussed HEP and POc     Bladder health reviewed       breathing x5 minutes       kegels with drop x10 2 x10 with digit feedback and stretch      Butterfly stretch  30 sec hold x 3      SKTC  30 sec hold x 3      Figure 4 modified  30 sec hold x 3       Pt gives verbal consent to internal  assessment/treatment no chaperon present.      MANUAL THERAPY: (30 minutes) to improve soft tissue tone and mobility  Date Type Location Comments   6/19/2019 Internal assessment/treatment Via vaginal canal SP, CTM                                       (Used abbreviations: MET - muscle energy technique; SCS- Strain counter strain; CTM-Connective tissue mobilizations; CR- Contract/relax; SP- Sustained pressure, TrP-Trigger point release, IASTM- Instrument assisted soft tissue mobilizations, TDN-Trigger point dry needling) THERAPEUTIC ACTIVITY: (15 minutes) to improve toilet  -discussed, reviewed, performed proper toilet position  -discussed, reviewed urge suppression    [employer] Portal     Treatment/Session Summary:  Pt reports good understanding of plan of care, as well as prescribed home exercise program.  All questions were answered to pt's satisfaction. Pt was invited to call with any further questions or concerns. · Response to Treatment:  Patient with less tightness in pelvic floor today. Still has difficulty with contraction in supine. Updated HEP for stretches. Next time biofeedback  · Communication/Consultation:  None today  · Equipment provided today:  None today  · Recommendations/Intent for next treatment session: Next visit will focus on manual therapy, stretches, biofeedback.   Total Treatment Billable Duration:  10 minutes there ex, 15 minutes there act, 30 minutes manual  PT Patient Time In/Time Out  Time In: 0800  Time Out: 0900  [facility] Morning, DPT    Future Appointments   Date Time Provider Trinidad Soila   6/20/2019  8:00 AM Nichole Samuels, PT [facility]   6/25/2019  8:00 AM Nichole Samuels, PT [facility]   6/26/2019  8:00 AM LAITH HuddlestonT [facility]   6/27/2019  8:00 AM Clarence DUNCAN, PT [facility]   7/1/2019  8:00 AM Clarence DUNCAN, PT [facility]   7/3/2019  8:00 AM Leny Martel DPT [facility]   7/15/2019  9:81 AM SFD CT 64 SLICE UNIT 1 SFDRCT D   7/17/2019  8:30 AM GCC OUTREACH INSURANCE [facility] [address] [facility]   7/17/2019  9:00 AM Jessica Chamorro MD SSA [facility]   6/2/2020  8:00 AM HTF LAB RESOURCE [facility] HTF HTF   6/5/2020  9:45 AM MD KRISSY Navarro HTF HTF

## 2019-06-20 ENCOUNTER — HOSPITAL ENCOUNTER (OUTPATIENT)
Dept: PHYSICAL THERAPY | Age: 59
Discharge: HOME | End: 2019-06-20
Attending: INTERNAL MEDICINE
Payer: COMMERCIAL

## 2019-06-20 PROCEDURE — 97110 THERAPEUTIC EXERCISES: CPT

## 2019-06-20 NOTE — PROGRESS NOTES
Bertha Worthington  : 1960  Primary: [address]  Secondary:  [address]  at [facility]  [address], [address], [place] [address]  [phone]   [phone]        OUTPATIENT PHYSICAL THERAPY: Daily Treatment Note 2019  Pre-treatment Symptoms/Complaints:  The patient reports she started a new diet today. Pain: Initial: Pain Intensity 1: 2  Post Session: 2 /10   Medications Last Reviewed:  19  Updated Objective Findings:  None Today   TREATMENT:   THERAPEUTIC EXERCISE: (39 minutes):  Exercises per grid below to improve mobility and strength. Required minimal verbal cues to promote proper body breathing techniques. Progressed repetitions as indicated. Activity  Date   19   Date   Date        Date      Date     Date      Date        Fatigue Score   weekly  2         Resting BP           Heart Rate   Before   After  98  102         O2 level Before   After  97  98         Machines        Wt/   sets/   reps    Leg Press           Leg Extension           Leg Curl           Dips/Pull Ups           Overhead Press  5) x 10 reps with 4#         Compound Row            Low Back Ext            Cardiovascular  Time   Intensity, etc  Time   Intensity, etc  Time   Intensity, etc  Time   Intensity, etc  Time   Intensity, etc  Time   Intensity, etc  Time   Intensity, etc    Upper body ergometer  2) level 1 x 6min         Airdyne bikes           Recumbent Bike           walking 1) 650' x 1  3) 1300' x 1         Nustep  4) level 2 x 15 min         Elliptical           Other Exercises/   Activities        Parameters    Forward flexion, biceps curls, abduction 5) x 10 reps with 4#         Sit to stand                       [employer] Portal  Treatment/Session Summary:    · Response to Treatment:  tolerated the treatment well.  able to participate. continues to feel winded with minimal activity.  gaining in overall strength.    · Communication/Consultation: None today  · Equipment provided today:  None today  · Recommendations/Intent for next treatment session: Next visit will focus on advance therapeutic exercises. The patient reports her physician would like for her to continue strengthening.   Treatment Plan of Care Effective Dates:  4/25/19 to 7/24/19  Total Treatment Billable Duration:  39 min  PT Patient Time In/Time Out  Time In: 0804  Time Out: [address], PT    Future Appointments   Date Time Provider Trinidad Soila   6/25/2019  8:00 AM [facility]   6/26/2019  8:00 AM Jeyson Espinoza DPT [facility]   6/27/2019  8:00 AM Shyla DUNCAN, PT [facility]   7/1/2019  8:00 AM Shyla [facility]UNCAN, [facility]   7/3/2019  8:00 AM [facility]   7/15/2019  6:61 AM SFD CT 64 SLICE UNIT 1 SFDRCT SFD   7/17/2019  8:30 AM GCC OUTREACH INSURANCE GCCOIG [facility]   7/17/2019  9:00 AM Ej Izquierdo MD [facility]   6/2/2020  8:00 AM HTF LAB RESOURCE [facility] HTF HTF   6/5/2020  9:45 AM Remi Pa MD [facility] HTF HTF

## 2019-06-25 ENCOUNTER — HOSPITAL ENCOUNTER (OUTPATIENT)
Dept: PHYSICAL THERAPY | Age: 59
Discharge: HOME | End: 2019-06-25
Attending: INTERNAL MEDICINE
Payer: COMMERCIAL

## 2019-06-25 ENCOUNTER — APPOINTMENT (RX ONLY)
Dept: URBAN - METROPOLITAN AREA CLINIC 349 | Facility: CLINIC | Age: 59
Setting detail: DERMATOLOGY
End: 2019-06-25

## 2019-06-25 DIAGNOSIS — L82.0 INFLAMED SEBORRHEIC KERATOSIS: ICD-10-CM

## 2019-06-25 DIAGNOSIS — L57.0 ACTINIC KERATOSIS: ICD-10-CM

## 2019-06-25 PROBLEM — D48.5 NEOPLASM OF UNCERTAIN BEHAVIOR OF SKIN: Status: ACTIVE | Noted: 2019-06-25

## 2019-06-25 PROBLEM — C44.41 BASAL CELL CARCINOMA OF SKIN OF SCALP AND NECK: Status: ACTIVE | Noted: 2019-06-25

## 2019-06-25 PROCEDURE — 17271 DSTR MAL LES S/N/H/F/G 0.6-1: CPT

## 2019-06-25 PROCEDURE — ? OBSERVATION

## 2019-06-25 PROCEDURE — ? LIQUID NITROGEN

## 2019-06-25 PROCEDURE — ? COUNSELING

## 2019-06-25 PROCEDURE — 17000 DESTRUCT PREMALG LESION: CPT | Mod: 59

## 2019-06-25 PROCEDURE — 17003 DESTRUCT PREMALG LES 2-14: CPT | Mod: 59

## 2019-06-25 PROCEDURE — A4550 SURGICAL TRAYS: HCPCS

## 2019-06-25 PROCEDURE — 97110 THERAPEUTIC EXERCISES: CPT

## 2019-06-25 PROCEDURE — ? CURETTAGE AND DESTRUCTION

## 2019-06-25 PROCEDURE — 17110 DESTRUCTION B9 LES UP TO 14: CPT | Mod: 59

## 2019-06-25 ASSESSMENT — LOCATION SIMPLE DESCRIPTION DERM
LOCATION SIMPLE: LEFT FOREHEAD
LOCATION SIMPLE: RIGHT CHEEK
LOCATION SIMPLE: RIGHT FOREHEAD

## 2019-06-25 ASSESSMENT — LOCATION DETAILED DESCRIPTION DERM
LOCATION DETAILED: LEFT MEDIAL FOREHEAD
LOCATION DETAILED: RIGHT SUPERIOR LATERAL BUCCAL CHEEK
LOCATION DETAILED: RIGHT SUPERIOR LATERAL MALAR CHEEK
LOCATION DETAILED: RIGHT SUPERIOR MEDIAL FOREHEAD

## 2019-06-25 ASSESSMENT — LOCATION ZONE DERM: LOCATION ZONE: FACE

## 2019-06-25 NOTE — PROCEDURE: CURETTAGE AND DESTRUCTION
Biopsy Photograph Reviewed: Yes
Size Of Lesion In Cm: 1
Additional Information: (Optional): The wound was cleaned, and a pressure dressing was applied.  The patient received detailed post-op instructions.
Add Ability To Document Additional Intralesional Injection: No
What Was Performed First?: Curettage
Bill As A Line Item Or As Units: Line Item
Anesthesia Type: 2% lidocaine with epinephrine
Detail Level: Detailed
Cautery Type: electrodesiccation
Consent was obtained from the patient. The risks, benefits and alternatives to therapy were discussed in detail. Specifically, the risks of infection, scarring, bleeding, prolonged wound healing, nerve injury, incomplete removal, allergy to anesthesia and recurrence were addressed. Alternatives to ED&C, such as: surgical removal and XRT were also discussed.  Prior to the procedure, the treatment site was clearly identified and confirmed by the patient. All components of Universal Protocol/PAUSE Rule completed.
Post-Care Instructions: I reviewed with the patient in detail post-care instructions. Patient is to keep the area dry for 48 hours, and not to engage in any swimming until the area is healed. Should the patient develop any fevers, chills, bleeding, severe pain patient will contact the office immediately.
Number Of Curettages: 3
Anesthesia Volume In Cc: 0.5

## 2019-06-25 NOTE — PROCEDURE: LIQUID NITROGEN
Add 52 Modifier (Optional): no
Detail Level: Detailed
Duration Of Freeze Thaw-Cycle (Seconds): 3
Medical Necessity Information: It is in your best interest to select a reason for this procedure from the list below. All of these items fulfill various CMS LCD requirements except the new and changing color options.
Consent: The patient's consent was obtained including but not limited to risks of crusting, scabbing, blistering, scarring, darker or lighter pigmentary change, recurrence, incomplete removal and infection.
Post-Care Instructions: I reviewed with the patient in detail post-care instructions. Patient is to wear sunprotection, and avoid picking at any of the treated lesions. Pt may apply Vaseline to crusted or scabbing areas.
Number Of Freeze-Thaw Cycles: 1 freeze-thaw cycle
Medical Necessity Clause: This procedure was medically necessary because the lesions that were treated were:

## 2019-06-25 NOTE — PROGRESS NOTES
Sam Faust  : 1960  [facility]  Secondary:  [address] at [facility]  [address], [address], [place] [address]  [phone]   [phone]        OUTPATIENT PHYSICAL THERAPY: Daily Treatment Note 2019  Pre-treatment Symptoms/Complaints:  The patient reports she has lost 11 pounds. Pain: Initial: Pain Intensity 1: 3  Post Session: 3 /10   Medications Last Reviewed:  19  Updated Objective Findings:  None Today   TREATMENT:   THERAPEUTIC EXERCISE: (41 minutes):  Exercises per grid below to improve mobility and strength. Required minimal verbal cues to promote proper body breathing techniques. Progressed repetitions as indicated. Activity  Date   19   Date  19   Date        Date      Date     Date      Date        Fatigue Score   weekly  2 2        Resting BP           Heart Rate   Before   After  98  102 87        O2 level Before   After  97  98 95        Machines        Wt/   sets/   reps    Leg Press           Leg Extension           Leg Curl           Dips/Pull Ups           Overhead Press  5) x 10 reps with 4# 5) x 10 with 4#        Compound Row            Low Back Ext            Cardiovascular  Time   Intensity, etc  Time   Intensity, etc  Time   Intensity, etc  Time   Intensity, etc  Time   Intensity, etc  Time   Intensity, etc  Time   Intensity, etc    Upper body ergometer  2) level 1 x 6min 2) level 1 x 6 min        Airdyne bikes           Recumbent Bike           walking 1) 650' x 1  3) 1300' x 1 1) 650' x 1  3) 1300' x 1        Nustep  4) level 2 x 15 min 4) level 2 x 15 min        Elliptical           Other Exercises/   Activities        Parameters    Forward flexion, biceps curls, abduction 5) x 10 reps with 4# 5) x 10 with 4$        Sit to stand                       [employer] Portal  Treatment/Session Summary:    · Response to Treatment:  tolerated the treatment well.  able to participate.   continues to feel winded with minimal activity.  gaining in overall strength. · Communication/Consultation:  None today  · Equipment provided today:  None today  · Recommendations/Intent for next treatment session: Next visit will focus on advance therapeutic exercises. The patient reports her physician would like for her to continue strengthening.   Treatment Plan of Care Effective Dates:  4/25/19 to 7/24/19  Total Treatment Billable Duration:  41 min  PT Patient Time In/Time Out  Time In: 0803  Time Out: 8218  Clarissa Addison, PT    Future Appointments   Date Time Provider Trinidad Cm   6/26/2019  8:00 AM [facility]   6/27/2019  8:00 AM Lillie DUNCAN, PT [facility]   7/1/2019  8:00 AM Lillie DUNCAN, PT [facility]   7/3/2019  8:00 AM Keisha Arzola DPT [facility]   7/15/2019  4:96 AM SFD CT 64 SLICE UNIT 1 SFDRCT D   7/17/2019  8:30 AM [facility]   7/17/2019  9:00 AM Lee Roldan MD [facility]   6/2/2020  8:00 AM HTF LAB RESOURCE [facility] HTF HTF   6/5/2020  9:45 AM Jeremy Randolph MD [facility] HTF HTF

## 2019-06-27 ENCOUNTER — HOSPITAL ENCOUNTER (OUTPATIENT)
Dept: PHYSICAL THERAPY | Age: 59
Discharge: HOME | End: 2019-06-27
Attending: INTERNAL MEDICINE
Payer: COMMERCIAL

## 2019-06-27 PROCEDURE — 97110 THERAPEUTIC EXERCISES: CPT

## 2019-06-27 NOTE — PROGRESS NOTES
Pat Jackson  : 1960  Primary: [address]  Secondary:  [facility]  [address], [place] [address]  [phone]   [phone]        OUTPATIENT PHYSICAL THERAPY: Daily Treatment Note 2019  Pre-treatment Symptoms/Complaints:  The patient reports she had a procedure with the dermatologist day before yesterday and doesn't feel as well today  Pain: Initial: Pain Intensity 1: 3  Post Session: 3 /10   Medications Last Reviewed:  19  Updated Objective Findings:  None Today   TREATMENT:   THERAPEUTIC EXERCISE: (44 minutes):  Exercises per grid below to improve mobility and strength. Required minimal verbal cues to promote proper body breathing techniques. Progressed repetitions as indicated.   Activity  Date   19   Date  19   Date   19     Date      Date     Date      Date        Fatigue Score   weekly  2 2 1       Resting BP           Heart Rate   Before   After  98  102 87 73  84       O2 level Before   After  97  98 95 96  96       Machines        Wt/   sets/   reps    Leg Press           Leg Extension           Leg Curl           Dips/Pull Ups           Overhead Press  5) x 10 reps with 4# 5) x 10 with 4# 5) x 10 reps with 4#       Compound Row            Low Back Ext            Cardiovascular  Time   Intensity, etc  Time   Intensity, etc  Time   Intensity, etc  Time   Intensity, etc  Time   Intensity, etc  Time   Intensity, etc  Time   Intensity, etc    Upper body ergometer  2) level 1 x 6min 2) level 1 x 6 min 2) level 1 x 6 min       Airdyne bikes           Recumbent Bike           walking 1) 650' x 1  3) 1300' x 1 1) 650' x 1  3) 1300' x 1 1) 650' x 1  3) 1300' x 1       Nustep  4) level 2 x 15 min 4) level 2 x 15 min 4) level 2 x 15       Elliptical           Other Exercises/   Activities        Parameters    Forward flexion, biceps curls, abduction 5) x 10 reps with 4# 5) x 10 with 4# 5) x 10 with 4#       Sit to stand                       [employer] Portal  Treatment/Session Summary:    · Response to Treatment:  tolerated the treatment well.  able to participate. continues to feel winded with minimal activity.  gaining in overall strength. · Communication/Consultation:  None today  · Equipment provided today:  None today  · Recommendations/Intent for next treatment session: Next visit will focus on advance therapeutic exercises. The patient reports her physician would like for her to continue strengthening.   Treatment Plan of Care Effective Dates:  4/25/19 to 7/24/19  Total Treatment Billable Duration:  44 min  PT Patient Time In/Time Out  Time In: 0804  Time Out: 0848  Tami Mcgraw PT    Future Appointments   Date Time Provider Trinidad Cm   7/1/2019  8:00 AM Nichole Samuels, PT [facility]   7/3/2019  8:00 [facility]   7/9/2019  8:45 AM May Nichole Alex, PT [facility]   7/11/2019  8:00 AM Colonel Rhonda DUNCAN, PT [facility]   7/15/2019  2:15 AM SFD CT 64 SLICE UNIT 1 SFDRCT SFD   7/17/2019  8:30 AM [facility]   7/17/2019  9:00 AM Nenita Donald MD [facility]   7/18/2019  8:00 AM Nichole Samuels, PT [facility]   7/22/2019  8:00 AM Colonel Rhonda DUNCAN, PT [facility]   7/25/2019  8:45 AM Colonel Rhonda DUNCAN, PT [facility]   6/2/2020  8:00 AM HTF LAB RESOURCE [facility]F HTF   6/5/2020  9:45 AM Irena Duran MD [facility] HTF HTF

## 2019-07-01 ENCOUNTER — HOSPITAL ENCOUNTER (OUTPATIENT)
Dept: PHYSICAL THERAPY | Age: 59
Discharge: HOME | End: 2019-07-01
Attending: INTERNAL MEDICINE
Payer: COMMERCIAL

## 2019-07-01 PROCEDURE — 97110 THERAPEUTIC EXERCISES: CPT

## 2019-07-01 NOTE — PROGRESS NOTES
Maria T Reid  : 1960  Primary: [address] [facility]  Secondary:  [address] at [facility]  [address], [address], [place] [address]  [phone]   [phone]        OUTPATIENT PHYSICAL THERAPY: Daily Treatment Note 2019   ICD-10: Treatment Diagnosis: muscle weakness generalized M 62.81  Precautions/Allergies:   Patient has no known allergies. Fall Risk Score: 0 (? 5 = High Risk)  MD Orders: oncology rehab     Pre-treatment Symptoms/Complaints:  The patient reports she is doing well on her diet. Pain: Initial: Pain Intensity 1: 2  Post Session: 2 /10   Medications Last Reviewed:  19  Updated Objective Findings:  None Today   TREATMENT:   THERAPEUTIC EXERCISE: (39 minutes):  Exercises per grid below to improve mobility and strength. Required minimal verbal cues to promote proper body breathing techniques. Progressed repetitions as indicated.   Activity  Date   19   Date  19   Date   19     Date  19      Date     Date      Date        Fatigue Score   weekly  2 2 1 4      Resting BP           Heart Rate   Before   After  98  102 87 73  84 88  94      O2 level Before   After  97  98 95 96  96 96  94      Machines        Wt/   sets/   reps    Leg Press           Leg Extension           Leg Curl           Dips/Pull Ups           Overhead Press  5) x 10 reps with 4# 5) x 10 with 4# 5) x 10 reps with 4#       Compound Row            Low Back Ext            Cardiovascular  Time   Intensity, etc  Time   Intensity, etc  Time   Intensity, etc  Time   Intensity, etc  Time   Intensity, etc  Time   Intensity, etc  Time   Intensity, etc    Upper body ergometer  2) level 1 x 6min 2) level 1 x 6 min 2) level 1 x 6 min 2) level 1 x 6 min      Airdyne bikes           Recumbent Bike           walking 1) 650' x 1  3) 1300' x 1 1) 650' x 1  3) 1300' x 1 1) 650' x 1  3) 1300' x 1 1) 650' x 1  3) 1300' x 1      Nustep  4) level 2 x 15 min 4) level 2 x 15 min 4) level 2 x 15 4) level 3 x 15 min      Elliptical           Other Exercises/   Activities        Parameters    Forward flexion, biceps curls, abduction 5) x 10 reps with 4# 5) x 10 with 4# 5) x 10 with 4#       Sit to stand                       [facility]  Treatment/Session Summary:    · Response to Treatment:  tolerated the treatment well.  able to participate. continues to feel winded with minimal activity.  gaining in overall strength. · Communication/Consultation:  None today  · Equipment provided today:  None today  · Recommendations/Intent for next treatment session: Next visit will focus on advance therapeutic exercises. The patient reports her physician would like for her to continue strengthening.   Treatment Plan of Care Effective Dates:  4/25/19 to 7/24/19  Total Treatment Billable Duration:  39 min  PT Patient Time In/Time Out  Time In: 0805  Time Out: 0573  Philomena Art, PT    Future Appointments   Date Time Provider Trinidad Cm   7/3/2019  8:00 AM [facility]   7/9/2019  8:45 AM Nichole Buchanan, PT [facility]   7/11/2019  8:00 AM Verdona Johnston D, PT [facility]   7/15/2019  1:34 AM SFD CT 64 SLICE UNIT 1 SFDRCT SFD   7/17/2019  8:30 AM [facility] OUTREACH INSURANCE [facility] [address] [place] [address]   7/17/2019  9:00 AM Juan Manuel Mcnamara MD [facility]   7/18/2019  8:00 AM Nichole Samuels, PT [facility]   7/22/2019  8:00 AM Verlena Labor D, PT [facility]   7/25/2019  8:45 AM Verlena Labor D, PT [facility]   6/2/2020  8:00 AM HTF LAB RESOURCE [facility] HTF HTF   6/5/2020  9:45 AM Shira Curry MD [facility] HTF HTF

## 2019-07-02 ENCOUNTER — HOSPITAL ENCOUNTER (OUTPATIENT)
Dept: PHYSICAL THERAPY | Age: 59
Discharge: HOME | End: 2019-07-02
Attending: INTERNAL MEDICINE
Payer: COMMERCIAL

## 2019-07-02 PROCEDURE — 97110 THERAPEUTIC EXERCISES: CPT

## 2019-07-02 NOTE — PROGRESS NOTES
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 TREATMENT PLAN:  Effective Dates: 2019 TO 2019 (90 days). Frequency/Duration: 1 time a week for 90 Day(s) MEDICAL/REFERRING DIAGNOSIS:  Urinary incontinence, unspecified type [R32]   DATE OF ONSET: Chronic  REFERRING PHYSICIAN: Jazmin Rodriguez MD MD Orders: Evaluate and Treat  Return MD Appointment: 3 months     Pre-treatment Symptoms/Complaints:  Patient reports she is a lot better. She can go days without leakage. Some days aren't always that good. Pain: Initial:   0 Post Session:  0/10   Medications Last Reviewed:  7/3/2019   Updated Objective Findings:  see below   Observation/Orthostatic Postural Assessment:          Redness to labia majora. Absent labia minora. Inability to wink or bulge. Increased use of abdominal with contractions. Tends to hold breath. Palpation:          Increased tone and tenderness to all layers of pelvic floor  ROM:          Decreased due to tone  Strength:          P: Power, E: Endurance, R: Repetitions, QF: Quick Flicks, TrA: Transverse Abdominus, DB: Diaphragmatic Breathing  P 1   E     R     QF     TrA     DB         Urinary: Frequency every half an hour x/day, 5 x/night. Positive for  mixed urinary incontinence (FRANCIA), urgency, frequency, incomplete emptying. Pt uses pads for protects; 10  pads per day (PPD).  Denies stress urinary incontinence (YVONNE), urge urinary incontinence (UUI), urinary hesitancy, dysuria, hematuria. Fluid intake: 64 oz water/day; bladder irritants include: tea   Bowel: Frequency once a day. Denies pain with bowel movement (BM), pushing/straining with BM, incomplete emptying, fecal incontinence, constipation. Does have a history of constipation but due to chemo drugs she is more regular. Sexual: Pt is not sexually active. Male partners. Positive for dyspareunia. Pelvic Organ Prolapse/Pelvic Pain: Location: bladder   TREATMENT:   THERAPEUTIC EXERCISE: (15 minutes):  Exercises per grid below to improve mobility, strength and coordination. Required minimal visual, verbal and manual cues to promote proper body alignment, promote proper body posture and promote proper body mechanics. Progressed resistance, range and repetitions as indicated. Date:  6-12-19 Date:  6-19-19 Date:  7-2-19     Activity/Exercise Parameters Parameters Parameters   Patient Education Discussed HEP and POc     Bladder health reviewed       breathing x5 minutes       kegels with drop x10 2 x10 with digit feedback and stretch      Butterfly stretch  30 sec hold x 3   30 sec hold x 3   SKTC  30 sec hold x 3   30 sec hold x 3   Figure 4 modified  30 sec hold x 3   30 sec hold x 3    Pt gives verbal consent to internal  assessment/treatment no chaperon present.      MANUAL THERAPY: (0 minutes) to improve soft tissue tone and mobility  Date Type Location Comments   7/3/2019 Internal assessment/treatment Via vaginal canal SP, CTM                                       (Used abbreviations: MET - muscle energy technique; SCS- Strain counter strain; CTM-Connective tissue mobilizations; CR- Contract/relax; SP- Sustained pressure, TrP-Trigger point release, IASTM- Instrument assisted soft tissue mobilizations, TDN-Trigger point dry needling)     THERAPEUTIC ACTIVITY: (0 minutes) to improve toilet  -discussed, reviewed, performed proper toilet position  -discussed, reviewed urge suppression    NEURO REEDUCATION: (45 minutes) to improve control and coordination of pelvic floor   Date:  7-3-19 Date:   Date:     Activity/Exercise Parameters Parameters Parameters   Biofeedback With sEMG was utilized for coordination of PFM. Supine, sitting, standing                                                 [employer] Portal     Treatment/Session Summary:  Pt reports good understanding of plan of care, as well as prescribed home exercise program.  All questions were answered to pt's satisfaction. Pt was invited to call with any further questions or concerns. · Response to Treatment:  Patient has difficulty with relaxation during supine and standing. Better in sitting with contraction and relaxation. Also, has difficulty coordinating breath.    · Communication/Consultation:  None today  · Equipment provided today:  None today  · Recommendations/Intent for next treatment session: Next visit will focus on manual therapy, stretches, strength   Total Treatment Billable Duration:  45 minutes neuro, 10 minutes there ex   PT Patient Time In/Time Out  Time In: 0800  Time Out: 0900  Morena Huff DPT    Future Appointments   Date Time Provider Trinidad Navasi   7/9/2019  8:45 AM Harshad Samuels, PT [facility]   7/11/2019  8:00 AM Nichole Samuels, PT [facility]   7/15/2019  5:72 AM SFD CT 64 SLICE UNIT 1 SFDRCT D   7/17/2019  8:30 AM [facility] OUTREACH INSURANCE [facility] [address] [place] [address]   7/17/2019  9:00 AM Lukasz Goodwin MD [facility]   7/18/2019  8:00 AM Nichole Samuels, PT [facility]   7/22/2019  8:00 AM Taurus DUNCAN, PT [facility]   7/25/2019  8:45 AM Taurus DUNCAN, PT [facility]   6/2/2020  8:00 AM HTF LAB RESOURCE [facility] HTF HTF   6/5/2020  9:45 AM Bria Ho MD [facility] HTF HTF

## 2019-07-02 NOTE — PROGRESS NOTES
Teodora Ahuja  : 1960  Primary:   Secondary:  [address] at [facility]  [address], [address], [place] [address]  [phone]   [phone]        OUTPATIENT PHYSICAL THERAPY: Daily Treatment Note 2019   ICD-10: Treatment Diagnosis: muscle weakness generalized M 62.81  Precautions/Allergies:   Patient has no known allergies. Fall Risk Score: 0 (? 5 = High Risk)  MD Orders: oncology rehab     Pre-treatment Symptoms/Complaints:  The patient reports she is late because of traffic. Pain: Initial: Pain Intensity 1: 4  Post Session: 2 /10   Medications Last Reviewed:  19  Updated Objective Findings:  None Today   TREATMENT:   THERAPEUTIC EXERCISE: (36 minutes):  Exercises per grid below to improve mobility and strength. Required minimal verbal cues to promote proper body breathing techniques. Progressed repetitions as indicated.   Activity  Date   19   Date  19   Date   19     Date  19      Date  19     Date      Date        Fatigue Score   weekly  2 2 1 4 2     Resting BP           Heart Rate   Before   After  98  102 87 73  84 88  94 88  97     O2 level Before   After  97  98 95 96  96 96  94 95  96     Machines        Wt/   sets/   reps    Leg Press           Leg Extension           Leg Curl           Dips/Pull Ups           Overhead Press  5) x 10 reps with 4# 5) x 10 with 4# 5) x 10 reps with 4#       Compound Row            Low Back Ext            Cardiovascular  Time   Intensity, etc  Time   Intensity, etc  Time   Intensity, etc  Time   Intensity, etc  Time   Intensity, etc  Time   Intensity, etc  Time   Intensity, etc    Upper body ergometer  2) level 1 x 6min 2) level 1 x 6 min 2) level 1 x 6 min 2) level 1 x 6 min 2) level 1 x 6 min     Airdyne bikes           Recumbent Bike           walking 1) 650' x 1  3) 1300' x 1 1) 650' x 1  3) 1300' x 1 1) 650' x 1  3) 1300' x 1 1) 650' x 1  3) 1300' x 1 1) 650' x 1  5) 650' x 1 Nustep  4) level 2 x 15 min 4) level 2 x 15 min 4) level 2 x 15 4) level 3 x 15 min 3) level 3 x 15 min     Elliptical           Other Exercises/   Activities        Parameters    Forward flexion, biceps curls, abduction 5) x 10 reps with 4# 5) x 10 with 4# 5) x 10 with 4#  4) x 10 reps with 4#     Sit to stand                       [employer] Portal  Treatment/Session Summary:    · Response to Treatment:  tolerated the treatment well.  able to participate. continues to feel winded with minimal activity.  gaining in overall strength. · Communication/Consultation:  None today  · Equipment provided today:  None today  · Recommendations/Intent for next treatment session: Next visit will focus on advance therapeutic exercises. The patient reports her physician would like for her to continue strengthening.   Treatment Plan of Care Effective Dates:  4/25/19 to 7/24/19  Total Treatment Billable Duration:  36 min  PT Patient Time In/Time Out  Time In: 0808  Time Out: 3414  Kathleen Monday, PT    Future Appointments   Date Time Provider Trinidad Cm   7/3/2019  8:00 AM Fam Lama DPT [facility]   7/9/2019  8:45 AM Nichole Chavez, PT [facility]   7/11/2019  8:00 AM Taurus DUNCAN, PT [facility]   7/15/2019  3:46 AM SFD CT 64 SLICE UNIT 1 SFDRCT SFD   7/17/2019  8:30 AM GCC OUTREACH INSURANCE [facility]   7/17/2019  9:00 AM Lukasz Goodwin MD [facility]   7/18/2019  8:00 AM Nichole Samuels, PT [facility]   7/22/2019  8:00 AM Taurus UDNCAN, PT [facility]   7/25/2019  8:45 AM Taurus DUNCAN, PT [facility]   6/2/2020  8:00 AM HTF LAB RESOURCE [facility] HTF HTF   6/5/2020  9:45 AM Sarita Baires MD [facility] HTF HTF

## 2019-07-03 ENCOUNTER — HOSPITAL ENCOUNTER (OUTPATIENT)
Dept: PHYSICAL THERAPY | Age: 59
Discharge: HOME | End: 2019-07-03
Attending: FAMILY MEDICINE
Payer: COMMERCIAL

## 2019-07-03 PROCEDURE — 97112 NEUROMUSCULAR REEDUCATION: CPT

## 2019-07-03 PROCEDURE — 97110 THERAPEUTIC EXERCISES: CPT

## 2019-07-09 ENCOUNTER — HOSPITAL ENCOUNTER (OUTPATIENT)
Dept: PHYSICAL THERAPY | Age: 59
Discharge: HOME | End: 2019-07-09
Attending: INTERNAL MEDICINE
Payer: COMMERCIAL

## 2019-07-09 PROCEDURE — 97110 THERAPEUTIC EXERCISES: CPT

## 2019-07-09 NOTE — PROGRESS NOTES
Franky Yee  : 1960  Primary:   Secondary:  [address] at [address]  [address], [address], [place] [address]  [phone]   [phone]        OUTPATIENT PHYSICAL THERAPY: Daily Treatment Note 2019   ICD-10: Treatment Diagnosis: muscle weakness generalized M 62.81  Precautions/Allergies:   Patient has no known allergies. Fall Risk Score: 0 (? 5 = High Risk)  MD Orders: oncology rehab     Pre-treatment Symptoms/Complaints:  The patient reports she is having pain due to the weather today. Pain: Initial: Pain Intensity 1: 5  Post Session: 5 /10   Medications Last Reviewed:  19  Updated Objective Findings:  None Today   TREATMENT:   THERAPEUTIC EXERCISE: (42 minutes):  Exercises per grid below to improve mobility and strength. Required minimal verbal cues to promote proper body breathing techniques. Progressed repetitions as indicated.   Activity  Date   19   Date  19   Date   19     Date  19      Date  19     Date  19      Date        Fatigue Score   weekly  2 2 1 4 2 2  1    Resting BP           Heart Rate   Before   After  98  102 87 73  84 88  94 88  97 84  97    O2 level Before   After  97  98 95 96  96 96  94 95  96 98  96    Machines        Wt/   sets/   reps    Leg Press           Leg Extension           Leg Curl           Dips/Pull Ups           Overhead Press  5) x 10 reps with 4# 5) x 10 with 4# 5) x 10 reps with 4#   5) x 10 reps with 4#    Compound Row            Low Back Ext            Cardiovascular  Time   Intensity, etc  Time   Intensity, etc  Time   Intensity, etc  Time   Intensity, etc  Time   Intensity, etc  Time   Intensity, etc  Time   Intensity, etc    Upper body ergometer  2) level 1 x 6min 2) level 1 x 6 min 2) level 1 x 6 min 2) level 1 x 6 min 2) level 1 x 6 min 2) level 1 x 6 min    Airdyne bikes           Recumbent Bike           walking 1) 650' x 1  3) 1300' x 1 1) 650' x 1  3) 1300' x 1 1) 650' x 1  3) 1300' x 1 1) 650' x 1  3) 1300' x 1 1) 650' x 1  5) 650' x 1 1) 650' x 1  6) 1300' x 1    Nustep  4) level 2 x 15 min 4) level 2 x 15 min 4) level 2 x 15 4) level 3 x 15 min 3) level 3 x 15 min 3) level 3 x 15 min    Elliptical           Other Exercises/   Activities        Parameters    Forward flexion, biceps curls, abduction 5) x 10 reps with 4# 5) x 10 with 4# 5) x 10 with 4#  4) x 10 reps with 4# 4) x 10 reps with 4#    Sit to stand                       [employer] Portal  Treatment/Session Summary:    · Response to Treatment:  tolerated the treatment well. · Communication/Consultation:  None today  · Equipment provided today:  None today  · Recommendations/Intent for next treatment session: Next visit will focus on advance therapeutic exercises. The patient reports her physician would like for her to continue strengthening.   Treatment Plan of Care Effective Dates:  4/25/19 to 7/24/19  Total Treatment Billable Duration:  42 min  PT Patient Time In/Time Out  Time In: 7116  Time Out: 9386  Pamela Palmer PT    Future Appointments   Date Time Provider Trinidad Cm   7/11/2019  8:00 AM Nichole Samuels, PT [facility]   7/15/2019  3:94 AM SFD CT 64 SLICE UNIT 1 SFDRCT SFD   7/17/2019  8:30 AM GCC OUTREACH INSURANCE [facility]   7/17/2019  9:00 AM Danielle Dick MD [facility]   7/18/2019  8:00 AM [facility]   7/22/2019  8:00 AM Mell DUNCAN, PT [facility]   7/25/2019  8:45 AM Mell DUNCAN, PT [facility]   6/2/2020  8:00 AM HTF LAB RESOURCE [facility] HTF HTF   6/5/2020  9:45 AM Breezy Rich MD [facility] HTF HTF

## 2019-07-11 ENCOUNTER — HOSPITAL ENCOUNTER (OUTPATIENT)
Dept: PHYSICAL THERAPY | Age: 59
Discharge: HOME | End: 2019-07-11
Attending: INTERNAL MEDICINE
Payer: COMMERCIAL

## 2019-07-11 ENCOUNTER — HOSPITAL ENCOUNTER (OUTPATIENT)
Dept: LAB | Age: 59
Discharge: HOME | End: 2019-07-11
Attending: PODIATRIST
Payer: COMMERCIAL

## 2019-07-11 LAB
BASOPHILS # BLD: 0 K/UL (ref 0–0.2)
BASOPHILS NFR BLD: 1 % (ref 0–2)
DIFFERENTIAL METHOD BLD: ABNORMAL
EOSINOPHIL # BLD: 0.2 K/UL (ref 0–0.8)
EOSINOPHIL NFR BLD: 3 % (ref 0.5–7.8)
ERYTHROCYTE [DISTWIDTH] IN BLOOD BY AUTOMATED COUNT: 13.6 % (ref 11.9–14.6)
HCT VFR BLD AUTO: 34.8 % (ref 35.8–46.3)
HGB BLD-MCNC: 11.6 G/DL (ref 11.7–15.4)
IMM GRANULOCYTES # BLD AUTO: 0 K/UL (ref 0–0.5)
IMM GRANULOCYTES NFR BLD AUTO: 0 % (ref 0–5)
LYMPHOCYTES # BLD: 0.6 K/UL (ref 0.5–4.6)
LYMPHOCYTES NFR BLD: 10 % (ref 13–44)
MCH RBC QN AUTO: 32 PG (ref 26.1–32.9)
MCHC RBC AUTO-ENTMCNC: 33.3 G/DL (ref 31.4–35)
MCV RBC AUTO: 96.1 FL (ref 79.6–97.8)
MONOCYTES # BLD: 0.5 K/UL (ref 0.1–1.3)
MONOCYTES NFR BLD: 9 % (ref 4–12)
NEUTS SEG # BLD: 4.4 K/UL (ref 1.7–8.2)
NEUTS SEG NFR BLD: 77 % (ref 43–78)
NRBC # BLD: 0 K/UL (ref 0–0.2)
PLATELET # BLD AUTO: 130 K/UL (ref 150–450)
PMV BLD AUTO: 10.4 FL (ref 9.4–12.3)
RBC # BLD AUTO: 3.62 M/UL (ref 4.05–5.2)
URATE SERPL-MCNC: 6.5 MG/DL (ref 2.6–6)
WBC # BLD AUTO: 5.7 K/UL (ref 4.3–11.1)

## 2019-07-11 PROCEDURE — 84550 ASSAY OF BLOOD/URIC ACID: CPT

## 2019-07-11 PROCEDURE — 36415 COLL VENOUS BLD VENIPUNCTURE: CPT

## 2019-07-11 PROCEDURE — 85025 COMPLETE CBC W/AUTO DIFF WBC: CPT

## 2019-07-11 PROCEDURE — 97110 THERAPEUTIC EXERCISES: CPT

## 2019-07-11 NOTE — PROGRESS NOTES
Ti Alexandra  : 1960  Primary:   Secondary:  [address]  at [facility], [facility]  [address], [address], [place]  [phone]   [phone]        OUTPATIENT PHYSICAL THERAPY: Daily Treatment Note 2019   ICD-10: Treatment Diagnosis: muscle weakness generalized M 62.81  Precautions/Allergies:   Patient has no known allergies. Fall Risk Score: 0 (? 5 = High Risk)  MD Orders: oncology rehab     Pre-treatment Symptoms/Complaints:  The patient reports she is doing well today. She reports she has lost a little over 20 pounds. She reports her big toe hurts. Pain: Initial: Pain Intensity 1: 4  Post Session: 4 /10   Medications Last Reviewed:  19  Updated Objective Findings:  None Today   TREATMENT:   THERAPEUTIC EXERCISE: (42 minutes):  Exercises per grid below to improve mobility and strength. Required minimal verbal cues to promote proper body breathing techniques. Progressed repetitions as indicated.   Activity  Date   19   Date  19   Date   19     Date  19      Date  19     Date  19      Date   19       Fatigue Score   weekly  2 2 1 4 2 2  1 2  0   Resting BP           Heart Rate   Before   After  98  102 87 73  84 88  94 88  97 84  97 82   O2 level Before   After  97  98 95 96  96 96  94 95  96 98  96 98   Machines        Wt/   sets/   reps    Leg Press           Leg Extension           Leg Curl           Dips/Pull Ups           Overhead Press  5) x 10 reps with 4# 5) x 10 with 4# 5) x 10 reps with 4#   5) x 10 reps with 4# 5) x 10 reps with 4#   Compound Row            Low Back Ext            Cardiovascular  Time   Intensity, etc  Time   Intensity, etc  Time   Intensity, etc  Time   Intensity, etc  Time   Intensity, etc  Time   Intensity, etc  Time   Intensity, etc    Upper body ergometer  2) level 1 x 6min 2) level 1 x 6 min 2) level 1 x 6 min 2) level 1 x 6 min 2) level 1 x 6 min 2) level 1 x 6 min 2) level 1 x 6 min   Airdyne bikes           Recumbent Bike           walking 1) 650' x 1  3) 1300' x 1 1) 650' x 1  3) 1300' x 1 1) 650' x 1  3) 1300' x 1 1) 650' x 1  3) 1300' x 1 1) 650' x 1  5) 650' x 1 1) 650' x 1  6) 1300' x 1 1) 650' x 1  6) x 1300' x 1   Nustep  4) level 2 x 15 min 4) level 2 x 15 min 4) level 2 x 15 4) level 3 x 15 min 3) level 3 x 15 min 3) level 3 x 15 min 3) level 3 x 15 min   Elliptical           Other Exercises/   Activities        Parameters    Forward flexion, biceps curls, abduction 5) x 10 reps with 4# 5) x 10 with 4# 5) x 10 with 4#  4) x 10 reps with 4# 4) x 10 reps with 4# 4) x 10 reps with 43   Sit to stand          Counter top pushups       X 10 reps      [employer] Portal  Treatment/Session Summary:    · Response to Treatment:  tolerated the treatment well. · Communication/Consultation:  discuss toe pain with the physician. the patient may have gout. · Equipment provided today:  None today  · Recommendations/Intent for next treatment session: Next visit will focus on advance therapeutic exercises. The patient reports her physician would like for her to continue strengthening.   Treatment Plan of Care Effective Dates:  4/25/19 to 7/24/19  Total Treatment Billable Duration:  42 min  PT Patient Time In/Time Out  Time In: 0802  Time Out: 0802  Char Lopez PT    Future Appointments   Date Time Provider Trinidad Cm   7/15/2019  5:91 AM SFD CT 64 SLICE UNIT 1 SFDRCT [facility]   7/17/2019  8:30 AM [facility] OUTREACH INSURANCE GCCOIG G[facility]   7/17/2019  9:00 AM Rohit Bearden MD [facility]   7/18/2019  8:00 AM [facility]   7/22/2019  8:00 [facility]   7/24/2019  8:00 AM [facility] Rosa [facility]   7/25/2019  8:45 AM Nichole Oakley, PT SFOORPT [facility]   6/2/2020  8:00 AM HTF LAB RESOURCE [facility] HTF HTF   6/5/2020  9:45 AM [facility]

## 2019-07-15 ENCOUNTER — HOSPITAL ENCOUNTER (OUTPATIENT)
Dept: CT IMAGING | Age: 59
Discharge: HOME | End: 2019-07-15
Attending: INTERNAL MEDICINE
Payer: COMMERCIAL

## 2019-07-15 DIAGNOSIS — C83.30 DIFFUSE LARGE B-CELL LYMPHOMA, UNSPECIFIED BODY REGION (HCC): ICD-10-CM

## 2019-07-15 PROCEDURE — 74011636320 HC RX REV CODE- 636/320: Performed by: INTERNAL MEDICINE

## 2019-07-15 PROCEDURE — 74177 CT ABD & PELVIS W/CONTRAST: CPT

## 2019-07-15 PROCEDURE — 74011000258 HC RX REV CODE- 258: Performed by: INTERNAL MEDICINE

## 2019-07-15 RX ORDER — SODIUM CHLORIDE 0.9 % (FLUSH) 0.9 %
10 SYRINGE (ML) INJECTION
Status: COMPLETED | OUTPATIENT
Start: 2019-07-15 | End: 2019-07-15

## 2019-07-15 RX ADMIN — SODIUM CHLORIDE 100 ML: 900 INJECTION, SOLUTION INTRAVENOUS at 10:44

## 2019-07-15 RX ADMIN — DIATRIZOATE MEGLUMINE AND DIATRIZOATE SODIUM 15 ML: 660; 100 LIQUID ORAL; RECTAL at 10:44

## 2019-07-15 RX ADMIN — IOPAMIDOL 100 ML: 755 INJECTION, SOLUTION INTRAVENOUS at 10:44

## 2019-07-15 RX ADMIN — Medication 10 ML: at 10:44

## 2019-07-17 ENCOUNTER — HOSPITAL ENCOUNTER (OUTPATIENT)
Dept: LAB | Age: 59
Discharge: HOME | End: 2019-07-17
Payer: COMMERCIAL

## 2019-07-17 ENCOUNTER — PATIENT OUTREACH (OUTPATIENT)
Dept: CASE MANAGEMENT | Age: 59
End: 2019-07-17

## 2019-07-17 DIAGNOSIS — C83.39 DIFFUSE LARGE B-CELL LYMPHOMA OF SOLID ORGAN EXCLUDING SPLEEN (HCC): ICD-10-CM

## 2019-07-17 LAB
ALBUMIN SERPL-MCNC: 4.2 G/DL (ref 3.5–5)
ALBUMIN/GLOB SERPL: 1.7 {RATIO} (ref 1.2–3.5)
ALP SERPL-CCNC: 106 U/L (ref 50–136)
ALT SERPL-CCNC: 44 U/L (ref 12–65)
ANION GAP SERPL CALC-SCNC: 6 MMOL/L (ref 7–16)
AST SERPL-CCNC: 23 U/L (ref 15–37)
BASOPHILS # BLD: 0 K/UL (ref 0–0.2)
BASOPHILS NFR BLD: 1 % (ref 0–2)
BILIRUB SERPL-MCNC: 0.7 MG/DL (ref 0.2–1.1)
BUN SERPL-MCNC: 20 MG/DL (ref 6–23)
CALCIUM SERPL-MCNC: 9.2 MG/DL (ref 8.3–10.4)
CHLORIDE SERPL-SCNC: 105 MMOL/L (ref 98–107)
CO2 SERPL-SCNC: 29 MMOL/L (ref 21–32)
CREAT SERPL-MCNC: 0.89 MG/DL (ref 0.6–1)
DIFFERENTIAL METHOD BLD: ABNORMAL
EOSINOPHIL # BLD: 0.2 K/UL (ref 0–0.8)
EOSINOPHIL NFR BLD: 3 % (ref 0.5–7.8)
ERYTHROCYTE [DISTWIDTH] IN BLOOD BY AUTOMATED COUNT: 13.5 % (ref 11.9–14.6)
GLOBULIN SER CALC-MCNC: 2.5 G/DL (ref 2.3–3.5)
GLUCOSE SERPL-MCNC: 98 MG/DL (ref 65–100)
HCT VFR BLD AUTO: 33.3 % (ref 35.8–46.3)
HGB BLD-MCNC: 11.4 G/DL (ref 11.7–15.4)
IMM GRANULOCYTES # BLD AUTO: 0 K/UL (ref 0–0.5)
IMM GRANULOCYTES NFR BLD AUTO: 0 % (ref 0–5)
LDH SERPL L TO P-CCNC: 231 U/L (ref 100–190)
LYMPHOCYTES # BLD: 0.6 K/UL (ref 0.5–4.6)
LYMPHOCYTES NFR BLD: 11 % (ref 13–44)
MAGNESIUM SERPL-MCNC: 2.2 MG/DL (ref 1.8–2.4)
MCH RBC QN AUTO: 32.4 PG (ref 26.1–32.9)
MCHC RBC AUTO-ENTMCNC: 34.2 G/DL (ref 31.4–35)
MCV RBC AUTO: 94.6 FL (ref 79.6–97.8)
MONOCYTES # BLD: 0.5 K/UL (ref 0.1–1.3)
MONOCYTES NFR BLD: 8 % (ref 4–12)
NEUTS SEG # BLD: 4.5 K/UL (ref 1.7–8.2)
NEUTS SEG NFR BLD: 77 % (ref 43–78)
NRBC # BLD: 0 K/UL (ref 0–0.2)
PLATELET # BLD AUTO: 128 K/UL (ref 150–450)
PMV BLD AUTO: 10.6 FL (ref 9.4–12.3)
POTASSIUM SERPL-SCNC: 4 MMOL/L (ref 3.5–5.1)
PROT SERPL-MCNC: 6.7 G/DL (ref 6.3–8.2)
RBC # BLD AUTO: 3.52 M/UL (ref 4.05–5.25)
SODIUM SERPL-SCNC: 140 MMOL/L (ref 136–145)
WBC # BLD AUTO: 5.8 K/UL (ref 4.3–11.1)

## 2019-07-17 PROCEDURE — 83735 ASSAY OF MAGNESIUM: CPT

## 2019-07-17 PROCEDURE — 80053 COMPREHEN METABOLIC PANEL: CPT

## 2019-07-17 PROCEDURE — 85025 COMPLETE CBC W/AUTO DIFF WBC: CPT

## 2019-07-17 PROCEDURE — 83615 LACTATE (LD) (LDH) ENZYME: CPT

## 2019-07-17 NOTE — PROGRESS NOTES
7/17/19 - Patient here for visit after recent CT scan. Patient will have port flushed in 3 months (if PCP needs labs drawn, patient will bring those orders in as well) and have CT scan in January with follow up with Dr. Ley May after.

## 2019-07-22 ENCOUNTER — HOSPITAL ENCOUNTER (OUTPATIENT)
Dept: PHYSICAL THERAPY | Age: 59
Discharge: HOME | End: 2019-07-22
Attending: INTERNAL MEDICINE
Payer: COMMERCIAL

## 2019-07-22 PROCEDURE — 97110 THERAPEUTIC EXERCISES: CPT

## 2019-07-22 NOTE — PROGRESS NOTES
Rikki Oseas  : 1960  Primary:   Secondary:  [address] at [facility]  [address], [address], [place] [address]  [phone]   [phone]        OUTPATIENT PHYSICAL THERAPY: Daily Treatment Note 2019   ICD-10: Treatment Diagnosis: muscle weakness generalized M 62.81  Precautions/Allergies:   Patient has no known allergies. Fall Risk Score: 0 (? 5 = High Risk)  MD Orders: oncology rehab     Pre-treatment Symptoms/Complaints:  The patient reports she has gout. She has lost 30 pounds. She is leaving for vacation Friday. Pain: Initial: Pain Intensity 1: 4  Post Session: 4 /10   Medications Last Reviewed:  19  Updated Objective Findings:  None Today   TREATMENT:   THERAPEUTIC EXERCISE: (38 minutes):  Exercises per grid below to improve mobility and strength. Required minimal verbal cues to promote proper body breathing techniques. Progressed repetitions as indicated.   Activity  Date   19   Date     Date        Date        Date       Date        Date          Fatigue Score   weekly  2         Resting BP           Heart Rate   Before   After  67  91         O2 level Before   After  97  98         Machines        Wt/   sets/   reps    Leg Press           Leg Extension           Leg Curl           Dips/Pull Ups           Overhead Press           Compound Row            Low Back Ext            Cardiovascular  Time   Intensity, etc  Time   Intensity, etc  Time   Intensity, etc  Time   Intensity, etc  Time   Intensity, etc  Time   Intensity, etc  Time   Intensity, etc    Upper body ergometer  2) level 1 x 6min         Airdyne bikes           Recumbent Bike           walking 1) 650' x 1  3) 1300' x 1         Nustep  4) level 3 x 15 min         Elliptical           Other Exercises/   Activities        Parameters    Forward flexion, biceps curls, abduction          Sit to stand          Counter top pushups             [employer] Portal  Treatment/Session Summary:    · Response to Treatment:  tolerated the treatment well. Gout limits participation. · Communication/Consultation:  continue exercises on vacation  · Equipment provided today:  None today  · Recommendations/Intent for next treatment session: Next visit will focus on advance therapeutic exercises. The patient reports her physician would like for her to continue strengthening.   Treatment Plan of Care Effective Dates:  4/25/19 to 7/24/19  Total Treatment Billable Duration:  38 min  PT Patient Time In/Time Out  Time In: 0805  Time Out: 50[address]  [address], T[place]    Future Appointments   Date Time Provider Trinidad Cm   7/24/2019  8:00 AM Jessica Peacock DPT [facility]   7/25/2019  8:45 AM Nichole Reyez, PT SFOORPT [facility]   10/16/2019  9:30 AM [facility] LAB PORT CHAIR 1 [facility]   1/7/2020 33:72 AM SFO CT 64 SLICE UNIT 1 SFORCT [facility]   1/17/2020  8:30 AM GCC OUTREACH INSURANCE GCCOIG GVL [facility]   1/17/2020  9:15 AM Rob Ewing MD [facility]   6/2/2020  8:00 AM HTF LAB RESOURCE [facility] HTF HTF   6/5/2020  9:45 AM Heraclio Hagan MD [facility] HTF HTF

## 2019-07-24 ENCOUNTER — HOSPITAL ENCOUNTER (OUTPATIENT)
Dept: PHYSICAL THERAPY | Age: 59
Discharge: HOME | End: 2019-07-24
Attending: INTERNAL MEDICINE
Payer: COMMERCIAL

## 2019-07-24 PROCEDURE — 97110 THERAPEUTIC EXERCISES: CPT

## 2019-08-06 ENCOUNTER — HOSPITAL ENCOUNTER (OUTPATIENT)
Dept: PHYSICAL THERAPY | Age: 59
Discharge: HOME | End: 2019-08-06
Attending: INTERNAL MEDICINE
Payer: COMMERCIAL

## 2019-08-06 ENCOUNTER — APPOINTMENT (RX ONLY)
Dept: URBAN - METROPOLITAN AREA CLINIC 349 | Facility: CLINIC | Age: 59
Setting detail: DERMATOLOGY
End: 2019-08-06

## 2019-08-06 DIAGNOSIS — D22 MELANOCYTIC NEVI: ICD-10-CM

## 2019-08-06 DIAGNOSIS — L81.4 OTHER MELANIN HYPERPIGMENTATION: ICD-10-CM

## 2019-08-06 DIAGNOSIS — L85.3 XEROSIS CUTIS: ICD-10-CM

## 2019-08-06 DIAGNOSIS — L82.0 INFLAMED SEBORRHEIC KERATOSIS: ICD-10-CM | Status: RESOLVING

## 2019-08-06 DIAGNOSIS — Z85.828 PERSONAL HISTORY OF OTHER MALIGNANT NEOPLASM OF SKIN: ICD-10-CM

## 2019-08-06 PROBLEM — D48.5 NEOPLASM OF UNCERTAIN BEHAVIOR OF SKIN: Status: ACTIVE | Noted: 2019-08-06

## 2019-08-06 PROBLEM — D22.39 MELANOCYTIC NEVI OF OTHER PARTS OF FACE: Status: ACTIVE | Noted: 2019-08-06

## 2019-08-06 PROCEDURE — ? LIQUID NITROGEN

## 2019-08-06 PROCEDURE — 17110 DESTRUCTION B9 LES UP TO 14: CPT

## 2019-08-06 PROCEDURE — ? OTHER

## 2019-08-06 PROCEDURE — 99213 OFFICE O/P EST LOW 20 MIN: CPT | Mod: 25

## 2019-08-06 PROCEDURE — ? COUNSELING

## 2019-08-06 PROCEDURE — 97110 THERAPEUTIC EXERCISES: CPT

## 2019-08-06 ASSESSMENT — LOCATION DETAILED DESCRIPTION DERM
LOCATION DETAILED: RIGHT INFERIOR LATERAL MALAR CHEEK
LOCATION DETAILED: LEFT INFERIOR NASAL CHEEK
LOCATION DETAILED: LEFT MEDIAL FRONTAL SCALP
LOCATION DETAILED: LEFT INFERIOR CENTRAL MALAR CHEEK
LOCATION DETAILED: LEFT SUPERIOR MEDIAL FOREHEAD
LOCATION DETAILED: RIGHT CENTRAL MALAR CHEEK
LOCATION DETAILED: LEFT MEDIAL MALAR CHEEK

## 2019-08-06 ASSESSMENT — LOCATION ZONE DERM
LOCATION ZONE: SCALP
LOCATION ZONE: FACE

## 2019-08-06 ASSESSMENT — LOCATION SIMPLE DESCRIPTION DERM
LOCATION SIMPLE: RIGHT CHEEK
LOCATION SIMPLE: LEFT SCALP
LOCATION SIMPLE: LEFT CHEEK
LOCATION SIMPLE: LEFT FOREHEAD

## 2019-08-06 NOTE — PROCEDURE: LIQUID NITROGEN
Post-Care Instructions: I reviewed with the patient in detail post-care instructions. Patient is to wear sunprotection, and avoid picking at any of the treated lesions. Pt may apply Vaseline to crusted or scabbing areas.
Include Z78.9 (Other Specified Conditions Influencing Health Status) As An Associated Diagnosis?: No
Medical Necessity Clause: This procedure was medically necessary because the lesions that were treated were:
Detail Level: Detailed
Duration Of Freeze Thaw-Cycle (Seconds): 3
Consent: The patient's consent was obtained including but not limited to risks of crusting, scabbing, blistering, scarring, darker or lighter pigmentary change, recurrence, incomplete removal and infection.
Medical Necessity Information: It is in your best interest to select a reason for this procedure from the list below. All of these items fulfill various CMS LCD requirements except the new and changing color options.

## 2019-08-06 NOTE — PROCEDURE: OTHER
Detail Level: Zone
Other (Free Text): Pt stated that is she dry right now. Advised pt to contact office if it doesn’t resolve
Note Text (......Xxx Chief Complaint.): This diagnosis correlates with the
Other (Free Text): Pt stated it has been there all her life. Denies any changes or lesion bleeding on its own.
Other (Free Text): ED&C site healed well.
Other (Free Text): Pt had some lesions on her right cheek treated with LN2 at previous visit. Lesions appeared to have resolved. however one Lesion looks better but was treated once more today.
Other (Free Text): Suggested pt set up a free consultation with Doris to discuss IPL. Offered to treat with LN2 as well but she declined.

## 2019-08-06 NOTE — THERAPY RECERTIFICATION
Georgia Jordan  : 1960  Primary: [address] [facility]  Secondary:  [address]  at [facility]  [address], [place] [address]  [phone]   [phone]          OUTPATIENT PHYSICAL THERAPY:Recertification    UIL-70: Treatment Diagnosis: muscle weakness generalized M 62.81  Precautions/Allergies:   Patient has no known allergies. Fall Risk Score: 0 (? 5 = High Risk)  MD Orders: oncology rehab MEDICAL/REFERRING DIAGNOSIS:  Diffuse large B-cell lymphoma, unspecified site [C83.30]    DATE OF ONSET: 3/30/17  REFERRING PHYSICIAN: Qiana Gunter MD  RETURN PHYSICIAN APPOINTMENT:      INITIAL ASSESSMENT:  Ms. Walker Aguilar presents following treatment of lymphoma. She previously was a participant with oncology rehab. She has undergone bilateral cataract surgery, back surgery x 2 and has had 2 broken toes. She returns with overall deconditioning and decreased strength. She will benefit from therapeutic exercises to improve strength and activity tolerance. She is currently being treated at the pain center for lumbar pain. 19:  The patient has attended a total of 9 visits. She has not attended since 3/6/19 due to a death in the family out of [place]. She has returned to resume oncology rehab. She would like to transition to Healthy Self in 2-3 weeks. She has regressed due to her absence. We will resume strengthening and conditioning.  19: The patient has attended a total of 18 visits. She has made progress towards her goals. She will transition to Healthy Self after she sees her oncologist again. She has improved her 6 minute walk and strength. She is walking at home. We will continue with strengthening and transition her to Healthy Self soon. 19: The patient has attended a total of 29 visits. She returns today having been gone for a week on vacation. She is steadily losing weight. Her activity level is greatly improved.   She continues to improve in overall conditioning and strength. She will benefit from continue therapeutic exercise. PROBLEM LIST (Impacting functional limitations):  1. Decreased Strength  2. Increased Pain  3. Decreased Activity Tolerance  4. Increased Fatigue  5. Increased Shortness of Breath  6. Decreased [place] with Home Exercise Program INTERVENTIONS PLANNED:  1. Home Exercise Program (HEP)  2. Therapeutic Exercise/Strengthening   TREATMENT PLAN:  Effective Dates: 7/24/19 TO 10/22/19 (90 days). Frequency/Duration: 2 times a week for 90 Days. GOALS: (Goals have been discussed and agreed upon with patient.)  Short-Term Functional Goals: Time Frame: 4 weeks  1. The patient will be independent with HEP for strength within 4 weeks. Met    2. The patient will gain 1/2 grade strength of all 4 extremities within 4 weeks. Met   3. The patient will report walking 10 minutes twice daily within 4 weeks. Met   Discharge Goals: Time Frame: 8 weeks  1. The patient will improve her 6 minute walk by 165' x 1 within 8 weeks. Met   2. The patient will report a fatigue level of 3 or less within 8 weeks. Met   3. The patient will transition to Healthy Self within 12 weeks. Rehabilitation Potential For Stated Goals: Good  Regarding Dilia Barrientos's therapy, I certify that the treatment plan above will be carried out by a therapist or under their direction. Thank you for this referral,  Fred Perrin PT                 The information in this section was collected on 6/11/19 (except where otherwise noted).   HISTORY:   History of Present Injury/Illness (Reason for Referral):  Post lymphoma treatment, lumbar pain, lumbar surgery x 2     Past Medical History/Comorbidities:   Ms. Elaine Garibay  has a past medical history of Anemia, Basal cell carcinoma, Cardiomegaly, Deep vein thrombosis (DVT) ([facility] [address] History of stroke, History of UTI, Hypertension, Marginal zone lymphoma ([facility] [address] (03/30/2017), Morbid obesity ([facility] [address] Osteoarthritis, Overactive bladder, Primary hypothyroidism, Prolapse of female bladder, acquired, Radiation therapy complication, Rheumatic fever, S/P total knee replacement using cement (10/3/2011), Shingles, and Superficial venous thrombosis of right arm (5/1/2017). She also has no past medical history of Aneurysm ([address] Arrhythmia, Asthma, Autoimmune disease ([address] CAD (coronary artery disease), Chronic kidney disease, Coagulation defects, COPD, Diabetes ([address] Difficult intubation, GERD (gastroesophageal reflux disease), Heart failure ([address] Liver disease, Malignant hyperthermia due to anesthesia, Nausea & vomiting, Pseudocholinesterase deficiency, Psychiatric disorder, PUD (peptic ulcer disease), Seizures ([address] Stroke ([address] or Unspecified sleep apnea. Ms. Larry Lopez  has a past surgical history that includes pr anesth,achilles tendon surg (Left, 02/10/2011); hx cholecystectomy (1983); hx vascular access; hx knee replacement (Left, 2013); hx knee replacement (Right, 2012); hx cataract removal (Bilateral, 2018); and hx colonoscopy (03/2017). Past Medical History:   Diagnosis Date    Anemia     Basal cell carcinoma     Cardiomegaly     Deep vein thrombosis (DVT) (HCC)     History of stroke     History of UTI     Hypertension     Marginal zone lymphoma ([address] 03/30/2017    Diffuse Large B-cell Lymphoma.  Completed Chemotherapy 5/28/18    Morbid obesity ([address]     Osteoarthritis     Overactive bladder     Primary hypothyroidism     Prolapse of female bladder, acquired     Radiation therapy complication     Rheumatic fever     S/P total knee replacement using cement 10/3/2011    Shingles     Superficial venous thrombosis of right arm 5/1/2017     Past Surgical History:   Procedure Laterality Date    HX CATARACT REMOVAL Bilateral 2018    HX CHOLECYSTECTOMY  1983    HX COLONOSCOPY  03/2017    Clear    HX KNEE REPLACEMENT Left 2013    Dr. Katie Gallego Right 2012    Dr. Rina Russell  HX VASCULAR ACCESS      [place] ANESTH,ACHILLES TENDON SURG Left 02/10/2011    Dr. Gerson Morgan        Ambulatory/Rehab Services H2 Model Falls Risk Assessment  1/28/19    Risk Factors:       No Risk Factors Identified Ability to Rise from Chair:       (0)  Ability to rise in a single movement    Falls Prevention Plan:       No modifications necessary   Total: (5 or greater = High Risk): 0    ©2010 [facility]. All Rights Reserved. [facility] [place] Patent #[id number]. Federal Law prohibits the replication, distribution or use without written permission from [facility] of  [address]:     lives with spouse  Prior Level of Function/Work/Activity:    Dominant Side:         RIGHT  Current Medications:       Current Outpatient Medications:     mirabegron ER (MYRBETRIQ) 25 mg ER tablet, Take 1 Tab by mouth daily. , Disp: 30 Tab, Rfl: 5    montelukast (SINGULAIR) 10 mg tablet, Take 1 Tab by mouth daily. , Disp: 30 Tab, Rfl: 5    triamterene-hydroCHLOROthiazide (MAXZIDE) 75-50 mg per tablet, Take 0.5-1 Tabs by mouth daily as needed (qam prn swelling). , Disp: 60 Tab, Rfl: 3    levothyroxine (SYNTHROID) 175 mcg tablet, Take 1 Tab by mouth Daily (before breakfast). , Disp: 90 Tab, Rfl: 1    cetirizine (ZYRTEC) 10 mg tablet, Take 1 Tab by mouth daily. , Disp: 30 Tab, Rfl: 5    magnesium oxide (MAG-OX) 400 mg tablet, Take 1 Tab by mouth two (2) times a day. (Patient taking differently: Take 400 mg by mouth two (2) times a week.), Disp: 60 Tab, Rfl: 1    pregabalin (LYRICA) 100 mg capsule, Take 1 Cap by mouth three (3) times daily. Max Daily Amount: 300 mg.  (Patient taking differently: Take 100 mg by mouth two (2) times a day.), Disp: 270 Cap, Rfl: 1   Date Last Reviewed:  8/6/19       EXAMINATION:   ROM:          Within functional limits  Strength:          Bilateral upper extremities grossly 4+/5 x 2 extremities  Bilateral lower extremities 4+/5 x 2 extremities    Balance: Intact  Skin Integrity:          intact  Edema/Girth:  pitting    Left Right    Initial Most Recent Initial Most Recent   Upper  Extremity           Lower  Extremity               Body Structures Involved:  1. Muscles Body Functions Affected:  1. Sensory/Pain  2. Neuromusculoskeletal Activities and Participation Affected:  1. None   Number of elements (examined above) that affect the Plan of Care: 3: MODERATE COMPLEXITY   CLINICAL PRESENTATION:   Presentation: Stable and uncomplicated: LOW COMPLEXITY   CLINICAL DECISION MAKING:   Outcome Measure: Tool Used: 6-MINUTE WALK TEST  Score:  Initial: 1445' feet Most Recent: 1600 feet (Date: 8/6/19 )   Interpretation of Score: Normal range varies but is approximately 9753-5488 Feet      Distance walked: 1600 feet               Baseline End of Test   Heart Rate 68 124   Dyspnea (Luther Scale)     Fatigue (Luther Scale) 4 4   SpO2 97 93   BP       Tool Used: TINETTI  Score:  Initial:   Gait: 11/12  Balance: 16/16  TOTAL: 27/28 Most Recent:  Gait: 12/12  Balance: 16/16  TOTAL:28 /28         Tool Used: Timed Up and Go (TUG)  Score:  Initial: 7 seconds Most Recent: 6 seconds (Date: 8/6/19 )   Interpretation of Score: The test measures, in seconds, the time taken by an individual to stand up from a standard arm chair (seat height 46 cm [18 in], arm height 65 cm [25.6 in]), walk a distance of 3 meters (118 in, approx 10 ft), turn, walk back to the chair and sit down. If the individual takes longer than 14 seconds to complete TUG, this indicates risk for falls.                             Tool Used: ECOG Performance Survey Score  Score:  Initial: 1 Most Recent:  0    Interpretation of Score:   0 Fully active, able to carry on all pre-disease performance without restriction   1 Restricted in physically strenuous activity but ambulatory and able to carry out work of a light or sedentary nature, e.g., light house work, office work   2 Ambulatory and capable of all selfcare but unable to carry out any work activities. Up and about more than 50% of waking hours   3 Capable of only limited selfcare, confined to bed or chair more than 50% of waking hours   4 Completely disabled. Cannot carry on any selfcare. Totally confined to bed or chair   5 Dead     Medical Necessity:   · Patient is expected to demonstrate progress in strength and overall conditionoing to increase independence with household activity. Reason for Services/Other Comments:  · Patient continues to demonstrate capacity to improve strength and overall conditionoing which will increase independence. Use of outcome tool(s) and clinical judgement create a POC that gives a: Clear prediction of patient's progress: LOW COMPLEXITY            TREATMENT:   (In addition to Assessment/Re-Assessment sessions the following treatments were rendered)  Pre-treatment Symptoms/Complaints:   The patient reports she feels good today. She continues to have symptoms of gout. Pain: Initial:   Pain Intensity 1: 4 /10  Post Session: 4/10   6 minute walk test  UBE level 1 x 6 min  Nustep level 2 x 15 min  TUG          [employer] Education Portal  Treatment/Session Assessment:    · Response to Treatment:  The patient tolerated the treatment well. Progressing in strength. · Compliance with Program/Exercises: Will assess as treatment progresses. · Recommendations/Intent for next treatment session: \"Next visit will focus on advancements to more challenging activities\".    Total Treatment Duration: 42 min    PT Patient Time In/Time Out  Time In: 0800  Time Out: 0842    Cassy Faulkner PT

## 2019-08-08 ENCOUNTER — HOSPITAL ENCOUNTER (OUTPATIENT)
Dept: PHYSICAL THERAPY | Age: 59
Discharge: HOME | End: 2019-08-08
Attending: INTERNAL MEDICINE
Payer: COMMERCIAL

## 2019-08-08 PROCEDURE — 97110 THERAPEUTIC EXERCISES: CPT

## 2019-08-08 NOTE — PROGRESS NOTES
Andre Dacosta  : 1960  Primary:   Secondary:  [address]  at [facility]  [address], [address], [place] [address]  [phone]   [phone]        OUTPATIENT PHYSICAL THERAPY: Daily Treatment Note 2019   ICD-10: Treatment Diagnosis: muscle weakness generalized M 62.81  Precautions/Allergies:   Patient has no known allergies. Fall Risk Score: 0 (? 5 = High Risk)  MD Orders: oncology rehab     Pre-treatment Symptoms/Complaints:  The patient reports she is able to exercise more at home. Pain: Initial:    Post Session: 4 /10   Medications Last Reviewed:  19  Updated Objective Findings:  None Today   TREATMENT:   THERAPEUTIC EXERCISE: (48 minutes):  Exercises per grid below to improve mobility and strength. Required minimal verbal cues to promote proper body breathing techniques. Progressed repetitions as indicated.   Activity  Date   19   Date  19     Date        Date        Date       Date        Date          Fatigue Score   weekly  2 0        Resting BP           Heart Rate   Before   After  67  91 82        O2 level Before   After  97  98 98        Machines        Wt/   sets/   reps    Leg Press           Leg Extension           Leg Curl           Dips/Pull Ups           Overhead Press           Compound Row            Low Back Ext            Cardiovascular  Time   Intensity, etc  Time   Intensity, etc  Time   Intensity, etc  Time   Intensity, etc  Time   Intensity, etc  Time   Intensity, etc  Time   Intensity, etc    Upper body ergometer  2) level 1 x 6min 2) level 1 x 6 min        Airdyne bikes           Recumbent Bike           walking 1) 650' x 1  3) 1300' x 1 1) 650' x 1  3) 1300' x 1        Nustep  4) level 3 x 15 min 4) level 4 x 15 min        Elliptical           Other Exercises/   Activities        Parameters    Forward flexion, biceps curls, abduction  5) x 10 with 4#        Sit to stand          Counter top pushups [facility]  Treatment/Session Summary:    · Response to Treatment:  tolerated the treatment well. · Communication/Consultation:  None today  · Equipment provided today:  None today  · Recommendations/Intent for next treatment session: Next visit will focus on advance therapeutic exercises. The patient reports her physician would like for her to continue strengthening.   Treatment Plan of Care Effective Dates:  7/24/19 to 10/22/19  Total Treatment Billable Duration:  48 min  PT Patient Time In/Time Out  Time In: 0800  Time Out: 0848  Meng Gregorio PT    Future Appointments   Date Time Provider Trinidad Cm   8/12/2019  8:00 AM Elmo Ceballos DPT [facility]   8/13/2019  8:00 AM [place] DUNCAN, PT [facility]   8/15/2019  8:00 AM [place] DUNCAN, PT [facility]   8/19/2019  8:00 AM El[facility]allos [facility]   9/17/2019  7:15 AM MD KRISSY Naranjo HTF HTF   10/16/2019  9:30 AM [facility] LAB PORT CHAIR 1 [facility]   1/7/2020 86:05 AM SFO CT 64 SLICE UNIT 1 SFORCT [facility]   1/17/2020  8:30 AM GCC OUTREACH INSURANCE GCCG [facility] [address] [facility]   1/17/2020  9:15 AM Laurence Randall MD [facility]   6/2/2020  8:00 AM HTF LAB RESOURCE [facility] HTF HTF   6/5/2020  9:45 AM Arabella Fink MD [facility] HTF HTF

## 2019-08-13 ENCOUNTER — HOSPITAL ENCOUNTER (OUTPATIENT)
Dept: PHYSICAL THERAPY | Age: 59
Discharge: HOME | End: 2019-08-13
Attending: INTERNAL MEDICINE
Payer: COMMERCIAL

## 2019-08-13 PROCEDURE — 97110 THERAPEUTIC EXERCISES: CPT

## 2019-08-13 NOTE — PROGRESS NOTES
Shayelucrecia Salcedo  : 1960  Primary:   Secondary:  [address]  at [facility]  [address], [address], [place] [address]  [phone]   [phone]        OUTPATIENT PHYSICAL THERAPY: Daily Treatment Note 2019   ICD-10: Treatment Diagnosis: muscle weakness generalized M 62.81  Precautions/Allergies:   Patient has no known allergies. Fall Risk Score: 0 (? 5 = High Risk)  MD Orders: oncology rehab     Pre-treatment Symptoms/Complaints:  The patient reports she is able to exercise more at home. Pain: Initial:   4 Post Session: 4 /10   Medications Last Reviewed:  19  Updated Objective Findings:  None Today   TREATMENT:   THERAPEUTIC EXERCISE: (42 minutes):  Exercises per grid below to improve mobility and strength. Required minimal verbal cues to promote proper body breathing techniques. Progressed repetitions as indicated.   Activity  Date   19   Date  19     Date   19       Date        Date       Date        Date          Fatigue Score   weekly  2 0 2-3       Resting BP           Heart Rate   Before   After  67  91 82 85  88       O2 level Before   After  97  98 98 98  98       Machines        Wt/   sets/   reps    Leg Press           Leg Extension           Leg Curl           Dips/Pull Ups           Overhead Press           Compound Row            Low Back Ext            Cardiovascular  Time   Intensity, etc  Time   Intensity, etc  Time   Intensity, etc  Time   Intensity, etc  Time   Intensity, etc  Time   Intensity, etc  Time   Intensity, etc    Upper body ergometer  2) level 1 x 6min 2) level 1 x 6 min 2) level 1 x 6 min       Airdyne bikes           Recumbent Bike           walking 1) 650' x 1  3) 1300' x 1 1) 650' x 1  3) 1300' x 1 1) 650' x 1  3) 1300' x 1       Nustep  4) level 3 x 15 min 4) level 4 x 15 min 4) level 4 x 15 min       Elliptical           Other Exercises/   Activities        Parameters    Forward flexion, biceps curls, abduction  5) x 10 with 4# 5) x 10 reps with 4#       Sit to stand          Counter top pushups             [facility] Portal  Treatment/Session Summary:    · Response to Treatment:  tolerated the treatment well. · Communication/Consultation:  None today  · Equipment provided today:  None today  · Recommendations/Intent for next treatment session: Next visit will focus on advance therapeutic exercises. The patient reports her physician would like for her to continue strengthening.   Treatment Plan of Care Effective Dates:  7/24/19 to 10/22/19  Total Treatment Billable Duration:  42 min  PT Patient Time In/Time Out  Time In: 0803  Time Out: 0845  Scot Herndon PT    Future Appointments   Date Time Provider Trinidad Cm   8/15/2019  8:00 AM [facility]   8/19/2019  8:00 AM Jay Jay Billingsley, DPT [facility]   9/17/2019  7:15 AM Mark Montejo MD [facility] HTF HTF   10/16/2019  9:30 AM [facility] LAB PORT CHAIR 1 [facility]   1/7/2020 74:19 AM SFO CT 64 SLICE UNIT 1 SFORCT [facility]   1/17/2020  8:30 AM GCC OUTREACH INSURANCE GCCOIG [facility]   1/17/2020  9:15 AM Kwame Lynne MD [facility]   6/2/2020  8:00 AM HTF LAB RESOURCE [facility] HTF HTF   6/5/2020  9:45 AM Mark Montejo MD [facility] HTF HTF

## 2019-08-15 ENCOUNTER — HOSPITAL ENCOUNTER (OUTPATIENT)
Dept: PHYSICAL THERAPY | Age: 59
Discharge: HOME | End: 2019-08-15
Attending: INTERNAL MEDICINE
Payer: COMMERCIAL

## 2019-08-15 PROCEDURE — 97110 THERAPEUTIC EXERCISES: CPT

## 2019-08-15 NOTE — PROGRESS NOTES
Stacy Matias  : 1960  Primary:   Secondary:  [address]  at [facility]  [address], [address], [place] [address]  [phone]   [phone]        OUTPATIENT PHYSICAL THERAPY: Daily Treatment Note 8/15/2019   ICD-10: Treatment Diagnosis: muscle weakness generalized M 62.81  Precautions/Allergies:   Patient has no known allergies. Fall Risk Score: 0 (? 5 = High Risk)  MD Orders: oncology rehab     Pre-treatment Symptoms/Complaints:  The patient reports she didn't rest well last night. She reports she is still having gout pain and left lower leg pain. Pain: Initial:   4 Post Session: 4 /10   Medications Last Reviewed:  19  Updated Objective Findings:  None Today   TREATMENT:   THERAPEUTIC EXERCISE: (51 minutes):  Exercises per grid below to improve mobility and strength. Required minimal verbal cues to promote proper body breathing techniques. Progressed repetitions as indicated.   Activity  Date   19   Date  19     Date   19       Date  8/15/19        Date       Date        Date          Fatigue Score   weekly  2 0 2-3 4      Resting BP           Heart Rate   Before   After  67  91 82 85  88 81      O2 level Before   After  97  98 98 98  98 97      Machines        Wt/   sets/   reps    Leg Press           Leg Extension           Leg Curl           Dips/Pull Ups           Overhead Press           Compound Row            Low Back Ext            Cardiovascular  Time   Intensity, etc  Time   Intensity, etc  Time   Intensity, etc  Time   Intensity, etc  Time   Intensity, etc  Time   Intensity, etc  Time   Intensity, etc    Upper body ergometer  2) level 1 x 6min 2) level 1 x 6 min 2) level 1 x 6 min 2) level 1 x 7 min      Airdyne bikes           Recumbent Bike           walking 1) 650' x 1  3) 1300' x 1 1) 650' x 1  3) 1300' x 1 1) 650' x 1  3) 1300' x 1 1) 650' x 1  3) 1300' x 1      Nustep  4) level 3 x 15 min 4) level 4 x 15 min 4) level 4 x 15 min 4) level 4 x 15 min      Elliptical           Other Exercises/   Activities        Parameters    Forward flexion, biceps curls, abduction  5) x 10 with 4# 5) x 10 reps with 4# 5) x 10 reps with 4 #      Sit to stand          Counter top pushups             [facility] Portal  Treatment/Session Summary:    · Response to Treatment:  tolerated the treatment well. · Communication/Consultation:  None today  · Equipment provided today:  None today  · Recommendations/Intent for next treatment session: Next visit will focus on advance therapeutic exercises. The patient reports her physician would like for her to continue strengthening.   Treatment Plan of Care Effective Dates:  7/24/19 to 10/22/19  Total Treatment Billable Duration:  51 min  PT Patient Time In/Time Out  Time In: 0803  Time Out: 3421  Emili Barrett PT    Future Appointments   Date Time Provider Trinidad Cm   8/26/2019  8:00 AM Carissa Samuels, PT [facility]   8/29/2019  8:00 AM Nichole Samuels, PT [facility]   9/17/2019  7:15 AM MD KRISSY Rojo HTF HTF   10/16/2019  9:30 AM UOA residential LAB PORT CHAIR 1 [facility]   1/7/2020 24:51 AM SFO CT 64 SLICE UNIT 1 SFORCT [facility]   1/17/2020  8:30 AM GCC OUTREACH INSURANCE GCCOIG [facility]   1/17/2020  9:15 AM Sky Clinton MD [facility]   6/2/2020  8:00 AM HTF LAB RESOURCE [facility] HTF HTF   6/5/2020  9:45 AM Julio Johnson MD [facility] HTF HTF

## 2019-08-19 ENCOUNTER — APPOINTMENT (OUTPATIENT)
Dept: PHYSICAL THERAPY | Age: 59
End: 2019-08-19
Attending: INTERNAL MEDICINE

## 2019-08-26 ENCOUNTER — HOSPITAL ENCOUNTER (OUTPATIENT)
Dept: PHYSICAL THERAPY | Age: 59
Discharge: HOME | End: 2019-08-26
Attending: INTERNAL MEDICINE
Payer: COMMERCIAL

## 2019-08-26 PROCEDURE — 97110 THERAPEUTIC EXERCISES: CPT

## 2019-08-26 NOTE — PROGRESS NOTES
Molly Hoang  : 1960  Primary:   Secondary:  [address] [facility] [address], [place] [address]  [phone]   [phone]        OUTPATIENT PHYSICAL THERAPY: Daily Treatment Note 2019   ICD-10: Treatment Diagnosis: muscle weakness generalized M 62.81  Precautions/Allergies:   Patient has no known allergies. Fall Risk Score: 0 (? 5 = High Risk)  MD Orders: oncology rehab     Pre-treatment Symptoms/Complaints:  The patient reports she is tired. 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 Pain: Initial:   4 Post Session: 4 /10   Medications Last Reviewed:  19  Updated Objective Findings:  None Today   TREATMENT:   THERAPEUTIC EXERCISE: (38 minutes):  Exercises per grid below to improve mobility and strength. Required minimal verbal cues to promote proper body breathing techniques. Progressed repetitions as indicated.   Activity  Date   19   Date  19     Date   19       Date  8/15/19        Date  19       Date        Date          Fatigue Score   weekly  2 0 2-3 4 6       Resting BP           Heart Rate   Before   After  67  91 82 85  88 81 77  92     O2 level Before   After  97  98 98 98  98 97 93  98     Machines        Wt/   sets/   reps    Leg Press           Leg Extension           Leg Curl           Dips/Pull Ups           Overhead Press           Compound Row            Low Back Ext            Cardiovascular  Time   Intensity, etc  Time   Intensity, etc  Time   Intensity, etc  Time   Intensity, etc  Time   Intensity, etc  Time   Intensity, etc  Time   Intensity, etc    Upper body ergometer  2) level 1 x 6min 2) level 1 x 6 min 2) level 1 x 6 min 2) level 1 x 7 min 2) level 1 x 7 min     Airdyne bikes           Recumbent Bike           walking 1) 650' x 1  3) 1300' x 1 1) 650' x 1  3) 1300' x 1 1) 650' x 1  3) 1300' x 1 1) 650' x 1  3) 1300' x 1 1) 650' x 1  3) 1300' x 1     Nustep  4) level 3 x 15 min 4) level 4 x 15 min 4) level 4 x 15 min 4) level 4 x 15 min 4) level 4 x 15 min     Elliptical           Other Exercises/   Activities        Parameters    Forward flexion, biceps curls, abduction  5) x 10 with 4# 5) x 10 reps with 4# 5) x 10 reps with 4 #      Sit to stand          Counter top pushups             [facility]  Treatment/Session Summary:    · Response to Treatment:  tolerated the treatment well. 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 She was able to participate fully. Unable to complete due to arriving late. · Communication/Consultation:  None today  · Equipment provided today:  None today  · Recommendations/Intent for next treatment session: Next visit will focus on advance therapeutic exercises. The patient reports her physician would like for her to continue strengthening.   Treatment Plan of Care Effective Dates:  7/24/19 to 10/22/19  Total Treatment Billable Duration:  38 min  PT Patient Time In/Time Out  Time In: 0806  Time Out: 3589  Sue Baltazar, TONNY    Future Appointments   Date Time Provider Trinidad Cm   8/29/2019  8:00 AM Serg Grimaldo, PT [facility]   9/17/2019  7:15 AM Stella Amaya MD [facility] HTF HTF   10/16/2019  9:30 AM [facility] LAB PORT CHAIR 1 [facility]   1/7/2020 23:92 AM SFO CT 64 SLICE UNIT 1 SFORCT [facility]   1/17/2020  8:30 AM GCC OUTREACH INSURANCE GCCOIG [facility]   1/17/2020  9:15 AM Herbert Luz MD [facility]   6/2/2020  8:00 AM HTF LAB RESOURCE [facility] HTF HTF   6/5/2020  9:45 AM Stella Amaya MD [facility] HTF HTF

## 2019-08-29 ENCOUNTER — HOSPITAL ENCOUNTER (OUTPATIENT)
Dept: PHYSICAL THERAPY | Age: 59
Discharge: HOME | End: 2019-08-29
Attending: INTERNAL MEDICINE
Payer: COMMERCIAL

## 2019-08-29 PROCEDURE — 97110 THERAPEUTIC EXERCISES: CPT

## 2019-08-29 NOTE — PROGRESS NOTES
Victorina Saba  : 1960  Primary:   Secondary:  [address]  at [facility]  [address], [address], [place] [address]  [phone]   [phone]        OUTPATIENT PHYSICAL THERAPY: Daily Treatment Note 2019   ICD-10: Treatment Diagnosis: muscle weakness generalized M 62.81  Precautions/Allergies:   Patient has no known allergies. Fall Risk Score: 0 (? 5 = High Risk)  MD Orders: oncology rehab     Pre-treatment Symptoms/Complaints:  The patient reports she has her cough back today. Pain: Initial:   4 Post Session: 4 /10   Medications Last Reviewed:  19  Updated Objective Findings:  able to increase resistance with Nustep. TREATMENT:   THERAPEUTIC EXERCISE: (38 minutes):  Exercises per grid below to improve mobility and strength. Required minimal verbal cues to promote proper body breathing techniques. Progressed repetitions as indicated.   Activity  Date   19   Date  19     Date   19       Date  8/15/19        Date  19       Date  19        Date          Fatigue Score   weekly  2 0 2-3 4 6   4    Resting BP           Heart Rate   Before   After  67  91 82 85  88 81 77  92 91    O2 level Before   After  97  98 98 98  98 97 93  98 92    Machines        Wt/   sets/   reps    Leg Press           Leg Extension           Leg Curl           Dips/Pull Ups           Overhead Press           Compound Row            Low Back Ext            Cardiovascular  Time   Intensity, etc  Time   Intensity, etc  Time   Intensity, etc  Time   Intensity, etc  Time   Intensity, etc  Time   Intensity, etc  Time   Intensity, etc    Upper body ergometer  2) level 1 x 6min 2) level 1 x 6 min 2) level 1 x 6 min 2) level 1 x 7 min 2) level 1 x 7 min 2) level 1 x 7 min    Airdyne bikes           Recumbent Bike           walking 1) 650' x 1  3) 1300' x 1 1) 650' x 1  3) 1300' x 1 1) 650' x 1  3) 1300' x 1 1) 650' x 1  3) 1300' x 1 1) 650' x 1  3) 1300' x 1 1) 650' x 1  3) 1300' x 1    Nustep  4) level 3 x 15 min 4) level 4 x 15 min 4) level 4 x 15 min 4) level 4 x 15 min 4) level 4 x 15 min 4) level 4 x 15 min    Elliptical           Other Exercises/   Activities        Parameters    Forward flexion, biceps curls, abduction  5) x 10 with 4# 5) x 10 reps with 4# 5) x 10 reps with 4 #      Sit to stand          Counter top pushups             [facility] Portal  Treatment/Session Summary:    · Response to Treatment:  tolerated the treatment well. The patient arrived late and was unable to complete her session. · Communication/Consultation:  None today  · Equipment provided today:  None today  · Recommendations/Intent for next treatment session: Next visit will focus on advance therapeutic exercises. The patient reports her physician would like for her to continue strengthening.   Treatment Plan of Care Effective Dates:  7/24/19 to 10/22/19  Total Treatment Billable Duration:  38 min  PT Patient Time In/Time Out  Time In: 0806  Time Out: 3271  Emili Barrett PT    Future Appointments   Date Time Provider Trinidad Cm   9/4/2019 10:30 AM Wendy Samuels, PT [facility]   9/10/2019  8:00 AM Nichole Samuels, PT [facility]   9/12/2019  8:00 AM Tyrese DUNCAN, PT [facility]   9/17/2019  7:15 AM MD KRISSY Rojo HTF HTF   9/17/2019  9:30 AM Tyrese DUNCAN, PT [facility]   9/19/2019  8:00 AM Tyrese DUNCAN, PT [facility]   9/24/2019  9:00 AM Tyrese DUNCAN, PT [facility]   9/26/2019  1:45 PM Nichole Samuels, PT [facility]   10/16/2019  9:30 AM U[facility] senior living LAB PORT CHAIR 1 [facility]-senior living [facility]   1/7/2020 08:28 AM SFO CT 64 SLICE UNIT 1 [facility]CT [facility]   1/17/2020  8:30 AM GCC OUTREACH INSURANCE GCCOIG [facility]   1/17/2020  9:15 AM Sky Clinton MD [facility] UOA-senior living [facility]   6/2/2020  8:00 AM HTF LAB RESOURCE [facility]   6/5/2020  9:45 AM MD KRISSY Rojo [facility]

## 2019-09-03 ENCOUNTER — PATIENT OUTREACH (OUTPATIENT)
Dept: CASE MANAGEMENT | Age: 59
End: 2019-09-03

## 2019-09-03 NOTE — PROGRESS NOTES
Pt called navigator with c/o fever, pain under breast which she believes may be from her spleen, cough and overall feeling that \"something might be wrong\" (feeling similar to when she was first diagnosed with lymphoma.)  Pt was treatmet at urgent care over the weekend, but she just wanted to make Dr. Lissa Rhoades aware. We will plan to do CT of abd/chest and so some lab work here. Pt notified of lab appt tomorrow and radiology to call her with CT appt.

## 2019-09-04 ENCOUNTER — HOSPITAL ENCOUNTER (OUTPATIENT)
Dept: LAB | Age: 59
Discharge: HOME | End: 2019-09-04
Payer: COMMERCIAL

## 2019-09-04 DIAGNOSIS — C83.30 DIFFUSE LARGE B-CELL LYMPHOMA, UNSPECIFIED BODY REGION (HCC): ICD-10-CM

## 2019-09-04 LAB
ALBUMIN SERPL-MCNC: 3.2 G/DL (ref 3.5–5)
ALBUMIN/GLOB SERPL: 1 {RATIO} (ref 1.2–3.5)
ALP SERPL-CCNC: 114 U/L (ref 50–136)
ALT SERPL-CCNC: 48 U/L (ref 12–65)
ANION GAP SERPL CALC-SCNC: 8 MMOL/L (ref 7–16)
APPEARANCE UR: CLEAR
AST SERPL-CCNC: 20 U/L (ref 15–37)
BASOPHILS # BLD: 0 K/UL (ref 0–0.2)
BASOPHILS NFR BLD: 1 % (ref 0–2)
BILIRUB SERPL-MCNC: 0.4 MG/DL (ref 0.2–1.1)
BILIRUB UR QL: NEGATIVE
BUN SERPL-MCNC: 20 MG/DL (ref 6–23)
CALCIUM SERPL-MCNC: 9.2 MG/DL (ref 8.3–10.4)
CHLORIDE SERPL-SCNC: 107 MMOL/L (ref 98–107)
CHOLEST SERPL-MCNC: 157 MG/DL
CO2 SERPL-SCNC: 28 MMOL/L (ref 21–32)
COLLECTION COMMENT, COLCM: NORMAL
COLOR UR: YELLOW
CREAT SERPL-MCNC: 0.8 MG/DL (ref 0.6–1)
DIFFERENTIAL METHOD BLD: ABNORMAL
EOSINOPHIL # BLD: 0.1 K/UL (ref 0–0.8)
EOSINOPHIL NFR BLD: 2 % (ref 0.5–7.8)
ERYTHROCYTE [DISTWIDTH] IN BLOOD BY AUTOMATED COUNT: 12.3 % (ref 11.9–14.6)
GLOBULIN SER CALC-MCNC: 3.3 G/DL (ref 2.3–3.5)
GLUCOSE SERPL-MCNC: 91 MG/DL (ref 65–100)
GLUCOSE UR STRIP.AUTO-MCNC: NEGATIVE MG/DL
HCT VFR BLD AUTO: 32.3 % (ref 35.8–46.3)
HDLC SERPL-MCNC: 49 MG/DL (ref 40–60)
HDLC SERPL: 3.2 {RATIO}
HGB BLD-MCNC: 10.5 G/DL (ref 11.7–15.4)
HGB UR QL STRIP: NEGATIVE
IMM GRANULOCYTES # BLD AUTO: 0 K/UL (ref 0–0.5)
IMM GRANULOCYTES NFR BLD AUTO: 1 % (ref 0–5)
KETONES UR QL STRIP.AUTO: NEGATIVE MG/DL
LDH SERPL L TO P-CCNC: 174 U/L (ref 100–190)
LDLC SERPL CALC-MCNC: 91.2 MG/DL
LEUKOCYTE ESTERASE UR QL STRIP.AUTO: NEGATIVE
LIPID PROFILE,FLP: NORMAL
LYMPHOCYTES # BLD: 0.5 K/UL (ref 0.5–4.6)
LYMPHOCYTES NFR BLD: 11 % (ref 13–44)
MAGNESIUM SERPL-MCNC: 2.2 MG/DL (ref 1.8–2.4)
MCH RBC QN AUTO: 31.1 PG (ref 26.1–32.9)
MCHC RBC AUTO-ENTMCNC: 32.5 G/DL (ref 31.4–35)
MCV RBC AUTO: 95.6 FL (ref 79.6–97.8)
MONOCYTES # BLD: 0.4 K/UL (ref 0.1–1.3)
MONOCYTES NFR BLD: 8 % (ref 4–12)
NEUTS SEG # BLD: 3.7 K/UL (ref 1.7–8.2)
NEUTS SEG NFR BLD: 78 % (ref 43–78)
NITRITE UR QL STRIP.AUTO: NEGATIVE
NRBC # BLD: 0 K/UL (ref 0–0.2)
PH UR STRIP: 5.5 [PH] (ref 5–9)
PLATELET # BLD AUTO: 143 K/UL (ref 150–450)
PMV BLD AUTO: 10.1 FL (ref 9.4–12.3)
POTASSIUM SERPL-SCNC: 4.1 MMOL/L (ref 3.5–5.1)
PROT SERPL-MCNC: 6.5 G/DL (ref 6.3–8.2)
PROT UR STRIP-MCNC: NEGATIVE MG/DL
RBC # BLD AUTO: 3.38 M/UL (ref 4.05–5.25)
SODIUM SERPL-SCNC: 143 MMOL/L (ref 136–145)
SP GR UR REFRACTOMETRY: 1.02 (ref 1–1.02)
TRIGL SERPL-MCNC: 84 MG/DL (ref 35–150)
TSH SERPL DL<=0.005 MIU/L-ACNC: 0.87 UIU/ML (ref 0.36–3.74)
UROBILINOGEN UR QL STRIP.AUTO: 0.2 EU/DL (ref 0.2–1)
VLDLC SERPL CALC-MCNC: 16.8 MG/DL (ref 6–23)
WBC # BLD AUTO: 4.8 K/UL (ref 4.3–11.1)

## 2019-09-04 PROCEDURE — 83615 LACTATE (LD) (LDH) ENZYME: CPT

## 2019-09-04 PROCEDURE — 80053 COMPREHEN METABOLIC PANEL: CPT

## 2019-09-04 PROCEDURE — 81003 URINALYSIS AUTO W/O SCOPE: CPT

## 2019-09-04 PROCEDURE — 80061 LIPID PANEL: CPT

## 2019-09-04 PROCEDURE — 85025 COMPLETE CBC W/AUTO DIFF WBC: CPT

## 2019-09-04 PROCEDURE — 83735 ASSAY OF MAGNESIUM: CPT

## 2019-09-04 PROCEDURE — 84443 ASSAY THYROID STIM HORMONE: CPT

## 2019-09-06 ENCOUNTER — HOSPITAL ENCOUNTER (OUTPATIENT)
Dept: CT IMAGING | Age: 59
Discharge: HOME | End: 2019-09-06
Attending: INTERNAL MEDICINE

## 2019-09-06 DIAGNOSIS — C83.30 DIFFUSE LARGE B-CELL LYMPHOMA, UNSPECIFIED BODY REGION (HCC): ICD-10-CM

## 2019-09-06 RX ORDER — SODIUM CHLORIDE 0.9 % (FLUSH) 0.9 %
10 SYRINGE (ML) INJECTION
Status: COMPLETED | OUTPATIENT
Start: 2019-09-06 | End: 2019-09-06

## 2019-09-06 RX ADMIN — Medication 10 ML: at 13:43

## 2019-09-10 ENCOUNTER — PATIENT OUTREACH (OUTPATIENT)
Dept: CASE MANAGEMENT | Age: 59
End: 2019-09-10

## 2019-09-10 ENCOUNTER — HOSPITAL ENCOUNTER (OUTPATIENT)
Dept: PHYSICAL THERAPY | Age: 59
Discharge: HOME | End: 2019-09-10
Attending: INTERNAL MEDICINE
Payer: COMMERCIAL

## 2019-09-10 PROCEDURE — 97110 THERAPEUTIC EXERCISES: CPT

## 2019-09-10 NOTE — PROGRESS NOTES
Kalpesh aMria Esther  : 1960  Primary:   Secondary:  [address]  at [facility]  [address], [address], [place] [address]  [phone]   [phone]        OUTPATIENT PHYSICAL THERAPY: Daily Treatment Note 9/10/2019   ICD-10: Treatment Diagnosis: muscle weakness generalized M 62.81  Precautions/Allergies:   Patient has no known allergies. Fall Risk Score: 0 (? 5 = High Risk)  MD Orders: oncology rehab     Pre-treatment Symptoms/Complaints:  The patient reports she had a scan Friday and is waiting on those results. She reports she has had a round of antibiotics for a cough and fever. Pain: Initial:   4 Post Session: 4 /10   Medications Last Reviewed:  9/10/19  Updated Objective Findings:  as below   TREATMENT:   THERAPEUTIC EXERCISE: (43 minutes):  Exercises per grid below to improve mobility and strength. Required minimal verbal cues to promote proper body breathing techniques. Progressed repetitions as indicated.   Activity  Date   19   Date  19     Date   19       Date  8/15/19        Date  19       Date  19        Date   9/10/19       Fatigue Score   weekly  2 0 2-3 4 6   4 2   Resting BP           Heart Rate   Before   After  67  91 82 85  88 81 77  92 91 78  103   O2 level Before   After  97  98 98 98  98 97 93  98 92 96  97   Machines        Wt/   sets/   reps    Leg Press           Leg Extension           Leg Curl           Dips/Pull Ups           Overhead Press           Compound Row            Low Back Ext            Cardiovascular  Time   Intensity, etc  Time   Intensity, etc  Time   Intensity, etc  Time   Intensity, etc  Time   Intensity, etc  Time   Intensity, etc  Time   Intensity, etc    Upper body ergometer  2) level 1 x 6min 2) level 1 x 6 min 2) level 1 x 6 min 2) level 1 x 7 min 2) level 1 x 7 min 2) level 1 x 7 min 2) level 1 x 7   Airdyne bikes           Recumbent Bike           walking 1) 650' x 1  3) 1300' x 1 1) 650' x 1  3) 1300' x 1 1) 650' x 1  3) 1300' x 1 1) 650' x 1  3) 1300' x 1 1) 650' x 1  3) 1300' x 1 1) 650' x 1  3) 1300' x 1 1) 650' x 1  3) 1300' x 1   Nustep  4) level 3 x 15 min 4) level 4 x 15 min 4) level 4 x 15 min 4) level 4 x 15 min 4) level 4 x 15 min 4) level 4 x 15 min 4) level 4 x 15 min  Spm 68  Mets 2.1   Elliptical           Other Exercises/   Activities        Parameters    Forward flexion, biceps curls, abduction  5) x 10 with 4# 5) x 10 reps with 4# 5) x 10 reps with 4 #      Sit to stand          Counter top pushups             [facility] Portal  Treatment/Session Summary:    · Response to Treatment:  tolerated the treatment well. The patient is fighting an infection and feels less than optimal today. · Communication/Consultation:  None today  · Equipment provided today:  None today  · Recommendations/Intent for next treatment session: Next visit will focus on advance therapeutic exercises. The patient reports her physician would like for her to continue strengthening.   Treatment Plan of Care Effective Dates:  7/24/19 to 10/22/19  Total Treatment Billable Duration:  43 min  PT Patient Time In/Time Out  Time In: 0802  Time Out: 0845  Dalton Fischer PT    Future Appointments   Date Time Provider Trinidad Cm   9/12/2019  8:00 AM [facility]   9/17/2019  7:15 AM MD KRISSY Smith HTF HTF   9/17/2019  9:30 AM Lenny DUNCAN, [facility]   9/19/2019  8:00 AM Nichole [facility]els, [facility]   9/24/2019  9:00 AM Lenny DUNCAN, [facility]   9/26/2019  1:45 PM Nichole Samuels, PT [facility]   10/16/2019  9:30 AM [facility] LAB PORT CHAIR 1 [facility]   1/7/2020 56:95 AM SFO CT 64 SLICE UNIT 1 SFORCT [facility]   1/17/2020  8:30 AM GCC OUTREACH INSURANCE GCCOIG [facility]   1/17/2020  9:15 AM Brittany Lewis MD [facility]   6/2/2020  8:00 AM HTF LAB RESOURCE [facility] HTF HTF   6/5/2020  9:45 AM Akanksha Jacobs MD [facility] HTF HTF

## 2019-09-10 NOTE — PROGRESS NOTES
Script for Levaquin sent to pt's pharmacy for pneumonia as seen on CT. Pt aware and verbalized understanding of instructions.

## 2019-09-12 ENCOUNTER — HOSPITAL ENCOUNTER (OUTPATIENT)
Dept: PHYSICAL THERAPY | Age: 59
Discharge: HOME | End: 2019-09-12
Attending: INTERNAL MEDICINE
Payer: COMMERCIAL

## 2019-09-12 NOTE — PROGRESS NOTES
The patient presents having been diagnosed with pneumonia. She is currently on an antibiotic. We will hold oncology rehab today and resume next week.

## 2019-09-17 ENCOUNTER — HOSPITAL ENCOUNTER (OUTPATIENT)
Dept: PHYSICAL THERAPY | Age: 59
Discharge: HOME | End: 2019-09-17
Attending: INTERNAL MEDICINE
Payer: COMMERCIAL

## 2019-09-17 PROCEDURE — 97110 THERAPEUTIC EXERCISES: CPT

## 2019-09-17 NOTE — PROGRESS NOTES
Marcus Mary  : 1960  Primary:   Secondary:  [address]  at [address]  [address], [address], [facility]  [phone]   [phone]        OUTPATIENT PHYSICAL THERAPY: Daily Treatment Note 2019   ICD-10: Treatment Diagnosis: muscle weakness generalized M 62.81  Precautions/Allergies:   Patient has no known allergies. Fall Risk Score: 0 (? 5 = High Risk)  MD Orders: oncology rehab     Pre-treatment Symptoms/Complaints:  The patient reports she finished her antibiotic yesterday and feels pretty good. Pain: Initial:   2 Post Session:  /10   Medications Last Reviewed:  19  Updated Objective Findings:  as below   TREATMENT:   THERAPEUTIC EXERCISE: (45 minutes):  Exercises per grid below to improve mobility and strength. Required minimal verbal cues to promote proper body breathing techniques. Progressed repetitions as indicated.   Activity  Date   19        Date        Fatigue Score   weekly  0         Resting BP           Heart Rate   Before   After  74  112           O2 level Before   After  98  97           Machines        Wt/   sets/   reps    Leg Press           Leg Extension           Leg Curl           Dips/Pull Ups           Overhead Press           Compound Row            Low Back Ext            Cardiovascular  Time   Intensity, etc  Time   Intensity, etc  Time   Intensity, etc  Time   Intensity, etc  Time   Intensity, etc  Time   Intensity, etc  Time   Intensity, etc    Upper body ergometer  2) level 1 x 6min         Airdyne bikes           Recumbent Bike           walking 1) 650' x 1  3) 1300' x 1  6) 1300' x 1         Nustep  4) level 3 x 15 min         Elliptical           Other Exercises/   Activities        Parameters    Forward flexion, biceps curls, abduction 5) x 10 rep with 4#         Sit to stand          Counter top pushups             [employer] Portal  Treatment/Session Summary:    · Response to Treatment:  tolerated the treatment well. The patient is fighting an infection and feels less than optimal today. · Communication/Consultation:  None today  · Equipment provided today:  None today  · Recommendations/Intent for next treatment session: Next visit will focus on advance therapeutic exercises. The patient reports her physician would like for her to continue strengthening.   Treatment Plan of Care Effective Dates:  7/24/19 to 10/22/19  Total Treatment Billable Duration:  45 min  PT Patient Time In/Time Out  Time In: 0926  Time Out: TONNY Betancur    Future Appointments   Date Time Provider Trinidad Cm   9/19/2019  8:00 AM Nichole Samuels, PT [facility]   9/24/2019  9:00 AM Nichole Samuels, PT [facility]   9/26/2019  1:45 PM Nichole Samuels, PT [facility]   10/16/2019  9:30 AM [facility] PORT CHAIR 1 [facility]   1/7/2020 21:07 AM SFO CT 64 SLICE UNIT 1 SFORCT [facility]   1/17/2020  8:30 AM GCC OUTREACH INSURANCE GCCOIG G[facility]   1/17/2020  9:15 AM Judith Hager MD [facility]   6/2/2020  8:00 AM HTF LAB RESOURCE [facility] HTF HT[facility]   6/5/2020  9:45 AM Jhony Gibson MD [facility] HTF HTF

## 2019-09-19 ENCOUNTER — HOSPITAL ENCOUNTER (OUTPATIENT)
Dept: PHYSICAL THERAPY | Age: 59
Discharge: HOME | End: 2019-09-19
Attending: INTERNAL MEDICINE
Payer: COMMERCIAL

## 2019-09-19 PROCEDURE — 97110 THERAPEUTIC EXERCISES: CPT

## 2019-09-24 ENCOUNTER — HOSPITAL ENCOUNTER (OUTPATIENT)
Dept: PHYSICAL THERAPY | Age: 59
Discharge: HOME | End: 2019-09-24
Attending: INTERNAL MEDICINE
Payer: COMMERCIAL

## 2019-09-24 PROCEDURE — 97110 THERAPEUTIC EXERCISES: CPT

## 2019-09-24 NOTE — PROGRESS NOTES
Ti Arriaga  : 1960  Primary:   Secondary:  [address]  at [facility]  [address], [address], [place] [address]  [phone]   [phone]        OUTPATIENT PHYSICAL THERAPY: Daily Treatment Note 2019   ICD-10: Treatment Diagnosis: muscle weakness generalized M 62.81  Precautions/Allergies:   Patient has no known allergies. Fall Risk Score: 0 (? 5 = High Risk)  MD Orders: oncology rehab     Pre-treatment Symptoms/Complaints:  The patient reports she has a mild cough this morning. Pain: Initial:   0 Post Session: 0 /10   Medications Last Reviewed:  19  Updated Objective Findings:  as below   TREATMENT:   THERAPEUTIC EXERCISE: (41 minutes):  Exercises per grid below to improve mobility and strength. Required minimal verbal cues to promote proper body breathing techniques. Progressed repetitions as indicated.   Activity  Date   19   Date  19 Date  19    Date        Fatigue Score   weekly  0 0 0       Resting BP           Heart Rate   Before   After  74  112   78 75  86       O2 level Before   After  98  97   96 97  97         Machines        Wt/   sets/   reps    Leg Press           Leg Extension           Leg Curl           Dips/Pull Ups           Overhead Press           Compound Row            Low Back Ext            Cardiovascular  Time   Intensity, etc  Time   Intensity, etc  Time   Intensity, etc  Time   Intensity, etc  Time   Intensity, etc  Time   Intensity, etc  Time   Intensity, etc    Upper body ergometer  2) level 1 x 6min 2) level 1 x 6 min 2) level 1 x 6 min       Airdyne bikes           Recumbent Bike           walking 1) 650' x 1  3) 1300' x 1  6) 1300' x 1 1) 650' x 1  3) 1300' x 1 1) 650' x 1  3) 1300' x 1       Nustep  4) level 3 x 15 min 4) level 4 x 15 min 4) level 4 x 15 min       Up on toes, hip abduction, hip extension  7) x 10 reps        Other Exercises/   Activities        Parameters    Forward flexion, biceps curls, abduction 5) x 10 rep with 4# 5) x 10 reps with 4# 5) x 10 reps with 4#       Sit to stand          Counter top pushups  6) x 10 reps               [employer] Portal  Treatment/Session Summary:    · Response to Treatment:  tolerated the treatment well. The patient reports she feels her gout returning. · Communication/Consultation:  None today  · Equipment provided today:  None today  · Recommendations/Intent for next treatment session: Next visit will focus on advance therapeutic exercises. The patient reports her physician would like for her to continue strengthening.   Treatment Plan of Care Effective Dates:  7/24/19 to 10/22/19  Total Treatment Billable Duration:  41 min  PT Patient Time In/Time Out  Time In: 7481  Time Out: 3202  Maurice Terry, TONNY    Future Appointments   Date Time Provider Trinidad Cm   10/1/2019  8:00 AM Nichole Samuels, PT [facility]   10/3/2019  8:00 AM Nichole Samuels, PT [facility]   10/16/2019  9:30 AM [facility] LAB PORT CHAIR 1 [facility]   1/7/2020 32:84 AM SFO CT 64 SLICE UNIT 1 SFORCT [facility]   1/17/2020  8:30 AM GCC OUTREACH INSURANCE GCCOIG [facility]   1/17/2020  9:15 AM Salome Heart [facility]   6/2/2020  8:00 AM HTF LAB RESOURCE [facility] HTF HTF   6/5/2020  9:45 AM Jaime Simpson MD [facility] HTF HTF

## 2019-09-26 ENCOUNTER — APPOINTMENT (OUTPATIENT)
Dept: PHYSICAL THERAPY | Age: 59
End: 2019-09-26
Attending: INTERNAL MEDICINE
Payer: COMMERCIAL

## 2019-10-01 ENCOUNTER — HOSPITAL ENCOUNTER (OUTPATIENT)
Dept: PHYSICAL THERAPY | Age: 59
Discharge: HOME | End: 2019-10-01
Attending: INTERNAL MEDICINE
Payer: COMMERCIAL

## 2019-10-01 PROCEDURE — 97110 THERAPEUTIC EXERCISES: CPT

## 2019-10-01 NOTE — PROGRESS NOTES
Gerry Gutierrez  : 1960  Primary:   Secondary:  [address]  at [facility]  [address], [address], [place] [address]  [phone]   [phone]        OUTPATIENT PHYSICAL THERAPY: Daily Treatment Note 10/1/2019   ICD-10: Treatment Diagnosis: muscle weakness generalized M 62.81  Precautions/Allergies:   Patient has no known allergies. Fall Risk Score: 0 (? 5 = High Risk)  MD Orders: oncology rehab     Pre-treatment Symptoms/Complaints:  The patient reports she is doing well. Pain: Initial:   0 Post Session: 0 /10   Medications Last Reviewed:  19  Updated Objective Findings:  as below   TREATMENT:   THERAPEUTIC EXERCISE: (41 minutes):  Exercises per grid below to improve mobility and strength. Required minimal verbal cues to promote proper body breathing techniques. Progressed repetitions as indicated.   Activity  Date   19   Date  19 Date  19 Date  10/1/19   Date        Fatigue Score   weekly  0 0 0       Resting BP           Heart Rate   Before   After  74  112   78 75  86 66      O2 level Before   After  98  97   96 97  97   99      Machines        Wt/   sets/   reps    Leg Press           Leg Extension           Leg Curl           Dips/Pull Ups           Overhead Press           Compound Row            Low Back Ext            Cardiovascular  Time   Intensity, etc  Time   Intensity, etc  Time   Intensity, etc  Time   Intensity, etc  Time   Intensity, etc  Time   Intensity, etc  Time   Intensity, etc    Upper body ergometer  2) level 1 x 6min 2) level 1 x 6 min 2) level 1 x 6 min 2) level 1 x 6 min      Airdyne bikes           Recumbent Bike           walking 1) 650' x 1  3) 1300' x 1  6) 1300' x 1 1) 650' x 1  3) 1300' x 1 1) 650' x 1  3) 1300' x 1 1) 650' x 1  4) 1300' x 1      Nustep  4) level 3 x 15 min 4) level 4 x 15 min 4) level 4 x 15 min 3) level 4 x 15 min      Up on toes, hip abduction, hip extension  7) x 10 reps        Other Exercises/   Activities        Parameters    Forward flexion, biceps curls, abduction 5) x 10 rep with 4# 5) x 10 reps with 4# 5) x 10 reps with 4# 5) x 10 reps with 4#      Sit to stand          Counter top pushups  6) x 10 reps               [employer] Portal  Treatment/Session Summary:    · Response to Treatment:  tolerated the treatment well. The patient will soon be ready to transition to Healthy self. · Communication/Consultation:  None today  · Equipment provided today:  None today  · Recommendations/Intent for next treatment session: Next visit will focus on advance therapeutic exercises. The patient reports her physician would like for her to continue strengthening.   Treatment Plan of Care Effective Dates:  7/24/19 to 10/22/19  Total Treatment Billable Duration:  41 min  PT Patient Time In/Time Out  Time In: 0807  Time Out: 0848  Zack Vital PT    Future Appointments   Date Time Provider Trinidad Cm   10/3/2019  8:00 AM Nichole Samuels, PT [facility]   10/7/2019  8:00 AM Nichole Samuels, PT [facility]   10/16/2019  9:30 AM UOA skilled nursing LAB PORT CHAIR 1 [facility] UOA-skilled nursing [facility]   10/21/2019  8:00 AM Nichole Samuels, PT [facility]   1/7/2020 13:60 AM SFO CT 64 SLICE UNIT 1 SFORCT [facility]   1/17/2020  8:30 AM GCC OUTREACH INSURANCE GCCOIG GVL [facility]   1/17/2020  9:15 AM Neo Ng MD [facility] UOA-skilled nursing [facility]   6/2/2020  8:00 AM HTF LAB RESOURCE [facility] HTF HTF   6/5/2020  9:45 AM Wally Schultz MD SSA HTF HTF

## 2019-10-03 ENCOUNTER — HOSPITAL ENCOUNTER (OUTPATIENT)
Dept: PHYSICAL THERAPY | Age: 59
Discharge: HOME | End: 2019-10-03
Attending: INTERNAL MEDICINE
Payer: COMMERCIAL

## 2019-10-03 PROCEDURE — 97110 THERAPEUTIC EXERCISES: CPT

## 2019-10-03 NOTE — PROGRESS NOTES
Fabrizio Ann  : 1960  Primary:   Secondary:  [address]  at [facility]  [address], [address], [place] [address]  [phone]   [phone]        OUTPATIENT PHYSICAL THERAPY: Daily Treatment Note 10/3/2019   ICD-10: Treatment Diagnosis: muscle weakness generalized M 62.81  Precautions/Allergies:   Patient has no known allergies. Fall Risk Score: 0 (? 5 = High Risk)  MD Orders: oncology rehab     Pre-treatment Symptoms/Complaints:  The patient reports she is doing well. Pain: Initial:   0 Post Session: 0 /10   Medications Last Reviewed:  10/3/19  Updated Objective Findings:  as below   TREATMENT:   THERAPEUTIC EXERCISE: (45 minutes):  Exercises per grid below to improve mobility and strength. Required minimal verbal cues to promote proper body breathing techniques. Progressed repetitions as indicated.   Activity  Date   19   Date  19 Date  19 Date  10/1/19 DATE  10/3/19  Date        Fatigue Score   weekly  0 0 0  4  0     Resting BP           Heart Rate   Before   After  74  112   78 75  86 66 78  85     O2 level Before   After  98  97   96 97  97   99 99  98     Machines        Wt/   sets/   reps    Leg Press           Leg Extension           Leg Curl           Dips/Pull Ups           Overhead Press           Compound Row            Low Back Ext            Cardiovascular  Time   Intensity, etc  Time   Intensity, etc  Time   Intensity, etc  Time   Intensity, etc  Time   Intensity, etc  Time   Intensity, etc  Time   Intensity, etc    Upper body ergometer  2) level 1 x 6min 2) level 1 x 6 min 2) level 1 x 6 min 2) level 1 x 6 min 2) level 1 x 6 min     Airdyne bikes           Recumbent Bike           walking 1) 650' x 1  3) 1300' x 1  6) 1300' x 1 1) 650' x 1  3) 1300' x 1 1) 650' x 1  3) 1300' x 1 1) 650' x 1  4) 1300' x 1 1) 650' x 1  4) 1300' x 1     Nustep  4) level 3 x 15 min 4) level 4 x 15 min 4) level 4 x 15 min 3) level 4 x 15 min 3) level 4 x 15 min     Up on toes, hip abduction, hip extension  7) x 10 reps        Other Exercises/   Activities        Parameters    Forward flexion, biceps curls, abduction 5) x 10 rep with 4# 5) x 10 reps with 4# 5) x 10 reps with 4# 5) x 10 reps with 4# 5) x 10 reps with 4#     Sit to stand          Counter top pushups  6) x 10 reps               [employer] Portal  Treatment/Session Summary:    · Response to Treatment:  tolerated the treatment well. The patient will soon be ready to transition to Healthy self. · Communication/Consultation:  None today  · Equipment provided today:  None today  · Recommendations/Intent for next treatment session: Next visit will focus on advance therapeutic exercises. The patient reports her physician would like for her to continue strengthening.   Treatment Plan of Care Effective Dates:  7/24/19 to 10/22/19  Total Treatment Billable Duration:  45 min  PT Patient Time In/Time Out  Time In: 0803  Time Out: 0848  Thuan Bar, TONNY    Future Appointments   Date Time Provider Trinidad Cm   10/7/2019  8:00 AM Nichole Samuels, PT [facility]   10/16/2019  9:30 AM UOA intermediate LAB PORT CHAIR 1 SSA UOA-intermediate [facility]   10/21/2019  8:00 AM Nichole Samuels, PT [facility]   1/7/2020 08:13 AM SFO CT 64 SLICE UNIT 1 [facility]   1/17/2020  8:30 AM GCC OUTREACH INSURANCE GCCOIG [facility]   1/17/2020  9:15 AM Nathaly Cordova MD [facility] UOA-intermediate [facility]   6/2/2020  8:00 AM HTF LAB RESOURCE [facility] HTF HTF   6/5/2020  9:45 AM Angela Hobson MD [facility] HTF HTF

## 2019-10-07 ENCOUNTER — HOSPITAL ENCOUNTER (OUTPATIENT)
Dept: PHYSICAL THERAPY | Age: 59
Discharge: HOME | End: 2019-10-07
Attending: INTERNAL MEDICINE
Payer: COMMERCIAL

## 2019-10-07 PROCEDURE — 97110 THERAPEUTIC EXERCISES: CPT

## 2019-10-07 NOTE — PROGRESS NOTES
Edilson Tatiana  : 1960  Primary:   Secondary:  [address]  at [facility]  [address], [address], [place] [address]  [phone]   [phone]        OUTPATIENT PHYSICAL THERAPY: Daily Treatment Note 10/7/2019   ICD-10: Treatment Diagnosis: muscle weakness generalized M 62.81  Precautions/Allergies:   Patient has no known allergies. Fall Risk Score: 0 (? 5 = High Risk)  MD Orders: oncology rehab     Pre-treatment Symptoms/Complaints:  The patient reports she is doing well. Pain: Initial:   0 Post Session: 0 /10   Medications Last Reviewed:  10/3/19  Updated Objective Findings:  as below   TREATMENT:   THERAPEUTIC EXERCISE: (42 minutes):  Exercises per grid below to improve mobility and strength. Required minimal verbal cues to promote proper body breathing techniques. Progressed repetitions as indicated.   Activity  Date   19   Date  19 Date  19 Date  10/1/19 DATE  10/3/19 Date   10/7/19 Date        Fatigue Score   weekly  0 0 0  4  0 0    Resting BP           Heart Rate   Before   After  74  112   78 75  86 66 78  85 81  97    O2 level Before   After  98  97   96 97  97   99 99  98 98  98    Machines        Wt/   sets/   reps    Leg Press           Leg Extension           Leg Curl           Dips/Pull Ups           Overhead Press           Compound Row            Low Back Ext            Cardiovascular  Time   Intensity, etc  Time   Intensity, etc  Time   Intensity, etc  Time   Intensity, etc  Time   Intensity, etc  Time   Intensity, etc  Time   Intensity, etc    Upper body ergometer  2) level 1 x 6min 2) level 1 x 6 min 2) level 1 x 6 min 2) level 1 x 6 min 2) level 1 x 6 min 2) level 1 x 6 min    Airdyne bikes           Recumbent Bike           walking 1) 650' x 1  3) 1300' x 1  6) 1300' x 1 1) 650' x 1  3) 1300' x 1 1) 650' x 1  3) 1300' x 1 1) 650' x 1  4) 1300' x 1 1) 650' x 1  4) 1300' x 1 1) 650' x 1  4) 1300' x 1    Nustep  4) level 3 x 15 min 4) level 4 x 15 min 4) level 4 x 15 min 3) level 4 x 15 min 3) level 4 x 15 min 3) level 4 x 15 min    Up on toes, hip abduction, hip extension  7) x 10 reps        Other Exercises/   Activities        Parameters    Forward flexion, biceps curls, abduction 5) x 10 rep with 4# 5) x 10 reps with 4# 5) x 10 reps with 4# 5) x 10 reps with 4# 5) x 10 reps with 4#     Sit to stand          Counter top pushups  6) x 10 reps               [facility] Portal  Treatment/Session Summary:    · Response to Treatment:  tolerated the treatment well. The patient will soon be ready to transition to Healthy self. · Communication/Consultation:  None today  · Equipment provided today:  None today  · Recommendations/Intent for next treatment session: Next visit will focus on advance therapeutic exercises. The patient reports her physician would like for her to continue strengthening.   Treatment Plan of Care Effective Dates:  7/24/19 to 10/22/19  Total Treatment Billable Duration:  42 min  PT Patient Time In/Time Out  Time In: 0804  Time Out: 3521  Mai Kennedy PT    Future Appointments   Date Time Provider Trinidad Cm   10/16/2019  9:30 AM UOA shelter LAB PORT CHAIR 1 [facility] UOA-shelter [facility]   10/21/2019  8:00 AM Nichole Samuels, PT [facility]   1/7/2020 35:86 AM SFO CT 64 SLICE UNIT 1 SFORCT [facility]   1/17/2020  8:30 AM GCC OUTREACH INSURANCE GCCOIG [facility]   1/17/2020  9:15 AM Steve Nassar MD [facility] UOA-shelter [facility]   6/2/2020  8:00 AM HTF LAB RESOURCE [facility] HTF HTF   6/5/2020  9:45 AM Nataliya Salcido MD [facility] HTF HTF

## 2019-10-21 ENCOUNTER — HOSPITAL ENCOUNTER (OUTPATIENT)
Dept: PHYSICAL THERAPY | Age: 59
Discharge: HOME | End: 2019-10-21
Attending: INTERNAL MEDICINE
Payer: COMMERCIAL

## 2019-10-21 PROCEDURE — 97110 THERAPEUTIC EXERCISES: CPT

## 2019-10-21 NOTE — THERAPY DISCHARGE
Mark Mcmahon  : 1960  Primary: [facility]*  Secondary:  [address]  at [facility]  [address], [address], [place] [address]  [phone]   [phone]          OUTPATIENT PHYSICAL THERAPY:Discharge    ICD-10: Treatment Diagnosis: muscle weakness generalized M 62.81  Precautions/Allergies:   Patient has no known allergies. Fall Risk Score: 0 (? 5 = High Risk)  MD Orders: oncology rehab MEDICAL/REFERRING DIAGNOSIS:  Diffuse large B-cell lymphoma, unspecified site [C83.30]    DATE OF ONSET: 3/30/17  REFERRING PHYSICIAN: Brandy Villa MD  RETURN PHYSICIAN APPOINTMENT:      INITIAL ASSESSMENT:  Ms. Everett Barrow presents following treatment of lymphoma. She previously was a participant with oncology rehab. She has undergone bilateral cataract surgery, back surgery x 2 and has had 2 broken toes. She returns with overall deconditioning and decreased strength. She will benefit from therapeutic exercises to improve strength and activity tolerance. She is currently being treated at the pain center for lumbar pain. 19:  The patient has attended a total of 9 visits. She has not attended since 3/6/19 due to a death in the family out of state. She has returned to resume oncology rehab. She would like to transition to Healthy Self in 2-3 weeks. She has regressed due to her absence. We will resume strengthening and conditioning.  19: The patient has attended a total of 18 visits. She has made progress towards her goals. She will transition to Healthy Self after she sees her oncologist again. She has improved her 6 minute walk and strength. She is walking at home. We will continue with strengthening and transition her to Healthy Self soon. 19: The patient has attended a total of 29 visits. She returns today having been gone for a week on vacation. She is steadily losing weight. Her activity level is greatly improved.   She continues to improve in overall conditioning and strength. She will benefit from continue therapeutic exercise. 10/21/19: The patient has met goals and is being transitioned to Healthy Self for medical exercise. She has attended a total of 42 visits. PROBLEM LIST (Impacting functional limitations):  1. Decreased Strength  2. Increased Pain  3. Decreased Activity Tolerance  4. Increased Fatigue  5. Increased Shortness of Breath  6. Decreased [place] with Home Exercise Program INTERVENTIONS PLANNED:  1. Home Exercise Program (HEP)  2. Therapeutic Exercise/Strengthening   TREATMENT PLAN:  Effective Dates: 7/24/19 TO 10/22/19 (90 days). Frequency/Duration: no further visits. .    GOALS: (Goals have been discussed and agreed upon with patient.)  Short-Term Functional Goals: Time Frame: 4 weeks  1. The patient will be independent with HEP for strength within 4 weeks. Met    2. The patient will gain 1/2 grade strength of all 4 extremities within 4 weeks. Met   3. The patient will report walking 10 minutes twice daily within 4 weeks. Met   Discharge Goals: Time Frame: 8 weeks  1. The patient will improve her 6 minute walk by 165' x 1 within 8 weeks. Met   2. The patient will report a fatigue level of 3 or less within 8 weeks. Met   3. The patient will transition to Healthy Self within 12 weeks. Met   Rehabilitation Potential For Stated Goals: Good  Regarding Ignacia Barrientos's therapy, I certify that the treatment plan above will be carried out by a therapist or under their direction. Thank you for this referral,  Sean Greene, PT                 The information in this section was collected on 10/21/19 (except where otherwise noted).   HISTORY:   History of Present Injury/Illness (Reason for Referral):  Post lymphoma treatment, lumbar pain, lumbar surgery x 2, fatigue, weakness     Past Medical History/Comorbidities:   Ms. Makenzie Giles  has a past medical history of Anemia, Basal cell carcinoma, Cardiomegaly, Deep vein thrombosis (DVT) (HCC), History of stroke, History of UTI, Hypertension, Marginal zone lymphoma ([address] (03/30/2017), Morbid obesity ([address] Osteoarthritis, Overactive bladder, Primary hypothyroidism, Prolapse of female bladder, acquired, Radiation therapy complication, Rheumatic fever, S/P total knee replacement using cement (10/3/2011), Shingles, and Superficial venous thrombosis of right arm (5/1/2017). She also has no past medical history of Aneurysm ([address] Arrhythmia, Asthma, Autoimmune disease ([address] CAD (coronary artery disease), Chronic kidney disease, Coagulation defects, COPD, Diabetes ([address] Difficult intubation, GERD (gastroesophageal reflux disease), Heart failure ([address] Liver disease, Malignant hyperthermia due to anesthesia, Nausea & vomiting, Pseudocholinesterase deficiency, Psychiatric disorder, PUD (peptic ulcer disease), Seizures ([address] Stroke ([address] or Unspecified sleep apnea. Ms. Sahil Regan  has a past surgical history that includes pr anesth,achilles tendon surg (Left, 02/10/2011); hx cholecystectomy (1983); hx vascular access; hx knee replacement (Left, 2013); hx knee replacement (Right, 2012); hx cataract removal (Bilateral, 2018); and hx colonoscopy (03/2017). Past Medical History:   Diagnosis Date    Anemia     Basal cell carcinoma     Cardiomegaly     Deep vein thrombosis (DVT) (HCC)     History of stroke     History of UTI     Hypertension     Marginal zone lymphoma ([address] 03/30/2017    Diffuse Large B-cell Lymphoma.  Completed Chemotherapy 5/28/18    Morbid obesity ([address]     Osteoarthritis     Overactive bladder     Primary hypothyroidism     Prolapse of female bladder, acquired     Radiation therapy complication     Rheumatic fever     S/P total knee replacement using cement 10/3/2011    Shingles     Superficial venous thrombosis of right arm 5/1/2017     Past Surgical History:   Procedure Laterality Date    HX CATARACT REMOVAL Bilateral 2018    HX CHOLECYSTECTOMY  1983    HX COLONOSCOPY  03/2017    Clear    HX KNEE REPLACEMENT Left 2013    Dr. Ricardo Body Right 2012    Dr. Curtis Mccain Left 02/10/2011    Dr. Trey Draper        Ambulatory/Rehab Services H2 Model Falls Risk Assessment  1/28/19    Risk Factors:       No Risk Factors Identified Ability to Rise from Chair:       (0)  Ability to rise in a single movement    Falls Prevention Plan:       No modifications necessary   Total: (5 or greater = High Risk): 0    ©2010 [facility] [place]. All Rights Reserved. [facility] [employer] Patent #[id number]. Federal Law prohibits the replication, distribution or use without written permission from [facility] of [address] [place] [address] Road:     lives with spouse  Prior Level of Function/Work/Activity:    Dominant Side:         RIGHT  Current Medications:       Current Outpatient Medications:     montelukast (SINGULAIR) 10 mg tablet, Take 1 Tab by mouth daily. , Disp: 90 Tab, Rfl: 3    levothyroxine (SYNTHROID) 175 mcg tablet, Take 1 Tab by mouth Daily (before breakfast). , Disp: 90 Tab, Rfl: 3    mirabegron ER (MYRBETRIQ) 25 mg ER tablet, Take 1 Tab by mouth daily. , Disp: 90 Tab, Rfl: 3    levoFLOXacin (LEVAQUIN) 500 mg tablet, Take 1 Tab by mouth daily. , Disp: 7 Tab, Rfl: 0    triamterene-hydroCHLOROthiazide (MAXZIDE) 75-50 mg per tablet, Take 0.5-1 Tabs by mouth daily as needed (qam prn swelling). , Disp: 60 Tab, Rfl: 3    cetirizine (ZYRTEC) 10 mg tablet, Take 1 Tab by mouth daily. , Disp: 30 Tab, Rfl: 5    magnesium oxide (MAG-OX) 400 mg tablet, Take 1 Tab by mouth two (2) times a day. (Patient taking differently: Take 400 mg by mouth two (2) times a week.), Disp: 60 Tab, Rfl: 1    pregabalin (LYRICA) 100 mg capsule, Take 1 Cap by mouth three (3) times daily. Max Daily Amount: 300 mg.  (Patient taking differently: Take 100 mg by mouth two (2) times a day.), Disp: 270 Cap, Rfl: 1   Date Last Reviewed:  10/21/19       EXAMINATION:   ROM:          Within functional limits  Strength:          Bilateral upper extremities grossly 4+/5 x 2 extremities  Bilateral lower extremities 4+/5 x 2 extremities    Balance:          Intact  Skin Integrity:          intact  Edema/Girth:  pitting    Left Right    Initial Most Recent Initial Most Recent   Upper  Extremity           Lower  Extremity               Body Structures Involved:  1. Muscles Body Functions Affected:  1. Sensory/Pain  2. Neuromusculoskeletal Activities and Participation Affected:  1. None   Number of elements (examined above) that affect the Plan of Care: 3: MODERATE COMPLEXITY   CLINICAL PRESENTATION:   Presentation: Stable and uncomplicated: LOW COMPLEXITY   CLINICAL DECISION MAKING:   Outcome Measure: Tool Used: 6-MINUTE WALK TEST  Score:  Initial: 1445' feet Most Recent: 1600 feet (Date: 8/6/19 )   Interpretation of Score: Normal range varies but is approximately 4861-6251 Feet      Distance walked: 1600 feet               Baseline End of Test   Heart Rate 68 124   Dyspnea (Luther Scale)     Fatigue (Luther Scale) 4 4   SpO2 97 93   BP       Tool Used: TINETTI  Score:  Initial:   Gait: 11/12  Balance: 16/16  TOTAL: 27/28 Most Recent:  Gait: 12/12  Balance: 16/16  TOTAL:28 /28         Tool Used: Timed Up and Go (TUG)  Score:  Initial: 7 seconds Most Recent: 6 seconds (Date: 8/6/19 )   Interpretation of Score: The test measures, in seconds, the time taken by an individual to stand up from a standard arm chair (seat height 46 cm [18 in], arm height 65 cm [25.6 in]), walk a distance of 3 meters (118 in, approx 10 ft), turn, walk back to the chair and sit down. If the individual takes longer than 14 seconds to complete TUG, this indicates risk for falls.                             Tool Used: ECOG Performance Survey Score  Score:  Initial: 1 Most Recent:  0    Interpretation of Score:   0 Fully active, able to carry on all pre-disease performance without restriction   1 Restricted in physically strenuous activity but ambulatory and able to carry out work of a light or sedentary nature, e.g., light house work, office work   2 Ambulatory and capable of all selfcare but unable to carry out any work activities. Up and about more than 50% of waking hours   3 Capable of only limited selfcare, confined to bed or chair more than 50% of waking hours   4 Completely disabled. Cannot carry on any selfcare. Totally confined to bed or chair   5 Dead     Medical Necessity:   · Patient is expected to demonstrate progress in strength and overall conditionoing to increase independence with household activity. Reason for Services/Other Comments:  · Patient continues to demonstrate capacity to improve strength and overall conditionoing which will increase independence.    Use of outcome tool(s) and clinical judgement create a POC that gives a: Clear prediction of patient's progress: LOW COMPLEXITY               PT Patient Time In/Time Out  Time In: 0805  Time Out: 0845    Thang Medley, PT

## 2019-10-21 NOTE — PROGRESS NOTES
Jennifer Hackett  : 1960  Primary:   Secondary:  [address]  at [facility]  [address], [address], [place]  [phone]   [phone]        OUTPATIENT PHYSICAL THERAPY: Daily Treatment Note 10/21/2019   ICD-10: Treatment Diagnosis: muscle weakness generalized M 62.81  Precautions/Allergies:   Patient has no known allergies. Fall Risk Score: 0 (? 5 = High Risk)  MD Orders: oncology rehab     Pre-treatment Symptoms/Complaints:  The patient reports she is doing well. She is ready to transition to Healthy Self. Pain: Initial:   0 Post Session: 0 /10   Medications Last Reviewed:  10/3/19  Updated Objective Findings:  as below   TREATMENT:   THERAPEUTIC EXERCISE: (40 minutes):  Exercises per grid below to improve mobility and strength. Required minimal verbal cues to promote proper body breathing techniques. Progressed repetitions as indicated.   Activity  Date   19   Date  19 Date  19 Date  10/1/19 DATE  10/3/19 Date   10/7/19 Date   10/21/19       Fatigue Score   weekly  0 0 0  4  0 0 2   Resting BP           Heart Rate   Before   After  74  112   78 75  86 66 78  85 81  97 70  86   O2 level Before   After  98  97   96 97  97   99 99  98 98  98 98  98   Machines        Wt/   sets/   reps    Leg Press           Leg Extension           Leg Curl           Dips/Pull Ups           Overhead Press           Compound Row            Low Back Ext            Cardiovascular  Time   Intensity, etc  Time   Intensity, etc  Time   Intensity, etc  Time   Intensity, etc  Time   Intensity, etc  Time   Intensity, etc  Time   Intensity, etc    Upper body ergometer  2) level 1 x 6min 2) level 1 x 6 min 2) level 1 x 6 min 2) level 1 x 6 min 2) level 1 x 6 min 2) level 1 x 6 min 2) level 1 x 6 min   Airdyne bikes           Recumbent Bike           walking 1) 650' x 1  3) 1300' x 1  6) 1300' x 1 1) 650' x 1  3) 1300' x 1 1) 650' x 1  3) 1300' x 1 1) 650' x 1  4) 1300' x 1 1) 650' x 1  4) 1300' x 1 1) 650' x 1  4) 1300' x 1 1) 650' x 1  4) 1300' x 1   Nustep  4) level 3 x 15 min 4) level 4 x 15 min 4) level 4 x 15 min 3) level 4 x 15 min 3) level 4 x 15 min 3) level 4 x 15 min 3) level 4 x 15 min   Up on toes, hip abduction, hip extension  7) x 10 reps        Other Exercises/   Activities        Parameters    Forward flexion, biceps curls, abduction 5) x 10 rep with 4# 5) x 10 reps with 4# 5) x 10 reps with 4# 5) x 10 reps with 4# 5) x 10 reps with 4#  5) x 10 reps with 4#   Sit to stand          Counter top pushups  6) x 10 reps               [employer] Portal  Treatment/Session Summary:    · Response to Treatment:  tolerated the treatment well. Transition to Healthy self.   · Communication/Consultation:  None today  · Equipment provided today:  None today  · Recommendations/Intent for next treatment session: discharge to [address] [place] [address] Effective Dates:  7/24/19 to 10/22/19  Total Treatment Billable Duration:  40 min  PT Patient Time In/Time Out  Time In: 0805  Time Out: 0845  Thuan Bar PT    Future Appointments   Date Time Provider Trinidad Cm   1/7/2020 46:80 AM [facility] [address] SLICE UNIT 1 [facility]   1/17/2020  8:30 AM [facility] OUTREACH INSURANCE GCCOIG GVL [facility]   1/17/2020  9:15 AM Nathaly Cordova MD [facility]   6/2/2020  8:00 AM [facility] LAB RESOURCE [facility]   6/5/2020  9:45 AM Angela Hobson MD [facility]

## 2019-12-05 NOTE — PROGRESS NOTES
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 TREATMENT PLAN:  Effective Dates: 2019 TO 2019 (90 days). Frequency/Duration: 1 time a week for 90 Day(s) MEDICAL/REFERRING DIAGNOSIS:  Urinary incontinence, unspecified type [R32]   DATE OF ONSET: Chronic  REFERRING PHYSICIAN: Salome Heart MD MD Orders: Evaluate and Treat  Return MD Appointment: 3 months   DISCHARGE ASSESSMENT: Devan Eason has been seen in physical therapy from 19 to 19 for 4 visits. Treatment has been discontinued at this time due to patient failing to return for additional treatment. Thank you for this referral.       INITIAL ASSESSMENT:  Ms. Jerome Nixon presents to physical therapy with urgency, frequency, and leakage. Patient demonstrates increased tightness and tone to pelvic floor muscles, decreased strength and coordination to muscles, and prolapse of bladder was felt upon evaluation. Patient would benefit from skilled physical therapy to address her deficits and maximize her function. GOALS: (Goals have been discussed and agreed upon with patient.) GOALS NOT MET  1. Patient will verbalize an understanding of pelvic anatomy and causes of incontinence   2. Patient will demonstrate I with basic PFM HEP to improve awareness, coordination, and timing of PFM  3.  Patient will demonstrate \"the knack\" to eliminate UI during cough/sneeze  4. Patient will demonstrate 10 quick flicks of the pelvic floor muscle group, without compensation, to implement urge suppression appropriately with urgency of urination and decrease the number of pads used per day. 5. Patient will demonstrate decreased accessory muscle activity during PFM contraction  6. Pt with demonstrate normal voluntary relaxation of the pelvic floor muscle group to improve pelvic floor ROM and decrease pain. 7. Pt will independently perform proper toilet position   8. Pt will decrease urinary leakage episodes to 1 per day  9. Pt will decrease pad usage to 1/day  10. Pt will delay voiding for 60 minutes      Outcome Measure:   Pelvic Floor Impact Questionnaire (PFIQ-7)  Score (out of 300) Initial:   Bladder/urinary: 0  Bowel/rectum: 80  Vagina/pelvis: 33  Total: 114 Most Recent:      Interpretation of Score: This survey asks questions concerning certain bowel, bladder, or pelvic symptoms and how much these symptoms interfere with daily activities. Each section is scored on a 0-3 scale, 3 representing the greatest disability. The scores of each section (out of 100) are added together for a total score out of 300.     Anival Faith DPT

## 2019-12-18 ENCOUNTER — APPOINTMENT (RX ONLY)
Dept: URBAN - METROPOLITAN AREA CLINIC 349 | Facility: CLINIC | Age: 59
Setting detail: DERMATOLOGY
End: 2019-12-18

## 2019-12-18 DIAGNOSIS — Z85.828 PERSONAL HISTORY OF OTHER MALIGNANT NEOPLASM OF SKIN: ICD-10-CM

## 2019-12-18 DIAGNOSIS — Z41.9 ENCOUNTER FOR PROCEDURE FOR PURPOSES OTHER THAN REMEDYING HEALTH STATE, UNSPECIFIED: ICD-10-CM

## 2019-12-18 DIAGNOSIS — L57.0 ACTINIC KERATOSIS: ICD-10-CM

## 2019-12-18 DIAGNOSIS — L81.4 OTHER MELANIN HYPERPIGMENTATION: ICD-10-CM

## 2019-12-18 PROBLEM — Z85.79 PERSONAL HISTORY OF OTHER MALIGNANT NEOPLASMS OF LYMPHOID, HEMATOPOIETIC AND RELATED TISSUES: Status: ACTIVE | Noted: 2019-12-18

## 2019-12-18 PROBLEM — D48.5 NEOPLASM OF UNCERTAIN BEHAVIOR OF SKIN: Status: ACTIVE | Noted: 2019-12-18

## 2019-12-18 PROCEDURE — ? COUNSELING

## 2019-12-18 PROCEDURE — ? RECOMMENDATIONS

## 2019-12-18 PROCEDURE — ? DEFER

## 2019-12-18 PROCEDURE — ? LIQUID NITROGEN

## 2019-12-18 PROCEDURE — ? OTHER

## 2019-12-18 PROCEDURE — 99213 OFFICE O/P EST LOW 20 MIN: CPT | Mod: 25

## 2019-12-18 PROCEDURE — 17000 DESTRUCT PREMALG LESION: CPT

## 2019-12-18 ASSESSMENT — LOCATION ZONE DERM
LOCATION ZONE: SCALP
LOCATION ZONE: FACE

## 2019-12-18 ASSESSMENT — LOCATION DETAILED DESCRIPTION DERM
LOCATION DETAILED: LEFT MEDIAL FRONTAL SCALP
LOCATION DETAILED: LEFT SUPERIOR LATERAL MALAR CHEEK
LOCATION DETAILED: RIGHT INFERIOR CENTRAL MALAR CHEEK
LOCATION DETAILED: LEFT CHIN
LOCATION DETAILED: LEFT SUPERIOR MEDIAL FOREHEAD
LOCATION DETAILED: LEFT INFERIOR CENTRAL MALAR CHEEK

## 2019-12-18 ASSESSMENT — LOCATION SIMPLE DESCRIPTION DERM
LOCATION SIMPLE: CHIN
LOCATION SIMPLE: LEFT FOREHEAD
LOCATION SIMPLE: LEFT SCALP
LOCATION SIMPLE: RIGHT CHEEK
LOCATION SIMPLE: LEFT CHEEK

## 2019-12-18 NOTE — PROCEDURE: DEFER
Introduction Text (Please End With A Colon): Procedure to be performed  shave removal
Instructions (Optional): Patient would like to do after the holidays
Detail Level: Detailed

## 2019-12-18 NOTE — HPI: SKIN LESION
How Severe Is Your Skin Lesion?: mild
Has Your Skin Lesion Been Treated?: not been treated
Is This A New Presentation, Or A Follow-Up?: Skin Lesion
Which Family Member (Optional)?: Father
Additional History: Patient is here to have a spot on her scalp looked at. Patient states it is irritated and flakey. Patient also has some brown spots on her face that she would like looked at and possible treated. Patient denies any growing, bleeding or changing. Patient denies personal history of melanoma but has a family history. Patient has a personal history of non melanoma skin cancers as well.

## 2019-12-18 NOTE — PROCEDURE: RECOMMENDATIONS
Detail Level: Zone
Recommendations (Free Text): Tinkle razors and laser hair removal. Patient to call to schedule.

## 2019-12-18 NOTE — PROCEDURE: OTHER
Detail Level: Zone
Note Text (......Xxx Chief Complaint.): This diagnosis correlates with the
Other (Free Text): Patient would like to biopsy after the holidays. Advised patient to follow up in a month or less.
Other (Free Text): Maryjo recommended IPL. Patient will schedule with the .

## 2019-12-18 NOTE — PROCEDURE: LIQUID NITROGEN
Render Note In Bullet Format When Appropriate: No
Detail Level: Detailed
Consent: The patient's consent was obtained including but not limited to risks of crusting, scabbing, blistering, scarring, darker or lighter pigmentary change, recurrence, incomplete removal and infection.
Post-Care Instructions: I reviewed with the patient in detail post-care instructions. Patient is to wear sunprotection, and avoid picking at any of the treated lesions. Pt may apply Vaseline to crusted or scabbing areas.
Duration Of Freeze Thaw-Cycle (Seconds): 3
Number Of Freeze-Thaw Cycles: 1 freeze-thaw cycle

## 2020-01-07 ENCOUNTER — HOSPITAL ENCOUNTER (OUTPATIENT)
Dept: CT IMAGING | Age: 60
Discharge: HOME | End: 2020-01-07
Attending: INTERNAL MEDICINE

## 2020-01-07 DIAGNOSIS — C83.39 DIFFUSE LARGE B-CELL LYMPHOMA OF SOLID ORGAN EXCLUDING SPLEEN (HCC): ICD-10-CM

## 2020-01-07 RX ORDER — HEPARIN 100 UNIT/ML
500 SYRINGE INTRAVENOUS AS NEEDED
Status: DISCONTINUED | OUTPATIENT
Start: 2020-01-07 | End: 2020-01-11 | Stop reason: HOSPADM

## 2020-01-07 RX ORDER — SODIUM CHLORIDE 0.9 % (FLUSH) 0.9 %
10 SYRINGE (ML) INJECTION
Status: ACTIVE | OUTPATIENT
Start: 2020-01-07 | End: 2020-01-07

## 2020-01-07 RX ADMIN — HEPARIN 500 UNITS: 100 SYRINGE at 10:33

## 2020-01-07 NOTE — PROGRESS NOTES
Port accessed with #20 gauge 3/4 inch Spear needle, site w/o redness or swelling, has good blood return. Stat creatinine drawn. Pt tolerated w/o complaints.

## 2020-01-07 NOTE — PROGRESS NOTES
Port de-accessed, past being flushed with 20cc of normal saline and 5cc of heparin flush. Site w/o redness or swelling, has good bld return. Pressure dressing applied with 2x2 and tape. Pt tolerated procedure w/o any complaints.

## 2020-01-08 ENCOUNTER — APPOINTMENT (RX ONLY)
Dept: URBAN - METROPOLITAN AREA CLINIC 349 | Facility: CLINIC | Age: 60
Setting detail: DERMATOLOGY
End: 2020-01-08

## 2020-01-08 DIAGNOSIS — Z41.9 ENCOUNTER FOR PROCEDURE FOR PURPOSES OTHER THAN REMEDYING HEALTH STATE, UNSPECIFIED: ICD-10-CM

## 2020-01-08 PROCEDURE — ? COSMETIC CONSULTATION: IPL

## 2020-01-08 ASSESSMENT — LOCATION DETAILED DESCRIPTION DERM
LOCATION DETAILED: LEFT INFERIOR CENTRAL MALAR CHEEK
LOCATION DETAILED: RIGHT INFERIOR CENTRAL MALAR CHEEK

## 2020-01-08 ASSESSMENT — LOCATION SIMPLE DESCRIPTION DERM
LOCATION SIMPLE: RIGHT CHEEK
LOCATION SIMPLE: LEFT CHEEK

## 2020-01-08 ASSESSMENT — LOCATION ZONE DERM: LOCATION ZONE: FACE

## 2020-01-15 ENCOUNTER — APPOINTMENT (RX ONLY)
Dept: URBAN - METROPOLITAN AREA CLINIC 349 | Facility: CLINIC | Age: 60
Setting detail: DERMATOLOGY
End: 2020-01-15

## 2020-01-15 DIAGNOSIS — L57.0 ACTINIC KERATOSIS: ICD-10-CM

## 2020-01-15 DIAGNOSIS — Z85.828 PERSONAL HISTORY OF OTHER MALIGNANT NEOPLASM OF SKIN: ICD-10-CM

## 2020-01-15 PROBLEM — D48.5 NEOPLASM OF UNCERTAIN BEHAVIOR OF SKIN: Status: ACTIVE | Noted: 2020-01-15

## 2020-01-15 PROBLEM — D04.4 CARCINOMA IN SITU OF SKIN OF SCALP AND NECK: Status: ACTIVE | Noted: 2020-01-15

## 2020-01-15 PROCEDURE — ? BIOPSY BY SHAVE METHOD

## 2020-01-15 PROCEDURE — ? COUNSELING

## 2020-01-15 PROCEDURE — 99213 OFFICE O/P EST LOW 20 MIN: CPT | Mod: 25

## 2020-01-15 PROCEDURE — 17003 DESTRUCT PREMALG LES 2-14: CPT

## 2020-01-15 PROCEDURE — 88305 TISSUE EXAM BY PATHOLOGIST: CPT

## 2020-01-15 PROCEDURE — 17000 DESTRUCT PREMALG LESION: CPT | Mod: 59

## 2020-01-15 PROCEDURE — ? PATHOLOGY BILLING

## 2020-01-15 PROCEDURE — ? LIQUID NITROGEN

## 2020-01-15 PROCEDURE — A4550 SURGICAL TRAYS: HCPCS

## 2020-01-15 PROCEDURE — ? OTHER

## 2020-01-15 PROCEDURE — 11102 TANGNTL BX SKIN SINGLE LES: CPT

## 2020-01-15 ASSESSMENT — LOCATION DETAILED DESCRIPTION DERM
LOCATION DETAILED: LEFT SUPERIOR MEDIAL FOREHEAD
LOCATION DETAILED: LEFT MEDIAL FRONTAL SCALP
LOCATION DETAILED: LEFT SUPERIOR PARIETAL SCALP

## 2020-01-15 ASSESSMENT — LOCATION SIMPLE DESCRIPTION DERM
LOCATION SIMPLE: LEFT FOREHEAD
LOCATION SIMPLE: SCALP
LOCATION SIMPLE: LEFT SCALP

## 2020-01-15 ASSESSMENT — LOCATION ZONE DERM
LOCATION ZONE: FACE
LOCATION ZONE: SCALP

## 2020-01-15 NOTE — PROCEDURE: BIOPSY BY SHAVE METHOD
Destruction After The Procedure: No
Electrodesiccation And Curettage Text: The wound bed was treated with electrodesiccation and curettage after the biopsy was performed.
Electrodesiccation Text: The wound bed was treated with electrodesiccation after the biopsy was performed.
Silver Nitrate Text: The wound bed was treated with silver nitrate after the biopsy was performed.
Accession #: [id number]
Wound Care: Vaseline
Anesthesia Type: 2% lidocaine with epinephrine
Depth Of Biopsy: dermis
Path Notes (To The Dermatopathologist): Check margins
Dressing: pressure dressing
Cryotherapy Text: The wound bed was treated with cryotherapy after the biopsy was performed.
Was A Bandage Applied: Yes
Additional Anesthesia Volume In Cc (Will Not Render If 0): 0
Notification Instructions: Patient will be notified of biopsy results. However, patient instructed to call the office if not contacted within 2 weeks.
Detail Level: Detailed
Hemostasis: Electrodesiccation
Curettage Text: The wound bed was treated with curettage after the biopsy was performed.
Type Of Destruction Used: Curettage
Biopsy Type: H and E
Biopsy Method: Dermablade
Billing Type: Third-Party Bill
Post-Care Instructions: I reviewed with the patient in detail post-care instructions. Patient is to keep the biopsy site dry overnight, and then apply bacitracin twice daily until healed. Patient may apply hydrogen peroxide soaks to remove any crusting.
Consent: Written consent was obtained and risks were reviewed including but not limited to scarring, infection, bleeding, scabbing, incomplete removal, nerve damage and allergy to anesthesia.
Anesthesia Volume In Cc (Will Not Render If 0): 0.3

## 2020-01-15 NOTE — PROCEDURE: OTHER
Other (Free Text): Patient concerned given history of recent skin cancer on scalp. Says it’s growing rapidly.
Note Text (......Xxx Chief Complaint.): This diagnosis correlates with the
Detail Level: Zone

## 2020-01-15 NOTE — PROCEDURE: LIQUID NITROGEN
Render Post-Care Instructions In Note?: no
Duration Of Freeze Thaw-Cycle (Seconds): 3
Consent: The patient's consent was obtained including but not limited to risks of crusting, scabbing, blistering, scarring, darker or lighter pigmentary change, recurrence, incomplete removal and infection.
Post-Care Instructions: I reviewed with the patient in detail post-care instructions. Patient is to wear sunprotection, and avoid picking at any of the treated lesions. Pt may apply Vaseline to crusted or scabbing areas.
Number Of Freeze-Thaw Cycles: 1 freeze-thaw cycle
Detail Level: Detailed

## 2020-01-15 NOTE — PROCEDURE: PATHOLOGY BILLING
Immunohistochemistry ([id number] and [id number]) billing is not performed here. Please use the Immunohistochemistry Stain Billing plan to accomplish this. Immunohistochemistry ([id number] and [id number]) billing is not performed here. Please use the Immunohistochemistry Stain Billing plan to accomplish this.

## 2020-01-17 ENCOUNTER — PATIENT OUTREACH (OUTPATIENT)
Dept: CASE MANAGEMENT | Age: 60
End: 2020-01-17

## 2020-01-17 ENCOUNTER — HOSPITAL ENCOUNTER (OUTPATIENT)
Dept: LAB | Age: 60
Discharge: HOME | End: 2020-01-17
Payer: COMMERCIAL

## 2020-01-17 DIAGNOSIS — C83.30 DIFFUSE LARGE B-CELL LYMPHOMA, UNSPECIFIED BODY REGION (HCC): ICD-10-CM

## 2020-01-17 DIAGNOSIS — C85.88 MARGINAL ZONE LYMPHOMA OF LYMPH NODES OF MULTIPLE SITES (HCC): ICD-10-CM

## 2020-01-17 DIAGNOSIS — C83.39 DIFFUSE LARGE B-CELL LYMPHOMA OF SOLID ORGAN EXCLUDING SPLEEN (HCC): ICD-10-CM

## 2020-01-17 LAB
ALBUMIN SERPL-MCNC: 3.9 G/DL (ref 3.5–5)
ALBUMIN/GLOB SERPL: 1.6 {RATIO} (ref 1.2–3.5)
ALP SERPL-CCNC: 96 U/L (ref 50–136)
ALT SERPL-CCNC: 29 U/L (ref 12–65)
ANION GAP SERPL CALC-SCNC: 5 MMOL/L (ref 7–16)
AST SERPL-CCNC: 9 U/L (ref 15–37)
BASOPHILS # BLD: 0 K/UL (ref 0–0.2)
BASOPHILS NFR BLD: 1 % (ref 0–2)
BILIRUB SERPL-MCNC: 0.6 MG/DL (ref 0.2–1.1)
BUN SERPL-MCNC: 24 MG/DL (ref 6–23)
CALCIUM SERPL-MCNC: 9 MG/DL (ref 8.3–10.4)
CHLORIDE SERPL-SCNC: 106 MMOL/L (ref 98–107)
CO2 SERPL-SCNC: 29 MMOL/L (ref 21–32)
CREAT SERPL-MCNC: 0.87 MG/DL (ref 0.6–1)
DIFFERENTIAL METHOD BLD: ABNORMAL
EOSINOPHIL # BLD: 0.1 K/UL (ref 0–0.8)
EOSINOPHIL NFR BLD: 3 % (ref 0.5–7.8)
ERYTHROCYTE [DISTWIDTH] IN BLOOD BY AUTOMATED COUNT: 12.8 % (ref 11.9–14.6)
GLOBULIN SER CALC-MCNC: 2.5 G/DL (ref 2.3–3.5)
GLUCOSE SERPL-MCNC: 102 MG/DL (ref 65–100)
HCT VFR BLD AUTO: 36.7 % (ref 35.8–46.3)
HGB BLD-MCNC: 12.3 G/DL (ref 11.7–15.4)
IMM GRANULOCYTES # BLD AUTO: 0 K/UL (ref 0–0.5)
IMM GRANULOCYTES NFR BLD AUTO: 0 % (ref 0–5)
LDH SERPL L TO P-CCNC: 214 U/L (ref 100–190)
LYMPHOCYTES # BLD: 0.8 K/UL (ref 0.5–4.6)
LYMPHOCYTES NFR BLD: 15 % (ref 13–44)
MAGNESIUM SERPL-MCNC: 2.1 MG/DL (ref 1.8–2.4)
MCH RBC QN AUTO: 31.1 PG (ref 26.1–32.9)
MCHC RBC AUTO-ENTMCNC: 33.5 G/DL (ref 31.4–35)
MCV RBC AUTO: 92.9 FL (ref 79.6–97.8)
MONOCYTES # BLD: 0.4 K/UL (ref 0.1–1.3)
MONOCYTES NFR BLD: 8 % (ref 4–12)
NEUTS SEG # BLD: 3.7 K/UL (ref 1.7–8.2)
NEUTS SEG NFR BLD: 73 % (ref 43–78)
NRBC # BLD: 0 K/UL (ref 0–0.2)
PLATELET # BLD AUTO: 128 K/UL (ref 150–450)
PMV BLD AUTO: 10.1 FL (ref 9.4–12.3)
POTASSIUM SERPL-SCNC: 3.9 MMOL/L (ref 3.5–5.1)
PROT SERPL-MCNC: 6.4 G/DL (ref 6.3–8.2)
RBC # BLD AUTO: 3.95 M/UL (ref 4.05–5.25)
SODIUM SERPL-SCNC: 140 MMOL/L (ref 136–145)
T4 SERPL-MCNC: 12.4 UG/DL (ref 4.8–13.9)
TSH SERPL DL<=0.005 MIU/L-ACNC: 2.37 UIU/ML (ref 0.36–3.74)
WBC # BLD AUTO: 5.1 K/UL (ref 4.3–11.1)

## 2020-01-17 PROCEDURE — 84443 ASSAY THYROID STIM HORMONE: CPT

## 2020-01-17 PROCEDURE — 85025 COMPLETE CBC W/AUTO DIFF WBC: CPT

## 2020-01-17 PROCEDURE — 83735 ASSAY OF MAGNESIUM: CPT

## 2020-01-17 PROCEDURE — 84436 ASSAY OF TOTAL THYROXINE: CPT

## 2020-01-17 PROCEDURE — 80053 COMPREHEN METABOLIC PANEL: CPT

## 2020-01-17 PROCEDURE — 83615 LACTATE (LD) (LDH) ENZYME: CPT

## 2020-01-17 NOTE — PROGRESS NOTES
Pt was seen and labs reviewed by Dr. Wing Zafar. Pt was told that her CT scan looked good. Pt is not having any issues related to heme. We will plan to repeat a CT in 6 months and she will return to clinic to see Dr. Wing Zafar with labs and for CT results. Navigation is signing off at this time.

## 2020-01-22 ENCOUNTER — APPOINTMENT (RX ONLY)
Dept: URBAN - METROPOLITAN AREA CLINIC 349 | Facility: CLINIC | Age: 60
Setting detail: DERMATOLOGY
End: 2020-01-22

## 2020-01-22 DIAGNOSIS — L82.1 OTHER SEBORRHEIC KERATOSIS: ICD-10-CM

## 2020-01-22 PROBLEM — D04.4 CARCINOMA IN SITU OF SKIN OF SCALP AND NECK: Status: ACTIVE | Noted: 2020-01-22

## 2020-01-22 PROCEDURE — 99213 OFFICE O/P EST LOW 20 MIN: CPT | Mod: 24,25

## 2020-01-22 PROCEDURE — A4550 SURGICAL TRAYS: HCPCS

## 2020-01-22 PROCEDURE — ? COUNSELING

## 2020-01-22 PROCEDURE — ? CURETTAGE AND DESTRUCTION

## 2020-01-22 PROCEDURE — 17272 DSTR MAL LES S/N/H/F/G 1.1-2: CPT | Mod: 79

## 2020-01-22 ASSESSMENT — LOCATION ZONE DERM
LOCATION ZONE: TRUNK
LOCATION ZONE: ARM

## 2020-01-22 ASSESSMENT — LOCATION SIMPLE DESCRIPTION DERM
LOCATION SIMPLE: RIGHT UPPER BACK
LOCATION SIMPLE: LEFT SHOULDER

## 2020-01-22 ASSESSMENT — LOCATION DETAILED DESCRIPTION DERM
LOCATION DETAILED: RIGHT MEDIAL UPPER BACK
LOCATION DETAILED: LEFT ANTERIOR SHOULDER

## 2020-01-22 NOTE — PROCEDURE: CURETTAGE AND DESTRUCTION
Number Of Curettages: 3
Total Volume (Ccs): 1
Post-Care Instructions: I reviewed with the patient in detail post-care instructions. Patient is to keep the area dry for 48 hours, and not to engage in any swimming until the area is healed. Should the patient develop any fevers, chills, bleeding, severe pain patient will contact the office immediately.
Additional Information: (Optional): The wound was cleaned, and a pressure dressing was applied.  The patient received detailed post-op instructions.
Anesthesia Type: 2% lidocaine with epinephrine
Add Intralesional Injection: No
Cautery Type: electrodesiccation
Biopsy Photograph Reviewed: Yes
Size Of Lesion In Cm: 1.4
What Was Performed First?: Curettage
Detail Level: Detailed
Anesthesia Volume In Cc: 0.5
Consent was obtained from the patient. The risks, benefits and alternatives to therapy were discussed in detail. Specifically, the risks of infection, scarring, bleeding, prolonged wound healing, nerve injury, incomplete removal, allergy to anesthesia and recurrence were addressed. Alternatives to ED&C, such as: surgical removal and XRT were also discussed.  Prior to the procedure, the treatment site was clearly identified and confirmed by the patient. All components of Universal Protocol/PAUSE Rule completed.
Bill As A Line Item Or As Units: Line Item

## 2020-04-16 NOTE — PROGRESS NOTES
Makayla Hill  : 1960  Primary: [address] [facility]  Secondary:  [address]  at [facility]  [address], [address], [place] [address]  [phone]   [phone]          OUTPATIENT PHYSICAL THERAPY:Daily Note    ICD-10: Treatment Diagnosis: muscle weakness generalized M 62.81  Precautions/Allergies:   Review of patient's allergies indicates no known allergies. Fall Risk Score: 0 (? 5 = High Risk)  MD Orders: oncology rehab MEDICAL/REFERRING DIAGNOSIS:  Other specified types of non-hodgkin lymphoma, lymph nodes of multiple sites [C85.88]    DATE OF ONSET: 3/30/17  REFERRING PHYSICIAN: Romel Willson MD  RETURN PHYSICIAN APPOINTMENT: 18     INITIAL ASSESSMENT:  Ms. Jailyn Warner presents following treatment of lymphoma. She previously was a participant with oncology rehab. She stopped when she resumed chemo. She returns with overall deconditioning and decreased strength. She will benefit from therapeutic exercises to improve strength and activity tolerance. She is currently being treated at the pain center for lumbar pain. She returns to then July 3. She has developed cataracts and is in need of these being removed. Due to the poor eyesight her gait is slightly altered. PROBLEM LIST (Impacting functional limitations):  1. Decreased Strength  2. Increased Pain  3. Decreased Activity Tolerance  4. Increased Fatigue  5. Increased Shortness of Breath  6. Decreased Lewis with Home Exercise Program INTERVENTIONS PLANNED:  1. Home Exercise Program (HEP)  2. Therapeutic Exercise/Strengthening   TREATMENT PLAN:  Effective Dates: 2018 TO 2018 (90 days). Frequency/Duration: 2 times a week for 90 Days  GOALS: (Goals have been discussed and agreed upon with patient.)  Short-Term Functional Goals: Time Frame: 4 weeks  1. The patient will be independent with HEP for strength within 4 weeks.   2. The patient will gain 1/2 grade strength of all Subjective


Date Seen by a Provider:  2020


Time Seen by a Provider:  08:30





Objective


Exam





Vital Signs








  Date Time  Temp Pulse Resp B/P (MAP) Pulse Ox O2 Delivery O2 Flow Rate FiO2


 


20 06:45 37.3 137 18 124/78 (93) 100   


 


20 06:00 36.9 131 18 128/59 (82) 100   


 


20 05:00  119 18 119/76 (90) 100   


 


20 04:04  92 20 122/79 (93) 100   


 


20 04:00  119 18 119/76 (90) 100   


 


20 03:00  100 18 114/69 (84) 100   


 


20 02:00  75 20 115/70 (85) 100   


 


20 01:00  71 18 101/63 (76) 100   


 


20 00:00 36.2 83 20 114/63 (80) 100   


 


4/15/20 23:00  90  105/67 (80)   98.00 


 


4/15/20 22:00  104  118/56 (76)    


 


4/15/20 21:00 36.6 90  119/70 (86)    


 


4/15/20 20:00  96  126/80 (95)    


 


4/15/20 18:40  103  125/61 (82)  Room Air  


 


4/15/20 17:30  96  107/57 (74)  Room Air  


 


4/15/20 17:00    115/83 (94)  Room Air  


 


4/15/20 16:30  91  123/85 (98)  Room Air  


 


4/15/20 16:20  97  116/78 (91)    


 


4/15/20 16:00 37.4 92  118/83 (95)    


 


4/15/20 15:43  92  111/71 (84) 98 Room Air  


 


4/15/20 15:20  93 18 116/78 (91)  Room Air  


 


4/15/20 14:50  77  115/77 (90)    


 


4/15/20 14:00 36.8 90 16  97 Room Air  


 


4/15/20 14:00 36.8 99 18 122/83 (96) 97 Room Air  














I & O 


 


 20





 07:00


 


Intake Total 1000 ml


 


Balance 1000 ml





Capillary Refill : Less Than 3 Seconds





Results


Lab


Laboratory Tests


4/15/20 15:25: 


White Blood Count 10.9, Red Blood Count 4.30L, Hemoglobin 13.5, Hematocrit 40, 

Mean Corpuscular Volume 93, Mean Corpuscular Hemoglobin 31, Mean Corpuscular 

Hemoglobin Concent 34, Red Cell Distribution Width 12.9, Platelet Count 174, 

Mean Platelet Volume 10.5H, Neutrophils (%) (Auto) 70, Lymphocytes (%) (Auto) 

20, Monocytes (%) (Auto) 8, Eosinophils (%) (Auto) 2, Basophils (%) (Auto) 0, 

Neutrophils # (Auto) 7.7, Lymphocytes # (Auto) 2.2, Monocytes # (Auto) 0.9, 

Eosinophils # (Auto) 0.2, Basophils # (Auto) 0.0


20 06:40: 


White Blood Count 19.8H, Red Blood Count 4.60, Hemoglobin 14.7, Hematocrit 44, 

Mean Corpuscular Volume 95, Mean Corpuscular Hemoglobin 32, Mean Corpuscular 

Hemoglobin Concent 34, Red Cell Distribution Width 13.1, Platelet Count 138, 

Mean Platelet Volume 10.9H, Neutrophils (%) (Auto) 82H, Lymphocytes (%) (Auto) 

10L, Monocytes (%) (Auto) 8, Eosinophils (%) (Auto) 0, Basophils (%) (Auto) 0, 

Neutrophils # (Auto) 16.2H, Lymphocytes # (Auto) 1.9, Monocytes # (Auto) 1.7H, 

Eosinophils # (Auto) 0.1, Basophils # (Auto) 0.0, Neutrophils % (Manual) 81, 

Lymphocytes % (Manual) 8, Monocytes % (Manual) 7, Eosinophils % (Manual) 0, 

Basophils % (Manual) 0, Metamyelocytes % 1, Band Neutrophils 2, Reactive 

Lymphocytes 1, Toxic Granulation 1+, Sodium Level 138, Potassium Level 4.0, 

Chloride Level 107, Carbon Dioxide Level 18L, Anion Gap 13, Blood Urea Nitrogen 

4L, Creatinine 0.65, Estimat Glomerular Filtration Rate > 60, BUN/Creatinine 

Ratio 6, Glucose Level 82, Calcium Level 9.9, Corrected Calcium 10.1, Total 

Bilirubin 0.6, Aspartate Amino Transf (AST/SGOT) 27, Alanine Aminotransferase 

(ALT/SGPT) 20, Alkaline Phosphatase 247H, Total Protein 7.7, Albumin 3.7





Clinical Quality Measures


DVT/VTE Risk/Contraindication:


Risk Factor Score Per Nursin


RFS Level Per Nursing on Admit:  1=Low/No VTE PPX











DUANE WEBBER DO               2020 08:48 4 extremities within 4 weeks. 3. The patient will report walking 10 minutes twice daily within 4 weeks. Discharge Goals: Time Frame: 8 weeks  1. The patient will improve her 6 minute walk by 165' x 1 within 8 weeks. 2. The patient will report a pain level of 3 or less within 8 weeks. 3. The patient will transition to Healthy Self within 12 weeks. Rehabilitation Potential For Stated Goals: Good  Regarding uV Barrientos's therapy, I certify that the treatment plan above will be carried out by a therapist or under their direction. Thank you for this referral,  Dillan King PT     Referring Physician Signature: Rose Moreno MD              Date                    The information in this section was collected on 6/26/18 (except where otherwise noted). HISTORY:   History of Present Injury/Illness (Reason for Referral):  Post lymphoma treatment, lumbar pain, altered gait due to poor eyesight     Past Medical History/Comorbidities:   Ms. 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 She also has no past medical history of Aneurysm ([address] Arrhythmia; Asthma; Autoimmune disease ([address] CAD (coronary artery disease); Chronic kidney disease; Coagulation defects; COPD; Diabetes ([address] Difficult intubation; GERD (gastroesophageal reflux disease); Heart failure ([address] Liver disease; Malignant hyperthermia due to anesthesia; Nausea & vomiting; Pseudocholinesterase deficiency; Psychiatric disorder; PUD (peptic ulcer disease); Seizures ([address] Stroke [facility]); or Unspecified sleep apnea.   Ms. Janny Cool  has a past surgical history that includes pr anesth,achilles tendon surg (Left, 02/10/2011); hx cholecystectomy (1983); hx vascular access; hx knee replacement (Left, 2013); and hx knee replacement (Right, 2012). Past Medical History:   Diagnosis Date    Anemia     Basal cell carcinoma     Cardiomegaly     Deep vein thrombosis (DVT) (HCC)     History of stroke     Hypertension     Marginal zone lymphoma ([facility] 03/30/2017    Morbid obesity ([facility]     Osteoarthritis     Overactive bladder     Primary hypothyroidism     Radiation therapy complication     Rheumatic fever     S/P total knee replacement using cement 10/3/2011    Shingles     Superficial venous thrombosis of right arm 5/1/2017     Past Surgical History:   Procedure Laterality Date    HX CHOLECYSTECTOMY  1983    HX KNEE REPLACEMENT Left 2013    Dr. Rebecca Escalante Right 2012    Dr. Emery Crane Left 02/10/2011    Dr. Claudio Isidro:     lives with spouse  Prior Level of Function/Work/Activity:    Dominant Side:         RIGHT  Current Medications:       Current Outpatient Prescriptions:     magic mouthwash (ALEXSANDER) susp, Take 10 mL by mouth every four (4) hours as needed. , Disp: 2 Bottle, Rfl: 2    nystatin (MYCOSTATIN) powder, Apply  to affected area three (3) times daily. , Disp: 60 g, Rfl: 2    pregabalin (LYRICA) 100 mg capsule, Take 1 Cap by mouth three (3) times daily. Max Daily Amount: 300 mg., Disp: 90 Cap, Rfl: 3    fluconazole (DIFLUCAN) 150 mg tablet, Take 1 Tab by mouth daily. FDA advises cautious prescribing of oral fluconazole in pregnancy. , Disp: 30 Tab, Rfl: 2    levothyroxine (SYNTHROID) 175 mcg tablet, Take 1 Tab by mouth Daily (before breakfast). , Disp: 30 Tab, Rfl: 5    allopurinol (ZYLOPRIM) 300 mg tablet, Take 1 Tab by mouth daily. , Disp: 30 Tab, Rfl: 1    ondansetron hcl (ZOFRAN, AS HYDROCHLORIDE,) 8 mg tablet, Take 1 Tab by mouth every eight (8) hours as needed for Nausea (or vomiting). , Disp: 90 Tab, Rfl: 2    fluconazole (DIFLUCAN) 150 mg tablet, Take 150 mg by mouth daily. FDA advises cautious prescribing of oral fluconazole in pregnancy. , Disp: , Rfl:     acyclovir (ZOVIRAX) 400 mg tablet, Take 1 Tab by mouth two (2) times a day., Disp: 60 Tab, Rfl: 3    magnesium oxide (MAG-OX) 400 mg tablet, Take 1 Tab by mouth two (2) times a day., Disp: 60 Tab, Rfl: 1    lidocaine-prilocaine (EMLA) topical cream, Apply  to affected area as needed for Pain. Apply to port site 45-60 minutes prior to lab appt or infusion. , Disp: 30 g, Rfl: 0   Date Last Reviewed:  6/26/18   Number of Personal Factors/Comorbidities that affect the Plan of Care: 3+: HIGH COMPLEXITY   EXAMINATION:   ROM:          Within functional limits  Strength:          Bilateral upper extremities grossly 4-/5 x 2 extremities  Bilateral lower extremities 4/5 x 2 extremities    Balance:          Intact, limited due to need for cataracts  Skin Integrity:          intact  Edema/Girth:  pitting    Left Right    Initial Most Recent Initial Most Recent   Upper  Extremity           Lower  Extremity               Body Structures Involved:  1. Muscles Body Functions Affected:  1. Sensory/Pain  2. Neuromusculoskeletal Activities and Participation Affected:  1. None   Number of elements (examined above) that affect the Plan of Care: 3: MODERATE COMPLEXITY   CLINICAL PRESENTATION:   Presentation: Evolving clinical presentation with changing clinical characteristics: MODERATE COMPLEXITY   CLINICAL DECISION MAKING:   Outcome Measure:    Tool Used: 6-MINUTE WALK TEST  Score:  Initial: 1300' feet Most Recent: X feet (Date: -- )   Interpretation of Score: Normal range varies but is approximately 8695-2157 Feet      Distance walked: 1300 feet               Baseline End of Test   Heart Rate 85 118   Dyspnea (Luther Scale)     Fatigue (Luther Scale)  3   SpO2 98 96   /84 183/93     Score 2133 0717-2347 8322-6600 [phone] 426-16 15-0   Modifier  CI CJ CK CL CM CN     ? Mobility - Walking and Moving Around:     - CURRENT STATUS: CJ - 20%-39% impaired, limited or restricted    - GOAL STATUS: CJ - 20%-39% impaired, limited or restricted    - D/C STATUS:  ---------------To be determined---------------    Tool Used: TINETTI  Score:  Initial:   Gait: 11/12  Balance: 16/16  TOTAL: 27/28 Most Recent:  Gait: /12  Balance: /16  TOTAL: /28   Interpretation of Score: The maximum score for the gait component is 12 points. The maximum score for the balance component is 16 points. The maximum total score is 28 points. In general, patients who score below 19 are at a high risk for falls. Patients who score in the range of 19-24 indicate that the patient has a risk for falls. Score 28 27-23 22-18 17-12 11-6 5-1 0   Modifier  CI  CK CL CM CN         Tool Used: Timed Up and Go (TUG)  Score:  Initial: 7 seconds Most Recent: X seconds (Date: -- )   Interpretation of Score: The test measures, in seconds, the time taken by an individual to stand up from a standard arm chair (seat height 46 cm [18 in], arm height 65 cm [25.6 in]), walk a distance of 3 meters (118 in, approx 10 ft), turn, walk back to the chair and sit down. If the individual takes longer than 14 seconds to complete TUG, this indicates risk for falls. Score 7 7.5-10.5 11-14 14.5-17.5 18-21 21.5-24.5 25+   Modifier  CI CJ CK CL CM CN         Tool Used: ECOG Performance Survey Score  Score:  Initial: 1 Most Recent:      Interpretation of Score:   0 Fully active, able to carry on all pre-disease performance without restriction   1 Restricted in physically strenuous activity but ambulatory and able to carry out work of a light or sedentary nature, e.g., light house work, office work   2 Ambulatory and capable of all selfcare but unable to carry out any work activities.  Up and about more than 50% of waking hours   3 Capable of only limited selfcare, confined to bed or chair more than 50% of waking hours   4 Completely disabled. Cannot carry on any selfcare. Totally confined to bed or chair   5 Dead     Medical Necessity:   · Patient is expected to demonstrate progress in strength and overall conditionoing to increase independence with household activity. Reason for Services/Other Comments:  · Patient continues to demonstrate capacity to improve strength and overall conditionoing which will increase independence. Use of outcome tool(s) and clinical judgement create a POC that gives a: Clear prediction of patient's progress: LOW COMPLEXITY            TREATMENT:   (In addition to Assessment/Re-Assessment sessions the following treatments were rendered)  Pre-treatment Symptoms/Complaints:  Lumbar pain, weakness, decreased activity tolerance, altered gait. Pain: Initial:     6-7 Post Session:  2-3   143/93   O2 98 HR 87  Fatigue   325' x 1 O2 95   UBE level 1 x 4 min  325'x1 O2 98   Nustep level 2 x 7 min SPM 84 mets 2.5  325' x 1 O2 97   Sit to stand x 10 reps O2 98   Bilateral upper extremity with 2# x 10 reps bicep curls, forward flexion, abduction, triceps extension, bent over rows  Counter top push ups x 10 reps. 325' x 1 O2 98   Standing up on toes, hip abduction, hip extension x 10 reps   Sustained one leg stance  Fatigue 7        as above  As above  [facility] Portal  Treatment/Session Assessment:    · Response to Treatment:  Tolerated the treatmenmt well. Less rest periods needed. · Compliance with Program/Exercises: Will assess as treatment progresses. · Recommendations/Intent for next treatment session: \"Next visit will focus on advancements to more challenging activities\".   Total Treatment Duration: 43 min  PT Patient Time In/Time Out  Time In: 0815  Time Out: 0941    Muriel Healy, PT

## 2020-05-22 ENCOUNTER — HOSPITAL ENCOUNTER (OUTPATIENT)
Dept: INFUSION THERAPY | Age: 60
End: 2020-05-22

## 2020-05-22 ENCOUNTER — APPOINTMENT (OUTPATIENT)
Dept: INFUSION THERAPY | Age: 60
End: 2020-05-22

## 2020-07-03 ENCOUNTER — APPOINTMENT (OUTPATIENT)
Dept: INFUSION THERAPY | Age: 60
End: 2020-07-03

## 2020-07-21 ENCOUNTER — HOSPITAL ENCOUNTER (OUTPATIENT)
Dept: INFUSION THERAPY | Age: 60
Discharge: HOME | End: 2020-07-21
Payer: COMMERCIAL

## 2020-07-21 ENCOUNTER — HOSPITAL ENCOUNTER (OUTPATIENT)
Dept: CT IMAGING | Age: 60
Discharge: HOME | End: 2020-07-21
Attending: INTERNAL MEDICINE

## 2020-07-21 DIAGNOSIS — C83.39 DIFFUSE LARGE B-CELL LYMPHOMA OF SOLID ORGAN EXCLUDING SPLEEN (HCC): ICD-10-CM

## 2020-07-21 LAB
ALBUMIN SERPL-MCNC: 3.8 G/DL (ref 3.2–4.6)
ALBUMIN/GLOB SERPL: 1.4 {RATIO} (ref 1.2–3.5)
ALP SERPL-CCNC: 82 U/L (ref 50–136)
ALT SERPL-CCNC: 31 U/L (ref 12–65)
ANION GAP SERPL CALC-SCNC: 5 MMOL/L (ref 7–16)
AST SERPL-CCNC: 20 U/L (ref 15–37)
BASOPHILS # BLD: 0 K/UL (ref 0–0.2)
BASOPHILS NFR BLD: 1 % (ref 0–2)
BILIRUB SERPL-MCNC: 0.5 MG/DL (ref 0.2–1.1)
BUN SERPL-MCNC: 26 MG/DL (ref 8–23)
CALCIUM SERPL-MCNC: 8.8 MG/DL (ref 8.3–10.4)
CHLORIDE SERPL-SCNC: 106 MMOL/L (ref 98–107)
CO2 SERPL-SCNC: 30 MMOL/L (ref 21–32)
CREAT SERPL-MCNC: 0.9 MG/DL (ref 0.6–1)
DIFFERENTIAL METHOD BLD: ABNORMAL
EOSINOPHIL # BLD: 0.1 K/UL (ref 0–0.8)
EOSINOPHIL NFR BLD: 2 % (ref 0.5–7.8)
ERYTHROCYTE [DISTWIDTH] IN BLOOD BY AUTOMATED COUNT: 13.2 % (ref 11.9–14.6)
GLOBULIN SER CALC-MCNC: 2.7 G/DL (ref 2.3–3.5)
GLUCOSE SERPL-MCNC: 104 MG/DL (ref 65–100)
HCT VFR BLD AUTO: 35.4 % (ref 35.8–46.3)
HGB BLD-MCNC: 11.6 G/DL (ref 11.7–15.4)
IMM GRANULOCYTES # BLD AUTO: 0.1 K/UL (ref 0–0.5)
IMM GRANULOCYTES NFR BLD AUTO: 2 % (ref 0–5)
LDH SERPL L TO P-CCNC: 237 U/L (ref 100–190)
LYMPHOCYTES # BLD: 0.9 K/UL (ref 0.5–4.6)
LYMPHOCYTES NFR BLD: 17 % (ref 13–44)
MAGNESIUM SERPL-MCNC: 2.1 MG/DL (ref 1.8–2.4)
MCH RBC QN AUTO: 31.4 PG (ref 26.1–32.9)
MCHC RBC AUTO-ENTMCNC: 32.8 G/DL (ref 31.4–35)
MCV RBC AUTO: 95.9 FL (ref 79.6–97.8)
MONOCYTES # BLD: 0.4 K/UL (ref 0.1–1.3)
MONOCYTES NFR BLD: 7 % (ref 4–12)
NEUTS SEG # BLD: 4.1 K/UL (ref 1.7–8.2)
NEUTS SEG NFR BLD: 72 % (ref 43–78)
NRBC # BLD: 0 K/UL (ref 0–0.2)
PHOSPHATE SERPL-MCNC: 3.6 MG/DL (ref 2.3–3.7)
PLATELET # BLD AUTO: 119 K/UL (ref 150–450)
PMV BLD AUTO: 10.4 FL (ref 9.4–12.3)
POTASSIUM SERPL-SCNC: 4.2 MMOL/L (ref 3.5–5.1)
PROT SERPL-MCNC: 6.5 G/DL (ref 6.3–8.2)
RBC # BLD AUTO: 3.69 M/UL (ref 4.05–5.25)
SODIUM SERPL-SCNC: 141 MMOL/L (ref 136–145)
WBC # BLD AUTO: 5.7 K/UL (ref 4.3–11.1)

## 2020-07-21 PROCEDURE — 83735 ASSAY OF MAGNESIUM: CPT

## 2020-07-21 PROCEDURE — 84100 ASSAY OF PHOSPHORUS: CPT

## 2020-07-21 PROCEDURE — 80053 COMPREHEN METABOLIC PANEL: CPT

## 2020-07-21 PROCEDURE — 36591 DRAW BLOOD OFF VENOUS DEVICE: CPT

## 2020-07-21 PROCEDURE — 85025 COMPLETE CBC W/AUTO DIFF WBC: CPT

## 2020-07-21 PROCEDURE — 83615 LACTATE (LD) (LDH) ENZYME: CPT

## 2020-07-21 RX ORDER — HEPARIN SODIUM (PORCINE) LOCK FLUSH IV SOLN 100 UNIT/ML 100 UNIT/ML
500 SOLUTION INTRAVENOUS AS NEEDED
Status: DISCONTINUED | OUTPATIENT
Start: 2020-07-21 | End: 2020-07-25 | Stop reason: HOSPADM

## 2020-07-21 RX ORDER — SODIUM CHLORIDE 0.9 % (FLUSH) 0.9 %
5-10 SYRINGE (ML) INJECTION EVERY 8 HOURS
Status: DISCONTINUED | OUTPATIENT
Start: 2020-07-21 | End: 2020-07-23 | Stop reason: HOSPADM

## 2020-07-21 RX ORDER — SODIUM CHLORIDE 0.9 % (FLUSH) 0.9 %
10 SYRINGE (ML) INJECTION
Status: COMPLETED | OUTPATIENT
Start: 2020-07-21 | End: 2020-07-21

## 2020-07-21 RX ADMIN — HEPARIN SODIUM (PORCINE) LOCK FLUSH IV SOLN 100 UNIT/ML 500 UNITS: 100 SOLUTION at 10:15

## 2020-07-21 RX ADMIN — Medication 10 ML: at 09:00

## 2020-07-21 RX ADMIN — Medication 10 ML: at 10:08

## 2020-07-21 NOTE — PROGRESS NOTES
Port worked well during CT scan, with nurse holding needle down while flush and contrast infusing. Past CT Procedure port de-accessed after being flushed with 20 cc of normal saline and 5 cc of heparin flush, had good blood return. Site without redness or swelling. Pressure dressing applied with 2x2 and paper tape. Pt tolerated procedure well, offering no complaints.

## 2020-07-21 NOTE — PROGRESS NOTES
Port accessed with #20 gauge 3/4 inch Spear needle. The tissue around port is fatty and needle not long enough. Port accessed again with 3/4 inch needle, while holding port upward and taping needle to chest. While pushing on needle and holding port it has good blood return. Site without redness or swelling. Pt in great mood, tolerated well with good humor.

## 2020-08-04 ENCOUNTER — HOSPITAL ENCOUNTER (EMERGENCY)
Age: 60
Discharge: HOME | End: 2020-08-04
Attending: STUDENT IN AN ORGANIZED HEALTH CARE EDUCATION/TRAINING PROGRAM
Payer: COMMERCIAL

## 2020-08-04 ENCOUNTER — APPOINTMENT (OUTPATIENT)
Dept: GENERAL RADIOLOGY | Age: 60
End: 2020-08-04
Attending: STUDENT IN AN ORGANIZED HEALTH CARE EDUCATION/TRAINING PROGRAM
Payer: COMMERCIAL

## 2020-08-04 VITALS
TEMPERATURE: 98.3 F | RESPIRATION RATE: 20 BRPM | SYSTOLIC BLOOD PRESSURE: 127 MMHG | OXYGEN SATURATION: 98 % | WEIGHT: 291.01 LBS | DIASTOLIC BLOOD PRESSURE: 53 MMHG | HEART RATE: 88 BPM | BODY MASS INDEX: 51.55 KG/M2

## 2020-08-04 DIAGNOSIS — Z20.822 SUSPECTED COVID-19 VIRUS INFECTION: ICD-10-CM

## 2020-08-04 DIAGNOSIS — J06.9 VIRAL URI WITH COUGH: Primary | ICD-10-CM

## 2020-08-04 DIAGNOSIS — B34.9 VIRAL SYNDROME: ICD-10-CM

## 2020-08-04 LAB
ALBUMIN SERPL-MCNC: 3.7 G/DL (ref 3.5–5)
ALBUMIN/GLOB SERPL: 1.4 {RATIO} (ref 1.1–2.2)
ALP SERPL-CCNC: 81 U/L (ref 45–117)
ALT SERPL-CCNC: 32 U/L (ref 12–78)
ANION GAP SERPL CALC-SCNC: 9 MMOL/L (ref 5–15)
APPEARANCE UR: CLEAR
AST SERPL-CCNC: 17 U/L (ref 15–37)
BACTERIA URNS QL MICRO: NEGATIVE /HPF
BASOPHILS # BLD: 0 K/UL (ref 0–0.1)
BASOPHILS NFR BLD: 0 % (ref 0–1)
BILIRUB SERPL-MCNC: 0.3 MG/DL (ref 0.2–1)
BILIRUB UR QL: NEGATIVE
BUN SERPL-MCNC: 22 MG/DL (ref 6–20)
BUN/CREAT SERPL: 23 (ref 12–20)
CALCIUM SERPL-MCNC: 8.8 MG/DL (ref 8.5–10.1)
CHLORIDE SERPL-SCNC: 103 MMOL/L (ref 97–108)
CO2 SERPL-SCNC: 31 MMOL/L (ref 21–32)
COLOR UR: NORMAL
CREAT SERPL-MCNC: 0.97 MG/DL (ref 0.55–1.02)
DIFFERENTIAL METHOD BLD: ABNORMAL
EOSINOPHIL # BLD: 0.1 K/UL (ref 0–0.4)
EOSINOPHIL NFR BLD: 2 % (ref 0–7)
EPITH CASTS URNS QL MICRO: NORMAL /LPF
ERYTHROCYTE [DISTWIDTH] IN BLOOD BY AUTOMATED COUNT: 13.4 % (ref 11.5–14.5)
GLOBULIN SER CALC-MCNC: 2.6 G/DL (ref 2–4)
GLUCOSE SERPL-MCNC: 102 MG/DL (ref 65–100)
GLUCOSE UR STRIP.AUTO-MCNC: NEGATIVE MG/DL
HCT VFR BLD AUTO: 36.5 % (ref 35–47)
HGB BLD-MCNC: 11.8 G/DL (ref 11.5–16)
HGB UR QL STRIP: NEGATIVE
IMM GRANULOCYTES # BLD AUTO: 0 K/UL (ref 0–0.04)
IMM GRANULOCYTES NFR BLD AUTO: 0 % (ref 0–0.5)
KETONES UR QL STRIP.AUTO: NEGATIVE MG/DL
LEUKOCYTE ESTERASE UR QL STRIP.AUTO: NEGATIVE
LYMPHOCYTES # BLD: 0.8 K/UL (ref 0.8–3.5)
LYMPHOCYTES NFR BLD: 14 % (ref 12–49)
MCH RBC QN AUTO: 31.1 PG (ref 26–34)
MCHC RBC AUTO-ENTMCNC: 32.3 G/DL (ref 30–36.5)
MCV RBC AUTO: 96.1 FL (ref 80–99)
MONOCYTES # BLD: 0.5 K/UL (ref 0–1)
MONOCYTES NFR BLD: 9 % (ref 5–13)
NEUTS SEG # BLD: 4.6 K/UL (ref 1.8–8)
NEUTS SEG NFR BLD: 75 % (ref 32–75)
NITRITE UR QL STRIP.AUTO: NEGATIVE
NRBC # BLD: 0 K/UL (ref 0–0.01)
NRBC BLD-RTO: 0 PER 100 WBC
PH UR STRIP: 7 [PH] (ref 5–8)
PLATELET # BLD AUTO: 122 K/UL (ref 150–400)
PMV BLD AUTO: 10.4 FL (ref 8.9–12.9)
POTASSIUM SERPL-SCNC: 4.1 MMOL/L (ref 3.5–5.1)
PROT SERPL-MCNC: 6.3 G/DL (ref 6.4–8.2)
PROT UR STRIP-MCNC: NEGATIVE MG/DL
RBC # BLD AUTO: 3.8 M/UL (ref 3.8–5.2)
RBC #/AREA URNS HPF: NORMAL /HPF (ref 0–5)
RBC MORPH BLD: ABNORMAL
SODIUM SERPL-SCNC: 143 MMOL/L (ref 136–145)
SP GR UR REFRACTOMETRY: 1.01 (ref 1–1.03)
UROBILINOGEN UR QL STRIP.AUTO: 0.2 EU/DL (ref 0.2–1)
WBC # BLD AUTO: 6 K/UL (ref 3.6–11)
WBC URNS QL MICRO: NORMAL /HPF (ref 0–4)

## 2020-08-04 PROCEDURE — 99283 EMERGENCY DEPT VISIT LOW MDM: CPT

## 2020-08-04 PROCEDURE — 85025 COMPLETE CBC W/AUTO DIFF WBC: CPT

## 2020-08-04 PROCEDURE — 36415 COLL VENOUS BLD VENIPUNCTURE: CPT

## 2020-08-04 PROCEDURE — 96374 THER/PROPH/DIAG INJ IV PUSH: CPT

## 2020-08-04 PROCEDURE — 71045 X-RAY EXAM CHEST 1 VIEW: CPT

## 2020-08-04 PROCEDURE — 74011250636 HC RX REV CODE- 250/636: Performed by: EMERGENCY MEDICINE

## 2020-08-04 PROCEDURE — 80053 COMPREHEN METABOLIC PANEL: CPT

## 2020-08-04 PROCEDURE — 81003 URINALYSIS AUTO W/O SCOPE: CPT

## 2020-08-04 PROCEDURE — 87635 SARS-COV-2 COVID-19 AMP PRB: CPT

## 2020-08-04 RX ORDER — GLUCOSAMINE SULFATE 1500 MG
POWDER IN PACKET (EA) ORAL DAILY
COMMUNITY

## 2020-08-04 RX ORDER — GUAIFENESIN 1200 MG/1
1200 TABLET, EXTENDED RELEASE ORAL 2 TIMES DAILY
Qty: 10 TAB | Refills: 0 | Status: SHIPPED | OUTPATIENT
Start: 2020-08-04 | End: 2020-08-28 | Stop reason: ALTCHOICE

## 2020-08-04 RX ORDER — CETIRIZINE HCL 10 MG
10 TABLET ORAL
COMMUNITY
End: 2020-08-04 | Stop reason: SDUPTHER

## 2020-08-04 RX ORDER — ASCORBIC ACID 250 MG
250 TABLET ORAL
COMMUNITY

## 2020-08-04 RX ORDER — DEXAMETHASONE SODIUM PHOSPHATE 10 MG/ML
10 INJECTION INTRAMUSCULAR; INTRAVENOUS ONCE
Status: COMPLETED | OUTPATIENT
Start: 2020-08-04 | End: 2020-08-04

## 2020-08-04 RX ORDER — CETIRIZINE HCL 10 MG
10 TABLET ORAL DAILY
Qty: 14 TAB | Refills: 0 | Status: SHIPPED | OUTPATIENT
Start: 2020-08-04 | End: 2020-08-18

## 2020-08-04 RX ORDER — FLUTICASONE PROPIONATE 50 MCG
2 SPRAY, SUSPENSION (ML) NASAL DAILY
Qty: 1 BOTTLE | Refills: 0 | Status: SHIPPED | OUTPATIENT
Start: 2020-08-04 | End: 2021-01-28 | Stop reason: SDUPTHER

## 2020-08-04 RX ADMIN — DEXAMETHASONE SODIUM PHOSPHATE 10 MG: 10 INJECTION, SOLUTION INTRAMUSCULAR; INTRAVENOUS at 19:59

## 2020-08-04 NOTE — DISCHARGE INSTRUCTIONS
Patient Education        Learning About Coronavirus [phone])  Coronavirus [phone]): Overview  What is coronavirus ([id number])? The coronavirus disease (COVID-19) is caused by a virus. It is an illness that was first found in [place], [place], in December 2019. It has since spread worldwide. The virus can cause fever, cough, and trouble breathing. In severe cases, it can cause pneumonia and make it hard to breathe without help. It can cause death. Coronaviruses are a large group of viruses. They cause the common cold. They also cause more serious illnesses like Middle East respiratory syndrome (MERS) and severe acute respiratory syndrome (SARS). COVID-19 is caused by a novel coronavirus. That means it's a new type that has not been seen in people before. This virus spreads person-to-person through droplets from coughing and sneezing. It can also spread when you are close to someone who is infected. And it can spread when you touch something that has the virus on it, such as a doorknob or a tabletop. What can you do to protect yourself from coronavirus (COVID-19)? The best way to protect yourself from getting sick is to:  · Avoid areas where there is an outbreak. · Avoid contact with people who may be infected. · Wash your hands often with soap or alcohol-based hand sanitizers. · Avoid crowds and try to stay at least 6 feet away from other people. · Wash your hands often, especially after you cough or sneeze. Use soap and water, and scrub for at least 20 seconds. If soap and water aren't available, use an alcohol-based hand . · Avoid touching your mouth, nose, and eyes. What can you do to avoid spreading the virus to others? To help avoid spreading the virus to others:  · Cover your mouth with a tissue when you cough or sneeze. Then throw the tissue in the trash. · Use a disinfectant to clean things that you touch often. · Wear a cloth face cover if you have to go to public areas.   · Stay home if you are sick or have been exposed to the virus. Don't go to school, work, or public areas. And don't use public transportation, ride-shares, or taxis unless you have no choice. · If you are sick:  ? Leave your home only if you need to get medical care. But call the doctor's office first so they know you're coming. And wear a face cover. ? Wear the face cover whenever you're around other people. It can help stop the spread of the virus when you cough or sneeze. ? Clean and disinfect your home every day. Use household  and disinfectant wipes or sprays. Take special care to clean things that you grab with your hands. These include doorknobs, remote controls, phones, and handles on your refrigerator and microwave. And don't forget countertops, tabletops, bathrooms, and computer keyboards. When to call for help  [id number] anytime you think you may need emergency care. For example, call if:  · You have severe trouble breathing. (You can't talk at all.)  · You have constant chest pain or pressure. · You are severely dizzy or lightheaded. · You are confused or can't think clearly. · Your face and lips have a blue color. · You pass out (lose consciousness) or are very hard to wake up. Call your doctor now if you develop symptoms such as:  · Shortness of breath. · Fever. · Cough. If you need to get care, call ahead to the doctor's office for instructions before you go. Make sure you wear a face cover to prevent exposing other people to the virus. Where can you get the latest information? The following health organizations are tracking and studying this virus. Their websites contain the most up-to-date information. Wesley Cochran also learn what to do if you think you may have been exposed to the virus. · U.S. Centers for Disease Control and Prevention (CDC): The CDC provides updated news about the disease and travel advice. The website also tells you how to prevent the spread of infection.  www.cdc.gov  · World Health Organization [facility]): WHO offers information about the virus outbreaks. WHO also has travel advice. www.who.int  Current as of: May 8, 2020               Content Version: 12.5  © 2006-2020 [employer]. Care instructions adapted under license by [employer] (which disclaims liability or warranty for this information). If you have questions about a medical condition or this instruction, always ask your healthcare professional. [place] [address] any warranty or liability for your use of this information.

## 2020-08-04 NOTE — ED PROVIDER NOTES
Is a 70-year-old female presenting to the emergency department for weakness, fatigue, cough. Patient states that she recently returned from a family vacation in [place] and started to develop symptoms on Sunday noticed that she was having nasal congestion cough but denies having any fevers. She had taken her grandchildren to the pool today and was falling asleep sitting in her chair which is unusual for her. Patient is taken 2 naps today which is also outside the norm for the patient. Patient is currently in remission for marginal zone lymphoma, diffuse large B cell lymphoma in which she completed chemotherapy in 2018. Patient denies any recent sick contacts or COVID exposure patient states that she has been diagnosed earlier in the year with double pneumonia. Patient is also concerned that she might be starting to develop a UTI. Past Medical History:   Diagnosis Date    Anemia     Basal cell carcinoma     Cardiomegaly     Deep vein thrombosis (DVT) (HCC)     History of stroke     History of UTI     Hypertension     Marginal zone lymphoma ([address] 03/30/2017    Diffuse Large B-cell Lymphoma. Completed Chemotherapy 5/28/18    Morbid obesity ([address]     Osteoarthritis     Overactive bladder     Primary hypothyroidism     Prolapse of female bladder, acquired     Radiation therapy complication     Rheumatic fever     S/P total knee replacement using cement 10/3/2011    Shingles     Superficial venous thrombosis of right arm 5/1/2017       Past Surgical History:   Procedure Laterality Date    HX CATARACT REMOVAL Bilateral 2018    HX CHOLECYSTECTOMY  1983    HX COLONOSCOPY  03/2017    Clear    HX KNEE REPLACEMENT Left 2013    Dr. Larry Allen Right 2012    Dr. Ja Dowell HX PREMALIG/BENIGN SKIN LESION EXCISION  2019    basal/squamous skin lesions removed from scalp. [facility].      HX VASCULAR ACCESS      DC ANESTH,ACHILLES TENDON SURG Left 02/10/2011 Dr. Julieta Eastman         Family History:   Problem Relation Age of Onset    Kidney Disease Father     Dementia Mother     Other Maternal Grandmother         Vascular Disorder with leg amputation    Liver Disease Sister         non-alcoholic    Celiac Disease Sister     Breast Cancer Cousin 48    Thyroid Disease Sister         hypothyroidism    Malignant Hyperthermia Neg Hx     Pseudocholinesterase Deficiency Neg Hx     Delayed Awakening Neg Hx     Emergence Delirium Neg Hx     Post-op Cognitive Dysfunction Neg Hx     Thyroid Cancer Neg Hx        Social History     Socioeconomic History    Marital status:      Spouse name: Not on file    Number of children: Not on file    Years of education: Not on file    Highest education level: Not on file   Occupational History    Not on file   Social Needs    Financial resource strain: Not on file    Food insecurity     Worry: Not on file     Inability: Not on file    Transportation needs     Medical: Not on file     Non-medical: Not on file   Tobacco Use    Smoking status: Never Smoker    Smokeless tobacco: Never Used   Substance and Sexual Activity    Alcohol use: No     Comment: RARE, ONCE/TWICE A YEAR    Drug use: Never    Sexual activity: Not on file   Lifestyle    Physical activity     Days per week: Not on file     Minutes per session: Not on file    Stress: Not on file   Relationships    Social connections     Talks on phone: Not on file     Gets together: Not on file     Attends [employer] service: Not on file     Active member of club or organization: Not on file     Attends meetings of clubs or organizations: Not on file     Relationship status: Not on file    Intimate partner violence     Fear of current or ex partner: Not on file     Emotionally abused: Not on file     Physically abused: Not on file     Forced sexual activity: Not on file   Other Topics Concern    Not on file   Social History Narrative    10/3/11:  PATIENT IS  TO DEJA.  THEY HAVE A GROWN SON AND DAUGHTER. SHE IS DISABLED. ALLERGIES: Gabapentin    Review of Systems   Constitutional: Positive for activity change and fatigue. Negative for fever. HENT: Positive for postnasal drip and sinus pressure. Respiratory: Positive for cough and shortness of breath. Cardiovascular: Negative for chest pain. Gastrointestinal: Negative for abdominal pain, diarrhea, nausea and vomiting. Musculoskeletal: Positive for myalgias. Neurological: Positive for weakness. Negative for dizziness and headaches. All other systems reviewed and are negative. Vitals:    08/04/20 1756   BP: 128/74   Pulse: 96   Resp: 20   Temp: 98.3 °F (36.8 °C)   SpO2: 96%   Weight: 132 kg (291 lb 0.1 oz)            Physical Exam  Vitals signs and nursing note reviewed. Constitutional:       Appearance: Normal appearance. She is obese. HENT:      Head: Normocephalic and atraumatic. Eyes:      Extraocular Movements: Extraocular movements intact. Conjunctiva/sclera: Conjunctivae normal.      Pupils: Pupils are equal, round, and reactive to light. Neck:      Musculoskeletal: Normal range of motion and neck supple. Pulmonary:      Effort: Pulmonary effort is normal.   Skin:     General: Skin is warm and dry. Neurological:      General: No focal deficit present. Mental Status: She is alert and oriented to person, place, and time. Psychiatric:         Mood and Affect: Mood normal.         Behavior: Behavior normal.          MDM  Number of Diagnoses or Management Options  Diagnosis management comments: A/P: Community-acquired pneumonia, COVID, UTI. 80-year-old female presenting with cold-like symptoms of weakness, fatigue, cough, congestion chills and body aches. Also recently traveled to [place] where there is an increased incidence of COVID outbreaks. Chest x-ray, labs, UA, COVID nasal swab.        Amount and/or Complexity of Data Reviewed  Clinical lab tests: ordered and reviewed  Tests in the radiology section of CPT®: ordered and reviewed  Independent visualization of images, tracings, or specimens: yes    Risk of Complications, Morbidity, and/or Mortality  Presenting problems: moderate  Diagnostic procedures: moderate  Management options: moderate    Patient Progress  Patient progress: stable         Procedures    7:18 PM    7:18 PM  Change of shift. Care of patient signed [facility] to [facility]. Bedside handoff complete. Awaiting CXR. Chest x-ray negative for any acute cardiopulmonary pathology. Discussed patient symptoms. Discussed starting Flonase, Zyrtec, Mucinex. Will give one-time dose of Decadron. Discussed self-isolation until COVID-19 test for concerns  Recent Results (from the past 24 hour(s))   CBC WITH AUTOMATED DIFF    Collection Time: 08/04/20  6:27 PM   Result Value Ref Range    WBC 6.0 3.6 - 11.0 K/uL    RBC 3.80 3.80 - 5.20 M/uL    HGB 11.8 11.5 - 16.0 g/dL    HCT 36.5 35.0 - 47.0 %    MCV 96.1 80.0 - 99.0 FL    MCH 31.1 26.0 - 34.0 PG    MCHC 32.3 30.0 - 36.5 g/dL    RDW 13.4 11.5 - 14.5 %    PLATELET 923 (L) 481 - 400 K/uL    MPV 10.4 8.9 - 12.9 FL    NRBC 0.0 0.0  WBC    ABSOLUTE NRBC 0.00 0.00 - 0.01 K/uL    NEUTROPHILS 75 32 - 75 %    LYMPHOCYTES 14 12 - 49 %    MONOCYTES 9 5 - 13 %    EOSINOPHILS 2 0 - 7 %    BASOPHILS 0 0 - 1 %    IMMATURE GRANULOCYTES 0 0 - 0.5 %    ABS. NEUTROPHILS 4.6 1.8 - 8.0 K/UL    ABS. LYMPHOCYTES 0.8 0.8 - 3.5 K/UL    ABS. MONOCYTES 0.5 0.0 - 1.0 K/UL    ABS. EOSINOPHILS 0.1 0.0 - 0.4 K/UL    ABS. BASOPHILS 0.0 0.0 - 0.1 K/UL    ABS. IMM.  GRANS. 0.0 0.00 - 0.04 K/UL    DF SMEAR SCANNED      RBC COMMENTS ANISOCYTOSIS  1+       METABOLIC PANEL, COMPREHENSIVE    Collection Time: 08/04/20  6:27 PM   Result Value Ref Range    Sodium 143 136 - 145 mmol/L    Potassium 4.1 3.5 - 5.1 mmol/L    Chloride 103 97 - 108 mmol/L    CO2 31 21 - 32 mmol/L    Anion gap 9 5 - 15 mmol/L    Glucose 102 (H) 65 - 100 mg/dL BUN 22 (H) 6 - 20 MG/DL    Creatinine 0.97 0.55 - 1.02 MG/DL    BUN/Creatinine ratio 23 (H) 12 - 20      GFR est AA >60 >60 ml/min/1.73m2    GFR est non-AA 59 (L) >60 ml/min/1.73m2    Calcium 8.8 8.5 - 10.1 MG/DL    Bilirubin, total 0.3 0.2 - 1.0 MG/DL    ALT (SGPT) 32 12 - 78 U/L    AST (SGOT) 17 15 - 37 U/L    Alk. phosphatase 81 45 - 117 U/L    Protein, total 6.3 (L) 6.4 - 8.2 g/dL    Albumin 3.7 3.5 - 5.0 g/dL    Globulin 2.6 2.0 - 4.0 g/dL    A-G Ratio 1.4 1.1 - 2.2     URINALYSIS W/ RFLX MICROSCOPIC    Collection Time: 08/04/20  6:27 PM   Result Value Ref Range    Color YELLOW/STRAW      Appearance CLEAR CLEAR      Specific gravity 1.015 1.003 - 1.030      pH (UA) 7.0 5.0 - 8.0      Protein Negative NEG mg/dL    Glucose Negative NEG mg/dL    Ketone Negative NEG mg/dL    Bilirubin Negative NEG      Blood Negative NEG      Urobilinogen 0.2 0.2 - 1.0 EU/dL    Nitrites Negative NEG      Leukocyte Esterase Negative NEG      WBC 0-4 0 - 4 /hpf    RBC 0-5 0 - 5 /hpf    Epithelial cells FEW FEW /lpf    Bacteria Negative NEG /hpf   SARS-COV-2    Collection Time: 08/04/20  8:05 PM   Result Value Ref Range    Specimen source Nasopharyngeal      SARS-CoV-2 PENDING     SARS-CoV-2 PENDING     Specimen source Nasopharyngeal      COVID-19 rapid test PENDING     COVID-19 PENDING NEG       Xr Chest Port    Result Date: 8/4/2020  EXAM: XR CHEST PORT INDICATION: eval for airspace dx COMPARISON: None. FINDINGS: A portable AP radiograph of the chest was obtained at 18:31 hours. A right Port-A-Cath projects in expected position with catheter traversing expected course for internal jugular vein approach with the tip of the catheter projecting expected location of the mid superior vena cava. The lungs are clear. The cardiac and mediastinal contours and pulmonary vascularity are normal.  The chest wall structures and visualized upper abdomen show no acute findings with incidental note of degenerative spine and shoulder changes. IMPRESSION: No acute cardiopulmonary process.

## 2020-08-05 ENCOUNTER — PATIENT OUTREACH (OUTPATIENT)
Dept: CASE MANAGEMENT | Age: 60
End: 2020-08-05

## 2020-08-05 NOTE — ED NOTES
The patient was discharged home by Dr Lorena Garay  in stable condition. The patient is alert and oriented, in no respiratory distress and discharge vital signs obtained. The patient's diagnosis, condition and treatment were explained. The patient expressed understanding. No work/school note given. A discharge plan has been developed. A  was not involved in the process. Aftercare instructions were given. Pt ambulatory out of the ED.

## 2020-08-05 NOTE — PROGRESS NOTES
Patient contacted regarding COVID-19 exposure. Discussed COVID-19 related testing which was pending at this time. Test results were pending. Patient informed of results, if available? results pending     Care Transition Nurse/ Ambulatory Care Manager contacted the patient by telephone to perform post discharge assessment. Verified name and  with patient as identifiers. Provided introduction to self, and explanation of the CTN/ACM role, and reason for call due to risk factors for infection and/or exposure to COVID-19. Symptoms reviewed with patient who verbalized the following symptoms: no new symptoms and no worsening symptoms. Due to no new or worsening symptoms encounter was not routed to provider for escalation. Discussed follow-up appointments. If no appointment was previously scheduled, appointment scheduling offered: yes  [facility] follow up appointment(s):   Future Appointments   Date Time Provider Trinidad Cm   2021  2:00 PM [facility] OUTREACH INSURANCE [facility]   2021  2:30 PM Micheal Pierre NP [facility]   2021  8:00 AM [facility]F LAB RESOURCE [facility]   2021  9:00 AM Elizabeth Snowden MD [facility]   2021  2:52 AM [facility] [address] [facility] [facility]   2021  2:30 PM [facility] OUTREACH INSURANCE [facility]   2021  3:00 PM Johanna Keita MD [facility]     Non-[facility] follow up appointment(s): pt states feeling better and will follow up with her primary care as needed. Advance Care Planning:   Does patient have an Advance Directive: yes, reviewed and current     Patient has following risk factors of: immunocompromised and hypertension. CTN/ACM reviewed discharge instructions, medical action plan and red flags such as increased shortness of breath, increasing fever and signs of decompensation with patient who verbalized understanding.    Discussed exposure protocols and quarantine with CDC Guidelines What to do if you are sick with coronavirus disease 2019.  Patient was given an opportunity for questions and concerns. The patient agrees to contact the [facility] exposure line [phone], local [facility] department [facility]  ([phone]) and PCP office for questions related to their healthcare. CTN/ACM provided contact information for future needs. Reviewed and educated patient on any new and changed medications related to discharge diagnosis. Patient/family/caregiver given information for [facility] and agrees to enroll no  Patient's preferred e-mail:  n/a  Patient's preferred phone number: n/a  Based on Loop alert triggers, patient will be contacted by nurse care manager for worsening symptoms. Plan for follow-up call in 1-2 days based on severity of symptoms and risk factors.

## 2020-08-07 ENCOUNTER — PATIENT OUTREACH (OUTPATIENT)
Dept: CASE MANAGEMENT | Age: 60
End: 2020-08-07

## 2020-08-07 LAB
SARS-COV-2, COV2NT: NOT DETECTED
SOURCE, COVRS: NORMAL
SPECIMEN SOURCE, FCOV2M: NORMAL

## 2020-08-07 NOTE — PROGRESS NOTES
Patient contacted regarding COVID-19 risk and screening. Discussed COVID-19 related testing which was pending at this time. Test results were pending. Patient informed of results, if available? results still pending     Care Transition Nurse/ Ambulatory Care Manager was not able to contact the patient by telephone to perform follow-up assessment.

## 2020-08-19 ENCOUNTER — PATIENT OUTREACH (OUTPATIENT)
Dept: CASE MANAGEMENT | Age: 60
End: 2020-08-19

## 2020-08-19 NOTE — PROGRESS NOTES
Patient resolved from Transition of Care episode on 8/19/20. ACM/CTN was unsuccessful at contacting this patient today. Patient/family was provided the following resources and education related to COVID-19 during the initial call:                         Signs, symptoms and red flags related to COVID-19            CDC exposure and quarantine guidelines            [facility] exposure contact - [phone]            Contact for their local Department of Health               Patient has not had any additional ED or hospital visits. No further outreach scheduled with this CTN/ACM. Episode of Care resolved. Patient has this CTN/ACM contact information if future needs arise.

## 2020-09-21 NOTE — PROGRESS NOTES
The patient presented for Oncology Rehab, but reported she has a painful area on her shin. The area is red, painful and tender to palpation  It is approximately 5 cm in width. The patient was advised to seek medical advice prior to continuing therapy. Statement Selected

## 2020-10-15 ENCOUNTER — HOSPITAL ENCOUNTER (OUTPATIENT)
Dept: GENERAL RADIOLOGY | Age: 60
Discharge: HOME | End: 2020-10-15
Payer: COMMERCIAL

## 2020-10-15 ENCOUNTER — HOSPITAL ENCOUNTER (OUTPATIENT)
Dept: LAB | Age: 60
Discharge: HOME | End: 2020-10-15
Payer: COMMERCIAL

## 2020-10-15 DIAGNOSIS — R05.9 COUGH: ICD-10-CM

## 2020-10-15 PROCEDURE — 71046 X-RAY EXAM CHEST 2 VIEWS: CPT

## 2020-10-15 PROCEDURE — 87070 CULTURE OTHR SPECIMN AEROBIC: CPT

## 2020-10-17 LAB
BACTERIA SPEC CULT: NORMAL
GRAM STN SPEC: NORMAL
SERVICE CMNT-IMP: NORMAL

## 2020-10-26 ENCOUNTER — HOSPITAL ENCOUNTER (OUTPATIENT)
Dept: LAB | Age: 60
Discharge: HOME | End: 2020-10-26
Payer: COMMERCIAL

## 2020-10-26 DIAGNOSIS — R05.9 COUGH: ICD-10-CM

## 2020-10-26 PROCEDURE — 87116 MYCOBACTERIA CULTURE: CPT

## 2020-10-26 PROCEDURE — 87070 CULTURE OTHR SPECIMN AEROBIC: CPT

## 2020-10-26 PROCEDURE — 87186 SC STD MICRODIL/AGAR DIL: CPT

## 2020-10-26 PROCEDURE — 87077 CULTURE AEROBIC IDENTIFY: CPT

## 2020-10-29 LAB
BACTERIA SPEC CULT: ABNORMAL
BACTERIA SPEC CULT: ABNORMAL
GRAM STN SPEC: ABNORMAL
SERVICE CMNT-IMP: ABNORMAL

## 2020-11-16 ENCOUNTER — HOSPITAL ENCOUNTER (OUTPATIENT)
Dept: GENERAL RADIOLOGY | Age: 60
Discharge: HOME | End: 2020-11-16
Payer: COMMERCIAL

## 2020-11-16 DIAGNOSIS — R05.9 COUGH: ICD-10-CM

## 2020-11-16 PROCEDURE — 71046 X-RAY EXAM CHEST 2 VIEWS: CPT

## 2020-11-20 ENCOUNTER — HOSPITAL ENCOUNTER (OUTPATIENT)
Dept: CT IMAGING | Age: 60
Discharge: HOME | End: 2020-11-20
Attending: INTERNAL MEDICINE

## 2020-11-20 DIAGNOSIS — J45.21 EXACERBATION OF INTERMITTENT ASTHMA, UNSPECIFIED ASTHMA SEVERITY: ICD-10-CM

## 2020-11-20 DIAGNOSIS — R05.9 COUGH: ICD-10-CM

## 2020-11-20 DIAGNOSIS — R06.2 WHEEZING: ICD-10-CM

## 2020-11-20 DIAGNOSIS — J40 BRONCHITIS: ICD-10-CM

## 2020-11-24 RX ORDER — SODIUM CHLORIDE 0.9 % (FLUSH) 0.9 %
5-40 SYRINGE (ML) INJECTION EVERY 8 HOURS
Status: CANCELLED | OUTPATIENT
Start: 2020-11-24

## 2020-11-24 RX ORDER — SODIUM CHLORIDE 0.9 % (FLUSH) 0.9 %
5-40 SYRINGE (ML) INJECTION AS NEEDED
Status: CANCELLED | OUTPATIENT
Start: 2020-11-24

## 2020-11-24 RX ORDER — MIDAZOLAM HYDROCHLORIDE 1 MG/ML
.25-5 INJECTION, SOLUTION INTRAMUSCULAR; INTRAVENOUS
Status: CANCELLED | OUTPATIENT
Start: 2020-11-24

## 2020-11-24 RX ORDER — LIDOCAINE HYDROCHLORIDE 20 MG/ML
JELLY TOPICAL AS NEEDED
Status: CANCELLED | OUTPATIENT
Start: 2020-11-24

## 2020-11-24 RX ORDER — FENTANYL CITRATE 50 UG/ML
50 INJECTION, SOLUTION INTRAMUSCULAR; INTRAVENOUS
Status: CANCELLED | OUTPATIENT
Start: 2020-11-24

## 2020-11-24 RX ORDER — LIDOCAINE HYDROCHLORIDE 40 MG/ML
SOLUTION TOPICAL AS NEEDED
Status: CANCELLED | OUTPATIENT
Start: 2020-11-24

## 2020-11-24 RX ORDER — SODIUM CHLORIDE 9 MG/ML
25 INJECTION, SOLUTION INTRAVENOUS CONTINUOUS
Status: CANCELLED | OUTPATIENT
Start: 2020-11-24

## 2020-11-24 NOTE — PROGRESS NOTES
Confirmed appointment and arrival time with pt. Pt educated to have someone to drive home post procedure and sedation.

## 2020-11-25 ENCOUNTER — HOSPITAL ENCOUNTER (OUTPATIENT)
Age: 60
Setting detail: OUTPATIENT SURGERY
Discharge: HOME | End: 2020-11-25
Attending: INTERNAL MEDICINE | Admitting: INTERNAL MEDICINE
Payer: COMMERCIAL

## 2020-11-25 VITALS
SYSTOLIC BLOOD PRESSURE: 100 MMHG | TEMPERATURE: 98.6 F | HEART RATE: 69 BPM | WEIGHT: 280 LBS | OXYGEN SATURATION: 98 % | HEIGHT: 63 IN | BODY MASS INDEX: 49.61 KG/M2 | RESPIRATION RATE: 18 BRPM | DIASTOLIC BLOOD PRESSURE: 68 MMHG

## 2020-11-25 DIAGNOSIS — J98.11 ATELECTASIS: ICD-10-CM

## 2020-11-25 DIAGNOSIS — I48.91 ATRIAL FIBRILLATION WITH RAPID VENTRICULAR RESPONSE (HCC): ICD-10-CM

## 2020-11-25 LAB
ALBUMIN SERPL-MCNC: 3.7 G/DL (ref 3.2–4.6)
ALBUMIN/GLOB SERPL: 1.4 {RATIO} (ref 1.2–3.5)
ALP SERPL-CCNC: 93 U/L (ref 50–136)
ALT SERPL-CCNC: 23 U/L (ref 12–65)
ANION GAP SERPL CALC-SCNC: 5 MMOL/L (ref 7–16)
AST SERPL-CCNC: 13 U/L (ref 15–37)
ATRIAL RATE: 170 BPM
BASOPHILS # BLD: 0 K/UL (ref 0–0.2)
BASOPHILS NFR BLD: 0 % (ref 0–2)
BILIRUB SERPL-MCNC: 0.5 MG/DL (ref 0.2–1.1)
BUN SERPL-MCNC: 18 MG/DL (ref 8–23)
CALCIUM SERPL-MCNC: 8.6 MG/DL (ref 8.3–10.4)
CALCULATED R AXIS, ECG10: -39 DEGREES
CALCULATED T AXIS, ECG11: 42 DEGREES
CHLORIDE SERPL-SCNC: 107 MMOL/L (ref 98–107)
CO2 SERPL-SCNC: 29 MMOL/L (ref 21–32)
CREAT SERPL-MCNC: 0.94 MG/DL (ref 0.6–1)
DIAGNOSIS, 93000: NORMAL
DIFFERENTIAL METHOD BLD: ABNORMAL
EOSINOPHIL # BLD: 0 K/UL (ref 0–0.8)
EOSINOPHIL NFR BLD: 0 % (ref 0.5–7.8)
ERYTHROCYTE [DISTWIDTH] IN BLOOD BY AUTOMATED COUNT: 13.7 % (ref 11.9–14.6)
GLOBULIN SER CALC-MCNC: 2.6 G/DL (ref 2.3–3.5)
GLUCOSE SERPL-MCNC: 128 MG/DL (ref 65–100)
HCT VFR BLD AUTO: 39.4 % (ref 35.8–46.3)
HGB BLD-MCNC: 13 G/DL (ref 11.7–15.4)
IMM GRANULOCYTES # BLD AUTO: 0.1 K/UL (ref 0–0.5)
IMM GRANULOCYTES NFR BLD AUTO: 1 % (ref 0–5)
INR PPP: 1
LYMPHOCYTES # BLD: 1.2 K/UL (ref 0.5–4.6)
LYMPHOCYTES NFR BLD: 11 % (ref 13–44)
MAGNESIUM SERPL-MCNC: 2.2 MG/DL (ref 1.8–2.4)
MCH RBC QN AUTO: 31.8 PG (ref 26.1–32.9)
MCHC RBC AUTO-ENTMCNC: 33 G/DL (ref 31.4–35)
MCV RBC AUTO: 96.3 FL (ref 79.6–97.8)
MONOCYTES # BLD: 0.3 K/UL (ref 0.1–1.3)
MONOCYTES NFR BLD: 3 % (ref 4–12)
NEUTS SEG # BLD: 9.3 K/UL (ref 1.7–8.2)
NEUTS SEG NFR BLD: 84 % (ref 43–78)
NRBC # BLD: 0 K/UL (ref 0–0.2)
PLATELET # BLD AUTO: 152 K/UL (ref 150–450)
PMV BLD AUTO: 10.2 FL (ref 9.4–12.3)
POTASSIUM SERPL-SCNC: 4 MMOL/L (ref 3.5–5.1)
PROT SERPL-MCNC: 6.3 G/DL (ref 6.3–8.2)
PROTHROMBIN TIME: 13.4 SEC (ref 12.5–14.7)
Q-T INTERVAL, ECG07: 312 MS
QRS DURATION, ECG06: 80 MS
QTC CALCULATION (BEZET), ECG08: 455 MS
RBC # BLD AUTO: 4.09 M/UL (ref 4.05–5.2)
SODIUM SERPL-SCNC: 141 MMOL/L (ref 136–145)
TSH SERPL DL<=0.005 MIU/L-ACNC: 1.17 UIU/ML (ref 0.36–3.74)
VENTRICULAR RATE, ECG03: 128 BPM
WBC # BLD AUTO: 11 K/UL (ref 4.3–11.1)

## 2020-11-25 PROCEDURE — 99152 MOD SED SAME PHYS/QHP 5/>YRS: CPT

## 2020-11-25 PROCEDURE — 74011250636 HC RX REV CODE- 250/636: Performed by: INTERNAL MEDICINE

## 2020-11-25 PROCEDURE — 87102 FUNGUS ISOLATION CULTURE: CPT

## 2020-11-25 PROCEDURE — 87106 FUNGI IDENTIFICATION YEAST: CPT

## 2020-11-25 PROCEDURE — 99205 OFFICE O/P NEW HI 60 MIN: CPT | Performed by: INTERNAL MEDICINE

## 2020-11-25 PROCEDURE — 93312 ECHO TRANSESOPHAGEAL: CPT | Performed by: INTERNAL MEDICINE

## 2020-11-25 PROCEDURE — 2709999900 HC NON-CHARGEABLE SUPPLY: Performed by: INTERNAL MEDICINE

## 2020-11-25 PROCEDURE — 80053 COMPREHEN METABOLIC PANEL: CPT

## 2020-11-25 PROCEDURE — 77030009397 HC ELECTRD ECG PACE ZOLL -B

## 2020-11-25 PROCEDURE — 87070 CULTURE OTHR SPECIMN AEROBIC: CPT

## 2020-11-25 PROCEDURE — 99152 MOD SED SAME PHYS/QHP 5/>YRS: CPT | Performed by: INTERNAL MEDICINE

## 2020-11-25 PROCEDURE — 85610 PROTHROMBIN TIME: CPT

## 2020-11-25 PROCEDURE — 87116 MYCOBACTERIA CULTURE: CPT

## 2020-11-25 PROCEDURE — 93320 DOPPLER ECHO COMPLETE: CPT | Performed by: INTERNAL MEDICINE

## 2020-11-25 PROCEDURE — 84443 ASSAY THYROID STIM HORMONE: CPT

## 2020-11-25 PROCEDURE — 85025 COMPLETE CBC W/AUTO DIFF WBC: CPT

## 2020-11-25 PROCEDURE — 92960 CARDIOVERSION ELECTRIC EXT: CPT

## 2020-11-25 PROCEDURE — 93005 ELECTROCARDIOGRAM TRACING: CPT | Performed by: INTERNAL MEDICINE

## 2020-11-25 PROCEDURE — 77030003560 HC NDL HUBR BARD -A: Performed by: INTERNAL MEDICINE

## 2020-11-25 PROCEDURE — 76040000008: Performed by: INTERNAL MEDICINE

## 2020-11-25 PROCEDURE — 83735 ASSAY OF MAGNESIUM: CPT

## 2020-11-25 PROCEDURE — 74011000250 HC RX REV CODE- 250: Performed by: INTERNAL MEDICINE

## 2020-11-25 PROCEDURE — 99153 MOD SED SAME PHYS/QHP EA: CPT | Performed by: INTERNAL MEDICINE

## 2020-11-25 PROCEDURE — 93312 ECHO TRANSESOPHAGEAL: CPT

## 2020-11-25 PROCEDURE — 77030012699 HC VLV SUC CNTRL OCOA -A: Performed by: INTERNAL MEDICINE

## 2020-11-25 PROCEDURE — 88112 CYTOPATH CELL ENHANCE TECH: CPT

## 2020-11-25 PROCEDURE — 92960 CARDIOVERSION ELECTRIC EXT: CPT | Performed by: INTERNAL MEDICINE

## 2020-11-25 PROCEDURE — 31645 BRNCHSC W/THER ASPIR 1ST: CPT | Performed by: INTERNAL MEDICINE

## 2020-11-25 PROCEDURE — 93325 DOPPLER ECHO COLOR FLOW MAPG: CPT | Performed by: INTERNAL MEDICINE

## 2020-11-25 RX ORDER — NALOXONE HYDROCHLORIDE 0.4 MG/ML
0.4 INJECTION, SOLUTION INTRAMUSCULAR; INTRAVENOUS; SUBCUTANEOUS
Status: DISCONTINUED | OUTPATIENT
Start: 2020-11-25 | End: 2020-11-27 | Stop reason: HOSPADM

## 2020-11-25 RX ORDER — HEPARIN 100 UNIT/ML
300 SYRINGE INTRAVENOUS
Status: DISCONTINUED | OUTPATIENT
Start: 2020-11-25 | End: 2020-11-27 | Stop reason: HOSPADM

## 2020-11-25 RX ORDER — LABETALOL HYDROCHLORIDE 5 MG/ML
5 INJECTION, SOLUTION INTRAVENOUS
Status: DISCONTINUED | OUTPATIENT
Start: 2020-11-25 | End: 2020-11-27 | Stop reason: HOSPADM

## 2020-11-25 RX ORDER — FLUMAZENIL 0.1 MG/ML
0.2 INJECTION INTRAVENOUS
Status: DISCONTINUED | OUTPATIENT
Start: 2020-11-25 | End: 2020-11-27 | Stop reason: HOSPADM

## 2020-11-25 RX ORDER — SODIUM CHLORIDE 0.9 % (FLUSH) 0.9 %
5-40 SYRINGE (ML) INJECTION EVERY 8 HOURS
Status: DISCONTINUED | OUTPATIENT
Start: 2020-11-25 | End: 2020-11-27 | Stop reason: HOSPADM

## 2020-11-25 RX ORDER — SODIUM CHLORIDE 0.9 % (FLUSH) 0.9 %
5-40 SYRINGE (ML) INJECTION AS NEEDED
Status: DISCONTINUED | OUTPATIENT
Start: 2020-11-25 | End: 2020-11-27 | Stop reason: HOSPADM

## 2020-11-25 RX ORDER — LIDOCAINE HYDROCHLORIDE 20 MG/ML
JELLY TOPICAL ONCE
Status: COMPLETED | OUTPATIENT
Start: 2020-11-25 | End: 2020-11-25

## 2020-11-25 RX ORDER — METOPROLOL SUCCINATE 50 MG/1
50 TABLET, EXTENDED RELEASE ORAL DAILY
Qty: 30 TAB | Refills: 11 | Status: SHIPPED | OUTPATIENT
Start: 2020-11-25 | End: 2021-12-02 | Stop reason: SDUPTHER

## 2020-11-25 RX ORDER — SODIUM CHLORIDE 9 MG/ML
50 INJECTION, SOLUTION INTRAVENOUS CONTINUOUS
Status: DISCONTINUED | OUTPATIENT
Start: 2020-11-25 | End: 2020-11-27 | Stop reason: HOSPADM

## 2020-11-25 RX ORDER — MIDAZOLAM HYDROCHLORIDE 1 MG/ML
.25-5 INJECTION, SOLUTION INTRAMUSCULAR; INTRAVENOUS
Status: DISCONTINUED | OUTPATIENT
Start: 2020-11-25 | End: 2020-11-27 | Stop reason: HOSPADM

## 2020-11-25 RX ORDER — DIPHENHYDRAMINE HYDROCHLORIDE 50 MG/ML
25-50 INJECTION, SOLUTION INTRAMUSCULAR; INTRAVENOUS
Status: DISCONTINUED | OUTPATIENT
Start: 2020-11-25 | End: 2020-11-27 | Stop reason: HOSPADM

## 2020-11-25 RX ORDER — FENTANYL CITRATE 50 UG/ML
25-100 INJECTION, SOLUTION INTRAMUSCULAR; INTRAVENOUS
Status: DISCONTINUED | OUTPATIENT
Start: 2020-11-25 | End: 2020-11-27 | Stop reason: HOSPADM

## 2020-11-25 RX ORDER — MIDAZOLAM HYDROCHLORIDE 1 MG/ML
.5-1 INJECTION, SOLUTION INTRAMUSCULAR; INTRAVENOUS
Status: DISCONTINUED | OUTPATIENT
Start: 2020-11-25 | End: 2020-11-27 | Stop reason: HOSPADM

## 2020-11-25 RX ORDER — LIDOCAINE HYDROCHLORIDE 40 MG/ML
SOLUTION TOPICAL ONCE
Status: COMPLETED | OUTPATIENT
Start: 2020-11-25 | End: 2020-11-25

## 2020-11-25 RX ORDER — LIDOCAINE HYDROCHLORIDE 20 MG/ML
15 SOLUTION OROPHARYNGEAL AS NEEDED
Status: DISCONTINUED | OUTPATIENT
Start: 2020-11-25 | End: 2020-11-27 | Stop reason: HOSPADM

## 2020-11-25 RX ADMIN — FENTANYL CITRATE 50 MCG: 50 INJECTION, SOLUTION INTRAMUSCULAR; INTRAVENOUS at 12:18

## 2020-11-25 RX ADMIN — DIPHENHYDRAMINE HYDROCHLORIDE 25 MG: 50 INJECTION, SOLUTION INTRAMUSCULAR; INTRAVENOUS at 12:18

## 2020-11-25 RX ADMIN — FENTANYL CITRATE 50 MCG: 50 INJECTION, SOLUTION INTRAMUSCULAR; INTRAVENOUS at 14:19

## 2020-11-25 RX ADMIN — DIPHENHYDRAMINE HYDROCHLORIDE 25 MG: 50 INJECTION, SOLUTION INTRAMUSCULAR; INTRAVENOUS at 12:25

## 2020-11-25 RX ADMIN — LIDOCAINE HYDROCHLORIDE: 40 SOLUTION TOPICAL at 12:18

## 2020-11-25 RX ADMIN — LIDOCAINE HYDROCHLORIDE: 20 JELLY TOPICAL at 12:18

## 2020-11-25 RX ADMIN — SODIUM CHLORIDE 50 ML/HR: 900 INJECTION, SOLUTION INTRAVENOUS at 11:58

## 2020-11-25 RX ADMIN — MIDAZOLAM 2 MG: 1 INJECTION INTRAMUSCULAR; INTRAVENOUS at 12:18

## 2020-11-25 RX ADMIN — FENTANYL CITRATE 50 MCG: 50 INJECTION, SOLUTION INTRAMUSCULAR; INTRAVENOUS at 12:25

## 2020-11-25 RX ADMIN — MIDAZOLAM 1 MG: 1 INJECTION INTRAMUSCULAR; INTRAVENOUS at 12:22

## 2020-11-25 RX ADMIN — MIDAZOLAM HYDROCHLORIDE 2 MG: 1 INJECTION, SOLUTION INTRAMUSCULAR; INTRAVENOUS at 14:18

## 2020-11-25 RX ADMIN — LABETALOL HYDROCHLORIDE 5 MG: 5 INJECTION INTRAVENOUS at 12:24

## 2020-11-25 NOTE — DISCHARGE INSTRUCTIONS
After your Transesophageal echocardiogram (JACK)    Do not eat or drink for at least two hours after your procedure. Your throat will be numb and there is a risk you might have difficulty swallowing for a while. Be careful when you do eat or drink for the first time especially with hot fluids since you could easily burn your throat. Call your doctor if:    You are bleeding from your throat or mouth  You have trouble breathing all of a sudden  You have chest pain or any pain that spreads to your neck, jaw or arms. You have questions or concerns  You have a fever greater than 101 F    Do not drive for 24 hours. Do not drink hot fluids for the next 3 hours. Patient Education        Electrical Cardioversion: Care Instructions  Your Care Instructions     Electrical cardioversion is a treatment for an abnormal heartbeat. For example, it may be used to treat atrial fibrillation. In cardioversion, a brief electric shock is given to the heart to reset its rhythm. The shock comes through patches that are put on the outside of your chest. Cardioversion most often restores the heartbeat to normal.  After the procedure, you may have redness where the patches were. (This may look like a sunburn.) Do not drive until the day after a cardioversion. You can eat and drink when you feel ready to. Your doctor may have you take medicines daily to help the heart beat in a normal way and to prevent blood clots. Your doctor may give you medicine before and after cardioversion. This is to help keep your heart in a normal rhythm after the procedure. Instead of electric cardioversion, your doctor may try to change your heartbeat to a normal rhythm by giving you medicine. This is most often done in a clinic or hospital.  You may have had a sedative to help you relax. You may be unsteady after having sedation. It can take a few hours for the medicine's effects to wear off.  Common side effects of sedation include nausea, vomiting, and feeling sleepy or tired. The doctor has checked you carefully, but problems can develop later. If you notice any problems or new symptoms, get medical treatment right away. Follow-up care is a key part of your treatment and safety. Be sure to make and go to all appointments, and call your doctor if you are having problems. It's also a good idea to know your test results and keep a list of the medicines you take. How can you care for yourself at home? Medicines    · If the doctor gave you a sedative:  ? For 24 hours, don't do anything that requires attention to detail. This includes going to work, making important decisions, or signing any legal documents. It takes time for the medicine's effects to completely wear off.  ? For your safety, do not drive or operate any machinery that could be dangerous. Wait until the medicine wears off and you can think clearly and react easily.     · Take your medicines exactly as prescribed. Call your doctor if you think you are having a problem with your medicine. You may take some of the following medicines:  ? Antiarrhythmic medicines such as amiodarone. ? Beta-blockers such as propranolol (Inderal), metoprolol (Lopressor), and carvedilol (Coreg). ? Calcium channel blockers such as diltiazem (Cardizem) and verapamil (Calan). ? Digoxin (Lanoxin). You will get more details on the specific medicines your doctor prescribes.     · If you take a blood thinner, be sure you get instructions about how to take your medicine safely. Blood thinners can cause serious bleeding problems.     · Do not take any vitamins, over-the-counter medicines, or herbal products without talking to your doctor first.   Lifestyle changes    · Do not smoke. Smoking increases your risk of stroke and heart attack. If you need help quitting, talk to your doctor about stop-smoking programs and medicines.  These can increase your chances of quitting for good.     · Eat heart-healthy foods.     · Limit alcohol to 2 drinks a day for men and 1 drink a day for women. Activity    · If your doctor recommends it, get more exercise. Walking is a good choice. Bit by bit, increase the amount you walk every day. Try for 30 minutes on most days of the week. You also may want to swim, bike, or do other activities.     · When you exercise, watch for signs that your heart is working too hard. You are pushing too hard if you cannot talk while you are exercising. If you become short of breath or dizzy or have chest pain, sit down and rest immediately.     · Check your pulse regularly. Place two fingers on the artery at the palm side of your wrist in line with your thumb. If your heartbeat seems uneven or fast, talk to your doctor. When should you call for help? Call 911 anytime you think you may need emergency care. For example, call if:    · You have trouble breathing.     · You passed out (lost consciousness).     · You cough up pink, foamy mucus and you have trouble breathing.     · You have symptoms of a heart attack. These may include:  ? Chest pain or pressure, or a strange feeling in the chest.  ? Sweating. ? Shortness of breath. ? Nausea or vomiting. ? Pain, pressure, or a strange feeling in the back, neck, jaw, or upper belly or in one or both shoulders or arms. ? Lightheadedness or sudden weakness. ? A fast or irregular heartbeat. After you call 911, the  may tell you to chew 1 adult-strength or 2 to 4 low-dose aspirin. Wait for an ambulance. Do not try to drive yourself.     · You have symptoms of a stroke. These may include:  ? Sudden numbness, tingling, weakness, or loss of movement in your face, arm, or leg, especially on only one side of your body. ? Sudden vision changes. ? Sudden trouble speaking. ? Sudden confusion or trouble understanding simple statements. ? Sudden problems with walking or balance. ? A sudden, severe headache that is different from past headaches.    Call your doctor now or seek immediate medical care if:    · You have new or worse nausea or vomiting.     · You have new or increased shortness of breath.     · You are dizzy or lightheaded, or you feel like you may faint.     · You have sudden weight gain, such as more than 2 to 3 pounds in a day or 5 pounds in a week.     · You have increased swelling in your legs, ankles, or feet. Watch closely for changes in your health, and be sure to contact your doctor if you have any problems. Where can you learn more? Go to http://www.gray.com/  Enter Y317 in the search box to learn more about \"Electrical Cardioversion: Care Instructions. \"  Current as of: December 16, 2019               Content Version: 12.6  © 3653-1497 [employer]. Care instructions adapted under license by [employer] (which disclaims liability or warranty for this information). If you have questions about a medical condition or this instruction, always ask your healthcare professional. [place] [address] any warranty or liability for your use of this information.

## 2020-11-25 NOTE — PROGRESS NOTES
Pt noted to have a new onset of afib with RVR during bronchoscopy. 5 mg iv labetelol given (see MAR for specifics). Since this is new onset and no previous hx of afib Dr. Daljit John placed consult to cardiology before pt to be discharged. Consult placed to [facility] with [facility].

## 2020-11-25 NOTE — H&P
Date of Surgery Update:  Mleissa Jimenez was seen and examined. History and physical has been reviewed. The patient has been examined.  There have been no significant clinical changes since the completion of the originally dated History and Physical.    Signed By: Daisy Reyna MD     November 25, 2020 12:18 PM

## 2020-11-25 NOTE — PROCEDURES
[facility]  CARDIAC CATH    Name:  Jayde Weber  MR#:  [id number]  :  1960  ACCOUNT #:  [de-identified]  DATE OF SERVICE:  2020    BRIEF HISTORY:  The patient is a female with history of lymphoma followed by Dr. Amador Hernandez, who presents to the pulmonary bronch lab for routine bronch screening due to recurrent and severe chronic cough. During the bronch, she was noted to develop atrial fibrillation with rapid ventricular response and we were consulted. AFib persisted despite IV medications and she is referred for JACK-guided cardioversion as the patient has had multiple weeks of tachy palpitations prior to procedure. PREOPERATIVE DIAGNOSIS:  Atrial fibrillation with rapid ventricular response. POSTOPERATIVE DIAGNOSIS:  Sinus rhythm. PROCEDURES PERFORMED:  JACK-guided cardioversion. SURGEON:  Carolee Cerna MD    ASSISTANT:  None. COMPLICATIONS:  None. ANESTHESIA:  Conscious sedation. Start time [id number], end time (931) 2043-038. MEDICATIONS:  3 mg of Versed, 75 mcg of fentanyl    MONITORING RN:  Orlando Francis    SPECIMENS REMOVED:  None. IMPLANTS:  None. ESTIMATED BLOOD LOSS:  None. PROCEDURE:  After informed consent, the patient was sedated with Versed and fentanyl. Oropharyngeal anesthesia had been completed with viscous lidocaine and benzocaine spray. JACK probe was passed without complications. JACK demonstrated normal left ventricular systolic function, no significant valvular heart disease, no evidence of left atrial, left atrial appendage, right atrial, and right atrial appendage thrombus. Following completion of the procedure, the patient received 200 joules of synchronized biphasic energy with restoration of sinus rhythm. CONCLUSION:  Successful JACK-guided cardioversion. RECOMMENDATIONS:  The patient will be started on Eliquis 5 mg twice daily and metoprolol succinate 50 mg daily. She will follow up in our office in the next two weeks.     Thank you for allowing us to participate in the care of this patient. For any questions or concerns, please feel free to contact me.       Michel Maravilla MD      [id number]  D:  11/25/2020 14:39  T:  11/25/2020 15:14  JOB #:  [id number]

## 2020-11-25 NOTE — PROGRESS NOTES
Report received from Nunu Kevin cath lab RN for continue of care post JACK/CVN. Patient resting quietly with no complaints.  at bedside.  Will monitor until discharge

## 2020-11-25 NOTE — PROGRESS NOTES
Pt's  updated and Dr. Yg Sanz at bedside. Bronchoscopy discharge instructions reviewed with pt and family. Pt transferred to [address] [place] [address] for continuous of care and cardioversion. TRANSFER - OUT REPORT:    Verbal report given to Nunu Kevin RN(name) on Stoney Vences  being transferred to CPRU(unit) for ordered procedure cardioversion. Report consisted of patients Situation, Background, Assessment and   Recommendations(SBAR). Information from the following report(s) SBAR, Procedure Summary, Recent Results and Cardiac Rhythm affib was reviewed with the receiving nurse. Lines:   Venous Access Device Power Port 02/05/18 Upper chest (subclavicular area, right (Active)   Central Line Being Utilized Yes 11/25/20 1156   Criteria for Appropriate Use Limited/no vessel suitable for conventional peripheral access 11/25/20 1156   Site Assessment Clean, dry, & intact 11/25/20 1156   Date of Last Dressing Change 11/25/20 11/25/20 1156   Dressing Status Clean, dry, & intact 11/25/20 1156   Dressing Type Transparent;Disk with Chlorhexadine gluconate (CHG) 11/25/20 1156   Date Accessed (Medial Site) 11/25/20 11/25/20 1156   Access Time (Medial Site) 1157 11/25/20 1156   Access Needle Size (Site #1) 20 G 11/25/20 1156   Access Needle Length (Medial Site) 0.75 inches 11/25/20 1156   Positive Blood Return (Medial Site) Yes 11/25/20 1156   Action Taken (Medial Site) Infusing 11/25/20 1156   Date Needle Changed (Medial Site) 11/25/20 11/25/20 1156       Venous Access Device port 08/15/17 (Active)        Opportunity for questions and clarification was provided.       Patient transported with:   Monitor  O2 @ 4 liters  Registered Nurse

## 2020-11-25 NOTE — PROCEDURES
Brief Cardiac Procedure Note    Patient: Bev Spencer MRN: [id number]  SSN: [id number]    YOB: 1960  Age: 61 y.o. Sex: female      Date of Procedure: 11/25/2020     Pre-procedure Diagnosis: Atrial Fibrillation/Atrial Flutter    Post-procedure Diagnosis: SR    Procedure: Transesophageal Echocardiogram with Cardioversion    Brief Description of Procedure: See note    Performed By: Felecia Mclaughlin MD     Assistants: None    Anesthesia: Moderate Sedation    Estimated Blood Loss: Less than 10 mL      Specimens: None    Implants: None    Findings:   JACK wit normal LVEF and no LA/HOMA/RA/RAA thrombus    ECV x 1 with restoration of SR    Complications: None    Recommendations: Continue medical therapy.     Signed By: Felecia Mclaughlin MD     November 25, 2020

## 2020-11-25 NOTE — PROCEDURES
PROCEDURE    Bronchoscopy with airway inspection/cleanout/BAL. TBBX/EBBX. INDICATION   atelelectasis    EQUIPMENT:    Olympus Q 180 Bronchhoscope. ANESTHESIA    Concious sedation with: Fentanyl 100 mcg IV; Versed 3 mg IV; Lidocaine 160 mg to tracheo-bronchial tree and vocal cords; benadryl 50 mg    AIRWAY INSPECTION    After obtaining informed consent, using a bite block/ET tube adapter, an Olympus q 180 video/fiberoptic bronchoscope was  introduced into the trachea through the vocal chords/ET tube, without complication. RIGHT    LOCATION NORM/ABNORM DESCRIPTION   VOCAL CORDS NL    TRACHEA NL    WIL NL    RMSB NL Copious thick secretions seen in tracheo and obstruction R and left main bronchus cleared with saline   RUL NL After mucus cleared no obstructing lesions seen   BI NL Diffuse inflammatory changes with erythema and some increased friability    RML NL    SUP SEGM RLL NL    MED BASAL NL    ANTERIOR BASAL NL    LATERAL BASAL NL    POSTERIOR BASAL NL                                              LEFT    LOCATION NORMAL/ABNORMAL TYPE   LMSB NL    CLARISSA NL    LINGULA NL    SUPERIOR DIVISION NL    SUPERIOR SEG LLL NL    ALICIA-MEDIAL LLL NL    LATERAL LLL NL    POSTERIOR LLL NL      The following samples were obtained:      Bronchial Wash:    Samples were sent for:  Cultures and cytology  Curing the procedure the pt developed hpt with bp 203/90 and atrial fib with rvr- given 5 mg of labetalol with improvement  Estimated blood loss-  0 to minimum  Recommendations: Follow up bronch results next week, pt.  On monitor- will need to see cardiology at some point  Bobby Adhikari MD

## 2020-11-25 NOTE — CONSULTS
[facility] Cardiology Consult                Date of  Admission: 11/25/2020 10:25 AM     Primary Care Physician: Robbi Banuelos MD  Primary Cardiologist: None  Referring Physician: Dr Diana De La Cruz Physician: Dr Rachna Rosas     CC/Reason for consult: a fib w Channing Martin is a 61 y.o. female seen in the [address] [place] [address] Ave lab for Infiltrate of lung present on chest x-ray [R91.8]. She has a h/o DVT, htn and large B cell lymphoma s/p chemo cancer free since 5-2018. Echo 7-2018 w EF 55-60%. She has had persistent cough and PAREDES and underwent bronch today to obtain cultures to r/o infection. Post procedure she went into a fib w rate around 130. She was given IV labetalol and HR remains in the 130's, /60. She doesn't currently have any cardiac complaints but has noticed palpitations for the past two weeks, no dizziness or syncope. Rare substernal chest tightness this AM, mild, not related to exertion, resolved quickly without intervention. No recent labs. She had an old CVA/TIA noted on CT head found incidentally. No h/o bleeding.       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 ([address] C85.88    Snores R06.83    Pancytopenia due to antineoplastic chemotherapy (HCC) D61.810, T45.1X5A    Chronic hypotension I95.89    Primary hypothyroidism E03.9    Admission for antineoplastic chemotherapy Z51.11    DLBCL (diffuse large B cell lymphoma) (HCC) C83.30    Left lower lobe pneumonia J18.9    Pneumonia and influenza J11.00    Hemoptysis R04.2    Atelectasis J98.11       Past Medical History:   Diagnosis Date    Anemia     Basal cell carcinoma     Cardiomegaly  Deep vein thrombosis (DVT) (HCC)     History of stroke     History of UTI     Hypertension     Marginal zone lymphoma ([facility] 03/30/2017    Diffuse Large B-cell Lymphoma. Completed Chemotherapy 5/28/18    Morbid obesity ([facility]     Osteoarthritis     Overactive bladder     Primary hypothyroidism     Prolapse of female bladder, acquired     Radiation therapy complication     Rheumatic fever     S/P total knee replacement using cement 10/3/2011    Shingles     Superficial venous thrombosis of right arm 5/1/2017      Past Surgical History:   Procedure Laterality Date    HX CATARACT REMOVAL Bilateral 2018    HX CHOLECYSTECTOMY  1983    HX COLONOSCOPY  03/2017    Clear    HX KNEE REPLACEMENT Left 2013    Dr. Keith Charter Right 2012    Dr. Doreen Boss HX PREMALIG/BENIGN SKIN LESION EXCISION  2019    basal/squamous skin lesions removed from scalp. [facility].      HX VASCULAR ACCESS      [place] ANESTH,ACHILLES TENDON SURG Left 02/10/2011    Dr. Meron Vora   Allergen Reactions    Gabapentin Other (comments)      Family History   Problem Relation Age of Onset    Kidney Disease Father     Dementia Mother     Other Maternal Grandmother         Vascular Disorder with leg amputation    Liver Disease Sister         non-alcoholic    Celiac Disease Sister     Breast Cancer Cousin 48    Thyroid Disease Sister         hypothyroidism    Malignant Hyperthermia Neg Hx     Pseudocholinesterase Deficiency Neg Hx     Delayed Awakening Neg Hx     Emergence Delirium Neg Hx     Post-op Cognitive Dysfunction Neg Hx     Thyroid Cancer Neg Hx       Social History     Tobacco Use    Smoking status: Never Smoker    Smokeless tobacco: Never Used   Substance Use Topics    Alcohol use: No     Comment: RARE, ONCE/TWICE A YEAR        Current Facility-Administered Medications   Medication Dose Route Frequency    sodium chloride (NS) flush 5-40 mL  5-40 mL IntraVENous Q8H    sodium chloride (NS) flush 5-40 mL  5-40 mL IntraVENous PRN    0.9% sodium chloride infusion  50 mL/hr IntraVENous CONTINUOUS    midazolam (VERSED) injection 0.25-5 mg  0.25-5 mg IntraVENous MULTIPLE DOSE GIVEN    fentaNYL citrate (PF) injection  mcg   mcg IntraVENous MULTIPLE DOSE GIVEN    naloxone (NARCAN) injection 0.4 mg  0.4 mg IntraVENous Multiple    flumazeniL (ROMAZICON) 0.1 mg/mL injection 0.2 mg  0.2 mg IntraVENous Multiple    diphenhydrAMINE (BENADRYL) injection 25-50 mg  25-50 mg IntraVENous Multiple    labetaloL (NORMODYNE;TRANDATE) injection 5 mg  5 mg IntraVENous MULTIPLE DOSE GIVEN       Review of Symptoms:  General: no weight change,  no weakness, fever or chills  Skin: no rashes, lumps, or other skin changes  HEENT: no headache, dizziness, lightheadedness, vision changes, hearing changes, tinnitus, vertigo, sinus pressure/pain, bleeding gums, sore throat, or hoarseness  Neck: no swollen glands, goiter, pain or stiffness  Respiratory: + cough, sputum, hemoptysis, + dyspnea, wheezing  Cardiovascular: + as per HPI  Gastrointestinal: no GERD, constipation, diarrhea, liver problems, or h/o GI bleed  Urinary: no frequency, urgency , hematuria, burning/pain with urination, recent flank pain, polyuria, nocturia, or difficulty urinating  Peripheral Vascular: no claudication, leg cramps, prior DVTs, swelling of calves, legs, or feet, color change, or swelling with redness or tenderness  Musculoskeletal: no muscle or joint pain/stiffness, joint swelling, erythema of joints, or back pain  Psychiatric: no depression or excessive stress  Neurological: no sensory or motor loss, seizures, syncope, tremors, numbness, no dementia  Hematologic: + h/o lymphoma   Endocrine: + thyroid problems, heat or cold intolerance, excessive sweating, polyuria, polydipsia, no  diabetes.        Physical Exam  Vitals:    11/25/20 1244 11/25/20 1247 11/25/20 1303 11/25/20 1310   BP: (!) 147/94 (!) 153/65 (!) 119/57 (!) 119/57   Pulse: (!) 135 (!) 128 (!) 107 (!) 139   Resp: 23 13 12 22   Temp:       SpO2: 100% 100% 100% 100%   Weight:       Height:           Physical Exam:  General: Well Developed, Well Nourished, No Acute Distress  HEENT: pupils equal and round, no abnormalities noted  Neck: supple, no JVD, no carotid bruits  Heart: S1S2 irregularly irregular   Lungs: Clear throughout auscultation bilaterally without adventitious sounds  Abd: soft, nontender, nondistended, with good bowel sounds  Ext: warm, no edema  Skin: warm and dry  Psychiatric: Normal mood and affect  Neurologic: Alert and oriented X 3      Cardiographics    Telemetry: a fib w rate around 130, currently asymptomatic       Labs: No results for input(s): NA, K, MG, BUN, CREA, GLU, WBC, HGB, HCT, PLT, INR, TRIGL, LDL, HDL, HGBEXT, HCTEXT, PLTEXT, INREXT in the last 72 hours. No lab exists for component: TROPI, TCHOL     Assessment/Plan:     Assessment:    Atrial fibrillation with rapid ventricular response ([facility] (11/25/2020)- new diagnosis, possible heart racing over past two weeks but not currently symptomatic, will transfer to cardiology holding, check STAT CBC, CMP, TSH, mag, if labs ok will start eliquis, npo for JACK/eCV today, will adjust htn meds appropriately after cardioversion     Hypertension - hold Maxzide     Non Hodgkin's lymphoma ([facility] (3/30/2017)- remission since 5-2018    Primary hypothyroidism ()- check TSH    Atelectasis (11/25/2020)- s/p bronch       Thank you very much for this referral. We appreciate the opportunity to participate in this patient's care. We will follow along with above stated plan.     Bolling Ormond, PA-C  Consulting MD: Vanetta Boxer

## 2020-11-28 LAB
BACTERIA SPEC CULT: NORMAL
GRAM STN SPEC: NORMAL
SERVICE CMNT-IMP: NORMAL

## 2020-12-07 LAB
FUNGUS CULTURE, RFCO2T: POSITIVE
FUNGUS SMEAR, RFCO1T: ABNORMAL
REFLEX TO ID, RFCO3T: ABNORMAL
SPECIMEN SOURCE: ABNORMAL

## 2020-12-08 LAB
FUNGUS ID 1, (DID), RFIM1T: NORMAL
SPECIMEN SOURCE: NORMAL

## 2020-12-09 LAB
FUNGUS SPEC CULT: NORMAL
FUNGUS STAIN, 188244: NORMAL
SPECIMEN SOURCE: NORMAL

## 2020-12-18 LAB
ACID FAST STN SPEC: NEGATIVE
MYCOBACTERIUM SPEC QL CULT: NEGATIVE
SPECIMEN PREPARATION: NORMAL
SPECIMEN SOURCE: NORMAL

## 2021-01-26 ENCOUNTER — HOSPITAL ENCOUNTER (OUTPATIENT)
Dept: LAB | Age: 61
Discharge: HOME | End: 2021-01-26
Payer: COMMERCIAL

## 2021-01-26 DIAGNOSIS — C83.30 DIFFUSE LARGE B-CELL LYMPHOMA, UNSPECIFIED BODY REGION (HCC): ICD-10-CM

## 2021-01-26 LAB
ALBUMIN SERPL-MCNC: 3.8 G/DL (ref 3.2–4.6)
ALBUMIN/GLOB SERPL: 1.5 {RATIO} (ref 1.2–3.5)
ALP SERPL-CCNC: 86 U/L (ref 50–136)
ALT SERPL-CCNC: 18 U/L (ref 12–65)
ANION GAP SERPL CALC-SCNC: 3 MMOL/L (ref 7–16)
AST SERPL-CCNC: 10 U/L (ref 15–37)
BASOPHILS # BLD: 0 K/UL (ref 0–0.2)
BASOPHILS NFR BLD: 0 % (ref 0–2)
BILIRUB SERPL-MCNC: 0.5 MG/DL (ref 0.2–1.1)
BUN SERPL-MCNC: 15 MG/DL (ref 8–23)
CALCIUM SERPL-MCNC: 9.3 MG/DL (ref 8.3–10.4)
CHLORIDE SERPL-SCNC: 104 MMOL/L (ref 98–107)
CO2 SERPL-SCNC: 33 MMOL/L (ref 21–32)
CREAT SERPL-MCNC: 0.9 MG/DL (ref 0.6–1)
DIFFERENTIAL METHOD BLD: ABNORMAL
EOSINOPHIL # BLD: 0.1 K/UL (ref 0–0.8)
EOSINOPHIL NFR BLD: 2 % (ref 0.5–7.8)
ERYTHROCYTE [DISTWIDTH] IN BLOOD BY AUTOMATED COUNT: 13.9 % (ref 11.9–14.6)
GLOBULIN SER CALC-MCNC: 2.6 G/DL (ref 2.3–3.5)
GLUCOSE SERPL-MCNC: 116 MG/DL (ref 65–100)
HCT VFR BLD AUTO: 35.4 % (ref 35.8–46.3)
HGB BLD-MCNC: 11.5 G/DL (ref 11.7–15.4)
IMM GRANULOCYTES # BLD AUTO: 0 K/UL (ref 0–0.5)
IMM GRANULOCYTES NFR BLD AUTO: 0 % (ref 0–5)
LDH SERPL L TO P-CCNC: 227 U/L (ref 100–190)
LYMPHOCYTES # BLD: 1.1 K/UL (ref 0.5–4.6)
LYMPHOCYTES NFR BLD: 16 % (ref 13–44)
MAGNESIUM SERPL-MCNC: 2.2 MG/DL (ref 1.8–2.4)
MCH RBC QN AUTO: 31.3 PG (ref 26.1–32.9)
MCHC RBC AUTO-ENTMCNC: 32.5 G/DL (ref 31.4–35)
MCV RBC AUTO: 96.5 FL (ref 79.6–97.8)
MONOCYTES # BLD: 0.3 K/UL (ref 0.1–1.3)
MONOCYTES NFR BLD: 5 % (ref 4–12)
NEUTS SEG # BLD: 5.3 K/UL (ref 1.7–8.2)
NEUTS SEG NFR BLD: 77 % (ref 43–78)
NRBC # BLD: 0 K/UL (ref 0–0.2)
PHOSPHATE SERPL-MCNC: 3.5 MG/DL (ref 2.3–3.7)
PLATELET # BLD AUTO: 149 K/UL (ref 150–450)
PMV BLD AUTO: 10.1 FL (ref 9.4–12.3)
POTASSIUM SERPL-SCNC: 3.9 MMOL/L (ref 3.5–5.1)
PROT SERPL-MCNC: 6.4 G/DL (ref 6.3–8.2)
RBC # BLD AUTO: 3.67 M/UL (ref 4.05–5.25)
SODIUM SERPL-SCNC: 140 MMOL/L (ref 136–145)
WBC # BLD AUTO: 6.8 K/UL (ref 4.3–11.1)

## 2021-01-26 PROCEDURE — 83735 ASSAY OF MAGNESIUM: CPT

## 2021-01-26 PROCEDURE — 85025 COMPLETE CBC W/AUTO DIFF WBC: CPT

## 2021-01-26 PROCEDURE — 80053 COMPREHEN METABOLIC PANEL: CPT

## 2021-01-26 PROCEDURE — 84100 ASSAY OF PHOSPHORUS: CPT

## 2021-01-26 PROCEDURE — 83615 LACTATE (LD) (LDH) ENZYME: CPT

## 2021-01-26 PROCEDURE — 36415 COLL VENOUS BLD VENIPUNCTURE: CPT

## 2021-02-12 ENCOUNTER — HOSPITAL ENCOUNTER (OUTPATIENT)
Dept: CT IMAGING | Age: 61
Discharge: HOME | End: 2021-02-12
Attending: NURSE PRACTITIONER

## 2021-02-12 DIAGNOSIS — R05.9 COUGH: ICD-10-CM

## 2021-05-13 ENCOUNTER — HOSPITAL ENCOUNTER (EMERGENCY)
Age: 61
Discharge: HOME | End: 2021-05-13
Attending: EMERGENCY MEDICINE
Payer: COMMERCIAL

## 2021-05-13 VITALS
OXYGEN SATURATION: 98 % | SYSTOLIC BLOOD PRESSURE: 160 MMHG | DIASTOLIC BLOOD PRESSURE: 92 MMHG | WEIGHT: 290 LBS | BODY MASS INDEX: 51.38 KG/M2 | HEIGHT: 63 IN | RESPIRATION RATE: 16 BRPM | TEMPERATURE: 98 F | HEART RATE: 82 BPM

## 2021-05-13 DIAGNOSIS — K42.9 UMBILICAL HERNIA WITHOUT OBSTRUCTION AND WITHOUT GANGRENE: Primary | ICD-10-CM

## 2021-05-13 PROCEDURE — 99282 EMERGENCY DEPT VISIT SF MDM: CPT

## 2021-05-13 NOTE — ED PROVIDER NOTES
Patient with known umbilical hernia. Has been able to reduce in the past.  Came out earlier today and patient is unable to reduce on her own. Having increased pain. Swelling at the umbilicus and above. No nausea or vomiting. No diarrhea or constipation. No dysuria hematuria. Has not seen a surgeon yet for this. The history is provided by the patient. No  was used. Abdominal Pain   This is a new problem. The current episode started 3 to 5 hours ago. The problem occurs constantly. The problem has not changed since onset. The pain is associated with an unknown factor. The pain is located in the periumbilical region. The quality of the pain is sharp and aching. The pain is moderate. Pertinent negatives include no fever, no diarrhea, no melena, no nausea, no vomiting, no constipation, no dysuria, no hematuria, no headaches, no chest pain and no back pain. The pain is worsened by palpation. The pain is relieved by nothing. Past Medical History:   Diagnosis Date    Anemia     Atrial fibrillation with rapid ventricular response ([address] 11/25/2020    Basal cell carcinoma     Cardiomegaly     Deep vein thrombosis (DVT) (HCC)     History of stroke     History of UTI     Hypertension     Marginal zone lymphoma ([address] 03/30/2017    Diffuse Large B-cell Lymphoma.  Completed Chemotherapy 5/28/18    Morbid obesity ([address]     Osteoarthritis     Overactive bladder     Primary hypothyroidism     Prolapse of female bladder, acquired     Radiation therapy complication     Rheumatic fever     S/P total knee replacement using cement 10/3/2011    Shingles     Superficial venous thrombosis of right arm 5/1/2017       Past Surgical History:   Procedure Laterality Date    HX CATARACT REMOVAL Bilateral 2018    HX CHOLECYSTECTOMY  1983    HX COLONOSCOPY  03/2017    Clear    HX KNEE REPLACEMENT Left 2013    Dr. Autumn Watkins Right 2012    Dr. Ondina Mead PREMALIG/BENIGN SKIN LESION EXCISION  2019    basal/squamous skin lesions removed from scalp. [facility].      HX VASCULAR ACCESS      [place] ANESTH,ACHILLES TENDON SURG Left 02/10/2011    Dr. Chica Kelley         Family History:   Problem Relation Age of Onset    Kidney Disease Father     Dementia Mother     Other Maternal Grandmother         Vascular Disorder with leg amputation    Liver Disease Sister         non-alcoholic    Celiac Disease Sister     Breast Cancer Cousin 48    Thyroid Disease Sister         hypothyroidism    Malignant Hyperthermia Neg Hx     Pseudocholinesterase Deficiency Neg Hx     Delayed Awakening Neg Hx     Emergence Delirium Neg Hx     Post-op Cognitive Dysfunction Neg Hx     Thyroid Cancer Neg Hx        Social History     Socioeconomic History    Marital status:      Spouse name: Not on file    Number of children: Not on file    Years of education: Not on file    Highest education level: Not on file   Occupational History    Not on file   Social Needs    Financial resource strain: Not on file    Food insecurity     Worry: Not on file     Inability: Not on file    Transportation needs     Medical: Not on file     Non-medical: Not on file   Tobacco Use    Smoking status: Never Smoker    Smokeless tobacco: Never Used   Substance and Sexual Activity    Alcohol use: No     Comment: RARE, ONCE/TWICE A YEAR    Drug use: Never    Sexual activity: Not on file   Lifestyle    Physical activity     Days per week: Not on file     Minutes per session: Not on file    Stress: Not on file   Relationships    Social connections     Talks on phone: Not on file     Gets together: Not on file     Attends Evangelical service: Not on file     Active member of club or organization: Not on file     Attends meetings of clubs or organizations: Not on file     Relationship status: Not on file    Intimate partner violence     Fear of current or ex partner: Not on file Emotionally abused: Not on file     Physically abused: Not on file     Forced sexual activity: Not on file   Other Topics Concern    Not on file   Social History Narrative    10/3/11:  PATIENT IS  TO [address]. SHE IS DISABLED. ALLERGIES: Gabapentin    Review of Systems   Constitutional: Negative for chills and fever. HENT: Negative for rhinorrhea and sore throat. Eyes: Negative for pain and redness. Respiratory: Negative for chest tightness, shortness of breath and wheezing. Cardiovascular: Negative for chest pain and leg swelling. Gastrointestinal: Positive for abdominal pain. Negative for constipation, diarrhea, melena, nausea and vomiting. Genitourinary: Negative for dysuria and hematuria. Musculoskeletal: Negative for back pain, gait problem, neck pain and neck stiffness. Skin: Negative for color change and rash. Neurological: Negative for weakness, numbness and headaches. All other systems reviewed and are negative. Vitals:    05/13/21 1817   BP: (!) 160/92   Pulse: 82   Resp: 16   Temp: 98 °F (36.7 °C)   SpO2: 98%   Weight: 131.5 kg (290 lb)   Height: 5' 3\" (1.6 m)            Physical Exam  Constitutional:       Appearance: She is well-developed. HENT:      Head: Normocephalic and atraumatic. Neck:      Musculoskeletal: Normal range of motion and neck supple. Cardiovascular:      Rate and Rhythm: Normal rate and regular rhythm. Pulmonary:      Effort: Pulmonary effort is normal.      Breath sounds: Normal breath sounds. Abdominal:      General: Bowel sounds are normal.      Palpations: Abdomen is soft. Tenderness: There is abdominal tenderness. Hernia: A hernia (umbilical hernia easily reduced in ED with gentle pressure. pain improved immediately. ) is present. Musculoskeletal: Normal range of motion. Skin:     General: Skin is warm and dry.    Neurological:      Mental Status: She is alert and oriented to person, place, and time. MDM  Number of Diagnoses or Management Options  Diagnosis management comments: Umbilical hernia. Will refer to surgery.      Patient Progress  Patient progress: stable         Procedures

## 2021-05-13 NOTE — ED TRIAGE NOTES
Pt states she has a hernia and for a few months and is not able to push it back in now. States she is having severe pain now. Pt has extensive medical hx. Denies vomiting or diarrhea. Masked for triage.

## 2021-05-13 NOTE — ED NOTES
I have reviewed discharge instructions with the patient. The patient verbalized understanding. Patient left ED via Discharge Method: ambulatory to Home. Opportunity for questions and clarification provided. Patient given 0 scripts. To continue your aftercare when you leave the hospital, you may receive an automated call from our care team to check in on how you are doing. This is a free service and part of our promise to provide the best care and service to meet your aftercare needs.  If you have questions, or wish to unsubscribe from this service please call [phone]. Thank you for Choosing our [facility] Emergency Department.

## 2021-06-22 PROBLEM — I95.89 CHRONIC HYPOTENSION: Chronic | Status: RESOLVED | Noted: 2017-07-19 | Resolved: 2021-06-22

## 2021-06-22 PROBLEM — Z86.79 HISTORY OF ATRIAL FIBRILLATION: Status: ACTIVE | Noted: 2021-06-22

## 2021-07-07 ENCOUNTER — HOSPITAL ENCOUNTER (OUTPATIENT)
Dept: LAB | Age: 61
Discharge: HOME | End: 2021-07-07
Payer: COMMERCIAL

## 2021-07-07 LAB
T4 SERPL-MCNC: 16 UG/DL (ref 4.8–13.9)
TSH SERPL DL<=0.005 MIU/L-ACNC: 1.51 UIU/ML (ref 0.36–3.74)

## 2021-07-07 PROCEDURE — 84436 ASSAY OF TOTAL THYROXINE: CPT

## 2021-07-07 PROCEDURE — 36415 COLL VENOUS BLD VENIPUNCTURE: CPT

## 2021-07-07 PROCEDURE — 84443 ASSAY THYROID STIM HORMONE: CPT

## 2021-07-07 PROCEDURE — 84479 ASSAY OF THYROID (T3 OR T4): CPT

## 2021-07-09 ENCOUNTER — HOSPITAL ENCOUNTER (OUTPATIENT)
Dept: GENERAL RADIOLOGY | Age: 61
Discharge: HOME | End: 2021-07-09

## 2021-07-09 DIAGNOSIS — R05.9 COUGH: ICD-10-CM

## 2021-07-09 DIAGNOSIS — J40 BRONCHITIS: ICD-10-CM

## 2021-07-19 ENCOUNTER — HOSPITAL ENCOUNTER (OUTPATIENT)
Dept: CT IMAGING | Age: 61
Discharge: HOME | End: 2021-07-19
Attending: INTERNAL MEDICINE

## 2021-07-19 DIAGNOSIS — C83.30 DIFFUSE LARGE B-CELL LYMPHOMA, UNSPECIFIED BODY REGION (HCC): ICD-10-CM

## 2021-07-19 RX ORDER — SODIUM CHLORIDE 0.9 % (FLUSH) 0.9 %
10 SYRINGE (ML) INJECTION
Status: COMPLETED | OUTPATIENT
Start: 2021-07-19 | End: 2021-07-19

## 2021-07-19 RX ADMIN — Medication 10 ML: at 10:12

## 2021-07-27 ENCOUNTER — HOSPITAL ENCOUNTER (OUTPATIENT)
Dept: INFUSION THERAPY | Age: 61
Discharge: HOME | End: 2021-07-27
Payer: COMMERCIAL

## 2021-07-27 DIAGNOSIS — E03.9 HYPOTHYROIDISM, UNSPECIFIED TYPE: ICD-10-CM

## 2021-07-27 DIAGNOSIS — C83.30 DIFFUSE LARGE B-CELL LYMPHOMA, UNSPECIFIED BODY REGION (HCC): ICD-10-CM

## 2021-07-27 LAB
ALBUMIN SERPL-MCNC: 3.8 G/DL (ref 3.2–4.6)
ALBUMIN/GLOB SERPL: 1.5 {RATIO} (ref 1.2–3.5)
ALP SERPL-CCNC: 80 U/L (ref 50–136)
ALT SERPL-CCNC: 27 U/L (ref 12–65)
ANION GAP SERPL CALC-SCNC: 5 MMOL/L (ref 7–16)
AST SERPL-CCNC: 14 U/L (ref 15–37)
BASOPHILS # BLD: 0 K/UL (ref 0–0.2)
BASOPHILS NFR BLD: 1 % (ref 0–2)
BILIRUB SERPL-MCNC: 0.4 MG/DL (ref 0.2–1.1)
BUN SERPL-MCNC: 20 MG/DL (ref 8–23)
CALCIUM SERPL-MCNC: 8.8 MG/DL (ref 8.3–10.4)
CHLORIDE SERPL-SCNC: 106 MMOL/L (ref 98–107)
CO2 SERPL-SCNC: 30 MMOL/L (ref 21–32)
CREAT SERPL-MCNC: 1.4 MG/DL (ref 0.6–1)
DIFFERENTIAL METHOD BLD: ABNORMAL
EOSINOPHIL # BLD: 0.1 K/UL (ref 0–0.8)
EOSINOPHIL NFR BLD: 2 % (ref 0.5–7.8)
ERYTHROCYTE [DISTWIDTH] IN BLOOD BY AUTOMATED COUNT: 13.6 % (ref 11.9–14.6)
GLOBULIN SER CALC-MCNC: 2.6 G/DL (ref 2.3–3.5)
GLUCOSE SERPL-MCNC: 117 MG/DL (ref 65–100)
HCT VFR BLD AUTO: 35.4 % (ref 35.8–46.3)
HGB BLD-MCNC: 11.7 G/DL (ref 11.7–15.4)
IMM GRANULOCYTES # BLD AUTO: 0 K/UL (ref 0–0.5)
IMM GRANULOCYTES NFR BLD AUTO: 0 % (ref 0–5)
LDH SERPL L TO P-CCNC: 220 U/L (ref 110–210)
LYMPHOCYTES # BLD: 1.2 K/UL (ref 0.5–4.6)
LYMPHOCYTES NFR BLD: 19 % (ref 13–44)
MAGNESIUM SERPL-MCNC: 2.1 MG/DL (ref 1.8–2.4)
MCH RBC QN AUTO: 31.9 PG (ref 26.1–32.9)
MCHC RBC AUTO-ENTMCNC: 33.1 G/DL (ref 31.4–35)
MCV RBC AUTO: 96.5 FL (ref 79.6–97.8)
MONOCYTES # BLD: 0.5 K/UL (ref 0.1–1.3)
MONOCYTES NFR BLD: 8 % (ref 4–12)
NEUTS SEG # BLD: 4.5 K/UL (ref 1.7–8.2)
NEUTS SEG NFR BLD: 71 % (ref 43–78)
NRBC # BLD: 0 K/UL (ref 0–0.2)
PHOSPHATE SERPL-MCNC: 3.6 MG/DL (ref 2.3–3.7)
PLATELET # BLD AUTO: 123 K/UL (ref 150–450)
PMV BLD AUTO: 10.4 FL (ref 9.4–12.3)
POTASSIUM SERPL-SCNC: 3.9 MMOL/L (ref 3.5–5.1)
PROT SERPL-MCNC: 6.4 G/DL (ref 6.3–8.2)
RBC # BLD AUTO: 3.67 M/UL (ref 4.05–5.2)
SODIUM SERPL-SCNC: 141 MMOL/L (ref 136–145)
T4 FREE SERPL-MCNC: 1.3 NG/DL (ref 0.78–1.46)
TSH SERPL DL<=0.005 MIU/L-ACNC: 0.97 UIU/ML (ref 0.36–3.74)
WBC # BLD AUTO: 6.3 K/UL (ref 4.3–11.1)

## 2021-07-27 PROCEDURE — 84439 ASSAY OF FREE THYROXINE: CPT

## 2021-07-27 PROCEDURE — 36591 DRAW BLOOD OFF VENOUS DEVICE: CPT

## 2021-07-27 PROCEDURE — 74011250636 HC RX REV CODE- 250/636: Performed by: NURSE PRACTITIONER

## 2021-07-27 PROCEDURE — 83735 ASSAY OF MAGNESIUM: CPT

## 2021-07-27 PROCEDURE — 84443 ASSAY THYROID STIM HORMONE: CPT

## 2021-07-27 PROCEDURE — 80053 COMPREHEN METABOLIC PANEL: CPT

## 2021-07-27 PROCEDURE — 84100 ASSAY OF PHOSPHORUS: CPT

## 2021-07-27 PROCEDURE — 83615 LACTATE (LD) (LDH) ENZYME: CPT

## 2021-07-27 PROCEDURE — 85025 COMPLETE CBC W/AUTO DIFF WBC: CPT

## 2021-07-27 RX ORDER — SODIUM CHLORIDE 0.9 % (FLUSH) 0.9 %
10 SYRINGE (ML) INJECTION AS NEEDED
Status: DISCONTINUED | OUTPATIENT
Start: 2021-07-27 | End: 2021-07-29 | Stop reason: HOSPADM

## 2021-07-27 RX ORDER — HEPARIN 100 UNIT/ML
500 SYRINGE INTRAVENOUS AS NEEDED
Status: DISCONTINUED | OUTPATIENT
Start: 2021-07-27 | End: 2021-07-28 | Stop reason: HOSPADM

## 2021-07-27 RX ADMIN — Medication 10 ML: at 14:45

## 2021-07-27 RX ADMIN — Medication 500 UNITS: at 14:45

## 2021-07-27 NOTE — PROGRESS NOTES
Patient arrived to port lab for port access and lab draw   Port accessed and labs drawn per protocol   Port flushed and de-accessed. Patient discharged from port lab ambulatory.

## 2021-08-25 ENCOUNTER — APPOINTMENT (RX ONLY)
Dept: URBAN - METROPOLITAN AREA CLINIC 349 | Facility: CLINIC | Age: 61
Setting detail: DERMATOLOGY
End: 2021-08-25

## 2021-08-25 DIAGNOSIS — L91.8 OTHER HYPERTROPHIC DISORDERS OF THE SKIN: ICD-10-CM

## 2021-08-25 DIAGNOSIS — D22 MELANOCYTIC NEVI: ICD-10-CM

## 2021-08-25 DIAGNOSIS — D18.0 HEMANGIOMA: ICD-10-CM

## 2021-08-25 DIAGNOSIS — L82.1 OTHER SEBORRHEIC KERATOSIS: ICD-10-CM

## 2021-08-25 DIAGNOSIS — L21.8 OTHER SEBORRHEIC DERMATITIS: ICD-10-CM

## 2021-08-25 DIAGNOSIS — L82.0 INFLAMED SEBORRHEIC KERATOSIS: ICD-10-CM

## 2021-08-25 DIAGNOSIS — Z71.89 OTHER SPECIFIED COUNSELING: ICD-10-CM

## 2021-08-25 DIAGNOSIS — Z85.828 PERSONAL HISTORY OF OTHER MALIGNANT NEOPLASM OF SKIN: ICD-10-CM

## 2021-08-25 PROBLEM — L30.9 DERMATITIS, UNSPECIFIED: Status: ACTIVE | Noted: 2021-08-25

## 2021-08-25 PROBLEM — D18.01 HEMANGIOMA OF SKIN AND SUBCUTANEOUS TISSUE: Status: ACTIVE | Noted: 2021-08-25

## 2021-08-25 PROBLEM — D22.5 MELANOCYTIC NEVI OF TRUNK: Status: ACTIVE | Noted: 2021-08-25

## 2021-08-25 PROCEDURE — ? COUNSELING

## 2021-08-25 PROCEDURE — ? OBSERVATION

## 2021-08-25 PROCEDURE — ? OTHER

## 2021-08-25 PROCEDURE — ? PRESCRIPTION

## 2021-08-25 PROCEDURE — 99213 OFFICE O/P EST LOW 20 MIN: CPT | Mod: 25

## 2021-08-25 PROCEDURE — 17110 DESTRUCTION B9 LES UP TO 14: CPT

## 2021-08-25 PROCEDURE — ? LIQUID NITROGEN

## 2021-08-25 PROCEDURE — ? PRESCRIPTION MEDICATION MANAGEMENT

## 2021-08-25 PROCEDURE — ? SKIN TAG REMOVAL (COSMETIC)

## 2021-08-25 RX ORDER — CLOBETASOL PROPIONATE 0.5 MG/ML
SOLUTION TOPICAL
Qty: 1 | Refills: 2 | Status: ERX | COMMUNITY
Start: 2021-08-25

## 2021-08-25 RX ORDER — KETOCONAZOLE 20 MG/ML
SHAMPOO, SUSPENSION TOPICAL
Qty: 1 | Refills: 6 | Status: ERX | COMMUNITY
Start: 2021-08-25

## 2021-08-25 RX ADMIN — CLOBETASOL PROPIONATE: 0.5 SOLUTION TOPICAL at 00:00

## 2021-08-25 RX ADMIN — KETOCONAZOLE: 20 SHAMPOO, SUSPENSION TOPICAL at 00:00

## 2021-08-25 ASSESSMENT — LOCATION SIMPLE DESCRIPTION DERM
LOCATION SIMPLE: LEFT SUPERIOR EYELID
LOCATION SIMPLE: RIGHT SCALP
LOCATION SIMPLE: LEFT INFERIOR EYELID
LOCATION SIMPLE: SCALP
LOCATION SIMPLE: LEFT EYEBROW
LOCATION SIMPLE: LEFT SCALP
LOCATION SIMPLE: LEFT FOREHEAD
LOCATION SIMPLE: UPPER BACK
LOCATION SIMPLE: RIGHT UPPER BACK

## 2021-08-25 ASSESSMENT — LOCATION ZONE DERM
LOCATION ZONE: TRUNK
LOCATION ZONE: FACE
LOCATION ZONE: EYELID
LOCATION ZONE: SCALP

## 2021-08-25 ASSESSMENT — LOCATION DETAILED DESCRIPTION DERM
LOCATION DETAILED: RIGHT SUPERIOR UPPER BACK
LOCATION DETAILED: RIGHT SUPERIOR PARIETAL SCALP
LOCATION DETAILED: LEFT MEDIAL FRONTAL SCALP
LOCATION DETAILED: RIGHT MEDIAL FRONTAL SCALP
LOCATION DETAILED: INFERIOR THORACIC SPINE
LOCATION DETAILED: RIGHT MEDIAL UPPER BACK
LOCATION DETAILED: LEFT MEDIAL SUPERIOR EYELID
LOCATION DETAILED: LEFT LATERAL SUPERIOR EYELID
LOCATION DETAILED: LEFT MEDIAL INFERIOR PRESEPTAL REGION
LOCATION DETAILED: LEFT SUPERIOR MEDIAL FOREHEAD
LOCATION DETAILED: LEFT MEDIAL EYEBROW

## 2021-08-25 NOTE — PROCEDURE: PRESCRIPTION MEDICATION MANAGEMENT
Initiate Treatment: ketoconazole 2 % shampoo \\nQuantity: 1.0 Bottle  Days Supply: 30\\nSig: Wash scalp once daily for two weeks when flared and then 1-2 times weekly for maintenance\\n\\nclobetasol 0.05 % scalp solution \\nQuantity: 1.0 Bottle\\nSig: Apply to affected areas on scalp twice daily as needed for flares
Detail Level: Zone
Render In Strict Bullet Format?: No

## 2021-08-25 NOTE — PROCEDURE: LIQUID NITROGEN
Medical Necessity Clause: This procedure was medically necessary because the lesions that were treated were:
Show Aperture Variable?: Yes
Include Z78.9 (Other Specified Conditions Influencing Health Status) As An Associated Diagnosis?: No
Duration Of Freeze Thaw-Cycle (Seconds): 3
Consent: The patient's consent was obtained including but not limited to risks of crusting, scabbing, blistering, scarring, darker or lighter pigmentary change, recurrence, incomplete removal and infection.
Post-Care Instructions: I reviewed with the patient in detail post-care instructions. Patient is to wear sunprotection, and avoid picking at any of the treated lesions. Pt may apply Vaseline to crusted or scabbing areas.
Medical Necessity Information: It is in your best interest to select a reason for this procedure from the list below. All of these items fulfill various CMS LCD requirements except the new and changing color options.
Detail Level: Detailed

## 2021-08-25 NOTE — HPI: SKIN LESIONS
Have Your Skin Lesions Been Treated?: not been treated
Is This A New Presentation, Or A Follow-Up?: Skin Lesions
Which Family Member (Optional)?: Father
Additional History: Patient stated that her  stated that he noticed some scaling on her scalp. She stated that she also has rough spots on her back.

## 2021-08-25 NOTE — PROCEDURE: OTHER
Note Text (......Xxx Chief Complaint.): This diagnosis correlates with the
Other (Free Text): Patient stated that she has been experiencing some dry and flaking on her scalp. She stated she typically doesn’t struggle with dandruff\\n\\nDiscussed treating as seb derm and if lesions are still present, we will treat as AKs. Patient is okay with this plan.\\n\\nMany of the lesions are easily scratched off and appear more like seb Derm
Other (Free Text): Lesions treated with hyfrecator on low setting 1.3\\n\\nNo charge
Render Risk Assessment In Note?: yes
Detail Level: Zone

## 2021-08-25 NOTE — PROCEDURE: SKIN TAG REMOVAL (COSMETIC)
Removed With: electrodesiccation
Consent: Written consent obtained and the risks of skin tag removal was reviewed with the patient including but not limited to bleeding, pigmentary change, infection, pain, and remote possibility of scarring.
Anesthesia Volume In Cc: 0
Detail Level: Detailed

## 2021-10-04 ENCOUNTER — HOSPITAL ENCOUNTER (OUTPATIENT)
Dept: PHYSICAL THERAPY | Age: 61
Discharge: HOME | End: 2021-10-04

## 2021-10-04 NOTE — THERAPY EVALUATION
Vaughn Shirley cancelled -- she informed front office her arm is killing her and she is returning to MD.  Will follow up to see if we can reschedule IE

## 2021-10-06 ENCOUNTER — APPOINTMENT (RX ONLY)
Dept: URBAN - METROPOLITAN AREA CLINIC 349 | Facility: CLINIC | Age: 61
Setting detail: DERMATOLOGY
End: 2021-10-06

## 2021-10-06 DIAGNOSIS — L82.1 OTHER SEBORRHEIC KERATOSIS: ICD-10-CM

## 2021-10-06 DIAGNOSIS — D485 NEOPLASM OF UNCERTAIN BEHAVIOR OF SKIN: ICD-10-CM

## 2021-10-06 DIAGNOSIS — L81.4 OTHER MELANIN HYPERPIGMENTATION: ICD-10-CM

## 2021-10-06 DIAGNOSIS — L57.8 OTHER SKIN CHANGES DUE TO CHRONIC EXPOSURE TO NONIONIZING RADIATION: ICD-10-CM

## 2021-10-06 DIAGNOSIS — Z85.828 PERSONAL HISTORY OF OTHER MALIGNANT NEOPLASM OF SKIN: ICD-10-CM

## 2021-10-06 DIAGNOSIS — L57.0 ACTINIC KERATOSIS: ICD-10-CM

## 2021-10-06 PROBLEM — D04.39 CARCINOMA IN SITU OF SKIN OF OTHER PARTS OF FACE: Status: ACTIVE | Noted: 2021-10-06

## 2021-10-06 PROBLEM — D48.5 NEOPLASM OF UNCERTAIN BEHAVIOR OF SKIN: Status: ACTIVE | Noted: 2021-10-06

## 2021-10-06 PROCEDURE — ? OTHER

## 2021-10-06 PROCEDURE — ? COUNSELING

## 2021-10-06 PROCEDURE — 99213 OFFICE O/P EST LOW 20 MIN: CPT | Mod: 25

## 2021-10-06 PROCEDURE — ? LIQUID NITROGEN

## 2021-10-06 PROCEDURE — ? PATHOLOGY BILLING

## 2021-10-06 PROCEDURE — 17000 DESTRUCT PREMALG LESION: CPT | Mod: 59

## 2021-10-06 PROCEDURE — ? BIOPSY BY SHAVE METHOD

## 2021-10-06 PROCEDURE — 17003 DESTRUCT PREMALG LES 2-14: CPT

## 2021-10-06 PROCEDURE — ? BENIGN DESTRUCTION COSMETIC

## 2021-10-06 PROCEDURE — A4550 SURGICAL TRAYS: HCPCS

## 2021-10-06 PROCEDURE — 11102 TANGNTL BX SKIN SINGLE LES: CPT

## 2021-10-06 PROCEDURE — 88305 TISSUE EXAM BY PATHOLOGIST: CPT

## 2021-10-06 ASSESSMENT — LOCATION SIMPLE DESCRIPTION DERM
LOCATION SIMPLE: INFERIOR FOREHEAD
LOCATION SIMPLE: SUPERIOR FOREHEAD
LOCATION SIMPLE: LEFT SCALP
LOCATION SIMPLE: LEFT CHEEK
LOCATION SIMPLE: RIGHT SCALP
LOCATION SIMPLE: RIGHT FOREHEAD

## 2021-10-06 ASSESSMENT — LOCATION DETAILED DESCRIPTION DERM
LOCATION DETAILED: LEFT MEDIAL MALAR CHEEK
LOCATION DETAILED: RIGHT SUPERIOR MEDIAL FOREHEAD
LOCATION DETAILED: RIGHT MEDIAL FRONTAL SCALP
LOCATION DETAILED: RIGHT SUPERIOR FOREHEAD
LOCATION DETAILED: LEFT INFERIOR CENTRAL MALAR CHEEK
LOCATION DETAILED: INFERIOR MID FOREHEAD
LOCATION DETAILED: SUPERIOR MID FOREHEAD
LOCATION DETAILED: LEFT CENTRAL MALAR CHEEK
LOCATION DETAILED: RIGHT CENTRAL FRONTAL SCALP
LOCATION DETAILED: LEFT MEDIAL FRONTAL SCALP

## 2021-10-06 ASSESSMENT — LOCATION ZONE DERM
LOCATION ZONE: FACE
LOCATION ZONE: SCALP

## 2021-10-06 NOTE — PROCEDURE: OTHER
Render Risk Assessment In Note?: yes
Detail Level: Zone
Note Text (......Xxx Chief Complaint.): This diagnosis correlates with the
Other (Free Text): No charge today, will charge $50 at follow up for more treatment

## 2021-10-06 NOTE — PROCEDURE: LIQUID NITROGEN
Number Of Freeze-Thaw Cycles: 1 freeze-thaw cycle
Consent: The patient's consent was obtained including but not limited to risks of crusting, scabbing, blistering, scarring, darker or lighter pigmentary change, recurrence, incomplete removal and infection.
Render Post-Care Instructions In Note?: no
Show Applicator Variable?: Yes
Duration Of Freeze Thaw-Cycle (Seconds): 3
Detail Level: Detailed
Post-Care Instructions: I reviewed with the patient in detail post-care instructions. Patient is to wear sunprotection, and avoid picking at any of the treated lesions. Pt may apply Vaseline to crusted or scabbing areas.

## 2021-10-06 NOTE — PROCEDURE: PATHOLOGY BILLING
Immunohistochemistry ([id number] and [id number]) billing is not performed here. Please use the Immunohistochemistry Stain Billing plan to accomplish this. Immunohistochemistry ([id number] and [id number]) billing is not performed here. Please use the Immunohistochemistry Stain Billing plan to accomplish this.

## 2021-10-06 NOTE — PROCEDURE: BIOPSY BY SHAVE METHOD
Type Of Destruction Used: Curettage
Wound Care: Vaseline
Hide Second Anesthesia?: No
Cryotherapy Text: The wound bed was treated with cryotherapy after the biopsy was performed.
Depth Of Biopsy: dermis
Notification Instructions: Patient will be notified of biopsy results. However, patient instructed to call the office if not contacted within 2 weeks.
Anesthesia Volume In Cc (Will Not Render If 0): 1
Additional Anesthesia Volume In Cc (Will Not Render If 0): 0
Billing Type: Third-Party Bill
Accession #: [id number]
Validate Note Data (See Information Below): Yes
Hemostasis: Electrodesiccation
Silver Nitrate Text: The wound bed was treated with silver nitrate after the biopsy was performed.
Detail Level: Detailed
Biopsy Method: Dermablade
Anesthesia Type: 2% lidocaine with epinephrine
Electrodesiccation Text: The wound bed was treated with electrodesiccation after the biopsy was performed.
Consent: Written consent was obtained and risks were reviewed including but not limited to scarring, infection, bleeding, scabbing, incomplete removal, nerve damage and allergy to anesthesia.
Curettage Text: The wound bed was treated with curettage after the biopsy was performed.
Path Notes (To The Dermatopathologist): Check margins
Dressing: Band-Aid
Information: Selecting Yes will display possible errors in your note based on the variables you have selected. This validation is only offered as a suggestion for you. PLEASE NOTE THAT THE VALIDATION TEXT WILL BE REMOVED WHEN YOU FINALIZE YOUR NOTE. IF YOU WANT TO FAX A PRELIMINARY NOTE YOU WILL NEED TO TOGGLE THIS TO 'NO' IF YOU DO NOT WANT IT IN YOUR FAXED NOTE.
Post-Care Instructions: I reviewed with the patient in detail post-care instructions. Patient is to keep the biopsy site dry overnight, and then apply bacitracin twice daily until healed. Patient may apply hydrogen peroxide soaks to remove any crusting.
Biopsy Type: H and E
Electrodesiccation And Curettage Text: The wound bed was treated with electrodesiccation and curettage after the biopsy was performed.

## 2021-10-26 ENCOUNTER — HOSPITAL ENCOUNTER (OUTPATIENT)
Dept: INFUSION THERAPY | Age: 61
Discharge: HOME | End: 2021-10-26
Payer: COMMERCIAL

## 2021-10-26 VITALS
RESPIRATION RATE: 20 BRPM | TEMPERATURE: 97 F | DIASTOLIC BLOOD PRESSURE: 89 MMHG | OXYGEN SATURATION: 97 % | SYSTOLIC BLOOD PRESSURE: 169 MMHG

## 2021-10-26 DIAGNOSIS — C83.30 DIFFUSE LARGE B-CELL LYMPHOMA, UNSPECIFIED BODY REGION (HCC): ICD-10-CM

## 2021-10-26 DIAGNOSIS — C85.88 MARGINAL ZONE LYMPHOMA OF LYMPH NODES OF MULTIPLE SITES (HCC): ICD-10-CM

## 2021-10-26 PROCEDURE — 96523 IRRIG DRUG DELIVERY DEVICE: CPT

## 2021-10-26 PROCEDURE — 74011250636 HC RX REV CODE- 250/636: Performed by: INTERNAL MEDICINE

## 2021-10-26 RX ORDER — HEPARIN 100 UNIT/ML
500 SYRINGE INTRAVENOUS AS NEEDED
Status: DISCONTINUED | OUTPATIENT
Start: 2021-10-26 | End: 2021-10-27 | Stop reason: HOSPADM

## 2021-10-26 RX ORDER — SODIUM CHLORIDE 0.9 % (FLUSH) 0.9 %
10 SYRINGE (ML) INJECTION AS NEEDED
Status: DISCONTINUED | OUTPATIENT
Start: 2021-10-26 | End: 2021-10-28 | Stop reason: HOSPADM

## 2021-10-26 RX ADMIN — HEPARIN SODIUM (PORCINE) LOCK FLUSH IV SOLN 100 UNIT/ML 500 UNITS: 100 SOLUTION at 16:40

## 2021-10-26 RX ADMIN — Medication 10 ML: at 16:40

## 2021-10-26 NOTE — PROGRESS NOTES
Arrived to the [facility]. Port flush completed. Provided education on rationale for flushing port    Patient instructed to report any side affects to ordering provider. Patient tolerated without problems. Any issues or concerns during appointment: no.  Patient aware of next infusion appointment on 5/3/22 (date) at 36 (time). Discharged ambulatory.

## 2021-10-30 ENCOUNTER — APPOINTMENT (OUTPATIENT)
Dept: CT IMAGING | Age: 61
End: 2021-10-30
Attending: EMERGENCY MEDICINE
Payer: COMMERCIAL

## 2021-10-30 ENCOUNTER — HOSPITAL ENCOUNTER (EMERGENCY)
Age: 61
Discharge: HOME | End: 2021-10-30
Attending: EMERGENCY MEDICINE
Payer: COMMERCIAL

## 2021-10-30 VITALS
OXYGEN SATURATION: 95 % | TEMPERATURE: 99 F | SYSTOLIC BLOOD PRESSURE: 140 MMHG | BODY MASS INDEX: 51.91 KG/M2 | DIASTOLIC BLOOD PRESSURE: 68 MMHG | HEIGHT: 63 IN | RESPIRATION RATE: 18 BRPM | HEART RATE: 74 BPM | WEIGHT: 293 LBS
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
## 2021-10-30 DIAGNOSIS — S01.01XA LACERATION OF SCALP, INITIAL ENCOUNTER: ICD-10-CM

## 2021-10-30 DIAGNOSIS — S09.90XA INJURY OF HEAD, INITIAL ENCOUNTER: ICD-10-CM

## 2021-10-30 DIAGNOSIS — S01.81XA LACERATION OF FOREHEAD, INITIAL ENCOUNTER: ICD-10-CM

## 2021-10-30 PROCEDURE — 70450 CT HEAD/BRAIN W/O DYE: CPT

## 2021-10-30 PROCEDURE — 72125 CT NECK SPINE W/O DYE: CPT

## 2021-10-30 PROCEDURE — 99284 EMERGENCY DEPT VISIT MOD MDM: CPT

## 2021-10-30 PROCEDURE — 75810000293 HC SIMP/SUPERF WND  RPR

## 2021-10-30 NOTE — ED TRIAGE NOTES
Patient fromt home brought in by [facility]. Patient fell at home and has an approx 6-8 in gash that is open. Patient denies loc, dizziness, no blurred vision.  Patient is on blood thinners

## 2021-10-30 NOTE — ED PROVIDER NOTES
[address]y Nichols is a 64 y.o. female seen on 10/30/2021 in the [facility] EMERGENCY DEPT in room ER08/08. Chief Complaint   Patient presents with    Laceration     6-8 cm gash      HPI: 57-year-old  female presented emergency department via EMS after tripping and falling just prior to arrival.  Patient states she was walking back into the house up her stairs after checking the mail and tripped up the stairs. She fell face first and hit her head on the metal edge of the door and the brick porch. She sustained a large laceration to her forehead and scalp is approximately 6 to 8 inches long. Patient is on Eliquis for A. fib. She denies loss of consciousness. She does have a headache. She denies any vomiting, lightheadedness, any other injuries. She denies neck pain currently. Her tetanus shot is up-to-date. She has mild active bleeding. She has no other complaints at this time. Historian: Patient    REVIEW OF SYSTEMS     Review of Systems   Constitutional: Negative. HENT: Negative. Respiratory: Negative. Cardiovascular: Negative. Gastrointestinal: Negative. Musculoskeletal: Negative. Skin: Positive for wound. Neurological: Positive for headaches. Hematological: Bruises/bleeds easily. Psychiatric/Behavioral: Negative. All other systems reviewed and are negative. PAST MEDICAL HISTORY     Past Medical History:   Diagnosis Date    Anemia     Asthma     Followed by Dr. Susan Jacob, Pulmonology    Atrial fibrillation with rapid ventricular response ([address] 11/25/2020    Basal cell carcinoma     Cardiomegaly     Deep vein thrombosis (DVT) ([address]     History of stroke     History of UTI     Hypertension     Marginal zone lymphoma ([address] 03/30/2017    Diffuse Large B-cell Lymphoma.  Completed Chemotherapy 5/28/18    Morbid obesity ([address]     Osteoarthritis     Overactive bladder     Primary hypothyroidism     Prolapse of female bladder, acquired     Radiation therapy complication     Rheumatic fever     S/P total knee replacement using cement 10/3/2011    Shingles     Superficial venous thrombosis of right arm 5/1/2017     Past Surgical History:   Procedure Laterality Date    HX CATARACT REMOVAL Bilateral 2018    HX CHOLECYSTECTOMY  1983    HX COLONOSCOPY  03/2017    Clear    HX KNEE REPLACEMENT Left 2013    Dr. Jun Willoughby Right 2012    Dr. Lyndsey Saleh HX PREMALIG/BENIGN SKIN LESION EXCISION  2019    basal/squamous skin lesions removed from scalp. [facility].  HX VASCULAR ACCESS      TN ANESTH,ACHILLES TENDON SURG Left 02/10/2011    Dr. Enriquez Joiner  11/25/2020    JACK-guided cardioversion for A. Fib     Social History     Socioeconomic History    Marital status:      Spouse name: Not on file    Number of children: Not on file    Years of education: Not on file    Highest education level: Not on file   Tobacco Use    Smoking status: Never Smoker    Smokeless tobacco: Never Used   Substance and Sexual Activity    Alcohol use: No     Comment: RARE, ONCE/TWICE A YEAR    Drug use: Never   Social History Narrative    10/3/11:  PATIENT IS  TO DEJA.  THEY HAVE A GROWN SON AND DAUGHTER. SHE IS DISABLED. Social Determinants of Health     Financial Resource Strain:     Difficulty of Paying Living Expenses:    Food Insecurity:     Worried About Running Out of Food in the Last Year:     [address] in the Last Year:    Transportation Needs:     Lack of Transportation (Medical):      Lack of Transportation (Non-Medical):    Physical Activity:     Days of Exercise per Week:     Minutes of Exercise per Session:    Stress:     Feeling of Stress :    Social Connections:     Frequency of Communication with Friends and Family:     Frequency of Social Gatherings with Friends and Family:     Attends Christianity Services:  Active Member of Clubs or Organizations:     Attends Club or Organization Meetings:     Marital Status:      Prior to Admission Medications   Prescriptions Last Dose Informant Patient Reported? Taking? BIOTIN PO   Yes No   Sig: Take  by mouth. albuterol (PROVENTIL HFA, VENTOLIN HFA, PROAIR HFA) 90 mcg/actuation inhaler   No No   Sig: Take 2 Puffs by inhalation every four (4) hours as needed for Wheezing or Shortness of Breath. albuterol (PROVENTIL VENTOLIN) 2.5 mg /3 mL (0.083 %) nebu   No No   Sig: 3 mL by Nebulization route four (4) times daily. apixaban (ELIQUIS) 5 mg tablet   No No   Sig: Take 1 Tab by mouth two (2) times a day. ascorbic acid, vitamin C, (Vitamin C) 250 mg tablet   Yes No   Sig: Take 250 mg by mouth. azithromycin (ZITHROMAX) 250 mg tablet   No No   Sig: Take 1 Tab by mouth every Monday, Wednesday, Friday. cholecalciferol (Vitamin D3) 25 mcg (1,000 unit) cap   Yes No   Sig: Take  by mouth daily. clobetasoL (TEMOVATE) 0.05 % external solution   Yes No   Sig: Per Derm   fesoterodine (Toviaz) 4 mg SR tablet   No No   Sig: Take 1 tab po daily. If no improvement after 2 weeks, increase to 2 tabs po daily   fluticasone furoate-vilanteroL (Breo Ellipta) 200-25 mcg/dose inhaler   No No   Sig: Take 1 Puff by inhalation daily. fluticasone propionate (Flonase Allergy Relief) 50 mcg/actuation nasal spray   No No   Si Sprays by Both Nostrils route daily. Indications: nasal congestion   ketoconazole (NIZORAL) 2 % shampoo   Yes No   Sig: Per Derm   levothyroxine (SYNTHROID) 200 mcg tablet   No No   Sig: TAKE 1 TABLET BY MOUTH EVERY DAY BEFORE BREAKFAST   magic mouthwash (ALEXSANDER) susp   No No   Si part: Maalox  1 part: Lidocaine 2% viscous   1 part: Diphenhydramine oral solution   1 part: Mycostatin    1 tsp po tid, swish and swallow    Pharmacy to mix equal portions of ingredients to a total volume as indicated in the dispense amount.    metoprolol succinate (TOPROL-XL) 50 mg XL tablet   No No   Sig: Take 1 Tab by mouth daily. montelukast (SINGULAIR) 10 mg tablet   No No   Sig: TAKE 1 TABLET BY MOUTH EVERY DAY   sodium chloride 3 % nebulizer solution   No No   Si mL by Nebulization route two (2) times daily as needed for Other or Cough (mucous clearance). Facility-Administered Medications: None     Allergies   Allergen Reactions    Gabapentin Other (comments)        PHYSICAL EXAM       Vitals:    10/30/21 1115 10/30/21 1135 10/30/21 1139   BP: (!) 169/115 (!) 143/65    Pulse: 75 68    Resp: 18     Temp: 99.3 °F (37.4 °C)     SpO2: 95% 96% 95%    Vital signs were reviewed. Physical Exam  Vitals and nursing note reviewed. Constitutional:       Appearance: Normal appearance. HENT:      Head: Normocephalic. Comments: 9 inch laceration extending from left temple across the forehead to the hairline extending to the wound apex of scalp. Mild active bleeding. Nose: Nose normal.      Mouth/Throat:      Mouth: Mucous membranes are moist.   Eyes:      Extraocular Movements: Extraocular movements intact. Pupils: Pupils are equal, round, and reactive to light. Cardiovascular:      Rate and Rhythm: Normal rate and regular rhythm. Pulses: Normal pulses. Heart sounds: Normal heart sounds. Pulmonary:      Effort: Pulmonary effort is normal.      Breath sounds: Normal breath sounds. Abdominal:      Palpations: Abdomen is soft. Tenderness: There is no abdominal tenderness. Musculoskeletal:         General: Normal range of motion. Cervical back: Normal range of motion. No tenderness. Skin:     General: Skin is warm and dry. Neurological:      General: No focal deficit present. Mental Status: She is alert and oriented to person, place, and time. Psychiatric:         Mood and Affect: Mood normal.         Behavior: Behavior normal.         Thought Content:  Thought content normal.         Judgment: Judgment normal.          MEDICAL DECISION MAKING     ED Course:    Orders Placed This Encounter    WOUND REPAIR    WOUND REPAIR    CT HEAD WO CONT    CT SPINE CERV WO CONT     No results found for this or any previous visit (from the past 8 hour(s)). CT HEAD WO CONT    Result Date: 10/30/2021  NONCONTRAST HEAD CT CLINICAL HISTORY:  HEAD injury from fall in a patient on anticoagulation therapy. History of lymphoma. TECHNIQUE:  Axial images were obtained with spiral technique. Radiation dose reduction was achieved using one or all of the following techniques: automated exposure control, weight-based dosing, iterative reconstruction. COMPARISON:  MRI of October 2, 2017. REPORT:   Standard noncontrast head CT demonstrates no definite intracranial mass effect, hemorrhage, or evidence of acute geographic infarction. White matter hypodensities are most consistent with small vessel ischemic disease. The ventricles are normal in size and configuration, accounting for the patient's age. Orbits  and paranasal sinuses are clear where imaged. There is left frontoparietal scalp hematoma and soft tissue mass associated with the laceration. Bone windows demonstrate no definite fracture or destruction. 1.  LEFT FRONTOPARIETAL SCALP HEMATOMA AND SOFT TISSUE GAS ASSOCIATED WITH A LACERATION BUT NO UNDERLYING CALVARIAL FRACTURE. 2.  MILD SMALL VESSEL ISCHEMIC DISEASE WITH NO ACUTE INTRACRANIAL ABNORMALITY IDENTIFIED AT NONCONTRAST CT.     CT SPINE CERV WO CONT    Result Date: 10/30/2021  CT CERVICAL SPINE WITHOUT CONTRAST HISTORY: Neck pain after a fall. . COMPARISON: None. TECHNIQUE: Helical imaging was performed through the cervical spine and reconstructed in multiple planes. Dose reduction techniques used: Automated exposure control, adjustment of the mAs and/or kVp according to patient's size, and iterative reconstruction techniques. FINDINGS: *  ALIGNMENT: Within normal limits. *  FRACTURES: None. *  PREVERTEBRAL SOFT TISSUES: No swelling.  The disc evaluation is suboptimal by CT. C2-C3: Unremarkable. C3-C4: Unremarkable. C4-C5: Unremarkable. C5-C6: Unremarkable. C6-C7: Unremarkable. C7-T1: Unremarkable. Unremarkable CT of the cervical spine. Date of Dictation: 10/30/2021 12:24 PM           MDM  Number of Diagnoses or Management Options  Diagnosis management comments: 51-year-old female present emergency department with significant laceration after trip and fall just prior to arrival.  Patient with large laceration to the face extending into the scalp that was repaired as below. Scalp laceration was repaired with staples and facial laceration with 5-0 Prolene. Discussed with patient about follow-up for suture and staple removal as well as signs and symptoms to watch for with close head injury on Eliquis. Patient will return to the emergency department for changes in mental status, vomiting or any other concerns. Amount and/or Complexity of Data Reviewed  Tests in the radiology section of CPT®: ordered and reviewed    Patient Progress  Patient progress: improved    Wound Repair    Date/Time: 10/30/2021 1:05 PM  Location details: scalp  Wound length:2.6 - 7.5 cm  Anesthesia: local infiltration    Anesthesia:  Local Anesthetic: lidocaine 1% with epinephrine  Foreign bodies: no foreign bodies  Irrigation solution: saline  Debridement: none  Skin closure: staples (14 staples)  Approximation: close  Patient tolerance: patient tolerated the procedure well with no immediate complications  My total time at bedside, performing this procedure was 16-30 minutes.   Wound Repair    Date/Time: 10/30/2021 1:06 PM  Location details: face  Wound length:2.6 - 7.5 cm  Anesthesia: local infiltration    Anesthesia:  Local Anesthetic: lidocaine 1% with epinephrine  Foreign bodies: no foreign bodies  Irrigation solution: tap water  Debridement: none  Skin closure: Prolene (5-0)  Number of sutures: 6  Technique: simple  Approximation: close  Patient tolerance: patient tolerated the procedure well with no immediate complications  My total time at bedside, performing this procedure was 16-30 minutes. HEAD INJURY -Prior to leaving the emergency department a plan of care was discussed. I have advised a follow-up plan for the patient's head injury. The patient should be observed for nausea, vomiting, weakness, mental status changes or any neurologic change that would suggest intracranial bleeding. At this time I do not believe the likelihood of finding a significant injury requiring neurosurgical intervention outweighs the risks associated with radiation exposure to this patient. I have recommended a period of outpatient observation and close follow up. Should the patient's status change or any other concerns arise, they should be brought back to the emergency department immediately for reevaluation. Disposition:  Discharged  Diagnosis:     ICD-10-CM ICD-9-CM   1. Fall, initial encounter  Eduardo Tipton. XXXA E888.9   2. Injury of head, initial encounter  S09.90XA 959.01   3. Laceration of forehead, initial encounter  S01.81XA 873.42   4. Laceration of scalp, initial encounter  S01. 01XA 873.0     ____________________________________________________________________  A portion of this note was generated using voice recognition dictation software. While the note has been reviewed for accuracy, please note certain words and phrases may not be transcribed as intended and some grammatical and/or typographical errors may be present.

## 2021-10-30 NOTE — ED NOTES
I have reviewed discharge instructions with the patient. The patient and spouse verbalized understanding. Patient left ED via Discharge Method: ambulatory to Home with . Opportunity for questions and clarification provided. Patient given 0 scripts. To continue your aftercare when you leave the hospital, you may receive an automated call from our care team to check in on how you are doing. This is a free service and part of our promise to provide the best care and service to meet your aftercare needs.  If you have questions, or wish to unsubscribe from this service please call [phone]. Thank you for Choosing our [address] [place] [address] Emergency Department.

## 2021-10-30 NOTE — DISCHARGE INSTRUCTIONS
Return to the emergency department for uncontrolled vomiting, uncontrolled pain, changes in no status or any other concerns. Follow-up with your doctor or return to the emergency department in 1 week for removal of stitches and staples. Keep clean and dry. Wash with soap and water.

## 2021-11-02 ENCOUNTER — HOSPITAL ENCOUNTER (OUTPATIENT)
Dept: LAB | Age: 61
Discharge: HOME | End: 2021-11-02
Payer: COMMERCIAL

## 2021-11-02 DIAGNOSIS — C83.30 DIFFUSE LARGE B-CELL LYMPHOMA, UNSPECIFIED BODY REGION (HCC): ICD-10-CM

## 2021-11-02 LAB
ALBUMIN SERPL-MCNC: 3.4 G/DL (ref 3.2–4.6)
ALBUMIN/GLOB SERPL: 1.1 {RATIO} (ref 1.2–3.5)
ALP SERPL-CCNC: 73 U/L (ref 50–136)
ALT SERPL-CCNC: 66 U/L (ref 12–65)
ANION GAP SERPL CALC-SCNC: 5 MMOL/L (ref 7–16)
AST SERPL-CCNC: 31 U/L (ref 15–37)
BASOPHILS # BLD: 0.1 K/UL (ref 0–0.2)
BASOPHILS NFR BLD: 1 % (ref 0–2)
BILIRUB SERPL-MCNC: 0.3 MG/DL (ref 0.2–1.1)
BUN SERPL-MCNC: 12 MG/DL (ref 8–23)
CALCIUM SERPL-MCNC: 8.6 MG/DL (ref 8.3–10.4)
CHLORIDE SERPL-SCNC: 105 MMOL/L (ref 98–107)
CO2 SERPL-SCNC: 30 MMOL/L (ref 21–32)
CREAT SERPL-MCNC: 0.9 MG/DL (ref 0.6–1)
DIFFERENTIAL METHOD BLD: ABNORMAL
EOSINOPHIL # BLD: 0.2 K/UL (ref 0–0.8)
EOSINOPHIL NFR BLD: 3 % (ref 0.5–7.8)
ERYTHROCYTE [DISTWIDTH] IN BLOOD BY AUTOMATED COUNT: 13.9 % (ref 11.9–14.6)
GLOBULIN SER CALC-MCNC: 3.2 G/DL (ref 2.3–3.5)
GLUCOSE SERPL-MCNC: 141 MG/DL (ref 65–100)
HCT VFR BLD AUTO: 34.5 % (ref 35.8–46.3)
HGB BLD-MCNC: 11 G/DL (ref 11.7–15.4)
IMM GRANULOCYTES # BLD AUTO: 0.1 K/UL (ref 0–0.5)
IMM GRANULOCYTES NFR BLD AUTO: 1 % (ref 0–5)
LDH SERPL L TO P-CCNC: 287 U/L (ref 110–210)
LYMPHOCYTES # BLD: 1.4 K/UL (ref 0.5–4.6)
LYMPHOCYTES NFR BLD: 21 % (ref 13–44)
MAGNESIUM SERPL-MCNC: 2 MG/DL (ref 1.8–2.4)
MCH RBC QN AUTO: 30.2 PG (ref 26.1–32.9)
MCHC RBC AUTO-ENTMCNC: 31.9 G/DL (ref 31.4–35)
MCV RBC AUTO: 94.8 FL (ref 79.6–97.8)
MONOCYTES # BLD: 0.4 K/UL (ref 0.1–1.3)
MONOCYTES NFR BLD: 5 % (ref 4–12)
NEUTS SEG # BLD: 4.6 K/UL (ref 1.7–8.2)
NEUTS SEG NFR BLD: 70 % (ref 43–78)
NRBC # BLD: 0 K/UL (ref 0–0.2)
PHOSPHATE SERPL-MCNC: 3.2 MG/DL (ref 2.3–3.7)
PLATELET # BLD AUTO: 146 K/UL (ref 150–450)
PMV BLD AUTO: 9.9 FL (ref 9.4–12.3)
POTASSIUM SERPL-SCNC: 3.9 MMOL/L (ref 3.5–5.1)
PROT SERPL-MCNC: 6.6 G/DL (ref 6.3–8.2)
RBC # BLD AUTO: 3.64 M/UL (ref 4.05–5.2)
SODIUM SERPL-SCNC: 140 MMOL/L (ref 136–145)
WBC # BLD AUTO: 6.7 K/UL (ref 4.3–11.1)

## 2021-11-02 PROCEDURE — 83735 ASSAY OF MAGNESIUM: CPT

## 2021-11-02 PROCEDURE — 85025 COMPLETE CBC W/AUTO DIFF WBC: CPT

## 2021-11-02 PROCEDURE — 80053 COMPREHEN METABOLIC PANEL: CPT

## 2021-11-02 PROCEDURE — 36415 COLL VENOUS BLD VENIPUNCTURE: CPT

## 2021-11-02 PROCEDURE — 84100 ASSAY OF PHOSPHORUS: CPT

## 2021-11-02 PROCEDURE — 83615 LACTATE (LD) (LDH) ENZYME: CPT

## 2021-11-03 ENCOUNTER — APPOINTMENT (RX ONLY)
Dept: URBAN - METROPOLITAN AREA CLINIC 349 | Facility: CLINIC | Age: 61
Setting detail: DERMATOLOGY
End: 2021-11-03

## 2021-11-03 DIAGNOSIS — D18.0 HEMANGIOMA: ICD-10-CM

## 2021-11-03 DIAGNOSIS — L82.1 OTHER SEBORRHEIC KERATOSIS: ICD-10-CM

## 2021-11-03 PROBLEM — D23.5 OTHER BENIGN NEOPLASM OF SKIN OF TRUNK: Status: ACTIVE | Noted: 2021-11-03

## 2021-11-03 PROBLEM — D04.39 CARCINOMA IN SITU OF SKIN OF OTHER PARTS OF FACE: Status: ACTIVE | Noted: 2021-11-03

## 2021-11-03 PROBLEM — D18.01 HEMANGIOMA OF SKIN AND SUBCUTANEOUS TISSUE: Status: ACTIVE | Noted: 2021-11-03

## 2021-11-03 PROCEDURE — ? COUNSELING

## 2021-11-03 PROCEDURE — 99212 OFFICE O/P EST SF 10 MIN: CPT

## 2021-11-03 PROCEDURE — ? OTHER

## 2021-11-03 PROCEDURE — ? DEFER

## 2021-11-03 ASSESSMENT — LOCATION DETAILED DESCRIPTION DERM
LOCATION DETAILED: RIGHT INFERIOR UPPER BACK
LOCATION DETAILED: LEFT INFERIOR LATERAL UPPER BACK

## 2021-11-03 ASSESSMENT — LOCATION SIMPLE DESCRIPTION DERM
LOCATION SIMPLE: LEFT UPPER BACK
LOCATION SIMPLE: RIGHT UPPER BACK

## 2021-11-03 ASSESSMENT — LOCATION ZONE DERM: LOCATION ZONE: TRUNK

## 2021-11-03 NOTE — PROCEDURE: DEFER
Detail Level: Simple
Other Procedure: ED&C
Introduction Text (Please End With A Colon): Procedure to be performed:

## 2021-11-03 NOTE — PROCEDURE: OTHER
Note Text (......Xxx Chief Complaint.): This diagnosis correlates with the
Detail Level: Zone
Render Risk Assessment In Note?: yes
Other (Free Text): Patient recently fell and has 16 staples in her scalp. She gets the staples removed in 1 week and the doctor who is treating her stated she can do the ED&C two weeks after she has the staples removed. Patient will make a one month appt to come back and have ED&C.

## 2021-11-08 ENCOUNTER — HOSPITAL ENCOUNTER (OUTPATIENT)
Dept: CT IMAGING | Age: 61
Discharge: HOME | End: 2021-11-08
Attending: INTERNAL MEDICINE

## 2021-11-08 DIAGNOSIS — C83.30 DIFFUSE LARGE B-CELL LYMPHOMA, UNSPECIFIED BODY REGION (HCC): ICD-10-CM

## 2021-11-08 RX ORDER — SODIUM CHLORIDE 0.9 % (FLUSH) 0.9 %
10 SYRINGE (ML) INJECTION
Status: COMPLETED | OUTPATIENT
Start: 2021-11-08 | End: 2021-11-08

## 2021-11-08 RX ADMIN — Medication 10 ML: at 14:35

## 2021-12-09 ENCOUNTER — APPOINTMENT (RX ONLY)
Dept: URBAN - METROPOLITAN AREA CLINIC 349 | Facility: CLINIC | Age: 61
Setting detail: DERMATOLOGY
End: 2021-12-09

## 2021-12-09 PROBLEM — D04.39 CARCINOMA IN SITU OF SKIN OF OTHER PARTS OF FACE: Status: ACTIVE | Noted: 2021-12-09

## 2021-12-09 PROCEDURE — ? COUNSELING

## 2021-12-09 PROCEDURE — 17283 DSTR MAL LS F/E/E/N/L/M2.1-3: CPT

## 2021-12-09 PROCEDURE — ? CURETTAGE AND DESTRUCTION

## 2021-12-09 NOTE — PROCEDURE: CURETTAGE AND DESTRUCTION
Size Of Lesion After Curettage: 2.1
Anesthesia Volume In Cc: 1.5
Add Ability To Document Additional Intralesional Injection: No
Biopsy Photograph Reviewed: Yes
What Was Performed First?: Curettage
Number Of Curettages: 3
Total Volume (Ccs): 1
Consent was obtained from the patient. The risks, benefits and alternatives to therapy were discussed in detail. Specifically, the risks of infection, scarring, bleeding, prolonged wound healing, nerve injury, incomplete removal, allergy to anesthesia and recurrence were addressed. Alternatives to ED&C, such as: surgical removal and XRT were also discussed.  Prior to the procedure, the treatment site was clearly identified and confirmed by the patient. All components of Universal Protocol/PAUSE Rule completed.
Detail Level: Detailed
Anesthesia Type: 2% lidocaine with epinephrine
Bill As A Line Item Or As Units: Line Item
Post-Care Instructions: I reviewed with the patient in detail post-care instructions. Patient is to keep the area dry for 48 hours, and not to engage in any swimming until the area is healed. Should the patient develop any fevers, chills, bleeding, severe pain patient will contact the office immediately.
Additional Information: (Optional): The wound was cleaned, and a pressure dressing was applied.  The patient received detailed post-op instructions.
Cautery Type: electrodesiccation
Final Size Statement: The size of the lesion after curettage was

## 2021-12-10 PROBLEM — Q21.12 PFO (PATENT FORAMEN OVALE): Status: ACTIVE | Noted: 2021-12-10

## 2021-12-10 PROBLEM — I34.0 MILD MITRAL REGURGITATION BY PRIOR ECHOCARDIOGRAM: Status: ACTIVE | Noted: 2021-12-10

## 2022-02-01 ENCOUNTER — HOSPITAL ENCOUNTER (OUTPATIENT)
Dept: LAB | Age: 62
Discharge: HOME | End: 2022-02-01
Payer: COMMERCIAL

## 2022-02-01 DIAGNOSIS — C83.30 DIFFUSE LARGE B-CELL LYMPHOMA, UNSPECIFIED BODY REGION (HCC): ICD-10-CM

## 2022-02-01 LAB
ALBUMIN SERPL-MCNC: 3.7 G/DL (ref 3.2–4.6)
ALBUMIN/GLOB SERPL: 1.2 {RATIO} (ref 1.2–3.5)
ALP SERPL-CCNC: 97 U/L (ref 50–136)
ALT SERPL-CCNC: 32 U/L (ref 12–65)
ANION GAP SERPL CALC-SCNC: 2 MMOL/L (ref 7–16)
AST SERPL-CCNC: 18 U/L (ref 15–37)
BASOPHILS # BLD: 0 K/UL (ref 0–0.2)
BASOPHILS NFR BLD: 1 % (ref 0–2)
BILIRUB SERPL-MCNC: 0.3 MG/DL (ref 0.2–1.1)
BUN SERPL-MCNC: 21 MG/DL (ref 8–23)
CALCIUM SERPL-MCNC: 9.2 MG/DL (ref 8.3–10.4)
CHLORIDE SERPL-SCNC: 104 MMOL/L (ref 98–107)
CO2 SERPL-SCNC: 32 MMOL/L (ref 21–32)
CREAT SERPL-MCNC: 1.1 MG/DL (ref 0.6–1)
DIFFERENTIAL METHOD BLD: ABNORMAL
EOSINOPHIL # BLD: 0.1 K/UL (ref 0–0.8)
EOSINOPHIL NFR BLD: 2 % (ref 0.5–7.8)
ERYTHROCYTE [DISTWIDTH] IN BLOOD BY AUTOMATED COUNT: 14.7 % (ref 11.9–14.6)
GLOBULIN SER CALC-MCNC: 3 G/DL (ref 2.3–3.5)
GLUCOSE SERPL-MCNC: 87 MG/DL (ref 65–100)
HCT VFR BLD AUTO: 35.9 % (ref 35.8–46.3)
HGB BLD-MCNC: 11.4 G/DL (ref 11.7–15.4)
IMM GRANULOCYTES # BLD AUTO: 0 K/UL (ref 0–0.5)
IMM GRANULOCYTES NFR BLD AUTO: 0 % (ref 0–5)
LDH SERPL L TO P-CCNC: 263 U/L (ref 110–210)
LYMPHOCYTES # BLD: 1.4 K/UL (ref 0.5–4.6)
LYMPHOCYTES NFR BLD: 18 % (ref 13–44)
MCH RBC QN AUTO: 29.8 PG (ref 26.1–32.9)
MCHC RBC AUTO-ENTMCNC: 31.8 G/DL (ref 31.4–35)
MCV RBC AUTO: 94 FL (ref 79.6–97.8)
MONOCYTES # BLD: 0.5 K/UL (ref 0.1–1.3)
MONOCYTES NFR BLD: 7 % (ref 4–12)
NEUTS SEG # BLD: 5.5 K/UL (ref 1.7–8.2)
NEUTS SEG NFR BLD: 72 % (ref 43–78)
NRBC # BLD: 0 K/UL (ref 0–0.2)
PHOSPHATE SERPL-MCNC: 2.8 MG/DL (ref 2.3–3.7)
PLATELET # BLD AUTO: 174 K/UL (ref 150–450)
PMV BLD AUTO: 10 FL (ref 9.4–12.3)
POTASSIUM SERPL-SCNC: 3.8 MMOL/L (ref 3.5–5.1)
PROT SERPL-MCNC: 6.7 G/DL (ref 6.3–8.2)
RBC # BLD AUTO: 3.82 M/UL (ref 4.05–5.2)
SODIUM SERPL-SCNC: 138 MMOL/L (ref 136–145)
TSH SERPL DL<=0.005 MIU/L-ACNC: 3.86 UIU/ML (ref 0.36–3.74)
WBC # BLD AUTO: 7.6 K/UL (ref 4.3–11.1)

## 2022-02-01 PROCEDURE — 84443 ASSAY THYROID STIM HORMONE: CPT

## 2022-02-01 PROCEDURE — 84100 ASSAY OF PHOSPHORUS: CPT

## 2022-02-01 PROCEDURE — 83735 ASSAY OF MAGNESIUM: CPT

## 2022-02-01 PROCEDURE — 85025 COMPLETE CBC W/AUTO DIFF WBC: CPT

## 2022-02-01 PROCEDURE — 80053 COMPREHEN METABOLIC PANEL: CPT

## 2022-02-01 PROCEDURE — 36415 COLL VENOUS BLD VENIPUNCTURE: CPT

## 2022-02-01 PROCEDURE — 83615 LACTATE (LD) (LDH) ENZYME: CPT

## 2022-02-02 LAB
Lab: NORMAL
REFERENCE LAB,REFLB: NORMAL
TEST DESCRIPTION:,ATST: NORMAL

## 2022-03-18 PROBLEM — Z51.11 ADMISSION FOR ANTINEOPLASTIC CHEMOTHERAPY: Status: ACTIVE | Noted: 2018-01-22

## 2022-03-18 PROBLEM — Q21.12 PFO (PATENT FORAMEN OVALE): Status: ACTIVE | Noted: 2021-12-10

## 2022-03-19 PROBLEM — J98.11 ATELECTASIS: Status: ACTIVE | Noted: 2020-11-25

## 2022-03-19 PROBLEM — R59.1 LYMPHADENOPATHY, GENERALIZED: Status: ACTIVE | Noted: 2017-03-30

## 2022-03-19 PROBLEM — C85.90 NON HODGKIN'S LYMPHOMA (HCC): Status: ACTIVE | Noted: 2017-03-30

## 2022-03-19 PROBLEM — I34.0 MILD MITRAL REGURGITATION BY PRIOR ECHOCARDIOGRAM: Status: ACTIVE | Noted: 2021-12-10

## 2022-03-19 PROBLEM — Z86.79 HISTORY OF ATRIAL FIBRILLATION: Status: ACTIVE | Noted: 2021-06-22

## 2022-03-19 PROBLEM — I48.91 ATRIAL FIBRILLATION WITH RAPID VENTRICULAR RESPONSE (HCC): Status: ACTIVE | Noted: 2020-11-25

## 2022-03-19 PROBLEM — T45.1X5A PANCYTOPENIA DUE TO ANTINEOPLASTIC CHEMOTHERAPY (HCC): Status: ACTIVE | Noted: 2017-07-19

## 2022-03-19 PROBLEM — J18.9 LEFT LOWER LOBE PNEUMONIA: Status: ACTIVE | Noted: 2018-01-22

## 2022-03-19 PROBLEM — R06.83 SNORES: Status: ACTIVE | Noted: 2017-05-01

## 2022-03-19 PROBLEM — C85.88 MARGINAL ZONE LYMPHOMA OF LYMPH NODES OF MULTIPLE SITES (HCC): Status: ACTIVE | Noted: 2017-04-07

## 2022-03-19 PROBLEM — R04.2 HEMOPTYSIS: Status: ACTIVE | Noted: 2018-01-23

## 2022-03-19 PROBLEM — J11.00 PNEUMONIA AND INFLUENZA: Status: ACTIVE | Noted: 2018-01-22

## 2022-03-19 PROBLEM — D61.810 PANCYTOPENIA DUE TO ANTINEOPLASTIC CHEMOTHERAPY (HCC): Status: ACTIVE | Noted: 2017-07-19

## 2022-03-19 PROBLEM — C83.30 DLBCL (DIFFUSE LARGE B CELL LYMPHOMA) (HCC): Status: ACTIVE | Noted: 2018-01-22

## 2022-04-20 NOTE — PROGRESS NOTES
Cassie Shrestha  : 1960 [facility] at [address] [facility]  [place], [place] [address], [address] [facility]  [phone]   [phone]          OUTPATIENT PHYSICAL THERAPY:Daily Note 2017    ICD-10: Treatment Diagnosis: I 89.0 lymphedema not elsewhere classified  M 62.81 muscle weakness generalized  Precautions/Allergies:   Review of patient's allergies indicates no known allergies. Fall Risk Score: 1 (? 5 = High Risk)  MD Orders: lymphedema assessment MEDICAL/REFERRING DIAGNOSIS:  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 [facility]) [C85.88]    DATE OF ONSET: 3/30/17  REFERRING PHYSICIAN: Eyal Duvall MD  RETURN PHYSICIAN APPOINTMENT: 17     INITIAL ASSESSMENT:  Ms. Leslie Solorzano presents for a lymphedema assessment due to edema of bilateral lower extremities. She has pitting edema in the bilateral lower extremities. She has previously had short stretch bandaging and MLD following knee surgery 3 years ago. She would like to try this again even though this is not orthopedic post surgical swelling. She was diagnosed with lymphoma in March of this year. She recently completed her second chemo of 6 planned. She will have a PET scan 17. Progress with chemo has been limited due to lab values being abnormal.  She is uncomfortable due to the edema. She will have a paracentesis 17. We will initiate edema management and progress slowly dependent on her tolerance to compression and her response to treatment. We have now initiated conditioning and strengthening. She is independent with home management of edema. 17: The patient's edema has resolved. She has now been focusing on conditioning and strengthening. She will benefit from therapeutic exercises in order to address decreased strength and overall conditioning. PROBLEM LIST (Impacting functional limitations):  1. Decreased Strength  2. Increased Pain  3.  Decreased Activity Tolerance  4. Increased Fatigue  5. Increased Shortness of Breath  6. Decreased Flexibility/Joint Mobility  7. Decreased [place] with Home Exercise Program INTERVENTIONS PLANNED:  1. Home Exercise Program (HEP)  2. Range of Motion (ROM)  3. Therapeutic Exercise/Strengthening   TREATMENT PLAN:  Effective Dates: 11/7/17 TO 1/31/18. Frequency/Duration: 2 times a week for 12 weeks dependent on chemo schedule and counts  GOALS: (Goals have been discussed and agreed upon with patient.)  Short-Term Functional Goals: Time Frame: 4 weeks  1. The patient will have knowledge of signs and symptoms of lymphedema and how to manage within 4 weeks. Met   2. The patient will tolerate short stretch bandaging of bilateral lower extremities for reduction of girth within 4 weeks. Met   3. The patient and caregiver will be independent with compression bandaging within 4 weeks. Met   4. The patient will improve her 6 minute walk by 60 feet within 4 weeks. Met   5. The patient will report a fatigue of 3 or less within 4 weeks. Met   Discharge Goals: Time Frame: 8 weeks  1. The patient will have a girth decrease of at least 5 cm in the calf within 8 weeks. 2. The patient will be fit with static compression garments within 8 weeks. Met   3. The patient and caregiver will be independent with edema management within 8 weeks. Met  4. The patient will transition to Healthy Self within 8 weeks. 5.   Rehabilitation Potential For Stated Goals: [address] [place] [address]'s therapy, I certify that the treatment plan above will be carried out by a therapist or under their direction. Thank you for this referral,  Ana Luisa Whalen PT     Referring Physician Signature: Bertha Song MD              Date                    The information in this section was collected on 5/24/17 (except where otherwise noted).   HISTORY:   History of Present Injury/Illness (Reason for Referral):  Lymphoma, decreased activity tolerance, decreased strength  Past Medical History/Comorbidities:   Ms. Rusty Cruz  has a past medical history of Anemia; Basal cell carcinoma; Cardiomegaly; Deep vein thrombosis (DVT) ([address] History of stroke; Hypertension; Marginal zone lymphoma ([address] (03/30/2017); Morbid obesity ([address] Osteoarthritis; Overactive bladder; Primary hypothyroidism; Rheumatic fever; and Shingles. She also has no past medical history of Aneurysm ([address] Arrhythmia; Asthma; Autoimmune disease ([address] CAD (coronary artery disease); Chronic kidney disease; Coagulation defects; COPD; Diabetes ([address] Difficult intubation; GERD (gastroesophageal reflux disease); Heart failure ([address] Liver disease; Malignant hyperthermia due to anesthesia; Nausea & vomiting; Pseudocholinesterase deficiency; Psychiatric disorder; PUD (peptic ulcer disease); Seizures ([facility] [address] Stroke [facility]); or Unspecified sleep apnea. Ms. Rusty Cruz  has a past surgical history that includes anesth,achilles tendon surg (Left, 02/10/2011); cholecystectomy (1983); vascular access; knee replacement (Left, 2013); and knee replacement (Right, 2012).    Past Medical History:   Diagnosis Date    Anemia     Basal cell carcinoma     Cardiomegaly     Deep vein thrombosis (DVT) (HCC)     History of stroke     Hypertension     Marginal zone lymphoma ([address] 03/30/2017    Morbid obesity (HCC)     Osteoarthritis     Overactive bladder     Primary hypothyroidism     Rheumatic fever     Shingles      Past Surgical History:   Procedure Laterality Date    HX CHOLECYSTECTOMY  1983    HX KNEE REPLACEMENT Left 2013    Dr. Angy Valdivia Right 2012    Dr. Cassi Gardner Left 02/10/2011    Dr. López Finders:     lives with family, 2 flights of stairs  Prior Level of Function/Work/Activity:    Dominant Side:         RIGHT  Current Medications:       Current Outpatient Prescriptions:     allopurinol (ZYLOPRIM) 300 mg tablet, Take  by mouth daily. , Disp: , Rfl:     levothyroxine (SYNTHROID) 175 mcg tablet, Take 1 Tab by mouth Daily (before breakfast). , Disp: 30 Tab, Rfl: 5    acyclovir (ZOVIRAX) 400 mg tablet, Take 1 Tab by mouth two (2) times a day., Disp: 60 Tab, Rfl: 3    pregabalin (LYRICA) 100 mg capsule, Take 1 Cap by mouth three (3) times daily. Max Daily Amount: 300 mg., Disp: 90 Cap, Rfl: 3    magnesium oxide (MAG-OX) 400 mg tablet, Take 1 Tab by mouth two (2) times a day., Disp: 60 Tab, Rfl: 1    magic mouthwash (ALEXSANDER) susp, Take 10 mL by mouth every four (4) hours as needed. , Disp: 2 Bottle, Rfl: 2    nystatin (MYCOSTATIN) powder, Apply  to affected area three (3) times daily. , Disp: 60 g, Rfl: 2    fluticasone (FLONASE) 50 mcg/actuation nasal spray, 2 Sprays by Both Nostrils route daily. , Disp: 1 Bottle, Rfl: 1    lidocaine-prilocaine (EMLA) topical cream, Apply  to affected area as needed for Pain. Apply to port site 45-60 minutes prior to lab appt or infusion. , Disp: 30 g, Rfl: 0    promethazine (PHENERGAN) 25 mg tablet, Take 25 mg by mouth every six (6) hours as needed for Nausea. Unsure of dose, Disp: , Rfl:    Date Last Reviewed:  11/7/17   Number of Personal Factors/Comorbidities that affect the Plan of Care: 3+: HIGH COMPLEXITY   EXAMINATION:   Palpation:           dry skin, loose skin  ROM:          Within functional limits. She previously has had bilateral knee replacements and an Achilles tendon repair on the left  Strength:          Grossly 4+/5 x 4 extremities  Functional Mobility:         Gait/Ambulation:  Independent         Transfers: independent        Bed Mobility:  independent  Skin Integrity:          Intact, but dry. Edema/Girth:  pitting    Left Right    Initial Most Recent Initial Most Recent   Upper  Extremity           Lower  Extremity               Body Structures Involved:  1. Muscles Body Functions Affected:  1.  Sensory/Pain Activities and Participation Affected:  1. None   Number of elements (examined above) that affect the Plan of Care: 1-2: LOW COMPLEXITY   CLINICAL PRESENTATION:   Presentation: Evolving clinical presentation with unstable and unpredictable characteristics: HIGH COMPLEXITY   CLINICAL DECISION MAKING:   Outcome Measure: Tool Used: NCCN Distress Thermometer   Score:  Initial:   Most Recent: X    Interpretation of Score: If greater than or equal to 8, then PHQ-9 Depression Scale Score   and JOON-7 Anxiety Scale Score  . Tool Used: ECOG Performance Survey Score  Score:  Initial: 2 Most Recent:  1    Interpretation of Score:   0 Fully active, able to carry on all pre-disease performance without restriction   1 Restricted in physically strenuous activity but ambulatory and able to carry out work of a light or sedentary nature, e.g., light house work, office work   2 Ambulatory and capable of all selfcare but unable to carry out any work activities. Up and about more than 50% of waking hours   3 Capable of only limited selfcare, confined to bed or chair more than 50% of waking hours   4 Completely disabled. Cannot carry on any selfcare. Totally confined to bed or chair   5 Dead    Tool Used: 6-MINUTE WALK TEST  Score:  Initial: 1110 feet Most Recent: 1630 feet (Date: 11/7/17 )   Interpretation of Score: Normal range varies but is approximately 9471-5141 Feet      Distance walked: 1631 feet     Baseline End of Test   Heart Rate 90 119   Dyspnea (Luther Scale)     Fatigue (Luther Scale) 2 2   SpO2 99 96   /87 174/74     Score 2133 [phone] 0514-8650 [phone] 426-16 15-0   Modifier CH CI CJ CK CL CM CN       Tool Used: Timed Up and Go (TUG)  Score:  Initial: 8 seconds Most Recent: 6 seconds (Date: 11/7/17 )   Interpretation of Score:  The test measures, in seconds, the time taken by an individual to stand up from a standard arm chair (seat height 46 cm [18 in], arm height 65 cm [25.6 in]), walk a distance of 3 meters (118 in, approx 10 ft), turn, walk back to the chair and sit down. If the individual takes longer than 14 seconds to complete TUG, this indicates risk for falls. Score 7 7.5-10.5 11-14 14.5-17.5 18-21 21.5-24.5 25+   Modifier CH CI CJ CK CL CM CN         Tool Used: Lymphedema Life Impact Scale   Score:  Initial:  54 Most Recent:33  (Date: 9/26/17 )   Interpretation of Score: The Lymphedema Life Impact Scale (LLIS) is a validated instrument that measures the physical, functional, and psychosocial concerns pertinent to patients with extremity lymphedema. The Scale's questionnaire is administered to patients to gauge impairments, activity limitations, and participation restrictions resulting from their lymphedema. Score 0 1-13 14-26 27-40 41-54 55-67 68   Modifier CH CI CJ CK CL CM CN       Medical Necessity:   · Patient is expected to demonstrate progress in strength to increase independence with self care and houseold activity. Reason for Services/Other Comments:  · Patient continues to demonstrate capacity to improve strength and conditioning which will increase independence. Use of outcome tool(s) and clinical judgement create a POC that gives a: Questionable prediction of patient's progress: MODERATE COMPLEXITY            TREATMENT:   (In addition to Assessment/Re-Assessment sessions the following treatments were rendered)  Pre-treatment Symptoms/Complaints: the patient reports significant pain in the right shoulder and bilateral knees. She reports she had to have help getting out of bed this morning. Pain: Initial: 5/10  Right upper arm pain and bilateral knee        Post Session: 5 /10    34 min       Fatigue 2/10  O2 99 HR 86  /76  650' x 1   Nustep level 2 x 6 1/2 min spm 81 mets 2.4  650' x 1   UBE level 1 x 4 min   650' x 1 O298   She will contact her physician concerning the progressive pain. as above    Treatment/Session Assessment:    · Response to Treatment:  Tolerated the treatment fair.   Was not able to complete full session due to pain. · Compliance with Program/Exercises: Will assess as treatment progresses. · Recommendations/Intent for next treatment session: \"Next visit will focus on conditioning and strengthening\".        Total Treatment Duration:  PT Patient Time In/Time Out  Time In: 0806  Time Out: 0840    Karon Teran PT PAIN SCALE 10 OF 10.

## 2022-05-25 RX ORDER — AZITHROMYCIN 250 MG/1
TABLET, FILM COATED ORAL
Qty: 15 TABLET | Refills: 6 | OUTPATIENT
Start: 2022-05-25

## 2022-06-10 ENCOUNTER — TELEPHONE (OUTPATIENT)
Dept: ONCOLOGY | Age: 62
End: 2022-06-10

## 2022-06-20 NOTE — PROGRESS NOTES
[facility] Follow Up                 Reason for Visit: Atrial fibrillation    Subjective:     Patient is a 58 y.o. female with a PMH of PFO, hypertension, atrial fibrillation, DVT, and asthma who presents for follow-up. The patient was last seen in December 2021. The patient had a ISABELL in November 2020 that was noted to demonstrate a normal EF and mild MR. The patient reports \"falling asleep\" too easily. She reports that \"my neuropathy\" in my legs is bothersome causing her to fall such that \"I have to walk with a cane\". She reports she will get a sleep study soon. She says her asthma is \"pretty controlled\". The patient denies angina. She reports infrequent palpitations 5-6 times yearly. Past Medical History:   Diagnosis Date    Anemia     Asthma     Followed by Dr. Vona Pallas, Pulmonology    Atrial fibrillation with rapid ventricular response ([address] 11/25/2020    Basal cell carcinoma     Cardiomegaly     Deep vein thrombosis (DVT) ([address]     History of stroke     History of UTI     Hypertension     Marginal zone lymphoma ([address] 03/30/2017    Diffuse Large B-cell Lymphoma. Completed Chemotherapy 5/28/18    Morbid obesity ([address]     Osteoarthritis     Overactive bladder     Primary hypothyroidism     Prolapse of female bladder, acquired     Radiation therapy complication     Rheumatic fever     S/P total knee replacement using cement 10/3/2011    Shingles     Superficial venous thrombosis of right arm 5/1/2017      Past Surgical History:   Procedure Laterality Date    CATARACT REMOVAL Bilateral 2018    CHOLECYSTECTOMY  1983    COLONOSCOPY  03/2017    Clear    UT ANESTH,ACHILLES TENDON SURG  Left 02/10/2011    Dr. Ewa Weber  11/25/2020    ISABELL-guided cardioversion for A. Fib    PRE-MALIGNANT / BENIGN SKIN LESION EXCISION  2019    basal/squamous skin lesions removed from scalp. [facility].     Mohamud De Anda Left 2013     Gustabo Corrales Right 2012    Dr. Karin Ruiz BREAST NEEDLE BIOPSY LEFT Left 1/3/2018     BREAST NEEDLE BIOPSY LEFT 1/3/2018 [facility]      Family History   Problem Relation Age of Onset    Other Maternal Grandmother         Vascular Disorder with leg amputation    Dementia Mother     Kidney Disease Father     Thyroid Cancer Neg Hx     Post-op Cognitive Dysfunction Neg Hx     Emergence Delirium Neg Hx     Delayed Awakening Neg Hx     Pseudochol. Deficiency Neg Hx     Malig Hypertherm Neg Hx     Thyroid Disease Sister         hypothyroidism    Breast Cancer Cousin 48    Celiac Disease Sister     Liver Disease Sister         non-alcoholic      Social History     Tobacco Use    Smoking status: Never Smoker    Smokeless tobacco: Never Used   Substance Use Topics    Alcohol use: No      Allergies   Allergen Reactions    Gabapentin Other (See Comments)         ROS:  No obvious pertinent positives on review of systems except for what was outlined above.        Objective:       /70   Pulse 80   Ht 5' 3\" (1.6 m)   Wt (!) 313 lb (142 kg)   BMI 55.45 kg/m²     BP Readings from Last 3 Encounters:   06/21/22 130/70   03/29/22 (!) 144/82   02/01/22 113/70       Wt Readings from Last 3 Encounters:   06/21/22 (!) 313 lb (142 kg)   03/29/22 (!) 313 lb (142 kg)   02/01/22 (!) 318 lb 4.8 oz (144.4 kg)       General/Constitutional:   Alert and oriented x 3, no acute distress  HEENT:   normocephalic, atraumatic, moist mucous membranes  Neck:   No JVD or carotid bruits bilaterally  Cardiovascular:   regular rate and rhythm, no rub/gallop appreciated  Pulmonary:   clear to auscultation bilaterally, no respiratory distress  Abdomen:   soft, non-tender, non-distended  Ext:   No sig LE edema bilaterally  Skin:  warm and dry, no obvious rashes seen  Neuro:   no obvious sensory or motor deficits  Psychiatric:   normal mood and affect    Data Review:   Lab Results   Component Value Date    CHOL 208 (H) 06/04/2021    CHOL 186 06/02/2020    CHOL 157 09/04/2019     Lab Results   Component Value Date    TRIG 113 06/04/2021    TRIG 139 06/02/2020    TRIG 84 09/04/2019     Lab Results   Component Value Date    HDL 64 06/04/2021    HDL 59 06/02/2020    HDL 49 09/04/2019     Lab Results   Component Value Date    LDLCALC 124 (H) 06/04/2021    LDLCALC 99 06/02/2020    LDLCALC 91.2 09/04/2019     Lab Results   Component Value Date    LABVLDL 28 06/02/2020    LABVLDL 16.8 09/04/2019    LABVLDL 30 05/31/2019    VLDL 20 06/04/2021     Lab Results   Component Value Date    CHOLHDLRATIO 3.2 09/04/2019        Lab Results   Component Value Date     03/21/2022     02/01/2022     11/02/2021    K 4.3 03/21/2022    K 3.8 02/01/2022    K 3.9 11/02/2021     03/21/2022     02/01/2022     11/02/2021    CO2 25 03/21/2022    CO2 32 02/01/2022    CO2 30 11/02/2021    BUN 15 03/21/2022    BUN 21 02/01/2022    BUN 12 11/02/2021    CREATININE 0.96 03/21/2022    CREATININE 1.10 02/01/2022    CREATININE 0.90 11/02/2021    GLUCOSE 112 03/21/2022    GLUCOSE 87 02/01/2022    GLUCOSE 141 11/02/2021    CALCIUM 9.2 03/21/2022    CALCIUM 9.2 02/01/2022    CALCIUM 8.6 11/02/2021         Lab Results   Component Value Date    ALT 23 03/21/2022    ALT 32 02/01/2022    ALT 66 (H) 11/02/2021    AST 18 03/21/2022    AST 18 02/01/2022    AST 31 11/02/2021        Assessment/Plan:   1. PFO (patent foramen ovale)  - Only a weak recommendation for closure for patients age ? 61 years with an embolic-appearing cryptogenic ischemic stroke (ie, no evident source of stroke despite a comprehensive evaluation) who have a PFO with a right-to-left shunt detected by bubble study: The patient does not fit this category; thus, closure is not indicated    2. Atrial fibrillation, unspecified type (HCC)  - [id number] equals 4  - Currently in sinus rhythm  - Continue with Eliquis and Toprol-XL    3.  Hypertension, unspecified type  - Continue with Toprol XL    4. Mild mitral regurgitation by prior echocardiogram  - Surveillance echocardiogram in 2023 to 2025    5. Morbid obesity with BMI of 50.0-59.9, adult ([address]  - Educated on Mediterranean diet and exercise    6.  Palpitations  - Minimally symptomatic and well tolerated  - No further cardiac work up indicated at this time    F/U: 6 months    Valorie Sagastume MD

## 2022-06-21 ENCOUNTER — OFFICE VISIT (OUTPATIENT)
Dept: CARDIOLOGY CLINIC | Age: 62
End: 2022-06-21
Payer: COMMERCIAL

## 2022-06-21 VITALS
SYSTOLIC BLOOD PRESSURE: 130 MMHG | WEIGHT: 293 LBS | DIASTOLIC BLOOD PRESSURE: 70 MMHG | HEART RATE: 80 BPM | HEIGHT: 63 IN | BODY MASS INDEX: 51.91 KG/M2

## 2022-06-21 DIAGNOSIS — I34.0 MILD MITRAL REGURGITATION BY PRIOR ECHOCARDIOGRAM: ICD-10-CM

## 2022-06-21 DIAGNOSIS — Q21.12 PFO (PATENT FORAMEN OVALE): Primary | ICD-10-CM

## 2022-06-21 DIAGNOSIS — I48.91 ATRIAL FIBRILLATION, UNSPECIFIED TYPE (HCC): ICD-10-CM

## 2022-06-21 DIAGNOSIS — E66.01 MORBID OBESITY WITH BMI OF 50.0-59.9, ADULT (HCC): ICD-10-CM

## 2022-06-21 DIAGNOSIS — R00.2 PALPITATIONS: ICD-10-CM

## 2022-06-21 DIAGNOSIS — I10 HYPERTENSION, UNSPECIFIED TYPE: ICD-10-CM

## 2022-06-21 PROCEDURE — 99214 OFFICE O/P EST MOD 30 MIN: CPT | Performed by: INTERNAL MEDICINE

## 2022-06-24 RX ORDER — LEVOTHYROXINE SODIUM 0.05 MG/1
TABLET ORAL
Qty: 90 TABLET | OUTPATIENT
Start: 2022-06-24

## 2022-06-29 ENCOUNTER — TELEPHONE (OUTPATIENT)
Dept: FAMILY MEDICINE CLINIC | Facility: CLINIC | Age: 62
End: 2022-06-29

## 2022-06-29 ENCOUNTER — TELEPHONE (OUTPATIENT)
Dept: CARDIOLOGY CLINIC | Age: 62
End: 2022-06-29

## 2022-06-29 ENCOUNTER — TELEPHONE (OUTPATIENT)
Dept: ONCOLOGY | Age: 62
End: 2022-06-29

## 2022-06-29 DIAGNOSIS — I10 HYPERTENSION, UNSPECIFIED TYPE: Primary | ICD-10-CM

## 2022-06-29 DIAGNOSIS — R53.83 FATIGUE, UNSPECIFIED TYPE: ICD-10-CM

## 2022-06-29 DIAGNOSIS — R06.83 SNORES: ICD-10-CM

## 2022-06-29 NOTE — TELEPHONE ENCOUNTER
Returned call to patient, per office visit in February pt to have labs and office visit in August and repeat CT scan in November 2022. Pt initially stated last CT scan was done in July 2021 for her cancer, that scan done in November 2021 was related to a fall around [address] [place] [address]. Explained to patient that scan done in November was complete and ordered by Dr. Alessia Kramer. Pt then stated that Dr Alessia Kramer never followed up with her about her thyroid, pt encouraged to discuss thyroid concerns and lab results with her primary care physician. Pt requested confirmation that upcoming office visit is with Dr Alessia Kraemr and confirmed that is the case. Pt verbalized understanding and appreciation for the call.

## 2022-06-29 NOTE — TELEPHONE ENCOUNTER
Patient called stating Dr. Gonzalo Mason wanted her to go for a sleep study and she thought she had a referral but needs a new one. She would like to go to the center in our building.

## 2022-06-29 NOTE — TELEPHONE ENCOUNTER
----- Message from Jose Antonio Benites sent at 6/29/2022  3:13 PM EDT -----  Subject: Referral Request    QUESTIONS   Reason for referral request? Patient is in need of a sleep study referral.   She thought she had one sent over to [facility] but they do not have   anything for her. She recently saw cardio and they to mentioned she should   get a sleep study but that PCP would follow her and needed to place the   referral. Please follow up   Has the physician seen you for this condition before? No   Preferred Specialist (if applicable)? Do you already have an appointment scheduled? No  Additional Information for Provider?   ---------------------------------------------------------------------------  --------------  CALL BACK INFO  What is the best way for the office to contact you? OK to leave message on   voicemail  Preferred Call Back Phone Number? [phone]  ---------------------------------------------------------------------------  --------------  SCRIPT ANSWERS  Relationship to Patient?  Self

## 2022-07-06 DIAGNOSIS — J45.901 UNSPECIFIED ASTHMA WITH (ACUTE) EXACERBATION: ICD-10-CM

## 2022-07-08 RX ORDER — LEVOTHYROXINE SODIUM 0.2 MG/1
200 TABLET ORAL
Qty: 90 TABLET | Refills: 1 | Status: CANCELLED | OUTPATIENT
Start: 2022-07-08

## 2022-07-11 RX ORDER — PREDNISONE 10 MG/1
TABLET ORAL
Qty: 30 TABLET | OUTPATIENT
Start: 2022-07-11

## 2022-07-12 DIAGNOSIS — Z00.00 ROUTINE GENERAL MEDICAL EXAMINATION AT A HEALTH CARE FACILITY: Primary | ICD-10-CM

## 2022-07-13 ENCOUNTER — NURSE ONLY (OUTPATIENT)
Dept: FAMILY MEDICINE CLINIC | Facility: CLINIC | Age: 62
End: 2022-07-13

## 2022-07-13 DIAGNOSIS — E03.9 PRIMARY HYPOTHYROIDISM: Primary | ICD-10-CM

## 2022-07-13 DIAGNOSIS — Z00.00 ROUTINE GENERAL MEDICAL EXAMINATION AT A HEALTH CARE FACILITY: ICD-10-CM

## 2022-07-13 LAB
ALBUMIN SERPL-MCNC: 3.8 G/DL (ref 3.2–4.6)
ALBUMIN/GLOB SERPL: 1.8 {RATIO} (ref 1.2–3.5)
ALP SERPL-CCNC: 87 U/L (ref 50–136)
ALT SERPL-CCNC: 28 U/L (ref 12–65)
ANION GAP SERPL CALC-SCNC: 5 MMOL/L (ref 7–16)
AST SERPL-CCNC: 15 U/L (ref 15–37)
BASOPHILS # BLD: 0 K/UL (ref 0–0.2)
BASOPHILS NFR BLD: 1 % (ref 0–2)
BILIRUB SERPL-MCNC: 0.4 MG/DL (ref 0.2–1.1)
BUN SERPL-MCNC: 20 MG/DL (ref 8–23)
CALCIUM SERPL-MCNC: 9.3 MG/DL (ref 8.3–10.4)
CHLORIDE SERPL-SCNC: 107 MMOL/L (ref 98–107)
CHOLEST SERPL-MCNC: 185 MG/DL
CO2 SERPL-SCNC: 29 MMOL/L (ref 21–32)
CREAT SERPL-MCNC: 1 MG/DL (ref 0.6–1)
DIFFERENTIAL METHOD BLD: ABNORMAL
EOSINOPHIL # BLD: 0.1 K/UL (ref 0–0.8)
EOSINOPHIL NFR BLD: 1 % (ref 0.5–7.8)
ERYTHROCYTE [DISTWIDTH] IN BLOOD BY AUTOMATED COUNT: 15.7 % (ref 11.9–14.6)
GLOBULIN SER CALC-MCNC: 2.1 G/DL (ref 2.3–3.5)
GLUCOSE SERPL-MCNC: 102 MG/DL (ref 65–100)
HCT VFR BLD AUTO: 35.1 % (ref 35.8–46.3)
HDLC SERPL-MCNC: 64 MG/DL (ref 40–60)
HDLC SERPL: 2.9 {RATIO}
HGB BLD-MCNC: 10.8 G/DL (ref 11.7–15.4)
IMM GRANULOCYTES # BLD AUTO: 0 K/UL (ref 0–0.5)
IMM GRANULOCYTES NFR BLD AUTO: 1 % (ref 0–5)
LDLC SERPL CALC-MCNC: 99.6 MG/DL
LYMPHOCYTES # BLD: 1.1 K/UL (ref 0.5–4.6)
LYMPHOCYTES NFR BLD: 17 % (ref 13–44)
MCH RBC QN AUTO: 29.2 PG (ref 26.1–32.9)
MCHC RBC AUTO-ENTMCNC: 30.8 G/DL (ref 31.4–35)
MCV RBC AUTO: 94.9 FL (ref 79.6–97.8)
MONOCYTES # BLD: 0.3 K/UL (ref 0.1–1.3)
MONOCYTES NFR BLD: 5 % (ref 4–12)
NEUTS SEG # BLD: 5 K/UL (ref 1.7–8.2)
NEUTS SEG NFR BLD: 75 % (ref 43–78)
NRBC # BLD: 0 K/UL (ref 0–0.2)
PLATELET # BLD AUTO: 161 K/UL (ref 150–450)
PMV BLD AUTO: 10.6 FL (ref 9.4–12.3)
POTASSIUM SERPL-SCNC: 3.9 MMOL/L (ref 3.5–5.1)
PROT SERPL-MCNC: 5.9 G/DL (ref 6.3–8.2)
RBC # BLD AUTO: 3.7 M/UL (ref 4.05–5.2)
SODIUM SERPL-SCNC: 141 MMOL/L (ref 136–145)
T4 FREE SERPL-MCNC: 1.7 NG/DL (ref 0.78–1.46)
TRIGL SERPL-MCNC: 107 MG/DL (ref 35–150)
TSH, 3RD GENERATION: 0.75 UIU/ML (ref 0.36–3.74)
VLDLC SERPL CALC-MCNC: 21.4 MG/DL (ref 6–23)
WBC # BLD AUTO: 6.6 K/UL (ref 4.3–11.1)

## 2022-07-14 ENCOUNTER — HOSPITAL ENCOUNTER (OUTPATIENT)
Dept: SLEEP MEDICINE | Age: 62
Discharge: HOME | End: 2022-07-17
Payer: COMMERCIAL

## 2022-07-14 PROCEDURE — 95811 POLYSOM 6/>YRS CPAP 4/> PARM: CPT

## 2022-07-14 RX ORDER — LEVOTHYROXINE SODIUM 0.05 MG/1
TABLET ORAL
Qty: 90 TABLET | OUTPATIENT
Start: 2022-07-14

## 2022-07-15 LAB — T3FREE SERPL-MCNC: 2.3 PG/ML (ref 2–4.4)

## 2022-07-18 ENCOUNTER — TELEPHONE (OUTPATIENT)
Dept: SLEEP MEDICINE | Age: 62
End: 2022-07-18

## 2022-07-18 ENCOUNTER — OFFICE VISIT (OUTPATIENT)
Dept: FAMILY MEDICINE CLINIC | Facility: CLINIC | Age: 62
End: 2022-07-18
Payer: COMMERCIAL

## 2022-07-18 VITALS
DIASTOLIC BLOOD PRESSURE: 82 MMHG | SYSTOLIC BLOOD PRESSURE: 134 MMHG | WEIGHT: 293 LBS | HEART RATE: 87 BPM | OXYGEN SATURATION: 95 % | TEMPERATURE: 97.7 F | BODY MASS INDEX: 55.45 KG/M2

## 2022-07-18 DIAGNOSIS — D61.810 PANCYTOPENIA DUE TO ANTINEOPLASTIC CHEMOTHERAPY (HCC): ICD-10-CM

## 2022-07-18 DIAGNOSIS — R26.89 BALANCE DISORDER: ICD-10-CM

## 2022-07-18 DIAGNOSIS — E03.9 ACQUIRED HYPOTHYROIDISM: ICD-10-CM

## 2022-07-18 DIAGNOSIS — R26.9 NEUROLOGIC GAIT DYSFUNCTION: ICD-10-CM

## 2022-07-18 DIAGNOSIS — G62.9 NEUROPATHY: ICD-10-CM

## 2022-07-18 DIAGNOSIS — R32 URINARY INCONTINENCE, UNSPECIFIED TYPE: ICD-10-CM

## 2022-07-18 DIAGNOSIS — T45.1X5A PANCYTOPENIA DUE TO ANTINEOPLASTIC CHEMOTHERAPY (HCC): ICD-10-CM

## 2022-07-18 DIAGNOSIS — G47.33 OSA (OBSTRUCTIVE SLEEP APNEA): Primary | ICD-10-CM

## 2022-07-18 DIAGNOSIS — Z00.00 ROUTINE HEALTH MAINTENANCE: Primary | ICD-10-CM

## 2022-07-18 PROCEDURE — 99396 PREV VISIT EST AGE 40-64: CPT | Performed by: FAMILY MEDICINE

## 2022-07-18 RX ORDER — TRIAMTERENE AND HYDROCHLOROTHIAZIDE 75; 50 MG/1; MG/1
.5-1 TABLET ORAL DAILY PRN
Qty: 30 TABLET | Refills: 5 | Status: SHIPPED | OUTPATIENT
Start: 2022-07-18

## 2022-07-18 RX ORDER — LEVOTHYROXINE SODIUM 0.2 MG/1
200 TABLET ORAL
Qty: 90 TABLET | Refills: 3 | Status: SHIPPED | OUTPATIENT
Start: 2022-07-18

## 2022-07-18 RX ORDER — LEVOTHYROXINE SODIUM 0.05 MG/1
50 TABLET ORAL
Qty: 90 TABLET | Refills: 3 | Status: SHIPPED | OUTPATIENT
Start: 2022-07-18

## 2022-07-18 ASSESSMENT — PATIENT HEALTH QUESTIONNAIRE - PHQ9
SUM OF ALL RESPONSES TO PHQ QUESTIONS 1-9: 0
SUM OF ALL RESPONSES TO PHQ9 QUESTIONS 1 & 2: 0
SUM OF ALL RESPONSES TO PHQ QUESTIONS 1-9: 0
2. FEELING DOWN, DEPRESSED OR HOPELESS: 0
1. LITTLE INTEREST OR PLEASURE IN DOING THINGS: 0
SUM OF ALL RESPONSES TO PHQ QUESTIONS 1-9: 0
SUM OF ALL RESPONSES TO PHQ QUESTIONS 1-9: 0

## 2022-07-18 ASSESSMENT — ENCOUNTER SYMPTOMS
GASTROINTESTINAL NEGATIVE: 1
RESPIRATORY NEGATIVE: 1
EYES NEGATIVE: 1

## 2022-07-18 NOTE — PROGRESS NOTES
HISTORY OF PRESENT ILLNESS    Kimber Taylor is a 58 y.o. female. HPI  Chief Complaint   Patient presents with    Annual Exam     See above. Overall stable on current regimen. Note history of pancytopenia due to chemo. Oncologist said that her levels will never go back to normal but should not be sever enough to cause problems. Over the last year has had increased neuropathy symptoms with numbness in feet and lower legs. Causes her to feel unstable when walking and has had occasional falls. Feels like thyroid supplement is working well. Denies excessive fatigue. No constipation. Denies dry skin. No side effects. Has continued to have urinary incontinence. Started PT but only had 2 sessions because therapist was pregnant. Denies burning or discharge. Current Outpatient Medications on File Prior to Visit   Medication Sig Dispense Refill    BIOTIN PO Take by mouth      albuterol sulfate  (90 Base) MCG/ACT inhaler Inhale 2 puffs into the lungs every 4 hours      albuterol (PROVENTIL) (2.5 MG/3ML) 0.083% nebulizer solution Inhale 2.5 mg into the lungs 4 times daily      apixaban (ELIQUIS) 5 MG TABS tablet Take 5 mg by mouth 2 times daily      ascorbic acid (VITAMIN C) 250 MG tablet Take 250 mg by mouth      azithromycin (ZITHROMAX) 250 MG tablet Take 250 mg by mouth      vitamin D 25 MCG (1000 UT) CAPS Take by mouth daily      fluticasone (FLONASE) 50 MCG/ACT nasal spray 2 sprays by Nasal route daily      Fluticasone-Umeclidin-Vilant (TRELEGY ELLIPTA) 200-62.5-25 MCG/INH AEPB Inhale 1 puff into the lungs daily      metoprolol succinate (TOPROL XL) 50 MG extended release tablet Take 50 mg by mouth daily      montelukast (SINGULAIR) 10 MG tablet Take 10 mg by mouth daily      sodium chloride, Inhalant, 3 % nebulizer solution Inhale 4 mLs into the lungs 2 times daily as needed       No current facility-administered medications on file prior to visit.      Past Medical History:   Diagnosis Date Anemia     Asthma     Followed by Dr. Romero Mcgill, Pulmonology    Atrial fibrillation with rapid ventricular response ([facility] [address] 11/25/2020    Basal cell carcinoma     Cardiomegaly     Deep vein thrombosis (DVT) (HCC)     History of stroke     History of UTI     Hypertension     Marginal zone lymphoma ([facility] [address] 03/30/2017    Diffuse Large B-cell Lymphoma. Completed Chemotherapy 5/28/18    Morbid obesity ([facility] [address]     Osteoarthritis     Overactive bladder     Primary hypothyroidism     Prolapse of female bladder, acquired     Radiation therapy complication     Rheumatic fever     S/P total knee replacement using cement 10/3/2011    Shingles     Superficial venous thrombosis of right arm 5/1/2017       Review of Systems   Constitutional: Negative. HENT: Negative. Eyes: Negative. Respiratory: Negative. Asthma is followed by pulmonary; stable. Cardiovascular: Negative. Gastrointestinal: Negative. Endocrine: Negative. Genitourinary:         Frequent leakage and difficulty with control   Musculoskeletal: Negative. Neurological:  Positive for numbness (feet; bilateral). Negative for dizziness, syncope, facial asymmetry, weakness, light-headedness and headaches. Hematological: Negative. Psychiatric/Behavioral: Negative. Blood pressure 134/82, pulse 87, temperature 97.7 °F (36.5 °C), temperature source Temporal, weight (!) 313 lb (142 kg), SpO2 95 %. Physical Exam  Vitals and nursing note reviewed. Constitutional:       Appearance: Normal appearance. HENT:      Right Ear: Tympanic membrane normal.      Left Ear: Tympanic membrane normal.      Nose: Nose normal.      Mouth/Throat:      Mouth: Mucous membranes are moist.      Pharynx: Oropharynx is clear. Eyes:      Extraocular Movements: Extraocular movements intact. Conjunctiva/sclera: Conjunctivae normal.      Pupils: Pupils are equal, round, and reactive to light. Neck:      Vascular: No carotid bruit.    Cardiovascular:      Rate and Rhythm: Normal rate and regular rhythm. Heart sounds: Normal heart sounds. Pulmonary:      Breath sounds: Normal breath sounds. Abdominal:      General: Bowel sounds are normal. There is no distension. Palpations: Abdomen is soft. There is no mass. Tenderness: There is no abdominal tenderness. There is no guarding. Musculoskeletal:         General: No swelling or tenderness. Cervical back: Neck supple. Lymphadenopathy:      Cervical: No cervical adenopathy. Skin:     General: Skin is warm. Findings: No rash. Neurological:      Comments: Gait is tentative but not wide based; no temor   Psychiatric:         Mood and Affect: Mood normal.        ASSESSMENT and [facility] was seen today for annual exam.    Diagnoses and all orders for this visit:    Routine health maintenance    Pancytopenia due to antineoplastic chemotherapy ([facility] [address]    Urinary incontinence, unspecified type  -     Jen Brewer MD, Neurology, [facility]    Neurologic gait dysfunction    Neuropathy  -     Jen Brewer MD, Neurology, [facility]    Acquired hypothyroidism    Balance disorder  -     External Referral to Physical Therapy    Other orders  -     triamterene-hydroCHLOROthiazide (MAXZIDE) 75-50 MG per tablet; Take 0.5-1 tablets by mouth daily as needed (swelling)  -     levothyroxine (SYNTHROID) 200 MCG tablet; Take 1 tablet by mouth every morning (before breakfast) Take 1 tablet by mouth every mouth along with levothyroxine 50mg to equal 250mg  -     levothyroxine (SYNTHROID) 50 MCG tablet; Take 1 tablet by mouth every morning (before breakfast) Take 1 tablet by mouth every mouth along with levothyroxine 200mg to equal 250mg   Reviewed labs and health care maintenance with emphasis on weight loss  Discussed history and medications with patient. Continue current measures. Follow up if side effects occur or if new symptoms develop.   Referral for further evaluation of neuropathy. Discussed otc iron supplements. Referral for PT to treat urinary incontinence and for gait/balance training. Return in about 6 months (around 1/18/2023), or if symptoms worsen or fail to improve.     Tye Dolan MD

## 2022-07-18 NOTE — TELEPHONE ENCOUNTER
Patient had recent sleep study that showed she has severe sleep apnea AHI- 137.9 lowest SaO2- 63%- order will be sent to [address] [place] [address] for urgent setup. Pt vm is full.

## 2022-07-19 ENCOUNTER — TELEPHONE (OUTPATIENT)
Dept: CARDIOLOGY CLINIC | Age: 62
End: 2022-07-19

## 2022-07-19 NOTE — TELEPHONE ENCOUNTER
Went to [facility] last week and picked up Metoprolol and her  apparently threw away her Metoprolol bag thinking it was a chick-[id number] bag. She called the pharmacy and they will not refill unless we call her pharmacy and give them permission. She said the pharmacy is [facility] [phone]. Can we please call and give them permission to fill this early?

## 2022-07-19 NOTE — TELEPHONE ENCOUNTER
Patient went to pharmacy recently and got Metoprolol refill but has now lost it so she needs another prescription called in to

## 2022-07-19 NOTE — TELEPHONE ENCOUNTER
Lyssa Ni MD  You 8 minutes ago (2:13 PM)     ZD    Please refill her Toprol-XL. [facility] called [facility] to [facility]. Pharmacy v/u. Pt.notified. Mario Smith

## 2022-07-20 NOTE — TELEPHONE ENCOUNTER
Patient is needing to get prescription for :    montelukast (SINGULAIR) 10 MG tablet     Patient had picked up prescription at drugstore last week but thinks it got thrown away.     [facility]

## 2022-07-26 RX ORDER — MONTELUKAST SODIUM 10 MG/1
10 TABLET ORAL DAILY
Qty: 30 TABLET | Refills: 11 | Status: SHIPPED | OUTPATIENT
Start: 2022-07-26

## 2022-07-28 DIAGNOSIS — C83.30 DIFFUSE LARGE B-CELL LYMPHOMA, UNSPECIFIED BODY REGION (HCC): Primary | ICD-10-CM

## 2022-08-02 ENCOUNTER — OFFICE VISIT (OUTPATIENT)
Dept: ONCOLOGY | Age: 62
End: 2022-08-02
Payer: COMMERCIAL

## 2022-08-02 ENCOUNTER — HOSPITAL ENCOUNTER (OUTPATIENT)
Dept: LAB | Age: 62
Discharge: HOME | End: 2022-08-05
Payer: COMMERCIAL

## 2022-08-02 VITALS
WEIGHT: 293 LBS | SYSTOLIC BLOOD PRESSURE: 144 MMHG | RESPIRATION RATE: 14 BRPM | HEIGHT: 63 IN | BODY MASS INDEX: 51.91 KG/M2 | DIASTOLIC BLOOD PRESSURE: 81 MMHG | OXYGEN SATURATION: 93 % | TEMPERATURE: 99.4 F | HEART RATE: 87 BPM

## 2022-08-02 DIAGNOSIS — C85.88 MARGINAL ZONE LYMPHOMA OF LYMPH NODES OF MULTIPLE SITES (HCC): ICD-10-CM

## 2022-08-02 DIAGNOSIS — C83.30 DIFFUSE LARGE B-CELL LYMPHOMA, UNSPECIFIED BODY REGION (HCC): Primary | ICD-10-CM

## 2022-08-02 DIAGNOSIS — G47.33 OBSTRUCTIVE SLEEP APNEA: ICD-10-CM

## 2022-08-02 DIAGNOSIS — C83.30 DIFFUSE LARGE B-CELL LYMPHOMA, UNSPECIFIED BODY REGION (HCC): ICD-10-CM

## 2022-08-02 LAB
ALBUMIN SERPL-MCNC: 3.8 G/DL (ref 3.2–4.6)
ALBUMIN/GLOB SERPL: 1.4 {RATIO} (ref 1.2–3.5)
ALP SERPL-CCNC: 90 U/L (ref 50–136)
ALT SERPL-CCNC: 32 U/L (ref 12–65)
ANION GAP SERPL CALC-SCNC: 7 MMOL/L (ref 7–16)
AST SERPL-CCNC: 15 U/L (ref 15–37)
BASOPHILS # BLD: 0 K/UL (ref 0–0.2)
BASOPHILS NFR BLD: 1 % (ref 0–2)
BILIRUB SERPL-MCNC: 0.6 MG/DL (ref 0.2–1.1)
BUN SERPL-MCNC: 12 MG/DL (ref 8–23)
CALCIUM SERPL-MCNC: 9.9 MG/DL (ref 8.3–10.4)
CHLORIDE SERPL-SCNC: 102 MMOL/L (ref 98–107)
CO2 SERPL-SCNC: 30 MMOL/L (ref 21–32)
CREAT SERPL-MCNC: 0.9 MG/DL (ref 0.6–1)
DIFFERENTIAL METHOD BLD: ABNORMAL
EOSINOPHIL # BLD: 0.1 K/UL (ref 0–0.8)
EOSINOPHIL NFR BLD: 2 % (ref 0.5–7.8)
ERYTHROCYTE [DISTWIDTH] IN BLOOD BY AUTOMATED COUNT: 14.7 % (ref 11.9–14.6)
GLOBULIN SER CALC-MCNC: 2.7 G/DL (ref 2.3–3.5)
GLUCOSE SERPL-MCNC: 112 MG/DL (ref 65–100)
HCT VFR BLD AUTO: 34.7 % (ref 35.8–46.3)
HGB BLD-MCNC: 11.2 G/DL (ref 11.7–15.4)
IMM GRANULOCYTES # BLD AUTO: 0 K/UL (ref 0–0.5)
IMM GRANULOCYTES NFR BLD AUTO: 1 % (ref 0–5)
LDH SERPL L TO P-CCNC: 248 U/L (ref 110–210)
LYMPHOCYTES # BLD: 0.9 K/UL (ref 0.5–4.6)
LYMPHOCYTES NFR BLD: 15 % (ref 13–44)
MCH RBC QN AUTO: 29.6 PG (ref 26.1–32.9)
MCHC RBC AUTO-ENTMCNC: 32.3 G/DL (ref 31.4–35)
MCV RBC AUTO: 91.6 FL (ref 79.6–97.8)
MONOCYTES # BLD: 0.3 K/UL (ref 0.1–1.3)
MONOCYTES NFR BLD: 4 % (ref 4–12)
NEUTS SEG # BLD: 4.7 K/UL (ref 1.7–8.2)
NEUTS SEG NFR BLD: 77 % (ref 43–78)
NRBC # BLD: 0 K/UL (ref 0–0.2)
PLATELET # BLD AUTO: 167 K/UL (ref 150–450)
PMV BLD AUTO: 10.2 FL (ref 9.4–12.3)
POTASSIUM SERPL-SCNC: 3.7 MMOL/L (ref 3.5–5.1)
PROT SERPL-MCNC: 6.5 G/DL (ref 6.3–8.2)
RBC # BLD AUTO: 3.79 M/UL (ref 4.05–5.2)
SODIUM SERPL-SCNC: 139 MMOL/L (ref 136–145)
WBC # BLD AUTO: 6.1 K/UL (ref 4.3–11.1)

## 2022-08-02 PROCEDURE — 36415 COLL VENOUS BLD VENIPUNCTURE: CPT

## 2022-08-02 PROCEDURE — 83615 LACTATE (LD) (LDH) ENZYME: CPT

## 2022-08-02 PROCEDURE — 80053 COMPREHEN METABOLIC PANEL: CPT

## 2022-08-02 PROCEDURE — 99214 OFFICE O/P EST MOD 30 MIN: CPT | Performed by: INTERNAL MEDICINE

## 2022-08-02 PROCEDURE — 85025 COMPLETE CBC W/AUTO DIFF WBC: CPT

## 2022-08-02 ASSESSMENT — PATIENT HEALTH QUESTIONNAIRE - PHQ9
SUM OF ALL RESPONSES TO PHQ9 QUESTIONS 1 & 2: 0
SUM OF ALL RESPONSES TO PHQ QUESTIONS 1-9: 0
1. LITTLE INTEREST OR PLEASURE IN DOING THINGS: 0
2. FEELING DOWN, DEPRESSED OR HOPELESS: 0

## 2022-08-02 NOTE — PROGRESS NOTES
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 In any case, at this point, I would recommend salvage chemotherapy with R-ICE. This was scheduled for 1/24/18 but delayed due to post-influenza pneumonia. Started 2/7/18. Seen at [facility] for transplant opinion, the recommendation  from Dr. Zacarias Alicea, while acknowledging that salvage therapy followed by autologous transplant was reasonable, was that we consider a change to R-CHOP, completing 5 cycles of therapy and not necessarily moving to an autograft if she is in CR. We discussed  the pros and cons of both approaches, and we eventually settled on the change to R-CHOP. We completed 2 additional cycles and then repeated her PET/CT, which showed a complete response. Therefore, we then recommended an additional 2 cycles of therapy  to complete a total of 6 cycles (1 RICE and 5 R-CHOP). PET/CT at completion of therapy reviewed and shows no evidence of hypermetabolic disease, consistent with complete response. Continue observation,  we have discussed the benefits and drawbacks to additional treatment including R maintenance for MZL, and IT CNS prophylaxis, and she is very comfortable with no additional therapy. Ms. Shelly Morley returns today for routine follow up. She is doing OK. She has been under quite a bit of stress lately, her brother in law to whom she was very close recently passed away from [address], there are other family stressors as well. Her weight has increased again after she lost quite a bit of weight a couple of years ago, this is multifactorial including some thyroid dysfunction. She has been diagnosed with severe DAGMAR but cannot find an appropriately fitted mask, she returns to pulmonary later this month to discuss options. She remains on Trelegy with good results.   The numbness in her chin and the tightness/bloating in her abdomen are ongoing, but not progressive. No other concerning symptoms for lymphoma recurrence. Review of Systems:  Constitutional: Positive for fatigue and weight gain. HENT: Negative. Eyes: Negative. Respiratory: Positive for DAGMAR and poor sleep. Cardiovascular: Negative. Gastrointestinal: Negative. Genitourinary: Negative. Musculoskeletal: Negative. Skin: Negative. Neurological: Negative. Endo/Heme/Allergies: Negative. Psychiatric/Behavioral: Negative. All other systems reviewed and are negative. Allergies   Allergen Reactions    Gabapentin Other (See Comments)     Past Medical History:   Diagnosis Date    Anemia     Asthma     Followed by Dr. Angi Roach, Pulmonology    Atrial fibrillation with rapid ventricular response ([facility] [address] 11/25/2020    Basal cell carcinoma     Cardiomegaly     Deep vein thrombosis (DVT) (HCC)     History of stroke     History of UTI     Hypertension     Marginal zone lymphoma ([facility] [address] 03/30/2017    Diffuse Large B-cell Lymphoma. Completed Chemotherapy 5/28/18    Morbid obesity ([facility] [address]     Osteoarthritis     Overactive bladder     Primary hypothyroidism     Prolapse of female bladder, acquired     Radiation therapy complication     Rheumatic fever     S/P total knee replacement using cement 10/3/2011    Shingles     Superficial venous thrombosis of right arm 5/1/2017     Past Surgical History:   Procedure Laterality Date    CATARACT REMOVAL Bilateral 2018    CHOLECYSTECTOMY  1983    COLONOSCOPY  03/2017    Clear    UT ANESTH,ACHILLES TENDON SURG  Left 02/10/2011    Dr. Wayne Castanon  11/25/2020    ISABELL-guided cardioversion for A. Fib    PRE-MALIGNANT / BENIGN SKIN LESION EXCISION  2019    basal/squamous skin lesions removed from scalp. [facility].      TOTAL KNEE ARTHROPLASTY Left 2013    Dr. Byron Holguin Right 2012    Dr. Marcus La Left 1/3/2018     BREAST NEEDLE BIOPSY LEFT 1/3/2018 [facility]     Family History   Problem Relation Age of Onset    Other Maternal Grandmother         Vascular Disorder with leg amputation    Dementia Mother     Kidney Disease Father     Thyroid Cancer Neg Hx     Post-op Cognitive Dysfunction Neg Hx     Emergence Delirium Neg Hx     Delayed Awakening Neg Hx     Pseudochol. Deficiency Neg Hx     Malig Hypertherm Neg Hx     Thyroid Disease Sister         hypothyroidism    Breast Cancer Cousin 48    Celiac Disease Sister     Liver Disease Sister         non-alcoholic     Social History     Socioeconomic History    Marital status:      Spouse name: Not on file    Number of children: Not on file    Years of education: Not on file    Highest education level: Not on file   Occupational History    Not on file   Tobacco Use    Smoking status: Never    Smokeless tobacco: Never   Substance and Sexual Activity    Alcohol use: No    Drug use: Never    Sexual activity: Not on file   Other Topics Concern    Not on file   Social History Narrative    10/3/11:  PATIENT IS  TO Ian Weaver. SHE IS DISABLED. Social Determinants of Health     Financial Resource Strain: Not on file   Food Insecurity: Not on file   Transportation Needs: Not on file   Physical Activity: Not on file   Stress: Not on file   Social Connections: Not on file   Intimate Partner Violence: Not on file   Housing Stability: Not on file     Current Outpatient Medications   Medication Sig Dispense Refill    montelukast (SINGULAIR) 10 MG tablet Take 1 tablet by mouth in the morning.  30 tablet 11    triamterene-hydroCHLOROthiazide (MAXZIDE) 75-50 MG per tablet Take 0.5-1 tablets by mouth daily as needed (swelling) 30 tablet 5    levothyroxine (SYNTHROID) 200 MCG tablet Take 1 tablet by mouth every morning (before breakfast) Take 1 tablet by mouth every mouth along with levothyroxine 50mg to equal 250mg 90 tablet 3    levothyroxine (SYNTHROID) 50 MCG tablet Take 1 tablet by mouth every morning (before breakfast) Take 1 tablet by mouth every mouth along with levothyroxine 200mg to equal 250mg 90 tablet 3    BIOTIN PO Take by mouth      albuterol sulfate  (90 Base) MCG/ACT inhaler Inhale 2 puffs into the lungs every 4 hours      albuterol (PROVENTIL) (2.5 MG/3ML) 0.083% nebulizer solution Inhale 2.5 mg into the lungs 4 times daily      apixaban (ELIQUIS) 5 MG TABS tablet Take 5 mg by mouth 2 times daily      ascorbic acid (VITAMIN C) 250 MG tablet Take 250 mg by mouth      azithromycin (ZITHROMAX) 250 MG tablet Take 250 mg by mouth      vitamin D 25 MCG (1000 UT) CAPS Take by mouth daily      fluticasone (FLONASE) 50 MCG/ACT nasal spray 2 sprays by Nasal route daily      Fluticasone-Umeclidin-Vilant (TRELEGY ELLIPTA) 200-62.5-25 MCG/INH AEPB Inhale 1 puff into the lungs daily      metoprolol succinate (TOPROL XL) 50 MG extended release tablet Take 50 mg by mouth daily      sodium chloride, Inhalant, 3 % nebulizer solution Inhale 4 mLs into the lungs 2 times daily as needed       No current facility-administered medications for this visit. OBJECTIVE:  BP (!) 144/81 (Site: Left Lower Arm, Position: Standing, Cuff Size: Large Adult)   Pulse 87   Temp 99.4 °F (37.4 °C) (Oral)   Resp 14   Ht 5' 3\" (1.6 m)   Wt (!) 317 lb 3.2 oz (143.9 kg)   SpO2 93%   BMI 56.19 kg/m²     Physical Exam:  Constitutional: Well developed, well nourished female in no acute distress, sitting comfortably in the exam room chair. HEENT: Normocephalic and atraumatic. Sclerae anicteric. Neck supple without JVD. No thyromegaly present. Lymph node   No palpable submandibular, cervical, supraclavicular lymph nodes. Skin Warm and dry. No bruising and no rash noted. No erythema. No pallor. Respiratory Lungs are clear to auscultation bilaterally without wheezes, rales or rhonchi, normal air exchange without accessory muscle use.     CVS Normal rate, regular rhythm and normal S1 and S2. No murmurs, gallops, or rubs. Abdomen Soft, nontender and nondistended, normoactive bowel sounds. No palpable mass. No hepatosplenomegaly. Neuro Grossly nonfocal with no obvious sensory or motor deficits. MSK Normal range of motion in general.  No edema and no tenderness. Psych Appropriate mood and affect.       Labs:  Recent Results (from the past 96 hour(s))   CBC with Auto Differential    Collection Time: 08/02/22  3:12 PM   Result Value Ref Range    WBC 6.1 4.3 - 11.1 K/uL    RBC 3.79 (L) 4.05 - 5.2 M/uL    Hemoglobin 11.2 (L) 11.7 - 15.4 g/dL    Hematocrit 34.7 (L) 35.8 - 46.3 %    MCV 91.6 79.6 - 97.8 FL    MCH 29.6 26.1 - 32.9 PG    MCHC 32.3 31.4 - 35.0 g/dL    RDW 14.7 (H) 11.9 - 14.6 %    Platelets 120 195 - 127 K/uL    MPV 10.2 9.4 - 12.3 FL    nRBC 0.00 0.0 - 0.2 K/uL    Differential Type AUTOMATED      Seg Neutrophils 77 43 - 78 %    Lymphocytes 15 13 - 44 %    Monocytes 4 4.0 - 12.0 %    Eosinophils % 2 0.5 - 7.8 %    Basophils 1 0.0 - 2.0 %    Immature Granulocytes 1 0.0 - 5.0 %    Segs Absolute 4.7 1.7 - 8.2 K/UL    Absolute Lymph # 0.9 0.5 - 4.6 K/UL    Absolute Mono # 0.3 0.1 - 1.3 K/UL    Absolute Eos # 0.1 0.0 - 0.8 K/UL    Basophils Absolute 0.0 0.0 - 0.2 K/UL    Absolute Immature Granulocyte 0.0 0.0 - 0.5 K/UL   Comprehensive Metabolic Panel    Collection Time: 08/02/22  3:12 PM   Result Value Ref Range    Sodium 139 136 - 145 mmol/L    Potassium 3.7 3.5 - 5.1 mmol/L    Chloride 102 98 - 107 mmol/L    CO2 30 21 - 32 mmol/L    Anion Gap 7 7 - 16 mmol/L    Glucose 112 (H) 65 - 100 mg/dL    BUN 12 8 - 23 MG/DL    Creatinine 0.90 0.6 - 1.0 MG/DL    GFR African American >60 >60 ml/min/1.73m2    GFR Non- >60 >60 ml/min/1.73m2    Calcium 9.9 8.3 - 10.4 MG/DL    Total Bilirubin 0.6 0.2 - 1.1 MG/DL    ALT 32 12 - 65 U/L    AST 15 15 - 37 U/L    Alk Phosphatase 90 50 - 136 U/L    Total Protein 6.5 6.3 - 8.2 g/dL    Albumin 3.8 3.2 - 4.6 g/dL    Globulin 2.7 2.3 - 3.5 g/dL    Albumin/Globulin Ratio 1.4 1.2 - 3.5     Lactate Dehydrogenase    Collection Time: 08/02/22  3:12 PM   Result Value Ref Range     (H) 110 - 210 U/L       Imaging:  No results found. ASSESSMENT:   Diagnosis Orders   1. Diffuse large B-cell lymphoma, unspecified body region ([facility] [address]        2. 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 ([facility] [address]        3. Obstructive sleep apnea              PLAN:  Lab studies were personally reviewed. Marginal zone lymphoma: diffuse parish involvement with splenomegaly, ascites, and bone marrow involvement. Her anemia, ascites, and constitutional symptoms are an indication for treatment. We recommended Rituxan and bendamustine for therapy. She was hospitalized for recurrent fevers after cycle 1. She was also noted to have hypotension requiring a brief ICU stay with Levophed, and acute kidney injury either from antibiotics or ATN from hypotension (or both). She completed repeat paracentesis  with good relief of abdominal symptoms. PET/CT after 2 cycles reviewed and shows dramatic response in lymphadenopathy and splenomegaly. Some  uptake in small abdominal wall foci but likely from paracenteses, low suspicion for disease. Plan was still for 6 cycles of BR in all. Hospitalized for neutropenic fever 9/8-9/12 but felt better relatively  quickly. Now back to baseline and counts are improved, platelets slightly low at 95k but not prohibitive for therapy. PET/CT reviewed and shows a complete response, she has some residual splenomegaly but it is decreased since PET in June by my measure,  and is not at all hypermetabolic like was the case at diagnosis. At this point, I see no indication for escalation of therapy, I would continue with cycle 6 BR and then enter R maintenance. However, I would move up her repeat imaging to 3 months (instead  of 6) given the bulk of her disease at presentation.   Indeed, PET/CT prior to her 2nd cycle of maintenance showed a new hypermetabolic breast nodule and a mesenteric nodule. Biopsy of the breast  nodule shows diffuse large B cell lymphoma. 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 In any case, at this  point, I would recommend salvage chemotherapy with R-ICE. This was scheduled on 1/24 but delayed due to post-influenza pneumonia. Started therapy 2/7/18. Recently returned from [facility] for transplant opinion and the trip itself was uneventful. However,  the recommendation from Dr. Allen Mi, while acknowledging that salvage therapy followed by autologous transplant was reasonable, was that we consider a change to R-CHOP, completing 5 cycles of therapy and not necessarily moving to an autograft if she is  in CR. We discussed the pros and cons of both approaches, and we eventually settled on the change to R-CHOP. We completed 2 additional cycles and then repeated her PET/CT, which showed a complete response. Therefore, we then recommended an additional  2 cycles of therapy to complete a total of 6 cycles (1 RICE and 5 R-CHOP). PET/CT at completion of therapy reviewed and shows no evidence of hypermetabolic disease, consistent with complete response. Continue observation, we have discussed the benefits and drawbacks to additional treatment including R maintenance for MZL, and IT CNS prophylaxis, and she was very comfortable with no additional therapy. Ms. Elisabeth Peterson returns today for routine follow up. She is doing OK. She has been under quite a bit of stress lately, her brother in law to whom she was very close recently passed away from [address] [facility], there are other family stressors as well. Her weight has increased again after she lost quite a bit of weight a couple of years ago, this is multifactorial including some thyroid dysfunction.   She has been diagnosed with severe DAGMAR but cannot find an appropriately fitted mask, she returns to pulmonary later this month to discuss options. She remains on Trelegy with good results. The numbness in her chin and the tightness/bloating in her abdomen are ongoing, but not progressive. No other concerning symptoms for lymphoma recurrence. Labs reviewed and unremarkable, mild anemia but otherwise no residual changes suggestive of chemotherapy effect. She is due for annual CT in November, she is aware that her risk of recurrent MZL remains although it is likely that her high grade lymphoma has been cured. No plans to restart treatment unless clinical progression (or impending clinical progression based on serology or imaging). She will call sooner with any issues, especially symptoms of recurrent lymphoma. All questions were asked and answered to the best of my ability.              Malia Rashid MD, MD  [address] [place] [address] Hematology and Oncology  [address] [place] [address]  Office : [phone]  Fax : [phone]

## 2022-08-08 ENCOUNTER — OFFICE VISIT (OUTPATIENT)
Dept: PULMONOLOGY | Age: 62
End: 2022-08-08
Payer: COMMERCIAL

## 2022-08-08 VITALS
WEIGHT: 293 LBS | HEART RATE: 93 BPM | TEMPERATURE: 97.7 F | DIASTOLIC BLOOD PRESSURE: 74 MMHG | OXYGEN SATURATION: 94 % | BODY MASS INDEX: 51.91 KG/M2 | SYSTOLIC BLOOD PRESSURE: 128 MMHG | HEIGHT: 63 IN | RESPIRATION RATE: 19 BRPM

## 2022-08-08 DIAGNOSIS — J45.30 MILD PERSISTENT ASTHMA WITHOUT COMPLICATION: Primary | ICD-10-CM

## 2022-08-08 DIAGNOSIS — R09.89 CHEST CONGESTION: ICD-10-CM

## 2022-08-08 DIAGNOSIS — E66.01 MORBID OBESITY WITH BMI OF 50.0-59.9, ADULT (HCC): ICD-10-CM

## 2022-08-08 DIAGNOSIS — J45.901 ASTHMA WITH ACUTE EXACERBATION, UNSPECIFIED ASTHMA SEVERITY, UNSPECIFIED WHETHER PERSISTENT: Primary | ICD-10-CM

## 2022-08-08 DIAGNOSIS — G47.33 OSA ON CPAP: ICD-10-CM

## 2022-08-08 DIAGNOSIS — Z99.89 OSA ON CPAP: ICD-10-CM

## 2022-08-08 PROCEDURE — 99215 OFFICE O/P EST HI 40 MIN: CPT | Performed by: NURSE PRACTITIONER

## 2022-08-08 RX ORDER — SODIUM CHLORIDE FOR INHALATION 3 %
4 VIAL, NEBULIZER (ML) INHALATION 2 TIMES DAILY PRN
Qty: 240 ML | Refills: 5 | Status: SHIPPED | OUTPATIENT
Start: 2022-08-08

## 2022-08-08 RX ORDER — ALBUTEROL SULFATE 2.5 MG/3ML
2.5 SOLUTION RESPIRATORY (INHALATION) 4 TIMES DAILY
Qty: 120 EACH | Refills: 5 | Status: SHIPPED | OUTPATIENT
Start: 2022-08-08

## 2022-08-08 NOTE — PROGRESS NOTES
[address] [facility]. [address], [address] [facility]  [phone]    Patient Name:  Dominique Abreu      YOB: 1960    Office Visit 8/8/2022    HISTORY OF PRESENT ILLNESS:    Ms. 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 The patient became sick on August 2020 and had cough with mucus production. She did go to the ED for further evaluation and COVID testing at that time was negative. CT chest  Was done revealing near collapse of the RML and Bronchoscopy was done November 25, 2020 revealing significant amount of thick secretions obstructing multiple airways. During the procedure the patient went into Atrial fibrillation with RVR requiring cardioversion. Cardiology did place the patient on Eliquis and metoprolol following that cardioversion  As of July 2021, she has been maintained on Zithromax Monday Wednesday Friday, Breo, and albuterol. Generally when she takes this regimen she does fairly well. Then she was switched to trelegy. Patient is a complain of more cough and congestion today. She states she went to the beach a few weeks ago and forgot her nebulizer. While she was down there, she was around several things that seem to aggravate her allergies. When she came back her cough did not improve or go away. She states her  also has the same cough. She is currently not using her nebulizer at all. She had a sleep study done back in July 2022 which showed an AHI of 138 with a sat dropping to 63. She was initiated on CPAP, but explains that she is having issues with the mask coming off during the night.   She states she has asked her DME rep about her mask and was given few suggestions on how to help. She is not actually scheduled to see any of our sleep providers. Hemoptysis:no  Dyspnea/SOB: no  Cough:yes  Sputum:yes, white to clear  Fever:no  Chills:no  Headache:no  Weight Loss:no  PMH Reference Info:                                                                                                                Exposure History:  Second Hand Smoke Exposure: No  Birds: No  Asbestos: No  TB: No  Hot Tubs/Humidifier: No  Organic/Inorganic Dusts: No  Molds: No  Occupation/Hobbies:   Past Medical History:   Diagnosis Date    Anemia     Asthma     Followed by Dr. Angi Roach, Pulmonology    Atrial fibrillation with rapid ventricular response ([facility] 11/25/2020    Basal cell carcinoma     Cardiomegaly     Deep vein thrombosis (DVT) (HCC)     History of stroke     History of UTI     Hypertension     Marginal zone lymphoma ([facility] 03/30/2017    Diffuse Large B-cell Lymphoma. Completed Chemotherapy 5/28/18    Morbid obesity ([facility]     Osteoarthritis     Overactive bladder     Primary hypothyroidism     Prolapse of female bladder, acquired     Radiation therapy complication     Rheumatic fever     S/P total knee replacement using cement 10/3/2011    Shingles     Superficial venous thrombosis of right arm 5/1/2017      Tobacco Use: Low Risk     Smoking Tobacco Use: Never    Smokeless Tobacco Use: Never     Allergies   Allergen Reactions    Gabapentin Other (See Comments)     Current Outpatient Medications   Medication Sig    sodium chloride, Inhalant, 3 % nebulizer solution Take 4 mLs by nebulization 2 times daily as needed for Cough    albuterol (PROVENTIL) (2.5 MG/3ML) 0.083% nebulizer solution Take 3 mLs by nebulization in the morning and 3 mLs at noon and 3 mLs in the evening and 3 mLs before bedtime. montelukast (SINGULAIR) 10 MG tablet Take 1 tablet by mouth in the morning.     triamterene-hydroCHLOROthiazide (MAXZIDE) 75-50 MG per tablet Take 0.5-1 is no oropharyngeal narrowing. TMs are clear. NECK/LYMPHATIC:  Symmetrical with no JVD. Trachea midline. No thyroid enlargement. No cervical adenopathy. LUNGS:  Normal respiratory effort with symmetrical lung expansion. Breath sounds rhonchi in right middle, otherwise clear on room air. HEART:  There is a regular rate and rhythm. No murmur, rub, or gallop. There is no edema in the lower extremities. ABDOMEN:  Soft and non-tender. No hepatosplenomegaly. Bowel sounds are normal.    SKIN:  There are no rashes, cyanosis, jaundice, or ecchymosis present. EXTREMITIES:  The extremities are unremarkable without clubbing, cyanosis, joint inflammation, degenerative, or ischemic change. MUSCULOSKELETAL:  There is no abnormal tone, muscle atrophy, or abnormal movement present. NEURO:  The patient is alert and oriented. Memory appears intact and mood is normal.  No gross sensorimotor deficits are present. DIAGNOSTIC TESTS:                                                                                                             LABS: No results for input(s): HGB, HCT, TSH, BNPNT in the last 72 hours. CXR PA and lateral:  No results found for this or any previous visit. Screening chest CT: No results found for this or any previous visit. CT of chest without contrast:   No results found for this or any previous visit. CT of chest with contrast:  No valid procedures specified. CT of chest PE protocol:  No results found for this or any previous visit. HRCT:  No results found for this or any previous visit. PET/CT: No valid procedures specified.     Exercise oximetry:    FeNo:     Spirometry:   Date:    7/2021 10/2020    FVC    2.07-67% 2.31-78%    FEV1    1.76-74% 1.96-83%    FEV1/FVC    84% 85%    FEF 25-75%    2.19-99% 2.%    Bronchodilator Response         TLC         RV         DLCO           Echo: 11/2020  SUMMARY:     -  Left ventricle: Systolic function was normal. Ejection fraction was  estimated in the range of 55 % to 60 %. This study was inadequate for the  evaluation of regional wall motion. -  Left atrium: The atrium was mildly to moderately dilated. -  Left atrial appendage: No thrombus was identified. -  Atrial septum: There was a possible tiny patent foramen ovale. -  Right atrium: The atrium was mildly dilated. -  Mitral valve: There was mild regurgitation.

## 2022-08-09 NOTE — PROGRESS NOTES
[address] [place] [address] Court, [address] [facility]  [phone]    Patient Name:  Andrew Pratt  YOB: 1960      Office Visit 8/10/2022    Chief Complaint   Patient presents with    Follow-up    CPAP/BiPAP       HISTORY OF PRESENT ILLNESS:    This pleasant lady came in today after being sent by nurse practitioner Suman Randolph for treatment of her sleep apnea. Patient has very severe sleep apnea with an AHI of 138 and sats as low as 63%. Patient is currently on CPAP at 19 cm H2O with C-Flex of 3. Patient also has atrial fibrillation and even needed urgent cardioversion in the past.    Patient currently has been having some problems with CPAP main issue has been try to find a mask that works. She did get a F 30 mask but indicated that she had trouble using it and few days ago was able to get an N30 I mask and indicates likes it much better. She has just use it only for 2 days and both days she has used it for almost 6 hours. Her AHI is down to 4.4 and she actually reports that she feels much better since she has been able to use it. She indicates her  was quite adamant that she needs to continue and now she understands the benefit of it. She currently reports that she has now no problems with her mask humidity. Still feels the pressure may be a little high. Compliance data as listed below, and only has had it for 20 days and use a 12 days only 5 days more than 4 hours. She is aware about the importance of using Pap therapy. Her [place] score today is currently 12/24. Regarding her weight, she indicates she is not able to lose much weight due to the fact that she cannot be very ambulatory. She indicates she has lost sensation in her left leg and now has problems with her right ankle and foot which is limiting her activity level.   She does get some swelling in her legs and indicates does have ankle wraps but does not use them all the time.    She was distraught about her brother-in-law's recent passing from [address] and reports they have set up a foundation for him. She wants to try and give it her all in order to get better. No flowsheet data found. Past Medical History:   Diagnosis Date    Anemia     Asthma     Followed by Dr. Fidel Vidales, Pulmonology    Atrial fibrillation with rapid ventricular response ([address] 11/25/2020    Basal cell carcinoma     Cardiomegaly     Deep vein thrombosis (DVT) (HCC)     History of stroke     History of UTI     Hypertension     Marginal zone lymphoma ([address] 03/30/2017    Diffuse Large B-cell Lymphoma.  Completed Chemotherapy 5/28/18    Morbid obesity ([address]     Osteoarthritis     Overactive bladder     Primary hypothyroidism     Prolapse of female bladder, acquired     Radiation therapy complication     Rheumatic fever     S/P total knee replacement using cement 10/3/2011    Shingles     Superficial venous thrombosis of right arm 5/1/2017         Patient Active Problem List   Diagnosis    PFO (patent foramen ovale)    Osteoarthritis of left knee    Heart murmur    Admission for antineoplastic chemotherapy    Hypertension    History of DVT of lower extremity    History of atrial fibrillation    Primary hypothyroidism    DLBCL (diffuse large B cell lymphoma) (HCC)    Snores    Atrial fibrillation (HCC)    Left lower lobe pneumonia    Atelectasis    Mild mitral regurgitation by prior echocardiogram    Pancytopenia due to antineoplastic chemotherapy ([address]    Hemoptysis    Non Hodgkin's lymphoma (HCC)    Pneumonia and influenza    Lymphadenopathy, generalized    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 ([address]    Osteoarthritis of right knee    Morbid obesity with BMI of 50.0-59.9, adult ([address]    Palpitations    Hypersomnia, unspecified    Hypoxemia           Past Surgical History:   Procedure Laterality Date    CATARACT REMOVAL Bilateral 2018    CHOLECYSTECTOMY  1983    COLONOSCOPY  03/2017 Clear    [place] ANESTH,ACHILLES TENDON SURG  Left 02/10/2011    Dr. Mari Higuera  11/25/2020    ISABELL-guided cardioversion for A. Fib    PRE-MALIGNANT / BENIGN SKIN LESION EXCISION  2019    basal/squamous skin lesions removed from scalp. [facility]. Lyssa Conception Left 2013    Dr. Dorothy Lancaster Right 2012    Dr. Patrica Villalobos Left 1/3/2018     BREAST NEEDLE BIOPSY LEFT 1/3/2018 [facility]         Social History     Socioeconomic History    Marital status:      Spouse name: Not on file    Number of children: Not on file    Years of education: Not on file    Highest education level: Not on file   Occupational History    Not on file   Tobacco Use    Smoking status: Never    Smokeless tobacco: Never   Substance and Sexual Activity    Alcohol use: No    Drug use: Never    Sexual activity: Not on file   Other Topics Concern    Not on file   Social History Narrative    10/3/11:  PATIENT IS  TO Ian Weaver. SHE IS DISABLED. Social Determinants of Health     Financial Resource Strain: Not on file   Food Insecurity: Not on file   Transportation Needs: Not on file   Physical Activity: Not on file   Stress: Not on file   Social Connections: Not on file   Intimate Partner Violence: Not on file   Housing Stability: Not on file         Family History   Problem Relation Age of Onset    Other Maternal Grandmother         Vascular Disorder with leg amputation    Dementia Mother     Kidney Disease Father     Thyroid Cancer Neg Hx     Post-op Cognitive Dysfunction Neg Hx     Emergence Delirium Neg Hx     Delayed Awakening Neg Hx     Pseudochol.  Deficiency Neg Hx     Malig Hypertherm Neg Hx     Thyroid Disease Sister         hypothyroidism    Breast Cancer Cousin 48    Celiac Disease Sister     Liver Disease Sister         non-alcoholic         Allergies   Allergen Reactions Gabapentin Other (See Comments)         Current Outpatient Medications   Medication Sig    sodium chloride, Inhalant, 3 % nebulizer solution Take 4 mLs by nebulization 2 times daily as needed for Cough    albuterol (PROVENTIL) (2.5 MG/3ML) 0.083% nebulizer solution Take 3 mLs by nebulization in the morning and 3 mLs at noon and 3 mLs in the evening and 3 mLs before bedtime. montelukast (SINGULAIR) 10 MG tablet Take 1 tablet by mouth in the morning. triamterene-hydroCHLOROthiazide (MAXZIDE) 75-50 MG per tablet Take 0.5-1 tablets by mouth daily as needed (swelling)    levothyroxine (SYNTHROID) 200 MCG tablet Take 1 tablet by mouth every morning (before breakfast) Take 1 tablet by mouth every mouth along with levothyroxine 50mg to equal 250mg    levothyroxine (SYNTHROID) 50 MCG tablet Take 1 tablet by mouth every morning (before breakfast) Take 1 tablet by mouth every mouth along with levothyroxine 200mg to equal 250mg    BIOTIN PO Take by mouth    albuterol sulfate  (90 Base) MCG/ACT inhaler Inhale 2 puffs into the lungs every 4 hours    apixaban (ELIQUIS) 5 MG TABS tablet Take 5 mg by mouth 2 times daily    ascorbic acid (VITAMIN C) 250 MG tablet Take 250 mg by mouth    azithromycin (ZITHROMAX) 250 MG tablet Take 250 mg by mouth    vitamin D 25 MCG (1000 UT) CAPS Take by mouth daily    fluticasone (FLONASE) 50 MCG/ACT nasal spray 2 sprays by Nasal route daily    Fluticasone-Umeclidin-Vilant (TRELEGY ELLIPTA) 200-62.5-25 MCG/INH AEPB Inhale 1 puff into the lungs daily    metoprolol succinate (TOPROL XL) 50 MG extended release tablet Take 50 mg by mouth daily     No current facility-administered medications for this visit. REVIEW OF SYSTEMS:     CONSTITUTIONAL:   There is Negative history of fever, chills, night sweats. Patient is  Negativefor weight loss, they are  Negative for  weight gain. Patient is  Negative  for fatigue and hypersomnia and is  on their CPAP for the past few days. if that helps her nocturnal hypoxemia      3. Hypersomnia, unspecified  G47.10 -[place] score is elevated at 12/24 we will reassess next visit if this is improving      4. Morbid obesity with BMI of 50.0-59.9, adult ([facility]  E66.01 -Patient has gained a fair amount of weight since she has become a little bit more debilitated. We discussed different forms of exercise that she could do while sitting down. I also discussed dietary intake and the goal was not to starve herself but to eat small frequent meals which may help as well. Z68.43       5. Atrial fibrillation, unspecified type ([facility]  I48.91 -Followed up with cardiology. Reassess if this improves with Pap therapy. SUMMARY:    Continue CPAP below her pressure is 17 cm H2O which was done in the office today    Supplies Renewed and will change her to the [place] Nasal Mask size small    Patient is benefiting from Pap therapy the past 2 days, will reassess how she does in 6 weeks    Nasal saline 2 sprays/nostril tid/qid. See above regarding weight loss     Sleep hygiene was discussed with her today      Did review [place] score, compliance data and looked over prior polysomnogram today. All questions are answered to the patient's satisfaction. The patient will call for further questions and concerns. Follow up at the [address] [place] [address] will be in 6 weeks with me. Follow up will be with the patient's referring physician as had been previously scheduled. Romana Lockwood MD    Over 50% of today's office visit was spent in face to face time reviewing test results, prognosis, importance of compliance, education about disease process, benefits of medications, instructions for management of acute flare-ups, and follow up plans. Electronically signed and dictated. Please note if there are errors this is  likely a result of the dictation software. No orders of the defined types were placed in this encounter.      No orders of the defined types were placed in this encounter.

## 2022-08-10 ENCOUNTER — OFFICE VISIT (OUTPATIENT)
Dept: SLEEP MEDICINE | Age: 62
End: 2022-08-10
Payer: COMMERCIAL

## 2022-08-10 VITALS
DIASTOLIC BLOOD PRESSURE: 80 MMHG | RESPIRATION RATE: 14 BRPM | HEIGHT: 63 IN | WEIGHT: 293 LBS | SYSTOLIC BLOOD PRESSURE: 130 MMHG | BODY MASS INDEX: 51.91 KG/M2 | HEART RATE: 93 BPM | TEMPERATURE: 97.2 F | OXYGEN SATURATION: 97 %

## 2022-08-10 DIAGNOSIS — G47.33 OSA (OBSTRUCTIVE SLEEP APNEA): Primary | ICD-10-CM

## 2022-08-10 DIAGNOSIS — E66.01 MORBID OBESITY WITH BMI OF 50.0-59.9, ADULT (HCC): ICD-10-CM

## 2022-08-10 DIAGNOSIS — R09.02 HYPOXEMIA: ICD-10-CM

## 2022-08-10 DIAGNOSIS — G47.10 HYPERSOMNIA, UNSPECIFIED: ICD-10-CM

## 2022-08-10 DIAGNOSIS — I48.91 ATRIAL FIBRILLATION, UNSPECIFIED TYPE (HCC): ICD-10-CM

## 2022-08-10 PROCEDURE — 99214 OFFICE O/P EST MOD 30 MIN: CPT | Performed by: INTERNAL MEDICINE

## 2022-08-10 ASSESSMENT — SLEEP AND FATIGUE QUESTIONNAIRES
ESS TOTAL SCORE: 12
HOW LIKELY ARE YOU TO NOD OFF OR FALL ASLEEP WHILE SITTING AND TALKING TO SOMEONE: 1
HOW LIKELY ARE YOU TO NOD OFF OR FALL ASLEEP WHILE WATCHING TV: 3
HOW LIKELY ARE YOU TO NOD OFF OR FALL ASLEEP WHEN YOU ARE A PASSENGER IN A CAR FOR AN HOUR WITHOUT A BREAK: 3
HOW LIKELY ARE YOU TO NOD OFF OR FALL ASLEEP WHILE SITTING AND READING: 3
HOW LIKELY ARE YOU TO NOD OFF OR FALL ASLEEP IN A CAR, WHILE STOPPED FOR A FEW MINUTES IN TRAFFIC: 0
HOW LIKELY ARE YOU TO NOD OFF OR FALL ASLEEP WHILE SITTING QUIETLY AFTER LUNCH WITHOUT ALCOHOL: 0
HOW LIKELY ARE YOU TO NOD OFF OR FALL ASLEEP WHILE SITTING INACTIVE IN A PUBLIC PLACE: 2
HOW LIKELY ARE YOU TO NOD OFF OR FALL ASLEEP WHILE LYING DOWN TO REST IN THE AFTERNOON WHEN CIRCUMSTANCES PERMIT: 0

## 2022-09-15 RX ORDER — LEVOTHYROXINE SODIUM 0.05 MG/1
50 TABLET ORAL DAILY
Qty: 90 TABLET | Refills: 1 | OUTPATIENT
Start: 2022-09-15

## 2022-09-23 NOTE — PROGRESS NOTES
after a lunch without alcohol 2 0   In a car, while stopped for a few minutes in traffic 0 0   [place] Sleepiness Score 6 12                   No flowsheet data found. Past Medical History:   Diagnosis Date    Anemia     Asthma     Followed by Dr. Juanita Juárez, Pulmonology    Atrial fibrillation with rapid ventricular response ([facility] 11/25/2020    Basal cell carcinoma     Cardiomegaly     Deep vein thrombosis (DVT) (HCC)     History of stroke     History of UTI     Hypertension     Marginal zone lymphoma ([address] 03/30/2017    Diffuse Large B-cell Lymphoma.  Completed Chemotherapy 5/28/18    Morbid obesity ([address]     Osteoarthritis     Overactive bladder     Primary hypothyroidism     Prolapse of female bladder, acquired     Radiation therapy complication     Rheumatic fever     S/P total knee replacement using cement 10/3/2011    Shingles     Superficial venous thrombosis of right arm 5/1/2017         Patient Active Problem List   Diagnosis    PFO (patent foramen ovale)    Osteoarthritis of left knee    Heart murmur    Admission for antineoplastic chemotherapy    Hypertension    History of DVT of lower extremity    History of atrial fibrillation    Primary hypothyroidism    DLBCL (diffuse large B cell lymphoma) (HCC)    Snores    Atrial fibrillation (HCC)    Left lower lobe pneumonia    Atelectasis    Mild mitral regurgitation by prior echocardiogram    Pancytopenia due to antineoplastic chemotherapy (HCC)    Hemoptysis    Non Hodgkin's lymphoma (HCC)    Pneumonia and influenza    Lymphadenopathy, generalized    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 ([address]    Osteoarthritis of right knee    Morbid obesity with BMI of 50.0-59.9, adult (HCC)    Palpitations    Hypersomnia, unspecified    Hypoxemia    DAGMAR (obstructive sleep apnea)           Past Surgical History:   Procedure Laterality Date    CATARACT REMOVAL Bilateral 2018    CHOLECYSTECTOMY  1983    COLONOSCOPY  03/2017    Clear    [place] ANESTH,ACHILLES TENDON SURG  Left 02/10/2011    Dr. Cooper Skelton  11/25/2020    ISABELL-guided cardioversion for A. Fib    PRE-MALIGNANT / BENIGN SKIN LESION EXCISION  2019    basal/squamous skin lesions removed from scalp. [facility]. [address] [facility] Left 2013    Dr. Charisma Michael Right 2012    Dr. Mary Rubin Left 1/3/2018     BREAST NEEDLE BIOPSY LEFT 1/3/2018 [facility]         Social History     Socioeconomic History    Marital status:      Spouse name: Not on file    Number of children: Not on file    Years of education: Not on file    Highest education level: Not on file   Occupational History    Not on file   Tobacco Use    Smoking status: Never    Smokeless tobacco: Never   Substance and Sexual Activity    Alcohol use: No    Drug use: Never    Sexual activity: Not on file   Other Topics Concern    Not on file   Social History Narrative    10/3/11:  PATIENT IS  TO Ian Weaver. SHE IS DISABLED. Social Determinants of Health     Financial Resource Strain: Not on file   Food Insecurity: Not on file   Transportation Needs: Not on file   Physical Activity: Not on file   Stress: Not on file   Social Connections: Not on file   Intimate Partner Violence: Not on file   Housing Stability: Not on file         Family History   Problem Relation Age of Onset    Other Maternal Grandmother         Vascular Disorder with leg amputation    Dementia Mother     Kidney Disease Father     Thyroid Cancer Neg Hx     Post-op Cognitive Dysfunction Neg Hx     Emergence Delirium Neg Hx     Delayed Awakening Neg Hx     Pseudochol.  Deficiency Neg Hx     Malig Hypertherm Neg Hx     Thyroid Disease Sister         hypothyroidism    Breast Cancer Cousin 48    Celiac Disease Sister     Liver Disease Sister         non-alcoholic         Allergies   Allergen Reactions    Gabapentin Other (See hypersomnia and is  on their CPAP for the past few days. Insomnia is not under control with CPAP. CARDIAC:   No chest pain, pressure, discomfort, palpitations, orthopnea, murmurs, or edema. GI:   No dysphagia, heartburn reflux, nausea/vomiting, diarrhea, abdominal pain, or bleeding. NEURO:    There is no history of AMS, persistent headache, decreased level of consciousness, seizures, or motor or sensory deficits. PHYSICAL EXAM:    /64   Pulse 78   Temp 97.2 °F (36.2 °C)   Resp 18   Ht 5' 3\" (1.6 m)   Wt 287 lb 9.6 oz (130.5 kg)   SpO2 98%   BMI 50.95 kg/m²     Body mass index is 50.95 kg/m². Constitutional:  the patient is well developed and in no acute distress  EENMT:  Sclera clear, pupils equal, oral mucosa moist, narrow oral airway Modified [place] Stage 4, Nares are patent bilateral  Respiratory: CTA b/l. No Wheezing or Rhonchi. Cardiovascular:  RRR without M,G,R  Gastrointestinal: soft and non-tender; with positive bowel sounds. Musculoskeletal: warm without cyanosis. There is no lower leg edema. Skin:  no jaundice or rashes, old healing knee scars  Neurologic: no gross neuro deficits     Psychiatric:  alert and oriented x 3        ASSESSMENT/PLAN:  (Medical Decision Making)       ICD-10-CM    1. DAGMAR (obstructive sleep apnea)  G47.33 - Marked improvement compliant since we decrease her pressure down to 17 cm H2O. We will continue at this level for now and see if she is able to get further improvement in her sleep before we check an overnight oximetry. 2. Hypoxemia  R09.02 -In the future can get overnight oximetry study on the lower pressure and see if that helps her nocturnal hypoxemia      3. Hypersomnia, unspecified  G47.10 - Markedly better since we reduce the pressure. [place] score is down to 6/24      4. Morbid obesity with BMI of 50.0-59.9, adult (HCC)  E66.01 - Doing very very well and encouraged to continue to do so. Down 38 pounds to 287 pounds.

## 2022-09-26 ENCOUNTER — OFFICE VISIT (OUTPATIENT)
Dept: NEUROLOGY | Age: 62
End: 2022-09-26
Payer: COMMERCIAL

## 2022-09-26 VITALS
DIASTOLIC BLOOD PRESSURE: 72 MMHG | HEART RATE: 72 BPM | BODY MASS INDEX: 49.43 KG/M2 | SYSTOLIC BLOOD PRESSURE: 130 MMHG | HEIGHT: 63 IN | WEIGHT: 279 LBS

## 2022-09-26 DIAGNOSIS — R20.2 NUMBNESS AND TINGLING: ICD-10-CM

## 2022-09-26 DIAGNOSIS — R20.0 NUMBNESS AND TINGLING: ICD-10-CM

## 2022-09-26 DIAGNOSIS — T45.1X5A POLYNEUROPATHY FOLLOWING CHEMOTHERAPY (HCC): Primary | ICD-10-CM

## 2022-09-26 DIAGNOSIS — R29.6 FALLING: ICD-10-CM

## 2022-09-26 DIAGNOSIS — T45.1X5A POLYNEUROPATHY FOLLOWING CHEMOTHERAPY (HCC): ICD-10-CM

## 2022-09-26 DIAGNOSIS — G62.0 POLYNEUROPATHY FOLLOWING CHEMOTHERAPY (HCC): Primary | ICD-10-CM

## 2022-09-26 DIAGNOSIS — G62.0 POLYNEUROPATHY FOLLOWING CHEMOTHERAPY (HCC): ICD-10-CM

## 2022-09-26 DIAGNOSIS — R29.898 WEAKNESS OF BOTH LEGS: ICD-10-CM

## 2022-09-26 LAB
EST. AVERAGE GLUCOSE BLD GHB EST-MCNC: 117 MG/DL
HBA1C MFR BLD: 5.7 % (ref 4.8–5.6)
VIT B12 SERPL-MCNC: 779 PG/ML (ref 193–986)

## 2022-09-26 PROCEDURE — 99203 OFFICE O/P NEW LOW 30 MIN: CPT | Performed by: PSYCHIATRY & NEUROLOGY

## 2022-09-26 PROCEDURE — 95885 MUSC TST DONE W/NERV TST LIM: CPT | Performed by: PSYCHIATRY & NEUROLOGY

## 2022-09-26 PROCEDURE — 95910 NRV CNDJ TEST 7-8 STUDIES: CPT | Performed by: PSYCHIATRY & NEUROLOGY

## 2022-09-26 NOTE — PROGRESS NOTES
[address], 852 [facility], [address] [place] [address]       Nerve Conduction Study and Electromyogram Report           Hx: 58 y.o. female with complaint of numbness and tingling of feet. Pt has hx of LYMPHOMA tx with chemo [id number]. Denied LBP. Neurological examination: Motor power showed mild weakness of the left gastrocnemius. There was no atrophy. There were no fasciculations. Deep tendon reflexes were present and symmetrical at 2- but absent at the ankles. Pinprick reduced at the top of foot level distally. Gait stable. Description: Nerve conduction studies were performed on the right lower extremity and left lower extremity, using standard technique. Bilateral sural nerve showed absent response. Right peroneal motor amplitude slightly reduced, left amplitude and CT reduced. Bilateral tibial motor distal latencies normal range R 4.63 and L 5.58 MS, amplitude significantly reduced R 0.4 and L0.1 mV, CV normal 40 right and mildly reduced left 37. F wave latencies mildly prolonged on R tibial 64.6 MS, absent on L tibial, normal bilateral peroneal R 52.4 and L57.1 MS. H reflex response was absent bilaterally. Needle electromyography was performed on the right lower extremity and left lower extremity, using standard concentric electrodes. Denervation changes were recorded from the left gastrocnemius with reduced recruitment; increased insertional activities from R tibialis anterior, L gastrocnemius and L rectus femoris. 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 2) left tibial distal amplitude significantly reduced, consistent with AH atrophy. Combined with clinical symptoms, suggested tarsal tunnel syndrome.      Needle EMG was performed on bilateral lower limb showing denervation and reduced recruitment in the left gastrocnemius, likely from pre-existing insult. The rest of EMG was unremarkable.              Procedure Details: Under procedure category          Lizz Banks MD

## 2022-09-26 NOTE — PROGRESS NOTES
9/27/2022  Fer Brown 58 y.o. female      Seen at the request of [unfilled]    Chief Complaint:  No chief complaint on file. HPI:   Patient was here [facility], additional time was spent reviewing history, diagnosis and treatment. 58 y.o. female with complaint of numbness and tingling of feet, no burning and off Lyrica. Pt has hx of lymphoma with chemo [id number]. developed tingling in feet after Chemothx. Left Achilles tendon surgery, baseline left foot mild weakness and left sole numbness. C/o right hand numbness tingling x 6 months intermittently often induced by neck extension. IMAGING REVIEW: I have reviewed imaging study and labs. Past Medical History:  Past Medical History:   Diagnosis Date    Anemia     Asthma     Followed by Dr. John Paul Rodriguez, Pulmonology    Atrial fibrillation with rapid ventricular response ([address] 11/25/2020    Basal cell carcinoma     Cardiomegaly     Deep vein thrombosis (DVT) (HCC)     History of stroke     History of UTI     Hypertension     Marginal zone lymphoma ([address] 03/30/2017    Diffuse Large B-cell Lymphoma. 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 Kenrick Martinez  11/25/2020    ISABELL-guided cardioversion for A. Fib    PRE-MALIGNANT / BENIGN SKIN LESION EXCISION  2019    basal/squamous skin lesions removed from scalp. [facility].      TOTAL KNEE ARTHROPLASTY Left 2013    Dr. Zachariah Sanches Jaqueline Ramirez Right 2012    Dr. Mary Rubin Left 1/3/2018     BREAST NEEDLE BIOPSY LEFT 1/3/2018 [facility]       Social History:  Social History     Socioeconomic History    Marital status:      Spouse name: Not on file    Number of children: Not on file    Years of education: Not on file    Highest education level: Not on file   Occupational History    Not on file   Tobacco Use    Smoking status: Never    Smokeless tobacco: Never   Substance and Sexual Activity    Alcohol use: No    Drug use: Never    Sexual activity: Not on file   Other Topics Concern    Not on file   Social History Narrative    10/3/11:  PATIENT IS  TO Ian Weaver. SHE IS DISABLED. Social Determinants of Health     Financial Resource Strain: Not on file   Food Insecurity: Not on file   Transportation Needs: Not on file   Physical Activity: Not on file   Stress: Not on file   Social Connections: Not on file   Intimate Partner Violence: Not on file   Housing Stability: Not on file       Family History:   Family History   Problem Relation Age of Onset    Other Maternal Grandmother         Vascular Disorder with leg amputation    Dementia Mother     Kidney Disease Father     Thyroid Cancer Neg Hx     Post-op Cognitive Dysfunction Neg Hx     Emergence Delirium Neg Hx     Delayed Awakening Neg Hx     Pseudochol.  Deficiency Neg Hx     Malig Hypertherm Neg Hx     Thyroid Disease Sister         hypothyroidism    Breast Cancer Cousin 48    Celiac Disease Sister     Liver Disease Sister         non-alcoholic       Current Outpatient Medications   Medication Sig Dispense Refill    sodium chloride, Inhalant, 3 % nebulizer solution Take 4 mLs by nebulization 2 times daily as needed for Cough 240 mL 5    albuterol (PROVENTIL) (2.5 MG/3ML) 0.083% nebulizer solution Take 3 mLs by nebulization in the morning and 3 mLs at noon and 3 mLs in the evening and 3 mLs before bedtime. 120 each 5    montelukast (SINGULAIR) 10 MG tablet Take 1 tablet by mouth in the morning. 30 tablet 11    triamterene-hydroCHLOROthiazide (MAXZIDE) 75-50 MG per tablet Take 0.5-1 tablets by mouth daily as needed (swelling) 30 tablet 5    levothyroxine (SYNTHROID) 200 MCG tablet Take 1 tablet by mouth every morning (before breakfast) Take 1 tablet by mouth every mouth along with levothyroxine 50mg to equal 250mg 90 tablet 3    levothyroxine (SYNTHROID) 50 MCG tablet Take 1 tablet by mouth every morning (before breakfast) Take 1 tablet by mouth every mouth along with levothyroxine 200mg to equal 250mg 90 tablet 3    BIOTIN PO Take by mouth      albuterol sulfate  (90 Base) MCG/ACT inhaler Inhale 2 puffs into the lungs every 4 hours      apixaban (ELIQUIS) 5 MG TABS tablet Take 5 mg by mouth 2 times daily      ascorbic acid (VITAMIN C) 250 MG tablet Take 250 mg by mouth (Patient not taking: Reported on 9/27/2022)      azithromycin (ZITHROMAX) 250 MG tablet Take 250 mg by mouth      vitamin D 25 MCG (1000 UT) CAPS Take by mouth daily (Patient not taking: Reported on 9/27/2022)      fluticasone (FLONASE) 50 MCG/ACT nasal spray 2 sprays by Nasal route daily      Fluticasone-Umeclidin-Vilant (TRELEGY ELLIPTA) 200-62.5-25 MCG/INH AEPB Inhale 1 puff into the lungs daily      metoprolol succinate (TOPROL XL) 50 MG extended release tablet Take 50 mg by mouth daily       No current facility-administered medications for this visit. Allergies   Allergen Reactions    Gabapentin Other (See Comments)       Review of Systems:  Review of Systems   Musculoskeletal:  Positive for falls. Negative for back pain and neck pain. Neurological:  Positive for tingling and focal weakness. Extended / Orthostatic Vitals:      Vitals:    09/26/22 0935   BP: 130/72   Pulse: 72   Weight: 279 lb (126.6 kg)   Height: 5' 3\" (1.6 m)        Physical Exam  Constitutional:       Appearance: She is normal weight.    HENT: Head: Normocephalic. Eyes:      Extraocular Movements: Extraocular movements intact and EOM normal.      Conjunctiva/sclera: Conjunctivae normal.      Pupils: Pupils are equal, round, and reactive to light. Cardiovascular:      Rate and Rhythm: Normal rate. Pulmonary:      Effort: Pulmonary effort is normal.   Musculoskeletal:         General: Normal range of motion. Cervical back: Normal range of motion. Skin:     General: Skin is warm and dry. Neurological:      General: No focal deficit present. Mental Status: She is alert and oriented to person, place, and time. Mental status is at baseline. Cranial Nerves: No cranial nerve deficit. Sensory: Sensory deficit present. Motor: Weakness present. Coordination: Coordination normal. Finger-Nose-Finger Test and Romberg Test normal.      Gait: Gait is intact. Gait normal.      Deep Tendon Reflexes: Reflexes abnormal.      Reflex Scores:       Tricep reflexes are 2+ on the right side and 2+ on the left side. Bicep reflexes are 2+ on the right side and 2+ on the left side. Brachioradialis reflexes are 2+ on the right side and 2+ on the left side. Patellar reflexes are 2+ on the right side and 2+ on the left side. Achilles reflexes are 0 on the right side and 0 on the left side. Psychiatric:         Mood and Affect: Mood normal.         Speech: Speech normal.         Thought Content: Thought content normal.         Judgment: Judgment normal.        Neurologic Exam     Mental Status   Oriented to person, place, and time. Concentration: normal.   Speech: speech is normal   Knowledge: good. Normal comprehension. Cranial Nerves     CN II   Visual fields full to confrontation. Visual acuity: normal  Right visual field deficit: none  Left visual field deficit: none     CN III, IV, VI   Pupils are equal, round, and reactive to light. Extraocular motions are normal.   Right pupil: Size: 3 mm.  Shape: regular. Left pupil: Size: 3 mm. Shape: regular. Nystagmus: none   Diplopia: none  Ophthalmoparesis: none  Upgaze: normal    CN VII   Facial expression full, symmetric. CN VIII   CN VIII normal.     CN IX, X   CN IX normal.   CN X normal.     CN XI   CN XI normal.     CN XII   CN XII normal.     Motor Exam   Muscle bulk: normal  Overall muscle tone: normal  Right arm pronator drift: absent  Left arm pronator drift: absent    Strength   Strength 5/5 except as noted. Left gastroc: 4/5    Sensory Exam   Light touch normal.   Right arm vibration: normal  Left arm vibration: normal  Right leg vibration: decreased from toes  Left leg vibration: decreased from toes  Right arm pinprick: normal  Left arm pinprick: normal  Right leg pinprick: decreased from toes  Left leg pinprick: decreased from toes    Gait, Coordination, and Reflexes     Gait  Gait: normal    Coordination   Romberg: negative  Finger to nose coordination: normal    Tremor   Resting tremor: absent  Intention tremor: absent  Action tremor: absent    Reflexes   Right brachioradialis: 2+  Left brachioradialis: 2+  Right biceps: 2+  Left biceps: 2+  Right triceps: 2+  Left triceps: 2+  Right patellar: 2+  Left patellar: 2+  Right achilles: 0  Left achilles: 0  Right plantar: normal          Assessment / Plan:    Diagnoses and all orders for this visit:    Polyneuropathy following chemotherapy ([facility]  -     [facility] - Physical Therapy[address]  -     Vitamin B12; Future  -     SUSANA and PE, Serum; Future  -     Hemoglobin A1C; Future    Numbness and tingling  -     Motor &/sens 7-8 nerve conduction test  -     Needle EMG w/ nerve conduction test, limited  -     Vitamin B12; Future  -     SUSANA and PE, Serum;  Future  -     Hemoglobin A1C; Future    Weakness of both legs  -     [facility] - Physical Therapy, [address] [place] [address] Internal [facility]    Falling  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 We have reviewed treatment, agree with off lyrica, continue regular physical exercise, fall precaution. She will benefit from physical therapy for gait training due to falls, complete neuropathy lab screening. Return as needed. I have spent 40 min, greater than 50% of discussing and counseling with patient, for treatment and diagnostic plan review.

## 2022-09-27 ENCOUNTER — OFFICE VISIT (OUTPATIENT)
Dept: SLEEP MEDICINE | Age: 62
End: 2022-09-27
Payer: COMMERCIAL

## 2022-09-27 VITALS
WEIGHT: 287.6 LBS | BODY MASS INDEX: 50.96 KG/M2 | RESPIRATION RATE: 18 BRPM | OXYGEN SATURATION: 98 % | HEART RATE: 78 BPM | HEIGHT: 63 IN | SYSTOLIC BLOOD PRESSURE: 130 MMHG | TEMPERATURE: 97.2 F | DIASTOLIC BLOOD PRESSURE: 64 MMHG

## 2022-09-27 DIAGNOSIS — G47.00 INSOMNIA, UNSPECIFIED TYPE: ICD-10-CM

## 2022-09-27 DIAGNOSIS — I48.91 ATRIAL FIBRILLATION, UNSPECIFIED TYPE (HCC): ICD-10-CM

## 2022-09-27 DIAGNOSIS — J45.21 MILD INTERMITTENT ASTHMA WITH (ACUTE) EXACERBATION: ICD-10-CM

## 2022-09-27 DIAGNOSIS — E66.01 MORBID OBESITY WITH BMI OF 50.0-59.9, ADULT (HCC): ICD-10-CM

## 2022-09-27 DIAGNOSIS — G47.33 OSA (OBSTRUCTIVE SLEEP APNEA): Primary | ICD-10-CM

## 2022-09-27 DIAGNOSIS — E03.9 ACQUIRED HYPOTHYROIDISM: ICD-10-CM

## 2022-09-27 DIAGNOSIS — R09.02 HYPOXEMIA: ICD-10-CM

## 2022-09-27 DIAGNOSIS — G47.10 HYPERSOMNIA, UNSPECIFIED: ICD-10-CM

## 2022-09-27 PROCEDURE — 99214 OFFICE O/P EST MOD 30 MIN: CPT | Performed by: INTERNAL MEDICINE

## 2022-09-27 ASSESSMENT — ENCOUNTER SYMPTOMS: BACK PAIN: 0

## 2022-09-27 ASSESSMENT — SLEEP AND FATIGUE QUESTIONNAIRES
HOW LIKELY ARE YOU TO NOD OFF OR FALL ASLEEP WHILE SITTING AND TALKING TO SOMEONE: 0
HOW LIKELY ARE YOU TO NOD OFF OR FALL ASLEEP WHEN YOU ARE A PASSENGER IN A CAR FOR AN HOUR WITHOUT A BREAK: 2
HOW LIKELY ARE YOU TO NOD OFF OR FALL ASLEEP WHILE SITTING INACTIVE IN A PUBLIC PLACE: 0
HOW LIKELY ARE YOU TO NOD OFF OR FALL ASLEEP WHILE LYING DOWN TO REST IN THE AFTERNOON WHEN CIRCUMSTANCES PERMIT: 0
ESS TOTAL SCORE: 6
HOW LIKELY ARE YOU TO NOD OFF OR FALL ASLEEP WHILE WATCHING TV: 1
HOW LIKELY ARE YOU TO NOD OFF OR FALL ASLEEP WHILE SITTING QUIETLY AFTER LUNCH WITHOUT ALCOHOL: 2
HOW LIKELY ARE YOU TO NOD OFF OR FALL ASLEEP IN A CAR, WHILE STOPPED FOR A FEW MINUTES IN TRAFFIC: 0
HOW LIKELY ARE YOU TO NOD OFF OR FALL ASLEEP WHILE SITTING AND READING: 1

## 2022-09-27 ASSESSMENT — VISUAL ACUITY: VA_NORMAL: 1

## 2022-09-29 LAB
ALBUMIN SERPL ELPH-MCNC: 3.8 G/DL (ref 2.9–4.4)
ALBUMIN/GLOB SERPL: 2.1 {RATIO} (ref 0.7–1.7)
ALPHA1 GLOB SERPL ELPH-MCNC: 0.3 G/DL (ref 0–0.4)
ALPHA2 GLOB SERPL ELPH-MCNC: 0.8 G/DL (ref 0.4–1)
B-GLOBULIN SERPL ELPH-MCNC: 0.8 G/DL (ref 0.7–1.3)
GAMMA GLOB SERPL ELPH-MCNC: 0.1 G/DL (ref 0.4–1.8)
GLOBULIN SER-MCNC: 1.9 G/DL (ref 2.2–3.9)
IGA SERPL-MCNC: <5 MG/DL (ref 87–352)
IGG SERPL-MCNC: 36 MG/DL (ref 586–1602)
IGM SERPL-MCNC: <5 MG/DL (ref 26–217)
INTERPRETATION SERPL IEP-IMP: ABNORMAL
M PROTEIN SERPL ELPH-MCNC: ABNORMAL G/DL
PROT SERPL-MCNC: 5.7 G/DL (ref 6–8.5)

## 2022-09-29 RX ORDER — AZITHROMYCIN 250 MG/1
TABLET, FILM COATED ORAL
Qty: 15 TABLET | Refills: 6 | Status: SHIPPED | OUTPATIENT
Start: 2022-09-29

## 2022-09-30 ENCOUNTER — CLINICAL DOCUMENTATION (OUTPATIENT)
Dept: ONCOLOGY | Age: 62
End: 2022-09-30

## 2022-09-30 ENCOUNTER — TELEPHONE (OUTPATIENT)
Dept: ONCOLOGY | Age: 62
End: 2022-09-30

## 2022-09-30 ENCOUNTER — TELEPHONE (OUTPATIENT)
Dept: FAMILY MEDICINE CLINIC | Facility: CLINIC | Age: 62
End: 2022-09-30

## 2022-09-30 DIAGNOSIS — R59.1 LYMPHADENOPATHY, GENERALIZED: Primary | ICD-10-CM

## 2022-09-30 DIAGNOSIS — J32.9 CHRONIC SINUSITIS, UNSPECIFIED LOCATION: ICD-10-CM

## 2022-09-30 DIAGNOSIS — D80.1 HYPOGAMMAGLOBULINEMIA (HCC): Primary | ICD-10-CM

## 2022-09-30 RX ORDER — ONDANSETRON 2 MG/ML
8 INJECTION INTRAMUSCULAR; INTRAVENOUS
Status: CANCELLED | OUTPATIENT
Start: 2022-10-03

## 2022-09-30 RX ORDER — DIPHENHYDRAMINE HCL 25 MG
25 CAPSULE ORAL ONCE
Status: CANCELLED | OUTPATIENT
Start: 2022-10-03 | End: 2022-10-03

## 2022-09-30 RX ORDER — SODIUM CHLORIDE 9 MG/ML
5-250 INJECTION, SOLUTION INTRAVENOUS PRN
Status: CANCELLED | OUTPATIENT
Start: 2022-10-03

## 2022-09-30 RX ORDER — SODIUM CHLORIDE 0.9 % (FLUSH) 0.9 %
5-40 SYRINGE (ML) INJECTION PRN
Status: CANCELLED | OUTPATIENT
Start: 2022-10-03

## 2022-09-30 RX ORDER — ACETAMINOPHEN 325 MG/1
650 TABLET ORAL
Status: CANCELLED | OUTPATIENT
Start: 2022-10-03

## 2022-09-30 RX ORDER — EPINEPHRINE 1 MG/ML
0.3 INJECTION, SOLUTION, CONCENTRATE INTRAVENOUS PRN
Status: CANCELLED | OUTPATIENT
Start: 2022-10-03

## 2022-09-30 RX ORDER — MEPERIDINE HYDROCHLORIDE 25 MG/ML
12.5 INJECTION INTRAMUSCULAR; INTRAVENOUS; SUBCUTANEOUS PRN
Status: CANCELLED | OUTPATIENT
Start: 2022-10-03

## 2022-09-30 RX ORDER — DIPHENHYDRAMINE HYDROCHLORIDE 50 MG/ML
50 INJECTION INTRAMUSCULAR; INTRAVENOUS
Status: CANCELLED | OUTPATIENT
Start: 2022-10-03

## 2022-09-30 RX ORDER — SODIUM CHLORIDE 9 MG/ML
INJECTION, SOLUTION INTRAVENOUS CONTINUOUS
Status: CANCELLED | OUTPATIENT
Start: 2022-10-03

## 2022-09-30 RX ORDER — ALBUTEROL SULFATE 90 UG/1
4 AEROSOL, METERED RESPIRATORY (INHALATION) PRN
Status: CANCELLED | OUTPATIENT
Start: 2022-10-03

## 2022-09-30 RX ORDER — ACETAMINOPHEN 325 MG/1
650 TABLET ORAL ONCE
Status: CANCELLED | OUTPATIENT
Start: 2022-10-03 | End: 2022-10-03

## 2022-09-30 RX ORDER — HEPARIN SODIUM (PORCINE) LOCK FLUSH IV SOLN 100 UNIT/ML 100 UNIT/ML
500 SOLUTION INTRAVENOUS PRN
Status: CANCELLED | OUTPATIENT
Start: 2022-10-03

## 2022-09-30 NOTE — TELEPHONE ENCOUNTER
PT called in regards to labs drawn from nerve doctor//states was advised by her to call us to discuss the labs/stated did not want to \"necessarily schedule an appt\"

## 2022-09-30 NOTE — TELEPHONE ENCOUNTER
Patient saw neurologist and spoke to oncologist. They also suggested that she sees Dr. Shabbir Magallanes regarding labs that were done. Her immunoglobins are very low. She's a cancer patient. She wants to know if she needs to wait a month to see oncologist or what else should she do. Please call patient to advise.

## 2022-09-30 NOTE — TELEPHONE ENCOUNTER
Msg to NP Pool. Msg rec from MARLON Park: We just need to make sure she isn't having a lot of sinopulmonary infections. If not, we can plan to repeat immunoglobulins when she sees Janneth Holloway in November. Call to the patient and she states she sees Dr Patricio Cronin Q 3 months and he has her on Zithromax 250 mg 3 times a week. She also needs the CT order in for her appt in November. Order placed and scheduled with Amada irahetaon 11/3/22 Thursday at 0900. I reported to MARLON Park and she states to set her up in infusion and she will put in the orders for IVIG. Msg to [address] [place] [address] in infusion for an appt. Call to the patient and instructions given for CT appt. She is aware of Infusion calling for the IVIG appt. She Verbalizes understanding of instructions  Appt made and Msg to MARLON Park for the IVIG order.

## 2022-10-03 ENCOUNTER — HOSPITAL ENCOUNTER (OUTPATIENT)
Dept: INFUSION THERAPY | Age: 62
End: 2022-10-03

## 2022-10-03 DIAGNOSIS — D80.1 HYPOGAMMAGLOBULINEMIA (HCC): ICD-10-CM

## 2022-10-04 ENCOUNTER — TELEPHONE (OUTPATIENT)
Dept: PULMONOLOGY | Age: 62
End: 2022-10-04

## 2022-10-04 RX ORDER — SODIUM CHLORIDE FOR INHALATION 3 %
4 VIAL, NEBULIZER (ML) INHALATION 2 TIMES DAILY PRN
Qty: 750 ML | Refills: 1 | OUTPATIENT
Start: 2022-10-04

## 2022-10-06 ENCOUNTER — HOSPITAL ENCOUNTER (OUTPATIENT)
Dept: LAB | Age: 62
Discharge: HOME | End: 2022-10-09
Payer: COMMERCIAL

## 2022-10-06 DIAGNOSIS — D80.1 HYPOGAMMAGLOBULINEMIA (HCC): ICD-10-CM

## 2022-10-06 DIAGNOSIS — D80.1 HYPOGAMMAGLOBULINEMIA (HCC): Primary | ICD-10-CM

## 2022-10-06 PROCEDURE — 86317 IMMUNOASSAY INFECTIOUS AGENT: CPT

## 2022-10-06 PROCEDURE — 36415 COLL VENOUS BLD VENIPUNCTURE: CPT

## 2022-10-11 ENCOUNTER — TELEPHONE (OUTPATIENT)
Dept: ONCOLOGY | Age: 62
End: 2022-10-11

## 2022-10-11 NOTE — TELEPHONE ENCOUNTER
Called pt and left VM letting her know pneumococcal immunity 14 type still pending. IVIG appt moved out a couple of weeks. Once have results and if no immunity detected, can send for approval for IVIG through insurance.

## 2022-10-13 ENCOUNTER — HOSPITAL ENCOUNTER (OUTPATIENT)
Dept: INFUSION THERAPY | Age: 62
End: 2022-10-13

## 2022-10-24 LAB
S PN DA SERO 19F IGG SER IA-MCNC: <0.1 UG/ML
S PNEUM DA 1 IGG SER IA-MCNC: <0.1 UG/ML
S PNEUM DA 12F IGG SER IA-MCNC: <0.1 UG/ML
S PNEUM DA 14 IGG SER IA-MCNC: <0.1 UG/ML
S PNEUM DA 18C IGG SER IA-MCNC: <0.1 UG/ML
S PNEUM DA 19A IGG SER IA-MCNC: <0.1 UG/ML
S PNEUM DA 23F IGG SER IA-MCNC: <0.1 UG/ML
S PNEUM DA 3 IGG SER IA-MCNC: <0.1 UG/ML
S PNEUM DA 4 IGG SER IA-MCNC: <0.1 UG/ML
S PNEUM DA 6B IGG SER IA-MCNC: <0.1 UG/ML
S PNEUM DA 7F IGG SER IA-MCNC: <0.1 UG/ML
S PNEUM DA 8 IGG SER IA-MCNC: <0.1 UG/ML
S PNEUM DA 9N IGG SER IA-MCNC: <0.1 UG/ML
S PNEUM DA 9V IGG SER IA-MCNC: <0.1 UG/ML

## 2022-10-25 ENCOUNTER — HOSPITAL ENCOUNTER (OUTPATIENT)
Dept: INFUSION THERAPY | Age: 62
Discharge: HOME | End: 2022-10-25
Payer: COMMERCIAL

## 2022-10-25 VITALS
BODY MASS INDEX: 48.64 KG/M2 | RESPIRATION RATE: 16 BRPM | WEIGHT: 274.6 LBS | TEMPERATURE: 98.2 F | DIASTOLIC BLOOD PRESSURE: 59 MMHG | HEART RATE: 63 BPM | OXYGEN SATURATION: 98 % | SYSTOLIC BLOOD PRESSURE: 123 MMHG

## 2022-10-25 DIAGNOSIS — D80.1 HYPOGAMMAGLOBULINEMIA (HCC): Primary | ICD-10-CM

## 2022-10-25 PROCEDURE — 6360000002 HC RX W HCPCS: Performed by: NURSE PRACTITIONER

## 2022-10-25 PROCEDURE — 2580000003 HC RX 258: Performed by: NURSE PRACTITIONER

## 2022-10-25 PROCEDURE — 96365 THER/PROPH/DIAG IV INF INIT: CPT

## 2022-10-25 PROCEDURE — 6370000000 HC RX 637 (ALT 250 FOR IP): Performed by: NURSE PRACTITIONER

## 2022-10-25 PROCEDURE — 96366 THER/PROPH/DIAG IV INF ADDON: CPT

## 2022-10-25 RX ORDER — DIPHENHYDRAMINE HYDROCHLORIDE 50 MG/ML
50 INJECTION INTRAMUSCULAR; INTRAVENOUS
OUTPATIENT
Start: 2022-10-25

## 2022-10-25 RX ORDER — SODIUM CHLORIDE 9 MG/ML
5-250 INJECTION, SOLUTION INTRAVENOUS PRN
OUTPATIENT
Start: 2022-10-25

## 2022-10-25 RX ORDER — DIPHENHYDRAMINE HCL 25 MG
25 CAPSULE ORAL ONCE
Status: COMPLETED | OUTPATIENT
Start: 2022-10-25 | End: 2022-10-25

## 2022-10-25 RX ORDER — DIPHENHYDRAMINE HCL 25 MG
25 CAPSULE ORAL ONCE
Status: CANCELLED | OUTPATIENT
Start: 2022-10-25 | End: 2022-10-25

## 2022-10-25 RX ORDER — MEPERIDINE HYDROCHLORIDE 25 MG/ML
12.5 INJECTION INTRAMUSCULAR; INTRAVENOUS; SUBCUTANEOUS PRN
OUTPATIENT
Start: 2022-10-25

## 2022-10-25 RX ORDER — HEPARIN SODIUM (PORCINE) LOCK FLUSH IV SOLN 100 UNIT/ML 100 UNIT/ML
500 SOLUTION INTRAVENOUS PRN
OUTPATIENT
Start: 2022-10-25

## 2022-10-25 RX ORDER — SODIUM CHLORIDE 0.9 % (FLUSH) 0.9 %
5-40 SYRINGE (ML) INJECTION PRN
Status: DISCONTINUED | OUTPATIENT
Start: 2022-10-25 | End: 2022-10-26 | Stop reason: HOSPADM

## 2022-10-25 RX ORDER — ALBUTEROL SULFATE 90 UG/1
4 AEROSOL, METERED RESPIRATORY (INHALATION) PRN
OUTPATIENT
Start: 2022-10-25

## 2022-10-25 RX ORDER — ACETAMINOPHEN 325 MG/1
650 TABLET ORAL ONCE
Status: CANCELLED | OUTPATIENT
Start: 2022-10-25 | End: 2022-10-25

## 2022-10-25 RX ORDER — SODIUM CHLORIDE 9 MG/ML
INJECTION, SOLUTION INTRAVENOUS CONTINUOUS
OUTPATIENT
Start: 2022-10-25

## 2022-10-25 RX ORDER — ONDANSETRON 2 MG/ML
8 INJECTION INTRAMUSCULAR; INTRAVENOUS
OUTPATIENT
Start: 2022-10-25

## 2022-10-25 RX ORDER — ACETAMINOPHEN 325 MG/1
650 TABLET ORAL
OUTPATIENT
Start: 2022-10-25

## 2022-10-25 RX ORDER — EPINEPHRINE 1 MG/ML
0.3 INJECTION, SOLUTION, CONCENTRATE INTRAVENOUS PRN
OUTPATIENT
Start: 2022-10-25

## 2022-10-25 RX ORDER — SODIUM CHLORIDE 0.9 % (FLUSH) 0.9 %
5-40 SYRINGE (ML) INJECTION PRN
OUTPATIENT
Start: 2022-10-25

## 2022-10-25 RX ORDER — ACETAMINOPHEN 325 MG/1
650 TABLET ORAL ONCE
Status: COMPLETED | OUTPATIENT
Start: 2022-10-25 | End: 2022-10-25

## 2022-10-25 RX ADMIN — SODIUM CHLORIDE, PRESERVATIVE FREE 10 ML: 5 INJECTION INTRAVENOUS at 09:42

## 2022-10-25 RX ADMIN — ACETAMINOPHEN 650 MG: 325 TABLET, FILM COATED ORAL at 09:45

## 2022-10-25 RX ADMIN — DIPHENHYDRAMINE HYDROCHLORIDE 25 MG: 25 CAPSULE ORAL at 09:45

## 2022-10-25 RX ADMIN — IMMUNE GLOBULIN (HUMAN) 50 G: 10 INJECTION INTRAVENOUS; SUBCUTANEOUS at 10:05

## 2022-10-25 NOTE — PROGRESS NOTES
Patient arrived ambulatory to infusion center. IVIG infusion completed. Patient tolerated tx well. Port deaccessed. Discharged ambulatory. Patient aware of next lab/ov on 11/15.

## 2022-11-02 ENCOUNTER — PATIENT MESSAGE (OUTPATIENT)
Dept: FAMILY MEDICINE CLINIC | Facility: CLINIC | Age: 62
End: 2022-11-02

## 2022-11-03 ENCOUNTER — HOSPITAL ENCOUNTER (OUTPATIENT)
Dept: CT IMAGING | Age: 62
Discharge: HOME | End: 2022-11-06
Payer: COMMERCIAL

## 2022-11-03 DIAGNOSIS — R59.1 LYMPHADENOPATHY, GENERALIZED: ICD-10-CM

## 2022-11-03 LAB — CREAT BLD-MCNC: 0.86 MG/DL (ref 0.8–1.5)

## 2022-11-03 PROCEDURE — 74177 CT ABD & PELVIS W/CONTRAST: CPT

## 2022-11-03 PROCEDURE — 6360000004 HC RX CONTRAST MEDICATION: Performed by: INTERNAL MEDICINE

## 2022-11-03 PROCEDURE — 82565 ASSAY OF CREATININE: CPT

## 2022-11-03 RX ADMIN — IOPAMIDOL 100 ML: 755 INJECTION, SOLUTION INTRAVENOUS at 08:46

## 2022-11-03 NOTE — TELEPHONE ENCOUNTER
From: Jyoti Stack  To: Dr. Ned Lobo: 11/2/2022 7:43 AM EDT  Subject: Thyroid Testing    I have a virtual visit on Nov 8th. I would like to  Have my thyroid checked prior to this visit my sleep doctor said part of the problem Im having sleeping even with my Cpap is due to my thyroid medication . He recommended cutting it back.  Maybe check  The current status first?

## 2022-11-07 ENCOUNTER — NURSE ONLY (OUTPATIENT)
Dept: FAMILY MEDICINE CLINIC | Facility: CLINIC | Age: 62
End: 2022-11-07

## 2022-11-07 DIAGNOSIS — E03.9 ACQUIRED HYPOTHYROIDISM: Primary | ICD-10-CM

## 2022-11-07 DIAGNOSIS — E03.9 HYPOTHYROIDISM, UNSPECIFIED TYPE: ICD-10-CM

## 2022-11-07 DIAGNOSIS — E03.9 ACQUIRED HYPOTHYROIDISM: ICD-10-CM

## 2022-11-07 DIAGNOSIS — E03.9 HYPOTHYROIDISM, UNSPECIFIED TYPE: Primary | ICD-10-CM

## 2022-11-08 ENCOUNTER — TELEMEDICINE (OUTPATIENT)
Dept: FAMILY MEDICINE CLINIC | Facility: CLINIC | Age: 62
End: 2022-11-08
Payer: COMMERCIAL

## 2022-11-08 DIAGNOSIS — C85.90 LYMPHOMA, UNSPECIFIED BODY REGION, UNSPECIFIED LYMPHOMA TYPE (HCC): ICD-10-CM

## 2022-11-08 DIAGNOSIS — G62.9 NEUROPATHY: Primary | ICD-10-CM

## 2022-11-08 LAB — TSH, 3RD GENERATION: 0.48 UIU/ML (ref 0.36–3.74)

## 2022-11-08 PROCEDURE — 99213 OFFICE O/P EST LOW 20 MIN: CPT | Performed by: FAMILY MEDICINE

## 2022-11-08 RX ORDER — PREGABALIN 100 MG/1
100 CAPSULE ORAL DAILY
Qty: 30 CAPSULE | Refills: 3 | Status: SHIPPED | OUTPATIENT
Start: 2022-11-08 | End: 2022-11-17 | Stop reason: ALTCHOICE

## 2022-11-08 ASSESSMENT — ENCOUNTER SYMPTOMS
EYES NEGATIVE: 1
RESPIRATORY NEGATIVE: 1
GASTROINTESTINAL NEGATIVE: 1

## 2022-11-08 NOTE — PROGRESS NOTES
2022    TELEHEALTH EVALUATION -- Audio/Visual (During PRULA-86 public health emergency)    HPI:    Jyoti Stack (:  1960) has requested an audio/video evaluation for the following concern(s):    Mainly wants to update on recent events. History of lymphoma. Recent labs indicated lack of immunoglobulins. Has been getting injections and is feeling better. Had been able to come off Lyrica for neuropathic pain in the neck. This pain has returned and would like to resume Lyrica. Has also had a tender lymph node in right submandibular area. This resolved with increase in azithromycin to 5 days a week (has been on this chronically). Imaging indicated possible disease recurrence vs reactive lymphadenopathy. Past Medical History:   Diagnosis Date    Anemia     Asthma     Followed by Dr. Nelsy Alicea, Pulmonology    Atrial fibrillation with rapid ventricular response ([facility] 2020    Basal cell carcinoma     Cardiomegaly     Deep vein thrombosis (DVT) (HCC)     History of stroke     History of UTI     Hypertension     Marginal zone lymphoma ([facility] 2017    Diffuse Large B-cell Lymphoma. 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 HENT: Negative. Eyes: Negative. Respiratory: Negative. Cardiovascular: Negative. Gastrointestinal: Negative. Genitourinary: Negative. Musculoskeletal:  Positive for neck pain. Neurological: Negative. Psychiatric/Behavioral: Negative. Prior to Visit Medications    Medication Sig Taking? Authorizing Provider   pregabalin (LYRICA) 100 MG capsule Take 1 capsule by mouth daily for 120 days.  Yes Josie Delgadillo MD   azithromycin (ZITHROMAX) 250 MG tablet TAKE 1 TABLET BY MOUTH EVERY MONDAY, [facility], FRIDAY. Nieves Mahoney MD   sodium chloride, Inhalant, 3 % nebulizer solution Take 4 mLs by nebulization 2 times daily as needed for Cough  Yeison Flannery APRN - CNP   albuterol (PROVENTIL) (2.5 MG/3ML) 0.083% nebulizer solution Take 3 mLs by nebulization in the morning and 3 mLs at noon and 3 mLs in the evening and 3 mLs before bedtime. Yeison Flannery APRN - CNP   montelukast (SINGULAIR) 10 MG tablet Take 1 tablet by mouth in the morning.   EMELY Saab - CHETNA   triamterene-hydroCHLOROthiazide (MAXZIDE) 75-50 MG per tablet Take 0.5-1 tablets by mouth daily as needed (swelling)  Josie Delgadillo MD   levothyroxine (SYNTHROID) 200 MCG tablet Take 1 tablet by mouth every morning (before breakfast) Take 1 tablet by mouth every mouth along with levothyroxine 50mg to equal 250mg  Josie Delgadillo MD   levothyroxine (SYNTHROID) 50 MCG tablet Take 1 tablet by mouth every morning (before breakfast) Take 1 tablet by mouth every mouth along with levothyroxine 200mg to equal 250mg  Josie Delgadillo MD   BIOTIN PO Take by mouth  Ar Automatic Reconciliation   albuterol sulfate  (90 Base) MCG/ACT inhaler Inhale 2 puffs into the lungs every 4 hours  Ar Automatic Reconciliation   apixaban (ELIQUIS) 5 MG TABS tablet Take 5 mg by mouth 2 times daily  Ar Automatic Reconciliation   ascorbic acid (VITAMIN C) 250 MG tablet Take 250 mg by mouth  Patient not taking: Reported on 9/27/2022  Ar Automatic Reconciliation   vitamin D 25 MCG (1000 UT) CAPS Take by mouth daily  Patient not taking: Reported on 9/27/2022  Ar Automatic Reconciliation   fluticasone (FLONASE) 50 MCG/ACT nasal spray 2 sprays by Nasal route daily  Ar Automatic Reconciliation   Fluticasone-Umeclidin-Vilant (TRELEGY ELLIPTA) 200-62.5-25 MCG/INH AEPB Inhale 1 puff into the lungs daily  Ar Automatic Reconciliation metoprolol succinate (TOPROL XL) 50 MG extended release tablet Take 50 mg by mouth daily  Ar Automatic Reconciliation       Social History     Tobacco Use    Smoking status: Never    Smokeless tobacco: Never   Substance Use Topics    Alcohol use: No    Drug use: Never            PHYSICAL EXAMINATION:  [ INSTRUCTIONS:  \"[x]\" Indicates a positive item  \"[]\" Indicates a negative item  -- DELETE ALL ITEMS NOT EXAMINED]  Vital Signs: (As obtained by patient/caregiver or practitioner observation)    Blood pressure-  Heart rate-    Respiratory rate-    Temperature-  Pulse oximetry-     Constitutional: [x] Appears well-developed and well-nourished [] No apparent distress      [] Abnormal-   Mental status  [x] Alert and awake  [] Oriented to person/place/time []Able to follow commands      Eyes:  EOM    []  Normal  [] Abnormal-  Sclera  []  Normal  [] Abnormal -         Discharge []  None visible  [] Abnormal -    HENT:   [] Normocephalic, atraumatic. [] Abnormal   [] Mouth/Throat: Mucous membranes are moist.     External Ears [] Normal  [] Abnormal-     Neck: [] No visualized mass     Pulmonary/Chest: [x] Respiratory effort normal.  [] No visualized signs of difficulty breathing or respiratory distress        [] Abnormal-      Musculoskeletal:   [] Normal gait with no signs of ataxia         [] Normal range of motion of neck        [] Abnormal-       Neurological:        [x] No Facial Asymmetry (Cranial nerve 7 motor function) (limited exam to video visit)          [] No gaze palsy        [] Abnormal-         Skin:        [] No significant exanthematous lesions or discoloration noted on facial skin         [] Abnormal-            Psychiatric:       [x] Normal Affect [] No Hallucinations        [] Abnormal-     Other pertinent observable physical exam findings-     ASSESSMENT/PLAN:  1. Neuropathy  Resume Lyrica  - pregabalin (LYRICA) 100 MG capsule; Take 1 capsule by mouth daily for 120 days.   Dispense: 30 capsule; Refill: 3    2. Lymphoma, unspecified body region, unspecified lymphoma type ([address]  Follow up with specialists as planned. Note also that pt has had difficulty sleeping. Oncologist recommended decreasing Synthroid to 200mcg form 250mcg to see if this helps. TSH result indicated room to decrease to the 200mcg dose. Follow up according to response. Return in about 3 months (around 2/8/2023), or if symptoms worsen or fail to improve. Pollo Barahona, was evaluated through a synchronous (real-time) audio-video encounter. The patient (or guardian if applicable) is aware that this is a billable service, which includes applicable co-pays. This Virtual Visit was conducted with patient's (and/or legal guardian's) consent. The visit was conducted pursuant to the emergency declaration under the [address] [place] [address], [address] [place] [address] waiver authority and the [employer] and [employer] General Act. Patient identification was verified, and a caregiver was present when appropriate. The patient was located at Home: [address] [place] [address] Drive. Provider was located at [facility] (Appt Dept): [address] [facility] ([address]. Total time spent on this encounter: Not billed by time    --Norris Willard MD on 11/8/2022 at 9:10 AM    An electronic signature was used to authenticate this note.

## 2022-11-14 DIAGNOSIS — C83.30 DIFFUSE LARGE B-CELL LYMPHOMA, UNSPECIFIED BODY REGION (HCC): Primary | ICD-10-CM

## 2022-11-15 ENCOUNTER — HOSPITAL ENCOUNTER (OUTPATIENT)
Dept: LAB | Age: 62
Discharge: HOME | End: 2022-11-18
Payer: COMMERCIAL

## 2022-11-15 ENCOUNTER — OFFICE VISIT (OUTPATIENT)
Dept: ONCOLOGY | Age: 62
End: 2022-11-15
Payer: COMMERCIAL

## 2022-11-15 VITALS
TEMPERATURE: 99.3 F | DIASTOLIC BLOOD PRESSURE: 77 MMHG | SYSTOLIC BLOOD PRESSURE: 129 MMHG | BODY MASS INDEX: 48.41 KG/M2 | WEIGHT: 273.2 LBS | OXYGEN SATURATION: 97 % | HEART RATE: 78 BPM | RESPIRATION RATE: 16 BRPM | HEIGHT: 63 IN

## 2022-11-15 DIAGNOSIS — C83.30 DIFFUSE LARGE B-CELL LYMPHOMA, UNSPECIFIED BODY REGION (HCC): Primary | ICD-10-CM

## 2022-11-15 DIAGNOSIS — C83.30 DIFFUSE LARGE B-CELL LYMPHOMA, UNSPECIFIED BODY REGION (HCC): ICD-10-CM

## 2022-11-15 LAB
ALBUMIN SERPL-MCNC: 3.5 G/DL (ref 3.2–4.6)
ALBUMIN/GLOB SERPL: 1 {RATIO} (ref 0.4–1.6)
ALP SERPL-CCNC: 82 U/L (ref 50–136)
ALT SERPL-CCNC: 42 U/L (ref 12–65)
ANION GAP SERPL CALC-SCNC: 5 MMOL/L (ref 2–11)
AST SERPL-CCNC: 25 U/L (ref 15–37)
BASOPHILS # BLD: 0 K/UL (ref 0–0.2)
BASOPHILS NFR BLD: 1 % (ref 0–2)
BILIRUB SERPL-MCNC: 0.3 MG/DL (ref 0.2–1.1)
BUN SERPL-MCNC: 19 MG/DL (ref 8–23)
CALCIUM SERPL-MCNC: 9.1 MG/DL (ref 8.3–10.4)
CHLORIDE SERPL-SCNC: 105 MMOL/L (ref 101–110)
CO2 SERPL-SCNC: 30 MMOL/L (ref 21–32)
CREAT SERPL-MCNC: 0.9 MG/DL (ref 0.6–1)
DIFFERENTIAL METHOD BLD: ABNORMAL
EOSINOPHIL # BLD: 0.1 K/UL (ref 0–0.8)
EOSINOPHIL NFR BLD: 2 % (ref 0.5–7.8)
ERYTHROCYTE [DISTWIDTH] IN BLOOD BY AUTOMATED COUNT: 14.1 % (ref 11.9–14.6)
GLOBULIN SER CALC-MCNC: 3.5 G/DL (ref 2.8–4.5)
GLUCOSE SERPL-MCNC: 107 MG/DL (ref 65–100)
HCT VFR BLD AUTO: 33.9 % (ref 35.8–46.3)
HGB BLD-MCNC: 10.7 G/DL (ref 11.7–15.4)
IMM GRANULOCYTES # BLD AUTO: 0 K/UL (ref 0–0.5)
IMM GRANULOCYTES NFR BLD AUTO: 0 % (ref 0–5)
LYMPHOCYTES # BLD: 1.1 K/UL (ref 0.5–4.6)
LYMPHOCYTES NFR BLD: 18 % (ref 13–44)
MCH RBC QN AUTO: 27.9 PG (ref 26.1–32.9)
MCHC RBC AUTO-ENTMCNC: 31.6 G/DL (ref 31.4–35)
MCV RBC AUTO: 88.5 FL (ref 82–102)
MONOCYTES # BLD: 0.3 K/UL (ref 0.1–1.3)
MONOCYTES NFR BLD: 5 % (ref 4–12)
NEUTS SEG # BLD: 4.6 K/UL (ref 1.7–8.2)
NEUTS SEG NFR BLD: 74 % (ref 43–78)
NRBC # BLD: 0 K/UL (ref 0–0.2)
PLATELET # BLD AUTO: 149 K/UL (ref 150–450)
PMV BLD AUTO: 10.4 FL (ref 9.4–12.3)
POTASSIUM SERPL-SCNC: 3 MMOL/L (ref 3.5–5.1)
PROT SERPL-MCNC: 7 G/DL (ref 6.3–8.2)
RBC # BLD AUTO: 3.83 M/UL (ref 4.05–5.2)
SODIUM SERPL-SCNC: 140 MMOL/L (ref 133–143)
WBC # BLD AUTO: 6.2 K/UL (ref 4.3–11.1)

## 2022-11-15 PROCEDURE — 3078F DIAST BP <80 MM HG: CPT | Performed by: INTERNAL MEDICINE

## 2022-11-15 PROCEDURE — 80053 COMPREHEN METABOLIC PANEL: CPT

## 2022-11-15 PROCEDURE — 3074F SYST BP LT 130 MM HG: CPT | Performed by: INTERNAL MEDICINE

## 2022-11-15 PROCEDURE — 86146 BETA-2 GLYCOPROTEIN ANTIBODY: CPT

## 2022-11-15 PROCEDURE — 99214 OFFICE O/P EST MOD 30 MIN: CPT | Performed by: INTERNAL MEDICINE

## 2022-11-15 PROCEDURE — 36415 COLL VENOUS BLD VENIPUNCTURE: CPT

## 2022-11-15 PROCEDURE — 85025 COMPLETE CBC W/AUTO DIFF WBC: CPT

## 2022-11-15 RX ORDER — PREGABALIN 50 MG/1
50 CAPSULE ORAL 3 TIMES DAILY
Qty: 90 CAPSULE | Refills: 3 | Status: CANCELLED | OUTPATIENT
Start: 2022-11-15 | End: 2022-12-15

## 2022-11-15 ASSESSMENT — PATIENT HEALTH QUESTIONNAIRE - PHQ9
SUM OF ALL RESPONSES TO PHQ QUESTIONS 1-9: 0
SUM OF ALL RESPONSES TO PHQ9 QUESTIONS 1 & 2: 0
1. LITTLE INTEREST OR PLEASURE IN DOING THINGS: 0
2. FEELING DOWN, DEPRESSED OR HOPELESS: 0
SUM OF ALL RESPONSES TO PHQ QUESTIONS 1-9: 0

## 2022-11-15 NOTE — PATIENT INSTRUCTIONS
Patient Instructions from Today's Visit    Reason for Visit:  Follow up    Diagnosis Information:  https://[email or web address]-Sells-[email or web address] net/about-us/asco-answers-patient-education-materials/hlzz-mdpoyri-qcpx-sheets       Plan:  Have MRI       Follow Up:   Follow up with Dr Hema Riggs after MRI    Recent Lab Results:  Hospital Outpatient Visit on 11/15/2022   Component Date Value Ref Range Status    WBC 11/15/2022 6.2  4.3 - 11.1 K/uL Final    RBC 11/15/2022 3.83 (A)  4.05 - 5.2 M/uL Final    Hemoglobin 11/15/2022 10.7 (A)  11.7 - 15.4 g/dL Final    Hematocrit 11/15/2022 33.9 (A)  35.8 - 46.3 % Final    MCV 11/15/2022 88.5  82.0 - 102.0 FL Final    MCH 11/15/2022 27.9  26.1 - 32.9 PG Final    MCHC 11/15/2022 31.6  31.4 - 35.0 g/dL Final    RDW 11/15/2022 14.1  11.9 - 14.6 % Final    Platelets 35/51/3952 149 (A)  150 - 450 K/uL Final    MPV 11/15/2022 10.4  9.4 - 12.3 FL Final    nRBC 11/15/2022 0.00  0.0 - 0.2 K/uL Final    **Note: Absolute NRBC parameter is now reported with Hemogram**    Seg Neutrophils 11/15/2022 74  43 - 78 % Final    Lymphocytes 11/15/2022 18  13 - 44 % Final    Monocytes 11/15/2022 5  4.0 - 12.0 % Final    Eosinophils % 11/15/2022 2  0.5 - 7.8 % Final    Basophils 11/15/2022 1  0.0 - 2.0 % Final    Immature Granulocytes 11/15/2022 0  0.0 - 5.0 % Final    Segs Absolute 11/15/2022 4.6  1.7 - 8.2 K/UL Final    Absolute Lymph # 11/15/2022 1.1  0.5 - 4.6 K/UL Final    Absolute Mono # 11/15/2022 0.3  0.1 - 1.3 K/UL Final    Absolute Eos # 11/15/2022 0.1  0.0 - 0.8 K/UL Final    Basophils Absolute 11/15/2022 0.0  0.0 - 0.2 K/UL Final    Absolute Immature Granulocyte 11/15/2022 0.0  0.0 - 0.5 K/UL Final    Differential Type 11/15/2022 AUTOMATED    Final    Sodium 11/15/2022 140  133 - 143 mmol/L Final    Potassium 11/15/2022 3.0 (A)  3.5 - 5.1 mmol/L Final    Chloride 11/15/2022 105  101 - 110 mmol/L Final    CO2 11/15/2022 30  21 - 32 mmol/L Final    Anion Gap 11/15/2022 5  2 - 11 mmol/L Final    Glucose 11/15/2022 107 (A) 65 - 100 mg/dL Final    BUN 11/15/2022 19  8 - 23 MG/DL Final    Creatinine 11/15/2022 0.90  0.6 - 1.0 MG/DL Final    EstAiden Filt Rate 11/15/2022 >60  >60 ml/min/1.73m2 Final    Comment:      Pediatric calculator link: Rodney.at. org/professionals/kdoqi/gfr_calculatorped       Effective Oct 3, 2022       These results are not intended for use in patients <25years of age. eGFR results are calculated without a race factor using  the 2021 CKD-EPI equation. Careful clinical correlation is recommended, particularly when comparing to results calculated using previous equations. The CKD-EPI equation is less accurate in patients with extremes of muscle mass, extra-renal metabolism of creatinine, excessive creatine ingestion, or following therapy that affects renal tubular secretion. Calcium 11/15/2022 9.1  8.3 - 10.4 MG/DL Final    Total Bilirubin 11/15/2022 0.3  0.2 - 1.1 MG/DL Final    ALT 11/15/2022 42  12 - 65 U/L Final    AST 11/15/2022 25  15 - 37 U/L Final    Alk Phosphatase 11/15/2022 82  50 - 136 U/L Final    Total Protein 11/15/2022 7.0  6.3 - 8.2 g/dL Final    Albumin 11/15/2022 3.5  3.2 - 4.6 g/dL Final    Globulin 11/15/2022 3.5  2.8 - 4.5 g/dL Final    Albumin/Globulin Ratio 11/15/2022 1.0  0.4 - 1.6   Final         Treatment Summary has been discussed and given to patient: n/a          -------------------------------------------------------------------------------------------------------------------  Please call our office at [phone] if you have any  of the following symptoms:   Fever of 100.5 or greater  Chills  Shortness of breath  Swelling or pain in one leg    After office hours an answering service is available and will contact a provider for emergencies or if you are experiencing any of the above symptoms. Patient does express an interest in My Chart. My Chart log in information explained on the after visit summary printout at the [facility] desk.     Ernie Logan,

## 2022-11-15 NOTE — PROGRESS NOTES
[facility] Hematology and Oncology: Office Visit Established Patient    Chief Complaint:    Chief Complaint   Patient presents with    Follow-up         History of Present Illness:  Ms. Reina Stinson is a 58 y.o. 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 She was in previously good health, seen as an urgent work-in in clinic for lymphadenopathy on 3/30/17. She was having abdominal pain and swelling which has progressed over the  previous several weeks, CT was recommended but was initially denied by insurance. She had GI evaluation including EGD/colonoscopy which showed no intraluminal pathology, but finally had a CT at [facility] that showed diffuse lymphadenopathy  with moderate ascites, splenomegaly, and possible infiltration of the left kidney, all consistent with lymphoproliferative disorder. We recommended inpatient admission for expedited work-up, including excisional biopsy of an axillary node, bone marrow  biopsy, labs and PET/CT. The biopsy returned showing marginal zone lymphoma. Her anemia, ascites, and constitutional symptoms are an indication for treatment. I recommend Rituxan and bendamustine  for therapy. She was hospitalized for recurrent fevers after cycle 1. She was also noted to have hypotension requiring a brief ICU stay with Levophed, and acute kidney injury either from antibiotics  or ATN from hypotension (or both). She completed repeat paracentesis with good relief of abdominal symptoms. Restaging after cycle 2 showed partial response in lymphadenopathy  and splenomegaly, continue current therapy. Restaging after 5 cycles showed essentially complete response, she was continued on to 6 cycles of BR and then entered R maintenance. PET/CT prior to her 2nd cycle of maintenance showed a new hypermetabolic  breast nodule and a mesenteric nodule. Biopsy of the breast nodule shows diffuse large B cell lymphoma.   I suspect this is not a true histologic transformation given her dramatic presentation last year and the response to BR, but is more likely recurrence  of an occult high grade lymphoma. In any case, at this point, I would recommend salvage chemotherapy with R-ICE. This was scheduled for 1/24/18 but delayed due to post-influenza pneumonia. Started 2/7/18. Seen at [facility] for transplant opinion, the recommendation  from Dr. Mikaela Dhaliwal, while acknowledging that salvage therapy followed by autologous transplant was reasonable, was that we consider a change to R-CHOP, completing 5 cycles of therapy and not necessarily moving to an autograft if she is in CR. We discussed  the pros and cons of both approaches, and we eventually settled on the change to R-CHOP. We completed 2 additional cycles and then repeated her PET/CT, which showed a complete response. Therefore, we then recommended an additional 2 cycles of therapy  to complete a total of 6 cycles (1 RICE and 5 R-CHOP). PET/CT at completion of therapy reviewed and shows no evidence of hypermetabolic disease, consistent with complete response. Continue observation,  we have discussed the benefits and drawbacks to additional treatment including R maintenance for MZL, and IT CNS prophylaxis, and she is very comfortable with no additional therapy. Ms. Merline Justice returns today for routine follow up. She is doing OK. Her main complaint currently is neck pain, this has been present for several weeks and does not seem to be responding to conservative measures. She is finally getting her DAGMAR under control with an appropriate positive pressure device. She continues to have some numbness in her chin, but manageable. She did resume Lyrica with some improvement in her neuropathic symptoms. Review of Systems:  Constitutional: Positive for fatigue. HENT: Negative. Eyes: Negative. Respiratory: Positive for DAGMAR. Cardiovascular: Negative. Gastrointestinal: Negative. Genitourinary: Negative. Musculoskeletal: Positive for neck pain. Skin: Negative. Neurological: Negative. Endo/Heme/Allergies: Negative. Psychiatric/Behavioral: Negative. All other systems reviewed and are negative. Allergies   Allergen Reactions    Gabapentin Other (See Comments)     Past Medical History:   Diagnosis Date    Anemia     Asthma     Followed by Dr. Sree Roberto, Pulmonology    Atrial fibrillation with rapid ventricular response ([facility] 11/25/2020    Basal cell carcinoma     Cardiomegaly     Deep vein thrombosis (DVT) (HCC)     History of stroke     History of UTI     Hypertension     Marginal zone lymphoma ([address] 03/30/2017    Diffuse Large B-cell Lymphoma. Completed Chemotherapy 5/28/18    Morbid obesity ([facility] [address]     Osteoarthritis     Overactive bladder     Primary hypothyroidism     Prolapse of female bladder, acquired     Radiation therapy complication     Rheumatic fever     S/P total knee replacement using cement 10/3/2011    Shingles     Superficial venous thrombosis of right arm 5/1/2017     Past Surgical History:   Procedure Laterality Date    CATARACT REMOVAL Bilateral 2018    CHOLECYSTECTOMY  1983    COLONOSCOPY  03/2017    Clear    ME ANESTH,ACHILLES TENDON SURG  Left 02/10/2011    Dr. Betzaida Concepcion  11/25/2020    ISABELL-guided cardioversion for A. Fib    PRE-MALIGNANT / BENIGN SKIN LESION EXCISION  2019    basal/squamous skin lesions removed from scalp. [facility].      Dustin Swift Left 2013    Dr. Radha Pruitt Right 2012    Dr. Fior Martines LEFT Left 1/3/2018     BREAST NEEDLE BIOPSY LEFT 1/3/2018 [facility]     Family History   Problem Relation Age of Onset    Other Maternal Grandmother         Vascular Disorder with leg amputation    Dementia Mother     Kidney Disease Father     Thyroid Cancer Neg Hx     Post-op Cognitive Dysfunction Neg Hx     Emergence Delirium Neg Hx Delayed Awakening Neg Hx     Pseudochol. Deficiency Neg Hx     Malig Hypertherm Neg Hx     Thyroid Disease Sister         hypothyroidism    Breast Cancer Cousin 48    Celiac Disease Sister     Liver Disease Sister         non-alcoholic     Social History     Socioeconomic History    Marital status:      Spouse name: Not on file    Number of children: Not on file    Years of education: Not on file    Highest education level: Not on file   Occupational History    Not on file   Tobacco Use    Smoking status: Never    Smokeless tobacco: Never   Substance and Sexual Activity    Alcohol use: No    Drug use: Never    Sexual activity: Not on file   Other Topics Concern    Not on file   Social History Narrative    10/3/11:  PATIENT IS  TO [facility]. SHE IS DISABLED. Social Determinants of Health     Financial Resource Strain: Not on file   Food Insecurity: Not on file   Transportation Needs: Not on file   Physical Activity: Not on file   Stress: Not on file   Social Connections: Not on file   Intimate Partner Violence: Not on file   Housing Stability: Not on file     Current Outpatient Medications   Medication Sig Dispense Refill    pregabalin (LYRICA) 50 MG capsule Take 1 capsule by mouth 3 times daily for 30 days. 90 capsule 3    Multiple Vitamins-Minerals (MULTIVITAMIN WOMEN PO) Take 2 tablets by mouth daily      pregabalin (LYRICA) 100 MG capsule Take 1 capsule by mouth daily for 120 days. 30 capsule 3    azithromycin (ZITHROMAX) 250 MG tablet TAKE 1 TABLET BY MOUTH EVERY MONDAY, WEDNESDAY, FRIDAY. 15 tablet 6    sodium chloride, Inhalant, 3 % nebulizer solution Take 4 mLs by nebulization 2 times daily as needed for Cough 240 mL 5    albuterol (PROVENTIL) (2.5 MG/3ML) 0.083% nebulizer solution Take 3 mLs by nebulization in the morning and 3 mLs at noon and 3 mLs in the evening and 3 mLs before bedtime.  120 each 5    montelukast (SINGULAIR) 10 MG tablet Take 1 tablet by mouth in the morning. 30 tablet 11    triamterene-hydroCHLOROthiazide (MAXZIDE) 75-50 MG per tablet Take 0.5-1 tablets by mouth daily as needed (swelling) 30 tablet 5    levothyroxine (SYNTHROID) 200 MCG tablet Take 1 tablet by mouth every morning (before breakfast) Take 1 tablet by mouth every mouth along with levothyroxine 50mg to equal 250mg 90 tablet 3    albuterol sulfate  (90 Base) MCG/ACT inhaler Inhale 2 puffs into the lungs every 4 hours      apixaban (ELIQUIS) 5 MG TABS tablet Take 5 mg by mouth 2 times daily      Fluticasone-Umeclidin-Vilant (TRELEGY ELLIPTA) 200-62.5-25 MCG/INH AEPB Inhale 1 puff into the lungs daily      metoprolol succinate (TOPROL XL) 50 MG extended release tablet Take 50 mg by mouth daily      levothyroxine (SYNTHROID) 50 MCG tablet Take 1 tablet by mouth every morning (before breakfast) Take 1 tablet by mouth every mouth along with levothyroxine 200mg to equal 250mg (Patient not taking: Reported on 11/15/2022) 90 tablet 3    BIOTIN PO Take by mouth (Patient not taking: Reported on 11/15/2022)      ascorbic acid (VITAMIN C) 250 MG tablet Take 250 mg by mouth (Patient not taking: Reported on 9/27/2022)      vitamin D 25 MCG (1000 UT) CAPS Take by mouth daily (Patient not taking: No sig reported)      fluticasone (FLONASE) 50 MCG/ACT nasal spray 2 sprays by Nasal route daily (Patient not taking: Reported on 11/15/2022)       No current facility-administered medications for this visit. OBJECTIVE:  /77 (Site: Right Lower Arm, Position: Sitting, Cuff Size: Large Adult)   Pulse 78   Temp 99.3 °F (37.4 °C) (Oral)   Resp 16   Ht 5' 3\" (1.6 m)   Wt 273 lb 3.2 oz (123.9 kg)   SpO2 97%   BMI 48.40 kg/m²     Physical Exam:  Constitutional: Well developed, well nourished female in no acute distress, sitting comfortably in the exam room chair. HEENT: Normocephalic and atraumatic. Sclerae anicteric. Neck supple without JVD. No thyromegaly present.     Lymph node   No palpable submandibular, cervical, supraclavicular lymph nodes. Skin Warm and dry. No bruising and no rash noted. No erythema. No pallor. Respiratory Lungs are clear to auscultation bilaterally without wheezes, rales or rhonchi, normal air exchange without accessory muscle use. CVS Normal rate, regular rhythm and normal S1 and S2. No murmurs, gallops, or rubs. Abdomen Soft, nontender and nondistended, normoactive bowel sounds. No palpable mass. No hepatosplenomegaly. Neuro Grossly nonfocal with no obvious sensory or motor deficits. MSK Normal range of motion in general.  No edema and no tenderness. Psych Appropriate mood and affect.       Labs:  Recent Results (from the past 96 hour(s))   CBC with Auto Differential    Collection Time: 11/15/22  3:00 PM   Result Value Ref Range    WBC 6.2 4.3 - 11.1 K/uL    RBC 3.83 (L) 4.05 - 5.2 M/uL    Hemoglobin 10.7 (L) 11.7 - 15.4 g/dL    Hematocrit 33.9 (L) 35.8 - 46.3 %    MCV 88.5 82.0 - 102.0 FL    MCH 27.9 26.1 - 32.9 PG    MCHC 31.6 31.4 - 35.0 g/dL    RDW 14.1 11.9 - 14.6 %    Platelets 904 (L) 621 - 450 K/uL    MPV 10.4 9.4 - 12.3 FL    nRBC 0.00 0.0 - 0.2 K/uL    Seg Neutrophils 74 43 - 78 %    Lymphocytes 18 13 - 44 %    Monocytes 5 4.0 - 12.0 %    Eosinophils % 2 0.5 - 7.8 %    Basophils 1 0.0 - 2.0 %    Immature Granulocytes 0 0.0 - 5.0 %    Segs Absolute 4.6 1.7 - 8.2 K/UL    Absolute Lymph # 1.1 0.5 - 4.6 K/UL    Absolute Mono # 0.3 0.1 - 1.3 K/UL    Absolute Eos # 0.1 0.0 - 0.8 K/UL    Basophils Absolute 0.0 0.0 - 0.2 K/UL    Absolute Immature Granulocyte 0.0 0.0 - 0.5 K/UL    Differential Type AUTOMATED     Comprehensive Metabolic Panel    Collection Time: 11/15/22  3:00 PM   Result Value Ref Range    Sodium 140 133 - 143 mmol/L    Potassium 3.0 (L) 3.5 - 5.1 mmol/L    Chloride 105 101 - 110 mmol/L    CO2 30 21 - 32 mmol/L    Anion Gap 5 2 - 11 mmol/L    Glucose 107 (H) 65 - 100 mg/dL    BUN 19 8 - 23 MG/DL    Creatinine 0.90 0.6 - 1.0 MG/DL Est, Glom Filt Rate >60 >60 ml/min/1.73m2    Calcium 9.1 8.3 - 10.4 MG/DL    Total Bilirubin 0.3 0.2 - 1.1 MG/DL    ALT 42 12 - 65 U/L    AST 25 15 - 37 U/L    Alk Phosphatase 82 50 - 136 U/L    Total Protein 7.0 6.3 - 8.2 g/dL    Albumin 3.5 3.2 - 4.6 g/dL    Globulin 3.5 2.8 - 4.5 g/dL    Albumin/Globulin Ratio 1.0 0.4 - 1.6     Beta-2 GlycoProtein 1 Ab,IGG & IGM    Collection Time: 11/15/22  3:00 PM   Result Value Ref Range    Anti b2-Glycoprotein IgG <9 0 - 20 GPI IgG units    Anti b2-Glycoprotein IgM <9 0 - 32 GPI IgM units   Beta-2 Glycoprotein 1 Antibody, IgA    Collection Time: 11/15/22  3:00 PM   Result Value Ref Range    Beta-2 Glyco 1 IgA <9 0 - 25 GPI IgA units         Imaging:  CT NECK CHEST ABD PELV W CONTRAST    Result Date: 11/3/2022  CT of the neck, chest, abdomen, and pelvis with contrast 11/3/2022 Comparison: 11/08/2021 and prior. Indication: Follow-up diffuse large B-cell lymphoma. Technique:  CT imaging was performed of the neck, chest, abdomen, and pelvis following the uncomplicated administration of intravenous contrast (Isovue 370, 100 mL). Oral contrast was used for bowel opacification. Intravenous contrast was used for better evaluation of solid organs and vascular structures. Radiation dose reduction techniques were used for this study:  Our CT scanners use one or all of the following: Automated exposure control, adjustment of the mA and/or kVp according to patient's size, iterative reconstruction. Findings: NECK CT: Globes and orbits intact. Limited views of intracranial contents within normal limits. The oropharynx, nasopharynx, larynx normal. Right cervical chain adenopathy slightly increased from previous examination with marker right level 2 node measuring 1.6 cm short axis, previously 1.4 cm. Right level 4 1.2 cm node previously measured 6 mm short axis. Prominent adenoid and tonsillar pillar soft tissues similar to prior examination.  Slight interval enlargement left level 2 nodes measuring 1.3 cm short axis, previously 1.1 cm. Vessels patent. CHEST CT: Right IJ portacatheter tip distal SVC. The heart size is normal. No pathologically enlarged mediastinal, hilar, or axillary lymph nodes. No pleural or pericardial effusion. The lung parenchyma is unremarkable. No lung mass seen. ABDOMEN CT: Mild hepatic steatosis with nodularity of the liver contour similar to previous examination. No aggressive liver lesion identified. Mild splenomegaly stable at 14.7 cm AP dimension. Stable appearance of the kidneys, adrenal glands, pancreas. Upper abdominal vasculature grossly patent. There is no intra or extrahepatic biliary ductal dilatation. PELVIS CT: The bowel is normal in caliber, and there is no focal or diffuse bowel wall thickening. There is no free air or free fluid in the abdomen or pelvis. There is no significant retroperitoneal, pelvic, mesenteric, or inguinal lymphadenopathy. The bladder is unremarkable. There are no aggressive appearing osseous lesions. Uterus age-appropriate. Fat-containing umbilical hernia. 1. Progressive bilateral neck cervical chain lymph node enlargement suggesting disease progression. 2. No progressive abnormality below the level of the neck. 3. Cirrhosis with splenomegaly stable. No interval ascites or focal aggressive liver lesion. ASSESSMENT:   Diagnosis Orders   1. Diffuse large B-cell lymphoma, unspecified body region [facility])  Ambulatory Referral To Oncology Rehabilitation    Beta-2 GlycoProtein 1 Ab,IGG & IGM    pregabalin (LYRICA) 50 MG capsule              PLAN:  Lab studies were personally reviewed. Marginal zone lymphoma: diffuse parish involvement with splenomegaly, ascites, and bone marrow involvement. Her anemia, ascites, and constitutional symptoms are an indication for treatment. We recommended Rituxan and bendamustine for therapy. She was hospitalized for recurrent fevers after cycle 1.  She was also noted to have hypotension requiring a brief ICU stay with Levophed, and acute kidney injury either from antibiotics or ATN from hypotension (or both). She completed repeat paracentesis  with good relief of abdominal symptoms. PET/CT after 2 cycles reviewed and shows dramatic response in lymphadenopathy and splenomegaly. Some  uptake in small abdominal wall foci but likely from paracenteses, low suspicion for disease. Plan was still for 6 cycles of BR in all. Hospitalized for neutropenic fever 9/8-9/12 but felt better relatively  quickly. Now back to baseline and counts are improved, platelets slightly low at 95k but not prohibitive for therapy. PET/CT reviewed and shows a complete response, she has some residual splenomegaly but it is decreased since PET in June by my measure,  and is not at all hypermetabolic like was the case at diagnosis. At this point, I see no indication for escalation of therapy, I would continue with cycle 6 BR and then enter R maintenance. However, I would move up her repeat imaging to 3 months (instead  of 6) given the bulk of her disease at presentation. Indeed, PET/CT prior to her 2nd cycle of maintenance showed a new hypermetabolic breast nodule and a mesenteric nodule. Biopsy of the breast  nodule shows diffuse large B cell lymphoma. 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 In any case, at this  point, I would recommend salvage chemotherapy with R-ICE. This was scheduled on 1/24 but delayed due to post-influenza pneumonia. Started therapy 2/7/18. Recently returned from [facility] for transplant opinion and the trip itself was uneventful.   However,  the recommendation from Dr. Anna Joseph, while acknowledging that salvage therapy followed by autologous transplant was reasonable, was that we consider a change to R-CHOP, completing 5 cycles of therapy and not necessarily moving to an autograft if she is  in CR. We discussed the pros and cons of both approaches, and we eventually settled on the change to R-CHOP. We completed 2 additional cycles and then repeated her PET/CT, which showed a complete response. Therefore, we then recommended an additional  2 cycles of therapy to complete a total of 6 cycles (1 RICE and 5 R-CHOP). PET/CT at completion of therapy reviewed and shows no evidence of hypermetabolic disease, consistent with complete response. Continue observation, we have discussed the benefits and drawbacks to additional treatment including R maintenance for MZL, and IT CNS prophylaxis, and she was very comfortable with no additional therapy. Ms. Missy Hale returns today for routine follow up. She is doing OK. Her main complaint currently is neck pain, this has been present for several weeks and does not seem to be responding to conservative measures. She is finally getting her DAGMAR under control with an appropriate positive pressure device. She continues to have some numbness in her chin, but manageable. She did resume Lyrica with some improvement in her neuropathic symptoms, I would rather her take a lower dose more frequently (50 mg TID). Labs reviewed and unremarkable, mild anemia but stable. CT reviewed and shows mild progression of multiple cervical lymph nodes, consistent with recurrent MZL. Although these are subtle, her neck discomfort is worrisome and I believe warrants MRI cervical spine to assess. We will also refer her back to onc rehab for assessment. If we do not find an alternative explanation for her neck pain, we can consider biopsy of a cervical node to prove this is MZL, and then consider ibrutinib for salvage therapy. They are in agreement. All questions were asked and answered to the best of my ability. F/u after MRI and possibly biopsy to discuss next steps.             Glenora Severin, MD, MD  [facility] Hematology and Oncology  [address], [address] [facility]  Office : [phone]  Fax : [phone]

## 2022-11-17 ENCOUNTER — TELEPHONE (OUTPATIENT)
Dept: ONCOLOGY | Age: 62
End: 2022-11-17

## 2022-11-17 DIAGNOSIS — M25.519 SHOULDER PAIN, UNSPECIFIED CHRONICITY, UNSPECIFIED LATERALITY: ICD-10-CM

## 2022-11-17 DIAGNOSIS — M54.2 NECK PAIN: Primary | ICD-10-CM

## 2022-11-17 DIAGNOSIS — C83.30 DIFFUSE LARGE B-CELL LYMPHOMA, UNSPECIFIED BODY REGION (HCC): ICD-10-CM

## 2022-11-17 LAB
B2 GLYCOPROT1 IGA SER-ACNC: <9 GPI IGA UNITS (ref 0–25)
B2 GLYCOPROT1 IGG SER-ACNC: <9 GPI IGG UNITS (ref 0–20)
B2 GLYCOPROT1 IGM SER-ACNC: <9 GPI IGM UNITS (ref 0–32)

## 2022-11-17 RX ORDER — PREGABALIN 50 MG/1
50 CAPSULE ORAL 3 TIMES DAILY
Qty: 90 CAPSULE | Refills: 3 | Status: SHIPPED | OUTPATIENT
Start: 2022-11-17 | End: 2022-12-17

## 2022-11-17 NOTE — TELEPHONE ENCOUNTER
Pt called stating that Dr. Edwar Fabian is changing her Lyrica RX to a different dosage and time of day taken. RX not originally prescribed by Edwar Fabian. Edwar Fabian told her he wants to change it to 50 MG 3 times daily instead of 1 100 mg daily. She was seen on Tuesday and called pharmacy this morning and they do not have new rx. Pt is following up.      Pharmacy is [facility]

## 2022-11-17 NOTE — TELEPHONE ENCOUNTER
MRI is for her back due to pain ,patient states.  A message is given to Dr Ching Huber nurse and doc to inquire of orders  949am- orders are being placed and pt is to be contacted for scheduling

## 2022-11-28 ENCOUNTER — HOSPITAL ENCOUNTER (OUTPATIENT)
Dept: MRI IMAGING | Age: 62
Discharge: HOME | End: 2022-12-01
Payer: COMMERCIAL

## 2022-11-28 DIAGNOSIS — M25.519 SHOULDER PAIN, UNSPECIFIED CHRONICITY, UNSPECIFIED LATERALITY: ICD-10-CM

## 2022-11-28 DIAGNOSIS — M54.2 NECK PAIN: ICD-10-CM

## 2022-11-28 DIAGNOSIS — C83.30 DIFFUSE LARGE B-CELL LYMPHOMA, UNSPECIFIED BODY REGION (HCC): ICD-10-CM

## 2022-11-28 PROCEDURE — 72156 MRI NECK SPINE W/O & W/DYE: CPT

## 2022-11-28 PROCEDURE — 6360000004 HC RX CONTRAST MEDICATION: Performed by: INTERNAL MEDICINE

## 2022-11-28 PROCEDURE — A9579 GAD-BASE MR CONTRAST NOS,1ML: HCPCS | Performed by: INTERNAL MEDICINE

## 2022-11-28 RX ADMIN — GADOTERIDOL 20 ML: 279.3 INJECTION, SOLUTION INTRAVENOUS at 07:29

## 2022-11-29 ENCOUNTER — TELEPHONE (OUTPATIENT)
Dept: ONCOLOGY | Age: 62
End: 2022-11-29

## 2022-11-29 NOTE — TELEPHONE ENCOUNTER
Pt called asking if she was going to have a biopsy scheduled or if Dr. Agustina Dueñas wants to wait until after her MRI is done to see what is going on.

## 2022-11-29 NOTE — TELEPHONE ENCOUNTER
I reviewed the chart and a msg has been sent to Zane Garcia RN   per chandan HAIDER the patient was to do the MRI and ONC Rehab. She has refused to schedule the ONC rehab per the referral notes. Zane Garcia aware. Call to the patient and she lives close to [facility] therapy and that is where she is calling to make an appt.  She will call if they need a referral.

## 2022-12-13 ENCOUNTER — TELEMEDICINE (OUTPATIENT)
Dept: FAMILY MEDICINE CLINIC | Facility: CLINIC | Age: 62
End: 2022-12-13
Payer: COMMERCIAL

## 2022-12-13 DIAGNOSIS — C85.90 LYMPHOMA, UNSPECIFIED BODY REGION, UNSPECIFIED LYMPHOMA TYPE (HCC): Primary | ICD-10-CM

## 2022-12-13 PROCEDURE — 99212 OFFICE O/P EST SF 10 MIN: CPT | Performed by: FAMILY MEDICINE

## 2022-12-13 RX ORDER — TRIAMTERENE AND HYDROCHLOROTHIAZIDE 75; 50 MG/1; MG/1
.5-1 TABLET ORAL DAILY PRN
Qty: 30 TABLET | Refills: 5 | Status: SHIPPED | OUTPATIENT
Start: 2022-12-13

## 2022-12-13 ASSESSMENT — ENCOUNTER SYMPTOMS
RESPIRATORY NEGATIVE: 1
EYES NEGATIVE: 1
GASTROINTESTINAL NEGATIVE: 1

## 2022-12-13 NOTE — PROGRESS NOTES
2022    TELEHEALTH EVALUATION -- Audio/Visual (During FAFCL-54 public health emergency)    HPI:    Dana Carranza (:  1960) has requested an audio/video evaluation for the following concern(s):    Visit to update status on lymphoma. Overall feeling well. Dr Sujatha Reynoso has suggested biopsy of lymph node to verify cancer activity if any. Also discussed a new oral med that to date has had a good track record but can increase bleeding and atrial fib both of which she has had in the past. MRI of neck showed no cancer. Pain is improving with PT. Needs refill on diuretic. Past Medical History:   Diagnosis Date    Anemia     Asthma     Followed by Dr. Radha Rivera, Pulmonology    Atrial fibrillation with rapid ventricular response ([facility] 2020    Basal cell carcinoma     Cardiomegaly     Deep vein thrombosis (DVT) (HCC)     History of stroke     History of UTI     Hypertension     Marginal zone lymphoma ([facility] 2017    Diffuse Large B-cell Lymphoma. Completed Chemotherapy 18    Morbid obesity ([facility]     Osteoarthritis     Overactive bladder     Primary hypothyroidism     Prolapse of female bladder, acquired     Radiation therapy complication     Rheumatic fever     S/P total knee replacement using cement 10/3/2011    Shingles     Superficial venous thrombosis of right arm 2017         Review of Systems   Constitutional: Negative. Eyes: Negative. Respiratory: Negative. Cardiovascular: Negative. Gastrointestinal: Negative. Genitourinary: Negative. Musculoskeletal:  Positive for neck pain (improved). Neurological: Negative. Psychiatric/Behavioral: Negative. Prior to Visit Medications    Medication Sig Taking? Authorizing Provider   triamterene-hydroCHLOROthiazide (MAXZIDE) 75-50 MG per tablet Take 0.5-1 tablets by mouth daily as needed (swelling) Yes Rene vIey MD   pregabalin (LYRICA) 50 MG capsule Take 1 capsule by mouth 3 times daily for 30 days. Masoud Tang MD   Multiple Vitamins-Minerals (MULTIVITAMIN WOMEN PO) Take 2 tablets by mouth daily  Historical Provider, MD   azithromycin (ZITHROMAX) 250 MG tablet TAKE 1 TABLET BY MOUTH EVERY MONDAY, [facility], [address] [place] [address]. Pippa Eli MD   sodium chloride, Inhalant, 3 % nebulizer solution Take 4 mLs by nebulization 2 times daily as needed for Cough  Cina [place] Prudencio APRN - CNP   albuterol (PROVENTIL) (2.5 MG/3ML) 0.083% nebulizer solution Take 3 mLs by nebulization in the morning and 3 mLs at noon and 3 mLs in the evening and 3 mLs before bedtime. Hank Flannery APRN - CNP   montelukast (SINGULAIR) 10 MG tablet Take 1 tablet by mouth in the morning.   EMELY Kraus - CNP   levothyroxine (SYNTHROID) 200 MCG tablet Take 1 tablet by mouth every morning (before breakfast) Take 1 tablet by mouth every mouth along with levothyroxine 50mg to equal 250mg  Surya Wright MD   levothyroxine (SYNTHROID) 50 MCG tablet Take 1 tablet by mouth every morning (before breakfast) Take 1 tablet by mouth every mouth along with levothyroxine 200mg to equal 250mg  Patient not taking: Reported on 11/15/2022  Surya Wright MD   BIOTIN PO Take by mouth  Patient not taking: Reported on 11/15/2022  Ar Automatic Reconciliation   albuterol sulfate  (90 Base) MCG/ACT inhaler Inhale 2 puffs into the lungs every 4 hours  Ar Automatic Reconciliation   apixaban (ELIQUIS) 5 MG TABS tablet Take 5 mg by mouth 2 times daily  Ar Automatic Reconciliation   ascorbic acid (VITAMIN C) 250 MG tablet Take 250 mg by mouth  Patient not taking: Reported on 9/27/2022  Ar Automatic Reconciliation   vitamin D 25 MCG (1000 UT) CAPS Take by mouth daily  Patient not taking: No sig reported  Ar Automatic Reconciliation   fluticasone (FLONASE) 50 MCG/ACT nasal spray 2 sprays by Nasal route daily  Patient not taking: Reported on 11/15/2022  Ar Automatic Reconciliation   Fluticasone-Umeclidin-Vilant (Grisel Desouza) lymphoma type [facility])  Discussed history and medications with patient. Continue current measures. Follow up if side effects occur or if new symptoms develop. Return in about 6 months (around 6/13/2023), or if symptoms worsen or fail to improve. Charline Pena, was evaluated through a synchronous (real-time) audio-video encounter. The patient (or guardian if applicable) is aware that this is a billable service, which includes applicable co-pays. This Virtual Visit was conducted with patient's (and/or legal guardian's) consent. The visit was conducted pursuant to the emergency declaration under the [facility], [facility] authority and the [employer] Act. Patient identification was verified, and a caregiver was present when appropriate. The patient was located at Home: [address] [place] [id number]. Provider was located at [facility] (Appt Dept): [address]. Total time spent on this encounter: Not billed by time    --Miguel Montez MD on 12/13/2022 at 7:40 AM    An electronic signature was used to authenticate this note.

## 2022-12-15 DIAGNOSIS — D80.1 HYPOGAMMAGLOBULINEMIA (HCC): ICD-10-CM

## 2022-12-15 DIAGNOSIS — R59.1 LYMPHADENOPATHY, GENERALIZED: ICD-10-CM

## 2022-12-15 DIAGNOSIS — C83.30 DIFFUSE LARGE B-CELL LYMPHOMA, UNSPECIFIED BODY REGION (HCC): ICD-10-CM

## 2022-12-15 DIAGNOSIS — M54.2 NECK PAIN: Primary | ICD-10-CM

## 2022-12-16 DIAGNOSIS — C85.88 MARGINAL ZONE LYMPHOMA OF LYMPH NODES OF MULTIPLE SITES (HCC): ICD-10-CM

## 2022-12-16 DIAGNOSIS — D80.1 HYPOGAMMAGLOBULINEMIA (HCC): ICD-10-CM

## 2022-12-16 DIAGNOSIS — C83.30 DIFFUSE LARGE B-CELL LYMPHOMA, UNSPECIFIED BODY REGION (HCC): Primary | ICD-10-CM

## 2022-12-18 NOTE — PROGRESS NOTES
[facility] CARDIOLOGY Follow Up                 Reason for Visit: History of atrial fibrillation    Subjective:     Patient is a 58 y.o. female with a PMH of PFO, hypertension, atrial fibrillation, DVT, lymphoma and asthma who presents for follow-up. The patient was last seen in June 2022. She had a ISABELL in November 2020 that was noted to demonstrate a normal EF and mild MR. The patient follows with oncology for lymphoma. She reports having a \"cold\" for the last 1 to 2 weeks. She reports a \"strong\" productive cough. The patient is following with oncology for lymphoma. Past Medical History:   Diagnosis Date    Anemia     Asthma     Followed by Dr. Kuldip Candelaria, Pulmonology    Atrial fibrillation with rapid ventricular response ([facility] 11/25/2020    Basal cell carcinoma     Cardiomegaly     Deep vein thrombosis (DVT) (HCC)     History of stroke     History of UTI     Hypertension     Marginal zone lymphoma ([address] 03/30/2017    Diffuse Large B-cell Lymphoma. Completed Chemotherapy 5/28/18    Morbid obesity ([address]     Osteoarthritis     Overactive bladder     Primary hypothyroidism     Prolapse of female bladder, acquired     Radiation therapy complication     Rheumatic fever     S/P total knee replacement using cement 10/3/2011    Shingles     Superficial venous thrombosis of right arm 5/1/2017      Past Surgical History:   Procedure Laterality Date    CATARACT REMOVAL Bilateral 2018    CHOLECYSTECTOMY  1983    COLONOSCOPY  03/2017    Clear    NM ANESTH,ACHILLES TENDON SURG  Left 02/10/2011    Dr. Canelo Landeros  11/25/2020    ISABELL-guided cardioversion for A. Fib    PRE-MALIGNANT / BENIGN SKIN LESION EXCISION  2019    basal/squamous skin lesions removed from scalp. [facility].      TOTAL KNEE ARTHROPLASTY Left 2013    Dr. No Roberson Right 2012    Dr. Kelley Richardson LEFT Left 1/3/2018     BREAST NEEDLE BIOPSY LEFT 1/3/2018 [facility]      Family History   Problem Relation Age of Onset    Other Maternal Grandmother         Vascular Disorder with leg amputation    Dementia Mother     Kidney Disease Father     Thyroid Cancer Neg Hx     Post-op Cognitive Dysfunction Neg Hx     Emergence Delirium Neg Hx     Delayed Awakening Neg Hx     Pseudochol. Deficiency Neg Hx     Malig Hypertherm Neg Hx     Thyroid Disease Sister         hypothyroidism    Breast Cancer Cousin 48    Celiac Disease Sister     Liver Disease Sister         non-alcoholic      Social History     Tobacco Use    Smoking status: Never    Smokeless tobacco: Never   Substance Use Topics    Alcohol use: No      Allergies   Allergen Reactions    Gabapentin Other (See Comments)         ROS:  No obvious pertinent positives on review of systems except for what was outlined above.        Objective:       /70   Pulse 88   Ht 5' 3\" (1.6 m)   Wt 263 lb (119.3 kg)   BMI 46.59 kg/m²     BP Readings from Last 3 Encounters:   12/20/22 122/70   11/15/22 129/77   10/25/22 (!) 123/59       Wt Readings from Last 3 Encounters:   12/20/22 263 lb (119.3 kg)   11/15/22 273 lb 3.2 oz (123.9 kg)   10/25/22 274 lb 9.6 oz (124.6 kg)       General/Constitutional:   Alert and oriented x 3, no acute distress  HEENT:   normocephalic, atraumatic, moist mucous membranes  Neck:   No JVD or carotid bruits bilaterally  Cardiovascular:   regular rate and rhythm, no rub/gallop appreciated  Pulmonary:   clear to auscultation bilaterally, no respiratory distress  Abdomen:   soft, non-tender, non-distended  Ext:   No sig LE edema bilaterally  Skin:  warm and dry, no obvious rashes seen  Neuro:   no obvious sensory or motor deficits  Psychiatric:   normal mood and affect    Data Review:   Lab Results   Component Value Date    CHOL 185 07/13/2022    CHOL 208 (H) 06/04/2021    CHOL 186 06/02/2020     Lab Results   Component Value Date    TRIG 107 07/13/2022    TRIG 113 06/04/2021    TRIG 139 06/02/2020     Lab Results   Component Value Date    HDL 64 (H) 07/13/2022    HDL 64 06/04/2021    HDL 59 06/02/2020     Lab Results   Component Value Date    LDLCALC 99.6 07/13/2022    LDLCALC 124 (H) 06/04/2021    LDLCALC 99 06/02/2020     Lab Results   Component Value Date    LABVLDL 21.4 07/13/2022    LABVLDL 28 06/02/2020    LABVLDL 16.8 09/04/2019    VLDL 20 06/04/2021     Lab Results   Component Value Date    CHOLHDLRATIO 2.9 07/13/2022    CHOLHDLRATIO 3.2 09/04/2019        Lab Results   Component Value Date/Time     11/15/2022 03:00 PM     08/02/2022 03:12 PM     07/13/2022 03:28 PM    K 3.0 11/15/2022 03:00 PM    K 3.7 08/02/2022 03:12 PM    K 3.9 07/13/2022 03:28 PM     11/15/2022 03:00 PM     08/02/2022 03:12 PM     07/13/2022 03:28 PM    CO2 30 11/15/2022 03:00 PM    CO2 30 08/02/2022 03:12 PM    CO2 29 07/13/2022 03:28 PM    BUN 19 11/15/2022 03:00 PM    BUN 12 08/02/2022 03:12 PM    BUN 20 07/13/2022 03:28 PM    CREATININE 0.90 11/15/2022 03:00 PM    CREATININE 0.86 11/03/2022 08:36 AM    CREATININE 0.90 08/02/2022 03:12 PM    CREATININE 1.00 07/13/2022 03:28 PM    GLUCOSE 107 11/15/2022 03:00 PM    GLUCOSE 112 08/02/2022 03:12 PM    GLUCOSE 102 07/13/2022 03:28 PM    CALCIUM 9.1 11/15/2022 03:00 PM    CALCIUM 9.9 08/02/2022 03:12 PM    CALCIUM 9.3 07/13/2022 03:28 PM         Lab Results   Component Value Date    ALT 42 11/15/2022    ALT 32 08/02/2022    ALT 28 07/13/2022    AST 25 11/15/2022    AST 15 08/02/2022    AST 15 07/13/2022        Assessment/Plan:   1. PFO (patent foramen ovale)  - Only a weak recommendation for closure for patients age ? 61 years with an embolic-appearing cryptogenic ischemic stroke (ie, no evident source of stroke despite a comprehensive evaluation) who have a PFO with a right-to-left shunt detected by bubble study: The patient does not fit this category; thus, closure is not indicated    2.  Hypertension, unspecified type  - Well controlled  - Continue with Toprol-XL    3. History of atrial fibrillation  - [id number] equals 4  - Currently in sinus rhythm  - Continue with Eliquis and Toprol-XL    4.  Mild mitral regurgitation by prior echocardiogram  - Surveillance echocardiogram in 2023 to 2025    F/U: 6 months    Coco Gant MD

## 2022-12-20 ENCOUNTER — OFFICE VISIT (OUTPATIENT)
Dept: CARDIOLOGY CLINIC | Age: 62
End: 2022-12-20
Payer: COMMERCIAL

## 2022-12-20 VITALS
SYSTOLIC BLOOD PRESSURE: 122 MMHG | DIASTOLIC BLOOD PRESSURE: 70 MMHG | HEIGHT: 63 IN | BODY MASS INDEX: 46.6 KG/M2 | WEIGHT: 263 LBS | HEART RATE: 88 BPM

## 2022-12-20 DIAGNOSIS — I10 HYPERTENSION, UNSPECIFIED TYPE: ICD-10-CM

## 2022-12-20 DIAGNOSIS — Q21.12 PFO (PATENT FORAMEN OVALE): Primary | ICD-10-CM

## 2022-12-20 DIAGNOSIS — Z86.79 HISTORY OF ATRIAL FIBRILLATION: ICD-10-CM

## 2022-12-20 DIAGNOSIS — I34.0 MILD MITRAL REGURGITATION BY PRIOR ECHOCARDIOGRAM: ICD-10-CM

## 2022-12-20 PROCEDURE — 99214 OFFICE O/P EST MOD 30 MIN: CPT | Performed by: INTERNAL MEDICINE

## 2022-12-20 PROCEDURE — 3074F SYST BP LT 130 MM HG: CPT | Performed by: INTERNAL MEDICINE

## 2022-12-20 PROCEDURE — 3078F DIAST BP <80 MM HG: CPT | Performed by: INTERNAL MEDICINE

## 2022-12-20 RX ORDER — METOPROLOL SUCCINATE 50 MG/1
50 TABLET, EXTENDED RELEASE ORAL DAILY
Qty: 90 TABLET | Refills: 3 | Status: SHIPPED | OUTPATIENT
Start: 2022-12-20

## 2022-12-21 ENCOUNTER — APPOINTMENT (RX ONLY)
Dept: URBAN - METROPOLITAN AREA CLINIC 330 | Facility: CLINIC | Age: 62
Setting detail: DERMATOLOGY
End: 2022-12-21

## 2022-12-21 ENCOUNTER — TELEPHONE (OUTPATIENT)
Dept: PULMONOLOGY | Age: 62
End: 2022-12-21

## 2022-12-21 DIAGNOSIS — L70.8 OTHER ACNE: ICD-10-CM

## 2022-12-21 DIAGNOSIS — Z85.828 PERSONAL HISTORY OF OTHER MALIGNANT NEOPLASM OF SKIN: ICD-10-CM

## 2022-12-21 DIAGNOSIS — R05.1 ACUTE COUGH: ICD-10-CM

## 2022-12-21 DIAGNOSIS — L57.0 ACTINIC KERATOSIS: ICD-10-CM

## 2022-12-21 DIAGNOSIS — R06.02 SOB (SHORTNESS OF BREATH): ICD-10-CM

## 2022-12-21 DIAGNOSIS — J98.11 ATELECTASIS: ICD-10-CM

## 2022-12-21 DIAGNOSIS — R09.02 HYPOXEMIA: Primary | ICD-10-CM

## 2022-12-21 PROCEDURE — ? LIQUID NITROGEN

## 2022-12-21 PROCEDURE — 17000 DESTRUCT PREMALG LESION: CPT

## 2022-12-21 PROCEDURE — ? COUNSELING

## 2022-12-21 PROCEDURE — 99213 OFFICE O/P EST LOW 20 MIN: CPT | Mod: 25

## 2022-12-21 PROCEDURE — ? PRESCRIPTION

## 2022-12-21 PROCEDURE — ? FULL BODY SKIN EXAM - DECLINED

## 2022-12-21 PROCEDURE — 17003 DESTRUCT PREMALG LES 2-14: CPT

## 2022-12-21 PROCEDURE — ? PRESCRIPTION MEDICATION MANAGEMENT

## 2022-12-21 RX ORDER — CLINDAMYCIN PHOSPHATE 10 MG/G
GEL TOPICAL
Qty: 60 | Refills: 3 | Status: ERX | COMMUNITY
Start: 2022-12-21

## 2022-12-21 RX ADMIN — CLINDAMYCIN PHOSPHATE: 10 GEL TOPICAL at 00:00

## 2022-12-21 ASSESSMENT — LOCATION SIMPLE DESCRIPTION DERM
LOCATION SIMPLE: CHIN
LOCATION SIMPLE: RIGHT SCALP
LOCATION SIMPLE: RIGHT FOREHEAD
LOCATION SIMPLE: LEFT FOREHEAD
LOCATION SIMPLE: LEFT SCALP

## 2022-12-21 ASSESSMENT — LOCATION DETAILED DESCRIPTION DERM
LOCATION DETAILED: LEFT CHIN
LOCATION DETAILED: LEFT MEDIAL FRONTAL SCALP
LOCATION DETAILED: RIGHT MEDIAL FRONTAL SCALP
LOCATION DETAILED: LEFT FOREHEAD
LOCATION DETAILED: RIGHT SUPERIOR MEDIAL FOREHEAD

## 2022-12-21 ASSESSMENT — LOCATION ZONE DERM
LOCATION ZONE: SCALP
LOCATION ZONE: FACE

## 2022-12-21 NOTE — PROCEDURE: PRESCRIPTION MEDICATION MANAGEMENT
Initiate Treatment: Clindamycin gel bid to chin prn flares
Render In Strict Bullet Format?: No
Detail Level: Zone

## 2022-12-21 NOTE — PROCEDURE: MIPS QUALITY
Detail Level: Zone
Quality 111:Pneumonia Vaccination Status For Older Adults: Pneumococcal vaccine (PPSV23) administered on or after patient’s 60th birthday and before the end of the measurement period
Quality 130: Documentation Of Current Medications In The Medical Record: Current Medications Documented
Quality 110: Preventive Care And Screening: Influenza Immunization: Influenza Immunization previously received during influenza season
Quality 226: Preventive Care And Screening: Tobacco Use: Screening And Cessation Intervention: Tobacco Screening not Performed for Unknown Reasons
Quality 431: Preventive Care And Screening: Unhealthy Alcohol Use - Screening: Patient not identified as an unhealthy alcohol user when screened for unhealthy alcohol use using a systematic screening method

## 2022-12-21 NOTE — TELEPHONE ENCOUNTER
TRIAGE CALL      Complaint: cough/congestion  Cough: yes  Productive:  yes  Bloody Sputum:  no  Increased SOB/Wheezing:  wheezing  Duration: 3 days    Fever/Chills: no  OTC Meds tried: flonase,       She says that the coughing is constant

## 2022-12-21 NOTE — PROCEDURE: LIQUID NITROGEN
Consent: The patient's consent was obtained including but not limited to risks of crusting, scabbing, blistering, scarring, darker or lighter pigmentary change, recurrence, incomplete removal and infection.
Render Note In Bullet Format When Appropriate: No
Show Applicator Variable?: Yes
Duration Of Freeze Thaw-Cycle (Seconds): 0
Detail Level: Detailed
Post-Care Instructions: I reviewed with the patient in detail post-care instructions. Patient is to wear sunprotection, and avoid picking at any of the treated lesions. Pt may apply Vaseline to crusted or scabbing areas.

## 2022-12-22 RX ORDER — PREDNISONE 20 MG/1
TABLET ORAL
Qty: 12 TABLET | Refills: 0 | Status: SHIPPED | OUTPATIENT
Start: 2022-12-22

## 2022-12-22 RX ORDER — AZITHROMYCIN 250 MG/1
TABLET, FILM COATED ORAL
Qty: 6 TABLET | Refills: 0 | Status: SHIPPED | OUTPATIENT
Start: 2022-12-22

## 2022-12-22 NOTE — TELEPHONE ENCOUNTER
LOV 8/8/22 with MARLON Flannery---- asthma, large B cell lymphoma, marginal zone lymphoma of the lymph nodes, OA, DVT, hypothyroidism    Allergies: gabapentin    Pt c/o about 3 days of \"constant\" productive coughing accompanied by wheezing. Unclear on color of sputum. No more SOB than normal. Does have congestion. No fever or chills. She is using Flonase, albuterol, Singulair, 3% sodium chloride nebulizer, Trelegy. Attempted to reach pt, LVM to return call.

## 2022-12-22 NOTE — TELEPHONE ENCOUNTER
LOV 8/8/22 with MARLON Flannery---- asthma, large B cell lymphoma, marginal zone lymphoma of the lymph nodes, OA, DVT, hypothyroidism     Allergies: gabapentin     Pt c/o about 3 days of \"constant\" productive coughing accompanied by wheezing. Thick, yellow sputum. Sneezing. No more SOB than normal. Does have congestion. No fever or chills. Unable to sleep, states that she is wearing her CPAP at night but is having trouble breathing and coughing with mask. She did try Delsym last night with some relief but is wondering if she can have something stronger to help her stop coughing so she can sleep the next few days. She is scheduled for a COVID test at 1030 this AM. She is using Flonase, albuterol, Singulair, 3% sodium chloride nebulizer, Trelegy. Pharmacy confirmed. Can LVM if she answer.

## 2022-12-22 NOTE — TELEPHONE ENCOUNTER
Give her Z pack and prednisone 40 mg for 3 days then 20 mg for 3 days and then 10 mg for 3 days   Also make sure her postnasal drainage is controlled with flonase at night , zyrtec/Jack Bell MD       Reviewed with pt, verbalized understanding. Wants sooner appt with [facility]. Scheduled.

## 2023-01-03 NOTE — PROGRESS NOTES
[address] [facility] [address], [address] [facility]  [phone]    Patient Name:  Jermaine More  YOB: 1960      Office Visit 2023    Chief Complaint   Patient presents with    Sleep Apnea    Follow-up     And I am having some trouble with my machine now. HISTORY OF PRESENT ILLNESS:    This pleasant lady came in today for follow-up visit. Since her last visit, her compliance has gotten a little worse. Please note last time we did decrease her pressure down to 17 cm H2O. She indicates one of the problems is that keeping the mask on at nighttime and may be the pressure. Please know compliance data now for the last 90 days shows she has used 86 days but only 46 days or more than 4 hours she is sleeping about 4 hours and 3 minutes which is an improvement from 3-1/2 hours that she was in the past.  Her AHI is down to 2.6 from previously being 137.9. Median air leaks are 28.3 and 95 percentile air leak is 49.3. Please note the patient has worked on weight loss and is down about 50 pounds due to combination of stopping the intake of sugars, sweets, caffeine, etc.  She indicates she does not feel hungry in the afternoons and is usually eating some variation of fruit. Since her last visit she rubs report that her cancer has returned. She is coming in for a biopsy tomorrow. She also did get immunoglobulin work-up and it showed she was severely deficient and she just had an infusion. She does report that because of her cancer last time she had difficulty tolerating chemotherapy. She indicates they are thinking about a biologic agent for her but she is getting a second opinion from her prior cancer doctor at the [facility]. [place] score today is 10/24.     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 a theatre or a meeting) 0 0 2   As a passenger in a car for an hour without a break 3 2 3   Lying down to rest in the afternoon when circumstances permit 0 0 0   Sitting and talking to someone 1 0 1   Sitting quietly after a lunch without alcohol 0 2 0   In a car, while stopped for a few minutes in traffic 0 0 0   [place] Sleepiness Score 10 6 12                     No flowsheet data found. Past Medical History:   Diagnosis Date    Anemia     Asthma     Followed by Dr. Diane Patel, Pulmonology    Atrial fibrillation with rapid ventricular response ([facility] [address] 11/25/2020    Basal cell carcinoma     Cardiomegaly     Deep vein thrombosis (DVT) (HCC)     History of stroke     History of UTI     Hypertension     Marginal zone lymphoma ([address] 03/30/2017    Diffuse Large B-cell Lymphoma.  Completed Chemotherapy 5/28/18    Morbid obesity ([address]     Osteoarthritis     Overactive bladder     Primary hypothyroidism     Prolapse of female bladder, acquired     Radiation therapy complication     Rheumatic fever     S/P total knee replacement using cement 10/3/2011    Shingles     Superficial venous thrombosis of right arm 5/1/2017         Patient Active Problem List   Diagnosis    PFO (patent foramen ovale)    Osteoarthritis of left knee    Heart murmur    Admission for antineoplastic chemotherapy    Hypertension    History of DVT of lower extremity    History of atrial fibrillation    Primary hypothyroidism    DLBCL (diffuse large B cell lymphoma) (HCC)    Snores    Atrial fibrillation (HCC)    Left lower lobe pneumonia    Atelectasis    Mild mitral regurgitation by prior echocardiogram    Pancytopenia due to antineoplastic chemotherapy (HCC)    Hemoptysis    Non Hodgkin's lymphoma (HCC)    Pneumonia and influenza    Lymphadenopathy, generalized    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 ([address]    Osteoarthritis of right knee    Morbid obesity with BMI of 50.0-59.9, adult ([facility] [address]    Palpitations    Hypersomnia, unspecified    Hypoxemia    DAGMAR (obstructive sleep apnea)    Hypogammaglobulinemia ([facility] [address]           Past Surgical History:   Procedure Laterality Date    CATARACT REMOVAL Bilateral 2018    CHOLECYSTECTOMY  1983    COLONOSCOPY  03/2017    Clear    [place] ANESTH,ACHILLES TENDON SURG  Left 02/10/2011    Dr. Ivon Rasmussen  11/25/2020    ISABELL-guided cardioversion for A. Fib    PRE-MALIGNANT / BENIGN SKIN LESION EXCISION  2019    basal/squamous skin lesions removed from scalp. [facility]. Nelda Silence Left 2013    Dr. Cyndi Martinez Right 2012    Dr. Yepez Severe Left 1/3/2018     BREAST NEEDLE BIOPSY LEFT 1/3/2018 [facility]         Social History     Socioeconomic History    Marital status:      Spouse name: Not on file    Number of children: Not on file    Years of education: Not on file    Highest education level: Not on file   Occupational History    Not on file   Tobacco Use    Smoking status: Never    Smokeless tobacco: Never   Substance and Sexual Activity    Alcohol use: No    Drug use: Never    Sexual activity: Not on file   Other Topics Concern    Not on file   Social History Narrative    10/3/11:  PATIENT IS  TO [address]. SHE IS DISABLED.        Social Determinants of Health     Financial Resource Strain: Not on file   Food Insecurity: Not on file   Transportation Needs: Not on file   Physical Activity: Not on file   Stress: Not on file   Social Connections: Not on file   Intimate Partner Violence: Not on file   Housing Stability: Not on file         Family History   Problem Relation Age of Onset    Other Maternal Grandmother         Vascular Disorder with leg amputation    Dementia Mother     Kidney Disease Father     Thyroid Cancer Neg Hx     Post-op Cognitive Dysfunction Neg Hx     Emergence Delirium Neg Hx Delayed Awakening Neg Hx     Pseudochol. Deficiency Neg Hx     Malig Hypertherm Neg Hx     Thyroid Disease Sister         hypothyroidism    Breast Cancer Cousin 48    Celiac Disease Sister     Liver Disease Sister         non-alcoholic         Allergies   Allergen Reactions    Gabapentin Other (See Comments)         Current Outpatient Medications   Medication Sig    azithromycin (ZITHROMAX) 250 MG tablet TAKE 2 TABS ON DAY 1, THEN 1 TAB A DAY FOR 4 DAYS    predniSONE (DELTASONE) 20 MG tablet 40 mg for 3 days then 20 mg for 3 days and then 10 mg for 3 days    metoprolol succinate (TOPROL XL) 50 MG extended release tablet Take 1 tablet by mouth daily    apixaban (ELIQUIS) 5 MG TABS tablet Take 1 tablet by mouth 2 times daily    triamterene-hydroCHLOROthiazide (MAXZIDE) 75-50 MG per tablet Take 0.5-1 tablets by mouth daily as needed (swelling)    pregabalin (LYRICA) 50 MG capsule Take 1 capsule by mouth 3 times daily for 30 days. Multiple Vitamins-Minerals (MULTIVITAMIN WOMEN PO) Take 2 tablets by mouth daily    sodium chloride, Inhalant, 3 % nebulizer solution Take 4 mLs by nebulization 2 times daily as needed for Cough    albuterol (PROVENTIL) (2.5 MG/3ML) 0.083% nebulizer solution Take 3 mLs by nebulization in the morning and 3 mLs at noon and 3 mLs in the evening and 3 mLs before bedtime. montelukast (SINGULAIR) 10 MG tablet Take 1 tablet by mouth in the morning.     levothyroxine (SYNTHROID) 200 MCG tablet Take 1 tablet by mouth every morning (before breakfast) Take 1 tablet by mouth every mouth along with levothyroxine 50mg to equal 250mg    albuterol sulfate  (90 Base) MCG/ACT inhaler Inhale 2 puffs into the lungs every 4 hours    fluticasone (FLONASE) 50 MCG/ACT nasal spray 2 sprays by Nasal route daily    Fluticasone-Umeclidin-Vilant (TRELEGY ELLIPTA) 200-62.5-25 MCG/INH AEPB Inhale 1 puff into the lungs daily    levothyroxine (SYNTHROID) 50 MCG tablet Take 1 tablet by mouth every morning (before breakfast) Take 1 tablet by mouth every mouth along with levothyroxine 200mg to equal 250mg (Patient not taking: Reported on 1/4/2023)     No current facility-administered medications for this visit. REVIEW OF SYSTEMS:     CONSTITUTIONAL:   There is Negative history of fever, chills, night sweats. Patient is positive for weight loss, they are  Negative for  weight gain. Patient is  Negative  for fatigue and hypersomnia and is  on their CPAP for the past few days. Insomnia is not under control with CPAP. CARDIAC:   No chest pain, pressure, discomfort, palpitations, orthopnea, murmurs, or edema. GI:   No dysphagia, heartburn reflux, nausea/vomiting, diarrhea, abdominal pain, or bleeding. NEURO:    There is no history of AMS, persistent headache, decreased level of consciousness, seizures, or motor or sensory deficits. PHYSICAL EXAM:    /72   Pulse 70   Temp 97.1 °F (36.2 °C)   Resp 15   Ht 5' 2\" (1.575 m)   Wt 264 lb 1.6 oz (119.8 kg)   SpO2 98%   BMI 48.30 kg/m²     Body mass index is 48.3 kg/m². Constitutional:  the patient is well developed and in no acute distress  EENMT:  Sclera clear, pupils equal, oral mucosa moist, narrow oral airway Modified S[place] Stage 4, Nares are patent bilateral  Respiratory: CTA b/l. No Wheezing or Rhonchi. Cardiovascular:  RRR without M,G,R  Gastrointestinal: soft and non-tender; with positive bowel sounds. Musculoskeletal: warm without cyanosis. There is no lower leg edema. Skin:  no jaundice or rashes, old healing knee scars  Neurologic: no gross neuro deficits     Psychiatric:  alert and oriented x 3        ASSESSMENT/PLAN:  (Medical Decision Making)       ICD-10-CM    1. DAGMAR (obstructive sleep apnea)  G47.33 - Compliance is a little worse today than last time which she was much better. However I believe her weight loss is likely contributed to having some difficulty tolerating the higher pressures.   We will change her from directly to CPAP at 17 now to AutoPap at a pressure of 6-12 cm H2O and keep C-Flex of 3. If she is able to get further improvement in her sleep before we check an overnight oximetry. 2. Hypoxemia  R09.02 -In the future can get overnight oximetry study on the lower pressure and see if that helps her nocturnal hypoxemia      3. Hypersomnia, unspecified  G47.10 - Slightly worse again and up to 10 from previously being 6      4. Morbid obesity with BMI of 50.0-59.9, adult ([facility] [address]  E66.01 - She is doing great with weight loss. She is down another 23 pounds since last time to 264 and overall down about 50 pounds. Congratulated on her efforts    Z68.43             6.  Insomnia  -- Part of this is likely related to the machine and her having some difficulty with the pressures. Hopefully changing this will help. Last time she was using melatonin told to continue. Follow-up with PMD regarding thyroid medication      7. Hypothyroidism  --See above under insomnia    8. Immunoglobulin deficiency  - Status post infusion feeling better. Advised to be cautious, given recent COVID      SUMMARY:    - Change from CPAP to 17 to auto CPAP pressure 6-12 cm H2O with C-Flex 3   -Patient is using her machine but her compliance has fell off a little bit we will see if it improves with pressure adjustment.    -She has having some issues with her CPAP mask I told her to bring it in next time  -Renew supplies if needed    --Congratulated weight loss advised to continue. Has lost about 50 pounds    -Continue proper sleep hygiene    - Remaining treatment as per above    Did review [place] score, and compliance data today    Follow-up with her cancer specialist regarding the return of her lymphoma. Immunoglobulin [id number] with PMD/specialists regarding infusions. All questions are answered to the patient's satisfaction. The patient will call for further questions and concerns.     Follow up at the [facility] will be in 2 months with me  Follow up will be with the patient's referring physician as had been previously scheduled. Anjel Gallegos MD    Over 50% of today's office visit was spent in face to face time reviewing test results, prognosis, importance of compliance, education about disease process, benefits of medications, instructions for management of acute flare-ups, and follow up plans. Electronically signed and dictated. Please note if there are errors this is  likely a result of the dictation software. No orders of the defined types were placed in this encounter. No orders of the defined types were placed in this encounter.

## 2023-01-04 ENCOUNTER — OFFICE VISIT (OUTPATIENT)
Dept: SLEEP MEDICINE | Age: 63
End: 2023-01-04
Payer: COMMERCIAL

## 2023-01-04 VITALS
TEMPERATURE: 97.1 F | HEART RATE: 70 BPM | WEIGHT: 264.1 LBS | BODY MASS INDEX: 48.6 KG/M2 | HEIGHT: 62 IN | RESPIRATION RATE: 15 BRPM | OXYGEN SATURATION: 98 % | SYSTOLIC BLOOD PRESSURE: 124 MMHG | DIASTOLIC BLOOD PRESSURE: 72 MMHG

## 2023-01-04 DIAGNOSIS — G47.10 HYPERSOMNIA, UNSPECIFIED: ICD-10-CM

## 2023-01-04 DIAGNOSIS — G47.33 OSA (OBSTRUCTIVE SLEEP APNEA): Primary | ICD-10-CM

## 2023-01-04 DIAGNOSIS — R09.02 HYPOXEMIA: ICD-10-CM

## 2023-01-04 DIAGNOSIS — G47.00 INSOMNIA, UNSPECIFIED TYPE: ICD-10-CM

## 2023-01-04 PROCEDURE — 99214 OFFICE O/P EST MOD 30 MIN: CPT | Performed by: INTERNAL MEDICINE

## 2023-01-04 PROCEDURE — 3074F SYST BP LT 130 MM HG: CPT | Performed by: INTERNAL MEDICINE

## 2023-01-04 PROCEDURE — 3078F DIAST BP <80 MM HG: CPT | Performed by: INTERNAL MEDICINE

## 2023-01-04 ASSESSMENT — SLEEP AND FATIGUE QUESTIONNAIRES
HOW LIKELY ARE YOU TO NOD OFF OR FALL ASLEEP WHILE SITTING QUIETLY AFTER LUNCH WITHOUT ALCOHOL: 0
HOW LIKELY ARE YOU TO NOD OFF OR FALL ASLEEP WHILE WATCHING TV: 3
HOW LIKELY ARE YOU TO NOD OFF OR FALL ASLEEP WHILE SITTING AND TALKING TO SOMEONE: 1
HOW LIKELY ARE YOU TO NOD OFF OR FALL ASLEEP WHILE SITTING INACTIVE IN A PUBLIC PLACE: 0
HOW LIKELY ARE YOU TO NOD OFF OR FALL ASLEEP WHILE SITTING AND READING: 3
HOW LIKELY ARE YOU TO NOD OFF OR FALL ASLEEP IN A CAR, WHILE STOPPED FOR A FEW MINUTES IN TRAFFIC: 0
HOW LIKELY ARE YOU TO NOD OFF OR FALL ASLEEP WHEN YOU ARE A PASSENGER IN A CAR FOR AN HOUR WITHOUT A BREAK: 3
ESS TOTAL SCORE: 10
HOW LIKELY ARE YOU TO NOD OFF OR FALL ASLEEP WHILE LYING DOWN TO REST IN THE AFTERNOON WHEN CIRCUMSTANCES PERMIT: 0

## 2023-01-05 ENCOUNTER — HOSPITAL ENCOUNTER (OUTPATIENT)
Dept: ULTRASOUND IMAGING | Age: 63
Discharge: HOME | End: 2023-01-05
Payer: COMMERCIAL

## 2023-01-05 VITALS
HEIGHT: 62 IN | RESPIRATION RATE: 18 BRPM | HEART RATE: 64 BPM | DIASTOLIC BLOOD PRESSURE: 69 MMHG | TEMPERATURE: 97.1 F | WEIGHT: 260 LBS | BODY MASS INDEX: 47.84 KG/M2 | OXYGEN SATURATION: 97 % | SYSTOLIC BLOOD PRESSURE: 132 MMHG

## 2023-01-05 DIAGNOSIS — C83.30 DIFFUSE LARGE B-CELL LYMPHOMA, UNSPECIFIED BODY REGION (HCC): ICD-10-CM

## 2023-01-05 DIAGNOSIS — M54.2 NECK PAIN: ICD-10-CM

## 2023-01-05 DIAGNOSIS — R59.1 LYMPHADENOPATHY, GENERALIZED: ICD-10-CM

## 2023-01-05 DIAGNOSIS — D80.1 HYPOGAMMAGLOBULINEMIA (HCC): ICD-10-CM

## 2023-01-05 PROCEDURE — 2500000003 HC RX 250 WO HCPCS: Performed by: PHYSICIAN ASSISTANT

## 2023-01-05 PROCEDURE — 38505 NEEDLE BIOPSY LYMPH NODES: CPT

## 2023-01-05 PROCEDURE — 88173 CYTOPATH EVAL FNA REPORT: CPT

## 2023-01-05 RX ORDER — LIDOCAINE HYDROCHLORIDE 20 MG/ML
INJECTION, SOLUTION INFILTRATION; PERINEURAL
Status: COMPLETED | OUTPATIENT
Start: 2023-01-05 | End: 2023-01-05

## 2023-01-05 RX ADMIN — LIDOCAINE HYDROCHLORIDE 5 ML: 20 INJECTION, SOLUTION INFILTRATION; PERINEURAL at 15:31

## 2023-01-05 NOTE — PROGRESS NOTES
Patient is Alert and discharge instructions given and received. Patient instructed to resume Eliquis tomorrow.

## 2023-01-05 NOTE — DISCHARGE INSTRUCTIONS
If you have any questions about your procedure, please call the Interventional Radiology department at [phone]. After business hours (5pm) and weekends, call the answering service at [phone] and ask for the Radiologist on call to be paged. Si tiene Preguntas acerca del procedimiento, por favor llame al departamento de Radiología Intervencional al [phone]. Después de horas de oficina (5 pm) y los fines de [place], llamar al Basilio Wilson al [phone] y pregunte por el Radiologo de [facility].

## 2023-01-13 ENCOUNTER — TELEPHONE (OUTPATIENT)
Dept: ONCOLOGY | Age: 63
End: 2023-01-13

## 2023-01-13 DIAGNOSIS — C83.30 DIFFUSE LARGE B-CELL LYMPHOMA, UNSPECIFIED BODY REGION (HCC): Primary | ICD-10-CM

## 2023-01-13 NOTE — TELEPHONE ENCOUNTER
Recent needle bx negative. She has recently seen [facility] and they recommend doing core biopsy. She is requesting it be done on the left side instead of right side. She also said they recommend her getting repeat IVIG infusion. Will discuss with Dr. Bailey

## 2023-01-13 NOTE — TELEPHONE ENCOUNTER
Reviewed with Dr. Marlee Morris. Will send referral to ENT for excisional neck cervical chain lymph node biopsy. Also will start insurance approval for IVIG. Tentative appt to be made and patient aware that this is pending insurance approval. Patient aware to let us know once excisional biopsy scheduled so we can schedule follow up with Dr. Marlee Morris.

## 2023-01-13 NOTE — TELEPHONE ENCOUNTER
Pt called requesting to schedule a biopsy after getting a second opinion from a doctor in [place]. She had a biopsy done there and pt states the kind she had done showed she had no cancer, even though she knows she does from Dr. Maine Robles.

## 2023-01-18 ENCOUNTER — PREP FOR PROCEDURE (OUTPATIENT)
Dept: ENT CLINIC | Age: 63
End: 2023-01-18

## 2023-01-18 ENCOUNTER — OFFICE VISIT (OUTPATIENT)
Dept: ENT CLINIC | Age: 63
End: 2023-01-18
Payer: COMMERCIAL

## 2023-01-18 VITALS — BODY MASS INDEX: 46.74 KG/M2 | HEIGHT: 62 IN | OXYGEN SATURATION: 95 % | HEART RATE: 80 BPM | WEIGHT: 254 LBS

## 2023-01-18 DIAGNOSIS — R59.0 CERVICAL LYMPHADENOPATHY: Primary | ICD-10-CM

## 2023-01-18 PROCEDURE — 99204 OFFICE O/P NEW MOD 45 MIN: CPT | Performed by: OTOLARYNGOLOGY

## 2023-01-18 RX ORDER — PREGABALIN 100 MG/1
50 CAPSULE ORAL 3 TIMES DAILY
COMMUNITY
Start: 2023-01-12

## 2023-01-18 ASSESSMENT — ENCOUNTER SYMPTOMS
SHORTNESS OF BREATH: 0
ABDOMINAL PAIN: 0

## 2023-01-18 NOTE — PROGRESS NOTES
Chief Complaint   Patient presents with    New Patient     Patient is here for a possible biopsy on her left lymph node. HPI:  Laci Coleman is a 58 y.o. female seen today in initial consultation for cervical LAD. I had spoken to Dr. Trung Neal about her case yesterday. 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 She then developed new lymphadenopathy after several rounds of treatment and biopsy of a breast nodule revealed with a diffuse large B-cell lymphoma. She was treated with more chemotherapy and has been followed closely. She had a surveillance CT scan of the neck/chest/abdomen which revealed several enlarged cervical lymph nodes. She has complained of intermittent left posterior neck pain and there has been intermittent swelling of this area. She underwent recent ultrasound-guided FNA of a right cervical node which was negative for any malignancy but there is still concern for recurrent lymphoma. She takes Eliquis daily but has been off this medication for the past 2 days. Past Medical History, Past Surgical History, Family history, Social History, and Medications were all reviewed with the patient today and updated as necessary.      Allergies   Allergen Reactions    Gabapentin Other (See Comments)     \"Double vision and falls\"     Patient Active Problem List   Diagnosis    PFO (patent foramen ovale)    Osteoarthritis of left knee    Heart murmur    Admission for antineoplastic chemotherapy    Hypertension    History of DVT of lower extremity    History of atrial fibrillation    Primary hypothyroidism    DLBCL (diffuse large B cell lymphoma) (HCC)    Snores    Atrial fibrillation (HCC)    Left lower lobe pneumonia    Atelectasis    Mild mitral regurgitation by prior echocardiogram    Pancytopenia due to antineoplastic chemotherapy (HCC)    Hemoptysis    Non Hodgkin's lymphoma (HCC)    Pneumonia and influenza    Lymphadenopathy, generalized 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 (HCC)    Osteoarthritis of right knee    Morbid obesity with BMI of 50.0-59.9, adult (HCC)    Palpitations    Hypersomnia, unspecified    Hypoxemia    DAGMAR (obstructive sleep apnea)    Hypogammaglobulinemia (HCC)    Cervical lymphadenopathy     Current Outpatient Medications   Medication Sig    pregabalin (LYRICA) 100 MG capsule TAKE 1 CAPSULE BY MOUTH DAILY    azithromycin (ZITHROMAX) 250 MG tablet TAKE 2 TABS ON DAY 1, THEN 1 TAB A DAY FOR 4 DAYS    predniSONE (DELTASONE) 20 MG tablet 40 mg for 3 days then 20 mg for 3 days and then 10 mg for 3 days    metoprolol succinate (TOPROL XL) 50 MG extended release tablet Take 1 tablet by mouth daily    apixaban (ELIQUIS) 5 MG TABS tablet Take 1 tablet by mouth 2 times daily    triamterene-hydroCHLOROthiazide (MAXZIDE) 75-50 MG per tablet Take 0.5-1 tablets by mouth daily as needed (swelling)    sodium chloride, Inhalant, 3 % nebulizer solution Take 4 mLs by nebulization 2 times daily as needed for Cough    albuterol (PROVENTIL) (2.5 MG/3ML) 0.083% nebulizer solution Take 3 mLs by nebulization in the morning and 3 mLs at noon and 3 mLs in the evening and 3 mLs before bedtime. montelukast (SINGULAIR) 10 MG tablet Take 1 tablet by mouth in the morning.     levothyroxine (SYNTHROID) 200 MCG tablet Take 1 tablet by mouth every morning (before breakfast) Take 1 tablet by mouth every mouth along with levothyroxine 50mg to equal 250mg    levothyroxine (SYNTHROID) 50 MCG tablet Take 1 tablet by mouth every morning (before breakfast) Take 1 tablet by mouth every mouth along with levothyroxine 200mg to equal 250mg    albuterol sulfate  (90 Base) MCG/ACT inhaler Inhale 2 puffs into the lungs every 4 hours    fluticasone (FLONASE) 50 MCG/ACT nasal spray 2 sprays by Nasal route daily    Fluticasone-Umeclidin-Vilant (TRELEGY ELLIPTA) 200-62.5-25 MCG/INH AEPB Inhale 1 puff into the lungs daily    pregabalin (LYRICA) 50 MG capsule Take 1 capsule by mouth 3 times daily for 30 days. No current facility-administered medications for this visit. Past Medical History:   Diagnosis Date    Anemia     Asthma     Followed by Dr. Viola Gatica, Pulmonology    Atrial fibrillation with rapid ventricular response ([facility] [address] 11/25/2020    Basal cell carcinoma     Cardiomegaly     Deep vein thrombosis (DVT) (HCC)     History of stroke     History of UTI     Hypertension     Marginal zone lymphoma ([facility] [address] 03/30/2017    Diffuse Large B-cell Lymphoma. Completed Chemotherapy 5/28/18    Morbid obesity ([facility] [address]     Osteoarthritis     Overactive bladder     Primary hypothyroidism     Prolapse of female bladder, acquired     Radiation therapy complication     Rheumatic fever     S/P total knee replacement using cement 10/3/2011    Shingles     Superficial venous thrombosis of right arm 5/1/2017     Social History     Tobacco Use    Smoking status: Never    Smokeless tobacco: Never   Substance Use Topics    Alcohol use: No     Past Surgical History:   Procedure Laterality Date    CATARACT REMOVAL Bilateral 2018    CHOLECYSTECTOMY  1983    COLONOSCOPY  03/2017    Clear    [facility] ANESTH,ACHILLES TENDON SURG  Left 02/10/2011    Dr. Mariela Euceda  11/25/2020    ISABELL-guided cardioversion for A. Fib    PRE-MALIGNANT / BENIGN SKIN LESION EXCISION  2019    basal/squamous skin lesions removed from scalp. [facility].      Lady Santos Left 2013    Dr. Brittany Lopez Right 2012    Dr. Fadumo Elliott LEFT Left 1/3/2018    US BREAST NEEDLE BIOPSY LEFT 1/3/2018 [facility]    US LYMPH NODE BIOPSY  1/5/2023    US LYMPH NODE BIOPSY 1/5/2023 Sandra Bustos PA-C [facility]     Family History   Problem Relation Age of Onset    Other Maternal Grandmother         Vascular Disorder with leg amputation    Dementia Mother     Kidney Disease Father     Thyroid Cancer Neg Hx     Post-op Cognitive Dysfunction Neg Hx     Emergence Delirium Neg Hx     Delayed Awakening Neg Hx     Pseudochol. Deficiency Neg Hx     Malig Hypertherm Neg Hx     Thyroid Disease Sister         hypothyroidism    Breast Cancer Cousin 48    Celiac Disease Sister     Liver Disease Sister         non-alcoholic        ROS:    Review of Systems   Constitutional:  Negative for fever. Eyes:  Negative for visual disturbance. Respiratory:  Negative for shortness of breath. Cardiovascular:  Negative for chest pain. Gastrointestinal:  Negative for abdominal pain. Skin:  Negative for rash. Neurological:  Negative for dizziness and headaches. Hematological:  Negative for adenopathy. Psychiatric/Behavioral:  Negative for agitation. PHYSICAL EXAM:    Pulse 80   Ht 5' 2\" (1.575 m)   Wt 254 lb (115.2 kg)   SpO2 95%   BMI 46.46 kg/m²     General: NAD, well-appearing  Neuro: No gross neuro deficits. CN's II-XII intact. No facial weakness. Eyes: EOMI. Pupils reactive. No periorbital edema/ecchymosis. No nystagmus. Skin: No facial erythema, rashes or concerning lesions. Nose: No external deviations or saddling. Intranasally, septum is midline without perforations, nasal mucosa appears healthy with no erythema, mucopurulence, or polyps. Mouth: Moist mucus membranes, normal tongue/palate mobility, no concerning mucosal lesions. Oropharynx clear with no erythema/exudate, no tonsillar hypertrophy. Ears: Normal appearing auricles, no hematomas. EACs clear with no cerumen impaction, healthy canal skin, TM's intact with no perforations or retraction pockets. No middle ear effusions. Neck: Soft, supple, no midline neck masses. No palpable parotid or submandibular masses. No thyromegaly or palpable thyroid nodules. No surgical scars. Lymphatics: There is bilateral level 2 cervical LAD. Resp: No audible stridor or wheezing. CV: No murmurs, no JVD.   Extremities: No clubbing or cyanosis. IMAGING:  CT neck-  Findings:   NECK CT:   Globes and orbits intact. Limited views of intracranial contents within normal   limits. The oropharynx, nasopharynx, larynx normal. Right cervical chain   adenopathy slightly increased from previous examination with marker right level   2 node measuring 1.6 cm short axis, previously 1.4 cm. Right level 4 1.2 cm node   previously measured 6 mm short axis. Prominent adenoid and tonsillar pillar soft   tissues similar to prior examination. Slight interval enlargement left level 2   nodes measuring 1.3 cm short axis, previously 1.1 cm. Vessels patent. CHEST CT:    Right IJ portacatheter tip distal SVC. The heart size is normal. No   pathologically enlarged mediastinal, hilar, or axillary lymph nodes. No pleural   or pericardial effusion. The lung parenchyma is unremarkable. No lung mass seen. ABDOMEN CT:   Mild hepatic steatosis with nodularity of the liver contour similar to previous   examination. No aggressive liver lesion identified. Mild splenomegaly stable at   14.7 cm AP dimension. Stable appearance of the kidneys, adrenal glands, pancreas. Upper abdominal   vasculature grossly patent. There is no intra or extrahepatic biliary ductal   dilatation. PELVIS CT:   The bowel is normal in caliber, and there is no focal or diffuse bowel wall   thickening. There is no free air or free fluid in the abdomen or pelvis. There is no significant retroperitoneal, pelvic, mesenteric, or inguinal   lymphadenopathy. The bladder is unremarkable. There are no aggressive appearing osseous lesions. Uterus age-appropriate. Fat-containing umbilical hernia. Impression   1. Progressive bilateral neck cervical chain lymph node enlargement suggesting   disease progression. 2. No progressive abnormality below the level of the neck. 3. Cirrhosis with splenomegaly stable.  No interval ascites or focal aggressive   liver lesion. PATH:  DIAGNOSIS   Right Neck Lymph Node, Fine Needle Aspiration:       NO CARCINOMA IDENTIFIED. ASSESSMENT and PLAN      ICD-10-CM    1. Cervical lymphadenopathy  R59.0         She has bilateral cervical lymphadenopathy, concerning for recurrent lymphoma. Her recent right-sided cervical lymph node FNA was negative for malignancy. At this time, I recommend proceeding with excisional biopsy of a deep L cervical cervical lymph node. I reviewed the risks of surgery including skin numbness, infection, hematoma, recurrence, shoulder weakness, and need for further procedures and she would like to proceed. I will add her onto the OR schedule next week.     Masha Man MD  1/18/2023    Electronically signed by Masha Man MD on 1/18/2023 at 1:25 PM

## 2023-01-19 DIAGNOSIS — G89.18 POST-OP PAIN: Primary | ICD-10-CM

## 2023-01-19 RX ORDER — HYDROCODONE BITARTRATE AND ACETAMINOPHEN 5; 325 MG/1; MG/1
1 TABLET ORAL EVERY 4 HOURS PRN
Qty: 18 TABLET | Refills: 0 | Status: SHIPPED | OUTPATIENT
Start: 2023-01-19 | End: 2023-01-22

## 2023-01-19 RX ORDER — ONDANSETRON 4 MG/1
4 TABLET, FILM COATED ORAL EVERY 8 HOURS PRN
Qty: 8 TABLET | Refills: 0 | Status: SHIPPED | OUTPATIENT
Start: 2023-01-19

## 2023-01-19 RX ORDER — CEPHALEXIN 500 MG/1
500 CAPSULE ORAL 4 TIMES DAILY
Qty: 28 CAPSULE | Refills: 0 | Status: SHIPPED | OUTPATIENT
Start: 2023-01-19

## 2023-01-19 NOTE — PERIOP NOTE
Patient verified name and . Order for consent not found in EHR ; patient verifies procedure. Type 1b surgery, Phone assessment complete. Orders not received. Labs per surgeon: none  Labs per anesthesia protocol: none    Recent labs including cbc,cmp in emr dated 1/10/23 and within anesthesia protocols for surgery. Recent card note and testing including ekg 12/10/21 and echo 20 in EMR if needed DOS. Patient answered medical/surgical history questions at their best of ability. All prior to admission medications documented in [facility]. Patient instructed to take the following medications the day of surgery according to anesthesia guidelines with a small sip of water: Use and bring inhalers DOS, Synthroid, metoprolol, lyrica,   Hold all vitamins 7 days prior to surgery and NSAIDS 5 days prior to surgery. Prescription meds to hold: Eliquis per surgeon's instructions. Patient instructed on the following:    > Arrive at [address], time of arrival to be called the day before by 1700  > NPO after midnight, unless otherwise indicated, including gum, mints, and ice chips  > Responsible adult must drive patient to the hospital, stay during surgery, and patient will need supervision 24 hours after anesthesia  > Use antibacterial soap in shower the night before surgery and on the morning of surgery  > All piercings must be removed prior to arrival.    > Leave all valuables (money and jewelry) at home but bring insurance card and ID on DOS.   > Do not wear make-up, nail polish, lotions, cologne, perfumes, powders, or oil on skin. Artificial nails are not permitted.

## 2023-01-19 NOTE — TELEPHONE ENCOUNTER
I have reviewed the provider's pre-op instructions with the patient, answering all questions to their satisfaction. History & Physical & ROS updated with patient . Post operative medications have been sent to pharmacy and instructions have been given to patient on how to take. Patient informed if they have any questions or concerns to please call the office on voicemail.

## 2023-01-25 ENCOUNTER — ANESTHESIA (OUTPATIENT)
Dept: SURGERY | Age: 63
End: 2023-01-25
Payer: COMMERCIAL

## 2023-01-25 ENCOUNTER — ANESTHESIA EVENT (OUTPATIENT)
Dept: SURGERY | Age: 63
End: 2023-01-25
Payer: COMMERCIAL

## 2023-01-25 ENCOUNTER — HOSPITAL ENCOUNTER (OUTPATIENT)
Age: 63
Setting detail: OUTPATIENT SURGERY
Discharge: HOME | End: 2023-01-25
Attending: OTOLARYNGOLOGY | Admitting: OTOLARYNGOLOGY
Payer: COMMERCIAL

## 2023-01-25 VITALS
HEART RATE: 71 BPM | OXYGEN SATURATION: 92 % | WEIGHT: 266.76 LBS | TEMPERATURE: 97.3 F | BODY MASS INDEX: 48.79 KG/M2 | RESPIRATION RATE: 18 BRPM | SYSTOLIC BLOOD PRESSURE: 136 MMHG | DIASTOLIC BLOOD PRESSURE: 69 MMHG

## 2023-01-25 DIAGNOSIS — R59.0 CERVICAL LYMPHADENOPATHY: ICD-10-CM

## 2023-01-25 PROCEDURE — 7100000000 HC PACU RECOVERY - FIRST 15 MIN: Performed by: OTOLARYNGOLOGY

## 2023-01-25 PROCEDURE — 2709999900 HC NON-CHARGEABLE SUPPLY: Performed by: OTOLARYNGOLOGY

## 2023-01-25 PROCEDURE — 6360000002 HC RX W HCPCS: Performed by: NURSE ANESTHETIST, CERTIFIED REGISTERED

## 2023-01-25 PROCEDURE — 2500000003 HC RX 250 WO HCPCS: Performed by: OTOLARYNGOLOGY

## 2023-01-25 PROCEDURE — C1894 INTRO/SHEATH, NON-LASER: HCPCS | Performed by: OTOLARYNGOLOGY

## 2023-01-25 PROCEDURE — 3600000012 HC SURGERY LEVEL 2 ADDTL 15MIN: Performed by: OTOLARYNGOLOGY

## 2023-01-25 PROCEDURE — 6370000000 HC RX 637 (ALT 250 FOR IP): Performed by: ANESTHESIOLOGY

## 2023-01-25 PROCEDURE — 7100000001 HC PACU RECOVERY - ADDTL 15 MIN: Performed by: OTOLARYNGOLOGY

## 2023-01-25 PROCEDURE — 2720000010 HC SURG SUPPLY STERILE: Performed by: OTOLARYNGOLOGY

## 2023-01-25 PROCEDURE — 2500000003 HC RX 250 WO HCPCS: Performed by: NURSE ANESTHETIST, CERTIFIED REGISTERED

## 2023-01-25 PROCEDURE — 3700000001 HC ADD 15 MINUTES (ANESTHESIA): Performed by: OTOLARYNGOLOGY

## 2023-01-25 PROCEDURE — 2580000003 HC RX 258: Performed by: ANESTHESIOLOGY

## 2023-01-25 PROCEDURE — 88305 TISSUE EXAM BY PATHOLOGIST: CPT

## 2023-01-25 PROCEDURE — 3700000000 HC ANESTHESIA ATTENDED CARE: Performed by: OTOLARYNGOLOGY

## 2023-01-25 PROCEDURE — 3600000002 HC SURGERY LEVEL 2 BASE: Performed by: OTOLARYNGOLOGY

## 2023-01-25 PROCEDURE — 38510 BIOPSY/REMOVAL LYMPH NODES: CPT | Performed by: OTOLARYNGOLOGY

## 2023-01-25 PROCEDURE — 7100000010 HC PHASE II RECOVERY - FIRST 15 MIN: Performed by: OTOLARYNGOLOGY

## 2023-01-25 RX ORDER — HYDROMORPHONE HYDROCHLORIDE 1 MG/ML
0.5 INJECTION, SOLUTION INTRAMUSCULAR; INTRAVENOUS; SUBCUTANEOUS EVERY 5 MIN PRN
Status: DISCONTINUED | OUTPATIENT
Start: 2023-01-25 | End: 2023-01-25 | Stop reason: HOSPADM

## 2023-01-25 RX ORDER — HALOPERIDOL 5 MG/ML
1 INJECTION INTRAMUSCULAR
Status: DISCONTINUED | OUTPATIENT
Start: 2023-01-25 | End: 2023-01-25 | Stop reason: HOSPADM

## 2023-01-25 RX ORDER — ONDANSETRON 2 MG/ML
INJECTION INTRAMUSCULAR; INTRAVENOUS PRN
Status: DISCONTINUED | OUTPATIENT
Start: 2023-01-25 | End: 2023-01-25 | Stop reason: SDUPTHER

## 2023-01-25 RX ORDER — LIDOCAINE HYDROCHLORIDE 20 MG/ML
INJECTION, SOLUTION EPIDURAL; INFILTRATION; INTRACAUDAL; PERINEURAL PRN
Status: DISCONTINUED | OUTPATIENT
Start: 2023-01-25 | End: 2023-01-25 | Stop reason: SDUPTHER

## 2023-01-25 RX ORDER — FENTANYL CITRATE 50 UG/ML
100 INJECTION, SOLUTION INTRAMUSCULAR; INTRAVENOUS
Status: DISCONTINUED | OUTPATIENT
Start: 2023-01-25 | End: 2023-01-25 | Stop reason: HOSPADM

## 2023-01-25 RX ORDER — SODIUM CHLORIDE 0.9 % (FLUSH) 0.9 %
5-40 SYRINGE (ML) INJECTION EVERY 12 HOURS SCHEDULED
Status: DISCONTINUED | OUTPATIENT
Start: 2023-01-25 | End: 2023-01-25 | Stop reason: HOSPADM

## 2023-01-25 RX ORDER — DEXAMETHASONE SODIUM PHOSPHATE 10 MG/ML
INJECTION INTRAMUSCULAR; INTRAVENOUS PRN
Status: DISCONTINUED | OUTPATIENT
Start: 2023-01-25 | End: 2023-01-25 | Stop reason: SDUPTHER

## 2023-01-25 RX ORDER — SODIUM CHLORIDE 0.9 % (FLUSH) 0.9 %
5-40 SYRINGE (ML) INJECTION PRN
Status: DISCONTINUED | OUTPATIENT
Start: 2023-01-25 | End: 2023-01-25 | Stop reason: HOSPADM

## 2023-01-25 RX ORDER — FENTANYL CITRATE 50 UG/ML
INJECTION, SOLUTION INTRAMUSCULAR; INTRAVENOUS PRN
Status: DISCONTINUED | OUTPATIENT
Start: 2023-01-25 | End: 2023-01-25 | Stop reason: SDUPTHER

## 2023-01-25 RX ORDER — LIDOCAINE HYDROCHLORIDE 10 MG/ML
1 INJECTION, SOLUTION INFILTRATION; PERINEURAL
Status: DISCONTINUED | OUTPATIENT
Start: 2023-01-25 | End: 2023-01-25 | Stop reason: HOSPADM

## 2023-01-25 RX ORDER — ACETAMINOPHEN 500 MG
1000 TABLET ORAL ONCE
Status: COMPLETED | OUTPATIENT
Start: 2023-01-25 | End: 2023-01-25

## 2023-01-25 RX ORDER — OXYCODONE HYDROCHLORIDE 5 MG/1
5 TABLET ORAL
Status: COMPLETED | OUTPATIENT
Start: 2023-01-25 | End: 2023-01-25

## 2023-01-25 RX ORDER — SODIUM CHLORIDE 9 MG/ML
INJECTION, SOLUTION INTRAVENOUS PRN
Status: DISCONTINUED | OUTPATIENT
Start: 2023-01-25 | End: 2023-01-25 | Stop reason: HOSPADM

## 2023-01-25 RX ORDER — SUCCINYLCHOLINE CHLORIDE 20 MG/ML
INJECTION INTRAMUSCULAR; INTRAVENOUS PRN
Status: DISCONTINUED | OUTPATIENT
Start: 2023-01-25 | End: 2023-01-25 | Stop reason: SDUPTHER

## 2023-01-25 RX ORDER — PROPOFOL 10 MG/ML
INJECTION, EMULSION INTRAVENOUS PRN
Status: DISCONTINUED | OUTPATIENT
Start: 2023-01-25 | End: 2023-01-25 | Stop reason: SDUPTHER

## 2023-01-25 RX ORDER — PROCHLORPERAZINE EDISYLATE 5 MG/ML
5 INJECTION INTRAMUSCULAR; INTRAVENOUS
Status: DISCONTINUED | OUTPATIENT
Start: 2023-01-25 | End: 2023-01-25 | Stop reason: HOSPADM

## 2023-01-25 RX ORDER — SODIUM CHLORIDE, SODIUM LACTATE, POTASSIUM CHLORIDE, CALCIUM CHLORIDE 600; 310; 30; 20 MG/100ML; MG/100ML; MG/100ML; MG/100ML
INJECTION, SOLUTION INTRAVENOUS CONTINUOUS
Status: DISCONTINUED | OUTPATIENT
Start: 2023-01-25 | End: 2023-01-25 | Stop reason: HOSPADM

## 2023-01-25 RX ORDER — LIDOCAINE HYDROCHLORIDE AND EPINEPHRINE 10; 10 MG/ML; UG/ML
INJECTION, SOLUTION INFILTRATION; PERINEURAL PRN
Status: DISCONTINUED | OUTPATIENT
Start: 2023-01-25 | End: 2023-01-25 | Stop reason: HOSPADM

## 2023-01-25 RX ORDER — MIDAZOLAM HYDROCHLORIDE 2 MG/2ML
2 INJECTION, SOLUTION INTRAMUSCULAR; INTRAVENOUS
Status: DISCONTINUED | OUTPATIENT
Start: 2023-01-25 | End: 2023-01-25 | Stop reason: HOSPADM

## 2023-01-25 RX ORDER — KETAMINE HYDROCHLORIDE 50 MG/ML
INJECTION, SOLUTION, CONCENTRATE INTRAMUSCULAR; INTRAVENOUS PRN
Status: DISCONTINUED | OUTPATIENT
Start: 2023-01-25 | End: 2023-01-25 | Stop reason: SDUPTHER

## 2023-01-25 RX ORDER — IPRATROPIUM BROMIDE AND ALBUTEROL SULFATE 2.5; .5 MG/3ML; MG/3ML
1 SOLUTION RESPIRATORY (INHALATION)
Status: DISCONTINUED | OUTPATIENT
Start: 2023-01-25 | End: 2023-01-25 | Stop reason: HOSPADM

## 2023-01-25 RX ADMIN — OXYCODONE 5 MG: 5 TABLET ORAL at 09:02

## 2023-01-25 RX ADMIN — PROPOFOL 50 MG: 10 INJECTION, EMULSION INTRAVENOUS at 07:52

## 2023-01-25 RX ADMIN — ACETAMINOPHEN 1000 MG: 500 TABLET, FILM COATED ORAL at 06:38

## 2023-01-25 RX ADMIN — DEXAMETHASONE SODIUM PHOSPHATE 10 MG: 10 INJECTION INTRAMUSCULAR; INTRAVENOUS at 07:18

## 2023-01-25 RX ADMIN — SODIUM CHLORIDE, SODIUM LACTATE, POTASSIUM CHLORIDE, AND CALCIUM CHLORIDE: 600; 310; 30; 20 INJECTION, SOLUTION INTRAVENOUS at 06:38

## 2023-01-25 RX ADMIN — Medication 200 MG: at 07:09

## 2023-01-25 RX ADMIN — LIDOCAINE HYDROCHLORIDE 70 MG: 20 INJECTION, SOLUTION EPIDURAL; INFILTRATION; INTRACAUDAL; PERINEURAL at 07:08

## 2023-01-25 RX ADMIN — PROPOFOL 150 MG: 10 INJECTION, EMULSION INTRAVENOUS at 07:08

## 2023-01-25 RX ADMIN — KETAMINE HYDROCHLORIDE 20 MG: 50 INJECTION, SOLUTION INTRAMUSCULAR; INTRAVENOUS at 07:55

## 2023-01-25 RX ADMIN — FENTANYL CITRATE 100 MCG: 50 INJECTION, SOLUTION INTRAMUSCULAR; INTRAVENOUS at 07:08

## 2023-01-25 RX ADMIN — ONDANSETRON 4 MG: 2 INJECTION INTRAMUSCULAR; INTRAVENOUS at 07:28

## 2023-01-25 ASSESSMENT — PAIN SCALES - GENERAL
PAINLEVEL_OUTOF10: 4
PAINLEVEL_OUTOF10: 4
PAINLEVEL_OUTOF10: 1

## 2023-01-25 ASSESSMENT — PAIN DESCRIPTION - FREQUENCY: FREQUENCY: CONTINUOUS

## 2023-01-25 ASSESSMENT — PAIN DESCRIPTION - LOCATION: LOCATION: NECK

## 2023-01-25 ASSESSMENT — PAIN DESCRIPTION - ORIENTATION: ORIENTATION: LEFT

## 2023-01-25 ASSESSMENT — PAIN DESCRIPTION - DESCRIPTORS: DESCRIPTORS: ACHING;SORE

## 2023-01-25 ASSESSMENT — PAIN DESCRIPTION - PAIN TYPE: TYPE: SURGICAL PAIN

## 2023-01-25 NOTE — ANESTHESIA POSTPROCEDURE EVALUATION
Department of Anesthesiology  Postprocedure Note    Patient: Kirill Coleman  MRN: [id number]  YOB: 1960  Date of evaluation: 1/25/2023      Procedure Summary     Date: 01/25/23 Room / Location: [facility] [facility]  / [facility] [facility]    Anesthesia Start: 0654 Anesthesia Stop: 0845    Procedure: CERVICAL LYMPH NODE BIOPSY EXCISION DISSECTION (Left: Chin) Diagnosis:       Cervical lymphadenopathy      (Cervical lymphadenopathy [R59.0])    Surgeons: Yohana Hodge MD Responsible Provider: Shin Castellanos MD    Anesthesia Type: General ASA Status: 3          Anesthesia Type: General    Nelia Phase I:      Nelia Phase II:        Anesthesia Post Evaluation    Patient location during evaluation: PACU  Patient participation: complete - patient participated  Level of consciousness: awake  Airway patency: patent  Nausea & Vomiting: no nausea  Complications: no  Cardiovascular status: hemodynamically stable  Respiratory status: acceptable and nonlabored ventilation  Hydration status: stable  Multimodal analgesia pain management approach

## 2023-01-25 NOTE — ANESTHESIA PRE PROCEDURE
Department of Anesthesiology  Preprocedure Note       Name:  Kirill Coleman   Age:  58 y.o.  :  1960                                          MRN:  [id number]         Date:  2023      Surgeon: Jamison Terrazas):  Yohana Hodge MD    Procedure: Procedure(s):  CERVICAL LYMPH NODE BIOPSY EXCISION DISSECTION    Medications prior to admission:   Prior to Admission medications    Medication Sig Start Date End Date Taking? Authorizing Provider   ondansetron (ZOFRAN) 4 MG tablet Take 1 tablet by mouth every 8 hours as needed for Nausea or Vomiting 23   Yohana Hodge MD   cephALEXin [facility]) 500 MG capsule Take 1 capsule by mouth 4 times daily  Patient not taking: Reported on 2023   Yohana Hodge MD   pregabalin (LYRICA) 100 MG capsule Take 50 mg by mouth in the morning, at noon, and at bedtime. 23   Historical Provider, MD   azithromycin (ZITHROMAX) 250 MG tablet TAKE 2 TABS ON DAY 1, THEN 1 TAB A DAY FOR 4 DAYS  Patient taking differently: TAKE 2 TABS ON DAY 1, THEN 1 TAB A DAY FOR 4 DAYS    Takes mon - wed - fri 22   Jayro Ricketts MD   predniSONE (DELTASONE) 20 MG tablet 40 mg for 3 days then 20 mg for 3 days and then 10 mg for 3 days  Patient not taking: Reported on 22   Jayro Ricketts MD   metoprolol succinate (TOPROL XL) 50 MG extended release tablet Take 1 tablet by mouth daily 22   Shavon Liu MD   apixaban Viri Abdirizak) 5 MG TABS tablet Take 1 tablet by mouth 2 times daily 22   Shavon Liu MD   triamterene-hydroCHLOROthiazide [facility]) 75-50 MG per tablet Take 0.5-1 tablets by mouth daily as needed (swelling) 22   Dorota Betts MD   pregabalin (LYRICA) 50 MG capsule Take 1 capsule by mouth 3 times daily for 30 days.  22  Mary Hayes MD   sodium chloride, Inhalant, 3 % nebulizer solution Take 4 mLs by nebulization 2 times daily as needed for Cough 22   Martha Nelson APRN - CNP   albuterol (PROVENTIL) (2.5 MG/3ML) 0.083% nebulizer solution Take 3 mLs by nebulization in the morning and 3 mLs at noon and 3 mLs in the evening and 3 mLs before bedtime. 8/8/22   EMELY Altamirano CNP   montelukast (SINGULAIR) 10 MG tablet Take 1 tablet by mouth in the morning.   Patient taking differently: Take 10 mg by mouth nightly 7/26/22   EMELY Wheeler CNP   levothyroxine (SYNTHROID) 200 MCG tablet Take 1 tablet by mouth every morning (before breakfast) Take 1 tablet by mouth every mouth along with levothyroxine 50mg to equal 250mg 7/18/22   Nia Najera MD   levothyroxine (SYNTHROID) 50 MCG tablet Take 1 tablet by mouth every morning (before breakfast) Take 1 tablet by mouth every mouth along with levothyroxine 200mg to equal 250mg  Patient not taking: Reported on 1/19/2023 7/18/22   Nia Najera MD   albuterol sulfate  (90 Base) MCG/ACT inhaler Inhale 2 puffs into the lungs every 4 hours  Patient not taking: Reported on 1/19/2023 1/31/22   Ar Automatic Reconciliation   fluticasone (FLONASE) 50 MCG/ACT nasal spray 2 sprays by Nasal route daily 1/28/21   Ar Automatic Reconciliation   Fluticasone-Umeclidin-Vilant (TRELEGY ELLIPTA) 200-62.5-25 MCG/INH AEPB Inhale 1 puff into the lungs daily 3/7/22   Ar Automatic Reconciliation       Current medications:    Current Facility-Administered Medications   Medication Dose Route Frequency Provider Last Rate Last Admin    lidocaine 1 % injection 1 mL  1 mL IntraDERmal Once PRN Jaron Kaur MD        fentaNYL (SUBLIMAZE) injection 100 mcg  100 mcg IntraVENous Once PRN Jaron Kaur MD        lactated ringers IV soln infusion   IntraVENous Continuous Jaron Kaur  mL/hr at 01/25/23 0638 New Bag at 01/25/23 6941    sodium chloride flush 0.9 % injection 5-40 mL  5-40 mL IntraVENous 2 times per day Jaron Kaur MD        sodium chloride flush 0.9 % injection 5-40 mL  5-40 mL IntraVENous PRN Jaron Kaur MD        0.9 % sodium chloride infusion   IntraVENous PRN Lisa Bustillos MD        midazolam PF (VERSED) injection 2 mg  2 mg IntraVENous Once PRN Lisa Bustillos MD           Allergies: Allergies   Allergen Reactions    Gabapentin Other (See Comments)     \"Double vision and falls\"       Problem List:    Patient Active Problem List   Diagnosis Code    PFO (patent foramen ovale) Q21.12    Osteoarthritis of left knee M17.12    Heart murmur R01.1    Admission for antineoplastic chemotherapy Z51.11    Hypertension I10    History of DVT of lower extremity Z86.718    History of atrial fibrillation Z86.79    Primary hypothyroidism E03.9    DLBCL (diffuse large B cell lymphoma) (HCC) C83.30    Snores R06.83    Atrial fibrillation (HCC) I48.91    Left lower lobe pneumonia J18.9    Atelectasis J98.11    Mild mitral regurgitation by prior echocardiogram I34.0    Pancytopenia due to antineoplastic chemotherapy (HCC) D61.810, T45.1X5A    Hemoptysis R04.2    Non Hodgkin's lymphoma (HCC) C85.90    Pneumonia and influenza J11.00    Lymphadenopathy, generalized R59.1    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 ([facility]) C85.88    Osteoarthritis of right knee M17.11    Morbid obesity with BMI of 50.0-59.9, adult ([facility]) E66.01, Z68.43    Palpitations R00.2    Hypersomnia, unspecified G47.10    Hypoxemia R09.02    DAGMAR (obstructive sleep apnea) G47.33    Hypogammaglobulinemia ([facility]) D80.1    Cervical lymphadenopathy R59.0       Past Medical History:        Diagnosis Date    Adverse effect of anesthesia 2020    new onset of afib with RVR during bronchoscopy    Anemia     Asthma     Followed by Dr. Benja Leiva, Pulmonology.   daily and rescue inhaler    Atrial fibrillation with rapid ventricular response ([address] 11/25/2020    Basal cell carcinoma     Cardiomegaly     Deep vein thrombosis (DVT) (HCC)     History of stroke     History of UTI     Hypertension     Marginal zone lymphoma ([address] 03/30/2017    Diffuse Large B-cell Lymphoma. Completed Chemotherapy 5/28/18    Morbid obesity ([address]     DAGMAR (obstructive sleep apnea)     uses CPAP    Osteoarthritis     Overactive bladder     Primary hypothyroidism     Prolapse of female bladder, acquired     Radiation therapy complication     Rheumatic fever     S/P total knee replacement using cement 10/3/2011    Shingles     Superficial venous thrombosis of right arm 5/1/2017       Past Surgical History:        Procedure Laterality Date    BRONCHOSCOPY  11/25/2020    new onset of afib with RVR during bronchoscopy    CATARACT REMOVAL Bilateral 2018    CHOLECYSTECTOMY  1983    COLONOSCOPY  03/2017    Clear    [place] ANESTH,ACHILLES TENDON SURG  Left 02/10/2011    Dr. Sarahy Thompson  11/25/2020    ISABELL-guided cardioversion for A. Fib    PRE-MALIGNANT / BENIGN SKIN LESION EXCISION  2019    basal/squamous skin lesions removed from scalp. [facility].  TOTAL KNEE ARTHROPLASTY Left 2013    Dr. Natasha Power Right 2012    Dr. Payton Flores [facility] LEFT Left 01/03/2018    US BREAST NEEDLE BIOPSY LEFT 1/3/2018 [facility]    US LYMPH NODE BIOPSY  01/05/2023    US LYMPH NODE BIOPSY 1/5/2023 Tsering Restrepo PA-C [facility]       Social History:    Social History     Tobacco Use    Smoking status: Never    Smokeless tobacco: Never   Substance Use Topics    Alcohol use:  No                                Counseling given: Not Answered      Vital Signs (Current):   Vitals:    01/25/23 0623   BP: 137/65   Pulse: 73   Resp: 18   Temp: 97.8 °F (36.6 °C)   TempSrc: Temporal   SpO2: 99%   Weight: 266 lb 12.1 oz (121 kg)                                              BP Readings from Last 3 Encounters:   01/25/23 137/65   01/05/23 132/69   01/04/23 124/72       NPO Status: Time of last liquid consumption: 2000                        Time of last solid consumption: 2000 Date of last liquid consumption: 01/24/23                        Date of last solid food consumption: 01/24/23    BMI:   Wt Readings from Last 3 Encounters:   01/25/23 266 lb 12.1 oz (121 kg)   01/18/23 254 lb (115.2 kg)   01/05/23 260 lb (117.9 kg)     Body mass index is 48.79 kg/m². CBC:   Lab Results   Component Value Date/Time    WBC 6.2 11/15/2022 03:00 PM    RBC 3.83 11/15/2022 03:00 PM    HGB 10.7 11/15/2022 03:00 PM    HCT 33.9 11/15/2022 03:00 PM    MCV 88.5 11/15/2022 03:00 PM    RDW 14.1 11/15/2022 03:00 PM     11/15/2022 03:00 PM       CMP:   Lab Results   Component Value Date/Time     11/15/2022 03:00 PM    K 3.0 11/15/2022 03:00 PM     11/15/2022 03:00 PM    CO2 30 11/15/2022 03:00 PM    BUN 19 11/15/2022 03:00 PM    CREATININE 0.90 11/15/2022 03:00 PM    GFRAA >60 08/02/2022 03:12 PM    AGRATIO 3.8 03/21/2022 10:02 AM    LABGLOM >60 11/15/2022 03:00 PM    GLUCOSE 107 11/15/2022 03:00 PM    PROT 7.0 11/15/2022 03:00 PM    CALCIUM 9.1 11/15/2022 03:00 PM    BILITOT 0.3 11/15/2022 03:00 PM    ALKPHOS 82 11/15/2022 03:00 PM    ALKPHOS 91 03/21/2022 10:02 AM    AST 25 11/15/2022 03:00 PM    ALT 42 11/15/2022 03:00 PM       POC Tests: No results for input(s): POCGLU, POCNA, POCK, POCCL, POCBUN, POCHEMO, POCHCT in the last 72 hours.     Coags:   Lab Results   Component Value Date/Time    PROTIME 13.4 11/25/2020 02:40 PM    INR 1.0 11/25/2020 02:40 PM       HCG (If Applicable): No results found for: PREGTESTUR, PREGSERUM, HCG, HCGQUANT     ABGs: No results found for: PHART, PO2ART, CGP6OEP, TYG2AFD, BEART, E0TTCFJN     Type & Screen (If Applicable):  No results found for: LABABO, LABRH    Drug/Infectious Status (If Applicable):  Lab Results   Component Value Date/Time    HEPCAB <0.1 06/07/2016 08:08 AM       COVID-19 Screening (If Applicable):   Lab Results   Component Value Date/Time    COVID19 Not Detected 08/04/2020 08:05 PM           Anesthesia Evaluation  Patient summary reviewed and Nursing notes reviewed no history of anesthetic complications:   Airway: Mallampati: III  TM distance: >3 FB   Neck ROM: full  Mouth opening: > = 3 FB   Dental: normal exam         Pulmonary: breath sounds clear to auscultation  (+) sleep apnea: on CPAP,  asthma:                            Cardiovascular:  Exercise tolerance: good (>4 METS),   (+) hypertension:, dysrhythmias (maintained on eliquis): atrial fibrillation,             Echocardiogram reviewed               ROS comment: ISABELL, normal EF, small PFO     Neuro/Psych:               GI/Hepatic/Renal:   (+) morbid obesity          Endo/Other:    (+) hypothyroidism, blood dyscrasia (pancytopenia due to chemo): thrombocytopenia and anemia:., malignancy/cancer (diffuse large B cell lymphoma). Abdominal:             Vascular:   + DVT, . Other Findings:           Anesthesia Plan      general     ASA 3       Induction: intravenous. Anesthetic plan and risks discussed with patient and spouse.                         Evaristo Colindres MD   1/25/2023

## 2023-01-25 NOTE — H&P
57 yo female seen recently for cervical LAD. I had spoken to Dr. John Cook about her case yesterday. 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 She then developed new lymphadenopathy after several rounds of treatment and biopsy of a breast nodule revealed with a diffuse large B-cell lymphoma. She was treated with more chemotherapy and has been followed closely. She had a surveillance CT scan of the neck/chest/abdomen which revealed several enlarged cervical lymph nodes. She has complained of intermittent left posterior neck pain and there has been intermittent swelling of this area. She underwent recent ultrasound-guided FNA of a right cervical node which was negative for any malignancy but there is still concern for recurrent lymphoma. She takes Eliquis daily but has been off this medication for the past 2 days. Past Medical History:   Diagnosis Date    Adverse effect of anesthesia 2020    new onset of afib with RVR during bronchoscopy    Anemia     Asthma     Followed by Dr. Ryne Stevenson, Pulmonology. daily and rescue inhaler    Atrial fibrillation with rapid ventricular response ([address] 11/25/2020    Basal cell carcinoma     Cardiomegaly     Deep vein thrombosis (DVT) (HCC)     History of stroke     History of UTI     Hypertension     Marginal zone lymphoma ([address] 03/30/2017    Diffuse Large B-cell Lymphoma.  Completed Chemotherapy 5/28/18    Morbid obesity (HCC)     DAGMAR (obstructive sleep apnea)     uses CPAP    Osteoarthritis     Overactive bladder     Primary hypothyroidism     Prolapse of female bladder, acquired     Radiation therapy complication     Rheumatic fever     S/P total knee replacement using cement 10/3/2011    Shingles     Superficial venous thrombosis of right arm 5/1/2017     Past Surgical History:   Procedure Laterality Date    BRONCHOSCOPY  11/25/2020    new onset of afib with RVR during bronchoscopy    CATARACT REMOVAL Bilateral 2018    CHOLECYSTECTOMY 1983    COLONOSCOPY  03/2017    Clear    [place] ANESTH,ACHILLES TENDON SURG  Left 02/10/2011    Dr. Darylene Darner  11/25/2020    ISABELL-guided cardioversion for A. Fib    PRE-MALIGNANT / BENIGN SKIN LESION EXCISION  2019    basal/squamous skin lesions removed from scalp. [facility]. Chip Mcdaniels Left 2013    Dr. Emily Nuno Right 2012    Dr. Deyanira Esposito LEFT Left 01/03/2018    US BREAST NEEDLE BIOPSY LEFT 1/3/2018 [facility]    US LYMPH NODE BIOPSY  01/05/2023    US LYMPH NODE BIOPSY 1/5/2023 Tsering Restrepo PA-C [facility]     Social History     Socioeconomic History    Marital status:      Spouse name: Not on file    Number of children: Not on file    Years of education: Not on file    Highest education level: Not on file   Occupational History    Not on file   Tobacco Use    Smoking status: Never    Smokeless tobacco: Never   Substance and Sexual Activity    Alcohol use: No    Drug use: Never    Sexual activity: Not on file   Other Topics Concern    Not on file   Social History Narrative    10/3/11:  PATIENT IS  TO [address]. SHE IS DISABLED. Social Determinants of Health     Financial Resource Strain: Not on file   Food Insecurity: Not on file   Transportation Needs: Not on file   Physical Activity: Not on file   Stress: Not on file   Social Connections: Not on file   Intimate Partner Violence: Not on file   Housing Stability: Not on file     Family History   Problem Relation Age of Onset    Other Maternal Grandmother         Vascular Disorder with leg amputation    Dementia Mother     Kidney Disease Father     Thyroid Cancer Neg Hx     Post-op Cognitive Dysfunction Neg Hx     Emergence Delirium Neg Hx     Delayed Awakening Neg Hx     Pseudochol.  Deficiency Neg Hx     Malig Hypertherm Neg Hx     Thyroid Disease Sister hypothyroidism    Breast Cancer Cousin 50    Celiac Disease Sister     Liver Disease Sister         non-alcoholic     Allergies   Allergen Reactions    Gabapentin Other (See Comments)     \"Double vision and falls\"     Current Facility-Administered Medications   Medication Dose Route Frequency Provider Last Rate Last Admin    lidocaine 1 % injection 1 mL  1 mL IntraDERmal Once PRN Emperatriz Moreno MD        fentaNYL (SUBLIMAZE) injection 100 mcg  100 mcg IntraVENous Once PRN Emperatriz Moreno MD        lactated ringers IV soln infusion   IntraVENous Continuous Emperatriz Moreno  mL/hr at 01/25/23 0638 New Bag at 01/25/23 1780    sodium chloride flush 0.9 % injection 5-40 mL  5-40 mL IntraVENous 2 times per day Emperatriz Moreno MD        sodium chloride flush 0.9 % injection 5-40 mL  5-40 mL IntraVENous PRN Emperatriz Moreno MD        0.9 % sodium chloride infusion   IntraVENous PRN Emperatriz Moreno MD        midazolam PF (VERSED) injection 2 mg  2 mg IntraVENous Once PRN Emperatriz Moreno MD         EXAM:  /65   Pulse 73   Temp 97.8 °F (36.6 °C) (Temporal)   Resp 18   Wt 266 lb 12.1 oz (121 kg)   SpO2 99%   BMI 48.79 kg/m²   General: NAD, well-appearing  Neuro: No gross neuro deficits. CN's II-XII intact. No facial weakness. Eyes: EOMI. Pupils reactive. No periorbital edema/ecchymosis. No nystagmus. Skin: No facial erythema, rashes or concerning lesions. Nose: No external deviations or saddling. Intranasally, septum is midline without perforations, nasal mucosa appears healthy with no erythema, mucopurulence, or polyps. Mouth: Moist mucus membranes, normal tongue/palate mobility, no concerning mucosal lesions. Oropharynx clear with no erythema/exudate, no tonsillar hypertrophy. Ears: Normal appearing auricles, no hematomas. EACs clear with no cerumen impaction, healthy canal skin, TM's intact with no perforations or retraction pockets. No middle ear effusions. Neck: Soft, supple, no midline neck masses.  No palpable parotid or submandibular masses. No thyromegaly or palpable thyroid nodules. No surgical scars. Lymphatics: There is bilateral level 2 cervical LAD. Resp: No audible stridor or wheezing. CV: No murmurs, no JVD. Extremities: No clubbing or cyanosis. IMAGING:  CT neck-  Findings:   NECK CT:   Globes and orbits intact. Limited views of intracranial contents within normal   limits. The oropharynx, nasopharynx, larynx normal. Right cervical chain   adenopathy slightly increased from previous examination with marker right level   2 node measuring 1.6 cm short axis, previously 1.4 cm. Right level 4 1.2 cm node   previously measured 6 mm short axis. Prominent adenoid and tonsillar pillar soft   tissues similar to prior examination. Slight interval enlargement left level 2   nodes measuring 1.3 cm short axis, previously 1.1 cm. Vessels patent. CHEST CT:    Right IJ portacatheter tip distal SVC. The heart size is normal. No   pathologically enlarged mediastinal, hilar, or axillary lymph nodes. No pleural   or pericardial effusion. The lung parenchyma is unremarkable. No lung mass seen. ABDOMEN CT:   Mild hepatic steatosis with nodularity of the liver contour similar to previous   examination. No aggressive liver lesion identified. Mild splenomegaly stable at   14.7 cm AP dimension. Stable appearance of the kidneys, adrenal glands, pancreas. Upper abdominal   vasculature grossly patent. There is no intra or extrahepatic biliary ductal   dilatation. PELVIS CT:   The bowel is normal in caliber, and there is no focal or diffuse bowel wall   thickening. There is no free air or free fluid in the abdomen or pelvis. There is no significant retroperitoneal, pelvic, mesenteric, or inguinal   lymphadenopathy. The bladder is unremarkable. There are no aggressive appearing osseous lesions. Uterus age-appropriate. Fat-containing umbilical hernia.            Impression 1. Progressive bilateral neck cervical chain lymph node enlargement suggesting   disease progression. 2. No progressive abnormality below the level of the neck. 3. Cirrhosis with splenomegaly stable. No interval ascites or focal aggressive   liver lesion. PATH:  DIAGNOSIS   Right Neck Lymph Node, Fine Needle Aspiration:       NO CARCINOMA IDENTIFIED      A/P:  She has bilateral cervical lymphadenopathy, concerning for recurrent lymphoma. Her recent right-sided cervical lymph node FNA was negative for malignancy. At this time, I recommend proceeding with excisional biopsy of a deep L cervical cervical lymph node. I reviewed the risks of surgery including skin numbness, infection, hematoma, recurrence, shoulder weakness, and need for further procedures and she would like to proceed.     [address]

## 2023-01-25 NOTE — DISCHARGE INSTRUCTIONS
-There are steri-strips in place over your neck incision. All of the sutures are deep to these steri strips. You may shower and bathe as long as these steri strips remain clean and dry  -No strenuous activity or heavy lifting for 1 week  -Please start with soft foods and advance diet    MEDICATION INTERACTION:    During your procedure you potentially received a medication or medications which may reduce the effectiveness of oral contraceptives. Please consider other forms of contraception for 1 month following your procedure if you are currently using oral contraceptives as your primary form of birth control. In addition to this, we recommend continuing your oral contraceptive as prescribed, unless otherwise instructed by your physician, during this time. After general anesthesia or intravenous sedation, for 24 hours or while taking prescription Narcotics:  Limit your activities  A responsible adult needs to be with you for the next 24 hours  Do not drive and operate hazardous machinery  Do not make important personal or business decisions  Do not drink alcoholic beverages  If you have not urinated within 8 hours after discharge, and you are experiencing discomfort from urinary retention, please go to the nearest ED. If you have sleep apnea and have a CPAP machine, please use it for all naps and sleeping. Please use caution when taking narcotics and any of your home medications that may cause drowsiness. *  Please give a list of your current medications to your Primary Care Provider. *  Please update this list whenever your medications are discontinued, doses are      changed, or new medications (including over-the-counter products) are added. *  Please carry medication information at all times in case of emergency situations.     These are general instructions for a healthy lifestyle:  No smoking/ No tobacco products/ Avoid exposure to second hand smoke  Surgeon General's Warning:  Quitting smoking now greatly reduces serious risk to your health. Obesity, smoking, and sedentary lifestyle greatly increases your risk for illness  A healthy diet, regular physical exercise & weight monitoring are important for maintaining a healthy lifestyle    You may be retaining fluid if you have a history of heart failure or if you experience any of the following symptoms:  Weight gain of 3 pounds or more overnight or 5 pounds in a week, increased swelling in our hands or feet or shortness of breath while lying flat in bed. Please call your doctor as soon as you notice any of these symptoms; do not wait until your next office visit.

## 2023-01-25 NOTE — BRIEF OP NOTE
Brief Postoperative Note      Patient: Kimber Taylor  YOB: 1960  MRN: [id number]    Date of Procedure: 1/25/2023    Pre-Op Diagnosis: L Cervical lymphadenopathy [R59.0]    Post-Op Diagnosis: L Cervical lymphadenopathy [R59.0]       Procedure(s):  CERVICAL LYMPH NODE BIOPSY EXCISION DISSECTION    Surgeon(s):  Garcia Thomas MD    Assistant:  * No surgical staff found *    Anesthesia: General    Estimated Blood Loss (mL): Minimal    IVF: 463 cc    Complications: None    Specimens:   ID Type Source Tests Collected by Time Destination   A : Left Neck Mass Tissue Neck SURGICAL PATHOLOGY Garcia Thomas MD 1/25/2023 5269        Implants:  * No implants in log *      Drains: * No LDAs found *    Findings: conglomerate of L level 1 nodes just posterior to SMG along facial vein    Electronically signed by Garcia Thomas MD on 1/25/2023 at 8:31 AM

## 2023-01-25 NOTE — OP NOTE
[place]  OPERATIVE REPORT    Name:  Deborah Robert  MR#:  [id number]  :  1960  ACCOUNT #:  [de-identified]  DATE OF SERVICE:  2023    PREOPERATIVE DIAGNOSES:  1. Left cervical lymphadenopathy. 2.  History of lymphoma. POSTOPERATIVE DIAGNOSES:  1. Left cervical lymphadenopathy. 2.  History of lymphoma. PROCEDURE PERFORMED:  Excisional biopsy of deep left cervical lymph node. SURGEON:  Mariela Koch. Chris Chun MD    ASSISTANT:  none. ANESTHESIA:  General endotracheal.    ANESTHESIOLOGIST:  Heather Pearson MD    COMPLICATIONS:  None. SPECIMENS REMOVED:  Left neck mass - permanent. IMPLANTS:  none. ESTIMATED BLOOD LOSS:  Minimal.    OPERATIVE FINDINGS:  There was a conglomerate of several enlarged lymph nodes just posterior to the submandibular gland within deep left level 2, overlying the left facial vein. Excisional biopsy was performed. IV FLUID:  900 mL crystalloid. DRAINS:  None. DISPOSITION:  PACU, then home. CONDITION:  Stable. BRIEF HISTORY:  The patient is a 57-year-old female with a history of lymphoma. She has been followed in the oncology office and a CT scan from earlier this  revealed multiple enlarged cervical lymph nodes and there was concern for possible recurrence. She initially underwent FNA of an enlarged right cervical node which revealed no evidence of malignancy. She was referred to my office to consider an open cervical biopsy of one of the left-sided nodes to rule out recurrent lymphoma. DESCRIPTION OF PROCEDURE:  The patient was brought back to the operating room and placed on the table in the supine position. General endotracheal anesthesia was inducted without any complications. Once the patient was adequately sedated, a total of 8 mL of 1% lidocaine with 1:100,000 epinephrine was injected along the planned incision line. She was then sterilely prepped and draped in the usual fashion.     I began by designing an incision approximately two fingerbreadths below the angle of the mandible along the left upper neck. I incised through the skin and dermis with a 15 blade and then dissected through some subcutaneous fat and platysma muscle using Bovie electrocautery. I used a careful blunt dissection to carefully elevate a superiorly-based subplatysmal skin flap. I was careful to protect the marginal mandibular branch of the facial nerve. I identified the posterior border of the submandibular gland. I dissected out the posterior border of the gland and identified the facial vein. I dissected superiorly, following the facial vein cephalad. Just overlying the facial vein was a conglomerate of several enlarged lymph nodes with some associated adipose tissue. All these nodes looked relatively normal in appearance. I used careful blunt dissection and bipolar electrocautery to carefully dissect these nodes away from the facial vein. I did ligate and divide the vein both distally and proximally. These nodes were removed en bloc and passed off for permanent pathology labeled left neck mass. I irrigated out the wound bed and ensured adequate hemostasis using bipolar electrocautery. Once hemostasis was ensured, I closed the incision in layers using a 3-0 undyed Vicryl deep suture reapproximating platysma and dermis followed by 5-0 running subcutaneous Monocryl suture for the skin. Mastisol and Steri-Strips were placed over the incision. This concluded the surgical portion of the procedure. The patient was then awakened from anesthesia, extubated and taken to the PACU in stable condition afterwards.       MD ERICA [email or web address]  D:  01/25/2023 8:58  T:  01/25/2023 13:13  JOB #:  [id number]

## 2023-01-26 ENCOUNTER — HOSPITAL ENCOUNTER (OUTPATIENT)
Dept: INFUSION THERAPY | Age: 63
Discharge: HOME | End: 2023-01-26
Payer: COMMERCIAL

## 2023-01-26 VITALS
OXYGEN SATURATION: 100 % | RESPIRATION RATE: 18 BRPM | DIASTOLIC BLOOD PRESSURE: 79 MMHG | BODY MASS INDEX: 49.31 KG/M2 | SYSTOLIC BLOOD PRESSURE: 140 MMHG | TEMPERATURE: 98.3 F | WEIGHT: 269.6 LBS | HEART RATE: 77 BPM

## 2023-01-26 DIAGNOSIS — D80.1 HYPOGAMMAGLOBULINEMIA (HCC): Primary | ICD-10-CM

## 2023-01-26 PROCEDURE — 2580000003 HC RX 258: Performed by: NURSE PRACTITIONER

## 2023-01-26 PROCEDURE — 6370000000 HC RX 637 (ALT 250 FOR IP): Performed by: NURSE PRACTITIONER

## 2023-01-26 PROCEDURE — 96375 TX/PRO/DX INJ NEW DRUG ADDON: CPT

## 2023-01-26 PROCEDURE — 96366 THER/PROPH/DIAG IV INF ADDON: CPT

## 2023-01-26 PROCEDURE — 96365 THER/PROPH/DIAG IV INF INIT: CPT

## 2023-01-26 PROCEDURE — 6360000002 HC RX W HCPCS: Performed by: NURSE PRACTITIONER

## 2023-01-26 RX ORDER — DIPHENHYDRAMINE HYDROCHLORIDE 50 MG/ML
50 INJECTION INTRAMUSCULAR; INTRAVENOUS
OUTPATIENT
Start: 2023-01-26

## 2023-01-26 RX ORDER — ACETAMINOPHEN 325 MG/1
650 TABLET ORAL ONCE
Status: COMPLETED | OUTPATIENT
Start: 2023-01-26 | End: 2023-01-26

## 2023-01-26 RX ORDER — DIPHENHYDRAMINE HYDROCHLORIDE 50 MG/ML
50 INJECTION INTRAMUSCULAR; INTRAVENOUS ONCE
Status: COMPLETED | OUTPATIENT
Start: 2023-01-26 | End: 2023-01-26

## 2023-01-26 RX ORDER — ALBUTEROL SULFATE 90 UG/1
4 AEROSOL, METERED RESPIRATORY (INHALATION) PRN
OUTPATIENT
Start: 2023-01-26

## 2023-01-26 RX ORDER — HEPARIN SODIUM (PORCINE) LOCK FLUSH IV SOLN 100 UNIT/ML 100 UNIT/ML
500 SOLUTION INTRAVENOUS PRN
OUTPATIENT
Start: 2023-01-26

## 2023-01-26 RX ORDER — ACETAMINOPHEN 325 MG/1
650 TABLET ORAL
OUTPATIENT
Start: 2023-01-26

## 2023-01-26 RX ORDER — ONDANSETRON 2 MG/ML
8 INJECTION INTRAMUSCULAR; INTRAVENOUS
OUTPATIENT
Start: 2023-01-26

## 2023-01-26 RX ORDER — SODIUM CHLORIDE 9 MG/ML
INJECTION, SOLUTION INTRAVENOUS CONTINUOUS
OUTPATIENT
Start: 2023-01-26

## 2023-01-26 RX ORDER — MEPERIDINE HYDROCHLORIDE 25 MG/ML
12.5 INJECTION INTRAMUSCULAR; INTRAVENOUS; SUBCUTANEOUS PRN
OUTPATIENT
Start: 2023-01-26

## 2023-01-26 RX ORDER — EPINEPHRINE 1 MG/ML
0.3 INJECTION, SOLUTION, CONCENTRATE INTRAVENOUS PRN
OUTPATIENT
Start: 2023-01-26

## 2023-01-26 RX ORDER — SODIUM CHLORIDE 0.9 % (FLUSH) 0.9 %
5-40 SYRINGE (ML) INJECTION PRN
OUTPATIENT
Start: 2023-01-26

## 2023-01-26 RX ORDER — SODIUM CHLORIDE 9 MG/ML
5-250 INJECTION, SOLUTION INTRAVENOUS PRN
OUTPATIENT
Start: 2023-01-26

## 2023-01-26 RX ORDER — SODIUM CHLORIDE 0.9 % (FLUSH) 0.9 %
5-40 SYRINGE (ML) INJECTION PRN
Status: DISCONTINUED | OUTPATIENT
Start: 2023-01-26 | End: 2023-01-27 | Stop reason: HOSPADM

## 2023-01-26 RX ADMIN — IMMUNE GLOBULIN (HUMAN) 50 G: 10 INJECTION INTRAVENOUS; SUBCUTANEOUS at 09:24

## 2023-01-26 RX ADMIN — DIPHENHYDRAMINE HYDROCHLORIDE 50 MG: 50 INJECTION, SOLUTION INTRAMUSCULAR; INTRAVENOUS at 09:12

## 2023-01-26 RX ADMIN — SODIUM CHLORIDE, PRESERVATIVE FREE 10 ML: 5 INJECTION INTRAVENOUS at 09:12

## 2023-01-26 RX ADMIN — SODIUM CHLORIDE, PRESERVATIVE FREE 10 ML: 5 INJECTION INTRAVENOUS at 11:30

## 2023-01-26 RX ADMIN — ACETAMINOPHEN 650 MG: 325 TABLET ORAL at 09:12

## 2023-01-26 ASSESSMENT — PAIN SCALES - GENERAL: PAINLEVEL_OUTOF10: 4

## 2023-01-26 ASSESSMENT — PAIN DESCRIPTION - LOCATION: LOCATION: NECK

## 2023-01-26 ASSESSMENT — PAIN DESCRIPTION - ORIENTATION: ORIENTATION: LEFT

## 2023-01-26 NOTE — PROGRESS NOTES
Pt is here for IVIG, She did have IVIG on 10/25. She states that she spiked a flow grade fever and her BP slightly increased. Orders received from Tessie Richter NP for tylenol 650 mg po and benadryl 50 mg IV for pre med's due to plan not having pre med's ordered. IVIG titrated per pharmacy instructions. After rate was at max 600/hr for 30 min pt c/o headache. Rate was decreased to 300 ml/hr VS remained WNL. IVIG completed and pt sated she feels a little nausea. She denies need for Zofran. She was given saltines and states \" it has passed\" Vs documented in mar. Pt is aware to call the office with any questions or concerns. Discharged ambulatory.

## 2023-01-30 ENCOUNTER — OFFICE VISIT (OUTPATIENT)
Dept: ENT CLINIC | Age: 63
End: 2023-01-30

## 2023-01-30 DIAGNOSIS — R22.1 NECK MASS: Primary | ICD-10-CM

## 2023-01-30 PROCEDURE — 99024 POSTOP FOLLOW-UP VISIT: CPT | Performed by: OTOLARYNGOLOGY

## 2023-01-30 NOTE — PROGRESS NOTES
Rafael Ugarte is a 58 y.o. female seen today now 5 days post-op after undergoing excision of L deep cervical LN back on 1/25/23. Doing well overall with minimal incisional pain. There is some postoperative ecchymosis as expected. No other new complaints today.    -L neck incision healing well, steri strips removed, mild edema and ecchymosis- no hematomas  -Mild weakness of sheridan branch of VII on L side  -No palpable cervical LAD    PATH:  DIAGNOSIS        \"LEFT NECK MASS\":  BENIGN SALIVARY GLAND TISSUE. A/P:   Diagnosis Orders   1. Neck mass          She is healing up well after surgery and I removed the Steri-Strips and reviewed her wound care instructions. She does have mild weakness of the left marginal mandibular branch of the facial nerve on the left side which should resolve with time. Her pathology revealed no lymphoid tissue and only some benign salivary gland tissue. There were no concerning deep L neck cervical lymph nodes intraoperatively and I am hopeful that she does not have any recurrent lymphoma based on my recent open biopsy and her previous R neck FNA. RTC in 1 month for another wound check.     Penny Gar MD

## 2023-02-06 ENCOUNTER — HOSPITAL ENCOUNTER (OUTPATIENT)
Dept: LAB | Age: 63
Discharge: HOME | End: 2023-02-09
Payer: COMMERCIAL

## 2023-02-06 ENCOUNTER — OFFICE VISIT (OUTPATIENT)
Dept: ONCOLOGY | Age: 63
End: 2023-02-06
Payer: COMMERCIAL

## 2023-02-06 VITALS
HEART RATE: 87 BPM | WEIGHT: 262.1 LBS | SYSTOLIC BLOOD PRESSURE: 151 MMHG | DIASTOLIC BLOOD PRESSURE: 81 MMHG | BODY MASS INDEX: 46.44 KG/M2 | HEIGHT: 63 IN | OXYGEN SATURATION: 99 % | RESPIRATION RATE: 16 BRPM | TEMPERATURE: 99.2 F

## 2023-02-06 DIAGNOSIS — C85.88 MARGINAL ZONE LYMPHOMA OF LYMPH NODES OF MULTIPLE SITES (HCC): ICD-10-CM

## 2023-02-06 DIAGNOSIS — E03.9 HYPOTHYROIDISM, UNSPECIFIED TYPE: ICD-10-CM

## 2023-02-06 DIAGNOSIS — R59.1 LYMPHADENOPATHY, GENERALIZED: ICD-10-CM

## 2023-02-06 DIAGNOSIS — C83.30 DIFFUSE LARGE B-CELL LYMPHOMA, UNSPECIFIED BODY REGION (HCC): ICD-10-CM

## 2023-02-06 DIAGNOSIS — D80.1 HYPOGAMMAGLOBULINEMIA (HCC): ICD-10-CM

## 2023-02-06 DIAGNOSIS — R59.1 LYMPHADENOPATHY, GENERALIZED: Primary | ICD-10-CM

## 2023-02-06 LAB
ALBUMIN SERPL-MCNC: 3.6 G/DL (ref 3.2–4.6)
ALBUMIN/GLOB SERPL: 1 (ref 0.4–1.6)
ALP SERPL-CCNC: 87 U/L (ref 50–136)
ALT SERPL-CCNC: 33 U/L (ref 12–65)
ANION GAP SERPL CALC-SCNC: 6 MMOL/L (ref 2–11)
AST SERPL-CCNC: 25 U/L (ref 15–37)
BASOPHILS # BLD: 0.1 K/UL (ref 0–0.2)
BASOPHILS NFR BLD: 1 % (ref 0–2)
BILIRUB SERPL-MCNC: 0.4 MG/DL (ref 0.2–1.1)
BUN SERPL-MCNC: 22 MG/DL (ref 8–23)
CALCIUM SERPL-MCNC: 9.3 MG/DL (ref 8.3–10.4)
CHLORIDE SERPL-SCNC: 103 MMOL/L (ref 101–110)
CO2 SERPL-SCNC: 30 MMOL/L (ref 21–32)
CREAT SERPL-MCNC: 0.9 MG/DL (ref 0.6–1)
DIFFERENTIAL METHOD BLD: ABNORMAL
EOSINOPHIL # BLD: 0.1 K/UL (ref 0–0.8)
EOSINOPHIL NFR BLD: 2 % (ref 0.5–7.8)
ERYTHROCYTE [DISTWIDTH] IN BLOOD BY AUTOMATED COUNT: 14.1 % (ref 11.9–14.6)
GLOBULIN SER CALC-MCNC: 3.7 G/DL (ref 2.8–4.5)
GLUCOSE SERPL-MCNC: 94 MG/DL (ref 65–100)
HCT VFR BLD AUTO: 35.9 % (ref 35.8–46.3)
HGB BLD-MCNC: 11.9 G/DL (ref 11.7–15.4)
IMM GRANULOCYTES # BLD AUTO: 0 K/UL (ref 0–0.5)
IMM GRANULOCYTES NFR BLD AUTO: 0 % (ref 0–5)
LDH SERPL L TO P-CCNC: 220 U/L (ref 110–210)
LYMPHOCYTES # BLD: 1.3 K/UL (ref 0.5–4.6)
LYMPHOCYTES NFR BLD: 22 % (ref 13–44)
MCH RBC QN AUTO: 29 PG (ref 26.1–32.9)
MCHC RBC AUTO-ENTMCNC: 33.1 G/DL (ref 31.4–35)
MCV RBC AUTO: 87.6 FL (ref 82–102)
MONOCYTES # BLD: 0.3 K/UL (ref 0.1–1.3)
MONOCYTES NFR BLD: 6 % (ref 4–12)
NEUTS SEG # BLD: 4.1 K/UL (ref 1.7–8.2)
NEUTS SEG NFR BLD: 69 % (ref 43–78)
NRBC # BLD: 0 K/UL (ref 0–0.2)
PLATELET # BLD AUTO: 98 K/UL (ref 150–450)
PMV BLD AUTO: 9.8 FL (ref 9.4–12.3)
POTASSIUM SERPL-SCNC: 3.2 MMOL/L (ref 3.5–5.1)
PROT SERPL-MCNC: 7.3 G/DL (ref 6.3–8.2)
RBC # BLD AUTO: 4.1 M/UL (ref 4.05–5.2)
SODIUM SERPL-SCNC: 139 MMOL/L (ref 133–143)
T4 FREE SERPL-MCNC: 1.6 NG/DL (ref 0.78–1.4)
TSH, 3RD GENERATION: 0.42 UIU/ML (ref 0.36–3)
WBC # BLD AUTO: 5.8 K/UL (ref 4.3–11.1)

## 2023-02-06 PROCEDURE — 80053 COMPREHEN METABOLIC PANEL: CPT

## 2023-02-06 PROCEDURE — 82784 ASSAY IGA/IGD/IGG/IGM EACH: CPT

## 2023-02-06 PROCEDURE — 83615 LACTATE (LD) (LDH) ENZYME: CPT

## 2023-02-06 PROCEDURE — 3078F DIAST BP <80 MM HG: CPT | Performed by: INTERNAL MEDICINE

## 2023-02-06 PROCEDURE — 99214 OFFICE O/P EST MOD 30 MIN: CPT | Performed by: INTERNAL MEDICINE

## 2023-02-06 PROCEDURE — 36415 COLL VENOUS BLD VENIPUNCTURE: CPT

## 2023-02-06 PROCEDURE — 84443 ASSAY THYROID STIM HORMONE: CPT

## 2023-02-06 PROCEDURE — 85025 COMPLETE CBC W/AUTO DIFF WBC: CPT

## 2023-02-06 PROCEDURE — 3074F SYST BP LT 130 MM HG: CPT | Performed by: INTERNAL MEDICINE

## 2023-02-06 PROCEDURE — 84439 ASSAY OF FREE THYROXINE: CPT

## 2023-02-06 ASSESSMENT — PATIENT HEALTH QUESTIONNAIRE - PHQ9
SUM OF ALL RESPONSES TO PHQ QUESTIONS 1-9: 0
SUM OF ALL RESPONSES TO PHQ QUESTIONS 1-9: 0
2. FEELING DOWN, DEPRESSED OR HOPELESS: 0
SUM OF ALL RESPONSES TO PHQ9 QUESTIONS 1 & 2: 0
SUM OF ALL RESPONSES TO PHQ QUESTIONS 1-9: 0
1. LITTLE INTEREST OR PLEASURE IN DOING THINGS: 0
SUM OF ALL RESPONSES TO PHQ QUESTIONS 1-9: 0

## 2023-02-06 NOTE — PATIENT INSTRUCTIONS
Patient Instructions from Today's Visit    Reason for Visit:  Follow up Lymphoma     Diagnosis Information:  [email or web address]    Plan:  Lab results reviewed    Follow Up: Follow up in May with labs prior    Recent Lab Results:  N/A    Treatment Summary has been discussed and given to patient: N/A    -------------------------------------------------------------------------------------------------------------------  Please call our office at [phone] if you have any  of the following symptoms:   Fever of 100.5 or greater  Chills  Shortness of breath  Swelling or pain in one leg    After office hours an answering service is available and will contact a provider for emergencies or if you are experiencing any of the above symptoms. Patient does express an interest in My Chart. My Chart log in information explained on the after visit summary printout at the [facility] desk.     Veronika Nieto RN

## 2023-02-06 NOTE — PROGRESS NOTES
[facility] Hematology and Oncology: Office Visit Established Patient    Chief Complaint:    Chief Complaint   Patient presents with    Follow-up         History of Present Illness:  Ms. Janet Mejía is a 58 y.o. 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 She was in previously good health, seen as an urgent work-in in clinic for lymphadenopathy on 3/30/17. She was having abdominal pain and swelling which has progressed over the  previous several weeks, CT was recommended but was initially denied by insurance. She had GI evaluation including EGD/colonoscopy which showed no intraluminal pathology, but finally had a CT at [facility] that showed diffuse lymphadenopathy  with moderate ascites, splenomegaly, and possible infiltration of the left kidney, all consistent with lymphoproliferative disorder. We recommended inpatient admission for expedited work-up, including excisional biopsy of an axillary node, bone marrow  biopsy, labs and PET/CT. The biopsy returned showing marginal zone lymphoma. Her anemia, ascites, and constitutional symptoms are an indication for treatment. I recommend Rituxan and bendamustine  for therapy. She was hospitalized for recurrent fevers after cycle 1. She was also noted to have hypotension requiring a brief ICU stay with Levophed, and acute kidney injury either from antibiotics  or ATN from hypotension (or both). She completed repeat paracentesis with good relief of abdominal symptoms. Restaging after cycle 2 showed partial response in lymphadenopathy  and splenomegaly, continue current therapy. Restaging after 5 cycles showed essentially complete response, she was continued on to 6 cycles of BR and then entered R maintenance. PET/CT prior to her 2nd cycle of maintenance showed a new hypermetabolic  breast nodule and a mesenteric nodule. Biopsy of the breast nodule shows diffuse large B cell lymphoma.   I suspect this is not a true histologic transformation given her dramatic presentation last year and the response to BR, but is more likely recurrence  of an occult high grade lymphoma. In any case, at this point, I would recommend salvage chemotherapy with R-ICE. This was scheduled for 1/24/18 but delayed due to post-influenza pneumonia. Started 2/7/18. Seen at [facility] for transplant opinion, the recommendation from Dr. Arabella Joseph, while acknowledging that salvage therapy followed by autologous transplant was reasonable, was that we consider a change to R-CHOP, completing 5 cycles of therapy and not necessarily moving to an autograft if she is in CR. We discussed  the pros and cons of both approaches, and we eventually settled on the change to R-CHOP. We completed 2 additional cycles and then repeated her PET/CT, which showed a complete response. Therefore, we then recommended an additional 2 cycles of therapy  to complete a total of 6 cycles (1 RICE and 5 R-CHOP). PET/CT at completion of therapy reviewed and shows no evidence of hypermetabolic disease, consistent with complete response. Continue observation,  we have discussed the benefits and drawbacks to additional treatment including R maintenance for MZL, and IT CNS prophylaxis, and she is very comfortable with no additional therapy. Ms. Felicity Pruitt returns today for follow up. She is doing OK. After her last neck imaging showed multiple progressing lymph nodes, we referred her to IR for ultrasound guided biopsy, this was nondiagnostic (FNA only). Therefore, we referred her to ENT to discuss excisional biopsy of a lymph node. Originally it was thought this would be a left posterior neck node, but after review of the imaging Dr. Eddi Awad did not feel these were pathologic appearing, she just had excision of an anterior lymph node mass just posterior to the submandibular gland, this was also benign on pathology.   Since recovery she has had some left facial nerve palsy, but this seems to be improving. She did get a couple infusions of IVIG for hypogammaglobulinemia and she feels much better, most recent was last week. She did resume Lyrica with some improvement in her neuropathic symptoms, she admits she has not been 100% compliant with TID dosing. Review of Systems:  Constitutional: Positive for fatigue. HENT: Positive for left facial nerve palsy. Eyes: Negative. Respiratory: Negative. Cardiovascular: Negative. Gastrointestinal: Negative. Genitourinary: Negative. Musculoskeletal: Negative. Skin: Negative. Neurological: Negative. Endo/Heme/Allergies: Negative. Psychiatric/Behavioral: Negative. All other systems reviewed and are negative. Allergies   Allergen Reactions    Gabapentin Other (See Comments)     \"Double vision and falls\"     Past Medical History:   Diagnosis Date    Adverse effect of anesthesia 2020    new onset of afib with RVR during bronchoscopy    Anemia     Asthma     Followed by Dr. Fernandez Cancer, Pulmonology. daily and rescue inhaler    Atrial fibrillation with rapid ventricular response ([facility] [address] 11/25/2020    Basal cell carcinoma     Cardiomegaly     Deep vein thrombosis (DVT) (HCC)     History of stroke     History of UTI     Hypertension     Marginal zone lymphoma ([address] 03/30/2017    Diffuse Large B-cell Lymphoma.  Completed Chemotherapy 5/28/18    Morbid obesity (HCC)     DAGMAR (obstructive sleep apnea)     uses CPAP    Osteoarthritis     Overactive bladder     Primary hypothyroidism     Prolapse of female bladder, acquired     Radiation therapy complication     Rheumatic fever     S/P total knee replacement using cement 10/3/2011    Shingles     Superficial venous thrombosis of right arm 5/1/2017     Past Surgical History:   Procedure Laterality Date    BRONCHOSCOPY  11/25/2020    new onset of afib with RVR during bronchoscopy    CATARACT REMOVAL Bilateral 2018    CHOLECYSTECTOMY  1983    COLONOSCOPY  03/2017    Clear    LYMPH NODE BIOPSY Left 01/25/2023    open L cervical LN excision- Tenny Arch    LYMPH NODE BIOPSY Left 1/25/2023    CERVICAL LYMPH NODE BIOPSY EXCISION DISSECTION performed by Ziggy Goodwin MD at [address]  Left 02/10/2011    Dr. Quincy Mckeon  11/25/2020    ISABELL-guided cardioversion for A. Fib    PRE-MALIGNANT / BENIGN SKIN LESION EXCISION  2019    basal/squamous skin lesions removed from scalp. [facility]. Kiko Daily Left 2013    Dr. Racquel Lunsford Right 2012    Dr. Keith Dawson LEFT Left 01/03/2018    US BREAST NEEDLE BIOPSY LEFT 1/3/2018 [facility]    US LYMPH NODE BIOPSY  01/05/2023    US LYMPH NODE BIOPSY 1/5/2023 Kellie Canela PA-C [facility]     Family History   Problem Relation Age of Onset    Other Maternal Grandmother         Vascular Disorder with leg amputation    Dementia Mother     Kidney Disease Father     Thyroid Cancer Neg Hx     Post-op Cognitive Dysfunction Neg Hx     Emergence Delirium Neg Hx     Delayed Awakening Neg Hx     Pseudochol. Deficiency Neg Hx     Malig Hypertherm Neg Hx     Thyroid Disease Sister         hypothyroidism    Breast Cancer Cousin 48    Celiac Disease Sister     Liver Disease Sister         non-alcoholic     Social History     Socioeconomic History    Marital status:      Spouse name: Not on file    Number of children: Not on file    Years of education: Not on file    Highest education level: Not on file   Occupational History    Not on file   Tobacco Use    Smoking status: Never    Smokeless tobacco: Never   Substance and Sexual Activity    Alcohol use: No    Drug use: Never    Sexual activity: Not on file   Other Topics Concern    Not on file   Social History Narrative    10/3/11:  PATIENT IS  TO Ian Weaver. SHE IS DISABLED.        Social Determinants of Health Financial Resource Strain: Not on file   Food Insecurity: Not on file   Transportation Needs: Not on file   Physical Activity: Not on file   Stress: Not on file   Social Connections: Not on file   Intimate Partner Violence: Not on file   Housing Stability: Not on file     Current Outpatient Medications   Medication Sig Dispense Refill    ondansetron (ZOFRAN) 4 MG tablet Take 1 tablet by mouth every 8 hours as needed for Nausea or Vomiting 8 tablet 0    azithromycin (ZITHROMAX) 250 MG tablet TAKE 2 TABS ON DAY 1, THEN 1 TAB A DAY FOR 4 DAYS (Patient taking differently: TAKE 2 TABS ON DAY 1, THEN 1 TAB A DAY FOR 4 DAYS    Takes mon - wed - fri) 6 tablet 0    metoprolol succinate (TOPROL XL) 50 MG extended release tablet Take 1 tablet by mouth daily 90 tablet 3    apixaban (ELIQUIS) 5 MG TABS tablet Take 1 tablet by mouth 2 times daily 180 tablet 3    triamterene-hydroCHLOROthiazide (MAXZIDE) 75-50 MG per tablet Take 0.5-1 tablets by mouth daily as needed (swelling) 30 tablet 5    pregabalin (LYRICA) 50 MG capsule Take 1 capsule by mouth 3 times daily for 30 days. 90 capsule 3    sodium chloride, Inhalant, 3 % nebulizer solution Take 4 mLs by nebulization 2 times daily as needed for Cough 240 mL 5    albuterol (PROVENTIL) (2.5 MG/3ML) 0.083% nebulizer solution Take 3 mLs by nebulization in the morning and 3 mLs at noon and 3 mLs in the evening and 3 mLs before bedtime. 120 each 5    montelukast (SINGULAIR) 10 MG tablet Take 1 tablet by mouth in the morning.  (Patient taking differently: Take 10 mg by mouth nightly) 30 tablet 11    levothyroxine (SYNTHROID) 200 MCG tablet Take 1 tablet by mouth every morning (before breakfast) Take 1 tablet by mouth every mouth along with levothyroxine 50mg to equal 250mg 90 tablet 3    albuterol sulfate  (90 Base) MCG/ACT inhaler Inhale 2 puffs into the lungs every 4 hours      fluticasone (FLONASE) 50 MCG/ACT nasal spray 2 sprays by Nasal route daily Fluticasone-Umeclidin-Vilant (TRELEGY ELLIPTA) 200-62.5-25 MCG/INH AEPB Inhale 1 puff into the lungs daily      cephALEXin (KEFLEX) 500 MG capsule Take 1 capsule by mouth 4 times daily (Patient not taking: Reported on 2/6/2023) 28 capsule 0    pregabalin (LYRICA) 100 MG capsule Take 50 mg by mouth in the morning, at noon, and at bedtime. (Patient not taking: Reported on 2/6/2023)      predniSONE (DELTASONE) 20 MG tablet 40 mg for 3 days then 20 mg for 3 days and then 10 mg for 3 days (Patient not taking: Reported on 2/6/2023) 12 tablet 0    levothyroxine (SYNTHROID) 50 MCG tablet Take 1 tablet by mouth every morning (before breakfast) Take 1 tablet by mouth every mouth along with levothyroxine 200mg to equal 250mg (Patient not taking: Reported on 2/6/2023) 90 tablet 3     No current facility-administered medications for this visit. OBJECTIVE:  BP (!) 151/81 (Site: Left Lower Arm, Position: Standing, Cuff Size: Medium Adult)   Pulse 87   Temp 99.2 °F (37.3 °C) (Oral)   Resp 16   Ht 5' 3\" (1.6 m)   Wt 262 lb 1.6 oz (118.9 kg)   SpO2 99%   BMI 46.43 kg/m²     Physical Exam:  Constitutional: Well developed, well nourished female in no acute distress, sitting comfortably on the examination table. HEENT: Normocephalic and atraumatic. Sclerae anicteric. Skin Warm and dry. No bruising and no rash noted. No erythema. No pallor. Cardiopulmonary Deferred. Neuro Left partial facial nerve palsy. Grossly nonfocal with no obvious sensory or motor deficits. MSK Normal range of motion in general.  No edema and no tenderness. Psych Appropriate mood and affect.        Labs:  Recent Results (from the past 96 hour(s))   CBC with Auto Differential    Collection Time: 02/06/23  4:38 PM   Result Value Ref Range    WBC 5.8 4.3 - 11.1 K/uL    RBC 4.10 4.05 - 5.2 M/uL    Hemoglobin 11.9 11.7 - 15.4 g/dL    Hematocrit 35.9 35.8 - 46.3 %    MCV 87.6 82.0 - 102.0 FL    MCH 29.0 26.1 - 32.9 PG    MCHC 33.1 31.4 - 35.0 g/dL    RDW 14.1 11.9 - 14.6 %    Platelets 98 (L) 014 - 450 K/uL    MPV 9.8 9.4 - 12.3 FL    nRBC 0.00 0.0 - 0.2 K/uL    Seg Neutrophils 69 43 - 78 %    Lymphocytes 22 13 - 44 %    Monocytes 6 4.0 - 12.0 %    Eosinophils % 2 0.5 - 7.8 %    Basophils 1 0.0 - 2.0 %    Immature Granulocytes 0 0.0 - 5.0 %    Segs Absolute 4.1 1.7 - 8.2 K/UL    Absolute Lymph # 1.3 0.5 - 4.6 K/UL    Absolute Mono # 0.3 0.1 - 1.3 K/UL    Absolute Eos # 0.1 0.0 - 0.8 K/UL    Basophils Absolute 0.1 0.0 - 0.2 K/UL    Absolute Immature Granulocyte 0.0 0.0 - 0.5 K/UL    Differential Type AUTOMATED     Comprehensive Metabolic Panel    Collection Time: 02/06/23  4:38 PM   Result Value Ref Range    Sodium 139 133 - 143 mmol/L    Potassium 3.2 (L) 3.5 - 5.1 mmol/L    Chloride 103 101 - 110 mmol/L    CO2 30 21 - 32 mmol/L    Anion Gap 6 2 - 11 mmol/L    Glucose 94 65 - 100 mg/dL    BUN 22 8 - 23 MG/DL    Creatinine 0.90 0.6 - 1.0 MG/DL    Est, Glom Filt Rate >60 >60 ml/min/1.73m2    Calcium 9.3 8.3 - 10.4 MG/DL    Total Bilirubin 0.4 0.2 - 1.1 MG/DL    ALT 33 12 - 65 U/L    AST 25 15 - 37 U/L    Alk Phosphatase 87 50 - 136 U/L    Total Protein 7.3 6.3 - 8.2 g/dL    Albumin 3.6 3.2 - 4.6 g/dL    Globulin 3.7 2.8 - 4.5 g/dL    Albumin/Globulin Ratio 1.0 0.4 - 1.6     Lactate Dehydrogenase    Collection Time: 02/06/23  4:38 PM   Result Value Ref Range     (H) 110 - 210 U/L   TSH    Collection Time: 02/06/23  4:38 PM   Result Value Ref Range    TSH, 3RD GENERATION 0.416 0.358 - 3 uIU/mL   IgG, IgA, IgM    Collection Time: 02/06/23  4:38 PM   Result Value Ref Range    IgG, Serum 729 586 - 1,602 mg/dL    IgA <5 (L) 87 - 352 mg/dL    IgM <5 (L) 26 - 217 mg/dL   T4, Free    Collection Time: 02/06/23  4:38 PM   Result Value Ref Range    T4 Free 1.6 (H) 0.78 - 1.4 NG/DL           Imaging:  CT NECK CHEST ABD PELV W CONTRAST    Result Date: 11/3/2022  CT of the neck, chest, abdomen, and pelvis with contrast 11/3/2022 Comparison: 11/08/2021 and prior. Indication: Follow-up diffuse large B-cell lymphoma. Technique:  CT imaging was performed of the neck, chest, abdomen, and pelvis following the uncomplicated administration of intravenous contrast (Isovue 370, 100 mL). Oral contrast was used for bowel opacification. Intravenous contrast was used for better evaluation of solid organs and vascular structures. Radiation dose reduction techniques were used for this study:  Our CT scanners use one or all of the following: Automated exposure control, adjustment of the mA and/or kVp according to patient's size, iterative reconstruction. Findings: NECK CT: Globes and orbits intact. Limited views of intracranial contents within normal limits. The oropharynx, nasopharynx, larynx normal. Right cervical chain adenopathy slightly increased from previous examination with marker right level 2 node measuring 1.6 cm short axis, previously 1.4 cm. Right level 4 1.2 cm node previously measured 6 mm short axis. Prominent adenoid and tonsillar pillar soft tissues similar to prior examination. Slight interval enlargement left level 2 nodes measuring 1.3 cm short axis, previously 1.1 cm. Vessels patent. CHEST CT: Right IJ portacatheter tip distal SVC. The heart size is normal. No pathologically enlarged mediastinal, hilar, or axillary lymph nodes. No pleural or pericardial effusion. The lung parenchyma is unremarkable. No lung mass seen. ABDOMEN CT: Mild hepatic steatosis with nodularity of the liver contour similar to previous examination. No aggressive liver lesion identified. Mild splenomegaly stable at 14.7 cm AP dimension. Stable appearance of the kidneys, adrenal glands, pancreas. Upper abdominal vasculature grossly patent. There is no intra or extrahepatic biliary ductal dilatation. PELVIS CT: The bowel is normal in caliber, and there is no focal or diffuse bowel wall thickening. There is no free air or free fluid in the abdomen or pelvis.  There is no significant retroperitoneal, pelvic, mesenteric, or inguinal lymphadenopathy. The bladder is unremarkable. There are no aggressive appearing osseous lesions. Uterus age-appropriate. Fat-containing umbilical hernia. 1. Progressive bilateral neck cervical chain lymph node enlargement suggesting disease progression. 2. No progressive abnormality below the level of the neck. 3. Cirrhosis with splenomegaly stable. No interval ascites or focal aggressive liver lesion. ASSESSMENT:   Diagnosis Orders   1. Lymphadenopathy, generalized  TSH      2. 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 (HCC)  TSH    IgG, IgA, IgM    CT CHEST ABDOMEN PELVIS W CONTRAST Additional Contrast? Oral      3. Hypothyroidism, unspecified type  T4, Free                PLAN:  Lab studies were personally reviewed. Marginal zone lymphoma: diffuse parish involvement with splenomegaly, ascites, and bone marrow involvement. Her anemia, ascites, and constitutional symptoms are an indication for treatment. We recommended Rituxan and bendamustine for therapy. She was hospitalized for recurrent fevers after cycle 1. She was also noted to have hypotension requiring a brief ICU stay with Levophed, and acute kidney injury either from antibiotics or ATN from hypotension (or both). She completed repeat paracentesis  with good relief of abdominal symptoms. PET/CT after 2 cycles reviewed and shows dramatic response in lymphadenopathy and splenomegaly. Some  uptake in small abdominal wall foci but likely from paracenteses, low suspicion for disease. Plan was still for 6 cycles of BR in all. Hospitalized for neutropenic fever 9/8-9/12 but felt better relatively  quickly. Now back to baseline and counts are improved, platelets slightly low at 95k but not prohibitive for therapy.   PET/CT reviewed and shows a complete response, she has some residual splenomegaly but it is decreased since PET in June by my measure,  and is not at all hypermetabolic like was the case at diagnosis. At this point, I see no indication for escalation of therapy, I would continue with cycle 6 BR and then enter R maintenance. However, I would move up her repeat imaging to 3 months (instead  of 6) given the bulk of her disease at presentation. Indeed, PET/CT prior to her 2nd cycle of maintenance showed a new hypermetabolic breast nodule and a mesenteric nodule. Biopsy of the breast  nodule shows diffuse large B cell lymphoma. 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 In any case, at this  point, I would recommend salvage chemotherapy with R-ICE. This was scheduled on 1/24 but delayed due to post-influenza pneumonia. Started therapy 2/7/18. Recently returned from [facility] for transplant opinion and the trip itself was uneventful. However,  the recommendation from Dr. Patel Carlton, while acknowledging that salvage therapy followed by autologous transplant was reasonable, was that we consider a change to R-CHOP, completing 5 cycles of therapy and not necessarily moving to an autograft if she is  in CR. We discussed the pros and cons of both approaches, and we eventually settled on the change to R-CHOP. We completed 2 additional cycles and then repeated her PET/CT, which showed a complete response. Therefore, we then recommended an additional  2 cycles of therapy to complete a total of 6 cycles (1 RICE and 5 R-CHOP). PET/CT at completion of therapy reviewed and shows no evidence of hypermetabolic disease, consistent with complete response. Continue observation, we have discussed the benefits and drawbacks to additional treatment including R maintenance for MZL, and IT CNS prophylaxis, and she was very comfortable with no additional therapy. Ms. Cheo Contreras returns today for follow up. She is doing OK.   After her last neck imaging showed multiple progressing lymph nodes, we referred her to IR for ultrasound guided biopsy, this was nondiagnostic (FNA only). Therefore, we referred her to ENT to discuss excisional biopsy of a lymph node. Originally it was thought this would be a left posterior neck node, but after review of the imaging Dr. Makeda Jones did not feel these were pathologic appearing, she just had excision of an anterior lymph node mass just posterior to the submandibular gland, this was also benign on pathology. Since recovery she has had some left facial nerve palsy, but this seems to be improving. She did get a couple infusions of IVIG for hypogammaglobulinemia and she feels much better, most recent was last week. She did resume Lyrica with some improvement in her neuropathic symptoms, she admits she has not been 100% compliant with TID dosing. Labs reviewed and unremarkable, mild thrombocytopenia but overall stable. We discussed the findings, at this point despite the cervical node progression, we have an FNA and now an excisional biopsy with no evidence of residual lymphoma. Certainly it is possible that she has some occult disease in one of the other nodes, but with her clinical improvement after IVIG and the multiple negative biopsies, I would recommend no additional work-up or treatment at this time. I would proceed with follow-up imaging in May 2023 (6 months) to assess for new or progressive lymphadenopathy, if this is concerning and biopsy is feasible with minimal risk we can consider it at that time. She is in agreement with the plan. All questions were asked and answered to the best of my ability. Recheck IgG level later this month, repeat IVIG for IgG less than 600. Clinical follow-up in May after imaging.             Lolis Milton MD, MD  [address] [place] [address] Hematology and Oncology  [address] [place] [address]  Office : [phone]  Fax : [phone]

## 2023-02-07 LAB
IGA SERPL-MCNC: <5 MG/DL (ref 87–352)
IGG SERPL-MCNC: 729 MG/DL (ref 586–1602)
IGM SERPL-MCNC: <5 MG/DL (ref 26–217)

## 2023-02-16 ENCOUNTER — OFFICE VISIT (OUTPATIENT)
Dept: PULMONOLOGY | Age: 63
End: 2023-02-16
Payer: COMMERCIAL

## 2023-02-16 VITALS
TEMPERATURE: 97.2 F | HEIGHT: 63 IN | DIASTOLIC BLOOD PRESSURE: 86 MMHG | HEART RATE: 90 BPM | WEIGHT: 261 LBS | SYSTOLIC BLOOD PRESSURE: 134 MMHG | OXYGEN SATURATION: 97 % | RESPIRATION RATE: 14 BRPM | BODY MASS INDEX: 46.25 KG/M2

## 2023-02-16 DIAGNOSIS — J98.8 RECURRENT RESPIRATORY INFECTION: ICD-10-CM

## 2023-02-16 DIAGNOSIS — C85.80 MARGINAL ZONE LYMPHOMA (HCC): ICD-10-CM

## 2023-02-16 DIAGNOSIS — Z99.89 OSA ON CPAP: Primary | ICD-10-CM

## 2023-02-16 DIAGNOSIS — J45.30 MILD PERSISTENT ASTHMA WITHOUT COMPLICATION: ICD-10-CM

## 2023-02-16 DIAGNOSIS — G47.33 OSA ON CPAP: Primary | ICD-10-CM

## 2023-02-16 PROCEDURE — 3075F SYST BP GE 130 - 139MM HG: CPT | Performed by: INTERNAL MEDICINE

## 2023-02-16 PROCEDURE — 3079F DIAST BP 80-89 MM HG: CPT | Performed by: INTERNAL MEDICINE

## 2023-02-16 PROCEDURE — 99214 OFFICE O/P EST MOD 30 MIN: CPT | Performed by: INTERNAL MEDICINE

## 2023-02-16 RX ORDER — FLUTICASONE FUROATE, UMECLIDINIUM BROMIDE AND VILANTEROL TRIFENATATE 200; 62.5; 25 UG/1; UG/1; UG/1
1 POWDER RESPIRATORY (INHALATION) DAILY
Qty: 1 EACH | Refills: 11 | Status: SHIPPED | OUTPATIENT
Start: 2023-02-16

## 2023-02-16 RX ORDER — AZITHROMYCIN 250 MG/1
TABLET, FILM COATED ORAL
Qty: 6 TABLET | Refills: 0 | Status: SHIPPED | OUTPATIENT
Start: 2023-02-16

## 2023-02-16 NOTE — PROGRESS NOTES
[facility]: FOLLOW-UP Patient Office Visit Note  [address] [facility]. 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 The patient became sick on August 2020 and had cough with mucus production. She did go to the ED for further evaluation and COVID testing at that time was negative. CT chest  Was done revealing near collapse of the RML and Bronchoscopy was done November 25, 2020 revealing significant amount of thick secretions obstructing multiple airways. During the procedure the patient went into Atrial fibrillation with RVR requiring cardioversion. Cardiology did place the patient on Eliquis and metoprolol following that cardioversion  As of July 2021, she has been maintained on Zithromax Monday Wednesday Friday, Breo, and albuterol. The patient's visit here was in September. And since that time there have been concerns that her lymphoma is recurring. She has had attempts of biopsies of nodes in the neck but so for diagnosis has not been confirmed. From the standpoint of her breathing and asthma she is doing well. No significant shortness of breath, cough, or wheezing. She is on Trelegy and feels that has been quite beneficial.  She occasionally uses albuterol. She has been using Zithromax 3 times a week. She has had recurrent respiratory infections    Recently she was determined to have low gammaglobulin levels and will be placed on gammaglobulin infusions. From the standpoint of her sleep apnea she is finally found a mass that she can tolerate.   She is using CPAP now 3 to 4 hours a night. Past Medical History:   Diagnosis Date    Adverse effect of anesthesia 2020    new onset of afib with RVR during bronchoscopy    Anemia     Asthma     Followed by Dr. Sudheer Earl, Pulmonology. daily and rescue inhaler    Atrial fibrillation with rapid ventricular response ([address] 11/25/2020    Basal cell carcinoma     Cardiomegaly     Deep vein thrombosis (DVT) (HCC)     History of stroke     History of UTI     Hypertension     Marginal zone lymphoma ([address] 03/30/2017    Diffuse Large B-cell Lymphoma.  Completed Chemotherapy 5/28/18    Morbid obesity (HCC)     DAGMAR (obstructive sleep apnea)     uses CPAP    Osteoarthritis     Overactive bladder     Primary hypothyroidism     Prolapse of female bladder, acquired     Radiation therapy complication     Rheumatic fever     S/P total knee replacement using cement 10/3/2011    Shingles     Superficial venous thrombosis of right arm 5/1/2017       Patient Active Problem List   Diagnosis    PFO (patent foramen ovale)    Osteoarthritis of left knee    Heart murmur    Admission for antineoplastic chemotherapy    Hypertension    History of DVT of lower extremity    History of atrial fibrillation    Primary hypothyroidism    DLBCL (diffuse large B cell lymphoma) (HCC)    Snores    Atrial fibrillation (HCC)    Left lower lobe pneumonia    Atelectasis    Mild mitral regurgitation by prior echocardiogram    Pancytopenia due to antineoplastic chemotherapy ([address]    Hemoptysis    Non Hodgkin's lymphoma (HCC)    Pneumonia and influenza    Lymphadenopathy, generalized    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 ([address]    Osteoarthritis of right knee    Morbid obesity with BMI of 50.0-59.9, adult (HCC)    Palpitations    Hypersomnia, unspecified    Hypoxemia    DAGMAR (obstructive sleep apnea)    Hypogammaglobulinemia ([address]    Cervical lymphadenopathy         Past Surgical History:   Procedure Laterality Date    BRONCHOSCOPY  11/25/2020    new onset of afib with RVR during bronchoscopy    CATARACT REMOVAL Bilateral 2018    CHOLECYSTECTOMY  1983    COLONOSCOPY  03/2017    Clear    LYMPH NODE BIOPSY Left 01/25/2023    open L cervical LN excision- [facility]    LYMPH NODE BIOPSY Left 1/25/2023    CERVICAL LYMPH NODE BIOPSY EXCISION DISSECTION performed by Jaya Resendiz MD at [address]  Left 02/10/2011    Dr. Jeri Iglesias  11/25/2020    ISABELL-guided cardioversion for A. Fib    PRE-MALIGNANT / BENIGN SKIN LESION EXCISION  2019    basal/squamous skin lesions removed from scalp. [facility]. Antonette Sanchez Left 2013    Dr. Francois Vang Right 2012    Dr. Rafael Christensen LEFT Left 01/03/2018    US BREAST NEEDLE BIOPSY LEFT 1/3/2018 [facility]    US LYMPH NODE BIOPSY  01/05/2023    US LYMPH NODE BIOPSY 1/5/2023 Mehul Enriquez PA-C [facility]       Social History     Socioeconomic History    Marital status:      Spouse name: Not on file    Number of children: Not on file    Years of education: Not on file    Highest education level: Not on file   Occupational History    Not on file   Tobacco Use    Smoking status: Never    Smokeless tobacco: Never   Substance and Sexual Activity    Alcohol use: No    Drug use: Never    Sexual activity: Not on file   Other Topics Concern    Not on file   Social History Narrative    10/3/11:  PATIENT IS  TO [address]. SHE IS DISABLED.        Social Determinants of Health     Financial Resource Strain: Not on file   Food Insecurity: Not on file   Transportation Needs: Not on file   Physical Activity: Not on file   Stress: Not on file   Social Connections: Not on file   Intimate Partner Violence: Not on file   Housing Stability: Not on file       Family History   Problem Relation Age of Onset    Other Maternal Grandmother Vascular Disorder with leg amputation    Dementia Mother     Kidney Disease Father     Thyroid Cancer Neg Hx     Post-op Cognitive Dysfunction Neg Hx     Emergence Delirium Neg Hx     Delayed Awakening Neg Hx     Pseudochol. Deficiency Neg Hx     Malig Hypertherm Neg Hx     Thyroid Disease Sister         hypothyroidism    Breast Cancer Cousin 48    Celiac Disease Sister     Liver Disease Sister         non-alcoholic       Allergies   Allergen Reactions    Gabapentin Other (See Comments)     \"Double vision and falls\"           Review of Systems    OBJECTIVE:  Physical Exam:  Vitals:    02/16/23 1105   BP: 134/86   Pulse: 90   Resp: 14   Temp: 97.2 °F (36.2 °C)   SpO2: 97%        GENERAL APPEARANCE:   The patient is normal weight and in no respiratory distress. HEENT:   PERRL. Conjunctivae unremarkable. Nasal mucosa is without epistaxis, exudate, or polyps. Gums and dentition are unremarkable. There is no oropharyngeal narrowing. TMs are clear. NECK/LYMPHATIC:   Symmetrical with no elevation of jugular venous pulsation. Trachea midline. No thyroid enlargement. No cervical adenopathy. LUNGS:   Normal respiratory effort with symmetrical lung expansion. Breath sounds clear. HEART:   There is a regular rate and rhythm. No murmur, rub, or gallop. There is no edema in the lower extremities. ABDOMEN:   Soft and non-tender. No hepatosplenomegaly. Bowel sounds are normal.       NEURO:   The patient is alert and oriented to person, place, and time. Memory appears intact and mood is normal.  No gross sensorimotor deficits are present.       Current Outpatient Medications   Medication Instructions    albuterol (PROVENTIL) 2.5 mg, Nebulization, 4 TIMES DAILY    albuterol sulfate  (90 Base) MCG/ACT inhaler 2 puffs, Inhalation, EVERY 4 HOURS    apixaban (ELIQUIS) 5 mg, Oral, 2 TIMES DAILY    azithromycin (ZITHROMAX) 250 MG tablet TAKE 2 TABS ON DAY 1, THEN 1 TAB A DAY FOR 4 DAYS    cephALEXin (KEFLEX) 500 mg, Oral, 4 TIMES DAILY    fluticasone (FLONASE) 50 MCG/ACT nasal spray 2 sprays, Nasal, DAILY    Fluticasone-Umeclidin-Vilant (TRELEGY ELLIPTA) 200-62.5-25 MCG/INH AEPB 1 puff, Inhalation, DAILY    levothyroxine (SYNTHROID) 200 mcg, Oral, DAILY BEFORE BREAKFAST, Take 1 tablet by mouth every mouth along with levothyroxine 50mg to equal 250mg    levothyroxine (SYNTHROID) 50 mcg, Oral, DAILY BEFORE BREAKFAST, Take 1 tablet by mouth every mouth along with levothyroxine 200mg to equal 250mg    metoprolol succinate (TOPROL XL) 50 mg, Oral, DAILY    montelukast (SINGULAIR) 10 mg, Oral, DAILY    ondansetron (ZOFRAN) 4 mg, Oral, EVERY 8 HOURS PRN    predniSONE (DELTASONE) 20 MG tablet 40 mg for 3 days then 20 mg for 3 days and then 10 mg for 3 days    pregabalin (LYRICA) 50 mg, Oral, 3 TIMES DAILY    pregabalin (LYRICA) 50 mg, 3 times daily    sodium chloride, Inhalant, 3 % nebulizer solution 4 mLs, Nebulization, 2 TIMES DAILY PRN    triamterene-hydroCHLOROthiazide (MAXZIDE) 75-50 MG per tablet 0.5-1 tablets, Oral, DAILY PRN         DIAGNOSTIC TESTS:    CXR:    No results found for this or any previous visit from the past 365 days. CT WITHOUT CONTRAST:    No results found for this or any previous visit from the past 365 days. CT WITH CONTRAST:    No results found for this or any previous visit from the past 365 days. CT HIGH RES:    No results found for this or any previous visit from the past 365 days. CT PE PROTOCOL:    No results found for this or any previous visit from the past 365 days. LDCT SCREENING:    No results found for this or any previous visit from the past 365 days. PET SCAN:    No results found for this or any previous visit from the past 365 days. Spirometry:      No flowsheet data found. Exercise oximetry:          ASSESSMENT:   Diagnosis Orders   1. DAGMAR on CPAP        2. Mild persistent asthma without complication        3.  Marginal zone lymphoma ([facility]        4. Recurrent respiratory infection            PLAN:  Continue Trelegy and albuterol  Continue Zithromax 3 times a week  Follow-up in 6 months    No orders of the defined types were placed in this encounter. No orders of the defined types were placed in this encounter.         Electronically signed by  Dejan Giron MD

## 2023-02-27 ENCOUNTER — TELEPHONE (OUTPATIENT)
Dept: ONCOLOGY | Age: 63
End: 2023-02-27

## 2023-02-27 ENCOUNTER — HOSPITAL ENCOUNTER (OUTPATIENT)
Dept: LAB | Age: 63
Discharge: HOME | End: 2023-03-02

## 2023-02-27 ENCOUNTER — HOSPITAL ENCOUNTER (OUTPATIENT)
Dept: INFUSION THERAPY | Age: 63
Discharge: HOME | End: 2023-02-27
Payer: COMMERCIAL

## 2023-02-27 DIAGNOSIS — C85.88 MARGINAL ZONE LYMPHOMA OF LYMPH NODES OF MULTIPLE SITES (HCC): ICD-10-CM

## 2023-02-27 DIAGNOSIS — C85.88 MARGINAL ZONE LYMPHOMA OF LYMPH NODES OF MULTIPLE SITES (HCC): Primary | ICD-10-CM

## 2023-02-27 LAB — FERRITIN SERPL-MCNC: 875 NG/ML (ref 8–388)

## 2023-02-27 PROCEDURE — 82728 ASSAY OF FERRITIN: CPT

## 2023-02-27 PROCEDURE — 82784 ASSAY IGA/IGD/IGG/IGM EACH: CPT

## 2023-02-27 PROCEDURE — 36415 COLL VENOUS BLD VENIPUNCTURE: CPT

## 2023-02-27 NOTE — TELEPHONE ENCOUNTER
PT called in regards to her 2/27/23 infusion appt/states a discussion was had in 2/6/23 appt with provider and nurse about rechecking labs and rescheduling the infusion appt but nothing has been scheduled for labs and the infusion appt is still scheduled as is/PT needing clarification about this mornings appt/high priority since appt is at 1030 this morning 2/27/23

## 2023-02-27 NOTE — TELEPHONE ENCOUNTER
Chart reviewed and discussed with Dr. Sussy Ornelas. Patient needs to have a lab appointment because we need to follow up on her levels. If her IgG level is <600 she will need to get the IVIG. Infusion appointment for today cancelled. Call back to patient to make aware. While on the phone she states that her last Ferritin level was elevated and she wanted to know if Dr. Sussy Ornelas would reorder it to follow up. Also, she wants her other immunoglobulin levels checked since at her last appointment they were on the low side.    Respoke with MD. We will order the labs and monitor for results

## 2023-02-28 LAB
IGA SERPL-MCNC: <5 MG/DL (ref 87–352)
IGG SERPL-MCNC: 495 MG/DL (ref 586–1602)
IGM SERPL-MCNC: <5 MG/DL (ref 26–217)

## 2023-03-02 ENCOUNTER — TELEPHONE (OUTPATIENT)
Dept: ONCOLOGY | Age: 63
End: 2023-03-02

## 2023-03-02 NOTE — TELEPHONE ENCOUNTER
IgG 495. Will proceed with IVIG infusion. Patient also wondering about elevated ferritin and low IgM and IgA. Told patient I will review with Dr. Irlanda Griffiths and if he wants to change anything or add additional orders I will let her know otherwise will discuss at next appt.

## 2023-03-02 NOTE — TELEPHONE ENCOUNTER
Pt stated she was informed that after her labs she would be scheduled for her 3rd infusion . Pt want to verify with Dr. Ct Jay is she will still need infusion.

## 2023-03-03 ENCOUNTER — OFFICE VISIT (OUTPATIENT)
Dept: ENT CLINIC | Age: 63
End: 2023-03-03

## 2023-03-03 DIAGNOSIS — R22.1 NECK MASS: Primary | ICD-10-CM

## 2023-03-03 PROCEDURE — 99024 POSTOP FOLLOW-UP VISIT: CPT | Performed by: OTOLARYNGOLOGY

## 2023-03-03 NOTE — PROGRESS NOTES
Audra Ayers is a 58 y.o. female seen today now 6 wks s/p excision of L deep cervical LN back on 1/25/23. Her pathology was consistent with just benign salivary tissue. There were no concerning pathologic nodes intraoperatively. She denies any further incisional pain and reports no swelling over the incision site. She still has a mild amount of left lower facial weakness, but there has been significant improvement since surgery. She has continued to undergo IVIG infusions and is tolerating them well. There is still concern for underlying lymphoma despite her previous negative FNA and open excisional biopsy. She may be getting updated imaging soon through her oncologist.    -Left neck incision well-healed with no edema, hematomas, or exposed sutures  -There is mild left lower facial weakness. All other cranial nerves are intact. A/P:   Diagnosis Orders   1. Neck mass          Her incision has healed up well and her left lower facial weakness has been improving. I anticipate a full recovery and I am optimistic this was just a traction injury. I had a long discussion with her today about her pathology report. Intraoperatively, there were no pathologic nodes within the left neck and all of the tissue removed was benign. I do not palpate any concerning neck masses on exam today but she still reports a fluctuating left posterior neck mass more along the occipital hairline. I would recommend repeat imaging before considering any further biopsy. She had discussed with her oncologist in [place] about a potential core biopsy which would always be an option through interventional radiology. I reassured her of my findings and we will see her back as needed.     Mele Gee MD

## 2023-03-06 ENCOUNTER — HOSPITAL ENCOUNTER (OUTPATIENT)
Dept: INFUSION THERAPY | Age: 63
Discharge: HOME | End: 2023-03-06
Payer: COMMERCIAL

## 2023-03-06 VITALS
BODY MASS INDEX: 45.24 KG/M2 | WEIGHT: 255.4 LBS | HEART RATE: 59 BPM | SYSTOLIC BLOOD PRESSURE: 107 MMHG | TEMPERATURE: 98.6 F | RESPIRATION RATE: 18 BRPM | OXYGEN SATURATION: 98 % | DIASTOLIC BLOOD PRESSURE: 61 MMHG

## 2023-03-06 DIAGNOSIS — D80.1 HYPOGAMMAGLOBULINEMIA (HCC): Primary | ICD-10-CM

## 2023-03-06 PROCEDURE — 6360000002 HC RX W HCPCS: Performed by: NURSE PRACTITIONER

## 2023-03-06 PROCEDURE — 96375 TX/PRO/DX INJ NEW DRUG ADDON: CPT

## 2023-03-06 PROCEDURE — 6370000000 HC RX 637 (ALT 250 FOR IP): Performed by: NURSE PRACTITIONER

## 2023-03-06 PROCEDURE — 96365 THER/PROPH/DIAG IV INF INIT: CPT

## 2023-03-06 PROCEDURE — 2580000003 HC RX 258: Performed by: NURSE PRACTITIONER

## 2023-03-06 PROCEDURE — 96366 THER/PROPH/DIAG IV INF ADDON: CPT

## 2023-03-06 RX ORDER — ALBUTEROL SULFATE 90 UG/1
4 AEROSOL, METERED RESPIRATORY (INHALATION) PRN
OUTPATIENT
Start: 2023-03-06

## 2023-03-06 RX ORDER — ONDANSETRON 2 MG/ML
8 INJECTION INTRAMUSCULAR; INTRAVENOUS
OUTPATIENT
Start: 2023-03-06

## 2023-03-06 RX ORDER — HEPARIN SODIUM (PORCINE) LOCK FLUSH IV SOLN 100 UNIT/ML 100 UNIT/ML
500 SOLUTION INTRAVENOUS PRN
OUTPATIENT
Start: 2023-03-06

## 2023-03-06 RX ORDER — SODIUM CHLORIDE 9 MG/ML
5-250 INJECTION, SOLUTION INTRAVENOUS PRN
OUTPATIENT
Start: 2023-03-06

## 2023-03-06 RX ORDER — ACETAMINOPHEN 325 MG/1
650 TABLET ORAL
Status: COMPLETED | OUTPATIENT
Start: 2023-03-06 | End: 2023-03-06

## 2023-03-06 RX ORDER — ACETAMINOPHEN 325 MG/1
650 TABLET ORAL
OUTPATIENT
Start: 2023-03-06

## 2023-03-06 RX ORDER — DIPHENHYDRAMINE HYDROCHLORIDE 50 MG/ML
50 INJECTION INTRAMUSCULAR; INTRAVENOUS
OUTPATIENT
Start: 2023-03-06

## 2023-03-06 RX ORDER — MEPERIDINE HYDROCHLORIDE 25 MG/ML
12.5 INJECTION INTRAMUSCULAR; INTRAVENOUS; SUBCUTANEOUS PRN
OUTPATIENT
Start: 2023-03-06

## 2023-03-06 RX ORDER — SODIUM CHLORIDE 0.9 % (FLUSH) 0.9 %
5-40 SYRINGE (ML) INJECTION PRN
OUTPATIENT
Start: 2023-03-06

## 2023-03-06 RX ORDER — DIPHENHYDRAMINE HYDROCHLORIDE 50 MG/ML
50 INJECTION INTRAMUSCULAR; INTRAVENOUS
Status: COMPLETED | OUTPATIENT
Start: 2023-03-06 | End: 2023-03-06

## 2023-03-06 RX ORDER — SODIUM CHLORIDE 0.9 % (FLUSH) 0.9 %
5-40 SYRINGE (ML) INJECTION PRN
Status: DISCONTINUED | OUTPATIENT
Start: 2023-03-06 | End: 2023-03-07 | Stop reason: HOSPADM

## 2023-03-06 RX ORDER — EPINEPHRINE 1 MG/ML
0.3 INJECTION, SOLUTION, CONCENTRATE INTRAVENOUS PRN
OUTPATIENT
Start: 2023-03-06

## 2023-03-06 RX ORDER — SODIUM CHLORIDE 9 MG/ML
INJECTION, SOLUTION INTRAVENOUS CONTINUOUS
OUTPATIENT
Start: 2023-03-06

## 2023-03-06 RX ADMIN — DIPHENHYDRAMINE HYDROCHLORIDE 50 MG: 50 INJECTION, SOLUTION INTRAMUSCULAR; INTRAVENOUS at 08:36

## 2023-03-06 RX ADMIN — SODIUM CHLORIDE, PRESERVATIVE FREE 10 ML: 5 INJECTION INTRAVENOUS at 09:31

## 2023-03-06 RX ADMIN — ACETAMINOPHEN 650 MG: 325 TABLET ORAL at 08:36

## 2023-03-06 RX ADMIN — IMMUNE GLOBULIN (HUMAN) 50 G: 10 INJECTION INTRAVENOUS; SUBCUTANEOUS at 08:43

## 2023-03-06 NOTE — PROGRESS NOTES
Patient arrived to [facility] for IVIG. Assessment completed. No needs voiced at this time. Patient requested Tylenol and Benadryl because of previous reactions to IVIG. Lucio Mcmanus RN added orders. Patient tolerated infusion well and is aware of next appointment on 4/3/2023 @0800. Patient discharged ambulatory.

## 2023-03-08 ENCOUNTER — TELEPHONE (OUTPATIENT)
Dept: FAMILY MEDICINE CLINIC | Facility: CLINIC | Age: 63
End: 2023-03-08

## 2023-03-08 NOTE — TELEPHONE ENCOUNTER
Patient requesting an additional order of PT sent in for her neck area, per her physical therapist at the [facility]    If any questions, call patient

## 2023-03-10 ENCOUNTER — TELEMEDICINE (OUTPATIENT)
Dept: FAMILY MEDICINE CLINIC | Facility: CLINIC | Age: 63
End: 2023-03-10
Payer: COMMERCIAL

## 2023-03-10 DIAGNOSIS — I48.91 ATRIAL FIBRILLATION, UNSPECIFIED TYPE (HCC): ICD-10-CM

## 2023-03-10 DIAGNOSIS — D61.810 PANCYTOPENIA DUE TO ANTINEOPLASTIC CHEMOTHERAPY (HCC): ICD-10-CM

## 2023-03-10 DIAGNOSIS — G62.0 POLYNEUROPATHY FOLLOWING CHEMOTHERAPY (HCC): Primary | ICD-10-CM

## 2023-03-10 DIAGNOSIS — M54.2 NECK PAIN: ICD-10-CM

## 2023-03-10 DIAGNOSIS — T45.1X5A PANCYTOPENIA DUE TO ANTINEOPLASTIC CHEMOTHERAPY (HCC): ICD-10-CM

## 2023-03-10 DIAGNOSIS — T45.1X5A POLYNEUROPATHY FOLLOWING CHEMOTHERAPY (HCC): Primary | ICD-10-CM

## 2023-03-10 DIAGNOSIS — E03.9 ACQUIRED HYPOTHYROIDISM: ICD-10-CM

## 2023-03-10 PROCEDURE — 99213 OFFICE O/P EST LOW 20 MIN: CPT | Performed by: FAMILY MEDICINE

## 2023-03-10 RX ORDER — LEVOTHYROXINE SODIUM 175 UG/1
175 TABLET ORAL DAILY
Qty: 30 TABLET | Refills: 5 | Status: SHIPPED | OUTPATIENT
Start: 2023-03-10

## 2023-03-10 ASSESSMENT — ENCOUNTER SYMPTOMS
RESPIRATORY NEGATIVE: 1
GASTROINTESTINAL NEGATIVE: 1

## 2023-03-10 NOTE — PROGRESS NOTES
3/10/2023    TELEHEALTH EVALUATION -- Audio/Visual (During [address] emergency)    HPI:    Rodney Vang (:  1960) has requested an audio/video evaluation for the following concern(s):    History of lymphoma with associated neuropathy and pancytopenia followed by specialists. Has been stable. History of atrial fib followed by cardiology; stable. PT has helped with back pain but has had increased neck pain and needs to extend PT. Recent TSH was a little low. Pt has had loose stools. No palpitation. Has  been undergoing evaluation of painful lesion in left lower neck. Initially fine needle biopsy which is apparently inadequate to evaluate for lymphoma. Core biopsy was recommended. Patient was referred to ENT who did a dissection of the left anterior neck. Ultimately path revealed no lymph tissue present. Conferring with specialists as to next step. Immunoglobulins were low. Has had 3 gamma globulin infusions with little improvement to date and other IgGs have been low. Past Medical History:   Diagnosis Date    Adverse effect of anesthesia     new onset of afib with RVR during bronchoscopy    Anemia     Asthma     Followed by Dr. Sandra Carballo, Pulmonology. daily and rescue inhaler    Atrial fibrillation with rapid ventricular response ([address] 2020    Basal cell carcinoma     Cardiomegaly     Deep vein thrombosis (DVT) (HCC)     History of stroke     History of UTI     Hypertension     Marginal zone lymphoma ([address] 2017    Diffuse Large B-cell Lymphoma.  Completed Chemotherapy 18    Morbid obesity (HCC)     DAGMAR (obstructive sleep apnea)     uses CPAP    Osteoarthritis     Overactive bladder     Primary hypothyroidism     Prolapse of female bladder, acquired     Radiation therapy complication     Rheumatic fever     S/P total knee replacement using cement 10/3/2011    Shingles     Superficial venous thrombosis of right arm 2017         Review of Systems Constitutional: Negative. HENT: Negative. Respiratory: Negative. Cardiovascular: Negative. Gastrointestinal: Negative. Genitourinary: Negative. Musculoskeletal:  Positive for neck pain. Neurological:  Positive for facial asymmetry (post-op; improved but not resolved). Prior to Visit Medications    Medication Sig Taking? Authorizing Provider   levothyroxine (SYNTHROID) 175 MCG tablet Take 1 tablet by mouth daily Yes Vanessa Aleman MD   azithromycin (ZITHROMAX) 250 MG tablet TAKE 2 TABS ON DAY 1, THEN 1 TAB A DAY FOR 4 DAYS    Takes mon - wed - fri  Ignacio Mcnally MD   fluticasone-umeclidin-vilant (TRELEGY ELLIPTA) 605-88.4-00 MCG/ACT AEPB inhaler Inhale 1 puff into the lungs daily  Ignacio Mcnally MD   ondansetron (ZOFRAN) 4 MG tablet Take 1 tablet by mouth every 8 hours as needed for Nausea or Vomiting  Edwar Sutherland MD   cephALEXin (KEFLEX) 500 MG capsule Take 1 capsule by mouth 4 times daily  Patient not taking: Reported on 2/16/2023  Edwar Sutherland MD   pregabalin (LYRICA) 100 MG capsule Take 50 mg by mouth in the morning, at noon, and at bedtime. Patient not taking: Reported on 2/16/2023  Historical Provider, MD   predniSONE (DELTASONE) 20 MG tablet 40 mg for 3 days then 20 mg for 3 days and then 10 mg for 3 days  Patient not taking: Reported on 2/16/2023  Audra Wolf MD   metoprolol succinate (TOPROL XL) 50 MG extended release tablet Take 1 tablet by mouth daily  Lyssa Ni MD   apixaban (ELIQUIS) 5 MG TABS tablet Take 1 tablet by mouth 2 times daily  Lyssa Ni MD   triamterene-hydroCHLOROthiazide (MAXZIDE) 75-50 MG per tablet Take 0.5-1 tablets by mouth daily as needed (swelling)  Vanessa Aleman MD   pregabalin (LYRICA) 50 MG capsule Take 1 capsule by mouth 3 times daily for 30 days.   Janay Devries MD   sodium chloride, Inhalant, 3 % nebulizer solution Take 4 mLs by nebulization 2 times daily as needed for Cough  Mahsa Oliveira, APRN - CNP   albuterol (PROVENTIL) (2.5 MG/3ML) 0.083% nebulizer solution Take 3 mLs by nebulization in the morning and 3 mLs at noon and 3 mLs in the evening and 3 mLs before bedtime. EMELY Adkins - CNP   montelukast (SINGULAIR) 10 MG tablet Take 1 tablet by mouth in the morning. Patient taking differently: Take 10 mg by mouth nightly  [place] EMELY Landers - CNP   albuterol sulfate  (90 Base) MCG/ACT inhaler Inhale 2 puffs into the lungs every 4 hours  Ar Automatic Reconciliation   fluticasone (FLONASE) 50 MCG/ACT nasal spray 2 sprays by Nasal route daily  Ar Automatic Reconciliation   Fluticasone-Umeclidin-Vilant (TRELEGY ELLIPTA) 200-62.5-25 MCG/INH AEPB Inhale 1 puff into the lungs daily  Ar Automatic Reconciliation       Social History     Tobacco Use    Smoking status: Never    Smokeless tobacco: Never   Substance Use Topics    Alcohol use: No    Drug use: Never            PHYSICAL EXAMINATION:  [ INSTRUCTIONS:  \"[x]\" Indicates a positive item  \"[]\" Indicates a negative item  -- DELETE ALL ITEMS NOT EXAMINED]  Vital Signs: (As obtained by patient/caregiver or practitioner observation)    Blood pressure-  Heart rate-    Respiratory rate-    Temperature-  Pulse oximetry-     Constitutional: [x] Appears well-developed and well-nourished [] No apparent distress      [] Abnormal-   Mental status  [x] Alert and awake  [] Oriented to person/place/time []Able to follow commands      Eyes:  EOM    []  Normal  [] Abnormal-  Sclera  []  Normal  [] Abnormal -         Discharge []  None visible  [] Abnormal -    HENT:   [] Normocephalic, atraumatic.   [] Abnormal   [] Mouth/Throat: Mucous membranes are moist.     External Ears [] Normal  [] Abnormal-     Neck: [] No visualized mass     Pulmonary/Chest: [x] Respiratory effort normal.  [] No visualized signs of difficulty breathing or respiratory distress        [] Abnormal-      Musculoskeletal:   [] Normal gait with no signs of ataxia         [] Normal range of motion of neck        [] Abnormal-       Neurological:        [] No Facial Asymmetry (Cranial nerve 7 motor function) (limited exam to video visit)          [] No gaze palsy        [] Abnormal-         Skin:        [] No significant exanthematous lesions or discoloration noted on facial skin         [] Abnormal-            Psychiatric:       [x] Normal Affect [] No Hallucinations        [] Abnormal-     Other pertinent observable physical exam findings-     ASSESSMENT/PLAN:  1. Polyneuropathy following chemotherapy (HCC)      2. Pancytopenia due to antineoplastic chemotherapy ([facility]      3. Atrial fibrillation, unspecified type ([facility]      4. Acquired hypothyroidism      5. Neck pain    Decrease thyroid dose. Continue other maintenance medications. PT referral for neck pain. Recheck TSH in 6 weeks      Return in about 6 months (around 9/10/2023), or if symptoms worsen or fail to improve. Nba Gannon, was evaluated through a synchronous (real-time) audio-video encounter. The patient (or guardian if applicable) is aware that this is a billable service, which includes applicable co-pays. This Virtual Visit was conducted with patient's (and/or legal guardian's) consent. The visit was conducted pursuant to the emergency declaration under the [address] [place] [address] authority and the [employer] General Act. Patient identification was verified, and a caregiver was present when appropriate. The patient was located at Home: [address] [place] [address]  Provider was located at [place] [phone]): [phone]  [facility],  [address] [place] [address] Drive        Total time spent on this encounter: Not billed by time    --Mehdi Cleveland MD on 3/10/2023 at 11:55 AM    An electronic signature was used to authenticate this note.

## 2023-03-10 NOTE — PROGRESS NOTES
[facility] Wdtvpl-ivkdtw-pw visit  [address] [facility] [address], [address], [address] [facility]  [phone]    Patient Name:  Desmond Contreras  YOB: 1960      Office Visit 3/13/2023    Chief Complaint   Patient presents with    CPAP/BiPAP     F/u on pressure change 6-12 cm     Sleep Apnea   And wants to discuss her cancer and other issues      HISTORY OF PRESENT ILLNESS:    This pleasant lady came in today for follow-up visit. To recap, patient previously was losing weight. She did have a polysomnogram in the past with an AHI that was severely elevated 137.9 with lowest sats being 63%. Patient did lose a lot of weight and wanted her pressure adjusted. During her last visit we put her on CPAP at 6-12 cm H2O with C-Flex of 3. Patient's compliance shows she has used it 63 out of 65 days and 49 days or more than 4 hours. Average hours are 5 hours and 2 minutes. Her AHI is down to 2.4. Mean airleak is 6.7 L/min 90% air leak is 25.4 L/min median pressure is 10.2 cm H2O 95th percentile pressure is 11.5 cm H2O. She currently reports she feels refreshed and alert in the mornings. She is getting the hang of it and doing much better particularly over the last 30 days. Her Commerce score is down to 8/24. Patient is still working on weight loss and has lost another 5 pounds since her last visit to 256 pounds. She indicated she lost more weight, but went to [place] since she had some issues with her daughter trying to get pregnant and losing her child due to in vitro fertilization failure. She indicates she may see a local doctor instead and I gave her the name for Dr. Shira Carlos from [facility]. Patient also reports that she has breast cancer as well as lymphoma. She indicates Dr. Mata Oseguera is helping her take care of this. She does have issues with the biopsy with Dr. Bond Doctor and still deals with her doctor from the [facility] [place] who works with Dr. Mata Oseguera in order to help her.   She is still working on this to figure out if she needs to get a lymph node core biopsy or remove another entire lymph node. She is working closely with Dr. Marita Lewis    She also reports that she has a new vein that popped up on her leg and is wondering if I have someone that can I can refer her to it. She also is still taking immunoglobulin infusions which have helped her. But does not know why. She has not had time to see an immunologist.  She is again wondering if I have someone I could refer her to. 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 Sleep Medicine 3/13/2023 2023 2022 8/10/2022   Sitting and reading 2 3 1 3   Watching TV 1 3 1 3   Sitting, inactive in a public place (e.g. a theatre or a meeting) 0 0 0 2   As a passenger in a car for an hour without a break 3 3 2 3   Lying down to rest in the afternoon when circumstances permit 2 0 0 0   Sitting and talking to someone 0 1 0 1   Sitting quietly after a lunch without alcohol 0 0 2 0   In a car, while stopped for a few minutes in traffic 0 0 0 0   Mohler Sleepiness Score 8 10 6 12     COMPLIANCE REPORT:    DIAGNOSTIC TEST:  Sleep Study- 22 AHI- 137.9 Lowest SaO2- 63%                  No flowsheet data found. Past Medical History:   Diagnosis Date    Adverse effect of anesthesia     new onset of afib with RVR during bronchoscopy    Anemia     Asthma     Followed by Dr. Benita Persaud, Pulmonology. daily and rescue inhaler    Atrial fibrillation with rapid ventricular response ([address] 2020    Basal cell carcinoma     Cardiomegaly     Deep vein thrombosis (DVT) (HCC)     History of stroke     History of UTI     Hypertension     Marginal zone lymphoma ([address] 2017    Diffuse Large B-cell Lymphoma.  Completed Chemotherapy 18    Morbid obesity (HCC)     DAGMAR (obstructive sleep apnea)     uses CPAP    Osteoarthritis     Overactive bladder     Primary hypothyroidism     Prolapse of female bladder, acquired     Radiation therapy complication     Rheumatic fever     S/P total knee replacement using cement 10/3/2011    Shingles     Superficial venous thrombosis of right arm 5/1/2017         Patient Active Problem List   Diagnosis    PFO (patent foramen ovale)    Osteoarthritis of left knee    Heart murmur    Admission for antineoplastic chemotherapy    Hypertension    History of DVT of lower extremity    History of atrial fibrillation    Primary hypothyroidism    DLBCL (diffuse large B cell lymphoma) (HCC)    Snores    Atrial fibrillation (HCC)    Left lower lobe pneumonia    Atelectasis    Mild mitral regurgitation by prior echocardiogram    Pancytopenia due to antineoplastic chemotherapy ([address]    Hemoptysis    Non Hodgkin's lymphoma (HCC)    Pneumonia and influenza    Lymphadenopathy, generalized    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 ([address]    Osteoarthritis of right knee    Morbid obesity with BMI of 50.0-59.9, adult (HCC)    Palpitations    Hypersomnia, unspecified    Hypoxemia    DAGMAR (obstructive sleep apnea)    Hypogammaglobulinemia ([address]    Cervical lymphadenopathy    Polyneuropathy following chemotherapy [facility])           Past Surgical History:   Procedure Laterality Date    BRONCHOSCOPY  11/25/2020    new onset of afib with RVR during bronchoscopy    CATARACT REMOVAL Bilateral 2018    CHOLECYSTECTOMY  1983    COLONOSCOPY  03/2017    Clear    LYMPH NODE BIOPSY Left 01/25/2023    open L cervical LN excision- [facility]    LYMPH NODE BIOPSY Left 1/25/2023    CERVICAL LYMPH NODE BIOPSY EXCISION DISSECTION performed by Roxi Rader MD at [address] [place] [address]  Left 02/10/2011    Dr. Leila Banuelos  11/25/2020    ISABELL-guided cardioversion for A. Fib    PRE-MALIGNANT / BENIGN SKIN LESION EXCISION  2019    basal/squamous skin lesions removed from scalp. [facility].      TOTAL KNEE ARTHROPLASTY Left 2013    Dr. Maryam Magdaleno Jame Ramos Right 2012    Dr. Tamera Buck LEFT Left 01/03/2018    US BREAST NEEDLE BIOPSY LEFT 1/3/2018 [facility]    US LYMPH NODE BIOPSY  01/05/2023    US LYMPH NODE BIOPSY 1/5/2023 Juan Martinez PA-C [facility]         Social History     Socioeconomic History    Marital status:      Spouse name: Not on file    Number of children: Not on file    Years of education: Not on file    Highest education level: Not on file   Occupational History    Not on file   Tobacco Use    Smoking status: Never    Smokeless tobacco: Never   Substance and Sexual Activity    Alcohol use: No    Drug use: Never    Sexual activity: Not on file   Other Topics Concern    Not on file   Social History Narrative    10/3/11:  PATIENT IS  TO Ian Weaver. SHE IS DISABLED. Social Determinants of Health     Financial Resource Strain: Not on file   Food Insecurity: Not on file   Transportation Needs: Not on file   Physical Activity: Not on file   Stress: Not on file   Social Connections: Not on file   Intimate Partner Violence: Not on file   Housing Stability: Not on file         Family History   Problem Relation Age of Onset    Other Maternal Grandmother         Vascular Disorder with leg amputation    Dementia Mother     Kidney Disease Father     Thyroid Cancer Neg Hx     Post-op Cognitive Dysfunction Neg Hx     Emergence Delirium Neg Hx     Delayed Awakening Neg Hx     Pseudochol.  Deficiency Neg Hx     Malig Hypertherm Neg Hx     Thyroid Disease Sister         hypothyroidism    Breast Cancer Cousin 48    Celiac Disease Sister     Liver Disease Sister         non-alcoholic         Allergies   Allergen Reactions    Gabapentin Other (See Comments)     \"Double vision and falls\"         Current Outpatient Medications   Medication Sig    levothyroxine (SYNTHROID) 175 MCG tablet Take 1 tablet by mouth daily    azithromycin (ZITHROMAX) 250 MG tablet TAKE 2 TABS ON DAY 1, THEN 1 TAB A DAY FOR 4 DAYS    Takes mon - wed - fri    fluticasone-umeclidin-vilant (TRELEGY ELLIPTA) 200-62.5-25 MCG/ACT AEPB inhaler Inhale 1 puff into the lungs daily    ondansetron (ZOFRAN) 4 MG tablet Take 1 tablet by mouth every 8 hours as needed for Nausea or Vomiting    metoprolol succinate (TOPROL XL) 50 MG extended release tablet Take 1 tablet by mouth daily    apixaban (ELIQUIS) 5 MG TABS tablet Take 1 tablet by mouth 2 times daily    triamterene-hydroCHLOROthiazide (MAXZIDE) 75-50 MG per tablet Take 0.5-1 tablets by mouth daily as needed (swelling)    sodium chloride, Inhalant, 3 % nebulizer solution Take 4 mLs by nebulization 2 times daily as needed for Cough    albuterol (PROVENTIL) (2.5 MG/3ML) 0.083% nebulizer solution Take 3 mLs by nebulization in the morning and 3 mLs at noon and 3 mLs in the evening and 3 mLs before bedtime. montelukast (SINGULAIR) 10 MG tablet Take 1 tablet by mouth in the morning. (Patient taking differently: Take 10 mg by mouth nightly)    albuterol sulfate  (90 Base) MCG/ACT inhaler Inhale 2 puffs into the lungs every 4 hours    fluticasone (FLONASE) 50 MCG/ACT nasal spray 2 sprays by Nasal route daily    Fluticasone-Umeclidin-Vilant (TRELEGY ELLIPTA) 200-62.5-25 MCG/INH AEPB Inhale 1 puff into the lungs daily    pregabalin (LYRICA) 50 MG capsule Take 1 capsule by mouth 3 times daily for 30 days. No current facility-administered medications for this visit. REVIEW OF SYSTEMS:     CONSTITUTIONAL:   There is Negative history of fever, chills, night sweats. Patient is positive for weight loss, they are  Negative for  weight gain. Patient is  Negative  for fatigue and hypersomnia and is  on their CPAP for the past few days. Insomnia is under control with CPAP. CARDIAC:   No chest pain, pressure, discomfort, palpitations, orthopnea, murmurs, or edema.      GI:   No dysphagia, heartburn reflux, nausea/vomiting, diarrhea, abdominal pain, or bleeding. NEURO:    There is no history of AMS, persistent headache, decreased level of consciousness, seizures, or motor or sensory deficits. PHYSICAL EXAM:    /60   Pulse 56   Temp 97 °F (36.1 °C)   Resp 16   Ht 5' 3\" (1.6 m)   Wt 256 lb 12.8 oz (116.5 kg)   SpO2 96%   BMI 45.49 kg/m²     Body mass index is 45.49 kg/m². Constitutional:  the patient is well developed and in no acute distress  EENMT:  Sclera clear, pupils equal, oral mucosa moist, narrow oral airway Modified Siddiqui Stage 4, Nares are patent bilateral  Respiratory: CTA b/l. No Wheezing or Rhonchi. Cardiovascular:  RRR without M,G,R  Gastrointestinal: soft and non-tender; with positive bowel sounds. Musculoskeletal: warm without cyanosis. There is no lower leg edema. Skin:  no jaundice or rashes, old healing knee scars, left upper leg with large varicose vein with pain to touch. Neurologic: no gross neuro deficits     Psychiatric:  alert and oriented x 3        ASSESSMENT/PLAN:  (Medical Decision Making)       ICD-10-CM    1. DAGMAR (obstructive sleep apnea)  G47.33 -Excellent compliance and is benefiting from PAP therapy      2. Hypoxemia  R09.02 -We can order overnight oximetry to make sure nocturnal hypoxemia resolves. I will reminder      3. Hypersomnia, unspecified  G47.10 -Improved with [place] now down to 8/24      4. Morbid obesity with BMI of 50.0-59.9, adult ([facility]  E66.01 -She is doing remarkably well with weight loss and is down to 256 pounds. Congratulated her on her efforts    Z68.43             6.  Insomnia  --Improved since we adjusted the pressure for her. 7.  Immunoglobulin deficiency  -Still getting immunoglobulin infusions. Indicates was lower on all immunoglobulin levels. We will send her to Dr. Gardenia Cabrera for assessment    8. Varicose vein large 1 that just popped up on her left leg. We will send her to Dr. Cristal Zacarias for evaluation    9.   Breast cancer and lymphoma  --Only breast cancer to control and having issues with lymphoma. Following up with Dr. Cornelius Olivares and remaining specialist    SUMMARY:    -Continue auto CPAP. - Patient is compliant and benefiting  --overnight on CPAP to make sure noctural hypoxemia resolves. -Renew supplies  -Continue proper sleep hygiene  --Did review [place] score, and compliance data today    -Congratulated on weight loss advised to continue    -Referral to Fortino Galeazzi for varicose veins left leg    -Referral to check cream for immunoglobulin deficiency    -Follow-up with oncology       - Remaining treatment as per above    All questions are answered to the patient's satisfaction. The patient will call for further questions and concerns. Follow up at the [address] [place] [address] will be in 6months with me    Time spent was 44 minutes  Follow up will be with the patient's referring physician as had been previously scheduled. Mariela Atkins MD    Over 50% of today's office visit was spent in face to face time reviewing test results, prognosis, importance of compliance, education about disease process, benefits of medications, instructions for management of acute flare-ups, and follow up plans. Electronically signed and dictated. Please note if there are errors this is  likely a result of the dictation software.     Orders Placed This Encounter   Procedures    [facility] - Brandyn Cool MD, Vascular Surgery, [place]     Referral Priority:   Routine     Referral Type:   Eval and Treat     Referral Reason:   Specialty Services Required     Referred to Provider:   Harlan George MD     Requested Specialty:   Vascular Surgery     Number of Visits Requested:   1    ALANA Anthony MD, Allergy & Immunology, [place]     Referral Priority:   Routine     Referral Type:   Eval and Treat     Referral Reason:   Specialty Services Required     Referred to Provider:   Mukund Alfonso MD     Requested Specialty: Allergy and Immunology     Number of Visits Requested:   1        No orders of the defined types were placed in this encounter.

## 2023-03-13 ENCOUNTER — OFFICE VISIT (OUTPATIENT)
Dept: SLEEP MEDICINE | Age: 63
End: 2023-03-13
Payer: COMMERCIAL

## 2023-03-13 VITALS
OXYGEN SATURATION: 96 % | TEMPERATURE: 97 F | SYSTOLIC BLOOD PRESSURE: 118 MMHG | HEART RATE: 56 BPM | BODY MASS INDEX: 45.5 KG/M2 | DIASTOLIC BLOOD PRESSURE: 60 MMHG | HEIGHT: 63 IN | WEIGHT: 256.8 LBS | RESPIRATION RATE: 16 BRPM

## 2023-03-13 DIAGNOSIS — G47.33 OSA (OBSTRUCTIVE SLEEP APNEA): Primary | ICD-10-CM

## 2023-03-13 DIAGNOSIS — G47.00 INSOMNIA, UNSPECIFIED TYPE: ICD-10-CM

## 2023-03-13 DIAGNOSIS — E66.01 MORBID OBESITY WITH BMI OF 50.0-59.9, ADULT (HCC): ICD-10-CM

## 2023-03-13 DIAGNOSIS — I83.812 VARICOSE VEINS OF LEFT LOWER EXTREMITY WITH PAIN: ICD-10-CM

## 2023-03-13 DIAGNOSIS — D80.9 IMMUNOGLOBULIN DEFICIENCY (HCC): ICD-10-CM

## 2023-03-13 DIAGNOSIS — G47.10 HYPERSOMNIA, UNSPECIFIED: ICD-10-CM

## 2023-03-13 DIAGNOSIS — R09.02 HYPOXEMIA: ICD-10-CM

## 2023-03-13 PROCEDURE — 3078F DIAST BP <80 MM HG: CPT | Performed by: INTERNAL MEDICINE

## 2023-03-13 PROCEDURE — 3074F SYST BP LT 130 MM HG: CPT | Performed by: INTERNAL MEDICINE

## 2023-03-13 PROCEDURE — 99215 OFFICE O/P EST HI 40 MIN: CPT | Performed by: INTERNAL MEDICINE

## 2023-03-13 ASSESSMENT — SLEEP AND FATIGUE QUESTIONNAIRES
HOW LIKELY ARE YOU TO NOD OFF OR FALL ASLEEP WHILE SITTING QUIETLY AFTER LUNCH WITHOUT ALCOHOL: 0
HOW LIKELY ARE YOU TO NOD OFF OR FALL ASLEEP WHILE SITTING AND TALKING TO SOMEONE: 0
HOW LIKELY ARE YOU TO NOD OFF OR FALL ASLEEP WHILE SITTING AND READING: 2
HOW LIKELY ARE YOU TO NOD OFF OR FALL ASLEEP WHILE SITTING INACTIVE IN A PUBLIC PLACE: 0
HOW LIKELY ARE YOU TO NOD OFF OR FALL ASLEEP WHILE LYING DOWN TO REST IN THE AFTERNOON WHEN CIRCUMSTANCES PERMIT: 2
HOW LIKELY ARE YOU TO NOD OFF OR FALL ASLEEP WHILE WATCHING TV: 1
HOW LIKELY ARE YOU TO NOD OFF OR FALL ASLEEP IN A CAR, WHILE STOPPED FOR A FEW MINUTES IN TRAFFIC: 0
ESS TOTAL SCORE: 8
HOW LIKELY ARE YOU TO NOD OFF OR FALL ASLEEP WHEN YOU ARE A PASSENGER IN A CAR FOR AN HOUR WITHOUT A BREAK: 3

## 2023-04-03 ENCOUNTER — HOSPITAL ENCOUNTER (OUTPATIENT)
Dept: LAB | Age: 63
Discharge: HOME | End: 2023-04-06

## 2023-04-03 ENCOUNTER — HOSPITAL ENCOUNTER (OUTPATIENT)
Dept: INFUSION THERAPY | Age: 63
Discharge: HOME | End: 2023-04-03
Payer: COMMERCIAL

## 2023-04-03 VITALS
SYSTOLIC BLOOD PRESSURE: 129 MMHG | OXYGEN SATURATION: 98 % | BODY MASS INDEX: 45.88 KG/M2 | HEART RATE: 67 BPM | DIASTOLIC BLOOD PRESSURE: 84 MMHG | WEIGHT: 259 LBS | RESPIRATION RATE: 16 BRPM | TEMPERATURE: 98.6 F

## 2023-04-03 DIAGNOSIS — D80.1 HYPOGAMMAGLOBULINEMIA (HCC): ICD-10-CM

## 2023-04-03 LAB — IGG SERPL-MCNC: 722 MG/DL (ref 700–1600)

## 2023-04-03 PROCEDURE — 82784 ASSAY IGA/IGD/IGG/IGM EACH: CPT

## 2023-04-03 PROCEDURE — 36415 COLL VENOUS BLD VENIPUNCTURE: CPT

## 2023-04-03 PROCEDURE — 99211 OFF/OP EST MAY X REQ PHY/QHP: CPT

## 2023-04-03 NOTE — PROGRESS NOTES
Patient arrived for possible IVIG. Patient had blood drawn via vein in lab, and labs showed \"in process\" in patient's chart. After apx 1 hour, call was placed and spoke with Teja Easton in lab to determine how much longer until IgG level would be resulted. Per Teja Easton the Ig labs were ordered as send out for [employer] and were not running. New order for IgG level today and resulted at 722 so treatment is not required. Patient asking about Ferritin level and Ct scan being scheduled. Email sent to Dr Shanthi Sullivan and Lb Ashley RN to address changing the Treatment Plan labs to reflect running here, scheduling CT scan and patient request for Ferritin level check.   Discharged ambulatory

## 2023-04-04 LAB
IGA SERPL-MCNC: <5 MG/DL (ref 87–352)
IGG SERPL-MCNC: 705 MG/DL (ref 586–1602)
IGM SERPL-MCNC: <5 MG/DL (ref 26–217)

## 2023-04-05 DIAGNOSIS — C85.88 MARGINAL ZONE LYMPHOMA OF LYMPH NODES OF MULTIPLE SITES (HCC): Primary | ICD-10-CM

## 2023-05-01 ENCOUNTER — HOSPITAL ENCOUNTER (OUTPATIENT)
Dept: INFUSION THERAPY | Age: 63
Discharge: HOME | End: 2023-05-01
Payer: COMMERCIAL

## 2023-05-01 ENCOUNTER — HOSPITAL ENCOUNTER (OUTPATIENT)
Dept: LAB | Age: 63
Discharge: HOME | End: 2023-05-04

## 2023-05-01 VITALS
SYSTOLIC BLOOD PRESSURE: 127 MMHG | RESPIRATION RATE: 18 BRPM | WEIGHT: 259.6 LBS | TEMPERATURE: 98.4 F | HEART RATE: 65 BPM | BODY MASS INDEX: 45.99 KG/M2 | DIASTOLIC BLOOD PRESSURE: 73 MMHG | OXYGEN SATURATION: 100 %

## 2023-05-01 DIAGNOSIS — D80.1 HYPOGAMMAGLOBULINEMIA (HCC): Primary | ICD-10-CM

## 2023-05-01 LAB — IGG SERPL-MCNC: 450 MG/DL (ref 700–1600)

## 2023-05-01 PROCEDURE — 96375 TX/PRO/DX INJ NEW DRUG ADDON: CPT

## 2023-05-01 PROCEDURE — 2580000003 HC RX 258: Performed by: NURSE PRACTITIONER

## 2023-05-01 PROCEDURE — 6370000000 HC RX 637 (ALT 250 FOR IP): Performed by: NURSE PRACTITIONER

## 2023-05-01 PROCEDURE — 96365 THER/PROPH/DIAG IV INF INIT: CPT

## 2023-05-01 PROCEDURE — 82784 ASSAY IGA/IGD/IGG/IGM EACH: CPT

## 2023-05-01 PROCEDURE — 96366 THER/PROPH/DIAG IV INF ADDON: CPT

## 2023-05-01 PROCEDURE — 6360000002 HC RX W HCPCS: Performed by: INTERNAL MEDICINE

## 2023-05-01 PROCEDURE — 36415 COLL VENOUS BLD VENIPUNCTURE: CPT

## 2023-05-01 PROCEDURE — 6360000002 HC RX W HCPCS: Performed by: NURSE PRACTITIONER

## 2023-05-01 RX ORDER — DIPHENHYDRAMINE HYDROCHLORIDE 50 MG/ML
50 INJECTION INTRAMUSCULAR; INTRAVENOUS ONCE
Status: CANCELLED
Start: 2023-05-01 | End: 2023-05-01

## 2023-05-01 RX ORDER — ACETAMINOPHEN 325 MG/1
650 TABLET ORAL
Status: DISCONTINUED | OUTPATIENT
Start: 2023-05-01 | End: 2023-05-02 | Stop reason: HOSPADM

## 2023-05-01 RX ORDER — ONDANSETRON 2 MG/ML
8 INJECTION INTRAMUSCULAR; INTRAVENOUS
Status: DISCONTINUED | OUTPATIENT
Start: 2023-05-01 | End: 2023-05-02 | Stop reason: HOSPADM

## 2023-05-01 RX ORDER — ACETAMINOPHEN 325 MG/1
650 TABLET ORAL EVERY 4 HOURS PRN
Status: CANCELLED
Start: 2023-05-01

## 2023-05-01 RX ORDER — MEPERIDINE HYDROCHLORIDE 25 MG/ML
12.5 INJECTION INTRAMUSCULAR; INTRAVENOUS; SUBCUTANEOUS PRN
Status: DISCONTINUED | OUTPATIENT
Start: 2023-05-01 | End: 2023-05-02 | Stop reason: HOSPADM

## 2023-05-01 RX ORDER — HEPARIN SODIUM (PORCINE) LOCK FLUSH IV SOLN 100 UNIT/ML 100 UNIT/ML
500 SOLUTION INTRAVENOUS PRN
Status: DISCONTINUED | OUTPATIENT
Start: 2023-05-01 | End: 2023-05-02 | Stop reason: HOSPADM

## 2023-05-01 RX ORDER — DIPHENHYDRAMINE HYDROCHLORIDE 50 MG/ML
50 INJECTION INTRAMUSCULAR; INTRAVENOUS ONCE
Status: COMPLETED | OUTPATIENT
Start: 2023-05-01 | End: 2023-05-01

## 2023-05-01 RX ORDER — DIPHENHYDRAMINE HYDROCHLORIDE 50 MG/ML
50 INJECTION INTRAMUSCULAR; INTRAVENOUS ONCE
Start: 2023-05-01 | End: 2023-05-01

## 2023-05-01 RX ORDER — ONDANSETRON 2 MG/ML
8 INJECTION INTRAMUSCULAR; INTRAVENOUS
OUTPATIENT
Start: 2023-05-01

## 2023-05-01 RX ORDER — SODIUM CHLORIDE 0.9 % (FLUSH) 0.9 %
5-40 SYRINGE (ML) INJECTION PRN
OUTPATIENT
Start: 2023-05-01

## 2023-05-01 RX ORDER — ALBUTEROL SULFATE 90 UG/1
4 AEROSOL, METERED RESPIRATORY (INHALATION) PRN
Status: DISCONTINUED | OUTPATIENT
Start: 2023-05-01 | End: 2023-05-02 | Stop reason: HOSPADM

## 2023-05-01 RX ORDER — SODIUM CHLORIDE 9 MG/ML
5-250 INJECTION, SOLUTION INTRAVENOUS PRN
Status: DISCONTINUED | OUTPATIENT
Start: 2023-05-01 | End: 2023-05-02 | Stop reason: HOSPADM

## 2023-05-01 RX ORDER — EPINEPHRINE 1 MG/ML
0.3 INJECTION, SOLUTION, CONCENTRATE INTRAVENOUS PRN
OUTPATIENT
Start: 2023-05-01

## 2023-05-01 RX ORDER — DIPHENHYDRAMINE HYDROCHLORIDE 50 MG/ML
50 INJECTION INTRAMUSCULAR; INTRAVENOUS
Status: DISCONTINUED | OUTPATIENT
Start: 2023-05-01 | End: 2023-05-02 | Stop reason: HOSPADM

## 2023-05-01 RX ORDER — DIPHENHYDRAMINE HYDROCHLORIDE 50 MG/ML
50 INJECTION INTRAMUSCULAR; INTRAVENOUS
OUTPATIENT
Start: 2023-05-01

## 2023-05-01 RX ORDER — SODIUM CHLORIDE 9 MG/ML
INJECTION, SOLUTION INTRAVENOUS CONTINUOUS
Status: DISCONTINUED | OUTPATIENT
Start: 2023-05-01 | End: 2023-05-02 | Stop reason: HOSPADM

## 2023-05-01 RX ORDER — SODIUM CHLORIDE 0.9 % (FLUSH) 0.9 %
5-40 SYRINGE (ML) INJECTION PRN
Status: DISCONTINUED | OUTPATIENT
Start: 2023-05-01 | End: 2023-05-02 | Stop reason: HOSPADM

## 2023-05-01 RX ORDER — SODIUM CHLORIDE 9 MG/ML
5-250 INJECTION, SOLUTION INTRAVENOUS PRN
OUTPATIENT
Start: 2023-05-01

## 2023-05-01 RX ORDER — HEPARIN SODIUM (PORCINE) LOCK FLUSH IV SOLN 100 UNIT/ML 100 UNIT/ML
500 SOLUTION INTRAVENOUS PRN
OUTPATIENT
Start: 2023-05-01

## 2023-05-01 RX ORDER — SODIUM CHLORIDE 9 MG/ML
INJECTION, SOLUTION INTRAVENOUS CONTINUOUS
OUTPATIENT
Start: 2023-05-01

## 2023-05-01 RX ORDER — ALBUTEROL SULFATE 90 UG/1
4 AEROSOL, METERED RESPIRATORY (INHALATION) PRN
OUTPATIENT
Start: 2023-05-01

## 2023-05-01 RX ORDER — ACETAMINOPHEN 325 MG/1
650 TABLET ORAL
Status: COMPLETED | OUTPATIENT
Start: 2023-05-01 | End: 2023-05-01

## 2023-05-01 RX ORDER — ACETAMINOPHEN 325 MG/1
650 TABLET ORAL
Status: CANCELLED
Start: 2023-05-01

## 2023-05-01 RX ORDER — EPINEPHRINE 1 MG/ML
0.3 INJECTION, SOLUTION, CONCENTRATE INTRAVENOUS PRN
Status: DISCONTINUED | OUTPATIENT
Start: 2023-05-01 | End: 2023-05-02 | Stop reason: HOSPADM

## 2023-05-01 RX ORDER — ACETAMINOPHEN 325 MG/1
650 TABLET ORAL
OUTPATIENT
Start: 2023-05-01

## 2023-05-01 RX ORDER — ACETAMINOPHEN 325 MG/1
650 TABLET ORAL
Start: 2023-05-01

## 2023-05-01 RX ORDER — MEPERIDINE HYDROCHLORIDE 25 MG/ML
12.5 INJECTION INTRAMUSCULAR; INTRAVENOUS; SUBCUTANEOUS PRN
OUTPATIENT
Start: 2023-05-01

## 2023-05-01 RX ADMIN — IMMUNE GLOBULIN (HUMAN) 50 G: 10 INJECTION INTRAVENOUS; SUBCUTANEOUS at 11:13

## 2023-05-01 RX ADMIN — SODIUM CHLORIDE 20 ML/HR: 9 INJECTION, SOLUTION INTRAVENOUS at 10:20

## 2023-05-01 RX ADMIN — ACETAMINOPHEN 650 MG: 325 TABLET ORAL at 10:43

## 2023-05-01 RX ADMIN — DIPHENHYDRAMINE HYDROCHLORIDE 50 MG: 50 INJECTION, SOLUTION INTRAMUSCULAR; INTRAVENOUS at 10:44

## 2023-05-01 RX ADMIN — SODIUM CHLORIDE, PRESERVATIVE FREE 10 ML: 5 INJECTION INTRAVENOUS at 10:20

## 2023-05-01 RX ADMIN — SODIUM CHLORIDE, PRESERVATIVE FREE 10 ML: 5 INJECTION INTRAVENOUS at 13:05

## 2023-05-01 NOTE — PROGRESS NOTES
Arrived to the [facility]. Assessment completed. IVIG  completed. Patient tolerated without problems. Any issues or concerns during appointment: None  Instructed to call Dr Benita Cortes with any side effects or concerns  Patient aware has no future appointment.   Discharged ambulatory

## 2023-05-02 ENCOUNTER — TELEPHONE (OUTPATIENT)
Dept: SLEEP MEDICINE | Age: 63
End: 2023-05-02

## 2023-05-02 NOTE — TELEPHONE ENCOUNTER
----- Message from Amie Adler MD sent at 5/1/2023  7:32 AM EDT -----  Overnight is fine on CPAP. Let patient know.   Thanks    ----- Message -----  From: Margarito Shin  Sent: 4/28/2023   4:56 PM EDT  To: Amie Adler MD

## 2023-05-08 ENCOUNTER — HOSPITAL ENCOUNTER (OUTPATIENT)
Dept: CT IMAGING | Age: 63
Discharge: HOME | End: 2023-05-11

## 2023-05-08 DIAGNOSIS — C85.88 MARGINAL ZONE LYMPHOMA OF LYMPH NODES OF MULTIPLE SITES (HCC): ICD-10-CM

## 2023-05-10 DIAGNOSIS — C85.88 MARGINAL ZONE LYMPHOMA OF LYMPH NODES OF MULTIPLE SITES (HCC): Primary | ICD-10-CM

## 2023-05-12 ENCOUNTER — HOSPITAL ENCOUNTER (OUTPATIENT)
Dept: CT IMAGING | Age: 63
Discharge: HOME | End: 2023-05-15

## 2023-05-12 DIAGNOSIS — C85.88 MARGINAL ZONE LYMPHOMA OF LYMPH NODES OF MULTIPLE SITES (HCC): ICD-10-CM

## 2023-05-12 DIAGNOSIS — C85.88 MARGINAL ZONE LYMPHOMA OF LYMPH NODES OF MULTIPLE SITES (HCC): Primary | ICD-10-CM

## 2023-05-15 ENCOUNTER — HOSPITAL ENCOUNTER (OUTPATIENT)
Dept: LAB | Age: 63
Discharge: HOME | End: 2023-05-18
Payer: COMMERCIAL

## 2023-05-15 ENCOUNTER — OFFICE VISIT (OUTPATIENT)
Dept: ONCOLOGY | Age: 63
End: 2023-05-15
Payer: COMMERCIAL

## 2023-05-15 VITALS
WEIGHT: 258 LBS | OXYGEN SATURATION: 100 % | TEMPERATURE: 98.8 F | SYSTOLIC BLOOD PRESSURE: 122 MMHG | DIASTOLIC BLOOD PRESSURE: 60 MMHG | HEART RATE: 68 BPM | BODY MASS INDEX: 45.71 KG/M2 | RESPIRATION RATE: 16 BRPM | HEIGHT: 63 IN

## 2023-05-15 DIAGNOSIS — C85.88 MARGINAL ZONE LYMPHOMA OF LYMPH NODES OF MULTIPLE SITES (HCC): ICD-10-CM

## 2023-05-15 DIAGNOSIS — C83.30 DIFFUSE LARGE B-CELL LYMPHOMA, UNSPECIFIED BODY REGION (HCC): Primary | ICD-10-CM

## 2023-05-15 LAB
ALBUMIN SERPL-MCNC: 3.6 G/DL (ref 3.2–4.6)
ALBUMIN/GLOB SERPL: 1 (ref 0.4–1.6)
ALP SERPL-CCNC: 81 U/L (ref 50–136)
ALT SERPL-CCNC: 35 U/L (ref 12–65)
ANION GAP SERPL CALC-SCNC: 7 MMOL/L (ref 2–11)
AST SERPL-CCNC: 33 U/L (ref 15–37)
BASOPHILS # BLD: 0 K/UL (ref 0–0.2)
BASOPHILS NFR BLD: 1 % (ref 0–2)
BILIRUB SERPL-MCNC: 0.5 MG/DL (ref 0.2–1.1)
BUN SERPL-MCNC: 21 MG/DL (ref 8–23)
CALCIUM SERPL-MCNC: 9.1 MG/DL (ref 8.3–10.4)
CHLORIDE SERPL-SCNC: 102 MMOL/L (ref 101–110)
CO2 SERPL-SCNC: 29 MMOL/L (ref 21–32)
CREAT SERPL-MCNC: 1 MG/DL (ref 0.6–1)
DIFFERENTIAL METHOD BLD: ABNORMAL
EOSINOPHIL # BLD: 0.1 K/UL (ref 0–0.8)
EOSINOPHIL NFR BLD: 3 % (ref 0.5–7.8)
ERYTHROCYTE [DISTWIDTH] IN BLOOD BY AUTOMATED COUNT: 14.5 % (ref 11.9–14.6)
FERRITIN SERPL-MCNC: 773 NG/ML (ref 8–388)
GLOBULIN SER CALC-MCNC: 3.6 G/DL (ref 2.8–4.5)
GLUCOSE SERPL-MCNC: 90 MG/DL (ref 65–100)
HCT VFR BLD AUTO: 36.4 % (ref 35.8–46.3)
HGB BLD-MCNC: 11.6 G/DL (ref 11.7–15.4)
IMM GRANULOCYTES # BLD AUTO: 0 K/UL (ref 0–0.5)
IMM GRANULOCYTES NFR BLD AUTO: 0 % (ref 0–5)
LYMPHOCYTES # BLD: 1.1 K/UL (ref 0.5–4.6)
LYMPHOCYTES NFR BLD: 24 % (ref 13–44)
MCH RBC QN AUTO: 28 PG (ref 26.1–32.9)
MCHC RBC AUTO-ENTMCNC: 31.9 G/DL (ref 31.4–35)
MCV RBC AUTO: 87.9 FL (ref 82–102)
MONOCYTES # BLD: 0.2 K/UL (ref 0.1–1.3)
MONOCYTES NFR BLD: 5 % (ref 4–12)
NEUTS SEG # BLD: 3.2 K/UL (ref 1.7–8.2)
NEUTS SEG NFR BLD: 67 % (ref 43–78)
NRBC # BLD: 0 K/UL (ref 0–0.2)
PLATELET # BLD AUTO: 88 K/UL (ref 150–450)
PMV BLD AUTO: 11.7 FL (ref 9.4–12.3)
POTASSIUM SERPL-SCNC: 3.4 MMOL/L (ref 3.5–5.1)
PROT SERPL-MCNC: 7.2 G/DL (ref 6.3–8.2)
RBC # BLD AUTO: 4.14 M/UL (ref 4.05–5.2)
SODIUM SERPL-SCNC: 138 MMOL/L (ref 133–143)
T4 FREE SERPL-MCNC: 1.4 NG/DL (ref 0.78–1.4)
TSH, 3RD GENERATION: 2.44 UIU/ML (ref 0.36–3)
WBC # BLD AUTO: 4.6 K/UL (ref 4.3–11.1)

## 2023-05-15 PROCEDURE — 36415 COLL VENOUS BLD VENIPUNCTURE: CPT

## 2023-05-15 PROCEDURE — 82784 ASSAY IGA/IGD/IGG/IGM EACH: CPT

## 2023-05-15 PROCEDURE — 99215 OFFICE O/P EST HI 40 MIN: CPT | Performed by: INTERNAL MEDICINE

## 2023-05-15 PROCEDURE — 3078F DIAST BP <80 MM HG: CPT | Performed by: INTERNAL MEDICINE

## 2023-05-15 PROCEDURE — 84439 ASSAY OF FREE THYROXINE: CPT

## 2023-05-15 PROCEDURE — 3074F SYST BP LT 130 MM HG: CPT | Performed by: INTERNAL MEDICINE

## 2023-05-15 PROCEDURE — 80053 COMPREHEN METABOLIC PANEL: CPT

## 2023-05-15 PROCEDURE — 85025 COMPLETE CBC W/AUTO DIFF WBC: CPT

## 2023-05-15 PROCEDURE — 82728 ASSAY OF FERRITIN: CPT

## 2023-05-15 PROCEDURE — 84443 ASSAY THYROID STIM HORMONE: CPT

## 2023-05-15 ASSESSMENT — PATIENT HEALTH QUESTIONNAIRE - PHQ9
SUM OF ALL RESPONSES TO PHQ9 QUESTIONS 1 & 2: 0
SUM OF ALL RESPONSES TO PHQ QUESTIONS 1-9: 0
2. FEELING DOWN, DEPRESSED OR HOPELESS: 0
1. LITTLE INTEREST OR PLEASURE IN DOING THINGS: 0
SUM OF ALL RESPONSES TO PHQ QUESTIONS 1-9: 0

## 2023-05-15 NOTE — PROGRESS NOTES
[facility] Hematology and Oncology: Office Visit Established Patient    Chief Complaint:    Chief Complaint   Patient presents with    Follow-up         History of Present Illness:  Ms. Muniz is a 63 y.o. 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  She was in previously good health, seen as an urgent work-in in clinic for lymphadenopathy on 3/30/17. She was having abdominal pain and swelling which has progressed over the  previous several weeks, CT was recommended but was initially denied by insurance. She had GI evaluation including EGD/colonoscopy which showed no intraluminal pathology, but finally had a CT at [facility] that showed diffuse lymphadenopathy  with moderate ascites, splenomegaly, and possible infiltration of the left kidney, all consistent with lymphoproliferative disorder.  We recommended inpatient admission for expedited work-up, including excisional biopsy of an axillary node, bone marrow  biopsy, labs and PET/CT.  The biopsy returned showing marginal zone lymphoma.  Her anemia, ascites, and constitutional symptoms are an indication for treatment.  I recommend Rituxan and bendamustine  for therapy.  She was hospitalized for recurrent fevers after cycle 1. She was also noted to have hypotension requiring a brief ICU stay with Levophed, and acute kidney injury either from antibiotics  or ATN from hypotension (or both).  She completed repeat paracentesis with good relief of abdominal symptoms.  Restaging after cycle 2 showed partial response in lymphadenopathy  and splenomegaly, continue current therapy.  Restaging after 5 cycles showed essentially complete response, she was continued on to 6 cycles of BR and then entered R maintenance.  PET/CT prior to her 2nd cycle of maintenance showed a new hypermetabolic  breast nodule and a mesenteric nodule.  Biopsy of the breast nodule shows diffuse large B cell lymphoma.  I suspect this

## 2023-05-15 NOTE — PATIENT INSTRUCTIONS
Final         Treatment Summary has been discussed and given to patient: n/a        -------------------------------------------------------------------------------------------------------------------  Please call our office at [phone] if you have any  of the following symptoms:   Fever of 100.5 or greater  Chills  Shortness of breath  Swelling or pain in one leg    After office hours an answering service is available and will contact a provider for emergencies or if you are experiencing any of the above symptoms. Patient does express an interest in My Chart. My Chart log in information explained on the after visit summary printout at the [facility] desk.     Keisha Mcbride MA

## 2023-05-16 LAB
IGA SERPL-MCNC: <5 MG/DL (ref 87–352)
IGG SERPL-MCNC: 870 MG/DL (ref 586–1602)
IGM SERPL-MCNC: <5 MG/DL (ref 26–217)

## 2023-05-18 DIAGNOSIS — C83.30 DIFFUSE LARGE B-CELL LYMPHOMA, UNSPECIFIED BODY REGION (HCC): Primary | ICD-10-CM

## 2023-05-23 ENCOUNTER — HOSPITAL ENCOUNTER (OUTPATIENT)
Dept: ULTRASOUND IMAGING | Age: 63
Discharge: HOME | End: 2023-05-26
Payer: COMMERCIAL

## 2023-05-23 VITALS
TEMPERATURE: 98 F | OXYGEN SATURATION: 97 % | SYSTOLIC BLOOD PRESSURE: 172 MMHG | DIASTOLIC BLOOD PRESSURE: 75 MMHG | RESPIRATION RATE: 18 BRPM | HEART RATE: 63 BPM

## 2023-05-23 DIAGNOSIS — C83.30 DIFFUSE LARGE B-CELL LYMPHOMA, UNSPECIFIED BODY REGION (HCC): ICD-10-CM

## 2023-05-23 PROCEDURE — 2500000003 HC RX 250 WO HCPCS: Performed by: RADIOLOGY

## 2023-05-23 PROCEDURE — 88305 TISSUE EXAM BY PATHOLOGIST: CPT

## 2023-05-23 PROCEDURE — 76942 ECHO GUIDE FOR BIOPSY: CPT

## 2023-05-23 RX ORDER — LIDOCAINE HYDROCHLORIDE 10 MG/ML
INJECTION, SOLUTION EPIDURAL; INFILTRATION; INTRACAUDAL; PERINEURAL PRN
Status: DISCONTINUED | OUTPATIENT
Start: 2023-05-23 | End: 2023-05-27 | Stop reason: HOSPADM

## 2023-05-23 RX ADMIN — LIDOCAINE HYDROCHLORIDE 5 ML: 10 INJECTION, SOLUTION EPIDURAL; INFILTRATION; INTRACAUDAL; PERINEURAL at 13:25

## 2023-05-23 NOTE — DISCHARGE INSTRUCTIONS
If you have any questions about your procedure, please call the Interventional Radiology department at [phone]. After business hours (5pm) and weekends, call the answering service at [phone] and ask for the Radiologist on call to be paged. Si tiene Preguntas acerca del procedimiento, por favor llame al departamento de Radiología Intervencional al [phone]. Después de horas de oficina (5 pm) y los fines de [place], llamar al Melyssa Wilson al [phone] y pregunte por el Radiologo de [facility].

## 2023-05-23 NOTE — OP NOTE
Department of Interventional Radiology  [phone]        Interventional Radiology Brief Procedure Note    Patient: Funmilayo Laura MRN: [id number]  SSN: [id number]    YOB: 1960  Age: 61 y.o. Sex: female      Date of Procedure: 5/23/2023    Pre-Procedure Diagnosis: lymphoma    Post-Procedure Diagnosis: SAME    Procedure(s): Image Guided Biopsy    Brief Description of Procedure: ln biopsy    Performed By: Talia Salinas MD     Assistants: None    Anesthesia:Lidocaine    Estimated Blood Loss: Less than 10ml    Specimens:  Pathology    Implants:  None    Findings: samll normal appearing LN in left submandibular region. 4 core biopsies.   No LN in posterior chain in patient's area of concern    Complications: None    Recommendations: routine     Follow Up: prn    Signed By: Talia Salinas MD     May 23, 2023

## 2023-05-24 DIAGNOSIS — J45.21 MILD INTERMITTENT ASTHMA WITH (ACUTE) EXACERBATION: ICD-10-CM

## 2023-06-05 RX ORDER — AZITHROMYCIN 250 MG/1
TABLET, FILM COATED ORAL
Qty: 15 TABLET | Refills: 6 | OUTPATIENT
Start: 2023-06-05

## 2023-06-06 DIAGNOSIS — C83.30 DIFFUSE LARGE B-CELL LYMPHOMA, UNSPECIFIED BODY REGION (HCC): ICD-10-CM

## 2023-06-06 RX ORDER — LEVOTHYROXINE SODIUM 175 UG/1
175 TABLET ORAL DAILY
Qty: 30 TABLET | Refills: 5 | Status: SHIPPED | OUTPATIENT
Start: 2023-06-06

## 2023-06-13 ENCOUNTER — HOSPITAL ENCOUNTER (OUTPATIENT)
Dept: INFUSION THERAPY | Age: 63
Discharge: HOME | End: 2023-06-13
Payer: COMMERCIAL

## 2023-06-13 VITALS
HEART RATE: 72 BPM | DIASTOLIC BLOOD PRESSURE: 74 MMHG | TEMPERATURE: 98.2 F | BODY MASS INDEX: 45.88 KG/M2 | RESPIRATION RATE: 16 BRPM | SYSTOLIC BLOOD PRESSURE: 128 MMHG | WEIGHT: 259 LBS | OXYGEN SATURATION: 97 %

## 2023-06-13 DIAGNOSIS — D80.1 HYPOGAMMAGLOBULINEMIA (HCC): Primary | ICD-10-CM

## 2023-06-13 LAB — IGG SERPL-MCNC: 603 MG/DL (ref 700–1600)

## 2023-06-13 PROCEDURE — 36415 COLL VENOUS BLD VENIPUNCTURE: CPT

## 2023-06-13 PROCEDURE — 82784 ASSAY IGA/IGD/IGG/IGM EACH: CPT

## 2023-06-13 RX ORDER — SODIUM CHLORIDE 9 MG/ML
5-250 INJECTION, SOLUTION INTRAVENOUS PRN
OUTPATIENT
Start: 2023-06-13

## 2023-06-13 RX ORDER — ACETAMINOPHEN 325 MG/1
650 TABLET ORAL
OUTPATIENT
Start: 2023-06-13

## 2023-06-13 RX ORDER — ALBUTEROL SULFATE 90 UG/1
4 AEROSOL, METERED RESPIRATORY (INHALATION) PRN
OUTPATIENT
Start: 2023-06-13

## 2023-06-13 RX ORDER — EPINEPHRINE 1 MG/ML
0.3 INJECTION, SOLUTION, CONCENTRATE INTRAVENOUS PRN
OUTPATIENT
Start: 2023-06-13

## 2023-06-13 RX ORDER — SODIUM CHLORIDE 9 MG/ML
INJECTION, SOLUTION INTRAVENOUS CONTINUOUS
OUTPATIENT
Start: 2023-06-13

## 2023-06-13 RX ORDER — ACETAMINOPHEN 325 MG/1
650 TABLET ORAL
Status: DISCONTINUED | OUTPATIENT
Start: 2023-06-13 | End: 2023-06-14 | Stop reason: HOSPADM

## 2023-06-13 RX ORDER — HEPARIN SODIUM 100 [USP'U]/ML
500 INJECTION, SOLUTION INTRAVENOUS PRN
OUTPATIENT
Start: 2023-06-13

## 2023-06-13 RX ORDER — SODIUM CHLORIDE 0.9 % (FLUSH) 0.9 %
5-40 SYRINGE (ML) INJECTION PRN
OUTPATIENT
Start: 2023-06-13

## 2023-06-13 RX ORDER — ACETAMINOPHEN 325 MG/1
650 TABLET ORAL
Start: 2023-06-13

## 2023-06-13 RX ORDER — DIPHENHYDRAMINE HYDROCHLORIDE 50 MG/ML
50 INJECTION INTRAMUSCULAR; INTRAVENOUS ONCE
Start: 2023-06-13 | End: 2023-06-13

## 2023-06-13 RX ORDER — DIPHENHYDRAMINE HYDROCHLORIDE 50 MG/ML
50 INJECTION INTRAMUSCULAR; INTRAVENOUS ONCE
Status: DISCONTINUED | OUTPATIENT
Start: 2023-06-13 | End: 2023-06-14 | Stop reason: HOSPADM

## 2023-06-13 RX ORDER — DIPHENHYDRAMINE HYDROCHLORIDE 50 MG/ML
50 INJECTION INTRAMUSCULAR; INTRAVENOUS
OUTPATIENT
Start: 2023-06-13

## 2023-06-13 RX ORDER — ONDANSETRON 2 MG/ML
8 INJECTION INTRAMUSCULAR; INTRAVENOUS
OUTPATIENT
Start: 2023-06-13

## 2023-06-13 RX ORDER — MEPERIDINE HYDROCHLORIDE 25 MG/ML
12.5 INJECTION INTRAMUSCULAR; INTRAVENOUS; SUBCUTANEOUS PRN
OUTPATIENT
Start: 2023-06-13

## 2023-06-14 ENCOUNTER — TELEPHONE (OUTPATIENT)
Dept: PULMONOLOGY | Age: 63
End: 2023-06-14

## 2023-06-14 DIAGNOSIS — C83.30 DIFFUSE LARGE B-CELL LYMPHOMA, UNSPECIFIED BODY REGION (HCC): ICD-10-CM

## 2023-06-14 RX ORDER — AZITHROMYCIN 250 MG/1
TABLET, FILM COATED ORAL
Qty: 6 TABLET | Refills: 4 | Status: SHIPPED | OUTPATIENT
Start: 2023-06-14 | End: 2023-06-20 | Stop reason: ALTCHOICE

## 2023-06-14 NOTE — TELEPHONE ENCOUNTER
Patient has been out of this med for her to stay on track this week . She needs it asap .      Pharmacy    [facility] [address] [place] [address], [address] [place] [address], [address] [place] [address]   Phone:  [phone]  Fax:  [phone]        Disp Refills Start End    azithromycin (ZITHROMAX) 250 MG tablet 6 tablet 0 2/16/2023     Sig: TAKE 2 TABS ON DAY 1, THEN 1 TAB A DAY FOR 4 DAYS

## 2023-06-15 ENCOUNTER — TELEPHONE (OUTPATIENT)
Dept: PULMONOLOGY | Age: 63
End: 2023-06-15

## 2023-06-15 RX ORDER — AZITHROMYCIN 250 MG/1
TABLET, FILM COATED ORAL
Qty: 6 TABLET | Refills: 0 | OUTPATIENT
Start: 2023-06-15

## 2023-06-15 NOTE — TELEPHONE ENCOUNTER
Patient says that the dosage is wrong on the     azithromycin (ZITHROMAX) 250 MG tablet    Dr. Dudley Staton has her on maintence she takes it Mon-Wed-Fri and it is 250mg .  Please contact the patient

## 2023-06-20 ENCOUNTER — OFFICE VISIT (OUTPATIENT)
Age: 63
End: 2023-06-20
Payer: COMMERCIAL

## 2023-06-20 VITALS
SYSTOLIC BLOOD PRESSURE: 102 MMHG | HEIGHT: 63 IN | WEIGHT: 258 LBS | DIASTOLIC BLOOD PRESSURE: 62 MMHG | BODY MASS INDEX: 45.71 KG/M2 | HEART RATE: 60 BPM

## 2023-06-20 DIAGNOSIS — I10 HYPERTENSION, UNSPECIFIED TYPE: ICD-10-CM

## 2023-06-20 DIAGNOSIS — Z86.79 HISTORY OF ATRIAL FIBRILLATION: ICD-10-CM

## 2023-06-20 DIAGNOSIS — I87.2 CHRONIC VENOUS INSUFFICIENCY: ICD-10-CM

## 2023-06-20 DIAGNOSIS — Q21.12 PFO (PATENT FORAMEN OVALE): Primary | ICD-10-CM

## 2023-06-20 DIAGNOSIS — I34.0 MILD MITRAL REGURGITATION BY PRIOR ECHOCARDIOGRAM: ICD-10-CM

## 2023-06-20 PROCEDURE — 3078F DIAST BP <80 MM HG: CPT | Performed by: INTERNAL MEDICINE

## 2023-06-20 PROCEDURE — 3074F SYST BP LT 130 MM HG: CPT | Performed by: INTERNAL MEDICINE

## 2023-06-20 PROCEDURE — 99214 OFFICE O/P EST MOD 30 MIN: CPT | Performed by: INTERNAL MEDICINE

## 2023-06-23 ENCOUNTER — TELEPHONE (OUTPATIENT)
Dept: ONCOLOGY | Age: 63
End: 2023-06-23

## 2023-06-23 NOTE — TELEPHONE ENCOUNTER
Pt called asking if PA has come through for upcoming appt. Pt is concerned it will cover her injections and she asks to check on previous injection coverage as well.

## 2023-06-30 ENCOUNTER — HOSPITAL ENCOUNTER (OUTPATIENT)
Dept: LAB | Age: 63
Discharge: HOME | End: 2023-07-03

## 2023-06-30 ENCOUNTER — HOSPITAL ENCOUNTER (OUTPATIENT)
Dept: INFUSION THERAPY | Age: 63
Discharge: HOME | End: 2023-06-30
Payer: COMMERCIAL

## 2023-06-30 VITALS
HEART RATE: 58 BPM | WEIGHT: 262.8 LBS | BODY MASS INDEX: 46.55 KG/M2 | RESPIRATION RATE: 16 BRPM | OXYGEN SATURATION: 99 % | DIASTOLIC BLOOD PRESSURE: 48 MMHG | SYSTOLIC BLOOD PRESSURE: 103 MMHG | TEMPERATURE: 97.8 F

## 2023-06-30 DIAGNOSIS — D80.1 HYPOGAMMAGLOBULINEMIA (HCC): Primary | ICD-10-CM

## 2023-06-30 LAB — IGG SERPL-MCNC: 478 MG/DL (ref 700–1600)

## 2023-06-30 PROCEDURE — 2580000003 HC RX 258: Performed by: NURSE PRACTITIONER

## 2023-06-30 PROCEDURE — 96365 THER/PROPH/DIAG IV INF INIT: CPT

## 2023-06-30 PROCEDURE — 96366 THER/PROPH/DIAG IV INF ADDON: CPT

## 2023-06-30 PROCEDURE — 96375 TX/PRO/DX INJ NEW DRUG ADDON: CPT

## 2023-06-30 PROCEDURE — 6360000002 HC RX W HCPCS: Performed by: INTERNAL MEDICINE

## 2023-06-30 PROCEDURE — 36415 COLL VENOUS BLD VENIPUNCTURE: CPT

## 2023-06-30 PROCEDURE — 6360000002 HC RX W HCPCS: Performed by: NURSE PRACTITIONER

## 2023-06-30 PROCEDURE — 6370000000 HC RX 637 (ALT 250 FOR IP): Performed by: INTERNAL MEDICINE

## 2023-06-30 PROCEDURE — 82784 ASSAY IGA/IGD/IGG/IGM EACH: CPT

## 2023-06-30 RX ORDER — ONDANSETRON 2 MG/ML
8 INJECTION INTRAMUSCULAR; INTRAVENOUS
Status: DISCONTINUED | OUTPATIENT
Start: 2023-06-30 | End: 2023-07-01 | Stop reason: HOSPADM

## 2023-06-30 RX ORDER — SODIUM CHLORIDE 0.9 % (FLUSH) 0.9 %
5-40 SYRINGE (ML) INJECTION PRN
OUTPATIENT
Start: 2023-06-30

## 2023-06-30 RX ORDER — ACETAMINOPHEN 325 MG/1
650 TABLET ORAL
Status: COMPLETED | OUTPATIENT
Start: 2023-06-30 | End: 2023-06-30

## 2023-06-30 RX ORDER — HEPARIN SODIUM 100 [USP'U]/ML
500 INJECTION, SOLUTION INTRAVENOUS PRN
OUTPATIENT
Start: 2023-06-30

## 2023-06-30 RX ORDER — ALBUTEROL SULFATE 90 UG/1
4 AEROSOL, METERED RESPIRATORY (INHALATION) PRN
OUTPATIENT
Start: 2023-06-30

## 2023-06-30 RX ORDER — DIPHENHYDRAMINE HYDROCHLORIDE 50 MG/ML
50 INJECTION INTRAMUSCULAR; INTRAVENOUS ONCE
Start: 2023-06-30 | End: 2023-06-30

## 2023-06-30 RX ORDER — DIPHENHYDRAMINE HYDROCHLORIDE 50 MG/ML
50 INJECTION INTRAMUSCULAR; INTRAVENOUS
OUTPATIENT
Start: 2023-06-30

## 2023-06-30 RX ORDER — SODIUM CHLORIDE 9 MG/ML
INJECTION, SOLUTION INTRAVENOUS CONTINUOUS
OUTPATIENT
Start: 2023-06-30

## 2023-06-30 RX ORDER — SODIUM CHLORIDE 0.9 % (FLUSH) 0.9 %
5-40 SYRINGE (ML) INJECTION PRN
Status: DISCONTINUED | OUTPATIENT
Start: 2023-06-30 | End: 2023-07-01 | Stop reason: HOSPADM

## 2023-06-30 RX ORDER — DIPHENHYDRAMINE HYDROCHLORIDE 50 MG/ML
50 INJECTION INTRAMUSCULAR; INTRAVENOUS
Status: DISCONTINUED | OUTPATIENT
Start: 2023-06-30 | End: 2023-07-01 | Stop reason: HOSPADM

## 2023-06-30 RX ORDER — ONDANSETRON 2 MG/ML
8 INJECTION INTRAMUSCULAR; INTRAVENOUS
OUTPATIENT
Start: 2023-06-30

## 2023-06-30 RX ORDER — EPINEPHRINE 1 MG/ML
0.3 INJECTION, SOLUTION, CONCENTRATE INTRAVENOUS PRN
OUTPATIENT
Start: 2023-06-30

## 2023-06-30 RX ORDER — SODIUM CHLORIDE 9 MG/ML
INJECTION, SOLUTION INTRAVENOUS CONTINUOUS
Status: DISCONTINUED | OUTPATIENT
Start: 2023-06-30 | End: 2023-07-01 | Stop reason: HOSPADM

## 2023-06-30 RX ORDER — SODIUM CHLORIDE 9 MG/ML
5-250 INJECTION, SOLUTION INTRAVENOUS PRN
Status: DISCONTINUED | OUTPATIENT
Start: 2023-06-30 | End: 2023-07-01 | Stop reason: HOSPADM

## 2023-06-30 RX ORDER — SODIUM CHLORIDE 9 MG/ML
5-250 INJECTION, SOLUTION INTRAVENOUS PRN
OUTPATIENT
Start: 2023-06-30

## 2023-06-30 RX ORDER — ACETAMINOPHEN 325 MG/1
650 TABLET ORAL
Status: DISCONTINUED | OUTPATIENT
Start: 2023-06-30 | End: 2023-07-01 | Stop reason: HOSPADM

## 2023-06-30 RX ORDER — MEPERIDINE HYDROCHLORIDE 25 MG/ML
12.5 INJECTION INTRAMUSCULAR; INTRAVENOUS; SUBCUTANEOUS PRN
Status: DISCONTINUED | OUTPATIENT
Start: 2023-06-30 | End: 2023-07-01 | Stop reason: HOSPADM

## 2023-06-30 RX ORDER — ALBUTEROL SULFATE 90 UG/1
4 AEROSOL, METERED RESPIRATORY (INHALATION) PRN
Status: DISCONTINUED | OUTPATIENT
Start: 2023-06-30 | End: 2023-07-01 | Stop reason: HOSPADM

## 2023-06-30 RX ORDER — MEPERIDINE HYDROCHLORIDE 25 MG/ML
12.5 INJECTION INTRAMUSCULAR; INTRAVENOUS; SUBCUTANEOUS PRN
OUTPATIENT
Start: 2023-06-30

## 2023-06-30 RX ORDER — ACETAMINOPHEN 325 MG/1
650 TABLET ORAL
Start: 2023-06-30

## 2023-06-30 RX ORDER — ACETAMINOPHEN 325 MG/1
650 TABLET ORAL
OUTPATIENT
Start: 2023-06-30

## 2023-06-30 RX ORDER — DIPHENHYDRAMINE HYDROCHLORIDE 50 MG/ML
50 INJECTION INTRAMUSCULAR; INTRAVENOUS ONCE
Status: COMPLETED | OUTPATIENT
Start: 2023-06-30 | End: 2023-06-30

## 2023-06-30 RX ORDER — EPINEPHRINE 1 MG/ML
0.3 INJECTION, SOLUTION, CONCENTRATE INTRAVENOUS PRN
Status: DISCONTINUED | OUTPATIENT
Start: 2023-06-30 | End: 2023-07-01 | Stop reason: HOSPADM

## 2023-06-30 RX ADMIN — IMMUNE GLOBULIN (HUMAN) 50 G: 10 INJECTION INTRAVENOUS; SUBCUTANEOUS at 09:25

## 2023-06-30 RX ADMIN — SODIUM CHLORIDE 100 ML/HR: 9 INJECTION, SOLUTION INTRAVENOUS at 09:07

## 2023-06-30 RX ADMIN — DIPHENHYDRAMINE HYDROCHLORIDE 50 MG: 50 INJECTION, SOLUTION INTRAMUSCULAR; INTRAVENOUS at 09:00

## 2023-06-30 RX ADMIN — SODIUM CHLORIDE, PRESERVATIVE FREE 10 ML: 5 INJECTION INTRAVENOUS at 09:07

## 2023-06-30 RX ADMIN — ACETAMINOPHEN 650 MG: 325 TABLET ORAL at 08:59

## 2023-06-30 ASSESSMENT — PAIN SCALES - GENERAL: PAINLEVEL_OUTOF10: 1

## 2023-06-30 ASSESSMENT — PAIN DESCRIPTION - LOCATION: LOCATION: HEAD

## 2023-07-18 DIAGNOSIS — Z00.00 ENCOUNTER FOR ANNUAL PHYSICAL EXAM: Primary | ICD-10-CM

## 2023-07-18 DIAGNOSIS — I10 ESSENTIAL (PRIMARY) HYPERTENSION: ICD-10-CM

## 2023-07-18 DIAGNOSIS — E03.9 ACQUIRED HYPOTHYROIDISM: ICD-10-CM

## 2023-07-19 ENCOUNTER — NURSE ONLY (OUTPATIENT)
Dept: FAMILY MEDICINE CLINIC | Facility: CLINIC | Age: 63
End: 2023-07-19

## 2023-07-19 DIAGNOSIS — I10 ESSENTIAL (PRIMARY) HYPERTENSION: ICD-10-CM

## 2023-07-19 DIAGNOSIS — E03.9 ACQUIRED HYPOTHYROIDISM: ICD-10-CM

## 2023-07-19 DIAGNOSIS — Z00.00 ENCOUNTER FOR ANNUAL PHYSICAL EXAM: ICD-10-CM

## 2023-07-19 LAB
ALBUMIN SERPL-MCNC: 3.8 G/DL (ref 3.2–4.6)
ALBUMIN/GLOB SERPL: 1.4 (ref 0.4–1.6)
ALP SERPL-CCNC: 84 U/L (ref 50–136)
ALT SERPL-CCNC: 37 U/L (ref 12–65)
ANION GAP SERPL CALC-SCNC: 11 MMOL/L (ref 2–11)
AST SERPL-CCNC: 35 U/L (ref 15–37)
BASOPHILS # BLD: 0 K/UL (ref 0–0.2)
BASOPHILS NFR BLD: 1 % (ref 0–2)
BILIRUB SERPL-MCNC: 0.4 MG/DL (ref 0.2–1.1)
BUN SERPL-MCNC: 18 MG/DL (ref 8–23)
CALCIUM SERPL-MCNC: 9.1 MG/DL (ref 8.3–10.4)
CHLORIDE SERPL-SCNC: 108 MMOL/L (ref 101–110)
CHOLEST SERPL-MCNC: 187 MG/DL
CO2 SERPL-SCNC: 25 MMOL/L (ref 21–32)
CREAT SERPL-MCNC: 0.9 MG/DL (ref 0.6–1)
DIFFERENTIAL METHOD BLD: ABNORMAL
EOSINOPHIL # BLD: 0.2 K/UL (ref 0–0.8)
EOSINOPHIL NFR BLD: 4 % (ref 0.5–7.8)
ERYTHROCYTE [DISTWIDTH] IN BLOOD BY AUTOMATED COUNT: 14 % (ref 11.9–14.6)
GLOBULIN SER CALC-MCNC: 2.7 G/DL (ref 2.8–4.5)
GLUCOSE SERPL-MCNC: 99 MG/DL (ref 65–100)
HCT VFR BLD AUTO: 36.4 % (ref 35.8–46.3)
HDLC SERPL-MCNC: 60 MG/DL (ref 40–60)
HDLC SERPL: 3.1
HGB BLD-MCNC: 12 G/DL (ref 11.7–15.4)
IMM GRANULOCYTES # BLD AUTO: 0 K/UL (ref 0–0.5)
IMM GRANULOCYTES NFR BLD AUTO: 0 % (ref 0–5)
LDLC SERPL CALC-MCNC: 85 MG/DL
LYMPHOCYTES # BLD: 1 K/UL (ref 0.5–4.6)
LYMPHOCYTES NFR BLD: 25 % (ref 13–44)
MCH RBC QN AUTO: 29.4 PG (ref 26.1–32.9)
MCHC RBC AUTO-ENTMCNC: 33 G/DL (ref 31.4–35)
MCV RBC AUTO: 89.2 FL (ref 82–102)
MONOCYTES # BLD: 0.3 K/UL (ref 0.1–1.3)
MONOCYTES NFR BLD: 6 % (ref 4–12)
NEUTS SEG # BLD: 2.6 K/UL (ref 1.7–8.2)
NEUTS SEG NFR BLD: 63 % (ref 43–78)
NRBC # BLD: 0 K/UL (ref 0–0.2)
PLATELET # BLD AUTO: 85 K/UL (ref 150–450)
PMV BLD AUTO: 11.3 FL (ref 9.4–12.3)
POTASSIUM SERPL-SCNC: 3.3 MMOL/L (ref 3.5–5.1)
PROT SERPL-MCNC: 6.5 G/DL (ref 6.3–8.2)
RBC # BLD AUTO: 4.08 M/UL (ref 4.05–5.2)
SODIUM SERPL-SCNC: 144 MMOL/L (ref 133–143)
TRIGL SERPL-MCNC: 210 MG/DL (ref 35–150)
TSH, 3RD GENERATION: 4.36 UIU/ML (ref 0.36–3.74)
VLDLC SERPL CALC-MCNC: 42 MG/DL (ref 6–23)
WBC # BLD AUTO: 4 K/UL (ref 4.3–11.1)

## 2023-07-20 LAB
APPEARANCE UR: CLEAR
BACTERIA URNS QL MICRO: ABNORMAL /HPF
BILIRUB UR QL: NEGATIVE
CASTS URNS QL MICRO: ABNORMAL /LPF (ref 0–2)
COLOR UR: ABNORMAL
EPI CELLS #/AREA URNS HPF: ABNORMAL /HPF (ref 0–5)
GLUCOSE UR STRIP.AUTO-MCNC: NEGATIVE MG/DL
HGB UR QL STRIP: NEGATIVE
KETONES UR QL STRIP.AUTO: NEGATIVE MG/DL
LEUKOCYTE ESTERASE UR QL STRIP.AUTO: ABNORMAL
MUCOUS THREADS URNS QL MICRO: 0 /LPF
NITRITE UR QL STRIP.AUTO: NEGATIVE
PH UR STRIP: 7 (ref 5–9)
PROT UR STRIP-MCNC: NEGATIVE MG/DL
RBC #/AREA URNS HPF: ABNORMAL /HPF (ref 0–5)
SP GR UR REFRACTOMETRY: 1.01 (ref 1–1.02)
URINE CULTURE IF INDICATED: ABNORMAL
UROBILINOGEN UR QL STRIP.AUTO: 0.2 EU/DL (ref 0.2–1)
WBC URNS QL MICRO: ABNORMAL /HPF (ref 0–4)

## 2023-07-21 NOTE — TELEPHONE ENCOUNTER
Patient is going to [facility] today and noticed that her thyroid results from [facility] labs is off. She's not feeling well due to thyroid and wants to know if the dosage can be adjusted  so that she can get the Rx before she leaves town.  [facility] [address] [place] [address]

## 2023-07-22 LAB
BACTERIA SPEC CULT: NORMAL
BACTERIA SPEC CULT: NORMAL
SERVICE CMNT-IMP: NORMAL

## 2023-07-24 RX ORDER — LEVOTHYROXINE SODIUM 0.2 MG/1
200 TABLET ORAL DAILY
Qty: 60 TABLET | Refills: 0 | Status: SHIPPED | OUTPATIENT
Start: 2023-07-24

## 2023-07-24 NOTE — TELEPHONE ENCOUNTER
----- Message from Suraj Greer MD sent at 7/21/2023  4:38 PM EDT -----  Sorry I do not have a reference. Please send in next highest dose (not sure if there is one in between 175 and 200mcg) #60 1 po qd. Recheck TSH in 6 weeks. Thanks so much Priti  ----- Message -----  From: Nancy Becker MA  Sent: 7/21/2023   3:33 PM EDT  To: Suraj Greer MD    Pt is experiencing symptoms. ----- Message -----  From: Suraj Greer MD  Sent: 7/21/2023   3:25 PM EDT  To:  Priti Tapia MA    Thyroid function is a little low but if no symptoms would not change dose  ----- Message -----  From: Nancy Becker MA  Sent: 7/21/2023   8:09 AM EDT  To: Suraj Greer MD    Pt states she IS having symptoms

## 2023-07-29 RX ORDER — AZITHROMYCIN 250 MG/1
250 TABLET, FILM COATED ORAL SEE ADMIN INSTRUCTIONS
Qty: 6 TABLET | Refills: 0 | Status: SHIPPED | OUTPATIENT
Start: 2023-07-29 | End: 2023-08-03

## 2023-07-29 RX ORDER — METHYLPREDNISOLONE 4 MG/1
TABLET ORAL
Qty: 1 KIT | Refills: 0 | Status: SHIPPED | OUTPATIENT
Start: 2023-07-29 | End: 2023-08-04

## 2023-07-31 ENCOUNTER — TELEPHONE (OUTPATIENT)
Age: 63
End: 2023-07-31

## 2023-07-31 NOTE — TELEPHONE ENCOUNTER
----- [facility] sent at 7/31/2023  7:57 AM EDT -----  Regarding: FW: Jamison Cornea tested pos for Covid  Contact: [phone]    ----- Message -----  From: Meche Duenas"  Sent: 7/30/2023   9:09 AM EDT  To: [facility] Clinical Staff  Subject: Jamison Cornea tested pos for Covid               I filled a prescription for Paxloid and prednisone as I tested positive for Covid on Friday July 28. When I filled the prescription the pharmacist noticed that I take a blood thinner and  advised against taking it while on these meds. Thoughts?

## 2023-08-01 NOTE — TELEPHONE ENCOUNTER
,   she said that she could not reach our office and is on day 3 of Paxlovid. She has been holding her Eliquis while on Paxlovid. She tested negative for COVID today and will be done w/Paxlovid tomorrow. The pt.said she called the office and the call did not go to a person after hours it went to a generated message saying that they could not take her call right now and to utilize my chart. She is upset that her [employer] message was not answered until today. I explained to her [employer] messages are not normally checked over the weekend and  was not here yesterday therefore we got her response from  today. I apologized for the inconvenience.

## 2023-08-01 NOTE — TELEPHONE ENCOUNTER
Linda Cardoso MD  You 15 hours ago (5:27 PM)     JOSEPH  The combination of Paxlovid with Eliquis should be avoided to mitigate the risk of bleeding.

## 2023-08-08 DIAGNOSIS — C83.30 DIFFUSE LARGE B-CELL LYMPHOMA, UNSPECIFIED BODY REGION (HCC): Primary | ICD-10-CM

## 2023-08-08 DIAGNOSIS — C85.88 MARGINAL ZONE LYMPHOMA OF LYMPH NODES OF MULTIPLE SITES (HCC): ICD-10-CM

## 2023-08-15 ENCOUNTER — OFFICE VISIT (OUTPATIENT)
Dept: ONCOLOGY | Age: 63
End: 2023-08-15
Payer: COMMERCIAL

## 2023-08-15 ENCOUNTER — HOSPITAL ENCOUNTER (OUTPATIENT)
Dept: LAB | Age: 63
Discharge: HOME | End: 2023-08-18
Payer: COMMERCIAL

## 2023-08-15 ENCOUNTER — HOSPITAL ENCOUNTER (OUTPATIENT)
Dept: INFUSION THERAPY | Age: 63
Discharge: HOME | End: 2023-08-15
Payer: COMMERCIAL

## 2023-08-15 VITALS
TEMPERATURE: 97.6 F | DIASTOLIC BLOOD PRESSURE: 43 MMHG | HEART RATE: 62 BPM | RESPIRATION RATE: 16 BRPM | SYSTOLIC BLOOD PRESSURE: 115 MMHG | OXYGEN SATURATION: 100 %

## 2023-08-15 VITALS
WEIGHT: 261 LBS | RESPIRATION RATE: 16 BRPM | DIASTOLIC BLOOD PRESSURE: 86 MMHG | SYSTOLIC BLOOD PRESSURE: 109 MMHG | HEIGHT: 63 IN | HEART RATE: 67 BPM | OXYGEN SATURATION: 93 % | BODY MASS INDEX: 46.25 KG/M2 | TEMPERATURE: 98.8 F

## 2023-08-15 DIAGNOSIS — C83.30 DIFFUSE LARGE B-CELL LYMPHOMA, UNSPECIFIED BODY REGION (HCC): ICD-10-CM

## 2023-08-15 DIAGNOSIS — C85.88 MARGINAL ZONE LYMPHOMA OF LYMPH NODES OF MULTIPLE SITES (HCC): ICD-10-CM

## 2023-08-15 DIAGNOSIS — D80.1 HYPOGAMMAGLOBULINEMIA (HCC): Primary | ICD-10-CM

## 2023-08-15 DIAGNOSIS — C83.30 DIFFUSE LARGE B-CELL LYMPHOMA, UNSPECIFIED BODY REGION (HCC): Primary | ICD-10-CM

## 2023-08-15 LAB
ALBUMIN SERPL-MCNC: 3.5 G/DL (ref 3.2–4.6)
ALBUMIN/GLOB SERPL: 1 (ref 0.4–1.6)
ALP SERPL-CCNC: 75 U/L (ref 50–136)
ALT SERPL-CCNC: 47 U/L (ref 12–65)
ANION GAP SERPL CALC-SCNC: 5 MMOL/L (ref 2–11)
AST SERPL-CCNC: 33 U/L (ref 15–37)
BASOPHILS # BLD: 0.1 K/UL (ref 0–0.2)
BASOPHILS NFR BLD: 1 % (ref 0–2)
BILIRUB SERPL-MCNC: 0.5 MG/DL (ref 0.2–1.1)
BUN SERPL-MCNC: 17 MG/DL (ref 8–23)
CALCIUM SERPL-MCNC: 8.9 MG/DL (ref 8.3–10.4)
CHLORIDE SERPL-SCNC: 106 MMOL/L (ref 101–110)
CO2 SERPL-SCNC: 28 MMOL/L (ref 21–32)
CREAT SERPL-MCNC: 0.9 MG/DL (ref 0.6–1)
DIFFERENTIAL METHOD BLD: ABNORMAL
EOSINOPHIL # BLD: 0.1 K/UL (ref 0–0.8)
EOSINOPHIL NFR BLD: 3 % (ref 0.5–7.8)
ERYTHROCYTE [DISTWIDTH] IN BLOOD BY AUTOMATED COUNT: 14.2 % (ref 11.9–14.6)
FERRITIN SERPL-MCNC: 693 NG/ML (ref 8–388)
GLOBULIN SER CALC-MCNC: 3.4 G/DL (ref 2.8–4.5)
GLUCOSE SERPL-MCNC: 90 MG/DL (ref 65–100)
HCT VFR BLD AUTO: 36.3 % (ref 35.8–46.3)
HGB BLD-MCNC: 11.8 G/DL (ref 11.7–15.4)
IGG SERPL-MCNC: 535 MG/DL (ref 700–1600)
IMM GRANULOCYTES # BLD AUTO: 0 K/UL (ref 0–0.5)
IMM GRANULOCYTES NFR BLD AUTO: 0 % (ref 0–5)
LYMPHOCYTES # BLD: 1.2 K/UL (ref 0.5–4.6)
LYMPHOCYTES NFR BLD: 24 % (ref 13–44)
MCH RBC QN AUTO: 29.1 PG (ref 26.1–32.9)
MCHC RBC AUTO-ENTMCNC: 32.5 G/DL (ref 31.4–35)
MCV RBC AUTO: 89.6 FL (ref 82–102)
MONOCYTES # BLD: 0.3 K/UL (ref 0.1–1.3)
MONOCYTES NFR BLD: 5 % (ref 4–12)
NEUTS SEG # BLD: 3.5 K/UL (ref 1.7–8.2)
NEUTS SEG NFR BLD: 67 % (ref 43–78)
NRBC # BLD: 0 K/UL (ref 0–0.2)
PLATELET # BLD AUTO: 79 K/UL (ref 150–450)
PMV BLD AUTO: 11.2 FL (ref 9.4–12.3)
POTASSIUM SERPL-SCNC: 3.6 MMOL/L (ref 3.5–5.1)
PROT SERPL-MCNC: 6.9 G/DL (ref 6.3–8.2)
RBC # BLD AUTO: 4.05 M/UL (ref 4.05–5.2)
SODIUM SERPL-SCNC: 139 MMOL/L (ref 133–143)
T4 FREE SERPL-MCNC: 1.3 NG/DL (ref 0.78–1.4)
TSH, 3RD GENERATION: 2.39 UIU/ML (ref 0.36–3)
WBC # BLD AUTO: 5.2 K/UL (ref 4.3–11.1)

## 2023-08-15 PROCEDURE — 3074F SYST BP LT 130 MM HG: CPT | Performed by: INTERNAL MEDICINE

## 2023-08-15 PROCEDURE — 6360000002 HC RX W HCPCS: Performed by: INTERNAL MEDICINE

## 2023-08-15 PROCEDURE — 6370000000 HC RX 637 (ALT 250 FOR IP): Performed by: INTERNAL MEDICINE

## 2023-08-15 PROCEDURE — 99214 OFFICE O/P EST MOD 30 MIN: CPT | Performed by: INTERNAL MEDICINE

## 2023-08-15 PROCEDURE — 82728 ASSAY OF FERRITIN: CPT

## 2023-08-15 PROCEDURE — 6360000002 HC RX W HCPCS: Performed by: NURSE PRACTITIONER

## 2023-08-15 PROCEDURE — 96366 THER/PROPH/DIAG IV INF ADDON: CPT

## 2023-08-15 PROCEDURE — 82784 ASSAY IGA/IGD/IGG/IGM EACH: CPT

## 2023-08-15 PROCEDURE — 96375 TX/PRO/DX INJ NEW DRUG ADDON: CPT

## 2023-08-15 PROCEDURE — 2580000003 HC RX 258: Performed by: NURSE PRACTITIONER

## 2023-08-15 PROCEDURE — 36415 COLL VENOUS BLD VENIPUNCTURE: CPT

## 2023-08-15 PROCEDURE — 3079F DIAST BP 80-89 MM HG: CPT | Performed by: INTERNAL MEDICINE

## 2023-08-15 PROCEDURE — 84439 ASSAY OF FREE THYROXINE: CPT

## 2023-08-15 PROCEDURE — 96365 THER/PROPH/DIAG IV INF INIT: CPT

## 2023-08-15 PROCEDURE — 84443 ASSAY THYROID STIM HORMONE: CPT

## 2023-08-15 PROCEDURE — 80053 COMPREHEN METABOLIC PANEL: CPT

## 2023-08-15 PROCEDURE — 85025 COMPLETE CBC W/AUTO DIFF WBC: CPT

## 2023-08-15 RX ORDER — SODIUM CHLORIDE 0.9 % (FLUSH) 0.9 %
5-40 SYRINGE (ML) INJECTION PRN
Status: DISCONTINUED | OUTPATIENT
Start: 2023-08-15 | End: 2023-08-16 | Stop reason: HOSPADM

## 2023-08-15 RX ORDER — SODIUM CHLORIDE 9 MG/ML
INJECTION, SOLUTION INTRAVENOUS CONTINUOUS
Status: DISCONTINUED | OUTPATIENT
Start: 2023-08-15 | End: 2023-08-16 | Stop reason: HOSPADM

## 2023-08-15 RX ORDER — ACETAMINOPHEN 325 MG/1
650 TABLET ORAL
OUTPATIENT
Start: 2023-08-15

## 2023-08-15 RX ORDER — DIPHENHYDRAMINE HYDROCHLORIDE 50 MG/ML
50 INJECTION INTRAMUSCULAR; INTRAVENOUS
Status: DISCONTINUED | OUTPATIENT
Start: 2023-08-15 | End: 2023-08-16 | Stop reason: HOSPADM

## 2023-08-15 RX ORDER — EPINEPHRINE 1 MG/ML
0.3 INJECTION, SOLUTION, CONCENTRATE INTRAVENOUS PRN
Status: DISCONTINUED | OUTPATIENT
Start: 2023-08-15 | End: 2023-08-16 | Stop reason: HOSPADM

## 2023-08-15 RX ORDER — DIPHENHYDRAMINE HYDROCHLORIDE 50 MG/ML
50 INJECTION INTRAMUSCULAR; INTRAVENOUS ONCE
Status: COMPLETED | OUTPATIENT
Start: 2023-08-15 | End: 2023-08-15

## 2023-08-15 RX ORDER — MEPERIDINE HYDROCHLORIDE 25 MG/ML
12.5 INJECTION INTRAMUSCULAR; INTRAVENOUS; SUBCUTANEOUS PRN
Status: DISCONTINUED | OUTPATIENT
Start: 2023-08-15 | End: 2023-08-16 | Stop reason: HOSPADM

## 2023-08-15 RX ORDER — HEPARIN 100 UNIT/ML
500 SYRINGE INTRAVENOUS PRN
OUTPATIENT
Start: 2023-08-15

## 2023-08-15 RX ORDER — SODIUM CHLORIDE 9 MG/ML
INJECTION, SOLUTION INTRAVENOUS CONTINUOUS
OUTPATIENT
Start: 2023-08-15

## 2023-08-15 RX ORDER — DIPHENHYDRAMINE HYDROCHLORIDE 50 MG/ML
50 INJECTION INTRAMUSCULAR; INTRAVENOUS
OUTPATIENT
Start: 2023-08-15

## 2023-08-15 RX ORDER — SODIUM CHLORIDE 9 MG/ML
5-250 INJECTION, SOLUTION INTRAVENOUS PRN
OUTPATIENT
Start: 2023-08-15

## 2023-08-15 RX ORDER — ONDANSETRON 2 MG/ML
8 INJECTION INTRAMUSCULAR; INTRAVENOUS
Status: DISCONTINUED | OUTPATIENT
Start: 2023-08-15 | End: 2023-08-16 | Stop reason: HOSPADM

## 2023-08-15 RX ORDER — SODIUM CHLORIDE 0.9 % (FLUSH) 0.9 %
5-40 SYRINGE (ML) INJECTION PRN
OUTPATIENT
Start: 2023-08-15

## 2023-08-15 RX ORDER — ALBUTEROL SULFATE 90 UG/1
4 AEROSOL, METERED RESPIRATORY (INHALATION) PRN
Status: DISCONTINUED | OUTPATIENT
Start: 2023-08-15 | End: 2023-08-16 | Stop reason: HOSPADM

## 2023-08-15 RX ORDER — EPINEPHRINE 1 MG/ML
0.3 INJECTION, SOLUTION, CONCENTRATE INTRAVENOUS PRN
OUTPATIENT
Start: 2023-08-15

## 2023-08-15 RX ORDER — SODIUM CHLORIDE 9 MG/ML
5-250 INJECTION, SOLUTION INTRAVENOUS PRN
Status: DISCONTINUED | OUTPATIENT
Start: 2023-08-15 | End: 2023-08-16 | Stop reason: HOSPADM

## 2023-08-15 RX ORDER — ACETAMINOPHEN 325 MG/1
650 TABLET ORAL
Start: 2023-08-15

## 2023-08-15 RX ORDER — DIPHENHYDRAMINE HYDROCHLORIDE 50 MG/ML
50 INJECTION INTRAMUSCULAR; INTRAVENOUS ONCE
Start: 2023-08-15 | End: 2023-08-15

## 2023-08-15 RX ORDER — ONDANSETRON 2 MG/ML
8 INJECTION INTRAMUSCULAR; INTRAVENOUS
OUTPATIENT
Start: 2023-08-15

## 2023-08-15 RX ORDER — ACETAMINOPHEN 325 MG/1
650 TABLET ORAL
Status: COMPLETED | OUTPATIENT
Start: 2023-08-15 | End: 2023-08-15

## 2023-08-15 RX ORDER — ACETAMINOPHEN 325 MG/1
650 TABLET ORAL
Status: DISCONTINUED | OUTPATIENT
Start: 2023-08-15 | End: 2023-08-16 | Stop reason: HOSPADM

## 2023-08-15 RX ORDER — ALBUTEROL SULFATE 90 UG/1
4 AEROSOL, METERED RESPIRATORY (INHALATION) PRN
OUTPATIENT
Start: 2023-08-15

## 2023-08-15 RX ORDER — MEPERIDINE HYDROCHLORIDE 25 MG/ML
12.5 INJECTION INTRAMUSCULAR; INTRAVENOUS; SUBCUTANEOUS PRN
OUTPATIENT
Start: 2023-08-15

## 2023-08-15 RX ADMIN — SODIUM CHLORIDE, PRESERVATIVE FREE 10 ML: 5 INJECTION INTRAVENOUS at 14:50

## 2023-08-15 RX ADMIN — IMMUNE GLOBULIN (HUMAN) 50 G: 10 INJECTION INTRAVENOUS; SUBCUTANEOUS at 15:30

## 2023-08-15 RX ADMIN — ACETAMINOPHEN 650 MG: 325 TABLET ORAL at 14:53

## 2023-08-15 RX ADMIN — DIPHENHYDRAMINE HYDROCHLORIDE 50 MG: 50 INJECTION INTRAMUSCULAR; INTRAVENOUS at 14:54

## 2023-08-15 RX ADMIN — SODIUM CHLORIDE 50 ML/HR: 9 INJECTION, SOLUTION INTRAVENOUS at 14:50

## 2023-08-15 ASSESSMENT — PATIENT HEALTH QUESTIONNAIRE - PHQ9
2. FEELING DOWN, DEPRESSED OR HOPELESS: 0
SUM OF ALL RESPONSES TO PHQ QUESTIONS 1-9: 0
SUM OF ALL RESPONSES TO PHQ9 QUESTIONS 1 & 2: 0
SUM OF ALL RESPONSES TO PHQ QUESTIONS 1-9: 0
1. LITTLE INTEREST OR PLEASURE IN DOING THINGS: 0

## 2023-08-15 NOTE — PROGRESS NOTES
biopsy with no evidence of residual lymphoma. Certainly it is possible that she has some occult disease in one of the other nodes, but with her clinical improvement after IVIG and the multiple negative biopsies, I would recommend no additional work-up or treatment at this time. I would proceed with follow-up imaging in May 2023 (6 months) to assess for new or progressive lymphadenopathy, if this is concerning and biopsy is feasible with minimal risk we can consider it at that time. CT reviewed and shows no evidence of progression, in fact her cervical nodes are actually smaller. 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 She is anxious about the possibility of ongoing lymphoma and asks about a core biopsy under imaging guidance. We discussed the risks and benefits of this approach, specifically that I am unsure it will affect our treatment recommendations. She would like us to talk to IR to see if they would consider repeat ultrasound and possible core biopsy if an accessible node is present. Here for follow-up. She completed a core biopsy of a submandibular node in IR and this was benign, no pathologic cervical nodes were seen. Therefore, we have continued observation. She is doing well overall. 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 She continues on IVIG about every other month. No new adenopathy. No fevers, chills, or B symptoms. No neck symptoms, these have stopped since she started the IVIG replacement. Review of Systems:  Constitutional: Negative. HENT: Negative. Eyes: Negative. Respiratory: Negative. Cardiovascular: Negative. Gastrointestinal: Negative. Genitourinary: Negative. Musculoskeletal: Negative. Skin: Negative. Neurological: Negative.

## 2023-08-15 NOTE — PATIENT INSTRUCTIONS
Patient Instructions from Today's Visit    Reason for Visit:  Follow up Lymphoma     Diagnosis Information:  [email or web address]    Plan:  Lab results reviewed     Follow Up: Follow up 2 mo     Recent Lab Results:        Treatment Summary has been discussed and given to patient:   N/A  -------------------------------------------------------------------------------------------------------------------  Please call our office at [phone] if you have any  of the following symptoms:   Fever of 100.5 or greater  Chills  Shortness of breath  Swelling or pain in one leg    After office hours an answering service is available and will contact a provider for emergencies or if you are experiencing any of the above symptoms. Patient does express an interest in My Chart. My Chart log in information explained on the after visit summary printout at the [address] desk.     Haris Chapa RN

## 2023-08-15 NOTE — PROGRESS NOTES
Arrived to the [address] [place] [address]. [facility]. IVIG infusion w/premeds completed. Patient hypotensive and very drowsy after pre-meds and throughout visit. BP 90's/50's, patient asymptomatic. Patient alert and oriented at end of visit and ambulating safely. Patient stated she is ok to drive. Discharged ambulatory. Patient aware of next lab and [facility] office visit on 10/16/2023 (date) at 1400 (time).   Patient instructed to call provider with temperature of 100.4 or greater or nausea/vomiting/ diarrhea or pain not controlled by medications

## 2023-08-18 NOTE — PROGRESS NOTES
Physical Therapy   Initial Evaluation     Patient Name: Felipa Chavez  Age:  84 y.o., Sex:  female  Medical Record #: [id number]  Today's Date: 8/18/2023     Precautions  Precautions: Fall Risk    Assessment  Patient is 84 y.o. female with a diagnosis of syncope and collapse after passing out at the bank.. Pmh includes hypothyroidism, hypertension, CAD, JEREMIAH with intermittent use of CPAP and depression.    Pt tolerated session fairly and was significantly limited by nausea. She required SBA for bed mobility. Pt perform sit to stand with CGA. Pt performed standing alteranting marching however required MIN A due to losses of balance. She ambulated 50' and then requested to sit down due to nausea. In sitting, pt's nausea worsening and she reported feelings of vomiting. Pt was wheeled back to room via 4WW. Transfer to bed with CGA while pt dry heaving RN present in room. Pt performed sit to supine with MIN A and was boosted in bed again continuing to report nausea. BP recorded in supine at 142/83. Pt did not vomit during session and reported feeling better upon reaching supine position.     At this time pt will require post acute placement as she has 16 stairs to her bedroom and bathroom. As per Rn pt has become nauseous with all mobility.     Plan    Physical Therapy Initial Treatment Plan   Treatment Plan : (P) Bed Mobility, Debridement, Equipment, Group Therapy, Gait Training, Orthotics Training , Neuro Re-Education / Balance, Manual Therapy, Self Care / Home Evaluation, Stair Training, Therapeutic Activities, Therapeutic Exercise  Treatment Frequency: (P) 4 Times per Week  Duration: (P) Until Therapy Goals Met    DC Equipment Recommendations: (P) None  Discharge Recommendations: (P) Recommend post-acute placement for additional physical therapy services prior to discharge home       Subjective    Pt is pleasant but mildly self limiting. She reports nausea with activity.      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 TREATMENT PLAN:  Effective Dates: 2019 TO 2019 (90 days). Frequency/Duration: 1 time a week for 90 Day(s) MEDICAL/REFERRING DIAGNOSIS:  Urinary incontinence, unspecified type [R32]   DATE OF ONSET: Chronic  REFERRING PHYSICIAN: Leanna Shields MD MD Orders: Evaluate and Treat  Return MD Appointment: 3 months     Pre-treatment Symptoms/Complaints:  Patient reports she hasn't been as consistent with her HEP this past week. Pain: Initial:   0 Post Session:  0/10   Medications Last Reviewed:  2019   Updated Objective Findings:  see below   Observation/Orthostatic Postural Assessment:          Redness to labia majora. Absent labia minora. Inability to wink or bulge. Increased use of abdominal with contractions. Tends to hold breath. Palpation:          Increased tone and tenderness to all layers of pelvic floor  ROM:          Decreased due to tone  Strength:          P: Power, E: Endurance, R: Repetitions, QF: Quick Flicks, TrA: Transverse Abdominus, DB: Diaphragmatic Breathing  P 1   E     R     QF     TrA     DB         Urinary: Frequency 5-10 x/day, 1 x/night. Positive for  mixed urinary incontinence (FRANCIA), urgency, frequency, incomplete emptying. Pt uses pads for protects; 11 pads per day (PPD).  Denies stress urinary incontinence (YVONNE), urge urinary incontinence (UUI), urinary BP Position Sitting   Blood Pressure  115/77   Vitals Comments BP again in supine after nausea 142/83   Pain   Intervention Declines   Prior Living Situation   Housing / Facility 2 Story House   Steps Into Home 2   Steps In Home 16   Equipment Owned 4-Wheel Walker   Lives with - Patient's Self Care Capacity Adult Children   Prior Level of Functional Mobility   Bed Mobility Independent   Transfer Status Independent   Ambulation Independent   Ambulation Distance household   Assistive Devices Used 4-Wheel Walker   Stairs Independent   Passive ROM Lower Body   Passive ROM Lower Body WDL   Active ROM Lower Body    Active ROM Lower Body  WDL   Strength Lower Body   Lower Body Strength  X   Gross Strength Generalized Weakness, Equal Bilaterally   Balance Assessment   Sitting Balance (Static) Fair +   Sitting Balance (Dynamic) Fair   Standing Balance (Static) Poor +   Standing Balance (Dynamic) Poor   Weight Shift Sitting Fair   Weight Shift Standing Poor   Bed Mobility    Supine to Sit Standby Assist   Gait Analysis   Gait Level Of Assist Contact Guard Assist   Assistive Device 4 Wheel Walker   Distance (Feet) 50   # of Times Distance was Traveled 1   Level of Assist with Stairs Unable to Participate   Functional Mobility   Sit to Stand Contact Guard Assist   How much difficulty does the patient currently have...   Turning over in bed (including adjusting bedclothes, sheets and blankets)? 3   Sitting down on and standing up from a chair with arms (e.g., wheelchair, bedside commode, etc.) 2   Moving from lying on back to sitting on the side of the bed? 3   How much help from another person does the patient currently need...   Moving to and from a bed to a chair (including a wheelchair)? 3   Need to walk in a hospital room? 3   Climbing 3-5 steps with a railing? 1   6 clicks Mobility Score 15   Short Term Goals    Short Term Goal # 1 Pt will perform sit<>supine with supervision   Short Term Goal # 2 Pt will perform  hesitancy, dysuria, hematuria. Fluid intake: 64 oz water/day; bladder irritants include: tea   Bowel: Frequency once a day. Denies pain with bowel movement (BM), pushing/straining with BM, incomplete emptying, fecal incontinence, constipation. Does have a history of constipation but due to chemo drugs she is more regular. Sexual: Pt is not sexually active. Male partners. Positive for dyspareunia. Pelvic Organ Prolapse/Pelvic Pain: Location: bladder   TREATMENT:   THERAPEUTIC EXERCISE: (58 minutes):  Exercises per grid below to improve mobility, strength and coordination. Required minimal visual, verbal and manual cues to promote proper body alignment, promote proper body posture and promote proper body mechanics. Progressed resistance, range and repetitions as indicated. Date:  6-12-19 Date:  6-19-19 Date:  7-2-19   Date:  7-24-19   Activity/Exercise Parameters Parameters Parameters    Patient Education Discussed HEP and POc      Bladder health reviewed        breathing x5 minutes        kegels with drop x10 2 x10 with digit feedback and stretch       Butterfly stretch  30 sec hold x 3   30 sec hold x 3 30 sec hold x 3   SKTC  30 sec hold x 3   30 sec hold x 3 30 sec hold x 3   Figure 4 modified  30 sec hold x 3   30 sec hold x 3 30 sec hold x 3   bridge    x15     clamshell    x15 B     Seated march    x15 B     Sit to Stand    x15     Rows      x15   Extensions      x15    Pt gives verbal consent to internal  assessment/treatment no chaperon present.      MANUAL THERAPY: (0 minutes) to improve soft tissue tone and mobility  Date Type Location Comments   7/24/2019 Internal assessment/treatment Via vaginal canal SP, CTM                                       (Used abbreviations: MET - muscle energy technique; SCS- Strain counter strain; CTM-Connective tissue mobilizations; CR- Contract/relax; SP- Sustained pressure, TrP-Trigger point release, IASTM- Instrument assisted soft tissue mobilizations, TDN-Trigger point dry needling)     THERAPEUTIC ACTIVITY: (0 minutes) to improve toilet  -discussed, reviewed, performed proper toilet position  -discussed, reviewed urge suppression    NEURO REEDUCATION: (0 minutes) to improve control and coordination of pelvic floor   Date:  7-3-19 Date:   Date:     Activity/Exercise Parameters Parameters Parameters   Biofeedback With sEMG was utilized for coordination of PFM. Supine, sitting, standing                                                 [facility] Portal     Treatment/Session Summary:  Pt reports good understanding of plan of care, as well as prescribed home exercise program.  All questions were answered to pt's satisfaction. Pt was invited to call with any further questions or concerns. · Response to Treatment:  Patient progressing well in therapy. Overall, has less leakage and is not using as many pads. Some difficulty with coordination of pelvic floor and there ex.    · Communication/Consultation:  None today  · Equipment provided today:  None today  · Recommendations/Intent for next treatment session: Next visit will focus on  stretches, strength   Total Treatment Billable Duration:  58 minutes there ex   PT Patient Time In/Time Out  Time In: 0800  Time Out: 0900  Anival Faith DPT    Future Appointments   Date Time Provider Trinidad Cm   7/25/2019  8:45 AM Ashok Samuels, PT [facility]   10/16/2019  9:30 AM [facility] 1 [facility]   1/7/2020 16:82 AM SFO CT 64 SLICE UNIT 1 [facility]   1/17/2020  8:30 AM GCC OUTREACH INSURANCE [facility] [address] [place] [address]   1/17/2020  9:15 AM Alvaro Pritchard MD [facility]   6/2/2020  8:00 AM HTF LAB RESOURCE [facility] HTF HTF   6/5/2020  9:45 AM Lorrie Hammans, MD [facility] HTF HTF sit<>stand and stand pivot transfers with 4WW and supervision.   Short Term Goal # 3 Pt will ambulate 100' with 4WW and supervision.   Short Term Goal # 4 Pt will perform 16 stairs with supervision.   Education Group   Education Provided Role of Physical Therapist   Role of Physical Therapist Patient Response Patient;Acceptance;Explanation;Demonstration;Verbal Demonstration   Physical Therapy Initial Treatment Plan    Treatment Plan  Bed Mobility;Debridement;Equipment;Group Therapy;Gait Training;Orthotics Training ;Neuro Re-Education / Balance;Manual Therapy;Self Care / Home Evaluation;Stair Training;Therapeutic Activities;Therapeutic Exercise   Treatment Frequency 4 Times per Week   Duration Until Therapy Goals Met   Problem List    Problems Impaired Bed Mobility;Impaired Transfers;Impaired Ambulation;Functional Strength Deficit;Impaired Balance;Decreased Activity Tolerance;Safety Awareness Deficits / Cognition;Limited Knowledge of Post-Op Precautions   Anticipated Discharge Equipment and Recommendations   DC Equipment Recommendations None   Discharge Recommendations Recommend post-acute placement for additional physical therapy services prior to discharge home   Interdisciplinary Plan of Care Collaboration   IDT Collaboration with  Nursing   Patient Position at End of Therapy In Bed;Bed Alarm On;Phone within Reach;Tray Table within Reach;Call Light within Reach   Session Information   Date / Session Number  8/18-1 (1/4, 8/24)

## 2023-08-21 RX ORDER — LEVOTHYROXINE SODIUM 0.2 MG/1
TABLET ORAL
Qty: 30 TABLET | Refills: 1 | OUTPATIENT
Start: 2023-08-21

## 2023-08-28 ENCOUNTER — TELEPHONE (OUTPATIENT)
Dept: PULMONOLOGY | Age: 63
End: 2023-08-28

## 2023-08-28 NOTE — TELEPHONE ENCOUNTER
Patient has an emergency in [place] and had to reschedule. She ask for these refills.       Antibiotic that she takes Monday-Wednesday and Friday             Disp Refills Start End    fluticasone-umeclidin-vilant (TRELEGY ELLIPTA) 200-62.5-25 MCG/ACT AEPB inhaler             Disp Refills Start End    fluticasone (FLONASE) 50 MCG/ACT nasal spray

## 2023-09-08 ENCOUNTER — TELEPHONE (OUTPATIENT)
Dept: PULMONOLOGY | Age: 63
End: 2023-09-08

## 2023-09-08 NOTE — TELEPHONE ENCOUNTER
Patient says that she is going to run out of the azithromycin that she takes Mon-Wed-Fri before her appt 9/22/23

## 2023-09-11 RX ORDER — AZITHROMYCIN 250 MG/1
250 TABLET, FILM COATED ORAL
Qty: 15 TABLET | Refills: 11 | Status: SHIPPED | OUTPATIENT
Start: 2023-09-11

## 2023-09-12 ENCOUNTER — OFFICE VISIT (OUTPATIENT)
Dept: FAMILY MEDICINE CLINIC | Facility: CLINIC | Age: 63
End: 2023-09-12
Payer: COMMERCIAL

## 2023-09-12 VITALS
SYSTOLIC BLOOD PRESSURE: 117 MMHG | WEIGHT: 253 LBS | DIASTOLIC BLOOD PRESSURE: 80 MMHG | TEMPERATURE: 96.7 F | HEART RATE: 80 BPM | OXYGEN SATURATION: 95 % | BODY MASS INDEX: 44.82 KG/M2

## 2023-09-12 DIAGNOSIS — E66.3 OVERWEIGHT: ICD-10-CM

## 2023-09-12 DIAGNOSIS — I83.813 VARICOSE VEINS OF BOTH LOWER EXTREMITIES WITH PAIN: ICD-10-CM

## 2023-09-12 DIAGNOSIS — I95.2 HYPOTENSION DUE TO DRUGS: Primary | ICD-10-CM

## 2023-09-12 PROCEDURE — 3079F DIAST BP 80-89 MM HG: CPT | Performed by: FAMILY MEDICINE

## 2023-09-12 PROCEDURE — 99213 OFFICE O/P EST LOW 20 MIN: CPT | Performed by: FAMILY MEDICINE

## 2023-09-12 PROCEDURE — 3074F SYST BP LT 130 MM HG: CPT | Performed by: FAMILY MEDICINE

## 2023-09-12 RX ORDER — LEVOTHYROXINE SODIUM 175 UG/1
175 TABLET ORAL DAILY
COMMUNITY
End: 2023-09-14 | Stop reason: SDUPTHER

## 2023-09-12 SDOH — ECONOMIC STABILITY: INCOME INSECURITY: HOW HARD IS IT FOR YOU TO PAY FOR THE VERY BASICS LIKE FOOD, HOUSING, MEDICAL CARE, AND HEATING?: NOT HARD AT ALL

## 2023-09-12 SDOH — ECONOMIC STABILITY: FOOD INSECURITY: WITHIN THE PAST 12 MONTHS, YOU WORRIED THAT YOUR FOOD WOULD RUN OUT BEFORE YOU GOT MONEY TO BUY MORE.: NEVER TRUE

## 2023-09-12 SDOH — ECONOMIC STABILITY: HOUSING INSECURITY
IN THE LAST 12 MONTHS, WAS THERE A TIME WHEN YOU DID NOT HAVE A STEADY PLACE TO SLEEP OR SLEPT IN A SHELTER (INCLUDING NOW)?: NO

## 2023-09-12 SDOH — ECONOMIC STABILITY: FOOD INSECURITY: WITHIN THE PAST 12 MONTHS, THE FOOD YOU BOUGHT JUST DIDN'T LAST AND YOU DIDN'T HAVE MONEY TO GET MORE.: NEVER TRUE

## 2023-09-12 ASSESSMENT — ENCOUNTER SYMPTOMS
GASTROINTESTINAL NEGATIVE: 1
EYES NEGATIVE: 1
RESPIRATORY NEGATIVE: 1

## 2023-09-12 NOTE — PROGRESS NOTES
HISTORY OF PRESENT ILLNESS    Vanda Epperson is a 61 y.o. female. HPI  Chief Complaint   Patient presents with    6 Month Follow-Up    Hypotension    Varicose Veins    Thyroid Problem     See above. History of lymphoma followed by hem/onc is currently stable. Continues to have episodes of hypotension. Cardiologist endorsed adjusting Toprol dose. Only takes Maxzide as needed for swelling. Has lost significant weight but is at a plateau. Has had a hard time losing further weight. Has history of superficial varicosities in both thighs. In recent months varicose veins have been painful especially on the left. Feels like thyroid supplement is working well. Denies excessive fatigue. No constipation. Denies dry skin. No side effects. Recent TSH was normal.      Current Outpatient Medications on File Prior to Visit   Medication Sig Dispense Refill    levothyroxine (SYNTHROID) 175 MCG tablet Take 1 tablet by mouth Daily      azithromycin (ZITHROMAX) 250 MG tablet Take 1 tablet by mouth three times a week 15 tablet 11    pregabalin (LYRICA) 50 MG capsule Take 1 capsule by mouth 3 times daily for 90 days.  Max Daily Amount: 150 mg 270 capsule 0    triamterene-hydroCHLOROthiazide (MAXZIDE) 75-50 MG per tablet Take 0.5-1 tablets by mouth daily as needed (swelling) 30 tablet 5    fluticasone-umeclidin-vilant (TRELEGY ELLIPTA) 200-62.5-25 MCG/ACT AEPB inhaler Inhale 1 puff into the lungs daily 1 each 11    ondansetron (ZOFRAN) 4 MG tablet Take 1 tablet by mouth every 8 hours as needed for Nausea or Vomiting 8 tablet 0    metoprolol succinate (TOPROL XL) 50 MG extended release tablet Take 1 tablet by mouth daily 90 tablet 3    apixaban (ELIQUIS) 5 MG TABS tablet Take 1 tablet by mouth 2 times daily 180 tablet 3    sodium chloride, Inhalant, 3 % nebulizer solution Take 4 mLs by nebulization 2 times daily as needed for Cough 240 mL 5    albuterol (PROVENTIL) (2.5 MG/3ML) 0.083% nebulizer solution Take 3 mLs by

## 2023-09-12 NOTE — TELEPHONE ENCOUNTER
Left a voice message to inform pt that the requested azithromycin has been escribed to [facility]. PARUL FUNEZ MA

## 2023-09-14 RX ORDER — LEVOTHYROXINE SODIUM 175 UG/1
175 TABLET ORAL DAILY
Qty: 90 TABLET | Refills: 3 | Status: SHIPPED | OUTPATIENT
Start: 2023-09-14

## 2023-09-22 ENCOUNTER — OFFICE VISIT (OUTPATIENT)
Dept: PULMONOLOGY | Age: 63
End: 2023-09-22
Payer: COMMERCIAL

## 2023-09-22 VITALS
OXYGEN SATURATION: 98 % | RESPIRATION RATE: 18 BRPM | TEMPERATURE: 97.1 F | HEART RATE: 70 BPM | BODY MASS INDEX: 48.37 KG/M2 | SYSTOLIC BLOOD PRESSURE: 125 MMHG | WEIGHT: 273 LBS | DIASTOLIC BLOOD PRESSURE: 82 MMHG | HEIGHT: 63 IN

## 2023-09-22 DIAGNOSIS — Z99.89 OSA ON CPAP: ICD-10-CM

## 2023-09-22 DIAGNOSIS — D80.9 IMMUNOGLOBULIN DEFICIENCY (HCC): ICD-10-CM

## 2023-09-22 DIAGNOSIS — G47.33 OSA ON CPAP: ICD-10-CM

## 2023-09-22 DIAGNOSIS — J45.21 MILD INTERMITTENT ASTHMA WITH (ACUTE) EXACERBATION: Primary | ICD-10-CM

## 2023-09-22 PROCEDURE — 3074F SYST BP LT 130 MM HG: CPT | Performed by: NURSE PRACTITIONER

## 2023-09-22 PROCEDURE — 99214 OFFICE O/P EST MOD 30 MIN: CPT | Performed by: NURSE PRACTITIONER

## 2023-09-22 PROCEDURE — 3079F DIAST BP 80-89 MM HG: CPT | Performed by: NURSE PRACTITIONER

## 2023-09-22 RX ORDER — AZITHROMYCIN 250 MG/1
250 TABLET, FILM COATED ORAL
Qty: 45 TABLET | Refills: 3 | Status: SHIPPED | OUTPATIENT
Start: 2023-09-22

## 2023-09-22 RX ORDER — FLUTICASONE FUROATE, UMECLIDINIUM BROMIDE AND VILANTEROL TRIFENATATE 200; 62.5; 25 UG/1; UG/1; UG/1
1 POWDER RESPIRATORY (INHALATION) DAILY
Qty: 3 EACH | Refills: 3 | Status: SHIPPED | OUTPATIENT
Start: 2023-09-22

## 2023-09-22 NOTE — PROGRESS NOTES
[facility])     History of stroke     History of UTI     Hypertension     Marginal zone lymphoma ([address]) 03/30/2017    Diffuse Large B-cell Lymphoma.  Completed Chemotherapy 5/28/18    Morbid obesity (HCC)     DAGMAR (obstructive sleep apnea)     uses CPAP    Osteoarthritis     Overactive bladder     Primary hypothyroidism     Prolapse of female bladder, acquired     Radiation therapy complication     Rheumatic fever     S/P total knee replacement using cement 10/3/2011    Shingles     Superficial venous thrombosis of right arm 5/1/2017        Tobacco Use      Smoking status: Never      Smokeless tobacco: Never    Allergies   Allergen Reactions    Gabapentin Other (See Comments)     \"Double vision and falls\"     Current Outpatient Medications   Medication Instructions    albuterol (PROVENTIL) 2.5 mg, Nebulization, 4 TIMES DAILY    albuterol sulfate  (90 Base) MCG/ACT inhaler 2 puffs, Inhalation, EVERY 4 HOURS    apixaban (ELIQUIS) 5 mg, Oral, 2 TIMES DAILY    azithromycin (ZITHROMAX) 250 mg, Oral, THREE TIMES WEEKLY    fluticasone (FLONASE) 50 MCG/ACT nasal spray 2 sprays, Nasal, DAILY    fluticasone-umeclidin-vilant (TRELEGY ELLIPTA) 200-62.5-25 MCG/ACT AEPB inhaler 1 puff, Inhalation, DAILY    levothyroxine (SYNTHROID) 175 mcg, Oral, DAILY    metoprolol succinate (TOPROL XL) 50 mg, Oral, DAILY    montelukast (SINGULAIR) 10 mg, Oral, DAILY    ondansetron (ZOFRAN) 4 mg, Oral, EVERY 8 HOURS PRN    pregabalin (LYRICA) 50 mg, Oral, 3 TIMES DAILY    sodium chloride, Inhalant, 3 % nebulizer solution 4 mLs, Nebulization, 2 TIMES DAILY PRN    triamterene-hydroCHLOROthiazide (MAXZIDE) 75-50 MG per tablet 0.5-1 tablets, Oral, DAILY PRN

## 2023-10-13 DIAGNOSIS — E03.9 HYPOTHYROIDISM, UNSPECIFIED TYPE: ICD-10-CM

## 2023-10-13 DIAGNOSIS — C83.30 DIFFUSE LARGE B-CELL LYMPHOMA, UNSPECIFIED BODY REGION (HCC): Primary | ICD-10-CM

## 2023-10-16 ENCOUNTER — HOSPITAL ENCOUNTER (OUTPATIENT)
Dept: INFUSION THERAPY | Age: 63
Discharge: HOME | End: 2023-10-16
Payer: COMMERCIAL

## 2023-10-16 ENCOUNTER — OFFICE VISIT (OUTPATIENT)
Dept: ONCOLOGY | Age: 63
End: 2023-10-16
Payer: COMMERCIAL

## 2023-10-16 ENCOUNTER — HOSPITAL ENCOUNTER (OUTPATIENT)
Dept: LAB | Age: 63
Discharge: HOME | End: 2023-10-19
Payer: COMMERCIAL

## 2023-10-16 VITALS
DIASTOLIC BLOOD PRESSURE: 85 MMHG | RESPIRATION RATE: 16 BRPM | HEART RATE: 58 BPM | SYSTOLIC BLOOD PRESSURE: 125 MMHG | TEMPERATURE: 97.9 F | OXYGEN SATURATION: 99 %

## 2023-10-16 VITALS
TEMPERATURE: 98.4 F | RESPIRATION RATE: 18 BRPM | SYSTOLIC BLOOD PRESSURE: 128 MMHG | HEIGHT: 63 IN | BODY MASS INDEX: 46.71 KG/M2 | WEIGHT: 263.6 LBS | OXYGEN SATURATION: 100 % | HEART RATE: 72 BPM | DIASTOLIC BLOOD PRESSURE: 90 MMHG

## 2023-10-16 DIAGNOSIS — C83.30 DIFFUSE LARGE B-CELL LYMPHOMA, UNSPECIFIED BODY REGION (HCC): ICD-10-CM

## 2023-10-16 DIAGNOSIS — E03.9 HYPOTHYROIDISM, UNSPECIFIED TYPE: ICD-10-CM

## 2023-10-16 DIAGNOSIS — C85.88 MARGINAL ZONE LYMPHOMA OF LYMPH NODES OF MULTIPLE SITES (HCC): Primary | ICD-10-CM

## 2023-10-16 DIAGNOSIS — D80.1 HYPOGAMMAGLOBULINEMIA (HCC): Primary | ICD-10-CM

## 2023-10-16 DIAGNOSIS — C85.88 MARGINAL ZONE LYMPHOMA OF LYMPH NODES OF MULTIPLE SITES (HCC): ICD-10-CM

## 2023-10-16 LAB
ALBUMIN SERPL-MCNC: 3.8 G/DL (ref 3.2–4.6)
ALBUMIN/GLOB SERPL: 1.3 (ref 0.4–1.6)
ALP SERPL-CCNC: 88 U/L (ref 50–136)
ALT SERPL-CCNC: 36 U/L (ref 12–65)
ANION GAP SERPL CALC-SCNC: 6 MMOL/L (ref 2–11)
AST SERPL-CCNC: 30 U/L (ref 15–37)
BASOPHILS # BLD: 0 K/UL (ref 0–0.2)
BASOPHILS NFR BLD: 1 % (ref 0–2)
BILIRUB SERPL-MCNC: 0.7 MG/DL (ref 0.2–1.1)
BUN SERPL-MCNC: 15 MG/DL (ref 8–23)
CALCIUM SERPL-MCNC: 9.2 MG/DL (ref 8.3–10.4)
CHLORIDE SERPL-SCNC: 105 MMOL/L (ref 101–110)
CO2 SERPL-SCNC: 29 MMOL/L (ref 21–32)
CREAT SERPL-MCNC: 0.9 MG/DL (ref 0.6–1)
DIFFERENTIAL METHOD BLD: ABNORMAL
EOSINOPHIL # BLD: 0.1 K/UL (ref 0–0.8)
EOSINOPHIL NFR BLD: 2 % (ref 0.5–7.8)
ERYTHROCYTE [DISTWIDTH] IN BLOOD BY AUTOMATED COUNT: 13.9 % (ref 11.9–14.6)
FERRITIN SERPL-MCNC: 595 NG/ML (ref 8–388)
GLOBULIN SER CALC-MCNC: 3 G/DL (ref 2.8–4.5)
GLUCOSE SERPL-MCNC: 83 MG/DL (ref 65–100)
HCT VFR BLD AUTO: 37.8 % (ref 35.8–46.3)
HGB BLD-MCNC: 12.2 G/DL (ref 11.7–15.4)
IGG SERPL-MCNC: 476 MG/DL (ref 700–1600)
IMM GRANULOCYTES # BLD AUTO: 0 K/UL (ref 0–0.5)
IMM GRANULOCYTES NFR BLD AUTO: 1 % (ref 0–5)
LYMPHOCYTES # BLD: 1.2 K/UL (ref 0.5–4.6)
LYMPHOCYTES NFR BLD: 23 % (ref 13–44)
MCH RBC QN AUTO: 29.2 PG (ref 26.1–32.9)
MCHC RBC AUTO-ENTMCNC: 32.3 G/DL (ref 31.4–35)
MCV RBC AUTO: 90.4 FL (ref 82–102)
MONOCYTES # BLD: 0.3 K/UL (ref 0.1–1.3)
MONOCYTES NFR BLD: 6 % (ref 4–12)
NEUTS SEG # BLD: 3.6 K/UL (ref 1.7–8.2)
NEUTS SEG NFR BLD: 67 % (ref 43–78)
NRBC # BLD: 0 K/UL (ref 0–0.2)
PLATELET # BLD AUTO: 105 K/UL (ref 150–450)
PMV BLD AUTO: 10.8 FL (ref 9.4–12.3)
POTASSIUM SERPL-SCNC: 3.7 MMOL/L (ref 3.5–5.1)
PROT SERPL-MCNC: 6.8 G/DL (ref 6.3–8.2)
RBC # BLD AUTO: 4.18 M/UL (ref 4.05–5.2)
SODIUM SERPL-SCNC: 140 MMOL/L (ref 133–143)
T4 FREE SERPL-MCNC: 1.5 NG/DL (ref 0.78–1.4)
TSH, 3RD GENERATION: 2.05 UIU/ML (ref 0.36–3)
WBC # BLD AUTO: 5.3 K/UL (ref 4.3–11.1)

## 2023-10-16 PROCEDURE — 80053 COMPREHEN METABOLIC PANEL: CPT

## 2023-10-16 PROCEDURE — 96365 THER/PROPH/DIAG IV INF INIT: CPT

## 2023-10-16 PROCEDURE — 84443 ASSAY THYROID STIM HORMONE: CPT

## 2023-10-16 PROCEDURE — 6370000000 HC RX 637 (ALT 250 FOR IP): Performed by: INTERNAL MEDICINE

## 2023-10-16 PROCEDURE — 99214 OFFICE O/P EST MOD 30 MIN: CPT | Performed by: NURSE PRACTITIONER

## 2023-10-16 PROCEDURE — 82784 ASSAY IGA/IGD/IGG/IGM EACH: CPT

## 2023-10-16 PROCEDURE — 3080F DIAST BP >= 90 MM HG: CPT | Performed by: NURSE PRACTITIONER

## 2023-10-16 PROCEDURE — 36415 COLL VENOUS BLD VENIPUNCTURE: CPT

## 2023-10-16 PROCEDURE — 96375 TX/PRO/DX INJ NEW DRUG ADDON: CPT

## 2023-10-16 PROCEDURE — 85025 COMPLETE CBC W/AUTO DIFF WBC: CPT

## 2023-10-16 PROCEDURE — 84439 ASSAY OF FREE THYROXINE: CPT

## 2023-10-16 PROCEDURE — 82728 ASSAY OF FERRITIN: CPT

## 2023-10-16 PROCEDURE — 3074F SYST BP LT 130 MM HG: CPT | Performed by: NURSE PRACTITIONER

## 2023-10-16 PROCEDURE — 6360000002 HC RX W HCPCS: Performed by: INTERNAL MEDICINE

## 2023-10-16 PROCEDURE — 2580000003 HC RX 258: Performed by: INTERNAL MEDICINE

## 2023-10-16 RX ORDER — DIPHENHYDRAMINE HYDROCHLORIDE 50 MG/ML
50 INJECTION INTRAMUSCULAR; INTRAVENOUS
Status: DISCONTINUED | OUTPATIENT
Start: 2023-10-16 | End: 2023-10-17 | Stop reason: HOSPADM

## 2023-10-16 RX ORDER — SODIUM CHLORIDE 9 MG/ML
5-250 INJECTION, SOLUTION INTRAVENOUS PRN
Status: DISCONTINUED | OUTPATIENT
Start: 2023-10-16 | End: 2023-10-17 | Stop reason: HOSPADM

## 2023-10-16 RX ORDER — DIPHENHYDRAMINE HYDROCHLORIDE 50 MG/ML
50 INJECTION INTRAMUSCULAR; INTRAVENOUS
OUTPATIENT
Start: 2023-10-16

## 2023-10-16 RX ORDER — ALBUTEROL SULFATE 90 UG/1
4 AEROSOL, METERED RESPIRATORY (INHALATION) PRN
Status: DISCONTINUED | OUTPATIENT
Start: 2023-10-16 | End: 2023-10-17 | Stop reason: HOSPADM

## 2023-10-16 RX ORDER — ONDANSETRON 2 MG/ML
8 INJECTION INTRAMUSCULAR; INTRAVENOUS
Status: DISCONTINUED | OUTPATIENT
Start: 2023-10-16 | End: 2023-10-17 | Stop reason: HOSPADM

## 2023-10-16 RX ORDER — SODIUM CHLORIDE 9 MG/ML
INJECTION, SOLUTION INTRAVENOUS CONTINUOUS
Status: DISCONTINUED | OUTPATIENT
Start: 2023-10-16 | End: 2023-10-17 | Stop reason: HOSPADM

## 2023-10-16 RX ORDER — ACETAMINOPHEN 325 MG/1
650 TABLET ORAL
OUTPATIENT
Start: 2023-10-16

## 2023-10-16 RX ORDER — MEPERIDINE HYDROCHLORIDE 25 MG/ML
12.5 INJECTION INTRAMUSCULAR; INTRAVENOUS; SUBCUTANEOUS PRN
Status: DISCONTINUED | OUTPATIENT
Start: 2023-10-16 | End: 2023-10-17 | Stop reason: HOSPADM

## 2023-10-16 RX ORDER — SODIUM CHLORIDE 0.9 % (FLUSH) 0.9 %
5-40 SYRINGE (ML) INJECTION PRN
Status: DISCONTINUED | OUTPATIENT
Start: 2023-10-16 | End: 2023-10-17 | Stop reason: HOSPADM

## 2023-10-16 RX ORDER — DIPHENHYDRAMINE HYDROCHLORIDE 50 MG/ML
50 INJECTION INTRAMUSCULAR; INTRAVENOUS ONCE
Start: 2023-10-16 | End: 2023-10-16

## 2023-10-16 RX ORDER — ACETAMINOPHEN 325 MG/1
650 TABLET ORAL
Start: 2023-10-16

## 2023-10-16 RX ORDER — ACETAMINOPHEN 325 MG/1
650 TABLET ORAL
Status: DISCONTINUED | OUTPATIENT
Start: 2023-10-16 | End: 2023-10-17 | Stop reason: HOSPADM

## 2023-10-16 RX ORDER — ACETAMINOPHEN 325 MG/1
650 TABLET ORAL
Status: COMPLETED | OUTPATIENT
Start: 2023-10-16 | End: 2023-10-16

## 2023-10-16 RX ORDER — DIPHENHYDRAMINE HYDROCHLORIDE 50 MG/ML
50 INJECTION INTRAMUSCULAR; INTRAVENOUS ONCE
Status: COMPLETED | OUTPATIENT
Start: 2023-10-16 | End: 2023-10-16

## 2023-10-16 RX ORDER — EPINEPHRINE 1 MG/ML
0.3 INJECTION, SOLUTION, CONCENTRATE INTRAVENOUS PRN
Status: DISCONTINUED | OUTPATIENT
Start: 2023-10-16 | End: 2023-10-17 | Stop reason: HOSPADM

## 2023-10-16 RX ADMIN — ACETAMINOPHEN 650 MG: 325 TABLET ORAL at 15:54

## 2023-10-16 RX ADMIN — DIPHENHYDRAMINE HYDROCHLORIDE 50 MG: 50 INJECTION INTRAMUSCULAR; INTRAVENOUS at 15:55

## 2023-10-16 RX ADMIN — SODIUM CHLORIDE 75 ML/HR: 9 INJECTION, SOLUTION INTRAVENOUS at 15:51

## 2023-10-16 RX ADMIN — SODIUM CHLORIDE, PRESERVATIVE FREE 10 ML: 5 INJECTION INTRAVENOUS at 15:50

## 2023-10-16 RX ADMIN — SODIUM CHLORIDE, PRESERVATIVE FREE 10 ML: 5 INJECTION INTRAVENOUS at 17:32

## 2023-10-16 RX ADMIN — IMMUNE GLOBULIN (HUMAN) 50 G: 10 INJECTION INTRAVENOUS; SUBCUTANEOUS at 16:05

## 2023-10-16 ASSESSMENT — PATIENT HEALTH QUESTIONNAIRE - PHQ9
SUM OF ALL RESPONSES TO PHQ QUESTIONS 1-9: 0
SUM OF ALL RESPONSES TO PHQ QUESTIONS 1-9: 0
2. FEELING DOWN, DEPRESSED OR HOPELESS: 0
SUM OF ALL RESPONSES TO PHQ QUESTIONS 1-9: 0
SUM OF ALL RESPONSES TO PHQ9 QUESTIONS 1 & 2: 0
SUM OF ALL RESPONSES TO PHQ QUESTIONS 1-9: 0
1. LITTLE INTEREST OR PLEASURE IN DOING THINGS: 0

## 2023-10-16 NOTE — PROGRESS NOTES
[facility] Hematology and Oncology: Office Visit Established Patient    Chief Complaint:    Chief Complaint   Patient presents with    Follow-up       History of Present Illness:  Ms. Ravin Mark is a 61 y.o. 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 She was in previously good health, seen as an urgent work-in in clinic for lymphadenopathy on 3/30/17. She was having abdominal pain and swelling which has progressed over the  previous several weeks, CT was recommended but was initially denied by insurance. She had GI evaluation including EGD/colonoscopy which showed no intraluminal pathology, but finally had a CT at [facility] that showed diffuse lymphadenopathy  with moderate ascites, splenomegaly, and possible infiltration of the left kidney, all consistent with lymphoproliferative disorder. We recommended inpatient admission for expedited work-up, including excisional biopsy of an axillary node, bone marrow  biopsy, labs and PET/CT. The biopsy returned showing marginal zone lymphoma. Her anemia, ascites, and constitutional symptoms are an indication for treatment. I recommend Rituxan and bendamustine  for therapy. She was hospitalized for recurrent fevers after cycle 1. She was also noted to have hypotension requiring a brief ICU stay with Levophed, and acute kidney injury either from antibiotics  or ATN from hypotension (or both). She completed repeat paracentesis with good relief of abdominal symptoms. Restaging after cycle 2 showed partial response in lymphadenopathy  and splenomegaly, continue current therapy. Restaging after 5 cycles showed essentially complete response, she was continued on to 6 cycles of BR and then entered R maintenance. PET/CT prior to her 2nd cycle of maintenance showed a new hypermetabolic  breast nodule and a mesenteric nodule. Biopsy of the breast nodule shows diffuse large B cell lymphoma.   I suspect this is

## 2023-10-16 NOTE — PROGRESS NOTES
Arrived to the [address] [place] [address]. IVIG completed. Patient tolerated well. Any issues or concerns during appointment: none. VSS. Patient aware of next infusion appointment on 11/15/23 (date) at [phone] (time). Patient aware of next lab and [facility] office visit on [phone] (date) at [phone] (time). Patient instructed to call provider with temperature of 100.4 or greater or nausea/vomiting/ diarrhea or pain not controlled by medications  Discharged amb.

## 2023-10-17 LAB
IGA SERPL-MCNC: <5 MG/DL (ref 87–352)
IGG SERPL-MCNC: 504 MG/DL (ref 586–1602)
IGM SERPL-MCNC: <5 MG/DL (ref 26–217)

## 2023-10-24 ENCOUNTER — TELEPHONE (OUTPATIENT)
Dept: ONCOLOGY | Age: 63
End: 2023-10-24

## 2023-10-24 DIAGNOSIS — C83.30 DIFFUSE LARGE B-CELL LYMPHOMA, UNSPECIFIED BODY REGION (HCC): ICD-10-CM

## 2023-10-24 RX ORDER — PREGABALIN 50 MG/1
50 CAPSULE ORAL 3 TIMES DAILY
Qty: 270 CAPSULE | Refills: 0 | Status: SHIPPED | OUTPATIENT
Start: 2023-10-24 | End: 2023-10-25 | Stop reason: SDUPTHER

## 2023-10-24 NOTE — TELEPHONE ENCOUNTER
Medication Requested: Lyrica    Date last prescribed: 6/15/23    [facility]    Action Taken: Chart reviewed, refill ordered, patient notified.

## 2023-10-24 NOTE — TELEPHONE ENCOUNTER
Physician provider: Jaden Britt MD  Reason for today's call: medication Refill   Last office visit:10/16/2023    Patient notified that their information will be routed to the [facility] clinical triage team for review. Patient is advised that they will receive a phone call from the triage department.     Medication refill: Lyrica 50 Mg 90 day supply   [facility]  [address]

## 2023-10-25 ENCOUNTER — TELEPHONE (OUTPATIENT)
Dept: ONCOLOGY | Age: 63
End: 2023-10-25

## 2023-10-25 DIAGNOSIS — C83.30 DIFFUSE LARGE B-CELL LYMPHOMA, UNSPECIFIED BODY REGION (HCC): ICD-10-CM

## 2023-10-25 RX ORDER — PREGABALIN 50 MG/1
50 CAPSULE ORAL 3 TIMES DAILY
Qty: 270 CAPSULE | Refills: 0 | Status: SHIPPED | OUTPATIENT
Start: 2023-10-25 | End: 2023-10-25

## 2023-10-25 RX ORDER — PREGABALIN 50 MG/1
50 CAPSULE ORAL 3 TIMES DAILY
Qty: 270 CAPSULE | Refills: 0 | Status: SHIPPED | OUTPATIENT
Start: 2023-10-25 | End: 2024-01-23

## 2023-10-25 NOTE — TELEPHONE ENCOUNTER
Physician provider: Markos Sandy MD  Reason for today's call: Refill  Last office visit:10/16/23    Patient notified that their information will be routed to the [facility] clinical triage team for review. Patient is advised that they will receive a phone call from the triage department. Pt received a confirmation call on 10/24 from our office that medication was placed for   Lyrica 50 mg 90 day supply . Pt state she called [facility] prior to calling our office & was informed they didn't have the medication.        [facility]: [address]

## 2023-10-25 NOTE — TELEPHONE ENCOUNTER
Physician provider: Kristen Garcia MD  Reason for today's call: refill  Last office visit:10/16/23    Patient notified that their information will be routed to the [facility] clinical triage team for review. Patient is advised that they will receive a phone call from the triage department. Pt had requested refill for Lyrica 50 mg 90 day supply but A 30 day supply was sent in . Pt state insurance will only pay for 90 days.  Pt  [facility] 90 day supply but Pt was informed she can [facility]  [facility] [address], [place], [place]

## 2023-11-06 ENCOUNTER — HOSPITAL ENCOUNTER (OUTPATIENT)
Dept: CT IMAGING | Age: 63
Discharge: HOME | End: 2023-11-09
Attending: INTERNAL MEDICINE
Payer: COMMERCIAL

## 2023-11-06 DIAGNOSIS — C83.30 DIFFUSE LARGE B-CELL LYMPHOMA, UNSPECIFIED BODY REGION (HCC): ICD-10-CM

## 2023-11-06 DIAGNOSIS — C85.88 MARGINAL ZONE LYMPHOMA OF LYMPH NODES OF MULTIPLE SITES (HCC): ICD-10-CM

## 2023-11-06 PROCEDURE — 71260 CT THORAX DX C+: CPT

## 2023-11-06 PROCEDURE — 6360000004 HC RX CONTRAST MEDICATION: Performed by: INTERNAL MEDICINE

## 2023-11-06 RX ADMIN — IOPAMIDOL 100 ML: 755 INJECTION, SOLUTION INTRAVENOUS at 14:46

## 2023-11-06 RX ADMIN — DIATRIZOATE MEGLUMINE AND DIATRIZOATE SODIUM 15 ML: 660; 100 LIQUID ORAL; RECTAL at 14:46

## 2023-11-07 RX ORDER — PREGABALIN 50 MG/1
50 CAPSULE ORAL 3 TIMES DAILY
Qty: 90 CAPSULE | Refills: 3 | OUTPATIENT
Start: 2023-11-07 | End: 2023-12-07

## 2023-11-07 RX ORDER — PREGABALIN 50 MG/1
50 CAPSULE ORAL 3 TIMES DAILY
Qty: 90 CAPSULE | OUTPATIENT
Start: 2023-11-07 | End: 2023-12-07

## 2023-11-10 ENCOUNTER — TELEPHONE (OUTPATIENT)
Dept: ONCOLOGY | Age: 63
End: 2023-11-10

## 2023-11-10 NOTE — TELEPHONE ENCOUNTER
Pt definitely wants to keep 11.15.23 appt w/Dr Bailey but asks about infusion appt time stating they conflict. Pt stated it is okay to change infusion appt, but not appt w/Dr Bailey

## 2023-11-14 DIAGNOSIS — C85.88 MARGINAL ZONE LYMPHOMA OF LYMPH NODES OF MULTIPLE SITES (HCC): ICD-10-CM

## 2023-11-14 DIAGNOSIS — C83.30 DIFFUSE LARGE B-CELL LYMPHOMA, UNSPECIFIED BODY REGION (HCC): Primary | ICD-10-CM

## 2023-11-15 ENCOUNTER — HOSPITAL ENCOUNTER (OUTPATIENT)
Dept: LAB | Age: 63
Discharge: HOME | End: 2023-11-18
Payer: COMMERCIAL

## 2023-11-15 ENCOUNTER — OFFICE VISIT (OUTPATIENT)
Dept: ONCOLOGY | Age: 63
End: 2023-11-15

## 2023-11-15 ENCOUNTER — HOSPITAL ENCOUNTER (OUTPATIENT)
Dept: INFUSION THERAPY | Age: 63
Discharge: HOME | End: 2023-11-15

## 2023-11-15 VITALS
TEMPERATURE: 97.6 F | HEIGHT: 63 IN | DIASTOLIC BLOOD PRESSURE: 79 MMHG | WEIGHT: 210.9 LBS | SYSTOLIC BLOOD PRESSURE: 153 MMHG | HEART RATE: 66 BPM | RESPIRATION RATE: 16 BRPM | OXYGEN SATURATION: 99 % | BODY MASS INDEX: 37.37 KG/M2

## 2023-11-15 DIAGNOSIS — D80.1 HYPOGAMMAGLOBULINEMIA (HCC): ICD-10-CM

## 2023-11-15 DIAGNOSIS — Z85.72 HISTORY OF DIFFUSE LARGE B-CELL LYMPHOMA: Primary | ICD-10-CM

## 2023-11-15 DIAGNOSIS — D69.6 THROMBOCYTOPENIA, UNSPECIFIED (HCC): ICD-10-CM

## 2023-11-15 DIAGNOSIS — C83.30 DIFFUSE LARGE B-CELL LYMPHOMA, UNSPECIFIED BODY REGION (HCC): ICD-10-CM

## 2023-11-15 DIAGNOSIS — C85.88 MARGINAL ZONE LYMPHOMA OF LYMPH NODES OF MULTIPLE SITES (HCC): ICD-10-CM

## 2023-11-15 DIAGNOSIS — R59.0 CERVICAL LYMPHADENOPATHY: ICD-10-CM

## 2023-11-15 LAB
ALBUMIN SERPL-MCNC: 4 G/DL (ref 3.2–4.6)
ALBUMIN/GLOB SERPL: 1.3 (ref 0.4–1.6)
ALP SERPL-CCNC: 91 U/L (ref 50–136)
ALT SERPL-CCNC: 36 U/L (ref 12–65)
ANION GAP SERPL CALC-SCNC: 4 MMOL/L (ref 2–11)
AST SERPL-CCNC: 33 U/L (ref 15–37)
BASOPHILS # BLD: 0.1 K/UL (ref 0–0.2)
BASOPHILS NFR BLD: 1 % (ref 0–2)
BILIRUB SERPL-MCNC: 0.6 MG/DL (ref 0.2–1.1)
BUN SERPL-MCNC: 14 MG/DL (ref 8–23)
CALCIUM SERPL-MCNC: 9.3 MG/DL (ref 8.3–10.4)
CHLORIDE SERPL-SCNC: 106 MMOL/L (ref 101–110)
CO2 SERPL-SCNC: 30 MMOL/L (ref 21–32)
CREAT SERPL-MCNC: 0.9 MG/DL (ref 0.6–1)
DIFFERENTIAL METHOD BLD: ABNORMAL
EOSINOPHIL # BLD: 0.1 K/UL (ref 0–0.8)
EOSINOPHIL NFR BLD: 3 % (ref 0.5–7.8)
ERYTHROCYTE [DISTWIDTH] IN BLOOD BY AUTOMATED COUNT: 13.5 % (ref 11.9–14.6)
FERRITIN SERPL-MCNC: 511 NG/ML (ref 8–388)
GLOBULIN SER CALC-MCNC: 3 G/DL (ref 2.8–4.5)
GLUCOSE SERPL-MCNC: 89 MG/DL (ref 65–100)
HCT VFR BLD AUTO: 38.2 % (ref 35.8–46.3)
HGB BLD-MCNC: 12.3 G/DL (ref 11.7–15.4)
IGG SERPL-MCNC: 719 MG/DL (ref 700–1600)
IMM GRANULOCYTES # BLD AUTO: 0 K/UL (ref 0–0.5)
IMM GRANULOCYTES NFR BLD AUTO: 0 % (ref 0–5)
LYMPHOCYTES # BLD: 1 K/UL (ref 0.5–4.6)
LYMPHOCYTES NFR BLD: 24 % (ref 13–44)
MCH RBC QN AUTO: 28.8 PG (ref 26.1–32.9)
MCHC RBC AUTO-ENTMCNC: 32.2 G/DL (ref 31.4–35)
MCV RBC AUTO: 89.5 FL (ref 82–102)
MONOCYTES # BLD: 0.2 K/UL (ref 0.1–1.3)
MONOCYTES NFR BLD: 6 % (ref 4–12)
NEUTS SEG # BLD: 2.6 K/UL (ref 1.7–8.2)
NEUTS SEG NFR BLD: 66 % (ref 43–78)
NRBC # BLD: 0 K/UL (ref 0–0.2)
PLATELET # BLD AUTO: 71 K/UL (ref 150–450)
PMV BLD AUTO: 11 FL (ref 9.4–12.3)
POTASSIUM SERPL-SCNC: 3.8 MMOL/L (ref 3.5–5.1)
PROT SERPL-MCNC: 7 G/DL (ref 6.3–8.2)
RBC # BLD AUTO: 4.27 M/UL (ref 4.05–5.2)
SODIUM SERPL-SCNC: 140 MMOL/L (ref 133–143)
T4 FREE SERPL-MCNC: 1.4 NG/DL (ref 0.78–1.4)
TSH, 3RD GENERATION: 5.7 UIU/ML (ref 0.36–3)
WBC # BLD AUTO: 4.1 K/UL (ref 4.3–11.1)

## 2023-11-15 PROCEDURE — 82784 ASSAY IGA/IGD/IGG/IGM EACH: CPT

## 2023-11-15 PROCEDURE — 84443 ASSAY THYROID STIM HORMONE: CPT

## 2023-11-15 PROCEDURE — 84439 ASSAY OF FREE THYROXINE: CPT

## 2023-11-15 PROCEDURE — 85025 COMPLETE CBC W/AUTO DIFF WBC: CPT

## 2023-11-15 PROCEDURE — 36415 COLL VENOUS BLD VENIPUNCTURE: CPT

## 2023-11-15 PROCEDURE — 82728 ASSAY OF FERRITIN: CPT

## 2023-11-15 PROCEDURE — 80053 COMPREHEN METABOLIC PANEL: CPT

## 2023-11-15 NOTE — PATIENT INSTRUCTIONS
11/15/2023 89  65 - 100 mg/dL Final    BUN 11/15/2023 14  8 - 23 MG/DL Final    Creatinine 11/15/2023 0.90  0.6 - 1.0 MG/DL Final    Est, Glom Filt Rate 11/15/2023 >60  >60 ml/min/1.73m2 Final    Comment:    Pediatric calculator link: [employer].at. org/professionals/kdoqi/gfr_calculatorped     These results are not intended for use in patients <25years of age. eGFR results are calculated without a race factor using  the 2021 CKD-EPI equation. Careful clinical correlation is recommended, particularly when comparing to results calculated using previous equations. The CKD-EPI equation is less accurate in patients with extremes of muscle mass, extra-renal metabolism of creatinine, excessive creatine ingestion, or following therapy that affects renal tubular secretion. Calcium 11/15/2023 9.3  8.3 - 10.4 MG/DL Final    Total Bilirubin 11/15/2023 0.6  0.2 - 1.1 MG/DL Final    ALT 11/15/2023 36  12 - 65 U/L Final    AST 11/15/2023 33  15 - 37 U/L Final    Alk Phosphatase 11/15/2023 91  50 - 136 U/L Final    Total Protein 11/15/2023 7.0  6.3 - 8.2 g/dL Final    Albumin 11/15/2023 4.0  3.2 - 4.6 g/dL Final    Globulin 11/15/2023 3.0  2.8 - 4.5 g/dL Final    Albumin/Globulin Ratio 11/15/2023 1.3  0.4 - 1.6   Final    Ferritin 11/15/2023 511 (H)  8 - 388 NG/ML Final    T4 Free 11/15/2023 1.4  0.78 - 1.4 NG/DL Final     Treatment Summary has been discussed and given to patient:   N/A    -------------------------------------------------------------------------------------------------------------------  Please call our office at [phone] if you have any  of the following symptoms:   Fever of 100.5 or greater  Chills  Shortness of breath  Swelling or pain in one leg    After office hours an answering service is available and will contact a provider for emergencies or if you are experiencing any of the above symptoms. Patient does express an interest in My Chart.   My Chart log in information explained on the

## 2023-11-15 NOTE — PROGRESS NOTES
No other new adenopathy. She denies any fevers or B symptoms. Her facial nerve palsy has almost completely resolved. Labs reviewed and stable/unremarkable. IgG is up to 703, we will delay 2 more weeks for infusion. CT reviewed, there is no evidence of progressive lymphoma, cervical nodes have normalized. Continue observation from lymphoma standpoint, no benefit to additional therapy or work-up at this point. All questions were asked and answered to the best of my ability. F/u for IVIG in 2 weeks, labs/OV in 6 weeks, repeat imaging likely May 2024.           Earl Vaca MD  [facility] Hematology and Oncology  [address]  Office : [phone]  Fax : [phone]

## 2023-11-16 ENCOUNTER — TELEPHONE (OUTPATIENT)
Dept: FAMILY MEDICINE CLINIC | Facility: CLINIC | Age: 63
End: 2023-11-16

## 2023-11-16 RX ORDER — LEVOTHYROXINE SODIUM 0.2 MG/1
200 TABLET ORAL DAILY
Qty: 90 TABLET | Refills: 1 | Status: SHIPPED | OUTPATIENT
Start: 2023-11-16

## 2023-11-16 NOTE — TELEPHONE ENCOUNTER
----- Message from Lebronbarrington Gio sent at 11/16/2023 10:08 AM EST -----  Subject: Medication Problem    Medication: levothyroxine (SYNTHROID) 175 MCG tablet  Dosage: 175 MCG 1 tablet daily   Ordering Provider: Isaac Bence     Question/Problem: Pt would like to inform PCP that after completing her   blood work with oncologist her test results are extremely high for several   things including her TSH (labs were completed on 11/15). Would like PCP to   increase her medication, labs are in pt chart. Please contact to disucss   as soon as possible.        [facility] [address] [place] [address] [phone]    ---------------------------------------------------------------------------  --------------  Kandy VAZQUEZ  [phone]; OK to leave message on voicemail  ---------------------------------------------------------------------------  --------------    SCRIPT ANSWERS  Relationship to Patient: Self

## 2023-11-17 LAB
IGA SERPL-MCNC: <5 MG/DL (ref 87–352)
IGG SERPL-MCNC: 703 MG/DL (ref 586–1602)
IGM SERPL-MCNC: <5 MG/DL (ref 26–217)

## 2023-11-18 PROBLEM — D69.6 THROMBOCYTOPENIA, UNSPECIFIED (HCC): Status: ACTIVE | Noted: 2023-11-18

## 2023-11-28 RX ORDER — HEPARIN SODIUM (PORCINE) LOCK FLUSH IV SOLN 100 UNIT/ML 100 UNIT/ML
500 SOLUTION INTRAVENOUS PRN
Status: CANCELLED | OUTPATIENT
Start: 2023-11-28

## 2023-11-28 RX ORDER — SODIUM CHLORIDE 9 MG/ML
INJECTION, SOLUTION INTRAVENOUS CONTINUOUS
OUTPATIENT
Start: 2023-11-28

## 2023-11-28 RX ORDER — ONDANSETRON 2 MG/ML
8 INJECTION INTRAMUSCULAR; INTRAVENOUS
OUTPATIENT
Start: 2023-11-28

## 2023-11-28 RX ORDER — SODIUM CHLORIDE 0.9 % (FLUSH) 0.9 %
5-40 SYRINGE (ML) INJECTION PRN
Status: CANCELLED | OUTPATIENT
Start: 2023-11-28

## 2023-11-28 RX ORDER — EPINEPHRINE 1 MG/ML
0.3 INJECTION, SOLUTION, CONCENTRATE INTRAVENOUS PRN
OUTPATIENT
Start: 2023-11-28

## 2023-11-28 RX ORDER — FAMOTIDINE 10 MG/ML
20 INJECTION, SOLUTION INTRAVENOUS
OUTPATIENT
Start: 2023-11-28

## 2023-11-28 RX ORDER — SODIUM CHLORIDE 9 MG/ML
5-250 INJECTION, SOLUTION INTRAVENOUS PRN
OUTPATIENT
Start: 2023-11-28

## 2023-11-28 RX ORDER — MEPERIDINE HYDROCHLORIDE 50 MG/ML
12.5 INJECTION INTRAMUSCULAR; INTRAVENOUS; SUBCUTANEOUS PRN
OUTPATIENT
Start: 2023-11-28

## 2023-11-28 RX ORDER — SODIUM CHLORIDE 9 MG/ML
5-250 INJECTION, SOLUTION INTRAVENOUS PRN
Status: CANCELLED | OUTPATIENT
Start: 2023-11-28

## 2023-11-28 RX ORDER — ALBUTEROL SULFATE 90 UG/1
4 AEROSOL, METERED RESPIRATORY (INHALATION) PRN
OUTPATIENT
Start: 2023-11-28

## 2023-11-28 RX ORDER — HEPARIN SODIUM (PORCINE) LOCK FLUSH IV SOLN 100 UNIT/ML 100 UNIT/ML
500 SOLUTION INTRAVENOUS PRN
OUTPATIENT
Start: 2023-11-28

## 2023-11-29 ENCOUNTER — HOSPITAL ENCOUNTER (OUTPATIENT)
Dept: INFUSION THERAPY | Age: 63
Discharge: HOME | End: 2023-11-29
Payer: COMMERCIAL

## 2023-11-29 ENCOUNTER — HOSPITAL ENCOUNTER (OUTPATIENT)
Dept: LAB | Age: 63
Discharge: HOME | End: 2023-12-02

## 2023-11-29 VITALS
OXYGEN SATURATION: 98 % | BODY MASS INDEX: 48.11 KG/M2 | WEIGHT: 271.6 LBS | RESPIRATION RATE: 16 BRPM | TEMPERATURE: 97 F | DIASTOLIC BLOOD PRESSURE: 77 MMHG | HEART RATE: 62 BPM | SYSTOLIC BLOOD PRESSURE: 165 MMHG

## 2023-11-29 DIAGNOSIS — D80.1 HYPOGAMMAGLOBULINEMIA (HCC): Primary | ICD-10-CM

## 2023-11-29 LAB
IGG SERPL-MCNC: 539 MG/DL (ref 700–1600)
TSH, 3RD GENERATION: 3.52 UIU/ML (ref 0.36–3.74)

## 2023-11-29 PROCEDURE — 82784 ASSAY IGA/IGD/IGG/IGM EACH: CPT

## 2023-11-29 PROCEDURE — 96365 THER/PROPH/DIAG IV INF INIT: CPT

## 2023-11-29 PROCEDURE — 84443 ASSAY THYROID STIM HORMONE: CPT

## 2023-11-29 PROCEDURE — 2580000003 HC RX 258: Performed by: INTERNAL MEDICINE

## 2023-11-29 PROCEDURE — 6360000002 HC RX W HCPCS: Performed by: INTERNAL MEDICINE

## 2023-11-29 PROCEDURE — 96375 TX/PRO/DX INJ NEW DRUG ADDON: CPT

## 2023-11-29 PROCEDURE — 6370000000 HC RX 637 (ALT 250 FOR IP): Performed by: INTERNAL MEDICINE

## 2023-11-29 PROCEDURE — 96366 THER/PROPH/DIAG IV INF ADDON: CPT

## 2023-11-29 PROCEDURE — 36415 COLL VENOUS BLD VENIPUNCTURE: CPT

## 2023-11-29 RX ORDER — DIPHENHYDRAMINE HYDROCHLORIDE 50 MG/ML
50 INJECTION INTRAMUSCULAR; INTRAVENOUS ONCE
Start: 2023-11-29 | End: 2023-11-29

## 2023-11-29 RX ORDER — EPINEPHRINE 1 MG/ML
0.3 INJECTION, SOLUTION, CONCENTRATE INTRAVENOUS PRN
OUTPATIENT
Start: 2023-11-29

## 2023-11-29 RX ORDER — ONDANSETRON 2 MG/ML
8 INJECTION INTRAMUSCULAR; INTRAVENOUS
OUTPATIENT
Start: 2023-11-29

## 2023-11-29 RX ORDER — DIPHENHYDRAMINE HYDROCHLORIDE 50 MG/ML
50 INJECTION INTRAMUSCULAR; INTRAVENOUS
OUTPATIENT
Start: 2023-11-29

## 2023-11-29 RX ORDER — ACETAMINOPHEN 325 MG/1
650 TABLET ORAL
Status: COMPLETED | OUTPATIENT
Start: 2023-11-29 | End: 2023-11-29

## 2023-11-29 RX ORDER — SODIUM CHLORIDE 9 MG/ML
5-250 INJECTION, SOLUTION INTRAVENOUS PRN
OUTPATIENT
Start: 2023-11-29

## 2023-11-29 RX ORDER — ALBUTEROL SULFATE 90 UG/1
4 AEROSOL, METERED RESPIRATORY (INHALATION) PRN
OUTPATIENT
Start: 2023-11-29

## 2023-11-29 RX ORDER — SODIUM CHLORIDE 0.9 % (FLUSH) 0.9 %
5-40 SYRINGE (ML) INJECTION PRN
OUTPATIENT
Start: 2023-11-29

## 2023-11-29 RX ORDER — SODIUM CHLORIDE 9 MG/ML
INJECTION, SOLUTION INTRAVENOUS CONTINUOUS
OUTPATIENT
Start: 2023-11-29

## 2023-11-29 RX ORDER — MEPERIDINE HYDROCHLORIDE 25 MG/ML
12.5 INJECTION INTRAMUSCULAR; INTRAVENOUS; SUBCUTANEOUS PRN
OUTPATIENT
Start: 2023-11-29

## 2023-11-29 RX ORDER — HEPARIN 100 UNIT/ML
500 SYRINGE INTRAVENOUS PRN
OUTPATIENT
Start: 2023-11-29

## 2023-11-29 RX ORDER — SODIUM CHLORIDE 0.9 % (FLUSH) 0.9 %
5-40 SYRINGE (ML) INJECTION PRN
Status: DISCONTINUED | OUTPATIENT
Start: 2023-11-29 | End: 2023-11-30 | Stop reason: HOSPADM

## 2023-11-29 RX ORDER — DIPHENHYDRAMINE HYDROCHLORIDE 50 MG/ML
50 INJECTION INTRAMUSCULAR; INTRAVENOUS ONCE
Status: COMPLETED | OUTPATIENT
Start: 2023-11-29 | End: 2023-11-29

## 2023-11-29 RX ORDER — SODIUM CHLORIDE 9 MG/ML
5-250 INJECTION, SOLUTION INTRAVENOUS PRN
Status: DISCONTINUED | OUTPATIENT
Start: 2023-11-29 | End: 2023-11-30 | Stop reason: HOSPADM

## 2023-11-29 RX ORDER — ACETAMINOPHEN 325 MG/1
650 TABLET ORAL
Start: 2023-11-29

## 2023-11-29 RX ORDER — ACETAMINOPHEN 325 MG/1
650 TABLET ORAL
OUTPATIENT
Start: 2023-11-29

## 2023-11-29 RX ADMIN — SODIUM CHLORIDE, PRESERVATIVE FREE 10 ML: 5 INJECTION INTRAVENOUS at 11:07

## 2023-11-29 RX ADMIN — ACETAMINOPHEN 650 MG: 325 TABLET ORAL at 08:59

## 2023-11-29 RX ADMIN — IMMUNE GLOBULIN (HUMAN) 50 G: 10 INJECTION INTRAVENOUS; SUBCUTANEOUS at 09:16

## 2023-11-29 RX ADMIN — DIPHENHYDRAMINE HYDROCHLORIDE 50 MG: 50 INJECTION INTRAMUSCULAR; INTRAVENOUS at 08:59

## 2023-11-29 RX ADMIN — SODIUM CHLORIDE 100 ML/HR: 9 INJECTION, SOLUTION INTRAVENOUS at 09:02

## 2023-12-11 ENCOUNTER — OFFICE VISIT (OUTPATIENT)
Age: 63
End: 2023-12-11
Payer: COMMERCIAL

## 2023-12-11 VITALS
BODY MASS INDEX: 47.84 KG/M2 | SYSTOLIC BLOOD PRESSURE: 122 MMHG | WEIGHT: 270 LBS | HEART RATE: 64 BPM | HEIGHT: 63 IN | DIASTOLIC BLOOD PRESSURE: 68 MMHG

## 2023-12-11 DIAGNOSIS — I10 HYPERTENSION, UNSPECIFIED TYPE: ICD-10-CM

## 2023-12-11 DIAGNOSIS — Q21.12 PFO (PATENT FORAMEN OVALE): Primary | ICD-10-CM

## 2023-12-11 DIAGNOSIS — I34.0 MILD MITRAL REGURGITATION BY PRIOR ECHOCARDIOGRAM: ICD-10-CM

## 2023-12-11 DIAGNOSIS — Z86.79 HISTORY OF ATRIAL FIBRILLATION: ICD-10-CM

## 2023-12-11 PROCEDURE — 3078F DIAST BP <80 MM HG: CPT | Performed by: INTERNAL MEDICINE

## 2023-12-11 PROCEDURE — 3074F SYST BP LT 130 MM HG: CPT | Performed by: INTERNAL MEDICINE

## 2023-12-11 PROCEDURE — 99214 OFFICE O/P EST MOD 30 MIN: CPT | Performed by: INTERNAL MEDICINE

## 2024-01-08 DIAGNOSIS — Z85.72 HISTORY OF DIFFUSE LARGE B-CELL LYMPHOMA: Primary | ICD-10-CM

## 2024-01-08 DIAGNOSIS — D80.1 HYPOGAMMAGLOBULINEMIA (HCC): ICD-10-CM

## 2024-01-08 DIAGNOSIS — C85.88 MARGINAL ZONE LYMPHOMA OF LYMPH NODES OF MULTIPLE SITES (HCC): ICD-10-CM

## 2024-01-15 ENCOUNTER — HOSPITAL ENCOUNTER (OUTPATIENT)
Dept: INFUSION THERAPY | Age: 64
Discharge: HOME | End: 2024-01-15
Payer: COMMERCIAL

## 2024-01-15 ENCOUNTER — HOSPITAL ENCOUNTER (OUTPATIENT)
Dept: LAB | Age: 64
Discharge: HOME | End: 2024-01-18
Payer: COMMERCIAL

## 2024-01-15 ENCOUNTER — OFFICE VISIT (OUTPATIENT)
Dept: ONCOLOGY | Age: 64
End: 2024-01-15
Payer: COMMERCIAL

## 2024-01-15 VITALS
BODY MASS INDEX: 48.9 KG/M2 | WEIGHT: 276 LBS | TEMPERATURE: 98.5 F | HEIGHT: 63 IN | SYSTOLIC BLOOD PRESSURE: 132 MMHG | DIASTOLIC BLOOD PRESSURE: 66 MMHG | RESPIRATION RATE: 18 BRPM | HEART RATE: 74 BPM | OXYGEN SATURATION: 97 %

## 2024-01-15 VITALS
RESPIRATION RATE: 16 BRPM | TEMPERATURE: 98 F | HEART RATE: 73 BPM | DIASTOLIC BLOOD PRESSURE: 66 MMHG | SYSTOLIC BLOOD PRESSURE: 127 MMHG

## 2024-01-15 DIAGNOSIS — D80.1 HYPOGAMMAGLOBULINEMIA (HCC): Primary | ICD-10-CM

## 2024-01-15 DIAGNOSIS — C85.88 MARGINAL ZONE LYMPHOMA OF LYMPH NODES OF MULTIPLE SITES (HCC): ICD-10-CM

## 2024-01-15 DIAGNOSIS — C83.30 DIFFUSE LARGE B-CELL LYMPHOMA, UNSPECIFIED BODY REGION (HCC): ICD-10-CM

## 2024-01-15 DIAGNOSIS — Z85.72 HISTORY OF DIFFUSE LARGE B-CELL LYMPHOMA: ICD-10-CM

## 2024-01-15 DIAGNOSIS — C83.30 DIFFUSE LARGE B-CELL LYMPHOMA, UNSPECIFIED BODY REGION (HCC): Primary | ICD-10-CM

## 2024-01-15 DIAGNOSIS — D80.1 HYPOGAMMAGLOBULINEMIA (HCC): ICD-10-CM

## 2024-01-15 LAB
ALBUMIN SERPL-MCNC: 3.8 G/DL (ref 3.2–4.6)
ALBUMIN/GLOB SERPL: 1.2 (ref 0.4–1.6)
ALP SERPL-CCNC: 97 U/L (ref 50–136)
ALT SERPL-CCNC: 38 U/L (ref 12–65)
ANION GAP SERPL CALC-SCNC: 3 MMOL/L (ref 2–11)
AST SERPL-CCNC: 29 U/L (ref 15–37)
BASOPHILS # BLD: 0.1 K/UL (ref 0–0.2)
BASOPHILS NFR BLD: 1 % (ref 0–2)
BILIRUB SERPL-MCNC: 0.5 MG/DL (ref 0.2–1.1)
BUN SERPL-MCNC: 21 MG/DL (ref 8–23)
CALCIUM SERPL-MCNC: 9.6 MG/DL (ref 8.3–10.4)
CHLORIDE SERPL-SCNC: 106 MMOL/L (ref 103–113)
CO2 SERPL-SCNC: 30 MMOL/L (ref 21–32)
CREAT SERPL-MCNC: 1.1 MG/DL (ref 0.6–1)
DIFFERENTIAL METHOD BLD: ABNORMAL
EOSINOPHIL # BLD: 0.2 K/UL (ref 0–0.8)
EOSINOPHIL NFR BLD: 5 % (ref 0.5–7.8)
ERYTHROCYTE [DISTWIDTH] IN BLOOD BY AUTOMATED COUNT: 14.1 % (ref 11.9–14.6)
GLOBULIN SER CALC-MCNC: 3.3 G/DL (ref 2.8–4.5)
GLUCOSE SERPL-MCNC: 111 MG/DL (ref 65–100)
HCT VFR BLD AUTO: 39 % (ref 35.8–46.3)
HGB BLD-MCNC: 12.7 G/DL (ref 11.7–15.4)
IGG SERPL-MCNC: 639 MG/DL (ref 700–1600)
IMM GRANULOCYTES # BLD AUTO: 0 K/UL (ref 0–0.5)
IMM GRANULOCYTES NFR BLD AUTO: 1 % (ref 0–5)
LYMPHOCYTES # BLD: 1.1 K/UL (ref 0.5–4.6)
LYMPHOCYTES NFR BLD: 26 % (ref 13–44)
MCH RBC QN AUTO: 28.9 PG (ref 26.1–32.9)
MCHC RBC AUTO-ENTMCNC: 32.6 G/DL (ref 31.4–35)
MCV RBC AUTO: 88.8 FL (ref 82–102)
MONOCYTES # BLD: 0.3 K/UL (ref 0.1–1.3)
MONOCYTES NFR BLD: 6 % (ref 4–12)
NEUTS SEG # BLD: 2.7 K/UL (ref 1.7–8.2)
NEUTS SEG NFR BLD: 61 % (ref 43–78)
NRBC # BLD: 0 K/UL (ref 0–0.2)
PLATELET # BLD AUTO: 90 K/UL (ref 150–450)
PMV BLD AUTO: 10.8 FL (ref 9.4–12.3)
POTASSIUM SERPL-SCNC: 4 MMOL/L (ref 3.5–5.1)
PROT SERPL-MCNC: 7.1 G/DL (ref 6.3–8.2)
RBC # BLD AUTO: 4.39 M/UL (ref 4.05–5.2)
SODIUM SERPL-SCNC: 139 MMOL/L (ref 136–146)
T4 FREE SERPL-MCNC: 1.2 NG/DL (ref 0.78–1.4)
TSH, 3RD GENERATION: 2.28 UIU/ML (ref 0.36–3.74)
WBC # BLD AUTO: 4.3 K/UL (ref 4.3–11.1)

## 2024-01-15 PROCEDURE — 3078F DIAST BP <80 MM HG: CPT | Performed by: INTERNAL MEDICINE

## 2024-01-15 PROCEDURE — 82784 ASSAY IGA/IGD/IGG/IGM EACH: CPT

## 2024-01-15 PROCEDURE — 85025 COMPLETE CBC W/AUTO DIFF WBC: CPT

## 2024-01-15 PROCEDURE — 96366 THER/PROPH/DIAG IV INF ADDON: CPT

## 2024-01-15 PROCEDURE — 36415 COLL VENOUS BLD VENIPUNCTURE: CPT

## 2024-01-15 PROCEDURE — 96365 THER/PROPH/DIAG IV INF INIT: CPT

## 2024-01-15 PROCEDURE — 80053 COMPREHEN METABOLIC PANEL: CPT

## 2024-01-15 PROCEDURE — 2580000003 HC RX 258: Performed by: INTERNAL MEDICINE

## 2024-01-15 PROCEDURE — 3075F SYST BP GE 130 - 139MM HG: CPT | Performed by: INTERNAL MEDICINE

## 2024-01-15 PROCEDURE — 99214 OFFICE O/P EST MOD 30 MIN: CPT | Performed by: INTERNAL MEDICINE

## 2024-01-15 PROCEDURE — 84443 ASSAY THYROID STIM HORMONE: CPT

## 2024-01-15 PROCEDURE — 6360000002 HC RX W HCPCS: Performed by: INTERNAL MEDICINE

## 2024-01-15 PROCEDURE — 84439 ASSAY OF FREE THYROXINE: CPT

## 2024-01-15 RX ORDER — HEPARIN 100 UNIT/ML
500 SYRINGE INTRAVENOUS PRN
OUTPATIENT
Start: 2024-01-15

## 2024-01-15 RX ORDER — EPINEPHRINE 1 MG/ML
0.3 INJECTION, SOLUTION, CONCENTRATE INTRAVENOUS PRN
OUTPATIENT
Start: 2024-01-15

## 2024-01-15 RX ORDER — ACETAMINOPHEN 325 MG/1
650 TABLET ORAL
Status: DISCONTINUED | OUTPATIENT
Start: 2024-01-15 | End: 2024-01-16 | Stop reason: HOSPADM

## 2024-01-15 RX ORDER — ONDANSETRON 2 MG/ML
8 INJECTION INTRAMUSCULAR; INTRAVENOUS
OUTPATIENT
Start: 2024-01-15

## 2024-01-15 RX ORDER — SODIUM CHLORIDE 9 MG/ML
5-250 INJECTION, SOLUTION INTRAVENOUS PRN
Status: DISCONTINUED | OUTPATIENT
Start: 2024-01-15 | End: 2024-01-16 | Stop reason: HOSPADM

## 2024-01-15 RX ORDER — SODIUM CHLORIDE 9 MG/ML
INJECTION, SOLUTION INTRAVENOUS CONTINUOUS
OUTPATIENT
Start: 2024-01-15

## 2024-01-15 RX ORDER — DIPHENHYDRAMINE HYDROCHLORIDE 50 MG/ML
50 INJECTION INTRAMUSCULAR; INTRAVENOUS
OUTPATIENT
Start: 2024-01-15

## 2024-01-15 RX ORDER — SODIUM CHLORIDE 9 MG/ML
5-250 INJECTION, SOLUTION INTRAVENOUS PRN
OUTPATIENT
Start: 2024-01-15

## 2024-01-15 RX ORDER — SODIUM CHLORIDE 0.9 % (FLUSH) 0.9 %
5-40 SYRINGE (ML) INJECTION PRN
OUTPATIENT
Start: 2024-01-15

## 2024-01-15 RX ORDER — ACETAMINOPHEN 325 MG/1
650 TABLET ORAL
Start: 2024-01-15

## 2024-01-15 RX ORDER — DIPHENHYDRAMINE HYDROCHLORIDE 50 MG/ML
50 INJECTION INTRAMUSCULAR; INTRAVENOUS ONCE
Start: 2024-01-15 | End: 2024-01-15

## 2024-01-15 RX ORDER — PREGABALIN 50 MG/1
50 CAPSULE ORAL 3 TIMES DAILY
Qty: 270 CAPSULE | Refills: 0 | Status: SHIPPED | OUTPATIENT
Start: 2024-01-15 | End: 2024-04-14

## 2024-01-15 RX ORDER — ACETAMINOPHEN 325 MG/1
650 TABLET ORAL
OUTPATIENT
Start: 2024-01-15

## 2024-01-15 RX ORDER — DIPHENHYDRAMINE HYDROCHLORIDE 50 MG/ML
50 INJECTION INTRAMUSCULAR; INTRAVENOUS
Status: DISCONTINUED | OUTPATIENT
Start: 2024-01-15 | End: 2024-01-16 | Stop reason: HOSPADM

## 2024-01-15 RX ORDER — ALBUTEROL SULFATE 2.5 MG/3ML
2.5 SOLUTION RESPIRATORY (INHALATION) 4 TIMES DAILY
Qty: 120 EACH | Refills: 1 | Status: SHIPPED | OUTPATIENT
Start: 2024-01-15

## 2024-01-15 RX ORDER — ALBUTEROL SULFATE 90 UG/1
4 AEROSOL, METERED RESPIRATORY (INHALATION) PRN
OUTPATIENT
Start: 2024-01-15

## 2024-01-15 RX ORDER — SODIUM CHLORIDE 0.9 % (FLUSH) 0.9 %
5-40 SYRINGE (ML) INJECTION PRN
Status: DISCONTINUED | OUTPATIENT
Start: 2024-01-15 | End: 2024-01-16 | Stop reason: HOSPADM

## 2024-01-15 RX ORDER — DIPHENHYDRAMINE HYDROCHLORIDE 50 MG/ML
50 INJECTION INTRAMUSCULAR; INTRAVENOUS ONCE
Status: DISCONTINUED | OUTPATIENT
Start: 2024-01-15 | End: 2024-01-16 | Stop reason: HOSPADM

## 2024-01-15 RX ORDER — MEPERIDINE HYDROCHLORIDE 25 MG/ML
12.5 INJECTION INTRAMUSCULAR; INTRAVENOUS; SUBCUTANEOUS PRN
OUTPATIENT
Start: 2024-01-15

## 2024-01-15 RX ADMIN — SODIUM CHLORIDE, PRESERVATIVE FREE 10 ML: 5 INJECTION INTRAVENOUS at 12:33

## 2024-01-15 RX ADMIN — IMMUNE GLOBULIN (HUMAN) 50 G: 10 INJECTION INTRAVENOUS; SUBCUTANEOUS at 10:56

## 2024-01-15 RX ADMIN — SODIUM CHLORIDE 100 ML/HR: 9 INJECTION, SOLUTION INTRAVENOUS at 10:55

## 2024-01-15 NOTE — PROGRESS NOTES
[facility] Hematology and Oncology: Office Visit Established Patient    Chief Complaint:    Chief Complaint   Patient presents with    Follow-up       History of Present Illness:  Ms. Muniz is a 63 y.o. 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  She was in previously good health, seen as an urgent work-in in clinic for lymphadenopathy on 3/30/17. She was having abdominal pain and swelling which has progressed over the  previous several weeks, CT was recommended but was initially denied by insurance. She had GI evaluation including EGD/colonoscopy which showed no intraluminal pathology, but finally had a CT at [facility] that showed diffuse lymphadenopathy  with moderate ascites, splenomegaly, and possible infiltration of the left kidney, all consistent with lymphoproliferative disorder.  We recommended inpatient admission for expedited work-up, including excisional biopsy of an axillary node, bone marrow  biopsy, labs and PET/CT.  The biopsy returned showing marginal zone lymphoma.  Her anemia, ascites, and constitutional symptoms are an indication for treatment.  I recommend Rituxan and bendamustine  for therapy.  She was hospitalized for recurrent fevers after cycle 1. She was also noted to have hypotension requiring a brief ICU stay with Levophed, and acute kidney injury either from antibiotics  or ATN from hypotension (or both).  She completed repeat paracentesis with good relief of abdominal symptoms.  Restaging after cycle 2 showed partial response in lymphadenopathy  and splenomegaly, continue current therapy.  Restaging after 5 cycles showed essentially complete response, she was continued on to 6 cycles of BR and then entered R maintenance.  PET/CT prior to her 2nd cycle of maintenance showed a new hypermetabolic  breast nodule and a mesenteric nodule.  Biopsy of the breast nodule shows diffuse large B cell lymphoma.  I suspect this is

## 2024-01-15 NOTE — PATIENT INSTRUCTIONS
Patient Instructions from Today's Visit    Reason for Visit:  Follow up Lymphoma     Diagnosis Information:  https://www.cancer.net/about-us/asco-answers-patient-education-materials/cgag-uchohiw-wgde-sheets    Plan:  Lab results reviewed.    Proceed with IVIG infusion today. We will push your next infusion to 7 weeks out and see how you feel.    Follow Up:  Follow up in 7 weeks.    Recent Lab Results:  Hospital Outpatient Visit on 01/15/2024   Component Date Value Ref Range Status    WBC 01/15/2024 4.3  4.3 - 11.1 K/uL Final    RBC 01/15/2024 4.39  4.05 - 5.2 M/uL Final    Hemoglobin 01/15/2024 12.7  11.7 - 15.4 g/dL Final    Hematocrit 01/15/2024 39.0  35.8 - 46.3 % Final    MCV 01/15/2024 88.8  82.0 - 102.0 FL Final    MCH 01/15/2024 28.9  26.1 - 32.9 PG Final    MCHC 01/15/2024 32.6  31.4 - 35.0 g/dL Final    RDW 01/15/2024 14.1  11.9 - 14.6 % Final    Platelets 01/15/2024 90 (L)  150 - 450 K/uL Final    MPV 01/15/2024 10.8  9.4 - 12.3 FL Final    nRBC 01/15/2024 0.00  0.0 - 0.2 K/uL Final    **Note: Absolute NRBC parameter is now reported with Hemogram**    Neutrophils % 01/15/2024 61  43 - 78 % Final    Lymphocytes % 01/15/2024 26  13 - 44 % Final    Monocytes % 01/15/2024 6  4.0 - 12.0 % Final    Eosinophils % 01/15/2024 5  0.5 - 7.8 % Final    Basophils % 01/15/2024 1  0.0 - 2.0 % Final    Immature Granulocytes 01/15/2024 1  0.0 - 5.0 % Final    Neutrophils Absolute 01/15/2024 2.7  1.7 - 8.2 K/UL Final    Lymphocytes Absolute 01/15/2024 1.1  0.5 - 4.6 K/UL Final    Monocytes Absolute 01/15/2024 0.3  0.1 - 1.3 K/UL Final    Eosinophils Absolute 01/15/2024 0.2  0.0 - 0.8 K/UL Final    Basophils Absolute 01/15/2024 0.1  0.0 - 0.2 K/UL Final    Absolute Immature Granulocyte 01/15/2024 0.0  0.0 - 0.5 K/UL Final    Differential Type 01/15/2024 AUTOMATED    Final    Sodium 01/15/2024 139  136 - 146 mmol/L Final    Potassium 01/15/2024 4.0  3.5 - 5.1 mmol/L Final    Chloride 01/15/2024 106  103 - 113 mmol/L Final

## 2024-01-16 LAB
IGA SERPL-MCNC: <5 MG/DL (ref 87–352)
IGG SERPL-MCNC: 590 MG/DL (ref 586–1602)
IGM SERPL-MCNC: <5 MG/DL (ref 26–217)

## 2024-02-29 RX ORDER — TRIAMTERENE AND HYDROCHLOROTHIAZIDE 75; 50 MG/1; MG/1
.5-1 TABLET ORAL DAILY PRN
Qty: 90 TABLET | Refills: 1 | OUTPATIENT
Start: 2024-02-29

## 2024-02-29 RX ORDER — METOPROLOL SUCCINATE 50 MG/1
50 TABLET, EXTENDED RELEASE ORAL DAILY
Qty: 90 TABLET | Refills: 1 | Status: SHIPPED | OUTPATIENT
Start: 2024-02-29

## 2024-02-29 NOTE — TELEPHONE ENCOUNTER
Per medical record, Eliquis and Toprol XL were continued at 12/11/23 office visit with Dr. Bryant. Patient's next scheduled appointment with Dr. Bryant is 6/11/24.   Requested Prescriptions     Pending Prescriptions Disp Refills    apixaban (ELIQUIS) 5 MG TABS tablet 180 tablet 1     Sig: Take 1 tablet by mouth 2 times daily    metoprolol succinate (TOPROL XL) 50 MG extended release tablet 90 tablet 1     Sig: Take 1 tablet by mouth daily     Pended Eliquis and Toprol XL refills, as above, sent to Dr. Bryant for approval.

## 2024-02-29 NOTE — TELEPHONE ENCOUNTER
Pt needs refills on her metoprolol and eliquis [facility] [address]. Pt is completely out of her metoprolol and needs this one filled asap.

## 2024-03-04 ENCOUNTER — OFFICE VISIT (OUTPATIENT)
Dept: ONCOLOGY | Age: 64
End: 2024-03-04
Payer: COMMERCIAL

## 2024-03-04 ENCOUNTER — HOSPITAL ENCOUNTER (OUTPATIENT)
Dept: INFUSION THERAPY | Age: 64
Discharge: HOME | End: 2024-03-04
Payer: COMMERCIAL

## 2024-03-04 ENCOUNTER — HOSPITAL ENCOUNTER (OUTPATIENT)
Dept: LAB | Age: 64
Discharge: HOME | End: 2024-03-07
Payer: COMMERCIAL

## 2024-03-04 VITALS
RESPIRATION RATE: 17 BRPM | OXYGEN SATURATION: 100 % | SYSTOLIC BLOOD PRESSURE: 116 MMHG | HEART RATE: 64 BPM | TEMPERATURE: 98.5 F | DIASTOLIC BLOOD PRESSURE: 76 MMHG

## 2024-03-04 VITALS
SYSTOLIC BLOOD PRESSURE: 123 MMHG | RESPIRATION RATE: 18 BRPM | BODY MASS INDEX: 50.57 KG/M2 | HEART RATE: 73 BPM | TEMPERATURE: 98.5 F | HEIGHT: 63 IN | WEIGHT: 285.4 LBS | OXYGEN SATURATION: 97 % | DIASTOLIC BLOOD PRESSURE: 61 MMHG

## 2024-03-04 DIAGNOSIS — C83.30 DIFFUSE LARGE B-CELL LYMPHOMA, UNSPECIFIED BODY REGION (HCC): Primary | ICD-10-CM

## 2024-03-04 DIAGNOSIS — D69.6 THROMBOCYTOPENIA, UNSPECIFIED (HCC): ICD-10-CM

## 2024-03-04 DIAGNOSIS — D80.1 HYPOGAMMAGLOBULINEMIA (HCC): Primary | ICD-10-CM

## 2024-03-04 DIAGNOSIS — C83.30 DIFFUSE LARGE B-CELL LYMPHOMA, UNSPECIFIED BODY REGION (HCC): ICD-10-CM

## 2024-03-04 DIAGNOSIS — D80.1 HYPOGAMMAGLOBULINEMIA (HCC): ICD-10-CM

## 2024-03-04 DIAGNOSIS — C85.88 MARGINAL ZONE LYMPHOMA OF LYMPH NODES OF MULTIPLE SITES (HCC): ICD-10-CM

## 2024-03-04 LAB
ALBUMIN SERPL-MCNC: 3.5 G/DL (ref 3.2–4.6)
ALBUMIN/GLOB SERPL: 1.1 (ref 0.4–1.6)
ALP SERPL-CCNC: 92 U/L (ref 50–136)
ALT SERPL-CCNC: 35 U/L (ref 12–65)
ANION GAP SERPL CALC-SCNC: 4 MMOL/L (ref 2–11)
AST SERPL-CCNC: 33 U/L (ref 15–37)
BASOPHILS # BLD: 0 K/UL (ref 0–0.2)
BASOPHILS NFR BLD: 1 % (ref 0–2)
BILIRUB SERPL-MCNC: 0.6 MG/DL (ref 0.2–1.1)
BUN SERPL-MCNC: 20 MG/DL (ref 8–23)
CALCIUM SERPL-MCNC: 9.4 MG/DL (ref 8.3–10.4)
CHLORIDE SERPL-SCNC: 108 MMOL/L (ref 103–113)
CO2 SERPL-SCNC: 28 MMOL/L (ref 21–32)
CREAT SERPL-MCNC: 0.8 MG/DL (ref 0.6–1)
DIFFERENTIAL METHOD BLD: ABNORMAL
EOSINOPHIL # BLD: 0.1 K/UL (ref 0–0.8)
EOSINOPHIL NFR BLD: 3 % (ref 0.5–7.8)
ERYTHROCYTE [DISTWIDTH] IN BLOOD BY AUTOMATED COUNT: 14.5 % (ref 11.9–14.6)
FERRITIN SERPL-MCNC: 383 NG/ML (ref 8–388)
GLOBULIN SER CALC-MCNC: 3.1 G/DL (ref 2.8–4.5)
GLUCOSE SERPL-MCNC: 100 MG/DL (ref 65–100)
HCT VFR BLD AUTO: 37.1 % (ref 35.8–46.3)
HGB BLD-MCNC: 11.8 G/DL (ref 11.7–15.4)
IGG SERPL-MCNC: 534 MG/DL (ref 700–1600)
IMM GRANULOCYTES # BLD AUTO: 0 K/UL (ref 0–0.5)
IMM GRANULOCYTES NFR BLD AUTO: 1 % (ref 0–5)
LYMPHOCYTES # BLD: 1 K/UL (ref 0.5–4.6)
LYMPHOCYTES NFR BLD: 22 % (ref 13–44)
MCH RBC QN AUTO: 28.4 PG (ref 26.1–32.9)
MCHC RBC AUTO-ENTMCNC: 31.8 G/DL (ref 31.4–35)
MCV RBC AUTO: 89.4 FL (ref 82–102)
MONOCYTES # BLD: 0.2 K/UL (ref 0.1–1.3)
MONOCYTES NFR BLD: 5 % (ref 4–12)
NEUTS SEG # BLD: 3 K/UL (ref 1.7–8.2)
NEUTS SEG NFR BLD: 68 % (ref 43–78)
NRBC # BLD: 0 K/UL (ref 0–0.2)
PLATELET # BLD AUTO: 82 K/UL (ref 150–450)
PMV BLD AUTO: 10.7 FL (ref 9.4–12.3)
POTASSIUM SERPL-SCNC: 4.1 MMOL/L (ref 3.5–5.1)
PROT SERPL-MCNC: 6.6 G/DL (ref 6.3–8.2)
RBC # BLD AUTO: 4.15 M/UL (ref 4.05–5.2)
SODIUM SERPL-SCNC: 140 MMOL/L (ref 136–146)
T4 FREE SERPL-MCNC: 1.2 NG/DL (ref 0.78–1.4)
TSH, 3RD GENERATION: 4.16 UIU/ML (ref 0.36–3)
WBC # BLD AUTO: 4.4 K/UL (ref 4.3–11.1)

## 2024-03-04 PROCEDURE — 84439 ASSAY OF FREE THYROXINE: CPT

## 2024-03-04 PROCEDURE — 3074F SYST BP LT 130 MM HG: CPT

## 2024-03-04 PROCEDURE — 36415 COLL VENOUS BLD VENIPUNCTURE: CPT

## 2024-03-04 PROCEDURE — 85025 COMPLETE CBC W/AUTO DIFF WBC: CPT

## 2024-03-04 PROCEDURE — 80053 COMPREHEN METABOLIC PANEL: CPT

## 2024-03-04 PROCEDURE — 99214 OFFICE O/P EST MOD 30 MIN: CPT

## 2024-03-04 PROCEDURE — 96366 THER/PROPH/DIAG IV INF ADDON: CPT

## 2024-03-04 PROCEDURE — 82784 ASSAY IGA/IGD/IGG/IGM EACH: CPT

## 2024-03-04 PROCEDURE — 82728 ASSAY OF FERRITIN: CPT

## 2024-03-04 PROCEDURE — 96365 THER/PROPH/DIAG IV INF INIT: CPT

## 2024-03-04 PROCEDURE — 2580000003 HC RX 258: Performed by: INTERNAL MEDICINE

## 2024-03-04 PROCEDURE — 6360000002 HC RX W HCPCS: Performed by: INTERNAL MEDICINE

## 2024-03-04 PROCEDURE — 3078F DIAST BP <80 MM HG: CPT

## 2024-03-04 PROCEDURE — 84443 ASSAY THYROID STIM HORMONE: CPT

## 2024-03-04 RX ORDER — SODIUM CHLORIDE 9 MG/ML
INJECTION, SOLUTION INTRAVENOUS CONTINUOUS
Status: DISCONTINUED | OUTPATIENT
Start: 2024-03-04 | End: 2024-03-05 | Stop reason: HOSPADM

## 2024-03-04 RX ORDER — MEPERIDINE HYDROCHLORIDE 25 MG/ML
12.5 INJECTION INTRAMUSCULAR; INTRAVENOUS; SUBCUTANEOUS PRN
Status: DISCONTINUED | OUTPATIENT
Start: 2024-03-04 | End: 2024-03-05 | Stop reason: HOSPADM

## 2024-03-04 RX ORDER — ALBUTEROL SULFATE 90 UG/1
4 AEROSOL, METERED RESPIRATORY (INHALATION) PRN
OUTPATIENT
Start: 2024-03-04

## 2024-03-04 RX ORDER — SODIUM CHLORIDE 0.9 % (FLUSH) 0.9 %
5-40 SYRINGE (ML) INJECTION PRN
OUTPATIENT
Start: 2024-03-04

## 2024-03-04 RX ORDER — DIPHENHYDRAMINE HYDROCHLORIDE 50 MG/ML
50 INJECTION INTRAMUSCULAR; INTRAVENOUS
Status: DISCONTINUED | OUTPATIENT
Start: 2024-03-04 | End: 2024-03-05 | Stop reason: HOSPADM

## 2024-03-04 RX ORDER — DIPHENHYDRAMINE HYDROCHLORIDE 50 MG/ML
50 INJECTION INTRAMUSCULAR; INTRAVENOUS
OUTPATIENT
Start: 2024-03-04

## 2024-03-04 RX ORDER — ACETAMINOPHEN 325 MG/1
650 TABLET ORAL
Start: 2024-03-04

## 2024-03-04 RX ORDER — SODIUM CHLORIDE 9 MG/ML
5-250 INJECTION, SOLUTION INTRAVENOUS PRN
Status: DISCONTINUED | OUTPATIENT
Start: 2024-03-04 | End: 2024-03-05 | Stop reason: HOSPADM

## 2024-03-04 RX ORDER — HEPARIN 100 UNIT/ML
500 SYRINGE INTRAVENOUS PRN
OUTPATIENT
Start: 2024-03-04

## 2024-03-04 RX ORDER — SODIUM CHLORIDE 9 MG/ML
5-250 INJECTION, SOLUTION INTRAVENOUS PRN
OUTPATIENT
Start: 2024-03-04

## 2024-03-04 RX ORDER — ALBUTEROL SULFATE 90 UG/1
4 AEROSOL, METERED RESPIRATORY (INHALATION) PRN
Status: DISCONTINUED | OUTPATIENT
Start: 2024-03-04 | End: 2024-03-05 | Stop reason: HOSPADM

## 2024-03-04 RX ORDER — MEPERIDINE HYDROCHLORIDE 25 MG/ML
12.5 INJECTION INTRAMUSCULAR; INTRAVENOUS; SUBCUTANEOUS PRN
OUTPATIENT
Start: 2024-03-04

## 2024-03-04 RX ORDER — PREGABALIN 50 MG/1
50 CAPSULE ORAL 3 TIMES DAILY
Qty: 270 CAPSULE | Refills: 0 | Status: SHIPPED | OUTPATIENT
Start: 2024-04-01 | End: 2024-06-30

## 2024-03-04 RX ORDER — ACETAMINOPHEN 325 MG/1
650 TABLET ORAL
OUTPATIENT
Start: 2024-03-04

## 2024-03-04 RX ORDER — SODIUM CHLORIDE 9 MG/ML
INJECTION, SOLUTION INTRAVENOUS CONTINUOUS
OUTPATIENT
Start: 2024-03-04

## 2024-03-04 RX ORDER — DIPHENHYDRAMINE HYDROCHLORIDE 50 MG/ML
50 INJECTION INTRAMUSCULAR; INTRAVENOUS ONCE
Status: DISCONTINUED | OUTPATIENT
Start: 2024-03-04 | End: 2024-03-05 | Stop reason: HOSPADM

## 2024-03-04 RX ORDER — ONDANSETRON 2 MG/ML
8 INJECTION INTRAMUSCULAR; INTRAVENOUS
Status: DISCONTINUED | OUTPATIENT
Start: 2024-03-04 | End: 2024-03-05 | Stop reason: HOSPADM

## 2024-03-04 RX ORDER — SODIUM CHLORIDE 0.9 % (FLUSH) 0.9 %
5-40 SYRINGE (ML) INJECTION PRN
Status: DISCONTINUED | OUTPATIENT
Start: 2024-03-04 | End: 2024-03-05 | Stop reason: HOSPADM

## 2024-03-04 RX ORDER — ACETAMINOPHEN 325 MG/1
650 TABLET ORAL
Status: DISCONTINUED | OUTPATIENT
Start: 2024-03-04 | End: 2024-03-05 | Stop reason: HOSPADM

## 2024-03-04 RX ORDER — EPINEPHRINE 1 MG/ML
0.3 INJECTION, SOLUTION, CONCENTRATE INTRAVENOUS PRN
Status: DISCONTINUED | OUTPATIENT
Start: 2024-03-04 | End: 2024-03-05 | Stop reason: HOSPADM

## 2024-03-04 RX ORDER — ONDANSETRON 2 MG/ML
8 INJECTION INTRAMUSCULAR; INTRAVENOUS
OUTPATIENT
Start: 2024-03-04

## 2024-03-04 RX ORDER — DIPHENHYDRAMINE HYDROCHLORIDE 50 MG/ML
50 INJECTION INTRAMUSCULAR; INTRAVENOUS ONCE
Start: 2024-03-04 | End: 2024-03-04

## 2024-03-04 RX ORDER — EPINEPHRINE 1 MG/ML
0.3 INJECTION, SOLUTION, CONCENTRATE INTRAVENOUS PRN
OUTPATIENT
Start: 2024-03-04

## 2024-03-04 RX ADMIN — SODIUM CHLORIDE, PRESERVATIVE FREE 10 ML: 5 INJECTION INTRAVENOUS at 10:23

## 2024-03-04 RX ADMIN — SODIUM CHLORIDE 100 ML/HR: 9 INJECTION, SOLUTION INTRAVENOUS at 10:31

## 2024-03-04 RX ADMIN — IMMUNE GLOBULIN (HUMAN) 50 G: 10 INJECTION INTRAVENOUS; SUBCUTANEOUS at 10:37

## 2024-03-04 ASSESSMENT — PATIENT HEALTH QUESTIONNAIRE - PHQ9
SUM OF ALL RESPONSES TO PHQ9 QUESTIONS 1 & 2: 0
1. LITTLE INTEREST OR PLEASURE IN DOING THINGS: 0
SUM OF ALL RESPONSES TO PHQ QUESTIONS 1-9: 0
2. FEELING DOWN, DEPRESSED OR HOPELESS: 0
SUM OF ALL RESPONSES TO PHQ QUESTIONS 1-9: 0

## 2024-03-04 NOTE — PROGRESS NOTES
[facility] Hematology and Oncology: Office Visit Established Patient    Chief Complaint:    Chief Complaint   Patient presents with    Follow-up       History of Present Illness:  Ms. Muniz is a 63 y.o. 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  She was in previously good health, seen as an urgent work-in in clinic for lymphadenopathy on 3/30/17. She was having abdominal pain and swelling which has progressed over the  previous several weeks, CT was recommended but was initially denied by insurance. She had GI evaluation including EGD/colonoscopy which showed no intraluminal pathology, but finally had a CT at [facility] that showed diffuse lymphadenopathy  with moderate ascites, splenomegaly, and possible infiltration of the left kidney, all consistent with lymphoproliferative disorder.  We recommended inpatient admission for expedited work-up, including excisional biopsy of an axillary node, bone marrow  biopsy, labs and PET/CT.  The biopsy returned showing marginal zone lymphoma.  Her anemia, ascites, and constitutional symptoms are an indication for treatment.  I recommend Rituxan and bendamustine  for therapy.  She was hospitalized for recurrent fevers after cycle 1. She was also noted to have hypotension requiring a brief ICU stay with Levophed, and acute kidney injury either from antibiotics  or ATN from hypotension (or both).  She completed repeat paracentesis with good relief of abdominal symptoms.  Restaging after cycle 2 showed partial response in lymphadenopathy  and splenomegaly, continue current therapy.  Restaging after 5 cycles showed essentially complete response, she was continued on to 6 cycles of BR and then entered R maintenance.  PET/CT prior to her 2nd cycle of maintenance showed a new hypermetabolic  breast nodule and a mesenteric nodule.  Biopsy of the breast nodule shows diffuse large B cell lymphoma.  I suspect this is

## 2024-03-04 NOTE — PROGRESS NOTES
Arrived to the Infusion Center.  IVIG infusion completed.  Patient tolerated well.   Any issues or concerns during appointment: none.  Discharged ambulatory.

## 2024-03-05 LAB
IGA SERPL-MCNC: <5 MG/DL (ref 87–352)
IGG SERPL-MCNC: 574 MG/DL (ref 586–1602)
IGM SERPL-MCNC: <5 MG/DL (ref 26–217)

## 2024-04-26 ENCOUNTER — HOSPITAL ENCOUNTER (OUTPATIENT)
Dept: INFUSION THERAPY | Age: 64
Setting detail: INFUSION SERIES
Discharge: HOME | End: 2024-04-26
Payer: COMMERCIAL

## 2024-04-26 ENCOUNTER — TELEPHONE (OUTPATIENT)
Dept: ONCOLOGY | Age: 64
End: 2024-04-26

## 2024-04-26 ENCOUNTER — HOSPITAL ENCOUNTER (OUTPATIENT)
Dept: LAB | Age: 64
Discharge: HOME | End: 2024-04-26
Payer: COMMERCIAL

## 2024-04-26 VITALS
TEMPERATURE: 98 F | OXYGEN SATURATION: 97 % | SYSTOLIC BLOOD PRESSURE: 141 MMHG | HEART RATE: 69 BPM | DIASTOLIC BLOOD PRESSURE: 73 MMHG | RESPIRATION RATE: 18 BRPM | BODY MASS INDEX: 51.26 KG/M2 | WEIGHT: 289.4 LBS

## 2024-04-26 DIAGNOSIS — D80.1 HYPOGAMMAGLOBULINEMIA (HCC): Primary | ICD-10-CM

## 2024-04-26 LAB — IGG SERPL-MCNC: 511 MG/DL (ref 700–1600)

## 2024-04-26 PROCEDURE — 36415 COLL VENOUS BLD VENIPUNCTURE: CPT

## 2024-04-26 PROCEDURE — 96365 THER/PROPH/DIAG IV INF INIT: CPT

## 2024-04-26 PROCEDURE — 96366 THER/PROPH/DIAG IV INF ADDON: CPT

## 2024-04-26 PROCEDURE — 82784 ASSAY IGA/IGD/IGG/IGM EACH: CPT

## 2024-04-26 PROCEDURE — 6360000002 HC RX W HCPCS: Performed by: INTERNAL MEDICINE

## 2024-04-26 PROCEDURE — 2580000003 HC RX 258: Performed by: INTERNAL MEDICINE

## 2024-04-26 RX ORDER — SODIUM CHLORIDE 9 MG/ML
INJECTION, SOLUTION INTRAVENOUS CONTINUOUS
Status: DISCONTINUED | OUTPATIENT
Start: 2024-04-26 | End: 2024-04-27 | Stop reason: HOSPADM

## 2024-04-26 RX ORDER — DIPHENHYDRAMINE HYDROCHLORIDE 50 MG/ML
50 INJECTION INTRAMUSCULAR; INTRAVENOUS ONCE
Status: DISCONTINUED | OUTPATIENT
Start: 2024-04-26 | End: 2024-04-27 | Stop reason: HOSPADM

## 2024-04-26 RX ORDER — ACETAMINOPHEN 325 MG/1
650 TABLET ORAL
Status: DISCONTINUED | OUTPATIENT
Start: 2024-04-26 | End: 2024-04-27 | Stop reason: HOSPADM

## 2024-04-26 RX ORDER — SODIUM CHLORIDE 9 MG/ML
5-250 INJECTION, SOLUTION INTRAVENOUS PRN
Status: CANCELLED | OUTPATIENT
Start: 2024-04-26

## 2024-04-26 RX ORDER — HEPARIN 100 UNIT/ML
500 SYRINGE INTRAVENOUS PRN
OUTPATIENT
Start: 2024-04-26

## 2024-04-26 RX ORDER — DIPHENHYDRAMINE HYDROCHLORIDE 50 MG/ML
50 INJECTION INTRAMUSCULAR; INTRAVENOUS
Status: CANCELLED | OUTPATIENT
Start: 2024-04-26

## 2024-04-26 RX ORDER — SODIUM CHLORIDE 9 MG/ML
5-250 INJECTION, SOLUTION INTRAVENOUS PRN
OUTPATIENT
Start: 2024-04-26

## 2024-04-26 RX ORDER — HEPARIN 100 UNIT/ML
500 SYRINGE INTRAVENOUS PRN
Status: CANCELLED | OUTPATIENT
Start: 2024-04-26

## 2024-04-26 RX ORDER — ACETAMINOPHEN 325 MG/1
650 TABLET ORAL
OUTPATIENT
Start: 2024-04-26

## 2024-04-26 RX ORDER — DIPHENHYDRAMINE HYDROCHLORIDE 50 MG/ML
50 INJECTION INTRAMUSCULAR; INTRAVENOUS
OUTPATIENT
Start: 2024-04-26

## 2024-04-26 RX ORDER — EPINEPHRINE 1 MG/ML
0.3 INJECTION, SOLUTION, CONCENTRATE INTRAVENOUS PRN
Status: CANCELLED | OUTPATIENT
Start: 2024-04-26

## 2024-04-26 RX ORDER — ALBUTEROL SULFATE 90 UG/1
4 AEROSOL, METERED RESPIRATORY (INHALATION) PRN
Status: DISCONTINUED | OUTPATIENT
Start: 2024-04-26 | End: 2024-04-27 | Stop reason: HOSPADM

## 2024-04-26 RX ORDER — DIPHENHYDRAMINE HYDROCHLORIDE 50 MG/ML
50 INJECTION INTRAMUSCULAR; INTRAVENOUS ONCE
Start: 2024-04-26 | End: 2024-04-26

## 2024-04-26 RX ORDER — SODIUM CHLORIDE 0.9 % (FLUSH) 0.9 %
5-40 SYRINGE (ML) INJECTION PRN
OUTPATIENT
Start: 2024-04-26

## 2024-04-26 RX ORDER — ALBUTEROL SULFATE 90 UG/1
4 AEROSOL, METERED RESPIRATORY (INHALATION) PRN
Status: CANCELLED | OUTPATIENT
Start: 2024-04-26

## 2024-04-26 RX ORDER — ONDANSETRON 2 MG/ML
8 INJECTION INTRAMUSCULAR; INTRAVENOUS
Status: DISCONTINUED | OUTPATIENT
Start: 2024-04-26 | End: 2024-04-27 | Stop reason: HOSPADM

## 2024-04-26 RX ORDER — EPINEPHRINE 1 MG/ML
0.3 INJECTION, SOLUTION, CONCENTRATE INTRAVENOUS PRN
Status: DISCONTINUED | OUTPATIENT
Start: 2024-04-26 | End: 2024-04-27 | Stop reason: HOSPADM

## 2024-04-26 RX ORDER — SODIUM CHLORIDE 9 MG/ML
INJECTION, SOLUTION INTRAVENOUS CONTINUOUS
OUTPATIENT
Start: 2024-04-26

## 2024-04-26 RX ORDER — ALBUTEROL SULFATE 90 UG/1
4 AEROSOL, METERED RESPIRATORY (INHALATION) PRN
OUTPATIENT
Start: 2024-04-26

## 2024-04-26 RX ORDER — MEPERIDINE HYDROCHLORIDE 25 MG/ML
12.5 INJECTION INTRAMUSCULAR; INTRAVENOUS; SUBCUTANEOUS PRN
Status: DISCONTINUED | OUTPATIENT
Start: 2024-04-26 | End: 2024-04-27 | Stop reason: HOSPADM

## 2024-04-26 RX ORDER — MEPERIDINE HYDROCHLORIDE 25 MG/ML
12.5 INJECTION INTRAMUSCULAR; INTRAVENOUS; SUBCUTANEOUS PRN
Status: CANCELLED | OUTPATIENT
Start: 2024-04-26

## 2024-04-26 RX ORDER — SODIUM CHLORIDE 0.9 % (FLUSH) 0.9 %
5-40 SYRINGE (ML) INJECTION PRN
Status: CANCELLED | OUTPATIENT
Start: 2024-04-26

## 2024-04-26 RX ORDER — DIPHENHYDRAMINE HYDROCHLORIDE 50 MG/ML
50 INJECTION INTRAMUSCULAR; INTRAVENOUS ONCE
Status: CANCELLED
Start: 2024-04-26 | End: 2024-04-26

## 2024-04-26 RX ORDER — ACETAMINOPHEN 325 MG/1
650 TABLET ORAL
Start: 2024-04-26

## 2024-04-26 RX ORDER — ONDANSETRON 2 MG/ML
8 INJECTION INTRAMUSCULAR; INTRAVENOUS
Status: CANCELLED | OUTPATIENT
Start: 2024-04-26

## 2024-04-26 RX ORDER — EPINEPHRINE 1 MG/ML
0.3 INJECTION, SOLUTION, CONCENTRATE INTRAVENOUS PRN
OUTPATIENT
Start: 2024-04-26

## 2024-04-26 RX ORDER — DIPHENHYDRAMINE HYDROCHLORIDE 50 MG/ML
50 INJECTION INTRAMUSCULAR; INTRAVENOUS
Status: DISCONTINUED | OUTPATIENT
Start: 2024-04-26 | End: 2024-04-27 | Stop reason: HOSPADM

## 2024-04-26 RX ORDER — SODIUM CHLORIDE 9 MG/ML
5-250 INJECTION, SOLUTION INTRAVENOUS PRN
Status: DISCONTINUED | OUTPATIENT
Start: 2024-04-26 | End: 2024-04-27 | Stop reason: HOSPADM

## 2024-04-26 RX ORDER — ACETAMINOPHEN 325 MG/1
650 TABLET ORAL
Status: CANCELLED | OUTPATIENT
Start: 2024-04-26

## 2024-04-26 RX ORDER — MEPERIDINE HYDROCHLORIDE 25 MG/ML
12.5 INJECTION INTRAMUSCULAR; INTRAVENOUS; SUBCUTANEOUS PRN
OUTPATIENT
Start: 2024-04-26

## 2024-04-26 RX ORDER — ONDANSETRON 2 MG/ML
8 INJECTION INTRAMUSCULAR; INTRAVENOUS
OUTPATIENT
Start: 2024-04-26

## 2024-04-26 RX ORDER — ACETAMINOPHEN 325 MG/1
650 TABLET ORAL
Status: CANCELLED
Start: 2024-04-26

## 2024-04-26 RX ORDER — SODIUM CHLORIDE 0.9 % (FLUSH) 0.9 %
5-40 SYRINGE (ML) INJECTION PRN
Status: DISCONTINUED | OUTPATIENT
Start: 2024-04-26 | End: 2024-04-27 | Stop reason: HOSPADM

## 2024-04-26 RX ORDER — SODIUM CHLORIDE 9 MG/ML
INJECTION, SOLUTION INTRAVENOUS CONTINUOUS
Status: CANCELLED | OUTPATIENT
Start: 2024-04-26

## 2024-04-26 RX ADMIN — SODIUM CHLORIDE, PRESERVATIVE FREE 10 ML: 5 INJECTION INTRAVENOUS at 16:00

## 2024-04-26 RX ADMIN — IMMUNE GLOBULIN (HUMAN) 50 G: 10 INJECTION INTRAVENOUS; SUBCUTANEOUS at 14:03

## 2024-04-26 RX ADMIN — SODIUM CHLORIDE, PRESERVATIVE FREE 10 ML: 5 INJECTION INTRAVENOUS at 13:40

## 2024-04-26 RX ADMIN — SODIUM CHLORIDE 20 ML/HR: 9 INJECTION, SOLUTION INTRAVENOUS at 13:40

## 2024-04-26 ASSESSMENT — PAIN SCALES - GENERAL: PAINLEVEL_OUTOF10: 0

## 2024-04-26 NOTE — PROGRESS NOTES
Arrived to the Infusion Center.  Assessment completed. IVIG  completed. Patient tolerated without problems.   Any issues or concerns during appointment: None  Instructed to call Dr Bailey with any side effects or concerns  Patient aware of next infusion appointment on 6/7/24(date) at 10 30 AM (time).  Discharged ambulatory

## 2024-05-01 RX ORDER — LEVOTHYROXINE SODIUM 0.2 MG/1
200 TABLET ORAL DAILY
Qty: 90 TABLET | Refills: 1 | OUTPATIENT
Start: 2024-05-01

## 2024-05-30 DIAGNOSIS — E03.9 HYPOTHYROIDISM, UNSPECIFIED TYPE: ICD-10-CM

## 2024-05-30 DIAGNOSIS — C83.30 DIFFUSE LARGE B-CELL LYMPHOMA, UNSPECIFIED BODY REGION (HCC): Primary | ICD-10-CM

## 2024-05-30 DIAGNOSIS — D69.6 THROMBOCYTOPENIA, UNSPECIFIED (HCC): ICD-10-CM

## 2024-06-07 ENCOUNTER — OFFICE VISIT (OUTPATIENT)
Dept: ONCOLOGY | Age: 64
End: 2024-06-07
Payer: COMMERCIAL

## 2024-06-07 ENCOUNTER — HOSPITAL ENCOUNTER (OUTPATIENT)
Dept: INFUSION THERAPY | Age: 64
Setting detail: INFUSION SERIES
Discharge: HOME | End: 2024-06-07
Payer: COMMERCIAL

## 2024-06-07 ENCOUNTER — HOSPITAL ENCOUNTER (OUTPATIENT)
Dept: LAB | Age: 64
Discharge: HOME | End: 2024-06-07
Payer: COMMERCIAL

## 2024-06-07 VITALS
RESPIRATION RATE: 16 BRPM | HEART RATE: 68 BPM | SYSTOLIC BLOOD PRESSURE: 121 MMHG | OXYGEN SATURATION: 99 % | DIASTOLIC BLOOD PRESSURE: 44 MMHG

## 2024-06-07 VITALS
OXYGEN SATURATION: 97 % | BODY MASS INDEX: 51.03 KG/M2 | HEIGHT: 63 IN | WEIGHT: 288 LBS | HEART RATE: 62 BPM | RESPIRATION RATE: 16 BRPM | TEMPERATURE: 98.7 F | DIASTOLIC BLOOD PRESSURE: 60 MMHG | SYSTOLIC BLOOD PRESSURE: 122 MMHG

## 2024-06-07 DIAGNOSIS — C83.30 DIFFUSE LARGE B-CELL LYMPHOMA, UNSPECIFIED BODY REGION (HCC): ICD-10-CM

## 2024-06-07 DIAGNOSIS — E03.9 HYPOTHYROIDISM, UNSPECIFIED TYPE: ICD-10-CM

## 2024-06-07 DIAGNOSIS — D80.1 HYPOGAMMAGLOBULINEMIA (HCC): Primary | ICD-10-CM

## 2024-06-07 DIAGNOSIS — D80.1 HYPOGAMMAGLOBULINEMIA (HCC): ICD-10-CM

## 2024-06-07 DIAGNOSIS — C85.88 MARGINAL ZONE LYMPHOMA OF LYMPH NODES OF MULTIPLE SITES (HCC): ICD-10-CM

## 2024-06-07 DIAGNOSIS — D69.6 THROMBOCYTOPENIA, UNSPECIFIED (HCC): Primary | ICD-10-CM

## 2024-06-07 DIAGNOSIS — D69.6 THROMBOCYTOPENIA, UNSPECIFIED (HCC): ICD-10-CM

## 2024-06-07 LAB
ALBUMIN SERPL-MCNC: 3.8 G/DL (ref 3.2–4.6)
ALBUMIN/GLOB SERPL: 1.5 (ref 1–1.9)
ALP SERPL-CCNC: 97 U/L (ref 35–104)
ALT SERPL-CCNC: 31 U/L (ref 12–65)
ANION GAP SERPL CALC-SCNC: 9 MMOL/L (ref 9–18)
AST SERPL-CCNC: 36 U/L (ref 15–37)
BASOPHILS # BLD: 0 K/UL (ref 0–0.2)
BASOPHILS NFR BLD: 1 % (ref 0–2)
BILIRUB SERPL-MCNC: 0.3 MG/DL (ref 0–1.2)
BUN SERPL-MCNC: 20 MG/DL (ref 8–23)
CALCIUM SERPL-MCNC: 9.2 MG/DL (ref 8.8–10.2)
CHLORIDE SERPL-SCNC: 104 MMOL/L (ref 98–107)
CO2 SERPL-SCNC: 27 MMOL/L (ref 20–28)
CREAT SERPL-MCNC: 0.92 MG/DL (ref 0.6–1.1)
DIFFERENTIAL METHOD BLD: ABNORMAL
EOSINOPHIL # BLD: 0.1 K/UL (ref 0–0.8)
EOSINOPHIL NFR BLD: 3 % (ref 0.5–7.8)
ERYTHROCYTE [DISTWIDTH] IN BLOOD BY AUTOMATED COUNT: 14.1 % (ref 11.9–14.6)
FERRITIN SERPL-MCNC: 487 NG/ML (ref 8–388)
GLOBULIN SER CALC-MCNC: 2.5 G/DL (ref 2.3–3.5)
GLUCOSE SERPL-MCNC: 124 MG/DL (ref 70–99)
HCT VFR BLD AUTO: 37.2 % (ref 35.8–46.3)
HGB BLD-MCNC: 12 G/DL (ref 11.7–15.4)
IGG SERPL-MCNC: 592 MG/DL (ref 700–1600)
IMM GRANULOCYTES # BLD AUTO: 0 K/UL (ref 0–0.5)
IMM GRANULOCYTES NFR BLD AUTO: 0 % (ref 0–5)
LYMPHOCYTES # BLD: 0.9 K/UL (ref 0.5–4.6)
LYMPHOCYTES NFR BLD: 19 % (ref 13–44)
MCH RBC QN AUTO: 29.4 PG (ref 26.1–32.9)
MCHC RBC AUTO-ENTMCNC: 32.3 G/DL (ref 31.4–35)
MCV RBC AUTO: 91.2 FL (ref 82–102)
MONOCYTES # BLD: 0.2 K/UL (ref 0.1–1.3)
MONOCYTES NFR BLD: 5 % (ref 4–12)
NEUTS SEG # BLD: 3.4 K/UL (ref 1.7–8.2)
NEUTS SEG NFR BLD: 73 % (ref 43–78)
NRBC # BLD: 0 K/UL (ref 0–0.2)
PLATELET # BLD AUTO: 98 K/UL (ref 150–450)
PMV BLD AUTO: 10.8 FL (ref 9.4–12.3)
POTASSIUM SERPL-SCNC: 4.4 MMOL/L (ref 3.5–5.1)
PROT SERPL-MCNC: 6.3 G/DL (ref 6.3–8.2)
RBC # BLD AUTO: 4.08 M/UL (ref 4.05–5.2)
SODIUM SERPL-SCNC: 140 MMOL/L (ref 136–145)
T4 FREE SERPL-MCNC: 1.7 NG/DL (ref 0.9–1.7)
TSH, 3RD GENERATION: 1.8 UIU/ML (ref 0.27–4.2)
WBC # BLD AUTO: 4.7 K/UL (ref 4.3–11.1)

## 2024-06-07 PROCEDURE — 96366 THER/PROPH/DIAG IV INF ADDON: CPT

## 2024-06-07 PROCEDURE — 99214 OFFICE O/P EST MOD 30 MIN: CPT | Performed by: INTERNAL MEDICINE

## 2024-06-07 PROCEDURE — 82728 ASSAY OF FERRITIN: CPT

## 2024-06-07 PROCEDURE — 84439 ASSAY OF FREE THYROXINE: CPT

## 2024-06-07 PROCEDURE — 2580000003 HC RX 258: Performed by: INTERNAL MEDICINE

## 2024-06-07 PROCEDURE — 82784 ASSAY IGA/IGD/IGG/IGM EACH: CPT

## 2024-06-07 PROCEDURE — 85025 COMPLETE CBC W/AUTO DIFF WBC: CPT

## 2024-06-07 PROCEDURE — 84443 ASSAY THYROID STIM HORMONE: CPT

## 2024-06-07 PROCEDURE — 96365 THER/PROPH/DIAG IV INF INIT: CPT

## 2024-06-07 PROCEDURE — 80053 COMPREHEN METABOLIC PANEL: CPT

## 2024-06-07 PROCEDURE — 36415 COLL VENOUS BLD VENIPUNCTURE: CPT

## 2024-06-07 PROCEDURE — 3074F SYST BP LT 130 MM HG: CPT | Performed by: INTERNAL MEDICINE

## 2024-06-07 PROCEDURE — 6360000002 HC RX W HCPCS: Performed by: INTERNAL MEDICINE

## 2024-06-07 PROCEDURE — 3078F DIAST BP <80 MM HG: CPT | Performed by: INTERNAL MEDICINE

## 2024-06-07 RX ORDER — SODIUM CHLORIDE 0.9 % (FLUSH) 0.9 %
5-40 SYRINGE (ML) INJECTION PRN
Status: DISCONTINUED | OUTPATIENT
Start: 2024-06-07 | End: 2024-06-08 | Stop reason: HOSPADM

## 2024-06-07 RX ORDER — DIPHENHYDRAMINE HYDROCHLORIDE 50 MG/ML
50 INJECTION INTRAMUSCULAR; INTRAVENOUS ONCE
Status: DISCONTINUED | OUTPATIENT
Start: 2024-06-07 | End: 2024-06-08 | Stop reason: HOSPADM

## 2024-06-07 RX ORDER — SODIUM CHLORIDE 0.9 % (FLUSH) 0.9 %
5-40 SYRINGE (ML) INJECTION PRN
OUTPATIENT
Start: 2024-06-07

## 2024-06-07 RX ORDER — SODIUM CHLORIDE 9 MG/ML
5-250 INJECTION, SOLUTION INTRAVENOUS PRN
OUTPATIENT
Start: 2024-06-07

## 2024-06-07 RX ORDER — ALBUTEROL SULFATE 90 UG/1
4 AEROSOL, METERED RESPIRATORY (INHALATION) PRN
OUTPATIENT
Start: 2024-06-07

## 2024-06-07 RX ORDER — ALBUTEROL SULFATE 90 UG/1
4 AEROSOL, METERED RESPIRATORY (INHALATION) PRN
Status: DISCONTINUED | OUTPATIENT
Start: 2024-06-07 | End: 2024-06-08 | Stop reason: HOSPADM

## 2024-06-07 RX ORDER — SODIUM CHLORIDE 9 MG/ML
5-250 INJECTION, SOLUTION INTRAVENOUS PRN
Status: DISCONTINUED | OUTPATIENT
Start: 2024-06-07 | End: 2024-06-08 | Stop reason: HOSPADM

## 2024-06-07 RX ORDER — DIPHENHYDRAMINE HYDROCHLORIDE 50 MG/ML
50 INJECTION INTRAMUSCULAR; INTRAVENOUS ONCE
Start: 2024-06-07 | End: 2024-06-07

## 2024-06-07 RX ORDER — DIPHENHYDRAMINE HYDROCHLORIDE 50 MG/ML
50 INJECTION INTRAMUSCULAR; INTRAVENOUS
Status: DISCONTINUED | OUTPATIENT
Start: 2024-06-07 | End: 2024-06-08 | Stop reason: HOSPADM

## 2024-06-07 RX ORDER — EPINEPHRINE 1 MG/ML
0.3 INJECTION, SOLUTION, CONCENTRATE INTRAVENOUS PRN
Status: DISCONTINUED | OUTPATIENT
Start: 2024-06-07 | End: 2024-06-08 | Stop reason: HOSPADM

## 2024-06-07 RX ORDER — ACETAMINOPHEN 325 MG/1
650 TABLET ORAL
OUTPATIENT
Start: 2024-06-07

## 2024-06-07 RX ORDER — MEPERIDINE HYDROCHLORIDE 25 MG/ML
12.5 INJECTION INTRAMUSCULAR; INTRAVENOUS; SUBCUTANEOUS PRN
Status: DISCONTINUED | OUTPATIENT
Start: 2024-06-07 | End: 2024-06-08 | Stop reason: HOSPADM

## 2024-06-07 RX ORDER — MEPERIDINE HYDROCHLORIDE 25 MG/ML
12.5 INJECTION INTRAMUSCULAR; INTRAVENOUS; SUBCUTANEOUS PRN
OUTPATIENT
Start: 2024-06-07

## 2024-06-07 RX ORDER — ACETAMINOPHEN 325 MG/1
650 TABLET ORAL
Status: DISCONTINUED | OUTPATIENT
Start: 2024-06-07 | End: 2024-06-08 | Stop reason: HOSPADM

## 2024-06-07 RX ORDER — SODIUM CHLORIDE 9 MG/ML
INJECTION, SOLUTION INTRAVENOUS CONTINUOUS
Status: DISCONTINUED | OUTPATIENT
Start: 2024-06-07 | End: 2024-06-08 | Stop reason: HOSPADM

## 2024-06-07 RX ORDER — SODIUM CHLORIDE 9 MG/ML
INJECTION, SOLUTION INTRAVENOUS CONTINUOUS
OUTPATIENT
Start: 2024-06-07

## 2024-06-07 RX ORDER — ONDANSETRON 2 MG/ML
8 INJECTION INTRAMUSCULAR; INTRAVENOUS
Status: DISCONTINUED | OUTPATIENT
Start: 2024-06-07 | End: 2024-06-08 | Stop reason: HOSPADM

## 2024-06-07 RX ORDER — HEPARIN 100 UNIT/ML
500 SYRINGE INTRAVENOUS PRN
OUTPATIENT
Start: 2024-06-07

## 2024-06-07 RX ORDER — EPINEPHRINE 1 MG/ML
0.3 INJECTION, SOLUTION, CONCENTRATE INTRAVENOUS PRN
OUTPATIENT
Start: 2024-06-07

## 2024-06-07 RX ORDER — DIPHENHYDRAMINE HYDROCHLORIDE 50 MG/ML
50 INJECTION INTRAMUSCULAR; INTRAVENOUS
OUTPATIENT
Start: 2024-06-07

## 2024-06-07 RX ORDER — ACETAMINOPHEN 325 MG/1
650 TABLET ORAL
Start: 2024-06-07

## 2024-06-07 RX ORDER — ONDANSETRON 2 MG/ML
8 INJECTION INTRAMUSCULAR; INTRAVENOUS
OUTPATIENT
Start: 2024-06-07

## 2024-06-07 RX ADMIN — SODIUM CHLORIDE, PRESERVATIVE FREE 10 ML: 5 INJECTION INTRAVENOUS at 11:36

## 2024-06-07 RX ADMIN — IMMUNE GLOBULIN (HUMAN) 50 G: 10 INJECTION INTRAVENOUS; SUBCUTANEOUS at 11:36

## 2024-06-07 RX ADMIN — SODIUM CHLORIDE, PRESERVATIVE FREE 10 ML: 5 INJECTION INTRAVENOUS at 13:07

## 2024-06-07 RX ADMIN — SODIUM CHLORIDE 100 ML/HR: 9 INJECTION, SOLUTION INTRAVENOUS at 11:40

## 2024-06-07 ASSESSMENT — PATIENT HEALTH QUESTIONNAIRE - PHQ9
SUM OF ALL RESPONSES TO PHQ QUESTIONS 1-9: 0
2. FEELING DOWN, DEPRESSED OR HOPELESS: NOT AT ALL
SUM OF ALL RESPONSES TO PHQ QUESTIONS 1-9: 0
SUM OF ALL RESPONSES TO PHQ QUESTIONS 1-9: 0
SUM OF ALL RESPONSES TO PHQ9 QUESTIONS 1 & 2: 0
SUM OF ALL RESPONSES TO PHQ QUESTIONS 1-9: 0
1. LITTLE INTEREST OR PLEASURE IN DOING THINGS: NOT AT ALL

## 2024-06-07 NOTE — PROGRESS NOTES
Arrived to the Infusion Center. IVIG completed. Patient tolerated well.   Any issues or concerns during appointment: no.  Patient aware of next appointment on 7/19/24 (date) at 1000 (time).  Patient instructed to call provider with temperature of 100.4 or greater or nausea/vomiting/ diarrhea or pain not controlled by medications  Discharged ambulatory.

## 2024-06-07 NOTE — PATIENT INSTRUCTIONS
Patient Information from Today's Visit    The members of your Oncology Medical [facility] are listed below:    Physician Provider: Adair Bailey, Medical Oncologist  Advanced Practice Clinician: Jammie Alas NP  Registered Nurse: Jason HILL RN  Nurse Navigator: N/A  Medical Assistant: Kathryn KIMBLE CMA  :Ella GLOVER  Supportive Care Services: Jose Armando FERMIN LMSW    Diagnosis: Lymphoma    Follow Up Instructions: 6 weeks as scheduled      Treatment Summary has been discussed and given to patient:No      Current Labs:   Hospital Outpatient Visit on 06/07/2024   Component Date Value Ref Range Status    IgG 06/07/2024 592 (L)  700 - 1600 mg/dL Final    T4 Free 06/07/2024 1.7  0.9 - 1.7 NG/DL Final    TSH, 3rd Generation 06/07/2024 1.800  0.270 - 4.200 uIU/mL Final    Ferritin 06/07/2024 487 (H)  8 - 388 NG/ML Final    Sodium 06/07/2024 140  136 - 145 mmol/L Final    Potassium 06/07/2024 4.4  3.5 - 5.1 mmol/L Final    Chloride 06/07/2024 104  98 - 107 mmol/L Final    CO2 06/07/2024 27  20 - 28 mmol/L Final    Anion Gap 06/07/2024 9  9 - 18 mmol/L Final    Glucose 06/07/2024 124 (H)  70 - 99 mg/dL Final    Comment: <70 mg/dL Consistent with, but not fully diagnostic of hypoglycemia.  100 - 125 mg/dL Impaired fasting glucose/consistent with pre-diabetes mellitus.  > 126 mg/dl Fasting glucose consistent with overt diabetes mellitus      BUN 06/07/2024 20  8 - 23 MG/DL Final    Creatinine 06/07/2024 0.92  0.60 - 1.10 MG/DL Final    Est, Glom Filt Rate 06/07/2024 70  >60 ml/min/1.73m2 Final    Comment:    Pediatric calculator link: https://www.kidney.org/professionals/kdoqi/gfr_calculatorped     These results are not intended for use in patients <18 years of age.     eGFR results are calculated without a race factor using  the 2021 CKD-EPI equation. Careful clinical correlation is recommended, particularly when comparing to results calculated using previous equations.  The CKD-EPI equation is less accurate in patients with

## 2024-06-07 NOTE — PROGRESS NOTES
move up her repeat imaging to 3 months (instead  of 6) given the bulk of her disease at presentation.  Indeed, PET/CT prior to her 2nd cycle of maintenance showed a new hypermetabolic breast nodule and a mesenteric nodule.  Biopsy of the breast  nodule shows diffuse large B cell lymphoma.  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  In any case, at this  point, I would recommend salvage chemotherapy with R-ICE.  This was scheduled on 1/24 but delayed due to post-influenza pneumonia.  Started therapy 2/7/18. Recently returned from [place] for transplant opinion and the trip itself was uneventful.  However,  the recommendation from Dr. Borden, while acknowledging that salvage therapy followed by autologous transplant was reasonable, was that we consider a change to R-CHOP, completing 5 cycles of therapy and not necessarily moving to an autograft if she is  in CR.  We discussed the pros and cons of both approaches, and we eventually settled on the change to R-CHOP.  We completed 2 additional cycles and then repeated her PET/CT, which showed a complete response.  Therefore, we then recommended an additional  2 cycles of therapy to complete a total of 6 cycles (1 RICE and 5 R-CHOP).  PET/CT at completion of therapy reviewed and shows no evidence of hypermetabolic disease, consistent with complete response.  Continue observation, we have discussed the benefits and drawbacks to additional treatment including R maintenance for MZL, and IT CNS prophylaxis, and she was very comfortable with no additional therapy.  In November 2022, her neck imaging showed multiple progressing lymph nodes, we referred her to IR for ultrasound guided biopsy, this was nondiagnostic (FNA only).  Therefore, we referred her to ENT to discuss excisional biopsy of a lymph node.  Originally it was thought this would

## 2024-06-08 RX ORDER — PREGABALIN 50 MG/1
50 CAPSULE ORAL 3 TIMES DAILY
Qty: 270 CAPSULE | Refills: 0 | Status: SHIPPED | OUTPATIENT
Start: 2024-06-08 | End: 2024-09-06

## 2024-06-09 LAB
IGA SERPL-MCNC: <5 MG/DL (ref 87–352)
IGG SERPL-MCNC: 583 MG/DL (ref 586–1602)
IGM SERPL-MCNC: <5 MG/DL (ref 26–217)

## 2024-06-10 NOTE — PROGRESS NOTES
for patients age ?60 years with an embolic-appearing cryptogenic ischemic stroke (ie, no evident source of stroke despite a comprehensive evaluation) who have a PFO with a right-to-left shunt detected by bubble study: The patient does not fit this category; thus, closure is not indicated    2. Hypertension, unspecified type  - Well controlled  - Currently on HCTZ  - Continue Toprol XL    3. History of atrial fibrillation  - [id number] equals 4  - Currently on Eliquis: Sent message to Dr. Bailey to determine if patient is an acceptable candidate to continue OAC from his perspective in the setting of thrombocytopenia    - Continue with Toprol XL    4. Mild mitral regurgitation by prior echocardiogram  - Surveillance echocardiogram in 2023 to 2025    5. 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 (HCC)  - Hematology note reviewed  - Continue to follow with hematology    6. History of DVT (deep vein thrombosis)   - Currently on Eliquis (see \"history of atrial fibrillation\" above)    F/U: 6 months    Colby Bryant MD

## 2024-06-11 ENCOUNTER — OFFICE VISIT (OUTPATIENT)
Dept: FAMILY MEDICINE CLINIC | Facility: CLINIC | Age: 64
End: 2024-06-11
Payer: COMMERCIAL

## 2024-06-11 ENCOUNTER — OFFICE VISIT (OUTPATIENT)
Age: 64
End: 2024-06-11
Payer: COMMERCIAL

## 2024-06-11 VITALS
BODY MASS INDEX: 50.66 KG/M2 | OXYGEN SATURATION: 99 % | HEART RATE: 65 BPM | SYSTOLIC BLOOD PRESSURE: 136 MMHG | TEMPERATURE: 97.2 F | WEIGHT: 286 LBS | DIASTOLIC BLOOD PRESSURE: 84 MMHG

## 2024-06-11 VITALS
HEART RATE: 76 BPM | BODY MASS INDEX: 50.78 KG/M2 | DIASTOLIC BLOOD PRESSURE: 70 MMHG | SYSTOLIC BLOOD PRESSURE: 122 MMHG | HEIGHT: 63 IN | WEIGHT: 286.6 LBS

## 2024-06-11 DIAGNOSIS — T45.1X5A POLYNEUROPATHY FOLLOWING CHEMOTHERAPY (HCC): ICD-10-CM

## 2024-06-11 DIAGNOSIS — Q21.12 PFO (PATENT FORAMEN OVALE): Primary | ICD-10-CM

## 2024-06-11 DIAGNOSIS — I10 PRIMARY HYPERTENSION: ICD-10-CM

## 2024-06-11 DIAGNOSIS — G62.0 POLYNEUROPATHY FOLLOWING CHEMOTHERAPY (HCC): ICD-10-CM

## 2024-06-11 DIAGNOSIS — Z86.718 HISTORY OF DVT (DEEP VEIN THROMBOSIS): ICD-10-CM

## 2024-06-11 DIAGNOSIS — E66.3 OVERWEIGHT: ICD-10-CM

## 2024-06-11 DIAGNOSIS — C85.88 MARGINAL ZONE LYMPHOMA OF LYMPH NODES OF MULTIPLE SITES (HCC): ICD-10-CM

## 2024-06-11 DIAGNOSIS — E03.9 ACQUIRED HYPOTHYROIDISM: ICD-10-CM

## 2024-06-11 DIAGNOSIS — I34.0 MILD MITRAL REGURGITATION BY PRIOR ECHOCARDIOGRAM: ICD-10-CM

## 2024-06-11 DIAGNOSIS — C85.91 NON-HODGKIN LYMPHOMA OF LYMPH NODES OF NECK, UNSPECIFIED NON-HODGKIN LYMPHOMA TYPE (HCC): Primary | ICD-10-CM

## 2024-06-11 DIAGNOSIS — I10 HYPERTENSION, UNSPECIFIED TYPE: ICD-10-CM

## 2024-06-11 DIAGNOSIS — I48.91 ATRIAL FIBRILLATION, UNSPECIFIED TYPE (HCC): ICD-10-CM

## 2024-06-11 DIAGNOSIS — Z86.79 HISTORY OF ATRIAL FIBRILLATION: ICD-10-CM

## 2024-06-11 PROCEDURE — 3075F SYST BP GE 130 - 139MM HG: CPT | Performed by: FAMILY MEDICINE

## 2024-06-11 PROCEDURE — 3078F DIAST BP <80 MM HG: CPT | Performed by: INTERNAL MEDICINE

## 2024-06-11 PROCEDURE — 99214 OFFICE O/P EST MOD 30 MIN: CPT | Performed by: INTERNAL MEDICINE

## 2024-06-11 PROCEDURE — 3074F SYST BP LT 130 MM HG: CPT | Performed by: INTERNAL MEDICINE

## 2024-06-11 PROCEDURE — 3079F DIAST BP 80-89 MM HG: CPT | Performed by: FAMILY MEDICINE

## 2024-06-11 PROCEDURE — 99213 OFFICE O/P EST LOW 20 MIN: CPT | Performed by: FAMILY MEDICINE

## 2024-06-11 RX ORDER — SEMAGLUTIDE 0.5 MG/.5ML
0.5 INJECTION, SOLUTION SUBCUTANEOUS
Qty: 2 ML | Refills: 0 | Status: SHIPPED | OUTPATIENT
Start: 2024-07-09

## 2024-06-11 RX ORDER — SEMAGLUTIDE 0.25 MG/.5ML
0.25 INJECTION, SOLUTION SUBCUTANEOUS
Qty: 2 ML | Refills: 0 | Status: SHIPPED | OUTPATIENT
Start: 2024-06-11 | End: 2024-07-09

## 2024-06-11 RX ORDER — METOPROLOL SUCCINATE 50 MG/1
50 TABLET, EXTENDED RELEASE ORAL DAILY
Qty: 90 TABLET | Refills: 3 | Status: SHIPPED | OUTPATIENT
Start: 2024-06-11

## 2024-06-11 ASSESSMENT — ENCOUNTER SYMPTOMS
GASTROINTESTINAL NEGATIVE: 1
RESPIRATORY NEGATIVE: 1
EYES NEGATIVE: 1

## 2024-06-11 NOTE — PROGRESS NOTES
HISTORY OF PRESENT ILLNESS    Seema Muniz is a 64 y.o. female.  HPI  Chief Complaint   Patient presents with    Follow-up    Weight Loss    Discuss Labs     See above. Hear mainly to review current health status.  Followed by cardiology for history of PFO and PAF; has been stable.  Followed by oncologist for marginal zone lymphoma and diffuse large cell B lymphoma. Imaging indicated recurrence of disease. Has resumed treatment.  After infusion of immunoglobulin she feels well for about 3 weeks. After that has increased fatigue and joint pain. Unlike past experience pt has not been able to lose weight with diet. Exercise is limited due to pain. Both cardiologist and oncologist agreed that she would be a good candidate for Wegovy to help with weight loss. However patient does not have DM.  Feels like thyroid supplement is working well. Denies excessive fatigue. No constipation. Denies dry skin. No side effects. Latest TSH was normal.      Current Outpatient Medications on File Prior to Visit   Medication Sig Dispense Refill    apixaban (ELIQUIS) 5 MG TABS tablet Take 1 tablet by mouth 2 times daily 180 tablet 3    metoprolol succinate (TOPROL XL) 50 MG extended release tablet Take 1 tablet by mouth daily 90 tablet 3    pregabalin (LYRICA) 50 MG capsule Take 1 capsule by mouth 3 times daily for 90 days. Max Daily Amount: 150 mg 270 capsule 0    albuterol (PROVENTIL) (2.5 MG/3ML) 0.083% nebulizer solution Take 3 mLs by nebulization 4 times daily (Patient taking differently: Take 3 mLs by nebulization 4 times daily PRN) 120 each 1    levothyroxine (SYNTHROID) 200 MCG tablet Take 1 tablet by mouth daily 90 tablet 1    fluticasone-umeclidin-vilant (TRELEGY ELLIPTA) 200-62.5-25 MCG/ACT AEPB inhaler Inhale 1 puff into the lungs daily 3 each 3    azithromycin (ZITHROMAX) 250 MG tablet Take 1 tablet by mouth three times a week 45 tablet 3    triamterene-hydroCHLOROthiazide (MAXZIDE) 75-50 MG per tablet Take 0.5-1

## 2024-06-13 ENCOUNTER — TELEPHONE (OUTPATIENT)
Dept: FAMILY MEDICINE CLINIC | Facility: CLINIC | Age: 64
End: 2024-06-13

## 2024-06-13 NOTE — TELEPHONE ENCOUNTER
Pt wanted to know the other program to help her pay for wegovy? Pt said she went to pick it up and it was $1,400 including her insurance. She said she thinks it's through [facility] but she is not sure if she heard wrong.     Please call pt about the program and what she needs to do if possible [phone].

## 2024-06-17 RX ORDER — LEVOTHYROXINE SODIUM 0.2 MG/1
200 TABLET ORAL DAILY
Qty: 90 TABLET | Refills: 1 | OUTPATIENT
Start: 2024-06-17

## 2024-06-25 RX ORDER — LEVOTHYROXINE SODIUM 0.2 MG/1
200 TABLET ORAL DAILY
Qty: 90 TABLET | Refills: 1 | OUTPATIENT
Start: 2024-06-25

## 2024-06-25 RX ORDER — TIRZEPATIDE 2.5 MG/.5ML
2.5 INJECTION, SOLUTION SUBCUTANEOUS WEEKLY
Qty: 2 ML | Refills: 0 | Status: SHIPPED | OUTPATIENT
Start: 2024-06-25 | End: 2024-07-23

## 2024-07-10 RX ORDER — LEVOTHYROXINE SODIUM 0.2 MG/1
200 TABLET ORAL DAILY
Qty: 90 TABLET | Refills: 1 | Status: SHIPPED | OUTPATIENT
Start: 2024-07-10

## 2024-07-19 ENCOUNTER — APPOINTMENT (OUTPATIENT)
Dept: INFUSION THERAPY | Age: 64
End: 2024-07-19
Payer: COMMERCIAL

## 2024-07-19 ENCOUNTER — HOSPITAL ENCOUNTER (OUTPATIENT)
Dept: INFUSION THERAPY | Age: 64
Setting detail: INFUSION SERIES
Discharge: HOME | End: 2024-07-19
Payer: COMMERCIAL

## 2024-07-19 ENCOUNTER — HOSPITAL ENCOUNTER (OUTPATIENT)
Dept: LAB | Age: 64
Discharge: HOME | End: 2024-07-22

## 2024-07-19 VITALS
BODY MASS INDEX: 51.3 KG/M2 | TEMPERATURE: 98.2 F | DIASTOLIC BLOOD PRESSURE: 55 MMHG | SYSTOLIC BLOOD PRESSURE: 152 MMHG | RESPIRATION RATE: 16 BRPM | WEIGHT: 289.6 LBS | HEART RATE: 60 BPM | OXYGEN SATURATION: 96 %

## 2024-07-19 DIAGNOSIS — D80.1 HYPOGAMMAGLOBULINEMIA (HCC): Primary | ICD-10-CM

## 2024-07-19 LAB — IGG SERPL-MCNC: 633 MG/DL (ref 700–1600)

## 2024-07-19 PROCEDURE — 6360000002 HC RX W HCPCS: Performed by: INTERNAL MEDICINE

## 2024-07-19 PROCEDURE — 2580000003 HC RX 258: Performed by: INTERNAL MEDICINE

## 2024-07-19 PROCEDURE — 96365 THER/PROPH/DIAG IV INF INIT: CPT

## 2024-07-19 PROCEDURE — 36415 COLL VENOUS BLD VENIPUNCTURE: CPT

## 2024-07-19 PROCEDURE — 6370000000 HC RX 637 (ALT 250 FOR IP): Performed by: INTERNAL MEDICINE

## 2024-07-19 PROCEDURE — 96366 THER/PROPH/DIAG IV INF ADDON: CPT

## 2024-07-19 PROCEDURE — 82784 ASSAY IGA/IGD/IGG/IGM EACH: CPT

## 2024-07-19 RX ORDER — DIPHENHYDRAMINE HYDROCHLORIDE 50 MG/ML
50 INJECTION INTRAMUSCULAR; INTRAVENOUS ONCE
Status: DISCONTINUED | OUTPATIENT
Start: 2024-07-19 | End: 2024-07-20 | Stop reason: HOSPADM

## 2024-07-19 RX ORDER — SODIUM CHLORIDE 9 MG/ML
5-250 INJECTION, SOLUTION INTRAVENOUS PRN
Status: DISCONTINUED | OUTPATIENT
Start: 2024-07-19 | End: 2024-07-20 | Stop reason: HOSPADM

## 2024-07-19 RX ORDER — SODIUM CHLORIDE 0.9 % (FLUSH) 0.9 %
5-40 SYRINGE (ML) INJECTION PRN
Status: DISCONTINUED | OUTPATIENT
Start: 2024-07-19 | End: 2024-07-20 | Stop reason: HOSPADM

## 2024-07-19 RX ORDER — MEPERIDINE HYDROCHLORIDE 25 MG/ML
12.5 INJECTION INTRAMUSCULAR; INTRAVENOUS; SUBCUTANEOUS PRN
Status: DISCONTINUED | OUTPATIENT
Start: 2024-07-19 | End: 2024-07-20 | Stop reason: HOSPADM

## 2024-07-19 RX ORDER — DIPHENHYDRAMINE HYDROCHLORIDE 50 MG/ML
50 INJECTION INTRAMUSCULAR; INTRAVENOUS
OUTPATIENT
Start: 2024-07-19

## 2024-07-19 RX ORDER — ACETAMINOPHEN 325 MG/1
650 TABLET ORAL
Start: 2024-07-19

## 2024-07-19 RX ORDER — ALBUTEROL SULFATE 90 UG/1
4 AEROSOL, METERED RESPIRATORY (INHALATION) PRN
Status: DISCONTINUED | OUTPATIENT
Start: 2024-07-19 | End: 2024-07-20 | Stop reason: HOSPADM

## 2024-07-19 RX ORDER — EPINEPHRINE 1 MG/ML
0.3 INJECTION, SOLUTION, CONCENTRATE INTRAVENOUS PRN
Status: DISCONTINUED | OUTPATIENT
Start: 2024-07-19 | End: 2024-07-20 | Stop reason: HOSPADM

## 2024-07-19 RX ORDER — ACETAMINOPHEN 325 MG/1
650 TABLET ORAL
Status: COMPLETED | OUTPATIENT
Start: 2024-07-19 | End: 2024-07-19

## 2024-07-19 RX ORDER — DIPHENHYDRAMINE HYDROCHLORIDE 50 MG/ML
50 INJECTION INTRAMUSCULAR; INTRAVENOUS ONCE
Start: 2024-07-19 | End: 2024-07-19

## 2024-07-19 RX ORDER — HEPARIN 100 UNIT/ML
500 SYRINGE INTRAVENOUS PRN
OUTPATIENT
Start: 2024-07-19

## 2024-07-19 RX ORDER — ONDANSETRON 2 MG/ML
8 INJECTION INTRAMUSCULAR; INTRAVENOUS
Status: DISCONTINUED | OUTPATIENT
Start: 2024-07-19 | End: 2024-07-20 | Stop reason: HOSPADM

## 2024-07-19 RX ORDER — DIPHENHYDRAMINE HYDROCHLORIDE 50 MG/ML
50 INJECTION INTRAMUSCULAR; INTRAVENOUS
Status: DISCONTINUED | OUTPATIENT
Start: 2024-07-19 | End: 2024-07-20 | Stop reason: HOSPADM

## 2024-07-19 RX ORDER — SODIUM CHLORIDE 9 MG/ML
5-250 INJECTION, SOLUTION INTRAVENOUS PRN
OUTPATIENT
Start: 2024-07-19

## 2024-07-19 RX ORDER — EPINEPHRINE 1 MG/ML
0.3 INJECTION, SOLUTION, CONCENTRATE INTRAVENOUS PRN
OUTPATIENT
Start: 2024-07-19

## 2024-07-19 RX ORDER — SODIUM CHLORIDE 9 MG/ML
INJECTION, SOLUTION INTRAVENOUS CONTINUOUS
OUTPATIENT
Start: 2024-07-19

## 2024-07-19 RX ORDER — SODIUM CHLORIDE 0.9 % (FLUSH) 0.9 %
5-40 SYRINGE (ML) INJECTION PRN
OUTPATIENT
Start: 2024-07-19

## 2024-07-19 RX ORDER — ALBUTEROL SULFATE 90 UG/1
4 AEROSOL, METERED RESPIRATORY (INHALATION) PRN
OUTPATIENT
Start: 2024-07-19

## 2024-07-19 RX ORDER — ACETAMINOPHEN 325 MG/1
650 TABLET ORAL
Status: DISCONTINUED | OUTPATIENT
Start: 2024-07-19 | End: 2024-07-20 | Stop reason: HOSPADM

## 2024-07-19 RX ORDER — ACETAMINOPHEN 325 MG/1
650 TABLET ORAL
OUTPATIENT
Start: 2024-07-19

## 2024-07-19 RX ORDER — SODIUM CHLORIDE 9 MG/ML
INJECTION, SOLUTION INTRAVENOUS CONTINUOUS
Status: DISCONTINUED | OUTPATIENT
Start: 2024-07-19 | End: 2024-07-20 | Stop reason: HOSPADM

## 2024-07-19 RX ORDER — MEPERIDINE HYDROCHLORIDE 25 MG/ML
12.5 INJECTION INTRAMUSCULAR; INTRAVENOUS; SUBCUTANEOUS PRN
OUTPATIENT
Start: 2024-07-19

## 2024-07-19 RX ORDER — ONDANSETRON 2 MG/ML
8 INJECTION INTRAMUSCULAR; INTRAVENOUS
OUTPATIENT
Start: 2024-07-19

## 2024-07-19 RX ADMIN — SODIUM CHLORIDE, PRESERVATIVE FREE 10 ML: 5 INJECTION INTRAVENOUS at 10:45

## 2024-07-19 RX ADMIN — ACETAMINOPHEN 650 MG: 325 TABLET ORAL at 08:35

## 2024-07-19 RX ADMIN — SODIUM CHLORIDE 20 ML/HR: 9 INJECTION, SOLUTION INTRAVENOUS at 08:26

## 2024-07-19 RX ADMIN — IMMUNE GLOBULIN (HUMAN) 50 G: 10 INJECTION INTRAVENOUS; SUBCUTANEOUS at 08:55

## 2024-07-19 ASSESSMENT — PAIN DESCRIPTION - PAIN TYPE: TYPE: CHRONIC PAIN

## 2024-07-19 ASSESSMENT — PAIN DESCRIPTION - DESCRIPTORS: DESCRIPTORS: BURNING

## 2024-07-19 ASSESSMENT — PAIN DESCRIPTION - ORIENTATION: ORIENTATION: LEFT;RIGHT

## 2024-07-19 ASSESSMENT — PAIN DESCRIPTION - ONSET: ONSET: ON-GOING

## 2024-07-19 ASSESSMENT — PAIN - FUNCTIONAL ASSESSMENT: PAIN_FUNCTIONAL_ASSESSMENT: ACTIVITIES ARE NOT PREVENTED

## 2024-07-19 ASSESSMENT — PAIN DESCRIPTION - LOCATION: LOCATION: FOOT

## 2024-07-19 ASSESSMENT — PAIN SCALES - GENERAL: PAINLEVEL_OUTOF10: 3

## 2024-07-19 ASSESSMENT — PAIN DESCRIPTION - FREQUENCY: FREQUENCY: CONTINUOUS

## 2024-07-19 NOTE — PROGRESS NOTES
Arrived to the Infusion Center.  Assessment completed. IVIG  completed. Patient tolerated without problems. Any issues or concerns during appointment: None  Instructed to call Dr Bailey with any side effects or concerns  Patient aware of next infusion appointment on 9/6/24(date) at 10 30 AM (time).  Discharged ambulatory

## 2024-07-25 ENCOUNTER — TELEPHONE (OUTPATIENT)
Dept: ONCOLOGY | Age: 64
End: 2024-07-25

## 2024-07-25 NOTE — TELEPHONE ENCOUNTER
Last progress note faxed to [facility] for continuation of care   fax [phone]

## 2024-08-11 PROBLEM — D61.810 PANCYTOPENIA DUE TO ANTINEOPLASTIC CHEMOTHERAPY (HCC): Status: RESOLVED | Noted: 2017-07-19 | Resolved: 2024-08-11

## 2024-08-11 PROBLEM — T45.1X5A PANCYTOPENIA DUE TO ANTINEOPLASTIC CHEMOTHERAPY (HCC): Status: RESOLVED | Noted: 2017-07-19 | Resolved: 2024-08-11

## 2024-08-11 PROBLEM — K21.9 GASTROESOPHAGEAL REFLUX DISEASE: Status: ACTIVE | Noted: 2021-05-04

## 2024-08-11 PROBLEM — J18.9 LEFT LOWER LOBE PNEUMONIA: Status: RESOLVED | Noted: 2018-01-22 | Resolved: 2024-08-11

## 2024-08-11 PROBLEM — R00.2 PALPITATIONS: Status: RESOLVED | Noted: 2022-06-21 | Resolved: 2024-08-11

## 2024-08-11 PROBLEM — J11.00 PNEUMONIA AND INFLUENZA: Status: RESOLVED | Noted: 2018-01-22 | Resolved: 2024-08-11

## 2024-08-11 PROBLEM — R53.82 CHRONIC FATIGUE: Status: ACTIVE | Noted: 2024-08-11

## 2024-08-11 PROBLEM — I48.91 ATRIAL FIBRILLATION (HCC): Status: RESOLVED | Noted: 2020-11-25 | Resolved: 2024-08-11

## 2024-08-11 RX ORDER — SEMAGLUTIDE 0.25 MG/.5ML
0.25 INJECTION, SOLUTION SUBCUTANEOUS
COMMUNITY
Start: 2024-07-04 | End: 2024-08-12

## 2024-08-12 ENCOUNTER — OFFICE VISIT (OUTPATIENT)
Dept: FAMILY MEDICINE CLINIC | Facility: CLINIC | Age: 64
End: 2024-08-12
Payer: COMMERCIAL

## 2024-08-12 VITALS
OXYGEN SATURATION: 98 % | TEMPERATURE: 98.2 F | DIASTOLIC BLOOD PRESSURE: 80 MMHG | HEIGHT: 63 IN | BODY MASS INDEX: 50.5 KG/M2 | WEIGHT: 285 LBS | HEART RATE: 69 BPM | SYSTOLIC BLOOD PRESSURE: 138 MMHG

## 2024-08-12 DIAGNOSIS — Z00.01 ENCOUNTER FOR WELL ADULT EXAM WITH ABNORMAL FINDINGS: Primary | ICD-10-CM

## 2024-08-12 DIAGNOSIS — E66.01 MORBID OBESITY WITH BMI OF 50.0-59.9, ADULT (HCC): ICD-10-CM

## 2024-08-12 DIAGNOSIS — C83.30 DIFFUSE LARGE B-CELL LYMPHOMA, UNSPECIFIED BODY REGION (HCC): ICD-10-CM

## 2024-08-12 DIAGNOSIS — Q21.12 PFO (PATENT FORAMEN OVALE): ICD-10-CM

## 2024-08-12 DIAGNOSIS — Z23 NEED FOR PROPHYLACTIC VACCINATION AND INOCULATION AGAINST VARICELLA: ICD-10-CM

## 2024-08-12 DIAGNOSIS — D80.1 HYPOGAMMAGLOBULINEMIA (HCC): ICD-10-CM

## 2024-08-12 DIAGNOSIS — M17.11 OSTEOARTHRITIS OF RIGHT KNEE, UNSPECIFIED OSTEOARTHRITIS TYPE: ICD-10-CM

## 2024-08-12 DIAGNOSIS — J45.20 MILD INTERMITTENT ASTHMA, UNSPECIFIED WHETHER COMPLICATED: ICD-10-CM

## 2024-08-12 DIAGNOSIS — G62.0 POLYNEUROPATHY FOLLOWING CHEMOTHERAPY (HCC): ICD-10-CM

## 2024-08-12 DIAGNOSIS — Z13.220 SCREENING FOR HYPERLIPIDEMIA: ICD-10-CM

## 2024-08-12 DIAGNOSIS — Z86.73 HISTORY OF STROKE: ICD-10-CM

## 2024-08-12 DIAGNOSIS — Z86.79 HISTORY OF ATRIAL FIBRILLATION: ICD-10-CM

## 2024-08-12 DIAGNOSIS — R73.01 ELEVATED FASTING GLUCOSE: ICD-10-CM

## 2024-08-12 DIAGNOSIS — I34.0 MILD MITRAL REGURGITATION BY PRIOR ECHOCARDIOGRAM: ICD-10-CM

## 2024-08-12 DIAGNOSIS — Z86.718 HISTORY OF DVT (DEEP VEIN THROMBOSIS): ICD-10-CM

## 2024-08-12 DIAGNOSIS — Z12.31 ENCOUNTER FOR SCREENING MAMMOGRAM FOR BREAST CANCER: ICD-10-CM

## 2024-08-12 DIAGNOSIS — M17.12 OSTEOARTHRITIS OF LEFT KNEE, UNSPECIFIED OSTEOARTHRITIS TYPE: ICD-10-CM

## 2024-08-12 DIAGNOSIS — T45.1X5A POLYNEUROPATHY FOLLOWING CHEMOTHERAPY (HCC): ICD-10-CM

## 2024-08-12 DIAGNOSIS — G47.33 OSA (OBSTRUCTIVE SLEEP APNEA): ICD-10-CM

## 2024-08-12 DIAGNOSIS — R53.82 CHRONIC FATIGUE: ICD-10-CM

## 2024-08-12 DIAGNOSIS — I10 HYPERTENSION, UNSPECIFIED TYPE: ICD-10-CM

## 2024-08-12 DIAGNOSIS — E03.9 PRIMARY HYPOTHYROIDISM: ICD-10-CM

## 2024-08-12 LAB
BILIRUBIN, URINE, POC: NEGATIVE
BLOOD URINE, POC: ABNORMAL
GLUCOSE URINE, POC: NEGATIVE
HBA1C MFR BLD: 5.2 %
KETONES, URINE, POC: NEGATIVE
LEUKOCYTE ESTERASE, URINE, POC: ABNORMAL
NITRITE, URINE, POC: NEGATIVE
PH, URINE, POC: 6 (ref 4.6–8)
PROTEIN,URINE, POC: NEGATIVE
SPECIFIC GRAVITY, URINE, POC: 1.02 (ref 1–1.03)
URINALYSIS CLARITY, POC: CLEAR
URINALYSIS COLOR, POC: YELLOW
UROBILINOGEN, POC: ABNORMAL

## 2024-08-12 PROCEDURE — 99396 PREV VISIT EST AGE 40-64: CPT | Performed by: NURSE PRACTITIONER

## 2024-08-12 PROCEDURE — 3079F DIAST BP 80-89 MM HG: CPT | Performed by: NURSE PRACTITIONER

## 2024-08-12 PROCEDURE — 3075F SYST BP GE 130 - 139MM HG: CPT | Performed by: NURSE PRACTITIONER

## 2024-08-12 PROCEDURE — 83036 HEMOGLOBIN GLYCOSYLATED A1C: CPT | Performed by: NURSE PRACTITIONER

## 2024-08-12 PROCEDURE — 81002 URINALYSIS NONAUTO W/O SCOPE: CPT | Performed by: NURSE PRACTITIONER

## 2024-08-12 RX ORDER — LEVOTHYROXINE SODIUM 88 UG/1
88 TABLET ORAL DAILY
Qty: 90 TABLET | Refills: 0 | Status: SHIPPED | OUTPATIENT
Start: 2024-08-12

## 2024-08-12 RX ORDER — ZOSTER VACCINE RECOMBINANT, ADJUVANTED 50 MCG/0.5
0.5 KIT INTRAMUSCULAR SEE ADMIN INSTRUCTIONS
Qty: 0.5 ML | Refills: 0 | Status: SHIPPED | OUTPATIENT
Start: 2024-08-12 | End: 2025-02-08

## 2024-08-12 RX ORDER — LEVOTHYROXINE SODIUM 0.2 MG/1
200 TABLET ORAL DAILY
Qty: 90 TABLET | Refills: 1 | Status: CANCELLED | OUTPATIENT
Start: 2024-08-12

## 2024-08-12 RX ORDER — LEVOTHYROXINE SODIUM 0.1 MG/1
100 TABLET ORAL DAILY
Qty: 90 TABLET | Refills: 0 | Status: SHIPPED | OUTPATIENT
Start: 2024-08-12

## 2024-08-12 ASSESSMENT — ENCOUNTER SYMPTOMS
ANAL BLEEDING: 0
COLOR CHANGE: 0
NAUSEA: 0
SINUS PAIN: 0
COUGH: 0
SHORTNESS OF BREATH: 0
BLOOD IN STOOL: 0
CHEST TIGHTNESS: 0
VOMITING: 0
RHINORRHEA: 0
ABDOMINAL PAIN: 0
BACK PAIN: 0
SORE THROAT: 0
VOICE CHANGE: 0
WHEEZING: 0
CONSTIPATION: 0
DIARRHEA: 1
EYE PAIN: 0

## 2024-08-12 NOTE — PROGRESS NOTES
swelling, myalgias and neck stiffness.        Bilateral knee pain, intermittent neck pain   Skin:  Negative for color change, pallor, rash and wound.   Allergic/Immunologic: Negative for environmental allergies.   Neurological:  Positive for headaches. Negative for dizziness, syncope, facial asymmetry, weakness, light-headedness and numbness.        Intermittent sharp headache   Hematological:  Negative for adenopathy. Bruises/bleeds easily.        Easily bruises, no bleeding   Psychiatric/Behavioral:  Negative for decreased concentration, dysphoric mood and sleep disturbance. The patient is not nervous/anxious.        Allergies   Allergen Reactions    Gabapentin Other (See Comments)     \"Double vision and falls\"     Prior to Visit Medications    Medication Sig Taking? Authorizing Provider   levothyroxine (SYNTHROID) 88 MCG tablet Take 1 tablet by mouth daily Take with 100 mcg tablet daily for total of 188 mcg daily. Yes Shawanda Brooks APRN - NP   levothyroxine (SYNTHROID) 100 MCG tablet Take 1 tablet by mouth daily Take with 88 mcg tablet daily for total of 188 mcg Yes Shawanda Brooks APRN - NP   zoster recombinant adjuvanted vaccine (SHINGRIX) 50 MCG/0.5ML SUSR injection Inject 0.5 mLs into the muscle See Admin Instructions 1 dose now and repeat in 2-6 months Yes Shawanda Brooks APRN - NP   apixaban (ELIQUIS) 5 MG TABS tablet Take 1 tablet by mouth 2 times daily Yes Colby Bryant MD   metoprolol succinate (TOPROL XL) 50 MG extended release tablet Take 1 tablet by mouth daily Yes Colby Bryant MD   pregabalin (LYRICA) 50 MG capsule Take 1 capsule by mouth 3 times daily for 90 days. Max Daily Amount: 150 mg Yes Adair Bailey MD   albuterol (PROVENTIL) (2.5 MG/3ML) 0.083% nebulizer solution Take 3 mLs by nebulization 4 times daily  Patient taking differently: Take 3 mLs by nebulization 4 times daily PRN Yes Adair Bailey MD   fluticasone-umeclidin-vilant (TRELEGY ELLIPTA)

## 2024-08-13 LAB
CHOLEST SERPL-MCNC: 195 MG/DL (ref 0–200)
HDLC SERPL-MCNC: 71 MG/DL (ref 40–60)
HDLC SERPL: 2.7 (ref 0–5)
LDLC SERPL CALC-MCNC: 104 MG/DL (ref 0–100)
TRIGL SERPL-MCNC: 101 MG/DL (ref 0–150)
VLDLC SERPL CALC-MCNC: 20 MG/DL (ref 6–23)

## 2024-08-14 LAB
BACTERIA SPEC CULT: NORMAL
BACTERIA SPEC CULT: NORMAL
SERVICE CMNT-IMP: NORMAL

## 2024-09-06 ENCOUNTER — HOSPITAL ENCOUNTER (OUTPATIENT)
Dept: LAB | Age: 64
Discharge: HOME | End: 2024-09-09
Payer: COMMERCIAL

## 2024-09-06 ENCOUNTER — OFFICE VISIT (OUTPATIENT)
Dept: ONCOLOGY | Age: 64
End: 2024-09-06
Payer: COMMERCIAL

## 2024-09-06 ENCOUNTER — HOSPITAL ENCOUNTER (OUTPATIENT)
Dept: INFUSION THERAPY | Age: 64
Setting detail: INFUSION SERIES
Discharge: HOME | End: 2024-09-06
Payer: COMMERCIAL

## 2024-09-06 ENCOUNTER — APPOINTMENT (OUTPATIENT)
Dept: INFUSION THERAPY | Age: 64
End: 2024-09-06
Payer: COMMERCIAL

## 2024-09-06 VITALS
WEIGHT: 289.4 LBS | SYSTOLIC BLOOD PRESSURE: 143 MMHG | RESPIRATION RATE: 17 BRPM | OXYGEN SATURATION: 100 % | BODY MASS INDEX: 51.28 KG/M2 | TEMPERATURE: 98.3 F | HEART RATE: 63 BPM | DIASTOLIC BLOOD PRESSURE: 63 MMHG

## 2024-09-06 DIAGNOSIS — D80.1 HYPOGAMMAGLOBULINEMIA (HCC): Primary | ICD-10-CM

## 2024-09-06 DIAGNOSIS — R53.82 CHRONIC FATIGUE: ICD-10-CM

## 2024-09-06 DIAGNOSIS — C83.30 DIFFUSE LARGE B-CELL LYMPHOMA, UNSPECIFIED BODY REGION (HCC): ICD-10-CM

## 2024-09-06 DIAGNOSIS — R59.0 CERVICAL LYMPHADENOPATHY: ICD-10-CM

## 2024-09-06 DIAGNOSIS — D69.6 THROMBOCYTOPENIA, UNSPECIFIED (HCC): ICD-10-CM

## 2024-09-06 DIAGNOSIS — C85.88 MARGINAL ZONE LYMPHOMA OF LYMPH NODES OF MULTIPLE SITES (HCC): Primary | ICD-10-CM

## 2024-09-06 DIAGNOSIS — D80.1 HYPOGAMMAGLOBULINEMIA (HCC): ICD-10-CM

## 2024-09-06 LAB
ALBUMIN SERPL-MCNC: 3.7 G/DL (ref 3.2–4.6)
ALBUMIN/GLOB SERPL: 1.5 (ref 1–1.9)
ALP SERPL-CCNC: 104 U/L (ref 35–104)
ALT SERPL-CCNC: 43 U/L (ref 12–65)
ANION GAP SERPL CALC-SCNC: 11 MMOL/L (ref 9–18)
AST SERPL-CCNC: 41 U/L (ref 15–37)
BASOPHILS # BLD: 0 K/UL (ref 0–0.2)
BASOPHILS NFR BLD: 1 % (ref 0–2)
BILIRUB SERPL-MCNC: 0.4 MG/DL (ref 0–1.2)
BUN SERPL-MCNC: 14 MG/DL (ref 8–23)
CALCIUM SERPL-MCNC: 9.4 MG/DL (ref 8.8–10.2)
CHLORIDE SERPL-SCNC: 107 MMOL/L (ref 98–107)
CO2 SERPL-SCNC: 25 MMOL/L (ref 20–28)
CREAT SERPL-MCNC: 0.86 MG/DL (ref 0.6–1.1)
DIFFERENTIAL METHOD BLD: ABNORMAL
EOSINOPHIL # BLD: 0.1 K/UL (ref 0–0.8)
EOSINOPHIL NFR BLD: 3 % (ref 0.5–7.8)
ERYTHROCYTE [DISTWIDTH] IN BLOOD BY AUTOMATED COUNT: 13.9 % (ref 11.9–14.6)
FERRITIN SERPL-MCNC: 452 NG/ML (ref 8–388)
GLOBULIN SER CALC-MCNC: 2.4 G/DL (ref 2.3–3.5)
GLUCOSE SERPL-MCNC: 134 MG/DL (ref 70–99)
HCT VFR BLD AUTO: 36.3 % (ref 35.8–46.3)
HGB BLD-MCNC: 11.7 G/DL (ref 11.7–15.4)
IGG SERPL-MCNC: 610 MG/DL (ref 700–1600)
IMM GRANULOCYTES # BLD AUTO: 0 K/UL (ref 0–0.5)
IMM GRANULOCYTES NFR BLD AUTO: 0 % (ref 0–5)
LYMPHOCYTES # BLD: 0.9 K/UL (ref 0.5–4.6)
LYMPHOCYTES NFR BLD: 22 % (ref 13–44)
MCH RBC QN AUTO: 29.5 PG (ref 26.1–32.9)
MCHC RBC AUTO-ENTMCNC: 32.2 G/DL (ref 31.4–35)
MCV RBC AUTO: 91.4 FL (ref 82–102)
MONOCYTES # BLD: 0.2 K/UL (ref 0.1–1.3)
MONOCYTES NFR BLD: 5 % (ref 4–12)
NEUTS SEG # BLD: 2.8 K/UL (ref 1.7–8.2)
NEUTS SEG NFR BLD: 69 % (ref 43–78)
NRBC # BLD: 0 K/UL (ref 0–0.2)
PLATELET # BLD AUTO: 79 K/UL (ref 150–450)
PMV BLD AUTO: 10.9 FL (ref 9.4–12.3)
POTASSIUM SERPL-SCNC: 3.9 MMOL/L (ref 3.5–5.1)
PROT SERPL-MCNC: 6.1 G/DL (ref 6.3–8.2)
RBC # BLD AUTO: 3.97 M/UL (ref 4.05–5.2)
SODIUM SERPL-SCNC: 143 MMOL/L (ref 136–145)
T4 FREE SERPL-MCNC: 1.4 NG/DL (ref 0.9–1.7)
TSH, 3RD GENERATION: 3.82 UIU/ML (ref 0.27–4.2)
WBC # BLD AUTO: 4 K/UL (ref 4.3–11.1)

## 2024-09-06 PROCEDURE — 82728 ASSAY OF FERRITIN: CPT

## 2024-09-06 PROCEDURE — 36415 COLL VENOUS BLD VENIPUNCTURE: CPT

## 2024-09-06 PROCEDURE — 2580000003 HC RX 258: Performed by: INTERNAL MEDICINE

## 2024-09-06 PROCEDURE — 85025 COMPLETE CBC W/AUTO DIFF WBC: CPT

## 2024-09-06 PROCEDURE — 96365 THER/PROPH/DIAG IV INF INIT: CPT

## 2024-09-06 PROCEDURE — 84439 ASSAY OF FREE THYROXINE: CPT

## 2024-09-06 PROCEDURE — 82784 ASSAY IGA/IGD/IGG/IGM EACH: CPT

## 2024-09-06 PROCEDURE — 6360000002 HC RX W HCPCS: Performed by: INTERNAL MEDICINE

## 2024-09-06 PROCEDURE — 99214 OFFICE O/P EST MOD 30 MIN: CPT | Performed by: NURSE PRACTITIONER

## 2024-09-06 PROCEDURE — 84443 ASSAY THYROID STIM HORMONE: CPT

## 2024-09-06 PROCEDURE — 80053 COMPREHEN METABOLIC PANEL: CPT

## 2024-09-06 RX ORDER — DIPHENHYDRAMINE HYDROCHLORIDE 50 MG/ML
50 INJECTION INTRAMUSCULAR; INTRAVENOUS
Status: DISCONTINUED | OUTPATIENT
Start: 2024-09-06 | End: 2024-09-07 | Stop reason: HOSPADM

## 2024-09-06 RX ORDER — SODIUM CHLORIDE 9 MG/ML
5-250 INJECTION, SOLUTION INTRAVENOUS PRN
Status: DISCONTINUED | OUTPATIENT
Start: 2024-09-06 | End: 2024-09-07 | Stop reason: HOSPADM

## 2024-09-06 RX ORDER — EPINEPHRINE 1 MG/ML
0.3 INJECTION, SOLUTION, CONCENTRATE INTRAVENOUS PRN
OUTPATIENT
Start: 2024-09-06

## 2024-09-06 RX ORDER — SODIUM CHLORIDE 0.9 % (FLUSH) 0.9 %
5-40 SYRINGE (ML) INJECTION PRN
OUTPATIENT
Start: 2024-09-06

## 2024-09-06 RX ORDER — DIPHENHYDRAMINE HYDROCHLORIDE 50 MG/ML
50 INJECTION INTRAMUSCULAR; INTRAVENOUS ONCE
Status: DISCONTINUED | OUTPATIENT
Start: 2024-09-06 | End: 2024-09-07 | Stop reason: HOSPADM

## 2024-09-06 RX ORDER — SODIUM CHLORIDE 9 MG/ML
5-250 INJECTION, SOLUTION INTRAVENOUS PRN
OUTPATIENT
Start: 2024-09-06

## 2024-09-06 RX ORDER — HEPARIN 100 UNIT/ML
500 SYRINGE INTRAVENOUS PRN
OUTPATIENT
Start: 2024-09-06

## 2024-09-06 RX ORDER — SODIUM CHLORIDE 9 MG/ML
INJECTION, SOLUTION INTRAVENOUS CONTINUOUS
Status: DISCONTINUED | OUTPATIENT
Start: 2024-09-06 | End: 2024-09-07 | Stop reason: HOSPADM

## 2024-09-06 RX ORDER — DIPHENHYDRAMINE HYDROCHLORIDE 50 MG/ML
50 INJECTION INTRAMUSCULAR; INTRAVENOUS ONCE
Start: 2024-09-06 | End: 2024-09-06

## 2024-09-06 RX ORDER — ONDANSETRON 2 MG/ML
8 INJECTION INTRAMUSCULAR; INTRAVENOUS
OUTPATIENT
Start: 2024-09-06

## 2024-09-06 RX ORDER — ACETAMINOPHEN 325 MG/1
650 TABLET ORAL
Start: 2024-09-06

## 2024-09-06 RX ORDER — ALBUTEROL SULFATE 90 UG/1
4 AEROSOL, METERED RESPIRATORY (INHALATION) PRN
OUTPATIENT
Start: 2024-09-06

## 2024-09-06 RX ORDER — DIPHENHYDRAMINE HYDROCHLORIDE 50 MG/ML
50 INJECTION INTRAMUSCULAR; INTRAVENOUS
OUTPATIENT
Start: 2024-09-06

## 2024-09-06 RX ORDER — EPINEPHRINE 1 MG/ML
0.3 INJECTION, SOLUTION, CONCENTRATE INTRAVENOUS PRN
Status: DISCONTINUED | OUTPATIENT
Start: 2024-09-06 | End: 2024-09-07 | Stop reason: HOSPADM

## 2024-09-06 RX ORDER — MEPERIDINE HYDROCHLORIDE 25 MG/ML
12.5 INJECTION INTRAMUSCULAR; INTRAVENOUS; SUBCUTANEOUS PRN
OUTPATIENT
Start: 2024-09-06

## 2024-09-06 RX ORDER — SODIUM CHLORIDE 9 MG/ML
INJECTION, SOLUTION INTRAVENOUS CONTINUOUS
OUTPATIENT
Start: 2024-09-06

## 2024-09-06 RX ORDER — ACETAMINOPHEN 325 MG/1
650 TABLET ORAL
OUTPATIENT
Start: 2024-09-06

## 2024-09-06 RX ORDER — SODIUM CHLORIDE 0.9 % (FLUSH) 0.9 %
5-40 SYRINGE (ML) INJECTION PRN
Status: DISCONTINUED | OUTPATIENT
Start: 2024-09-06 | End: 2024-09-07 | Stop reason: HOSPADM

## 2024-09-06 RX ORDER — MEPERIDINE HYDROCHLORIDE 25 MG/ML
12.5 INJECTION INTRAMUSCULAR; INTRAVENOUS; SUBCUTANEOUS PRN
Status: DISCONTINUED | OUTPATIENT
Start: 2024-09-06 | End: 2024-09-07 | Stop reason: HOSPADM

## 2024-09-06 RX ORDER — ONDANSETRON 2 MG/ML
8 INJECTION INTRAMUSCULAR; INTRAVENOUS
Status: DISCONTINUED | OUTPATIENT
Start: 2024-09-06 | End: 2024-09-07 | Stop reason: HOSPADM

## 2024-09-06 RX ORDER — ACETAMINOPHEN 325 MG/1
650 TABLET ORAL
Status: DISCONTINUED | OUTPATIENT
Start: 2024-09-06 | End: 2024-09-07 | Stop reason: HOSPADM

## 2024-09-06 RX ORDER — ALBUTEROL SULFATE 90 UG/1
4 AEROSOL, METERED RESPIRATORY (INHALATION) PRN
Status: DISCONTINUED | OUTPATIENT
Start: 2024-09-06 | End: 2024-09-07 | Stop reason: HOSPADM

## 2024-09-06 RX ADMIN — SODIUM CHLORIDE, PRESERVATIVE FREE 10 ML: 5 INJECTION INTRAVENOUS at 08:30

## 2024-09-06 RX ADMIN — IMMUNE GLOBULIN (HUMAN) 50 G: 10 INJECTION INTRAVENOUS; SUBCUTANEOUS at 08:50

## 2024-09-06 NOTE — PROGRESS NOTES
imaging Dr. Hood did not feel these were pathologic appearing, she just had excision of an anterior lymph node mass just posterior to the submandibular gland, this was also benign on pathology.  We discussed the findings, at this point despite the cervical node progression, we have an FNA and now an excisional biopsy with no evidence of residual lymphoma.  Certainly it is possible that she has some occult disease in one of the other nodes, but with her clinical improvement after IVIG and the multiple negative biopsies, I would recommend no additional work-up or treatment at this time.  I would proceed with follow-up imaging in May 2023 (6 months) to assess for new or progressive lymphadenopathy.  CT reviewed and shows no evidence of progression, in fact her cervical nodes are actually smaller.  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  She is anxious about the possibility of ongoing lymphoma and asks about a core biopsy under imaging guidance.  We discussed the risks and benefits of this approach, specifically that I am unsure it will affect our treatment recommendations.  She would like us to talk to IR to see if they would consider repeat ultrasound and possible core biopsy if an accessible node is present.  She completed a core biopsy of a submandibular node in IR and this was benign, no pathologic cervical nodes were seen.  Therefore, we have continued observation.        She returns today for follow-up and next IVIG.  Overall, she has been well since last seen.  After adding daily Benefiber her diarrhea has been much improved and gas has decreased.  She is currently on the waiting list for the [facility] weight loss program.  Fatigue is ongoing, increased more in the last week.  Neuropathy is stable, remains on

## 2024-09-06 NOTE — PROGRESS NOTES
Arrived to the Infusion Center. Labs and orders reviewed; IVIG completed. Patient tolerated well.   Any issues or concerns during appointment: none.  Patient aware of next infusion appointment on 10/25/2024 (date) at 1100 (time).  Patient aware of next lab and [facility] office visit on 10/25/2024 (date) at 1000 (time).  Patient instructed to call provider with temperature of 100.4 or greater or nausea/vomiting/ diarrhea or pain not controlled by medications  Discharged ambulatory.

## 2024-09-07 LAB
IGA SERPL-MCNC: <5 MG/DL (ref 87–352)
IGG SERPL-MCNC: 569 MG/DL (ref 586–1602)
IGM SERPL-MCNC: 6 MG/DL (ref 26–217)

## 2024-10-02 RX ORDER — FLUTICASONE FUROATE, UMECLIDINIUM BROMIDE AND VILANTEROL TRIFENATATE 200; 62.5; 25 UG/1; UG/1; UG/1
POWDER RESPIRATORY (INHALATION)
Qty: 180 EACH | Refills: 3 | Status: SHIPPED | OUTPATIENT
Start: 2024-10-02

## 2024-10-02 NOTE — TELEPHONE ENCOUNTER
Patient pharmacy requesting a refill on patients  TRELEGY ELLIPTA 200-62.5-25 MCG/ACT AEPB inhalers. Last seen by Marly Flannery NP on 09/22/23 and has a return appointment on 12/20/24. IDANIA

## 2024-10-08 ENCOUNTER — TELEMEDICINE (OUTPATIENT)
Dept: SLEEP MEDICINE | Age: 64
End: 2024-10-08
Payer: COMMERCIAL

## 2024-10-08 DIAGNOSIS — G47.33 OSA (OBSTRUCTIVE SLEEP APNEA): Primary | ICD-10-CM

## 2024-10-08 DIAGNOSIS — E66.01 MORBID OBESITY WITH BMI OF 50.0-59.9, ADULT: ICD-10-CM

## 2024-10-08 DIAGNOSIS — R09.02 HYPOXEMIA: ICD-10-CM

## 2024-10-08 DIAGNOSIS — G47.10 HYPERSOMNIA, UNSPECIFIED: ICD-10-CM

## 2024-10-08 PROCEDURE — 99214 OFFICE O/P EST MOD 30 MIN: CPT | Performed by: INTERNAL MEDICINE

## 2024-10-08 RX ORDER — AZITHROMYCIN 250 MG/1
250 TABLET, FILM COATED ORAL
Qty: 45 TABLET | Refills: 3 | Status: SHIPPED | OUTPATIENT
Start: 2024-10-09

## 2024-10-08 RX ORDER — PREGABALIN 50 MG/1
50 CAPSULE ORAL 3 TIMES DAILY
Qty: 270 CAPSULE | Refills: 0 | Status: SHIPPED | OUTPATIENT
Start: 2024-10-08 | End: 2025-01-06

## 2024-10-08 ASSESSMENT — SLEEP AND FATIGUE QUESTIONNAIRES
HOW LIKELY ARE YOU TO NOD OFF OR FALL ASLEEP IN A CAR, WHILE STOPPED FOR A FEW MINUTES IN TRAFFIC: WOULD NEVER DOZE
HOW LIKELY ARE YOU TO NOD OFF OR FALL ASLEEP WHILE WATCHING TV: SLIGHT CHANCE OF DOZING
ESS TOTAL SCORE: 3
HOW LIKELY ARE YOU TO NOD OFF OR FALL ASLEEP WHILE SITTING AND READING: WOULD NEVER DOZE
HOW LIKELY ARE YOU TO NOD OFF OR FALL ASLEEP WHILE LYING DOWN TO REST IN THE AFTERNOON WHEN CIRCUMSTANCES PERMIT: WOULD NEVER DOZE
HOW LIKELY ARE YOU TO NOD OFF OR FALL ASLEEP WHILE SITTING QUIETLY AFTER LUNCH WITHOUT ALCOHOL: WOULD NEVER DOZE
HOW LIKELY ARE YOU TO NOD OFF OR FALL ASLEEP WHILE SITTING AND TALKING TO SOMEONE: WOULD NEVER DOZE
HOW LIKELY ARE YOU TO NOD OFF OR FALL ASLEEP WHEN YOU ARE A PASSENGER IN A CAR FOR AN HOUR WITHOUT A BREAK: MODERATE CHANCE OF DOZING
HOW LIKELY ARE YOU TO NOD OFF OR FALL ASLEEP WHILE SITTING INACTIVE IN A PUBLIC PLACE: WOULD NEVER DOZE

## 2024-10-08 NOTE — PROGRESS NOTES
[facility]  [address]  [place] [address] [phone]    Patient Name:  Seema Muniz  YOB: 1960      Office Visit 10/8/2024    Seema Muniz, was evaluated through a synchronous (real-time) audio-video encounter. The patient (or guardian if applicable) is aware that this is a billable service, which includes applicable co-pays. This Virtual Visit was conducted with patient's (and/or legal guardian's) consent. Patient identification was verified, and a caregiver was present when appropriate.   The patient was located at Home: [address] [place] [id number]  Provider was located at Facility (Appt Dept): [address]  [place] [address]  Confirm you are appropriately licensed, registered, or certified to deliver care in the state where the patient is located as indicated above. If you are not or unsure, please re-schedule the visit: Yes, I confirm.      Total time spent for this encounter:  30 minutes    --Brenna Rivera MD on 10/8/2024 at 3:27 PM    An electronic signature was used to authenticate this note.    CHIEF COMPLAINT:      Chief Complaint   Patient presents with    Follow-up    Sleep Apnea    CPAP/BiPAP         HISTORY OF PRESENT ILLNESS: The patient was evaluated virtually for follow-up of sleep apnea.     She had known severe sleep apnea with AHI at 137.9/hour with lowest sats being 63%.       During her last visit we put her on CPAP at 6-12 cm H2O with C-Flex of 3.  Her download indicated 70% compliance with average daily use is 3 hours and 44 minutes.  Her AHI is down to 2.2/.  Mean airleak is 7.2 L/min 95% air leak is 11.7 L/min median pressure is 10.9 cm H2O 95th percentile pressure is 11.7 cm H2O.  She currently reports she feels refreshed and alert in the mornings.  Her Houston score is down to 3/24.  This is much better from previous visit at 8/24.  No evidence of daytime sleepiness based on the

## 2024-10-25 ENCOUNTER — HOSPITAL ENCOUNTER (OUTPATIENT)
Dept: INFUSION THERAPY | Age: 64
Setting detail: INFUSION SERIES
End: 2024-10-25

## 2024-11-01 ENCOUNTER — HOSPITAL ENCOUNTER (OUTPATIENT)
Dept: INFUSION THERAPY | Age: 64
Setting detail: INFUSION SERIES
Discharge: HOME | End: 2024-11-01
Payer: COMMERCIAL

## 2024-11-01 ENCOUNTER — HOSPITAL ENCOUNTER (OUTPATIENT)
Dept: LAB | Age: 64
Discharge: HOME | End: 2024-11-01

## 2024-11-01 VITALS
OXYGEN SATURATION: 98 % | DIASTOLIC BLOOD PRESSURE: 48 MMHG | BODY MASS INDEX: 51.38 KG/M2 | WEIGHT: 290 LBS | RESPIRATION RATE: 18 BRPM | SYSTOLIC BLOOD PRESSURE: 125 MMHG | HEART RATE: 60 BPM | TEMPERATURE: 98.2 F

## 2024-11-01 DIAGNOSIS — D80.1 HYPOGAMMAGLOBULINEMIA (HCC): Primary | ICD-10-CM

## 2024-11-01 LAB — IGG SERPL-MCNC: 522 MG/DL (ref 700–1600)

## 2024-11-01 PROCEDURE — 96365 THER/PROPH/DIAG IV INF INIT: CPT

## 2024-11-01 PROCEDURE — 6370000000 HC RX 637 (ALT 250 FOR IP): Performed by: INTERNAL MEDICINE

## 2024-11-01 PROCEDURE — 96375 TX/PRO/DX INJ NEW DRUG ADDON: CPT

## 2024-11-01 PROCEDURE — 6360000002 HC RX W HCPCS: Performed by: INTERNAL MEDICINE

## 2024-11-01 PROCEDURE — 82784 ASSAY IGA/IGD/IGG/IGM EACH: CPT

## 2024-11-01 PROCEDURE — 36415 COLL VENOUS BLD VENIPUNCTURE: CPT

## 2024-11-01 PROCEDURE — 96366 THER/PROPH/DIAG IV INF ADDON: CPT

## 2024-11-01 PROCEDURE — 2580000003 HC RX 258: Performed by: INTERNAL MEDICINE

## 2024-11-01 RX ORDER — DIPHENHYDRAMINE HYDROCHLORIDE 50 MG/ML
50 INJECTION INTRAMUSCULAR; INTRAVENOUS
OUTPATIENT
Start: 2024-11-01

## 2024-11-01 RX ORDER — EPINEPHRINE 1 MG/ML
0.3 INJECTION, SOLUTION, CONCENTRATE INTRAVENOUS PRN
Status: DISCONTINUED | OUTPATIENT
Start: 2024-11-01 | End: 2024-11-02 | Stop reason: HOSPADM

## 2024-11-01 RX ORDER — SODIUM CHLORIDE 9 MG/ML
5-250 INJECTION, SOLUTION INTRAVENOUS PRN
OUTPATIENT
Start: 2024-11-01

## 2024-11-01 RX ORDER — SODIUM CHLORIDE 9 MG/ML
INJECTION, SOLUTION INTRAVENOUS CONTINUOUS
OUTPATIENT
Start: 2024-11-01

## 2024-11-01 RX ORDER — MEPERIDINE HYDROCHLORIDE 25 MG/ML
12.5 INJECTION INTRAMUSCULAR; INTRAVENOUS; SUBCUTANEOUS PRN
Status: DISCONTINUED | OUTPATIENT
Start: 2024-11-01 | End: 2024-11-02 | Stop reason: HOSPADM

## 2024-11-01 RX ORDER — ALBUTEROL SULFATE 90 UG/1
4 INHALANT RESPIRATORY (INHALATION) PRN
Status: DISCONTINUED | OUTPATIENT
Start: 2024-11-01 | End: 2024-11-02 | Stop reason: HOSPADM

## 2024-11-01 RX ORDER — SODIUM CHLORIDE 9 MG/ML
INJECTION, SOLUTION INTRAVENOUS CONTINUOUS
Status: DISCONTINUED | OUTPATIENT
Start: 2024-11-01 | End: 2024-11-02 | Stop reason: HOSPADM

## 2024-11-01 RX ORDER — SODIUM CHLORIDE 0.9 % (FLUSH) 0.9 %
5-40 SYRINGE (ML) INJECTION PRN
Status: DISCONTINUED | OUTPATIENT
Start: 2024-11-01 | End: 2024-11-02 | Stop reason: HOSPADM

## 2024-11-01 RX ORDER — ACETAMINOPHEN 325 MG/1
650 TABLET ORAL
Start: 2024-11-01

## 2024-11-01 RX ORDER — HEPARIN 100 UNIT/ML
500 SYRINGE INTRAVENOUS PRN
OUTPATIENT
Start: 2024-11-01

## 2024-11-01 RX ORDER — DIPHENHYDRAMINE HYDROCHLORIDE 50 MG/ML
50 INJECTION INTRAMUSCULAR; INTRAVENOUS ONCE
Start: 2024-11-01 | End: 2024-11-01

## 2024-11-01 RX ORDER — SODIUM CHLORIDE 9 MG/ML
5-250 INJECTION, SOLUTION INTRAVENOUS PRN
Status: DISCONTINUED | OUTPATIENT
Start: 2024-11-01 | End: 2024-11-02 | Stop reason: HOSPADM

## 2024-11-01 RX ORDER — HEPARIN 100 UNIT/ML
500 SYRINGE INTRAVENOUS PRN
Status: DISCONTINUED | OUTPATIENT
Start: 2024-11-01 | End: 2024-11-02 | Stop reason: HOSPADM

## 2024-11-01 RX ORDER — ACETAMINOPHEN 325 MG/1
650 TABLET ORAL
Status: DISCONTINUED | OUTPATIENT
Start: 2024-11-01 | End: 2024-11-02 | Stop reason: HOSPADM

## 2024-11-01 RX ORDER — ONDANSETRON 2 MG/ML
8 INJECTION INTRAMUSCULAR; INTRAVENOUS
Status: DISCONTINUED | OUTPATIENT
Start: 2024-11-01 | End: 2024-11-02 | Stop reason: HOSPADM

## 2024-11-01 RX ORDER — MEPERIDINE HYDROCHLORIDE 25 MG/ML
12.5 INJECTION INTRAMUSCULAR; INTRAVENOUS; SUBCUTANEOUS PRN
OUTPATIENT
Start: 2024-11-01

## 2024-11-01 RX ORDER — EPINEPHRINE 1 MG/ML
0.3 INJECTION, SOLUTION, CONCENTRATE INTRAVENOUS PRN
OUTPATIENT
Start: 2024-11-01

## 2024-11-01 RX ORDER — ACETAMINOPHEN 325 MG/1
650 TABLET ORAL
Status: COMPLETED | OUTPATIENT
Start: 2024-11-01 | End: 2024-11-01

## 2024-11-01 RX ORDER — ACETAMINOPHEN 325 MG/1
650 TABLET ORAL
OUTPATIENT
Start: 2024-11-01

## 2024-11-01 RX ORDER — DIPHENHYDRAMINE HYDROCHLORIDE 50 MG/ML
50 INJECTION INTRAMUSCULAR; INTRAVENOUS
Status: DISCONTINUED | OUTPATIENT
Start: 2024-11-01 | End: 2024-11-02 | Stop reason: HOSPADM

## 2024-11-01 RX ORDER — ALBUTEROL SULFATE 90 UG/1
4 INHALANT RESPIRATORY (INHALATION) PRN
OUTPATIENT
Start: 2024-11-01

## 2024-11-01 RX ORDER — DIPHENHYDRAMINE HYDROCHLORIDE 50 MG/ML
50 INJECTION INTRAMUSCULAR; INTRAVENOUS ONCE
Status: COMPLETED | OUTPATIENT
Start: 2024-11-01 | End: 2024-11-01

## 2024-11-01 RX ORDER — SODIUM CHLORIDE 0.9 % (FLUSH) 0.9 %
5-40 SYRINGE (ML) INJECTION PRN
OUTPATIENT
Start: 2024-11-01

## 2024-11-01 RX ORDER — ONDANSETRON 2 MG/ML
8 INJECTION INTRAMUSCULAR; INTRAVENOUS
OUTPATIENT
Start: 2024-11-01

## 2024-11-01 RX ADMIN — IMMUNE GLOBULIN (HUMAN) 50 G: 10 INJECTION INTRAVENOUS; SUBCUTANEOUS at 14:42

## 2024-11-01 RX ADMIN — ACETAMINOPHEN 650 MG: 325 TABLET ORAL at 14:27

## 2024-11-01 RX ADMIN — SODIUM CHLORIDE, PRESERVATIVE FREE 10 ML: 5 INJECTION INTRAVENOUS at 14:20

## 2024-11-01 RX ADMIN — SODIUM CHLORIDE, PRESERVATIVE FREE 10 ML: 5 INJECTION INTRAVENOUS at 16:50

## 2024-11-01 RX ADMIN — DIPHENHYDRAMINE HYDROCHLORIDE 50 MG: 50 INJECTION INTRAMUSCULAR; INTRAVENOUS at 14:29

## 2024-11-01 NOTE — PROGRESS NOTES
Arrived to the Infusion Center.  IVIG completed. Patient tolerated without difficulty.   Any issues or concerns during appointment: none.  Patient aware of next infusion appointment on 12/13 (date) at 1100 (time).  Patient instructed to call provider with temperature of 100.4 or greater or nausea/vomiting/ diarrhea or pain not controlled by medications  Discharged ambulatory.

## 2024-11-04 NOTE — PROGRESS NOTES
Patient will need to receive future IVIG infusion at the [facility]. Therapy plan updated to be given at this location.

## 2024-11-06 DIAGNOSIS — E03.9 PRIMARY HYPOTHYROIDISM: ICD-10-CM

## 2024-11-06 RX ORDER — LEVOTHYROXINE SODIUM 88 UG/1
TABLET ORAL
Qty: 90 TABLET | Refills: 0 | OUTPATIENT
Start: 2024-11-06

## 2024-11-06 RX ORDER — LEVOTHYROXINE SODIUM 100 UG/1
100 TABLET ORAL DAILY
Qty: 90 TABLET | Refills: 0 | OUTPATIENT
Start: 2024-11-06

## 2024-11-20 DIAGNOSIS — E03.9 PRIMARY HYPOTHYROIDISM: ICD-10-CM

## 2024-11-21 RX ORDER — LEVOTHYROXINE SODIUM 100 UG/1
100 TABLET ORAL DAILY
Qty: 90 TABLET | Refills: 3 | Status: SHIPPED | OUTPATIENT
Start: 2024-11-21

## 2024-11-21 RX ORDER — LEVOTHYROXINE SODIUM 88 UG/1
88 TABLET ORAL DAILY
Qty: 90 TABLET | Refills: 3 | Status: SHIPPED | OUTPATIENT
Start: 2024-11-21

## 2024-12-05 ENCOUNTER — APPOINTMENT (OUTPATIENT)
Dept: URBAN - METROPOLITAN AREA CLINIC 329 | Facility: CLINIC | Age: 64
Setting detail: DERMATOLOGY
End: 2024-12-05

## 2024-12-05 DIAGNOSIS — L81.4 OTHER MELANIN HYPERPIGMENTATION: ICD-10-CM

## 2024-12-05 DIAGNOSIS — L57.8 OTHER SKIN CHANGES DUE TO CHRONIC EXPOSURE TO NONIONIZING RADIATION: ICD-10-CM

## 2024-12-05 DIAGNOSIS — D485 NEOPLASM OF UNCERTAIN BEHAVIOR OF SKIN: ICD-10-CM

## 2024-12-05 DIAGNOSIS — L57.0 ACTINIC KERATOSIS: ICD-10-CM

## 2024-12-05 PROBLEM — D48.5 NEOPLASM OF UNCERTAIN BEHAVIOR OF SKIN: Status: ACTIVE | Noted: 2024-12-05

## 2024-12-05 PROCEDURE — ? LIQUID NITROGEN

## 2024-12-05 PROCEDURE — ? BIOPSY BY SHAVE METHOD

## 2024-12-05 PROCEDURE — ? SUNSCREEN RECOMMENDATIONS

## 2024-12-05 PROCEDURE — 99213 OFFICE O/P EST LOW 20 MIN: CPT | Mod: 25

## 2024-12-05 PROCEDURE — 17003 DESTRUCT PREMALG LES 2-14: CPT

## 2024-12-05 PROCEDURE — 17000 DESTRUCT PREMALG LESION: CPT | Mod: 59

## 2024-12-05 PROCEDURE — ? COUNSELING

## 2024-12-05 PROCEDURE — 11102 TANGNTL BX SKIN SINGLE LES: CPT

## 2024-12-05 PROCEDURE — ? FULL BODY SKIN EXAM - DECLINED

## 2024-12-05 ASSESSMENT — LOCATION SIMPLE DESCRIPTION DERM
LOCATION SIMPLE: RIGHT CHEEK
LOCATION SIMPLE: LEFT FOREHEAD
LOCATION SIMPLE: LEFT SCALP
LOCATION SIMPLE: RIGHT ZYGOMA
LOCATION SIMPLE: LEFT CHEEK
LOCATION SIMPLE: RIGHT FOREHEAD
LOCATION SIMPLE: NOSE

## 2024-12-05 ASSESSMENT — LOCATION DETAILED DESCRIPTION DERM
LOCATION DETAILED: RIGHT CENTRAL ZYGOMA
LOCATION DETAILED: LEFT CENTRAL FRONTAL SCALP
LOCATION DETAILED: LEFT INFERIOR CENTRAL MALAR CHEEK
LOCATION DETAILED: RIGHT INFERIOR CENTRAL MALAR CHEEK
LOCATION DETAILED: LEFT SUPERIOR FOREHEAD
LOCATION DETAILED: RIGHT MEDIAL FOREHEAD
LOCATION DETAILED: NASAL DORSUM
LOCATION DETAILED: RIGHT SUPERIOR FOREHEAD
LOCATION DETAILED: RIGHT SUPERIOR MEDIAL FOREHEAD

## 2024-12-05 ASSESSMENT — LOCATION ZONE DERM
LOCATION ZONE: NOSE
LOCATION ZONE: FACE
LOCATION ZONE: SCALP

## 2024-12-09 ENCOUNTER — NURSE ONLY (OUTPATIENT)
Age: 64
End: 2024-12-09
Payer: COMMERCIAL

## 2024-12-09 ENCOUNTER — HOSPITAL ENCOUNTER (OUTPATIENT)
Dept: LAB | Age: 64
Discharge: HOME | End: 2024-12-09
Payer: COMMERCIAL

## 2024-12-09 VITALS
SYSTOLIC BLOOD PRESSURE: 130 MMHG | DIASTOLIC BLOOD PRESSURE: 67 MMHG | TEMPERATURE: 98.1 F | RESPIRATION RATE: 16 BRPM | HEART RATE: 61 BPM | OXYGEN SATURATION: 96 %

## 2024-12-09 DIAGNOSIS — C83.30 DIFFUSE LARGE B-CELL LYMPHOMA, UNSPECIFIED BODY REGION (HCC): ICD-10-CM

## 2024-12-09 DIAGNOSIS — D80.1 HYPOGAMMAGLOBULINEMIA (HCC): Primary | ICD-10-CM

## 2024-12-09 LAB
ALBUMIN SERPL-MCNC: 3.4 G/DL (ref 3.2–4.6)
ALBUMIN/GLOB SERPL: 1.3 (ref 1–1.9)
ALP SERPL-CCNC: 82 U/L (ref 35–104)
ALT SERPL-CCNC: 28 U/L (ref 8–45)
ANION GAP SERPL CALC-SCNC: 11 MMOL/L (ref 7–16)
AST SERPL-CCNC: 37 U/L (ref 15–37)
BASOPHILS # BLD: 0 K/UL (ref 0–0.2)
BASOPHILS NFR BLD: 1 % (ref 0–2)
BILIRUB SERPL-MCNC: 0.4 MG/DL (ref 0–1.2)
BUN SERPL-MCNC: 18 MG/DL (ref 8–23)
CALCIUM SERPL-MCNC: 8.8 MG/DL (ref 8.8–10.2)
CHLORIDE SERPL-SCNC: 106 MMOL/L (ref 98–107)
CO2 SERPL-SCNC: 26 MMOL/L (ref 20–29)
CREAT SERPL-MCNC: 0.83 MG/DL (ref 0.6–1.1)
DIFFERENTIAL METHOD BLD: ABNORMAL
EOSINOPHIL # BLD: 0.1 K/UL (ref 0–0.8)
EOSINOPHIL NFR BLD: 3 % (ref 0.5–7.8)
ERYTHROCYTE [DISTWIDTH] IN BLOOD BY AUTOMATED COUNT: 14.2 % (ref 11.9–14.6)
FERRITIN SERPL-MCNC: 182 NG/ML (ref 8–388)
GLOBULIN SER CALC-MCNC: 2.7 G/DL (ref 2.3–3.5)
GLUCOSE SERPL-MCNC: 106 MG/DL (ref 70–99)
HCT VFR BLD AUTO: 30.9 % (ref 35.8–46.3)
HGB BLD-MCNC: 9.9 G/DL (ref 11.7–15.4)
IGG SERPL-MCNC: 509 MG/DL (ref 700–1600)
IMM GRANULOCYTES # BLD AUTO: 0 K/UL (ref 0–0.5)
IMM GRANULOCYTES NFR BLD AUTO: 1 % (ref 0–5)
LYMPHOCYTES # BLD: 0.9 K/UL (ref 0.5–4.6)
LYMPHOCYTES NFR BLD: 23 % (ref 13–44)
MCH RBC QN AUTO: 29.6 PG (ref 26.1–32.9)
MCHC RBC AUTO-ENTMCNC: 32 G/DL (ref 31.4–35)
MCV RBC AUTO: 92.5 FL (ref 82–102)
MONOCYTES # BLD: 0.2 K/UL (ref 0.1–1.3)
MONOCYTES NFR BLD: 6 % (ref 4–12)
NEUTS SEG # BLD: 2.6 K/UL (ref 1.7–8.2)
NEUTS SEG NFR BLD: 66 % (ref 43–78)
NRBC # BLD: 0 K/UL (ref 0–0.2)
PLATELET # BLD AUTO: 78 K/UL (ref 150–450)
PMV BLD AUTO: 11.1 FL (ref 9.4–12.3)
POTASSIUM SERPL-SCNC: 4.3 MMOL/L (ref 3.5–5.1)
PROT SERPL-MCNC: 6.1 G/DL (ref 6.3–8.2)
RBC # BLD AUTO: 3.34 M/UL (ref 4.05–5.2)
SODIUM SERPL-SCNC: 143 MMOL/L (ref 136–145)
T4 FREE SERPL-MCNC: 1.5 NG/DL (ref 0.9–1.7)
TSH, 3RD GENERATION: 2.07 UIU/ML (ref 0.27–4.2)
WBC # BLD AUTO: 3.8 K/UL (ref 4.3–11.1)

## 2024-12-09 PROCEDURE — 82784 ASSAY IGA/IGD/IGG/IGM EACH: CPT

## 2024-12-09 PROCEDURE — 82728 ASSAY OF FERRITIN: CPT

## 2024-12-09 PROCEDURE — 84443 ASSAY THYROID STIM HORMONE: CPT

## 2024-12-09 PROCEDURE — 36415 COLL VENOUS BLD VENIPUNCTURE: CPT

## 2024-12-09 PROCEDURE — 85025 COMPLETE CBC W/AUTO DIFF WBC: CPT

## 2024-12-09 PROCEDURE — 96365 THER/PROPH/DIAG IV INF INIT: CPT | Performed by: PSYCHIATRY & NEUROLOGY

## 2024-12-09 PROCEDURE — 96366 THER/PROPH/DIAG IV INF ADDON: CPT | Performed by: PSYCHIATRY & NEUROLOGY

## 2024-12-09 PROCEDURE — 80053 COMPREHEN METABOLIC PANEL: CPT

## 2024-12-09 PROCEDURE — 84439 ASSAY OF FREE THYROXINE: CPT

## 2024-12-09 RX ORDER — ACETAMINOPHEN 325 MG/1
650 TABLET ORAL
Start: 2024-12-09

## 2024-12-09 RX ORDER — SODIUM CHLORIDE 9 MG/ML
INJECTION, SOLUTION INTRAVENOUS CONTINUOUS
Status: DISCONTINUED | OUTPATIENT
Start: 2024-12-09 | End: 2024-12-09 | Stop reason: HOSPADM

## 2024-12-09 RX ORDER — HYDROCORTISONE SODIUM SUCCINATE 100 MG/2ML
100 INJECTION INTRAMUSCULAR; INTRAVENOUS
Status: DISCONTINUED | OUTPATIENT
Start: 2024-12-09 | End: 2024-12-09 | Stop reason: HOSPADM

## 2024-12-09 RX ORDER — ALBUTEROL SULFATE 90 UG/1
4 INHALANT RESPIRATORY (INHALATION) PRN
Status: DISCONTINUED | OUTPATIENT
Start: 2024-12-09 | End: 2024-12-09 | Stop reason: HOSPADM

## 2024-12-09 RX ORDER — SODIUM CHLORIDE 0.9 % (FLUSH) 0.9 %
5-40 SYRINGE (ML) INJECTION PRN
OUTPATIENT
Start: 2024-12-09

## 2024-12-09 RX ORDER — SODIUM CHLORIDE 0.9 % (FLUSH) 0.9 %
5-40 SYRINGE (ML) INJECTION PRN
Status: DISCONTINUED | OUTPATIENT
Start: 2024-12-09 | End: 2024-12-09 | Stop reason: HOSPADM

## 2024-12-09 RX ORDER — DIPHENHYDRAMINE HYDROCHLORIDE 50 MG/ML
50 INJECTION INTRAMUSCULAR; INTRAVENOUS
OUTPATIENT
Start: 2024-12-09

## 2024-12-09 RX ORDER — ACETAMINOPHEN 325 MG/1
650 TABLET ORAL
Status: COMPLETED | OUTPATIENT
Start: 2024-12-09 | End: 2024-12-09

## 2024-12-09 RX ORDER — HEPARIN 100 UNIT/ML
500 SYRINGE INTRAVENOUS PRN
Status: DISCONTINUED | OUTPATIENT
Start: 2024-12-09 | End: 2024-12-09 | Stop reason: HOSPADM

## 2024-12-09 RX ORDER — ONDANSETRON 2 MG/ML
8 INJECTION INTRAMUSCULAR; INTRAVENOUS
OUTPATIENT
Start: 2024-12-09

## 2024-12-09 RX ORDER — SODIUM CHLORIDE 9 MG/ML
5-250 INJECTION, SOLUTION INTRAVENOUS PRN
Status: DISCONTINUED | OUTPATIENT
Start: 2024-12-09 | End: 2024-12-09 | Stop reason: HOSPADM

## 2024-12-09 RX ORDER — MEPERIDINE HYDROCHLORIDE 25 MG/ML
12.5 INJECTION INTRAMUSCULAR; INTRAVENOUS; SUBCUTANEOUS PRN
Status: DISCONTINUED | OUTPATIENT
Start: 2024-12-09 | End: 2024-12-09 | Stop reason: HOSPADM

## 2024-12-09 RX ORDER — MEPERIDINE HYDROCHLORIDE 25 MG/ML
12.5 INJECTION INTRAMUSCULAR; INTRAVENOUS; SUBCUTANEOUS PRN
OUTPATIENT
Start: 2024-12-09

## 2024-12-09 RX ORDER — SODIUM CHLORIDE 9 MG/ML
5-250 INJECTION, SOLUTION INTRAVENOUS PRN
OUTPATIENT
Start: 2024-12-09

## 2024-12-09 RX ORDER — HEPARIN 100 UNIT/ML
500 SYRINGE INTRAVENOUS PRN
OUTPATIENT
Start: 2024-12-09

## 2024-12-09 RX ORDER — DIPHENHYDRAMINE HYDROCHLORIDE 50 MG/ML
50 INJECTION INTRAMUSCULAR; INTRAVENOUS
Status: DISCONTINUED | OUTPATIENT
Start: 2024-12-09 | End: 2024-12-09 | Stop reason: HOSPADM

## 2024-12-09 RX ORDER — DIPHENHYDRAMINE HYDROCHLORIDE 50 MG/ML
50 INJECTION INTRAMUSCULAR; INTRAVENOUS ONCE
Start: 2024-12-09 | End: 2024-12-09

## 2024-12-09 RX ORDER — ONDANSETRON 2 MG/ML
8 INJECTION INTRAMUSCULAR; INTRAVENOUS
Status: DISCONTINUED | OUTPATIENT
Start: 2024-12-09 | End: 2024-12-09 | Stop reason: HOSPADM

## 2024-12-09 RX ORDER — HYDROCORTISONE SODIUM SUCCINATE 100 MG/2ML
100 INJECTION INTRAMUSCULAR; INTRAVENOUS
OUTPATIENT
Start: 2024-12-09

## 2024-12-09 RX ORDER — EPINEPHRINE 1 MG/ML
0.3 INJECTION, SOLUTION, CONCENTRATE INTRAVENOUS PRN
OUTPATIENT
Start: 2024-12-09

## 2024-12-09 RX ORDER — SODIUM CHLORIDE 9 MG/ML
INJECTION, SOLUTION INTRAVENOUS CONTINUOUS
OUTPATIENT
Start: 2024-12-09

## 2024-12-09 RX ORDER — DIPHENHYDRAMINE HYDROCHLORIDE 50 MG/ML
50 INJECTION INTRAMUSCULAR; INTRAVENOUS ONCE
Status: DISCONTINUED | OUTPATIENT
Start: 2024-12-09 | End: 2024-12-09 | Stop reason: HOSPADM

## 2024-12-09 RX ORDER — ACETAMINOPHEN 325 MG/1
650 TABLET ORAL
Status: DISCONTINUED | OUTPATIENT
Start: 2024-12-09 | End: 2024-12-09 | Stop reason: HOSPADM

## 2024-12-09 RX ORDER — ACETAMINOPHEN 325 MG/1
650 TABLET ORAL
OUTPATIENT
Start: 2024-12-09

## 2024-12-09 RX ORDER — EPINEPHRINE 1 MG/ML
0.3 INJECTION, SOLUTION, CONCENTRATE INTRAVENOUS PRN
Status: DISCONTINUED | OUTPATIENT
Start: 2024-12-09 | End: 2024-12-09 | Stop reason: HOSPADM

## 2024-12-09 RX ORDER — ALBUTEROL SULFATE 90 UG/1
4 INHALANT RESPIRATORY (INHALATION) PRN
OUTPATIENT
Start: 2024-12-09

## 2024-12-09 RX ADMIN — ACETAMINOPHEN 650 MG: 325 TABLET ORAL at 09:25

## 2024-12-09 NOTE — PROGRESS NOTES
Spoke with Mom 8/20/21 at 5pm.  Unable to bring Andrew in for exam as was scheduled because they are quarantined d/t covid-19 exposure  Andrew has been having recurrent abdominal pain.  No n/v/d.  Eating normally.  Abdominal pain is random.  Usually occurs during the week, often calls/texts Mom with concerns of abdominal pain while he's at school.  Had some issues with same last year, but better over the summer.  Symptoms don't seem to usually occur during the weekends.  Discussed with Mom - discussed functional abdominal pain.    Will order labs and KUB to evaluate for any other causes  Will refer to GI as needed  Follow up as needed  Reviewed s/s needing further investigation, including those for which to present to ER.   MD Annette

## 2024-12-09 NOTE — PROGRESS NOTES
[facility]  [address], [address]  [place] [address]  Office : [phone], Fax: [phone]     Pre-questions:  Have you recently been exposed to the flu or a cold?    2.   Do you have any symptoms of an infection, like a fever or chills?    3.  Have you had to take an antibiotic since your last infusion?    4.  Have had hospital admissions or missed any work/school since your last infusion?    5.  Do you notice any changes in how you feel in between your infusions and what are they? (Example of symptoms: fatigue, pain, difficulty swallowing, numbness/tingling in extremities, balance, etc.)? When did you notice these changes/wear off/side effects?      Patient arrived ambulatory to the infusion suite today for an IVIG infusion. Vital signs WNL. No contraindications noted. Patient offered warm blanket and pillow for comfort. Patient up ad natalio to BR; offered drink and snacks during visit.    *** g PIV placed in the patient's  *** x *** attempt(s); flushed with 10ml NS. Patient tolerated well.   Pre-medications given p.o. and IVP per orders. No wait time required.   IVIG 50G administered and titrated per orders every *** minutes with vital signs per protocol for *** hours,***minutes.     Patient tolerated the infusion well, no complications noted.     No observation required/recommended.      Post infusion vital signs WNL. PIV flushed with 10ml NS and removed without difficulty, catheter intact; dressing applied. Patient instructed to leave the dressing on for at least 30 minutes before removal.        Patient discharged ambulatory with steady gait out of infusion suite, feeling well.   Patient instructed to call the ordering provider with any post-infusion issues.     Next appointment scheduled at a date/time convenient for them prior to patient's departure today.   
issues.     Next appointment scheduled at a date/time convenient for them prior to patient's departure today.

## 2024-12-10 ENCOUNTER — HOSPITAL ENCOUNTER (OUTPATIENT)
Dept: CT IMAGING | Age: 64
Discharge: HOME | End: 2024-12-13
Attending: INTERNAL MEDICINE
Payer: COMMERCIAL

## 2024-12-10 DIAGNOSIS — D80.1 HYPOGAMMAGLOBULINEMIA (HCC): ICD-10-CM

## 2024-12-10 DIAGNOSIS — C83.30 DIFFUSE LARGE B-CELL LYMPHOMA, UNSPECIFIED BODY REGION (HCC): ICD-10-CM

## 2024-12-10 DIAGNOSIS — C85.88 MARGINAL ZONE LYMPHOMA OF LYMPH NODES OF MULTIPLE SITES (HCC): ICD-10-CM

## 2024-12-10 LAB
IGA SERPL-MCNC: <5 MG/DL (ref 87–352)
IGG SERPL-MCNC: 535 MG/DL (ref 586–1602)
IGM SERPL-MCNC: <5 MG/DL (ref 26–217)

## 2024-12-10 PROCEDURE — 6360000004 HC RX CONTRAST MEDICATION: Performed by: INTERNAL MEDICINE

## 2024-12-10 PROCEDURE — 71260 CT THORAX DX C+: CPT

## 2024-12-10 RX ORDER — DIATRIZOATE MEGLUMINE AND DIATRIZOATE SODIUM 660; 100 MG/ML; MG/ML
15 SOLUTION ORAL; RECTAL
Status: DISCONTINUED | OUTPATIENT
Start: 2024-12-10 | End: 2024-12-14 | Stop reason: HOSPADM

## 2024-12-10 RX ORDER — IOPAMIDOL 755 MG/ML
100 INJECTION, SOLUTION INTRAVASCULAR
Status: COMPLETED | OUTPATIENT
Start: 2024-12-10 | End: 2024-12-10

## 2024-12-10 RX ADMIN — DIATRIZOATE MEGLUMINE AND DIATRIZOATE SODIUM 15 ML: 660; 100 LIQUID ORAL; RECTAL at 10:16

## 2024-12-10 RX ADMIN — IOPAMIDOL 100 ML: 755 INJECTION, SOLUTION INTRAVENOUS at 10:16

## 2024-12-11 ENCOUNTER — TELEPHONE (OUTPATIENT)
Age: 64
End: 2024-12-11

## 2024-12-11 ENCOUNTER — OFFICE VISIT (OUTPATIENT)
Age: 64
End: 2024-12-11
Payer: COMMERCIAL

## 2024-12-11 VITALS
HEART RATE: 72 BPM | SYSTOLIC BLOOD PRESSURE: 132 MMHG | DIASTOLIC BLOOD PRESSURE: 76 MMHG | WEIGHT: 293 LBS | BODY MASS INDEX: 51.91 KG/M2 | HEIGHT: 63 IN

## 2024-12-11 DIAGNOSIS — Z86.718 HISTORY OF DVT (DEEP VEIN THROMBOSIS): ICD-10-CM

## 2024-12-11 DIAGNOSIS — C85.88 MARGINAL ZONE LYMPHOMA OF LYMPH NODES OF MULTIPLE SITES (HCC): ICD-10-CM

## 2024-12-11 DIAGNOSIS — I10 HYPERTENSION, UNSPECIFIED TYPE: ICD-10-CM

## 2024-12-11 DIAGNOSIS — I34.0 MILD MITRAL REGURGITATION BY PRIOR ECHOCARDIOGRAM: ICD-10-CM

## 2024-12-11 DIAGNOSIS — Q21.12 PFO (PATENT FORAMEN OVALE): Primary | ICD-10-CM

## 2024-12-11 DIAGNOSIS — Z86.79 HISTORY OF ATRIAL FIBRILLATION: ICD-10-CM

## 2024-12-11 PROCEDURE — 3075F SYST BP GE 130 - 139MM HG: CPT | Performed by: INTERNAL MEDICINE

## 2024-12-11 PROCEDURE — 99214 OFFICE O/P EST MOD 30 MIN: CPT | Performed by: INTERNAL MEDICINE

## 2024-12-11 PROCEDURE — 3078F DIAST BP <80 MM HG: CPT | Performed by: INTERNAL MEDICINE

## 2024-12-11 NOTE — TELEPHONE ENCOUNTER
Per Dr. Bryant, advised patient that Dr. Bryant will be calling her in just a few minutes. Advised patient to keep phone with her. Patient verbalized understanding.

## 2024-12-17 ENCOUNTER — TELEPHONE (OUTPATIENT)
Dept: ONCOLOGY | Age: 64
End: 2024-12-17

## 2024-12-17 NOTE — TELEPHONE ENCOUNTER
Physician provider: Dr. Bailey  Reason for today's call (Please detail here patients chief complaint): lab appt  Last office visit:n/a  Patient Callback Number: [phone]  Was callback number verified?: Yes  Preferred pharmacy (If refill request): n/a  Calls to office within the last 48 hours?:No    Patient notified that their information will be routed to the [facility] clinical triage team for review. Patient is advised that they will receive a phone call from the triage department. If symptom related and symptoms worsen before receiving a call back, the patient has been advised to proceed to the nearest ED.          Pt would like to know if she will need labs on 12/18. Pt stated she had labs 12/09/2024    [phone]

## 2024-12-17 NOTE — TELEPHONE ENCOUNTER
Chart reviewed. Patient had all her labs drawn on 12/9/24. She thinks that they just forgot to cancel the lab appointment unless we feel she needs to get again. Labs were done about a week ago. She should not need any more labs drawn

## 2024-12-18 ENCOUNTER — OFFICE VISIT (OUTPATIENT)
Dept: ONCOLOGY | Age: 64
End: 2024-12-18
Payer: COMMERCIAL

## 2024-12-18 VITALS
HEART RATE: 75 BPM | WEIGHT: 293 LBS | SYSTOLIC BLOOD PRESSURE: 142 MMHG | RESPIRATION RATE: 16 BRPM | HEIGHT: 63 IN | TEMPERATURE: 99.5 F | OXYGEN SATURATION: 96 % | DIASTOLIC BLOOD PRESSURE: 62 MMHG | BODY MASS INDEX: 51.91 KG/M2

## 2024-12-18 DIAGNOSIS — E03.9 HYPOTHYROIDISM, UNSPECIFIED TYPE: ICD-10-CM

## 2024-12-18 DIAGNOSIS — C85.88 MARGINAL ZONE LYMPHOMA OF LYMPH NODES OF MULTIPLE SITES (HCC): Primary | ICD-10-CM

## 2024-12-18 DIAGNOSIS — D80.1 HYPOGAMMAGLOBULINEMIA (HCC): ICD-10-CM

## 2024-12-18 PROCEDURE — 99214 OFFICE O/P EST MOD 30 MIN: CPT | Performed by: INTERNAL MEDICINE

## 2024-12-18 PROCEDURE — 3078F DIAST BP <80 MM HG: CPT | Performed by: INTERNAL MEDICINE

## 2024-12-18 PROCEDURE — 3077F SYST BP >= 140 MM HG: CPT | Performed by: INTERNAL MEDICINE

## 2024-12-18 RX ORDER — L-METHYLFOLATE-ALGAE-VIT B12-B6 CAP 3-90.314-2-35 MG 3-90.314-2-35 MG
2 CAP ORAL DAILY
Qty: 60 CAPSULE | Refills: 5 | Status: SHIPPED | OUTPATIENT
Start: 2024-12-18

## 2024-12-18 NOTE — PROGRESS NOTES
IVIG every 6-7 weeks per Dr Bailey.   [facility]   Last given 12-9-24. Next due end of January 2025.  Message to  [facility] [place] to schedule pt.     
was also benign on pathology.  We discussed the findings, at this point despite the cervical node progression, we have an FNA and now an excisional biopsy with no evidence of residual lymphoma.  Certainly it is possible that she has some occult disease in one of the other nodes, but with her clinical improvement after IVIG and the multiple negative biopsies, I would recommend no additional work-up or treatment at this time.  I would proceed with follow-up imaging in May 2023 (6 months) to assess for new or progressive lymphadenopathy.  CT reviewed and shows no evidence of progression, in fact her cervical nodes are actually smaller.  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  She is anxious about the possibility of ongoing lymphoma and asks about a core biopsy under imaging guidance.  We discussed the risks and benefits of this approach, specifically that I am unsure it will affect our treatment recommendations.  She would like us to talk to IR to see if they would consider repeat ultrasound and possible core biopsy if an accessible node is present.  She completed a core biopsy of a submandibular node in IR and this was benign, no pathologic cervical nodes were seen.  Therefore, we have continued observation.      Assessment & Plan  1. 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   The likelihood of recurrence of diffuse large B-cell lymphoma is currently low. The marginal zone component may persist and could potentially re-emerge in the future. However, there are no indications of such a development at present. The CT scan does not reveal any significant changes that would necessitate the resumption of treatment or further diagnostic procedures such as biopsies or PET scans. Her cervical adenopathy is

## 2025-01-28 ENCOUNTER — TELEPHONE (OUTPATIENT)
Dept: ONCOLOGY | Age: 65
End: 2025-01-28

## 2025-01-28 DIAGNOSIS — D80.1 HYPOGAMMAGLOBULINEMIA (HCC): Primary | ICD-10-CM

## 2025-01-28 NOTE — TELEPHONE ENCOUNTER
Spoke with Meryl at Dr Bailey's office; awaiting orders to schedule patient. Clinical teams at Infusion can enter needed orders if approved.     Physician provider: Dr. Bailey  Reason for today's call (Please detail here patients chief complaint): lab orders  Last office visit:n/a  Patient Callback Number: [phone]  Was callback number verified?: Yes  Preferred pharmacy (If refill request): n/a  Calls to office within the last 48 hours?:No    Patient notified that their information will be routed to the [facility] clinical triage team for review. Patient is advised that they will receive a phone call from the triage department. If symptom related and symptoms worsen before receiving a call back, the patient has been advised to proceed to the nearest ED.          Verito from Infusion [facility]tr stated pt need labs in for seven week schedule for the IvIg. The labs are not in Epic and the labs that are in the computer are for March.    [phone]

## 2025-01-28 NOTE — TELEPHONE ENCOUNTER
Chart reviewed with Verito. We noted that an IgG level seemed to be ordered alone every other infusion.our office has  placed an order for the IgG to be drawn this week. She will go ahead and reach out to the patient. She is due for another round 1/31/25. I advised Verito that if there was a problem she can call us back and let us know.

## 2025-01-28 NOTE — TELEPHONE ENCOUNTER
Call back placed to morro at the infusion center. SACHA left to call office back. Chart reviewed. If patient to get this every 6-7 weeks she would be due around now.   Order pended to NP for IgG level

## 2025-01-28 NOTE — TELEPHONE ENCOUNTER
Physician provider: Dr. Bailey  Reason for today's call (Please detail here patients chief complaint): lab orders  Last office visit:n/a  Patient Callback Number: [phone]  Was callback number verified?: Yes  Preferred pharmacy (If refill request): n/a  Calls to office within the last 48 hours?:No    Patient notified that their information will be routed to the [facility] clinical triage team for review. Patient is advised that they will receive a phone call from the triage department. If symptom related and symptoms worsen before receiving a call back, the patient has been advised to proceed to the nearest ED.          Verito from Infusion [facility]tr stated pt need labs in for seven week schedule for the IvIg. The labs are not in Epic and the labs that are in the computer are for March.    [phone]

## 2025-01-29 ENCOUNTER — TELEPHONE (OUTPATIENT)
Dept: INFUSION THERAPY | Age: 65
End: 2025-01-29

## 2025-01-29 NOTE — TELEPHONE ENCOUNTER
Patient called stating she needs lab work done prior to [facility]. She also stated Dr Bailey told her to take a specific iron supplement at her last visit and she is not sure what that was. Patient is requesting a call back her IVIG is next week.

## 2025-01-29 NOTE — TELEPHONE ENCOUNTER
Spoke to patient regarding labs before her next IVIG treatment. After chart review it looks like every other time we were doing an IgG level only. She states that is not how her lab works gets done. She has it done every 7 weeks. She states the last time she had labs done her ferritin had made a big dive. She needs to make sure it is checked again. She has her next appointment set up for 2/4/25. She always comes here to get her blood drawn and is asking for a lab appointment. Appointment made for Monday morning. Dates for the lab work will be changed in the system to reflect they get drawn on 2/3.25  We also reviewed the OTC iron supplement that Dr. Bailey had recommended. It is Proferrin ES 12 mg OTC . She appreciated the call and making of the lab appointment

## 2025-02-03 ENCOUNTER — TELEPHONE (OUTPATIENT)
Dept: FAMILY MEDICINE CLINIC | Facility: CLINIC | Age: 65
End: 2025-02-03

## 2025-02-03 ENCOUNTER — HOSPITAL ENCOUNTER (OUTPATIENT)
Dept: LAB | Age: 65
Discharge: HOME | End: 2025-02-03
Payer: COMMERCIAL

## 2025-02-03 DIAGNOSIS — E03.9 HYPOTHYROIDISM, UNSPECIFIED TYPE: ICD-10-CM

## 2025-02-03 DIAGNOSIS — D80.1 HYPOGAMMAGLOBULINEMIA (HCC): ICD-10-CM

## 2025-02-03 DIAGNOSIS — C85.88 MARGINAL ZONE LYMPHOMA OF LYMPH NODES OF MULTIPLE SITES (HCC): ICD-10-CM

## 2025-02-03 LAB
ALBUMIN SERPL-MCNC: 3.5 G/DL (ref 3.2–4.6)
ALBUMIN/GLOB SERPL: 1.4 (ref 1–1.9)
ALP SERPL-CCNC: 89 U/L (ref 35–104)
ALT SERPL-CCNC: 35 U/L (ref 8–45)
ANION GAP SERPL CALC-SCNC: 12 MMOL/L (ref 7–16)
AST SERPL-CCNC: 36 U/L (ref 15–37)
BASOPHILS # BLD: 0.03 K/UL (ref 0–0.2)
BASOPHILS NFR BLD: 0.8 % (ref 0–2)
BILIRUB SERPL-MCNC: 0.4 MG/DL (ref 0–1.2)
BUN SERPL-MCNC: 15 MG/DL (ref 8–23)
CALCIUM SERPL-MCNC: 9 MG/DL (ref 8.8–10.2)
CHLORIDE SERPL-SCNC: 105 MMOL/L (ref 98–107)
CO2 SERPL-SCNC: 25 MMOL/L (ref 20–29)
CREAT SERPL-MCNC: 0.93 MG/DL (ref 0.6–1.1)
DIFFERENTIAL METHOD BLD: ABNORMAL
EOSINOPHIL # BLD: 0.11 K/UL (ref 0–0.8)
EOSINOPHIL NFR BLD: 3.1 % (ref 0.5–7.8)
ERYTHROCYTE [DISTWIDTH] IN BLOOD BY AUTOMATED COUNT: 14.5 % (ref 11.9–14.6)
FERRITIN SERPL-MCNC: 43 NG/ML (ref 8–388)
GLOBULIN SER CALC-MCNC: 2.5 G/DL (ref 2.3–3.5)
GLUCOSE SERPL-MCNC: 103 MG/DL (ref 70–99)
HCT VFR BLD AUTO: 30.2 % (ref 35.8–46.3)
HGB BLD-MCNC: 9.4 G/DL (ref 11.7–15.4)
IMM GRANULOCYTES # BLD AUTO: 0 K/UL (ref 0–0.5)
IMM GRANULOCYTES NFR BLD AUTO: 0 % (ref 0–5)
LYMPHOCYTES # BLD: 0.36 K/UL (ref 0.5–4.6)
LYMPHOCYTES NFR BLD: 10.1 % (ref 13–44)
MCH RBC QN AUTO: 27.4 PG (ref 26.1–32.9)
MCHC RBC AUTO-ENTMCNC: 31.1 G/DL (ref 31.4–35)
MCV RBC AUTO: 88 FL (ref 82–102)
MONOCYTES # BLD: 0.15 K/UL (ref 0.1–1.3)
MONOCYTES NFR BLD: 4.2 % (ref 4–12)
NEUTS SEG # BLD: 2.91 K/UL (ref 1.7–8.2)
NEUTS SEG NFR BLD: 81.8 % (ref 43–78)
NRBC # BLD: 0 K/UL (ref 0–0.2)
PLATELET # BLD AUTO: 78 K/UL (ref 150–450)
PMV BLD AUTO: 11.6 FL (ref 9.4–12.3)
POTASSIUM SERPL-SCNC: 4.6 MMOL/L (ref 3.5–5.1)
PROT SERPL-MCNC: 6 G/DL (ref 6.3–8.2)
RBC # BLD AUTO: 3.43 M/UL (ref 4.05–5.2)
SODIUM SERPL-SCNC: 142 MMOL/L (ref 136–145)
T4 FREE SERPL-MCNC: 1.6 NG/DL (ref 0.9–1.7)
TSH, 3RD GENERATION: 2.98 UIU/ML (ref 0.27–4.2)
WBC # BLD AUTO: 3.6 K/UL (ref 4.3–11.1)

## 2025-02-03 PROCEDURE — 36415 COLL VENOUS BLD VENIPUNCTURE: CPT

## 2025-02-03 PROCEDURE — 82784 ASSAY IGA/IGD/IGG/IGM EACH: CPT

## 2025-02-03 PROCEDURE — 84443 ASSAY THYROID STIM HORMONE: CPT

## 2025-02-03 PROCEDURE — 82728 ASSAY OF FERRITIN: CPT

## 2025-02-03 PROCEDURE — 85025 COMPLETE CBC W/AUTO DIFF WBC: CPT

## 2025-02-03 PROCEDURE — 80053 COMPREHEN METABOLIC PANEL: CPT

## 2025-02-03 PROCEDURE — 84439 ASSAY OF FREE THYROXINE: CPT

## 2025-02-03 NOTE — TELEPHONE ENCOUNTER
Pt called and said she has a 101.3 fever, chills, and everything hurts. She also said she didn't sleep at all last night and tomorrow she is supposed to have an infusion and can't do that because she's sick and said she needs the infusion in order to fight what she has. She asked me to send a message to Dr to see if he can send something in for her?    Pt wants a call back.

## 2025-02-04 ENCOUNTER — TELEPHONE (OUTPATIENT)
Dept: ONCOLOGY | Age: 65
End: 2025-02-04

## 2025-02-04 LAB
IGA SERPL-MCNC: <5 MG/DL (ref 87–352)
IGG SERPL-MCNC: 401 MG/DL (ref 586–1602)
IGM SERPL-MCNC: <5 MG/DL (ref 26–217)

## 2025-02-04 RX ORDER — OSELTAMIVIR PHOSPHATE 75 MG/1
75 CAPSULE ORAL 2 TIMES DAILY
Qty: 14 CAPSULE | Refills: 0 | Status: SHIPPED | OUTPATIENT
Start: 2025-02-04 | End: 2025-02-11

## 2025-02-04 RX ORDER — BROMPHENIRAMINE MALEATE, PSEUDOEPHEDRINE HYDROCHLORIDE, AND DEXTROMETHORPHAN HYDROBROMIDE 2; 30; 10 MG/5ML; MG/5ML; MG/5ML
5 SYRUP ORAL 4 TIMES DAILY PRN
Qty: 180 ML | Refills: 0 | Status: SHIPPED | OUTPATIENT
Start: 2025-02-04

## 2025-02-04 NOTE — TELEPHONE ENCOUNTER
Spoke with patient. Looks like the immunoglobulin levels hadnt resulted yet despite her getting labs done yesterday. I advised she reach out to the infusion center and discuss with them. She verbalized understanding and was going to call them

## 2025-02-04 NOTE — TELEPHONE ENCOUNTER
Pt states she took an at home test and she is negative for covid but POSITVE for FLU    Please send something in for pt?   [facility] on [address]

## 2025-02-04 NOTE — TELEPHONE ENCOUNTER
Physician provider: Dr. Bailey  Reason for today's call (Please detail here patients chief complaint): Pt got labs done yesterday and the results have not come back. Does she still need to keep her infusion appt today at 9 or does she need to r/s when she gets the full lab results back?  Last office visit:n/a  Patient Callback Number: [phone]  Was callback number verified?: Yes  Preferred pharmacy (If refill request): n/a  Calls to office within the last 48 hours?:No    Patient notified that their information will be routed to the [facility] clinical triage team for review. Patient is advised that they will receive a phone call from the triage department. If symptom related and symptoms worsen before receiving a call back, the patient has been advised to proceed to the nearest ED.

## 2025-02-05 ENCOUNTER — NURSE ONLY (OUTPATIENT)
Age: 65
End: 2025-02-05

## 2025-02-05 VITALS
BODY MASS INDEX: 51.03 KG/M2 | HEART RATE: 58 BPM | DIASTOLIC BLOOD PRESSURE: 80 MMHG | RESPIRATION RATE: 16 BRPM | WEIGHT: 288 LBS | OXYGEN SATURATION: 94 % | TEMPERATURE: 98.1 F | SYSTOLIC BLOOD PRESSURE: 149 MMHG

## 2025-02-05 DIAGNOSIS — D80.1 HYPOGAMMAGLOBULINEMIA (HCC): Primary | ICD-10-CM

## 2025-02-05 RX ORDER — SODIUM CHLORIDE 9 MG/ML
INJECTION, SOLUTION INTRAVENOUS CONTINUOUS
OUTPATIENT
Start: 2025-02-05

## 2025-02-05 RX ORDER — DIPHENHYDRAMINE HYDROCHLORIDE 50 MG/ML
50 INJECTION INTRAMUSCULAR; INTRAVENOUS
OUTPATIENT
Start: 2025-02-05

## 2025-02-05 RX ORDER — ACETAMINOPHEN 325 MG/1
650 TABLET ORAL
OUTPATIENT
Start: 2025-02-05

## 2025-02-05 RX ORDER — SODIUM CHLORIDE 0.9 % (FLUSH) 0.9 %
5-40 SYRINGE (ML) INJECTION PRN
Status: DISCONTINUED | OUTPATIENT
Start: 2025-02-05 | End: 2025-02-05 | Stop reason: HOSPADM

## 2025-02-05 RX ORDER — EPINEPHRINE 1 MG/ML
0.3 INJECTION, SOLUTION, CONCENTRATE INTRAVENOUS PRN
Status: DISCONTINUED | OUTPATIENT
Start: 2025-02-05 | End: 2025-02-05 | Stop reason: HOSPADM

## 2025-02-05 RX ORDER — ONDANSETRON 2 MG/ML
8 INJECTION INTRAMUSCULAR; INTRAVENOUS
Status: DISCONTINUED | OUTPATIENT
Start: 2025-02-05 | End: 2025-02-05 | Stop reason: HOSPADM

## 2025-02-05 RX ORDER — ALBUTEROL SULFATE 90 UG/1
4 INHALANT RESPIRATORY (INHALATION) PRN
Status: DISCONTINUED | OUTPATIENT
Start: 2025-02-05 | End: 2025-02-05 | Stop reason: HOSPADM

## 2025-02-05 RX ORDER — SODIUM CHLORIDE 9 MG/ML
5-250 INJECTION, SOLUTION INTRAVENOUS PRN
OUTPATIENT
Start: 2025-02-05

## 2025-02-05 RX ORDER — EPINEPHRINE 1 MG/ML
0.3 INJECTION, SOLUTION, CONCENTRATE INTRAVENOUS PRN
OUTPATIENT
Start: 2025-02-05

## 2025-02-05 RX ORDER — ONDANSETRON 2 MG/ML
8 INJECTION INTRAMUSCULAR; INTRAVENOUS
OUTPATIENT
Start: 2025-02-05

## 2025-02-05 RX ORDER — DIPHENHYDRAMINE HYDROCHLORIDE 50 MG/ML
50 INJECTION INTRAMUSCULAR; INTRAVENOUS ONCE
Start: 2025-02-05 | End: 2025-02-05

## 2025-02-05 RX ORDER — SODIUM CHLORIDE 9 MG/ML
INJECTION, SOLUTION INTRAVENOUS CONTINUOUS
Status: DISCONTINUED | OUTPATIENT
Start: 2025-02-05 | End: 2025-02-05 | Stop reason: HOSPADM

## 2025-02-05 RX ORDER — MEPERIDINE HYDROCHLORIDE 25 MG/ML
12.5 INJECTION INTRAMUSCULAR; INTRAVENOUS; SUBCUTANEOUS PRN
Status: DISCONTINUED | OUTPATIENT
Start: 2025-02-05 | End: 2025-02-05 | Stop reason: HOSPADM

## 2025-02-05 RX ORDER — ACETAMINOPHEN 325 MG/1
650 TABLET ORAL
Status: DISCONTINUED | OUTPATIENT
Start: 2025-02-05 | End: 2025-02-05 | Stop reason: HOSPADM

## 2025-02-05 RX ORDER — HYDROCORTISONE SODIUM SUCCINATE 100 MG/2ML
100 INJECTION INTRAMUSCULAR; INTRAVENOUS
Status: DISCONTINUED | OUTPATIENT
Start: 2025-02-05 | End: 2025-02-05 | Stop reason: HOSPADM

## 2025-02-05 RX ORDER — SODIUM CHLORIDE 9 MG/ML
5-250 INJECTION, SOLUTION INTRAVENOUS PRN
Status: DISCONTINUED | OUTPATIENT
Start: 2025-02-05 | End: 2025-02-05 | Stop reason: HOSPADM

## 2025-02-05 RX ORDER — MEPERIDINE HYDROCHLORIDE 25 MG/ML
12.5 INJECTION INTRAMUSCULAR; INTRAVENOUS; SUBCUTANEOUS PRN
OUTPATIENT
Start: 2025-02-05

## 2025-02-05 RX ORDER — HEPARIN 100 UNIT/ML
500 SYRINGE INTRAVENOUS PRN
OUTPATIENT
Start: 2025-02-05

## 2025-02-05 RX ORDER — SODIUM CHLORIDE 0.9 % (FLUSH) 0.9 %
5-40 SYRINGE (ML) INJECTION PRN
OUTPATIENT
Start: 2025-02-05

## 2025-02-05 RX ORDER — HYDROCORTISONE SODIUM SUCCINATE 100 MG/2ML
100 INJECTION INTRAMUSCULAR; INTRAVENOUS
OUTPATIENT
Start: 2025-02-05

## 2025-02-05 RX ORDER — DIPHENHYDRAMINE HYDROCHLORIDE 50 MG/ML
50 INJECTION INTRAMUSCULAR; INTRAVENOUS ONCE
Status: COMPLETED | OUTPATIENT
Start: 2025-02-05 | End: 2025-02-05

## 2025-02-05 RX ORDER — DIPHENHYDRAMINE HYDROCHLORIDE 50 MG/ML
50 INJECTION INTRAMUSCULAR; INTRAVENOUS
Status: DISCONTINUED | OUTPATIENT
Start: 2025-02-05 | End: 2025-02-05 | Stop reason: HOSPADM

## 2025-02-05 RX ORDER — ALBUTEROL SULFATE 90 UG/1
4 INHALANT RESPIRATORY (INHALATION) PRN
OUTPATIENT
Start: 2025-02-05

## 2025-02-05 RX ORDER — HEPARIN 100 UNIT/ML
500 SYRINGE INTRAVENOUS PRN
Status: DISCONTINUED | OUTPATIENT
Start: 2025-02-05 | End: 2025-02-05 | Stop reason: HOSPADM

## 2025-02-05 RX ORDER — ACETAMINOPHEN 325 MG/1
650 TABLET ORAL
Start: 2025-02-05

## 2025-02-05 RX ADMIN — DIPHENHYDRAMINE HYDROCHLORIDE 50 MG: 50 INJECTION INTRAMUSCULAR; INTRAVENOUS at 09:53

## 2025-02-05 NOTE — PROGRESS NOTES
[facility]  [address], [address]  [place] [address]  Office : [phone], Fax: [phone]     Pre-questions:  Has your insurance changed or have you received a new card since your last visit; this includes open or re-enrollment and any changes you may have made to your insurance plans/policies?  Failure to report changes to your insurance may lead to claim denial, making you liable for the claim amount.  No  Have you recently been exposed to the flu or a cold? Yes - Last week flu already took medications and okayed by Dr.  Do you have any symptoms of an infection, like a fever or chills? No  Have you had to take an antibiotic since your last infusion? Yes  Have had hospital admissions or missed any work/school since your last infusion? No   Do you notice any changes in how you feel in between your infusions and what are they? (Example of symptoms: fatigue, pain, difficulty swallowing, numbness/tingling in extremities, balance, etc.)? When did you notice these changes/wear off/side effects? \"More tired\"      Patient arrived ambulatory to the infusion suite today for an IVIG infusion. Vital signs WNL. Labs completed 2/3/25; Dr Bailey aware and okay to infuse. No contraindications noted. Patient offered warm blanket and pillow for comfort. Patient up ad natalio to BR; offered drink and snacks during visit.    20g 0.75\" gaytan needle placed into the patient's  right subclavian (migrated closer to breast) single lumen port with 1 x attempt (s), brisk blood return, vigorously flushed with 10ml NS. Patient tolerated well.   Pre-medications administered per orders. No wait time required.   IVIG 50G administered and titrated per orders every 15 minutes after initial 30 minutes with vital signs per protocol for 2 hours.   Patient tolerated the infusion well, no complications noted.   No observation required/recommended. No sign/symptoms of adverse reaction during 30

## 2025-02-06 DIAGNOSIS — T45.1X5A POLYNEUROPATHY FOLLOWING CHEMOTHERAPY (HCC): Primary | ICD-10-CM

## 2025-02-06 DIAGNOSIS — G62.0 POLYNEUROPATHY FOLLOWING CHEMOTHERAPY (HCC): Primary | ICD-10-CM

## 2025-02-07 RX ORDER — PREGABALIN 50 MG/1
50 CAPSULE ORAL 3 TIMES DAILY
Qty: 270 CAPSULE | Refills: 0 | Status: SHIPPED | OUTPATIENT
Start: 2025-02-07 | End: 2025-05-08

## 2025-02-07 RX ORDER — ALBUTEROL SULFATE 90 UG/1
2 INHALANT RESPIRATORY (INHALATION) EVERY 4 HOURS
Qty: 18 G | Refills: 0 | Status: SHIPPED | OUTPATIENT
Start: 2025-02-07

## 2025-02-11 RX ORDER — BROMPHENIRAMINE MALEATE, PSEUDOEPHEDRINE HYDROCHLORIDE, AND DEXTROMETHORPHAN HYDROBROMIDE 2; 30; 10 MG/5ML; MG/5ML; MG/5ML
5 SYRUP ORAL 3 TIMES DAILY PRN
Qty: 180 ML | Refills: 0 | Status: SHIPPED | OUTPATIENT
Start: 2025-02-11

## 2025-02-11 NOTE — PROCEDURE: FULL BODY SKIN EXAM - DECLINED
NEW PATIENT ABSTRACT            Referral Diagnosis: LILY    Referring Provider: Antwon Jacob MD    Primary Care Provider: Antwon Jacob MD    Presenting Symptoms: None    Family History of Cancer: Cancer-related family history includes Cancer in his father and mother.    Past Medical History:   Past Medical History:   Diagnosis Date    Anemia 2000 Just below normal    CAD (coronary artery disease) 2010 crestor 10 mg daily    1.5 creatine  10/25    Chest pain     Chronic kidney disease     Chronic renal failure     COVID-19 12/2022    Diabetes (HCC)     Diabetes mellitus (HCC)     Dyspnea     Glaucoma about 1980    lantanaprost    History of Clostridioides difficile colitis     History of UTI     HTN (hypertension), benign     Hyperlipidemia     Hypertension     paroxysmal atrial fibrillation     Sleep apnea     Systolic heart failure (HCC)        Family/ Social/ Medical/ Surgical History Updated in Epic: Yes    Chronological History of Pertinent Events (From Onset of Presenting Symptoms):  1/16/25- WBC: 7.5, RBC: 3.88, Hgb: 10.9, 36.3, Plt: 201    10/9/23- WBC: 7.9, RBC: 4.33, Hgb: 11.3, Hct: 38.0, Plt: 287    Record located in Epic.      Pertinent Notes from Referring Provider: None    Other Pertinent Information: Pt is currently taking Eliquis 5mg BID/daily for Paroxysmal Atrial Fibrillation    
Price (Do Not Change): 0.00
Instructions: This plan will send the code FBSD to the PM system.  DO NOT or CHANGE the price.
Detail Level: Simple

## 2025-03-05 ENCOUNTER — OFFICE VISIT (OUTPATIENT)
Dept: PULMONOLOGY | Age: 65
End: 2025-03-05
Payer: COMMERCIAL

## 2025-03-05 VITALS
HEART RATE: 75 BPM | WEIGHT: 293 LBS | SYSTOLIC BLOOD PRESSURE: 132 MMHG | OXYGEN SATURATION: 96 % | DIASTOLIC BLOOD PRESSURE: 83 MMHG | BODY MASS INDEX: 51.91 KG/M2 | TEMPERATURE: 97.5 F | HEIGHT: 63 IN | RESPIRATION RATE: 19 BRPM

## 2025-03-05 DIAGNOSIS — C83.30 DIFFUSE LARGE B-CELL LYMPHOMA, UNSPECIFIED BODY REGION (HCC): ICD-10-CM

## 2025-03-05 DIAGNOSIS — D80.9 IMMUNOGLOBULIN DEFICIENCY: ICD-10-CM

## 2025-03-05 DIAGNOSIS — J45.21 MILD INTERMITTENT ASTHMA WITH (ACUTE) EXACERBATION: Primary | ICD-10-CM

## 2025-03-05 DIAGNOSIS — G47.33 OSA ON CPAP: ICD-10-CM

## 2025-03-05 DIAGNOSIS — E66.01 MORBID OBESITY WITH BMI OF 50.0-59.9, ADULT: ICD-10-CM

## 2025-03-05 PROCEDURE — 3075F SYST BP GE 130 - 139MM HG: CPT | Performed by: NURSE PRACTITIONER

## 2025-03-05 PROCEDURE — 99214 OFFICE O/P EST MOD 30 MIN: CPT | Performed by: NURSE PRACTITIONER

## 2025-03-05 PROCEDURE — 3079F DIAST BP 80-89 MM HG: CPT | Performed by: NURSE PRACTITIONER

## 2025-03-05 RX ORDER — ALBUTEROL SULFATE 90 UG/1
2 INHALANT RESPIRATORY (INHALATION) EVERY 4 HOURS
Qty: 18 G | Refills: 0 | Status: SHIPPED | OUTPATIENT
Start: 2025-03-05

## 2025-03-05 RX ORDER — FLUTICASONE FUROATE, UMECLIDINIUM BROMIDE AND VILANTEROL TRIFENATATE 200; 62.5; 25 UG/1; UG/1; UG/1
1 POWDER RESPIRATORY (INHALATION) DAILY
Qty: 3 EACH | Refills: 3 | Status: SHIPPED | OUTPATIENT
Start: 2025-03-05

## 2025-03-05 RX ORDER — AZITHROMYCIN 250 MG/1
250 TABLET, FILM COATED ORAL
Qty: 45 TABLET | Refills: 3 | Status: SHIPPED | OUTPATIENT
Start: 2025-03-06

## 2025-03-05 NOTE — PROGRESS NOTES
Name:  Seema Muniz  YOB: 1960   MRN: [id number]      Office Visit: 3/5/2025        ASSESSMENT AND PLAN:  (Medical Decision Making)    Impression: 64 y.o. female with history of mild intermittent asthma, immunoglobulin deficiency and sleep apnea on CPAP    1. Mild intermittent asthma with (acute) exacerbation  --Continue Trelegy 200.    --albuterol as needed  -- Can continue azithromycin on Monday Wednesday Friday.  Updated script to ensure enough pills for 3 months supply.    2. DAGMAR on CPAP  --Seems to be doing rather well on her CPAP    3. Immunoglobulin deficiency (HCC)  4. Diffuse Large B cell lymphoma  --Currently being managed by Dr. Hernandez.  Also with hx of marginal zone lymphoma, concern for recurrence.    Last IgG level was 535.  No recent infections.    5. Morbid obesity with BMI 50.0-59.9  --on wait list with [facility] weight loss program and with hopes of getting Wegovy for weight loss.        Orders Placed This Encounter   Medications    fluticasone-umeclidin-vilant (TRELEGY ELLIPTA) 200-62.5-25 MCG/ACT AEPB inhaler     Sig: Inhale 1 puff into the lungs daily     Dispense:  3 each     Refill:  3    azithromycin (ZITHROMAX) 250 MG tablet     Sig: Take 1 tablet by mouth four times a week     Dispense:  45 tablet     Refill:  3    albuterol sulfate HFA (PROVENTIL;VENTOLIN;PROAIR) 108 (90 Base) MCG/ACT inhaler     Sig: Inhale 2 puffs into the lungs every 4 hours     Dispense:  18 g     Refill:  0     No orders of the defined types were placed in this encounter.    Follow-up and Dispositions    Return in about 6 months (around 9/5/2025) for marly or chang.         Marly Flannery, APRN - CNP    No specialty comments available.    Collaborating physician is Carroll Ruelas MD    __________________________________________________    HISTORY OF PRESENT ILLNESS:    Ms. Seema Muniz is a 64 y.o. female who is seen at [facility] for  Follow-up and Asthma   64 year

## 2025-03-17 ENCOUNTER — HOSPITAL ENCOUNTER (OUTPATIENT)
Dept: LAB | Age: 65
Discharge: HOME | End: 2025-03-17
Payer: COMMERCIAL

## 2025-03-17 ENCOUNTER — TELEPHONE (OUTPATIENT)
Dept: ONCOLOGY | Age: 65
End: 2025-03-17

## 2025-03-17 ENCOUNTER — APPOINTMENT (OUTPATIENT)
Dept: URBAN - METROPOLITAN AREA CLINIC 329 | Facility: CLINIC | Age: 65
Setting detail: DERMATOLOGY
End: 2025-03-17

## 2025-03-17 ENCOUNTER — PATIENT MESSAGE (OUTPATIENT)
Dept: ONCOLOGY | Age: 65
End: 2025-03-17

## 2025-03-17 DIAGNOSIS — Z85.828 PERSONAL HISTORY OF OTHER MALIGNANT NEOPLASM OF SKIN: ICD-10-CM

## 2025-03-17 DIAGNOSIS — C83.30 DIFFUSE LARGE B-CELL LYMPHOMA, UNSPECIFIED BODY REGION (HCC): ICD-10-CM

## 2025-03-17 DIAGNOSIS — L57.0 ACTINIC KERATOSIS: ICD-10-CM

## 2025-03-17 DIAGNOSIS — D69.6 THROMBOCYTOPENIA, UNSPECIFIED: ICD-10-CM

## 2025-03-17 DIAGNOSIS — D69.6 THROMBOCYTOPENIA, UNSPECIFIED: Primary | ICD-10-CM

## 2025-03-17 DIAGNOSIS — D80.1 HYPOGAMMAGLOBULINEMIA: ICD-10-CM

## 2025-03-17 LAB
ALBUMIN SERPL-MCNC: 3.5 G/DL (ref 3.2–4.6)
ALBUMIN/GLOB SERPL: 1.4 (ref 1–1.9)
ALP SERPL-CCNC: 84 U/L (ref 35–104)
ALT SERPL-CCNC: 27 U/L (ref 8–45)
ANION GAP SERPL CALC-SCNC: 10 MMOL/L (ref 7–16)
AST SERPL-CCNC: 32 U/L (ref 15–37)
BASOPHILS # BLD: 0.03 K/UL (ref 0–0.2)
BASOPHILS NFR BLD: 0.8 % (ref 0–2)
BILIRUB SERPL-MCNC: 0.3 MG/DL (ref 0–1.2)
BUN SERPL-MCNC: 21 MG/DL (ref 8–23)
CALCIUM SERPL-MCNC: 9.1 MG/DL (ref 8.8–10.2)
CHLORIDE SERPL-SCNC: 104 MMOL/L (ref 98–107)
CO2 SERPL-SCNC: 24 MMOL/L (ref 20–29)
CREAT SERPL-MCNC: 1.03 MG/DL (ref 0.6–1.1)
DIFFERENTIAL METHOD BLD: ABNORMAL
EOSINOPHIL # BLD: 0.16 K/UL (ref 0–0.8)
EOSINOPHIL NFR BLD: 4.1 % (ref 0.5–7.8)
ERYTHROCYTE [DISTWIDTH] IN BLOOD BY AUTOMATED COUNT: 15.7 % (ref 11.9–14.6)
FERRITIN SERPL-MCNC: 31 NG/ML (ref 8–388)
GLOBULIN SER CALC-MCNC: 2.5 G/DL (ref 2.3–3.5)
GLUCOSE SERPL-MCNC: 108 MG/DL (ref 70–99)
HCT VFR BLD AUTO: 27.8 % (ref 35.8–46.3)
HGB BLD-MCNC: 8.3 G/DL (ref 11.7–15.4)
IGG SERPL-MCNC: 435 MG/DL (ref 700–1600)
IMM GRANULOCYTES # BLD AUTO: 0 K/UL (ref 0–0.5)
IMM GRANULOCYTES NFR BLD AUTO: 0 % (ref 0–5)
IRON SATN MFR SERPL: 6 % (ref 20–50)
IRON SERPL-MCNC: 23 UG/DL (ref 35–100)
LYMPHOCYTES # BLD: 0.85 K/UL (ref 0.5–4.6)
LYMPHOCYTES NFR BLD: 22 % (ref 13–44)
MCH RBC QN AUTO: 25.2 PG (ref 26.1–32.9)
MCHC RBC AUTO-ENTMCNC: 29.9 G/DL (ref 31.4–35)
MCV RBC AUTO: 84.2 FL (ref 82–102)
MONOCYTES # BLD: 0.23 K/UL (ref 0.1–1.3)
MONOCYTES NFR BLD: 6 % (ref 4–12)
NEUTS SEG # BLD: 2.59 K/UL (ref 1.7–8.2)
NEUTS SEG NFR BLD: 67.1 % (ref 43–78)
NRBC # BLD: 0 K/UL (ref 0–0.2)
PLATELET # BLD AUTO: 98 K/UL (ref 150–450)
PMV BLD AUTO: 11.7 FL (ref 9.4–12.3)
POTASSIUM SERPL-SCNC: 4.1 MMOL/L (ref 3.5–5.1)
PROT SERPL-MCNC: 6 G/DL (ref 6.3–8.2)
RBC # BLD AUTO: 3.3 M/UL (ref 4.05–5.2)
SODIUM SERPL-SCNC: 138 MMOL/L (ref 136–145)
TIBC SERPL-MCNC: 386 UG/DL (ref 240–450)
UIBC SERPL-MCNC: 363 UG/DL (ref 112–347)
WBC # BLD AUTO: 3.9 K/UL (ref 4.3–11.1)

## 2025-03-17 PROCEDURE — ? COUNSELING

## 2025-03-17 PROCEDURE — 83550 IRON BINDING TEST: CPT

## 2025-03-17 PROCEDURE — 36415 COLL VENOUS BLD VENIPUNCTURE: CPT

## 2025-03-17 PROCEDURE — 80053 COMPREHEN METABOLIC PANEL: CPT

## 2025-03-17 PROCEDURE — 83540 ASSAY OF IRON: CPT

## 2025-03-17 PROCEDURE — 85025 COMPLETE CBC W/AUTO DIFF WBC: CPT

## 2025-03-17 PROCEDURE — 82784 ASSAY IGA/IGD/IGG/IGM EACH: CPT

## 2025-03-17 PROCEDURE — 82728 ASSAY OF FERRITIN: CPT

## 2025-03-17 PROCEDURE — 17000 DESTRUCT PREMALG LESION: CPT

## 2025-03-17 PROCEDURE — ? FULL BODY SKIN EXAM - DECLINED

## 2025-03-17 PROCEDURE — 17003 DESTRUCT PREMALG LES 2-14: CPT

## 2025-03-17 PROCEDURE — ? LIQUID NITROGEN

## 2025-03-17 ASSESSMENT — LOCATION ZONE DERM: LOCATION ZONE: SCALP

## 2025-03-17 ASSESSMENT — LOCATION SIMPLE DESCRIPTION DERM
LOCATION SIMPLE: RIGHT SCALP
LOCATION SIMPLE: SCALP

## 2025-03-17 ASSESSMENT — LOCATION DETAILED DESCRIPTION DERM
LOCATION DETAILED: LEFT SUPERIOR PARIETAL SCALP
LOCATION DETAILED: RIGHT MEDIAL FRONTAL SCALP

## 2025-03-17 NOTE — TELEPHONE ENCOUNTER
Called pt to discuss her questions. Pt is concerned about her hemoglobin level of 8.3 and ferritin level. Pt stated she is more tired than normal. She also stated that she is confused about her appts. She said in the past, she would get the infusion after seeing the doctor/NP. She is wondering if her schedule could be changed. Informed her that I will send this Dr. Bailey's team. Pt verbalized understanding.

## 2025-03-17 NOTE — PROCEDURE: LIQUID NITROGEN
Post-Care Instructions: I reviewed with the patient in detail post-care instructions. Patient is to wear sunprotection, and avoid picking at any of the treated lesions. Pt may apply Vaseline to crusted or scabbing areas.
Consent: The patient's consent was obtained including but not limited to risks of crusting, scabbing, blistering, scarring, darker or lighter pigmentary change, recurrence, incomplete removal and infection.
Duration Of Freeze Thaw-Cycle (Seconds): 0
Render Note In Bullet Format When Appropriate: No
Show Applicator Variable?: Yes
Detail Level: Detailed

## 2025-03-18 ENCOUNTER — OFFICE VISIT (OUTPATIENT)
Dept: ONCOLOGY | Age: 65
End: 2025-03-18
Payer: COMMERCIAL

## 2025-03-18 VITALS
DIASTOLIC BLOOD PRESSURE: 84 MMHG | HEART RATE: 76 BPM | TEMPERATURE: 97.8 F | RESPIRATION RATE: 22 BRPM | BODY MASS INDEX: 51.91 KG/M2 | HEIGHT: 63 IN | OXYGEN SATURATION: 99 % | WEIGHT: 293 LBS | SYSTOLIC BLOOD PRESSURE: 122 MMHG

## 2025-03-18 DIAGNOSIS — C85.88 MARGINAL ZONE LYMPHOMA OF LYMPH NODES OF MULTIPLE SITES (HCC): ICD-10-CM

## 2025-03-18 DIAGNOSIS — D64.9 ANEMIA, UNSPECIFIED TYPE: ICD-10-CM

## 2025-03-18 DIAGNOSIS — D80.1 HYPOGAMMAGLOBULINEMIA: Primary | ICD-10-CM

## 2025-03-18 DIAGNOSIS — D50.9 IRON DEFICIENCY ANEMIA, UNSPECIFIED IRON DEFICIENCY ANEMIA TYPE: ICD-10-CM

## 2025-03-18 PROCEDURE — 3079F DIAST BP 80-89 MM HG: CPT | Performed by: INTERNAL MEDICINE

## 2025-03-18 PROCEDURE — 99214 OFFICE O/P EST MOD 30 MIN: CPT | Performed by: INTERNAL MEDICINE

## 2025-03-18 PROCEDURE — 3074F SYST BP LT 130 MM HG: CPT | Performed by: INTERNAL MEDICINE

## 2025-03-18 RX ORDER — ONDANSETRON 2 MG/ML
8 INJECTION INTRAMUSCULAR; INTRAVENOUS
OUTPATIENT
Start: 2025-03-25

## 2025-03-18 RX ORDER — SODIUM CHLORIDE 0.9 % (FLUSH) 0.9 %
5-40 SYRINGE (ML) INJECTION PRN
OUTPATIENT
Start: 2025-03-25

## 2025-03-18 RX ORDER — DIPHENHYDRAMINE HYDROCHLORIDE 50 MG/ML
50 INJECTION, SOLUTION INTRAMUSCULAR; INTRAVENOUS
OUTPATIENT
Start: 2025-03-25

## 2025-03-18 RX ORDER — SODIUM CHLORIDE 9 MG/ML
5-250 INJECTION, SOLUTION INTRAVENOUS PRN
OUTPATIENT
Start: 2025-03-25

## 2025-03-18 RX ORDER — ACETAMINOPHEN 325 MG/1
650 TABLET ORAL
OUTPATIENT
Start: 2025-03-25

## 2025-03-18 RX ORDER — ALBUTEROL SULFATE 90 UG/1
4 INHALANT RESPIRATORY (INHALATION) PRN
OUTPATIENT
Start: 2025-03-25

## 2025-03-18 RX ORDER — HEPARIN SODIUM (PORCINE) LOCK FLUSH IV SOLN 100 UNIT/ML 100 UNIT/ML
500 SOLUTION INTRAVENOUS PRN
OUTPATIENT
Start: 2025-03-25

## 2025-03-18 RX ORDER — SODIUM CHLORIDE 9 MG/ML
INJECTION, SOLUTION INTRAVENOUS CONTINUOUS
OUTPATIENT
Start: 2025-03-25

## 2025-03-18 RX ORDER — HYDROCORTISONE SODIUM SUCCINATE 100 MG/2ML
100 INJECTION INTRAMUSCULAR; INTRAVENOUS
OUTPATIENT
Start: 2025-03-25

## 2025-03-18 RX ORDER — EPINEPHRINE 1 MG/ML
0.3 INJECTION, SOLUTION, CONCENTRATE INTRAVENOUS PRN
OUTPATIENT
Start: 2025-03-25

## 2025-03-18 RX ORDER — FAMOTIDINE 10 MG/ML
20 INJECTION, SOLUTION INTRAVENOUS
OUTPATIENT
Start: 2025-03-25

## 2025-03-18 ASSESSMENT — PATIENT HEALTH QUESTIONNAIRE - PHQ9
SUM OF ALL RESPONSES TO PHQ QUESTIONS 1-9: 0
2. FEELING DOWN, DEPRESSED OR HOPELESS: NOT AT ALL
SUM OF ALL RESPONSES TO PHQ QUESTIONS 1-9: 0
1. LITTLE INTEREST OR PLEASURE IN DOING THINGS: NOT AT ALL

## 2025-03-18 NOTE — PATIENT INSTRUCTIONS
Monocytes Absolute 03/17/2025 0.23  0.10 - 1.30 K/UL Final    Eosinophils Absolute 03/17/2025 0.16  0.00 - 0.80 K/UL Final    Basophils Absolute 03/17/2025 0.03  0.00 - 0.20 K/UL Final    Immature Granulocytes Absolute 03/17/2025 0.00  0.00 - 0.50 K/UL Final    Differential Type 03/17/2025 AUTOMATED    Final               Please refer to After Visit Summary or [employer] for upcoming appointment information. If you have any questions regarding your upcoming schedule please reach out to your care team through [employer] or call [phone].    Please notify your assigned Nurse Navigator of any unplanned hospital admissions or Emergency Room visits within 24 hours of discharge.    -------------------------------------------------------------------------------------------------------------------  Please call our office at [phone] if you have any  of the following symptoms:   Fever of 100.5 or greater  Chills  Shortness of breath  Swelling or pain in one leg    After office hours an answering service is available and will contact a provider for emergencies or if you are experiencing any of the above symptoms.        KIZZY KRASUE RN

## 2025-03-18 NOTE — PROGRESS NOTES
4 (fatigue-10)  Vitals reviewed in infusion.   Physical Exam:  Constitutional: Well developed, well nourished female in no acute distress, sitting comfortably in the exam room chair.    HEENT: Normocephalic and atraumatic. Sclerae anicteric. Neck supple without JVD. No thyromegaly present.    Lymph node   No palpable submandibular, cervical, supraclavicular, axillary lymph nodes.   Skin Warm and dry.  No bruising and no rash noted.  No erythema.  No pallor.    Respiratory Lungs are clear to auscultation bilaterally without wheezes, rales or rhonchi, normal air exchange without accessory muscle use.    CVS Normal rate, regular rhythm and normal S1 and S2.  No murmurs, gallops, or rubs.   Abdomen Soft, nontender and nondistended, normoactive bowel sounds.    Neuro Grossly nonfocal with no obvious sensory or motor deficits.   MSK Normal range of motion in general.  No edema and no tenderness.   Psych Appropriate mood and affect.        Labs:  Recent Results (from the past 96 hours)   IgG    Collection Time: 03/17/25  7:13 AM   Result Value Ref Range    IgG 435 (L) 700 - 1600 mg/dL   Ferritin    Collection Time: 03/17/25  7:13 AM   Result Value Ref Range    Ferritin 31 8 - 388 NG/ML   Iron and TIBC    Collection Time: 03/17/25  7:13 AM   Result Value Ref Range    Iron 23 (L) 35 - 100 ug/dL    TIBC 386 240 - 450 ug/dL    Iron % Saturation 6 (L) 20 - 50 %    UIBC 363.0 (H) 112.0 - 347.0 ug/dL   Comprehensive Metabolic Panel    Collection Time: 03/17/25  7:13 AM   Result Value Ref Range    Sodium 138 136 - 145 mmol/L    Potassium 4.1 3.5 - 5.1 mmol/L    Chloride 104 98 - 107 mmol/L    CO2 24 20 - 29 mmol/L    Anion Gap 10 7 - 16 mmol/L    Glucose 108 (H) 70 - 99 mg/dL    BUN 21 8 - 23 MG/DL    Creatinine 1.03 0.60 - 1.10 MG/DL    Est, Glom Filt Rate 61 >60 ml/min/1.73m2    Calcium 9.1 8.8 - 10.2 MG/DL    Total Bilirubin 0.3 0.0 - 1.2 MG/DL    ALT 27 8 - 45 U/L    AST 32 15 - 37 U/L    Alk Phosphatase 84 35 - 104 U/L

## 2025-03-18 NOTE — TELEPHONE ENCOUNTER
RN called patient to discuss her schedule and labs. Patient is concerned about labwork and is scheduled to have her IVIG infusion tomorrow, 3/19, [facility]. Patient's OV is currently scheduled for next week, but patient does not want to wait to have infusion. RN offered appt today at 10 am with Dr. Bailey and patient accepted. Scheduling notified.

## 2025-03-19 ENCOUNTER — CLINICAL SUPPORT (OUTPATIENT)
Age: 65
End: 2025-03-19

## 2025-03-19 VITALS
HEART RATE: 61 BPM | OXYGEN SATURATION: 96 % | TEMPERATURE: 98.1 F | DIASTOLIC BLOOD PRESSURE: 75 MMHG | SYSTOLIC BLOOD PRESSURE: 124 MMHG | BODY MASS INDEX: 51.55 KG/M2 | RESPIRATION RATE: 20 BRPM | WEIGHT: 291 LBS

## 2025-03-19 DIAGNOSIS — D80.1 HYPOGAMMAGLOBULINEMIA: Primary | ICD-10-CM

## 2025-03-19 RX ORDER — HYDROCORTISONE SODIUM SUCCINATE 100 MG/2ML
100 INJECTION INTRAMUSCULAR; INTRAVENOUS
OUTPATIENT
Start: 2025-03-19

## 2025-03-19 RX ORDER — DIPHENHYDRAMINE HYDROCHLORIDE 50 MG/ML
50 INJECTION, SOLUTION INTRAMUSCULAR; INTRAVENOUS
Status: DISCONTINUED | OUTPATIENT
Start: 2025-03-19 | End: 2025-03-19 | Stop reason: HOSPADM

## 2025-03-19 RX ORDER — MEPERIDINE HYDROCHLORIDE 25 MG/ML
12.5 INJECTION INTRAMUSCULAR; INTRAVENOUS; SUBCUTANEOUS PRN
Status: DISCONTINUED | OUTPATIENT
Start: 2025-03-19 | End: 2025-03-19 | Stop reason: HOSPADM

## 2025-03-19 RX ORDER — SODIUM CHLORIDE 9 MG/ML
5-250 INJECTION, SOLUTION INTRAVENOUS PRN
Status: DISCONTINUED | OUTPATIENT
Start: 2025-03-19 | End: 2025-03-19 | Stop reason: HOSPADM

## 2025-03-19 RX ORDER — EPINEPHRINE 1 MG/ML
0.3 INJECTION, SOLUTION, CONCENTRATE INTRAVENOUS PRN
OUTPATIENT
Start: 2025-03-19

## 2025-03-19 RX ORDER — ONDANSETRON 2 MG/ML
8 INJECTION INTRAMUSCULAR; INTRAVENOUS
Status: DISCONTINUED | OUTPATIENT
Start: 2025-03-19 | End: 2025-03-19 | Stop reason: HOSPADM

## 2025-03-19 RX ORDER — HYDROCORTISONE SODIUM SUCCINATE 100 MG/2ML
100 INJECTION INTRAMUSCULAR; INTRAVENOUS
Status: DISCONTINUED | OUTPATIENT
Start: 2025-03-19 | End: 2025-03-19 | Stop reason: HOSPADM

## 2025-03-19 RX ORDER — SODIUM CHLORIDE 9 MG/ML
5-250 INJECTION, SOLUTION INTRAVENOUS PRN
OUTPATIENT
Start: 2025-03-19

## 2025-03-19 RX ORDER — SODIUM CHLORIDE 0.9 % (FLUSH) 0.9 %
5-40 SYRINGE (ML) INJECTION PRN
Status: DISCONTINUED | OUTPATIENT
Start: 2025-03-19 | End: 2025-03-19 | Stop reason: HOSPADM

## 2025-03-19 RX ORDER — EPINEPHRINE 1 MG/ML
0.3 INJECTION, SOLUTION, CONCENTRATE INTRAVENOUS PRN
Status: DISCONTINUED | OUTPATIENT
Start: 2025-03-19 | End: 2025-03-19 | Stop reason: HOSPADM

## 2025-03-19 RX ORDER — ACETAMINOPHEN 325 MG/1
650 TABLET ORAL
Status: COMPLETED | OUTPATIENT
Start: 2025-03-19 | End: 2025-03-19

## 2025-03-19 RX ORDER — ALBUTEROL SULFATE 90 UG/1
4 INHALANT RESPIRATORY (INHALATION) PRN
OUTPATIENT
Start: 2025-03-19

## 2025-03-19 RX ORDER — HEPARIN 100 UNIT/ML
500 SYRINGE INTRAVENOUS PRN
Status: DISCONTINUED | OUTPATIENT
Start: 2025-03-19 | End: 2025-03-19 | Stop reason: HOSPADM

## 2025-03-19 RX ORDER — DIPHENHYDRAMINE HYDROCHLORIDE 50 MG/ML
50 INJECTION, SOLUTION INTRAMUSCULAR; INTRAVENOUS ONCE
Status: COMPLETED | OUTPATIENT
Start: 2025-03-19 | End: 2025-03-19

## 2025-03-19 RX ORDER — ACETAMINOPHEN 325 MG/1
650 TABLET ORAL
Status: DISCONTINUED | OUTPATIENT
Start: 2025-03-19 | End: 2025-03-19 | Stop reason: HOSPADM

## 2025-03-19 RX ORDER — ACETAMINOPHEN 325 MG/1
650 TABLET ORAL
OUTPATIENT
Start: 2025-03-19

## 2025-03-19 RX ORDER — ONDANSETRON 2 MG/ML
8 INJECTION INTRAMUSCULAR; INTRAVENOUS
OUTPATIENT
Start: 2025-03-19

## 2025-03-19 RX ORDER — HEPARIN 100 UNIT/ML
500 SYRINGE INTRAVENOUS PRN
OUTPATIENT
Start: 2025-03-19

## 2025-03-19 RX ORDER — DIPHENHYDRAMINE HYDROCHLORIDE 50 MG/ML
50 INJECTION, SOLUTION INTRAMUSCULAR; INTRAVENOUS
OUTPATIENT
Start: 2025-03-19

## 2025-03-19 RX ORDER — ACETAMINOPHEN 325 MG/1
650 TABLET ORAL
Start: 2025-03-19

## 2025-03-19 RX ORDER — DIPHENHYDRAMINE HYDROCHLORIDE 50 MG/ML
50 INJECTION, SOLUTION INTRAMUSCULAR; INTRAVENOUS ONCE
Start: 2025-03-19 | End: 2025-03-19

## 2025-03-19 RX ORDER — ALBUTEROL SULFATE 90 UG/1
4 INHALANT RESPIRATORY (INHALATION) PRN
Status: DISCONTINUED | OUTPATIENT
Start: 2025-03-19 | End: 2025-03-19 | Stop reason: HOSPADM

## 2025-03-19 RX ORDER — SODIUM CHLORIDE 0.9 % (FLUSH) 0.9 %
5-40 SYRINGE (ML) INJECTION PRN
OUTPATIENT
Start: 2025-03-19

## 2025-03-19 RX ORDER — MEPERIDINE HYDROCHLORIDE 25 MG/ML
12.5 INJECTION INTRAMUSCULAR; INTRAVENOUS; SUBCUTANEOUS PRN
OUTPATIENT
Start: 2025-03-19

## 2025-03-19 RX ORDER — SODIUM CHLORIDE 9 MG/ML
INJECTION, SOLUTION INTRAVENOUS CONTINUOUS
OUTPATIENT
Start: 2025-03-19

## 2025-03-19 RX ORDER — SODIUM CHLORIDE 9 MG/ML
INJECTION, SOLUTION INTRAVENOUS CONTINUOUS
Status: DISCONTINUED | OUTPATIENT
Start: 2025-03-19 | End: 2025-03-19 | Stop reason: HOSPADM

## 2025-03-19 RX ADMIN — DIPHENHYDRAMINE HYDROCHLORIDE 50 MG: 50 INJECTION, SOLUTION INTRAMUSCULAR; INTRAVENOUS at 09:45

## 2025-03-19 RX ADMIN — ACETAMINOPHEN 650 MG: 325 TABLET ORAL at 09:40

## 2025-03-19 ASSESSMENT — PAIN DESCRIPTION - LOCATION: LOCATION: ABDOMEN

## 2025-03-19 ASSESSMENT — PAIN DESCRIPTION - DESCRIPTORS: DESCRIPTORS: DISCOMFORT;ACHING

## 2025-03-19 ASSESSMENT — PAIN SCALES - GENERAL: PAINLEVEL_OUTOF10: 2

## 2025-03-19 NOTE — PROGRESS NOTES
with 20ml NS, heparin locked with 5ml, and de-accessed without difficulty, catheter intact. Patient tolerated well. Site covered with gauze and pressure dressing. Patient instructed to leave in place for 30 minutes before removal.      Patient discharged ambulatory with steady gait out of infusion suite, feeling well.  Patient instructed to call the ordering provider with any post-infusion issues.     Next appointment scheduled at a date/time convenient for them prior to patient's departure today.

## 2025-04-08 ENCOUNTER — HOSPITAL ENCOUNTER (OUTPATIENT)
Dept: INFUSION THERAPY | Age: 65
Setting detail: INFUSION SERIES
Discharge: HOME | End: 2025-04-08
Payer: COMMERCIAL

## 2025-04-08 VITALS
RESPIRATION RATE: 18 BRPM | TEMPERATURE: 98 F | SYSTOLIC BLOOD PRESSURE: 131 MMHG | HEART RATE: 56 BPM | OXYGEN SATURATION: 97 % | DIASTOLIC BLOOD PRESSURE: 71 MMHG

## 2025-04-08 DIAGNOSIS — D50.9 IRON DEFICIENCY ANEMIA, UNSPECIFIED IRON DEFICIENCY ANEMIA TYPE: Primary | ICD-10-CM

## 2025-04-08 DIAGNOSIS — C83.30 DIFFUSE LARGE B-CELL LYMPHOMA, UNSPECIFIED BODY REGION (HCC): ICD-10-CM

## 2025-04-08 PROCEDURE — 2500000003 HC RX 250 WO HCPCS: Performed by: INTERNAL MEDICINE

## 2025-04-08 PROCEDURE — 2580000003 HC RX 258: Performed by: INTERNAL MEDICINE

## 2025-04-08 PROCEDURE — 6360000002 HC RX W HCPCS: Performed by: INTERNAL MEDICINE

## 2025-04-08 PROCEDURE — 96365 THER/PROPH/DIAG IV INF INIT: CPT

## 2025-04-08 RX ORDER — EPINEPHRINE 1 MG/ML
0.3 INJECTION, SOLUTION, CONCENTRATE INTRAVENOUS PRN
Status: CANCELLED | OUTPATIENT
Start: 2025-04-15

## 2025-04-08 RX ORDER — SODIUM CHLORIDE 9 MG/ML
5-250 INJECTION, SOLUTION INTRAVENOUS PRN
Status: CANCELLED | OUTPATIENT
Start: 2025-04-15

## 2025-04-08 RX ORDER — ALBUTEROL SULFATE 90 UG/1
4 INHALANT RESPIRATORY (INHALATION) PRN
Status: CANCELLED | OUTPATIENT
Start: 2025-04-15

## 2025-04-08 RX ORDER — DIPHENHYDRAMINE HYDROCHLORIDE 50 MG/ML
50 INJECTION, SOLUTION INTRAMUSCULAR; INTRAVENOUS
Status: DISCONTINUED | OUTPATIENT
Start: 2025-04-08 | End: 2025-04-09 | Stop reason: HOSPADM

## 2025-04-08 RX ORDER — SODIUM CHLORIDE 9 MG/ML
INJECTION, SOLUTION INTRAVENOUS CONTINUOUS
Status: CANCELLED | OUTPATIENT
Start: 2025-04-15

## 2025-04-08 RX ORDER — SODIUM CHLORIDE 0.9 % (FLUSH) 0.9 %
5-40 SYRINGE (ML) INJECTION PRN
Status: DISCONTINUED | OUTPATIENT
Start: 2025-04-08 | End: 2025-04-09 | Stop reason: HOSPADM

## 2025-04-08 RX ORDER — DIPHENHYDRAMINE HYDROCHLORIDE 50 MG/ML
50 INJECTION, SOLUTION INTRAMUSCULAR; INTRAVENOUS
Status: CANCELLED | OUTPATIENT
Start: 2025-04-15

## 2025-04-08 RX ORDER — ONDANSETRON 2 MG/ML
8 INJECTION INTRAMUSCULAR; INTRAVENOUS
Status: CANCELLED | OUTPATIENT
Start: 2025-04-15

## 2025-04-08 RX ORDER — HYDROCORTISONE SODIUM SUCCINATE 100 MG/2ML
100 INJECTION INTRAMUSCULAR; INTRAVENOUS
Status: CANCELLED | OUTPATIENT
Start: 2025-04-15

## 2025-04-08 RX ORDER — ONDANSETRON 2 MG/ML
8 INJECTION INTRAMUSCULAR; INTRAVENOUS
Status: DISCONTINUED | OUTPATIENT
Start: 2025-04-08 | End: 2025-04-09 | Stop reason: HOSPADM

## 2025-04-08 RX ORDER — ACETAMINOPHEN 325 MG/1
650 TABLET ORAL
Status: DISCONTINUED | OUTPATIENT
Start: 2025-04-08 | End: 2025-04-09 | Stop reason: HOSPADM

## 2025-04-08 RX ORDER — HEPARIN 100 UNIT/ML
500 SYRINGE INTRAVENOUS PRN
Status: CANCELLED | OUTPATIENT
Start: 2025-04-15

## 2025-04-08 RX ORDER — ALBUTEROL SULFATE 90 UG/1
4 INHALANT RESPIRATORY (INHALATION) PRN
Status: DISCONTINUED | OUTPATIENT
Start: 2025-04-08 | End: 2025-04-09 | Stop reason: HOSPADM

## 2025-04-08 RX ORDER — EPINEPHRINE 1 MG/ML
0.3 INJECTION, SOLUTION, CONCENTRATE INTRAVENOUS PRN
Status: DISCONTINUED | OUTPATIENT
Start: 2025-04-08 | End: 2025-04-09 | Stop reason: HOSPADM

## 2025-04-08 RX ORDER — HYDROCORTISONE SODIUM SUCCINATE 100 MG/2ML
100 INJECTION INTRAMUSCULAR; INTRAVENOUS
Status: DISCONTINUED | OUTPATIENT
Start: 2025-04-08 | End: 2025-04-09 | Stop reason: HOSPADM

## 2025-04-08 RX ORDER — ACETAMINOPHEN 325 MG/1
650 TABLET ORAL
Status: CANCELLED | OUTPATIENT
Start: 2025-04-15

## 2025-04-08 RX ORDER — SODIUM CHLORIDE 0.9 % (FLUSH) 0.9 %
5-40 SYRINGE (ML) INJECTION PRN
Status: CANCELLED | OUTPATIENT
Start: 2025-04-15

## 2025-04-08 RX ADMIN — FERUMOXYTOL 510 MG: 510 INJECTION INTRAVENOUS at 14:52

## 2025-04-08 RX ADMIN — SODIUM CHLORIDE, PRESERVATIVE FREE 10 ML: 5 INJECTION INTRAVENOUS at 14:32

## 2025-04-08 NOTE — PROGRESS NOTES
Patient arrived to infusion. Feraheme completed. Patient tolerated without difficulty. Monitored for 30 minutes. VSS. Discharged ambulatory. Patient aware of next infusion appt on 4/15.

## 2025-04-15 ENCOUNTER — HOSPITAL ENCOUNTER (OUTPATIENT)
Dept: INFUSION THERAPY | Age: 65
Setting detail: INFUSION SERIES
Discharge: HOME | End: 2025-04-15
Payer: COMMERCIAL

## 2025-04-15 VITALS
RESPIRATION RATE: 16 BRPM | DIASTOLIC BLOOD PRESSURE: 68 MMHG | OXYGEN SATURATION: 100 % | TEMPERATURE: 97.9 F | HEART RATE: 63 BPM | SYSTOLIC BLOOD PRESSURE: 143 MMHG

## 2025-04-15 DIAGNOSIS — C83.30 DIFFUSE LARGE B-CELL LYMPHOMA, UNSPECIFIED BODY REGION (HCC): ICD-10-CM

## 2025-04-15 DIAGNOSIS — D50.9 IRON DEFICIENCY ANEMIA, UNSPECIFIED IRON DEFICIENCY ANEMIA TYPE: Primary | ICD-10-CM

## 2025-04-15 PROCEDURE — 2580000003 HC RX 258: Performed by: INTERNAL MEDICINE

## 2025-04-15 PROCEDURE — 96365 THER/PROPH/DIAG IV INF INIT: CPT

## 2025-04-15 PROCEDURE — 6360000002 HC RX W HCPCS: Performed by: INTERNAL MEDICINE

## 2025-04-15 RX ORDER — SODIUM CHLORIDE 9 MG/ML
INJECTION, SOLUTION INTRAVENOUS CONTINUOUS
Status: DISCONTINUED | OUTPATIENT
Start: 2025-04-15 | End: 2025-04-16 | Stop reason: HOSPADM

## 2025-04-15 RX ORDER — ACETAMINOPHEN 325 MG/1
650 TABLET ORAL
Status: DISCONTINUED | OUTPATIENT
Start: 2025-04-15 | End: 2025-04-16 | Stop reason: HOSPADM

## 2025-04-15 RX ORDER — SODIUM CHLORIDE 9 MG/ML
5-250 INJECTION, SOLUTION INTRAVENOUS PRN
OUTPATIENT
Start: 2025-04-22

## 2025-04-15 RX ORDER — DIPHENHYDRAMINE HYDROCHLORIDE 50 MG/ML
50 INJECTION, SOLUTION INTRAMUSCULAR; INTRAVENOUS
OUTPATIENT
Start: 2025-04-22

## 2025-04-15 RX ORDER — ALBUTEROL SULFATE 90 UG/1
4 INHALANT RESPIRATORY (INHALATION) PRN
OUTPATIENT
Start: 2025-04-22

## 2025-04-15 RX ORDER — HYDROCORTISONE SODIUM SUCCINATE 100 MG/2ML
100 INJECTION INTRAMUSCULAR; INTRAVENOUS
OUTPATIENT
Start: 2025-04-22

## 2025-04-15 RX ORDER — ONDANSETRON 2 MG/ML
8 INJECTION INTRAMUSCULAR; INTRAVENOUS
Status: DISCONTINUED | OUTPATIENT
Start: 2025-04-15 | End: 2025-04-16 | Stop reason: HOSPADM

## 2025-04-15 RX ORDER — EPINEPHRINE 1 MG/ML
0.3 INJECTION, SOLUTION, CONCENTRATE INTRAVENOUS PRN
OUTPATIENT
Start: 2025-04-22

## 2025-04-15 RX ORDER — ACETAMINOPHEN 325 MG/1
650 TABLET ORAL
OUTPATIENT
Start: 2025-04-22

## 2025-04-15 RX ORDER — HYDROCORTISONE SODIUM SUCCINATE 100 MG/2ML
100 INJECTION INTRAMUSCULAR; INTRAVENOUS
Status: DISCONTINUED | OUTPATIENT
Start: 2025-04-15 | End: 2025-04-16 | Stop reason: HOSPADM

## 2025-04-15 RX ORDER — DIPHENHYDRAMINE HYDROCHLORIDE 50 MG/ML
50 INJECTION, SOLUTION INTRAMUSCULAR; INTRAVENOUS
Status: DISCONTINUED | OUTPATIENT
Start: 2025-04-15 | End: 2025-04-16 | Stop reason: HOSPADM

## 2025-04-15 RX ORDER — HEPARIN 100 UNIT/ML
500 SYRINGE INTRAVENOUS PRN
OUTPATIENT
Start: 2025-04-22

## 2025-04-15 RX ORDER — SODIUM CHLORIDE 9 MG/ML
5-250 INJECTION, SOLUTION INTRAVENOUS PRN
Status: DISCONTINUED | OUTPATIENT
Start: 2025-04-15 | End: 2025-04-16 | Stop reason: HOSPADM

## 2025-04-15 RX ORDER — ONDANSETRON 2 MG/ML
8 INJECTION INTRAMUSCULAR; INTRAVENOUS
OUTPATIENT
Start: 2025-04-22

## 2025-04-15 RX ORDER — SODIUM CHLORIDE 0.9 % (FLUSH) 0.9 %
5-40 SYRINGE (ML) INJECTION PRN
OUTPATIENT
Start: 2025-04-22

## 2025-04-15 RX ORDER — SODIUM CHLORIDE 9 MG/ML
INJECTION, SOLUTION INTRAVENOUS CONTINUOUS
OUTPATIENT
Start: 2025-04-22

## 2025-04-15 RX ORDER — ALBUTEROL SULFATE 90 UG/1
4 INHALANT RESPIRATORY (INHALATION) PRN
Status: DISCONTINUED | OUTPATIENT
Start: 2025-04-15 | End: 2025-04-16 | Stop reason: HOSPADM

## 2025-04-15 RX ORDER — EPINEPHRINE 1 MG/ML
0.3 INJECTION, SOLUTION, CONCENTRATE INTRAVENOUS PRN
Status: DISCONTINUED | OUTPATIENT
Start: 2025-04-15 | End: 2025-04-16 | Stop reason: HOSPADM

## 2025-04-15 RX ADMIN — SODIUM CHLORIDE: 0.9 INJECTION, SOLUTION INTRAVENOUS at 15:21

## 2025-04-15 RX ADMIN — FERUMOXYTOL 510 MG: 510 INJECTION INTRAVENOUS at 15:32

## 2025-04-15 NOTE — PROGRESS NOTES
Arrived to the Infusion Center.  Assessment completed. Feraheme  completed. Patient tolerated without problems. Patient stayed for 30 min observation post infusion with no problems noted  Any issues or concerns during appointment: None  Instructed to call Dr Bailey with any side effects or concerns  Patient has no future appointment.  Discharged ambulatory

## 2025-04-23 ENCOUNTER — OFFICE VISIT (OUTPATIENT)
Dept: ORTHOPEDIC SURGERY | Age: 65
End: 2025-04-23
Payer: COMMERCIAL

## 2025-04-23 DIAGNOSIS — M47.816 LUMBAR SPONDYLOSIS: ICD-10-CM

## 2025-04-23 DIAGNOSIS — M79.18 BUTTOCK PAIN: ICD-10-CM

## 2025-04-23 DIAGNOSIS — M43.16 SPONDYLOLISTHESIS OF LUMBAR REGION: ICD-10-CM

## 2025-04-23 DIAGNOSIS — Z96.651 S/P TOTAL KNEE ARTHROPLASTY, RIGHT: Primary | ICD-10-CM

## 2025-04-23 PROCEDURE — 1123F ACP DISCUSS/DSCN MKR DOCD: CPT | Performed by: PHYSICIAN ASSISTANT

## 2025-04-23 PROCEDURE — 99204 OFFICE O/P NEW MOD 45 MIN: CPT | Performed by: PHYSICIAN ASSISTANT

## 2025-04-23 RX ORDER — METHOCARBAMOL 750 MG/1
750 TABLET, FILM COATED ORAL 3 TIMES DAILY PRN
Qty: 40 TABLET | Refills: 1 | Status: SHIPPED | OUTPATIENT
Start: 2025-04-23
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
## 2025-04-28 ENCOUNTER — HOSPITAL ENCOUNTER (OUTPATIENT)
Dept: LAB | Age: 65
Discharge: HOME | End: 2025-04-28
Payer: COMMERCIAL

## 2025-04-28 ENCOUNTER — RESULTS FOLLOW-UP (OUTPATIENT)
Dept: ONCOLOGY | Age: 65
End: 2025-04-28

## 2025-04-28 DIAGNOSIS — D80.1 HYPOGAMMAGLOBULINEMIA: ICD-10-CM

## 2025-04-28 DIAGNOSIS — C85.88 MARGINAL ZONE LYMPHOMA OF LYMPH NODES OF MULTIPLE SITES (HCC): ICD-10-CM

## 2025-04-28 DIAGNOSIS — D64.9 ANEMIA, UNSPECIFIED TYPE: ICD-10-CM

## 2025-04-28 DIAGNOSIS — D50.9 IRON DEFICIENCY ANEMIA, UNSPECIFIED IRON DEFICIENCY ANEMIA TYPE: ICD-10-CM

## 2025-04-28 DIAGNOSIS — D64.9 ANEMIA, UNSPECIFIED TYPE: Primary | ICD-10-CM

## 2025-04-28 LAB
ALBUMIN SERPL-MCNC: 3.7 G/DL (ref 3.2–4.6)
ALBUMIN/GLOB SERPL: 1.4 (ref 1–1.9)
ALP SERPL-CCNC: 78 U/L (ref 35–104)
ALT SERPL-CCNC: 34 U/L (ref 8–45)
ANION GAP SERPL CALC-SCNC: 11 MMOL/L (ref 7–16)
AST SERPL-CCNC: 43 U/L (ref 15–37)
BASOPHILS # BLD: 0.02 K/UL (ref 0–0.2)
BASOPHILS NFR BLD: 0.5 % (ref 0–2)
BILIRUB SERPL-MCNC: 0.4 MG/DL (ref 0–1.2)
BUN SERPL-MCNC: 19 MG/DL (ref 8–23)
CALCIUM SERPL-MCNC: 9.5 MG/DL (ref 8.8–10.2)
CHLORIDE SERPL-SCNC: 104 MMOL/L (ref 98–107)
CO2 SERPL-SCNC: 25 MMOL/L (ref 20–29)
CREAT SERPL-MCNC: 0.97 MG/DL (ref 0.6–1.1)
DIFFERENTIAL METHOD BLD: ABNORMAL
EOSINOPHIL # BLD: 0.13 K/UL (ref 0–0.8)
EOSINOPHIL NFR BLD: 3.5 % (ref 0.5–7.8)
ERYTHROCYTE [DISTWIDTH] IN BLOOD BY AUTOMATED COUNT: 23.4 % (ref 11.9–14.6)
FERRITIN SERPL-MCNC: 315 NG/ML (ref 8–388)
GLOBULIN SER CALC-MCNC: 2.6 G/DL (ref 2.3–3.5)
GLUCOSE SERPL-MCNC: 110 MG/DL (ref 70–99)
HCT VFR BLD AUTO: 28.1 % (ref 35.8–46.3)
HGB BLD-MCNC: 8.6 G/DL (ref 11.7–15.4)
IGG SERPL-MCNC: 449 MG/DL (ref 700–1600)
IMM GRANULOCYTES # BLD AUTO: 0.01 K/UL (ref 0–0.5)
IMM GRANULOCYTES NFR BLD AUTO: 0.3 % (ref 0–5)
IRON SATN MFR SERPL: 15 % (ref 20–50)
IRON SERPL-MCNC: 49 UG/DL (ref 35–100)
LYMPHOCYTES # BLD: 0.83 K/UL (ref 0.5–4.6)
LYMPHOCYTES NFR BLD: 22.4 % (ref 13–44)
MCH RBC QN AUTO: 27.6 PG (ref 26.1–32.9)
MCHC RBC AUTO-ENTMCNC: 30.6 G/DL (ref 31.4–35)
MCV RBC AUTO: 90.1 FL (ref 82–102)
MONOCYTES # BLD: 0.18 K/UL (ref 0.1–1.3)
MONOCYTES NFR BLD: 4.9 % (ref 4–12)
NEUTS SEG # BLD: 2.54 K/UL (ref 1.7–8.2)
NEUTS SEG NFR BLD: 68.4 % (ref 43–78)
NRBC # BLD: 0 K/UL (ref 0–0.2)
PLATELET # BLD AUTO: 71 K/UL (ref 150–450)
PMV BLD AUTO: 10.5 FL (ref 9.4–12.3)
POTASSIUM SERPL-SCNC: 4.1 MMOL/L (ref 3.5–5.1)
PROT SERPL-MCNC: 6.3 G/DL (ref 6.3–8.2)
RBC # BLD AUTO: 3.12 M/UL (ref 4.05–5.2)
SODIUM SERPL-SCNC: 140 MMOL/L (ref 136–145)
TIBC SERPL-MCNC: 323 UG/DL (ref 240–450)
UIBC SERPL-MCNC: 274 UG/DL (ref 112–347)
WBC # BLD AUTO: 3.7 K/UL (ref 4.3–11.1)

## 2025-04-28 PROCEDURE — 83540 ASSAY OF IRON: CPT

## 2025-04-28 PROCEDURE — 83550 IRON BINDING TEST: CPT

## 2025-04-28 PROCEDURE — 84443 ASSAY THYROID STIM HORMONE: CPT

## 2025-04-28 PROCEDURE — 84439 ASSAY OF FREE THYROXINE: CPT

## 2025-04-28 PROCEDURE — 36415 COLL VENOUS BLD VENIPUNCTURE: CPT

## 2025-04-28 PROCEDURE — 84238 ASSAY NONENDOCRINE RECEPTOR: CPT

## 2025-04-28 PROCEDURE — 82728 ASSAY OF FERRITIN: CPT

## 2025-04-28 PROCEDURE — 80053 COMPREHEN METABOLIC PANEL: CPT

## 2025-04-28 PROCEDURE — 82784 ASSAY IGA/IGD/IGG/IGM EACH: CPT

## 2025-04-28 PROCEDURE — 85025 COMPLETE CBC W/AUTO DIFF WBC: CPT

## 2025-04-28 NOTE — PROGRESS NOTES
Verbal order for soluble transferrin receptor received from Dr. Bailey with verbal read back to confirm. Order signed and routed to provider for co signature.

## 2025-04-29 ENCOUNTER — HOSPITAL ENCOUNTER (OUTPATIENT)
Dept: LAB | Age: 65
Discharge: HOME | End: 2025-04-29

## 2025-04-29 ENCOUNTER — OFFICE VISIT (OUTPATIENT)
Dept: ONCOLOGY | Age: 65
End: 2025-04-29
Payer: COMMERCIAL

## 2025-04-29 VITALS
WEIGHT: 292 LBS | HEIGHT: 63 IN | DIASTOLIC BLOOD PRESSURE: 77 MMHG | SYSTOLIC BLOOD PRESSURE: 136 MMHG | TEMPERATURE: 99 F | OXYGEN SATURATION: 99 % | BODY MASS INDEX: 51.74 KG/M2 | RESPIRATION RATE: 12 BRPM | HEART RATE: 69 BPM

## 2025-04-29 DIAGNOSIS — C85.88 MARGINAL ZONE LYMPHOMA OF LYMPH NODES OF MULTIPLE SITES (HCC): Primary | ICD-10-CM

## 2025-04-29 DIAGNOSIS — D50.9 IRON DEFICIENCY ANEMIA, UNSPECIFIED IRON DEFICIENCY ANEMIA TYPE: ICD-10-CM

## 2025-04-29 DIAGNOSIS — D80.1 HYPOGAMMAGLOBULINEMIA: ICD-10-CM

## 2025-04-29 DIAGNOSIS — D64.9 ANEMIA, UNSPECIFIED TYPE: ICD-10-CM

## 2025-04-29 DIAGNOSIS — C83.30 DIFFUSE LARGE B-CELL LYMPHOMA, UNSPECIFIED BODY REGION (HCC): ICD-10-CM

## 2025-04-29 DIAGNOSIS — E03.9 HYPOTHYROIDISM, UNSPECIFIED TYPE: Primary | ICD-10-CM

## 2025-04-29 DIAGNOSIS — E03.9 HYPOTHYROIDISM, UNSPECIFIED TYPE: ICD-10-CM

## 2025-04-29 LAB
T4 FREE SERPL-MCNC: 1.4 NG/DL (ref 0.9–1.7)
TRANSFERRIN SERPL-SCNC: 46.3 NMOL/L (ref 12.2–27.3)
TSH, 3RD GENERATION: 6.01 UIU/ML (ref 0.27–4.2)

## 2025-04-29 PROCEDURE — 3075F SYST BP GE 130 - 139MM HG: CPT | Performed by: INTERNAL MEDICINE

## 2025-04-29 PROCEDURE — 99214 OFFICE O/P EST MOD 30 MIN: CPT | Performed by: INTERNAL MEDICINE

## 2025-04-29 PROCEDURE — 1123F ACP DISCUSS/DSCN MKR DOCD: CPT | Performed by: INTERNAL MEDICINE

## 2025-04-29 PROCEDURE — 3078F DIAST BP <80 MM HG: CPT | Performed by: INTERNAL MEDICINE

## 2025-04-29 ASSESSMENT — PATIENT HEALTH QUESTIONNAIRE - PHQ9
1. LITTLE INTEREST OR PLEASURE IN DOING THINGS: NOT AT ALL
SUM OF ALL RESPONSES TO PHQ QUESTIONS 1-9: 0
2. FEELING DOWN, DEPRESSED OR HOPELESS: NOT AT ALL

## 2025-04-29 NOTE — PROGRESS NOTES
Differential    Comprehensive Metabolic Panel    Ferritin    Iron and TIBC    IgG      2. Diffuse large B-cell lymphoma, unspecified body region (HCC)  CT NECK CHEST ABD PELV W CONTRAST      3. Hypogammaglobulinemia  CT NECK CHEST ABD PELV W CONTRAST    CBC with Auto Differential    Comprehensive Metabolic Panel    Ferritin    Iron and TIBC    IgG      4. Hypothyroidism, unspecified type  T4, Free    TSH      5. Anemia, unspecified type  Soluble transferrin receptor    CBC with Auto Differential    Comprehensive Metabolic Panel    Ferritin    Iron and TIBC    IgG      6. Iron deficiency anemia, unspecified iron deficiency anemia type  Soluble transferrin receptor                    PLAN:  Lab studies were personally reviewed.    Marginal zone lymphoma: diffuse parish involvement with splenomegaly, ascites, and bone marrow involvement.  Her anemia, ascites, and constitutional symptoms are an indication for treatment.  We recommended Rituxan and bendamustine for therapy.   She was hospitalized for recurrent fevers after cycle 1. She was also noted to have hypotension requiring a brief ICU stay with Levophed, and acute kidney injury either from antibiotics or ATN from hypotension (or both).  She completed repeat paracentesis  with good relief of abdominal symptoms.  PET/CT after 2 cycles reviewed and shows dramatic response in lymphadenopathy and splenomegaly.  Some  uptake in small abdominal wall foci but likely from paracenteses, low suspicion for disease.  Plan was still for 6 cycles of BR in all.  Hospitalized for neutropenic fever 9/8-9/12 but felt better relatively  quickly.  Now back to baseline and counts are improved, platelets slightly low at 95k but not prohibitive for therapy.  PET/CT reviewed and shows a complete response, she has some residual splenomegaly but it is decreased since PET in June by my measure,  and is not at all hypermetabolic like was the case at diagnosis.  At this point, I see no

## 2025-04-29 NOTE — PATIENT INSTRUCTIONS
Patient Information from Today's Visit    The members of your [facility] are listed below:    Physician Provider: Dr. Adair Bailey  Advanced Practice Clinician: Jammie Alas  Registered Nurse: Jason HILL   Nurse Navigator: N/A  Medical Assistant: Whit \"Kathryn\" LAMIN   : Bisi \"Polly\" JULIANNA.   Supportive Care Services: SHEY NegreteW    Diagnosis (Information Sheet Provided on Day of Diagnosis): MZL and DLBCL    Follow Up Instructions:   6 weeks with CT NCAP prior    Has Treatment Plan Been Finalized? Yes - see prior treatment plan documentation    Current Labs:   Hospital Outpatient Visit on 04/29/2025   Component Date Value Ref Range Status    TSH, 3rd Generation 04/28/2025 6.010 (H)  0.270 - 4.200 uIU/mL Final    T4 Free 04/28/2025 1.4  0.9 - 1.7 NG/DL Final   Hospital Outpatient Visit on 04/28/2025   Component Date Value Ref Range Status    IgG 04/28/2025 449 (L)  700 - 1600 mg/dL Final    Iron 04/28/2025 49  35 - 100 ug/dL Final    TIBC 04/28/2025 323  240 - 450 ug/dL Final    Iron % Saturation 04/28/2025 15 (L)  20 - 50 % Final    UIBC 04/28/2025 274.0  112.0 - 347.0 ug/dL Final    Ferritin 04/28/2025 315  8 - 388 NG/ML Final    Sodium 04/28/2025 140  136 - 145 mmol/L Final    Potassium 04/28/2025 4.1  3.5 - 5.1 mmol/L Final    Chloride 04/28/2025 104  98 - 107 mmol/L Final    CO2 04/28/2025 25  20 - 29 mmol/L Final    Anion Gap 04/28/2025 11  7 - 16 mmol/L Final    Glucose 04/28/2025 110 (H)  70 - 99 mg/dL Final    Comment: <70 mg/dL Consistent with, but not fully diagnostic of hypoglycemia.  100 - 125 mg/dL Impaired fasting glucose/consistent with pre-diabetes mellitus.  > 126 mg/dl Fasting glucose consistent with overt diabetes mellitus      BUN 04/28/2025 19  8 - 23 MG/DL Final    Creatinine 04/28/2025 0.97  0.60 - 1.10 MG/DL Final    Est, Glom Filt Rate 04/28/2025 65  >60 ml/min/1.73m2 Final    Comment:    Pediatric calculator link:

## 2025-05-01 ENCOUNTER — CLINICAL SUPPORT (OUTPATIENT)
Age: 65
End: 2025-05-01

## 2025-05-01 VITALS
TEMPERATURE: 97.7 F | DIASTOLIC BLOOD PRESSURE: 61 MMHG | WEIGHT: 291.1 LBS | RESPIRATION RATE: 18 BRPM | HEART RATE: 76 BPM | BODY MASS INDEX: 51.57 KG/M2 | SYSTOLIC BLOOD PRESSURE: 98 MMHG | OXYGEN SATURATION: 93 %

## 2025-05-01 DIAGNOSIS — D80.1 HYPOGAMMAGLOBULINEMIA: Primary | ICD-10-CM

## 2025-05-01 RX ORDER — SODIUM CHLORIDE 9 MG/ML
5-250 INJECTION, SOLUTION INTRAVENOUS PRN
Status: DISCONTINUED | OUTPATIENT
Start: 2025-05-01 | End: 2025-05-01 | Stop reason: HOSPADM

## 2025-05-01 RX ORDER — DIPHENHYDRAMINE HYDROCHLORIDE 50 MG/ML
50 INJECTION, SOLUTION INTRAMUSCULAR; INTRAVENOUS
Status: DISCONTINUED | OUTPATIENT
Start: 2025-05-01 | End: 2025-05-01 | Stop reason: HOSPADM

## 2025-05-01 RX ORDER — SODIUM CHLORIDE 9 MG/ML
INJECTION, SOLUTION INTRAVENOUS CONTINUOUS
Status: DISCONTINUED | OUTPATIENT
Start: 2025-05-01 | End: 2025-05-01 | Stop reason: HOSPADM

## 2025-05-01 RX ORDER — SODIUM CHLORIDE 9 MG/ML
INJECTION, SOLUTION INTRAVENOUS CONTINUOUS
OUTPATIENT
Start: 2025-05-01

## 2025-05-01 RX ORDER — EPINEPHRINE 1 MG/ML
0.3 INJECTION, SOLUTION, CONCENTRATE INTRAVENOUS PRN
OUTPATIENT
Start: 2025-05-01

## 2025-05-01 RX ORDER — HEPARIN 100 UNIT/ML
500 SYRINGE INTRAVENOUS PRN
OUTPATIENT
Start: 2025-05-01

## 2025-05-01 RX ORDER — ONDANSETRON 2 MG/ML
8 INJECTION INTRAMUSCULAR; INTRAVENOUS
OUTPATIENT
Start: 2025-05-01

## 2025-05-01 RX ORDER — DIPHENHYDRAMINE HYDROCHLORIDE 50 MG/ML
50 INJECTION, SOLUTION INTRAMUSCULAR; INTRAVENOUS ONCE
Status: COMPLETED | OUTPATIENT
Start: 2025-05-01 | End: 2025-05-01

## 2025-05-01 RX ORDER — HYDROCORTISONE SODIUM SUCCINATE 100 MG/2ML
100 INJECTION INTRAMUSCULAR; INTRAVENOUS
OUTPATIENT
Start: 2025-05-01

## 2025-05-01 RX ORDER — SODIUM CHLORIDE 0.9 % (FLUSH) 0.9 %
5-40 SYRINGE (ML) INJECTION PRN
OUTPATIENT
Start: 2025-05-01

## 2025-05-01 RX ORDER — MEPERIDINE HYDROCHLORIDE 25 MG/ML
12.5 INJECTION INTRAMUSCULAR; INTRAVENOUS; SUBCUTANEOUS PRN
Status: DISCONTINUED | OUTPATIENT
Start: 2025-05-01 | End: 2025-05-01 | Stop reason: HOSPADM

## 2025-05-01 RX ORDER — ACETAMINOPHEN 325 MG/1
650 TABLET ORAL
Start: 2025-05-01

## 2025-05-01 RX ORDER — HEPARIN 100 UNIT/ML
500 SYRINGE INTRAVENOUS PRN
Status: DISCONTINUED | OUTPATIENT
Start: 2025-05-01 | End: 2025-05-01 | Stop reason: HOSPADM

## 2025-05-01 RX ORDER — ACETAMINOPHEN 325 MG/1
650 TABLET ORAL
Status: COMPLETED | OUTPATIENT
Start: 2025-05-01 | End: 2025-05-01

## 2025-05-01 RX ORDER — EPINEPHRINE 1 MG/ML
0.3 INJECTION, SOLUTION, CONCENTRATE INTRAVENOUS PRN
Status: DISCONTINUED | OUTPATIENT
Start: 2025-05-01 | End: 2025-05-01 | Stop reason: HOSPADM

## 2025-05-01 RX ORDER — HYDROCORTISONE SODIUM SUCCINATE 100 MG/2ML
100 INJECTION INTRAMUSCULAR; INTRAVENOUS
Status: DISCONTINUED | OUTPATIENT
Start: 2025-05-01 | End: 2025-05-01 | Stop reason: HOSPADM

## 2025-05-01 RX ORDER — ACETAMINOPHEN 325 MG/1
650 TABLET ORAL
Status: DISCONTINUED | OUTPATIENT
Start: 2025-05-01 | End: 2025-05-01 | Stop reason: HOSPADM

## 2025-05-01 RX ORDER — SODIUM CHLORIDE 9 MG/ML
5-250 INJECTION, SOLUTION INTRAVENOUS PRN
OUTPATIENT
Start: 2025-05-01

## 2025-05-01 RX ORDER — SODIUM CHLORIDE 0.9 % (FLUSH) 0.9 %
5-40 SYRINGE (ML) INJECTION PRN
Status: DISCONTINUED | OUTPATIENT
Start: 2025-05-01 | End: 2025-05-01 | Stop reason: HOSPADM

## 2025-05-01 RX ORDER — DIPHENHYDRAMINE HYDROCHLORIDE 50 MG/ML
50 INJECTION, SOLUTION INTRAMUSCULAR; INTRAVENOUS ONCE
Start: 2025-05-01 | End: 2025-05-01

## 2025-05-01 RX ORDER — ALBUTEROL SULFATE 90 UG/1
4 INHALANT RESPIRATORY (INHALATION) PRN
OUTPATIENT
Start: 2025-05-01

## 2025-05-01 RX ORDER — ALBUTEROL SULFATE 90 UG/1
4 INHALANT RESPIRATORY (INHALATION) PRN
Status: DISCONTINUED | OUTPATIENT
Start: 2025-05-01 | End: 2025-05-01 | Stop reason: HOSPADM

## 2025-05-01 RX ORDER — ONDANSETRON 2 MG/ML
8 INJECTION INTRAMUSCULAR; INTRAVENOUS
Status: DISCONTINUED | OUTPATIENT
Start: 2025-05-01 | End: 2025-05-01 | Stop reason: HOSPADM

## 2025-05-01 RX ORDER — DIPHENHYDRAMINE HYDROCHLORIDE 50 MG/ML
50 INJECTION, SOLUTION INTRAMUSCULAR; INTRAVENOUS
OUTPATIENT
Start: 2025-05-01

## 2025-05-01 RX ORDER — ACETAMINOPHEN 325 MG/1
650 TABLET ORAL
OUTPATIENT
Start: 2025-05-01

## 2025-05-01 RX ORDER — MEPERIDINE HYDROCHLORIDE 25 MG/ML
12.5 INJECTION INTRAMUSCULAR; INTRAVENOUS; SUBCUTANEOUS PRN
OUTPATIENT
Start: 2025-05-01

## 2025-05-01 RX ADMIN — DIPHENHYDRAMINE HYDROCHLORIDE 50 MG: 50 INJECTION, SOLUTION INTRAMUSCULAR; INTRAVENOUS at 08:10

## 2025-05-01 RX ADMIN — ACETAMINOPHEN 650 MG: 325 TABLET ORAL at 08:05

## 2025-05-01 NOTE — PROGRESS NOTES
[facility]  [address], [address]  [place] [address]  Office : [phone], Fax: [phone]     Pre-questions:  Has your insurance changed or have you received a new card since your last visit; this includes open or re-enrollment and any changes you may have made to your insurance plans/policies?  Failure to report changes to your insurance may lead to claim denial, making you liable for the claim amount.  No  Have you recently been exposed to the flu or a cold? No  Do you have any symptoms of an infection, like a fever or chills? No  Have you had to take an antibiotic since your last infusion? No  Have had hospital admissions or missed any work/school since your last infusion? No   Do you notice any changes in how you feel in between your infusions and what are they? (Example of symptoms: fatigue, pain, difficulty swallowing, numbness/tingling in extremities, balance, etc.)? When did you notice these changes/wear off/side effects? \"I get really tired the closer it gets to the infusion.\"        Patient arrived ambulatory to the infusion suite today for an IVIG infusion. Vital signs WNL. Denies pain on admission. No contraindications noted. Patient offered warm blanket and pillow for comfort. Patient up ad natalio to ; offered drink and snacks during visit.    20g 0.75\" gaytan needle placed into the patient's  right subclavian single lumen port with 1 x attempt (s), brisk blood return, vigorously flushed with 10ml NS. Patient tolerated well.   Pre-medications administered per orders (Benadryl 50mg IVP & Tylenol 650mg PO). No wait time required.   IVIG 50G administered and titrated per orders every 15 minutes after first 30 minutes with vital signs per protocol for 2 hours.   Patient tolerated the infusion well, no complications noted.   No observation required/recommended.      Port flushed vigorously with 10ml NS, heparin locked with 5ml, and de-accessed

## 2025-05-07 ENCOUNTER — HOSPITAL ENCOUNTER (OUTPATIENT)
Dept: PHYSICAL THERAPY | Age: 65
Setting detail: RECURRING SERIES
Discharge: HOME | End: 2025-05-10
Payer: COMMERCIAL

## 2025-05-07 DIAGNOSIS — M79.18 RIGHT BUTTOCK PAIN: ICD-10-CM

## 2025-05-07 DIAGNOSIS — M25.561 ACUTE PAIN OF RIGHT KNEE: Primary | ICD-10-CM

## 2025-05-07 DIAGNOSIS — R10.31 RIGHT GROIN PAIN: ICD-10-CM

## 2025-05-07 PROCEDURE — 97162 PT EVAL MOD COMPLEX 30 MIN: CPT

## 2025-05-07 ASSESSMENT — PAIN SCALES - GENERAL: PAINLEVEL_OUTOF10: 4

## 2025-05-07 ASSESSMENT — PAIN DESCRIPTION - LOCATION: LOCATION: LEG;BUTTOCKS

## 2025-05-07 ASSESSMENT — PAIN DESCRIPTION - PAIN TYPE: TYPE: ACUTE PAIN

## 2025-05-07 ASSESSMENT — PAIN DESCRIPTION - ORIENTATION: ORIENTATION: RIGHT
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
## 2025-05-29 ENCOUNTER — TELEPHONE (OUTPATIENT)
Dept: PULMONOLOGY | Age: 65
End: 2025-05-29
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
## 2025-06-03 ENCOUNTER — HOSPITAL ENCOUNTER (OUTPATIENT)
Dept: CT IMAGING | Age: 65
Discharge: HOME | End: 2025-06-05
Attending: INTERNAL MEDICINE
Payer: COMMERCIAL

## 2025-06-03 DIAGNOSIS — D80.1 HYPOGAMMAGLOBULINEMIA: ICD-10-CM

## 2025-06-03 DIAGNOSIS — C83.30 DIFFUSE LARGE B-CELL LYMPHOMA, UNSPECIFIED BODY REGION (HCC): ICD-10-CM

## 2025-06-03 DIAGNOSIS — C85.88 MARGINAL ZONE LYMPHOMA OF LYMPH NODES OF MULTIPLE SITES (HCC): ICD-10-CM

## 2025-06-03 LAB — CREAT BLD-MCNC: 0.91 MG/DL (ref 0.8–1.5)

## 2025-06-03 PROCEDURE — 6360000004 HC RX CONTRAST MEDICATION: Performed by: INTERNAL MEDICINE

## 2025-06-03 PROCEDURE — 82565 ASSAY OF CREATININE: CPT

## 2025-06-03 PROCEDURE — 70491 CT SOFT TISSUE NECK W/DYE: CPT

## 2025-06-03 RX ORDER — IOPAMIDOL 755 MG/ML
100 INJECTION, SOLUTION INTRAVASCULAR
Status: COMPLETED | OUTPATIENT
Start: 2025-06-03 | End: 2025-06-03

## 2025-06-03 RX ADMIN — IOPAMIDOL 100 ML: 755 INJECTION, SOLUTION INTRAVENOUS at 09:22

## 2025-06-04 ENCOUNTER — HOSPITAL ENCOUNTER (OUTPATIENT)
Dept: PHYSICAL THERAPY | Age: 65
Setting detail: RECURRING SERIES
Discharge: HOME | End: 2025-06-07
Payer: COMMERCIAL

## 2025-06-04 PROCEDURE — 97110 THERAPEUTIC EXERCISES: CPT

## 2025-06-04 ASSESSMENT — PAIN SCALES - GENERAL: PAINLEVEL_OUTOF10: 5

## 2025-06-04 NOTE — PROGRESS NOTES
Seema Muniz  : 1960  Primary: [employer] (Bernice [employer]OW[employer])  Secondary:  [place] [address]  [address]  [facility] [address]  Phone: [phone]  Fax: [phone] Plan Frequency: 2 x week for 6 weeks    Plan of Care/Certification Expiration Date: 25        Plan of Care/Certification Expiration Date:  Plan of Care/Certification Expiration Date: 25    Frequency/Duration: Plan Frequency: 2 x week for 6 weeks      Time In/Out:   Time In: 0953  Time Out: 1030      PT Visit Info:    Total # of Visits to Date: 2      Visit Count:  2    OUTPATIENT PHYSICAL THERAPY:   Treatment Note 2025       Episode  (back and R leg pain)               Treatment Diagnosis:    Acute pain of right knee  Right buttock pain  Right groin pain  Medical/Referring Diagnosis:    No admission diagnoses are documented for this encounter.    Referring Physician:  No ref. provider found  MD Orders:  PT Eval and Treat   Return MD Appt:  not scheduled   Date of Onset:  Onset Date: 25     Allergies:   Gabapentin  Restrictions/Precautions:   None      Interventions Planned (Treatment may consist of any combination of the following):     See Assessment Note    Subjective Comments:   Seema Muniz presents with center of LB pain and R sided glute pain. Pt was late due to oversleeping.   Initial Pain Level:     5/10  Post Session Pain Level:      0/10  Medications Last Reviewed: 2025  Updated Objective Findings:  None Today  Treatment   Patient's insurance plan requires evaluation only before treatment is authorized.     THERAPEUTIC EXERCISE: (35 minutes):    Exercises per grid below to improve mobility, strength, and coordination.  Required minimal verbal cues to promote proper body alignment, promote proper body posture, and promote proper body mechanics.  Progressed resistance, range, repetitions, and complexity of movement as indicated.   Date:  25 Date:

## 2025-06-05 ENCOUNTER — TELEPHONE (OUTPATIENT)
Dept: PULMONOLOGY | Age: 65
End: 2025-06-05

## 2025-06-09 ENCOUNTER — HOSPITAL ENCOUNTER (OUTPATIENT)
Dept: LAB | Age: 65
Discharge: HOME | End: 2025-06-09
Payer: COMMERCIAL

## 2025-06-09 ENCOUNTER — HOSPITAL ENCOUNTER (OUTPATIENT)
Dept: PHYSICAL THERAPY | Age: 65
Setting detail: RECURRING SERIES
Discharge: HOME | End: 2025-06-12
Payer: COMMERCIAL

## 2025-06-09 DIAGNOSIS — C85.88 MARGINAL ZONE LYMPHOMA OF LYMPH NODES OF MULTIPLE SITES (HCC): ICD-10-CM

## 2025-06-09 DIAGNOSIS — E03.9 HYPOTHYROIDISM, UNSPECIFIED TYPE: ICD-10-CM

## 2025-06-09 DIAGNOSIS — D64.9 ANEMIA, UNSPECIFIED TYPE: ICD-10-CM

## 2025-06-09 DIAGNOSIS — D80.1 HYPOGAMMAGLOBULINEMIA: ICD-10-CM

## 2025-06-09 DIAGNOSIS — D50.9 IRON DEFICIENCY ANEMIA, UNSPECIFIED IRON DEFICIENCY ANEMIA TYPE: ICD-10-CM

## 2025-06-09 LAB
ALBUMIN SERPL-MCNC: 3.7 G/DL (ref 3.2–4.6)
ALBUMIN/GLOB SERPL: 1.4 (ref 1–1.9)
ALP SERPL-CCNC: 80 U/L (ref 35–104)
ALT SERPL-CCNC: 28 U/L (ref 8–45)
ANION GAP SERPL CALC-SCNC: 14 MMOL/L (ref 7–16)
AST SERPL-CCNC: 34 U/L (ref 15–37)
BASOPHILS # BLD: 0.03 K/UL (ref 0–0.2)
BASOPHILS NFR BLD: 0.6 % (ref 0–2)
BILIRUB SERPL-MCNC: 0.4 MG/DL (ref 0–1.2)
BUN SERPL-MCNC: 17 MG/DL (ref 8–23)
CALCIUM SERPL-MCNC: 9.2 MG/DL (ref 8.8–10.2)
CHLORIDE SERPL-SCNC: 103 MMOL/L (ref 98–107)
CO2 SERPL-SCNC: 23 MMOL/L (ref 20–29)
CREAT SERPL-MCNC: 0.92 MG/DL (ref 0.6–1.1)
DIFFERENTIAL METHOD BLD: ABNORMAL
EOSINOPHIL # BLD: 0.15 K/UL (ref 0–0.8)
EOSINOPHIL NFR BLD: 3 % (ref 0.5–7.8)
ERYTHROCYTE [DISTWIDTH] IN BLOOD BY AUTOMATED COUNT: 16.8 % (ref 11.9–14.6)
FERRITIN SERPL-MCNC: 26 NG/ML (ref 8–388)
GLOBULIN SER CALC-MCNC: 2.6 G/DL (ref 2.3–3.5)
GLUCOSE SERPL-MCNC: 96 MG/DL (ref 70–99)
HCT VFR BLD AUTO: 26.5 % (ref 35.8–46.3)
HGB BLD-MCNC: 7.9 G/DL (ref 11.7–15.4)
IGG SERPL-MCNC: 458 MG/DL (ref 700–1600)
IMM GRANULOCYTES # BLD AUTO: 0.02 K/UL (ref 0–0.5)
IMM GRANULOCYTES NFR BLD AUTO: 0.4 % (ref 0–5)
IRON SATN MFR SERPL: 7 % (ref 20–50)
IRON SERPL-MCNC: 27 UG/DL (ref 35–100)
LYMPHOCYTES # BLD: 1.07 K/UL (ref 0.5–4.6)
LYMPHOCYTES NFR BLD: 21.1 % (ref 13–44)
MCH RBC QN AUTO: 26.1 PG (ref 26.1–32.9)
MCHC RBC AUTO-ENTMCNC: 29.8 G/DL (ref 31.4–35)
MCV RBC AUTO: 87.5 FL (ref 82–102)
MONOCYTES # BLD: 0.25 K/UL (ref 0.1–1.3)
MONOCYTES NFR BLD: 4.9 % (ref 4–12)
NEUTS SEG # BLD: 3.56 K/UL (ref 1.7–8.2)
NEUTS SEG NFR BLD: 70 % (ref 43–78)
NRBC # BLD: 0 K/UL (ref 0–0.2)
PLATELET # BLD AUTO: 95 K/UL (ref 150–450)
PMV BLD AUTO: 10.1 FL (ref 9.4–12.3)
POTASSIUM SERPL-SCNC: 4.1 MMOL/L (ref 3.5–5.1)
PROT SERPL-MCNC: 6.3 G/DL (ref 6.3–8.2)
RBC # BLD AUTO: 3.03 M/UL (ref 4.05–5.2)
SODIUM SERPL-SCNC: 140 MMOL/L (ref 136–145)
T4 FREE SERPL-MCNC: 1.2 NG/DL (ref 0.9–1.7)
TIBC SERPL-MCNC: 405 UG/DL (ref 240–450)
TSH, 3RD GENERATION: 7.07 UIU/ML (ref 0.27–4.2)
UIBC SERPL-MCNC: 378 UG/DL (ref 112–347)
WBC # BLD AUTO: 5.1 K/UL (ref 4.3–11.1)

## 2025-06-09 PROCEDURE — 97110 THERAPEUTIC EXERCISES: CPT

## 2025-06-09 PROCEDURE — 84238 ASSAY NONENDOCRINE RECEPTOR: CPT

## 2025-06-09 PROCEDURE — 84443 ASSAY THYROID STIM HORMONE: CPT

## 2025-06-09 PROCEDURE — 80053 COMPREHEN METABOLIC PANEL: CPT

## 2025-06-09 PROCEDURE — 84439 ASSAY OF FREE THYROXINE: CPT

## 2025-06-09 PROCEDURE — 82728 ASSAY OF FERRITIN: CPT

## 2025-06-09 PROCEDURE — 82784 ASSAY IGA/IGD/IGG/IGM EACH: CPT

## 2025-06-09 PROCEDURE — 83540 ASSAY OF IRON: CPT

## 2025-06-09 PROCEDURE — 83550 IRON BINDING TEST: CPT

## 2025-06-09 PROCEDURE — 85025 COMPLETE CBC W/AUTO DIFF WBC: CPT

## 2025-06-09 PROCEDURE — 36415 COLL VENOUS BLD VENIPUNCTURE: CPT

## 2025-06-09 ASSESSMENT — PAIN SCALES - GENERAL: PAINLEVEL_OUTOF10: 4

## 2025-06-09 NOTE — PROGRESS NOTES
ROS:  No obvious pertinent positives on review of systems except for what was outlined above.       Objective:       /70   Pulse 72   Ht 1.6 m (5' 3\")   Wt 132 kg (291 lb)   BMI 51.55 kg/m²     BP Readings from Last 3 Encounters:   06/11/25 118/70   05/01/25 98/61   04/29/25 136/77       Wt Readings from Last 3 Encounters:   06/11/25 132 kg (291 lb)   05/01/25 132 kg (291 lb 1.6 oz)   04/29/25 132.5 kg (292 lb)       General/Constitutional:   Alert and oriented x 3, no acute distress  HEENT:   normocephalic, atraumatic, moist mucous membranes  Neck:   No JVD or carotid bruits bilaterally  Cardiovascular:   regular rate and rhythm, no rub/gallop appreciated  Pulmonary:   clear to auscultation bilaterally, no respiratory distress  Abdomen:   soft, non-tender, non-distended  Ext:   No sig LE edema bilaterally  Skin:  warm and dry, no obvious rashes seen  Neuro:   no obvious sensory or motor deficits  Psychiatric:   normal mood and affect    Data Review:   Lab Results   Component Value Date    CHOL 195 08/12/2024    CHOL 187 07/19/2023    CHOL 185 07/13/2022     Lab Results   Component Value Date    TRIG 101 08/12/2024    TRIG 210 (H) 07/19/2023    TRIG 107 07/13/2022     Lab Results   Component Value Date    HDL 71 (H) 08/12/2024    HDL 60 07/19/2023    HDL 64 (H) 07/13/2022     No components found for: \"LDLCHOLESTEROL\", \"LDLCALC\"  Lab Results   Component Value Date    VLDL 20 08/12/2024    VLDL 42 (H) 07/19/2023    VLDL 21.4 07/13/2022     Lab Results   Component Value Date    CHOLHDLRATIO 2.7 08/12/2024    CHOLHDLRATIO 3.1 07/19/2023    CHOLHDLRATIO 2.9 07/13/2022        Lab Results   Component Value Date/Time     06/09/2025 02:54 PM     04/28/2025 08:06 AM     03/17/2025 07:13 AM    K 4.1 06/09/2025 02:54 PM    K 4.1 04/28/2025 08:06 AM    K 4.1 03/17/2025 07:13 AM     06/09/2025 02:54 PM     04/28/2025 08:06 AM     03/17/2025 07:13 AM    CO2 23 06/09/2025 02:54

## 2025-06-09 NOTE — PROGRESS NOTES
06/04/2025  Prepared by: Faviola Johnson    Exercises  - Supine Lower Trunk Rotation  - 1 x daily - 7 x weekly - 1 sets - 10 reps - 5 hold  - Supine Piriformis Stretch with Foot on Ground  - 1 x daily - 7 x weekly - 1 sets - 10 reps - 5 hold  - Supine Single Knee to Chest Stretch  - 1 x daily - 7 x weekly - 1 sets - 10 reps - 5 hold  - Standing 'L' Stretch at Counter  - 1 x daily - 7 x weekly - 1 sets - 10 reps - 5 hold  - Supine Posterior Pelvic Tilt  - 1 x daily - 3 x weekly - 2 sets - 10 reps - 5 hold  - Supine Transversus Abdominis Bracing - Hands on Thighs  - 1 x daily - 3 x weekly - 2 sets - 10 reps - 5 hold  - Supine Bridge with Mini Swiss Ball Between Knees  - 1 x daily - 3 x weekly - 2 sets - 10 reps - 5 hold  - Active Straight Leg Raise with Quad Set  - 1 x daily - 3 x weekly - 2 sets - 10 reps - 5 hold  - Sit to Stand Without Arm Support  - 1 x daily - 3 x weekly - 2 sets - 10 reps - 5 hold

## 2025-06-11 ENCOUNTER — OFFICE VISIT (OUTPATIENT)
Age: 65
End: 2025-06-11
Payer: COMMERCIAL

## 2025-06-11 ENCOUNTER — HOSPITAL ENCOUNTER (OUTPATIENT)
Dept: PHYSICAL THERAPY | Age: 65
Setting detail: RECURRING SERIES
Discharge: HOME | End: 2025-06-14
Payer: COMMERCIAL

## 2025-06-11 ENCOUNTER — OFFICE VISIT (OUTPATIENT)
Dept: ONCOLOGY | Age: 65
End: 2025-06-11
Payer: COMMERCIAL

## 2025-06-11 VITALS
BODY MASS INDEX: 51.91 KG/M2 | OXYGEN SATURATION: 98 % | HEIGHT: 63 IN | WEIGHT: 293 LBS | SYSTOLIC BLOOD PRESSURE: 117 MMHG | DIASTOLIC BLOOD PRESSURE: 74 MMHG | HEART RATE: 79 BPM | TEMPERATURE: 99 F | RESPIRATION RATE: 20 BRPM

## 2025-06-11 VITALS
HEART RATE: 72 BPM | DIASTOLIC BLOOD PRESSURE: 70 MMHG | HEIGHT: 63 IN | SYSTOLIC BLOOD PRESSURE: 118 MMHG | WEIGHT: 291 LBS | BODY MASS INDEX: 51.56 KG/M2

## 2025-06-11 DIAGNOSIS — D80.1 HYPOGAMMAGLOBULINEMIA: ICD-10-CM

## 2025-06-11 DIAGNOSIS — C85.88 MARGINAL ZONE LYMPHOMA OF LYMPH NODES OF MULTIPLE SITES (HCC): ICD-10-CM

## 2025-06-11 DIAGNOSIS — T45.1X5A POLYNEUROPATHY FOLLOWING CHEMOTHERAPY: ICD-10-CM

## 2025-06-11 DIAGNOSIS — G62.0 POLYNEUROPATHY FOLLOWING CHEMOTHERAPY: ICD-10-CM

## 2025-06-11 DIAGNOSIS — R07.89 ATYPICAL CHEST PAIN: ICD-10-CM

## 2025-06-11 DIAGNOSIS — Q21.12 PFO (PATENT FORAMEN OVALE): ICD-10-CM

## 2025-06-11 DIAGNOSIS — D50.9 IRON DEFICIENCY ANEMIA, UNSPECIFIED IRON DEFICIENCY ANEMIA TYPE: ICD-10-CM

## 2025-06-11 DIAGNOSIS — D64.9 ANEMIA, UNSPECIFIED TYPE: ICD-10-CM

## 2025-06-11 DIAGNOSIS — D68.59 HYPERCOAGULABLE STATE: Primary | ICD-10-CM

## 2025-06-11 DIAGNOSIS — I10 HYPERTENSION, UNSPECIFIED TYPE: ICD-10-CM

## 2025-06-11 DIAGNOSIS — C83.30 DIFFUSE LARGE B-CELL LYMPHOMA, UNSPECIFIED BODY REGION (HCC): Primary | ICD-10-CM

## 2025-06-11 DIAGNOSIS — Z86.718 HISTORY OF DVT (DEEP VEIN THROMBOSIS): ICD-10-CM

## 2025-06-11 DIAGNOSIS — D64.9 SEVERE ANEMIA: ICD-10-CM

## 2025-06-11 DIAGNOSIS — I50.32 CHRONIC HEART FAILURE WITH PRESERVED EJECTION FRACTION (HFPEF) (HCC): ICD-10-CM

## 2025-06-11 DIAGNOSIS — E03.9 HYPOTHYROIDISM, UNSPECIFIED TYPE: ICD-10-CM

## 2025-06-11 DIAGNOSIS — Z86.79 HISTORY OF ATRIAL FIBRILLATION: ICD-10-CM

## 2025-06-11 PROCEDURE — 3074F SYST BP LT 130 MM HG: CPT | Performed by: INTERNAL MEDICINE

## 2025-06-11 PROCEDURE — 1123F ACP DISCUSS/DSCN MKR DOCD: CPT | Performed by: INTERNAL MEDICINE

## 2025-06-11 PROCEDURE — 3078F DIAST BP <80 MM HG: CPT | Performed by: INTERNAL MEDICINE

## 2025-06-11 PROCEDURE — 99215 OFFICE O/P EST HI 40 MIN: CPT | Performed by: INTERNAL MEDICINE

## 2025-06-11 PROCEDURE — 97110 THERAPEUTIC EXERCISES: CPT

## 2025-06-11 RX ORDER — FUROSEMIDE 40 MG/1
40 TABLET ORAL DAILY PRN
Qty: 60 TABLET | Refills: 3 | Status: SHIPPED | OUTPATIENT
Start: 2025-06-11

## 2025-06-11 RX ORDER — PREGABALIN 50 MG/1
50 CAPSULE ORAL 3 TIMES DAILY
Qty: 270 CAPSULE | Refills: 0 | Status: CANCELLED | OUTPATIENT
Start: 2025-06-11 | End: 2025-09-09

## 2025-06-11 RX ORDER — METOPROLOL SUCCINATE 50 MG/1
50 TABLET, EXTENDED RELEASE ORAL DAILY
Qty: 90 TABLET | Refills: 3 | Status: SHIPPED | OUTPATIENT
Start: 2025-06-11

## 2025-06-11 ASSESSMENT — PATIENT HEALTH QUESTIONNAIRE - PHQ9
SUM OF ALL RESPONSES TO PHQ QUESTIONS 1-9: 0
1. LITTLE INTEREST OR PLEASURE IN DOING THINGS: NOT AT ALL
SUM OF ALL RESPONSES TO PHQ QUESTIONS 1-9: 0
2. FEELING DOWN, DEPRESSED OR HOPELESS: NOT AT ALL

## 2025-06-11 ASSESSMENT — PAIN SCALES - GENERAL: PAINLEVEL_OUTOF10: 4

## 2025-06-11 NOTE — PATIENT INSTRUCTIONS
06/09/2025 458 (L)  700 - 1600 mg/dL Final    Ferritin 06/09/2025 26  8 - 388 NG/ML Final    Iron 06/09/2025 27 (L)  35 - 100 ug/dL Final    TIBC 06/09/2025 405  240 - 450 ug/dL Final    Iron % Saturation 06/09/2025 7 (L)  20 - 50 % Final    UIBC 06/09/2025 378.0 (H)  112.0 - 347.0 ug/dL Final    T4 Free 06/09/2025 1.2  0.9 - 1.7 NG/DL Final    TSH, 3rd Generation 06/09/2025 7.070 (H)  0.270 - 4.200 uIU/mL Final           Please refer to After Visit Summary or [employer] for upcoming appointment information. Please call our office for rescheduling needs at least 24 hours before your scheduled appointment time.If you have any questions regarding your upcoming schedule please reach out to your care team through [employer] or call [phone].     Please notify your assigned Nurse Navigator of any unplanned hospital admissions or Emergency Room visits within 24 hours of discharge.    -------------------------------------------------------------------------------------------------------------------  Please call our office at [phone] if you have any  of the following symptoms:   Fever of 100.5 or greater  Chills  Shortness of breath  Swelling or pain in one leg    After office hours an answering service is available and will contact a provider for emergencies or if you are experiencing any of the above symptoms.        KIZZY KRAUSE RN

## 2025-06-11 NOTE — PROGRESS NOTES
Seema Muniz  : 1960  Primary: [employer] ([employer]ice [employer]OW[employer])  Secondary:  [place] [address]  [address]  [facility] [address]  Phone: [phone]  Fax: [phone] Plan Frequency: 2 x week for 6 weeks    Plan of Care/Certification Expiration Date: 25        Plan of Care/Certification Expiration Date:  Plan of Care/Certification Expiration Date: 25    Frequency/Duration: Plan Frequency: 2 x week for 6 weeks      Time In/Out:   Time In: 45  Time Out: 1030      PT Visit Info:    Total # of Visits to Date: 4      Visit Count:  4    OUTPATIENT PHYSICAL THERAPY:   Treatment Note 2025       Episode  (back and R leg pain)               Treatment Diagnosis:    Acute pain of right knee  Right buttock pain  Right groin pain  Medical/Referring Diagnosis:    No admission diagnoses are documented for this encounter.    Referring Physician:  No ref. provider found  MD Orders:  PT Eval and Treat   Return MD Appt:  not scheduled   Date of Onset:  Onset Date: 25     Allergies:   Gabapentin  Restrictions/Precautions:   None      Interventions Planned (Treatment may consist of any combination of the following):     See Assessment Note    Subjective Comments:   Seema Muniz reports working diligently with her HEP.  Initial Pain Level:     4/10  Post Session Pain Level:      3/10  Medications Last Reviewed: 2025  Updated Objective Findings:  None Today  Treatment   Patient's insurance plan requires evaluation only before treatment is authorized.     THERAPEUTIC EXERCISE: (45 minutes):    Exercises per grid below to improve mobility, strength, and coordination.  Required minimal verbal cues to promote proper body alignment, promote proper body posture, and promote proper body mechanics.  Progressed resistance, range, repetitions, and complexity of movement as indicated.   Date:  25 Date:  25 Date:  25   Activity/Exercise

## 2025-06-11 NOTE — PROGRESS NOTES
[facility] Hematology and Oncology: Office Visit Established Patient    Chief Complaint:    Chief Complaint   Patient presents with    Follow-up       History of Present Illness:  Ms. Muniz is a 65 y.o. 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  She was in previously good health, seen as an urgent work-in in clinic for lymphadenopathy on 3/30/17. She was having abdominal pain and swelling which has progressed over the  previous several weeks, CT was recommended but was initially denied by insurance. She had GI evaluation including EGD/colonoscopy which showed no intraluminal pathology, but finally had a CT at [facility] that showed diffuse lymphadenopathy  with moderate ascites, splenomegaly, and possible infiltration of the left kidney, all consistent with lymphoproliferative disorder.  We recommended inpatient admission for expedited work-up, including excisional biopsy of an axillary node, bone marrow  biopsy, labs and PET/CT.  The biopsy returned showing marginal zone lymphoma.  Her anemia, ascites, and constitutional symptoms are an indication for treatment.  I recommend Rituxan and bendamustine  for therapy.  She was hospitalized for recurrent fevers after cycle 1. She was also noted to have hypotension requiring a brief ICU stay with Levophed, and acute kidney injury either from antibiotics  or ATN from hypotension (or both).  She completed repeat paracentesis with good relief of abdominal symptoms.  Restaging after cycle 2 showed partial response in lymphadenopathy  and splenomegaly, continue current therapy.  Restaging after 5 cycles showed essentially complete response, she was continued on to 6 cycles of BR and then entered R maintenance.  PET/CT prior to her 2nd cycle of maintenance showed a new hypermetabolic  breast nodule and a mesenteric nodule.  Biopsy of the breast nodule shows diffuse large B cell lymphoma.  I suspect this is

## 2025-06-12 ENCOUNTER — CLINICAL SUPPORT (OUTPATIENT)
Age: 65
End: 2025-06-12

## 2025-06-12 VITALS
OXYGEN SATURATION: 97 % | HEART RATE: 63 BPM | DIASTOLIC BLOOD PRESSURE: 55 MMHG | TEMPERATURE: 97.9 F | RESPIRATION RATE: 16 BRPM | SYSTOLIC BLOOD PRESSURE: 122 MMHG

## 2025-06-12 DIAGNOSIS — D80.1 HYPOGAMMAGLOBULINEMIA: Primary | ICD-10-CM

## 2025-06-12 LAB — TRANSFERRIN SERPL-SCNC: 48.4 NMOL/L (ref 12.2–27.3)

## 2025-06-12 RX ORDER — SODIUM CHLORIDE 9 MG/ML
INJECTION, SOLUTION INTRAVENOUS CONTINUOUS
OUTPATIENT
Start: 2025-06-12

## 2025-06-12 RX ORDER — SODIUM CHLORIDE 0.9 % (FLUSH) 0.9 %
5-40 SYRINGE (ML) INJECTION PRN
OUTPATIENT
Start: 2025-06-12

## 2025-06-12 RX ORDER — EPINEPHRINE 1 MG/ML
0.3 INJECTION, SOLUTION, CONCENTRATE INTRAVENOUS PRN
Status: DISCONTINUED | OUTPATIENT
Start: 2025-06-12 | End: 2025-06-12 | Stop reason: HOSPADM

## 2025-06-12 RX ORDER — MEPERIDINE HYDROCHLORIDE 25 MG/ML
12.5 INJECTION INTRAMUSCULAR; INTRAVENOUS; SUBCUTANEOUS PRN
Status: DISCONTINUED | OUTPATIENT
Start: 2025-06-12 | End: 2025-06-12 | Stop reason: HOSPADM

## 2025-06-12 RX ORDER — SODIUM CHLORIDE 9 MG/ML
INJECTION, SOLUTION INTRAVENOUS CONTINUOUS
Status: DISCONTINUED | OUTPATIENT
Start: 2025-06-12 | End: 2025-06-12 | Stop reason: HOSPADM

## 2025-06-12 RX ORDER — HEPARIN 100 UNIT/ML
500 SYRINGE INTRAVENOUS PRN
Status: DISCONTINUED | OUTPATIENT
Start: 2025-06-12 | End: 2025-06-12 | Stop reason: HOSPADM

## 2025-06-12 RX ORDER — ALBUTEROL SULFATE 90 UG/1
4 INHALANT RESPIRATORY (INHALATION) PRN
OUTPATIENT
Start: 2025-06-12

## 2025-06-12 RX ORDER — HYDROCORTISONE SODIUM SUCCINATE 100 MG/2ML
100 INJECTION INTRAMUSCULAR; INTRAVENOUS
Status: DISCONTINUED | OUTPATIENT
Start: 2025-06-12 | End: 2025-06-12 | Stop reason: HOSPADM

## 2025-06-12 RX ORDER — HEPARIN 100 UNIT/ML
500 SYRINGE INTRAVENOUS PRN
OUTPATIENT
Start: 2025-06-12

## 2025-06-12 RX ORDER — DIPHENHYDRAMINE HYDROCHLORIDE 50 MG/ML
50 INJECTION, SOLUTION INTRAMUSCULAR; INTRAVENOUS ONCE
Start: 2025-06-12 | End: 2025-06-12

## 2025-06-12 RX ORDER — ACETAMINOPHEN 325 MG/1
650 TABLET ORAL
Start: 2025-06-12

## 2025-06-12 RX ORDER — ACETAMINOPHEN 325 MG/1
650 TABLET ORAL
OUTPATIENT
Start: 2025-06-12

## 2025-06-12 RX ORDER — SODIUM CHLORIDE 9 MG/ML
5-250 INJECTION, SOLUTION INTRAVENOUS PRN
OUTPATIENT
Start: 2025-06-12

## 2025-06-12 RX ORDER — ONDANSETRON 2 MG/ML
8 INJECTION INTRAMUSCULAR; INTRAVENOUS
OUTPATIENT
Start: 2025-06-12

## 2025-06-12 RX ORDER — DIPHENHYDRAMINE HYDROCHLORIDE 50 MG/ML
50 INJECTION, SOLUTION INTRAMUSCULAR; INTRAVENOUS
OUTPATIENT
Start: 2025-06-12

## 2025-06-12 RX ORDER — ALBUTEROL SULFATE 90 UG/1
4 INHALANT RESPIRATORY (INHALATION) PRN
Status: DISCONTINUED | OUTPATIENT
Start: 2025-06-12 | End: 2025-06-12 | Stop reason: HOSPADM

## 2025-06-12 RX ORDER — SODIUM CHLORIDE 9 MG/ML
5-250 INJECTION, SOLUTION INTRAVENOUS PRN
Status: DISCONTINUED | OUTPATIENT
Start: 2025-06-12 | End: 2025-06-12 | Stop reason: HOSPADM

## 2025-06-12 RX ORDER — EPINEPHRINE 1 MG/ML
0.3 INJECTION, SOLUTION, CONCENTRATE INTRAVENOUS PRN
OUTPATIENT
Start: 2025-06-12

## 2025-06-12 RX ORDER — MEPERIDINE HYDROCHLORIDE 25 MG/ML
12.5 INJECTION INTRAMUSCULAR; INTRAVENOUS; SUBCUTANEOUS PRN
OUTPATIENT
Start: 2025-06-12

## 2025-06-12 RX ORDER — SODIUM CHLORIDE 0.9 % (FLUSH) 0.9 %
5-40 SYRINGE (ML) INJECTION PRN
Status: DISCONTINUED | OUTPATIENT
Start: 2025-06-12 | End: 2025-06-12 | Stop reason: HOSPADM

## 2025-06-12 RX ORDER — HYDROCORTISONE SODIUM SUCCINATE 100 MG/2ML
100 INJECTION INTRAMUSCULAR; INTRAVENOUS
OUTPATIENT
Start: 2025-06-12

## 2025-06-12 RX ORDER — DIPHENHYDRAMINE HYDROCHLORIDE 50 MG/ML
50 INJECTION, SOLUTION INTRAMUSCULAR; INTRAVENOUS
Status: DISCONTINUED | OUTPATIENT
Start: 2025-06-12 | End: 2025-06-12 | Stop reason: HOSPADM

## 2025-06-12 RX ORDER — ONDANSETRON 2 MG/ML
8 INJECTION INTRAMUSCULAR; INTRAVENOUS
Status: DISCONTINUED | OUTPATIENT
Start: 2025-06-12 | End: 2025-06-12 | Stop reason: HOSPADM

## 2025-06-12 RX ORDER — ACETAMINOPHEN 325 MG/1
650 TABLET ORAL
Status: DISCONTINUED | OUTPATIENT
Start: 2025-06-12 | End: 2025-06-12 | Stop reason: HOSPADM

## 2025-06-12 ASSESSMENT — PAIN DESCRIPTION - DESCRIPTORS
DESCRIPTORS: ACHING

## 2025-06-12 ASSESSMENT — PAIN DESCRIPTION - LOCATION
LOCATION: BACK

## 2025-06-12 ASSESSMENT — PAIN SCALES - GENERAL
PAINLEVEL_OUTOF10: 3
PAINLEVEL_OUTOF10: 2
PAINLEVEL_OUTOF10: 2

## 2025-06-12 ASSESSMENT — PAIN DESCRIPTION - ORIENTATION: ORIENTATION: LOWER

## 2025-06-12 NOTE — PROGRESS NOTES
Patient tolerated the infusion well, no complications noted.   No observation required/recommended. Vital signs WNL: BP (!) 122/55   Pulse 63   Temp 97.9 °F (36.6 °C) (Temporal)   Resp 16   SpO2 97% .     Port flushed vigorously with 10ml NS, heparin locked with 5ml, and de-accessed without difficulty, catheter intact. Patient tolerated well. Site covered with gauze and pressure dressing. Patient instructed to leave in place for 30 minutes before removal.      Patient discharged ambulatory with steady gait out of infusion suite, feeling well.  Patient instructed to call the ordering provider with any post-infusion issues.     Next appointment scheduled at a date/time convenient for them prior to patient's departure today.

## 2025-06-16 ENCOUNTER — TELEPHONE (OUTPATIENT)
Age: 65
End: 2025-06-16

## 2025-06-16 ENCOUNTER — APPOINTMENT (OUTPATIENT)
Dept: PHYSICAL THERAPY | Age: 65
End: 2025-06-16
Payer: COMMERCIAL

## 2025-06-16 DIAGNOSIS — G62.0 POLYNEUROPATHY FOLLOWING CHEMOTHERAPY: ICD-10-CM

## 2025-06-16 DIAGNOSIS — T45.1X5A POLYNEUROPATHY FOLLOWING CHEMOTHERAPY: ICD-10-CM

## 2025-06-16 RX ORDER — PREGABALIN 50 MG/1
50 CAPSULE ORAL 3 TIMES DAILY
Qty: 270 CAPSULE | Refills: 2 | Status: SHIPPED | OUTPATIENT
Start: 2025-06-16 | End: 2026-03-13

## 2025-06-16 RX ORDER — PREGABALIN 50 MG/1
50 CAPSULE ORAL 3 TIMES DAILY
Qty: 270 CAPSULE | OUTPATIENT
Start: 2025-06-16

## 2025-06-16 NOTE — TELEPHONE ENCOUNTER
Cardiac Clearance        Physician or Practice Requesting:[facility]   :    Contact Phone Number: [phone]  Fax Number: [phone]  Date of Surgery/Procedure: TBD  Type of Surgery or Procedure: EGD/Colonoscopy   Type of Anesthesia:   Type of Clearance Requested: risk assessment and any medication hold   Medication to Hold:eliquis  Days to Hold: Please advise

## 2025-06-16 NOTE — TELEPHONE ENCOUNTER
Cardiac Clearance           Physician or Practice Requesting:[facility]   :    Contact Phone Number: [phone]  Fax Number: [phone]  Date of Surgery/Procedure: TBD  Type of Surgery or Procedure: EGD/Colonoscopy   Type of Anesthesia:   Type of Clearance Requested: risk assessment and any medication hold   Medication to Hold:eliquis  Days to Hold: Please advise   Last seen 6/11/25

## 2025-06-17 NOTE — TELEPHONE ENCOUNTER
Colby Bryant MD  You13 hours ago (7:04 PM)       - The patient is not having ACS, unstable cardiac arrhythmia, or decompensated HF  - RCRI equals 1: 0.9% rate of MACE  - According to the ACC guidelines, if the estimated perioperative risk of MACE is low (<1%), no further testing is recommended prior to noncardiac surgery  - This patient has an estimated perioperative risk of MACE that is low risk; thus, no further cardiac testing is recommended prior to noncardiac surgery  - Recommend management of Eliquis perioperatively, as below, with bleeding risk determined by the  (CrCl >30 mL/min)    High bleeding risk  - give Eliquis last dose three days before procedure (ie, skip four doses on the two days before the procedure)  - resume Eliquis 72 hours after surgery (ie, postoperative day 3)    Low/moderate bleeding risk  - give Eliquis last dose two days before procedure (ie, skip two doses on the day before the procedure)  - resume Eliquis 24 hours after surgery (ie, postoperative day 1)   Copy of this note faxed to :[facility]   :    Contact Phone Number: [phone]  Fax Number: [phone]

## 2025-06-18 ENCOUNTER — APPOINTMENT (OUTPATIENT)
Dept: PHYSICAL THERAPY | Age: 65
End: 2025-06-18
Payer: COMMERCIAL

## 2025-06-19 ENCOUNTER — APPOINTMENT (OUTPATIENT)
Dept: PHYSICAL THERAPY | Age: 65
End: 2025-06-19
Payer: COMMERCIAL

## 2025-06-19 ENCOUNTER — HOSPITAL ENCOUNTER (OUTPATIENT)
Dept: INFUSION THERAPY | Age: 65
Setting detail: INFUSION SERIES
Discharge: HOME | End: 2025-06-19
Payer: COMMERCIAL

## 2025-06-19 VITALS
TEMPERATURE: 99.3 F | DIASTOLIC BLOOD PRESSURE: 41 MMHG | HEART RATE: 71 BPM | RESPIRATION RATE: 18 BRPM | SYSTOLIC BLOOD PRESSURE: 98 MMHG | OXYGEN SATURATION: 97 %

## 2025-06-19 DIAGNOSIS — D50.9 IRON DEFICIENCY ANEMIA, UNSPECIFIED IRON DEFICIENCY ANEMIA TYPE: Primary | ICD-10-CM

## 2025-06-19 DIAGNOSIS — C83.30 DIFFUSE LARGE B-CELL LYMPHOMA, UNSPECIFIED BODY REGION (HCC): ICD-10-CM

## 2025-06-19 PROCEDURE — 6360000002 HC RX W HCPCS: Performed by: INTERNAL MEDICINE

## 2025-06-19 PROCEDURE — 2580000003 HC RX 258: Performed by: INTERNAL MEDICINE

## 2025-06-19 PROCEDURE — 96365 THER/PROPH/DIAG IV INF INIT: CPT

## 2025-06-19 PROCEDURE — 2500000003 HC RX 250 WO HCPCS: Performed by: INTERNAL MEDICINE

## 2025-06-19 RX ORDER — ALBUTEROL SULFATE 90 UG/1
4 INHALANT RESPIRATORY (INHALATION) PRN
Status: DISCONTINUED | OUTPATIENT
Start: 2025-06-19 | End: 2025-06-20 | Stop reason: HOSPADM

## 2025-06-19 RX ORDER — SODIUM CHLORIDE 9 MG/ML
5-250 INJECTION, SOLUTION INTRAVENOUS PRN
Status: CANCELLED | OUTPATIENT
Start: 2025-06-24

## 2025-06-19 RX ORDER — SODIUM CHLORIDE 0.9 % (FLUSH) 0.9 %
5-40 SYRINGE (ML) INJECTION PRN
Status: DISCONTINUED | OUTPATIENT
Start: 2025-06-19 | End: 2025-06-20 | Stop reason: HOSPADM

## 2025-06-19 RX ORDER — ONDANSETRON 2 MG/ML
8 INJECTION INTRAMUSCULAR; INTRAVENOUS
Status: DISCONTINUED | OUTPATIENT
Start: 2025-06-19 | End: 2025-06-20 | Stop reason: HOSPADM

## 2025-06-19 RX ORDER — ACETAMINOPHEN 325 MG/1
650 TABLET ORAL
Status: CANCELLED | OUTPATIENT
Start: 2025-06-24

## 2025-06-19 RX ORDER — SODIUM CHLORIDE 9 MG/ML
INJECTION, SOLUTION INTRAVENOUS CONTINUOUS
Status: CANCELLED | OUTPATIENT
Start: 2025-06-24

## 2025-06-19 RX ORDER — DIPHENHYDRAMINE HYDROCHLORIDE 50 MG/ML
50 INJECTION, SOLUTION INTRAMUSCULAR; INTRAVENOUS
Status: DISCONTINUED | OUTPATIENT
Start: 2025-06-19 | End: 2025-06-20 | Stop reason: HOSPADM

## 2025-06-19 RX ORDER — HYDROCORTISONE SODIUM SUCCINATE 100 MG/2ML
100 INJECTION INTRAMUSCULAR; INTRAVENOUS
Status: DISCONTINUED | OUTPATIENT
Start: 2025-06-19 | End: 2025-06-20 | Stop reason: HOSPADM

## 2025-06-19 RX ORDER — DIPHENHYDRAMINE HYDROCHLORIDE 50 MG/ML
50 INJECTION, SOLUTION INTRAMUSCULAR; INTRAVENOUS
Status: CANCELLED | OUTPATIENT
Start: 2025-06-24

## 2025-06-19 RX ORDER — ONDANSETRON 2 MG/ML
8 INJECTION INTRAMUSCULAR; INTRAVENOUS
Status: CANCELLED | OUTPATIENT
Start: 2025-06-24

## 2025-06-19 RX ORDER — HYDROCORTISONE SODIUM SUCCINATE 100 MG/2ML
100 INJECTION INTRAMUSCULAR; INTRAVENOUS
Status: CANCELLED | OUTPATIENT
Start: 2025-06-24

## 2025-06-19 RX ORDER — EPINEPHRINE 1 MG/ML
0.3 INJECTION, SOLUTION, CONCENTRATE INTRAVENOUS PRN
Status: DISCONTINUED | OUTPATIENT
Start: 2025-06-19 | End: 2025-06-20 | Stop reason: HOSPADM

## 2025-06-19 RX ORDER — ACETAMINOPHEN 325 MG/1
650 TABLET ORAL
Status: DISCONTINUED | OUTPATIENT
Start: 2025-06-19 | End: 2025-06-20 | Stop reason: HOSPADM

## 2025-06-19 RX ORDER — ALBUTEROL SULFATE 90 UG/1
4 INHALANT RESPIRATORY (INHALATION) PRN
Status: CANCELLED | OUTPATIENT
Start: 2025-06-24

## 2025-06-19 RX ORDER — HEPARIN 100 UNIT/ML
500 SYRINGE INTRAVENOUS PRN
Status: CANCELLED | OUTPATIENT
Start: 2025-06-24

## 2025-06-19 RX ORDER — EPINEPHRINE 1 MG/ML
0.3 INJECTION, SOLUTION, CONCENTRATE INTRAVENOUS PRN
Status: CANCELLED | OUTPATIENT
Start: 2025-06-24

## 2025-06-19 RX ORDER — SODIUM CHLORIDE 0.9 % (FLUSH) 0.9 %
5-40 SYRINGE (ML) INJECTION PRN
Status: CANCELLED | OUTPATIENT
Start: 2025-06-24

## 2025-06-19 RX ADMIN — FERUMOXYTOL 510 MG: 510 INJECTION INTRAVENOUS at 14:25

## 2025-06-19 RX ADMIN — SODIUM CHLORIDE, PRESERVATIVE FREE 10 ML: 5 INJECTION INTRAVENOUS at 13:58

## 2025-06-19 NOTE — PROGRESS NOTES
Arrived to the Infusion Center.  Feraheme iron infusion completed. Patient tolerated well.   Any issues or concerns during appointment: none.  Patient aware of next infusion appointment on 6/26/25 (date) at 1300 (time).  Patient aware of next lab and [facility] office visit on 7/22/25 (date) at 1530 (time).  Patient instructed to call provider with temperature of 100.4 or greater or nausea/vomiting/ diarrhea or pain not controlled by medications  Discharged home ambulatory after 30 minute observation period.

## 2025-06-23 ENCOUNTER — APPOINTMENT (OUTPATIENT)
Dept: PHYSICAL THERAPY | Age: 65
End: 2025-06-23
Payer: COMMERCIAL

## 2025-06-25 ENCOUNTER — APPOINTMENT (OUTPATIENT)
Dept: PHYSICAL THERAPY | Age: 65
End: 2025-06-25
Payer: COMMERCIAL

## 2025-06-26 ENCOUNTER — HOSPITAL ENCOUNTER (OUTPATIENT)
Dept: INFUSION THERAPY | Age: 65
Setting detail: INFUSION SERIES
Discharge: HOME | End: 2025-06-26
Payer: COMMERCIAL

## 2025-06-26 VITALS
DIASTOLIC BLOOD PRESSURE: 53 MMHG | OXYGEN SATURATION: 96 % | RESPIRATION RATE: 16 BRPM | SYSTOLIC BLOOD PRESSURE: 104 MMHG | TEMPERATURE: 99 F | HEART RATE: 61 BPM

## 2025-06-26 DIAGNOSIS — D50.9 IRON DEFICIENCY ANEMIA, UNSPECIFIED IRON DEFICIENCY ANEMIA TYPE: Primary | ICD-10-CM

## 2025-06-26 DIAGNOSIS — C83.30 DIFFUSE LARGE B-CELL LYMPHOMA, UNSPECIFIED BODY REGION (HCC): ICD-10-CM

## 2025-06-26 PROCEDURE — 2500000003 HC RX 250 WO HCPCS: Performed by: INTERNAL MEDICINE

## 2025-06-26 PROCEDURE — 96365 THER/PROPH/DIAG IV INF INIT: CPT

## 2025-06-26 PROCEDURE — 6360000002 HC RX W HCPCS: Performed by: INTERNAL MEDICINE

## 2025-06-26 PROCEDURE — 2580000003 HC RX 258: Performed by: INTERNAL MEDICINE

## 2025-06-26 RX ORDER — SODIUM CHLORIDE 0.9 % (FLUSH) 0.9 %
5-40 SYRINGE (ML) INJECTION PRN
OUTPATIENT
Start: 2025-07-01

## 2025-06-26 RX ORDER — EPINEPHRINE 1 MG/ML
0.3 INJECTION, SOLUTION, CONCENTRATE INTRAVENOUS PRN
OUTPATIENT
Start: 2025-07-01

## 2025-06-26 RX ORDER — ONDANSETRON 2 MG/ML
8 INJECTION INTRAMUSCULAR; INTRAVENOUS
OUTPATIENT
Start: 2025-07-01

## 2025-06-26 RX ORDER — HEPARIN 100 UNIT/ML
500 SYRINGE INTRAVENOUS PRN
OUTPATIENT
Start: 2025-07-01

## 2025-06-26 RX ORDER — DIPHENHYDRAMINE HYDROCHLORIDE 50 MG/ML
50 INJECTION, SOLUTION INTRAMUSCULAR; INTRAVENOUS
Status: DISCONTINUED | OUTPATIENT
Start: 2025-06-26 | End: 2025-06-27 | Stop reason: HOSPADM

## 2025-06-26 RX ORDER — SODIUM CHLORIDE 9 MG/ML
5-250 INJECTION, SOLUTION INTRAVENOUS PRN
Status: CANCELLED | OUTPATIENT
Start: 2025-07-01

## 2025-06-26 RX ORDER — HYDROCORTISONE SODIUM SUCCINATE 100 MG/2ML
100 INJECTION INTRAMUSCULAR; INTRAVENOUS
OUTPATIENT
Start: 2025-07-01

## 2025-06-26 RX ORDER — SODIUM CHLORIDE 9 MG/ML
INJECTION, SOLUTION INTRAVENOUS CONTINUOUS
OUTPATIENT
Start: 2025-07-01

## 2025-06-26 RX ORDER — SODIUM CHLORIDE 9 MG/ML
5-250 INJECTION, SOLUTION INTRAVENOUS PRN
Status: DISCONTINUED | OUTPATIENT
Start: 2025-06-26 | End: 2025-06-27 | Stop reason: HOSPADM

## 2025-06-26 RX ORDER — SODIUM CHLORIDE 9 MG/ML
5-250 INJECTION, SOLUTION INTRAVENOUS PRN
OUTPATIENT
Start: 2025-07-01

## 2025-06-26 RX ORDER — ALBUTEROL SULFATE 90 UG/1
4 INHALANT RESPIRATORY (INHALATION) PRN
OUTPATIENT
Start: 2025-07-01

## 2025-06-26 RX ORDER — SODIUM CHLORIDE 0.9 % (FLUSH) 0.9 %
5-40 SYRINGE (ML) INJECTION PRN
Status: DISCONTINUED | OUTPATIENT
Start: 2025-06-26 | End: 2025-06-27 | Stop reason: HOSPADM

## 2025-06-26 RX ORDER — ACETAMINOPHEN 325 MG/1
650 TABLET ORAL
OUTPATIENT
Start: 2025-07-01

## 2025-06-26 RX ORDER — HYDROCORTISONE SODIUM SUCCINATE 100 MG/2ML
100 INJECTION INTRAMUSCULAR; INTRAVENOUS
Status: DISCONTINUED | OUTPATIENT
Start: 2025-06-26 | End: 2025-06-27 | Stop reason: HOSPADM

## 2025-06-26 RX ORDER — DIPHENHYDRAMINE HYDROCHLORIDE 50 MG/ML
50 INJECTION, SOLUTION INTRAMUSCULAR; INTRAVENOUS
OUTPATIENT
Start: 2025-07-01

## 2025-06-26 RX ADMIN — FERUMOXYTOL 510 MG: 510 INJECTION INTRAVENOUS at 13:48

## 2025-06-26 RX ADMIN — SODIUM CHLORIDE, PRESERVATIVE FREE 10 ML: 5 INJECTION INTRAVENOUS at 13:42

## 2025-06-26 RX ADMIN — SODIUM CHLORIDE 25 ML/HR: 0.9 INJECTION, SOLUTION INTRAVENOUS at 13:45

## 2025-06-26 NOTE — PROGRESS NOTES
Arrived to the Infusion Center.  Feraheme infusing. Patient tolerating well.   Any issues or concerns during appointment: None.  Patient aware of next infusion appointment on 7/24/25 (date) at 0830 (time).  Patient aware of next lab and [facility] office visit on 7/22/25 (date) at 3:30pm (time).  Patient instructed to call provider with temperature of 100.4 or greater or nausea/vomiting/ diarrhea or pain not controlled by medications.  Report given to Clay

## 2025-06-26 NOTE — PROGRESS NOTES
Report received from VITALY Mcclelland. Pauly completed. Pt observed 30 mins post infusion. Discharged ambulatory.

## 2025-07-01 ENCOUNTER — TELEPHONE (OUTPATIENT)
Age: 65
End: 2025-07-01

## 2025-07-01 NOTE — TELEPHONE ENCOUNTER
Colby Bryant MD Keener, Lynn F RN  Caller: Unspecified (Today,  2:28 PM)  - The patient is not having ACS, unstable cardiac arrhythmia, or decompensated HF  - RCRI equals 1: 0.9% rate of MACE  - According to the ACC guidelines, if the estimated perioperative risk of MACE is low (<1%), no further testing is recommended prior to noncardiac surgery  - This patient has an estimated perioperative risk of MACE that is low risk; thus, no further cardiac testing is recommended prior to noncardiac surgery  - Recommend management of Eliquis perioperatively, as below, with bleeding risk determined by the  (CrCl >30 mL/min)    High bleeding risk  - give Eliquis last dose three days before procedure (ie, skip four doses on the two days before the procedure)  - resume Eliquis 72 hours after surgery (ie, postoperative day 3)    Low/moderate bleeding risk  - give Eliquis last dose two days before procedure (ie, skip two doses on the day before the procedure)  - resume Eliquis 24 hours after surgery (ie, postoperative day 1)

## 2025-07-01 NOTE — TELEPHONE ENCOUNTER
Cardiac Clearance        Physician or Practice Requesting:[facility]   :    Contact Phone Number: [phone]  Fax Number: [phone]  Date of Surgery/Procedure: 07/18/25  Type of Surgery or Procedure: EUS  Type of Anesthesia:   Type of Clearance Requested: risk assessment and any medication hold   Medication to Hold:Eliquis   Days to Hold: 2 day hold

## 2025-07-08 DIAGNOSIS — C85.88 MARGINAL ZONE LYMPHOMA OF LYMPH NODES OF MULTIPLE SITES (HCC): ICD-10-CM

## 2025-07-08 DIAGNOSIS — D64.9 ANEMIA, UNSPECIFIED TYPE: ICD-10-CM

## 2025-07-08 DIAGNOSIS — C83.30 DIFFUSE LARGE B-CELL LYMPHOMA, UNSPECIFIED BODY REGION (HCC): Primary | ICD-10-CM

## 2025-07-16 ENCOUNTER — HOSPITAL ENCOUNTER (OUTPATIENT)
Dept: CT IMAGING | Age: 65
Discharge: HOME | End: 2025-07-18
Attending: INTERNAL MEDICINE
Payer: COMMERCIAL

## 2025-07-16 VITALS
DIASTOLIC BLOOD PRESSURE: 59 MMHG | SYSTOLIC BLOOD PRESSURE: 136 MMHG | OXYGEN SATURATION: 99 % | RESPIRATION RATE: 16 BRPM | TEMPERATURE: 98 F | HEART RATE: 60 BPM

## 2025-07-16 DIAGNOSIS — C85.88 MARGINAL ZONE LYMPHOMA OF LYMPH NODES OF MULTIPLE SITES (HCC): ICD-10-CM

## 2025-07-16 DIAGNOSIS — C83.30 DIFFUSE LARGE B-CELL LYMPHOMA, UNSPECIFIED BODY REGION (HCC): ICD-10-CM

## 2025-07-16 DIAGNOSIS — D64.9 ANEMIA, UNSPECIFIED TYPE: ICD-10-CM

## 2025-07-16 LAB
BASOPHILS # BLD: 0.04 K/UL (ref 0–0.2)
BASOPHILS NFR BLD: 1.1 % (ref 0–2)
BONE MARROW PREP & WRIGHT STAIN: NORMAL
DIFFERENTIAL METHOD BLD: ABNORMAL
EOSINOPHIL # BLD: 0.21 K/UL (ref 0–0.8)
EOSINOPHIL NFR BLD: 5.6 % (ref 0.5–7.8)
ERYTHROCYTE [DISTWIDTH] IN BLOOD BY AUTOMATED COUNT: 20.2 % (ref 11.9–14.6)
HCT VFR BLD AUTO: 30.7 % (ref 35.8–46.3)
HGB BLD-MCNC: 9.5 G/DL (ref 11.7–15.4)
IMM GRANULOCYTES # BLD AUTO: 0.01 K/UL (ref 0–0.5)
IMM GRANULOCYTES NFR BLD AUTO: 0.3 % (ref 0–5)
LYMPHOCYTES # BLD: 0.91 K/UL (ref 0.5–4.6)
LYMPHOCYTES NFR BLD: 24.4 % (ref 13–44)
MCH RBC QN AUTO: 29.1 PG (ref 26.1–32.9)
MCHC RBC AUTO-ENTMCNC: 30.9 G/DL (ref 31.4–35)
MCV RBC AUTO: 94.2 FL (ref 82–102)
MONOCYTES # BLD: 0.25 K/UL (ref 0.1–1.3)
MONOCYTES NFR BLD: 6.7 % (ref 4–12)
NEUTS SEG # BLD: 2.31 K/UL (ref 1.7–8.2)
NEUTS SEG NFR BLD: 61.9 % (ref 43–78)
NRBC # BLD: 0 K/UL (ref 0–0.2)
PLATELET # BLD AUTO: 76 K/UL (ref 150–450)
PMV BLD AUTO: 10.7 FL (ref 9.4–12.3)
RBC # BLD AUTO: 3.26 M/UL (ref 4.05–5.2)
WBC # BLD AUTO: 3.7 K/UL (ref 4.3–11.1)

## 2025-07-16 PROCEDURE — 85025 COMPLETE CBC W/AUTO DIFF WBC: CPT

## 2025-07-16 PROCEDURE — 99152 MOD SED SAME PHYS/QHP 5/>YRS: CPT

## 2025-07-16 PROCEDURE — 88341 IMHCHEM/IMCYTCHM EA ADD ANTB: CPT

## 2025-07-16 PROCEDURE — 6360000002 HC RX W HCPCS: Performed by: RADIOLOGY

## 2025-07-16 PROCEDURE — 88313 SPECIAL STAINS GROUP 2: CPT

## 2025-07-16 PROCEDURE — 88311 DECALCIFY TISSUE: CPT

## 2025-07-16 PROCEDURE — 88305 TISSUE EXAM BY PATHOLOGIST: CPT

## 2025-07-16 PROCEDURE — C1830 POWER BONE MARROW BX NEEDLE: HCPCS

## 2025-07-16 PROCEDURE — 77012 CT SCAN FOR NEEDLE BIOPSY: CPT | Performed by: RADIOLOGY

## 2025-07-16 PROCEDURE — 88342 IMHCHEM/IMCYTCHM 1ST ANTB: CPT

## 2025-07-16 PROCEDURE — 38222 DX BONE MARROW BX & ASPIR: CPT | Performed by: RADIOLOGY

## 2025-07-16 PROCEDURE — 99152 MOD SED SAME PHYS/QHP 5/>YRS: CPT | Performed by: RADIOLOGY

## 2025-07-16 PROCEDURE — 2580000003 HC RX 258: Performed by: RADIOLOGY

## 2025-07-16 RX ORDER — LIDOCAINE HYDROCHLORIDE 10 MG/ML
INJECTION, SOLUTION INFILTRATION; PERINEURAL PRN
Status: COMPLETED | OUTPATIENT
Start: 2025-07-16 | End: 2025-07-16

## 2025-07-16 RX ORDER — FENTANYL CITRATE 50 UG/ML
INJECTION, SOLUTION INTRAMUSCULAR; INTRAVENOUS PRN
Status: COMPLETED | OUTPATIENT
Start: 2025-07-16 | End: 2025-07-16

## 2025-07-16 RX ORDER — SODIUM CHLORIDE 9 MG/ML
INJECTION, SOLUTION INTRAVENOUS CONTINUOUS PRN
Status: COMPLETED | OUTPATIENT
Start: 2025-07-16 | End: 2025-07-16

## 2025-07-16 RX ORDER — MIDAZOLAM HYDROCHLORIDE 1 MG/ML
INJECTION, SOLUTION INTRAMUSCULAR; INTRAVENOUS PRN
Status: COMPLETED | OUTPATIENT
Start: 2025-07-16 | End: 2025-07-16

## 2025-07-16 RX ADMIN — MIDAZOLAM 0.5 MG: 1 INJECTION INTRAMUSCULAR; INTRAVENOUS at 14:41

## 2025-07-16 RX ADMIN — FENTANYL CITRATE 25 MCG: 50 INJECTION, SOLUTION INTRAMUSCULAR; INTRAVENOUS at 14:42

## 2025-07-16 RX ADMIN — FENTANYL CITRATE 50 MCG: 50 INJECTION, SOLUTION INTRAMUSCULAR; INTRAVENOUS at 14:36

## 2025-07-16 RX ADMIN — MIDAZOLAM 1 MG: 1 INJECTION INTRAMUSCULAR; INTRAVENOUS at 14:36

## 2025-07-16 RX ADMIN — LIDOCAINE HYDROCHLORIDE 8 ML: 10 INJECTION, SOLUTION INFILTRATION; PERINEURAL at 14:47

## 2025-07-16 RX ADMIN — MIDAZOLAM 0.5 MG: 1 INJECTION INTRAMUSCULAR; INTRAVENOUS at 14:46

## 2025-07-16 RX ADMIN — FENTANYL CITRATE 25 MCG: 50 INJECTION, SOLUTION INTRAMUSCULAR; INTRAVENOUS at 14:46

## 2025-07-16 RX ADMIN — SODIUM CHLORIDE 100 ML/HR: 9 INJECTION, SOLUTION INTRAVENOUS at 14:36

## 2025-07-16 NOTE — OR NURSING
.TRANSFER - OUT REPORT:           Verbal report given to VITALY Reis on Seema Muniz  being transferred to IR Recovery for routine post-op      Report consisted of patient’s Situation, Background, Assessment and Recommendations(SBAR).          Information from the following report(s) SBAR, Procedure Summary, and MAR was reviewed with the receiving nurse.       Opportunity for questions and clarification was provided.          Conscious Sedation:    100 Mcg of Fentanyl administered   2 Mg of Versed administered           Pt tolerated procedure well.          LT sacral area 4 x 4 gauze and Other: bordered gauze clean, dry, intact, and nontender    VITALS:  BP (!) 145/64   Pulse 61   Temp 98 °F (36.7 °C) (Oral)   Resp 16   SpO2 99%

## 2025-07-16 NOTE — OR NURSING
Prep complete. Patient ready for procedure. Blood obtained at this time and sent to lab for ordered tests.

## 2025-07-16 NOTE — OR NURSING
Recovery period without difficulty. Pt alert and oriented and denies pain. Dressing is clean, dry, and intact. Reviewed discharge instructions with patient and spouse, both verbalized understanding. Pt escorted to lobby discharge area via wheelchair. Vital signs and Nelia score completed.

## 2025-07-16 NOTE — H&P
[facility]  Department of Interventional Radiology  [phone]    History and Physical    Patient:  Seema Muniz MRN:  [id number]  SSN:  [id number]    YOB: 1960  Age:  65 y.o.  Sex:  female      Primary Care Provider:  Jasiel Carrillo Jr., MD  Referring Physician:  Adair Bailey MD    Subjective:     Chief Complaint: lymphoma    History of the Present Illness:  The patient is a 65 y.o. female who presents with a lymphoma history and needs repeat bm biopsy after therapy.  nPO.        Past Medical History:   Diagnosis Date    Adverse effect of anesthesia 2020    new onset of afib with RVR during bronchoscopy    Anemia     Asthma     Followed by Dr. Cha, Pulmonology.  daily and rescue inhaler    Atrial fibrillation (HCC) 11/25/2020    Atrial fibrillation with rapid ventricular response (HCC) 11/25/2020    Basal cell carcinoma     Cardiomegaly     Deep vein thrombosis (DVT) (HCC)     Gastroesophageal reflux disease 05/04/2021    History of stroke     History of UTI     Hypertension     Left lower lobe pneumonia 01/22/2018    Marginal zone lymphoma (HCC) 03/30/2017    Diffuse Large B-cell Lymphoma. Completed Chemotherapy 5/28/18    Morbid obesity (HCC)     DAGMAR (obstructive sleep apnea)     uses CPAP    Osteoarthritis     Overactive bladder     Palpitations 06/21/2022    Pancytopenia due to antineoplastic chemotherapy 07/19/2017    Pneumonia and influenza 01/22/2018    Primary hypothyroidism     Prolapse of female bladder, acquired     Radiation therapy complication     Rheumatic fever     S/P total knee replacement using cement 10/3/2011    Shingles     Superficial venous thrombosis of right arm 5/1/2017     Past Surgical History:   Procedure Laterality Date    BRONCHOSCOPY  11/25/2020    new onset of afib with RVR during bronchoscopy    CATARACT REMOVAL Bilateral 2018    CHOLECYSTECTOMY  1983    COLONOSCOPY  03/2017    Clear    LYMPH NODE BIOPSY Left

## 2025-07-16 NOTE — PRE SEDATION
Sedation Pre-Procedure Note    Patient Name: Seema Muniz   YOB: 1960  Room/Bed: Room/bed info not found  Medical Record Number: [id number]  Date: 7/16/2025   Time: 2:14 PM       Indication:  lymphoma    Consent: I have discussed with the patient and/or the patient representative the indication, alternatives, and the possible risks and/or complications of the planned procedure and the anesthesia methods. The patient and/or patient representative appear to understand and agree to proceed.    Vital Signs:   Vitals:    07/16/25 1315   BP: (!) 188/78   Pulse: 73   Resp: 16   Temp: 98 °F (36.7 °C)   SpO2: 98%       Past Medical History:   has a past medical history of Adverse effect of anesthesia, Anemia, Asthma, Atrial fibrillation (HCC), Atrial fibrillation with rapid ventricular response (HCC), Basal cell carcinoma, Cardiomegaly, Deep vein thrombosis (DVT) (HCC), Gastroesophageal reflux disease, History of stroke, History of UTI, Hypertension, Left lower lobe pneumonia, Marginal zone lymphoma (HCC), Morbid obesity (HCC), DAGMAR (obstructive sleep apnea), Osteoarthritis, Overactive bladder, Palpitations, Pancytopenia due to antineoplastic chemotherapy, Pneumonia and influenza, Primary hypothyroidism, Prolapse of female bladder, acquired, Radiation therapy complication, Rheumatic fever, S/P total knee replacement using cement, Shingles, and Superficial venous thrombosis of right arm.    Past Surgical History:   has a past surgical history that includes Total knee arthroplasty (Left, 2013); Total knee arthroplasty (Right, 2012); Colonoscopy (03/2017); pre-malignant / benign skin lesion excision (2019); pr unlisted procedure cardiac surgery (11/25/2020); Cholecystectomy (1983); pr anesth,achilles tendon surg [[id number]] (Left, 02/10/2011); Cataract removal (Bilateral, 2018); US BREAST BIOPSY W LOC DEVICE 1ST LESION LEFT (Left, 01/03/2018); US BIOPSY LYMPH NODE (01/05/2023); bronchoscopy (11/25/2020); lymph

## 2025-07-16 NOTE — DISCHARGE INSTRUCTIONS
If you have any questions about your procedure, please call the Interventional Radiology department at [phone].     After business hours (5pm) and weekends, call the answering service at [phone] and ask for the Interventional Radiologist on call to be paged.

## 2025-07-22 ENCOUNTER — HOSPITAL ENCOUNTER (OUTPATIENT)
Dept: LAB | Age: 65
Discharge: HOME | End: 2025-07-22
Payer: COMMERCIAL

## 2025-07-22 DIAGNOSIS — D80.1 HYPOGAMMAGLOBULINEMIA: ICD-10-CM

## 2025-07-22 DIAGNOSIS — E03.9 HYPOTHYROIDISM, UNSPECIFIED TYPE: ICD-10-CM

## 2025-07-22 DIAGNOSIS — C85.88 MARGINAL ZONE LYMPHOMA OF LYMPH NODES OF MULTIPLE SITES (HCC): ICD-10-CM

## 2025-07-22 DIAGNOSIS — D64.9 ANEMIA, UNSPECIFIED TYPE: ICD-10-CM

## 2025-07-22 DIAGNOSIS — D50.9 IRON DEFICIENCY ANEMIA, UNSPECIFIED IRON DEFICIENCY ANEMIA TYPE: ICD-10-CM

## 2025-07-22 LAB
ALBUMIN SERPL-MCNC: 3.8 G/DL (ref 3.2–4.6)
ALBUMIN/GLOB SERPL: 1.5 (ref 1–1.9)
ALP SERPL-CCNC: 87 U/L (ref 35–104)
ALT SERPL-CCNC: 31 U/L (ref 8–45)
ANION GAP SERPL CALC-SCNC: 12 MMOL/L (ref 7–16)
AST SERPL-CCNC: 39 U/L (ref 15–37)
BASOPHILS # BLD: 0.04 K/UL (ref 0–0.2)
BASOPHILS NFR BLD: 0.9 % (ref 0–2)
BILIRUB SERPL-MCNC: 0.5 MG/DL (ref 0–1.2)
BUN SERPL-MCNC: 16 MG/DL (ref 8–23)
CALCIUM SERPL-MCNC: 9.5 MG/DL (ref 8.8–10.2)
CHLORIDE SERPL-SCNC: 104 MMOL/L (ref 98–107)
CO2 SERPL-SCNC: 24 MMOL/L (ref 20–29)
CREAT SERPL-MCNC: 0.93 MG/DL (ref 0.6–1.1)
DIFFERENTIAL METHOD BLD: ABNORMAL
EOSINOPHIL # BLD: 0.17 K/UL (ref 0–0.8)
EOSINOPHIL NFR BLD: 3.8 % (ref 0.5–7.8)
ERYTHROCYTE [DISTWIDTH] IN BLOOD BY AUTOMATED COUNT: 19.6 % (ref 11.9–14.6)
FERRITIN SERPL-MCNC: 100 NG/ML (ref 8–388)
GLOBULIN SER CALC-MCNC: 2.5 G/DL (ref 2.3–3.5)
GLUCOSE SERPL-MCNC: 92 MG/DL (ref 70–99)
HCT VFR BLD AUTO: 30.7 % (ref 35.8–46.3)
HGB BLD-MCNC: 9.5 G/DL (ref 11.7–15.4)
IGG SERPL-MCNC: 521 MG/DL (ref 700–1600)
IMM GRANULOCYTES # BLD AUTO: 0.01 K/UL (ref 0–0.5)
IMM GRANULOCYTES NFR BLD AUTO: 0.2 % (ref 0–5)
IRON SATN MFR SERPL: 13 % (ref 20–50)
IRON SERPL-MCNC: 44 UG/DL (ref 35–100)
LYMPHOCYTES # BLD: 0.94 K/UL (ref 0.5–4.6)
LYMPHOCYTES NFR BLD: 20.8 % (ref 13–44)
MCH RBC QN AUTO: 28.4 PG (ref 26.1–32.9)
MCHC RBC AUTO-ENTMCNC: 30.9 G/DL (ref 31.4–35)
MCV RBC AUTO: 91.6 FL (ref 82–102)
MONOCYTES # BLD: 0.23 K/UL (ref 0.1–1.3)
MONOCYTES NFR BLD: 5.1 % (ref 4–12)
NEUTS SEG # BLD: 3.14 K/UL (ref 1.7–8.2)
NEUTS SEG NFR BLD: 69.2 % (ref 43–78)
NRBC # BLD: 0 K/UL (ref 0–0.2)
PLATELET # BLD AUTO: 84 K/UL (ref 150–450)
PMV BLD AUTO: 10.5 FL (ref 9.4–12.3)
POTASSIUM SERPL-SCNC: 4.1 MMOL/L (ref 3.5–5.1)
PROT SERPL-MCNC: 6.3 G/DL (ref 6.3–8.2)
RBC # BLD AUTO: 3.35 M/UL (ref 4.05–5.2)
SODIUM SERPL-SCNC: 140 MMOL/L (ref 136–145)
T4 FREE SERPL-MCNC: 1.6 NG/DL (ref 0.9–1.7)
TIBC SERPL-MCNC: 337 UG/DL (ref 240–450)
TSH, 3RD GENERATION: 1.17 UIU/ML (ref 0.27–4.2)
UIBC SERPL-MCNC: 293 UG/DL (ref 112–347)
WBC # BLD AUTO: 4.5 K/UL (ref 4.3–11.1)

## 2025-07-22 PROCEDURE — 36415 COLL VENOUS BLD VENIPUNCTURE: CPT

## 2025-07-22 PROCEDURE — 82784 ASSAY IGA/IGD/IGG/IGM EACH: CPT

## 2025-07-22 PROCEDURE — 83540 ASSAY OF IRON: CPT

## 2025-07-22 PROCEDURE — 82728 ASSAY OF FERRITIN: CPT

## 2025-07-22 PROCEDURE — 84439 ASSAY OF FREE THYROXINE: CPT

## 2025-07-22 PROCEDURE — 85025 COMPLETE CBC W/AUTO DIFF WBC: CPT

## 2025-07-22 PROCEDURE — 83550 IRON BINDING TEST: CPT

## 2025-07-22 PROCEDURE — 84443 ASSAY THYROID STIM HORMONE: CPT

## 2025-07-22 PROCEDURE — 80053 COMPREHEN METABOLIC PANEL: CPT

## 2025-07-23 ENCOUNTER — OFFICE VISIT (OUTPATIENT)
Dept: ONCOLOGY | Age: 65
End: 2025-07-23
Payer: COMMERCIAL

## 2025-07-23 VITALS
TEMPERATURE: 98.3 F | BODY MASS INDEX: 50.14 KG/M2 | DIASTOLIC BLOOD PRESSURE: 65 MMHG | WEIGHT: 283 LBS | OXYGEN SATURATION: 97 % | RESPIRATION RATE: 24 BRPM | SYSTOLIC BLOOD PRESSURE: 146 MMHG | HEART RATE: 66 BPM | HEIGHT: 63 IN

## 2025-07-23 DIAGNOSIS — E03.9 HYPOTHYROIDISM, UNSPECIFIED TYPE: ICD-10-CM

## 2025-07-23 DIAGNOSIS — C85.88 MARGINAL ZONE LYMPHOMA OF LYMPH NODES OF MULTIPLE SITES (HCC): ICD-10-CM

## 2025-07-23 DIAGNOSIS — D50.9 IRON DEFICIENCY ANEMIA, UNSPECIFIED IRON DEFICIENCY ANEMIA TYPE: ICD-10-CM

## 2025-07-23 DIAGNOSIS — D80.1 HYPOGAMMAGLOBULINEMIA: ICD-10-CM

## 2025-07-23 DIAGNOSIS — D64.9 ANEMIA, UNSPECIFIED TYPE: ICD-10-CM

## 2025-07-23 PROCEDURE — 3077F SYST BP >= 140 MM HG: CPT | Performed by: INTERNAL MEDICINE

## 2025-07-23 PROCEDURE — 1123F ACP DISCUSS/DSCN MKR DOCD: CPT | Performed by: INTERNAL MEDICINE

## 2025-07-23 PROCEDURE — 99214 OFFICE O/P EST MOD 30 MIN: CPT | Performed by: INTERNAL MEDICINE

## 2025-07-23 PROCEDURE — 3078F DIAST BP <80 MM HG: CPT | Performed by: INTERNAL MEDICINE

## 2025-07-23 NOTE — PROGRESS NOTES
[facility] Hematology and Oncology: Office Visit Established Patient    Chief Complaint:    Chief Complaint   Patient presents with    Follow-up       History of Present Illness:  Ms. Muniz is a 65 y.o. 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  She was in previously good health, seen as an urgent work-in in clinic for lymphadenopathy on 3/30/17. She was having abdominal pain and swelling which has progressed over the  previous several weeks, CT was recommended but was initially denied by insurance. She had GI evaluation including EGD/colonoscopy which showed no intraluminal pathology, but finally had a CT at [facility] that showed diffuse lymphadenopathy  with moderate ascites, splenomegaly, and possible infiltration of the left kidney, all consistent with lymphoproliferative disorder.  We recommended inpatient admission for expedited work-up, including excisional biopsy of an axillary node, bone marrow  biopsy, labs and PET/CT.  The biopsy returned showing marginal zone lymphoma.  Her anemia, ascites, and constitutional symptoms are an indication for treatment.  I recommend Rituxan and bendamustine  for therapy.  She was hospitalized for recurrent fevers after cycle 1. She was also noted to have hypotension requiring a brief ICU stay with Levophed, and acute kidney injury either from antibiotics  or ATN from hypotension (or both).  She completed repeat paracentesis with good relief of abdominal symptoms.  Restaging after cycle 2 showed partial response in lymphadenopathy  and splenomegaly, continue current therapy.  Restaging after 5 cycles showed essentially complete response, she was continued on to 6 cycles of BR and then entered R maintenance.  PET/CT prior to her 2nd cycle of maintenance showed a new hypermetabolic  breast nodule and a mesenteric nodule.  Biopsy of the breast nodule shows diffuse large B cell lymphoma.  I suspect this is

## 2025-07-23 NOTE — PATIENT INSTRUCTIONS
Patient Information from Today's Visit    The members of your [facility] are listed below:    Physician Provider: Dr. Adair Bailey  Advanced Practice Clinician: Jammie Alas  Registered Nurse: Jason HILL   Nurse Navigator: N/A  Medical Assistant: Whit \"Kathryn\" LAMIN   : Bisi \"Polly\" VIOLETA   Supportive Care Services: ZACH Negrete    Diagnosis (Information Sheet Provided on Day of Diagnosis): Lymphoma/Anemia    Follow Up Instructions: 6 weeks      Has Treatment Plan Been Finalized? N/A     Current Labs:   Hospital Outpatient Visit on 07/22/2025   Component Date Value Ref Range Status    TSH, 3rd Generation 07/22/2025 1.170  0.270 - 4.200 uIU/mL Final    T4 Free 07/22/2025 1.6  0.9 - 1.7 NG/DL Final    Iron 07/22/2025 44  35 - 100 ug/dL Final    TIBC 07/22/2025 337  240 - 450 ug/dL Final    Iron % Saturation 07/22/2025 13 (L)  20 - 50 % Final    UIBC 07/22/2025 293.0  112.0 - 347.0 ug/dL Final    Ferritin 07/22/2025 100  8 - 388 NG/ML Final    IgG 07/22/2025 521 (L)  700 - 1600 mg/dL Final    Sodium 07/22/2025 140  136 - 145 mmol/L Final    Potassium 07/22/2025 4.1  3.5 - 5.1 mmol/L Final    Chloride 07/22/2025 104  98 - 107 mmol/L Final    CO2 07/22/2025 24  20 - 29 mmol/L Final    Anion Gap 07/22/2025 12  7 - 16 mmol/L Final    Glucose 07/22/2025 92  70 - 99 mg/dL Final    Comment: <70 mg/dL Consistent with, but not fully diagnostic of hypoglycemia.  100 - 125 mg/dL Impaired fasting glucose/consistent with pre-diabetes mellitus.  > 126 mg/dl Fasting glucose consistent with overt diabetes mellitus      BUN 07/22/2025 16  8 - 23 MG/DL Final    Creatinine 07/22/2025 0.93  0.60 - 1.10 MG/DL Final    Est, Glom Filt Rate 07/22/2025 68  >60 ml/min/1.73m2 Final    Comment:    Pediatric calculator link: https://www.kidney.org/professionals/kdoqi/gfr_calculatorped     These results are not intended for use in patients <18 years of age.     eGFR results are calculated without a race factor using  the

## 2025-07-24 ENCOUNTER — CLINICAL SUPPORT (OUTPATIENT)
Age: 65
End: 2025-07-24

## 2025-07-24 VITALS
BODY MASS INDEX: 49.99 KG/M2 | SYSTOLIC BLOOD PRESSURE: 114 MMHG | HEART RATE: 58 BPM | TEMPERATURE: 97.2 F | DIASTOLIC BLOOD PRESSURE: 53 MMHG | RESPIRATION RATE: 16 BRPM | WEIGHT: 282.2 LBS | OXYGEN SATURATION: 94 %

## 2025-07-24 DIAGNOSIS — D80.1 HYPOGAMMAGLOBULINEMIA: Primary | ICD-10-CM

## 2025-07-24 RX ORDER — SODIUM CHLORIDE 9 MG/ML
INJECTION, SOLUTION INTRAVENOUS CONTINUOUS
OUTPATIENT
Start: 2025-07-24

## 2025-07-24 RX ORDER — HEPARIN 100 UNIT/ML
500 SYRINGE INTRAVENOUS PRN
OUTPATIENT
Start: 2025-07-24

## 2025-07-24 RX ORDER — DIPHENHYDRAMINE HYDROCHLORIDE 50 MG/ML
50 INJECTION, SOLUTION INTRAMUSCULAR; INTRAVENOUS
OUTPATIENT
Start: 2025-07-24

## 2025-07-24 RX ORDER — SODIUM CHLORIDE 9 MG/ML
5-250 INJECTION, SOLUTION INTRAVENOUS PRN
OUTPATIENT
Start: 2025-07-24

## 2025-07-24 RX ORDER — ALBUTEROL SULFATE 90 UG/1
4 INHALANT RESPIRATORY (INHALATION) PRN
OUTPATIENT
Start: 2025-07-24

## 2025-07-24 RX ORDER — EPINEPHRINE 1 MG/ML
0.3 INJECTION, SOLUTION, CONCENTRATE INTRAVENOUS PRN
OUTPATIENT
Start: 2025-07-24

## 2025-07-24 RX ORDER — ACETAMINOPHEN 325 MG/1
650 TABLET ORAL
Status: COMPLETED | OUTPATIENT
Start: 2025-07-24 | End: 2025-07-24

## 2025-07-24 RX ORDER — ACETAMINOPHEN 325 MG/1
650 TABLET ORAL
OUTPATIENT
Start: 2025-07-24

## 2025-07-24 RX ORDER — DIPHENHYDRAMINE HYDROCHLORIDE 50 MG/ML
50 INJECTION, SOLUTION INTRAMUSCULAR; INTRAVENOUS ONCE
Start: 2025-07-24 | End: 2025-07-24

## 2025-07-24 RX ORDER — DIPHENHYDRAMINE HYDROCHLORIDE 50 MG/ML
50 INJECTION, SOLUTION INTRAMUSCULAR; INTRAVENOUS ONCE
Status: DISCONTINUED | OUTPATIENT
Start: 2025-07-24 | End: 2025-07-24 | Stop reason: HOSPADM

## 2025-07-24 RX ORDER — ACETAMINOPHEN 325 MG/1
650 TABLET ORAL
Start: 2025-07-24

## 2025-07-24 RX ORDER — SODIUM CHLORIDE 0.9 % (FLUSH) 0.9 %
5-40 SYRINGE (ML) INJECTION PRN
OUTPATIENT
Start: 2025-07-24

## 2025-07-24 RX ORDER — HYDROCORTISONE SODIUM SUCCINATE 100 MG/2ML
100 INJECTION INTRAMUSCULAR; INTRAVENOUS
OUTPATIENT
Start: 2025-07-24

## 2025-07-24 RX ORDER — ONDANSETRON 2 MG/ML
8 INJECTION INTRAMUSCULAR; INTRAVENOUS
OUTPATIENT
Start: 2025-07-24

## 2025-07-24 RX ORDER — MEPERIDINE HYDROCHLORIDE 25 MG/ML
12.5 INJECTION INTRAMUSCULAR; INTRAVENOUS; SUBCUTANEOUS PRN
OUTPATIENT
Start: 2025-07-24

## 2025-07-24 RX ADMIN — ACETAMINOPHEN 650 MG: 325 TABLET ORAL at 08:44

## 2025-07-24 NOTE — PROGRESS NOTES
[facility]  [address], [address]  [place] [address]  Office : [phone], Fax: [phone]     Pre-questions:  Has your insurance changed or have you received a new card since your last visit; this includes open or re-enrollment and any changes you may have made to your insurance plans/policies?  Failure to report changes to your insurance may lead to claim denial, making you liable for the claim amount.  No  Have you recently been exposed to the flu or a cold? No  Do you have any symptoms of an infection, like a fever or chills? No  Have you had to take an antibiotic since your last infusion? Yes, pt on maintenance Azithromycin on M/W/F. Provider approved.  Have had hospital admissions or missed any work/school since your last infusion? No   Do you notice any changes in how you feel in between your infusions and what are they? (Example of symptoms: fatigue, pain, difficulty swallowing, numbness/tingling in extremities, balance, etc.)? When did you notice these changes/wear off/side effects? Pt endorses increased fatigue.        Patient arrived ambulatory to the infusion suite today for an IVIG infusion.   Vital signs WNL. Pain level is 3/10 on the pain scale on admission. No contraindications noted. Patient offered warm blanket and pillow for comfort. Patient up ad natalio to BR; offered drink and snacks during visit.    20g 0.75\" gaytan needle placed into the patient's right subclavian single lumen port  x1 attempt (s), brisk blood return, vigorously flushed with 10ml NS. Patient tolerated well.   Pre-medications administered per orders. No wait time required.   IVIG 50G administered and titrated per orders every 15 minutes after initial 30 minute start. Total infusion time: 2 hours, 1 minute.  Patient tolerated the infusion well, no complications noted.   No observation required/recommended.  Post infusion vitals WNL.     Port flushed vigorously with 10ml

## 2025-07-28 ENCOUNTER — TELEPHONE (OUTPATIENT)
Dept: ONCOLOGY | Age: 65
End: 2025-07-28

## 2025-07-28 LAB
FLOW CYTOMETRY RESULTS: NORMAL
SPECIMEN SOURCE: NORMAL
TEST ORDERED: NORMAL

## 2025-08-07 DIAGNOSIS — Z13.89 SCREENING FOR BLOOD OR PROTEIN IN URINE: ICD-10-CM

## 2025-08-07 DIAGNOSIS — Z13.220 SCREENING FOR HYPERLIPIDEMIA: ICD-10-CM

## 2025-08-07 DIAGNOSIS — E03.9 PRIMARY HYPOTHYROIDISM: Primary | ICD-10-CM

## 2025-08-07 DIAGNOSIS — R73.01 ELEVATED FASTING GLUCOSE: ICD-10-CM

## 2025-08-07 DIAGNOSIS — I10 HYPERTENSION, UNSPECIFIED TYPE: ICD-10-CM

## 2025-08-07 DIAGNOSIS — Z00.00 ANNUAL PHYSICAL EXAM: ICD-10-CM

## 2025-08-08 ENCOUNTER — LAB (OUTPATIENT)
Dept: FAMILY MEDICINE CLINIC | Facility: CLINIC | Age: 65
End: 2025-08-08

## 2025-08-08 DIAGNOSIS — E03.9 PRIMARY HYPOTHYROIDISM: ICD-10-CM

## 2025-08-08 DIAGNOSIS — Z13.220 SCREENING FOR HYPERLIPIDEMIA: ICD-10-CM

## 2025-08-08 DIAGNOSIS — I10 HYPERTENSION, UNSPECIFIED TYPE: ICD-10-CM

## 2025-08-08 DIAGNOSIS — Z00.00 ANNUAL PHYSICAL EXAM: ICD-10-CM

## 2025-08-08 DIAGNOSIS — R73.01 ELEVATED FASTING GLUCOSE: ICD-10-CM

## 2025-08-08 DIAGNOSIS — Z13.89 SCREENING FOR BLOOD OR PROTEIN IN URINE: ICD-10-CM

## 2025-08-08 LAB
ALBUMIN SERPL-MCNC: 3.6 G/DL (ref 3.2–4.6)
ALBUMIN/GLOB SERPL: 1.5 (ref 1–1.9)
ALP SERPL-CCNC: 72 U/L (ref 35–104)
ALT SERPL-CCNC: 50 U/L (ref 8–45)
ANION GAP SERPL CALC-SCNC: 13 MMOL/L (ref 7–16)
APPEARANCE UR: CLEAR
AST SERPL-CCNC: 44 U/L (ref 15–37)
BACTERIA URNS QL MICRO: ABNORMAL /HPF
BASOPHILS # BLD: 0.03 K/UL (ref 0–0.2)
BASOPHILS NFR BLD: 0.7 % (ref 0–2)
BILIRUB SERPL-MCNC: 0.4 MG/DL (ref 0–1.2)
BILIRUB UR QL: NEGATIVE
BUN SERPL-MCNC: 24 MG/DL (ref 8–23)
CALCIUM SERPL-MCNC: 9.2 MG/DL (ref 8.8–10.2)
CASTS URNS QL MICRO: 0 /LPF
CHLORIDE SERPL-SCNC: 108 MMOL/L (ref 98–107)
CHOLEST SERPL-MCNC: 175 MG/DL (ref 0–200)
CO2 SERPL-SCNC: 23 MMOL/L (ref 20–29)
COLOR UR: ABNORMAL
CREAT SERPL-MCNC: 0.93 MG/DL (ref 0.6–1.1)
CRYSTALS URNS QL MICRO: 0 /LPF
DIFFERENTIAL METHOD BLD: ABNORMAL
EOSINOPHIL # BLD: 0.04 K/UL (ref 0–0.8)
EOSINOPHIL NFR BLD: 0.9 % (ref 0.5–7.8)
EPI CELLS #/AREA URNS HPF: ABNORMAL /HPF (ref 0–5)
ERYTHROCYTE [DISTWIDTH] IN BLOOD BY AUTOMATED COUNT: 17.9 % (ref 11.9–14.6)
GLOBULIN SER CALC-MCNC: 2.5 G/DL (ref 2.3–3.5)
GLUCOSE SERPL-MCNC: 128 MG/DL (ref 70–99)
GLUCOSE UR STRIP.AUTO-MCNC: NEGATIVE MG/DL
HCT VFR BLD AUTO: 28.6 % (ref 35.8–46.3)
HDLC SERPL-MCNC: 81 MG/DL (ref 40–60)
HDLC SERPL: 2.2 (ref 0–5)
HGB BLD-MCNC: 9 G/DL (ref 11.7–15.4)
HGB UR QL STRIP: NEGATIVE
HYALINE CASTS URNS QL MICRO: ABNORMAL /LPF
IMM GRANULOCYTES # BLD AUTO: 0.01 K/UL (ref 0–0.5)
IMM GRANULOCYTES NFR BLD AUTO: 0.2 % (ref 0–5)
KETONES UR QL STRIP.AUTO: NEGATIVE MG/DL
LDLC SERPL CALC-MCNC: 78 MG/DL (ref 0–100)
LEUKOCYTE ESTERASE UR QL STRIP.AUTO: ABNORMAL
LYMPHOCYTES # BLD: 0.85 K/UL (ref 0.5–4.6)
LYMPHOCYTES NFR BLD: 19.1 % (ref 13–44)
MCH RBC QN AUTO: 29.3 PG (ref 26.1–32.9)
MCHC RBC AUTO-ENTMCNC: 31.5 G/DL (ref 31.4–35)
MCV RBC AUTO: 93.2 FL (ref 82–102)
MONOCYTES # BLD: 0.15 K/UL (ref 0.1–1.3)
MONOCYTES NFR BLD: 3.4 % (ref 4–12)
MUCOUS THREADS URNS QL MICRO: 0 /LPF
NEUTS SEG # BLD: 3.37 K/UL (ref 1.7–8.2)
NEUTS SEG NFR BLD: 75.7 % (ref 43–78)
NITRITE UR QL STRIP.AUTO: NEGATIVE
NRBC # BLD: 0 K/UL (ref 0–0.2)
PH UR STRIP: 6 (ref 5–9)
PLATELET # BLD AUTO: 70 K/UL (ref 150–450)
PMV BLD AUTO: 11.6 FL (ref 9.4–12.3)
POTASSIUM SERPL-SCNC: 4.2 MMOL/L (ref 3.5–5.1)
PROT SERPL-MCNC: 6.1 G/DL (ref 6.3–8.2)
PROT UR STRIP-MCNC: NEGATIVE MG/DL
RBC # BLD AUTO: 3.07 M/UL (ref 4.05–5.2)
RBC #/AREA URNS HPF: ABNORMAL /HPF (ref 0–5)
SODIUM SERPL-SCNC: 143 MMOL/L (ref 136–145)
SP GR UR REFRACTOMETRY: 1.02 (ref 1–1.02)
TRIGL SERPL-MCNC: 81 MG/DL (ref 0–150)
TSH W FREE THYROID IF ABNORMAL: 0.8 UIU/ML (ref 0.27–4.2)
URINE CULTURE IF INDICATED: ABNORMAL
UROBILINOGEN UR QL STRIP.AUTO: 0.2 EU/DL (ref 0.2–1)
VLDLC SERPL CALC-MCNC: 16 MG/DL (ref 6–23)
WBC # BLD AUTO: 4.5 K/UL (ref 4.3–11.1)
WBC URNS QL MICRO: ABNORMAL /HPF (ref 0–4)

## 2025-08-13 ENCOUNTER — HOSPITAL ENCOUNTER (OUTPATIENT)
Dept: PET IMAGING | Age: 65
Discharge: HOME | End: 2025-08-16
Payer: COMMERCIAL

## 2025-08-13 DIAGNOSIS — C85.80 LYMPHOSARCOMA, MIXED CELL TYPE (HCC): ICD-10-CM

## 2025-08-13 LAB
GLUCOSE BLD STRIP.AUTO-MCNC: 130 MG/DL (ref 65–100)
SERVICE CMNT-IMP: ABNORMAL

## 2025-08-13 PROCEDURE — 82962 GLUCOSE BLOOD TEST: CPT

## 2025-08-13 PROCEDURE — 6360000004 HC RX CONTRAST MEDICATION: Performed by: INTERNAL MEDICINE

## 2025-08-13 PROCEDURE — 3430000000 HC RX DIAGNOSTIC RADIOPHARMACEUTICAL: Performed by: INTERNAL MEDICINE

## 2025-08-13 PROCEDURE — A9609 HC RX DIAGNOSTIC RADIOPHARMACEUTICAL: HCPCS | Performed by: INTERNAL MEDICINE

## 2025-08-13 PROCEDURE — 2500000003 HC RX 250 WO HCPCS: Performed by: INTERNAL MEDICINE

## 2025-08-13 PROCEDURE — 78815 PET IMAGE W/CT SKULL-THIGH: CPT

## 2025-08-13 RX ORDER — FLUDEOXYGLUCOSE F 18 200 MCI/ML
12.28 INJECTION, SOLUTION INTRAVENOUS
Status: COMPLETED | OUTPATIENT
Start: 2025-08-13 | End: 2025-08-13

## 2025-08-13 RX ORDER — DIATRIZOATE MEGLUMINE AND DIATRIZOATE SODIUM 660; 100 MG/ML; MG/ML
10 SOLUTION ORAL; RECTAL
Status: DISCONTINUED | OUTPATIENT
Start: 2025-08-13 | End: 2025-08-17 | Stop reason: HOSPADM

## 2025-08-13 RX ORDER — SODIUM CHLORIDE 0.9 % (FLUSH) 0.9 %
20 SYRINGE (ML) INJECTION AS NEEDED
Status: DISCONTINUED | OUTPATIENT
Start: 2025-08-13 | End: 2025-08-17 | Stop reason: HOSPADM

## 2025-08-13 RX ADMIN — SODIUM CHLORIDE, PRESERVATIVE FREE 20 ML: 5 INJECTION INTRAVENOUS at 07:45

## 2025-08-13 RX ADMIN — FLUDEOXYGLUCOSE F 18 12.28 MILLICURIE: 200 INJECTION, SOLUTION INTRAVENOUS at 07:45

## 2025-08-13 RX ADMIN — DIATRIZOATE MEGLUMINE AND DIATRIZOATE SODIUM 10 ML: 660; 100 LIQUID ORAL; RECTAL at 07:45

## 2025-08-14 ENCOUNTER — OFFICE VISIT (OUTPATIENT)
Dept: FAMILY MEDICINE CLINIC | Facility: CLINIC | Age: 65
End: 2025-08-14
Payer: COMMERCIAL

## 2025-08-14 VITALS
BODY MASS INDEX: 50.84 KG/M2 | DIASTOLIC BLOOD PRESSURE: 82 MMHG | WEIGHT: 287 LBS | OXYGEN SATURATION: 91 % | HEART RATE: 99 BPM | TEMPERATURE: 97.1 F | SYSTOLIC BLOOD PRESSURE: 134 MMHG

## 2025-08-14 DIAGNOSIS — I48.0 PAROXYSMAL ATRIAL FIBRILLATION (HCC): ICD-10-CM

## 2025-08-14 DIAGNOSIS — I50.32 CHRONIC HEART FAILURE WITH PRESERVED EJECTION FRACTION (HCC): ICD-10-CM

## 2025-08-14 DIAGNOSIS — E03.9 ACQUIRED HYPOTHYROIDISM: ICD-10-CM

## 2025-08-14 DIAGNOSIS — I10 PRIMARY HYPERTENSION: Primary | ICD-10-CM

## 2025-08-14 DIAGNOSIS — C85.80 MARGINAL ZONE LYMPHOMA (HCC): ICD-10-CM

## 2025-08-14 DIAGNOSIS — Z00.00 ROUTINE HEALTH MAINTENANCE: ICD-10-CM

## 2025-08-14 PROCEDURE — 3079F DIAST BP 80-89 MM HG: CPT | Performed by: FAMILY MEDICINE

## 2025-08-14 PROCEDURE — 3075F SYST BP GE 130 - 139MM HG: CPT | Performed by: FAMILY MEDICINE

## 2025-08-14 PROCEDURE — 99397 PER PM REEVAL EST PAT 65+ YR: CPT | Performed by: FAMILY MEDICINE

## 2025-08-14 SDOH — ECONOMIC STABILITY: FOOD INSECURITY: WITHIN THE PAST 12 MONTHS, YOU WORRIED THAT YOUR FOOD WOULD RUN OUT BEFORE YOU GOT MONEY TO BUY MORE.: NEVER TRUE

## 2025-08-14 SDOH — ECONOMIC STABILITY: FOOD INSECURITY: WITHIN THE PAST 12 MONTHS, THE FOOD YOU BOUGHT JUST DIDN'T LAST AND YOU DIDN'T HAVE MONEY TO GET MORE.: NEVER TRUE

## 2025-08-14 ASSESSMENT — ENCOUNTER SYMPTOMS
EYES NEGATIVE: 1
GASTROINTESTINAL NEGATIVE: 1
RESPIRATORY NEGATIVE: 1

## 2025-08-16 DIAGNOSIS — J45.21 MILD INTERMITTENT ASTHMA WITH (ACUTE) EXACERBATION: Primary | ICD-10-CM

## 2025-08-18 DIAGNOSIS — R93.89 ABNORMAL CT SCAN: ICD-10-CM

## 2025-08-18 DIAGNOSIS — C85.88 MARGINAL ZONE LYMPHOMA OF LYMPH NODES OF MULTIPLE SITES (HCC): Primary | ICD-10-CM

## 2025-08-18 DIAGNOSIS — D80.1 HYPOGAMMAGLOBULINEMIA: ICD-10-CM

## 2025-08-18 RX ORDER — ALBUTEROL SULFATE 90 UG/1
2 INHALANT RESPIRATORY (INHALATION) EVERY 4 HOURS
Qty: 18 EACH | Refills: 5 | Status: SHIPPED | OUTPATIENT
Start: 2025-08-18

## 2025-08-28 ENCOUNTER — HOSPITAL ENCOUNTER (OUTPATIENT)
Dept: MRI IMAGING | Age: 65
Discharge: HOME | End: 2025-08-30
Attending: INTERNAL MEDICINE
Payer: COMMERCIAL

## 2025-08-28 DIAGNOSIS — R93.89 ABNORMAL CT SCAN: ICD-10-CM

## 2025-08-28 DIAGNOSIS — C85.88 MARGINAL ZONE LYMPHOMA OF LYMPH NODES OF MULTIPLE SITES (HCC): ICD-10-CM

## 2025-08-28 DIAGNOSIS — D80.1 HYPOGAMMAGLOBULINEMIA: ICD-10-CM

## 2025-08-28 PROCEDURE — 73223 MRI JOINT UPR EXTR W/O&W/DYE: CPT

## 2025-08-28 PROCEDURE — A9579 GAD-BASE MR CONTRAST NOS,1ML: HCPCS | Performed by: INTERNAL MEDICINE

## 2025-08-28 PROCEDURE — 6360000004 HC RX CONTRAST MEDICATION: Performed by: INTERNAL MEDICINE

## 2025-08-28 RX ORDER — GADOTERIDOL 279.3 MG/ML
26 INJECTION INTRAVENOUS
Status: COMPLETED | OUTPATIENT
Start: 2025-08-28 | End: 2025-08-28

## 2025-08-28 RX ADMIN — GADOTERIDOL 26 ML: 279.3 INJECTION, SOLUTION INTRAVENOUS at 20:05

## 2025-09-03 ENCOUNTER — OFFICE VISIT (OUTPATIENT)
Dept: ONCOLOGY | Age: 65
End: 2025-09-03
Payer: COMMERCIAL

## 2025-09-03 ENCOUNTER — HOSPITAL ENCOUNTER (OUTPATIENT)
Dept: LAB | Age: 65
Discharge: HOME | End: 2025-09-03
Payer: COMMERCIAL

## 2025-09-03 VITALS
RESPIRATION RATE: 24 BRPM | OXYGEN SATURATION: 97 % | SYSTOLIC BLOOD PRESSURE: 130 MMHG | WEIGHT: 293 LBS | BODY MASS INDEX: 51.91 KG/M2 | DIASTOLIC BLOOD PRESSURE: 72 MMHG | HEIGHT: 63 IN | TEMPERATURE: 98.8 F | HEART RATE: 81 BPM

## 2025-09-03 DIAGNOSIS — D50.9 IRON DEFICIENCY ANEMIA, UNSPECIFIED IRON DEFICIENCY ANEMIA TYPE: ICD-10-CM

## 2025-09-03 DIAGNOSIS — D80.1 HYPOGAMMAGLOBULINEMIA: ICD-10-CM

## 2025-09-03 DIAGNOSIS — D64.9 ANEMIA, UNSPECIFIED TYPE: ICD-10-CM

## 2025-09-03 DIAGNOSIS — E03.9 HYPOTHYROIDISM, UNSPECIFIED TYPE: ICD-10-CM

## 2025-09-03 DIAGNOSIS — C85.88 MARGINAL ZONE LYMPHOMA OF LYMPH NODES OF MULTIPLE SITES (HCC): Primary | ICD-10-CM

## 2025-09-03 DIAGNOSIS — C85.88 MARGINAL ZONE LYMPHOMA OF LYMPH NODES OF MULTIPLE SITES (HCC): ICD-10-CM

## 2025-09-03 LAB
ALBUMIN SERPL-MCNC: 3.3 G/DL (ref 3.2–4.6)
ALBUMIN/GLOB SERPL: 1.2 (ref 1–1.9)
ALP SERPL-CCNC: 71 U/L (ref 35–104)
ALT SERPL-CCNC: 55 U/L (ref 8–45)
ANION GAP SERPL CALC-SCNC: 10 MMOL/L (ref 7–16)
AST SERPL-CCNC: 40 U/L (ref 15–37)
BASOPHILS # BLD: 0.02 K/UL (ref 0–0.2)
BASOPHILS NFR BLD: 0.3 % (ref 0–2)
BILIRUB SERPL-MCNC: 0.6 MG/DL (ref 0–1.2)
BUN SERPL-MCNC: 13 MG/DL (ref 8–23)
CALCIUM SERPL-MCNC: 9.2 MG/DL (ref 8.8–10.2)
CHLORIDE SERPL-SCNC: 101 MMOL/L (ref 98–107)
CO2 SERPL-SCNC: 26 MMOL/L (ref 20–29)
CREAT SERPL-MCNC: 1.06 MG/DL (ref 0.6–1.1)
DIFFERENTIAL METHOD BLD: ABNORMAL
EOSINOPHIL # BLD: 0.05 K/UL (ref 0–0.8)
EOSINOPHIL NFR BLD: 0.8 % (ref 0.5–7.8)
ERYTHROCYTE [DISTWIDTH] IN BLOOD BY AUTOMATED COUNT: 16.7 % (ref 11.9–14.6)
FERRITIN SERPL-MCNC: 34 NG/ML (ref 8–388)
GLOBULIN SER CALC-MCNC: 2.7 G/DL (ref 2.3–3.5)
GLUCOSE SERPL-MCNC: 110 MG/DL (ref 70–99)
HAPTOGLOB SERPL-MCNC: 110 MG/DL (ref 30–200)
HCT VFR BLD AUTO: 30.6 % (ref 35.8–46.3)
HGB BLD-MCNC: 9.1 G/DL (ref 11.7–15.4)
HGB RETIC QN AUTO: 24 PG (ref 29–35)
IGG SERPL-MCNC: 542 MG/DL (ref 700–1600)
IMM GRANULOCYTES # BLD AUTO: 0.02 K/UL (ref 0–0.5)
IMM GRANULOCYTES NFR BLD AUTO: 0.3 % (ref 0–5)
IMM RETICS NFR: 20 % (ref 3–15.9)
IRON SATN MFR SERPL: 9 % (ref 20–50)
IRON SERPL-MCNC: 33 UG/DL (ref 35–100)
LDH SERPL L TO P-CCNC: 271 U/L (ref 127–281)
LYMPHOCYTES # BLD: 0.9 K/UL (ref 0.5–4.6)
LYMPHOCYTES NFR BLD: 14 % (ref 13–44)
MCH RBC QN AUTO: 27 PG (ref 26.1–32.9)
MCHC RBC AUTO-ENTMCNC: 29.7 G/DL (ref 31.4–35)
MCV RBC AUTO: 90.8 FL (ref 82–102)
MONOCYTES # BLD: 0.24 K/UL (ref 0.1–1.3)
MONOCYTES NFR BLD: 3.7 % (ref 4–12)
NEUTS SEG # BLD: 5.2 K/UL (ref 1.7–8.2)
NEUTS SEG NFR BLD: 80.9 % (ref 43–78)
NRBC # BLD: 0 K/UL (ref 0–0.2)
PLATELET # BLD AUTO: 75 K/UL (ref 150–450)
PLATELETS.RETICULATED NFR BLD AUTO: 6.3 % (ref 1.8–7.9)
PMV BLD AUTO: 12 FL (ref 9.4–12.3)
POTASSIUM SERPL-SCNC: 4.2 MMOL/L (ref 3.5–5.1)
PROT SERPL-MCNC: 5.9 G/DL (ref 6.3–8.2)
RBC # BLD AUTO: 3.37 M/UL (ref 4.05–5.2)
RETICS # AUTO: 0.07 M/UL (ref 0.03–0.1)
RETICS/RBC NFR AUTO: 2.1 % (ref 0.3–2)
SODIUM SERPL-SCNC: 137 MMOL/L (ref 136–145)
T4 FREE SERPL-MCNC: 1.5 NG/DL (ref 0.9–1.7)
TIBC SERPL-MCNC: 366 UG/DL (ref 240–450)
TSH, 3RD GENERATION: 2.42 UIU/ML (ref 0.27–4.2)
UIBC SERPL-MCNC: 333 UG/DL (ref 112–347)
WBC # BLD AUTO: 6.4 K/UL (ref 4.3–11.1)

## 2025-09-03 PROCEDURE — 80053 COMPREHEN METABOLIC PANEL: CPT

## 2025-09-03 PROCEDURE — 1123F ACP DISCUSS/DSCN MKR DOCD: CPT | Performed by: INTERNAL MEDICINE

## 2025-09-03 PROCEDURE — 84443 ASSAY THYROID STIM HORMONE: CPT

## 2025-09-03 PROCEDURE — 36415 COLL VENOUS BLD VENIPUNCTURE: CPT

## 2025-09-03 PROCEDURE — 83550 IRON BINDING TEST: CPT

## 2025-09-03 PROCEDURE — 85055 RETICULATED PLATELET ASSAY: CPT

## 2025-09-03 PROCEDURE — 84439 ASSAY OF FREE THYROXINE: CPT

## 2025-09-03 PROCEDURE — 82728 ASSAY OF FERRITIN: CPT

## 2025-09-03 PROCEDURE — 3075F SYST BP GE 130 - 139MM HG: CPT | Performed by: INTERNAL MEDICINE

## 2025-09-03 PROCEDURE — 3078F DIAST BP <80 MM HG: CPT | Performed by: INTERNAL MEDICINE

## 2025-09-03 PROCEDURE — 85025 COMPLETE CBC W/AUTO DIFF WBC: CPT

## 2025-09-03 PROCEDURE — 83615 LACTATE (LD) (LDH) ENZYME: CPT

## 2025-09-03 PROCEDURE — 83540 ASSAY OF IRON: CPT

## 2025-09-03 PROCEDURE — 99214 OFFICE O/P EST MOD 30 MIN: CPT | Performed by: INTERNAL MEDICINE

## 2025-09-03 PROCEDURE — 82784 ASSAY IGA/IGD/IGG/IGM EACH: CPT

## 2025-09-03 PROCEDURE — 85046 RETICYTE/HGB CONCENTRATE: CPT

## 2025-09-03 PROCEDURE — 83010 ASSAY OF HAPTOGLOBIN QUANT: CPT

## 2025-09-03 RX ORDER — BUDESONIDE 3 MG/1
9 CAPSULE, COATED PELLETS ORAL DAILY
Qty: 90 CAPSULE | Refills: 2 | Status: SHIPPED | OUTPATIENT
Start: 2025-09-03

## 2025-09-03 RX ORDER — ALBUTEROL SULFATE 0.83 MG/ML
2.5 SOLUTION RESPIRATORY (INHALATION)
OUTPATIENT
Start: 2025-09-03

## 2025-09-03 RX ORDER — LIDOCAINE HYDROCHLORIDE 10 MG/ML
0.25 INJECTION, SOLUTION EPIDURAL; INFILTRATION; INTRACAUDAL; PERINEURAL
OUTPATIENT
Start: 2025-09-03

## 2025-09-03 RX ORDER — DEXTROSE MONOHYDRATE 50 MG/ML
5-250 INJECTION, SOLUTION INTRAVENOUS PRN
OUTPATIENT
Start: 2025-09-03

## 2025-09-03 RX ORDER — SODIUM CHLORIDE 9 MG/ML
5-250 INJECTION, SOLUTION INTRAVENOUS PRN
OUTPATIENT
Start: 2025-09-03

## 2025-09-03 RX ORDER — PREDNISONE 10 MG/1
TABLET ORAL
COMMUNITY
Start: 2025-08-04

## 2025-09-03 ASSESSMENT — PATIENT HEALTH QUESTIONNAIRE - PHQ9
SUM OF ALL RESPONSES TO PHQ QUESTIONS 1-9: 0
SUM OF ALL RESPONSES TO PHQ QUESTIONS 1-9: 0
2. FEELING DOWN, DEPRESSED OR HOPELESS: NOT AT ALL
SUM OF ALL RESPONSES TO PHQ QUESTIONS 1-9: 0
SUM OF ALL RESPONSES TO PHQ QUESTIONS 1-9: 0
1. LITTLE INTEREST OR PLEASURE IN DOING THINGS: NOT AT ALL

## 2025-09-04 ENCOUNTER — CLINICAL SUPPORT (OUTPATIENT)
Age: 65
End: 2025-09-04

## 2025-09-04 VITALS
SYSTOLIC BLOOD PRESSURE: 112 MMHG | TEMPERATURE: 98.2 F | DIASTOLIC BLOOD PRESSURE: 57 MMHG | OXYGEN SATURATION: 96 % | HEART RATE: 66 BPM | RESPIRATION RATE: 18 BRPM

## 2025-09-04 DIAGNOSIS — D80.1 HYPOGAMMAGLOBULINEMIA: Primary | ICD-10-CM

## 2025-09-04 RX ORDER — DIPHENHYDRAMINE HYDROCHLORIDE 50 MG/ML
50 INJECTION, SOLUTION INTRAMUSCULAR; INTRAVENOUS ONCE
Status: COMPLETED | OUTPATIENT
Start: 2025-09-04 | End: 2025-09-04

## 2025-09-04 RX ORDER — SODIUM CHLORIDE 9 MG/ML
INJECTION, SOLUTION INTRAVENOUS CONTINUOUS
OUTPATIENT
Start: 2025-09-04

## 2025-09-04 RX ORDER — SODIUM CHLORIDE 9 MG/ML
5-250 INJECTION, SOLUTION INTRAVENOUS PRN
OUTPATIENT
Start: 2025-09-04

## 2025-09-04 RX ORDER — DIPHENHYDRAMINE HYDROCHLORIDE 50 MG/ML
50 INJECTION, SOLUTION INTRAMUSCULAR; INTRAVENOUS
OUTPATIENT
Start: 2025-09-04

## 2025-09-04 RX ORDER — ALBUTEROL SULFATE 90 UG/1
4 INHALANT RESPIRATORY (INHALATION) PRN
OUTPATIENT
Start: 2025-09-04

## 2025-09-04 RX ORDER — HYDROCORTISONE SODIUM SUCCINATE 100 MG/2ML
100 INJECTION INTRAMUSCULAR; INTRAVENOUS
OUTPATIENT
Start: 2025-09-04

## 2025-09-04 RX ORDER — HEPARIN 100 UNIT/ML
500 SYRINGE INTRAVENOUS PRN
OUTPATIENT
Start: 2025-09-04

## 2025-09-04 RX ORDER — ONDANSETRON 2 MG/ML
8 INJECTION INTRAMUSCULAR; INTRAVENOUS
OUTPATIENT
Start: 2025-09-04

## 2025-09-04 RX ORDER — EPINEPHRINE 1 MG/ML
0.3 INJECTION, SOLUTION, CONCENTRATE INTRAVENOUS PRN
OUTPATIENT
Start: 2025-09-04

## 2025-09-04 RX ORDER — MEPERIDINE HYDROCHLORIDE 25 MG/ML
12.5 INJECTION INTRAMUSCULAR; INTRAVENOUS; SUBCUTANEOUS PRN
OUTPATIENT
Start: 2025-09-04

## 2025-09-04 RX ORDER — ACETAMINOPHEN 325 MG/1
650 TABLET ORAL
Status: COMPLETED | OUTPATIENT
Start: 2025-09-04 | End: 2025-09-04

## 2025-09-04 RX ORDER — SODIUM CHLORIDE 0.9 % (FLUSH) 0.9 %
5-40 SYRINGE (ML) INJECTION PRN
OUTPATIENT
Start: 2025-09-04

## 2025-09-04 RX ORDER — ACETAMINOPHEN 325 MG/1
650 TABLET ORAL
Start: 2025-09-04

## 2025-09-04 RX ORDER — ACETAMINOPHEN 325 MG/1
650 TABLET ORAL
OUTPATIENT
Start: 2025-09-04

## 2025-09-04 RX ORDER — DIPHENHYDRAMINE HYDROCHLORIDE 50 MG/ML
50 INJECTION, SOLUTION INTRAMUSCULAR; INTRAVENOUS ONCE
Start: 2025-09-04 | End: 2025-09-04

## 2025-09-04 RX ADMIN — DIPHENHYDRAMINE HYDROCHLORIDE 50 MG: 50 INJECTION, SOLUTION INTRAMUSCULAR; INTRAVENOUS at 08:47

## 2025-09-04 RX ADMIN — ACETAMINOPHEN 650 MG: 325 TABLET ORAL at 08:48

## (undated) DEVICE — STRIP,CLOSURE,WOUND,MEDI-STRIP,1/2X4: Brand: MEDLINE

## (undated) DEVICE — KENDALL RADIOLUCENT FOAM MONITORING ELECTRODE RECTANGULAR SHAPE: Brand: KENDALL

## (undated) DEVICE — APPLIER CLP L9.375IN APER 2.1MM CLS L3.8MM 20 SM TI CLP

## (undated) DEVICE — KIT PROCEDURE SURG HEAD AND NECK TOTE

## (undated) DEVICE — SUTURE ETHLN SZ 5-0 L18IN NONABSORBABLE BLK P-3 L13MM 3/8 698G

## (undated) DEVICE — SHEET, T, LAPAROTOMY, STERILE: Brand: MEDLINE

## (undated) DEVICE — MOUTHPIECE ENDOSCP 20X27MM --

## (undated) DEVICE — AMD ANTIMICROBIAL NON-ADHERENT PAD,0.2% POLYHEXAMETHYLENE BIGUANIDE HCI (PHMB): Brand: TELFA

## (undated) DEVICE — SUTURE MCRYL SZ 5-0 L18IN ABSRB UD L13MM P-3 3/8 CIR PRIM Y493G

## (undated) DEVICE — SUTURE VCRL SZ 4-0 L27IN ABSRB UD L19MM PS-2 3/8 CIR PRIM J426H

## (undated) DEVICE — SUTURE VCRL SZ 3-0 L27IN ABSRB UD L26MM SH 1/2 CIR J416H

## (undated) DEVICE — HOOK RETRCT DIA5MM SHRP E STAY DISP FOR LONE STAR SELF RET

## (undated) DEVICE — NEEDLE HYPO 21GA L1.5IN INTRAMUSCULAR S STL LATCH BVL UP

## (undated) DEVICE — Device: Brand: MEDICAL ACTION INDUSTRIES

## (undated) DEVICE — PREMIUM WET SKIN PREP TRAY: Brand: MEDLINE INDUSTRIES, INC.

## (undated) DEVICE — SUTURE PERMAHAND SZ 3-0 L18IN NONABSORBABLE BLK SILK BRAID A184H

## (undated) DEVICE — SET INFUS 20GA L0.75IN 300PSI 5ML/SEC W/O Y INJ SITE ULT LO

## (undated) DEVICE — SUTURE PERMAHAND SZ 2-0 L12X18IN NONABSORBABLE BLK SILK A185H

## (undated) DEVICE — APPLIER CLP AUTO MED 9.75 IN TI SURGCLP SUPER INTLOK 20 DISP

## (undated) DEVICE — PENCIL ES L3M BTTN SWCH HOLSTER W/ BLDE ELECTRD EDGE

## (undated) DEVICE — SOLUTION IRRIG 1000ML 09% SOD CHL USP PIC PLAS CONTAINER

## (undated) DEVICE — INTENDED FOR TISSUE SEPARATION, AND OTHER PROCEDURES THAT REQUIRE A SHARP SURGICAL BLADE TO PUNCTURE OR CUT.: Brand: BARD-PARKER SAFETY BLADES SIZE 15, STERILE

## (undated) DEVICE — (D)PREP SKN CHLRAPRP APPL 26ML -- CONVERT TO ITEM 371833

## (undated) DEVICE — KENDALL SCD EXPRESS SLEEVES, KNEE LENGTH, LARGE: Brand: KENDALL SCD

## (undated) DEVICE — SPONGE SURG SM 3/8IN WHT PNUT DISECT RADPQ ST

## (undated) DEVICE — GLOVE ORANGE PI 7 1/2   MSG9075

## (undated) DEVICE — DRAPE,INSTRUMENT,MAGNETIC,10X16: Brand: MEDLINE

## (undated) DEVICE — REM POLYHESIVE ADULT PATIENT RETURN ELECTRODE: Brand: VALLEYLAB

## (undated) DEVICE — (D)STRIP SKN CLSR 0.5X4IN WHT --

## (undated) DEVICE — ELECTRODE PT RET AD L9FT HI MOIST COND ADH HYDRGEL CORDED

## (undated) DEVICE — SINGLE USE SUCTION VALVE MAJ-209: Brand: SINGLE USE SUCTION VALVE (STERILE)

## (undated) DEVICE — SURGICAL PROCEDURE PACK BASIC ST FRANCIS

## (undated) DEVICE — SOLUTION IV 1000ML 0.9% SOD CHL

## (undated) DEVICE — GARMENT,MEDLINE,DVT,INT,CALF,LG, GEN2: Brand: MEDLINE

## (undated) DEVICE — BLADE ES ELASTOMERIC COAT INSUL DURABLE BEND UPTO 90DEG

## (undated) DEVICE — BRONCHOSCOPY PACK: Brand: MEDLINE INDUSTRIES, INC.

## (undated) DEVICE — CORD ES L12FT BPLR FRCP

## (undated) DEVICE — DISSECTOR ENDOSCP L21CM TIP CURVATURE 40DEG FN CRV JAW VES

## (undated) DEVICE — INTRODUCER TUBE COUDE TIP AD 15 FRX70 CM CALIB POLYETH DISP

## (undated) DEVICE — SINGLE USE BIOPSY VALVE MAJ-210: Brand: SINGLE USE BIOPSY VALVE (STERILE)

## (undated) DEVICE — SYR LR LCK 1ML GRAD NSAF 30ML --

## (undated) DEVICE — MASTISOL ADHESIVE LIQ 2/3ML

## (undated) DEVICE — 3M™ TEGADERM™ TRANSPARENT FILM DRESSING FRAME STYLE, 1626W, 4 IN X 4-3/4 IN (10 CM X 12 CM), 50/CT 4CT/CASE: Brand: 3M™ TEGADERM™